# Patient Record
Sex: FEMALE | Race: BLACK OR AFRICAN AMERICAN | Employment: OTHER | URBAN - METROPOLITAN AREA
[De-identification: names, ages, dates, MRNs, and addresses within clinical notes are randomized per-mention and may not be internally consistent; named-entity substitution may affect disease eponyms.]

---

## 2017-02-25 ENCOUNTER — APPOINTMENT (OUTPATIENT)
Dept: GENERAL RADIOLOGY | Age: 72
DRG: 246 | End: 2017-02-25
Payer: MEDICARE

## 2017-02-25 ENCOUNTER — HOSPITAL ENCOUNTER (INPATIENT)
Age: 72
LOS: 3 days | Discharge: HOME | DRG: 246 | End: 2017-03-01
Attending: EMERGENCY MEDICINE | Admitting: INTERNAL MEDICINE
Payer: MEDICARE

## 2017-02-25 DIAGNOSIS — E11.42 TYPE 2 DIABETES MELLITUS WITH DIABETIC POLYNEUROPATHY, WITH LONG-TERM CURRENT USE OF INSULIN (HCC): ICD-10-CM

## 2017-02-25 DIAGNOSIS — I21.3 ST ELEVATION MYOCARDIAL INFARCTION (STEMI), UNSPECIFIED ARTERY (HCC): Primary | ICD-10-CM

## 2017-02-25 DIAGNOSIS — Z79.4 TYPE 2 DIABETES MELLITUS WITH DIABETIC POLYNEUROPATHY, WITH LONG-TERM CURRENT USE OF INSULIN (HCC): ICD-10-CM

## 2017-02-25 LAB
POC CREATININE: 0.8 MG/DL (ref 0.6–1.4)
POC TROPONIN I: 0 NG/ML (ref 0–0.1)
POC TROPONIN INTERP: NORMAL

## 2017-02-25 PROCEDURE — 82565 ASSAY OF CREATININE: CPT

## 2017-02-25 PROCEDURE — B205YZZ PLAIN RADIOGRAPHY OF LEFT HEART USING OTHER CONTRAST: ICD-10-PCS | Performed by: EMERGENCY MEDICINE

## 2017-02-25 PROCEDURE — 6360000002 HC RX W HCPCS

## 2017-02-25 PROCEDURE — 92928 PRQ TCAT PLMT NTRAC ST 1 LES: CPT | Performed by: INTERNAL MEDICINE

## 2017-02-25 PROCEDURE — 92929 HC PRQ CARD STENT W/ANGIO ADDL: CPT | Performed by: INTERNAL MEDICINE

## 2017-02-25 PROCEDURE — C1769 GUIDE WIRE: HCPCS

## 2017-02-25 PROCEDURE — C1874 STENT, COATED/COV W/DEL SYS: HCPCS

## 2017-02-25 PROCEDURE — 93005 ELECTROCARDIOGRAM TRACING: CPT

## 2017-02-25 PROCEDURE — 6360000002 HC RX W HCPCS: Performed by: EMERGENCY MEDICINE

## 2017-02-25 PROCEDURE — 4A023N7 MEASUREMENT OF CARDIAC SAMPLING AND PRESSURE, LEFT HEART, PERCUTANEOUS APPROACH: ICD-10-PCS | Performed by: EMERGENCY MEDICINE

## 2017-02-25 PROCEDURE — 027337Z DILATION OF CORONARY ARTERY, FOUR OR MORE ARTERIES WITH FOUR OR MORE DRUG-ELUTING INTRALUMINAL DEVICES, PERCUTANEOUS APPROACH: ICD-10-PCS | Performed by: EMERGENCY MEDICINE

## 2017-02-25 PROCEDURE — C1887 CATHETER, GUIDING: HCPCS

## 2017-02-25 PROCEDURE — 99285 EMERGENCY DEPT VISIT HI MDM: CPT

## 2017-02-25 PROCEDURE — B200YZZ PLAIN RADIOGRAPHY OF SINGLE CORONARY ARTERY USING OTHER CONTRAST: ICD-10-PCS | Performed by: EMERGENCY MEDICINE

## 2017-02-25 PROCEDURE — 92941 PRQ TRLML REVSC TOT OCCL AMI: CPT | Performed by: INTERNAL MEDICINE

## 2017-02-25 PROCEDURE — C1894 INTRO/SHEATH, NON-LASER: HCPCS

## 2017-02-25 PROCEDURE — C1768 GRAFT, VASCULAR: HCPCS

## 2017-02-25 PROCEDURE — 84484 ASSAY OF TROPONIN QUANT: CPT

## 2017-02-25 PROCEDURE — 71010 XR CHEST PORTABLE: CPT

## 2017-02-25 PROCEDURE — C1725 CATH, TRANSLUMIN NON-LASER: HCPCS

## 2017-02-25 PROCEDURE — 93458 L HRT ARTERY/VENTRICLE ANGIO: CPT | Performed by: INTERNAL MEDICINE

## 2017-02-25 RX ORDER — HEPARIN SODIUM 1000 [USP'U]/ML
4000 INJECTION, SOLUTION INTRAVENOUS; SUBCUTANEOUS PRN
Status: DISCONTINUED | OUTPATIENT
Start: 2017-02-25 | End: 2017-02-26

## 2017-02-25 RX ORDER — HEPARIN SODIUM 1000 [USP'U]/ML
2000 INJECTION, SOLUTION INTRAVENOUS; SUBCUTANEOUS PRN
Status: DISCONTINUED | OUTPATIENT
Start: 2017-02-25 | End: 2017-02-26

## 2017-02-25 RX ORDER — FENTANYL CITRATE 50 UG/ML
50 INJECTION, SOLUTION INTRAMUSCULAR; INTRAVENOUS ONCE
Status: COMPLETED | OUTPATIENT
Start: 2017-02-25 | End: 2017-02-25

## 2017-02-25 RX ORDER — HEPARIN SODIUM 10000 [USP'U]/100ML
INJECTION, SOLUTION INTRAVENOUS
Status: COMPLETED
Start: 2017-02-25 | End: 2017-02-25

## 2017-02-25 RX ORDER — HEPARIN SODIUM 1000 [USP'U]/ML
INJECTION, SOLUTION INTRAVENOUS; SUBCUTANEOUS
Status: COMPLETED
Start: 2017-02-25 | End: 2017-02-25

## 2017-02-25 RX ORDER — HEPARIN SODIUM 10000 [USP'U]/100ML
1000 INJECTION, SOLUTION INTRAVENOUS CONTINUOUS
Status: DISCONTINUED | OUTPATIENT
Start: 2017-02-25 | End: 2017-02-26

## 2017-02-25 RX ORDER — HEPARIN SODIUM 1000 [USP'U]/ML
4000 INJECTION, SOLUTION INTRAVENOUS; SUBCUTANEOUS ONCE
Status: COMPLETED | OUTPATIENT
Start: 2017-02-25 | End: 2017-02-25

## 2017-02-25 RX ORDER — ONDANSETRON 2 MG/ML
4 INJECTION INTRAMUSCULAR; INTRAVENOUS ONCE
Status: COMPLETED | OUTPATIENT
Start: 2017-02-25 | End: 2017-02-25

## 2017-02-25 RX ORDER — 0.9 % SODIUM CHLORIDE 0.9 %
1000 INTRAVENOUS SOLUTION INTRAVENOUS ONCE
Status: DISCONTINUED | OUTPATIENT
Start: 2017-02-25 | End: 2017-03-01 | Stop reason: HOSPADM

## 2017-02-25 RX ADMIN — HEPARIN SODIUM 1000 UNITS/HR: 10000 INJECTION, SOLUTION INTRAVENOUS at 23:28

## 2017-02-25 RX ADMIN — HEPARIN SODIUM AND DEXTROSE 1000 UNITS/HR: 10000; 5 INJECTION INTRAVENOUS at 23:28

## 2017-02-25 RX ADMIN — ONDANSETRON 4 MG: 2 INJECTION, SOLUTION INTRAMUSCULAR; INTRAVENOUS at 23:18

## 2017-02-25 RX ADMIN — HEPARIN SODIUM 4000 UNITS: 1000 INJECTION, SOLUTION INTRAVENOUS; SUBCUTANEOUS at 23:27

## 2017-02-25 RX ADMIN — FENTANYL CITRATE 50 MCG: 50 INJECTION, SOLUTION INTRAMUSCULAR; INTRAVENOUS at 23:18

## 2017-02-25 ASSESSMENT — PAIN DESCRIPTION - LOCATION: LOCATION: CHEST

## 2017-02-25 ASSESSMENT — PAIN DESCRIPTION - DESCRIPTORS: DESCRIPTORS: BURNING

## 2017-02-25 ASSESSMENT — PAIN DESCRIPTION - FREQUENCY: FREQUENCY: CONTINUOUS

## 2017-02-25 ASSESSMENT — PAIN DESCRIPTION - ORIENTATION: ORIENTATION: MID

## 2017-02-25 ASSESSMENT — PAIN SCALES - GENERAL
PAINLEVEL_OUTOF10: 10
PAINLEVEL_OUTOF10: 10

## 2017-02-25 ASSESSMENT — PAIN DESCRIPTION - PAIN TYPE: TYPE: ACUTE PAIN

## 2017-02-26 PROBLEM — I21.3 ST ELEVATION MYOCARDIAL INFARCTION (STEMI) (HCC): Status: ACTIVE | Noted: 2017-02-26

## 2017-02-26 PROBLEM — I21.11 ST ELEVATION (STEMI) MYOCARDIAL INFARCTION INVOLVING RIGHT CORONARY ARTERY (HCC): Status: ACTIVE | Noted: 2017-02-26

## 2017-02-26 LAB
ABSOLUTE EOS #: 0.1 K/UL (ref 0–0.4)
ABSOLUTE LYMPH #: 2 K/UL (ref 1–4.8)
ABSOLUTE MONO #: 0.4 K/UL (ref 0.1–1.2)
ALBUMIN SERPL-MCNC: 3.9 G/DL (ref 3.5–5.2)
ALBUMIN/GLOBULIN RATIO: 1.6 (ref 1–2.5)
ALP BLD-CCNC: 102 U/L (ref 35–104)
ALT SERPL-CCNC: 25 U/L (ref 5–33)
ANION GAP SERPL CALCULATED.3IONS-SCNC: 13 MMOL/L (ref 9–17)
AST SERPL-CCNC: 91 U/L
BASOPHILS # BLD: 1 % (ref 0–2)
BASOPHILS ABSOLUTE: 0.1 K/UL (ref 0–0.2)
BILIRUB SERPL-MCNC: 0.23 MG/DL (ref 0.3–1.2)
BUN BLDV-MCNC: 14 MG/DL (ref 8–23)
BUN/CREAT BLD: ABNORMAL (ref 9–20)
CALCIUM SERPL-MCNC: 8.9 MG/DL (ref 8.6–10.4)
CHLORIDE BLD-SCNC: 93 MMOL/L (ref 98–107)
CHOLESTEROL/HDL RATIO: 4.7
CHOLESTEROL: 249 MG/DL
CO2: 25 MMOL/L (ref 20–31)
CREAT SERPL-MCNC: 0.52 MG/DL (ref 0.5–0.9)
DIFFERENTIAL TYPE: NORMAL
EOSINOPHILS RELATIVE PERCENT: 2 % (ref 1–4)
ESTIMATED AVERAGE GLUCOSE: 266 MG/DL
GFR AFRICAN AMERICAN: >60 ML/MIN
GFR NON-AFRICAN AMERICAN: >60 ML/MIN
GFR SERPL CREATININE-BSD FRML MDRD: ABNORMAL ML/MIN/{1.73_M2}
GFR SERPL CREATININE-BSD FRML MDRD: ABNORMAL ML/MIN/{1.73_M2}
GLUCOSE BLD-MCNC: 246 MG/DL (ref 65–105)
GLUCOSE BLD-MCNC: 252 MG/DL (ref 65–105)
GLUCOSE BLD-MCNC: 320 MG/DL (ref 65–105)
GLUCOSE BLD-MCNC: 327 MG/DL (ref 70–99)
GLUCOSE BLD-MCNC: 335 MG/DL (ref 65–105)
HBA1C MFR BLD: 10.9 % (ref 4–6)
HCT VFR BLD CALC: 37.6 % (ref 36–46)
HDLC SERPL-MCNC: 53 MG/DL
HEMOGLOBIN: 12.9 G/DL (ref 12–16)
LDL CHOLESTEROL: 156 MG/DL (ref 0–130)
LYMPHOCYTES # BLD: 27 % (ref 24–44)
MAGNESIUM: 1.9 MG/DL (ref 1.6–2.6)
MCH RBC QN AUTO: 30.2 PG (ref 26–34)
MCHC RBC AUTO-ENTMCNC: 34.4 G/DL (ref 31–37)
MCV RBC AUTO: 87.8 FL (ref 80–100)
MONOCYTES # BLD: 6 % (ref 2–11)
PDW BLD-RTO: 14.3 % (ref 12.5–15.4)
PLATELET # BLD: 160 K/UL (ref 140–450)
PLATELET ESTIMATE: NORMAL
PMV BLD AUTO: 9.2 FL (ref 6–12)
POTASSIUM SERPL-SCNC: 3.7 MMOL/L (ref 3.7–5.3)
RBC # BLD: 4.28 M/UL (ref 4–5.2)
RBC # BLD: NORMAL 10*6/UL
SEG NEUTROPHILS: 64 % (ref 36–66)
SEGMENTED NEUTROPHILS ABSOLUTE COUNT: 4.9 K/UL (ref 1.8–7.7)
SODIUM BLD-SCNC: 131 MMOL/L (ref 135–144)
TOTAL PROTEIN: 6.3 G/DL (ref 6.4–8.3)
TRIGL SERPL-MCNC: 199 MG/DL
TROPONIN INTERP: ABNORMAL
TROPONIN INTERP: ABNORMAL
TROPONIN T: 1 NG/ML
TROPONIN T: 2.36 NG/ML
VLDLC SERPL CALC-MCNC: ABNORMAL MG/DL (ref 1–30)
WBC # BLD: 7.6 K/UL (ref 3.5–11)
WBC # BLD: NORMAL 10*3/UL

## 2017-02-26 PROCEDURE — 6360000002 HC RX W HCPCS: Performed by: INTERNAL MEDICINE

## 2017-02-26 PROCEDURE — 80053 COMPREHEN METABOLIC PANEL: CPT

## 2017-02-26 PROCEDURE — 83735 ASSAY OF MAGNESIUM: CPT

## 2017-02-26 PROCEDURE — 2000000000 HC ICU R&B

## 2017-02-26 PROCEDURE — 82947 ASSAY GLUCOSE BLOOD QUANT: CPT

## 2017-02-26 PROCEDURE — 84484 ASSAY OF TROPONIN QUANT: CPT

## 2017-02-26 PROCEDURE — 2580000003 HC RX 258: Performed by: INTERNAL MEDICINE

## 2017-02-26 PROCEDURE — 83036 HEMOGLOBIN GLYCOSYLATED A1C: CPT

## 2017-02-26 PROCEDURE — 6360000002 HC RX W HCPCS

## 2017-02-26 PROCEDURE — 93005 ELECTROCARDIOGRAM TRACING: CPT

## 2017-02-26 PROCEDURE — 2500000003 HC RX 250 WO HCPCS

## 2017-02-26 PROCEDURE — 99221 1ST HOSP IP/OBS SF/LOW 40: CPT | Performed by: FAMILY MEDICINE

## 2017-02-26 PROCEDURE — 6370000000 HC RX 637 (ALT 250 FOR IP): Performed by: INTERNAL MEDICINE

## 2017-02-26 PROCEDURE — 6370000000 HC RX 637 (ALT 250 FOR IP): Performed by: FAMILY MEDICINE

## 2017-02-26 PROCEDURE — 36415 COLL VENOUS BLD VENIPUNCTURE: CPT

## 2017-02-26 PROCEDURE — 85025 COMPLETE CBC W/AUTO DIFF WBC: CPT

## 2017-02-26 PROCEDURE — 6370000000 HC RX 637 (ALT 250 FOR IP)

## 2017-02-26 PROCEDURE — 2580000003 HC RX 258

## 2017-02-26 PROCEDURE — 80061 LIPID PANEL: CPT

## 2017-02-26 RX ORDER — ATORVASTATIN CALCIUM 80 MG/1
80 TABLET, FILM COATED ORAL NIGHTLY
Status: DISCONTINUED | OUTPATIENT
Start: 2017-02-26 | End: 2017-03-01 | Stop reason: HOSPADM

## 2017-02-26 RX ORDER — GABAPENTIN 100 MG/1
200 CAPSULE ORAL 3 TIMES DAILY
Status: DISCONTINUED | OUTPATIENT
Start: 2017-02-26 | End: 2017-03-01 | Stop reason: HOSPADM

## 2017-02-26 RX ORDER — LORAZEPAM 0.5 MG/1
0.5 TABLET ORAL EVERY 8 HOURS PRN
Status: DISCONTINUED | OUTPATIENT
Start: 2017-02-26 | End: 2017-03-01 | Stop reason: HOSPADM

## 2017-02-26 RX ORDER — DEXTROSE MONOHYDRATE 25 G/50ML
12.5 INJECTION, SOLUTION INTRAVENOUS PRN
Status: DISCONTINUED | OUTPATIENT
Start: 2017-02-26 | End: 2017-03-01 | Stop reason: HOSPADM

## 2017-02-26 RX ORDER — NITROGLYCERIN 0.4 MG/1
0.4 TABLET SUBLINGUAL EVERY 5 MIN PRN
Status: DISCONTINUED | OUTPATIENT
Start: 2017-02-26 | End: 2017-03-01 | Stop reason: HOSPADM

## 2017-02-26 RX ORDER — PANTOPRAZOLE SODIUM 40 MG/1
40 TABLET, DELAYED RELEASE ORAL
Status: DISCONTINUED | OUTPATIENT
Start: 2017-02-26 | End: 2017-03-01 | Stop reason: HOSPADM

## 2017-02-26 RX ORDER — TRAMADOL HYDROCHLORIDE 50 MG/1
50 TABLET ORAL EVERY 8 HOURS PRN
Status: DISCONTINUED | OUTPATIENT
Start: 2017-02-26 | End: 2017-03-01 | Stop reason: HOSPADM

## 2017-02-26 RX ORDER — POTASSIUM CHLORIDE 20MEQ/15ML
40 LIQUID (ML) ORAL PRN
Status: DISCONTINUED | OUTPATIENT
Start: 2017-02-26 | End: 2017-03-01 | Stop reason: HOSPADM

## 2017-02-26 RX ORDER — LISINOPRIL 20 MG/1
20 TABLET ORAL DAILY
Status: DISCONTINUED | OUTPATIENT
Start: 2017-02-27 | End: 2017-03-01 | Stop reason: HOSPADM

## 2017-02-26 RX ORDER — POTASSIUM CHLORIDE 20 MEQ/1
40 TABLET, EXTENDED RELEASE ORAL PRN
Status: DISCONTINUED | OUTPATIENT
Start: 2017-02-26 | End: 2017-03-01 | Stop reason: HOSPADM

## 2017-02-26 RX ORDER — SPIRONOLACTONE 25 MG/1
25 TABLET ORAL DAILY
COMMUNITY
End: 2021-08-20 | Stop reason: SDUPTHER

## 2017-02-26 RX ORDER — ACETAMINOPHEN 325 MG/1
650 TABLET ORAL EVERY 4 HOURS PRN
Status: DISCONTINUED | OUTPATIENT
Start: 2017-02-26 | End: 2017-03-01 | Stop reason: HOSPADM

## 2017-02-26 RX ORDER — NIFEDIPINE 60 MG/1
60 TABLET, FILM COATED, EXTENDED RELEASE ORAL DAILY
Status: DISCONTINUED | OUTPATIENT
Start: 2017-02-27 | End: 2017-03-01 | Stop reason: HOSPADM

## 2017-02-26 RX ORDER — POTASSIUM CHLORIDE 7.45 MG/ML
10 INJECTION INTRAVENOUS PRN
Status: DISCONTINUED | OUTPATIENT
Start: 2017-02-26 | End: 2017-03-01 | Stop reason: HOSPADM

## 2017-02-26 RX ORDER — M-VIT,TX,IRON,MINS/CALC/FOLIC 27MG-0.4MG
1 TABLET ORAL DAILY
Status: DISCONTINUED | OUTPATIENT
Start: 2017-02-26 | End: 2017-03-01 | Stop reason: HOSPADM

## 2017-02-26 RX ORDER — ONDANSETRON 2 MG/ML
4 INJECTION INTRAMUSCULAR; INTRAVENOUS EVERY 6 HOURS PRN
Status: DISCONTINUED | OUTPATIENT
Start: 2017-02-26 | End: 2017-03-01 | Stop reason: HOSPADM

## 2017-02-26 RX ORDER — POTASSIUM CHLORIDE 20 MEQ/1
20 TABLET, EXTENDED RELEASE ORAL
Status: DISCONTINUED | OUTPATIENT
Start: 2017-02-27 | End: 2017-02-26

## 2017-02-26 RX ORDER — ASPIRIN 81 MG/1
81 TABLET ORAL DAILY
Status: DISCONTINUED | OUTPATIENT
Start: 2017-02-26 | End: 2017-03-01 | Stop reason: HOSPADM

## 2017-02-26 RX ORDER — HYDRALAZINE HYDROCHLORIDE 20 MG/ML
10 INJECTION INTRAMUSCULAR; INTRAVENOUS EVERY 10 MIN PRN
Status: DISCONTINUED | OUTPATIENT
Start: 2017-02-26 | End: 2017-03-01 | Stop reason: HOSPADM

## 2017-02-26 RX ORDER — OXYCODONE HYDROCHLORIDE AND ACETAMINOPHEN 5; 325 MG/1; MG/1
1 TABLET ORAL EVERY 8 HOURS PRN
Status: DISCONTINUED | OUTPATIENT
Start: 2017-02-26 | End: 2017-03-01 | Stop reason: HOSPADM

## 2017-02-26 RX ORDER — CYCLOBENZAPRINE HCL 10 MG
10 TABLET ORAL 3 TIMES DAILY PRN
Status: DISCONTINUED | OUTPATIENT
Start: 2017-02-26 | End: 2017-03-01 | Stop reason: HOSPADM

## 2017-02-26 RX ORDER — NICOTINE POLACRILEX 4 MG
15 LOZENGE BUCCAL PRN
Status: DISCONTINUED | OUTPATIENT
Start: 2017-02-26 | End: 2017-03-01 | Stop reason: HOSPADM

## 2017-02-26 RX ORDER — DEXTROSE MONOHYDRATE 50 MG/ML
100 INJECTION, SOLUTION INTRAVENOUS PRN
Status: DISCONTINUED | OUTPATIENT
Start: 2017-02-26 | End: 2017-03-01 | Stop reason: HOSPADM

## 2017-02-26 RX ORDER — SODIUM CHLORIDE 0.9 % (FLUSH) 0.9 %
10 SYRINGE (ML) INJECTION EVERY 12 HOURS SCHEDULED
Status: DISCONTINUED | OUTPATIENT
Start: 2017-02-26 | End: 2017-03-01 | Stop reason: HOSPADM

## 2017-02-26 RX ORDER — ONDANSETRON 4 MG/1
4 TABLET, FILM COATED ORAL EVERY 8 HOURS PRN
Status: DISCONTINUED | OUTPATIENT
Start: 2017-02-26 | End: 2017-03-01 | Stop reason: HOSPADM

## 2017-02-26 RX ORDER — NIFEDIPINE 30 MG/1
30 TABLET, EXTENDED RELEASE ORAL DAILY
Status: DISCONTINUED | OUTPATIENT
Start: 2017-02-26 | End: 2017-02-26

## 2017-02-26 RX ORDER — DOCUSATE SODIUM 100 MG/1
100 CAPSULE, LIQUID FILLED ORAL 2 TIMES DAILY
Status: DISCONTINUED | OUTPATIENT
Start: 2017-02-26 | End: 2017-03-01 | Stop reason: HOSPADM

## 2017-02-26 RX ORDER — GLIMEPIRIDE 2 MG/1
4 TABLET ORAL
Status: DISCONTINUED | OUTPATIENT
Start: 2017-02-26 | End: 2017-03-01 | Stop reason: HOSPADM

## 2017-02-26 RX ORDER — POTASSIUM BICARBONATE 25 MEQ/1
25 TABLET, EFFERVESCENT ORAL 2 TIMES DAILY
Status: DISCONTINUED | OUTPATIENT
Start: 2017-02-26 | End: 2017-02-26

## 2017-02-26 RX ORDER — ALISKIREN 150 MG/1
150 TABLET, FILM COATED ORAL DAILY
Status: DISCONTINUED | OUTPATIENT
Start: 2017-02-26 | End: 2017-02-26

## 2017-02-26 RX ORDER — SODIUM CHLORIDE 0.9 % (FLUSH) 0.9 %
10 SYRINGE (ML) INJECTION PRN
Status: DISCONTINUED | OUTPATIENT
Start: 2017-02-26 | End: 2017-03-01 | Stop reason: HOSPADM

## 2017-02-26 RX ORDER — SPIRONOLACTONE 25 MG/1
25 TABLET ORAL DAILY
Status: DISCONTINUED | OUTPATIENT
Start: 2017-02-26 | End: 2017-02-26 | Stop reason: SDUPTHER

## 2017-02-26 RX ORDER — INSULIN GLARGINE 100 [IU]/ML
60 INJECTION, SOLUTION SUBCUTANEOUS 2 TIMES DAILY
Status: DISCONTINUED | OUTPATIENT
Start: 2017-02-26 | End: 2017-02-27

## 2017-02-26 RX ORDER — SPIRONOLACTONE 25 MG/1
25 TABLET ORAL DAILY
Status: DISCONTINUED | OUTPATIENT
Start: 2017-02-26 | End: 2017-03-01 | Stop reason: HOSPADM

## 2017-02-26 RX ORDER — CARVEDILOL 25 MG/1
25 TABLET ORAL 2 TIMES DAILY WITH MEALS
Status: DISCONTINUED | OUTPATIENT
Start: 2017-02-26 | End: 2017-03-01 | Stop reason: HOSPADM

## 2017-02-26 RX ORDER — MELOXICAM 15 MG/1
15 TABLET ORAL DAILY
Status: ON HOLD | COMMUNITY
Start: 2016-07-27 | End: 2017-02-27 | Stop reason: HOSPADM

## 2017-02-26 RX ADMIN — GLIMEPIRIDE 4 MG: 2 TABLET ORAL at 09:43

## 2017-02-26 RX ADMIN — INSULIN LISPRO 3 UNITS: 100 INJECTION, SOLUTION INTRAVENOUS; SUBCUTANEOUS at 17:48

## 2017-02-26 RX ADMIN — ATORVASTATIN CALCIUM 80 MG: 80 TABLET, FILM COATED ORAL at 20:19

## 2017-02-26 RX ADMIN — DOCUSATE SODIUM 100 MG: 100 CAPSULE, LIQUID FILLED ORAL at 09:42

## 2017-02-26 RX ADMIN — NIFEDIPINE 30 MG: 30 TABLET, FILM COATED, EXTENDED RELEASE ORAL at 09:42

## 2017-02-26 RX ADMIN — TICAGRELOR 90 MG: 90 TABLET ORAL at 20:19

## 2017-02-26 RX ADMIN — DOCUSATE SODIUM 100 MG: 100 CAPSULE, LIQUID FILLED ORAL at 20:19

## 2017-02-26 RX ADMIN — INSULIN LISPRO 10 UNITS: 100 INJECTION, SOLUTION INTRAVENOUS; SUBCUTANEOUS at 16:31

## 2017-02-26 RX ADMIN — OXYCODONE HYDROCHLORIDE AND ACETAMINOPHEN 1 TABLET: 5; 325 TABLET ORAL at 01:41

## 2017-02-26 RX ADMIN — SPIRONOLACTONE 25 MG: 25 TABLET ORAL at 13:13

## 2017-02-26 RX ADMIN — CARVEDILOL 25 MG: 25 TABLET, FILM COATED ORAL at 16:37

## 2017-02-26 RX ADMIN — PANTOPRAZOLE SODIUM 40 MG: 40 TABLET, DELAYED RELEASE ORAL at 16:31

## 2017-02-26 RX ADMIN — TICAGRELOR 90 MG: 90 TABLET ORAL at 09:43

## 2017-02-26 RX ADMIN — ASPIRIN 81 MG: 81 TABLET, COATED ORAL at 09:43

## 2017-02-26 RX ADMIN — OXYCODONE HYDROCHLORIDE AND ACETAMINOPHEN 1 TABLET: 5; 325 TABLET ORAL at 16:30

## 2017-02-26 RX ADMIN — HYDRALAZINE HYDROCHLORIDE 10 MG: 20 INJECTION INTRAMUSCULAR; INTRAVENOUS at 04:07

## 2017-02-26 RX ADMIN — ATORVASTATIN CALCIUM 80 MG: 80 TABLET, FILM COATED ORAL at 04:07

## 2017-02-26 RX ADMIN — Medication 10 ML: at 20:15

## 2017-02-26 RX ADMIN — GABAPENTIN 200 MG: 100 CAPSULE ORAL at 14:42

## 2017-02-26 RX ADMIN — GABAPENTIN 200 MG: 100 CAPSULE ORAL at 20:19

## 2017-02-26 RX ADMIN — CARVEDILOL 25 MG: 25 TABLET, FILM COATED ORAL at 09:42

## 2017-02-26 RX ADMIN — INSULIN GLARGINE 60 UNITS: 100 INJECTION, SOLUTION SUBCUTANEOUS at 20:20

## 2017-02-26 RX ADMIN — MULTIPLE VITAMINS W/ MINERALS TAB 1 TABLET: TAB at 09:42

## 2017-02-26 RX ADMIN — GABAPENTIN 200 MG: 100 CAPSULE ORAL at 09:42

## 2017-02-26 RX ADMIN — ALISKIREN HEMIFUMARATE 150 MG: 150 TABLET, FILM COATED ORAL at 09:42

## 2017-02-26 ASSESSMENT — ENCOUNTER SYMPTOMS
SHORTNESS OF BREATH: 0
NAUSEA: 1
SHORTNESS OF BREATH: 1
SINUS PRESSURE: 0
WHEEZING: 0
COUGH: 0
NAUSEA: 0
CONSTIPATION: 0
DIARRHEA: 0
BLOOD IN STOOL: 0
SORE THROAT: 0
VOMITING: 0
VOICE CHANGE: 0
ABDOMINAL PAIN: 0

## 2017-02-26 ASSESSMENT — PAIN SCALES - GENERAL
PAINLEVEL_OUTOF10: 8
PAINLEVEL_OUTOF10: 7
PAINLEVEL_OUTOF10: 8

## 2017-02-26 ASSESSMENT — PAIN DESCRIPTION - PAIN TYPE: TYPE: ACUTE PAIN

## 2017-02-26 ASSESSMENT — PAIN DESCRIPTION - LOCATION: LOCATION: GROIN;CHEST

## 2017-02-27 ENCOUNTER — APPOINTMENT (OUTPATIENT)
Dept: GENERAL RADIOLOGY | Age: 72
DRG: 246 | End: 2017-02-27
Payer: MEDICARE

## 2017-02-27 LAB
ALBUMIN SERPL-MCNC: 3.5 G/DL (ref 3.5–5.2)
ALBUMIN/GLOBULIN RATIO: 1.6 (ref 1–2.5)
ALP BLD-CCNC: 85 U/L (ref 35–104)
ALT SERPL-CCNC: 21 U/L (ref 5–33)
ANION GAP SERPL CALCULATED.3IONS-SCNC: 11 MMOL/L (ref 9–17)
AST SERPL-CCNC: 54 U/L
BILIRUB SERPL-MCNC: 0.57 MG/DL (ref 0.3–1.2)
BILIRUBIN DIRECT: 0.14 MG/DL
BILIRUBIN, INDIRECT: 0.43 MG/DL (ref 0–1)
BUN BLDV-MCNC: 18 MG/DL (ref 8–23)
BUN/CREAT BLD: ABNORMAL (ref 9–20)
CALCIUM SERPL-MCNC: 8.7 MG/DL (ref 8.6–10.4)
CHLORIDE BLD-SCNC: 100 MMOL/L (ref 98–107)
CO2: 26 MMOL/L (ref 20–31)
CREAT SERPL-MCNC: 0.69 MG/DL (ref 0.5–0.9)
EKG ATRIAL RATE: 72 BPM
EKG ATRIAL RATE: 83 BPM
EKG ATRIAL RATE: 87 BPM
EKG P AXIS: 44 DEGREES
EKG P AXIS: 77 DEGREES
EKG P AXIS: 95 DEGREES
EKG P-R INTERVAL: 146 MS
EKG P-R INTERVAL: 156 MS
EKG P-R INTERVAL: 160 MS
EKG Q-T INTERVAL: 366 MS
EKG Q-T INTERVAL: 400 MS
EKG Q-T INTERVAL: 406 MS
EKG QRS DURATION: 86 MS
EKG QRS DURATION: 90 MS
EKG QRS DURATION: 94 MS
EKG QTC CALCULATION (BAZETT): 430 MS
EKG QTC CALCULATION (BAZETT): 438 MS
EKG QTC CALCULATION (BAZETT): 481 MS
EKG R AXIS: 13 DEGREES
EKG R AXIS: 18 DEGREES
EKG T AXIS: 108 DEGREES
EKG T AXIS: 119 DEGREES
EKG T AXIS: 128 DEGREES
EKG VENTRICULAR RATE: 70 BPM
EKG VENTRICULAR RATE: 83 BPM
EKG VENTRICULAR RATE: 87 BPM
GFR AFRICAN AMERICAN: >60 ML/MIN
GFR NON-AFRICAN AMERICAN: >60 ML/MIN
GFR SERPL CREATININE-BSD FRML MDRD: ABNORMAL ML/MIN/{1.73_M2}
GFR SERPL CREATININE-BSD FRML MDRD: ABNORMAL ML/MIN/{1.73_M2}
GLOBULIN: ABNORMAL G/DL (ref 1.5–3.8)
GLUCOSE BLD-MCNC: 140 MG/DL (ref 65–105)
GLUCOSE BLD-MCNC: 219 MG/DL (ref 65–105)
GLUCOSE BLD-MCNC: 239 MG/DL (ref 65–105)
GLUCOSE BLD-MCNC: 240 MG/DL (ref 70–99)
GLUCOSE BLD-MCNC: 97 MG/DL (ref 65–105)
LV EF: 53 %
LVEF MODALITY: NORMAL
POTASSIUM SERPL-SCNC: 3.9 MMOL/L (ref 3.7–5.3)
SODIUM BLD-SCNC: 137 MMOL/L (ref 135–144)
TOTAL PROTEIN: 5.7 G/DL (ref 6.4–8.3)

## 2017-02-27 PROCEDURE — 2060000000 HC ICU INTERMEDIATE R&B

## 2017-02-27 PROCEDURE — 6370000000 HC RX 637 (ALT 250 FOR IP): Performed by: INTERNAL MEDICINE

## 2017-02-27 PROCEDURE — 71010 XR CHEST PORTABLE: CPT

## 2017-02-27 PROCEDURE — 99232 SBSQ HOSP IP/OBS MODERATE 35: CPT | Performed by: FAMILY MEDICINE

## 2017-02-27 PROCEDURE — 71010 XR CHEST PORTABLE: CPT | Performed by: RADIOLOGY

## 2017-02-27 PROCEDURE — 96374 THER/PROPH/DIAG INJ IV PUSH: CPT

## 2017-02-27 PROCEDURE — 80076 HEPATIC FUNCTION PANEL: CPT

## 2017-02-27 PROCEDURE — 93306 TTE W/DOPPLER COMPLETE: CPT

## 2017-02-27 PROCEDURE — 96375 TX/PRO/DX INJ NEW DRUG ADDON: CPT

## 2017-02-27 PROCEDURE — 2580000003 HC RX 258: Performed by: INTERNAL MEDICINE

## 2017-02-27 PROCEDURE — 80048 BASIC METABOLIC PNL TOTAL CA: CPT

## 2017-02-27 PROCEDURE — 6370000000 HC RX 637 (ALT 250 FOR IP): Performed by: FAMILY MEDICINE

## 2017-02-27 PROCEDURE — 36415 COLL VENOUS BLD VENIPUNCTURE: CPT

## 2017-02-27 PROCEDURE — 82947 ASSAY GLUCOSE BLOOD QUANT: CPT

## 2017-02-27 PROCEDURE — G0108 DIAB MANAGE TRN  PER INDIV: HCPCS

## 2017-02-27 RX ORDER — INSULIN GLARGINE 100 [IU]/ML
66 INJECTION, SOLUTION SUBCUTANEOUS 2 TIMES DAILY
Status: DISCONTINUED | OUTPATIENT
Start: 2017-02-27 | End: 2017-03-01 | Stop reason: HOSPADM

## 2017-02-27 RX ADMIN — GLIMEPIRIDE 4 MG: 2 TABLET ORAL at 08:29

## 2017-02-27 RX ADMIN — ATORVASTATIN CALCIUM 80 MG: 80 TABLET, FILM COATED ORAL at 20:01

## 2017-02-27 RX ADMIN — OXYCODONE HYDROCHLORIDE AND ACETAMINOPHEN 1 TABLET: 5; 325 TABLET ORAL at 15:00

## 2017-02-27 RX ADMIN — ASPIRIN 81 MG: 81 TABLET, COATED ORAL at 08:28

## 2017-02-27 RX ADMIN — Medication 66 UNITS: at 08:58

## 2017-02-27 RX ADMIN — NIFEDIPINE 60 MG: 60 TABLET, FILM COATED, EXTENDED RELEASE ORAL at 08:31

## 2017-02-27 RX ADMIN — TICAGRELOR 90 MG: 90 TABLET ORAL at 08:30

## 2017-02-27 RX ADMIN — MULTIPLE VITAMINS W/ MINERALS TAB 1 TABLET: TAB at 08:30

## 2017-02-27 RX ADMIN — INSULIN LISPRO 10 UNITS: 100 INJECTION, SOLUTION INTRAVENOUS; SUBCUTANEOUS at 12:44

## 2017-02-27 RX ADMIN — Medication 10 ML: at 20:04

## 2017-02-27 RX ADMIN — DOCUSATE SODIUM 100 MG: 100 CAPSULE, LIQUID FILLED ORAL at 08:29

## 2017-02-27 RX ADMIN — LISINOPRIL 20 MG: 20 TABLET ORAL at 08:31

## 2017-02-27 RX ADMIN — CARVEDILOL 25 MG: 25 TABLET, FILM COATED ORAL at 08:28

## 2017-02-27 RX ADMIN — OXYCODONE HYDROCHLORIDE AND ACETAMINOPHEN 1 TABLET: 5; 325 TABLET ORAL at 00:44

## 2017-02-27 RX ADMIN — SPIRONOLACTONE 25 MG: 25 TABLET ORAL at 08:30

## 2017-02-27 RX ADMIN — PANTOPRAZOLE SODIUM 40 MG: 40 TABLET, DELAYED RELEASE ORAL at 08:30

## 2017-02-27 RX ADMIN — INSULIN LISPRO 2 UNITS: 100 INJECTION, SOLUTION INTRAVENOUS; SUBCUTANEOUS at 12:45

## 2017-02-27 RX ADMIN — INSULIN LISPRO 10 UNITS: 100 INJECTION, SOLUTION INTRAVENOUS; SUBCUTANEOUS at 08:32

## 2017-02-27 RX ADMIN — TICAGRELOR 90 MG: 90 TABLET ORAL at 20:01

## 2017-02-27 RX ADMIN — LINAGLIPTIN 5 MG: 5 TABLET, FILM COATED ORAL at 08:31

## 2017-02-27 RX ADMIN — GABAPENTIN 200 MG: 100 CAPSULE ORAL at 08:27

## 2017-02-27 RX ADMIN — GABAPENTIN 200 MG: 100 CAPSULE ORAL at 14:57

## 2017-02-27 RX ADMIN — GABAPENTIN 200 MG: 100 CAPSULE ORAL at 20:01

## 2017-02-27 RX ADMIN — PANTOPRAZOLE SODIUM 40 MG: 40 TABLET, DELAYED RELEASE ORAL at 14:57

## 2017-02-27 RX ADMIN — CARVEDILOL 25 MG: 25 TABLET, FILM COATED ORAL at 17:42

## 2017-02-27 RX ADMIN — DOCUSATE SODIUM 100 MG: 100 CAPSULE, LIQUID FILLED ORAL at 20:01

## 2017-02-27 RX ADMIN — INSULIN LISPRO 2 UNITS: 100 INJECTION, SOLUTION INTRAVENOUS; SUBCUTANEOUS at 08:34

## 2017-02-27 ASSESSMENT — ENCOUNTER SYMPTOMS
DIARRHEA: 0
CONSTIPATION: 0
VOMITING: 0
COUGH: 0
WHEEZING: 0
SINUS PRESSURE: 0
SORE THROAT: 0
VOICE CHANGE: 0
ABDOMINAL PAIN: 0
SHORTNESS OF BREATH: 0
NAUSEA: 0
BLOOD IN STOOL: 0

## 2017-02-27 ASSESSMENT — PAIN SCALES - GENERAL
PAINLEVEL_OUTOF10: 7
PAINLEVEL_OUTOF10: 7

## 2017-02-28 ENCOUNTER — APPOINTMENT (OUTPATIENT)
Dept: GENERAL RADIOLOGY | Age: 72
DRG: 246 | End: 2017-02-28
Payer: MEDICARE

## 2017-02-28 LAB
ADENOVIRUS PCR: NOT DETECTED
BNP INTERPRETATION: ABNORMAL
BORDETELLA PERTUSSIS PCR: NOT DETECTED
CHLAMYDIA PNEUMONIAE BY PCR: NOT DETECTED
CORONAVIRUS 229E PCR: NOT DETECTED
CORONAVIRUS HKU1 PCR: NOT DETECTED
CORONAVIRUS NL63 PCR: NOT DETECTED
CORONAVIRUS OC43 PCR: NOT DETECTED
GLUCOSE BLD-MCNC: 112 MG/DL (ref 65–105)
GLUCOSE BLD-MCNC: 134 MG/DL (ref 65–105)
GLUCOSE BLD-MCNC: 166 MG/DL (ref 65–105)
GLUCOSE BLD-MCNC: 174 MG/DL (ref 65–105)
GLUCOSE BLD-MCNC: 234 MG/DL (ref 65–105)
GLUCOSE BLD-MCNC: 57 MG/DL (ref 65–105)
GLUCOSE BLD-MCNC: 78 MG/DL (ref 65–105)
HUMAN METAPNEUMOVIRUS PCR: NOT DETECTED
INFLUENZA A BY PCR: NOT DETECTED
INFLUENZA A H1 (2009) PCR: NORMAL
INFLUENZA A H1 PCR: NORMAL
INFLUENZA A H3 PCR: NORMAL
INFLUENZA B BY PCR: NOT DETECTED
MYCOPLASMA PNEUMONIAE PCR: NOT DETECTED
PARAINFLUENZA 1 PCR: NOT DETECTED
PARAINFLUENZA 2 PCR: NOT DETECTED
PARAINFLUENZA 3 PCR: NOT DETECTED
PARAINFLUENZA 4 PCR: NOT DETECTED
PRO-BNP: 359 PG/ML
RESP SYNCYTIAL VIRUS PCR: NOT DETECTED
RHINO/ENTEROVIRUS PCR: NOT DETECTED
SOURCE: NORMAL

## 2017-02-28 PROCEDURE — 6370000000 HC RX 637 (ALT 250 FOR IP): Performed by: FAMILY MEDICINE

## 2017-02-28 PROCEDURE — 87486 CHLMYD PNEUM DNA AMP PROBE: CPT

## 2017-02-28 PROCEDURE — 36415 COLL VENOUS BLD VENIPUNCTURE: CPT

## 2017-02-28 PROCEDURE — 83880 ASSAY OF NATRIURETIC PEPTIDE: CPT

## 2017-02-28 PROCEDURE — 99232 SBSQ HOSP IP/OBS MODERATE 35: CPT | Performed by: INTERNAL MEDICINE

## 2017-02-28 PROCEDURE — 71010 XR CHEST PORTABLE: CPT

## 2017-02-28 PROCEDURE — 6360000002 HC RX W HCPCS: Performed by: INTERNAL MEDICINE

## 2017-02-28 PROCEDURE — G0009 ADMIN PNEUMOCOCCAL VACCINE: HCPCS | Performed by: INTERNAL MEDICINE

## 2017-02-28 PROCEDURE — 6370000000 HC RX 637 (ALT 250 FOR IP): Performed by: INTERNAL MEDICINE

## 2017-02-28 PROCEDURE — 87798 DETECT AGENT NOS DNA AMP: CPT

## 2017-02-28 PROCEDURE — 87581 M.PNEUMON DNA AMP PROBE: CPT

## 2017-02-28 PROCEDURE — 82947 ASSAY GLUCOSE BLOOD QUANT: CPT

## 2017-02-28 PROCEDURE — 87633 RESP VIRUS 12-25 TARGETS: CPT

## 2017-02-28 PROCEDURE — 2060000000 HC ICU INTERMEDIATE R&B

## 2017-02-28 PROCEDURE — 90670 PCV13 VACCINE IM: CPT | Performed by: INTERNAL MEDICINE

## 2017-02-28 PROCEDURE — 2580000003 HC RX 258: Performed by: INTERNAL MEDICINE

## 2017-02-28 RX ORDER — FUROSEMIDE 10 MG/ML
40 INJECTION INTRAMUSCULAR; INTRAVENOUS ONCE
Status: COMPLETED | OUTPATIENT
Start: 2017-02-28 | End: 2017-02-28

## 2017-02-28 RX ADMIN — ASPIRIN 81 MG: 81 TABLET, COATED ORAL at 08:26

## 2017-02-28 RX ADMIN — GLIMEPIRIDE 4 MG: 2 TABLET ORAL at 08:26

## 2017-02-28 RX ADMIN — GABAPENTIN 200 MG: 100 CAPSULE ORAL at 08:26

## 2017-02-28 RX ADMIN — SPIRONOLACTONE 25 MG: 25 TABLET ORAL at 08:26

## 2017-02-28 RX ADMIN — INSULIN LISPRO 10 UNITS: 100 INJECTION, SOLUTION INTRAVENOUS; SUBCUTANEOUS at 12:00

## 2017-02-28 RX ADMIN — Medication 66 UNITS: at 23:04

## 2017-02-28 RX ADMIN — ATORVASTATIN CALCIUM 80 MG: 80 TABLET, FILM COATED ORAL at 20:46

## 2017-02-28 RX ADMIN — INSULIN LISPRO 10 UNITS: 100 INJECTION, SOLUTION INTRAVENOUS; SUBCUTANEOUS at 08:38

## 2017-02-28 RX ADMIN — LISINOPRIL 20 MG: 20 TABLET ORAL at 08:26

## 2017-02-28 RX ADMIN — ENOXAPARIN SODIUM 40 MG: 40 INJECTION SUBCUTANEOUS at 15:58

## 2017-02-28 RX ADMIN — NIFEDIPINE 60 MG: 60 TABLET, FILM COATED, EXTENDED RELEASE ORAL at 08:26

## 2017-02-28 RX ADMIN — CARVEDILOL 25 MG: 25 TABLET, FILM COATED ORAL at 15:57

## 2017-02-28 RX ADMIN — CARVEDILOL 25 MG: 25 TABLET, FILM COATED ORAL at 08:26

## 2017-02-28 RX ADMIN — PNEUMOCOCCAL 13-VALENT CONJUGATE VACCINE 0.5 ML: 2.2; 2.2; 2.2; 2.2; 2.2; 4.4; 2.2; 2.2; 2.2; 2.2; 2.2; 2.2; 2.2 INJECTION, SUSPENSION INTRAMUSCULAR at 11:11

## 2017-02-28 RX ADMIN — Medication 10 ML: at 08:26

## 2017-02-28 RX ADMIN — DOCUSATE SODIUM 100 MG: 100 CAPSULE, LIQUID FILLED ORAL at 08:48

## 2017-02-28 RX ADMIN — PANTOPRAZOLE SODIUM 40 MG: 40 TABLET, DELAYED RELEASE ORAL at 08:26

## 2017-02-28 RX ADMIN — PANTOPRAZOLE SODIUM 40 MG: 40 TABLET, DELAYED RELEASE ORAL at 15:58

## 2017-02-28 RX ADMIN — INSULIN LISPRO 10 UNITS: 100 INJECTION, SOLUTION INTRAVENOUS; SUBCUTANEOUS at 16:48

## 2017-02-28 RX ADMIN — LINAGLIPTIN 5 MG: 5 TABLET, FILM COATED ORAL at 08:26

## 2017-02-28 RX ADMIN — MULTIPLE VITAMINS W/ MINERALS TAB 1 TABLET: TAB at 08:26

## 2017-02-28 RX ADMIN — GABAPENTIN 200 MG: 100 CAPSULE ORAL at 20:46

## 2017-02-28 RX ADMIN — FUROSEMIDE 40 MG: 10 INJECTION, SOLUTION INTRAMUSCULAR; INTRAVENOUS at 14:45

## 2017-02-28 RX ADMIN — INSULIN LISPRO 1 UNITS: 100 INJECTION, SOLUTION INTRAVENOUS; SUBCUTANEOUS at 16:49

## 2017-02-28 RX ADMIN — Medication 10 ML: at 20:47

## 2017-02-28 RX ADMIN — Medication 66 UNITS: at 08:38

## 2017-02-28 RX ADMIN — DOCUSATE SODIUM 100 MG: 100 CAPSULE, LIQUID FILLED ORAL at 20:46

## 2017-02-28 RX ADMIN — INSULIN LISPRO 2 UNITS: 100 INJECTION, SOLUTION INTRAVENOUS; SUBCUTANEOUS at 13:12

## 2017-02-28 RX ADMIN — GABAPENTIN 200 MG: 100 CAPSULE ORAL at 13:10

## 2017-02-28 RX ADMIN — TICAGRELOR 90 MG: 90 TABLET ORAL at 08:26

## 2017-02-28 RX ADMIN — ACETAMINOPHEN 650 MG: 325 TABLET ORAL at 04:13

## 2017-02-28 RX ADMIN — TICAGRELOR 90 MG: 90 TABLET ORAL at 20:46

## 2017-02-28 ASSESSMENT — ENCOUNTER SYMPTOMS
ABDOMINAL PAIN: 0
ABDOMINAL DISTENTION: 0
DIARRHEA: 0
SORE THROAT: 0
EYE PAIN: 0
COUGH: 1
CHOKING: 0
WHEEZING: 0
NAUSEA: 0

## 2017-02-28 ASSESSMENT — PAIN SCALES - GENERAL
PAINLEVEL_OUTOF10: 0

## 2017-03-01 VITALS
RESPIRATION RATE: 20 BRPM | SYSTOLIC BLOOD PRESSURE: 135 MMHG | HEIGHT: 66 IN | DIASTOLIC BLOOD PRESSURE: 61 MMHG | WEIGHT: 229 LBS | BODY MASS INDEX: 36.8 KG/M2 | OXYGEN SATURATION: 98 % | HEART RATE: 90 BPM | TEMPERATURE: 97.9 F

## 2017-03-01 LAB
ANION GAP SERPL CALCULATED.3IONS-SCNC: 13 MMOL/L (ref 9–17)
BUN BLDV-MCNC: 20 MG/DL (ref 8–23)
BUN/CREAT BLD: ABNORMAL (ref 9–20)
CALCIUM SERPL-MCNC: 8.8 MG/DL (ref 8.6–10.4)
CHLORIDE BLD-SCNC: 98 MMOL/L (ref 98–107)
CO2: 26 MMOL/L (ref 20–31)
CREAT SERPL-MCNC: 0.78 MG/DL (ref 0.5–0.9)
GFR AFRICAN AMERICAN: >60 ML/MIN
GFR NON-AFRICAN AMERICAN: >60 ML/MIN
GFR SERPL CREATININE-BSD FRML MDRD: ABNORMAL ML/MIN/{1.73_M2}
GFR SERPL CREATININE-BSD FRML MDRD: ABNORMAL ML/MIN/{1.73_M2}
GLUCOSE BLD-MCNC: 109 MG/DL (ref 65–105)
GLUCOSE BLD-MCNC: 135 MG/DL (ref 65–105)
GLUCOSE BLD-MCNC: 182 MG/DL (ref 70–99)
GLUCOSE BLD-MCNC: 77 MG/DL (ref 65–105)
POTASSIUM SERPL-SCNC: 3.6 MMOL/L (ref 3.7–5.3)
SODIUM BLD-SCNC: 137 MMOL/L (ref 135–144)

## 2017-03-01 PROCEDURE — G0108 DIAB MANAGE TRN  PER INDIV: HCPCS

## 2017-03-01 PROCEDURE — 2580000003 HC RX 258: Performed by: INTERNAL MEDICINE

## 2017-03-01 PROCEDURE — 80048 BASIC METABOLIC PNL TOTAL CA: CPT

## 2017-03-01 PROCEDURE — 6360000002 HC RX W HCPCS: Performed by: INTERNAL MEDICINE

## 2017-03-01 PROCEDURE — 97530 THERAPEUTIC ACTIVITIES: CPT

## 2017-03-01 PROCEDURE — G8978 MOBILITY CURRENT STATUS: HCPCS

## 2017-03-01 PROCEDURE — 99232 SBSQ HOSP IP/OBS MODERATE 35: CPT | Performed by: INTERNAL MEDICINE

## 2017-03-01 PROCEDURE — 97535 SELF CARE MNGMENT TRAINING: CPT

## 2017-03-01 PROCEDURE — 97116 GAIT TRAINING THERAPY: CPT

## 2017-03-01 PROCEDURE — 6370000000 HC RX 637 (ALT 250 FOR IP): Performed by: FAMILY MEDICINE

## 2017-03-01 PROCEDURE — G8979 MOBILITY GOAL STATUS: HCPCS

## 2017-03-01 PROCEDURE — 97165 OT EVAL LOW COMPLEX 30 MIN: CPT

## 2017-03-01 PROCEDURE — 36415 COLL VENOUS BLD VENIPUNCTURE: CPT

## 2017-03-01 PROCEDURE — 82947 ASSAY GLUCOSE BLOOD QUANT: CPT

## 2017-03-01 PROCEDURE — 6370000000 HC RX 637 (ALT 250 FOR IP): Performed by: INTERNAL MEDICINE

## 2017-03-01 PROCEDURE — 97162 PT EVAL MOD COMPLEX 30 MIN: CPT

## 2017-03-01 PROCEDURE — G8987 SELF CARE CURRENT STATUS: HCPCS

## 2017-03-01 PROCEDURE — G8988 SELF CARE GOAL STATUS: HCPCS

## 2017-03-01 RX ADMIN — SPIRONOLACTONE 25 MG: 25 TABLET ORAL at 08:55

## 2017-03-01 RX ADMIN — TICAGRELOR 90 MG: 90 TABLET ORAL at 08:55

## 2017-03-01 RX ADMIN — ASPIRIN 81 MG: 81 TABLET, COATED ORAL at 08:55

## 2017-03-01 RX ADMIN — ENOXAPARIN SODIUM 40 MG: 40 INJECTION SUBCUTANEOUS at 08:54

## 2017-03-01 RX ADMIN — OXYCODONE HYDROCHLORIDE AND ACETAMINOPHEN 1 TABLET: 5; 325 TABLET ORAL at 00:59

## 2017-03-01 RX ADMIN — GLIMEPIRIDE 4 MG: 2 TABLET ORAL at 08:54

## 2017-03-01 RX ADMIN — CARVEDILOL 25 MG: 25 TABLET, FILM COATED ORAL at 08:55

## 2017-03-01 RX ADMIN — Medication 66 UNITS: at 08:54

## 2017-03-01 RX ADMIN — GABAPENTIN 200 MG: 100 CAPSULE ORAL at 08:55

## 2017-03-01 RX ADMIN — LINAGLIPTIN 5 MG: 5 TABLET, FILM COATED ORAL at 08:54

## 2017-03-01 RX ADMIN — LISINOPRIL 20 MG: 20 TABLET ORAL at 08:55

## 2017-03-01 RX ADMIN — INSULIN LISPRO 10 UNITS: 100 INJECTION, SOLUTION INTRAVENOUS; SUBCUTANEOUS at 08:55

## 2017-03-01 RX ADMIN — Medication 10 ML: at 08:56

## 2017-03-01 RX ADMIN — POTASSIUM CHLORIDE 40 MEQ: 40 SOLUTION ORAL at 08:55

## 2017-03-01 RX ADMIN — DOCUSATE SODIUM 100 MG: 100 CAPSULE, LIQUID FILLED ORAL at 08:55

## 2017-03-01 RX ADMIN — NIFEDIPINE 60 MG: 60 TABLET, FILM COATED, EXTENDED RELEASE ORAL at 08:55

## 2017-03-01 RX ADMIN — PANTOPRAZOLE SODIUM 40 MG: 40 TABLET, DELAYED RELEASE ORAL at 08:55

## 2017-03-01 RX ADMIN — INSULIN LISPRO 10 UNITS: 100 INJECTION, SOLUTION INTRAVENOUS; SUBCUTANEOUS at 13:54

## 2017-03-01 RX ADMIN — MULTIPLE VITAMINS W/ MINERALS TAB 1 TABLET: TAB at 08:55

## 2017-03-01 ASSESSMENT — ENCOUNTER SYMPTOMS
ABDOMINAL PAIN: 0
ABDOMINAL DISTENTION: 0
SORE THROAT: 0
NAUSEA: 0
CHOKING: 0
COUGH: 1
EYE PAIN: 0
WHEEZING: 0
DIARRHEA: 0

## 2017-03-01 ASSESSMENT — PAIN SCALES - GENERAL
PAINLEVEL_OUTOF10: 7
PAINLEVEL_OUTOF10: 7
PAINLEVEL_OUTOF10: 0

## 2017-03-01 ASSESSMENT — PAIN DESCRIPTION - PAIN TYPE: TYPE: NEUROPATHIC PAIN

## 2017-03-01 ASSESSMENT — PAIN DESCRIPTION - DESCRIPTORS: DESCRIPTORS: TINGLING

## 2017-03-01 ASSESSMENT — PAIN DESCRIPTION - LOCATION: LOCATION: HAND;FOOT

## 2017-03-01 ASSESSMENT — PAIN DESCRIPTION - ORIENTATION: ORIENTATION: RIGHT;LEFT

## 2017-03-16 ENCOUNTER — HOSPITAL ENCOUNTER (OUTPATIENT)
Age: 72
Setting detail: SPECIMEN
Discharge: HOME | End: 2017-03-16
Payer: MEDICARE

## 2017-03-16 ENCOUNTER — HOSPITAL ENCOUNTER (OUTPATIENT)
Dept: PAIN MANAGEMENT | Age: 72
Discharge: HOME | End: 2017-03-16
Payer: MEDICARE

## 2017-03-16 VITALS
TEMPERATURE: 47.3 F | RESPIRATION RATE: 18 BRPM | DIASTOLIC BLOOD PRESSURE: 87 MMHG | SYSTOLIC BLOOD PRESSURE: 191 MMHG | HEART RATE: 84 BPM

## 2017-03-16 DIAGNOSIS — M54.12 RADICULAR SYNDROME OF UPPER LIMBS: Primary | ICD-10-CM

## 2017-03-16 LAB
ABSOLUTE EOS #: 0.2 K/UL (ref 0–0.4)
ABSOLUTE LYMPH #: 2.1 K/UL (ref 1–4.8)
ABSOLUTE MONO #: 0.3 K/UL (ref 0.1–1.2)
ALBUMIN SERPL-MCNC: 4.3 G/DL (ref 3.5–5.2)
ALBUMIN/GLOBULIN RATIO: 1.5 (ref 1–2.5)
ALP BLD-CCNC: 103 U/L (ref 35–104)
ALT SERPL-CCNC: 13 U/L (ref 5–33)
ANION GAP SERPL CALCULATED.3IONS-SCNC: 12 MMOL/L (ref 9–17)
AST SERPL-CCNC: 11 U/L
BASOPHILS # BLD: 0 % (ref 0–2)
BASOPHILS ABSOLUTE: 0 K/UL (ref 0–0.2)
BILIRUB SERPL-MCNC: 0.3 MG/DL (ref 0.3–1.2)
BUN BLDV-MCNC: 16 MG/DL (ref 8–23)
BUN/CREAT BLD: ABNORMAL (ref 9–20)
CALCIUM SERPL-MCNC: 9.5 MG/DL (ref 8.6–10.4)
CHLORIDE BLD-SCNC: 101 MMOL/L (ref 98–107)
CO2: 27 MMOL/L (ref 20–31)
CREAT SERPL-MCNC: 0.69 MG/DL (ref 0.5–0.9)
DIFFERENTIAL TYPE: NORMAL
EOSINOPHILS RELATIVE PERCENT: 3 % (ref 1–4)
GFR AFRICAN AMERICAN: >60 ML/MIN
GFR NON-AFRICAN AMERICAN: >60 ML/MIN
GFR SERPL CREATININE-BSD FRML MDRD: ABNORMAL ML/MIN/{1.73_M2}
GFR SERPL CREATININE-BSD FRML MDRD: ABNORMAL ML/MIN/{1.73_M2}
GLUCOSE BLD-MCNC: 328 MG/DL (ref 70–99)
HCT VFR BLD CALC: 37.3 % (ref 36–46)
HEMOGLOBIN: 12.9 G/DL (ref 12–16)
LYMPHOCYTES # BLD: 33 % (ref 24–44)
MCH RBC QN AUTO: 30.2 PG (ref 26–34)
MCHC RBC AUTO-ENTMCNC: 34.4 G/DL (ref 31–37)
MCV RBC AUTO: 87.8 FL (ref 80–100)
MONOCYTES # BLD: 5 % (ref 2–11)
PDW BLD-RTO: 13.9 % (ref 12.5–15.4)
PLATELET # BLD: 207 K/UL (ref 140–450)
PLATELET ESTIMATE: NORMAL
PMV BLD AUTO: 9.3 FL (ref 6–12)
POTASSIUM SERPL-SCNC: 4.8 MMOL/L (ref 3.7–5.3)
RBC # BLD: 4.25 M/UL (ref 4–5.2)
RBC # BLD: NORMAL 10*6/UL
SEG NEUTROPHILS: 59 % (ref 36–66)
SEGMENTED NEUTROPHILS ABSOLUTE COUNT: 3.7 K/UL (ref 1.8–7.7)
SODIUM BLD-SCNC: 140 MMOL/L (ref 135–144)
THYROXINE, FREE: 1.15 NG/DL (ref 0.93–1.7)
TOTAL PROTEIN: 7.1 G/DL (ref 6.4–8.3)
TSH SERPL DL<=0.05 MIU/L-ACNC: 0.63 MIU/L (ref 0.3–5)
WBC # BLD: 6.4 K/UL (ref 3.5–11)
WBC # BLD: NORMAL 10*3/UL

## 2017-03-16 PROCEDURE — 84443 ASSAY THYROID STIM HORMONE: CPT

## 2017-03-16 PROCEDURE — 84439 ASSAY OF FREE THYROXINE: CPT

## 2017-03-16 PROCEDURE — G0463 HOSPITAL OUTPT CLINIC VISIT: HCPCS

## 2017-03-16 PROCEDURE — 36415 COLL VENOUS BLD VENIPUNCTURE: CPT

## 2017-03-16 PROCEDURE — 80053 COMPREHEN METABOLIC PANEL: CPT

## 2017-03-16 PROCEDURE — 80307 DRUG TEST PRSMV CHEM ANLYZR: CPT

## 2017-03-16 PROCEDURE — 85025 COMPLETE CBC W/AUTO DIFF WBC: CPT

## 2017-03-16 ASSESSMENT — ENCOUNTER SYMPTOMS
BOWEL INCONTINENCE: 0
STRIDOR: 0
JAUNDICE: 0
UNUSUAL HAIR DISTRIBUTION: 0
EYE DISCHARGE: 0
SUSPICIOUS LESIONS: 0
HEMOPTYSIS: 0
SPUTUM PRODUCTION: 0

## 2017-03-16 ASSESSMENT — PAIN DESCRIPTION - FREQUENCY: FREQUENCY: CONTINUOUS

## 2017-03-16 ASSESSMENT — PAIN DESCRIPTION - LOCATION: LOCATION: HAND;FOOT

## 2017-03-16 ASSESSMENT — PAIN DESCRIPTION - ONSET: ONSET: ON-GOING

## 2017-03-16 ASSESSMENT — PAIN SCALES - GENERAL: PAINLEVEL_OUTOF10: 8

## 2017-03-16 ASSESSMENT — PAIN DESCRIPTION - DESCRIPTORS: DESCRIPTORS: BURNING;CONSTANT

## 2017-03-16 ASSESSMENT — PAIN DESCRIPTION - ORIENTATION: ORIENTATION: RIGHT;LEFT

## 2017-03-16 ASSESSMENT — PAIN DESCRIPTION - PAIN TYPE: TYPE: CHRONIC PAIN

## 2017-03-16 ASSESSMENT — PAIN DESCRIPTION - PROGRESSION: CLINICAL_PROGRESSION: GRADUALLY WORSENING

## 2017-03-19 LAB

## 2017-03-27 ENCOUNTER — APPOINTMENT (OUTPATIENT)
Dept: GENERAL RADIOLOGY | Age: 72
End: 2017-03-27
Payer: MEDICARE

## 2017-03-27 ENCOUNTER — HOSPITAL ENCOUNTER (OUTPATIENT)
Age: 72
Setting detail: OBSERVATION
LOS: 1 days | Discharge: HOME HEALTH | End: 2017-03-28
Attending: EMERGENCY MEDICINE | Admitting: INTERNAL MEDICINE
Payer: MEDICARE

## 2017-03-27 DIAGNOSIS — R06.02 SOB (SHORTNESS OF BREATH): ICD-10-CM

## 2017-03-27 DIAGNOSIS — R07.9 CHEST PAIN, UNSPECIFIED TYPE: Primary | ICD-10-CM

## 2017-03-27 PROBLEM — I25.10 CAD (CORONARY ARTERY DISEASE): Status: ACTIVE | Noted: 2017-03-27

## 2017-03-27 PROBLEM — I10 ESSENTIAL HYPERTENSION: Status: ACTIVE | Noted: 2017-03-27

## 2017-03-27 LAB
ABSOLUTE EOS #: 0.4 K/UL (ref 0–0.4)
ABSOLUTE LYMPH #: 2.3 K/UL (ref 1–4.8)
ABSOLUTE MONO #: 0.7 K/UL (ref 0.1–1.2)
ANION GAP SERPL CALCULATED.3IONS-SCNC: 18 MMOL/L (ref 9–17)
BASOPHILS # BLD: 2 % (ref 0–2)
BASOPHILS ABSOLUTE: 0.2 K/UL (ref 0–0.2)
BUN BLDV-MCNC: 15 MG/DL (ref 8–23)
BUN/CREAT BLD: ABNORMAL (ref 9–20)
CALCIUM SERPL-MCNC: 10.1 MG/DL (ref 8.6–10.4)
CHLORIDE BLD-SCNC: 98 MMOL/L (ref 98–107)
CO2: 25 MMOL/L (ref 20–31)
CREAT SERPL-MCNC: 0.72 MG/DL (ref 0.5–0.9)
DIFFERENTIAL TYPE: ABNORMAL
EOSINOPHILS RELATIVE PERCENT: 5 % (ref 1–4)
GFR AFRICAN AMERICAN: >60 ML/MIN
GFR NON-AFRICAN AMERICAN: >60 ML/MIN
GFR SERPL CREATININE-BSD FRML MDRD: ABNORMAL ML/MIN/{1.73_M2}
GFR SERPL CREATININE-BSD FRML MDRD: ABNORMAL ML/MIN/{1.73_M2}
GLUCOSE BLD-MCNC: 190 MG/DL (ref 65–105)
GLUCOSE BLD-MCNC: 196 MG/DL (ref 70–99)
GLUCOSE BLD-MCNC: 199 MG/DL (ref 65–105)
HCT VFR BLD CALC: 38.5 % (ref 36–46)
HEMOGLOBIN: 13.2 G/DL (ref 12–16)
INR BLD: 1
LYMPHOCYTES # BLD: 29 % (ref 24–44)
MCH RBC QN AUTO: 30.2 PG (ref 26–34)
MCHC RBC AUTO-ENTMCNC: 34.4 G/DL (ref 31–37)
MCV RBC AUTO: 87.8 FL (ref 80–100)
MONOCYTES # BLD: 9 % (ref 2–11)
PARTIAL THROMBOPLASTIN TIME: 24.3 SEC (ref 21.3–31.3)
PDW BLD-RTO: 14.7 % (ref 12.5–15.4)
PLATELET # BLD: 168 K/UL (ref 140–450)
PLATELET ESTIMATE: ABNORMAL
PMV BLD AUTO: 9.3 FL (ref 6–12)
POC TROPONIN I: 0 NG/ML (ref 0–0.1)
POC TROPONIN I: 0 NG/ML (ref 0–0.1)
POC TROPONIN INTERP: NORMAL
POC TROPONIN INTERP: NORMAL
POTASSIUM SERPL-SCNC: 4.3 MMOL/L (ref 3.7–5.3)
PROTHROMBIN TIME: 10.9 SEC (ref 9.4–12.6)
RBC # BLD: 4.38 M/UL (ref 4–5.2)
RBC # BLD: ABNORMAL 10*6/UL
SEG NEUTROPHILS: 55 % (ref 36–66)
SEGMENTED NEUTROPHILS ABSOLUTE COUNT: 4.5 K/UL (ref 1.8–7.7)
SODIUM BLD-SCNC: 141 MMOL/L (ref 135–144)
TROPONIN INTERP: NORMAL
TROPONIN T: <0.03 NG/ML
WBC # BLD: 7.9 K/UL (ref 3.5–11)
WBC # BLD: ABNORMAL 10*3/UL

## 2017-03-27 PROCEDURE — 71020 XR CHEST STANDARD TWO VW: CPT

## 2017-03-27 PROCEDURE — 80048 BASIC METABOLIC PNL TOTAL CA: CPT

## 2017-03-27 PROCEDURE — 6370000000 HC RX 637 (ALT 250 FOR IP): Performed by: HOSPITALIST

## 2017-03-27 PROCEDURE — 96374 THER/PROPH/DIAG INJ IV PUSH: CPT

## 2017-03-27 PROCEDURE — G0378 HOSPITAL OBSERVATION PER HR: HCPCS

## 2017-03-27 PROCEDURE — 6360000002 HC RX W HCPCS: Performed by: EMERGENCY MEDICINE

## 2017-03-27 PROCEDURE — 84484 ASSAY OF TROPONIN QUANT: CPT

## 2017-03-27 PROCEDURE — 99285 EMERGENCY DEPT VISIT HI MDM: CPT

## 2017-03-27 PROCEDURE — 85610 PROTHROMBIN TIME: CPT

## 2017-03-27 PROCEDURE — 99223 1ST HOSP IP/OBS HIGH 75: CPT | Performed by: INTERNAL MEDICINE

## 2017-03-27 PROCEDURE — 6370000000 HC RX 637 (ALT 250 FOR IP): Performed by: EMERGENCY MEDICINE

## 2017-03-27 PROCEDURE — 36415 COLL VENOUS BLD VENIPUNCTURE: CPT

## 2017-03-27 PROCEDURE — 1200000000 HC SEMI PRIVATE

## 2017-03-27 PROCEDURE — 93005 ELECTROCARDIOGRAM TRACING: CPT

## 2017-03-27 PROCEDURE — 96375 TX/PRO/DX INJ NEW DRUG ADDON: CPT

## 2017-03-27 PROCEDURE — 82947 ASSAY GLUCOSE BLOOD QUANT: CPT

## 2017-03-27 PROCEDURE — 2580000003 HC RX 258: Performed by: HOSPITALIST

## 2017-03-27 PROCEDURE — 96372 THER/PROPH/DIAG INJ SC/IM: CPT

## 2017-03-27 PROCEDURE — 85025 COMPLETE CBC W/AUTO DIFF WBC: CPT

## 2017-03-27 PROCEDURE — 85730 THROMBOPLASTIN TIME PARTIAL: CPT

## 2017-03-27 PROCEDURE — 6360000002 HC RX W HCPCS: Performed by: HOSPITALIST

## 2017-03-27 PROCEDURE — 96376 TX/PRO/DX INJ SAME DRUG ADON: CPT

## 2017-03-27 RX ORDER — ACETAMINOPHEN 325 MG/1
650 TABLET ORAL EVERY 4 HOURS PRN
Status: CANCELLED | OUTPATIENT
Start: 2017-03-27

## 2017-03-27 RX ORDER — GABAPENTIN 100 MG/1
200 CAPSULE ORAL 3 TIMES DAILY
Status: DISCONTINUED | OUTPATIENT
Start: 2017-03-27 | End: 2017-03-28 | Stop reason: HOSPADM

## 2017-03-27 RX ORDER — DEXTROSE MONOHYDRATE 50 MG/ML
100 INJECTION, SOLUTION INTRAVENOUS PRN
Status: DISCONTINUED | OUTPATIENT
Start: 2017-03-27 | End: 2017-03-28 | Stop reason: HOSPADM

## 2017-03-27 RX ORDER — SODIUM CHLORIDE 0.9 % (FLUSH) 0.9 %
10 SYRINGE (ML) INJECTION PRN
Status: CANCELLED | OUTPATIENT
Start: 2017-03-27

## 2017-03-27 RX ORDER — NIFEDIPINE 30 MG/1
30 TABLET, EXTENDED RELEASE ORAL DAILY
Status: CANCELLED | OUTPATIENT
Start: 2017-03-27

## 2017-03-27 RX ORDER — VITAMIN E 268 MG
400 CAPSULE ORAL DAILY
Status: CANCELLED | OUTPATIENT
Start: 2017-03-27

## 2017-03-27 RX ORDER — NITROGLYCERIN 0.4 MG/1
0.4 TABLET SUBLINGUAL EVERY 5 MIN PRN
Status: CANCELLED | OUTPATIENT
Start: 2017-03-27

## 2017-03-27 RX ORDER — SPIRONOLACTONE 25 MG/1
25 TABLET ORAL DAILY
Status: DISCONTINUED | OUTPATIENT
Start: 2017-03-27 | End: 2017-03-28 | Stop reason: HOSPADM

## 2017-03-27 RX ORDER — DOCUSATE SODIUM 100 MG/1
100 CAPSULE, LIQUID FILLED ORAL DAILY
Status: DISCONTINUED | OUTPATIENT
Start: 2017-03-27 | End: 2017-03-28 | Stop reason: HOSPADM

## 2017-03-27 RX ORDER — MORPHINE SULFATE 4 MG/ML
4 INJECTION, SOLUTION INTRAMUSCULAR; INTRAVENOUS ONCE
Status: COMPLETED | OUTPATIENT
Start: 2017-03-27 | End: 2017-03-27

## 2017-03-27 RX ORDER — ASPIRIN 81 MG/1
324 TABLET, CHEWABLE ORAL ONCE
Status: COMPLETED | OUTPATIENT
Start: 2017-03-27 | End: 2017-03-27

## 2017-03-27 RX ORDER — ACETAMINOPHEN 325 MG/1
650 TABLET ORAL EVERY 4 HOURS PRN
Status: DISCONTINUED | OUTPATIENT
Start: 2017-03-27 | End: 2017-03-28 | Stop reason: HOSPADM

## 2017-03-27 RX ORDER — ONDANSETRON 2 MG/ML
4 INJECTION INTRAMUSCULAR; INTRAVENOUS ONCE
Status: COMPLETED | OUTPATIENT
Start: 2017-03-27 | End: 2017-03-27

## 2017-03-27 RX ORDER — PANTOPRAZOLE SODIUM 40 MG/1
40 TABLET, DELAYED RELEASE ORAL
Status: DISCONTINUED | OUTPATIENT
Start: 2017-03-28 | End: 2017-03-28 | Stop reason: HOSPADM

## 2017-03-27 RX ORDER — CARVEDILOL 25 MG/1
25 TABLET ORAL 2 TIMES DAILY WITH MEALS
Status: CANCELLED | OUTPATIENT
Start: 2017-03-27

## 2017-03-27 RX ORDER — M-VIT,TX,IRON,MINS/CALC/FOLIC 27MG-0.4MG
1 TABLET ORAL DAILY
Status: CANCELLED | OUTPATIENT
Start: 2017-03-27

## 2017-03-27 RX ORDER — HYDROCODONE BITARTRATE AND ACETAMINOPHEN 5; 325 MG/1; MG/1
1 TABLET ORAL EVERY 4 HOURS PRN
Status: DISCONTINUED | OUTPATIENT
Start: 2017-03-27 | End: 2017-03-28 | Stop reason: HOSPADM

## 2017-03-27 RX ORDER — CYCLOBENZAPRINE HCL 10 MG
10 TABLET ORAL 3 TIMES DAILY PRN
Status: CANCELLED | OUTPATIENT
Start: 2017-03-27

## 2017-03-27 RX ORDER — LISINOPRIL 10 MG/1
10 TABLET ORAL DAILY
Status: CANCELLED | OUTPATIENT
Start: 2017-03-27

## 2017-03-27 RX ORDER — NICOTINE POLACRILEX 4 MG
15 LOZENGE BUCCAL PRN
Status: DISCONTINUED | OUTPATIENT
Start: 2017-03-27 | End: 2017-03-28 | Stop reason: HOSPADM

## 2017-03-27 RX ORDER — ASPIRIN 81 MG/1
81 TABLET ORAL DAILY
Status: DISCONTINUED | OUTPATIENT
Start: 2017-03-28 | End: 2017-03-28 | Stop reason: HOSPADM

## 2017-03-27 RX ORDER — DEXTROSE MONOHYDRATE 25 G/50ML
12.5 INJECTION, SOLUTION INTRAVENOUS PRN
Status: DISCONTINUED | OUTPATIENT
Start: 2017-03-27 | End: 2017-03-28 | Stop reason: HOSPADM

## 2017-03-27 RX ORDER — SODIUM CHLORIDE 0.9 % (FLUSH) 0.9 %
10 SYRINGE (ML) INJECTION EVERY 12 HOURS SCHEDULED
Status: CANCELLED | OUTPATIENT
Start: 2017-03-27

## 2017-03-27 RX ORDER — SPIRONOLACTONE 25 MG/1
25 TABLET ORAL DAILY
Status: CANCELLED | OUTPATIENT
Start: 2017-03-27

## 2017-03-27 RX ORDER — NITROGLYCERIN 0.4 MG/1
0.4 TABLET SUBLINGUAL EVERY 5 MIN PRN
Status: DISCONTINUED | OUTPATIENT
Start: 2017-03-27 | End: 2017-03-28 | Stop reason: HOSPADM

## 2017-03-27 RX ORDER — SODIUM CHLORIDE 0.9 % (FLUSH) 0.9 %
10 SYRINGE (ML) INJECTION PRN
Status: DISCONTINUED | OUTPATIENT
Start: 2017-03-27 | End: 2017-03-28 | Stop reason: HOSPADM

## 2017-03-27 RX ORDER — NIFEDIPINE 30 MG/1
30 TABLET, EXTENDED RELEASE ORAL DAILY
Status: DISCONTINUED | OUTPATIENT
Start: 2017-03-28 | End: 2017-03-28 | Stop reason: HOSPADM

## 2017-03-27 RX ORDER — DOCUSATE SODIUM 100 MG/1
100 CAPSULE, LIQUID FILLED ORAL DAILY
Status: CANCELLED | OUTPATIENT
Start: 2017-03-27

## 2017-03-27 RX ORDER — OXYCODONE HYDROCHLORIDE AND ACETAMINOPHEN 5; 325 MG/1; MG/1
1 TABLET ORAL EVERY 4 HOURS PRN
Status: CANCELLED | OUTPATIENT
Start: 2017-03-27

## 2017-03-27 RX ORDER — PANTOPRAZOLE SODIUM 40 MG/1
40 TABLET, DELAYED RELEASE ORAL
Status: CANCELLED | OUTPATIENT
Start: 2017-03-28

## 2017-03-27 RX ORDER — CYCLOBENZAPRINE HCL 10 MG
10 TABLET ORAL 3 TIMES DAILY PRN
Status: DISCONTINUED | OUTPATIENT
Start: 2017-03-27 | End: 2017-03-28 | Stop reason: HOSPADM

## 2017-03-27 RX ORDER — ATORVASTATIN CALCIUM 40 MG/1
40 TABLET, FILM COATED ORAL NIGHTLY
Status: DISCONTINUED | OUTPATIENT
Start: 2017-03-27 | End: 2017-03-28 | Stop reason: HOSPADM

## 2017-03-27 RX ORDER — LISINOPRIL 10 MG/1
10 TABLET ORAL DAILY
Status: DISCONTINUED | OUTPATIENT
Start: 2017-03-27 | End: 2017-03-28 | Stop reason: HOSPADM

## 2017-03-27 RX ORDER — CARVEDILOL 25 MG/1
25 TABLET ORAL 2 TIMES DAILY WITH MEALS
Status: DISCONTINUED | OUTPATIENT
Start: 2017-03-27 | End: 2017-03-28 | Stop reason: HOSPADM

## 2017-03-27 RX ORDER — GABAPENTIN 100 MG/1
200 CAPSULE ORAL 3 TIMES DAILY
Status: CANCELLED | OUTPATIENT
Start: 2017-03-27

## 2017-03-27 RX ORDER — ONDANSETRON 2 MG/ML
4 INJECTION INTRAMUSCULAR; INTRAVENOUS EVERY 6 HOURS PRN
Status: CANCELLED | OUTPATIENT
Start: 2017-03-27

## 2017-03-27 RX ORDER — ONDANSETRON 2 MG/ML
4 INJECTION INTRAMUSCULAR; INTRAVENOUS EVERY 6 HOURS PRN
Status: DISCONTINUED | OUTPATIENT
Start: 2017-03-27 | End: 2017-03-28 | Stop reason: HOSPADM

## 2017-03-27 RX ORDER — VITAMIN E 268 MG
400 CAPSULE ORAL DAILY
Status: DISCONTINUED | OUTPATIENT
Start: 2017-03-27 | End: 2017-03-28 | Stop reason: HOSPADM

## 2017-03-27 RX ORDER — SODIUM CHLORIDE 0.9 % (FLUSH) 0.9 %
10 SYRINGE (ML) INJECTION EVERY 12 HOURS SCHEDULED
Status: DISCONTINUED | OUTPATIENT
Start: 2017-03-27 | End: 2017-03-28 | Stop reason: HOSPADM

## 2017-03-27 RX ADMIN — GABAPENTIN 200 MG: 100 CAPSULE ORAL at 20:19

## 2017-03-27 RX ADMIN — VITAMIN E CAP 400 UNIT 400 UNITS: 400 CAP at 18:33

## 2017-03-27 RX ADMIN — TICAGRELOR 90 MG: 90 TABLET ORAL at 20:19

## 2017-03-27 RX ADMIN — ONDANSETRON 4 MG: 2 INJECTION, SOLUTION INTRAMUSCULAR; INTRAVENOUS at 13:18

## 2017-03-27 RX ADMIN — MORPHINE SULFATE 4 MG: 4 INJECTION, SOLUTION INTRAMUSCULAR; INTRAVENOUS at 16:23

## 2017-03-27 RX ADMIN — ASPIRIN 81 MG 324 MG: 81 TABLET ORAL at 13:17

## 2017-03-27 RX ADMIN — ENOXAPARIN SODIUM 40 MG: 100 INJECTION SUBCUTANEOUS at 18:32

## 2017-03-27 RX ADMIN — SODIUM CHLORIDE, PRESERVATIVE FREE 10 ML: 5 INJECTION INTRAVENOUS at 20:19

## 2017-03-27 RX ADMIN — LISINOPRIL 10 MG: 10 TABLET ORAL at 18:32

## 2017-03-27 RX ADMIN — Medication 2 UNITS: at 18:34

## 2017-03-27 RX ADMIN — ATORVASTATIN CALCIUM 40 MG: 40 TABLET, FILM COATED ORAL at 20:19

## 2017-03-27 RX ADMIN — DOCUSATE SODIUM 100 MG: 100 CAPSULE ORAL at 18:34

## 2017-03-27 RX ADMIN — MORPHINE SULFATE 4 MG: 4 INJECTION, SOLUTION INTRAMUSCULAR; INTRAVENOUS at 13:18

## 2017-03-27 RX ADMIN — INSULIN LISPRO 1 UNITS: 100 INJECTION, SOLUTION INTRAVENOUS; SUBCUTANEOUS at 20:20

## 2017-03-27 RX ADMIN — SPIRONOLACTONE 25 MG: 25 TABLET ORAL at 18:34

## 2017-03-27 ASSESSMENT — PAIN DESCRIPTION - FREQUENCY: FREQUENCY: INTERMITTENT

## 2017-03-27 ASSESSMENT — PAIN DESCRIPTION - ORIENTATION
ORIENTATION: LEFT
ORIENTATION: RIGHT

## 2017-03-27 ASSESSMENT — ENCOUNTER SYMPTOMS
CHEST TIGHTNESS: 1
DIARRHEA: 0
SORE THROAT: 0
SHORTNESS OF BREATH: 1
VOMITING: 0
RHINORRHEA: 0
COUGH: 0
EYE DISCHARGE: 0
EYE REDNESS: 0
ABDOMINAL PAIN: 0
NAUSEA: 1

## 2017-03-27 ASSESSMENT — PAIN DESCRIPTION - LOCATION
LOCATION: CHEST
LOCATION: CHEST

## 2017-03-27 ASSESSMENT — PAIN SCALES - GENERAL
PAINLEVEL_OUTOF10: 7
PAINLEVEL_OUTOF10: 6
PAINLEVEL_OUTOF10: 7
PAINLEVEL_OUTOF10: 7

## 2017-03-27 ASSESSMENT — PAIN DESCRIPTION - PAIN TYPE: TYPE: ACUTE PAIN

## 2017-03-28 VITALS
TEMPERATURE: 98.1 F | OXYGEN SATURATION: 97 % | DIASTOLIC BLOOD PRESSURE: 57 MMHG | WEIGHT: 226 LBS | RESPIRATION RATE: 18 BRPM | HEIGHT: 66 IN | HEART RATE: 65 BPM | SYSTOLIC BLOOD PRESSURE: 110 MMHG | BODY MASS INDEX: 36.32 KG/M2

## 2017-03-28 LAB
ANION GAP SERPL CALCULATED.3IONS-SCNC: 12 MMOL/L (ref 9–17)
BUN BLDV-MCNC: 15 MG/DL (ref 8–23)
BUN/CREAT BLD: ABNORMAL (ref 9–20)
CALCIUM SERPL-MCNC: 9 MG/DL (ref 8.6–10.4)
CHLORIDE BLD-SCNC: 99 MMOL/L (ref 98–107)
CO2: 25 MMOL/L (ref 20–31)
CREAT SERPL-MCNC: 0.73 MG/DL (ref 0.5–0.9)
GFR AFRICAN AMERICAN: >60 ML/MIN
GFR NON-AFRICAN AMERICAN: >60 ML/MIN
GFR SERPL CREATININE-BSD FRML MDRD: ABNORMAL ML/MIN/{1.73_M2}
GFR SERPL CREATININE-BSD FRML MDRD: ABNORMAL ML/MIN/{1.73_M2}
GLUCOSE BLD-MCNC: 206 MG/DL (ref 65–105)
GLUCOSE BLD-MCNC: 228 MG/DL (ref 70–99)
GLUCOSE BLD-MCNC: 243 MG/DL (ref 65–105)
GLUCOSE BLD-MCNC: 251 MG/DL (ref 65–105)
HCT VFR BLD CALC: 34.1 % (ref 36–46)
HEMOGLOBIN: 11.7 G/DL (ref 12–16)
MCH RBC QN AUTO: 30 PG (ref 26–34)
MCHC RBC AUTO-ENTMCNC: 34.3 G/DL (ref 31–37)
MCV RBC AUTO: 87.7 FL (ref 80–100)
PDW BLD-RTO: 14.4 % (ref 12.5–15.4)
PLATELET # BLD: 134 K/UL (ref 140–450)
PMV BLD AUTO: 9.3 FL (ref 6–12)
POTASSIUM SERPL-SCNC: 4.3 MMOL/L (ref 3.7–5.3)
RBC # BLD: 3.89 M/UL (ref 4–5.2)
SODIUM BLD-SCNC: 136 MMOL/L (ref 135–144)
TROPONIN INTERP: NORMAL
TROPONIN T: <0.03 NG/ML
WBC # BLD: 5.9 K/UL (ref 3.5–11)

## 2017-03-28 PROCEDURE — 99239 HOSP IP/OBS DSCHRG MGMT >30: CPT | Performed by: INTERNAL MEDICINE

## 2017-03-28 PROCEDURE — 96372 THER/PROPH/DIAG INJ SC/IM: CPT

## 2017-03-28 PROCEDURE — 36415 COLL VENOUS BLD VENIPUNCTURE: CPT

## 2017-03-28 PROCEDURE — G0378 HOSPITAL OBSERVATION PER HR: HCPCS

## 2017-03-28 PROCEDURE — 2580000003 HC RX 258: Performed by: HOSPITALIST

## 2017-03-28 PROCEDURE — 6370000000 HC RX 637 (ALT 250 FOR IP): Performed by: HOSPITALIST

## 2017-03-28 PROCEDURE — 6360000002 HC RX W HCPCS: Performed by: HOSPITALIST

## 2017-03-28 PROCEDURE — 85027 COMPLETE CBC AUTOMATED: CPT

## 2017-03-28 PROCEDURE — 80048 BASIC METABOLIC PNL TOTAL CA: CPT

## 2017-03-28 PROCEDURE — 82947 ASSAY GLUCOSE BLOOD QUANT: CPT

## 2017-03-28 RX ORDER — ATORVASTATIN CALCIUM 40 MG/1
40 TABLET, FILM COATED ORAL NIGHTLY
Qty: 30 TABLET | Refills: 3 | Status: SHIPPED | OUTPATIENT
Start: 2017-03-28 | End: 2020-05-01

## 2017-03-28 RX ADMIN — CARVEDILOL 25 MG: 25 TABLET, FILM COATED ORAL at 08:41

## 2017-03-28 RX ADMIN — PANTOPRAZOLE SODIUM 40 MG: 40 TABLET, DELAYED RELEASE ORAL at 05:49

## 2017-03-28 RX ADMIN — Medication 4 UNITS: at 08:41

## 2017-03-28 RX ADMIN — SPIRONOLACTONE 25 MG: 25 TABLET ORAL at 08:43

## 2017-03-28 RX ADMIN — TICAGRELOR 90 MG: 90 TABLET ORAL at 08:43

## 2017-03-28 RX ADMIN — HYDROCODONE BITARTRATE AND ACETAMINOPHEN 1 TABLET: 5; 325 TABLET ORAL at 05:49

## 2017-03-28 RX ADMIN — Medication 4 UNITS: at 12:38

## 2017-03-28 RX ADMIN — NIFEDIPINE 30 MG: 30 TABLET, FILM COATED, EXTENDED RELEASE ORAL at 08:42

## 2017-03-28 RX ADMIN — ASPIRIN 81 MG: 81 TABLET, COATED ORAL at 08:43

## 2017-03-28 RX ADMIN — SODIUM CHLORIDE, PRESERVATIVE FREE 10 ML: 5 INJECTION INTRAVENOUS at 09:00

## 2017-03-28 RX ADMIN — ENOXAPARIN SODIUM 40 MG: 100 INJECTION SUBCUTANEOUS at 08:43

## 2017-03-28 RX ADMIN — GABAPENTIN 200 MG: 100 CAPSULE ORAL at 14:00

## 2017-03-28 RX ADMIN — LISINOPRIL 10 MG: 10 TABLET ORAL at 08:43

## 2017-03-28 RX ADMIN — GABAPENTIN 200 MG: 100 CAPSULE ORAL at 08:43

## 2017-03-28 RX ADMIN — VITAMIN E CAP 400 UNIT 400 UNITS: 400 CAP at 08:44

## 2017-03-28 RX ADMIN — Medication 6 UNITS: at 17:10

## 2017-03-28 RX ADMIN — DOCUSATE SODIUM 100 MG: 100 CAPSULE ORAL at 08:44

## 2017-03-28 ASSESSMENT — PAIN DESCRIPTION - DESCRIPTORS: DESCRIPTORS: ACHING;HEAVINESS

## 2017-03-28 ASSESSMENT — PAIN DESCRIPTION - PAIN TYPE: TYPE: ACUTE PAIN

## 2017-03-28 ASSESSMENT — PAIN SCALES - GENERAL
PAINLEVEL_OUTOF10: 3
PAINLEVEL_OUTOF10: 5

## 2017-03-28 ASSESSMENT — PAIN DESCRIPTION - LOCATION: LOCATION: CHEST

## 2017-03-29 LAB
EKG ATRIAL RATE: 76 BPM
EKG P AXIS: 71 DEGREES
EKG P-R INTERVAL: 144 MS
EKG Q-T INTERVAL: 384 MS
EKG QRS DURATION: 86 MS
EKG QTC CALCULATION (BAZETT): 432 MS
EKG R AXIS: 1 DEGREES
EKG T AXIS: 42 DEGREES
EKG VENTRICULAR RATE: 76 BPM

## 2017-03-30 ENCOUNTER — HOSPITAL ENCOUNTER (OUTPATIENT)
Dept: PAIN MANAGEMENT | Age: 72
Discharge: HOME | End: 2017-03-30
Payer: MEDICARE

## 2017-03-30 ENCOUNTER — HOSPITAL ENCOUNTER (OUTPATIENT)
Age: 72
Discharge: HOME | End: 2017-03-30
Payer: MEDICARE

## 2017-03-30 PROCEDURE — 99213 OFFICE O/P EST LOW 20 MIN: CPT

## 2017-04-06 ENCOUNTER — HOSPITAL ENCOUNTER (OUTPATIENT)
Dept: PAIN MANAGEMENT | Age: 72
Discharge: HOME | End: 2017-04-06
Payer: MEDICARE

## 2017-04-06 VITALS
SYSTOLIC BLOOD PRESSURE: 143 MMHG | RESPIRATION RATE: 18 BRPM | TEMPERATURE: 98.2 F | DIASTOLIC BLOOD PRESSURE: 78 MMHG | HEART RATE: 74 BPM

## 2017-04-06 DIAGNOSIS — Z79.4 TYPE 2 DIABETES MELLITUS WITH DIABETIC POLYNEUROPATHY, WITH LONG-TERM CURRENT USE OF INSULIN (HCC): Primary | Chronic | ICD-10-CM

## 2017-04-06 DIAGNOSIS — E11.42 TYPE 2 DIABETES MELLITUS WITH DIABETIC POLYNEUROPATHY, WITH LONG-TERM CURRENT USE OF INSULIN (HCC): Primary | Chronic | ICD-10-CM

## 2017-04-06 PROCEDURE — 99215 OFFICE O/P EST HI 40 MIN: CPT

## 2017-04-06 ASSESSMENT — PAIN DESCRIPTION - FREQUENCY: FREQUENCY: INTERMITTENT

## 2017-04-06 ASSESSMENT — PAIN DESCRIPTION - DIRECTION: RADIATING_TOWARDS: BIL HANDS AND FEET

## 2017-04-06 ASSESSMENT — PAIN DESCRIPTION - ORIENTATION: ORIENTATION: RIGHT;LEFT

## 2017-04-06 ASSESSMENT — PAIN DESCRIPTION - PAIN TYPE: TYPE: CHRONIC PAIN

## 2017-04-06 ASSESSMENT — PAIN DESCRIPTION - LOCATION: LOCATION: FOOT;CHEST

## 2017-04-06 ASSESSMENT — PAIN DESCRIPTION - ONSET: ONSET: ON-GOING

## 2017-04-06 ASSESSMENT — ENCOUNTER SYMPTOMS
BLURRED VISION: 0
UNUSUAL HAIR DISTRIBUTION: 0
EYE DISCHARGE: 0
SUSPICIOUS LESIONS: 0

## 2017-04-06 ASSESSMENT — PAIN SCALES - GENERAL: PAINLEVEL_OUTOF10: 7

## 2017-04-06 ASSESSMENT — PAIN DESCRIPTION - PROGRESSION: CLINICAL_PROGRESSION: NOT CHANGED

## 2017-10-03 ENCOUNTER — HOSPITAL ENCOUNTER (OUTPATIENT)
Age: 72
Discharge: HOME | End: 2017-10-03
Payer: MEDICARE

## 2017-10-03 ENCOUNTER — HOSPITAL ENCOUNTER (OUTPATIENT)
Dept: GENERAL RADIOLOGY | Age: 72
Discharge: HOME | End: 2017-10-03
Payer: MEDICARE

## 2017-10-03 DIAGNOSIS — R07.81 RIB PAIN ON LEFT SIDE: ICD-10-CM

## 2017-10-03 PROCEDURE — 71100 X-RAY EXAM RIBS UNI 2 VIEWS: CPT

## 2017-10-24 ENCOUNTER — HOSPITAL ENCOUNTER (OUTPATIENT)
Age: 72
Setting detail: OBSERVATION
Discharge: HOME | End: 2017-10-25
Attending: EMERGENCY MEDICINE | Admitting: EMERGENCY MEDICINE
Payer: MEDICARE

## 2017-10-24 ENCOUNTER — APPOINTMENT (OUTPATIENT)
Dept: GENERAL RADIOLOGY | Age: 72
End: 2017-10-24
Payer: MEDICARE

## 2017-10-24 DIAGNOSIS — R07.9 CHEST PAIN, UNSPECIFIED TYPE: Primary | ICD-10-CM

## 2017-10-24 DIAGNOSIS — R73.9 HYPERGLYCEMIA: ICD-10-CM

## 2017-10-24 DIAGNOSIS — F41.1 ANXIETY STATE: ICD-10-CM

## 2017-10-24 LAB
ABSOLUTE EOS #: 0.2 K/UL (ref 0–0.4)
ABSOLUTE IMMATURE GRANULOCYTE: NORMAL K/UL (ref 0–0.3)
ABSOLUTE LYMPH #: 2.5 K/UL (ref 1–4.8)
ABSOLUTE MONO #: 0.4 K/UL (ref 0.1–1.2)
ANION GAP SERPL CALCULATED.3IONS-SCNC: 15 MMOL/L (ref 9–17)
BASOPHILS # BLD: 1 %
BASOPHILS ABSOLUTE: 0 K/UL (ref 0–0.2)
BNP INTERPRETATION: ABNORMAL
BUN BLDV-MCNC: 15 MG/DL (ref 8–23)
BUN/CREAT BLD: ABNORMAL (ref 9–20)
CALCIUM SERPL-MCNC: 9.5 MG/DL (ref 8.6–10.4)
CHLORIDE BLD-SCNC: 94 MMOL/L (ref 98–107)
CO2: 25 MMOL/L (ref 20–31)
CREAT SERPL-MCNC: 0.65 MG/DL (ref 0.5–0.9)
DIFFERENTIAL TYPE: NORMAL
EOSINOPHILS RELATIVE PERCENT: 4 %
GFR AFRICAN AMERICAN: >60 ML/MIN
GFR NON-AFRICAN AMERICAN: >60 ML/MIN
GFR SERPL CREATININE-BSD FRML MDRD: ABNORMAL ML/MIN/{1.73_M2}
GFR SERPL CREATININE-BSD FRML MDRD: ABNORMAL ML/MIN/{1.73_M2}
GLUCOSE BLD-MCNC: 324 MG/DL (ref 70–99)
HCT VFR BLD CALC: 41.1 % (ref 36–46)
HEMOGLOBIN: 13.5 G/DL (ref 12–16)
IMMATURE GRANULOCYTES: NORMAL %
LYMPHOCYTES # BLD: 44 %
MCH RBC QN AUTO: 29.5 PG (ref 26–34)
MCHC RBC AUTO-ENTMCNC: 32.8 G/DL (ref 31–37)
MCV RBC AUTO: 90 FL (ref 80–100)
MONOCYTES # BLD: 6 %
PDW BLD-RTO: 14.5 % (ref 12.5–15.4)
PLATELET # BLD: 167 K/UL (ref 140–450)
PLATELET ESTIMATE: NORMAL
PMV BLD AUTO: 10.1 FL (ref 6–12)
POC TROPONIN I: 0 NG/ML (ref 0–0.1)
POC TROPONIN INTERP: NORMAL
POTASSIUM SERPL-SCNC: 3.8 MMOL/L (ref 3.7–5.3)
PRO-BNP: 389 PG/ML
RBC # BLD: 4.56 M/UL (ref 4–5.2)
RBC # BLD: NORMAL 10*6/UL
SEG NEUTROPHILS: 45 %
SEGMENTED NEUTROPHILS ABSOLUTE COUNT: 2.6 K/UL (ref 1.8–7.7)
SODIUM BLD-SCNC: 134 MMOL/L (ref 135–144)
WBC # BLD: 5.8 K/UL (ref 3.5–11)
WBC # BLD: NORMAL 10*3/UL

## 2017-10-24 PROCEDURE — 84484 ASSAY OF TROPONIN QUANT: CPT

## 2017-10-24 PROCEDURE — 93005 ELECTROCARDIOGRAM TRACING: CPT

## 2017-10-24 PROCEDURE — 99285 EMERGENCY DEPT VISIT HI MDM: CPT

## 2017-10-24 PROCEDURE — 2580000003 HC RX 258: Performed by: EMERGENCY MEDICINE

## 2017-10-24 PROCEDURE — G0378 HOSPITAL OBSERVATION PER HR: HCPCS

## 2017-10-24 PROCEDURE — 96372 THER/PROPH/DIAG INJ SC/IM: CPT

## 2017-10-24 PROCEDURE — 80048 BASIC METABOLIC PNL TOTAL CA: CPT

## 2017-10-24 PROCEDURE — 83880 ASSAY OF NATRIURETIC PEPTIDE: CPT

## 2017-10-24 PROCEDURE — 85025 COMPLETE CBC W/AUTO DIFF WBC: CPT

## 2017-10-24 PROCEDURE — 71020 XR CHEST STANDARD TWO VW: CPT

## 2017-10-24 PROCEDURE — 6370000000 HC RX 637 (ALT 250 FOR IP): Performed by: EMERGENCY MEDICINE

## 2017-10-24 PROCEDURE — 82947 ASSAY GLUCOSE BLOOD QUANT: CPT

## 2017-10-24 RX ORDER — NICOTINE POLACRILEX 4 MG
15 LOZENGE BUCCAL PRN
Status: DISCONTINUED | OUTPATIENT
Start: 2017-10-24 | End: 2017-10-25 | Stop reason: HOSPADM

## 2017-10-24 RX ORDER — ATORVASTATIN CALCIUM 40 MG/1
40 TABLET, FILM COATED ORAL NIGHTLY
Status: DISCONTINUED | OUTPATIENT
Start: 2017-10-24 | End: 2017-10-25 | Stop reason: HOSPADM

## 2017-10-24 RX ORDER — ACETAMINOPHEN 325 MG/1
650 TABLET ORAL EVERY 4 HOURS PRN
Status: DISCONTINUED | OUTPATIENT
Start: 2017-10-24 | End: 2017-10-25 | Stop reason: HOSPADM

## 2017-10-24 RX ORDER — SODIUM CHLORIDE 0.9 % (FLUSH) 0.9 %
10 SYRINGE (ML) INJECTION EVERY 12 HOURS SCHEDULED
Status: DISCONTINUED | OUTPATIENT
Start: 2017-10-24 | End: 2017-10-25 | Stop reason: HOSPADM

## 2017-10-24 RX ORDER — NIFEDIPINE 30 MG/1
30 TABLET, EXTENDED RELEASE ORAL DAILY
Status: DISCONTINUED | OUTPATIENT
Start: 2017-10-25 | End: 2017-10-25 | Stop reason: HOSPADM

## 2017-10-24 RX ORDER — M-VIT,TX,IRON,MINS/CALC/FOLIC 27MG-0.4MG
1 TABLET ORAL DAILY
Status: DISCONTINUED | OUTPATIENT
Start: 2017-10-25 | End: 2017-10-25 | Stop reason: HOSPADM

## 2017-10-24 RX ORDER — GABAPENTIN 100 MG/1
200 CAPSULE ORAL 3 TIMES DAILY
Status: DISCONTINUED | OUTPATIENT
Start: 2017-10-24 | End: 2017-10-25 | Stop reason: HOSPADM

## 2017-10-24 RX ORDER — LISINOPRIL 10 MG/1
10 TABLET ORAL DAILY
Status: DISCONTINUED | OUTPATIENT
Start: 2017-10-25 | End: 2017-10-25 | Stop reason: HOSPADM

## 2017-10-24 RX ORDER — DEXTROSE MONOHYDRATE 25 G/50ML
12.5 INJECTION, SOLUTION INTRAVENOUS PRN
Status: DISCONTINUED | OUTPATIENT
Start: 2017-10-24 | End: 2017-10-25 | Stop reason: HOSPADM

## 2017-10-24 RX ORDER — LORAZEPAM 0.5 MG/1
1 TABLET ORAL ONCE
Status: COMPLETED | OUTPATIENT
Start: 2017-10-24 | End: 2017-10-24

## 2017-10-24 RX ORDER — SODIUM CHLORIDE 0.9 % (FLUSH) 0.9 %
10 SYRINGE (ML) INJECTION PRN
Status: DISCONTINUED | OUTPATIENT
Start: 2017-10-24 | End: 2017-10-25 | Stop reason: HOSPADM

## 2017-10-24 RX ORDER — CYCLOBENZAPRINE HCL 10 MG
10 TABLET ORAL 3 TIMES DAILY PRN
Status: DISCONTINUED | OUTPATIENT
Start: 2017-10-24 | End: 2017-10-25 | Stop reason: HOSPADM

## 2017-10-24 RX ORDER — DOCUSATE SODIUM 100 MG/1
100 CAPSULE, LIQUID FILLED ORAL DAILY
Status: DISCONTINUED | OUTPATIENT
Start: 2017-10-25 | End: 2017-10-25 | Stop reason: HOSPADM

## 2017-10-24 RX ORDER — ASPIRIN 81 MG/1
81 TABLET ORAL DAILY
Status: DISCONTINUED | OUTPATIENT
Start: 2017-10-25 | End: 2017-10-25 | Stop reason: HOSPADM

## 2017-10-24 RX ORDER — DEXTROSE MONOHYDRATE 50 MG/ML
100 INJECTION, SOLUTION INTRAVENOUS PRN
Status: DISCONTINUED | OUTPATIENT
Start: 2017-10-24 | End: 2017-10-25 | Stop reason: HOSPADM

## 2017-10-24 RX ORDER — VITAMIN E 268 MG
400 CAPSULE ORAL DAILY
Status: DISCONTINUED | OUTPATIENT
Start: 2017-10-25 | End: 2017-10-25 | Stop reason: HOSPADM

## 2017-10-24 RX ORDER — CARVEDILOL 25 MG/1
25 TABLET ORAL 2 TIMES DAILY WITH MEALS
Status: DISCONTINUED | OUTPATIENT
Start: 2017-10-25 | End: 2017-10-25 | Stop reason: HOSPADM

## 2017-10-24 RX ORDER — LORAZEPAM 1 MG/1
1 TABLET ORAL EVERY 6 HOURS PRN
Status: DISCONTINUED | OUTPATIENT
Start: 2017-10-24 | End: 2017-10-25 | Stop reason: HOSPADM

## 2017-10-24 RX ADMIN — INSULIN LISPRO 3 UNITS: 100 INJECTION, SOLUTION INTRAVENOUS; SUBCUTANEOUS at 23:01

## 2017-10-24 RX ADMIN — GABAPENTIN 200 MG: 100 CAPSULE ORAL at 22:55

## 2017-10-24 RX ADMIN — Medication 10 ML: at 22:56

## 2017-10-24 RX ADMIN — LORAZEPAM 1 MG: 0.5 TABLET ORAL at 20:22

## 2017-10-24 RX ADMIN — TICAGRELOR 90 MG: 90 TABLET ORAL at 22:55

## 2017-10-24 RX ADMIN — ATORVASTATIN CALCIUM 40 MG: 40 TABLET, FILM COATED ORAL at 22:55

## 2017-10-24 RX ADMIN — INSULIN LISPRO 5 UNITS: 100 INJECTION, SOLUTION INTRAVENOUS; SUBCUTANEOUS at 21:47

## 2017-10-24 RX ADMIN — LORAZEPAM 1 MG: 0.5 TABLET ORAL at 22:55

## 2017-10-24 ASSESSMENT — ENCOUNTER SYMPTOMS
ABDOMINAL PAIN: 0
DIARRHEA: 0
COUGH: 0
VOMITING: 0
SHORTNESS OF BREATH: 1
CHEST TIGHTNESS: 0
NAUSEA: 0

## 2017-10-24 ASSESSMENT — PAIN SCALES - GENERAL: PAINLEVEL_OUTOF10: 7

## 2017-10-24 ASSESSMENT — PAIN DESCRIPTION - DESCRIPTORS: DESCRIPTORS: PRESSURE

## 2017-10-24 ASSESSMENT — PAIN DESCRIPTION - PAIN TYPE: TYPE: ACUTE PAIN

## 2017-10-24 ASSESSMENT — PAIN DESCRIPTION - DIRECTION: RADIATING_TOWARDS: LEFT SIDE

## 2017-10-24 ASSESSMENT — PAIN DESCRIPTION - ORIENTATION: ORIENTATION: MID

## 2017-10-24 ASSESSMENT — PAIN DESCRIPTION - FREQUENCY: FREQUENCY: CONTINUOUS

## 2017-10-24 ASSESSMENT — PAIN DESCRIPTION - LOCATION: LOCATION: CHEST

## 2017-10-24 NOTE — ED TRIAGE NOTES
Pt. Arrives to ED with c/o chest pain. Pt. Was visitor in room 3 and developed sudden onset CP, midsternal radiating towards left breast.  Pt. Took one SL nitro with minimal relief.

## 2017-10-24 NOTE — ED PROVIDER NOTES
[facility]  ED  Emergency Department Encounter  Emergency Medicine Resident     Pt Name: Deirdre Hansen  MRN: [id number]  [place] 1945  Date of evaluation: 10/24/17  PCP:  Balta Qureshi MD    [address] [place] [address]       Chief Complaint   Patient presents with    Chest Pain     pt. was visitor in room 3 and developed chest pain, took 1 SL nitro with minimal relief        HISTORY OF PRESENT ILLNESS  (Location/Symptom, Timing/Onset, Context/Setting, Quality, Duration, Modifying Factors, Severity.)      Deirdre Hansen is a 67 y.o. female who presents with chest pain for An hour. She has dyspnea too. Chest pain started after patient arrived to the ED and found out that her son has had a cardiac arrest and is now dead. She took 1 of nitro which helped with the pain. She has an extensive history of coronary artery disease including 6 stents. Chest pain is midsternal, nonradiating and feels like pressure. No diaphoresis, nausea, vomiting, abdominal pain. Patient states that she is also very anxious and would like something for that. No Other complaints or concerns at this time    PAST MEDICAL / SURGICAL / SOCIAL / FAMILY HISTORY      has a past medical history of Anxiety; Benign positional vertigo; CAD (coronary artery disease); Chest pain; Depression; Diabetes mellitus ([address] Diabetic neuropathy ([address] Hyperlipidemia; Hypertension; and Migraine. has a past surgical history that includes Percutaneous Transluminal Coronary Angio; Cardiac catheterization; and Coronary angioplasty with stent. Social History     Social History    Marital status:      Spouse name: N/A    Number of children: N/A    Years of education: N/A     Occupational History    Not on file.      Social History Main Topics    Smoking status: Never Smoker    Smokeless tobacco: Never Used    Alcohol use No    Drug use: No    Sexual activity: No     Other Topics Concern    Not on file     Social History Narrative daily.    Historical Provider, MD   meclizine (ANTIVERT) 25 MG tablet Take 25 mg by mouth 3 times daily as needed. Historical Provider, MD   nitroGLYCERIN (NITROSTAT) 0.4 MG SL tablet Place 0.4 mg under the tongue every 5 minutes as needed. Historical Provider, MD   oxyCODONE-acetaminophen (PERCOCET) 5-325 MG per tablet Take 1 tablet by mouth every 8 hours as needed. Historical Provider, MD   omeprazole (PRILOSEC) 20 MG capsule Take 20 mg by mouth Daily. Historical Provider, MD   traMADol (ULTRAM) 50 MG tablet Take 50 mg by mouth every 8 hours as needed. Historical Provider, MD       REVIEW OF SYSTEMS    (2-9 systems for level 4, 10 or more for level 5)      Review of Systems   Constitutional: Negative for chills, diaphoresis and fever. Respiratory: Positive for shortness of breath. Negative for cough and chest tightness. Cardiovascular: Positive for chest pain. Negative for palpitations and leg swelling. Gastrointestinal: Negative for abdominal pain, diarrhea, nausea and vomiting. Psychiatric/Behavioral: The patient is nervous/anxious. PHYSICAL EXAM   (up to 7 for level 4, 8 or more for level 5)      INITIAL VITALS:   BP (!) 200/93   Pulse 86   Resp (!) 33   SpO2 100%     Physical Exam   Constitutional: She appears well-developed and well-nourished. She appears distressed (tearful). She is not intubated. Neck: No JVD present. No tracheal deviation present. Cardiovascular: Normal rate, regular rhythm, normal heart sounds and intact distal pulses. PMI is not displaced. Exam reveals no gallop, no friction rub and no decreased pulses. No murmur heard. Pulses:       Radial pulses are 2+ on the right side, and 2+ on the left side. Dorsalis pedis pulses are 2+ on the right side, and 2+ on the left side. Posterior tibial pulses are 2+ on the right side, and 2+ on the left side.    Pulmonary/Chest: Effort normal and breath sounds normal. No accessory muscle usage or stridor. No apnea, no tachypnea and no bradypnea. She is not intubated. No respiratory distress. She has no decreased breath sounds. She has no wheezes. She has no rhonchi. She has no rales. She exhibits no tenderness. Abdominal: Soft. She exhibits no distension and no mass. There is no tenderness. There is no rigidity, no rebound and no guarding. Musculoskeletal:        Right lower leg: She exhibits swelling and edema. Left lower leg: She exhibits swelling and edema. Right foot: There is no tenderness and no swelling. Left foot: There is no tenderness and no swelling. 1+ edema   Neurological: She is alert. Skin: Skin is warm. She is not diaphoretic. Vitals reviewed.       DIFFERENTIAL  DIAGNOSIS     PLAN (LABS / IMAGING / EKG):  Orders Placed This Encounter   Procedures    XR CHEST STANDARD (2 VW)    CBC WITH AUTO DIFFERENTIAL    BASIC METABOLIC PANEL    Brain Natriuretic Peptide    Telemetry monitoring    Inpatient consult to Cardiology    POCT troponin    POCT troponin    EKG 12 Lead    Insert peripheral IV    PATIENT STATUS (FROM ED OR OR/PROCEDURAL) Observation       MEDICATIONS ORDERED:  Orders Placed This Encounter   Medications    LORazepam (ATIVAN) tablet 1 mg    insulin lispro (HUMALOG) injection vial 5 Units       DDX:  ACS/NSTEMI/STEMI/angina, arrhythmia, trauma, aortic dissection,  PE, PNA, pneumothroax, esophageal rupture, tamponade, Cocaine use, endocarditis, intercostal pain    DIAGNOSTIC RESULTS / EMERGENCY DEPARTMENT COURSE / MDM     Heart Score    SCREENING TOOLS:    HEART Risk Score for Chest Pain Patients   History and Physical Exam Suspicion Level  (Nausea, Vomiting, Diaphoresis, Radiation, Exertion)   Slightly Suspicious (0 pts)   Moderately Suspicious (1 pt)   Highly Suspicious (2 pts)   EKG Interpretation   Normal (0 pts)   Non-Specific Repolarization Disturbance (1 pt)   Significant ST-Depression (2 pts)   Age of Patient (in years)   = 45 (0 pts)   46-64 (1 pt)   = 65 (2 pts)   Risk Factors   No Risk Factors (0 pts)   1-2 Risk Factors (1 pt)   = 3 Risk Factors (2 pts)   Risk Factors Include:   Hypercholesterolemia   Hypertension   Diabetes Mellitus   Cigarette smoking   Positive family history   Obesity   CAD   (SLE, CKDz, HIV, Cocaine abuse)   Troponin Levels   = Normal Limit (0 pts)   1-3 Times Normal Limit (1 pt)   > 3 Times Normal Limit (2 pts)  TOTAL: 5    Percent Risk for Major Adverse Cardiac Event (MACE)  0-3 pts indicates low risk for MACE   2.5% (DISCHARGE)   4-7 pts indicates moderate risk for MACE  20.3% (OBS)  8-10 pts indicates high risk for MACE  72.7% (EARLY INVASIVE [place])    LABS:  Results for orders placed or performed during the hospital encounter of 10/24/17   CBC WITH AUTO DIFFERENTIAL   Result Value Ref Range    WBC 5.8 3.5 - 11.0 k/uL    RBC 4.56 4.0 - 5.2 m/uL    Hemoglobin 13.5 12.0 - 16.0 g/dL    Hematocrit 41.1 36 - 46 %    MCV 90.0 80 - 100 fL    MCH 29.5 26 - 34 pg    MCHC 32.8 31 - 37 g/dL    RDW 14.5 12.5 - 15.4 %    Platelets 023 629 - 642 k/uL    MPV 10.1 6.0 - 12.0 fL    Differential Type NOT REPORTED     Seg Neutrophils 45 %    Lymphocytes 44 %    Monocytes 6 %    Eosinophils % 4 %    Basophils 1 %    Immature Granulocytes NOT REPORTED 0 %    Segs Absolute 2.60 1.8 - 7.7 k/uL    Absolute Lymph # 2.50 1.0 - 4.8 k/uL    Absolute Mono # 0.40 0.1 - 1.2 k/uL    Absolute Eos # 0.20 0.0 - 0.4 k/uL    Basophils # 0.00 0.0 - 0.2 k/uL    Absolute Immature Granulocyte NOT REPORTED 0.00 - 0.30 k/uL    WBC Morphology NOT REPORTED     RBC Morphology NOT REPORTED     Platelet Estimate NOT REPORTED    BASIC METABOLIC PANEL   Result Value Ref Range    Glucose 324 (H) 70 - 99 mg/dL    BUN 15 8 - 23 mg/dL    CREATININE 0.65 0.50 - 0.90 mg/dL    Bun/Cre Ratio NOT REPORTED 9 - 20    Calcium 9.5 8.6 - 10.4 mg/dL    Sodium 134 (L) 135 - 144 mmol/L    Potassium 3.8 3.7 - 5.3 mmol/L    Chloride 94 (L) 98 - 107 mmol/L    CO2 25 20 - 31 mmol/L Anion Gap 15 9 - 17 mmol/L    GFR Non-African American >60 >60 mL/min    GFR African American >60 >60 mL/min    GFR Comment          GFR Staging NOT REPORTED    Brain Natriuretic Peptide   Result Value Ref Range    Pro- (H) <300 pg/mL    BNP Interpretation         POCT troponin   Result Value Ref Range    POC Troponin I 0.00 0.00 - 0.10 ng/mL    POC Troponin Interp       The Troponin-I (POC) results cannot be compared to the Troponin-T results. IMPRESSION: Patient here with chest pain and shortness of breath. History and physical exam concerning for possible ACS. I did recently undergo something traumatic and she is very tearful and anxious. Patient is asking for some Ativan. Patient is stating that she would like to be with her son. Given the circumstances, we still plan to do a cardiac workup to include EKG, troponins, CBC, BMP, chest x-ray. It may be difficult to keep the patient in her own room on cardiac monitoring because she would like to be at her son's bedside. We'll give her some oral Ativan. Anticipate admission. Low suspicion for PE, dissection, tension pneumothorax, esophageal rupture. RADIOLOGY:  Xr Chest Standard (2 Vw)    Result Date: 10/24/2017  EXAMINATION: TWO VIEWS OF THE CHEST 10/24/2017 8:26 pm COMPARISON: 10/03/2017, 03/27/2017 HISTORY: ORDERING SYSTEM PROVIDED HISTORY: midsternal tight chest pain and shortness of breath TECHNOLOGIST PROVIDED HISTORY: Reason for exam:->midsternal tight chest pain and shortness of breath FINDINGS: The heart and mediastinal structures are stable. The pulmonary vasculature is normal.  The lungs are clear. Degenerative change about the left shoulder noted. Degenerative spurring in the mid and lower thoracic spine is evident. Coronary artery stent is in place. No acute cardiopulmonary disease.      Xr Ribs Left (2 Views)    Result Date: 10/3/2017  EXAMINATION: FOUR VIEWS OF THE LEFT RIBS 10/3/2017 5:08 pm COMPARISON: Chest

## 2017-10-25 ENCOUNTER — APPOINTMENT (OUTPATIENT)
Dept: NUCLEAR MEDICINE | Age: 72
End: 2017-10-25
Payer: MEDICARE

## 2017-10-25 VITALS
WEIGHT: 225.97 LBS | TEMPERATURE: 98.2 F | SYSTOLIC BLOOD PRESSURE: 163 MMHG | DIASTOLIC BLOOD PRESSURE: 86 MMHG | OXYGEN SATURATION: 100 % | RESPIRATION RATE: 18 BRPM | HEART RATE: 75 BPM | BODY MASS INDEX: 36.32 KG/M2 | HEIGHT: 66 IN

## 2017-10-25 LAB
EKG ATRIAL RATE: 76 BPM
EKG ATRIAL RATE: 78 BPM
EKG ATRIAL RATE: 92 BPM
EKG P AXIS: 74 DEGREES
EKG P AXIS: 90 DEGREES
EKG P AXIS: 96 DEGREES
EKG P-R INTERVAL: 134 MS
EKG P-R INTERVAL: 148 MS
EKG P-R INTERVAL: 156 MS
EKG Q-T INTERVAL: 370 MS
EKG Q-T INTERVAL: 418 MS
EKG Q-T INTERVAL: 430 MS
EKG QRS DURATION: 86 MS
EKG QRS DURATION: 86 MS
EKG QRS DURATION: 88 MS
EKG QTC CALCULATION (BAZETT): 457 MS
EKG QTC CALCULATION (BAZETT): 476 MS
EKG QTC CALCULATION (BAZETT): 483 MS
EKG R AXIS: 11 DEGREES
EKG R AXIS: 4 DEGREES
EKG R AXIS: 9 DEGREES
EKG T AXIS: 125 DEGREES
EKG T AXIS: 65 DEGREES
EKG T AXIS: 81 DEGREES
EKG VENTRICULAR RATE: 76 BPM
EKG VENTRICULAR RATE: 78 BPM
EKG VENTRICULAR RATE: 92 BPM
GLUCOSE BLD-MCNC: 235 MG/DL (ref 65–105)
GLUCOSE BLD-MCNC: 248 MG/DL (ref 65–105)
GLUCOSE BLD-MCNC: 283 MG/DL (ref 65–105)
LV EF: 56 %
LVEF MODALITY: NORMAL
TROPONIN INTERP: NORMAL
TROPONIN T: <0.03 NG/ML

## 2017-10-25 PROCEDURE — G0378 HOSPITAL OBSERVATION PER HR: HCPCS

## 2017-10-25 PROCEDURE — 93017 CV STRESS TEST TRACING ONLY: CPT

## 2017-10-25 PROCEDURE — 78452 HT MUSCLE IMAGE SPECT MULT: CPT

## 2017-10-25 PROCEDURE — A9500 TC99M SESTAMIBI: HCPCS | Performed by: INTERNAL MEDICINE

## 2017-10-25 PROCEDURE — 6370000000 HC RX 637 (ALT 250 FOR IP): Performed by: EMERGENCY MEDICINE

## 2017-10-25 PROCEDURE — 82947 ASSAY GLUCOSE BLOOD QUANT: CPT

## 2017-10-25 PROCEDURE — 6360000002 HC RX W HCPCS: Performed by: INTERNAL MEDICINE

## 2017-10-25 PROCEDURE — 93005 ELECTROCARDIOGRAM TRACING: CPT

## 2017-10-25 PROCEDURE — 2580000003 HC RX 258: Performed by: INTERNAL MEDICINE

## 2017-10-25 PROCEDURE — 3430000000 HC RX DIAGNOSTIC RADIOPHARMACEUTICAL: Performed by: INTERNAL MEDICINE

## 2017-10-25 RX ORDER — SODIUM CHLORIDE 9 MG/ML
INJECTION, SOLUTION INTRAVENOUS ONCE
Status: COMPLETED | OUTPATIENT
Start: 2017-10-25 | End: 2017-10-25

## 2017-10-25 RX ORDER — SODIUM CHLORIDE 0.9 % (FLUSH) 0.9 %
10 SYRINGE (ML) INJECTION PRN
Status: DISCONTINUED | OUTPATIENT
Start: 2017-10-25 | End: 2017-10-25 | Stop reason: HOSPADM

## 2017-10-25 RX ORDER — NITROGLYCERIN 0.4 MG/1
0.4 TABLET SUBLINGUAL EVERY 5 MIN PRN
Status: DISCONTINUED | OUTPATIENT
Start: 2017-10-25 | End: 2017-10-25 | Stop reason: ALTCHOICE

## 2017-10-25 RX ORDER — METOPROLOL TARTRATE 5 MG/5ML
2.5 INJECTION INTRAVENOUS PRN
Status: DISCONTINUED | OUTPATIENT
Start: 2017-10-25 | End: 2017-10-25 | Stop reason: ALTCHOICE

## 2017-10-25 RX ORDER — SODIUM CHLORIDE 0.9 % (FLUSH) 0.9 %
10 SYRINGE (ML) INJECTION PRN
Status: DISCONTINUED | OUTPATIENT
Start: 2017-10-25 | End: 2017-10-25 | Stop reason: ALTCHOICE

## 2017-10-25 RX ORDER — AMINOPHYLLINE DIHYDRATE 25 MG/ML
100 INJECTION, SOLUTION INTRAVENOUS
Status: COMPLETED | OUTPATIENT
Start: 2017-10-25 | End: 2017-10-25

## 2017-10-25 RX ADMIN — SODIUM CHLORIDE, PRESERVATIVE FREE 10 ML: 5 INJECTION INTRAVENOUS at 10:12

## 2017-10-25 RX ADMIN — AMINOPHYLLINE 100 MG: 25 INJECTION, SOLUTION INTRAVENOUS at 12:01

## 2017-10-25 RX ADMIN — DOCUSATE SODIUM 100 MG: 100 CAPSULE, LIQUID FILLED ORAL at 15:13

## 2017-10-25 RX ADMIN — SODIUM CHLORIDE, PRESERVATIVE FREE 10 ML: 5 INJECTION INTRAVENOUS at 12:00

## 2017-10-25 RX ADMIN — TETRAKIS(2-METHOXYISOBUTYLISOCYANIDE)COPPER(I) TETRAFLUOROBORATE 15.2 MILLICURIE: 1 INJECTION, POWDER, LYOPHILIZED, FOR SOLUTION INTRAVENOUS at 10:12

## 2017-10-25 RX ADMIN — REGADENOSON 0.4 MG: 0.08 INJECTION, SOLUTION INTRAVENOUS at 11:59

## 2017-10-25 RX ADMIN — GABAPENTIN 200 MG: 100 CAPSULE ORAL at 15:13

## 2017-10-25 RX ADMIN — TICAGRELOR 90 MG: 90 TABLET ORAL at 15:13

## 2017-10-25 RX ADMIN — Medication 10 ML: at 11:48

## 2017-10-25 RX ADMIN — SODIUM CHLORIDE: 9 INJECTION, SOLUTION INTRAVENOUS at 11:48

## 2017-10-25 RX ADMIN — TETRAKIS(2-METHOXYISOBUTYLISOCYANIDE)COPPER(I) TETRAFLUOROBORATE 39 MILLICURIE: 1 INJECTION, POWDER, LYOPHILIZED, FOR SOLUTION INTRAVENOUS at 12:00

## 2017-10-25 RX ADMIN — CARVEDILOL 25 MG: 25 TABLET, FILM COATED ORAL at 15:13

## 2017-10-25 RX ADMIN — ASPIRIN 81 MG: 81 TABLET, COATED ORAL at 15:14

## 2017-10-25 RX ADMIN — VITAMIN E CAP 400 UNIT 400 UNITS: 400 CAP at 15:13

## 2017-10-25 RX ADMIN — NIFEDIPINE 30 MG: 30 TABLET, FILM COATED, EXTENDED RELEASE ORAL at 15:13

## 2017-10-25 RX ADMIN — LISINOPRIL 10 MG: 10 TABLET ORAL at 15:13

## 2017-10-25 RX ADMIN — MULTIPLE VITAMINS W/ MINERALS TAB 1 TABLET: TAB at 15:13

## 2017-10-25 RX ADMIN — INSULIN LISPRO 9 UNITS: 100 INJECTION, SOLUTION INTRAVENOUS; SUBCUTANEOUS at 13:31

## 2017-10-25 ASSESSMENT — ENCOUNTER SYMPTOMS
GASTROINTESTINAL NEGATIVE: 1
RESPIRATORY NEGATIVE: 1

## 2017-10-25 NOTE — H&P
Positive for chest pain. Gastrointestinal: Negative. Endocrine: Negative. Genitourinary: Negative. Musculoskeletal: Negative. Skin: Negative. Neurological: Negative. (PQRS) Advance directives on face sheet per hospital policy. No change unless specifically mentioned in chart    PAST MEDICAL HISTORY    has a past medical history of Anxiety; Benign positional vertigo; CAD (coronary artery disease); Chest pain; Depression; Diabetes mellitus ([address] Diabetic neuropathy ([facility] [address] Hyperlipidemia; Hypertension; and Migraine. I have reviewed the past medical history with the patient and it is pertinent to this complaint. SURGICAL HISTORY      has a past surgical history that includes Percutaneous Transluminal Coronary Angio; Cardiac catheterization; and Coronary angioplasty with stent. I have reviewed and agree with Surgical History entered and it is pertinent to this complaint.      CURRENT MEDICATIONS       NIFEdipine (PROCARDIA XL) extended release tablet 30 mg Daily   aspirin EC tablet 81 mg Daily   carvedilol (COREG) tablet 25 mg BID WC   docusate sodium (COLACE) capsule 100 mg Daily   cyclobenzaprine (FLEXERIL) tablet 10 mg TID PRN   therapeutic multivitamin-minerals 1 tablet Daily   vitamin E capsule 400 Units Daily   gabapentin (NEURONTIN) capsule 200 mg TID   lisinopril (PRINIVIL;ZESTRIL) tablet 10 mg Daily   ticagrelor (BRILINTA) tablet 90 mg BID   atorvastatin (LIPITOR) tablet 40 mg Nightly   glucose (GLUTOSE) 40 % oral gel 15 g PRN   dextrose 50 % solution 12.5 g PRN   glucagon (rDNA) injection 1 mg PRN   dextrose 5 % solution PRN   insulin lispro (HUMALOG) injection vial 0-18 Units TID    insulin lispro (HUMALOG) injection vial 0-9 Units Nightly   sodium chloride flush 0.9 % injection 10 mL 2 times per day   sodium chloride flush 0.9 % injection 10 mL PRN   acetaminophen (TYLENOL) tablet 650 mg Q4H PRN   enoxaparin (LOVENOX) injection 40 mg Daily   LORazepam (ATIVAN) tablet 1 mg Q6H Notable for the following:     POC Glucose 235 (*)     All other components within normal limits   CBC WITH AUTO DIFFERENTIAL   TROPONIN   POCT TROPONIN   POCT TROPONIN   POCT GLUCOSE       HEART Risk Score for Chest Pain Patients      History and Physical Exam Suspicion Level   · Nausea/Vomiting   · Diaphoresis   · Radiation   · Related to Exertion  · Quality of Pain     EKG Interpretation  · Normal (0 pts)  · Non-Specific Repolarization Disturbance (1 pt)  · Significant ST-Depression (2 pts)     Age of Patient (in years)  · = 45 (0 pts)  · 46-64 (1 pt)  · = 65 (2 pts)    Risk Factors (number present)  · Hypercholesterolemia  · Hypertension  · Diabetes Mellitus  · Cigarette smoking  · Positive family history  · Obesity  · CAD  · Automatically get 2 points for - SLE, CKDz, HIV, Cocaine abuse     Troponin Levels  · = Normal Limit (0 pts)  · 1-3 Times Normal Limit (1 pt)  · > 3 Times Normal Limit (2 pts)      H&P ECG  Patient Age Risk Factors Troponins   0   Slight Normal   45   None   Normal level   1   Moderate Non-specific    46-64   1-2 risk factors   1-3 x Normal   2   High ST Changes   65 or older   3+ risk factors   3+ x Normal   Total 1 1 2 2 0     TOTAL: 6    Percent Risk for Major Adverse Cardiac Event (MACE)  0-3 pts indicates low risk for MACE   2.5% (DISCHARGE)   4-7 pts indicates moderate risk for MACE  20.3% (OBS)  8-10 pts indicates high risk for MACE   72.7% (EARLY INVASIVE [place])    CDU IMPRESSION / PLAN      Rachel Rothman is a 67 y.o. female who presented to the ED last night with chest pain after her son had passed away. She had dyspnea. Chest pain started after patient arrived to the ED and found out that her son has had a cardiac arrest.  She took 1 of nitro which helped with the pain. She has an extensive history of coronary artery disease including 6 stents. Chest pain is midsternal, nonradiating and feels like pressure. No diaphoresis, nausea, vomiting, abdominal pain.     Primary Complaint:

## 2017-10-25 NOTE — ED PROVIDER NOTES
[facility] ED  Emergency Department  Emergency Medicine Resident Sign-out     Care of Filiberto Ritchie was assumed from Dr. Cristino Moya and is being seen for Chest Pain (pt. was visitor in room 3 and developed chest pain, took 1 SL nitro with minimal relief )  . The patient's initial evaluation and plan have been discussed with the prior provider who initially evaluated the patient. EMERGENCY DEPARTMENT COURSE / MEDICAL DECISION MAKING:       MEDICATIONS GIVEN:  Orders Placed This Encounter   Medications    LORazepam (ATIVAN) tablet 1 mg    insulin lispro (HUMALOG) injection vial 5 Units    LORazepam (ATIVAN) tablet 1 mg       LABS / RADIOLOGY:     Labs Reviewed   BASIC METABOLIC PANEL - Abnormal; Notable for the following:        Result Value    Glucose 324 (*)     Sodium 134 (*)     Chloride 94 (*)     All other components within normal limits   BRAIN NATRIURETIC PEPTIDE - Abnormal; Notable for the following:     Pro- (*)     All other components within normal limits   CBC WITH AUTO DIFFERENTIAL   POCT TROPONIN   POCT TROPONIN   POCT TROPONIN       Xr Chest Standard (2 Vw)    Result Date: 10/24/2017  EXAMINATION: TWO VIEWS OF THE CHEST 10/24/2017 8:26 pm COMPARISON: 10/03/2017, 03/27/2017 HISTORY: ORDERING SYSTEM PROVIDED HISTORY: midsternal tight chest pain and shortness of breath TECHNOLOGIST PROVIDED HISTORY: Reason for exam:->midsternal tight chest pain and shortness of breath FINDINGS: The heart and mediastinal structures are stable. The pulmonary vasculature is normal.  The lungs are clear. Degenerative change about the left shoulder noted. Degenerative spurring in the mid and lower thoracic spine is evident. Coronary artery stent is in place. No acute cardiopulmonary disease.      Xr Ribs Left (2 Views)    Result Date: 10/3/2017  EXAMINATION: FOUR VIEWS OF THE LEFT RIBS 10/3/2017 5:08 pm COMPARISON: Chest radiographs March 27, 2017 HISTORY: ORDERING SYSTEM PROVIDED

## 2017-10-25 NOTE — PROCEDURES
[facility]                             CARDIAC STRESS TEST    PATIENT NAME: Magdalene Guthrie                       :             1945  MED REC NO:   [id number]                              ROOM:            [id number]  ACCOUNT NO:   [de-identified]                            ADMISSION DATE:  10/24/2017  PROVIDER:     Sanjana Logan STRESS STUDY    DATE OF STUDY:  10/25/2017    ORDERING PROVIDER:  Fanny Uriarte Do    INDICATION:  Chest discomfort. CONSENT:  The test was explained and consent was signed. PROTOCOL:  Lexiscan, 0.4 mg infused. 100 mg IV Aminophylline was  given 2 minutes post Cardiolite injection. PREINFUSION EKG:  Abnormal-normal sinus rhythm, nonspecific ST and T  changes. PREINFUSION HR:  85 bpm, infusion HR, 100 bpm.  HR response to  Lexiscan was normal.  PREINFUSION BP:  150/102 mmHg, infusion BP, 206/85 mmHg. BP response  to Lexiscan was appropriate. LEXISCAN EKG:  No changes noted. CHEST DISCOMFORT:  Chest discomfort (7 of 10) with Lexiscan infusion,  relieved in recovery. ISCHEMIC EKG CHANGES:  None. IMPRESSION:  Electrocardiographically negative Lexiscan stress study.   *Cardiolite report issued from the department of Nuclear Medicine**    Angeles Christian    D: 10/25/2017 14:13:33       T: 10/25/2017 14:17:10     MT/ANGI  Job#: [id number]    Doc#: Unknown

## 2017-10-25 NOTE — ED PROVIDER NOTES
[facility]     Emergency Department     Faculty Attestation    I performed a history and physical examination of the patient and discussed management with the resident. I reviewed the residents note and agree with the documented findings and plan of care. Any areas of disagreement are noted on the chart. I was personally present for the key portions of any procedures. I have documented in the chart those procedures where I was not present during the key portions. I have reviewed the emergency nurses triage note. I agree with the chief complaint, past medical history, past surgical history, allergies, medications, social and family history as documented unless otherwise noted below. For Physician Assistant/ Nurse Practitioner cases/documentation I have personally evaluated this patient and have completed at least one if not all key elements of the E/M (history, physical exam, and MDM). Additional findings are as noted. Chest clear,  Heart exam normal , no pain or swelling on examination of the lower extremities , equal pulses both wrists , trachea midline. Abdomen is nontender without pulsatile mass or bruit. Chest pain does not radiate to the back , and the pain is not pleuritic. Patient appears comfortable in no distress, skin is warm and dry.  The patient is somewhat anxious and tearful because her son just passed away here of cardiac arrest.    EKG Interpretation    Interpreted by me    Rhythm: normal sinus   Rate: normal/92  Axis: normal  Ectopy: none  Conduction: normal  Lateral ST and T-wave changes  Q Waves: III    Clinical Impression:   Abnormal EKG      Elliot Sharma MD [address]  Attending Physician               Leon Isidro MD  10/24/17 2019

## 2017-10-26 NOTE — CONSULTS
capsules by mouth 3 times daily 3/26/16   Madelaine Irvin MD   lisinopril (PRINIVIL;ZESTRIL) 10 MG tablet Take 1 tablet by mouth daily 3/26/16   Madelaine Irvin MD   cyclobenzaprine (FLEXERIL) 10 MG tablet Take 10 mg by mouth 3 times daily as needed for Muscle spasms. Historical Provider, MD   Multiple Vitamins-Minerals (THERAPEUTIC MULTIVITAMIN-MINERALS) tablet Take 1 tablet by mouth daily. Historical Provider, MD   vitamin E 400 UNIT capsule Take 400 Units by mouth daily. Historical Provider, MD   insulin aspart (NOVOLOG FLEXPEN) 100 UNIT/ML injection pen Inject  into the skin 3 times daily (before meals). Historical Provider, MD   NIFEdipine (ADALAT CC) 30 MG CR tablet Take 30 mg by mouth daily. Historical Provider, MD   aspirin 81 MG tablet Take 81 mg by mouth daily. Historical Provider, MD   LORazepam (ATIVAN) 0.5 MG tablet Take 0.5 mg by mouth every 8 hours as needed. Historical Provider, MD   carvedilol (COREG) 25 MG tablet Take 25 mg by mouth 2 times daily (with meals). Historical Provider, MD   docusate sodium (COLACE) 100 MG capsule Take 100 mg by mouth daily     Historical Provider, MD   glimepiride (AMARYL) 4 MG tablet Take 4 mg by mouth every morning (before breakfast). Historical Provider, MD   insulin detemir (LEVEMIR) 100 UNIT/ML injection Inject 60 Units into the skin 2 times daily. Historical Provider, MD   meclizine (ANTIVERT) 25 MG tablet Take 25 mg by mouth 3 times daily as needed. Historical Provider, MD   nitroGLYCERIN (NITROSTAT) 0.4 MG SL tablet Place 0.4 mg under the tongue every 5 minutes as needed. Historical Provider, MD   oxyCODONE-acetaminophen (PERCOCET) 5-325 MG per tablet Take 1 tablet by mouth every 8 hours as needed. Historical Provider, MD   omeprazole (PRILOSEC) 20 MG capsule Take 20 mg by mouth Daily. Historical Provider, MD   traMADol (ULTRAM) 50 MG tablet Take 50 mg by mouth every 8 hours as needed.     Historical Provider, MD Hospital Meds:    No current facility-administered medications for this encounter. Current Outpatient Prescriptions   Medication Sig Dispense Refill    atorvastatin (LIPITOR) 40 MG tablet Take 1 tablet by mouth nightly 30 tablet 3    ticagrelor (BRILINTA) 90 MG TABS tablet Take 1 tablet by mouth 2 times daily 180 tablet 0    spironolactone (ALDACTONE) 25 MG tablet Take 25 mg by mouth      gabapentin (NEURONTIN) 100 MG capsule Take 2 capsules by mouth 3 times daily 30 capsule 3    lisinopril (PRINIVIL;ZESTRIL) 10 MG tablet Take 1 tablet by mouth daily 30 tablet 3    cyclobenzaprine (FLEXERIL) 10 MG tablet Take 10 mg by mouth 3 times daily as needed for Muscle spasms.  Multiple Vitamins-Minerals (THERAPEUTIC MULTIVITAMIN-MINERALS) tablet Take 1 tablet by mouth daily.  vitamin E 400 UNIT capsule Take 400 Units by mouth daily.  insulin aspart (NOVOLOG FLEXPEN) 100 UNIT/ML injection pen Inject  into the skin 3 times daily (before meals).  NIFEdipine (ADALAT CC) 30 MG CR tablet Take 30 mg by mouth daily.  aspirin 81 MG tablet Take 81 mg by mouth daily.  LORazepam (ATIVAN) 0.5 MG tablet Take 0.5 mg by mouth every 8 hours as needed.  carvedilol (COREG) 25 MG tablet Take 25 mg by mouth 2 times daily (with meals).  docusate sodium (COLACE) 100 MG capsule Take 100 mg by mouth daily       glimepiride (AMARYL) 4 MG tablet Take 4 mg by mouth every morning (before breakfast).  insulin detemir (LEVEMIR) 100 UNIT/ML injection Inject 60 Units into the skin 2 times daily.  meclizine (ANTIVERT) 25 MG tablet Take 25 mg by mouth 3 times daily as needed.  nitroGLYCERIN (NITROSTAT) 0.4 MG SL tablet Place 0.4 mg under the tongue every 5 minutes as needed.  oxyCODONE-acetaminophen (PERCOCET) 5-325 MG per tablet Take 1 tablet by mouth every 8 hours as needed.  omeprazole (PRILOSEC) 20 MG capsule Take 20 mg by mouth Daily.       traMADol (ULTRAM) 50 MG tablet Take cath.      Electronically signed by Nadia Diaz DO on 10/25/2017 at 9:30 PM

## 2017-10-29 NOTE — DISCHARGE SUMMARY
(ADALAT CC) 30 MG CR tablet  Take 30 mg by mouth daily. nitroGLYCERIN (NITROSTAT) 0.4 MG SL tablet  Place 0.4 mg under the tongue every 5 minutes as needed. omeprazole (PRILOSEC) 20 MG capsule  Take 20 mg by mouth Daily. oxyCODONE-acetaminophen (PERCOCET) 5-325 MG per tablet  Take 1 tablet by mouth every 8 hours as needed. spironolactone (ALDACTONE) 25 MG tablet  Take 25 mg by mouth             ticagrelor (BRILINTA) 90 MG TABS tablet  Take 1 tablet by mouth 2 times daily             traMADol (ULTRAM) 50 MG tablet  Take 50 mg by mouth every 8 hours as needed. vitamin E 400 UNIT capsule  Take 400 Units by mouth daily.                  Diet: General    Activity:  As tolerated    Follow-up:  Call today/tomorrow for a follow up appointment with Michael Bustamante MD     Consultants: IP CONSULT TO CARDIOLOGY  IP CONSULT TO CARDIOLOGY  IP CONSULT TO SPIRITUAL SERVICES    Procedures: not indicated     Diagnostic Test: [place] Stress Test        Physical Exam:    Vitals:  Vitals:    10/24/17 2005 10/24/17 2219 10/24/17 2317 10/25/17 0845   BP:   (!) 165/81 (!) 163/86   Pulse:   75 75   Resp: (!) 33  16 18   Temp:   97.5 °F (36.4 °C) 98.2 °F (36.8 °C)   TempSrc:   Oral Oral   SpO2: 100%  100%    Weight:  225 lb 15.5 oz (102.5 kg)     Height:  5' 6.14\" (1.68 m)         General appearance - alert, well appearing, and in no distress, oriented to person, place, and time, overweight, acyanotic, in no respiratory distress, improved and well hydrated  Chest - clear to auscultation, no wheezes, rales or rhonchi, symmetric air entry  Heart - normal rate, regular rhythm, normal S1, S2, no murmurs, rubs, clicks or gallops  Abdomen - soft, nontender, nondistended, no masses or organomegaly  Neurological - alert, oriented, normal speech, no focal findings or movement disorder noted  Extremities - peripheral pulses normal, no pedal edema, no clubbing or cyanosis  Skin - normal

## 2018-01-13 ENCOUNTER — APPOINTMENT (OUTPATIENT)
Dept: GENERAL RADIOLOGY | Age: 73
End: 2018-01-13
Payer: MEDICARE

## 2018-01-13 ENCOUNTER — APPOINTMENT (OUTPATIENT)
Dept: CT IMAGING | Age: 73
End: 2018-01-13
Payer: MEDICARE

## 2018-01-13 ENCOUNTER — HOSPITAL ENCOUNTER (OUTPATIENT)
Age: 73
Setting detail: OBSERVATION
Discharge: HOME | End: 2018-01-14
Attending: EMERGENCY MEDICINE | Admitting: EMERGENCY MEDICINE
Payer: MEDICARE

## 2018-01-13 DIAGNOSIS — E11.8 TYPE 2 DIABETES MELLITUS WITH COMPLICATION, UNSPECIFIED LONG TERM INSULIN USE STATUS: ICD-10-CM

## 2018-01-13 DIAGNOSIS — B34.9 ACUTE VIRAL SYNDROME: Primary | ICD-10-CM

## 2018-01-13 DIAGNOSIS — R06.09 EXERTIONAL DYSPNEA: ICD-10-CM

## 2018-01-13 PROBLEM — R06.00 DYSPNEA: Status: ACTIVE | Noted: 2018-01-13

## 2018-01-13 LAB
-: NORMAL
ABSOLUTE EOS #: 0.26 K/UL (ref 0–0.44)
ABSOLUTE IMMATURE GRANULOCYTE: <0.03 K/UL (ref 0–0.3)
ABSOLUTE LYMPH #: 2.17 K/UL (ref 1.1–3.7)
ABSOLUTE MONO #: 0.49 K/UL (ref 0.1–1.2)
AMORPHOUS: NORMAL
ANION GAP SERPL CALCULATED.3IONS-SCNC: 12 MMOL/L (ref 9–17)
BACTERIA: NORMAL
BASOPHILS # BLD: 0 % (ref 0–2)
BASOPHILS ABSOLUTE: 0.03 K/UL (ref 0–0.2)
BILIRUBIN URINE: NEGATIVE
BUN BLDV-MCNC: 18 MG/DL (ref 8–23)
BUN/CREAT BLD: ABNORMAL (ref 9–20)
CALCIUM SERPL-MCNC: 8.8 MG/DL (ref 8.6–10.4)
CASTS UA: NORMAL /LPF (ref 0–8)
CHLORIDE BLD-SCNC: 97 MMOL/L (ref 98–107)
CO2: 24 MMOL/L (ref 20–31)
COLOR: YELLOW
COMMENT UA: ABNORMAL
CREAT SERPL-MCNC: 0.58 MG/DL (ref 0.5–0.9)
CRYSTALS, UA: NORMAL /HPF
DIFFERENTIAL TYPE: NORMAL
DIRECT EXAM: NORMAL
EOSINOPHILS RELATIVE PERCENT: 4 % (ref 1–4)
EPITHELIAL CELLS UA: NORMAL /HPF (ref 0–5)
GFR AFRICAN AMERICAN: >60 ML/MIN
GFR NON-AFRICAN AMERICAN: >60 ML/MIN
GFR SERPL CREATININE-BSD FRML MDRD: ABNORMAL ML/MIN/{1.73_M2}
GFR SERPL CREATININE-BSD FRML MDRD: ABNORMAL ML/MIN/{1.73_M2}
GLUCOSE BLD-MCNC: 163 MG/DL (ref 65–105)
GLUCOSE BLD-MCNC: 263 MG/DL (ref 70–99)
GLUCOSE BLD-MCNC: 298 MG/DL (ref 65–105)
GLUCOSE URINE: ABNORMAL
HCT VFR BLD CALC: 41.9 % (ref 36.3–47.1)
HEMOGLOBIN: 13.4 G/DL (ref 11.9–15.1)
IMMATURE GRANULOCYTES: 0 %
KETONES, URINE: NEGATIVE
LEUKOCYTE ESTERASE, URINE: ABNORMAL
LYMPHOCYTES # BLD: 31 % (ref 24–43)
Lab: NORMAL
MAGNESIUM: 2 MG/DL (ref 1.6–2.6)
MCH RBC QN AUTO: 29.5 PG (ref 25.2–33.5)
MCHC RBC AUTO-ENTMCNC: 32 G/DL (ref 28.4–34.8)
MCV RBC AUTO: 92.1 FL (ref 82.6–102.9)
MONOCYTES # BLD: 7 % (ref 3–12)
MUCUS: NORMAL
NITRITE, URINE: NEGATIVE
OTHER OBSERVATIONS UA: NORMAL
PDW BLD-RTO: 13 % (ref 11.8–14.4)
PH UA: 5 (ref 5–8)
PLATELET # BLD: 173 K/UL (ref 138–453)
PLATELET ESTIMATE: NORMAL
PMV BLD AUTO: 11.9 FL (ref 8.1–13.5)
POC TROPONIN I: 0 NG/ML (ref 0–0.1)
POC TROPONIN I: 0 NG/ML (ref 0–0.1)
POC TROPONIN INTERP: NORMAL
POC TROPONIN INTERP: NORMAL
POTASSIUM SERPL-SCNC: 4.2 MMOL/L (ref 3.7–5.3)
PROTEIN UA: NEGATIVE
RBC # BLD: 4.55 M/UL (ref 3.95–5.11)
RBC # BLD: NORMAL 10*6/UL
RBC UA: NORMAL /HPF (ref 0–4)
RENAL EPITHELIAL, UA: NORMAL /HPF
SEDIMENTATION RATE, ERYTHROCYTE: 8 MM (ref 0–20)
SEG NEUTROPHILS: 58 % (ref 36–65)
SEGMENTED NEUTROPHILS ABSOLUTE COUNT: 4.07 K/UL (ref 1.5–8.1)
SODIUM BLD-SCNC: 133 MMOL/L (ref 135–144)
SPECIFIC GRAVITY UA: 1.02 (ref 1–1.03)
SPECIMEN DESCRIPTION: NORMAL
STATUS: NORMAL
TRICHOMONAS: NORMAL
TURBIDITY: CLEAR
URINE HGB: NEGATIVE
UROBILINOGEN, URINE: NORMAL
WBC # BLD: 7 K/UL (ref 3.5–11.3)
WBC # BLD: NORMAL 10*3/UL
WBC UA: NORMAL /HPF (ref 0–5)
YEAST: NORMAL

## 2018-01-13 PROCEDURE — 71046 X-RAY EXAM CHEST 2 VIEWS: CPT

## 2018-01-13 PROCEDURE — 99285 EMERGENCY DEPT VISIT HI MDM: CPT

## 2018-01-13 PROCEDURE — 6370000000 HC RX 637 (ALT 250 FOR IP): Performed by: EMERGENCY MEDICINE

## 2018-01-13 PROCEDURE — 84484 ASSAY OF TROPONIN QUANT: CPT

## 2018-01-13 PROCEDURE — 83735 ASSAY OF MAGNESIUM: CPT

## 2018-01-13 PROCEDURE — 2580000003 HC RX 258: Performed by: EMERGENCY MEDICINE

## 2018-01-13 PROCEDURE — G0378 HOSPITAL OBSERVATION PER HR: HCPCS

## 2018-01-13 PROCEDURE — 6370000000 HC RX 637 (ALT 250 FOR IP): Performed by: STUDENT IN AN ORGANIZED HEALTH CARE EDUCATION/TRAINING PROGRAM

## 2018-01-13 PROCEDURE — 96361 HYDRATE IV INFUSION ADD-ON: CPT

## 2018-01-13 PROCEDURE — 70450 CT HEAD/BRAIN W/O DYE: CPT

## 2018-01-13 PROCEDURE — 85025 COMPLETE CBC W/AUTO DIFF WBC: CPT

## 2018-01-13 PROCEDURE — 81001 URINALYSIS AUTO W/SCOPE: CPT

## 2018-01-13 PROCEDURE — 6360000002 HC RX W HCPCS: Performed by: EMERGENCY MEDICINE

## 2018-01-13 PROCEDURE — 85651 RBC SED RATE NONAUTOMATED: CPT

## 2018-01-13 PROCEDURE — 87804 INFLUENZA ASSAY W/OPTIC: CPT

## 2018-01-13 PROCEDURE — 93005 ELECTROCARDIOGRAM TRACING: CPT

## 2018-01-13 PROCEDURE — 96372 THER/PROPH/DIAG INJ SC/IM: CPT

## 2018-01-13 PROCEDURE — 80048 BASIC METABOLIC PNL TOTAL CA: CPT

## 2018-01-13 PROCEDURE — 96374 THER/PROPH/DIAG INJ IV PUSH: CPT

## 2018-01-13 PROCEDURE — 82947 ASSAY GLUCOSE BLOOD QUANT: CPT

## 2018-01-13 PROCEDURE — 96375 TX/PRO/DX INJ NEW DRUG ADDON: CPT

## 2018-01-13 RX ORDER — ATORVASTATIN CALCIUM 40 MG/1
40 TABLET, FILM COATED ORAL NIGHTLY
Status: DISCONTINUED | OUTPATIENT
Start: 2018-01-13 | End: 2018-01-14 | Stop reason: HOSPADM

## 2018-01-13 RX ORDER — HYDROCODONE BITARTRATE AND ACETAMINOPHEN 5; 325 MG/1; MG/1
1 TABLET ORAL ONCE
Status: COMPLETED | OUTPATIENT
Start: 2018-01-13 | End: 2018-01-13

## 2018-01-13 RX ORDER — MECLIZINE HCL 12.5 MG/1
25 TABLET ORAL 3 TIMES DAILY PRN
Status: DISCONTINUED | OUTPATIENT
Start: 2018-01-13 | End: 2018-01-14 | Stop reason: HOSPADM

## 2018-01-13 RX ORDER — LISINOPRIL 10 MG/1
10 TABLET ORAL DAILY
Status: DISCONTINUED | OUTPATIENT
Start: 2018-01-13 | End: 2018-01-14 | Stop reason: HOSPADM

## 2018-01-13 RX ORDER — INSULIN GLARGINE 100 [IU]/ML
40 INJECTION, SOLUTION SUBCUTANEOUS 2 TIMES DAILY
Status: DISCONTINUED | OUTPATIENT
Start: 2018-01-13 | End: 2018-01-14 | Stop reason: HOSPADM

## 2018-01-13 RX ORDER — DEXTROSE MONOHYDRATE 50 MG/ML
100 INJECTION, SOLUTION INTRAVENOUS PRN
Status: DISCONTINUED | OUTPATIENT
Start: 2018-01-13 | End: 2018-01-14 | Stop reason: HOSPADM

## 2018-01-13 RX ORDER — PANTOPRAZOLE SODIUM 40 MG/1
40 TABLET, DELAYED RELEASE ORAL
Status: DISCONTINUED | OUTPATIENT
Start: 2018-01-14 | End: 2018-01-14 | Stop reason: HOSPADM

## 2018-01-13 RX ORDER — SODIUM CHLORIDE, SODIUM LACTATE, POTASSIUM CHLORIDE, AND CALCIUM CHLORIDE .6; .31; .03; .02 G/100ML; G/100ML; G/100ML; G/100ML
500 INJECTION, SOLUTION INTRAVENOUS ONCE
Status: COMPLETED | OUTPATIENT
Start: 2018-01-13 | End: 2018-01-13

## 2018-01-13 RX ORDER — ASPIRIN 81 MG/1
243 TABLET, CHEWABLE ORAL ONCE
Status: COMPLETED | OUTPATIENT
Start: 2018-01-13 | End: 2018-01-13

## 2018-01-13 RX ORDER — SPIRONOLACTONE 25 MG/1
25 TABLET ORAL DAILY
Status: DISCONTINUED | OUTPATIENT
Start: 2018-01-13 | End: 2018-01-14 | Stop reason: HOSPADM

## 2018-01-13 RX ORDER — LORAZEPAM 0.5 MG/1
0.5 TABLET ORAL EVERY 8 HOURS PRN
Status: DISCONTINUED | OUTPATIENT
Start: 2018-01-13 | End: 2018-01-14 | Stop reason: HOSPADM

## 2018-01-13 RX ORDER — ASPIRIN 81 MG/1
81 TABLET ORAL DAILY
Status: DISCONTINUED | OUTPATIENT
Start: 2018-01-13 | End: 2018-01-14 | Stop reason: HOSPADM

## 2018-01-13 RX ORDER — CARVEDILOL 25 MG/1
25 TABLET ORAL 2 TIMES DAILY WITH MEALS
Status: DISCONTINUED | OUTPATIENT
Start: 2018-01-13 | End: 2018-01-14 | Stop reason: HOSPADM

## 2018-01-13 RX ORDER — NIFEDIPINE 30 MG/1
30 TABLET, EXTENDED RELEASE ORAL DAILY
Status: DISCONTINUED | OUTPATIENT
Start: 2018-01-13 | End: 2018-01-14 | Stop reason: HOSPADM

## 2018-01-13 RX ORDER — CYCLOBENZAPRINE HCL 10 MG
10 TABLET ORAL 3 TIMES DAILY PRN
Status: DISCONTINUED | OUTPATIENT
Start: 2018-01-13 | End: 2018-01-14 | Stop reason: HOSPADM

## 2018-01-13 RX ORDER — ONDANSETRON 2 MG/ML
4 INJECTION INTRAMUSCULAR; INTRAVENOUS ONCE
Status: COMPLETED | OUTPATIENT
Start: 2018-01-13 | End: 2018-01-13

## 2018-01-13 RX ORDER — KETOROLAC TROMETHAMINE 15 MG/ML
15 INJECTION, SOLUTION INTRAMUSCULAR; INTRAVENOUS ONCE
Status: COMPLETED | OUTPATIENT
Start: 2018-01-13 | End: 2018-01-13

## 2018-01-13 RX ORDER — NICOTINE POLACRILEX 4 MG
15 LOZENGE BUCCAL PRN
Status: DISCONTINUED | OUTPATIENT
Start: 2018-01-13 | End: 2018-01-14 | Stop reason: HOSPADM

## 2018-01-13 RX ORDER — SODIUM CHLORIDE 0.9 % (FLUSH) 0.9 %
10 SYRINGE (ML) INJECTION EVERY 12 HOURS SCHEDULED
Status: DISCONTINUED | OUTPATIENT
Start: 2018-01-13 | End: 2018-01-14 | Stop reason: HOSPADM

## 2018-01-13 RX ORDER — ACETAMINOPHEN 325 MG/1
650 TABLET ORAL EVERY 4 HOURS PRN
Status: DISCONTINUED | OUTPATIENT
Start: 2018-01-13 | End: 2018-01-14 | Stop reason: HOSPADM

## 2018-01-13 RX ORDER — GABAPENTIN 100 MG/1
200 CAPSULE ORAL 3 TIMES DAILY
Status: DISCONTINUED | OUTPATIENT
Start: 2018-01-13 | End: 2018-01-14 | Stop reason: HOSPADM

## 2018-01-13 RX ORDER — GLIMEPIRIDE 2 MG/1
4 TABLET ORAL
Status: DISCONTINUED | OUTPATIENT
Start: 2018-01-14 | End: 2018-01-14 | Stop reason: HOSPADM

## 2018-01-13 RX ORDER — DEXTROSE MONOHYDRATE 25 G/50ML
12.5 INJECTION, SOLUTION INTRAVENOUS PRN
Status: DISCONTINUED | OUTPATIENT
Start: 2018-01-13 | End: 2018-01-14 | Stop reason: HOSPADM

## 2018-01-13 RX ORDER — SODIUM CHLORIDE 0.9 % (FLUSH) 0.9 %
10 SYRINGE (ML) INJECTION PRN
Status: DISCONTINUED | OUTPATIENT
Start: 2018-01-13 | End: 2018-01-14 | Stop reason: HOSPADM

## 2018-01-13 RX ORDER — SODIUM CHLORIDE, SODIUM LACTATE, POTASSIUM CHLORIDE, CALCIUM CHLORIDE 600; 310; 30; 20 MG/100ML; MG/100ML; MG/100ML; MG/100ML
INJECTION, SOLUTION INTRAVENOUS CONTINUOUS
Status: DISCONTINUED | OUTPATIENT
Start: 2018-01-13 | End: 2018-01-14

## 2018-01-13 RX ADMIN — DESMOPRESSIN ACETATE 40 MG: 0.2 TABLET ORAL at 20:46

## 2018-01-13 RX ADMIN — INSULIN GLARGINE 40 UNITS: 100 INJECTION, SOLUTION SUBCUTANEOUS at 21:57

## 2018-01-13 RX ADMIN — SPIRONOLACTONE 25 MG: 25 TABLET ORAL at 16:29

## 2018-01-13 RX ADMIN — TICAGRELOR 90 MG: 90 TABLET ORAL at 20:47

## 2018-01-13 RX ADMIN — CARVEDILOL 25 MG: 25 TABLET, FILM COATED ORAL at 17:16

## 2018-01-13 RX ADMIN — INSULIN LISPRO 2 UNITS: 100 INJECTION, SOLUTION INTRAVENOUS; SUBCUTANEOUS at 21:55

## 2018-01-13 RX ADMIN — SODIUM CHLORIDE, POTASSIUM CHLORIDE, SODIUM LACTATE AND CALCIUM CHLORIDE 500 ML: 600; 310; 30; 20 INJECTION, SOLUTION INTRAVENOUS at 10:30

## 2018-01-13 RX ADMIN — SODIUM CHLORIDE, POTASSIUM CHLORIDE, SODIUM LACTATE AND CALCIUM CHLORIDE: 600; 310; 30; 20 INJECTION, SOLUTION INTRAVENOUS at 14:49

## 2018-01-13 RX ADMIN — ENOXAPARIN SODIUM 40 MG: 40 INJECTION SUBCUTANEOUS at 16:29

## 2018-01-13 RX ADMIN — GABAPENTIN 200 MG: 100 CAPSULE ORAL at 20:43

## 2018-01-13 RX ADMIN — MECLIZINE HCL 25 MG: 12.5 TABLET ORAL at 16:29

## 2018-01-13 RX ADMIN — GABAPENTIN 200 MG: 100 CAPSULE ORAL at 17:17

## 2018-01-13 RX ADMIN — ASPIRIN 81 MG 243 MG: 81 TABLET ORAL at 10:20

## 2018-01-13 RX ADMIN — ACETAMINOPHEN 650 MG: 325 TABLET ORAL at 21:55

## 2018-01-13 RX ADMIN — ONDANSETRON 4 MG: 2 INJECTION INTRAMUSCULAR; INTRAVENOUS at 10:19

## 2018-01-13 RX ADMIN — KETOROLAC TROMETHAMINE 15 MG: 15 INJECTION, SOLUTION INTRAMUSCULAR; INTRAVENOUS at 12:52

## 2018-01-13 RX ADMIN — HYDROCODONE BITARTRATE AND ACETAMINOPHEN 1 TABLET: 5; 325 TABLET ORAL at 10:19

## 2018-01-13 RX ADMIN — LISINOPRIL 10 MG: 10 TABLET ORAL at 16:29

## 2018-01-13 RX ADMIN — NIFEDIPINE 30 MG: 30 TABLET, FILM COATED, EXTENDED RELEASE ORAL at 16:28

## 2018-01-13 RX ADMIN — HYDROCODONE BITARTRATE AND ACETAMINOPHEN 1 TABLET: 5; 325 TABLET ORAL at 12:52

## 2018-01-13 ASSESSMENT — PAIN DESCRIPTION - PROGRESSION: CLINICAL_PROGRESSION: GRADUALLY WORSENING

## 2018-01-13 ASSESSMENT — PAIN DESCRIPTION - DESCRIPTORS: DESCRIPTORS: CONSTANT

## 2018-01-13 ASSESSMENT — ENCOUNTER SYMPTOMS
SHORTNESS OF BREATH: 1
ABDOMINAL PAIN: 0
VOMITING: 0
SINUS PRESSURE: 0
NAUSEA: 1
EYE PAIN: 0
DIARRHEA: 1
CHEST TIGHTNESS: 1
BLOOD IN STOOL: 0
PHOTOPHOBIA: 0
SORE THROAT: 0
COUGH: 0
RHINORRHEA: 0

## 2018-01-13 ASSESSMENT — PAIN SCALES - GENERAL
PAINLEVEL_OUTOF10: 10
PAINLEVEL_OUTOF10: 7
PAINLEVEL_OUTOF10: 10
PAINLEVEL_OUTOF10: 7

## 2018-01-13 ASSESSMENT — PAIN DESCRIPTION - ONSET: ONSET: PROGRESSIVE

## 2018-01-13 ASSESSMENT — PAIN DESCRIPTION - PAIN TYPE: TYPE: ACUTE PAIN

## 2018-01-13 ASSESSMENT — PAIN DESCRIPTION - LOCATION: LOCATION: HEAD

## 2018-01-13 ASSESSMENT — PAIN DESCRIPTION - FREQUENCY: FREQUENCY: CONTINUOUS

## 2018-01-13 NOTE — CARE COORDINATION
Case Management Initial Discharge Plan  Candida Pinto,         Readmission Risk              Readmission Risk:        9.5       Age 72 or Greater:  1    Admitted from SNF or Requires Paid or Family Care:  0    Currently has CHF,COPD,ARF,CRI,or is on dialysis:  0    Takes more than 5 Prescription Medications:  4    Takes Digoxin,Insulin,Anticoagulants,Narcotics or ASA/Plavix:  2    [address] in Past 12 Months:  0    On Disability:  0    Patient Considers own Health:  2.5            Met with:patient to discuss discharge plans. Information verified: address, contacts, phone number, , insurance Yes  PCP: Virginia Davalos MD  Date of last visit: 2017    Insurance Provider: Yamila Townsend    Discharge Planning  Current Residence:  Private Residence  Living Arrangements:  FION[place] Hendrix has 2 stories/ 10 stairs to climb  Support Systems:  Children  Current Services PTA:  Durable Medical Equipment Supplier: none  Patient able to perform ADL's:Independent  DME used to aid ambulation prior to admission: cane /during admission none    Potential Assistance Needed:  N/A    Pharmacy: [facility]   Potential Assistance Purchasing Medications:  No  Does patient want to participate in local refill/ meds to beds program?  No    Patient agreeable to home care: No  [place] of choice provided:  n/a      Type of Home Care Services:  None  Patient expects to be discharged to:  home    Prior SNF/Rehab Placement and Facility: no  Agreeable to SNF/Rehab: No  [place] of choice provided: n/a   Evaluation: n/a    Expected Discharge date:      Follow Up Appointment: Best Day/ Time:      Transportation provider: pt children  Transportation arrangements needed for discharge: No    Discharge Plan: home        Electronically signed by Fely Hoffman RN on 18 at 1:31 PM

## 2018-01-13 NOTE — ED PROVIDER NOTES
History reviewed. No pertinent family history. Allergies:  Penicillins and Sulfa antibiotics    Home Medications:  Prior to Admission medications    Medication Sig Start Date End Date Taking? Authorizing Provider   atorvastatin (LIPITOR) 40 MG tablet Take 1 tablet by mouth nightly 3/28/17  Yes Rebecca Munguia MD   spironolactone (ALDACTONE) 25 MG tablet Take 25 mg by mouth   Yes Historical Provider, MD   gabapentin (NEURONTIN) 100 MG capsule Take 2 capsules by mouth 3 times daily 3/26/16  Yes Sheril Nissen, MD   lisinopril (PRINIVIL;ZESTRIL) 10 MG tablet Take 1 tablet by mouth daily 3/26/16  Yes Sheril Nissen, MD   cyclobenzaprine (FLEXERIL) 10 MG tablet Take 10 mg by mouth 3 times daily as needed for Muscle spasms. Yes Historical Provider, MD   Multiple Vitamins-Minerals (THERAPEUTIC MULTIVITAMIN-MINERALS) tablet Take 1 tablet by mouth daily. Yes Historical Provider, MD   vitamin E 400 UNIT capsule Take 400 Units by mouth daily. Yes Historical Provider, MD   insulin aspart (NOVOLOG FLEXPEN) 100 UNIT/ML injection pen Inject  into the skin 3 times daily (before meals). Yes Historical Provider, MD   NIFEdipine (ADALAT CC) 30 MG CR tablet Take 30 mg by mouth daily. Yes Historical Provider, MD   aspirin 81 MG tablet Take 81 mg by mouth daily. Yes Historical Provider, MD   LORazepam (ATIVAN) 0.5 MG tablet Take 0.5 mg by mouth every 8 hours as needed. Yes Historical Provider, MD   carvedilol (COREG) 25 MG tablet Take 25 mg by mouth 2 times daily (with meals). Yes Historical Provider, MD   docusate sodium (COLACE) 100 MG capsule Take 100 mg by mouth daily    Yes Historical Provider, MD   glimepiride (AMARYL) 4 MG tablet Take 4 mg by mouth every morning (before breakfast). Yes Historical Provider, MD   insulin detemir (LEVEMIR) 100 UNIT/ML injection Inject 60 Units into the skin 2 times daily.    Yes Historical Provider, MD   meclizine (ANTIVERT) 25 MG tablet Take 25 mg by mouth 3 times daily as needed. Yes Historical Provider, MD   nitroGLYCERIN (NITROSTAT) 0.4 MG SL tablet Place 0.4 mg under the tongue every 5 minutes as needed. Yes Historical Provider, MD   omeprazole (PRILOSEC) 20 MG capsule Take 20 mg by mouth Daily. Yes Historical Provider, MD   traMADol (ULTRAM) 50 MG tablet Take 50 mg by mouth every 8 hours as needed. Yes Historical Provider, MD   ticagrelor (BRILINTA) 90 MG TABS tablet Take 1 tablet by mouth 2 times daily 2/27/17 5/28/17  TIP Sanchez MD         REVIEW OF SYSTEMS       Review of Systems   Constitutional: Positive for fatigue. Negative for chills and fever. HENT: Positive for congestion. Negative for postnasal drip, rhinorrhea, sinus pressure, sneezing and sore throat. Eyes: Negative for photophobia, pain and visual disturbance. Respiratory: Positive for chest tightness and shortness of breath. Negative for cough. Cardiovascular: Negative for chest pain and palpitations. PND. Gastrointestinal: Positive for diarrhea and nausea. Negative for abdominal pain, blood in stool and vomiting. Genitourinary: Negative for dysuria, flank pain, frequency and hematuria. Musculoskeletal: Positive for myalgias. Negative for arthralgias and joint swelling. Skin: Negative for pallor, rash and wound. Neurological: Positive for weakness and headaches. Negative for numbness. Psychiatric/Behavioral: Negative for confusion. The patient is not nervous/anxious.         PHYSICAL EXAM      INITIAL VITALS:   BP (!) 187/86   Pulse 54   Temp 98.1 °F (36.7 °C) (Oral)   Resp 15   Ht 5' 6\" (1.676 m)   Wt 235 lb (106.6 kg)   SpO2 97%   BMI 37.93 kg/m²     Physical Exam       CONSTITUTIONAL: AOx4,  cooperative with exam, afebrile   HEAD: normocephalic, atraumatic; mild bilateral tenderness over the temporal regions    EYES: Pupils round and reactive to light bilaterally, EOMI   ENT: Moist mucous membranes, uvula midline; mild posterior pharyngeal erythema; no tonsillar swelling/exudate; TM's clear bilaterally   NECK: Supple, symmetric, trachea midline; no JVD; full range of motion of the neck.   No meningismus    BACK: Symmetric, no deformity   LUNGS: Bilateral breath sounds, CTAB, no rales/ronchi/wheezes   CARDIOVASCULAR: RRR, no murmurs, 2+ pulses throughout   ABDOMEN: Soft, non-tender, non-distended, no guarding or rigidity   NEUROLOGIC:  MAEx4, normal sensorium; normal gait; AAOx3, CN III-XII grossly intact,  Moving all extremities, follows commands  5/5 motor strength bilateral upper/lower extremities  Sensation preserved/intact bilateral upper/lower extremities   MUSCULOSKELETAL: No clubbing, cyanosis or edema; no calf swelling or tenderness   SKIN: No rash, pallor or wounds       DIFFERENTIAL  DIAGNOSIS     PLAN (LABS / IMAGING / EKG):  Orders Placed This Encounter   Procedures    RAPID INFLUENZA A/B ANTIGENS    XR CHEST STANDARD (2 VW)    CT Head WO Contrast    CBC Auto Differential    Basic Metabolic Panel    MAGNESIUM    Sedimentation Rate    URINALYSIS    Microscopic Urinalysis    DIET GENERAL;    Vital signs per unit routine    Notify physician    Notify physician    Place intermittent pneumatic compression device    Telemetry Monitoring    HYPOGLYCEMIA TREATMENT: blood glucose less than 50 mg/dL and patient  ALERT and TOLERATING PO    HYPOGLYCEMIA TREATMENT: blood glucose less than 70 mg/dL and patient ALERT and TOLERATING PO    HYPOGLYCEMIA TREATMENT: blood glucose less than 70 mg/dL and patient NOT ALERT or NPO    Full Code    Inpatient consult to Cardiology    Inpatient consult to Spiritual Services    Home O2 eval (desaturation screen)    POCT troponin    POCT troponin    POCT troponin    POCT Glucose    POC Glucose Fingerstick    EKG 12 Lead    EKG 12 Lead    Insert peripheral IV    PATIENT STATUS (FROM ED OR OR/PROCEDURAL) Observation       MEDICATIONS ORDERED:  Orders Placed This Encounter   Medications    lactated ringers bolus    aspirin chewable tablet 243 mg    ondansetron (ZOFRAN) injection 4 mg    HYDROcodone-acetaminophen (NORCO) 5-325 MG per tablet 1 tablet    HYDROcodone-acetaminophen (NORCO) 5-325 MG per tablet 1 tablet    ketorolac (TORADOL) injection 15 mg    sodium chloride flush 0.9 % injection 10 mL    sodium chloride flush 0.9 % injection 10 mL    enoxaparin (LOVENOX) injection 40 mg    DISCONTD: lactated ringers 1,000 mL infusion    lactated ringers infusion    aspirin EC tablet 81 mg    atorvastatin (LIPITOR) tablet 40 mg    carvedilol (COREG) tablet 25 mg    cyclobenzaprine (FLEXERIL) tablet 10 mg    gabapentin (NEURONTIN) capsule 200 mg    glimepiride (AMARYL) tablet 4 mg    lisinopril (PRINIVIL;ZESTRIL) tablet 10 mg    LORazepam (ATIVAN) tablet 0.5 mg    meclizine (ANTIVERT) tablet 25 mg    NIFEdipine (PROCARDIA XL) extended release tablet 30 mg    pantoprazole (PROTONIX) tablet 40 mg    spironolactone (ALDACTONE) tablet 25 mg    ticagrelor (BRILINTA) tablet 90 mg    insulin lispro (HUMALOG) injection vial 0-6 Units    insulin lispro (HUMALOG) injection vial 0-3 Units    glucose (GLUTOSE) 40 % oral gel 15 g    dextrose 50 % solution 12.5 g    glucagon (rDNA) injection 1 mg    dextrose 5 % solution       DDX/IMPRESSION: 28-year-old female presents with generalized weakness, malaise, fatigue, congestion, cough, shortness of breath in particular increasing dyspnea with minimal exertion. Symptoms of PND also fit the scenario. Multiple comorbid disease including hypertension, diabetes. No known chronic kidney disease. On examination, hypertensive. Vital signs otherwise normal and stable. No wheezes, rales or rhonchi. Mild pharyngeal erythema, suggesting pharyngitis, combined with history of symptoms suggest influenza or other viral cause. We'll evaluate ACS with cardiac workup, has small amount of IV fluids, antiemetics, pain medication for myalgias. Also, provided aspirin. 1/13/2018 10:47 am TECHNIQUE: CT of the head was performed without the administration of intravenous contrast. Dose modulation, iterative reconstruction, and/or weight based adjustment of the mA/kV was utilized to reduce the radiation dose to as low as reasonably achievable. COMPARISON: March 2016 HISTORY: ORDERING SYSTEM PROVIDED HISTORY: Headache x3 days, no neuromuscular weakness. Night sweats. FINDINGS: BRAIN/VENTRICLES: Ventricles and sulci are stable. No focal areas of abnormal density are noted. No midline shift, hemorrhage or extra-axial collection is noted. Multiple vascular calcifications are noted ORBITS: Radiographic proptosis is noted bilaterally. SINUSES: The visualized paranasal sinuses and mastoid air cells demonstrate no acute abnormality. SOFT TISSUES/SKULL:  No acute abnormality of the visualized skull or soft tissues. No acute intracranial abnormality. EKG  EKG shows normal sinus rhythm, rate of 63, normal axis with intervals, QRS of 86 ms,  ms. Normal R-wave progression. No ST segment depression or elevation. I stated T-wave inversion in aVL. Compared to prior EKG on 10/25/2017, TWI not new. All EKG's are interpreted by the Emergency Department Physician who either signs or Co-signs this chart in the absence of a cardiologist.    PROCEDURES:  None    CONSULTS:  IP CONSULT TO CARDIOLOGY  IP CONSULT TO [facility] SERVICES    EMERGENCY DEPARTMENT COURSE/MDM:      ED Course as of Jan 13 1739   Sat Jan 13, 2018   1154 CT Head WO Contrast [MP]   5227 Labs reviewed. Hyperglycemia, but no definite evidence of DKA. Urinalysis without signs of any tract infection. Serial troponins are within normal limits. CBC with normal hematology indices. Mildly hyponatremic, normal potassium, normal anion gap. Rapid influenza is negative. ESR is within normal limits. At this time do not suspect any febrile at a dissection or pulmonary M wasn't.   Was IV symptoms represent a viral

## 2018-01-13 NOTE — ED PROVIDER NOTES
[address] [facility]     Emergency Department     Faculty Attestation    I performed a history and physical examination of the patient and discussed management with the resident. I reviewed the residents note and agree with the documented findings and plan of care. Any areas of disagreement are noted on the chart. I was personally present for the key portions of any procedures. I have documented in the chart those procedures where I was not present during the key portions. I have reviewed the emergency nurses triage note. I agree with the chief complaint, past medical history, past surgical history, allergies, medications, social and family history as documented unless otherwise noted below. For Physician Assistant/ Nurse Practitioner cases/documentation I have personally evaluated this patient and have completed at least one if not all key elements of the E/M (history, physical exam, and MDM). Additional findings are as noted. Chest clear,  Heart exam normal , no pain or swelling on examination of the lower extremities , equal pulses both wrists , trachea midline. Abdomen is nontender without pulsatile mass or bruit. No focal neuro deficits, bilateral temporal artery pain. Benign abdomen, no CVA tenderness. No meningeal signs. Patient appears comfortable in no distress, skin is warm and dry.      Deanne Sampson MD [facility]  Attending Physician     EKG Interpretation    Interpreted by me    Rhythm: normal sinus   Rate: normal/63  Axis: normal  Ectopy: none  Conduction: normal  ST Segments: no acute change  T Waves: Nonspecific T-wave changes  Q Waves: none    Clinical Impression: Abnormal EKG            Meli Jeronimo MD  01/13/18 4440

## 2018-01-14 VITALS
HEART RATE: 70 BPM | OXYGEN SATURATION: 97 % | DIASTOLIC BLOOD PRESSURE: 76 MMHG | HEIGHT: 66 IN | TEMPERATURE: 97.9 F | SYSTOLIC BLOOD PRESSURE: 142 MMHG | RESPIRATION RATE: 18 BRPM | WEIGHT: 235 LBS | BODY MASS INDEX: 37.77 KG/M2

## 2018-01-14 LAB
GLUCOSE BLD-MCNC: 247 MG/DL (ref 65–105)
GLUCOSE BLD-MCNC: 273 MG/DL (ref 65–105)

## 2018-01-14 PROCEDURE — 94618 PULMONARY STRESS TESTING: CPT

## 2018-01-14 PROCEDURE — 6360000002 HC RX W HCPCS: Performed by: EMERGENCY MEDICINE

## 2018-01-14 PROCEDURE — 6370000000 HC RX 637 (ALT 250 FOR IP): Performed by: EMERGENCY MEDICINE

## 2018-01-14 PROCEDURE — G0378 HOSPITAL OBSERVATION PER HR: HCPCS

## 2018-01-14 PROCEDURE — G0008 ADMIN INFLUENZA VIRUS VAC: HCPCS | Performed by: EMERGENCY MEDICINE

## 2018-01-14 PROCEDURE — 93005 ELECTROCARDIOGRAM TRACING: CPT

## 2018-01-14 PROCEDURE — 96372 THER/PROPH/DIAG INJ SC/IM: CPT

## 2018-01-14 PROCEDURE — 90686 IIV4 VACC NO PRSV 0.5 ML IM: CPT | Performed by: EMERGENCY MEDICINE

## 2018-01-14 PROCEDURE — 82947 ASSAY GLUCOSE BLOOD QUANT: CPT

## 2018-01-14 PROCEDURE — 2580000003 HC RX 258: Performed by: EMERGENCY MEDICINE

## 2018-01-14 RX ORDER — MECLIZINE HYDROCHLORIDE 25 MG/1
25 TABLET ORAL 3 TIMES DAILY PRN
Qty: 15 TABLET | Refills: 0 | Status: SHIPPED | OUTPATIENT
Start: 2018-01-14 | End: 2018-01-24

## 2018-01-14 RX ORDER — AZITHROMYCIN 250 MG/1
250 TABLET, FILM COATED ORAL DAILY
Qty: 10 TABLET | Refills: 0 | Status: SHIPPED | OUTPATIENT
Start: 2018-01-14 | End: 2018-01-24

## 2018-01-14 RX ORDER — DIAZEPAM 5 MG/1
5 TABLET ORAL EVERY 8 HOURS PRN
Qty: 10 TABLET | Refills: 0 | Status: SHIPPED | OUTPATIENT
Start: 2018-01-14 | End: 2018-01-24

## 2018-01-14 RX ADMIN — SPIRONOLACTONE 25 MG: 25 TABLET ORAL at 09:31

## 2018-01-14 RX ADMIN — GLIMEPIRIDE 4 MG: 2 TABLET ORAL at 09:31

## 2018-01-14 RX ADMIN — ENOXAPARIN SODIUM 40 MG: 40 INJECTION SUBCUTANEOUS at 09:32

## 2018-01-14 RX ADMIN — GABAPENTIN 200 MG: 100 CAPSULE ORAL at 14:38

## 2018-01-14 RX ADMIN — INSULIN LISPRO 2 UNITS: 100 INJECTION, SOLUTION INTRAVENOUS; SUBCUTANEOUS at 09:35

## 2018-01-14 RX ADMIN — ASPIRIN 81 MG: 81 TABLET, COATED ORAL at 09:30

## 2018-01-14 RX ADMIN — MECLIZINE HCL 25 MG: 12.5 TABLET ORAL at 09:30

## 2018-01-14 RX ADMIN — NIFEDIPINE 30 MG: 30 TABLET, FILM COATED, EXTENDED RELEASE ORAL at 09:30

## 2018-01-14 RX ADMIN — INSULIN GLARGINE 40 UNITS: 100 INJECTION, SOLUTION SUBCUTANEOUS at 09:35

## 2018-01-14 RX ADMIN — Medication 10 ML: at 09:41

## 2018-01-14 RX ADMIN — LORAZEPAM 0.5 MG: 0.5 TABLET ORAL at 14:35

## 2018-01-14 RX ADMIN — MECLIZINE HCL 25 MG: 12.5 TABLET ORAL at 16:15

## 2018-01-14 RX ADMIN — INFLUENZA VIRUS VACCINE 0.5 ML: 15; 15; 15; 15 SUSPENSION INTRAMUSCULAR at 16:38

## 2018-01-14 RX ADMIN — TICAGRELOR 90 MG: 90 TABLET ORAL at 09:30

## 2018-01-14 RX ADMIN — CARVEDILOL 25 MG: 25 TABLET, FILM COATED ORAL at 09:30

## 2018-01-14 RX ADMIN — PANTOPRAZOLE SODIUM 40 MG: 40 TABLET, DELAYED RELEASE ORAL at 09:29

## 2018-01-14 RX ADMIN — GABAPENTIN 200 MG: 100 CAPSULE ORAL at 09:29

## 2018-01-14 RX ADMIN — LISINOPRIL 10 MG: 10 TABLET ORAL at 10:50

## 2018-01-14 NOTE — H&P
(*)     All other components within normal limits   POC GLUCOSE FINGERSTICK - Abnormal; Notable for the following:     POC Glucose 298 (*)     All other components within normal limits   POC GLUCOSE FINGERSTICK - Abnormal; Notable for the following:     POC Glucose 247 (*)     All other components within normal limits   POC GLUCOSE FINGERSTICK - Abnormal; Notable for the following:     POC Glucose 273 (*)     All other components within normal limits   RAPID INFLUENZA A/B ANTIGENS   C DIFF TOXIN B BY RT PCR   CBC WITH AUTO DIFFERENTIAL   MAGNESIUM   SEDIMENTATION RATE   MICROSCOPIC URINALYSIS   POCT TROPONIN   POCT TROPONIN   POCT TROPONIN   POCT TROPONIN       SCREENING TOOLS:    HEART Risk Score for Chest Pain Patients   History and Physical Exam Suspicion Level  (Nausea, Vomiting, Diaphoresis, Radiation, Exertion)   Slightly Suspicious (0 pts)   Moderately Suspicious (1 pt)   Highly Suspicious (2 pts)   EKG Interpretation   Normal (0 pts)   Non-Specific Repolarization Disturbance (1 pt)   Significant ST-Depression (2 pts)   Age of Patient (in years)   = 39 (0 pts)   46-64 (1 pt)   = 65 (2 pts)   Risk Factors   No Risk Factors (0 pts)   1-2 Risk Factors (1 pt)   = 3 Risk Factors (2 pts)   Risk Factors Include:   Hypercholesterolemia   Hypertension   Diabetes Mellitus   Cigarette smoking   Positive family history   Obesity   CAD   (SLE, CKDz, HIV, Cocaine abuse)   Troponin Levels   = Normal Limit (0 pts)   1-3 Times Normal Limit (1 pt)   > 3 Times Normal Limit (2 pts)  TOTAL: 6    Percent Risk for Major Adverse Cardiac Event (MACE)  0-3 pts indicates low risk for MACE   2.5% (DISCHARGE)   4-7 pts indicates moderate risk for MACE  20.3% (OBS)  8-10 pts indicates high risk for MACE  72.7% (EARLY INVASIVE [place])    CDU IMPRESSION / PLAN      Yuliet Marks is a 67 y.o. female who presents with acute dizziness most likely etiology unknown and acute left-sided chest pain worse with exertion most likely etiology cardiac

## 2018-01-14 NOTE — CONSULTS
Cardiology Consult           Date of Admission:  1/13/2018  Date of Consultation:  1/14/2018      PCP:  Luigi Tran MD      Chief Complaint: dizziness and cough    History of Present Illness:  Heri Orourke is a 67 y.o. female who presents with dizziness, nausea and vomitting. She had diarrhea about 4 days ago. She has had a cough productive of yellow sputum for about a week that is not getting any better by her report. With the cough she has had chest tightness which is made worse by deep breathing and coughing. She thinks she has been more SOB since having this cough. PMH:   has a past medical history of Anxiety; Benign positional vertigo; CAD (coronary artery disease); Chest pain; Depression; Diabetes mellitus ([address] Diabetic neuropathy ([facility] [address] Hyperlipidemia; Hypertension; and Migraine. PSH:   has a past surgical history that includes Percutaneous Transluminal Coronary Angio; Cardiac catheterization; and Coronary angioplasty with stent. Allergies: Allergies   Allergen Reactions    Penicillins      Other reaction(s): Hives    Sulfa Antibiotics      Other reaction(s): Rash        Home Meds:    Prior to Admission medications    Medication Sig Start Date End Date Taking? Authorizing Provider   meclizine (ANTIVERT) 25 MG tablet Take 1 tablet by mouth 3 times daily as needed for Dizziness 1/14/18 1/24/18 Yes Dolly Coleman DO   diazepam (VALIUM) 5 MG tablet Take 1 tablet by mouth every 8 hours as needed for Anxiety for up to 10 days.  1/14/18 1/24/18 Yes Dolly Coleman DO   atorvastatin (LIPITOR) 40 MG tablet Take 1 tablet by mouth nightly 3/28/17  Yes Kelechi Hobson MD   spironolactone (ALDACTONE) 25 MG tablet Take 25 mg by mouth   Yes Historical Provider, MD   gabapentin (NEURONTIN) 100 MG capsule Take 2 capsules by mouth 3 times daily 3/26/16  Yes Kan Lunsford MD   lisinopril (PRINIVIL;ZESTRIL) 10 MG tablet Take 1 tablet by mouth daily 3/26/16  Yes Kan Lunsford MD   cyclobenzaprine (FLEXERIL) 10 MG tablet Take 10 mg by mouth 3 times daily as needed for Muscle spasms. Yes Historical Provider, MD   Multiple Vitamins-Minerals (THERAPEUTIC MULTIVITAMIN-MINERALS) tablet Take 1 tablet by mouth daily. Yes Historical Provider, MD   vitamin E 400 UNIT capsule Take 400 Units by mouth daily. Yes Historical Provider, MD   insulin aspart (NOVOLOG FLEXPEN) 100 UNIT/ML injection pen Inject  into the skin 3 times daily (before meals). Yes Historical Provider, MD   NIFEdipine (ADALAT CC) 30 MG CR tablet Take 30 mg by mouth daily. Yes Historical Provider, MD   aspirin 81 MG tablet Take 81 mg by mouth daily. Yes Historical Provider, MD   LORazepam (ATIVAN) 0.5 MG tablet Take 0.5 mg by mouth every 8 hours as needed. Yes Historical Provider, MD   carvedilol (COREG) 25 MG tablet Take 25 mg by mouth 2 times daily (with meals). Yes Historical Provider, MD   docusate sodium (COLACE) 100 MG capsule Take 100 mg by mouth daily    Yes Historical Provider, MD   glimepiride (AMARYL) 4 MG tablet Take 4 mg by mouth every morning (before breakfast). Yes Historical Provider, MD   insulin detemir (LEVEMIR) 100 UNIT/ML injection Inject 60 Units into the skin 2 times daily. Yes Historical Provider, MD   meclizine (ANTIVERT) 25 MG tablet Take 25 mg by mouth 3 times daily as needed. Yes Historical Provider, MD   nitroGLYCERIN (NITROSTAT) 0.4 MG SL tablet Place 0.4 mg under the tongue every 5 minutes as needed. Yes Historical Provider, MD   omeprazole (PRILOSEC) 20 MG capsule Take 20 mg by mouth Daily. Yes Historical Provider, MD   traMADol (ULTRAM) 50 MG tablet Take 50 mg by mouth every 8 hours as needed.    Yes Historical Provider, MD   ticagrelor (BRILINTA) 90 MG TABS tablet Take 1 tablet by mouth 2 times daily 2/27/17 5/28/17  Rosalie Jj MD        [facility] Meds:    Current Facility-Administered Medications   Medication Dose Route Frequency Provider Last Rate Last Dose    glucose (GLUTOSE) 40 % oral gel 15 g  15 g Oral PRN Fernanda Maciel, DO        dextrose 50 % solution 12.5 g  12.5 g Intravenous PRN Fernanda Beer, DO        glucagon (rDNA) injection 1 mg  1 mg Intramuscular PRN Fernanda Beer, DO        dextrose 5 % solution  100 mL/hr Intravenous PRN Fernanda Beer, DO        insulin glargine (LANTUS) injection vial 40 Units  40 Units Subcutaneous BID Elissa Thompson MD   40 Units at 01/14/18 0935    influenza quadrivalent split vaccine (FLUZONE;FLUARIX;FLULAVAL;AFLURIA) injection 0.5 mL  0.5 mL Intramuscular Once Elissa Thompson MD        acetaminophen (TYLENOL) tablet 650 mg  650 mg Oral Q4H PRN Karla Borrego MD   650 mg at 01/13/18 2155       Social History:       TOBACCO:   reports that she has never smoked. She has never used smokeless tobacco.  ETOH:   reports that she does not drink alcohol. DRUGS:  reports that she does not use drugs. OCCUPATION:          Family Histroy:     History reviewed. No pertinent family history. Review of Systems:   · Constitutional: there has been no unanticipated weight loss. There's been no change in energy level, sleep pattern, or activity level. · Eyes: No visual changes or diplopia. No scleral icterus. · ENT: No Headaches, hearing loss or vertigo. No mouth sores or sore throat. · Cardiovascular: see HPI  · Respiratory: see HPI   · Gastrointestinal: No abdominal pain, appetite loss, blood in stools. No change in bowel or bladder habits. · Genitourinary: No dysuria, trouble voiding, or hematuria. · Musculoskeletal:  No gait disturbance, weakness or joint complaints. · Integumentary: No rash or pruritis. · Neurological: No headache, diplopia, change in muscle strength, numbness or tingling. No change in gait, balance, coordination, mood, affect, memory, mentation, behavior. · Psychiatric: No anxiety, or depression. · Endocrine: No temperature intolerance. No excessive thirst, fluid intake, or urination.

## 2018-01-15 LAB
EKG ATRIAL RATE: 63 BPM
EKG ATRIAL RATE: 65 BPM
EKG ATRIAL RATE: 68 BPM
EKG P AXIS: 74 DEGREES
EKG P AXIS: 86 DEGREES
EKG P AXIS: 98 DEGREES
EKG P-R INTERVAL: 140 MS
EKG P-R INTERVAL: 148 MS
EKG P-R INTERVAL: 160 MS
EKG Q-T INTERVAL: 402 MS
EKG Q-T INTERVAL: 416 MS
EKG Q-T INTERVAL: 436 MS
EKG QRS DURATION: 84 MS
EKG QRS DURATION: 86 MS
EKG QRS DURATION: 86 MS
EKG QTC CALCULATION (BAZETT): 411 MS
EKG QTC CALCULATION (BAZETT): 442 MS
EKG QTC CALCULATION (BAZETT): 453 MS
EKG R AXIS: 1 DEGREES
EKG R AXIS: 14 DEGREES
EKG R AXIS: 4 DEGREES
EKG T AXIS: 126 DEGREES
EKG T AXIS: 72 DEGREES
EKG T AXIS: 84 DEGREES
EKG VENTRICULAR RATE: 63 BPM
EKG VENTRICULAR RATE: 65 BPM
EKG VENTRICULAR RATE: 68 BPM

## 2018-01-17 NOTE — DISCHARGE SUMMARY
LIPITOR  Take 1 tablet by mouth nightly     carvedilol 25 MG tablet  Commonly known as:  COREG     cyclobenzaprine 10 MG tablet  Commonly known as:  FLEXERIL     docusate sodium 100 MG capsule  Commonly known as:  COLACE     gabapentin 100 MG capsule  Commonly known as:  NEURONTIN  Take 2 capsules by mouth 3 times daily     glimepiride 4 MG tablet  Commonly known as:  AMARYL     LEVEMIR 100 UNIT/ML injection vial  Generic drug:  insulin detemir     lisinopril 10 MG tablet  Commonly known as:  PRINIVIL;ZESTRIL  Take 1 tablet by mouth daily     LORazepam 0.5 MG tablet  Commonly known as:  ATIVAN     NIFEdipine 30 MG extended release tablet  Commonly known as:  ADALAT CC     nitroGLYCERIN 0.4 MG SL tablet  Commonly known as:  NITROSTAT     NOVOLOG FLEXPEN 100 UNIT/ML injection pen  Generic drug:  insulin aspart     omeprazole 20 MG delayed release capsule  Commonly known as:  PRILOSEC     spironolactone 25 MG tablet  Commonly known as:  ALDACTONE     therapeutic multivitamin-minerals tablet     ticagrelor 90 MG Tabs tablet  Commonly known as:  BRILINTA  Take 1 tablet by mouth 2 times daily     traMADol 50 MG tablet  Commonly known as:  ULTRAM     vitamin E 400 UNIT capsule           Where to Get Your Medications      These medications were sent to [facility], [address], [address] [place] [id number]    Phone:  [phone]   · meclizine 25 MG tablet     You can get these medications from any pharmacy    Bring a paper prescription for each of these medications  · azithromycin 250 MG tablet  · diazepam 5 MG tablet         Diet:   , advance as tolerated     Activity:  As tolerated    Consultants: IP CONSULT TO SPIRITUAL SERVICES  IP CONSULT TO CARDIOLOGY    Procedures:  Not indicated     Diagnostic Test:   Results for orders placed or performed during the hospital encounter of 01/13/18   RAPID INFLUENZA A/B ANTIGENS   Result Value Ref Range - 2.6 mg/dL   Sedimentation Rate   Result Value Ref Range    Sed Rate 8 0 - 20 mm   URINALYSIS   Result Value Ref Range    Color, UA YELLOW YEL    Turbidity UA CLEAR CLEAR    Glucose, Ur 3+ (A) NEG    Bilirubin Urine NEGATIVE NEG    Ketones, Urine NEGATIVE NEG    Specific Gravity, UA 1.022 1.005 - 1.030    Urine Hgb NEGATIVE NEG    pH, UA 5.0 5.0 - 8.0    Protein, UA NEGATIVE NEG    Urobilinogen, Urine Normal NORM    Nitrite, Urine NEGATIVE NEG    Leukocyte Esterase, Urine TRACE (A) NEG    Urinalysis Comments NOT REPORTED    Microscopic Urinalysis   Result Value Ref Range    -          WBC, UA 2 TO 5 0 - 5 /HPF    RBC, UA None 0 - 4 /HPF    Casts UA 0 TO 2 HYALINE 0 - 8 /LPF    Crystals UA NOT REPORTED NONE /HPF    Epithelial Cells UA 0 TO 2 0 - 5 /HPF    Renal Epithelial, Urine NOT REPORTED 0 /HPF    Bacteria, UA NOT REPORTED NONE    Mucus, UA NOT REPORTED NONE    Trichomonas, UA NOT REPORTED NONE    Amorphous, UA NOT REPORTED NONE    Other Observations UA NOT REPORTED NREQ    Yeast, UA NOT REPORTED NONE   POCT troponin   Result Value Ref Range    POC Troponin I 0.00 0.00 - 0.10 ng/mL    POC Troponin Interp       The Troponin-I (POC) results cannot be compared to the Troponin-T results. POCT troponin   Result Value Ref Range    POC Troponin I 0.00 0.00 - 0.10 ng/mL    POC Troponin Interp       The Troponin-I (POC) results cannot be compared to the Troponin-T results.    POC Glucose Fingerstick   Result Value Ref Range    POC Glucose 163 (H) 65 - 105 mg/dL   POC Glucose Fingerstick   Result Value Ref Range    POC Glucose 298 (H) 65 - 105 mg/dL   POC Glucose Fingerstick   Result Value Ref Range    POC Glucose 247 (H) 65 - 105 mg/dL   POC Glucose Fingerstick   Result Value Ref Range    POC Glucose 273 (H) 65 - 105 mg/dL   EKG 12 Lead   Result Value Ref Range    Ventricular Rate 63 BPM    Atrial Rate 63 BPM    P-R Interval 148 ms    QRS Duration 86 ms    Q-T Interval 402 ms    QTc Calculation (Bazett) 411 ms    P Axis 86 degrees    R Axis 1 degrees    T Axis 72 degrees   EKG 12 Lead   Result Value Ref Range    Ventricular Rate 65 BPM    Atrial Rate 65 BPM    P-R Interval 140 ms    QRS Duration 84 ms    Q-T Interval 436 ms    QTc Calculation (Bazett) 453 ms    P Axis 98 degrees    R Axis 4 degrees    T Axis 126 degrees   EKG 12 Lead   Result Value Ref Range    Ventricular Rate 68 BPM    Atrial Rate 68 BPM    P-R Interval 160 ms    QRS Duration 86 ms    Q-T Interval 416 ms    QTc Calculation (Bazett) 442 ms    P Axis 74 degrees    R Axis 14 degrees    T Axis 84 degrees     Xr Chest Standard (2 Vw)    Result Date: 1/13/2018  EXAMINATION: TWO VIEWS OF THE CHEST 1/13/2018 10:07 am COMPARISON: October 24, 2017, March 27, 2017 HISTORY: ORDERING SYSTEM PROVIDED HISTORY: Cough, x 3 days, increased dyspnea. TECHNOLOGIST PROVIDED HISTORY: Reason for exam:->Cough, x 3 days, increased dyspnea. FINDINGS: The cardiomediastinal silhouette is unchanged in appearance. The cardiac silhouette is mildly enlarged as before. A coronary stent is noted. Mild prominence of the right cardiophrenic fat pad is again demonstrated. There is no consolidation, pneumothorax, or evidence of edema. No effusion is appreciated. The osseous structures are unchanged in appearance. Unchanged appearance of the chest without acute airspace disease identified. Ct Head Wo Contrast    Result Date: 1/13/2018  EXAMINATION: CT OF THE HEAD WITHOUT CONTRAST  1/13/2018 10:47 am TECHNIQUE: CT of the head was performed without the administration of intravenous contrast. Dose modulation, iterative reconstruction, and/or weight based adjustment of the mA/kV was utilized to reduce the radiation dose to as low as reasonably achievable. COMPARISON: March 2016 HISTORY: ORDERING SYSTEM PROVIDED HISTORY: Headache x3 days, no neuromuscular weakness. Night sweats. FINDINGS: BRAIN/VENTRICLES: Ventricles and sulci are stable. No focal areas of abnormal density are noted.   No midline shift, hemorrhage or extra-axial collection is noted. Multiple vascular calcifications are noted ORBITS: Radiographic proptosis is noted bilaterally. SINUSES: The visualized paranasal sinuses and mastoid air cells demonstrate no acute abnormality. SOFT TISSUES/SKULL:  No acute abnormality of the visualized skull or soft tissues. No acute intracranial abnormality. Physical Exam:    General appearance - NAD, AOx 3   Lungs -CTAB, no R/R/R  Heart - RRR, no M/R/G  Abdomen - Soft, NT/ND  Neurological:  MAEx4, No focal motor deficit, sensory loss  Extremities - Cap refil <2 sec in all ext., no edema  Skin -warm, dry      Hospital Course:  Clinical course has improved, labs and imaging reviewed. Aparna Ramirez originally presented to the hospital on 1/13/2018  9:32 AM with acute dizziness most likely etiology unknown and acute left-sided chest pain worse with exertion most likely etiology cardiac ischemia versus musculoskeletal chest wall pain . At that time it was determined that She required further observation and cardiology consult. Labs and imaging were followed daily. Imaging results as above. She is medically stable to be discharged. Disposition: Home    Patient stated that they will not drive themselves home from the hospital if they have gotten pain killers/ narcotics earlier that day and that they will arrange for transportation on their own or work with the  for a ride. Patient counseled NOT to drive while under the influence of narcotics/ pain killers. Condition: Good    Patient stable and ready for discharge home. I have discussed plan of care with patient and they are in understanding. They were instructed to read discharge paperwork. All of their questions and concerns were addressed. Time Spent: 0 day      --  Consuelo Lorenzo DO  Emergency Medicine Resident Physician    This dictation was generated by voice recognition computer software.   Although all attempts are made to edit the dictation for accuracy, there may be errors in the transcription that are not intended.

## 2018-03-14 ENCOUNTER — HOSPITAL ENCOUNTER (OUTPATIENT)
Age: 73
Setting detail: OBSERVATION
Discharge: HOME | End: 2018-03-15
Attending: EMERGENCY MEDICINE | Admitting: EMERGENCY MEDICINE
Payer: MEDICARE

## 2018-03-14 ENCOUNTER — APPOINTMENT (OUTPATIENT)
Dept: GENERAL RADIOLOGY | Age: 73
End: 2018-03-14
Payer: MEDICARE

## 2018-03-14 DIAGNOSIS — R07.9 CHEST PAIN, UNSPECIFIED TYPE: Primary | ICD-10-CM

## 2018-03-14 LAB
ABSOLUTE EOS #: 0.13 K/UL (ref 0–0.44)
ABSOLUTE IMMATURE GRANULOCYTE: <0.03 K/UL (ref 0–0.3)
ABSOLUTE LYMPH #: 1.37 K/UL (ref 1.1–3.7)
ABSOLUTE MONO #: 0.8 K/UL (ref 0.1–1.2)
ANION GAP SERPL CALCULATED.3IONS-SCNC: 12 MMOL/L (ref 9–17)
BASOPHILS # BLD: 1 % (ref 0–2)
BASOPHILS ABSOLUTE: 0.04 K/UL (ref 0–0.2)
BNP INTERPRETATION: ABNORMAL
BUN BLDV-MCNC: 19 MG/DL (ref 8–23)
BUN/CREAT BLD: ABNORMAL (ref 9–20)
CALCIUM SERPL-MCNC: 9.8 MG/DL (ref 8.6–10.4)
CHLORIDE BLD-SCNC: 98 MMOL/L (ref 98–107)
CO2: 26 MMOL/L (ref 20–31)
CREAT SERPL-MCNC: 0.73 MG/DL (ref 0.5–0.9)
DIFFERENTIAL TYPE: ABNORMAL
EOSINOPHILS RELATIVE PERCENT: 3 % (ref 1–4)
GFR AFRICAN AMERICAN: >60 ML/MIN
GFR NON-AFRICAN AMERICAN: >60 ML/MIN
GFR SERPL CREATININE-BSD FRML MDRD: ABNORMAL ML/MIN/{1.73_M2}
GFR SERPL CREATININE-BSD FRML MDRD: ABNORMAL ML/MIN/{1.73_M2}
GLUCOSE BLD-MCNC: 219 MG/DL (ref 65–105)
GLUCOSE BLD-MCNC: 252 MG/DL (ref 70–99)
HCT VFR BLD CALC: 39.7 % (ref 36.3–47.1)
HEMOGLOBIN: 12.6 G/DL (ref 11.9–15.1)
IMMATURE GRANULOCYTES: 1 %
LYMPHOCYTES # BLD: 31 % (ref 24–43)
MCH RBC QN AUTO: 29.5 PG (ref 25.2–33.5)
MCHC RBC AUTO-ENTMCNC: 31.7 G/DL (ref 28.4–34.8)
MCV RBC AUTO: 93 FL (ref 82.6–102.9)
MONOCYTES # BLD: 18 % (ref 3–12)
NRBC AUTOMATED: 0 PER 100 WBC
PDW BLD-RTO: 12.8 % (ref 11.8–14.4)
PLATELET # BLD: 147 K/UL (ref 138–453)
PLATELET ESTIMATE: ABNORMAL
PMV BLD AUTO: 11.6 FL (ref 8.1–13.5)
POC TROPONIN I: 0 NG/ML (ref 0–0.1)
POC TROPONIN I: 0 NG/ML (ref 0–0.1)
POC TROPONIN INTERP: NORMAL
POC TROPONIN INTERP: NORMAL
POTASSIUM SERPL-SCNC: 4.3 MMOL/L (ref 3.7–5.3)
PRO-BNP: 361 PG/ML
RBC # BLD: 4.27 M/UL (ref 3.95–5.11)
RBC # BLD: ABNORMAL 10*6/UL
SEG NEUTROPHILS: 46 % (ref 36–65)
SEGMENTED NEUTROPHILS ABSOLUTE COUNT: 2.07 K/UL (ref 1.5–8.1)
SODIUM BLD-SCNC: 136 MMOL/L (ref 135–144)
WBC # BLD: 4.4 K/UL (ref 3.5–11.3)
WBC # BLD: ABNORMAL 10*3/UL

## 2018-03-14 PROCEDURE — 2580000003 HC RX 258: Performed by: EMERGENCY MEDICINE

## 2018-03-14 PROCEDURE — 6370000000 HC RX 637 (ALT 250 FOR IP): Performed by: EMERGENCY MEDICINE

## 2018-03-14 PROCEDURE — 93005 ELECTROCARDIOGRAM TRACING: CPT

## 2018-03-14 PROCEDURE — 6360000002 HC RX W HCPCS: Performed by: EMERGENCY MEDICINE

## 2018-03-14 PROCEDURE — 84484 ASSAY OF TROPONIN QUANT: CPT

## 2018-03-14 PROCEDURE — 83880 ASSAY OF NATRIURETIC PEPTIDE: CPT

## 2018-03-14 PROCEDURE — 71046 X-RAY EXAM CHEST 2 VIEWS: CPT

## 2018-03-14 PROCEDURE — 94640 AIRWAY INHALATION TREATMENT: CPT

## 2018-03-14 PROCEDURE — 80048 BASIC METABOLIC PNL TOTAL CA: CPT

## 2018-03-14 PROCEDURE — 94664 DEMO&/EVAL PT USE INHALER: CPT

## 2018-03-14 PROCEDURE — G0378 HOSPITAL OBSERVATION PER HR: HCPCS

## 2018-03-14 PROCEDURE — 99285 EMERGENCY DEPT VISIT HI MDM: CPT

## 2018-03-14 PROCEDURE — 82947 ASSAY GLUCOSE BLOOD QUANT: CPT

## 2018-03-14 PROCEDURE — 96372 THER/PROPH/DIAG INJ SC/IM: CPT

## 2018-03-14 PROCEDURE — 85025 COMPLETE CBC W/AUTO DIFF WBC: CPT

## 2018-03-14 RX ORDER — DOCUSATE SODIUM 100 MG/1
100 CAPSULE, LIQUID FILLED ORAL DAILY
Status: DISCONTINUED | OUTPATIENT
Start: 2018-03-14 | End: 2018-03-15 | Stop reason: HOSPADM

## 2018-03-14 RX ORDER — ALBUTEROL SULFATE 2.5 MG/3ML
5 SOLUTION RESPIRATORY (INHALATION)
Status: DISCONTINUED | OUTPATIENT
Start: 2018-03-14 | End: 2018-03-14

## 2018-03-14 RX ORDER — NITROGLYCERIN 0.4 MG/1
0.4 TABLET SUBLINGUAL EVERY 5 MIN PRN
Status: DISCONTINUED | OUTPATIENT
Start: 2018-03-14 | End: 2018-03-15 | Stop reason: HOSPADM

## 2018-03-14 RX ORDER — GABAPENTIN 100 MG/1
200 CAPSULE ORAL 3 TIMES DAILY
Status: DISCONTINUED | OUTPATIENT
Start: 2018-03-14 | End: 2018-03-15 | Stop reason: HOSPADM

## 2018-03-14 RX ORDER — DEXTROSE MONOHYDRATE 25 G/50ML
12.5 INJECTION, SOLUTION INTRAVENOUS PRN
Status: DISCONTINUED | OUTPATIENT
Start: 2018-03-14 | End: 2018-03-15 | Stop reason: HOSPADM

## 2018-03-14 RX ORDER — M-VIT,TX,IRON,MINS/CALC/FOLIC 27MG-0.4MG
1 TABLET ORAL DAILY
Status: DISCONTINUED | OUTPATIENT
Start: 2018-03-14 | End: 2018-03-15 | Stop reason: HOSPADM

## 2018-03-14 RX ORDER — ALBUTEROL SULFATE 90 UG/1
2 AEROSOL, METERED RESPIRATORY (INHALATION)
Status: DISCONTINUED | OUTPATIENT
Start: 2018-03-14 | End: 2018-03-14

## 2018-03-14 RX ORDER — SODIUM CHLORIDE 0.9 % (FLUSH) 0.9 %
10 SYRINGE (ML) INJECTION EVERY 12 HOURS SCHEDULED
Status: DISCONTINUED | OUTPATIENT
Start: 2018-03-14 | End: 2018-03-15 | Stop reason: HOSPADM

## 2018-03-14 RX ORDER — ASPIRIN 81 MG/1
81 TABLET, CHEWABLE ORAL DAILY
Status: DISCONTINUED | OUTPATIENT
Start: 2018-03-14 | End: 2018-03-15 | Stop reason: HOSPADM

## 2018-03-14 RX ORDER — ALBUTEROL SULFATE 90 UG/1
2 AEROSOL, METERED RESPIRATORY (INHALATION) EVERY 6 HOURS PRN
Status: DISCONTINUED | OUTPATIENT
Start: 2018-03-15 | End: 2018-03-15 | Stop reason: HOSPADM

## 2018-03-14 RX ORDER — ACETAMINOPHEN 325 MG/1
650 TABLET ORAL EVERY 4 HOURS PRN
Status: DISCONTINUED | OUTPATIENT
Start: 2018-03-14 | End: 2018-03-15 | Stop reason: HOSPADM

## 2018-03-14 RX ORDER — MORPHINE SULFATE 10 MG/ML
6 INJECTION, SOLUTION INTRAMUSCULAR; INTRAVENOUS
Status: DISCONTINUED | OUTPATIENT
Start: 2018-03-14 | End: 2018-03-15 | Stop reason: HOSPADM

## 2018-03-14 RX ORDER — GLIMEPIRIDE 2 MG/1
4 TABLET ORAL
Status: DISCONTINUED | OUTPATIENT
Start: 2018-03-15 | End: 2018-03-15 | Stop reason: HOSPADM

## 2018-03-14 RX ORDER — SODIUM CHLORIDE 0.9 % (FLUSH) 0.9 %
10 SYRINGE (ML) INJECTION PRN
Status: DISCONTINUED | OUTPATIENT
Start: 2018-03-14 | End: 2018-03-15 | Stop reason: HOSPADM

## 2018-03-14 RX ORDER — VITAMIN E 268 MG
400 CAPSULE ORAL DAILY
Status: DISCONTINUED | OUTPATIENT
Start: 2018-03-14 | End: 2018-03-15 | Stop reason: HOSPADM

## 2018-03-14 RX ORDER — MORPHINE SULFATE 4 MG/ML
4 INJECTION, SOLUTION INTRAMUSCULAR; INTRAVENOUS
Status: DISCONTINUED | OUTPATIENT
Start: 2018-03-14 | End: 2018-03-15 | Stop reason: HOSPADM

## 2018-03-14 RX ORDER — DEXTROSE MONOHYDRATE 50 MG/ML
100 INJECTION, SOLUTION INTRAVENOUS PRN
Status: DISCONTINUED | OUTPATIENT
Start: 2018-03-14 | End: 2018-03-15 | Stop reason: HOSPADM

## 2018-03-14 RX ORDER — LISINOPRIL 10 MG/1
10 TABLET ORAL DAILY
Status: DISCONTINUED | OUTPATIENT
Start: 2018-03-14 | End: 2018-03-15 | Stop reason: HOSPADM

## 2018-03-14 RX ORDER — IPRATROPIUM BROMIDE AND ALBUTEROL SULFATE 2.5; .5 MG/3ML; MG/3ML
1 SOLUTION RESPIRATORY (INHALATION)
Status: DISCONTINUED | OUTPATIENT
Start: 2018-03-14 | End: 2018-03-14

## 2018-03-14 RX ORDER — CARVEDILOL 25 MG/1
25 TABLET ORAL 2 TIMES DAILY WITH MEALS
Status: DISCONTINUED | OUTPATIENT
Start: 2018-03-14 | End: 2018-03-15 | Stop reason: HOSPADM

## 2018-03-14 RX ORDER — NIFEDIPINE 30 MG/1
30 TABLET, EXTENDED RELEASE ORAL DAILY
Status: DISCONTINUED | OUTPATIENT
Start: 2018-03-14 | End: 2018-03-15 | Stop reason: HOSPADM

## 2018-03-14 RX ORDER — ONDANSETRON 2 MG/ML
4 INJECTION INTRAMUSCULAR; INTRAVENOUS EVERY 8 HOURS PRN
Status: DISCONTINUED | OUTPATIENT
Start: 2018-03-14 | End: 2018-03-15 | Stop reason: HOSPADM

## 2018-03-14 RX ORDER — CYCLOBENZAPRINE HCL 10 MG
10 TABLET ORAL 3 TIMES DAILY PRN
Status: DISCONTINUED | OUTPATIENT
Start: 2018-03-14 | End: 2018-03-15 | Stop reason: HOSPADM

## 2018-03-14 RX ORDER — LORAZEPAM 0.5 MG/1
0.5 TABLET ORAL EVERY 8 HOURS PRN
Status: DISCONTINUED | OUTPATIENT
Start: 2018-03-14 | End: 2018-03-15 | Stop reason: HOSPADM

## 2018-03-14 RX ORDER — ATORVASTATIN CALCIUM 40 MG/1
40 TABLET, FILM COATED ORAL NIGHTLY
Status: DISCONTINUED | OUTPATIENT
Start: 2018-03-14 | End: 2018-03-15 | Stop reason: HOSPADM

## 2018-03-14 RX ORDER — PANTOPRAZOLE SODIUM 40 MG/1
40 TABLET, DELAYED RELEASE ORAL
Status: DISCONTINUED | OUTPATIENT
Start: 2018-03-15 | End: 2018-03-15 | Stop reason: HOSPADM

## 2018-03-14 RX ORDER — SPIRONOLACTONE 25 MG/1
25 TABLET ORAL DAILY
Status: DISCONTINUED | OUTPATIENT
Start: 2018-03-14 | End: 2018-03-15 | Stop reason: HOSPADM

## 2018-03-14 RX ORDER — NICOTINE POLACRILEX 4 MG
15 LOZENGE BUCCAL PRN
Status: DISCONTINUED | OUTPATIENT
Start: 2018-03-14 | End: 2018-03-15 | Stop reason: HOSPADM

## 2018-03-14 RX ORDER — ASPIRIN 81 MG/1
324 TABLET, CHEWABLE ORAL ONCE
Status: COMPLETED | OUTPATIENT
Start: 2018-03-14 | End: 2018-03-14

## 2018-03-14 RX ORDER — MECLIZINE HCL 12.5 MG/1
25 TABLET ORAL 3 TIMES DAILY PRN
Status: DISCONTINUED | OUTPATIENT
Start: 2018-03-14 | End: 2018-03-15 | Stop reason: HOSPADM

## 2018-03-14 RX ADMIN — GABAPENTIN 200 MG: 100 CAPSULE ORAL at 21:30

## 2018-03-14 RX ADMIN — TICAGRELOR 90 MG: 90 TABLET ORAL at 22:29

## 2018-03-14 RX ADMIN — ATORVASTATIN CALCIUM 40 MG: 40 TABLET, FILM COATED ORAL at 21:30

## 2018-03-14 RX ADMIN — ENOXAPARIN SODIUM 40 MG: 40 INJECTION SUBCUTANEOUS at 22:34

## 2018-03-14 RX ADMIN — CARVEDILOL 25 MG: 25 TABLET, FILM COATED ORAL at 21:30

## 2018-03-14 RX ADMIN — IPRATROPIUM BROMIDE 0.5 MG: 0.5 SOLUTION RESPIRATORY (INHALATION) at 19:26

## 2018-03-14 RX ADMIN — NITROGLYCERIN 0.4 MG: 0.4 TABLET SUBLINGUAL at 19:05

## 2018-03-14 RX ADMIN — NITROGLYCERIN 0.4 MG: 0.4 TABLET SUBLINGUAL at 18:33

## 2018-03-14 RX ADMIN — Medication 10 ML: at 22:28

## 2018-03-14 RX ADMIN — ASPIRIN 81 MG 324 MG: 81 TABLET ORAL at 18:33

## 2018-03-14 RX ADMIN — ALBUTEROL SULFATE 10 MG: 5 SOLUTION RESPIRATORY (INHALATION) at 19:26

## 2018-03-14 ASSESSMENT — PAIN SCALES - GENERAL
PAINLEVEL_OUTOF10: 6
PAINLEVEL_OUTOF10: 7

## 2018-03-14 ASSESSMENT — PAIN DESCRIPTION - PAIN TYPE
TYPE: ACUTE PAIN
TYPE: ACUTE PAIN

## 2018-03-14 ASSESSMENT — PAIN DESCRIPTION - DIRECTION: RADIATING_TOWARDS: LUE

## 2018-03-14 ASSESSMENT — PAIN DESCRIPTION - PROGRESSION: CLINICAL_PROGRESSION: GRADUALLY IMPROVING

## 2018-03-14 ASSESSMENT — PAIN DESCRIPTION - DESCRIPTORS: DESCRIPTORS: SQUEEZING

## 2018-03-14 ASSESSMENT — PAIN DESCRIPTION - ORIENTATION: ORIENTATION: MID

## 2018-03-14 ASSESSMENT — PAIN DESCRIPTION - ONSET: ONSET: ON-GOING

## 2018-03-14 ASSESSMENT — PAIN DESCRIPTION - LOCATION
LOCATION: CHEST
LOCATION: CHEST

## 2018-03-14 ASSESSMENT — PAIN DESCRIPTION - FREQUENCY: FREQUENCY: INTERMITTENT

## 2018-03-14 ASSESSMENT — HEART SCORE: ECG: 1

## 2018-03-14 NOTE — PROGRESS NOTES
Inhaler / Aerosol Education        t   [x] Aerosolized Medications:     Verbal education has been provided in the use, benefits and possible adverse reactions of aerosolized medications used in the treatment of this patient.     [] Other:

## 2018-03-14 NOTE — ED PROVIDER NOTES
[facility]  Emergency Department Encounter  Emergency Medicine Resident     Pt Name: Melissa Escobar  MRN: [id number]  [place] 1945  Date of evaluation: 3/14/18  PCP:  Bernadette Ivan MD    [address] [place] [address]       Chief Complaint   Patient presents with    Chest Pain     x 2 days    Emesis    Nausea    Shortness of Breath    Cough       HISTORY OF PRESENT ILLNESS  (Location/Symptom, Timing/Onset, Context/Setting, Quality, Duration, Modifying Factors, Severity.)      Melissa Escobar is a 67 y.o. female with history of ACS and COPD who presents with Chest pain and shortness of breath. The patient reports that she was recently admitted to the hospital for bronchitis but has continued to have intermittent shortness of breath despite finishing her antibiotics. Starting 2 days ago she developed gradual onset, constant, progressive, left-sided chest pain that radiates into her right neck, and intermittent tingling down her left arm. She also complains of associated shortness of breath, nausea, and diaphoresis. She has been taking her albuterol inhaler with some relief does not list any other palliating factors. She denies any provoking factors. The patient has history of 6 stents with last stent approximately 1 year ago. Her cardiologist is Dr. Jean Parker. She denies fever, chills, headache, vision changes, neck pain, back pain, abdominal pain, urine/bowel symptoms, focal weakness, numbness, or recent injury. PAST MEDICAL / SURGICAL / SOCIAL / FAMILY HISTORY      has a past medical history of Anxiety; Benign positional vertigo; CAD (coronary artery disease); Chest pain; Depression; Diabetes mellitus ([address] Diabetic neuropathy ([address] Hyperlipidemia; Hypertension; and Migraine. has a past surgical history that includes Percutaneous Transluminal Coronary Angio; Cardiac catheterization; Coronary angioplasty with stent; and Appendectomy.     Social History     Social History    Marital status:      Spouse name: N/A    Number of children: N/A    Years of education: N/A     Occupational History    Not on file. Social History Main Topics    Smoking status: Never Smoker    Smokeless tobacco: Never Used    Alcohol use No    Drug use: No    Sexual activity: No     Other Topics Concern    Not on file     Social History Narrative    No narrative on file       I counseled the patient against using tobacco products. History reviewed. No pertinent family history. Allergies:  Penicillins and Sulfa antibiotics    Home Medications:  Prior to Admission medications    Medication Sig Start Date End Date Taking? Authorizing Provider   atorvastatin (LIPITOR) 40 MG tablet Take 1 tablet by mouth nightly 3/28/17  Yes Karen Justice MD   spironolactone (ALDACTONE) 25 MG tablet Take 25 mg by mouth   Yes Historical Provider, MD   gabapentin (NEURONTIN) 100 MG capsule Take 2 capsules by mouth 3 times daily 3/26/16  Yes Phani Rincon MD   lisinopril (PRINIVIL;ZESTRIL) 10 MG tablet Take 1 tablet by mouth daily 3/26/16  Yes Phani Rincon MD   cyclobenzaprine (FLEXERIL) 10 MG tablet Take 10 mg by mouth 3 times daily as needed for Muscle spasms. Yes Historical Provider, MD   Multiple Vitamins-Minerals (THERAPEUTIC MULTIVITAMIN-MINERALS) tablet Take 1 tablet by mouth daily. Yes Historical Provider, MD   vitamin E 400 UNIT capsule Take 400 Units by mouth daily. Yes Historical Provider, MD   insulin aspart (NOVOLOG FLEXPEN) 100 UNIT/ML injection pen Inject  into the skin 3 times daily (before meals). Yes Historical Provider, MD   NIFEdipine (ADALAT CC) 30 MG CR tablet Take 30 mg by mouth daily. Yes Historical Provider, MD   aspirin 81 MG tablet Take 81 mg by mouth daily. Yes Historical Provider, MD   LORazepam (ATIVAN) 0.5 MG tablet Take 0.5 mg by mouth every 8 hours as needed. Yes Historical Provider, MD   carvedilol (COREG) 25 MG tablet Take 25 mg by mouth 2 times daily (with meals). TREATMENT: blood glucose less than 70 mg/dL and patient NOT ALERT or NPO    Full Code    Inpatient consult to Cardiology    Inpatient consult to Spiritual Services    MDI Treatment    POCT troponin    POCT troponin    POCT troponin    POC Glucose Fingerstick    POCT glucose    POCT Glucose    EKG 12 Lead    EKG 12 Lead    EKG 12 Lead    Insert peripheral IV    PATIENT STATUS (FROM ED OR OR/PROCEDURAL) Observation       MEDICATIONS ORDERED:  Orders Placed This Encounter   Medications    aspirin chewable tablet 324 mg    nitroGLYCERIN (NITROSTAT) SL tablet 0.4 mg    DISCONTD: albuterol (PROVENTIL) nebulizer solution 5 mg    DISCONTD: ipratropium-albuterol (DUONEB) nebulizer solution 1 ampule    DISCONTD: albuterol (PROVENTIL) nebulizer solution 5 mg    DISCONTD: ipratropium (ATROVENT) 0.02 % nebulizer solution 0.5 mg    DISCONTD: albuterol sulfate  (90 Base) MCG/ACT inhaler 2 puff    DISCONTD: albuterol (PROVENTIL) nebulizer solution 10 mg    aspirin chewable tablet 81 mg    atorvastatin (LIPITOR) tablet 40 mg    carvedilol (COREG) tablet 25 mg    cyclobenzaprine (FLEXERIL) tablet 10 mg    docusate sodium (COLACE) capsule 100 mg    gabapentin (NEURONTIN) capsule 200 mg    glimepiride (AMARYL) tablet 4 mg    lisinopril (PRINIVIL;ZESTRIL) tablet 10 mg    LORazepam (ATIVAN) tablet 0.5 mg    meclizine (ANTIVERT) tablet 25 mg    therapeutic multivitamin-minerals 1 tablet    NIFEdipine (PROCARDIA XL) extended release tablet 30 mg    pantoprazole (PROTONIX) tablet 40 mg    spironolactone (ALDACTONE) tablet 25 mg    ticagrelor (BRILINTA) tablet 90 mg    vitamin E capsule 400 Units    sodium chloride flush 0.9 % injection 10 mL    sodium chloride flush 0.9 % injection 10 mL    acetaminophen (TYLENOL) tablet 650 mg    enoxaparin (LOVENOX) injection 40 mg    OR Linked Order Group     morphine injection 4 mg     morphine (PF) injection 6 mg    ondansetron (ZOFRAN) injection 4 DO Mc  Resident  03/15/18 [id number]

## 2018-03-14 NOTE — ED NOTES
Pt reports that she feels better after taking Nitro. Pain rating 5/10.        Allen Dyson RN  03/14/18 [id number]

## 2018-03-15 VITALS
HEIGHT: 60 IN | HEART RATE: 70 BPM | RESPIRATION RATE: 16 BRPM | SYSTOLIC BLOOD PRESSURE: 136 MMHG | BODY MASS INDEX: 46.92 KG/M2 | WEIGHT: 239 LBS | DIASTOLIC BLOOD PRESSURE: 75 MMHG | TEMPERATURE: 98.9 F | OXYGEN SATURATION: 98 %

## 2018-03-15 LAB
EKG ATRIAL RATE: 67 BPM
EKG ATRIAL RATE: 71 BPM
EKG ATRIAL RATE: 79 BPM
EKG P AXIS: 77 DEGREES
EKG P AXIS: 82 DEGREES
EKG P AXIS: 87 DEGREES
EKG P-R INTERVAL: 132 MS
EKG P-R INTERVAL: 134 MS
EKG P-R INTERVAL: 158 MS
EKG Q-T INTERVAL: 418 MS
EKG Q-T INTERVAL: 422 MS
EKG Q-T INTERVAL: 428 MS
EKG QRS DURATION: 102 MS
EKG QRS DURATION: 88 MS
EKG QRS DURATION: 92 MS
EKG QTC CALCULATION (BAZETT): 441 MS
EKG QTC CALCULATION (BAZETT): 465 MS
EKG QTC CALCULATION (BAZETT): 483 MS
EKG R AXIS: 13 DEGREES
EKG R AXIS: 5 DEGREES
EKG R AXIS: 7 DEGREES
EKG T AXIS: 58 DEGREES
EKG T AXIS: 75 DEGREES
EKG T AXIS: 99 DEGREES
EKG VENTRICULAR RATE: 67 BPM
EKG VENTRICULAR RATE: 71 BPM
EKG VENTRICULAR RATE: 79 BPM
GLUCOSE BLD-MCNC: 208 MG/DL (ref 65–105)

## 2018-03-15 PROCEDURE — G0378 HOSPITAL OBSERVATION PER HR: HCPCS

## 2018-03-15 PROCEDURE — 82947 ASSAY GLUCOSE BLOOD QUANT: CPT

## 2018-03-15 PROCEDURE — 93005 ELECTROCARDIOGRAM TRACING: CPT

## 2018-03-15 PROCEDURE — 6370000000 HC RX 637 (ALT 250 FOR IP): Performed by: EMERGENCY MEDICINE

## 2018-03-15 RX ORDER — ALBUTEROL SULFATE 90 UG/1
2 AEROSOL, METERED RESPIRATORY (INHALATION) EVERY 6 HOURS PRN
Qty: 1 INHALER | Refills: 3 | Status: ON HOLD | OUTPATIENT
Start: 2018-03-15 | End: 2022-05-31 | Stop reason: HOSPADM

## 2018-03-15 RX ADMIN — MULTIPLE VITAMINS W/ MINERALS TAB 1 TABLET: TAB at 11:54

## 2018-03-15 RX ADMIN — CARVEDILOL 25 MG: 25 TABLET, FILM COATED ORAL at 11:54

## 2018-03-15 RX ADMIN — TICAGRELOR 90 MG: 90 TABLET ORAL at 11:53

## 2018-03-15 RX ADMIN — ASPIRIN 81 MG: 81 TABLET, CHEWABLE ORAL at 11:54

## 2018-03-15 RX ADMIN — SPIRONOLACTONE 25 MG: 25 TABLET ORAL at 11:53

## 2018-03-15 RX ADMIN — GLIMEPIRIDE 4 MG: 2 TABLET ORAL at 11:53

## 2018-03-15 RX ADMIN — PANTOPRAZOLE SODIUM 40 MG: 40 TABLET, DELAYED RELEASE ORAL at 06:59

## 2018-03-15 RX ADMIN — GABAPENTIN 200 MG: 100 CAPSULE ORAL at 11:54

## 2018-03-15 RX ADMIN — LISINOPRIL 10 MG: 10 TABLET ORAL at 11:53

## 2018-03-15 RX ADMIN — VITAMIN E CAP 400 UNIT 400 UNITS: 400 CAP at 11:53

## 2018-03-15 RX ADMIN — INSULIN LISPRO 2 UNITS: 100 INJECTION, SOLUTION INTRAVENOUS; SUBCUTANEOUS at 11:55

## 2018-03-15 RX ADMIN — DOCUSATE SODIUM 100 MG: 100 CAPSULE ORAL at 11:54

## 2018-03-15 ASSESSMENT — ENCOUNTER SYMPTOMS
ABDOMINAL PAIN: 0
BACK PAIN: 0
VOMITING: 0
CONSTIPATION: 0
DIARRHEA: 0
NAUSEA: 1
SHORTNESS OF BREATH: 1

## 2018-03-15 ASSESSMENT — PAIN SCALES - GENERAL: PAINLEVEL_OUTOF10: 0

## 2018-03-15 NOTE — CARE COORDINATION
Case Management Initial Discharge Plan  Melissa Escobar,         Readmission Risk              Readmission Risk:        19.5       Age 72 or Greater:  1    Admitted from SNF or Requires Paid or Family Care:  0    Currently has CHF,COPD,ARF,CRI,or is on dialysis:  0    Takes more than 5 Prescription Medications:  4    Takes Digoxin,Insulin,Anticoagulants,Narcotics or ASA/Plavix:  2    [address] in Past 12 Months:  10    On Disability:  0    Patient Considers own Health:  2.5            Met with:patient to discuss discharge plans.    Information verified: address, contacts, phone number, , insurance Yes  PCP: Bernadette Ivan MD  Date of last visit: 3 weeks     Insurance Provider: [address] [place] [address] Drive     Discharge Planning  Current Residence:  Private Residence  Living Arrangements:  Alone   Home has 2 stories/flight  stairs to climb  Support Systems:  Family Members, Children  Current Services PTA:  None, Durable Medical Equipment Supplier: none   Patient able to perform ADL's:Independent  DME used to aid ambulation prior to admission: cane/during admission cane     Potential Assistance Needed:  N/A    Pharmacy: [facility]    Potential Assistance Purchasing Medications:  No  Does patient want to participate in local refill/ meds to beds program?  No    Patient agreeable to home care: No  [place] of choice provided:  n/a      Type of Home Care Services:  None  Patient expects to be discharged to:  home    Prior SNF/Rehab Placement and Facility: no   Agreeable to SNF/Rehab: No  [place] of choice provided: n/a   Evaluation: n/a    Expected Discharge date:  03/15/18  Follow Up Appointment: Best Day/ Time: Tuesday PM    Transportation provider: kids can provide   Transportation arrangements needed for discharge: No    Discharge Plan: Plan to discharge independently; has follow up PCP care         Electronically signed by Nba Arellano RN on 3/15/18 at 9:16 AM

## 2018-03-15 NOTE — H&P
[facility]  CDU / OBSERVATION eNCOUnter  Resident Note     Pt Name: Cielo Johnson  MRN: [id number]  [place] 1945  Date of evaluation: 3/15/18  Patient's PCP is :  Andreina Thompson MD    CHIEF COMPLAINT       Chief Complaint   Patient presents with    Chest Pain     x 2 days    Emesis    Nausea    Shortness of Breath    Cough         HISTORY OF PRESENT ILLNESS    Cielo Johnson is a 67 y.o. female who presents with acute onset left sided pressure-like chest pain that radiates to the right neck with associated intermittent left arm tingling, shortness of breath, sweating, and nausea, starting 2 days prior to arrival that is constant since onset, gradually worsening, and mildly improved with the use of her albuterol inhaler and worsened by coughing, deep inhalation. Pt has past medical history of COPD and ACS, with 6 stents placed in the past, most recently 1 year ago. Pt is followed by Dr. Domenico Sawant of Cardiology. She denies new or changes in current medications. Pt denies fever, chills, headache, weakness, changes in vision, neck or back pain, change in urinary or bowel habits, numbness, or recent injury. Location/Symptom: Chest pain with associated SOB, left arm tingling, nausea, diaphoresis  Timing/Onset: Acutely 2 days ago, constant since onset, progressively worsening. Provocation: Chest pain worse with coughing, deep-inhalation, improved with albuterol inhaler   Quality: Pressure-like  Radiation: Right neck, associated intermittent left arm tingling  Severity: Moderate  Timing/Duration: Constant since onset, progressively worsening    Modifying Factors:  Worsened by coughing, deep inhalation, improved with albuterol inhaler    REVIEW OF SYSTEMS       General ROS - No fevers, positive but improving malaise, positive diaphoresis w/ chest pain  Ophthalmic ROS - No discharge, No changes in vision  ENT ROS -  No sore throat, No rhinorrhea,   Respiratory ROS - Positive shortness 90 mg BID   vitamin E capsule 400 Units Daily   sodium chloride flush 0.9 % injection 10 mL 2 times per day   sodium chloride flush 0.9 % injection 10 mL PRN   acetaminophen (TYLENOL) tablet 650 mg Q4H PRN   enoxaparin (LOVENOX) injection 40 mg Daily   morphine injection 4 mg Q2H PRN   Or    morphine (PF) injection 6 mg Q2H PRN   ondansetron (ZOFRAN) injection 4 mg Q8H PRN   insulin lispro (HUMALOG) injection vial 0-6 Units TID WC   insulin lispro (HUMALOG) injection vial 0-3 Units Nightly   glucose (GLUTOSE) 40 % oral gel 15 g PRN   dextrose 50 % solution 12.5 g PRN   glucagon (rDNA) injection 1 mg PRN   dextrose 5 % solution PRN   albuterol sulfate  (90 Base) MCG/ACT inhaler 2 puff Q6H PRN       All medication charted and reviewed. ALLERGIES     is allergic to penicillins and sulfa antibiotics. FAMILY HISTORY     has no family status information on file. family history is not on file. The patient denies any pertinent family history. I have reviewed and agree with the family history entered. I have reviewed the Family History and it is not significant to the case    SOCIAL HISTORY      reports that she has never smoked. She has never used smokeless tobacco. She reports that she does not drink alcohol or use drugs. I have reviewed and agree with all Social.  There are no concerns for substance abuse/use. PHYSICAL EXAM     INITIAL VITALS:  height is 5' (1.524 m) and weight is 239 lb (108.4 kg). Her oral temperature is 98.9 °F (37.2 °C). Her blood pressure is 136/75 and her pulse is 70. Her respiration is 20 and oxygen saturation is 98%. CONSTITUTIONAL: AOx4, no apparent distress, appears stated age   HEAD: normocephalic, atraumatic   EYES: PERRLA, EOMI    ENT: moist mucous membranes, uvula midline   NECK: supple, symmetric   BACK: symmetric   LUNGS: Faint wheezing in left upper lobe, otherwise clear to ausculation. No rhonchi. Lung sounds diminished bilaterally.  No evidence of respiratory distress. CARDIOVASCULAR: regular rate and rhythm, no murmurs, rubs or gallops   ABDOMEN: soft, non-tender, non-distended with normal active bowel sounds   NEUROLOGIC:  MAEx4, no focal sensory or motor deficits   MUSCULOSKELETAL: no clubbing, cyanosis or edema   SKIN: no rash or wounds       DIFFERENTIAL DIAGNOSIS/MDM:     DDx:  Viral URI, bronchitis, chest wall pain, costochondritis, pleuritis, COPD exacerbation,  rule out ACS. DIAGNOSTIC RESULTS     EKG: All EKG's are interpreted by the Observation Physician who either signs or Co-signs this chart in the absence of a cardiologist.    EKG Interpretation    Interpreted by observation physician    Rhythm: normal sinus   Rate: normal  Axis: left  Ectopy: none  Conduction: normal  ST Segments: no acute change  T Waves: flattening in  I, II and III  Q Waves: inferior leads    Clinical Impression: no acute changes    Grace Murray MD        RADIOLOGY:   I directly visualized the following  images and reviewed the radiologist interpretations:    Xr Chest Standard (2 Vw)    Result Date: 3/14/2018  EXAMINATION: TWO VIEWS OF THE CHEST 3/14/2018 6:45 pm COMPARISON: January 13, 2018 HISTORY: ORDERING SYSTEM PROVIDED HISTORY: chest pain TECHNOLOGIST PROVIDED HISTORY: Reason for exam:->chest pain FINDINGS: The lungs are without acute focal process. There is no effusion or pneumothorax. The cardiomediastinal silhouette is without acute process. The osseous structures are without acute process. No acute process. LABS:  I have reviewed and interpreted all available lab results.   Labs Reviewed   BASIC METABOLIC PANEL - Abnormal; Notable for the following:        Result Value    Glucose 252 (*)     All other components within normal limits   BRAIN NATRIURETIC PEPTIDE - Abnormal; Notable for the following:     Pro- (*)     All other components within normal limits   CBC WITH AUTO DIFFERENTIAL - Abnormal; Notable for the following:     Monocytes 18 (*)     Immature Granulocytes 1 (*)     All other components within normal limits   POC GLUCOSE FINGERSTICK - Abnormal; Notable for the following:     POC Glucose 219 (*)     All other components within normal limits   POC GLUCOSE FINGERSTICK - Abnormal; Notable for the following:     POC Glucose 208 (*)     All other components within normal limits   POCT TROPONIN   POCT TROPONIN   POCT TROPONIN   POCT TROPONIN   POCT GLUCOSE   POCT GLUCOSE   POCT GLUCOSE           SCREENING TOOLS:    HEART Risk Score for Chest Pain Patients   History and Physical Exam Suspicion Level  (Nausea, Vomiting, Diaphoresis, Radiation, Exertion)   Slightly Suspicious (0 pts)   Moderately Suspicious (1 pt)   Highly Suspicious (2 pts)   EKG Interpretation   Normal (0 pts)   Non-Specific Repolarization Disturbance (1 pt)   Significant ST-Depression (2 pts)   Age of Patient (in years)   = 39 (0 pts)   46-64 (1 pt)   = 65 (2 pts)   Risk Factors   No Risk Factors (0 pts)   1-2 Risk Factors (1 pt)   = 3 Risk Factors (2 pts)   Risk Factors Include:   Hypercholesterolemia   Hypertension   Diabetes Mellitus   Cigarette smoking   Positive family history   Obesity   CAD   (SLE, CKDz, HIV, Cocaine abuse)   Troponin Levels   = Normal Limit (0 pts)   1-3 Times Normal Limit (1 pt)   > 3 Times Normal Limit (2 pts)  TOTAL: 6    Percent Risk for Major Adverse Cardiac Event (MACE)  0-3 pts indicates low risk for MACE   2.5% (DISCHARGE)   4-7 pts indicates moderate risk for MACE  20.3% (OBS)  8-10 pts indicates high risk for MACE  72.7% (EARLY INVASIVE [place])    CDU IMPRESSION / PLAN      Heri Orourke is a 67 y.o. female who presents with     1.  Acute onset left sided pressure-like chest pain with radiation into the left neck and associated left arm tingling, shortness of breath, cough, and diaphoresis most likely etiology viral URI vs bronchitis vs COPD exacerbation, doubt ACS, Cardiology consulted and plan to follow up on recommendations and further

## 2018-03-15 NOTE — PROGRESS NOTES
Provider, MD   meclizine (ANTIVERT) 25 MG tablet Take 25 mg by mouth 3 times daily as needed. Yes Historical Provider, MD   nitroGLYCERIN (NITROSTAT) 0.4 MG SL tablet Place 0.4 mg under the tongue every 5 minutes as needed. Yes Historical Provider, MD   omeprazole (PRILOSEC) 20 MG capsule Take 20 mg by mouth Daily. Yes Historical Provider, MD   traMADol (ULTRAM) 50 MG tablet Take 50 mg by mouth every 8 hours as needed.    Yes Historical Provider, MD   ticagrelor (BRILINTA) 90 MG TABS tablet Take 1 tablet by mouth 2 times daily 2/27/17 5/28/17  TIP Zee MD   .  Recent Surgical History: None = 0     Assessment     Peak Flow (asthma only)    Predicted:   Personal Best:   PEF   % Predicted   Peak Flow :     FEV1/FVC    FEV1 Predicted       FEV1     FEV1 % Predicted   FVC   IS volume   IBW     RR 16  Breath Sounds: clear      · Bronchodilator assessment at level  1  · Hyperinflation assessment at level   · Secretion Management assessment at level    ·   · []    Bronchodilator Assessment  BRONCHODILATOR ASSESSMENT SCORE  Score 0 1 2 3 4 5   Breath Sounds   []  Patient Baseline [x]  No Wheeze good aeration []  Faint, scattered wheezing, good aeration []  Expiratory Wheezing and or moderately diminished []  Insp/Exp wheeze and/or very diminished []  Insp/Exp and/ or marked distress   Respiratory Rate   []  Patient Baseline [x]  Less than 20 []  Less than 20 []  20-25 []  Greater than 25 []  Greater than 25   Peak flow % of Pred or PB [x]  NA   []  Greater than 90%  []  81-90% []  71-80% []  Less than or equal to 70%  or unable to perform []  Unable due to Respiratory Distress   Dyspnea re []  Patient Baseline [x]  No SOB []  No SOB []  SOB on exertion []  SOB min activity []  At rest/acute   e FEV% Predicted       [x]  NA []  Above 69%  []  Unable []  Above 60-69%  []  Unable []  Above 50-59%  []  Unable []  Above 35-49%  []  Unable []  Less than 35%  []  Unable                 []  Hyperinflation Assessment  Score 1 2 3   CXR and Breath Sounds   []  Clear []  No atelectasis  Basilar aeration []  Atelectasis or absent basilar breath sounds   Incentive Spirometry Volume  (Per IBW)   []  Greater than or equal to 15ml/Kg []  less than 15ml/Kg []  less than 15ml/Kg   Surgery within last 2 weeks []  None or general   []  Abdominal or thoracic surgery  []  Abdominal or thoracic   Chronic Pulmonary Historyre []  No []  Yes []  Yes     []  Secretion Management Assessment  Score 1 2 3   Bilateral Breath Sounds   []  Occasional Rhonchi []  Scattered Rhonchi []  Course Rhonchi and/or poor aeration   Sputum    []  Small amount of thin secretions []  Moderate amount of viscous secretions []  Copius, Viscious Yellow/ Secretions   CXR as reported by physician []  clear  []  Unavailable []  Infiltrates and/or consolidation  []  Unavailable []  Mucus Plugging and or lobar consolidation  []  Unavailable   Cough []  Strong, productive cough []  Weak productive cough []  No cough or weak non-productive cough   CAR A MARIAH  9:05 AM                            FEMALE                                  MALE                            FEV1 Predicted Normal Values                        FEV1 Predicted Normal Values          Age                                     Height in Feet and Inches       Age                                     Height in Feet and Inches       4' 11\" 5' 1\" 5' 3\" 5' 5\" 5' 7\" 5' 9\" 5' 11\" 6' 1\"  4' 11\" 5' 1\" 5' 3\" 5' 5\" 5' 7\" 5' 9\" 5' 11\" 6' 1\"   42 - 45 2.49 2.66 2.84 3.03 3.22 3.42 3.62 3.83 42 - 45 2.82 3.03 3.26 3.49 3.72 3.96 4.22 4.47   46 - 49 2.40 2.57 2.76 2.94 3.14 3.33 3.54 3.75 46 - 49 2.70 2.92 3.14 3.37 3.61 3.85 4.10 4.36   50 - 53 2.31 2.48 2.66 2.85 3.04 3.24 3.45 3.66 50 - 53 2.58 2.80 3.02 3.25 3.49 3.73 3.98 4.24   54 - 57 2.21 2.38 2.57 2.75 2.95 3.14 3.35 3.56 54 - 57 2.46 2.67 2.89 3.12 3.36 3.60 3.85 4.11   58 - 61 2.10 2.28 2.46 2.65 2.84 3.04 3.24 3.45 58 - 61 2.32 2.54 2.76 2.99 3.23 3. 47 3.72 3.98   62 - 65 1.99 2.17 2.35 2.54 2.73 2.93 3.13 3.34 62 - 65 2.19 2.40 2.62 2.85 3.09 3.33 3.58 3.84   66 - 69 1.88 2.05 2.23 2.42 2.61 2.81 3.02 3.23 66 - 69 2.04 2.26 2.48 2.71 2.95 3.19 3.44 3.70   70+ 1.82 1.99 2.17 2.36 2.55 2.75 2.95 3.16 70+ 1.97 2.19 2.41 2.64 2.87 3.12 3.37 3.62             Predicted Peak Expiratory Flow Rate                                       Height (in)  Female       Height (in) Male           Age 64 63 56 61 58 73 78 74 Age            21 344 357 372 387 402 417 432 446  60 62 64 66 68 70 72 74 76   25 337 352 366 381 396 411 426 441 25 447 476 505 533 562 [phone]   30 329 344 359 374 389 404 419 434 30 437 466 494 523 552 580 [phone]   35 322 337 351 366 381 396 411 426 35 426 455 484 512 541 [phone]   40 314 329 344 359 374 389 404 419 40 416 445 473 502 531 [phone] 647   45 307 322 336 351 366 381 396 411 45 405 434 463 491 520 [phone] 636   50 299 314 329 344 359 374 389 404 50 395 424 452 481 510 [phone] 625   55 292 307 321 336 351 366 381 396 55 384 413 442 470 499 528 556 585 615   60 284 299 314 329 344 359 374 389 60 374 403 431 460 489 517 546 575 605   65 277 292 306 321 336 351 366 381 65 363 392 421 449 478 507 535 564 594   70 269 284 299 314 329 344 359 374 70 353 382 410 439 468 [phone] 583   75 261 274 289 305 319 334 348 364 75 344 372 400 429 458 487 515 544 573   80 253 266 282 296 312 327 342 356 80 335 362 390 419 448 476 [phone]

## 2018-03-15 NOTE — CONSULTS
81 mg Oral Daily Aileen Gentle, DO        atorvastatin (LIPITOR) tablet 40 mg  40 mg Oral Nightly Aileen Gentle, DO   40 mg at 03/14/18 2130    carvedilol (COREG) tablet 25 mg  25 mg Oral BID WC Aileen Gentle, DO   25 mg at 03/14/18 2130    cyclobenzaprine (FLEXERIL) tablet 10 mg  10 mg Oral TID PRN Aileen Gentle, DO        docusate sodium (COLACE) capsule 100 mg  100 mg Oral Daily Aileen Gentle, DO        gabapentin (NEURONTIN) capsule 200 mg  200 mg Oral TID Aileen Gentle, DO   200 mg at 03/14/18 2130    glimepiride (AMARYL) tablet 4 mg  4 mg Oral QAM AC Aileen Gentle, DO        lisinopril (PRINIVIL;ZESTRIL) tablet 10 mg  10 mg Oral Daily Aileen Gentle, DO        LORazepam (ATIVAN) tablet 0.5 mg  0.5 mg Oral Q8H PRN Aileen Gentle, DO        meclizine (ANTIVERT) tablet 25 mg  25 mg Oral TID PRN Aileen Gentle, DO        therapeutic multivitamin-minerals 1 tablet  1 tablet Oral Daily Aileen Gentle, DO        NIFEdipine (PROCARDIA XL) extended release tablet 30 mg  30 mg Oral Daily Aileen Gentle, DO        pantoprazole (PROTONIX) tablet 40 mg  40 mg Oral QAM  Keila Sy, DO   40 mg at 03/15/18 4146    spironolactone (ALDACTONE) tablet 25 mg  25 mg Oral Daily Aileen Gentle, DO        ticagrelor [facility]) tablet 90 mg  90 mg Oral BID Aileen Gentle, DO   90 mg at 03/14/18 2229    vitamin E capsule 400 Units  400 Units Oral Daily Aileen Gentle, DO        sodium chloride flush 0.9 % injection 10 mL  10 mL Intravenous 2 times per day Aileen Gentle, DO   10 mL at 03/14/18 2228    sodium chloride flush 0.9 % injection 10 mL  10 mL Intravenous PRN Aileen Gentle, DO        acetaminophen (TYLENOL) tablet 650 mg  650 mg Oral Q4H PRN Aileen Gentle, DO        enoxaparin (LOVENOX) injection 40 mg  40 mg Subcutaneous Daily Aileen Gentle, DO   40 mg at 03/14/18 2234    morphine injection 4 mg  4 mg Intravenous Q2H PRN Aileen Gentle, DO        Or or pruritis. · Neurological: No headache, diplopia, change in muscle strength, numbness or tingling. No change in gait, balance, coordination, mood, affect, memory, mentation, behavior. · Psychiatric: No anxiety, or depression. · Endocrine: No temperature intolerance. No excessive thirst, fluid intake, or urination. No tremor. · Hematologic/Lymphatic: No abnormal bruising or bleeding, blood clots or swollen lymph nodes. · Allergic/Immunologic: No nasal congestion or hives. Physical Exam    Vital Signs: /75   Pulse 70   Temp 98.9 °F (37.2 °C) (Oral)   Resp 20   Ht 5' (1.524 m)   Wt 239 lb (108.4 kg)   SpO2 98%   BMI 46.68 kg/m²        Admission Weight: 239 lb (108.4 kg)     General appearance: Awake, Alert Cooperative    Head: Normocephalic, without obvious abnormality, atraumatic    Eyes: Conjunctivae/corneas clear. PERRL, EOM's intact. Fundi benign    Neck: no adenopathy, no carotid bruit, no JVD, supple, symmetrical, trachea midline and thyroid: not enlarged, symmetric, no tenderness/mass/nodules    Lungs: diminished air movement through out    Heart: regular rate and rhythm, S1, S2 normal, no murmur, click, rub or gallop    Abdomen: Soft, non-tender. Bowel sounds normal. No masses,  no organomegaly    Extremities: extremities normal, atraumatic, no cyanosis or edema    Skin: Skin color, texture, turgor normal. No rashes or lesions    Neurologic: Grossly normal            Labs:      CBC:   Recent Labs      03/14/18   1846   WBC  4.4   HGB  12.6   HCT  39.7   MCV  93.0   PLT  147     BMP:   Recent Labs      03/14/18   1846   NA  136   K  4.3   CL  98   CO2  26   BUN  19   CREATININE  0.73     PT/INR: No results for input(s): PROTIME, INR in the last 72 hours. APTT: No results for input(s): APTT in the last 72 hours. MAG: No results for input(s): MG in the last 72 hours. D Dimer: No results for input(s): DDIMER in the last 72 hours.   Troponin T No results for input(s): TROPONINT in the last 72 hours. ProBNP Invalid input(s): PRO-BNP          Diagnosis:  Active Problems:    Chest pain  Resolved Problems:    * No resolved hospital problems. *          Plan:    1. Atypical chest pain     - not ACS or cardiac in origin     - I suspect this is musculoskeletal do to coughing    2. CAD with stents     - continue DAPT     - continue BB, statin, and Nitrates    3. SOB and cough     - suspect pulmonary issue     - normal CXR    At this time I would not pursue an ischemic workup given that the symptoms are very atypical.  Would continue with pulmonary toilet and aerosols. OK for DC home from CV standpoint when OK with others.   Can follow up in office with OP workup then if symptoms persist.      Electronically signed by Jason Sherwood DO on 3/15/2018 at 11:01 AM

## 2018-03-16 NOTE — DISCHARGE SUMMARY
hypertension, and hyperlipidemia. Work up in the ED indicated no evidence of acute cardiac etiology, and due to past medical history of heart disease and concerning history, pt admitted to extended treatment unit for further evaluation and treatment, along with Cardiology evaluation. Following admission, pt had ongoing clinical improvement and reported improvement in chest pain the morning following admission. She was seen and evaluated by Cardiology who recommended no further diagnostic work up and attributed presenting symptoms to recent pulmonary problems. Pt was cleared for discharge from Cardiology standpoint and was provided with albuterol nebulizer for symptomatic relief. She was instructed to follow up with PCP and Cardiology for reevaluation. Labs and imaging were followed daily. At time of discharge, Connie Awad was tolerating a PO intake well, passing flatus, urinating adequately, ambulating and had adequate analgesia on oral pain medications. She is medically stable to be discharged. Clinical course has improved. I feel the patient can be safely discharged to home with outpatient follow up. Instructions have been given for the patient to return to the ED for any worsening of the symptoms, including but not limited to increased pain, shortness of breath, weakness, or any deterioration of their current condition. Disposition: Home    Condition: Good      Patient stable and ready for discharge home. I have discussed plan of care with patient and they are in understanding. They were instructed to read discharge paperwork. All of their questions and concerns were addressed.      Patient states that they understand the plan and agree with the plan      Time Spent: 10      Segundo Newton MD  Emergency Medicine Resident Physician

## 2018-06-14 ENCOUNTER — HOSPITAL ENCOUNTER (EMERGENCY)
Age: 73
Discharge: HOME | End: 2018-06-14
Attending: EMERGENCY MEDICINE
Payer: MEDICARE

## 2018-06-14 ENCOUNTER — APPOINTMENT (OUTPATIENT)
Dept: GENERAL RADIOLOGY | Age: 73
End: 2018-06-14
Payer: MEDICARE

## 2018-06-14 VITALS
TEMPERATURE: 97.7 F | DIASTOLIC BLOOD PRESSURE: 81 MMHG | SYSTOLIC BLOOD PRESSURE: 155 MMHG | RESPIRATION RATE: 18 BRPM | OXYGEN SATURATION: 98 % | HEART RATE: 72 BPM

## 2018-06-14 DIAGNOSIS — M79.605 LEFT LEG PAIN: Primary | ICD-10-CM

## 2018-06-14 LAB
ANION GAP SERPL CALCULATED.3IONS-SCNC: 12 MMOL/L (ref 9–17)
BUN BLDV-MCNC: 13 MG/DL (ref 8–23)
BUN/CREAT BLD: ABNORMAL (ref 9–20)
CALCIUM SERPL-MCNC: 9.3 MG/DL (ref 8.6–10.4)
CHLORIDE BLD-SCNC: 100 MMOL/L (ref 98–107)
CO2: 23 MMOL/L (ref 20–31)
CREAT SERPL-MCNC: 0.53 MG/DL (ref 0.5–0.9)
D-DIMER QUANTITATIVE: 0.48 MG/L FEU
GFR AFRICAN AMERICAN: >60 ML/MIN
GFR NON-AFRICAN AMERICAN: >60 ML/MIN
GFR SERPL CREATININE-BSD FRML MDRD: ABNORMAL ML/MIN/{1.73_M2}
GFR SERPL CREATININE-BSD FRML MDRD: ABNORMAL ML/MIN/{1.73_M2}
GLUCOSE BLD-MCNC: 277 MG/DL (ref 70–99)
HCT VFR BLD CALC: 41.4 % (ref 36.3–47.1)
HEMOGLOBIN: 12.7 G/DL (ref 11.9–15.1)
MCH RBC QN AUTO: 29.5 PG (ref 25.2–33.5)
MCHC RBC AUTO-ENTMCNC: 30.7 G/DL (ref 28.4–34.8)
MCV RBC AUTO: 96.1 FL (ref 82.6–102.9)
MYOGLOBIN: 62 NG/ML (ref 25–58)
NRBC AUTOMATED: 0 PER 100 WBC
PDW BLD-RTO: 12.8 % (ref 11.8–14.4)
PLATELET # BLD: 166 K/UL (ref 138–453)
PMV BLD AUTO: 11.4 FL (ref 8.1–13.5)
POTASSIUM SERPL-SCNC: 4 MMOL/L (ref 3.7–5.3)
RBC # BLD: 4.31 M/UL (ref 3.95–5.11)
SODIUM BLD-SCNC: 135 MMOL/L (ref 135–144)
TOTAL CK: 272 U/L (ref 26–192)
WBC # BLD: 5.6 K/UL (ref 3.5–11.3)

## 2018-06-14 PROCEDURE — 83874 ASSAY OF MYOGLOBIN: CPT

## 2018-06-14 PROCEDURE — 85027 COMPLETE CBC AUTOMATED: CPT

## 2018-06-14 PROCEDURE — 99283 EMERGENCY DEPT VISIT LOW MDM: CPT

## 2018-06-14 PROCEDURE — 6370000000 HC RX 637 (ALT 250 FOR IP): Performed by: EMERGENCY MEDICINE

## 2018-06-14 PROCEDURE — 80048 BASIC METABOLIC PNL TOTAL CA: CPT

## 2018-06-14 PROCEDURE — 73590 X-RAY EXAM OF LOWER LEG: CPT

## 2018-06-14 PROCEDURE — 73552 X-RAY EXAM OF FEMUR 2/>: CPT

## 2018-06-14 PROCEDURE — 85379 FIBRIN DEGRADATION QUANT: CPT

## 2018-06-14 PROCEDURE — 82550 ASSAY OF CK (CPK): CPT

## 2018-06-14 RX ORDER — TRAMADOL HYDROCHLORIDE 50 MG/1
50 TABLET ORAL ONCE
Status: COMPLETED | OUTPATIENT
Start: 2018-06-14 | End: 2018-06-14

## 2018-06-14 RX ORDER — LIDOCAINE 50 MG/G
1 PATCH TOPICAL DAILY
Qty: 30 PATCH | Refills: 0 | Status: ON HOLD | OUTPATIENT
Start: 2018-06-14 | End: 2019-12-16 | Stop reason: HOSPADM

## 2018-06-14 RX ADMIN — TRAMADOL HYDROCHLORIDE 50 MG: 50 TABLET, FILM COATED ORAL at 14:57

## 2018-06-14 ASSESSMENT — ENCOUNTER SYMPTOMS
VOMITING: 0
NAUSEA: 0
SHORTNESS OF BREATH: 0
ABDOMINAL PAIN: 0

## 2018-06-14 ASSESSMENT — PAIN SCALES - GENERAL
PAINLEVEL_OUTOF10: 8
PAINLEVEL_OUTOF10: 8

## 2018-06-14 ASSESSMENT — PAIN DESCRIPTION - ORIENTATION: ORIENTATION: RIGHT

## 2018-06-14 ASSESSMENT — PAIN DESCRIPTION - LOCATION: LOCATION: LEG

## 2018-06-29 ENCOUNTER — OFFICE VISIT (OUTPATIENT)
Dept: PODIATRY | Age: 73
End: 2018-06-29
Payer: MEDICARE

## 2018-06-29 VITALS — HEIGHT: 66 IN

## 2018-06-29 DIAGNOSIS — M20.42 HAMMER TOES OF BOTH FEET: ICD-10-CM

## 2018-06-29 DIAGNOSIS — B35.1 DERMATOPHYTOSIS OF NAIL: Primary | ICD-10-CM

## 2018-06-29 DIAGNOSIS — Z79.4 TYPE 2 DIABETES MELLITUS WITH DIABETIC POLYNEUROPATHY, WITH LONG-TERM CURRENT USE OF INSULIN (HCC): ICD-10-CM

## 2018-06-29 DIAGNOSIS — M79.674 TOE PAIN, BILATERAL: ICD-10-CM

## 2018-06-29 DIAGNOSIS — E11.42 TYPE 2 DIABETES MELLITUS WITH DIABETIC POLYNEUROPATHY, WITH LONG-TERM CURRENT USE OF INSULIN (HCC): ICD-10-CM

## 2018-06-29 DIAGNOSIS — M79.9 LESION OF SOFT TISSUE OF LOWER LEG AND ANKLE: ICD-10-CM

## 2018-06-29 DIAGNOSIS — M20.41 HAMMER TOES OF BOTH FEET: ICD-10-CM

## 2018-06-29 DIAGNOSIS — M79.675 TOE PAIN, BILATERAL: ICD-10-CM

## 2018-06-29 PROCEDURE — 99203 OFFICE O/P NEW LOW 30 MIN: CPT | Performed by: STUDENT IN AN ORGANIZED HEALTH CARE EDUCATION/TRAINING PROGRAM

## 2018-06-29 PROCEDURE — 11721 DEBRIDE NAIL 6 OR MORE: CPT | Performed by: STUDENT IN AN ORGANIZED HEALTH CARE EDUCATION/TRAINING PROGRAM

## 2018-06-29 PROCEDURE — 99204 OFFICE O/P NEW MOD 45 MIN: CPT | Performed by: STUDENT IN AN ORGANIZED HEALTH CARE EDUCATION/TRAINING PROGRAM

## 2018-10-05 ENCOUNTER — OFFICE VISIT (OUTPATIENT)
Dept: PODIATRY | Age: 73
End: 2018-10-05
Payer: MEDICARE

## 2018-10-05 VITALS
BODY MASS INDEX: 37.61 KG/M2 | WEIGHT: 234 LBS | DIASTOLIC BLOOD PRESSURE: 77 MMHG | SYSTOLIC BLOOD PRESSURE: 137 MMHG | HEIGHT: 66 IN | HEART RATE: 70 BPM

## 2018-10-05 DIAGNOSIS — M20.42 HAMMER TOES OF BOTH FEET: ICD-10-CM

## 2018-10-05 DIAGNOSIS — M79.675 TOE PAIN, BILATERAL: ICD-10-CM

## 2018-10-05 DIAGNOSIS — E11.42 TYPE 2 DIABETES MELLITUS WITH DIABETIC POLYNEUROPATHY, WITH LONG-TERM CURRENT USE OF INSULIN (HCC): ICD-10-CM

## 2018-10-05 DIAGNOSIS — B35.1 DERMATOPHYTOSIS OF NAIL: Primary | ICD-10-CM

## 2018-10-05 DIAGNOSIS — M20.41 HAMMER TOES OF BOTH FEET: ICD-10-CM

## 2018-10-05 DIAGNOSIS — I73.9 PAD (PERIPHERAL ARTERY DISEASE) (HCC): ICD-10-CM

## 2018-10-05 DIAGNOSIS — Z79.4 TYPE 2 DIABETES MELLITUS WITH DIABETIC POLYNEUROPATHY, WITH LONG-TERM CURRENT USE OF INSULIN (HCC): ICD-10-CM

## 2018-10-05 DIAGNOSIS — M79.674 TOE PAIN, BILATERAL: ICD-10-CM

## 2018-10-05 DIAGNOSIS — M79.9 LESION OF SOFT TISSUE OF LOWER LEG AND ANKLE: ICD-10-CM

## 2018-10-05 PROCEDURE — 99212 OFFICE O/P EST SF 10 MIN: CPT | Performed by: PODIATRIST

## 2018-10-05 PROCEDURE — 11721 DEBRIDE NAIL 6 OR MORE: CPT | Performed by: PODIATRIST

## 2018-10-05 PROCEDURE — 99213 OFFICE O/P EST LOW 20 MIN: CPT | Performed by: PODIATRIST

## 2018-10-05 NOTE — PROGRESS NOTES
Patient instructed to remove shoes and socks, instructed to sit in exam chair. Current PCP name is  and date of last visit 7/2018. Do you have a follow up visit scheduled? No   Diabetic visit information    Blood pressure (Control is BP <140/90)  BP Readings from Last 3 Encounters:   06/14/18 (!) 155/81   03/15/18 136/75   01/14/18 (!) 142/76       BP taken with correct size cuff? - Yes   Repeated if > 140/90 No      Tobacco use:  Patient  reports that she has never smoked. She has never used smokeless tobacco.  If Smoker - Cessation materials given?- No       Diabetic Health Maintenance Items due  Diabetes Management   Topic Date Due    Diabetic foot exam  05/11/1955    Diabetic retinal exam  05/11/1955    Diabetic microalbuminuria test  03/22/2017    A1C test (Diabetic or Prediabetic)  02/26/2018    Lipid screen  02/26/2018       Diabetic retinal exam done in last year? - Yes   If No: remind patient that it is due and they should schedule an exam    Medications  Is patient taking any medications for diabetes? -   Yes  Have blood sugars been controlled? Fasting blood sugars under 120   -   Yes   Random home sugars or today's POCT glucose is under 180 -   Yes   []  If No to the above then patient should schedule appt with PCP.      Diabetic Plan    A1C Plan  Lab Results   Component Value Date    LABA1C 10.9 (H) 02/26/2017    LABA1C 11.9 (H) 03/21/2016      []  If A1C over 8 and last result >3 months ago - Order A1C and refer to PCP   []  If last A1C over 6 months ago - Order A1C and refer to PCP for follow up   []  If elevated blood sugars > 180 - refer to PCP for follow up    []  Blood sugar controlled - A1C under 8 and last check was < 6 months      Cholesterol Plan   Lab Results   Component Value Date    LDLCHOLESTEROL 156 (H) 02/26/2017      []  If LDL > 100 and last result >3 months ago - order Fasting lipids and refer to PCP for follow up   []  If LDL < 100 and over 1 year ago - Order Fasting

## 2018-10-08 ENCOUNTER — TELEPHONE (OUTPATIENT)
Dept: PODIATRY | Age: 73
End: 2018-10-08

## 2018-10-08 NOTE — TELEPHONE ENCOUNTER
Writer called pt to see how her podiatry sayda went on Friday, pt stated that it was wonderful and no questions or concerns

## 2018-10-11 ENCOUNTER — HOSPITAL ENCOUNTER (OUTPATIENT)
Dept: VASCULAR LAB | Age: 73
Discharge: HOME | End: 2018-10-11
Payer: MEDICARE

## 2018-10-11 DIAGNOSIS — I73.9 PAD (PERIPHERAL ARTERY DISEASE) (HCC): ICD-10-CM

## 2018-10-11 PROCEDURE — 93923 UPR/LXTR ART STDY 3+ LVLS: CPT

## 2018-10-16 DIAGNOSIS — I73.9 PVD (PERIPHERAL VASCULAR DISEASE) (HCC): Primary | ICD-10-CM

## 2018-12-06 ENCOUNTER — INITIAL CONSULT (OUTPATIENT)
Dept: VASCULAR SURGERY | Age: 73
End: 2018-12-06
Payer: MEDICARE

## 2018-12-06 VITALS
RESPIRATION RATE: 19 BRPM | OXYGEN SATURATION: 100 % | WEIGHT: 233.91 LBS | BODY MASS INDEX: 37.59 KG/M2 | HEART RATE: 86 BPM | DIASTOLIC BLOOD PRESSURE: 78 MMHG | HEIGHT: 66 IN | SYSTOLIC BLOOD PRESSURE: 138 MMHG

## 2018-12-06 DIAGNOSIS — I10 ESSENTIAL HYPERTENSION: ICD-10-CM

## 2018-12-06 DIAGNOSIS — I73.9 PVD (PERIPHERAL VASCULAR DISEASE) WITH CLAUDICATION (HCC): Primary | ICD-10-CM

## 2018-12-06 DIAGNOSIS — Z13.6 ENCOUNTER FOR SCREENING FOR VASCULAR DISEASE: ICD-10-CM

## 2018-12-06 DIAGNOSIS — I79.8 OTHER DISORDERS OF ARTERIES, ARTERIOLES AND CAPILLARIES IN DISEASES CLASSIFIED ELSEWHERE (HCC): ICD-10-CM

## 2018-12-06 DIAGNOSIS — E11.42 TYPE 2 DIABETES MELLITUS WITH DIABETIC POLYNEUROPATHY, WITH LONG-TERM CURRENT USE OF INSULIN (HCC): Chronic | ICD-10-CM

## 2018-12-06 DIAGNOSIS — Z79.4 TYPE 2 DIABETES MELLITUS WITH DIABETIC POLYNEUROPATHY, WITH LONG-TERM CURRENT USE OF INSULIN (HCC): Chronic | ICD-10-CM

## 2018-12-06 PROCEDURE — 99204 OFFICE O/P NEW MOD 45 MIN: CPT | Performed by: SURGERY

## 2018-12-14 ENCOUNTER — HOSPITAL ENCOUNTER (OUTPATIENT)
Dept: VASCULAR LAB | Age: 73
Discharge: HOME | End: 2018-12-14
Payer: MEDICARE

## 2018-12-14 DIAGNOSIS — Z13.6 ENCOUNTER FOR SCREENING FOR VASCULAR DISEASE: ICD-10-CM

## 2018-12-14 DIAGNOSIS — I10 ESSENTIAL HYPERTENSION: ICD-10-CM

## 2018-12-14 DIAGNOSIS — Z79.4 TYPE 2 DIABETES MELLITUS WITH DIABETIC POLYNEUROPATHY, WITH LONG-TERM CURRENT USE OF INSULIN (HCC): Chronic | ICD-10-CM

## 2018-12-14 DIAGNOSIS — I73.9 PVD (PERIPHERAL VASCULAR DISEASE) WITH CLAUDICATION (HCC): ICD-10-CM

## 2018-12-14 DIAGNOSIS — E11.42 TYPE 2 DIABETES MELLITUS WITH DIABETIC POLYNEUROPATHY, WITH LONG-TERM CURRENT USE OF INSULIN (HCC): Chronic | ICD-10-CM

## 2018-12-14 DIAGNOSIS — I79.8 OTHER DISORDERS OF ARTERIES, ARTERIOLES AND CAPILLARIES IN DISEASES CLASSIFIED ELSEWHERE (HCC): ICD-10-CM

## 2018-12-14 PROCEDURE — 93880 EXTRACRANIAL BILAT STUDY: CPT

## 2019-01-02 ENCOUNTER — OFFICE VISIT (OUTPATIENT)
Dept: FAMILY MEDICINE CLINIC | Age: 74
End: 2019-01-02
Payer: MEDICARE

## 2019-01-02 VITALS
SYSTOLIC BLOOD PRESSURE: 130 MMHG | BODY MASS INDEX: 37.28 KG/M2 | HEART RATE: 63 BPM | HEIGHT: 66 IN | WEIGHT: 232 LBS | OXYGEN SATURATION: 98 % | DIASTOLIC BLOOD PRESSURE: 70 MMHG | TEMPERATURE: 97.6 F | RESPIRATION RATE: 16 BRPM

## 2019-01-02 DIAGNOSIS — E11.65 UNCONTROLLED TYPE 2 DIABETES MELLITUS WITH HYPERGLYCEMIA (HCC): Primary | ICD-10-CM

## 2019-01-02 DIAGNOSIS — E78.2 MIXED HYPERLIPIDEMIA: ICD-10-CM

## 2019-01-02 DIAGNOSIS — I25.10 CORONARY ARTERY DISEASE INVOLVING NATIVE CORONARY ARTERY OF NATIVE HEART WITHOUT ANGINA PECTORIS: ICD-10-CM

## 2019-01-02 DIAGNOSIS — I10 ESSENTIAL HYPERTENSION: ICD-10-CM

## 2019-01-02 DIAGNOSIS — E11.42 DIABETIC POLYNEUROPATHY ASSOCIATED WITH TYPE 2 DIABETES MELLITUS (HCC): ICD-10-CM

## 2019-01-02 DIAGNOSIS — Z12.39 SCREENING FOR BREAST CANCER: ICD-10-CM

## 2019-01-02 PROCEDURE — 99203 OFFICE O/P NEW LOW 30 MIN: CPT | Performed by: INTERNAL MEDICINE

## 2019-01-02 RX ORDER — TRAMADOL HYDROCHLORIDE 50 MG/1
50 TABLET ORAL EVERY 8 HOURS PRN
Qty: 30 TABLET | Refills: 0 | Status: SHIPPED | OUTPATIENT
Start: 2019-01-02 | End: 2019-02-01

## 2019-01-02 RX ORDER — GABAPENTIN 100 MG/1
400 CAPSULE ORAL 3 TIMES DAILY
Qty: 360 CAPSULE | Refills: 0
Start: 2019-01-02 | End: 2019-12-03

## 2019-01-02 RX ORDER — METFORMIN HYDROCHLORIDE 500 MG/1
TABLET, EXTENDED RELEASE ORAL
Status: ON HOLD | COMMUNITY
End: 2019-11-25 | Stop reason: HOSPADM

## 2019-01-02 ASSESSMENT — PATIENT HEALTH QUESTIONNAIRE - PHQ9
SUM OF ALL RESPONSES TO PHQ QUESTIONS 1-9: 0
SUM OF ALL RESPONSES TO PHQ9 QUESTIONS 1 & 2: 0
SUM OF ALL RESPONSES TO PHQ QUESTIONS 1-9: 0
1. LITTLE INTEREST OR PLEASURE IN DOING THINGS: 0
2. FEELING DOWN, DEPRESSED OR HOPELESS: 0

## 2019-02-01 ENCOUNTER — TELEPHONE (OUTPATIENT)
Dept: PODIATRY | Age: 74
End: 2019-02-01

## 2019-02-22 ENCOUNTER — OFFICE VISIT (OUTPATIENT)
Dept: PODIATRY | Age: 74
End: 2019-02-22
Payer: MEDICARE

## 2019-02-22 VITALS
BODY MASS INDEX: 37.61 KG/M2 | WEIGHT: 234 LBS | SYSTOLIC BLOOD PRESSURE: 135 MMHG | HEART RATE: 87 BPM | HEIGHT: 66 IN | DIASTOLIC BLOOD PRESSURE: 85 MMHG

## 2019-02-22 DIAGNOSIS — M79.675 TOE PAIN, BILATERAL: ICD-10-CM

## 2019-02-22 DIAGNOSIS — M20.42 HAMMER TOES OF BOTH FEET: ICD-10-CM

## 2019-02-22 DIAGNOSIS — M79.674 TOE PAIN, BILATERAL: ICD-10-CM

## 2019-02-22 DIAGNOSIS — B35.1 DERMATOPHYTOSIS OF NAIL: Primary | ICD-10-CM

## 2019-02-22 DIAGNOSIS — Z79.4 TYPE 2 DIABETES MELLITUS WITH DIABETIC POLYNEUROPATHY, WITH LONG-TERM CURRENT USE OF INSULIN (HCC): ICD-10-CM

## 2019-02-22 DIAGNOSIS — M20.41 HAMMER TOES OF BOTH FEET: ICD-10-CM

## 2019-02-22 DIAGNOSIS — I73.9 PAD (PERIPHERAL ARTERY DISEASE) (HCC): ICD-10-CM

## 2019-02-22 DIAGNOSIS — E11.42 TYPE 2 DIABETES MELLITUS WITH DIABETIC POLYNEUROPATHY, WITH LONG-TERM CURRENT USE OF INSULIN (HCC): ICD-10-CM

## 2019-02-22 PROCEDURE — 99212 OFFICE O/P EST SF 10 MIN: CPT

## 2019-02-22 PROCEDURE — 11721 DEBRIDE NAIL 6 OR MORE: CPT | Performed by: PODIATRIST

## 2019-02-22 PROCEDURE — 99213 OFFICE O/P EST LOW 20 MIN: CPT | Performed by: PODIATRIST

## 2019-02-28 ENCOUNTER — TELEPHONE (OUTPATIENT)
Dept: PODIATRY | Age: 74
End: 2019-02-28

## 2019-02-28 ENCOUNTER — HOSPITAL ENCOUNTER (OUTPATIENT)
Dept: PHYSICAL THERAPY | Age: 74
Setting detail: THERAPIES SERIES
Discharge: HOME | End: 2019-02-28
Payer: MEDICARE

## 2019-02-28 PROCEDURE — 97161 PT EVAL LOW COMPLEX 20 MIN: CPT

## 2019-06-04 ENCOUNTER — TELEPHONE (OUTPATIENT)
Dept: VASCULAR SURGERY | Age: 74
End: 2019-06-04

## 2019-06-04 NOTE — TELEPHONE ENCOUNTER
Unable to LVM to schedule 6 month follow up wit MARIZOL testing. On the patients Cell Phone. I Contacted the patients son and spoke to him and he requested that I leave him a VM with the information and he would inform his mother.

## 2019-06-17 ENCOUNTER — TELEPHONE (OUTPATIENT)
Dept: PODIATRY | Age: 74
End: 2019-06-17

## 2019-07-08 ENCOUNTER — TELEPHONE (OUTPATIENT)
Dept: PODIATRY | Age: 74
End: 2019-07-08

## 2019-07-23 ENCOUNTER — TELEPHONE (OUTPATIENT)
Dept: PODIATRY | Age: 74
End: 2019-07-23

## 2019-07-23 NOTE — TELEPHONE ENCOUNTER
Writer called pt to inform her that her 7/29 podiatry appointment has been cancelled and needs to be rescheduled.  Pt rescheduled to 8/12

## 2019-08-07 ENCOUNTER — TELEPHONE (OUTPATIENT)
Dept: PEDIATRIC CARDIOLOGY | Age: 74
End: 2019-08-07

## 2019-08-09 DIAGNOSIS — I73.9 PVD (PERIPHERAL VASCULAR DISEASE) WITH CLAUDICATION (HCC): Primary | ICD-10-CM

## 2019-09-04 ENCOUNTER — HOSPITAL ENCOUNTER (OUTPATIENT)
Dept: VASCULAR LAB | Age: 74
Discharge: HOME | End: 2019-09-04
Payer: MEDICARE

## 2019-09-04 DIAGNOSIS — I73.9 PVD (PERIPHERAL VASCULAR DISEASE) WITH CLAUDICATION (HCC): ICD-10-CM

## 2019-09-04 PROCEDURE — 93923 UPR/LXTR ART STDY 3+ LVLS: CPT

## 2019-09-09 ENCOUNTER — OFFICE VISIT (OUTPATIENT)
Dept: PODIATRY | Age: 74
End: 2019-09-09
Payer: MEDICARE

## 2019-09-09 VITALS
HEART RATE: 84 BPM | BODY MASS INDEX: 35.84 KG/M2 | SYSTOLIC BLOOD PRESSURE: 132 MMHG | WEIGHT: 223 LBS | HEIGHT: 66 IN | DIASTOLIC BLOOD PRESSURE: 82 MMHG

## 2019-09-09 DIAGNOSIS — I73.9 PAD (PERIPHERAL ARTERY DISEASE) (HCC): ICD-10-CM

## 2019-09-09 DIAGNOSIS — E11.42 TYPE 2 DIABETES MELLITUS WITH DIABETIC POLYNEUROPATHY, WITH LONG-TERM CURRENT USE OF INSULIN (HCC): Primary | ICD-10-CM

## 2019-09-09 DIAGNOSIS — M20.41 HAMMER TOES OF BOTH FEET: ICD-10-CM

## 2019-09-09 DIAGNOSIS — B35.1 DERMATOPHYTOSIS OF NAIL: ICD-10-CM

## 2019-09-09 DIAGNOSIS — M79.674 TOE PAIN, BILATERAL: ICD-10-CM

## 2019-09-09 DIAGNOSIS — Z79.4 TYPE 2 DIABETES MELLITUS WITH DIABETIC POLYNEUROPATHY, WITH LONG-TERM CURRENT USE OF INSULIN (HCC): Primary | ICD-10-CM

## 2019-09-09 DIAGNOSIS — M20.42 HAMMER TOES OF BOTH FEET: ICD-10-CM

## 2019-09-09 DIAGNOSIS — M79.675 TOE PAIN, BILATERAL: ICD-10-CM

## 2019-09-09 PROCEDURE — 11721 DEBRIDE NAIL 6 OR MORE: CPT | Performed by: STUDENT IN AN ORGANIZED HEALTH CARE EDUCATION/TRAINING PROGRAM

## 2019-09-09 PROCEDURE — 99212 OFFICE O/P EST SF 10 MIN: CPT | Performed by: STUDENT IN AN ORGANIZED HEALTH CARE EDUCATION/TRAINING PROGRAM

## 2019-09-09 NOTE — PROGRESS NOTES
MD Abbey   cyclobenzaprine (FLEXERIL) 10 MG tablet Take 10 mg by mouth 3 times daily as needed for Muscle spasms. Yes Historical Provider, MD   Multiple Vitamins-Minerals (THERAPEUTIC MULTIVITAMIN-MINERALS) tablet Take 1 tablet by mouth daily. Yes Historical Provider, MD   vitamin E 400 UNIT capsule Take 400 Units by mouth daily. Yes Historical Provider, MD   insulin aspart (NOVOLOG FLEXPEN) 100 UNIT/ML injection pen Inject  into the skin 3 times daily (before meals). Yes Historical Provider, MD   NIFEdipine (ADALAT CC) 30 MG CR tablet Take 30 mg by mouth daily. Yes Historical Provider, MD   aspirin 81 MG tablet Take 81 mg by mouth daily. Yes Historical Provider, MD   LORazepam (ATIVAN) 0.5 MG tablet Take 0.5 mg by mouth every 8 hours as needed. Yes Historical Provider, MD   carvedilol (COREG) 25 MG tablet Take 25 mg by mouth 2 times daily (with meals). Yes Historical Provider, MD   docusate sodium (COLACE) 100 MG capsule Take 100 mg by mouth daily    Yes Historical Provider, MD   insulin detemir (LEVEMIR) 100 UNIT/ML injection Inject 60 Units into the skin 2 times daily. Yes Historical Provider, MD   meclizine (ANTIVERT) 25 MG tablet Take 25 mg by mouth 3 times daily as needed. Yes Historical Provider, MD   nitroGLYCERIN (NITROSTAT) 0.4 MG SL tablet Place 0.4 mg under the tongue every 5 minutes as needed. Yes Historical Provider, MD   omeprazole (PRILOSEC) 20 MG capsule Take 20 mg by mouth Daily. Yes Historical Provider, MD   gabapentin (NEURONTIN) 100 MG capsule Take 4 capsules by mouth 3 times daily for 30 days. . 1/2/19 2/1/19  Letitia Negrete MD   ticagrelor (BRILINTA) 90 MG TABS tablet Take 1 tablet by mouth 2 times daily 2/27/17 1/2/19  Katelin Garcia MD       Past Surgical History:   Procedure Laterality Date    APPENDECTOMY      CARDIAC CATHETERIZATION      2012    CORONARY ANGIOPLASTY WITH STENT PLACEMENT      had 2 with 4 more placed     PTCA       No family history on NAILS, 6 OR MORE       Electronically signed by Astrid Broussard DPM on 9/9/2019 at 2:14 PM

## 2019-09-16 ENCOUNTER — OFFICE VISIT (OUTPATIENT)
Dept: VASCULAR SURGERY | Age: 74
End: 2019-09-16
Payer: MEDICARE

## 2019-09-16 VITALS
OXYGEN SATURATION: 100 % | HEART RATE: 73 BPM | WEIGHT: 220 LBS | BODY MASS INDEX: 35.36 KG/M2 | SYSTOLIC BLOOD PRESSURE: 196 MMHG | RESPIRATION RATE: 17 BRPM | HEIGHT: 66 IN | DIASTOLIC BLOOD PRESSURE: 86 MMHG

## 2019-09-16 DIAGNOSIS — I73.9 PVD (PERIPHERAL VASCULAR DISEASE) WITH CLAUDICATION (HCC): Primary | ICD-10-CM

## 2019-09-16 PROCEDURE — 99213 OFFICE O/P EST LOW 20 MIN: CPT | Performed by: SURGERY

## 2019-09-16 RX ORDER — LORATADINE 10 MG/1
TABLET ORAL
COMMUNITY
End: 2020-05-11 | Stop reason: SDUPTHER

## 2019-09-16 RX ORDER — ESCITALOPRAM OXALATE 10 MG/1
TABLET ORAL DAILY
COMMUNITY
End: 2020-08-11 | Stop reason: SDUPTHER

## 2019-09-16 RX ORDER — GABAPENTIN 300 MG/1
300 CAPSULE ORAL 3 TIMES DAILY
Status: ON HOLD | COMMUNITY
End: 2019-11-25 | Stop reason: HOSPADM

## 2019-09-16 RX ORDER — TRAMADOL HYDROCHLORIDE 50 MG/1
TABLET ORAL EVERY 6 HOURS PRN
COMMUNITY
End: 2020-02-11 | Stop reason: SDUPTHER

## 2019-09-17 ASSESSMENT — ENCOUNTER SYMPTOMS
CHEST TIGHTNESS: 0
ALLERGIC/IMMUNOLOGIC NEGATIVE: 1
COLOR CHANGE: 0
ABDOMINAL PAIN: 0
SHORTNESS OF BREATH: 0

## 2019-09-23 ENCOUNTER — HOSPITAL ENCOUNTER (EMERGENCY)
Age: 74
Discharge: HOME | End: 2019-09-23
Attending: EMERGENCY MEDICINE
Payer: MEDICARE

## 2019-09-23 VITALS
HEART RATE: 71 BPM | SYSTOLIC BLOOD PRESSURE: 148 MMHG | RESPIRATION RATE: 17 BRPM | DIASTOLIC BLOOD PRESSURE: 74 MMHG | OXYGEN SATURATION: 99 % | TEMPERATURE: 96.8 F

## 2019-09-23 DIAGNOSIS — R73.9 HYPERGLYCEMIA: Primary | ICD-10-CM

## 2019-09-23 DIAGNOSIS — N39.0 URINARY TRACT INFECTION WITHOUT HEMATURIA, SITE UNSPECIFIED: ICD-10-CM

## 2019-09-23 LAB
-: ABNORMAL
AMORPHOUS: ABNORMAL
ANION GAP SERPL CALCULATED.3IONS-SCNC: 15 MMOL/L (ref 9–17)
BACTERIA: ABNORMAL
BILIRUBIN URINE: NEGATIVE
BUN BLDV-MCNC: 20 MG/DL (ref 8–23)
BUN/CREAT BLD: ABNORMAL (ref 9–20)
CALCIUM SERPL-MCNC: 9.7 MG/DL (ref 8.6–10.4)
CASTS UA: ABNORMAL /LPF (ref 0–8)
CHLORIDE BLD-SCNC: 99 MMOL/L (ref 98–107)
CO2: 25 MMOL/L (ref 20–31)
COLOR: YELLOW
COMMENT UA: ABNORMAL
CREAT SERPL-MCNC: 0.72 MG/DL (ref 0.5–0.9)
CRYSTALS, UA: ABNORMAL /HPF
EPITHELIAL CELLS UA: ABNORMAL /HPF (ref 0–5)
GFR AFRICAN AMERICAN: >60 ML/MIN
GFR NON-AFRICAN AMERICAN: >60 ML/MIN
GFR SERPL CREATININE-BSD FRML MDRD: ABNORMAL ML/MIN/{1.73_M2}
GFR SERPL CREATININE-BSD FRML MDRD: ABNORMAL ML/MIN/{1.73_M2}
GLUCOSE BLD-MCNC: 231 MG/DL (ref 65–105)
GLUCOSE BLD-MCNC: 301 MG/DL (ref 70–99)
GLUCOSE BLD-MCNC: 319 MG/DL (ref 65–105)
GLUCOSE URINE: ABNORMAL
KETONES, URINE: NEGATIVE
LEUKOCYTE ESTERASE, URINE: NEGATIVE
MUCUS: ABNORMAL
NITRITE, URINE: POSITIVE
OTHER OBSERVATIONS UA: ABNORMAL
PH UA: 5 (ref 5–8)
POTASSIUM SERPL-SCNC: 3.7 MMOL/L (ref 3.7–5.3)
PROTEIN UA: NEGATIVE
RBC UA: ABNORMAL /HPF (ref 0–4)
RENAL EPITHELIAL, UA: ABNORMAL /HPF
SODIUM BLD-SCNC: 139 MMOL/L (ref 135–144)
SPECIFIC GRAVITY UA: 1.03 (ref 1–1.03)
TRICHOMONAS: ABNORMAL
TURBIDITY: CLEAR
URINE HGB: NEGATIVE
UROBILINOGEN, URINE: NORMAL
WBC UA: ABNORMAL /HPF (ref 0–5)
YEAST: ABNORMAL

## 2019-09-23 PROCEDURE — 87077 CULTURE AEROBIC IDENTIFY: CPT

## 2019-09-23 PROCEDURE — 93005 ELECTROCARDIOGRAM TRACING: CPT | Performed by: NURSE PRACTITIONER

## 2019-09-23 PROCEDURE — 96360 HYDRATION IV INFUSION INIT: CPT

## 2019-09-23 PROCEDURE — 2580000003 HC RX 258: Performed by: NURSE PRACTITIONER

## 2019-09-23 PROCEDURE — 81001 URINALYSIS AUTO W/SCOPE: CPT

## 2019-09-23 PROCEDURE — 6370000000 HC RX 637 (ALT 250 FOR IP): Performed by: NURSE PRACTITIONER

## 2019-09-23 PROCEDURE — 99285 EMERGENCY DEPT VISIT HI MDM: CPT

## 2019-09-23 PROCEDURE — 82947 ASSAY GLUCOSE BLOOD QUANT: CPT

## 2019-09-23 PROCEDURE — 87186 SC STD MICRODIL/AGAR DIL: CPT

## 2019-09-23 PROCEDURE — 87086 URINE CULTURE/COLONY COUNT: CPT

## 2019-09-23 PROCEDURE — 80048 BASIC METABOLIC PNL TOTAL CA: CPT

## 2019-09-23 RX ORDER — GLUCOSAMINE HCL/CHONDROITIN SU 500-400 MG
1 CAPSULE ORAL 2 TIMES DAILY
Qty: 100 STRIP | Refills: 0 | Status: SHIPPED | OUTPATIENT
Start: 2019-09-23 | End: 2019-12-03

## 2019-09-23 RX ORDER — BLOOD SUGAR DIAGNOSTIC
STRIP MISCELLANEOUS
Qty: 1 EACH | Refills: 0 | Status: ON HOLD | OUTPATIENT
Start: 2019-09-23 | End: 2019-12-16 | Stop reason: HOSPADM

## 2019-09-23 RX ORDER — LANCETS 30 GAUGE
1 EACH MISCELLANEOUS 2 TIMES DAILY
Qty: 300 EACH | Refills: 0 | Status: ON HOLD | OUTPATIENT
Start: 2019-09-23 | End: 2019-12-16 | Stop reason: HOSPADM

## 2019-09-23 RX ORDER — CEPHALEXIN 500 MG/1
500 CAPSULE ORAL 2 TIMES DAILY
Qty: 13 CAPSULE | Refills: 0 | Status: SHIPPED | OUTPATIENT
Start: 2019-09-23 | End: 2019-09-30

## 2019-09-23 RX ORDER — FLUCONAZOLE 150 MG/1
150 TABLET ORAL ONCE
Qty: 1 TABLET | Refills: 0 | Status: SHIPPED | OUTPATIENT
Start: 2019-09-23 | End: 2019-09-23

## 2019-09-23 RX ORDER — 0.9 % SODIUM CHLORIDE 0.9 %
1000 INTRAVENOUS SOLUTION INTRAVENOUS ONCE
Status: COMPLETED | OUTPATIENT
Start: 2019-09-23 | End: 2019-09-23

## 2019-09-23 RX ORDER — CEPHALEXIN 500 MG/1
500 CAPSULE ORAL ONCE
Status: COMPLETED | OUTPATIENT
Start: 2019-09-23 | End: 2019-09-23

## 2019-09-23 RX ORDER — BLOOD-GLUCOSE METER
1 EACH MISCELLANEOUS DAILY
Qty: 1 KIT | Refills: 0 | Status: SHIPPED | OUTPATIENT
Start: 2019-09-23 | End: 2019-12-03

## 2019-09-23 RX ADMIN — CEPHALEXIN 500 MG: 500 CAPSULE ORAL at 19:00

## 2019-09-23 RX ADMIN — SODIUM CHLORIDE 1000 ML: 9 INJECTION, SOLUTION INTRAVENOUS at 17:45

## 2019-09-23 ASSESSMENT — ENCOUNTER SYMPTOMS
SHORTNESS OF BREATH: 0
VOMITING: 0
ABDOMINAL PAIN: 0
NAUSEA: 0
DIARRHEA: 0

## 2019-09-23 NOTE — ED PROVIDER NOTES
FLEXPEN) 100 UNIT/ML injection pen Inject  into the skin 3 times daily (before meals). Yes Historical Provider, MD   NIFEdipine (ADALAT CC) 30 MG CR tablet Take 30 mg by mouth daily. Yes Historical Provider, MD   aspirin 81 MG tablet Take 81 mg by mouth daily. Yes Historical Provider, MD   LORazepam (ATIVAN) 0.5 MG tablet Take 0.5 mg by mouth every 8 hours as needed. Yes Historical Provider, MD   carvedilol (COREG) 25 MG tablet Take 25 mg by mouth 2 times daily (with meals). Yes Historical Provider, MD   docusate sodium (COLACE) 100 MG capsule Take 100 mg by mouth daily    Yes Historical Provider, MD   meclizine (ANTIVERT) 25 MG tablet Take 25 mg by mouth 3 times daily as needed. Yes Historical Provider, MD   nitroGLYCERIN (NITROSTAT) 0.4 MG SL tablet Place 0.4 mg under the tongue every 5 minutes as needed. Yes Historical Provider, MD   omeprazole (PRILOSEC) 20 MG capsule Take 20 mg by mouth Daily. Yes Historical Provider, MD   gabapentin (NEURONTIN) 100 MG capsule Take 4 capsules by mouth 3 times daily for 30 days. . 1/2/19 2/1/19  Letitia Chacon MD       REVIEW OF SYSTEMS    (2-9 systems for level 4, 10 or more for level 5)      Review of Systems   Constitutional: Negative for chills and fever. Respiratory: Negative for shortness of breath. Cardiovascular: Negative for chest pain. Gastrointestinal: Negative for abdominal pain, diarrhea, nausea and vomiting. Endocrine: Positive for polyuria. Genitourinary: Positive for frequency. Musculoskeletal: Negative for myalgias. Neurological: Positive for dizziness. Negative for syncope, weakness and numbness. PHYSICALEXAM   (upto 7 for level 4, 8 or more for level 5)      INITIAL VITALS:  oral temperature is 96.8 °F (36 °C). Her blood pressure is 148/74 (abnormal) and her pulse is 71. Her respiration is 17 and oxygen saturation is 99%. Physical Exam   Constitutional: She is oriented to person, place, and time.  She appears well-developed and well-nourished. No distress. HENT:   Head: Normocephalic. Eyes: Pupils are equal, round, and reactive to light. Neck: Normal range of motion. Neck supple. Cardiovascular: Normal rate and regular rhythm. Pulmonary/Chest: Effort normal. No respiratory distress. Abdominal: Soft. There is no tenderness. There is no rebound and no guarding. Musculoskeletal: Normal range of motion. Neurological: She is alert and oriented to person, place, and time. Skin: Skin is warm and dry. Capillary refill takes less than 2 seconds. Psychiatric: She has a normal mood and affect. Her behavior is normal. Judgment and thought content normal.   Nursing note and vitals reviewed. DIFFERENTIAL  DIAGNOSIS   Hyperglycemia, UTI    PLAN (LABS / IMAGING / EKG):  Orders Placed This Encounter   Procedures    Urine culture clean catch    Urinalysis Reflex to Culture    Basic Metabolic Panel    Microscopic Urinalysis    POC Glucose Fingerstick    POC Glucose Fingerstick    EKG 12 Lead    Insert peripheral IV       MEDICATIONS ORDERED:  Orders Placed This Encounter   Medications    0.9 % sodium chloride bolus    cephALEXin (KEFLEX) capsule 500 mg    cephALEXin (KEFLEX) 500 MG capsule     Sig: Take 1 capsule by mouth 2 times daily for 7 days     Dispense:  13 capsule     Refill:  0    insulin detemir (LEVEMIR) 100 UNIT/ML injection vial     Sig: Inject 60 Units into the skin 2 times daily     Dispense:  1 vial     Refill:  0    Lancets MISC     Si each by Does not apply route 2 times daily     Dispense:  300 each     Refill:  0    Blood Glucose Monitoring Suppl (ONE TOUCH ULTRA 2) w/Device KIT     Si kit by Does not apply route daily     Dispense:  1 kit     Refill:  0    blood glucose monitor strips     Si strip by Other route 2 times daily Test twice daily times a day & as needed for symptoms of irregular blood glucose.      Dispense:  100 strip     Refill:  0    Alcohol Swabs

## 2019-09-23 NOTE — ED PROVIDER NOTES
I performed a history and physical examination of the patient and discussed management with the resident. I reviewed the residents note and agree with the documented findings and plan of care. Any areas of disagreement are noted on the chart. I was personally present for the key portions of any procedures. I have documented in the chart those procedures where I was not present during the key portions. I have reviewed the emergency nurses triage note. I agree with the chief complaint, past medical history, past surgical history, allergies, medications, social and family history as documented unless otherwise noted below. Documentation of the HPI, Physical Exam and Medical Decision Making performed by medical students or scribes is based on my personal performance of the HPI, PE and MDM. For Phys Assistant/ Nurse Practitioner cases/documentation I have personally evaluated this patient and have completed at least one if not all key elements of the E/M (history, physical exam, and MDM). I find the patient's history and physical exam are consistent with the NP/PA documentation. I agree with the care provided, treatment rendered, disposition and followup plan. Additional findings are as noted. Bret Reed. Charly Patel MD  Attending Emergency  Physician    PRESENTING WITH C/O ELEVATED GLUCOSE. NOT SURE OF EXACT LEVEL SINCE SHE DOES NOT CHECK HER GLUCOSE LEVELS. MILD POLYURIA, POLYDYPSIA. NO FEVER, CHILLS, CHEST PAIN, SOB, VOMITING, DIARRHEA, ABD PAIN. AWAKE, ALERT, COOP, RESP. LUNGS CLEAR MARK. NO RALES, RHONCHI, WHEEZES, STRIDOR, RETRACTIONS. CARDIAC-S1S2, RRR, NO MRG. ABD SOFT, NONDISTENDED, NONTENDER. NORMAL BOWEL SOUNDS. SPEECH FLUENT, NORMAL COMPREHENSION. GCS-15. Les Senna. UA-POS  NITRITE, 3+GLUCOSE, NEG KETONES. NOTE-PATIENT WAS ADVISED HER MD VIA TELEMEDICINE TO COME TO ED FOR 'A LITER OF FLUID'. IMP-MILD HYPERGLYCEMIA  PLAN-IV FLUIDS, REASSESS.  PROB DISCHARGE WITH RX FOR ATB'S, INSULIN(PATIENT'S

## 2019-09-23 NOTE — ED NOTES
Pt to the ED with complaints of hyperglycemia. Pt states that she is feeling dizzy but denies any other complaints. Pt states that her BG normally runs around 140. She does not have a glucometer at home and she states that she took her last insulin this morning.         Abramremigio BellHel[facility]  09/23/19 2936

## 2019-09-24 LAB
EKG ATRIAL RATE: 72 BPM
EKG P AXIS: 79 DEGREES
EKG P-R INTERVAL: 154 MS
EKG Q-T INTERVAL: 396 MS
EKG QRS DURATION: 92 MS
EKG QTC CALCULATION (BAZETT): 433 MS
EKG R AXIS: 10 DEGREES
EKG T AXIS: 109 DEGREES
EKG VENTRICULAR RATE: 72 BPM

## 2019-09-25 LAB
CULTURE: ABNORMAL
Lab: ABNORMAL
SPECIMEN DESCRIPTION: ABNORMAL

## 2019-09-27 ENCOUNTER — HOSPITAL ENCOUNTER (EMERGENCY)
Age: 74
Discharge: HOME | End: 2019-09-27
Attending: EMERGENCY MEDICINE
Payer: MEDICARE

## 2019-09-27 VITALS
HEIGHT: 66 IN | HEART RATE: 74 BPM | SYSTOLIC BLOOD PRESSURE: 185 MMHG | RESPIRATION RATE: 16 BRPM | WEIGHT: 221 LBS | DIASTOLIC BLOOD PRESSURE: 91 MMHG | OXYGEN SATURATION: 95 % | BODY MASS INDEX: 35.52 KG/M2 | TEMPERATURE: 97.3 F

## 2019-09-27 DIAGNOSIS — M35.00 SICCA, UNSPECIFIED TYPE (HCC): Primary | ICD-10-CM

## 2019-09-27 DIAGNOSIS — R03.0 ELEVATED BLOOD PRESSURE READING: ICD-10-CM

## 2019-09-27 PROCEDURE — 99282 EMERGENCY DEPT VISIT SF MDM: CPT

## 2019-09-27 RX ORDER — LANOLIN ALCOHOL/MO/W.PET/CERES
3 CREAM (GRAM) TOPICAL NIGHTLY PRN
Qty: 60 TABLET | Refills: 0 | Status: SHIPPED | OUTPATIENT
Start: 2019-09-27 | End: 2020-02-11

## 2019-09-27 ASSESSMENT — ENCOUNTER SYMPTOMS
EYE DISCHARGE: 0
DIARRHEA: 0
RECTAL PAIN: 0
NAUSEA: 0
PHOTOPHOBIA: 0
RHINORRHEA: 0
SINUS PRESSURE: 1
SORE THROAT: 0
VOMITING: 0
ABDOMINAL PAIN: 0
TROUBLE SWALLOWING: 0
EYE REDNESS: 0
FACIAL SWELLING: 0
SHORTNESS OF BREATH: 0
VOICE CHANGE: 0

## 2019-09-27 NOTE — ED PROVIDER NOTES
mucosal edema, rhinorrhea, nose lacerations, sinus tenderness, nasal deformity, septal deviation or nasal septal hematoma. No epistaxis. No foreign bodies. Right sinus exhibits no maxillary sinus tenderness and no frontal sinus tenderness. Left sinus exhibits maxillary sinus tenderness. Left sinus exhibits no frontal sinus tenderness. Mouth/Throat: Uvula is midline, oropharynx is clear and moist and mucous membranes are normal. She has dentures. No oropharyngeal exudate. No tonsillar exudate. Upper and lower dentures  Sinus pressure L>R  No evidence of chad material in either nare similar to which pt brought with her      Eyes: Pupils are equal, round, and reactive to light. Conjunctivae and EOM are normal. Right eye exhibits no discharge. Left eye exhibits no discharge. Neck: Normal range of motion. Neck supple. Cardiovascular: Normal rate, regular rhythm and intact distal pulses. Pulmonary/Chest: Effort normal and breath sounds normal. No respiratory distress. Abdominal: Soft. Bowel sounds are normal. She exhibits no distension. There is no tenderness. There is no rebound and no guarding. Musculoskeletal: Normal range of motion. Lymphadenopathy:     She has no cervical adenopathy. Neurological: She is alert and oriented to person, place, and time. Skin: Skin is warm. Capillary refill takes less than 2 seconds. She is not diaphoretic. Nursing note and vitals reviewed. DIFFERENTIAL  DIAGNOSIS     PLAN (LABS / IMAGING / EKG):  No orders of the defined types were placed in this encounter. MEDICATIONS ORDERED:  No orders of the defined types were placed in this encounter. DDX: antihistamine use, sjogren, fb    Initial MDM/Plan: 76 y.o. female who presents with 1 month of nasal dryness. No fb, lesions, rhinorrhea, pharyngitis, ear pain. Negative nery in 2016 makes sjogren less likely. May be secondary to daily vitamin use were advised cessation of this.  Will recheck bp after he takes a dose of her antihypertensive medication. DIAGNOSTIC RESULTS / EMERGENCYDEPARTMENT COURSE / MDM     LABS:  Labs Reviewed - No data to display      RADIOLOGY:  No results found. EKG  All EKG's are interpreted by the Emergency Department Physicianwho either signs or Co-signs this chart in the absence of a cardiologist.    EMERGENCY DEPARTMENT COURSE:    seen and evaluated, h and p consistent with overuse of diphenhydramine pt advised to stop this medication and to use melatonin for sleep. Found to have elevated bp secondary to not taking her medication, which was provided and rechecked with improvement      PROCEDURES:  None    CONSULTS:  None    CRITICAL CARE:  Please see attending note    FINAL IMPRESSION      1. Sicca, unspecified type ([address]    2. Elevated blood pressure reading         DISPOSITION / [address]  Discharged home with prescription for melatonin advised to stop taking Benadryl and to follow-up with her primary care physician.       PATIENT REFERRED TO:  Dea Lesches, MD  [address] [place] [address]  [phone]    Schedule an appointment as soon as possible for a visit today      [facility]  [address] [place] [address]  [phone]  Go to   As needed, If symptoms worsen      DISCHARGE MEDICATIONS:  New Prescriptions    MELATONIN (RA MELATONIN) 3 MG TABS TABLET    Take 1 tablet by mouth nightly as needed (for insomnia)       Osbaldo Baxter DO  Emergency Medicine Resident    (Please note that portions of this note were completed with a voice recognition program.Efforts were made to edit the dictations but occasionally words are mis-transcribed.)        Osbaldo Baxter DO  Resident  09/27/19 [id number]

## 2019-10-03 ENCOUNTER — HOSPITAL ENCOUNTER (OUTPATIENT)
Dept: CARDIAC CATH/INVASIVE PROCEDURES | Age: 74
Discharge: HOME | End: 2019-10-03
Payer: MEDICARE

## 2019-10-03 VITALS
DIASTOLIC BLOOD PRESSURE: 66 MMHG | WEIGHT: 225 LBS | OXYGEN SATURATION: 98 % | RESPIRATION RATE: 16 BRPM | HEART RATE: 76 BPM | BODY MASS INDEX: 36.16 KG/M2 | SYSTOLIC BLOOD PRESSURE: 138 MMHG | HEIGHT: 66 IN | TEMPERATURE: 97.6 F

## 2019-10-03 LAB
GFR NON-AFRICAN AMERICAN: >60 ML/MIN
GFR SERPL CREATININE-BSD FRML MDRD: >60 ML/MIN
GFR SERPL CREATININE-BSD FRML MDRD: NORMAL ML/MIN/{1.73_M2}
GLUCOSE BLD-MCNC: 114 MG/DL (ref 65–105)
GLUCOSE BLD-MCNC: 117 MG/DL (ref 74–100)
GLUCOSE BLD-MCNC: 89 MG/DL (ref 65–105)
POC CHLORIDE: 106 MMOL/L (ref 98–107)
POC CREATININE: 0.65 MG/DL (ref 0.51–1.19)
POC HEMATOCRIT: 40 % (ref 36–46)
POC HEMOGLOBIN: 13.8 G/DL (ref 12–16)
POC POTASSIUM: 4.1 MMOL/L (ref 3.5–4.5)
POC SODIUM: 142 MMOL/L (ref 138–146)

## 2019-10-03 PROCEDURE — 82435 ASSAY OF BLOOD CHLORIDE: CPT

## 2019-10-03 PROCEDURE — 6370000000 HC RX 637 (ALT 250 FOR IP)

## 2019-10-03 PROCEDURE — 2500000003 HC RX 250 WO HCPCS

## 2019-10-03 PROCEDURE — 7100000010 HC PHASE II RECOVERY - FIRST 15 MIN

## 2019-10-03 PROCEDURE — C1725 CATH, TRANSLUMIN NON-LASER: HCPCS

## 2019-10-03 PROCEDURE — 37228 HC TIB PER TERRITORY PLASTY: CPT | Performed by: SURGERY

## 2019-10-03 PROCEDURE — 75710 ARTERY X-RAYS ARM/LEG: CPT | Performed by: SURGERY

## 2019-10-03 PROCEDURE — C1894 INTRO/SHEATH, NON-LASER: HCPCS

## 2019-10-03 PROCEDURE — C1887 CATHETER, GUIDING: HCPCS

## 2019-10-03 PROCEDURE — 85014 HEMATOCRIT: CPT

## 2019-10-03 PROCEDURE — 2709999900 HC NON-CHARGEABLE SUPPLY

## 2019-10-03 PROCEDURE — 75774 ARTERY X-RAY EACH VESSEL: CPT | Performed by: SURGERY

## 2019-10-03 PROCEDURE — 82947 ASSAY GLUCOSE BLOOD QUANT: CPT

## 2019-10-03 PROCEDURE — 75625 CONTRAST EXAM ABDOMINL AORTA: CPT | Performed by: SURGERY

## 2019-10-03 PROCEDURE — 37228 PR REVSC OPN/PRQ TIB/PERO W/ANGIOPLASTY UNI: CPT | Performed by: SURGERY

## 2019-10-03 PROCEDURE — C1760 CLOSURE DEV, VASC: HCPCS

## 2019-10-03 PROCEDURE — 6360000002 HC RX W HCPCS

## 2019-10-03 PROCEDURE — 84295 ASSAY OF SERUM SODIUM: CPT

## 2019-10-03 PROCEDURE — 76937 US GUIDE VASCULAR ACCESS: CPT | Performed by: SURGERY

## 2019-10-03 PROCEDURE — C1769 GUIDE WIRE: HCPCS

## 2019-10-03 PROCEDURE — 84132 ASSAY OF SERUM POTASSIUM: CPT

## 2019-10-03 PROCEDURE — 7100000011 HC PHASE II RECOVERY - ADDTL 15 MIN

## 2019-10-03 PROCEDURE — 82565 ASSAY OF CREATININE: CPT

## 2019-10-03 RX ORDER — OXYCODONE HYDROCHLORIDE AND ACETAMINOPHEN 5; 325 MG/1; MG/1
1 TABLET ORAL EVERY 4 HOURS PRN
Status: DISCONTINUED | OUTPATIENT
Start: 2019-10-03 | End: 2019-10-04 | Stop reason: HOSPADM

## 2019-10-03 RX ORDER — SODIUM CHLORIDE 9 MG/ML
INJECTION, SOLUTION INTRAVENOUS CONTINUOUS
Status: DISCONTINUED | OUTPATIENT
Start: 2019-10-03 | End: 2019-10-04 | Stop reason: HOSPADM

## 2019-10-03 RX ADMIN — OXYCODONE HYDROCHLORIDE AND ACETAMINOPHEN 1 TABLET: 5; 325 TABLET ORAL at 15:15

## 2019-10-03 RX ADMIN — SODIUM CHLORIDE: 9 INJECTION, SOLUTION INTRAVENOUS at 10:43

## 2019-10-03 ASSESSMENT — PAIN SCALES - GENERAL: PAINLEVEL_OUTOF10: 7

## 2019-10-04 ENCOUNTER — TELEPHONE (OUTPATIENT)
Dept: VASCULAR SURGERY | Age: 74
End: 2019-10-04

## 2019-10-07 DIAGNOSIS — I73.9 CLAUDICATION IN PERIPHERAL VASCULAR DISEASE (HCC): Primary | ICD-10-CM

## 2019-10-07 DIAGNOSIS — Z98.890 S/P ANGIOGRAM OF EXTREMITY: ICD-10-CM

## 2019-10-07 DIAGNOSIS — Z98.62 S/P ANGIOPLASTY: ICD-10-CM

## 2019-11-15 ENCOUNTER — TELEPHONE (OUTPATIENT)
Dept: VASCULAR SURGERY | Age: 74
End: 2019-11-15

## 2019-11-18 ENCOUNTER — APPOINTMENT (OUTPATIENT)
Dept: GENERAL RADIOLOGY | Age: 74
DRG: 176 | End: 2019-11-18
Payer: MEDICARE

## 2019-11-18 ENCOUNTER — HOSPITAL ENCOUNTER (INPATIENT)
Age: 74
LOS: 7 days | Discharge: HOME HEALTH | DRG: 176 | End: 2019-11-25
Attending: EMERGENCY MEDICINE | Admitting: FAMILY MEDICINE
Payer: MEDICARE

## 2019-11-18 ENCOUNTER — APPOINTMENT (OUTPATIENT)
Dept: CT IMAGING | Age: 74
DRG: 176 | End: 2019-11-18
Payer: MEDICARE

## 2019-11-18 DIAGNOSIS — I26.94 MULTIPLE SUBSEGMENTAL PULMONARY EMBOLI WITHOUT ACUTE COR PULMONALE (HCC): Primary | ICD-10-CM

## 2019-11-18 PROBLEM — I26.99 ACUTE PULMONARY EMBOLISM WITHOUT ACUTE COR PULMONALE (HCC): Status: ACTIVE | Noted: 2019-11-18

## 2019-11-18 LAB
-: NORMAL
ABSOLUTE EOS #: 0.3 K/UL (ref 0–0.44)
ABSOLUTE IMMATURE GRANULOCYTE: 0.03 K/UL (ref 0–0.3)
ABSOLUTE LYMPH #: 2.58 K/UL (ref 1.1–3.7)
ABSOLUTE MONO #: 0.52 K/UL (ref 0.1–1.2)
AMMONIA: 12 UMOL/L (ref 11–51)
ANION GAP SERPL CALCULATED.3IONS-SCNC: 10 MMOL/L (ref 9–17)
BASOPHILS # BLD: 1 % (ref 0–2)
BASOPHILS ABSOLUTE: 0.04 K/UL (ref 0–0.2)
BILIRUBIN URINE: NEGATIVE
BUN BLDV-MCNC: 14 MG/DL (ref 8–23)
BUN/CREAT BLD: ABNORMAL (ref 9–20)
CALCIUM IONIZED: 1.17 MMOL/L (ref 1.13–1.33)
CALCIUM SERPL-MCNC: 9.6 MG/DL (ref 8.6–10.4)
CHLORIDE BLD-SCNC: 101 MMOL/L (ref 98–107)
CO2: 27 MMOL/L (ref 20–31)
COLOR: YELLOW
COMMENT UA: ABNORMAL
CREAT SERPL-MCNC: 0.66 MG/DL (ref 0.5–0.9)
D-DIMER QUANTITATIVE: 2.61 MG/L FEU
DIFFERENTIAL TYPE: NORMAL
EOSINOPHILS RELATIVE PERCENT: 4 % (ref 1–4)
ESTIMATED AVERAGE GLUCOSE: 249 MG/DL
FOLATE: >20 NG/ML
GFR AFRICAN AMERICAN: >60 ML/MIN
GFR NON-AFRICAN AMERICAN: >60 ML/MIN
GFR SERPL CREATININE-BSD FRML MDRD: ABNORMAL ML/MIN/{1.73_M2}
GFR SERPL CREATININE-BSD FRML MDRD: ABNORMAL ML/MIN/{1.73_M2}
GLUCOSE BLD-MCNC: 311 MG/DL (ref 70–99)
GLUCOSE BLD-MCNC: 336 MG/DL (ref 65–105)
GLUCOSE URINE: ABNORMAL
HBA1C MFR BLD: 10.3 % (ref 4–6)
HCT VFR BLD CALC: 39 % (ref 36.3–47.1)
HEMOGLOBIN: 12.6 G/DL (ref 11.9–15.1)
IMMATURE GRANULOCYTES: 0 %
INR BLD: 1.1
KETONES, URINE: ABNORMAL
LACTIC ACID, WHOLE BLOOD: 1 MMOL/L (ref 0.7–2.1)
LEUKOCYTE ESTERASE, URINE: NEGATIVE
LYMPHOCYTES # BLD: 37 % (ref 24–43)
MAGNESIUM: 2.2 MG/DL (ref 1.6–2.6)
MCH RBC QN AUTO: 30.3 PG (ref 25.2–33.5)
MCHC RBC AUTO-ENTMCNC: 32.3 G/DL (ref 28.4–34.8)
MCV RBC AUTO: 93.8 FL (ref 82.6–102.9)
MONOCYTES # BLD: 8 % (ref 3–12)
NITRITE, URINE: NEGATIVE
NRBC AUTOMATED: 0 PER 100 WBC
PARTIAL THROMBOPLASTIN TIME: 25 SEC (ref 20.5–30.5)
PARTIAL THROMBOPLASTIN TIME: 92.4 SEC (ref 20.5–30.5)
PARTIAL THROMBOPLASTIN TIME: >120 SEC (ref 20.5–30.5)
PARTIAL THROMBOPLASTIN TIME: >120 SEC (ref 20.5–30.5)
PDW BLD-RTO: 13.2 % (ref 11.8–14.4)
PH UA: 6 (ref 5–8)
PLATELET # BLD: NORMAL K/UL (ref 138–453)
PLATELET ESTIMATE: NORMAL
PLATELET, FLUORESCENCE: 152 K/UL (ref 138–453)
PLATELET, IMMATURE FRACTION: 7.9 % (ref 1.1–10.3)
PMV BLD AUTO: NORMAL FL (ref 8.1–13.5)
POTASSIUM SERPL-SCNC: 4.2 MMOL/L (ref 3.7–5.3)
PROTEIN UA: NEGATIVE
PROTHROMBIN TIME: 11.8 SEC (ref 9–12)
RBC # BLD: 4.16 M/UL (ref 3.95–5.11)
RBC # BLD: NORMAL 10*6/UL
REASON FOR REJECTION: NORMAL
SEG NEUTROPHILS: 50 % (ref 36–65)
SEGMENTED NEUTROPHILS ABSOLUTE COUNT: 3.48 K/UL (ref 1.5–8.1)
SODIUM BLD-SCNC: 138 MMOL/L (ref 135–144)
SPECIFIC GRAVITY UA: 1.03 (ref 1–1.03)
TROPONIN INTERP: ABNORMAL
TROPONIN INTERP: ABNORMAL
TROPONIN T: ABNORMAL NG/ML
TROPONIN T: ABNORMAL NG/ML
TROPONIN, HIGH SENSITIVITY: 22 NG/L (ref 0–14)
TROPONIN, HIGH SENSITIVITY: 23 NG/L (ref 0–14)
TURBIDITY: CLEAR
URINE HGB: NEGATIVE
UROBILINOGEN, URINE: NORMAL
VITAMIN B-12: 780 PG/ML (ref 232–1245)
WBC # BLD: 7 K/UL (ref 3.5–11.3)
WBC # BLD: NORMAL 10*3/UL
ZZ NTE CLEAN UP: ORDERED TEST: NORMAL
ZZ NTE WITH NAME CLEAN UP: SPECIMEN SOURCE: NORMAL

## 2019-11-18 PROCEDURE — 82330 ASSAY OF CALCIUM: CPT

## 2019-11-18 PROCEDURE — 84484 ASSAY OF TROPONIN QUANT: CPT

## 2019-11-18 PROCEDURE — 96375 TX/PRO/DX INJ NEW DRUG ADDON: CPT

## 2019-11-18 PROCEDURE — 85025 COMPLETE CBC W/AUTO DIFF WBC: CPT

## 2019-11-18 PROCEDURE — 6370000000 HC RX 637 (ALT 250 FOR IP): Performed by: STUDENT IN AN ORGANIZED HEALTH CARE EDUCATION/TRAINING PROGRAM

## 2019-11-18 PROCEDURE — 83735 ASSAY OF MAGNESIUM: CPT

## 2019-11-18 PROCEDURE — 82607 VITAMIN B-12: CPT

## 2019-11-18 PROCEDURE — 83605 ASSAY OF LACTIC ACID: CPT

## 2019-11-18 PROCEDURE — 87077 CULTURE AEROBIC IDENTIFY: CPT

## 2019-11-18 PROCEDURE — 71260 CT THORAX DX C+: CPT

## 2019-11-18 PROCEDURE — 2060000000 HC ICU INTERMEDIATE R&B

## 2019-11-18 PROCEDURE — 83036 HEMOGLOBIN GLYCOSYLATED A1C: CPT

## 2019-11-18 PROCEDURE — 71046 X-RAY EXAM CHEST 2 VIEWS: CPT

## 2019-11-18 PROCEDURE — 96366 THER/PROPH/DIAG IV INF ADDON: CPT

## 2019-11-18 PROCEDURE — 99223 1ST HOSP IP/OBS HIGH 75: CPT | Performed by: NURSE PRACTITIONER

## 2019-11-18 PROCEDURE — 85379 FIBRIN DEGRADATION QUANT: CPT

## 2019-11-18 PROCEDURE — 6360000002 HC RX W HCPCS: Performed by: STUDENT IN AN ORGANIZED HEALTH CARE EDUCATION/TRAINING PROGRAM

## 2019-11-18 PROCEDURE — 6360000004 HC RX CONTRAST MEDICATION: Performed by: STUDENT IN AN ORGANIZED HEALTH CARE EDUCATION/TRAINING PROGRAM

## 2019-11-18 PROCEDURE — 87086 URINE CULTURE/COLONY COUNT: CPT

## 2019-11-18 PROCEDURE — 82746 ASSAY OF FOLIC ACID SERUM: CPT

## 2019-11-18 PROCEDURE — 93005 ELECTROCARDIOGRAM TRACING: CPT | Performed by: STUDENT IN AN ORGANIZED HEALTH CARE EDUCATION/TRAINING PROGRAM

## 2019-11-18 PROCEDURE — 82140 ASSAY OF AMMONIA: CPT

## 2019-11-18 PROCEDURE — 80048 BASIC METABOLIC PNL TOTAL CA: CPT

## 2019-11-18 PROCEDURE — 6370000000 HC RX 637 (ALT 250 FOR IP): Performed by: FAMILY MEDICINE

## 2019-11-18 PROCEDURE — 99285 EMERGENCY DEPT VISIT HI MDM: CPT

## 2019-11-18 PROCEDURE — 85055 RETICULATED PLATELET ASSAY: CPT

## 2019-11-18 PROCEDURE — 96365 THER/PROPH/DIAG IV INF INIT: CPT

## 2019-11-18 PROCEDURE — 87186 SC STD MICRODIL/AGAR DIL: CPT

## 2019-11-18 PROCEDURE — 81003 URINALYSIS AUTO W/O SCOPE: CPT

## 2019-11-18 PROCEDURE — 85730 THROMBOPLASTIN TIME PARTIAL: CPT

## 2019-11-18 PROCEDURE — 82947 ASSAY GLUCOSE BLOOD QUANT: CPT

## 2019-11-18 PROCEDURE — 85610 PROTHROMBIN TIME: CPT

## 2019-11-18 RX ORDER — GABAPENTIN 300 MG/1
300 CAPSULE ORAL 3 TIMES DAILY
Status: DISCONTINUED | OUTPATIENT
Start: 2019-11-18 | End: 2019-11-25 | Stop reason: HOSPADM

## 2019-11-18 RX ORDER — ESCITALOPRAM OXALATE 10 MG/1
10 TABLET ORAL DAILY
Status: DISCONTINUED | OUTPATIENT
Start: 2019-11-18 | End: 2019-11-25 | Stop reason: HOSPADM

## 2019-11-18 RX ORDER — HEPARIN SODIUM 10000 [USP'U]/100ML
18 INJECTION, SOLUTION INTRAVENOUS CONTINUOUS
Status: DISCONTINUED | OUTPATIENT
Start: 2019-11-18 | End: 2019-11-21

## 2019-11-18 RX ORDER — SODIUM CHLORIDE 0.9 % (FLUSH) 0.9 %
10 SYRINGE (ML) INJECTION PRN
Status: DISCONTINUED | OUTPATIENT
Start: 2019-11-18 | End: 2019-11-25 | Stop reason: HOSPADM

## 2019-11-18 RX ORDER — INSULIN GLARGINE 100 [IU]/ML
60 INJECTION, SOLUTION SUBCUTANEOUS 2 TIMES DAILY
Status: DISCONTINUED | OUTPATIENT
Start: 2019-11-18 | End: 2019-11-25 | Stop reason: HOSPADM

## 2019-11-18 RX ORDER — ONDANSETRON 2 MG/ML
4 INJECTION INTRAMUSCULAR; INTRAVENOUS EVERY 6 HOURS PRN
Status: DISCONTINUED | OUTPATIENT
Start: 2019-11-18 | End: 2019-11-25 | Stop reason: HOSPADM

## 2019-11-18 RX ORDER — ALBUTEROL SULFATE 90 UG/1
2 AEROSOL, METERED RESPIRATORY (INHALATION) EVERY 6 HOURS PRN
Status: DISCONTINUED | OUTPATIENT
Start: 2019-11-18 | End: 2019-11-25 | Stop reason: HOSPADM

## 2019-11-18 RX ORDER — METFORMIN HYDROCHLORIDE 500 MG/1
500 TABLET, EXTENDED RELEASE ORAL 2 TIMES DAILY WITH MEALS
Status: DISCONTINUED | OUTPATIENT
Start: 2019-11-18 | End: 2019-11-24

## 2019-11-18 RX ORDER — PANTOPRAZOLE SODIUM 20 MG/1
20 TABLET, DELAYED RELEASE ORAL
Status: DISCONTINUED | OUTPATIENT
Start: 2019-11-18 | End: 2019-11-25 | Stop reason: HOSPADM

## 2019-11-18 RX ORDER — NIFEDIPINE 30 MG/1
30 TABLET, EXTENDED RELEASE ORAL DAILY
Status: DISCONTINUED | OUTPATIENT
Start: 2019-11-18 | End: 2019-11-25 | Stop reason: HOSPADM

## 2019-11-18 RX ORDER — HEPARIN SODIUM 1000 [USP'U]/ML
40 INJECTION, SOLUTION INTRAVENOUS; SUBCUTANEOUS PRN
Status: DISCONTINUED | OUTPATIENT
Start: 2019-11-18 | End: 2019-11-22

## 2019-11-18 RX ORDER — MAGNESIUM HYDROXIDE/ALUMINUM HYDROXICE/SIMETHICONE 120; 1200; 1200 MG/30ML; MG/30ML; MG/30ML
30 SUSPENSION ORAL ONCE
Status: COMPLETED | OUTPATIENT
Start: 2019-11-18 | End: 2019-11-18

## 2019-11-18 RX ORDER — HYDRALAZINE HYDROCHLORIDE 20 MG/ML
10 INJECTION INTRAMUSCULAR; INTRAVENOUS EVERY 6 HOURS PRN
Status: DISCONTINUED | OUTPATIENT
Start: 2019-11-18 | End: 2019-11-25 | Stop reason: HOSPADM

## 2019-11-18 RX ORDER — LISINOPRIL 10 MG/1
10 TABLET ORAL DAILY
Status: DISCONTINUED | OUTPATIENT
Start: 2019-11-18 | End: 2019-11-25 | Stop reason: HOSPADM

## 2019-11-18 RX ORDER — GABAPENTIN 300 MG/1
300 CAPSULE ORAL ONCE
Status: COMPLETED | OUTPATIENT
Start: 2019-11-18 | End: 2019-11-18

## 2019-11-18 RX ORDER — CETIRIZINE HYDROCHLORIDE 10 MG/1
5 TABLET ORAL DAILY
Status: DISCONTINUED | OUTPATIENT
Start: 2019-11-18 | End: 2019-11-25 | Stop reason: HOSPADM

## 2019-11-18 RX ORDER — CARVEDILOL 25 MG/1
25 TABLET ORAL 2 TIMES DAILY WITH MEALS
Status: DISCONTINUED | OUTPATIENT
Start: 2019-11-18 | End: 2019-11-25 | Stop reason: HOSPADM

## 2019-11-18 RX ORDER — HEPARIN SODIUM 1000 [USP'U]/ML
80 INJECTION, SOLUTION INTRAVENOUS; SUBCUTANEOUS PRN
Status: DISCONTINUED | OUTPATIENT
Start: 2019-11-18 | End: 2019-11-22

## 2019-11-18 RX ORDER — HYDRALAZINE HYDROCHLORIDE 20 MG/ML
20 INJECTION INTRAMUSCULAR; INTRAVENOUS ONCE
Status: COMPLETED | OUTPATIENT
Start: 2019-11-18 | End: 2019-11-18

## 2019-11-18 RX ORDER — SPIRONOLACTONE 25 MG/1
25 TABLET ORAL DAILY
Status: DISCONTINUED | OUTPATIENT
Start: 2019-11-18 | End: 2019-11-25 | Stop reason: HOSPADM

## 2019-11-18 RX ORDER — HEPARIN SODIUM 1000 [USP'U]/ML
80 INJECTION, SOLUTION INTRAVENOUS; SUBCUTANEOUS ONCE
Status: COMPLETED | OUTPATIENT
Start: 2019-11-18 | End: 2019-11-18

## 2019-11-18 RX ORDER — SODIUM CHLORIDE 0.9 % (FLUSH) 0.9 %
10 SYRINGE (ML) INJECTION EVERY 12 HOURS SCHEDULED
Status: DISCONTINUED | OUTPATIENT
Start: 2019-11-18 | End: 2019-11-25 | Stop reason: HOSPADM

## 2019-11-18 RX ORDER — ATORVASTATIN CALCIUM 40 MG/1
40 TABLET, FILM COATED ORAL NIGHTLY
Status: DISCONTINUED | OUTPATIENT
Start: 2019-11-18 | End: 2019-11-25 | Stop reason: HOSPADM

## 2019-11-18 RX ADMIN — PANTOPRAZOLE SODIUM 20 MG: 20 TABLET, DELAYED RELEASE ORAL at 22:45

## 2019-11-18 RX ADMIN — CARVEDILOL 25 MG: 25 TABLET, FILM COATED ORAL at 22:40

## 2019-11-18 RX ADMIN — MYCOPHENOLATE MOFETIL 300 MG: 500 TABLET ORAL at 22:41

## 2019-11-18 RX ADMIN — HEPARIN SODIUM 8060 UNITS: 1000 INJECTION INTRAVENOUS; SUBCUTANEOUS at 16:47

## 2019-11-18 RX ADMIN — ESCITALOPRAM OXALATE 10 MG: 10 TABLET ORAL at 22:41

## 2019-11-18 RX ADMIN — ALUMINUM HYDROXIDE, MAGNESIUM HYDROXIDE, AND SIMETHICONE 30 ML: 200; 200; 20 SUSPENSION ORAL at 18:49

## 2019-11-18 RX ADMIN — DESMOPRESSIN ACETATE 40 MG: 0.2 TABLET ORAL at 22:40

## 2019-11-18 RX ADMIN — INSULIN GLARGINE 60 UNITS: 100 INJECTION, SOLUTION SUBCUTANEOUS at 22:42

## 2019-11-18 RX ADMIN — IOHEXOL 75 ML: 350 INJECTION, SOLUTION INTRAVENOUS at 16:01

## 2019-11-18 RX ADMIN — LISINOPRIL 10 MG: 10 TABLET ORAL at 22:44

## 2019-11-18 RX ADMIN — CETIRIZINE HYDROCHLORIDE 5 MG: 10 TABLET ORAL at 22:41

## 2019-11-18 RX ADMIN — TICAGRELOR 90 MG: 90 TABLET ORAL at 22:46

## 2019-11-18 RX ADMIN — HYDRALAZINE HYDROCHLORIDE 20 MG: 20 INJECTION INTRAMUSCULAR; INTRAVENOUS at 17:59

## 2019-11-18 RX ADMIN — SPIRONOLACTONE 25 MG: 25 TABLET ORAL at 22:45

## 2019-11-18 RX ADMIN — HEPARIN SODIUM AND DEXTROSE 18 UNITS/KG/HR: 10000; 5 INJECTION INTRAVENOUS at 16:46

## 2019-11-18 RX ADMIN — GABAPENTIN 300 MG: 300 CAPSULE ORAL at 15:00

## 2019-11-18 RX ADMIN — METFORMIN HYDROCHLORIDE 500 MG: 500 TABLET, EXTENDED RELEASE ORAL at 22:45

## 2019-11-18 RX ADMIN — NIFEDIPINE 30 MG: 30 TABLET, FILM COATED, EXTENDED RELEASE ORAL at 22:45

## 2019-11-18 ASSESSMENT — PAIN SCALES - GENERAL: PAINLEVEL_OUTOF10: 10

## 2019-11-19 PROBLEM — I50.22 CHRONIC SYSTOLIC HEART FAILURE (HCC): Status: ACTIVE | Noted: 2019-11-19

## 2019-11-19 PROBLEM — E11.42 DIABETIC POLYNEUROPATHY ASSOCIATED WITH TYPE 2 DIABETES MELLITUS (HCC): Status: ACTIVE | Noted: 2019-11-19

## 2019-11-19 PROBLEM — E78.49 OTHER HYPERLIPIDEMIA: Status: ACTIVE | Noted: 2019-11-19

## 2019-11-19 PROBLEM — I73.9 PERIPHERAL ARTERY DISEASE (HCC): Status: ACTIVE | Noted: 2019-11-19

## 2019-11-19 LAB
EKG ATRIAL RATE: 64 BPM
EKG P AXIS: 97 DEGREES
EKG P-R INTERVAL: 140 MS
EKG Q-T INTERVAL: 390 MS
EKG QRS DURATION: 88 MS
EKG QTC CALCULATION (BAZETT): 402 MS
EKG R AXIS: 8 DEGREES
EKG T AXIS: 103 DEGREES
EKG VENTRICULAR RATE: 64 BPM
GLUCOSE BLD-MCNC: 195 MG/DL (ref 65–105)
GLUCOSE BLD-MCNC: 215 MG/DL (ref 65–105)
GLUCOSE BLD-MCNC: 266 MG/DL (ref 65–105)
GLUCOSE BLD-MCNC: 297 MG/DL (ref 65–105)
HAV IGM SER IA-ACNC: NONREACTIVE
HEPATITIS B CORE IGM ANTIBODY: NONREACTIVE
HEPATITIS B SURFACE ANTIGEN: NONREACTIVE
HEPATITIS C ANTIBODY: NONREACTIVE
HOMOCYSTEINE: 10.3 UMOL/L
IRON SATURATION: 27 % (ref 20–55)
IRON: 57 UG/DL (ref 37–145)
MYOGLOBIN: 45 NG/ML (ref 25–58)
PARTIAL THROMBOPLASTIN TIME: 108.6 SEC (ref 20.5–30.5)
PARTIAL THROMBOPLASTIN TIME: 118.4 SEC (ref 20.5–30.5)
PARTIAL THROMBOPLASTIN TIME: 58.5 SEC (ref 20.5–30.5)
PARTIAL THROMBOPLASTIN TIME: 97.5 SEC (ref 20.5–30.5)
SEDIMENTATION RATE, ERYTHROCYTE: 11 MM (ref 0–20)
TOTAL IRON BINDING CAPACITY: 212 UG/DL (ref 250–450)
TROPONIN INTERP: ABNORMAL
TROPONIN T: ABNORMAL NG/ML
TROPONIN, HIGH SENSITIVITY: 26 NG/L (ref 0–14)
TSH SERPL DL<=0.05 MIU/L-ACNC: 1.11 MIU/L (ref 0.3–5)
UNSATURATED IRON BINDING CAPACITY: 155 UG/DL (ref 112–347)

## 2019-11-19 PROCEDURE — 83090 ASSAY OF HOMOCYSTEINE: CPT

## 2019-11-19 PROCEDURE — 6370000000 HC RX 637 (ALT 250 FOR IP): Performed by: NURSE PRACTITIONER

## 2019-11-19 PROCEDURE — 6370000000 HC RX 637 (ALT 250 FOR IP): Performed by: INTERNAL MEDICINE

## 2019-11-19 PROCEDURE — 2580000003 HC RX 258: Performed by: INTERNAL MEDICINE

## 2019-11-19 PROCEDURE — 99221 1ST HOSP IP/OBS SF/LOW 40: CPT | Performed by: SURGERY

## 2019-11-19 PROCEDURE — 85730 THROMBOPLASTIN TIME PARTIAL: CPT

## 2019-11-19 PROCEDURE — 6360000002 HC RX W HCPCS: Performed by: FAMILY MEDICINE

## 2019-11-19 PROCEDURE — 84484 ASSAY OF TROPONIN QUANT: CPT

## 2019-11-19 PROCEDURE — 2580000003 HC RX 258: Performed by: FAMILY MEDICINE

## 2019-11-19 PROCEDURE — 93010 ELECTROCARDIOGRAM REPORT: CPT | Performed by: INTERNAL MEDICINE

## 2019-11-19 PROCEDURE — 83036 HEMOGLOBIN GLYCOSYLATED A1C: CPT

## 2019-11-19 PROCEDURE — 84443 ASSAY THYROID STIM HORMONE: CPT

## 2019-11-19 PROCEDURE — 2060000000 HC ICU INTERMEDIATE R&B

## 2019-11-19 PROCEDURE — 6370000000 HC RX 637 (ALT 250 FOR IP): Performed by: FAMILY MEDICINE

## 2019-11-19 PROCEDURE — 86038 ANTINUCLEAR ANTIBODIES: CPT

## 2019-11-19 PROCEDURE — 99232 SBSQ HOSP IP/OBS MODERATE 35: CPT | Performed by: INTERNAL MEDICINE

## 2019-11-19 PROCEDURE — 85651 RBC SED RATE NONAUTOMATED: CPT

## 2019-11-19 PROCEDURE — 83540 ASSAY OF IRON: CPT

## 2019-11-19 PROCEDURE — 93005 ELECTROCARDIOGRAM TRACING: CPT | Performed by: NURSE PRACTITIONER

## 2019-11-19 PROCEDURE — 82947 ASSAY GLUCOSE BLOOD QUANT: CPT

## 2019-11-19 PROCEDURE — 80074 ACUTE HEPATITIS PANEL: CPT

## 2019-11-19 PROCEDURE — 83874 ASSAY OF MYOGLOBIN: CPT

## 2019-11-19 PROCEDURE — 83550 IRON BINDING TEST: CPT

## 2019-11-19 PROCEDURE — 36415 COLL VENOUS BLD VENIPUNCTURE: CPT

## 2019-11-19 PROCEDURE — 93970 EXTREMITY STUDY: CPT

## 2019-11-19 RX ORDER — 0.9 % SODIUM CHLORIDE 0.9 %
250 INTRAVENOUS SOLUTION INTRAVENOUS ONCE
Status: COMPLETED | OUTPATIENT
Start: 2019-11-19 | End: 2019-11-19

## 2019-11-19 RX ORDER — DEXTROSE MONOHYDRATE 50 MG/ML
100 INJECTION, SOLUTION INTRAVENOUS PRN
Status: DISCONTINUED | OUTPATIENT
Start: 2019-11-19 | End: 2019-11-25 | Stop reason: HOSPADM

## 2019-11-19 RX ORDER — UREA 10 %
3 LOTION (ML) TOPICAL NIGHTLY PRN
Status: DISCONTINUED | OUTPATIENT
Start: 2019-11-19 | End: 2019-11-25 | Stop reason: HOSPADM

## 2019-11-19 RX ORDER — ISOSORBIDE MONONITRATE 30 MG/1
30 TABLET, EXTENDED RELEASE ORAL DAILY
Status: DISCONTINUED | OUTPATIENT
Start: 2019-11-19 | End: 2019-11-25 | Stop reason: HOSPADM

## 2019-11-19 RX ORDER — ACETAMINOPHEN 325 MG/1
650 TABLET ORAL EVERY 4 HOURS PRN
Status: DISCONTINUED | OUTPATIENT
Start: 2019-11-19 | End: 2019-11-25 | Stop reason: HOSPADM

## 2019-11-19 RX ORDER — ROPINIROLE 0.25 MG/1
0.5 TABLET, FILM COATED ORAL NIGHTLY
Status: DISCONTINUED | OUTPATIENT
Start: 2019-11-19 | End: 2019-11-20

## 2019-11-19 RX ORDER — DEXTROSE MONOHYDRATE 25 G/50ML
12.5 INJECTION, SOLUTION INTRAVENOUS PRN
Status: DISCONTINUED | OUTPATIENT
Start: 2019-11-19 | End: 2019-11-25 | Stop reason: HOSPADM

## 2019-11-19 RX ORDER — NICOTINE POLACRILEX 4 MG
15 LOZENGE BUCCAL PRN
Status: DISCONTINUED | OUTPATIENT
Start: 2019-11-19 | End: 2019-11-25 | Stop reason: HOSPADM

## 2019-11-19 RX ADMIN — INSULIN LISPRO 3 UNITS: 100 INJECTION, SOLUTION INTRAVENOUS; SUBCUTANEOUS at 09:07

## 2019-11-19 RX ADMIN — CETIRIZINE HYDROCHLORIDE 5 MG: 10 TABLET ORAL at 09:08

## 2019-11-19 RX ADMIN — INSULIN LISPRO 3 UNITS: 100 INJECTION, SOLUTION INTRAVENOUS; SUBCUTANEOUS at 21:36

## 2019-11-19 RX ADMIN — LINAGLIPTIN 5 MG: 5 TABLET, FILM COATED ORAL at 09:08

## 2019-11-19 RX ADMIN — ISOSORBIDE MONONITRATE 30 MG: 30 TABLET ORAL at 09:08

## 2019-11-19 RX ADMIN — TICAGRELOR 90 MG: 90 TABLET ORAL at 09:08

## 2019-11-19 RX ADMIN — ROPINIROLE HYDROCHLORIDE 0.5 MG: 0.25 TABLET, FILM COATED ORAL at 21:36

## 2019-11-19 RX ADMIN — HEPARIN SODIUM AND DEXTROSE 13 UNITS/KG/HR: 10000; 5 INJECTION INTRAVENOUS at 15:02

## 2019-11-19 RX ADMIN — CARVEDILOL 25 MG: 25 TABLET, FILM COATED ORAL at 17:07

## 2019-11-19 RX ADMIN — INSULIN LISPRO 2 UNITS: 100 INJECTION, SOLUTION INTRAVENOUS; SUBCUTANEOUS at 17:00

## 2019-11-19 RX ADMIN — LISINOPRIL 10 MG: 10 TABLET ORAL at 09:08

## 2019-11-19 RX ADMIN — MYCOPHENOLATE MOFETIL 300 MG: 500 TABLET ORAL at 15:04

## 2019-11-19 RX ADMIN — PANTOPRAZOLE SODIUM 20 MG: 20 TABLET, DELAYED RELEASE ORAL at 07:00

## 2019-11-19 RX ADMIN — SODIUM CHLORIDE 250 ML: 9 INJECTION, SOLUTION INTRAVENOUS at 11:49

## 2019-11-19 RX ADMIN — TICAGRELOR 90 MG: 90 TABLET ORAL at 21:36

## 2019-11-19 RX ADMIN — INSULIN GLARGINE 60 UNITS: 100 INJECTION, SOLUTION SUBCUTANEOUS at 21:36

## 2019-11-19 RX ADMIN — SODIUM CHLORIDE, PRESERVATIVE FREE 10 ML: 5 INJECTION INTRAVENOUS at 09:09

## 2019-11-19 RX ADMIN — HEPARIN SODIUM AND DEXTROSE 11 UNITS/KG/HR: 10000; 5 INJECTION INTRAVENOUS at 10:19

## 2019-11-19 RX ADMIN — SODIUM CHLORIDE 250 ML: 9 INJECTION, SOLUTION INTRAVENOUS at 18:29

## 2019-11-19 RX ADMIN — NIFEDIPINE 30 MG: 30 TABLET, FILM COATED, EXTENDED RELEASE ORAL at 09:07

## 2019-11-19 RX ADMIN — HEPARIN SODIUM AND DEXTROSE 11 UNITS/KG/HR: 10000; 5 INJECTION INTRAVENOUS at 11:20

## 2019-11-19 RX ADMIN — INSULIN GLARGINE 60 UNITS: 100 INJECTION, SOLUTION SUBCUTANEOUS at 09:07

## 2019-11-19 RX ADMIN — MYCOPHENOLATE MOFETIL 300 MG: 500 TABLET ORAL at 09:08

## 2019-11-19 RX ADMIN — DESMOPRESSIN ACETATE 40 MG: 0.2 TABLET ORAL at 21:36

## 2019-11-19 RX ADMIN — ESCITALOPRAM OXALATE 10 MG: 10 TABLET ORAL at 09:08

## 2019-11-19 RX ADMIN — MYCOPHENOLATE MOFETIL 300 MG: 500 TABLET ORAL at 21:36

## 2019-11-19 RX ADMIN — ACETAMINOPHEN 650 MG: 325 TABLET ORAL at 03:10

## 2019-11-19 RX ADMIN — HEPARIN SODIUM 4030 UNITS: 1000 INJECTION INTRAVENOUS; SUBCUTANEOUS at 15:02

## 2019-11-19 RX ADMIN — SPIRONOLACTONE 25 MG: 25 TABLET ORAL at 09:08

## 2019-11-19 RX ADMIN — INSULIN LISPRO 4 UNITS: 100 INJECTION, SOLUTION INTRAVENOUS; SUBCUTANEOUS at 12:09

## 2019-11-19 ASSESSMENT — PAIN - FUNCTIONAL ASSESSMENT: PAIN_FUNCTIONAL_ASSESSMENT: ACTIVITIES ARE NOT PREVENTED

## 2019-11-19 ASSESSMENT — PAIN DESCRIPTION - LOCATION: LOCATION: HAND;FOOT

## 2019-11-19 ASSESSMENT — PAIN DESCRIPTION - DESCRIPTORS: DESCRIPTORS: CONSTANT;NUMBNESS;PINS AND NEEDLES

## 2019-11-19 ASSESSMENT — ENCOUNTER SYMPTOMS
CONSTIPATION: 0
DIARRHEA: 0
DIARRHEA: 1
BLOOD IN STOOL: 0
VOMITING: 0
SHORTNESS OF BREATH: 1
RHINORRHEA: 1
NAUSEA: 0
CHEST TIGHTNESS: 0
ABDOMINAL PAIN: 0
CHOKING: 0
COUGH: 0
WHEEZING: 0
COUGH: 1
ABDOMINAL PAIN: 1
CHEST TIGHTNESS: 1
STRIDOR: 0

## 2019-11-19 ASSESSMENT — PAIN DESCRIPTION - ONSET: ONSET: ON-GOING

## 2019-11-19 ASSESSMENT — PAIN SCALES - GENERAL
PAINLEVEL_OUTOF10: 7
PAINLEVEL_OUTOF10: 7

## 2019-11-19 ASSESSMENT — PAIN DESCRIPTION - FREQUENCY: FREQUENCY: CONTINUOUS

## 2019-11-19 ASSESSMENT — PAIN DESCRIPTION - PROGRESSION: CLINICAL_PROGRESSION: NOT CHANGED

## 2019-11-19 ASSESSMENT — PAIN DESCRIPTION - ORIENTATION: ORIENTATION: DISTAL

## 2019-11-19 ASSESSMENT — PAIN DESCRIPTION - PAIN TYPE: TYPE: CHRONIC PAIN

## 2019-11-20 LAB
ANION GAP SERPL CALCULATED.3IONS-SCNC: 11 MMOL/L (ref 9–17)
ANTI-NUCLEAR ANTIBODY (ANA): NEGATIVE
BUN BLDV-MCNC: 19 MG/DL (ref 8–23)
BUN/CREAT BLD: ABNORMAL (ref 9–20)
CALCIUM SERPL-MCNC: 8.7 MG/DL (ref 8.6–10.4)
CHLORIDE BLD-SCNC: 106 MMOL/L (ref 98–107)
CO2: 22 MMOL/L (ref 20–31)
CREAT SERPL-MCNC: 0.78 MG/DL (ref 0.5–0.9)
CULTURE: ABNORMAL
EKG ATRIAL RATE: 61 BPM
EKG P AXIS: 97 DEGREES
EKG P-R INTERVAL: 138 MS
EKG Q-T INTERVAL: 412 MS
EKG QRS DURATION: 94 MS
EKG QTC CALCULATION (BAZETT): 414 MS
EKG R AXIS: 177 DEGREES
EKG T AXIS: 85 DEGREES
EKG VENTRICULAR RATE: 61 BPM
GFR AFRICAN AMERICAN: >60 ML/MIN
GFR NON-AFRICAN AMERICAN: >60 ML/MIN
GFR SERPL CREATININE-BSD FRML MDRD: ABNORMAL ML/MIN/{1.73_M2}
GFR SERPL CREATININE-BSD FRML MDRD: ABNORMAL ML/MIN/{1.73_M2}
GLUCOSE BLD-MCNC: 102 MG/DL (ref 65–105)
GLUCOSE BLD-MCNC: 123 MG/DL (ref 70–99)
GLUCOSE BLD-MCNC: 131 MG/DL (ref 65–105)
GLUCOSE BLD-MCNC: 83 MG/DL (ref 65–105)
GLUCOSE BLD-MCNC: 93 MG/DL (ref 65–105)
HCT VFR BLD CALC: 35.1 % (ref 36.3–47.1)
HEMOGLOBIN: 10.6 G/DL (ref 11.9–15.1)
Lab: ABNORMAL
MCH RBC QN AUTO: 29.8 PG (ref 25.2–33.5)
MCHC RBC AUTO-ENTMCNC: 30.2 G/DL (ref 28.4–34.8)
MCV RBC AUTO: 98.6 FL (ref 82.6–102.9)
MYOGLOBIN: 42 NG/ML (ref 25–58)
MYOGLOBIN: 45 NG/ML (ref 25–58)
NRBC AUTOMATED: 0 PER 100 WBC
PARTIAL THROMBOPLASTIN TIME: 115.1 SEC (ref 20.5–30.5)
PARTIAL THROMBOPLASTIN TIME: 54 SEC (ref 20.5–30.5)
PARTIAL THROMBOPLASTIN TIME: 57 SEC (ref 20.5–30.5)
PARTIAL THROMBOPLASTIN TIME: 62.3 SEC (ref 20.5–30.5)
PDW BLD-RTO: 13.4 % (ref 11.8–14.4)
PLATELET # BLD: 161 K/UL (ref 138–453)
PLATELET # BLD: 175 K/UL (ref 138–453)
PMV BLD AUTO: 11.3 FL (ref 8.1–13.5)
POTASSIUM SERPL-SCNC: 3.5 MMOL/L (ref 3.7–5.3)
RBC # BLD: 3.56 M/UL (ref 3.95–5.11)
SODIUM BLD-SCNC: 139 MMOL/L (ref 135–144)
SPECIMEN DESCRIPTION: ABNORMAL
TROPONIN INTERP: ABNORMAL
TROPONIN INTERP: ABNORMAL
TROPONIN T: ABNORMAL NG/ML
TROPONIN T: ABNORMAL NG/ML
TROPONIN, HIGH SENSITIVITY: 28 NG/L (ref 0–14)
TROPONIN, HIGH SENSITIVITY: 30 NG/L (ref 0–14)
WBC # BLD: 6.7 K/UL (ref 3.5–11.3)

## 2019-11-20 PROCEDURE — 93010 ELECTROCARDIOGRAM REPORT: CPT | Performed by: INTERNAL MEDICINE

## 2019-11-20 PROCEDURE — 6370000000 HC RX 637 (ALT 250 FOR IP): Performed by: INTERNAL MEDICINE

## 2019-11-20 PROCEDURE — 80048 BASIC METABOLIC PNL TOTAL CA: CPT

## 2019-11-20 PROCEDURE — 6370000000 HC RX 637 (ALT 250 FOR IP): Performed by: NURSE PRACTITIONER

## 2019-11-20 PROCEDURE — 97530 THERAPEUTIC ACTIVITIES: CPT

## 2019-11-20 PROCEDURE — 6370000000 HC RX 637 (ALT 250 FOR IP): Performed by: FAMILY MEDICINE

## 2019-11-20 PROCEDURE — 82947 ASSAY GLUCOSE BLOOD QUANT: CPT

## 2019-11-20 PROCEDURE — 84484 ASSAY OF TROPONIN QUANT: CPT

## 2019-11-20 PROCEDURE — 97162 PT EVAL MOD COMPLEX 30 MIN: CPT

## 2019-11-20 PROCEDURE — 6360000002 HC RX W HCPCS: Performed by: FAMILY MEDICINE

## 2019-11-20 PROCEDURE — 83874 ASSAY OF MYOGLOBIN: CPT

## 2019-11-20 PROCEDURE — 99232 SBSQ HOSP IP/OBS MODERATE 35: CPT | Performed by: INTERNAL MEDICINE

## 2019-11-20 PROCEDURE — 85730 THROMBOPLASTIN TIME PARTIAL: CPT

## 2019-11-20 PROCEDURE — 85027 COMPLETE CBC AUTOMATED: CPT

## 2019-11-20 PROCEDURE — 2580000003 HC RX 258: Performed by: FAMILY MEDICINE

## 2019-11-20 PROCEDURE — 85049 AUTOMATED PLATELET COUNT: CPT

## 2019-11-20 PROCEDURE — 36415 COLL VENOUS BLD VENIPUNCTURE: CPT

## 2019-11-20 PROCEDURE — 2060000000 HC ICU INTERMEDIATE R&B

## 2019-11-20 PROCEDURE — 97166 OT EVAL MOD COMPLEX 45 MIN: CPT

## 2019-11-20 RX ORDER — CIPROFLOXACIN 500 MG/1
500 TABLET, FILM COATED ORAL EVERY 12 HOURS SCHEDULED
Status: DISCONTINUED | OUTPATIENT
Start: 2019-11-20 | End: 2019-11-25 | Stop reason: HOSPADM

## 2019-11-20 RX ORDER — POTASSIUM CHLORIDE 7.45 MG/ML
10 INJECTION INTRAVENOUS PRN
Status: DISCONTINUED | OUTPATIENT
Start: 2019-11-20 | End: 2019-11-25 | Stop reason: HOSPADM

## 2019-11-20 RX ORDER — CALCIUM CARBONATE 200(500)MG
1000 TABLET,CHEWABLE ORAL 3 TIMES DAILY PRN
Status: DISCONTINUED | OUTPATIENT
Start: 2019-11-20 | End: 2019-11-25 | Stop reason: HOSPADM

## 2019-11-20 RX ORDER — POTASSIUM CHLORIDE 20 MEQ/1
40 TABLET, EXTENDED RELEASE ORAL PRN
Status: DISCONTINUED | OUTPATIENT
Start: 2019-11-20 | End: 2019-11-25 | Stop reason: HOSPADM

## 2019-11-20 RX ORDER — ROPINIROLE 0.25 MG/1
0.5 TABLET, FILM COATED ORAL 3 TIMES DAILY
Status: DISCONTINUED | OUTPATIENT
Start: 2019-11-20 | End: 2019-11-25 | Stop reason: HOSPADM

## 2019-11-20 RX ORDER — MECLIZINE HCL 12.5 MG/1
12.5 TABLET ORAL 3 TIMES DAILY PRN
Status: DISCONTINUED | OUTPATIENT
Start: 2019-11-20 | End: 2019-11-25 | Stop reason: HOSPADM

## 2019-11-20 RX ADMIN — CIPROFLOXACIN 500 MG: 500 TABLET ORAL at 20:13

## 2019-11-20 RX ADMIN — ACETAMINOPHEN 650 MG: 325 TABLET ORAL at 04:19

## 2019-11-20 RX ADMIN — INSULIN GLARGINE 60 UNITS: 100 INJECTION, SOLUTION SUBCUTANEOUS at 20:13

## 2019-11-20 RX ADMIN — ESCITALOPRAM OXALATE 10 MG: 10 TABLET ORAL at 08:19

## 2019-11-20 RX ADMIN — ACETAMINOPHEN 650 MG: 325 TABLET ORAL at 08:25

## 2019-11-20 RX ADMIN — MYCOPHENOLATE MOFETIL 300 MG: 500 TABLET ORAL at 20:13

## 2019-11-20 RX ADMIN — HEPARIN SODIUM 4030 UNITS: 1000 INJECTION INTRAVENOUS; SUBCUTANEOUS at 10:23

## 2019-11-20 RX ADMIN — MYCOPHENOLATE MOFETIL 300 MG: 500 TABLET ORAL at 08:19

## 2019-11-20 RX ADMIN — CETIRIZINE HYDROCHLORIDE 5 MG: 10 TABLET ORAL at 08:19

## 2019-11-20 RX ADMIN — ISOSORBIDE MONONITRATE 30 MG: 30 TABLET ORAL at 08:19

## 2019-11-20 RX ADMIN — ROPINIROLE HYDROCHLORIDE 0.5 MG: 0.25 TABLET, FILM COATED ORAL at 20:12

## 2019-11-20 RX ADMIN — LINAGLIPTIN 5 MG: 5 TABLET, FILM COATED ORAL at 08:19

## 2019-11-20 RX ADMIN — DESMOPRESSIN ACETATE 40 MG: 0.2 TABLET ORAL at 21:53

## 2019-11-20 RX ADMIN — ROPINIROLE HYDROCHLORIDE 0.5 MG: 0.25 TABLET, FILM COATED ORAL at 13:21

## 2019-11-20 RX ADMIN — MYCOPHENOLATE MOFETIL 300 MG: 500 TABLET ORAL at 13:21

## 2019-11-20 RX ADMIN — TICAGRELOR 90 MG: 90 TABLET ORAL at 08:19

## 2019-11-20 RX ADMIN — HEPARIN SODIUM AND DEXTROSE 10 UNITS/KG/HR: 10000; 5 INJECTION INTRAVENOUS at 08:59

## 2019-11-20 RX ADMIN — MECLIZINE HYDROCHLORIDE 12.5 MG: 12.5 TABLET, FILM COATED ORAL at 13:21

## 2019-11-20 RX ADMIN — TICAGRELOR 60 MG: 60 TABLET ORAL at 21:53

## 2019-11-20 RX ADMIN — INSULIN GLARGINE 60 UNITS: 100 INJECTION, SOLUTION SUBCUTANEOUS at 08:18

## 2019-11-20 RX ADMIN — SPIRONOLACTONE 25 MG: 25 TABLET ORAL at 08:19

## 2019-11-20 RX ADMIN — SODIUM CHLORIDE, PRESERVATIVE FREE 10 ML: 5 INJECTION INTRAVENOUS at 08:23

## 2019-11-20 RX ADMIN — PANTOPRAZOLE SODIUM 20 MG: 20 TABLET, DELAYED RELEASE ORAL at 06:37

## 2019-11-20 RX ADMIN — ROPINIROLE HYDROCHLORIDE 0.5 MG: 0.25 TABLET, FILM COATED ORAL at 11:55

## 2019-11-20 RX ADMIN — POTASSIUM CHLORIDE 40 MEQ: 1500 TABLET, EXTENDED RELEASE ORAL at 18:20

## 2019-11-20 RX ADMIN — MECLIZINE HYDROCHLORIDE 12.5 MG: 12.5 TABLET, FILM COATED ORAL at 20:25

## 2019-11-20 ASSESSMENT — ENCOUNTER SYMPTOMS
SHORTNESS OF BREATH: 0
RHINORRHEA: 0
VOMITING: 0
SORE THROAT: 0
SINUS PRESSURE: 0
NAUSEA: 0
BACK PAIN: 0
CHEST TIGHTNESS: 0
ABDOMINAL PAIN: 0
COUGH: 0
DIARRHEA: 0

## 2019-11-20 ASSESSMENT — PAIN DESCRIPTION - LOCATION
LOCATION: HAND

## 2019-11-20 ASSESSMENT — PAIN SCALES - GENERAL
PAINLEVEL_OUTOF10: 10
PAINLEVEL_OUTOF10: 8
PAINLEVEL_OUTOF10: 7
PAINLEVEL_OUTOF10: 8
PAINLEVEL_OUTOF10: 10
PAINLEVEL_OUTOF10: 8

## 2019-11-20 ASSESSMENT — PAIN DESCRIPTION - PAIN TYPE
TYPE: CHRONIC PAIN
TYPE: CHRONIC PAIN

## 2019-11-20 ASSESSMENT — PAIN DESCRIPTION - FREQUENCY: FREQUENCY: CONTINUOUS

## 2019-11-20 ASSESSMENT — PAIN DESCRIPTION - PROGRESSION: CLINICAL_PROGRESSION: NOT CHANGED

## 2019-11-20 ASSESSMENT — PAIN DESCRIPTION - ONSET: ONSET: ON-GOING

## 2019-11-20 ASSESSMENT — PAIN DESCRIPTION - ORIENTATION
ORIENTATION: LEFT;RIGHT
ORIENTATION: RIGHT;LEFT
ORIENTATION: RIGHT;LEFT

## 2019-11-20 ASSESSMENT — PAIN - FUNCTIONAL ASSESSMENT: PAIN_FUNCTIONAL_ASSESSMENT: PREVENTS OR INTERFERES SOME ACTIVE ACTIVITIES AND ADLS

## 2019-11-21 LAB
ANION GAP SERPL CALCULATED.3IONS-SCNC: 10 MMOL/L (ref 9–17)
BUN BLDV-MCNC: 17 MG/DL (ref 8–23)
BUN/CREAT BLD: ABNORMAL (ref 9–20)
CALCIUM SERPL-MCNC: 8.7 MG/DL (ref 8.6–10.4)
CHLORIDE BLD-SCNC: 108 MMOL/L (ref 98–107)
CO2: 23 MMOL/L (ref 20–31)
CREAT SERPL-MCNC: 0.77 MG/DL (ref 0.5–0.9)
ESTIMATED AVERAGE GLUCOSE: 249 MG/DL
GFR AFRICAN AMERICAN: >60 ML/MIN
GFR NON-AFRICAN AMERICAN: >60 ML/MIN
GFR SERPL CREATININE-BSD FRML MDRD: ABNORMAL ML/MIN/{1.73_M2}
GFR SERPL CREATININE-BSD FRML MDRD: ABNORMAL ML/MIN/{1.73_M2}
GLUCOSE BLD-MCNC: 165 MG/DL (ref 65–105)
GLUCOSE BLD-MCNC: 174 MG/DL (ref 65–105)
GLUCOSE BLD-MCNC: 61 MG/DL (ref 65–105)
GLUCOSE BLD-MCNC: 66 MG/DL (ref 65–105)
GLUCOSE BLD-MCNC: 73 MG/DL (ref 65–105)
GLUCOSE BLD-MCNC: 85 MG/DL (ref 65–105)
GLUCOSE BLD-MCNC: 86 MG/DL (ref 70–99)
GLUCOSE BLD-MCNC: 97 MG/DL (ref 65–105)
HBA1C MFR BLD: 10.3 % (ref 4–6)
HCT VFR BLD CALC: 34.6 % (ref 36.3–47.1)
HEMOGLOBIN: 10.9 G/DL (ref 11.9–15.1)
LV EF: 50 %
LVEF MODALITY: NORMAL
MCH RBC QN AUTO: 30.3 PG (ref 25.2–33.5)
MCHC RBC AUTO-ENTMCNC: 31.5 G/DL (ref 28.4–34.8)
MCV RBC AUTO: 96.1 FL (ref 82.6–102.9)
NRBC AUTOMATED: 0 PER 100 WBC
PARTIAL THROMBOPLASTIN TIME: 60.8 SEC (ref 20.5–30.5)
PDW BLD-RTO: 13.7 % (ref 11.8–14.4)
PLATELET # BLD: 155 K/UL (ref 138–453)
PMV BLD AUTO: 11.4 FL (ref 8.1–13.5)
POTASSIUM SERPL-SCNC: 4 MMOL/L (ref 3.7–5.3)
RBC # BLD: 3.6 M/UL (ref 3.95–5.11)
SODIUM BLD-SCNC: 141 MMOL/L (ref 135–144)
WBC # BLD: 7.2 K/UL (ref 3.5–11.3)

## 2019-11-21 PROCEDURE — 6370000000 HC RX 637 (ALT 250 FOR IP): Performed by: FAMILY MEDICINE

## 2019-11-21 PROCEDURE — 6370000000 HC RX 637 (ALT 250 FOR IP): Performed by: NURSE PRACTITIONER

## 2019-11-21 PROCEDURE — 6360000002 HC RX W HCPCS: Performed by: FAMILY MEDICINE

## 2019-11-21 PROCEDURE — 93306 TTE W/DOPPLER COMPLETE: CPT

## 2019-11-21 PROCEDURE — 93971 EXTREMITY STUDY: CPT

## 2019-11-21 PROCEDURE — 2580000003 HC RX 258: Performed by: FAMILY MEDICINE

## 2019-11-21 PROCEDURE — 6370000000 HC RX 637 (ALT 250 FOR IP): Performed by: INTERNAL MEDICINE

## 2019-11-21 PROCEDURE — 82947 ASSAY GLUCOSE BLOOD QUANT: CPT

## 2019-11-21 PROCEDURE — 80048 BASIC METABOLIC PNL TOTAL CA: CPT

## 2019-11-21 PROCEDURE — 99232 SBSQ HOSP IP/OBS MODERATE 35: CPT | Performed by: SURGERY

## 2019-11-21 PROCEDURE — 2060000000 HC ICU INTERMEDIATE R&B

## 2019-11-21 PROCEDURE — 85027 COMPLETE CBC AUTOMATED: CPT

## 2019-11-21 PROCEDURE — 85730 THROMBOPLASTIN TIME PARTIAL: CPT

## 2019-11-21 PROCEDURE — 99232 SBSQ HOSP IP/OBS MODERATE 35: CPT | Performed by: INTERNAL MEDICINE

## 2019-11-21 PROCEDURE — 36415 COLL VENOUS BLD VENIPUNCTURE: CPT

## 2019-11-21 RX ORDER — ISOSORBIDE MONONITRATE 30 MG/1
30 TABLET, EXTENDED RELEASE ORAL DAILY
Qty: 30 TABLET | Refills: 3 | Status: CANCELLED | OUTPATIENT
Start: 2019-11-22

## 2019-11-21 RX ORDER — CIPROFLOXACIN 500 MG/1
500 TABLET, FILM COATED ORAL EVERY 12 HOURS SCHEDULED
Qty: 10 TABLET | Refills: 0 | Status: CANCELLED | OUTPATIENT
Start: 2019-11-21 | End: 2019-11-26

## 2019-11-21 RX ORDER — CARVEDILOL 25 MG/1
25 TABLET ORAL 2 TIMES DAILY WITH MEALS
Qty: 60 TABLET | Refills: 1 | Status: CANCELLED | OUTPATIENT
Start: 2019-11-21

## 2019-11-21 RX ORDER — ROPINIROLE 0.5 MG/1
0.5 TABLET, FILM COATED ORAL 3 TIMES DAILY
Qty: 90 TABLET | Refills: 0 | Status: CANCELLED | OUTPATIENT
Start: 2019-11-21

## 2019-11-21 RX ADMIN — ROPINIROLE HYDROCHLORIDE 0.5 MG: 0.25 TABLET, FILM COATED ORAL at 20:33

## 2019-11-21 RX ADMIN — ESCITALOPRAM OXALATE 10 MG: 10 TABLET ORAL at 08:23

## 2019-11-21 RX ADMIN — HEPARIN SODIUM 4030 UNITS: 1000 INJECTION INTRAVENOUS; SUBCUTANEOUS at 00:51

## 2019-11-21 RX ADMIN — CARVEDILOL 25 MG: 25 TABLET, FILM COATED ORAL at 17:10

## 2019-11-21 RX ADMIN — CIPROFLOXACIN 500 MG: 500 TABLET ORAL at 08:23

## 2019-11-21 RX ADMIN — HEPARIN SODIUM AND DEXTROSE 11 UNITS/KG/HR: 10000; 5 INJECTION INTRAVENOUS at 07:07

## 2019-11-21 RX ADMIN — TICAGRELOR 60 MG: 60 TABLET ORAL at 20:33

## 2019-11-21 RX ADMIN — ROPINIROLE HYDROCHLORIDE 0.5 MG: 0.25 TABLET, FILM COATED ORAL at 13:56

## 2019-11-21 RX ADMIN — SPIRONOLACTONE 25 MG: 25 TABLET ORAL at 08:23

## 2019-11-21 RX ADMIN — SODIUM CHLORIDE, PRESERVATIVE FREE 10 ML: 5 INJECTION INTRAVENOUS at 08:23

## 2019-11-21 RX ADMIN — ISOSORBIDE MONONITRATE 30 MG: 30 TABLET ORAL at 08:23

## 2019-11-21 RX ADMIN — ROPINIROLE HYDROCHLORIDE 0.5 MG: 0.25 TABLET, FILM COATED ORAL at 08:23

## 2019-11-21 RX ADMIN — MYCOPHENOLATE MOFETIL 300 MG: 500 TABLET ORAL at 13:56

## 2019-11-21 RX ADMIN — INSULIN LISPRO 2 UNITS: 100 INJECTION, SOLUTION INTRAVENOUS; SUBCUTANEOUS at 17:10

## 2019-11-21 RX ADMIN — INSULIN LISPRO 1 UNITS: 100 INJECTION, SOLUTION INTRAVENOUS; SUBCUTANEOUS at 20:43

## 2019-11-21 RX ADMIN — MYCOPHENOLATE MOFETIL 300 MG: 500 TABLET ORAL at 20:33

## 2019-11-21 RX ADMIN — SODIUM CHLORIDE, PRESERVATIVE FREE 10 ML: 5 INJECTION INTRAVENOUS at 20:33

## 2019-11-21 RX ADMIN — CIPROFLOXACIN 500 MG: 500 TABLET ORAL at 20:46

## 2019-11-21 RX ADMIN — CETIRIZINE HYDROCHLORIDE 5 MG: 10 TABLET ORAL at 08:23

## 2019-11-21 RX ADMIN — ACETAMINOPHEN 650 MG: 325 TABLET ORAL at 20:42

## 2019-11-21 RX ADMIN — ONDANSETRON 4 MG: 2 INJECTION INTRAMUSCULAR; INTRAVENOUS at 11:50

## 2019-11-21 RX ADMIN — RIVAROXABAN 15 MG: 15 TABLET, FILM COATED ORAL at 15:37

## 2019-11-21 RX ADMIN — MYCOPHENOLATE MOFETIL 300 MG: 500 TABLET ORAL at 08:23

## 2019-11-21 RX ADMIN — ACETAMINOPHEN 650 MG: 325 TABLET ORAL at 00:57

## 2019-11-21 RX ADMIN — PANTOPRAZOLE SODIUM 20 MG: 20 TABLET, DELAYED RELEASE ORAL at 06:31

## 2019-11-21 RX ADMIN — LINAGLIPTIN 5 MG: 5 TABLET, FILM COATED ORAL at 08:23

## 2019-11-21 RX ADMIN — DESMOPRESSIN ACETATE 40 MG: 0.2 TABLET ORAL at 20:33

## 2019-11-21 RX ADMIN — TICAGRELOR 60 MG: 60 TABLET ORAL at 08:23

## 2019-11-21 ASSESSMENT — PAIN DESCRIPTION - ORIENTATION
ORIENTATION: RIGHT
ORIENTATION: RIGHT;LEFT
ORIENTATION: RIGHT
ORIENTATION: RIGHT

## 2019-11-21 ASSESSMENT — PAIN SCALES - GENERAL
PAINLEVEL_OUTOF10: 3
PAINLEVEL_OUTOF10: 2
PAINLEVEL_OUTOF10: 6
PAINLEVEL_OUTOF10: 9
PAINLEVEL_OUTOF10: 6
PAINLEVEL_OUTOF10: 7

## 2019-11-21 ASSESSMENT — PAIN DESCRIPTION - PAIN TYPE
TYPE: ACUTE PAIN

## 2019-11-21 ASSESSMENT — PAIN DESCRIPTION - LOCATION
LOCATION: LEG
LOCATION: ARM

## 2019-11-21 ASSESSMENT — PAIN DESCRIPTION - PROGRESSION: CLINICAL_PROGRESSION: NOT CHANGED

## 2019-11-21 ASSESSMENT — PAIN - FUNCTIONAL ASSESSMENT: PAIN_FUNCTIONAL_ASSESSMENT: PREVENTS OR INTERFERES SOME ACTIVE ACTIVITIES AND ADLS

## 2019-11-21 ASSESSMENT — PAIN DESCRIPTION - FREQUENCY: FREQUENCY: CONTINUOUS

## 2019-11-21 ASSESSMENT — PAIN DESCRIPTION - ONSET: ONSET: ON-GOING

## 2019-11-22 LAB
ANION GAP SERPL CALCULATED.3IONS-SCNC: 9 MMOL/L (ref 9–17)
BUN BLDV-MCNC: 14 MG/DL (ref 8–23)
BUN/CREAT BLD: NORMAL (ref 9–20)
CALCIUM SERPL-MCNC: 8.8 MG/DL (ref 8.6–10.4)
CHLORIDE BLD-SCNC: 105 MMOL/L (ref 98–107)
CO2: 26 MMOL/L (ref 20–31)
CREAT SERPL-MCNC: 0.77 MG/DL (ref 0.5–0.9)
GFR AFRICAN AMERICAN: >60 ML/MIN
GFR NON-AFRICAN AMERICAN: >60 ML/MIN
GFR SERPL CREATININE-BSD FRML MDRD: NORMAL ML/MIN/{1.73_M2}
GFR SERPL CREATININE-BSD FRML MDRD: NORMAL ML/MIN/{1.73_M2}
GLUCOSE BLD-MCNC: 150 MG/DL (ref 65–105)
GLUCOSE BLD-MCNC: 188 MG/DL (ref 65–105)
GLUCOSE BLD-MCNC: 203 MG/DL (ref 65–105)
GLUCOSE BLD-MCNC: 74 MG/DL (ref 65–105)
GLUCOSE BLD-MCNC: 90 MG/DL (ref 70–99)
HCT VFR BLD CALC: 33 % (ref 36.3–47.1)
HEMOGLOBIN: 10.2 G/DL (ref 11.9–15.1)
MCH RBC QN AUTO: 29.7 PG (ref 25.2–33.5)
MCHC RBC AUTO-ENTMCNC: 30.9 G/DL (ref 28.4–34.8)
MCV RBC AUTO: 96.2 FL (ref 82.6–102.9)
NRBC AUTOMATED: 0 PER 100 WBC
PDW BLD-RTO: 13.7 % (ref 11.8–14.4)
PLATELET # BLD: 140 K/UL (ref 138–453)
PMV BLD AUTO: 10.9 FL (ref 8.1–13.5)
POTASSIUM SERPL-SCNC: 4.2 MMOL/L (ref 3.7–5.3)
RBC # BLD: 3.43 M/UL (ref 3.95–5.11)
SODIUM BLD-SCNC: 140 MMOL/L (ref 135–144)
WBC # BLD: 6.1 K/UL (ref 3.5–11.3)

## 2019-11-22 PROCEDURE — 99232 SBSQ HOSP IP/OBS MODERATE 35: CPT | Performed by: FAMILY MEDICINE

## 2019-11-22 PROCEDURE — 6370000000 HC RX 637 (ALT 250 FOR IP): Performed by: NURSE PRACTITIONER

## 2019-11-22 PROCEDURE — 6370000000 HC RX 637 (ALT 250 FOR IP): Performed by: INTERNAL MEDICINE

## 2019-11-22 PROCEDURE — 85027 COMPLETE CBC AUTOMATED: CPT

## 2019-11-22 PROCEDURE — 80048 BASIC METABOLIC PNL TOTAL CA: CPT

## 2019-11-22 PROCEDURE — 82947 ASSAY GLUCOSE BLOOD QUANT: CPT

## 2019-11-22 PROCEDURE — 97110 THERAPEUTIC EXERCISES: CPT

## 2019-11-22 PROCEDURE — 6370000000 HC RX 637 (ALT 250 FOR IP): Performed by: FAMILY MEDICINE

## 2019-11-22 PROCEDURE — 97535 SELF CARE MNGMENT TRAINING: CPT

## 2019-11-22 PROCEDURE — 94760 N-INVAS EAR/PLS OXIMETRY 1: CPT

## 2019-11-22 PROCEDURE — 2060000000 HC ICU INTERMEDIATE R&B

## 2019-11-22 PROCEDURE — 36415 COLL VENOUS BLD VENIPUNCTURE: CPT

## 2019-11-22 PROCEDURE — 2580000003 HC RX 258: Performed by: FAMILY MEDICINE

## 2019-11-22 PROCEDURE — 97116 GAIT TRAINING THERAPY: CPT

## 2019-11-22 RX ORDER — PREGABALIN 100 MG/1
100 CAPSULE ORAL ONCE
Status: COMPLETED | OUTPATIENT
Start: 2019-11-22 | End: 2019-11-22

## 2019-11-22 RX ADMIN — ACETAMINOPHEN 650 MG: 325 TABLET ORAL at 16:53

## 2019-11-22 RX ADMIN — SODIUM CHLORIDE, PRESERVATIVE FREE 10 ML: 5 INJECTION INTRAVENOUS at 08:31

## 2019-11-22 RX ADMIN — DESMOPRESSIN ACETATE 40 MG: 0.2 TABLET ORAL at 21:21

## 2019-11-22 RX ADMIN — MYCOPHENOLATE MOFETIL 300 MG: 500 TABLET ORAL at 08:31

## 2019-11-22 RX ADMIN — CIPROFLOXACIN 500 MG: 500 TABLET ORAL at 08:31

## 2019-11-22 RX ADMIN — TICAGRELOR 60 MG: 60 TABLET ORAL at 08:31

## 2019-11-22 RX ADMIN — RIVAROXABAN 15 MG: 15 TABLET, FILM COATED ORAL at 08:31

## 2019-11-22 RX ADMIN — ROPINIROLE HYDROCHLORIDE 0.5 MG: 0.25 TABLET, FILM COATED ORAL at 08:31

## 2019-11-22 RX ADMIN — CETIRIZINE HYDROCHLORIDE 5 MG: 10 TABLET ORAL at 08:31

## 2019-11-22 RX ADMIN — CARVEDILOL 25 MG: 25 TABLET, FILM COATED ORAL at 16:53

## 2019-11-22 RX ADMIN — RIVAROXABAN 15 MG: 15 TABLET, FILM COATED ORAL at 16:53

## 2019-11-22 RX ADMIN — MYCOPHENOLATE MOFETIL 300 MG: 500 TABLET ORAL at 13:18

## 2019-11-22 RX ADMIN — INSULIN LISPRO 2 UNITS: 100 INJECTION, SOLUTION INTRAVENOUS; SUBCUTANEOUS at 11:21

## 2019-11-22 RX ADMIN — CARVEDILOL 25 MG: 25 TABLET, FILM COATED ORAL at 08:31

## 2019-11-22 RX ADMIN — MYCOPHENOLATE MOFETIL 300 MG: 500 TABLET ORAL at 21:21

## 2019-11-22 RX ADMIN — ROPINIROLE HYDROCHLORIDE 0.5 MG: 0.25 TABLET, FILM COATED ORAL at 13:18

## 2019-11-22 RX ADMIN — ACETAMINOPHEN 650 MG: 325 TABLET ORAL at 00:13

## 2019-11-22 RX ADMIN — METFORMIN HYDROCHLORIDE 500 MG: 500 TABLET, EXTENDED RELEASE ORAL at 16:53

## 2019-11-22 RX ADMIN — ISOSORBIDE MONONITRATE 30 MG: 30 TABLET ORAL at 08:31

## 2019-11-22 RX ADMIN — CIPROFLOXACIN 500 MG: 500 TABLET ORAL at 21:20

## 2019-11-22 RX ADMIN — SPIRONOLACTONE 25 MG: 25 TABLET ORAL at 08:31

## 2019-11-22 RX ADMIN — PREGABALIN 100 MG: 100 CAPSULE ORAL at 17:23

## 2019-11-22 RX ADMIN — TICAGRELOR 60 MG: 60 TABLET ORAL at 21:20

## 2019-11-22 RX ADMIN — ACETAMINOPHEN 650 MG: 325 TABLET ORAL at 03:56

## 2019-11-22 RX ADMIN — ACETAMINOPHEN 650 MG: 325 TABLET ORAL at 08:31

## 2019-11-22 RX ADMIN — ROPINIROLE HYDROCHLORIDE 0.5 MG: 0.25 TABLET, FILM COATED ORAL at 21:21

## 2019-11-22 RX ADMIN — PANTOPRAZOLE SODIUM 20 MG: 20 TABLET, DELAYED RELEASE ORAL at 05:50

## 2019-11-22 RX ADMIN — ACETAMINOPHEN 650 MG: 325 TABLET ORAL at 13:18

## 2019-11-22 RX ADMIN — ESCITALOPRAM OXALATE 10 MG: 10 TABLET ORAL at 08:31

## 2019-11-22 RX ADMIN — INSULIN LISPRO 2 UNITS: 100 INJECTION, SOLUTION INTRAVENOUS; SUBCUTANEOUS at 21:23

## 2019-11-22 RX ADMIN — INSULIN LISPRO 2 UNITS: 100 INJECTION, SOLUTION INTRAVENOUS; SUBCUTANEOUS at 16:53

## 2019-11-22 RX ADMIN — LINAGLIPTIN 5 MG: 5 TABLET, FILM COATED ORAL at 08:31

## 2019-11-22 ASSESSMENT — PAIN SCALES - GENERAL
PAINLEVEL_OUTOF10: 7
PAINLEVEL_OUTOF10: 0
PAINLEVEL_OUTOF10: 6
PAINLEVEL_OUTOF10: 3
PAINLEVEL_OUTOF10: 7
PAINLEVEL_OUTOF10: 6
PAINLEVEL_OUTOF10: 7
PAINLEVEL_OUTOF10: 6
PAINLEVEL_OUTOF10: 7
PAINLEVEL_OUTOF10: 7
PAINLEVEL_OUTOF10: 0

## 2019-11-22 ASSESSMENT — PAIN DESCRIPTION - LOCATION
LOCATION: HAND

## 2019-11-22 ASSESSMENT — PAIN DESCRIPTION - ORIENTATION
ORIENTATION: RIGHT;LEFT
ORIENTATION: LEFT;RIGHT

## 2019-11-22 ASSESSMENT — PAIN DESCRIPTION - PAIN TYPE
TYPE: ACUTE PAIN
TYPE: CHRONIC PAIN
TYPE: ACUTE PAIN
TYPE: ACUTE PAIN

## 2019-11-23 LAB
ANION GAP SERPL CALCULATED.3IONS-SCNC: 10 MMOL/L (ref 9–17)
BUN BLDV-MCNC: 12 MG/DL (ref 8–23)
BUN/CREAT BLD: ABNORMAL (ref 9–20)
CALCIUM SERPL-MCNC: 8.9 MG/DL (ref 8.6–10.4)
CHLORIDE BLD-SCNC: 105 MMOL/L (ref 98–107)
CO2: 25 MMOL/L (ref 20–31)
CREAT SERPL-MCNC: 0.61 MG/DL (ref 0.5–0.9)
GFR AFRICAN AMERICAN: >60 ML/MIN
GFR NON-AFRICAN AMERICAN: >60 ML/MIN
GFR SERPL CREATININE-BSD FRML MDRD: ABNORMAL ML/MIN/{1.73_M2}
GFR SERPL CREATININE-BSD FRML MDRD: ABNORMAL ML/MIN/{1.73_M2}
GLUCOSE BLD-MCNC: 181 MG/DL (ref 65–105)
GLUCOSE BLD-MCNC: 185 MG/DL (ref 65–105)
GLUCOSE BLD-MCNC: 195 MG/DL (ref 70–99)
GLUCOSE BLD-MCNC: 204 MG/DL (ref 65–105)
GLUCOSE BLD-MCNC: 206 MG/DL (ref 65–105)
HCT VFR BLD CALC: 33.6 % (ref 36.3–47.1)
HEMOGLOBIN: 10.5 G/DL (ref 11.9–15.1)
MCH RBC QN AUTO: 30.3 PG (ref 25.2–33.5)
MCHC RBC AUTO-ENTMCNC: 31.3 G/DL (ref 28.4–34.8)
MCV RBC AUTO: 97.1 FL (ref 82.6–102.9)
NRBC AUTOMATED: 0 PER 100 WBC
PDW BLD-RTO: 13.7 % (ref 11.8–14.4)
PLATELET # BLD: ABNORMAL K/UL (ref 138–453)
PLATELET, FLUORESCENCE: 138 K/UL (ref 138–453)
PLATELET, IMMATURE FRACTION: 5.6 % (ref 1.1–10.3)
PMV BLD AUTO: ABNORMAL FL (ref 8.1–13.5)
POTASSIUM SERPL-SCNC: 4.5 MMOL/L (ref 3.7–5.3)
RBC # BLD: 3.46 M/UL (ref 3.95–5.11)
SODIUM BLD-SCNC: 140 MMOL/L (ref 135–144)
WBC # BLD: 4.8 K/UL (ref 3.5–11.3)

## 2019-11-23 PROCEDURE — 99232 SBSQ HOSP IP/OBS MODERATE 35: CPT | Performed by: FAMILY MEDICINE

## 2019-11-23 PROCEDURE — 6370000000 HC RX 637 (ALT 250 FOR IP): Performed by: INTERNAL MEDICINE

## 2019-11-23 PROCEDURE — 6370000000 HC RX 637 (ALT 250 FOR IP): Performed by: NURSE PRACTITIONER

## 2019-11-23 PROCEDURE — 82947 ASSAY GLUCOSE BLOOD QUANT: CPT

## 2019-11-23 PROCEDURE — 6370000000 HC RX 637 (ALT 250 FOR IP): Performed by: FAMILY MEDICINE

## 2019-11-23 PROCEDURE — 80048 BASIC METABOLIC PNL TOTAL CA: CPT

## 2019-11-23 PROCEDURE — 36415 COLL VENOUS BLD VENIPUNCTURE: CPT

## 2019-11-23 PROCEDURE — 85027 COMPLETE CBC AUTOMATED: CPT

## 2019-11-23 PROCEDURE — 85055 RETICULATED PLATELET ASSAY: CPT

## 2019-11-23 PROCEDURE — 2060000000 HC ICU INTERMEDIATE R&B

## 2019-11-23 PROCEDURE — 2580000003 HC RX 258: Performed by: FAMILY MEDICINE

## 2019-11-23 PROCEDURE — 6360000002 HC RX W HCPCS: Performed by: NURSE PRACTITIONER

## 2019-11-23 RX ADMIN — LINAGLIPTIN 5 MG: 5 TABLET, FILM COATED ORAL at 08:21

## 2019-11-23 RX ADMIN — TICAGRELOR 60 MG: 60 TABLET ORAL at 20:58

## 2019-11-23 RX ADMIN — INSULIN LISPRO 4 UNITS: 100 INJECTION, SOLUTION INTRAVENOUS; SUBCUTANEOUS at 11:56

## 2019-11-23 RX ADMIN — TICAGRELOR 60 MG: 60 TABLET ORAL at 08:22

## 2019-11-23 RX ADMIN — ISOSORBIDE MONONITRATE 30 MG: 30 TABLET ORAL at 08:22

## 2019-11-23 RX ADMIN — CETIRIZINE HYDROCHLORIDE 5 MG: 10 TABLET ORAL at 08:22

## 2019-11-23 RX ADMIN — ESCITALOPRAM OXALATE 10 MG: 10 TABLET ORAL at 08:23

## 2019-11-23 RX ADMIN — SODIUM CHLORIDE, PRESERVATIVE FREE 10 ML: 5 INJECTION INTRAVENOUS at 08:24

## 2019-11-23 RX ADMIN — SPIRONOLACTONE 25 MG: 25 TABLET ORAL at 08:22

## 2019-11-23 RX ADMIN — CIPROFLOXACIN 500 MG: 500 TABLET ORAL at 21:00

## 2019-11-23 RX ADMIN — ROPINIROLE HYDROCHLORIDE 0.5 MG: 0.25 TABLET, FILM COATED ORAL at 15:23

## 2019-11-23 RX ADMIN — MYCOPHENOLATE MOFETIL 300 MG: 500 TABLET ORAL at 21:00

## 2019-11-23 RX ADMIN — METFORMIN HYDROCHLORIDE 500 MG: 500 TABLET, EXTENDED RELEASE ORAL at 08:23

## 2019-11-23 RX ADMIN — SODIUM CHLORIDE, PRESERVATIVE FREE 10 ML: 5 INJECTION INTRAVENOUS at 21:01

## 2019-11-23 RX ADMIN — ACETAMINOPHEN 650 MG: 325 TABLET ORAL at 20:59

## 2019-11-23 RX ADMIN — LISINOPRIL 10 MG: 10 TABLET ORAL at 08:23

## 2019-11-23 RX ADMIN — RIVAROXABAN 15 MG: 15 TABLET, FILM COATED ORAL at 17:49

## 2019-11-23 RX ADMIN — ROPINIROLE HYDROCHLORIDE 0.5 MG: 0.25 TABLET, FILM COATED ORAL at 20:59

## 2019-11-23 RX ADMIN — CARVEDILOL 25 MG: 25 TABLET, FILM COATED ORAL at 17:49

## 2019-11-23 RX ADMIN — CIPROFLOXACIN 500 MG: 500 TABLET ORAL at 08:21

## 2019-11-23 RX ADMIN — DESMOPRESSIN ACETATE 40 MG: 0.2 TABLET ORAL at 21:00

## 2019-11-23 RX ADMIN — RIVAROXABAN 15 MG: 15 TABLET, FILM COATED ORAL at 08:23

## 2019-11-23 RX ADMIN — HYDRALAZINE HYDROCHLORIDE 10 MG: 20 INJECTION INTRAMUSCULAR; INTRAVENOUS at 17:26

## 2019-11-23 RX ADMIN — MYCOPHENOLATE MOFETIL 300 MG: 500 TABLET ORAL at 15:23

## 2019-11-23 RX ADMIN — INSULIN LISPRO 2 UNITS: 100 INJECTION, SOLUTION INTRAVENOUS; SUBCUTANEOUS at 08:24

## 2019-11-23 RX ADMIN — CARVEDILOL 25 MG: 25 TABLET, FILM COATED ORAL at 08:23

## 2019-11-23 RX ADMIN — ACETAMINOPHEN 650 MG: 325 TABLET ORAL at 08:21

## 2019-11-23 RX ADMIN — INSULIN LISPRO 1 UNITS: 100 INJECTION, SOLUTION INTRAVENOUS; SUBCUTANEOUS at 20:55

## 2019-11-23 RX ADMIN — INSULIN LISPRO 4 UNITS: 100 INJECTION, SOLUTION INTRAVENOUS; SUBCUTANEOUS at 17:26

## 2019-11-23 RX ADMIN — PANTOPRAZOLE SODIUM 20 MG: 20 TABLET, DELAYED RELEASE ORAL at 06:21

## 2019-11-23 RX ADMIN — ACETAMINOPHEN 650 MG: 325 TABLET ORAL at 15:23

## 2019-11-23 RX ADMIN — METFORMIN HYDROCHLORIDE 500 MG: 500 TABLET, EXTENDED RELEASE ORAL at 17:49

## 2019-11-23 RX ADMIN — ROPINIROLE HYDROCHLORIDE 0.5 MG: 0.25 TABLET, FILM COATED ORAL at 08:22

## 2019-11-23 RX ADMIN — MYCOPHENOLATE MOFETIL 300 MG: 500 TABLET ORAL at 08:23

## 2019-11-23 ASSESSMENT — PAIN SCALES - GENERAL
PAINLEVEL_OUTOF10: 6
PAINLEVEL_OUTOF10: 7
PAINLEVEL_OUTOF10: 0
PAINLEVEL_OUTOF10: 6

## 2019-11-23 ASSESSMENT — PAIN DESCRIPTION - ORIENTATION
ORIENTATION: LEFT;RIGHT
ORIENTATION: RIGHT;LEFT
ORIENTATION: LEFT;RIGHT

## 2019-11-23 ASSESSMENT — PAIN DESCRIPTION - LOCATION
LOCATION: HAND;HEAD
LOCATION: HAND
LOCATION: HAND

## 2019-11-23 ASSESSMENT — PAIN DESCRIPTION - PAIN TYPE
TYPE: CHRONIC PAIN;ACUTE PAIN
TYPE: CHRONIC PAIN
TYPE: CHRONIC PAIN

## 2019-11-23 ASSESSMENT — PAIN DESCRIPTION - PROGRESSION: CLINICAL_PROGRESSION: NOT CHANGED

## 2019-11-23 ASSESSMENT — PAIN DESCRIPTION - ONSET: ONSET: ON-GOING

## 2019-11-23 ASSESSMENT — PAIN - FUNCTIONAL ASSESSMENT: PAIN_FUNCTIONAL_ASSESSMENT: PREVENTS OR INTERFERES SOME ACTIVE ACTIVITIES AND ADLS

## 2019-11-23 ASSESSMENT — PAIN DESCRIPTION - FREQUENCY: FREQUENCY: CONTINUOUS

## 2019-11-24 LAB
ANION GAP SERPL CALCULATED.3IONS-SCNC: 7 MMOL/L (ref 9–17)
BUN BLDV-MCNC: 14 MG/DL (ref 8–23)
BUN/CREAT BLD: ABNORMAL (ref 9–20)
CALCIUM SERPL-MCNC: 9.4 MG/DL (ref 8.6–10.4)
CHLORIDE BLD-SCNC: 103 MMOL/L (ref 98–107)
CO2: 26 MMOL/L (ref 20–31)
CREAT SERPL-MCNC: 0.65 MG/DL (ref 0.5–0.9)
GFR AFRICAN AMERICAN: >60 ML/MIN
GFR NON-AFRICAN AMERICAN: >60 ML/MIN
GFR SERPL CREATININE-BSD FRML MDRD: ABNORMAL ML/MIN/{1.73_M2}
GFR SERPL CREATININE-BSD FRML MDRD: ABNORMAL ML/MIN/{1.73_M2}
GLUCOSE BLD-MCNC: 158 MG/DL (ref 65–105)
GLUCOSE BLD-MCNC: 167 MG/DL (ref 65–105)
GLUCOSE BLD-MCNC: 189 MG/DL (ref 65–105)
GLUCOSE BLD-MCNC: 195 MG/DL (ref 65–105)
GLUCOSE BLD-MCNC: 197 MG/DL (ref 70–99)
HCT VFR BLD CALC: 34.6 % (ref 36.3–47.1)
HEMOGLOBIN: 11 G/DL (ref 11.9–15.1)
MCH RBC QN AUTO: 30.4 PG (ref 25.2–33.5)
MCHC RBC AUTO-ENTMCNC: 31.8 G/DL (ref 28.4–34.8)
MCV RBC AUTO: 95.6 FL (ref 82.6–102.9)
NRBC AUTOMATED: 0 PER 100 WBC
PDW BLD-RTO: 13.5 % (ref 11.8–14.4)
PLATELET # BLD: 167 K/UL (ref 138–453)
PMV BLD AUTO: 11.3 FL (ref 8.1–13.5)
POTASSIUM SERPL-SCNC: 4.5 MMOL/L (ref 3.7–5.3)
RBC # BLD: 3.62 M/UL (ref 3.95–5.11)
SODIUM BLD-SCNC: 136 MMOL/L (ref 135–144)
WBC # BLD: 5.3 K/UL (ref 3.5–11.3)

## 2019-11-24 PROCEDURE — 36415 COLL VENOUS BLD VENIPUNCTURE: CPT

## 2019-11-24 PROCEDURE — 97535 SELF CARE MNGMENT TRAINING: CPT

## 2019-11-24 PROCEDURE — 6370000000 HC RX 637 (ALT 250 FOR IP): Performed by: INTERNAL MEDICINE

## 2019-11-24 PROCEDURE — 99232 SBSQ HOSP IP/OBS MODERATE 35: CPT | Performed by: FAMILY MEDICINE

## 2019-11-24 PROCEDURE — 6370000000 HC RX 637 (ALT 250 FOR IP): Performed by: NURSE PRACTITIONER

## 2019-11-24 PROCEDURE — 6360000002 HC RX W HCPCS: Performed by: NURSE PRACTITIONER

## 2019-11-24 PROCEDURE — 2060000000 HC ICU INTERMEDIATE R&B

## 2019-11-24 PROCEDURE — 6370000000 HC RX 637 (ALT 250 FOR IP): Performed by: FAMILY MEDICINE

## 2019-11-24 PROCEDURE — 2580000003 HC RX 258: Performed by: FAMILY MEDICINE

## 2019-11-24 PROCEDURE — 82947 ASSAY GLUCOSE BLOOD QUANT: CPT

## 2019-11-24 PROCEDURE — 80048 BASIC METABOLIC PNL TOTAL CA: CPT

## 2019-11-24 PROCEDURE — 85027 COMPLETE CBC AUTOMATED: CPT

## 2019-11-24 RX ORDER — METFORMIN HYDROCHLORIDE 750 MG/1
750 TABLET, EXTENDED RELEASE ORAL 2 TIMES DAILY WITH MEALS
Status: DISCONTINUED | OUTPATIENT
Start: 2019-11-25 | End: 2019-11-25 | Stop reason: HOSPADM

## 2019-11-24 RX ADMIN — SPIRONOLACTONE 25 MG: 25 TABLET ORAL at 09:24

## 2019-11-24 RX ADMIN — INSULIN LISPRO 2 UNITS: 100 INJECTION, SOLUTION INTRAVENOUS; SUBCUTANEOUS at 16:57

## 2019-11-24 RX ADMIN — TICAGRELOR 60 MG: 60 TABLET ORAL at 21:55

## 2019-11-24 RX ADMIN — CIPROFLOXACIN 500 MG: 500 TABLET ORAL at 21:55

## 2019-11-24 RX ADMIN — INSULIN LISPRO 1 UNITS: 100 INJECTION, SOLUTION INTRAVENOUS; SUBCUTANEOUS at 21:56

## 2019-11-24 RX ADMIN — MYCOPHENOLATE MOFETIL 300 MG: 500 TABLET ORAL at 13:49

## 2019-11-24 RX ADMIN — METFORMIN HYDROCHLORIDE 500 MG: 500 TABLET, EXTENDED RELEASE ORAL at 09:25

## 2019-11-24 RX ADMIN — MYCOPHENOLATE MOFETIL 300 MG: 500 TABLET ORAL at 09:23

## 2019-11-24 RX ADMIN — ESCITALOPRAM OXALATE 10 MG: 10 TABLET ORAL at 09:25

## 2019-11-24 RX ADMIN — ACETAMINOPHEN 650 MG: 325 TABLET ORAL at 06:37

## 2019-11-24 RX ADMIN — PANTOPRAZOLE SODIUM 20 MG: 20 TABLET, DELAYED RELEASE ORAL at 06:03

## 2019-11-24 RX ADMIN — ISOSORBIDE MONONITRATE 30 MG: 30 TABLET ORAL at 09:24

## 2019-11-24 RX ADMIN — MYCOPHENOLATE MOFETIL 300 MG: 500 TABLET ORAL at 21:56

## 2019-11-24 RX ADMIN — CARVEDILOL 25 MG: 25 TABLET, FILM COATED ORAL at 10:37

## 2019-11-24 RX ADMIN — ANTACID TABLETS 1000 MG: 500 TABLET, CHEWABLE ORAL at 00:03

## 2019-11-24 RX ADMIN — LISINOPRIL 10 MG: 10 TABLET ORAL at 09:25

## 2019-11-24 RX ADMIN — METFORMIN HYDROCHLORIDE 500 MG: 500 TABLET, EXTENDED RELEASE ORAL at 16:58

## 2019-11-24 RX ADMIN — INSULIN LISPRO 2 UNITS: 100 INJECTION, SOLUTION INTRAVENOUS; SUBCUTANEOUS at 12:06

## 2019-11-24 RX ADMIN — ROPINIROLE HYDROCHLORIDE 0.5 MG: 0.25 TABLET, FILM COATED ORAL at 21:55

## 2019-11-24 RX ADMIN — RIVAROXABAN 15 MG: 15 TABLET, FILM COATED ORAL at 10:36

## 2019-11-24 RX ADMIN — ACETAMINOPHEN 650 MG: 325 TABLET ORAL at 13:55

## 2019-11-24 RX ADMIN — LINAGLIPTIN 5 MG: 5 TABLET, FILM COATED ORAL at 09:24

## 2019-11-24 RX ADMIN — CARVEDILOL 25 MG: 25 TABLET, FILM COATED ORAL at 16:58

## 2019-11-24 RX ADMIN — CETIRIZINE HYDROCHLORIDE 5 MG: 10 TABLET ORAL at 09:25

## 2019-11-24 RX ADMIN — ACETAMINOPHEN 650 MG: 325 TABLET ORAL at 02:37

## 2019-11-24 RX ADMIN — RIVAROXABAN 15 MG: 15 TABLET, FILM COATED ORAL at 17:16

## 2019-11-24 RX ADMIN — SODIUM CHLORIDE, PRESERVATIVE FREE 10 ML: 5 INJECTION INTRAVENOUS at 21:57

## 2019-11-24 RX ADMIN — DESMOPRESSIN ACETATE 40 MG: 0.2 TABLET ORAL at 21:56

## 2019-11-24 RX ADMIN — TICAGRELOR 60 MG: 60 TABLET ORAL at 09:23

## 2019-11-24 RX ADMIN — SODIUM CHLORIDE, PRESERVATIVE FREE 10 ML: 5 INJECTION INTRAVENOUS at 10:35

## 2019-11-24 RX ADMIN — INSULIN LISPRO 2 UNITS: 100 INJECTION, SOLUTION INTRAVENOUS; SUBCUTANEOUS at 09:25

## 2019-11-24 RX ADMIN — CIPROFLOXACIN 500 MG: 500 TABLET ORAL at 09:24

## 2019-11-24 RX ADMIN — HYDRALAZINE HYDROCHLORIDE 10 MG: 20 INJECTION INTRAMUSCULAR; INTRAVENOUS at 03:34

## 2019-11-24 ASSESSMENT — PAIN SCALES - GENERAL
PAINLEVEL_OUTOF10: 8
PAINLEVEL_OUTOF10: 6
PAINLEVEL_OUTOF10: 6
PAINLEVEL_OUTOF10: 7

## 2019-11-24 ASSESSMENT — PAIN DESCRIPTION - LOCATION: LOCATION: HAND

## 2019-11-25 LAB
ANION GAP SERPL CALCULATED.3IONS-SCNC: 9 MMOL/L (ref 9–17)
BUN BLDV-MCNC: 14 MG/DL (ref 8–23)
BUN/CREAT BLD: ABNORMAL (ref 9–20)
CALCIUM SERPL-MCNC: 9.5 MG/DL (ref 8.6–10.4)
CHLORIDE BLD-SCNC: 103 MMOL/L (ref 98–107)
CO2: 25 MMOL/L (ref 20–31)
CREAT SERPL-MCNC: 0.76 MG/DL (ref 0.5–0.9)
GFR AFRICAN AMERICAN: >60 ML/MIN
GFR NON-AFRICAN AMERICAN: >60 ML/MIN
GFR SERPL CREATININE-BSD FRML MDRD: ABNORMAL ML/MIN/{1.73_M2}
GFR SERPL CREATININE-BSD FRML MDRD: ABNORMAL ML/MIN/{1.73_M2}
GLUCOSE BLD-MCNC: 144 MG/DL (ref 65–105)
GLUCOSE BLD-MCNC: 162 MG/DL (ref 65–105)
GLUCOSE BLD-MCNC: 165 MG/DL (ref 70–99)
HCT VFR BLD CALC: 35.3 % (ref 36.3–47.1)
HEMOGLOBIN: 11 G/DL (ref 11.9–15.1)
MCH RBC QN AUTO: 30 PG (ref 25.2–33.5)
MCHC RBC AUTO-ENTMCNC: 31.2 G/DL (ref 28.4–34.8)
MCV RBC AUTO: 96.2 FL (ref 82.6–102.9)
NRBC AUTOMATED: 0 PER 100 WBC
PDW BLD-RTO: 13.5 % (ref 11.8–14.4)
PLATELET # BLD: 174 K/UL (ref 138–453)
PMV BLD AUTO: 11.2 FL (ref 8.1–13.5)
POTASSIUM SERPL-SCNC: 4.3 MMOL/L (ref 3.7–5.3)
RBC # BLD: 3.67 M/UL (ref 3.95–5.11)
SODIUM BLD-SCNC: 137 MMOL/L (ref 135–144)
WBC # BLD: 6 K/UL (ref 3.5–11.3)

## 2019-11-25 PROCEDURE — 6370000000 HC RX 637 (ALT 250 FOR IP): Performed by: INTERNAL MEDICINE

## 2019-11-25 PROCEDURE — 6370000000 HC RX 637 (ALT 250 FOR IP): Performed by: NURSE PRACTITIONER

## 2019-11-25 PROCEDURE — 99239 HOSP IP/OBS DSCHRG MGMT >30: CPT | Performed by: INTERNAL MEDICINE

## 2019-11-25 PROCEDURE — 85027 COMPLETE CBC AUTOMATED: CPT

## 2019-11-25 PROCEDURE — 36415 COLL VENOUS BLD VENIPUNCTURE: CPT

## 2019-11-25 PROCEDURE — 97530 THERAPEUTIC ACTIVITIES: CPT

## 2019-11-25 PROCEDURE — 82947 ASSAY GLUCOSE BLOOD QUANT: CPT

## 2019-11-25 PROCEDURE — 97110 THERAPEUTIC EXERCISES: CPT

## 2019-11-25 PROCEDURE — 6360000002 HC RX W HCPCS: Performed by: FAMILY MEDICINE

## 2019-11-25 PROCEDURE — 80048 BASIC METABOLIC PNL TOTAL CA: CPT

## 2019-11-25 PROCEDURE — 6370000000 HC RX 637 (ALT 250 FOR IP): Performed by: FAMILY MEDICINE

## 2019-11-25 PROCEDURE — 2580000003 HC RX 258: Performed by: FAMILY MEDICINE

## 2019-11-25 RX ORDER — CARVEDILOL 25 MG/1
25 TABLET ORAL 2 TIMES DAILY WITH MEALS
Qty: 60 TABLET | Refills: 3 | Status: SHIPPED | OUTPATIENT
Start: 2019-11-25 | End: 2021-04-27 | Stop reason: SDUPTHER

## 2019-11-25 RX ORDER — ROPINIROLE 0.5 MG/1
0.5 TABLET, FILM COATED ORAL 3 TIMES DAILY
Qty: 90 TABLET | Refills: 0 | Status: SHIPPED | OUTPATIENT
Start: 2019-11-25 | End: 2020-12-11

## 2019-11-25 RX ORDER — METFORMIN HYDROCHLORIDE 750 MG/1
750 TABLET, EXTENDED RELEASE ORAL 2 TIMES DAILY WITH MEALS
Qty: 30 TABLET | Refills: 3 | Status: ON HOLD | OUTPATIENT
Start: 2019-11-25 | End: 2020-11-13 | Stop reason: HOSPADM

## 2019-11-25 RX ORDER — CIPROFLOXACIN 500 MG/1
500 TABLET, FILM COATED ORAL EVERY 12 HOURS SCHEDULED
Qty: 4 TABLET | Refills: 0 | Status: SHIPPED | OUTPATIENT
Start: 2019-11-25 | End: 2019-11-27

## 2019-11-25 RX ORDER — CARVEDILOL 25 MG/1
25 TABLET ORAL 2 TIMES DAILY WITH MEALS
Qty: 60 TABLET | Refills: 3 | Status: SHIPPED | OUTPATIENT
Start: 2019-11-25 | End: 2019-11-25

## 2019-11-25 RX ORDER — CALCIUM CARBONATE 200(500)MG
1000 TABLET,CHEWABLE ORAL 3 TIMES DAILY PRN
Qty: 90 TABLET | Refills: 0 | Status: SHIPPED | OUTPATIENT
Start: 2019-11-25 | End: 2019-12-25

## 2019-11-25 RX ORDER — ISOSORBIDE MONONITRATE 30 MG/1
30 TABLET, EXTENDED RELEASE ORAL DAILY
Qty: 30 TABLET | Refills: 3 | Status: SHIPPED | OUTPATIENT
Start: 2019-11-26 | End: 2021-04-27 | Stop reason: SDUPTHER

## 2019-11-25 RX ADMIN — SODIUM CHLORIDE, PRESERVATIVE FREE 10 ML: 5 INJECTION INTRAVENOUS at 07:43

## 2019-11-25 RX ADMIN — INSULIN LISPRO 2 UNITS: 100 INJECTION, SOLUTION INTRAVENOUS; SUBCUTANEOUS at 07:34

## 2019-11-25 RX ADMIN — ACETAMINOPHEN 650 MG: 325 TABLET ORAL at 03:54

## 2019-11-25 RX ADMIN — ISOSORBIDE MONONITRATE 30 MG: 30 TABLET ORAL at 07:34

## 2019-11-25 RX ADMIN — METFORMIN HYDROCHLORIDE 750 MG: 750 TABLET, EXTENDED RELEASE ORAL at 07:32

## 2019-11-25 RX ADMIN — CETIRIZINE HYDROCHLORIDE 5 MG: 10 TABLET ORAL at 07:33

## 2019-11-25 RX ADMIN — PANTOPRAZOLE SODIUM 20 MG: 20 TABLET, DELAYED RELEASE ORAL at 07:33

## 2019-11-25 RX ADMIN — SPIRONOLACTONE 25 MG: 25 TABLET ORAL at 07:34

## 2019-11-25 RX ADMIN — LISINOPRIL 10 MG: 10 TABLET ORAL at 07:32

## 2019-11-25 RX ADMIN — ANTACID TABLETS 1000 MG: 500 TABLET, CHEWABLE ORAL at 01:19

## 2019-11-25 RX ADMIN — ONDANSETRON 4 MG: 2 INJECTION INTRAMUSCULAR; INTRAVENOUS at 09:40

## 2019-11-25 RX ADMIN — MYCOPHENOLATE MOFETIL 300 MG: 500 TABLET ORAL at 07:33

## 2019-11-25 RX ADMIN — CIPROFLOXACIN 500 MG: 500 TABLET ORAL at 07:33

## 2019-11-25 RX ADMIN — TICAGRELOR 60 MG: 60 TABLET ORAL at 07:33

## 2019-11-25 RX ADMIN — ROPINIROLE HYDROCHLORIDE 0.5 MG: 0.25 TABLET, FILM COATED ORAL at 13:59

## 2019-11-25 RX ADMIN — INSULIN LISPRO 2 UNITS: 100 INJECTION, SOLUTION INTRAVENOUS; SUBCUTANEOUS at 11:53

## 2019-11-25 RX ADMIN — LINAGLIPTIN 5 MG: 5 TABLET, FILM COATED ORAL at 07:32

## 2019-11-25 RX ADMIN — ESCITALOPRAM OXALATE 10 MG: 10 TABLET ORAL at 07:33

## 2019-11-25 RX ADMIN — ROPINIROLE HYDROCHLORIDE 0.5 MG: 0.25 TABLET, FILM COATED ORAL at 07:34

## 2019-11-25 RX ADMIN — MYCOPHENOLATE MOFETIL 300 MG: 500 TABLET ORAL at 13:59

## 2019-11-25 RX ADMIN — RIVAROXABAN 15 MG: 15 TABLET, FILM COATED ORAL at 07:33

## 2019-11-25 ASSESSMENT — PAIN DESCRIPTION - ORIENTATION: ORIENTATION: RIGHT;LEFT

## 2019-11-25 ASSESSMENT — PAIN SCALES - GENERAL
PAINLEVEL_OUTOF10: 6
PAINLEVEL_OUTOF10: 8

## 2019-11-25 ASSESSMENT — PAIN DESCRIPTION - LOCATION: LOCATION: HAND

## 2019-11-25 ASSESSMENT — PAIN DESCRIPTION - PAIN TYPE: TYPE: ACUTE PAIN;CHRONIC PAIN

## 2019-12-01 VITALS
RESPIRATION RATE: 16 BRPM | SYSTOLIC BLOOD PRESSURE: 154 MMHG | BODY MASS INDEX: 37.34 KG/M2 | DIASTOLIC BLOOD PRESSURE: 54 MMHG | HEART RATE: 64 BPM | HEIGHT: 66 IN | TEMPERATURE: 97.8 F | OXYGEN SATURATION: 97 % | WEIGHT: 232.37 LBS

## 2019-12-09 ENCOUNTER — HOSPITAL ENCOUNTER (OUTPATIENT)
Dept: VASCULAR LAB | Age: 74
Discharge: HOME | End: 2019-12-09
Payer: MEDICARE

## 2019-12-09 DIAGNOSIS — Z98.62 S/P ANGIOPLASTY: ICD-10-CM

## 2019-12-09 DIAGNOSIS — Z98.890 S/P ANGIOGRAM OF EXTREMITY: ICD-10-CM

## 2019-12-09 DIAGNOSIS — I73.9 CLAUDICATION IN PERIPHERAL VASCULAR DISEASE (HCC): ICD-10-CM

## 2019-12-09 PROCEDURE — 93923 UPR/LXTR ART STDY 3+ LVLS: CPT

## 2019-12-10 ENCOUNTER — TELEPHONE (OUTPATIENT)
Dept: VASCULAR SURGERY | Age: 74
End: 2019-12-10

## 2019-12-13 ENCOUNTER — APPOINTMENT (OUTPATIENT)
Dept: GENERAL RADIOLOGY | Age: 74
DRG: 293 | End: 2019-12-13
Payer: MEDICARE

## 2019-12-13 ENCOUNTER — APPOINTMENT (OUTPATIENT)
Dept: CT IMAGING | Age: 74
DRG: 293 | End: 2019-12-13
Payer: MEDICARE

## 2019-12-13 ENCOUNTER — HOSPITAL ENCOUNTER (INPATIENT)
Age: 74
LOS: 3 days | Discharge: HOME HEALTH | DRG: 293 | End: 2019-12-16
Attending: EMERGENCY MEDICINE | Admitting: FAMILY MEDICINE
Payer: MEDICARE
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
## 2019-12-13 DIAGNOSIS — I50.21 ACUTE SYSTOLIC CONGESTIVE HEART FAILURE (HCC): Primary | ICD-10-CM

## 2019-12-13 DIAGNOSIS — J81.0 ACUTE PULMONARY EDEMA (HCC): ICD-10-CM

## 2019-12-13 PROBLEM — I50.9 CONGESTIVE HEART FAILURE (HCC): Status: ACTIVE | Noted: 2019-12-13

## 2019-12-13 LAB
ABSOLUTE EOS #: 0.18 K/UL (ref 0–0.44)
ABSOLUTE IMMATURE GRANULOCYTE: 0.03 K/UL (ref 0–0.3)
ABSOLUTE LYMPH #: 2.02 K/UL (ref 1.1–3.7)
ABSOLUTE MONO #: 0.37 K/UL (ref 0.1–1.2)
ANION GAP SERPL CALCULATED.3IONS-SCNC: 13 MMOL/L (ref 9–17)
BASOPHILS # BLD: 1 % (ref 0–2)
BASOPHILS ABSOLUTE: 0.03 K/UL (ref 0–0.2)
BNP INTERPRETATION: ABNORMAL
BUN BLDV-MCNC: 11 MG/DL (ref 8–23)
BUN/CREAT BLD: ABNORMAL (ref 9–20)
CALCIUM SERPL-MCNC: 9.8 MG/DL (ref 8.6–10.4)
CHLORIDE BLD-SCNC: 102 MMOL/L (ref 98–107)
CO2: 25 MMOL/L (ref 20–31)
CREAT SERPL-MCNC: 0.71 MG/DL (ref 0.5–0.9)
DIFFERENTIAL TYPE: ABNORMAL
EOSINOPHILS RELATIVE PERCENT: 3 % (ref 1–4)
GFR AFRICAN AMERICAN: >60 ML/MIN
GFR NON-AFRICAN AMERICAN: >60 ML/MIN
GFR SERPL CREATININE-BSD FRML MDRD: ABNORMAL ML/MIN/{1.73_M2}
GFR SERPL CREATININE-BSD FRML MDRD: ABNORMAL ML/MIN/{1.73_M2}
GLUCOSE BLD-MCNC: 173 MG/DL (ref 65–105)
GLUCOSE BLD-MCNC: 264 MG/DL (ref 70–99)
HCT VFR BLD CALC: 39.7 % (ref 36.3–47.1)
HEMOGLOBIN: 12.6 G/DL (ref 11.9–15.1)
IMMATURE GRANULOCYTES: 1 %
LYMPHOCYTES # BLD: 32 % (ref 24–43)
MCH RBC QN AUTO: 30.7 PG (ref 25.2–33.5)
MCHC RBC AUTO-ENTMCNC: 31.7 G/DL (ref 28.4–34.8)
MCV RBC AUTO: 96.6 FL (ref 82.6–102.9)
MONOCYTES # BLD: 6 % (ref 3–12)
NRBC AUTOMATED: 0 PER 100 WBC
PDW BLD-RTO: 13.6 % (ref 11.8–14.4)
PLATELET # BLD: 174 K/UL (ref 138–453)
PLATELET ESTIMATE: ABNORMAL
PMV BLD AUTO: 11.8 FL (ref 8.1–13.5)
POTASSIUM SERPL-SCNC: 3.9 MMOL/L (ref 3.7–5.3)
PRO-BNP: 798 PG/ML
RBC # BLD: 4.11 M/UL (ref 3.95–5.11)
RBC # BLD: ABNORMAL 10*6/UL
SEG NEUTROPHILS: 57 % (ref 36–65)
SEGMENTED NEUTROPHILS ABSOLUTE COUNT: 3.75 K/UL (ref 1.5–8.1)
SODIUM BLD-SCNC: 140 MMOL/L (ref 135–144)
TROPONIN INTERP: ABNORMAL
TROPONIN INTERP: ABNORMAL
TROPONIN T: ABNORMAL NG/ML
TROPONIN T: ABNORMAL NG/ML
TROPONIN, HIGH SENSITIVITY: 32 NG/L (ref 0–14)
TROPONIN, HIGH SENSITIVITY: 35 NG/L (ref 0–14)
WBC # BLD: 6.4 K/UL (ref 3.5–11.3)
WBC # BLD: ABNORMAL 10*3/UL

## 2019-12-13 PROCEDURE — 71260 CT THORAX DX C+: CPT

## 2019-12-13 PROCEDURE — 84484 ASSAY OF TROPONIN QUANT: CPT

## 2019-12-13 PROCEDURE — 70450 CT HEAD/BRAIN W/O DYE: CPT

## 2019-12-13 PROCEDURE — 6370000000 HC RX 637 (ALT 250 FOR IP): Performed by: NURSE PRACTITIONER

## 2019-12-13 PROCEDURE — 1200000000 HC SEMI PRIVATE

## 2019-12-13 PROCEDURE — 71101 X-RAY EXAM UNILAT RIBS/CHEST: CPT

## 2019-12-13 PROCEDURE — 73110 X-RAY EXAM OF WRIST: CPT

## 2019-12-13 PROCEDURE — 82947 ASSAY GLUCOSE BLOOD QUANT: CPT

## 2019-12-13 PROCEDURE — 93005 ELECTROCARDIOGRAM TRACING: CPT | Performed by: EMERGENCY MEDICINE

## 2019-12-13 PROCEDURE — 83880 ASSAY OF NATRIURETIC PEPTIDE: CPT

## 2019-12-13 PROCEDURE — 73030 X-RAY EXAM OF SHOULDER: CPT

## 2019-12-13 PROCEDURE — 2580000003 HC RX 258: Performed by: NURSE PRACTITIONER

## 2019-12-13 PROCEDURE — 6360000004 HC RX CONTRAST MEDICATION: Performed by: EMERGENCY MEDICINE

## 2019-12-13 PROCEDURE — 83036 HEMOGLOBIN GLYCOSYLATED A1C: CPT

## 2019-12-13 PROCEDURE — 6360000002 HC RX W HCPCS: Performed by: EMERGENCY MEDICINE

## 2019-12-13 PROCEDURE — 73130 X-RAY EXAM OF HAND: CPT

## 2019-12-13 PROCEDURE — 73080 X-RAY EXAM OF ELBOW: CPT

## 2019-12-13 PROCEDURE — 80048 BASIC METABOLIC PNL TOTAL CA: CPT

## 2019-12-13 PROCEDURE — 99223 1ST HOSP IP/OBS HIGH 75: CPT | Performed by: NURSE PRACTITIONER

## 2019-12-13 PROCEDURE — 6370000000 HC RX 637 (ALT 250 FOR IP): Performed by: EMERGENCY MEDICINE

## 2019-12-13 PROCEDURE — 36415 COLL VENOUS BLD VENIPUNCTURE: CPT

## 2019-12-13 PROCEDURE — 85025 COMPLETE CBC W/AUTO DIFF WBC: CPT

## 2019-12-13 PROCEDURE — 99285 EMERGENCY DEPT VISIT HI MDM: CPT

## 2019-12-13 RX ORDER — CALCIUM CARBONATE 200(500)MG
1000 TABLET,CHEWABLE ORAL 3 TIMES DAILY PRN
Status: DISCONTINUED | OUTPATIENT
Start: 2019-12-13 | End: 2019-12-16 | Stop reason: HOSPADM

## 2019-12-13 RX ORDER — ONDANSETRON 2 MG/ML
4 INJECTION INTRAMUSCULAR; INTRAVENOUS EVERY 6 HOURS PRN
Status: DISCONTINUED | OUTPATIENT
Start: 2019-12-13 | End: 2019-12-16 | Stop reason: HOSPADM

## 2019-12-13 RX ORDER — SPIRONOLACTONE 25 MG/1
25 TABLET ORAL DAILY
Status: DISCONTINUED | OUTPATIENT
Start: 2019-12-14 | End: 2019-12-16 | Stop reason: HOSPADM

## 2019-12-13 RX ORDER — LISINOPRIL 10 MG/1
10 TABLET ORAL DAILY
Status: DISCONTINUED | OUTPATIENT
Start: 2019-12-14 | End: 2019-12-16 | Stop reason: HOSPADM

## 2019-12-13 RX ORDER — HYDROCODONE BITARTRATE AND ACETAMINOPHEN 5; 325 MG/1; MG/1
1 TABLET ORAL EVERY 4 HOURS PRN
Status: DISCONTINUED | OUTPATIENT
Start: 2019-12-13 | End: 2019-12-16 | Stop reason: HOSPADM

## 2019-12-13 RX ORDER — ALBUTEROL SULFATE 2.5 MG/3ML
2.5 SOLUTION RESPIRATORY (INHALATION)
Status: DISCONTINUED | OUTPATIENT
Start: 2019-12-13 | End: 2019-12-16 | Stop reason: HOSPADM

## 2019-12-13 RX ORDER — BENZONATATE 100 MG/1
100 CAPSULE ORAL 3 TIMES DAILY PRN
Status: DISCONTINUED | OUTPATIENT
Start: 2019-12-13 | End: 2019-12-16 | Stop reason: HOSPADM

## 2019-12-13 RX ORDER — DEXTROSE MONOHYDRATE 25 G/50ML
12.5 INJECTION, SOLUTION INTRAVENOUS PRN
Status: DISCONTINUED | OUTPATIENT
Start: 2019-12-13 | End: 2019-12-16 | Stop reason: HOSPADM

## 2019-12-13 RX ORDER — ATORVASTATIN CALCIUM 40 MG/1
40 TABLET, FILM COATED ORAL NIGHTLY
Status: DISCONTINUED | OUTPATIENT
Start: 2019-12-13 | End: 2019-12-16 | Stop reason: HOSPADM

## 2019-12-13 RX ORDER — ASPIRIN 81 MG/1
81 TABLET, CHEWABLE ORAL DAILY
Status: DISCONTINUED | OUTPATIENT
Start: 2019-12-14 | End: 2019-12-16 | Stop reason: HOSPADM

## 2019-12-13 RX ORDER — ESCITALOPRAM OXALATE 10 MG/1
10 TABLET ORAL DAILY
Status: DISCONTINUED | OUTPATIENT
Start: 2019-12-14 | End: 2019-12-16 | Stop reason: HOSPADM

## 2019-12-13 RX ORDER — OXYCODONE HYDROCHLORIDE 5 MG/1
5 TABLET ORAL ONCE
Status: COMPLETED | OUTPATIENT
Start: 2019-12-13 | End: 2019-12-13

## 2019-12-13 RX ORDER — VITAMIN E 268 MG
400 CAPSULE ORAL DAILY
Status: DISCONTINUED | OUTPATIENT
Start: 2019-12-14 | End: 2019-12-16 | Stop reason: HOSPADM

## 2019-12-13 RX ORDER — SODIUM CHLORIDE 0.9 % (FLUSH) 0.9 %
10 SYRINGE (ML) INJECTION PRN
Status: DISCONTINUED | OUTPATIENT
Start: 2019-12-13 | End: 2019-12-16 | Stop reason: HOSPADM

## 2019-12-13 RX ORDER — CARVEDILOL 25 MG/1
25 TABLET ORAL 2 TIMES DAILY WITH MEALS
Status: DISCONTINUED | OUTPATIENT
Start: 2019-12-14 | End: 2019-12-13

## 2019-12-13 RX ORDER — ACETAMINOPHEN 325 MG/1
650 TABLET ORAL EVERY 4 HOURS PRN
Status: DISCONTINUED | OUTPATIENT
Start: 2019-12-13 | End: 2019-12-16 | Stop reason: HOSPADM

## 2019-12-13 RX ORDER — GABAPENTIN 300 MG/1
300 CAPSULE ORAL 3 TIMES DAILY
Status: DISCONTINUED | OUTPATIENT
Start: 2019-12-13 | End: 2019-12-16 | Stop reason: HOSPADM

## 2019-12-13 RX ORDER — FENTANYL CITRATE 50 UG/ML
50 INJECTION, SOLUTION INTRAMUSCULAR; INTRAVENOUS ONCE
Status: COMPLETED | OUTPATIENT
Start: 2019-12-13 | End: 2019-12-13

## 2019-12-13 RX ORDER — HYDROCODONE BITARTRATE AND ACETAMINOPHEN 5; 325 MG/1; MG/1
2 TABLET ORAL EVERY 4 HOURS PRN
Status: DISCONTINUED | OUTPATIENT
Start: 2019-12-13 | End: 2019-12-16 | Stop reason: HOSPADM

## 2019-12-13 RX ORDER — DEXTROSE MONOHYDRATE 50 MG/ML
100 INJECTION, SOLUTION INTRAVENOUS PRN
Status: DISCONTINUED | OUTPATIENT
Start: 2019-12-13 | End: 2019-12-16 | Stop reason: HOSPADM

## 2019-12-13 RX ORDER — SODIUM CHLORIDE 0.9 % (FLUSH) 0.9 %
10 SYRINGE (ML) INJECTION EVERY 12 HOURS SCHEDULED
Status: DISCONTINUED | OUTPATIENT
Start: 2019-12-13 | End: 2019-12-16 | Stop reason: HOSPADM

## 2019-12-13 RX ORDER — NICOTINE POLACRILEX 4 MG
15 LOZENGE BUCCAL PRN
Status: DISCONTINUED | OUTPATIENT
Start: 2019-12-13 | End: 2019-12-16 | Stop reason: HOSPADM

## 2019-12-13 RX ORDER — ROPINIROLE 0.25 MG/1
0.5 TABLET, FILM COATED ORAL 3 TIMES DAILY
Status: DISCONTINUED | OUTPATIENT
Start: 2019-12-13 | End: 2019-12-16 | Stop reason: HOSPADM

## 2019-12-13 RX ORDER — CARVEDILOL 25 MG/1
25 TABLET ORAL 2 TIMES DAILY WITH MEALS
Status: DISCONTINUED | OUTPATIENT
Start: 2019-12-13 | End: 2019-12-16 | Stop reason: HOSPADM

## 2019-12-13 RX ORDER — ACETAMINOPHEN 500 MG
1000 TABLET ORAL ONCE
Status: COMPLETED | OUTPATIENT
Start: 2019-12-13 | End: 2019-12-13

## 2019-12-13 RX ORDER — UREA 10 %
3 LOTION (ML) TOPICAL NIGHTLY PRN
Status: DISCONTINUED | OUTPATIENT
Start: 2019-12-13 | End: 2019-12-16 | Stop reason: HOSPADM

## 2019-12-13 RX ORDER — FUROSEMIDE 10 MG/ML
40 INJECTION INTRAMUSCULAR; INTRAVENOUS 2 TIMES DAILY
Status: DISCONTINUED | OUTPATIENT
Start: 2019-12-14 | End: 2019-12-16 | Stop reason: HOSPADM

## 2019-12-13 RX ORDER — ISOSORBIDE MONONITRATE 30 MG/1
30 TABLET, EXTENDED RELEASE ORAL DAILY
Status: DISCONTINUED | OUTPATIENT
Start: 2019-12-14 | End: 2019-12-16 | Stop reason: HOSPADM

## 2019-12-13 RX ORDER — CETIRIZINE HYDROCHLORIDE 10 MG/1
10 TABLET ORAL DAILY
Status: DISCONTINUED | OUTPATIENT
Start: 2019-12-14 | End: 2019-12-16 | Stop reason: HOSPADM

## 2019-12-13 RX ORDER — NICOTINE 21 MG/24HR
1 PATCH, TRANSDERMAL 24 HOURS TRANSDERMAL DAILY PRN
Status: DISCONTINUED | OUTPATIENT
Start: 2019-12-13 | End: 2019-12-16 | Stop reason: HOSPADM

## 2019-12-13 RX ADMIN — ROPINIROLE HYDROCHLORIDE 0.5 MG: 0.25 TABLET, FILM COATED ORAL at 22:46

## 2019-12-13 RX ADMIN — DESMOPRESSIN ACETATE 40 MG: 0.2 TABLET ORAL at 22:46

## 2019-12-13 RX ADMIN — HYDROCODONE BITARTRATE AND ACETAMINOPHEN 2 TABLET: 5; 325 TABLET ORAL at 22:35

## 2019-12-13 RX ADMIN — FENTANYL CITRATE 50 MCG: 50 INJECTION, SOLUTION INTRAMUSCULAR; INTRAVENOUS at 19:19

## 2019-12-13 RX ADMIN — Medication 10 ML: at 22:53

## 2019-12-13 RX ADMIN — TICAGRELOR 90 MG: 90 TABLET ORAL at 22:46

## 2019-12-13 RX ADMIN — OXYCODONE HYDROCHLORIDE 5 MG: 5 TABLET ORAL at 21:02

## 2019-12-13 RX ADMIN — GABAPENTIN 300 MG: 300 CAPSULE ORAL at 22:46

## 2019-12-13 RX ADMIN — IOHEXOL 75 ML: 350 INJECTION, SOLUTION INTRAVENOUS at 19:01

## 2019-12-13 RX ADMIN — INSULIN LISPRO 1 UNITS: 100 INJECTION, SOLUTION INTRAVENOUS; SUBCUTANEOUS at 22:47

## 2019-12-13 RX ADMIN — ACETAMINOPHEN 1000 MG: 500 TABLET ORAL at 18:34

## 2019-12-13 ASSESSMENT — ENCOUNTER SYMPTOMS
SHORTNESS OF BREATH: 1
TROUBLE SWALLOWING: 0
ABDOMINAL PAIN: 0
CHEST TIGHTNESS: 1
BACK PAIN: 0
NAUSEA: 0
VOMITING: 0
VOICE CHANGE: 0

## 2019-12-13 ASSESSMENT — PAIN DESCRIPTION - ONSET: ONSET: SUDDEN

## 2019-12-13 ASSESSMENT — PAIN SCALES - GENERAL
PAINLEVEL_OUTOF10: 8
PAINLEVEL_OUTOF10: 10
PAINLEVEL_OUTOF10: 9
PAINLEVEL_OUTOF10: 10

## 2019-12-13 ASSESSMENT — PAIN DESCRIPTION - LOCATION: LOCATION: ARM;CHEST

## 2019-12-13 ASSESSMENT — PAIN DESCRIPTION - ORIENTATION: ORIENTATION: RIGHT

## 2019-12-13 ASSESSMENT — PAIN DESCRIPTION - PROGRESSION: CLINICAL_PROGRESSION: GRADUALLY WORSENING

## 2019-12-13 ASSESSMENT — PAIN DESCRIPTION - PAIN TYPE: TYPE: ACUTE PAIN

## 2019-12-13 ASSESSMENT — PAIN DESCRIPTION - DESCRIPTORS: DESCRIPTORS: ACHING

## 2019-12-14 ENCOUNTER — APPOINTMENT (OUTPATIENT)
Dept: GENERAL RADIOLOGY | Age: 74
DRG: 293 | End: 2019-12-14
Payer: MEDICARE
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
## 2019-12-14 LAB
BNP INTERPRETATION: ABNORMAL
CHOLESTEROL/HDL RATIO: 2.9
CHOLESTEROL: 148 MG/DL
EKG ATRIAL RATE: 85 BPM
EKG P AXIS: 79 DEGREES
EKG P-R INTERVAL: 162 MS
EKG Q-T INTERVAL: 384 MS
EKG QRS DURATION: 86 MS
EKG QTC CALCULATION (BAZETT): 456 MS
EKG R AXIS: 14 DEGREES
EKG T AXIS: 71 DEGREES
EKG VENTRICULAR RATE: 85 BPM
GLUCOSE BLD-MCNC: 151 MG/DL (ref 65–105)
GLUCOSE BLD-MCNC: 194 MG/DL (ref 65–105)
GLUCOSE BLD-MCNC: 208 MG/DL (ref 65–105)
GLUCOSE BLD-MCNC: 217 MG/DL (ref 65–105)
HCT VFR BLD CALC: 32.6 % (ref 36.3–47.1)
HDLC SERPL-MCNC: 51 MG/DL
HEMOGLOBIN: 10.3 G/DL (ref 11.9–15.1)
LDL CHOLESTEROL: 85 MG/DL (ref 0–130)
MAGNESIUM: 1.9 MG/DL (ref 1.6–2.6)
MCH RBC QN AUTO: 30.3 PG (ref 25.2–33.5)
MCHC RBC AUTO-ENTMCNC: 31.6 G/DL (ref 28.4–34.8)
MCV RBC AUTO: 95.9 FL (ref 82.6–102.9)
NRBC AUTOMATED: 0 PER 100 WBC
PDW BLD-RTO: 13.7 % (ref 11.8–14.4)
PLATELET # BLD: 161 K/UL (ref 138–453)
PMV BLD AUTO: 11.9 FL (ref 8.1–13.5)
PRO-BNP: 1223 PG/ML
RBC # BLD: 3.4 M/UL (ref 3.95–5.11)
TRIGL SERPL-MCNC: 61 MG/DL
TSH SERPL DL<=0.05 MIU/L-ACNC: 0.46 MIU/L (ref 0.3–5)
VLDLC SERPL CALC-MCNC: NORMAL MG/DL (ref 1–30)
WBC # BLD: 6.5 K/UL (ref 3.5–11.3)

## 2019-12-14 PROCEDURE — 99232 SBSQ HOSP IP/OBS MODERATE 35: CPT | Performed by: FAMILY MEDICINE

## 2019-12-14 PROCEDURE — 2580000003 HC RX 258: Performed by: NURSE PRACTITIONER

## 2019-12-14 PROCEDURE — 82947 ASSAY GLUCOSE BLOOD QUANT: CPT

## 2019-12-14 PROCEDURE — 80061 LIPID PANEL: CPT

## 2019-12-14 PROCEDURE — 83735 ASSAY OF MAGNESIUM: CPT

## 2019-12-14 PROCEDURE — 6370000000 HC RX 637 (ALT 250 FOR IP): Performed by: NURSE PRACTITIONER

## 2019-12-14 PROCEDURE — 83880 ASSAY OF NATRIURETIC PEPTIDE: CPT

## 2019-12-14 PROCEDURE — 85027 COMPLETE CBC AUTOMATED: CPT

## 2019-12-14 PROCEDURE — 71046 X-RAY EXAM CHEST 2 VIEWS: CPT

## 2019-12-14 PROCEDURE — 1200000000 HC SEMI PRIVATE

## 2019-12-14 PROCEDURE — 84443 ASSAY THYROID STIM HORMONE: CPT

## 2019-12-14 PROCEDURE — 6360000002 HC RX W HCPCS: Performed by: NURSE PRACTITIONER

## 2019-12-14 PROCEDURE — 36415 COLL VENOUS BLD VENIPUNCTURE: CPT

## 2019-12-14 RX ORDER — TRAMADOL HYDROCHLORIDE 50 MG/1
100 TABLET ORAL EVERY 6 HOURS PRN
Status: DISCONTINUED | OUTPATIENT
Start: 2019-12-14 | End: 2019-12-16 | Stop reason: HOSPADM

## 2019-12-14 RX ORDER — TRAMADOL HYDROCHLORIDE 50 MG/1
50 TABLET ORAL EVERY 6 HOURS PRN
Status: DISCONTINUED | OUTPATIENT
Start: 2019-12-14 | End: 2019-12-16 | Stop reason: HOSPADM

## 2019-12-14 RX ADMIN — LINAGLIPTIN 5 MG: 5 TABLET, FILM COATED ORAL at 09:00

## 2019-12-14 RX ADMIN — ROPINIROLE HYDROCHLORIDE 0.5 MG: 0.25 TABLET, FILM COATED ORAL at 09:18

## 2019-12-14 RX ADMIN — CARVEDILOL 25 MG: 25 TABLET, FILM COATED ORAL at 17:34

## 2019-12-14 RX ADMIN — ASPIRIN 81 MG: 81 TABLET, CHEWABLE ORAL at 09:18

## 2019-12-14 RX ADMIN — DESMOPRESSIN ACETATE 40 MG: 0.2 TABLET ORAL at 21:51

## 2019-12-14 RX ADMIN — LISINOPRIL 10 MG: 10 TABLET ORAL at 09:18

## 2019-12-14 RX ADMIN — ISOSORBIDE MONONITRATE 30 MG: 30 TABLET ORAL at 09:18

## 2019-12-14 RX ADMIN — SPIRONOLACTONE 25 MG: 25 TABLET ORAL at 09:18

## 2019-12-14 RX ADMIN — TICAGRELOR 90 MG: 90 TABLET ORAL at 21:50

## 2019-12-14 RX ADMIN — GABAPENTIN 300 MG: 300 CAPSULE ORAL at 13:09

## 2019-12-14 RX ADMIN — GABAPENTIN 300 MG: 300 CAPSULE ORAL at 09:19

## 2019-12-14 RX ADMIN — INSULIN LISPRO 4 UNITS: 100 INJECTION, SOLUTION INTRAVENOUS; SUBCUTANEOUS at 17:33

## 2019-12-14 RX ADMIN — HYDROCODONE BITARTRATE AND ACETAMINOPHEN 2 TABLET: 5; 325 TABLET ORAL at 13:08

## 2019-12-14 RX ADMIN — TICAGRELOR 90 MG: 90 TABLET ORAL at 09:19

## 2019-12-14 RX ADMIN — CETIRIZINE HYDROCHLORIDE 10 MG: 10 TABLET ORAL at 09:19

## 2019-12-14 RX ADMIN — ROPINIROLE HYDROCHLORIDE 0.5 MG: 0.25 TABLET, FILM COATED ORAL at 13:09

## 2019-12-14 RX ADMIN — GABAPENTIN 300 MG: 300 CAPSULE ORAL at 21:51

## 2019-12-14 RX ADMIN — FUROSEMIDE 40 MG: 10 INJECTION, SOLUTION INTRAMUSCULAR; INTRAVENOUS at 17:34

## 2019-12-14 RX ADMIN — BENZONATATE 100 MG: 100 CAPSULE ORAL at 17:34

## 2019-12-14 RX ADMIN — INSULIN LISPRO 4 UNITS: 100 INJECTION, SOLUTION INTRAVENOUS; SUBCUTANEOUS at 13:38

## 2019-12-14 RX ADMIN — ESCITALOPRAM OXALATE 10 MG: 10 TABLET ORAL at 09:18

## 2019-12-14 RX ADMIN — INSULIN LISPRO 2 UNITS: 100 INJECTION, SOLUTION INTRAVENOUS; SUBCUTANEOUS at 09:14

## 2019-12-14 RX ADMIN — RIVAROXABAN 20 MG: 20 TABLET, FILM COATED ORAL at 09:18

## 2019-12-14 RX ADMIN — VITAMIN E CAP 400 UNIT 400 UNITS: 400 CAP at 09:18

## 2019-12-14 RX ADMIN — HYDROCODONE BITARTRATE AND ACETAMINOPHEN 2 TABLET: 5; 325 TABLET ORAL at 06:50

## 2019-12-14 RX ADMIN — ROPINIROLE HYDROCHLORIDE 0.5 MG: 0.25 TABLET, FILM COATED ORAL at 21:50

## 2019-12-14 RX ADMIN — CARVEDILOL 25 MG: 25 TABLET, FILM COATED ORAL at 09:18

## 2019-12-14 RX ADMIN — CARVEDILOL 25 MG: 25 TABLET, FILM COATED ORAL at 00:01

## 2019-12-14 RX ADMIN — Medication 10 ML: at 21:51

## 2019-12-14 RX ADMIN — INSULIN LISPRO 1 UNITS: 100 INJECTION, SOLUTION INTRAVENOUS; SUBCUTANEOUS at 22:07

## 2019-12-14 RX ADMIN — HYDROCODONE BITARTRATE AND ACETAMINOPHEN 2 TABLET: 5; 325 TABLET ORAL at 22:00

## 2019-12-14 RX ADMIN — FUROSEMIDE 40 MG: 10 INJECTION, SOLUTION INTRAMUSCULAR; INTRAVENOUS at 09:18

## 2019-12-14 RX ADMIN — Medication 10 ML: at 09:18

## 2019-12-14 ASSESSMENT — PAIN DESCRIPTION - FREQUENCY: FREQUENCY: CONTINUOUS

## 2019-12-14 ASSESSMENT — PAIN DESCRIPTION - DESCRIPTORS: DESCRIPTORS: ACHING

## 2019-12-14 ASSESSMENT — PAIN SCALES - GENERAL
PAINLEVEL_OUTOF10: 7
PAINLEVEL_OUTOF10: 9
PAINLEVEL_OUTOF10: 8

## 2019-12-14 ASSESSMENT — PAIN DESCRIPTION - PROGRESSION: CLINICAL_PROGRESSION: GRADUALLY WORSENING

## 2019-12-14 ASSESSMENT — PAIN DESCRIPTION - LOCATION: LOCATION: ARM;HAND

## 2019-12-14 ASSESSMENT — PAIN DESCRIPTION - ONSET: ONSET: SUDDEN

## 2019-12-14 ASSESSMENT — PAIN - FUNCTIONAL ASSESSMENT: PAIN_FUNCTIONAL_ASSESSMENT: PREVENTS OR INTERFERES SOME ACTIVE ACTIVITIES AND ADLS

## 2019-12-14 ASSESSMENT — PAIN DESCRIPTION - PAIN TYPE: TYPE: ACUTE PAIN

## 2019-12-14 ASSESSMENT — PAIN DESCRIPTION - ORIENTATION: ORIENTATION: RIGHT

## 2019-12-15 LAB
ANION GAP SERPL CALCULATED.3IONS-SCNC: 11 MMOL/L (ref 9–17)
BUN BLDV-MCNC: 17 MG/DL (ref 8–23)
BUN/CREAT BLD: ABNORMAL (ref 9–20)
CALCIUM SERPL-MCNC: 8.7 MG/DL (ref 8.6–10.4)
CHLORIDE BLD-SCNC: 102 MMOL/L (ref 98–107)
CO2: 27 MMOL/L (ref 20–31)
CREAT SERPL-MCNC: 0.74 MG/DL (ref 0.5–0.9)
GFR AFRICAN AMERICAN: >60 ML/MIN
GFR NON-AFRICAN AMERICAN: >60 ML/MIN
GFR SERPL CREATININE-BSD FRML MDRD: ABNORMAL ML/MIN/{1.73_M2}
GFR SERPL CREATININE-BSD FRML MDRD: ABNORMAL ML/MIN/{1.73_M2}
GLUCOSE BLD-MCNC: 153 MG/DL (ref 65–105)
GLUCOSE BLD-MCNC: 164 MG/DL (ref 65–105)
GLUCOSE BLD-MCNC: 177 MG/DL (ref 70–99)
GLUCOSE BLD-MCNC: 179 MG/DL (ref 65–105)
GLUCOSE BLD-MCNC: 182 MG/DL (ref 65–105)
GLUCOSE BLD-MCNC: 212 MG/DL (ref 65–105)
POTASSIUM SERPL-SCNC: 3.9 MMOL/L (ref 3.7–5.3)
SODIUM BLD-SCNC: 140 MMOL/L (ref 135–144)

## 2019-12-15 PROCEDURE — 1200000000 HC SEMI PRIVATE

## 2019-12-15 PROCEDURE — 6370000000 HC RX 637 (ALT 250 FOR IP): Performed by: FAMILY MEDICINE

## 2019-12-15 PROCEDURE — 99232 SBSQ HOSP IP/OBS MODERATE 35: CPT | Performed by: FAMILY MEDICINE

## 2019-12-15 PROCEDURE — 82947 ASSAY GLUCOSE BLOOD QUANT: CPT

## 2019-12-15 PROCEDURE — 6370000000 HC RX 637 (ALT 250 FOR IP): Performed by: NURSE PRACTITIONER

## 2019-12-15 PROCEDURE — 36415 COLL VENOUS BLD VENIPUNCTURE: CPT

## 2019-12-15 PROCEDURE — 80048 BASIC METABOLIC PNL TOTAL CA: CPT

## 2019-12-15 PROCEDURE — 6360000002 HC RX W HCPCS: Performed by: NURSE PRACTITIONER

## 2019-12-15 PROCEDURE — 85025 COMPLETE CBC W/AUTO DIFF WBC: CPT

## 2019-12-15 PROCEDURE — 2580000003 HC RX 258: Performed by: NURSE PRACTITIONER

## 2019-12-15 RX ADMIN — CARVEDILOL 25 MG: 25 TABLET, FILM COATED ORAL at 08:17

## 2019-12-15 RX ADMIN — INSULIN LISPRO 2 UNITS: 100 INJECTION, SOLUTION INTRAVENOUS; SUBCUTANEOUS at 16:57

## 2019-12-15 RX ADMIN — ASPIRIN 81 MG: 81 TABLET, CHEWABLE ORAL at 08:16

## 2019-12-15 RX ADMIN — ESCITALOPRAM OXALATE 10 MG: 10 TABLET ORAL at 08:14

## 2019-12-15 RX ADMIN — FUROSEMIDE 40 MG: 10 INJECTION, SOLUTION INTRAMUSCULAR; INTRAVENOUS at 08:17

## 2019-12-15 RX ADMIN — ISOSORBIDE MONONITRATE 30 MG: 30 TABLET ORAL at 08:14

## 2019-12-15 RX ADMIN — CETIRIZINE HYDROCHLORIDE 10 MG: 10 TABLET ORAL at 08:14

## 2019-12-15 RX ADMIN — INSULIN LISPRO 4 UNITS: 100 INJECTION, SOLUTION INTRAVENOUS; SUBCUTANEOUS at 13:13

## 2019-12-15 RX ADMIN — LINAGLIPTIN 5 MG: 5 TABLET, FILM COATED ORAL at 08:17

## 2019-12-15 RX ADMIN — FUROSEMIDE 40 MG: 10 INJECTION, SOLUTION INTRAMUSCULAR; INTRAVENOUS at 16:56

## 2019-12-15 RX ADMIN — ROPINIROLE HYDROCHLORIDE 0.5 MG: 0.25 TABLET, FILM COATED ORAL at 20:32

## 2019-12-15 RX ADMIN — GABAPENTIN 300 MG: 300 CAPSULE ORAL at 13:13

## 2019-12-15 RX ADMIN — VITAMIN E CAP 400 UNIT 400 UNITS: 400 CAP at 08:17

## 2019-12-15 RX ADMIN — HYDROCODONE BITARTRATE AND ACETAMINOPHEN 1 TABLET: 5; 325 TABLET ORAL at 20:33

## 2019-12-15 RX ADMIN — LISINOPRIL 10 MG: 10 TABLET ORAL at 08:14

## 2019-12-15 RX ADMIN — BENZONATATE 100 MG: 100 CAPSULE ORAL at 08:17

## 2019-12-15 RX ADMIN — Medication 10 ML: at 20:32

## 2019-12-15 RX ADMIN — GABAPENTIN 300 MG: 300 CAPSULE ORAL at 08:14

## 2019-12-15 RX ADMIN — HYDROCODONE BITARTRATE AND ACETAMINOPHEN 2 TABLET: 5; 325 TABLET ORAL at 13:18

## 2019-12-15 RX ADMIN — RIVAROXABAN 20 MG: 20 TABLET, FILM COATED ORAL at 08:18

## 2019-12-15 RX ADMIN — TRAMADOL HYDROCHLORIDE 100 MG: 50 TABLET, FILM COATED ORAL at 08:13

## 2019-12-15 RX ADMIN — ONDANSETRON 4 MG: 2 INJECTION INTRAMUSCULAR; INTRAVENOUS at 14:21

## 2019-12-15 RX ADMIN — ROPINIROLE HYDROCHLORIDE 0.5 MG: 0.25 TABLET, FILM COATED ORAL at 08:16

## 2019-12-15 RX ADMIN — MAGNESIUM HYDROXIDE 30 ML: 400 SUSPENSION ORAL at 20:33

## 2019-12-15 RX ADMIN — GABAPENTIN 300 MG: 300 CAPSULE ORAL at 20:32

## 2019-12-15 RX ADMIN — INSULIN LISPRO 2 UNITS: 100 INJECTION, SOLUTION INTRAVENOUS; SUBCUTANEOUS at 08:19

## 2019-12-15 RX ADMIN — Medication 10 ML: at 08:18

## 2019-12-15 RX ADMIN — ROPINIROLE HYDROCHLORIDE 0.5 MG: 0.25 TABLET, FILM COATED ORAL at 13:14

## 2019-12-15 RX ADMIN — INSULIN LISPRO 1 UNITS: 100 INJECTION, SOLUTION INTRAVENOUS; SUBCUTANEOUS at 23:19

## 2019-12-15 RX ADMIN — TICAGRELOR 90 MG: 90 TABLET ORAL at 20:33

## 2019-12-15 RX ADMIN — HYDROCODONE BITARTRATE AND ACETAMINOPHEN 2 TABLET: 5; 325 TABLET ORAL at 05:56

## 2019-12-15 RX ADMIN — SPIRONOLACTONE 25 MG: 25 TABLET ORAL at 08:17

## 2019-12-15 RX ADMIN — DESMOPRESSIN ACETATE 40 MG: 0.2 TABLET ORAL at 20:32

## 2019-12-15 RX ADMIN — TICAGRELOR 90 MG: 90 TABLET ORAL at 08:17

## 2019-12-15 ASSESSMENT — PAIN SCALES - GENERAL
PAINLEVEL_OUTOF10: 10
PAINLEVEL_OUTOF10: 0
PAINLEVEL_OUTOF10: 0
PAINLEVEL_OUTOF10: 9
PAINLEVEL_OUTOF10: 7
PAINLEVEL_OUTOF10: 8
PAINLEVEL_OUTOF10: 0

## 2019-12-16 VITALS
DIASTOLIC BLOOD PRESSURE: 45 MMHG | HEART RATE: 74 BPM | HEIGHT: 65 IN | BODY MASS INDEX: 37.25 KG/M2 | SYSTOLIC BLOOD PRESSURE: 122 MMHG | RESPIRATION RATE: 16 BRPM | WEIGHT: 223.6 LBS | OXYGEN SATURATION: 98 % | TEMPERATURE: 98.2 F

## 2019-12-16 LAB
ESTIMATED AVERAGE GLUCOSE: 214 MG/DL
GLUCOSE BLD-MCNC: 177 MG/DL (ref 65–105)
GLUCOSE BLD-MCNC: 218 MG/DL (ref 65–105)
HBA1C MFR BLD: 9.1 % (ref 4–6)

## 2019-12-16 PROCEDURE — 6360000002 HC RX W HCPCS: Performed by: NURSE PRACTITIONER

## 2019-12-16 PROCEDURE — 97535 SELF CARE MNGMENT TRAINING: CPT

## 2019-12-16 PROCEDURE — 6370000000 HC RX 637 (ALT 250 FOR IP): Performed by: NURSE PRACTITIONER

## 2019-12-16 PROCEDURE — 99239 HOSP IP/OBS DSCHRG MGMT >30: CPT | Performed by: FAMILY MEDICINE

## 2019-12-16 PROCEDURE — 97760 ORTHOTIC MGMT&TRAING 1ST ENC: CPT

## 2019-12-16 PROCEDURE — 97166 OT EVAL MOD COMPLEX 45 MIN: CPT

## 2019-12-16 PROCEDURE — 82947 ASSAY GLUCOSE BLOOD QUANT: CPT

## 2019-12-16 RX ORDER — HYDROCODONE BITARTRATE AND ACETAMINOPHEN 5; 325 MG/1; MG/1
1 TABLET ORAL EVERY 8 HOURS PRN
Qty: 6 TABLET | Refills: 0 | Status: SHIPPED | OUTPATIENT
Start: 2019-12-16 | End: 2019-12-18

## 2019-12-16 RX ADMIN — FUROSEMIDE 40 MG: 10 INJECTION, SOLUTION INTRAMUSCULAR; INTRAVENOUS at 09:14

## 2019-12-16 RX ADMIN — INSULIN LISPRO 4 UNITS: 100 INJECTION, SOLUTION INTRAVENOUS; SUBCUTANEOUS at 09:30

## 2019-12-16 RX ADMIN — LISINOPRIL 10 MG: 10 TABLET ORAL at 09:12

## 2019-12-16 RX ADMIN — CETIRIZINE HYDROCHLORIDE 10 MG: 10 TABLET ORAL at 09:14

## 2019-12-16 RX ADMIN — CARVEDILOL 25 MG: 25 TABLET, FILM COATED ORAL at 09:12

## 2019-12-16 RX ADMIN — ASPIRIN 81 MG: 81 TABLET, CHEWABLE ORAL at 09:14

## 2019-12-16 RX ADMIN — ROPINIROLE HYDROCHLORIDE 0.5 MG: 0.25 TABLET, FILM COATED ORAL at 09:12

## 2019-12-16 RX ADMIN — TICAGRELOR 90 MG: 90 TABLET ORAL at 09:14

## 2019-12-16 RX ADMIN — VITAMIN E CAP 400 UNIT 400 UNITS: 400 CAP at 09:17

## 2019-12-16 RX ADMIN — LINAGLIPTIN 5 MG: 5 TABLET, FILM COATED ORAL at 09:17

## 2019-12-16 RX ADMIN — ESCITALOPRAM OXALATE 10 MG: 10 TABLET ORAL at 09:14

## 2019-12-16 RX ADMIN — GABAPENTIN 300 MG: 300 CAPSULE ORAL at 09:14

## 2019-12-16 RX ADMIN — BENZONATATE 100 MG: 100 CAPSULE ORAL at 09:18

## 2019-12-16 RX ADMIN — RIVAROXABAN 20 MG: 20 TABLET, FILM COATED ORAL at 09:12

## 2019-12-16 RX ADMIN — BENZONATATE 100 MG: 100 CAPSULE ORAL at 01:32

## 2019-12-16 RX ADMIN — HYDROCODONE BITARTRATE AND ACETAMINOPHEN 1 TABLET: 5; 325 TABLET ORAL at 06:10

## 2019-12-16 RX ADMIN — ISOSORBIDE MONONITRATE 30 MG: 30 TABLET ORAL at 09:12

## 2019-12-16 RX ADMIN — SPIRONOLACTONE 25 MG: 25 TABLET ORAL at 09:12

## 2019-12-16 ASSESSMENT — PAIN DESCRIPTION - PAIN TYPE: TYPE: ACUTE PAIN

## 2019-12-16 ASSESSMENT — PAIN SCALES - GENERAL
PAINLEVEL_OUTOF10: 8
PAINLEVEL_OUTOF10: 7

## 2019-12-16 ASSESSMENT — PAIN DESCRIPTION - LOCATION: LOCATION: HAND

## 2019-12-16 ASSESSMENT — PAIN DESCRIPTION - ORIENTATION: ORIENTATION: RIGHT

## 2020-01-02 ENCOUNTER — APPOINTMENT (OUTPATIENT)
Dept: GENERAL RADIOLOGY | Age: 75
DRG: 690 | End: 2020-01-02
Payer: MEDICARE

## 2020-01-02 ENCOUNTER — APPOINTMENT (OUTPATIENT)
Dept: CT IMAGING | Age: 75
DRG: 690 | End: 2020-01-02
Payer: MEDICARE

## 2020-01-02 ENCOUNTER — HOSPITAL ENCOUNTER (INPATIENT)
Age: 75
LOS: 1 days | Discharge: HOME HEALTH | DRG: 690 | End: 2020-01-04
Attending: EMERGENCY MEDICINE | Admitting: INTERNAL MEDICINE
Payer: MEDICARE

## 2020-01-02 LAB
-: ABNORMAL
ABSOLUTE EOS #: 0.21 K/UL (ref 0–0.44)
ABSOLUTE IMMATURE GRANULOCYTE: 0.03 K/UL (ref 0–0.3)
ABSOLUTE LYMPH #: 1.83 K/UL (ref 1.1–3.7)
ABSOLUTE MONO #: 0.87 K/UL (ref 0.1–1.2)
ALBUMIN SERPL-MCNC: 4.1 G/DL (ref 3.5–5.2)
ALBUMIN/GLOBULIN RATIO: 1.2 (ref 1–2.5)
ALP BLD-CCNC: 100 U/L (ref 35–104)
ALT SERPL-CCNC: 9 U/L (ref 5–33)
AMORPHOUS: ABNORMAL
ANION GAP SERPL CALCULATED.3IONS-SCNC: 16 MMOL/L (ref 9–17)
AST SERPL-CCNC: 10 U/L
BACTERIA: ABNORMAL
BASOPHILS # BLD: 0 % (ref 0–2)
BASOPHILS ABSOLUTE: <0.03 K/UL (ref 0–0.2)
BILIRUB SERPL-MCNC: 0.71 MG/DL (ref 0.3–1.2)
BILIRUBIN URINE: NEGATIVE
BUN BLDV-MCNC: 9 MG/DL (ref 8–23)
BUN/CREAT BLD: ABNORMAL (ref 9–20)
CALCIUM SERPL-MCNC: 9.5 MG/DL (ref 8.6–10.4)
CASTS UA: ABNORMAL /LPF (ref 0–8)
CHLORIDE BLD-SCNC: 101 MMOL/L (ref 98–107)
CO2: 20 MMOL/L (ref 20–31)
COLOR: YELLOW
COMMENT UA: ABNORMAL
CREAT SERPL-MCNC: 0.58 MG/DL (ref 0.5–0.9)
CRYSTALS, UA: ABNORMAL /HPF
DIFFERENTIAL TYPE: ABNORMAL
EOSINOPHILS RELATIVE PERCENT: 2 % (ref 1–4)
EPITHELIAL CELLS UA: ABNORMAL /HPF (ref 0–5)
GFR AFRICAN AMERICAN: >60 ML/MIN
GFR NON-AFRICAN AMERICAN: >60 ML/MIN
GFR SERPL CREATININE-BSD FRML MDRD: ABNORMAL ML/MIN/{1.73_M2}
GFR SERPL CREATININE-BSD FRML MDRD: ABNORMAL ML/MIN/{1.73_M2}
GLUCOSE BLD-MCNC: 132 MG/DL (ref 70–99)
GLUCOSE URINE: NEGATIVE
HCT VFR BLD CALC: 36.5 % (ref 36.3–47.1)
HEMOGLOBIN: 11.7 G/DL (ref 11.9–15.1)
IMMATURE GRANULOCYTES: 0 %
KETONES, URINE: NEGATIVE
LACTIC ACID, WHOLE BLOOD: 1.2 MMOL/L (ref 0.7–2.1)
LEUKOCYTE ESTERASE, URINE: ABNORMAL
LIPASE: 13 U/L (ref 13–60)
LYMPHOCYTES # BLD: 19 % (ref 24–43)
MCH RBC QN AUTO: 30.2 PG (ref 25.2–33.5)
MCHC RBC AUTO-ENTMCNC: 32.1 G/DL (ref 28.4–34.8)
MCV RBC AUTO: 94.3 FL (ref 82.6–102.9)
MONOCYTES # BLD: 9 % (ref 3–12)
MUCUS: ABNORMAL
NITRITE, URINE: POSITIVE
NRBC AUTOMATED: 0 PER 100 WBC
OTHER OBSERVATIONS UA: ABNORMAL
PDW BLD-RTO: 13.6 % (ref 11.8–14.4)
PH UA: 5.5 (ref 5–8)
PLATELET # BLD: 196 K/UL (ref 138–453)
PLATELET ESTIMATE: ABNORMAL
PMV BLD AUTO: 11.6 FL (ref 8.1–13.5)
POTASSIUM SERPL-SCNC: 3.9 MMOL/L (ref 3.7–5.3)
PROTEIN UA: ABNORMAL
RBC # BLD: 3.87 M/UL (ref 3.95–5.11)
RBC # BLD: ABNORMAL 10*6/UL
RBC UA: ABNORMAL /HPF (ref 0–4)
RENAL EPITHELIAL, UA: ABNORMAL /HPF
SEG NEUTROPHILS: 70 % (ref 36–65)
SEGMENTED NEUTROPHILS ABSOLUTE COUNT: 6.59 K/UL (ref 1.5–8.1)
SODIUM BLD-SCNC: 137 MMOL/L (ref 135–144)
SPECIFIC GRAVITY UA: 1.01 (ref 1–1.03)
TOTAL PROTEIN: 7.5 G/DL (ref 6.4–8.3)
TRICHOMONAS: ABNORMAL
TROPONIN INTERP: ABNORMAL
TROPONIN INTERP: ABNORMAL
TROPONIN T: ABNORMAL NG/ML
TROPONIN T: ABNORMAL NG/ML
TROPONIN, HIGH SENSITIVITY: 38 NG/L (ref 0–14)
TROPONIN, HIGH SENSITIVITY: 46 NG/L (ref 0–14)
TURBIDITY: ABNORMAL
URINE HGB: ABNORMAL
UROBILINOGEN, URINE: NORMAL
WBC # BLD: 9.6 K/UL (ref 3.5–11.3)
WBC # BLD: ABNORMAL 10*3/UL
WBC UA: ABNORMAL /HPF (ref 0–5)
YEAST: ABNORMAL

## 2020-01-02 PROCEDURE — 73110 X-RAY EXAM OF WRIST: CPT

## 2020-01-02 PROCEDURE — 74177 CT ABD & PELVIS W/CONTRAST: CPT

## 2020-01-02 PROCEDURE — 6360000004 HC RX CONTRAST MEDICATION: Performed by: EMERGENCY MEDICINE

## 2020-01-02 PROCEDURE — 87077 CULTURE AEROBIC IDENTIFY: CPT

## 2020-01-02 PROCEDURE — 96365 THER/PROPH/DIAG IV INF INIT: CPT

## 2020-01-02 PROCEDURE — 96375 TX/PRO/DX INJ NEW DRUG ADDON: CPT

## 2020-01-02 PROCEDURE — 84484 ASSAY OF TROPONIN QUANT: CPT

## 2020-01-02 PROCEDURE — 6360000002 HC RX W HCPCS: Performed by: STUDENT IN AN ORGANIZED HEALTH CARE EDUCATION/TRAINING PROGRAM

## 2020-01-02 PROCEDURE — 2580000003 HC RX 258: Performed by: STUDENT IN AN ORGANIZED HEALTH CARE EDUCATION/TRAINING PROGRAM

## 2020-01-02 PROCEDURE — 85025 COMPLETE CBC W/AUTO DIFF WBC: CPT

## 2020-01-02 PROCEDURE — 87086 URINE CULTURE/COLONY COUNT: CPT

## 2020-01-02 PROCEDURE — 99285 EMERGENCY DEPT VISIT HI MDM: CPT

## 2020-01-02 PROCEDURE — 83605 ASSAY OF LACTIC ACID: CPT

## 2020-01-02 PROCEDURE — 87186 SC STD MICRODIL/AGAR DIL: CPT

## 2020-01-02 PROCEDURE — 80053 COMPREHEN METABOLIC PANEL: CPT

## 2020-01-02 PROCEDURE — 83690 ASSAY OF LIPASE: CPT

## 2020-01-02 PROCEDURE — 70450 CT HEAD/BRAIN W/O DYE: CPT

## 2020-01-02 PROCEDURE — 93005 ELECTROCARDIOGRAM TRACING: CPT | Performed by: INTERNAL MEDICINE

## 2020-01-02 PROCEDURE — 81001 URINALYSIS AUTO W/SCOPE: CPT

## 2020-01-02 RX ORDER — LORAZEPAM 2 MG/ML
2 INJECTION INTRAMUSCULAR ONCE
Status: COMPLETED | OUTPATIENT
Start: 2020-01-02 | End: 2020-01-02

## 2020-01-02 RX ORDER — MORPHINE SULFATE 4 MG/ML
4 INJECTION, SOLUTION INTRAMUSCULAR; INTRAVENOUS ONCE
Status: COMPLETED | OUTPATIENT
Start: 2020-01-02 | End: 2020-01-02

## 2020-01-02 RX ORDER — ONDANSETRON 2 MG/ML
4 INJECTION INTRAMUSCULAR; INTRAVENOUS EVERY 6 HOURS PRN
Status: DISCONTINUED | OUTPATIENT
Start: 2020-01-02 | End: 2020-01-04 | Stop reason: HOSPADM

## 2020-01-02 RX ADMIN — ONDANSETRON 4 MG: 2 INJECTION INTRAMUSCULAR; INTRAVENOUS at 20:26

## 2020-01-02 RX ADMIN — IOHEXOL 75 ML: 350 INJECTION, SOLUTION INTRAVENOUS at 21:34

## 2020-01-02 RX ADMIN — CEFTRIAXONE SODIUM 1 G: 1 INJECTION, POWDER, FOR SOLUTION INTRAMUSCULAR; INTRAVENOUS at 23:22

## 2020-01-02 RX ADMIN — MORPHINE SULFATE 4 MG: 4 INJECTION INTRAVENOUS at 20:26

## 2020-01-02 RX ADMIN — LORAZEPAM 2 MG: 2 INJECTION INTRAMUSCULAR; INTRAVENOUS at 21:09

## 2020-01-02 ASSESSMENT — PAIN SCALES - GENERAL
PAINLEVEL_OUTOF10: 10
PAINLEVEL_OUTOF10: 3

## 2020-01-03 PROBLEM — R77.8 ELEVATED TROPONIN: Status: ACTIVE | Noted: 2020-01-03

## 2020-01-03 PROBLEM — R79.89 ELEVATED TROPONIN: Status: ACTIVE | Noted: 2020-01-03

## 2020-01-03 PROBLEM — Z86.711 HISTORY OF PULMONARY EMBOLISM: Status: ACTIVE | Noted: 2020-01-03

## 2020-01-03 PROBLEM — N12 PYELONEPHRITIS OF RIGHT KIDNEY: Status: ACTIVE | Noted: 2020-01-03

## 2020-01-03 PROBLEM — N39.0 UTI (URINARY TRACT INFECTION): Status: ACTIVE | Noted: 2020-01-03

## 2020-01-03 LAB
ESTIMATED AVERAGE GLUCOSE: 220 MG/DL
GLUCOSE BLD-MCNC: 128 MG/DL (ref 65–105)
GLUCOSE BLD-MCNC: 148 MG/DL (ref 65–105)
GLUCOSE BLD-MCNC: 180 MG/DL (ref 65–105)
GLUCOSE BLD-MCNC: 182 MG/DL (ref 65–105)
GLUCOSE BLD-MCNC: 258 MG/DL (ref 65–105)
HBA1C MFR BLD: 9.3 % (ref 4–6)
MYOGLOBIN: 41 NG/ML (ref 25–58)
TROPONIN INTERP: ABNORMAL
TROPONIN T: ABNORMAL NG/ML
TROPONIN, HIGH SENSITIVITY: 39 NG/L (ref 0–14)

## 2020-01-03 PROCEDURE — 6360000002 HC RX W HCPCS: Performed by: STUDENT IN AN ORGANIZED HEALTH CARE EDUCATION/TRAINING PROGRAM

## 2020-01-03 PROCEDURE — 1200000000 HC SEMI PRIVATE

## 2020-01-03 PROCEDURE — 96375 TX/PRO/DX INJ NEW DRUG ADDON: CPT

## 2020-01-03 PROCEDURE — 83874 ASSAY OF MYOGLOBIN: CPT

## 2020-01-03 PROCEDURE — 6370000000 HC RX 637 (ALT 250 FOR IP): Performed by: INTERNAL MEDICINE

## 2020-01-03 PROCEDURE — 83036 HEMOGLOBIN GLYCOSYLATED A1C: CPT

## 2020-01-03 PROCEDURE — 84484 ASSAY OF TROPONIN QUANT: CPT

## 2020-01-03 PROCEDURE — 2500000003 HC RX 250 WO HCPCS: Performed by: NURSE PRACTITIONER

## 2020-01-03 PROCEDURE — 82947 ASSAY GLUCOSE BLOOD QUANT: CPT

## 2020-01-03 PROCEDURE — 96376 TX/PRO/DX INJ SAME DRUG ADON: CPT

## 2020-01-03 PROCEDURE — 96366 THER/PROPH/DIAG IV INF ADDON: CPT

## 2020-01-03 PROCEDURE — 36415 COLL VENOUS BLD VENIPUNCTURE: CPT

## 2020-01-03 PROCEDURE — 6360000002 HC RX W HCPCS: Performed by: NURSE PRACTITIONER

## 2020-01-03 PROCEDURE — 99223 1ST HOSP IP/OBS HIGH 75: CPT | Performed by: INTERNAL MEDICINE

## 2020-01-03 PROCEDURE — 6370000000 HC RX 637 (ALT 250 FOR IP): Performed by: NURSE PRACTITIONER

## 2020-01-03 PROCEDURE — 2580000003 HC RX 258: Performed by: NURSE PRACTITIONER

## 2020-01-03 PROCEDURE — G0378 HOSPITAL OBSERVATION PER HR: HCPCS

## 2020-01-03 RX ORDER — PANTOPRAZOLE SODIUM 40 MG/1
40 TABLET, DELAYED RELEASE ORAL
Status: DISCONTINUED | OUTPATIENT
Start: 2020-01-03 | End: 2020-01-04 | Stop reason: HOSPADM

## 2020-01-03 RX ORDER — SODIUM CHLORIDE 9 MG/ML
INJECTION, SOLUTION INTRAVENOUS CONTINUOUS
Status: DISCONTINUED | OUTPATIENT
Start: 2020-01-03 | End: 2020-01-03

## 2020-01-03 RX ORDER — ESCITALOPRAM OXALATE 10 MG/1
10 TABLET ORAL DAILY
Status: DISCONTINUED | OUTPATIENT
Start: 2020-01-03 | End: 2020-01-04 | Stop reason: HOSPADM

## 2020-01-03 RX ORDER — SPIRONOLACTONE 25 MG/1
25 TABLET ORAL DAILY
Status: DISCONTINUED | OUTPATIENT
Start: 2020-01-03 | End: 2020-01-04 | Stop reason: HOSPADM

## 2020-01-03 RX ORDER — CARVEDILOL 25 MG/1
25 TABLET ORAL 2 TIMES DAILY WITH MEALS
Status: DISCONTINUED | OUTPATIENT
Start: 2020-01-03 | End: 2020-01-04 | Stop reason: HOSPADM

## 2020-01-03 RX ORDER — LISINOPRIL 10 MG/1
10 TABLET ORAL DAILY
Status: DISCONTINUED | OUTPATIENT
Start: 2020-01-03 | End: 2020-01-03

## 2020-01-03 RX ORDER — SODIUM CHLORIDE 0.9 % (FLUSH) 0.9 %
10 SYRINGE (ML) INJECTION PRN
Status: DISCONTINUED | OUTPATIENT
Start: 2020-01-03 | End: 2020-01-04 | Stop reason: HOSPADM

## 2020-01-03 RX ORDER — ACETAMINOPHEN 325 MG/1
650 TABLET ORAL EVERY 4 HOURS PRN
Status: DISCONTINUED | OUTPATIENT
Start: 2020-01-03 | End: 2020-01-04 | Stop reason: HOSPADM

## 2020-01-03 RX ORDER — TRAMADOL HYDROCHLORIDE 50 MG/1
100 TABLET ORAL EVERY 6 HOURS PRN
Status: DISCONTINUED | OUTPATIENT
Start: 2020-01-03 | End: 2020-01-04 | Stop reason: HOSPADM

## 2020-01-03 RX ORDER — UREA 10 %
3 LOTION (ML) TOPICAL NIGHTLY PRN
Status: DISCONTINUED | OUTPATIENT
Start: 2020-01-03 | End: 2020-01-04 | Stop reason: HOSPADM

## 2020-01-03 RX ORDER — DEXTROSE MONOHYDRATE 25 G/50ML
12.5 INJECTION, SOLUTION INTRAVENOUS PRN
Status: DISCONTINUED | OUTPATIENT
Start: 2020-01-03 | End: 2020-01-04 | Stop reason: HOSPADM

## 2020-01-03 RX ORDER — ASPIRIN 81 MG/1
81 TABLET ORAL DAILY
Status: DISCONTINUED | OUTPATIENT
Start: 2020-01-03 | End: 2020-01-04 | Stop reason: HOSPADM

## 2020-01-03 RX ORDER — LISINOPRIL 20 MG/1
20 TABLET ORAL DAILY
Status: DISCONTINUED | OUTPATIENT
Start: 2020-01-04 | End: 2020-01-04 | Stop reason: HOSPADM

## 2020-01-03 RX ORDER — GABAPENTIN 300 MG/1
300 CAPSULE ORAL 3 TIMES DAILY
Status: DISCONTINUED | OUTPATIENT
Start: 2020-01-03 | End: 2020-01-04 | Stop reason: HOSPADM

## 2020-01-03 RX ORDER — ATORVASTATIN CALCIUM 40 MG/1
40 TABLET, FILM COATED ORAL NIGHTLY
Status: DISCONTINUED | OUTPATIENT
Start: 2020-01-03 | End: 2020-01-04 | Stop reason: HOSPADM

## 2020-01-03 RX ORDER — NICOTINE POLACRILEX 4 MG
15 LOZENGE BUCCAL PRN
Status: DISCONTINUED | OUTPATIENT
Start: 2020-01-03 | End: 2020-01-04 | Stop reason: HOSPADM

## 2020-01-03 RX ORDER — VITAMIN E 268 MG
400 CAPSULE ORAL DAILY
Status: DISCONTINUED | OUTPATIENT
Start: 2020-01-03 | End: 2020-01-04 | Stop reason: HOSPADM

## 2020-01-03 RX ORDER — TRAMADOL HYDROCHLORIDE 50 MG/1
50 TABLET ORAL EVERY 6 HOURS PRN
Status: DISCONTINUED | OUTPATIENT
Start: 2020-01-03 | End: 2020-01-04 | Stop reason: HOSPADM

## 2020-01-03 RX ORDER — SODIUM CHLORIDE 0.9 % (FLUSH) 0.9 %
10 SYRINGE (ML) INJECTION EVERY 12 HOURS SCHEDULED
Status: DISCONTINUED | OUTPATIENT
Start: 2020-01-03 | End: 2020-01-04 | Stop reason: HOSPADM

## 2020-01-03 RX ORDER — METOPROLOL TARTRATE 5 MG/5ML
5 INJECTION INTRAVENOUS EVERY 4 HOURS PRN
Status: DISCONTINUED | OUTPATIENT
Start: 2020-01-03 | End: 2020-01-04 | Stop reason: HOSPADM

## 2020-01-03 RX ORDER — ISOSORBIDE MONONITRATE 30 MG/1
30 TABLET, EXTENDED RELEASE ORAL DAILY
Status: DISCONTINUED | OUTPATIENT
Start: 2020-01-03 | End: 2020-01-04 | Stop reason: HOSPADM

## 2020-01-03 RX ORDER — CETIRIZINE HYDROCHLORIDE 10 MG/1
5 TABLET ORAL DAILY
Status: DISCONTINUED | OUTPATIENT
Start: 2020-01-03 | End: 2020-01-04 | Stop reason: HOSPADM

## 2020-01-03 RX ORDER — LISINOPRIL 10 MG/1
10 TABLET ORAL ONCE
Status: COMPLETED | OUTPATIENT
Start: 2020-01-03 | End: 2020-01-03

## 2020-01-03 RX ORDER — MORPHINE SULFATE 4 MG/ML
4 INJECTION, SOLUTION INTRAMUSCULAR; INTRAVENOUS ONCE
Status: COMPLETED | OUTPATIENT
Start: 2020-01-03 | End: 2020-01-03

## 2020-01-03 RX ORDER — DEXTROSE MONOHYDRATE 50 MG/ML
100 INJECTION, SOLUTION INTRAVENOUS PRN
Status: DISCONTINUED | OUTPATIENT
Start: 2020-01-03 | End: 2020-01-04 | Stop reason: HOSPADM

## 2020-01-03 RX ORDER — ROPINIROLE 0.25 MG/1
0.5 TABLET, FILM COATED ORAL 3 TIMES DAILY
Status: DISCONTINUED | OUTPATIENT
Start: 2020-01-03 | End: 2020-01-04 | Stop reason: HOSPADM

## 2020-01-03 RX ADMIN — LINAGLIPTIN 5 MG: 5 TABLET, FILM COATED ORAL at 08:39

## 2020-01-03 RX ADMIN — ROPINIROLE HYDROCHLORIDE 0.5 MG: 0.25 TABLET, FILM COATED ORAL at 14:15

## 2020-01-03 RX ADMIN — ISOSORBIDE MONONITRATE 30 MG: 30 TABLET ORAL at 08:39

## 2020-01-03 RX ADMIN — CARVEDILOL 25 MG: 25 TABLET, FILM COATED ORAL at 17:31

## 2020-01-03 RX ADMIN — ESCITALOPRAM OXALATE 10 MG: 10 TABLET ORAL at 08:38

## 2020-01-03 RX ADMIN — MORPHINE SULFATE 4 MG: 4 INJECTION INTRAVENOUS at 01:37

## 2020-01-03 RX ADMIN — VITAMIN E CAP 400 UNIT 400 UNITS: 400 CAP at 08:38

## 2020-01-03 RX ADMIN — CEFTRIAXONE SODIUM 1 G: 1 INJECTION, POWDER, FOR SOLUTION INTRAMUSCULAR; INTRAVENOUS at 23:07

## 2020-01-03 RX ADMIN — GABAPENTIN 300 MG: 300 CAPSULE ORAL at 14:15

## 2020-01-03 RX ADMIN — ROPINIROLE HYDROCHLORIDE 0.5 MG: 0.25 TABLET, FILM COATED ORAL at 08:39

## 2020-01-03 RX ADMIN — DESMOPRESSIN ACETATE 40 MG: 0.2 TABLET ORAL at 21:39

## 2020-01-03 RX ADMIN — RIVAROXABAN 20 MG: 20 TABLET, FILM COATED ORAL at 08:39

## 2020-01-03 RX ADMIN — INSULIN LISPRO 3 UNITS: 100 INJECTION, SOLUTION INTRAVENOUS; SUBCUTANEOUS at 17:32

## 2020-01-03 RX ADMIN — CETIRIZINE HYDROCHLORIDE 5 MG: 10 TABLET ORAL at 08:38

## 2020-01-03 RX ADMIN — GABAPENTIN 300 MG: 300 CAPSULE ORAL at 21:39

## 2020-01-03 RX ADMIN — PANTOPRAZOLE SODIUM 40 MG: 40 TABLET, DELAYED RELEASE ORAL at 05:53

## 2020-01-03 RX ADMIN — SPIRONOLACTONE 25 MG: 25 TABLET ORAL at 08:39

## 2020-01-03 RX ADMIN — INSULIN LISPRO 1 UNITS: 100 INJECTION, SOLUTION INTRAVENOUS; SUBCUTANEOUS at 08:48

## 2020-01-03 RX ADMIN — LISINOPRIL 10 MG: 10 TABLET ORAL at 10:27

## 2020-01-03 RX ADMIN — METOPROLOL TARTRATE 5 MG: 5 INJECTION, SOLUTION INTRAVENOUS at 05:54

## 2020-01-03 RX ADMIN — GABAPENTIN 300 MG: 300 CAPSULE ORAL at 08:39

## 2020-01-03 RX ADMIN — ACETAMINOPHEN 650 MG: 325 TABLET ORAL at 10:27

## 2020-01-03 RX ADMIN — TICAGRELOR 90 MG: 90 TABLET ORAL at 08:38

## 2020-01-03 RX ADMIN — ACETAMINOPHEN 650 MG: 325 TABLET ORAL at 05:54

## 2020-01-03 RX ADMIN — TICAGRELOR 90 MG: 90 TABLET ORAL at 22:07

## 2020-01-03 RX ADMIN — SODIUM CHLORIDE: 9 INJECTION, SOLUTION INTRAVENOUS at 05:36

## 2020-01-03 RX ADMIN — LISINOPRIL 10 MG: 10 TABLET ORAL at 08:39

## 2020-01-03 RX ADMIN — TRAMADOL HYDROCHLORIDE 100 MG: 50 TABLET, FILM COATED ORAL at 05:54

## 2020-01-03 RX ADMIN — ROPINIROLE HYDROCHLORIDE 0.5 MG: 0.25 TABLET, FILM COATED ORAL at 21:39

## 2020-01-03 RX ADMIN — CARVEDILOL 25 MG: 25 TABLET, FILM COATED ORAL at 08:38

## 2020-01-03 RX ADMIN — Medication 81 MG: at 08:38

## 2020-01-03 RX ADMIN — INSULIN LISPRO 1 UNITS: 100 INJECTION, SOLUTION INTRAVENOUS; SUBCUTANEOUS at 12:06

## 2020-01-03 RX ADMIN — TRAMADOL HYDROCHLORIDE 100 MG: 50 TABLET, FILM COATED ORAL at 21:39

## 2020-01-03 RX ADMIN — TRAMADOL HYDROCHLORIDE 100 MG: 50 TABLET, FILM COATED ORAL at 12:05

## 2020-01-03 ASSESSMENT — ENCOUNTER SYMPTOMS
BACK PAIN: 0
SHORTNESS OF BREATH: 0
ABDOMINAL PAIN: 1
SORE THROAT: 0
VOMITING: 1
NAUSEA: 1

## 2020-01-03 ASSESSMENT — PAIN SCALES - GENERAL
PAINLEVEL_OUTOF10: 5
PAINLEVEL_OUTOF10: 8
PAINLEVEL_OUTOF10: 7
PAINLEVEL_OUTOF10: 9
PAINLEVEL_OUTOF10: 5
PAINLEVEL_OUTOF10: 0
PAINLEVEL_OUTOF10: 0
PAINLEVEL_OUTOF10: 10
PAINLEVEL_OUTOF10: 7
PAINLEVEL_OUTOF10: 4

## 2020-01-03 NOTE — H&P
%    Monocytes 9 3 - 12 %    Eosinophils % 2 1 - 4 %    Basophils 0 0 - 2 %    Immature Granulocytes 0 0 %    Segs Absolute 6.59 1.50 - 8.10 k/uL    Absolute Lymph # 1.83 1.10 - 3.70 k/uL    Absolute Mono # 0.87 0.10 - 1.20 k/uL    Absolute Eos # 0.21 0.00 - 0.44 k/uL    Basophils Absolute <0.03 0.00 - 0.20 k/uL    Absolute Immature Granulocyte 0.03 0.00 - 0.30 k/uL    WBC Morphology NOT REPORTED     RBC Morphology NOT REPORTED     Platelet Estimate NOT REPORTED    Comprehensive Metabolic Panel    Collection Time: 01/02/20  8:31 PM   Result Value Ref Range    Glucose 132 (H) 70 - 99 mg/dL    BUN 9 8 - 23 mg/dL    CREATININE 0.58 0.50 - 0.90 mg/dL    Bun/Cre Ratio NOT REPORTED 9 - 20    Calcium 9.5 8.6 - 10.4 mg/dL    Sodium 137 135 - 144 mmol/L    Potassium 3.9 3.7 - 5.3 mmol/L    Chloride 101 98 - 107 mmol/L    CO2 20 20 - 31 mmol/L    Anion Gap 16 9 - 17 mmol/L    Alkaline Phosphatase 100 35 - 104 U/L    ALT 9 5 - 33 U/L    AST 10 <32 U/L    Total Bilirubin 0.71 0.3 - 1.2 mg/dL    Total Protein 7.5 6.4 - 8.3 g/dL    Alb 4.1 3.5 - 5.2 g/dL    Albumin/Globulin Ratio 1.2 1.0 - 2.5    GFR Non-African American >60 >60 mL/min    GFR African American >60 >60 mL/min    GFR Comment          GFR Staging NOT REPORTED    LIPASE    Collection Time: 01/02/20  8:31 PM   Result Value Ref Range    Lipase 13 13 - 60 U/L   Troponin    Collection Time: 01/02/20  8:31 PM   Result Value Ref Range    Troponin, High Sensitivity 46 (H) 0 - 14 ng/L    Troponin T NOT REPORTED <0.03 ng/mL    Troponin Interp NOT REPORTED    LACTIC ACID, WHOLE BLOOD    Collection Time: 01/02/20  9:09 PM   Result Value Ref Range    Lactic Acid, Whole Blood 1.2 0.7 - 2.1 mmol/L   Urinalysis Reflex to Culture    Collection Time: 01/02/20  9:14 PM   Result Value Ref Range    Color, UA YELLOW YELLOW    Turbidity UA CLOUDY (A) CLEAR    Glucose, Ur NEGATIVE NEGATIVE    Bilirubin Urine NEGATIVE NEGATIVE    Ketones, Urine NEGATIVE NEGATIVE    Specific [place], UA 1.010 1.005 - 1.030    Urine Hgb LARGE (A) NEGATIVE    pH, UA 5.5 5.0 - 8.0    Protein, UA 2+ (A) NEGATIVE    Urobilinogen, Urine Normal Normal    Nitrite, Urine POSITIVE (A) NEGATIVE    Leukocyte Esterase, Urine MODERATE (A) NEGATIVE    Urinalysis Comments NOT REPORTED    Microscopic Urinalysis    Collection Time: 01/02/20  9:14 PM   Result Value Ref Range    -          WBC, UA 50  0 - 5 /HPF    RBC, UA TOO NUMEROUS TO COUNT 0 - 4 /HPF    Casts UA  0 - 8 /LPF     5 TO 10 HYALINE Reference range defined for non-centrifuged specimen. Crystals UA NOT REPORTED None /HPF    Epithelial Cells UA None 0 - 5 /HPF    Renal Epithelial, Urine NOT REPORTED 0 /HPF    Bacteria, UA MANY (A) None    Mucus, UA NOT REPORTED None    Trichomonas, UA NOT REPORTED None    Amorphous, UA NOT REPORTED None    Other Observations UA NOT REPORTED NOT REQ. Yeast, UA NOT REPORTED None   Troponin    Collection Time: 01/02/20 11:09 PM   Result Value Ref Range    Troponin, High Sensitivity 38 (H) 0 - 14 ng/L    Troponin T NOT REPORTED <0.03 ng/mL    Troponin Interp NOT REPORTED    POC Glucose Fingerstick    Collection Time: 01/03/20  5:34 AM   Result Value Ref Range    POC Glucose 148 (H) 65 - 105 mg/dL   POC Glucose Fingerstick    Collection Time: 01/03/20  7:23 AM   Result Value Ref Range    POC Glucose 182 (H) 65 - 105 mg/dL   Hemoglobin A1c    Collection Time: 01/03/20  7:42 AM   Result Value Ref Range    Hemoglobin A1C 9.3 (H) 4.0 - 6.0 %    Estimated Avg Glucose 220 mg/dL   TROP/MYOGLOBIN    Collection Time: 01/03/20  7:42 AM   Result Value Ref Range    Troponin, High Sensitivity 39 (H) 0 - 14 ng/L    Troponin T NOT REPORTED <0.03 ng/mL    Troponin Interp NOT REPORTED     Myoglobin 41 25 - 58 ng/mL       Imaging/Diagnostics:  Xr Wrist Right (min 3 Views)    Result Date: 1/2/2020  Degenerative changes without acute osseous abnormality.  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 Ct Head Wo Contrast    Result Date: 1/2/2020  No acute intracranial abnormality. Ct Abdomen Pelvis W Iv Contrast Additional Contrast? None    Result Date: 1/2/2020  1. Mild diffuse bladder wall thickening and infiltration of the pericystic fat given the degree of distention. Correlate for underlying cystitis. 2. No other acute findings within the abdomen or pelvis. Colonic diverticulosis with no acute features. 3. Previous cholecystectomy and hysterectomy.        Assessment :      Hospital Problems           Last Modified POA    * (Principal) Pyelonephritis of right kidney 1/3/2020 Yes    Type 2 diabetes mellitus with diabetic polyneuropathy, with long-term current use of insulin (HCC) (Chronic) 1/3/2020 Yes    Essential hypertension 1/3/2020 Yes    CAD (coronary artery disease) 1/3/2020 Yes    Overview Signed 3/27/2017  4:39 PM by Darwin Knox MD     S/P 4 + 2 stents         Peripheral artery disease ([facility] 1/3/2020 Yes    Chronic systolic heart failure ([facility] 1/3/2020 Yes    History of pulmonary embolism 1/3/2020 Yes    Elevated troponin 1/3/2020 Yes    Overview Signed 1/3/2020  9:05 AM by Anabell Echevarria Blood, DO     chronically           uncontrolled htn    Plan:     Patient status inpatient in the Med/Surge    Adjust htn mds for better control  Iv antibiotics for pyelonephritis  Would like to see urine culture results prior to dc  Cont home meds  dvt prophylaxis with full dose xarelto for h/o PE  Dc ivf with h/o chf  Pt/ot-needs work with right wrist, will brace that wrist for stability  If continues to have reduced rom in right wrist, would suggest hand specialist as outpatient  Monitor/control glucose  D/w family    Consultations:   IP CONSULT TO HOSPITALIST     Patient is admitted as inpatient status because of co-morbidities listed above, severity of signs and symptoms as outlined, requirement for current medical therapies and most importantly because of direct

## 2020-01-03 NOTE — DISCHARGE INSTR - COC
Z79.4    Vertigo R42    Essential hypertension I10    CAD (coronary artery disease) I25.10    Dyspnea R06.00    Claudication in peripheral vascular disease (HCC) I73.9    Acute pulmonary embolism without acute cor pulmonale (HCC) I26.99    Peripheral artery disease (HCC) I73.9    Diabetic polyneuropathy associated with type 2 diabetes mellitus (HCC) E11.42    Other hyperlipidemia E78.49    Chronic systolic heart failure (HCC) I50.22    Multiple subsegmental pulmonary emboli without acute cor pulmonale I26.94    Congestive heart failure (HCC) I50.9    Fall W19. Karolina Ousmane    Pyelonephritis of right kidney N12    History of pulmonary embolism Z86.711    Elevated troponin R79.89       Isolation/Infection:   Isolation          No Isolation        Patient Infection Status     None to display          Nurse Assessment:  Last Vital Signs: BP (!) 186/68   Pulse 81   Temp 98.3 °F (36.8 °C)   Resp 18   Ht 5' 6\" (1.676 m)   Wt 227 lb 1.6 oz (103 kg)   SpO2 95%   BMI 36.65 kg/m²     Last documented pain score (0-10 scale): Pain Level: 4  Last Weight:   Wt Readings from Last 1 Encounters:   01/03/20 227 lb 1.6 oz (103 kg)     Mental Status:  oriented    IV Access:  - None    Nursing Mobility/ADLs:  Walking   Independent  Transfer  Independent  Bathing  Independent  Dressing  Independent  Toileting  Independent  Feeding  Independent  Med Admin  Independent  Med Delivery   whole    Wound Care Documentation and Therapy:        Elimination:  Continence:   · Bowel: No  · Bladder: Yes  Urinary Catheter: None   Colostomy/Ileostomy/Ileal Conduit: No       Date of Last BM: n/a    Intake/Output Summary (Last 24 hours) at 1/3/2020 1151  Last data filed at 1/3/2020 0009  Gross per 24 hour   Intake 50 ml   Output --   Net 50 ml     I/O last 3 completed shifts:   In: 48 [IV Piggyback:50]  Out: -     Safety Concerns:     None    Impairments/Disabilities:      None    Nutrition Therapy:  Current Nutrition Therapy:   - Oral Diet: General    Routes of Feeding: Oral  Liquids: No Restrictions  Daily Fluid Restriction: no  Last Modified Barium Swallow with Video (Video Swallowing Test): not done    Treatments at the Time of Hospital Discharge:   Respiratory Treatments: n/a  Oxygen Therapy:  is not on home oxygen therapy. Ventilator:    - No ventilator support    Rehab Therapies: Physical Therapy and Occupational Therapy  Weight Bearing Status/Restrictions: No weight bearing restirctions  Other Medical Equipment (for information only, NOT a DME order):  ***  Other Treatments: n/a    Patient's personal belongings (please select all that are sent with patient):  None    RN SIGNATURE:  Electronically signed by Aquilino Jaffe RN on 1/4/20 at 6:20 PM    CASE MANAGEMENT/SOCIAL WORK SECTION    Inpatient Status Date: ***    Readmission Risk Assessment Score:  Readmission Risk              Risk of Unplanned Readmission:        20           Discharging to Facility/ Agency   · Name: [address] [address]         Phone: [phone]       Fax: [phone]        Dialysis Facility (if applicable)   · Name:  · Address:  · Dialysis Schedule:  · Phone:  · Fax:    / signature: Electronically signed by Yung Acevedo RN on 1/3/20 at 11:51 AM    PHYSICIAN SECTION    Prognosis: Fair    Condition at Discharge: Stable    Rehab Potential (if transferring to Rehab): Fair    Recommended Labs or Other Treatments After Discharge: pt/ot    Physician Certification: I certify the above information and transfer of Owen Masters  is necessary for the continuing treatment of the diagnosis listed and that she requires Home Care for greater 30 days.      Update Admission H&P: No change in H&P    PHYSICIAN SIGNATURE:  Electronically signed by Quintin Boeck Blood, DO on 1/4/20 at 10:35 AM

## 2020-01-03 NOTE — ED NOTES
Complete bed change, Gown change, Due to peeing in bed  pericare provided.  Pt placed back on monitor        Charo Hunt RN  01/03/20 0021

## 2020-01-03 NOTE — ED PROVIDER NOTES
[facility]  Emergency Department  Faculty Attestation     I performed a history and physical examination of the patient and discussed management with the resident. I reviewed the residents note and agree with the documented findings and plan of care. Any areas of disagreement are noted on the chart. I was personally present for the key portions of any procedures. I have documented in the chart those procedures where I was not present during the key portions. I have reviewed the emergency nurses triage note. I agree with the chief complaint, past medical history, past surgical history, allergies, medications, social and family history as documented unless otherwise noted below. For Physician Assistant/ Nurse Practitioner cases/documentation I have personally evaluated this patient and have completed at least one if not all key elements of the E/M (history, physical exam, and MDM). Additional findings are as noted. Primary Care Physician:  Emerita Velásquez MD    Screenings:  [unfilled]    CHIEF COMPLAINT       Chief Complaint   Patient presents with    Emesis    Abdominal Pain       RECENT VITALS:   Temp: 98.5 °F (36.9 °C),  Pulse: 95, Resp: 20, BP: (!) 204/92    LABS:  Labs Reviewed   CBC WITH AUTO DIFFERENTIAL   COMPREHENSIVE METABOLIC PANEL   LIPASE   URINE RT REFLEX TO CULTURE   TROPONIN       Radiology  CT ABDOMEN PELVIS W IV CONTRAST Additional Contrast? None    (Results Pending)       EKG:   EKG Interpretation    Interpreted by me    Rhythm: normal sinus   Rate: normal  Axis: normal  Ectopy: none  Conduction: normal  ST Segments: no acute change  T Waves: Flattening, inversion aVL  Q Waves: none    Clinical Impression: Nonspecific T wave changes    Attending Physician Additional  Notes    Patient is multiple complaints. She has been sick ever since discharge. Has been having uncontrollable shakiness in the right upper extremity since then.   She fell and

## 2020-01-03 NOTE — ED TRIAGE NOTES
Pt arrived to ed c/o vomiting and lower abd pain for 3 days. Pt was recently admitted for a fall less than a month ago. Per pt c/o urinary incontinence which is new for her.  Pt is alert oriented speaking clearly

## 2020-01-03 NOTE — PROGRESS NOTES
Smoking Cessation - topics covered   []  Health Risks  []  Benefits of Quitting   []  Smoking Cessation  [x]  Patient has no history of tobacco use  []  Patient is former smoker. [x]  No need for tobacco cessation education. []  Booklet given  []  Patient verbalizes understanding. []  Patient denies need for tobacco cessation education. []  Unable to meet with patient today. Will follow up as able.   Iván Santiago  7:29 AM

## 2020-01-03 NOTE — ED PROVIDER NOTES
file   Social Needs    Financial resource strain: Not on file    Food insecurity:     Worry: Not on file     Inability: Not on file    Transportation needs:     Medical: Not on file     Non-medical: Not on file   Tobacco Use    Smoking status: Never Smoker    Smokeless tobacco: Never Used   Substance and Sexual Activity    Alcohol use: No    Drug use: No    Sexual activity: Never   Lifestyle    Physical activity:     Days per week: Not on file     Minutes per session: Not on file    Stress: Not on file   Relationships    Social connections:     Talks on phone: Not on file     Gets together: Not on file     Attends [employer] service: Not on file     Active member of club or organization: Not on file     Attends meetings of clubs or organizations: Not on file     Relationship status: Not on file    Intimate partner violence:     Fear of current or ex partner: Not on file     Emotionally abused: Not on file     Physically abused: Not on file     Forced sexual activity: Not on file   Other Topics Concern    Not on file   Social History Narrative    Not on file       Family History   Problem Relation Age of Onset    Cancer Mother     Cancer Father         Allergies:  Penicillins and Sulfa antibiotics    Home Medications:  Prior to Admission medications    Medication Sig Start Date End Date Taking? Authorizing Provider   gabapentin (NEURONTIN) 300 MG capsule Take 1 capsule by mouth 3 times daily for 180 days.  Intended supply: 90 days 12/6/19 6/3/20  Gregoria Heart MD   isosorbide mononitrate (IMDUR) 30 MG extended release tablet Take 1 tablet by mouth daily 11/26/19   Stephania Chow MD   rivaroxaban (XARELTO) 20 MG TABS tablet Take 1 tablet by mouth daily (with breakfast) Start on 12/12/2019 after finishing Xarelto 15 mg twice daily for 21 days 11/25/19   Stephania Chow MD   linagliptin (TRADJENTA) 5 MG tablet Take 1 tablet by mouth daily 11/26/19   Stephania Chow MD   metFORMIN [facility]) 750 MG extended release tablet Take 1 tablet by mouth 2 times daily (with meals) 11/25/19   Rafael Cox MD   rOPINIRole (REQUIP) 0.5 MG tablet Take 1 tablet by mouth 3 times daily 11/25/19   Rafael Cox MD   carvedilol (COREG) 25 MG tablet Take 1 tablet by mouth 2 times daily (with meals) Hold for systolic blood pressure < 120 or heart rate < 50  Give 1 hour apart from other blood pressure medicines 11/25/19   Rafael Cox MD   melatonin (RA MELATONIN) 3 MG TABS tablet Take 1 tablet by mouth nightly as needed (for insomnia) 9/27/19   Larwance Olp, DO   traMADol (ULTRAM) 50 MG tablet every 6 hours as needed. Historical Provider, MD   escitalopram (LEXAPRO) 10 MG tablet Take by mouth daily     Historical Provider, MD   loratadine (CLARITIN) 10 MG tablet Claritin 10 mg tablet   Take 1 tablet every day by oral route. Historical Provider, MD   albuterol sulfate  (90 Base) MCG/ACT inhaler Inhale 2 puffs into the lungs every 6 hours as needed for Wheezing 3/15/18   Alexandrea Hammer MD   atorvastatin (LIPITOR) 40 MG tablet Take 1 tablet by mouth nightly 3/28/17   Gina Santana MD   ticagrelor (BRILINTA) 90 MG TABS tablet Take 1 tablet by mouth 2 times daily 2/27/17 10/3/19  P Teresita Eugene MD   spironolactone (ALDACTONE) 25 MG tablet Take 25 mg by mouth daily     Historical Provider, MD   lisinopril (PRINIVIL;ZESTRIL) 10 MG tablet Take 1 tablet by mouth daily 3/26/16   Saira Sequeira MD   vitamin E 400 UNIT capsule Take 400 Units by mouth daily. Historical Provider, MD   insulin aspart (NOVOLOG FLEXPEN) 100 UNIT/ML injection pen Inject  into the skin 3 times daily (before meals). Historical Provider, MD   aspirin 81 MG tablet Take 81 mg by mouth daily. Historical Provider, MD   nitroGLYCERIN (NITROSTAT) 0.4 MG SL tablet Place 0.4 mg under the tongue every 5 minutes as needed. Historical Provider, MD   omeprazole (PRILOSEC) 20 MG capsule Take 20 mg by mouth Daily. and oriented to person, place, and time. DIFFERENTIAL  DIAGNOSIS     PLAN (LABS / IMAGING / EKG):  Orders Placed This Encounter   Procedures    Urine Culture    CT ABDOMEN PELVIS W IV CONTRAST Additional Contrast? None    CT HEAD WO CONTRAST    XR WRIST RIGHT (MIN 3 VIEWS)    CBC WITH AUTO DIFFERENTIAL    Comprehensive Metabolic Panel    LIPASE    Urinalysis Reflex to Culture    Troponin    LACTIC ACID, WHOLE BLOOD    Microscopic Urinalysis    Troponin    Inpatient consult to Hospitalist    PATIENT STATUS (FROM ED OR OR/PROCEDURAL) Inpatient       MEDICATIONS ORDERED:  Orders Placed This Encounter   Medications    morphine injection 4 mg    ondansetron (ZOFRAN) injection 4 mg    LORazepam (ATIVAN) injection 2 mg    iohexol (OMNIPAQUE 350) solution 75 mL    cefTRIAXone (ROCEPHIN) 1 g IVPB in 50 mL D5W minibag         Initial MDM/Plan: 76 y.o. female who presents with abdominal pain and urinary incontinence since yesterday. The patient was afebrile and not tachycardic on arrival.  On physical exam, patient had significant tenderness suprapubic region. No rebound or guarding. She also had resting right hand tremor. Given the patient's history of peripheral vascular disease, mesenteric ischemia differential.  Will will get initial CT abdomen pelvis with contrast.  Will also get urinalysis due to the patient suprapubic tenderness. Will also get CBC, CMP and lipase. The patient is denying any chest pain but given her age and nausea/vomiting will get cardiac workup. Right hand tremor could be due to focal seizure, will give Ativan and CT head to rule out any acute pathology.      DIAGNOSTIC RESULTS / EMERGENCYDEPARTMENT COURSE / MDM     LABS:  Labs Reviewed   CBC WITH AUTO DIFFERENTIAL - Abnormal; Notable for the following components:       Result Value    RBC 3.87 (*)     Hemoglobin 11.7 (*)     Seg Neutrophils 70 (*)     Lymphocytes 19 (*)     All other components within normal limits COMPREHENSIVE METABOLIC PANEL - Abnormal; Notable for the following components:    Glucose 132 (*)     All other components within normal limits   URINE RT REFLEX TO CULTURE - Abnormal; Notable for the following components:    Turbidity UA CLOUDY (*)     Urine Hgb LARGE (*)     Protein, UA 2+ (*)     Nitrite, Urine POSITIVE (*)     Leukocyte Esterase, Urine MODERATE (*)     All other components within normal limits   TROPONIN - Abnormal; Notable for the following components:    Troponin, High Sensitivity 46 (*)     All other components within normal limits   MICROSCOPIC URINALYSIS - Abnormal; Notable for the following components:    Bacteria, UA MANY (*)     All other components within normal limits   TROPONIN - Abnormal; Notable for the following components:    Troponin, High Sensitivity 38 (*)     All other components within normal limits   URINE CULTURE   LIPASE   LACTIC ACID, WHOLE BLOOD         RADIOLOGY:  Xr Wrist Right (min 3 Views)    Result Date: 1/2/2020  EXAMINATION: 4 XRAY VIEWS OF THE RIGHT WRIST 1/2/2020 10:57 pm COMPARISON: None. HISTORY: ORDERING SYSTEM PROVIDED HISTORY: right wrist pain TECHNOLOGIST PROVIDED HISTORY: right wrist pain Reason for Exam: Pt fell onto Rt wiist (x several wks ago)  Still pain and swelling Acuity: Unknown FINDINGS: 4 images of the right wrist were provided. Multifocal degenerative change in the wrist is noted. This is greatest at the junction of the distal carpal row in the metacarpal bases. Round lucency in the scaphoid is noted raising the question of a cystic geode. There is no acute fracture. There are suggested periosteal reactive changes at the base of the 5th metacarpal raising the question of a healing fracture. Mild soft tissue swelling is noted along the dorsum of the wrist.  Widening of the scapholunate space is again noted. Degenerative changes without acute osseous abnormality.  Healing fracture of the base of the 5th metacarpal Widening of the none  Conduction: normal  ST Segments: no acute change  T Waves: no acute change  Q Waves: none    Clinical Impression: no acute changes    [address]      All EKG's are interpreted by the Emergency Department Physicianwho either signs or Co-signs this chart in the absence of a cardiologist.    EMERGENCY DEPARTMENT COURSE:      Initial troponin 46 and down trended to 38. EKG showed no changes from prior. CT abdomen/pelvis significant for bladder wall thickening and the patient's urinalysis was positive for nitrates, leukocyte esterase and bacteria. The patient was started on Rocephin. She was not meeting Sirs criteria as she did not have leukocytosis or abnormal vital signs. The family was requesting imaging of the right wrist due to the patient's persistent pain and swelling. X-ray imaging showed possible scapholunate ligament injury and healing fifth metacarpal fracture. The patient was admitted to [place] [id number] for neurology and orthopedic consultation as well as UTI. PROCEDURES:  None    CONSULTS:  IP CONSULT TO HOSPITALIST    CRITICAL CARE:  Please see attending note    FINAL IMPRESSION      1. Acute cystitis without hematuria    2.  Closed nondisplaced fracture of other part of fifth metacarpal bone of right hand, initial encounter          DISPOSITION / [address] Admitted 01/03/2020 12:20:34 AM      PATIENT REFERRED TO:  Vanessa Giordano, [address]   [address]  [place] [address]  [phone]            DISCHARGE MEDICATIONS:  New Prescriptions    No medications on file       Lucien Bauman MD  Emergency Medicine Resident    (Please note that portions of this note were completed with a voice recognition program.Efforts were made to edit the dictations but occasionally words are mis-transcribed.)        Lucien Bauman MD  Resident  01/03/20 Jessica Taylor MD  Resident  01/03/20 [id number]       Lucien Bauman MD  Resident  01/03/20 [id number]

## 2020-01-03 NOTE — ED NOTES
Pt brief changed and placed on bedside commode  Pt placed back into bed and back on cardiac monitor  Pt denies pain at this time     Ruba Sanz RN  01/03/20 [address], RN  01/03/20 0141

## 2020-01-03 NOTE — ED PROVIDER NOTES
XRAY VIEWS OF THE CHEST 12/14/2019 10:05 am COMPARISON: None. HISTORY: ORDERING SYSTEM PROVIDED HISTORY: CHF TECHNOLOGIST PROVIDED HISTORY: CHF FINDINGS: There is chronic pulmonary change. There may be trace effusions. There is no pneumothorax. Mediastinal structures are unremarkable. The upper abdomen is unremarkable. The extrathoracic soft tissues are unremarkable. Chronic pulmonary change with trace effusions. Xr Ribs Right Include Chest (min 3 Views)    Result Date: 12/13/2019  EXAMINATION: 3  XRAY VIEWS OF THE RIGHT RIBS WITH FRONTAL XRAY VIEW OF THE CHEST 12/13/2019 6:13 pm COMPARISON: 11/18/2018 CTA chest HISTORY: ORDERING SYSTEM PROVIDED HISTORY: chest pain, fall w/right side pain TECHNOLOGIST PROVIDED HISTORY: chest pain, fall w/right side pain Reason for Exam: fall/ right sided pain with right arm and hand and left hand pain Acuity: Acute Type of Exam: Initial FINDINGS: Heart is mildly large in size. Mild perihilar edema and vascular congestion. No acute displaced rib fracture. No pleural effusion pneumothorax. No acute displaced rib fracture. Mild cardiomegaly and mild pulmonary edema. Xr Shoulder Right (min 2 Views)    Result Date: 12/13/2019  EXAMINATION: THREE XRAY VIEWS OF THE RIGHT SHOULDER 12/13/2019 6:13 pm COMPARISON: None. HISTORY: ORDERING SYSTEM PROVIDED HISTORY: fall, pain TECHNOLOGIST PROVIDED HISTORY: fall, pain Reason for Exam: fall/ right sided pain with right arm and hand and left hand pain Acuity: Acute Type of Exam: Initial FINDINGS: Glenohumeral joint is normally aligned. No evidence of acute fracture or dislocation. No abnormal periarticular calcifications. The [facility] joint is unremarkable in appearance. Visualized lung is unremarkable. No acute abnormality.      Xr Elbow Right (min 3 Views)    Result Date: 12/13/2019  EXAMINATION: 3 XRAY VIEWS OF THE RIGHT ELBOW; 3 XRAY VIEWS OF THE RIGHT WRIST 12/13/2019 6:13 pm COMPARISON: Radiographs of the right hand 08/11/2015. HISTORY: ORDERING SYSTEM PROVIDED HISTORY: fall, pain TECHNOLOGIST PROVIDED HISTORY: fall, pain Reason for Exam: fall/ right sided pain with right arm and hand and left hand pain Acuity: Acute Type of Exam: Initial FINDINGS: RIGHT WRIST: There is no acute fracture or dislocation. Mild to moderate degenerative changes are noted at the 1st [facility] and triscaphe joints. Degenerative changes of the interphalangeal joints are incompletely profiled. There are no radiopaque foreign bodies. RIGHT ELBOW: There is no acute fracture or dislocation. The joint spaces are preserved. Mild degenerative changes are noted about the lateral epicondyle. There is no elbow joint effusion. There are no radiopaque foreign bodies. 1. No acute fracture or dislocation of the right wrist or elbow. 2. Degenerative changes as described above. Xr Wrist Right (min 3 Views)    Result Date: 1/2/2020  EXAMINATION: 4 XRAY VIEWS OF THE RIGHT WRIST 1/2/2020 10:57 pm COMPARISON: None. HISTORY: ORDERING SYSTEM PROVIDED HISTORY: right wrist pain TECHNOLOGIST PROVIDED HISTORY: right wrist pain Reason for Exam: Pt fell onto Rt wiist (x several wks ago)  Still pain and swelling Acuity: Unknown FINDINGS: 4 images of the right wrist were provided. Multifocal degenerative change in the wrist is noted. This is greatest at the junction of the distal carpal row in the metacarpal bases. Round lucency in the scaphoid is noted raising the question of a cystic geode. There is no acute fracture. There are suggested periosteal reactive changes at the base of the 5th metacarpal raising the question of a healing fracture. Mild soft tissue swelling is noted along the dorsum of the wrist.  Widening of the scapholunate space is again noted. Degenerative changes without acute osseous abnormality. 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      Xr Wrist Right (min 3 fall, pain Reason for Exam: fall/ right sided pain with right arm and hand and left hand pain Acuity: Acute Type of Exam: Initial FINDINGS: No fracture, dislocation, or focal osseous lesion is noted. Mild lateral soft tissue swelling. Mild degenerate change. No fracture or dislocation. Mild degenerate changes     Ct Head Wo Contrast    Result Date: 1/2/2020  EXAMINATION: CT OF THE HEAD WITHOUT CONTRAST  1/2/2020 9:25 pm TECHNIQUE: CT of the head was performed without the administration of intravenous contrast. Dose modulation, iterative reconstruction, and/or weight based adjustment of the mA/kV was utilized to reduce the radiation dose to as low as reasonably achievable. COMPARISON: 12/13/2019. HISTORY: ORDERING SYSTEM PROVIDED HISTORY: seizure like activity TECHNOLOGIST PROVIDED HISTORY: seizure like activity Reason for Exam: seizure like activity Acuity: Unknown Type of Exam: Unknown FINDINGS: BRAIN/VENTRICLES: There is no acute intracranial hemorrhage, mass effect or midline shift. No abnormal extra-axial fluid collection. The gray-white differentiation is maintained without evidence of an acute infarct. There is no evidence of hydrocephalus. Very mild decreased attenuation in the periventricular and subcortical white matter consistent with small vessel ischemic change. Intracranial atherosclerotic vascular calcification. ORBITS: The visualized portion of the orbits demonstrate no acute abnormality. SINUSES: The visualized paranasal sinuses and mastoid air cells demonstrate no acute abnormality. SOFT TISSUES/SKULL:  No acute abnormality of the visualized skull or soft tissues. No acute intracranial abnormality.      Ct Head Wo Contrast    Result Date: 12/13/2019  EXAMINATION: CT OF THE HEAD WITHOUT CONTRAST  12/13/2019 6:43 pm TECHNIQUE: CT of the head was performed without the administration of intravenous contrast. Dose modulation, iterative reconstruction, and/or weight based adjustment of the mA/kV was utilized to reduce the radiation dose to as low as reasonably achievable. COMPARISON: CT head 01/13/2018, MRI brain 03/25/2016. HISTORY: ORDERING SYSTEM PROVIDED HISTORY: fall, head contusion, on blood thinners TECHNOLOGIST PROVIDED HISTORY: fall, head contusion, on blood thinners FINDINGS: BRAIN/VENTRICLES: There is no intracranial hemorrhage. The ventricles and basal cisterns are patent. There is no midline shift or extra-axial collection. There is no acute transcortical infarct. There is a mild amount of periventricular, subcortical and deep white matter hypoattenuation, likely the sequela of chronic small vessel ischemia. ORBITS: The visualized portion of the orbits demonstrate no acute abnormality. SINUSES: There is partial opacification of the inferior left mastoid air cells, possibly a mastoid effusion. SOFT TISSUES/SKULL:  There is no depressed calvarial fracture. No acute intracranial abnormality. Ct Abdomen Pelvis W Iv Contrast Additional Contrast? None    Result Date: 1/2/2020  EXAMINATION: CT OF THE ABDOMEN AND PELVIS WITH CONTRAST 1/2/2020 9:25 pm TECHNIQUE: CT of the abdomen and pelvis was performed with the administration of intravenous contrast. Multiplanar reformatted images are provided for review. Dose modulation, iterative reconstruction, and/or weight based adjustment of the mA/kV was utilized to reduce the radiation dose to as low as reasonably achievable. COMPARISON: 01/29/2015 HISTORY: ORDERING SYSTEM PROVIDED HISTORY: abdominal pain TECHNOLOGIST PROVIDED HISTORY: abdominal pain Reason for Exam: pain Acuity: Unknown Type of Exam: Unknown FINDINGS: Lower Chest: No acute infiltrate at the lung bases. Mild interlobular septal thickening suggesting possible mild edema. Mild distal esophageal wall thickening. Atherosclerotic calcification in the coronary circulation. Organs: Status post cholecystectomy. No significant biliary dilatation.   The liver, spleen, pancreas and adrenal glands are unremarkable. No solid renal mass or significant hydronephrosis. Bilateral renal cysts, the largest on the left measuring 6.6 cm. There is interval decrease in size of a cyst in the lower pole medially on the left suggesting interval rupture. GI/Bowel: Colonic diverticulosis with no CT evidence of diverticulitis. No secondary signs of appendicitis. No small bowel distension. The stomach and duodenal C-loop are intact. Pelvis: No pelvic mass or free pelvic fluid. The uterus is absent. Mild distention of the urinary bladder. Mild diffuse bladder wall thickening with mild infiltration of the pericystic fat. Peritoneum/Retroperitoneum: The abdominal aorta is normal in caliber with mild calcified atherosclerotic plaque. No retroperitoneal adenopathy or upper abdominal ascites. Bones/Soft Tissues: No acute osseous or soft tissue abnormality. Mild skin thickening and infiltration of the subcutaneous fat in the mid pelvis, stable. Stable mild compression deformity of the superior endplate of L2.     1. Mild diffuse bladder wall thickening and infiltration of the pericystic fat given the degree of distention. Correlate for underlying cystitis. 2. No other acute findings within the abdomen or pelvis. Colonic diverticulosis with no acute features. 3. Previous cholecystectomy and hysterectomy. Ct Chest Pulmonary Embolism W Contrast    Result Date: 12/13/2019  EXAMINATION: CTA OF THE CHEST 12/13/2019 6:43 pm TECHNIQUE: CTA of the chest was performed after the administration of intravenous contrast.  Multiplanar reformatted images are provided for review. MIP images are provided for review. Dose modulation, iterative reconstruction, and/or weight based adjustment of the mA/kV was utilized to reduce the radiation dose to as low as reasonably achievable.  COMPARISON: 11/18/2018 CT chest, chest x-ray 12/13/2018 HISTORY: ORDERING SYSTEM PROVIDED HISTORY: recently diagnosed PE, c/o chest pain and SOB TECHNOLOGIST PROVIDED HISTORY: recently diagnosed PE, c/o chest pain and SOB Reason for Exam: recently diagnosed PE. Pt c/o chest pain and SOB Acuity: Unknown Type of Exam: Unknown FINDINGS: Pulmonary Arteries: Pulmonary arteries are adequately opacified for evaluation. No evidence of central, lobar or proximal segmental intraluminal filling defect to suggest central, lobar or proximal segmental pulmonary embolism. Distal pulmonary arteries not well evaluated due to motion artifacts. Main pulmonary artery is normal in caliber. Mediastinum: Heart is enlarged. Coronary calcifications and coronary stents. Trace pericardial thickening versus pericardial fluid. No suspicious mediastinal or hilar adenopathy identified per CT criteria. Lungs/pleura: Mild bronchial wall thickening Upper Abdomen: Limited images of the upper abdomen are unremarkable. Soft Tissues/Bones: Multilevel degenerate changes thoracic spine. Mild mucosal thickening identified distal thoracic esophagus. No evidence of central, lobar or proximal segmental pulmonary embolism Mild pulmonary edema. Small effusions. Bronchial wall thickening noted. This may related to underlying bronchitis versus edema. Vl Arterial Pvr Lower Wo Exercise    Result Date: 12/9/2019    [facility]  Vascular Lower Arterial Plethysmography Procedure   Patient Name   Erica Kilgore     Date of Study           12/09/2019                 Kaila Lim   Date of Birth  1945  Gender                  Female   Age            76 year(s)  Race                    Black   Room Number    op   Corporate ID # [id number]   Patient Acct # [de-identified]   MR #           [id number]     Sonographer             Gita Grande RVT   Accession #    [id number]   Interpreting Physician  Bradford Cabezas   Referring                  Referring Physician  Nurse  Practitioner  Procedure Type of Study:   Extremities Arteries: Lower Arterial Plethysmography, PVR Lower.   Indications for Study:PVD. Patient Status:Out Patient. Conclusions   Summary   Right MARIZOL of 0.85 is consistent with mild arterial insufficiency. Left MARIZOL  of 0.59 is consistent with moderate arterial insufficiency. These findings  are unchanged from the previous study in 09/2019. Signature   ----------------------------------------------------------------  Electronically signed by Patti Tarango RVT(Sonographer) on  12/09/2019 12:15 PM  ----------------------------------------------------------------   ----------------------------------------------------------------  Electronically signed by Willena Jan Reyes,Arthur(Interpreting  physician) on 12/09/2019 09:32 PM  ----------------------------------------------------------------  Risk Factors History +----------------------------+----------+----------------------------------+ ! Diagnosis                   ! Date      ! Comments                          ! +----------------------------+----------+----------------------------------+ ! Pulmonary Embolism          ! 11/19/2019!                                  ! +----------------------------+----------+----------------------------------+ ! Previous Scan               !11/19/2019! rt wnl, left tibioperoneal trunk, ! !                            !          !PTV's non-compressible            ! +----------------------------+----------+----------------------------------+ ! Previous Scan               !09/04/2019! RT MARIZOL--.78                       ! ! !          !LT MARIZOL--.57                       ! +----------------------------+----------+----------------------------------+ ! Peripheral vascular         ! ! S/P RT leg angioplasty 1 month ago! !disease->Surgery or PCI     !          !                                  ! +----------------------------+----------+----------------------------------+ ! CHF                         !           !                                  ! +----------------------------+----------+----------------------------------+ ! CAD                         ! !stent                             ! +----------------------------+----------+----------------------------------+   - The patient's risk factor(s) include: diabetes mellitus, dyslipidemia     and arterial hypertension. Allergies   - Allergy:Sulfa(Drug). - Allergy:Penicillin(Drug). Velocities are measured in cm/s ; Diameters are measured in cm Pressures +--------------------------------------++--------+-----+----+--------+-----+ ! ! !Right   ! ! Left!        !     ! +--------------------------------------++--------+-----+----+--------+-----+ ! Location                              ! !Pressure! Ratio! !Pressure! Ratio! +--------------------------------------++--------+-----+----+--------+-----+ ! Thigh                                 !!184     !1.03 !    !180     !1.01 ! +--------------------------------------++--------+-----+----+--------+-----+ ! Calf                                  !!133     !0.75 !    !147     ! 0.83 ! +--------------------------------------++--------+-----+----+--------+-----+ ! Ankle PT                              !!106     !0.6  ! !105     ! 0.59 ! +--------------------------------------++--------+-----+----+--------+-----+ ! Ankle DP                              !!151     !0.85 !    !99      !0.56 ! +--------------------------------------++--------+-----+----+--------+-----+ ! Great Toe                             !!72      !0.4  ! !48      !0.27 ! +--------------------------------------++--------+-----+----+--------+-----+   - Brachial Pressure:Right: 164. Left:178.   - MARIZOL:Right: 0.85. Left: 0.59. Plethysmographic Digit Evaluation +---------++--------+-----+---------------++--------+-----+----------------+ ! ! !Right   ! ! Left           !!        !     !                ! +---------++--------+-----+---------------++--------+-----+----------------+ ! Location ! !Pressure! Ratio! PPG Wave Form  ! !Pressure! Ratio! PPG Wave Form   ! +---------++--------+-----+---------------++--------+-----+----------------+ ! Great Toe!!72      !0.4  ! !!48      !0.27 !                ! +---------++--------+-----+---------------++--------+-----+----------------+      RECENT VITALS:     Temp: 98.5 °F (36.9 °C),  Pulse: 100, Resp: 20, BP: (!) 172/93, SpO2: 97 %    This patient is a 76 y.o. Female with abd pain, vomiting, R wrist pain. New tremor. S/p mechanical fall 3 wks, on AC. CTH  Neg. abd CT obtained w abnormal gallbladder wall thickening. UA pos for UTI. Receiving rocephin. Admitted to [facility]. OUTSTANDING TASKS / RECOMMENDATIONS:    1. Awaiting bed     FINAL IMPRESSION:     1. Acute cystitis without hematuria    2.  Closed nondisplaced fracture of other part of fifth metacarpal bone of right hand, initial encounter        DISPOSITION:         DISPOSITION:  []  Discharge   []  Transfer -    [x]  Admission -     []  Against Medical Advice   []  Eloped   FOLLOW-UP: Joseline Mendoza [facility]  [address] [facility] [address]: New Prescriptions    No medications on file          Josh Stephenson MD  Emergency Medicine Resident  [address] [facility]       Josh Stephenson MD  01/04/20 [id number]

## 2020-01-03 NOTE — CARE COORDINATION
Case Management Initial Discharge [address],           Met with:patient and pt's son Philippe Samano to discuss discharge plans. Information verified: address, contacts, phone number, , insurance Yes  PCP: Neda Dias MD  Date of last visit: unsure of last visit    Insurance Provider: [employer]    Discharge Planning    Living Arrangements:  Children, Family Members   Support Systems:  Family Members, [id number] has 2 stories  2 stairs to climb to get into front door, one flight of stairs to climb to reach second floor  Location of bedroom/bathroom in home is on the main level    Patient able to perform ADL's:Independent    Current Services (outpatient & in home) [facility]  DME equipment: walker, glucometer  DME provider: n/a      Potential Assistance Needed:  Home Care    Patient agreeable to home care: Yes  Freedom of choice provided:  Yes, continue with [facility]    Prior SNF/Rehab Placement and Facility: none  Agreeable to SNF/Rehab: No  [place] of choice provided: n/a   Evaluation: n/a    Expected Discharge date:  20  Patient expects to be discharged to:  Home  Follow Up Appointment: Best Day/ Time: Monday AM    Transportation provider: family  Transportation arrangements needed for discharge: No    Readmission Risk              Risk of Unplanned Readmission:        20             Does patient have a readmission risk score greater than 14?: Yes  If yes, follow-up appointment must be made within 7 days of discharge. Goals of Care:       Discharge Plan: home with [facility], [facility] [place] with [facility] verified that pt is current with their services. Need HC order and STEPHEN completed. Will need PCP F/U appt.            Electronically signed by Estrada Retana RN on 1/3/20 at 4:17 PM

## 2020-01-03 NOTE — ED NOTES
Pt up to bedside commode with assist and placed back into bed safely.  Call light within reach, side rails up     Lauryn Choi RN  01/03/20 [id number]

## 2020-01-03 NOTE — ED PROVIDER NOTES
Dr Praveena Calzada sign out, abdominal pain vomiting, uti, pt admitted,       Jaret Chu,   01/02/20 [id number]

## 2020-01-04 VITALS
HEIGHT: 66 IN | BODY MASS INDEX: 36.48 KG/M2 | WEIGHT: 227 LBS | RESPIRATION RATE: 16 BRPM | SYSTOLIC BLOOD PRESSURE: 140 MMHG | DIASTOLIC BLOOD PRESSURE: 59 MMHG | OXYGEN SATURATION: 96 % | HEART RATE: 75 BPM | TEMPERATURE: 98.1 F

## 2020-01-04 LAB
ANION GAP SERPL CALCULATED.3IONS-SCNC: 11 MMOL/L (ref 9–17)
BUN BLDV-MCNC: 16 MG/DL (ref 8–23)
BUN/CREAT BLD: ABNORMAL (ref 9–20)
CALCIUM SERPL-MCNC: 8.3 MG/DL (ref 8.6–10.4)
CHLORIDE BLD-SCNC: 101 MMOL/L (ref 98–107)
CO2: 23 MMOL/L (ref 20–31)
CREAT SERPL-MCNC: 1.19 MG/DL (ref 0.5–0.9)
CULTURE: ABNORMAL
EKG ATRIAL RATE: 95 BPM
EKG P AXIS: 81 DEGREES
EKG P-R INTERVAL: 146 MS
EKG Q-T INTERVAL: 368 MS
EKG QRS DURATION: 92 MS
EKG QTC CALCULATION (BAZETT): 462 MS
EKG R AXIS: 30 DEGREES
EKG T AXIS: 93 DEGREES
EKG VENTRICULAR RATE: 95 BPM
GFR AFRICAN AMERICAN: 54 ML/MIN
GFR NON-AFRICAN AMERICAN: 44 ML/MIN
GFR SERPL CREATININE-BSD FRML MDRD: ABNORMAL ML/MIN/{1.73_M2}
GFR SERPL CREATININE-BSD FRML MDRD: ABNORMAL ML/MIN/{1.73_M2}
GLUCOSE BLD-MCNC: 180 MG/DL (ref 65–105)
GLUCOSE BLD-MCNC: 202 MG/DL (ref 65–105)
GLUCOSE BLD-MCNC: 209 MG/DL (ref 65–105)
GLUCOSE BLD-MCNC: 220 MG/DL (ref 70–99)
HCT VFR BLD CALC: 32.2 % (ref 36.3–47.1)
HEMOGLOBIN: 9.7 G/DL (ref 11.9–15.1)
Lab: ABNORMAL
MCH RBC QN AUTO: 30.1 PG (ref 25.2–33.5)
MCHC RBC AUTO-ENTMCNC: 30.1 G/DL (ref 28.4–34.8)
MCV RBC AUTO: 100 FL (ref 82.6–102.9)
NRBC AUTOMATED: 0 PER 100 WBC
PDW BLD-RTO: 13.6 % (ref 11.8–14.4)
PLATELET # BLD: 179 K/UL (ref 138–453)
PMV BLD AUTO: 11.7 FL (ref 8.1–13.5)
POTASSIUM SERPL-SCNC: 4.2 MMOL/L (ref 3.7–5.3)
RBC # BLD: 3.22 M/UL (ref 3.95–5.11)
SODIUM BLD-SCNC: 135 MMOL/L (ref 135–144)
SPECIMEN DESCRIPTION: ABNORMAL
WBC # BLD: 8.3 K/UL (ref 3.5–11.3)

## 2020-01-04 PROCEDURE — 82947 ASSAY GLUCOSE BLOOD QUANT: CPT

## 2020-01-04 PROCEDURE — G0378 HOSPITAL OBSERVATION PER HR: HCPCS

## 2020-01-04 PROCEDURE — 85027 COMPLETE CBC AUTOMATED: CPT

## 2020-01-04 PROCEDURE — 6370000000 HC RX 637 (ALT 250 FOR IP): Performed by: NURSE PRACTITIONER

## 2020-01-04 PROCEDURE — 80048 BASIC METABOLIC PNL TOTAL CA: CPT

## 2020-01-04 PROCEDURE — 6370000000 HC RX 637 (ALT 250 FOR IP): Performed by: INTERNAL MEDICINE

## 2020-01-04 PROCEDURE — 36415 COLL VENOUS BLD VENIPUNCTURE: CPT

## 2020-01-04 PROCEDURE — 2580000003 HC RX 258: Performed by: INTERNAL MEDICINE

## 2020-01-04 PROCEDURE — 99239 HOSP IP/OBS DSCHRG MGMT >30: CPT | Performed by: INTERNAL MEDICINE

## 2020-01-04 PROCEDURE — 6360000002 HC RX W HCPCS: Performed by: INTERNAL MEDICINE

## 2020-01-04 PROCEDURE — 96366 THER/PROPH/DIAG IV INF ADDON: CPT

## 2020-01-04 RX ORDER — CEFDINIR 300 MG/1
300 CAPSULE ORAL 2 TIMES DAILY
Qty: 10 CAPSULE | Refills: 0 | Status: SHIPPED | OUTPATIENT
Start: 2020-01-05 | End: 2020-01-07 | Stop reason: SINTOL

## 2020-01-04 RX ADMIN — INSULIN LISPRO 2 UNITS: 100 INJECTION, SOLUTION INTRAVENOUS; SUBCUTANEOUS at 17:19

## 2020-01-04 RX ADMIN — TRAMADOL HYDROCHLORIDE 100 MG: 50 TABLET, FILM COATED ORAL at 09:04

## 2020-01-04 RX ADMIN — TRAMADOL HYDROCHLORIDE 100 MG: 50 TABLET, FILM COATED ORAL at 15:45

## 2020-01-04 RX ADMIN — CEFTRIAXONE SODIUM 1 G: 1 INJECTION, POWDER, FOR SOLUTION INTRAMUSCULAR; INTRAVENOUS at 18:33

## 2020-01-04 RX ADMIN — Medication 81 MG: at 09:03

## 2020-01-04 RX ADMIN — VITAMIN E CAP 400 UNIT 400 UNITS: 400 CAP at 09:04

## 2020-01-04 RX ADMIN — RIVAROXABAN 20 MG: 20 TABLET, FILM COATED ORAL at 12:23

## 2020-01-04 RX ADMIN — LINAGLIPTIN 5 MG: 5 TABLET, FILM COATED ORAL at 12:23

## 2020-01-04 RX ADMIN — CARVEDILOL 25 MG: 25 TABLET, FILM COATED ORAL at 09:03

## 2020-01-04 RX ADMIN — CARVEDILOL 25 MG: 25 TABLET, FILM COATED ORAL at 18:39

## 2020-01-04 RX ADMIN — LISINOPRIL 20 MG: 20 TABLET ORAL at 12:23

## 2020-01-04 RX ADMIN — TICAGRELOR 90 MG: 90 TABLET ORAL at 09:04

## 2020-01-04 RX ADMIN — GABAPENTIN 300 MG: 300 CAPSULE ORAL at 09:04

## 2020-01-04 RX ADMIN — SPIRONOLACTONE 25 MG: 25 TABLET ORAL at 12:22

## 2020-01-04 RX ADMIN — CETIRIZINE HYDROCHLORIDE 5 MG: 10 TABLET ORAL at 09:03

## 2020-01-04 RX ADMIN — ESCITALOPRAM OXALATE 10 MG: 10 TABLET ORAL at 09:03

## 2020-01-04 RX ADMIN — ROPINIROLE HYDROCHLORIDE 0.5 MG: 0.25 TABLET, FILM COATED ORAL at 09:05

## 2020-01-04 RX ADMIN — ISOSORBIDE MONONITRATE 30 MG: 30 TABLET ORAL at 09:02

## 2020-01-04 RX ADMIN — INSULIN LISPRO 2 UNITS: 100 INJECTION, SOLUTION INTRAVENOUS; SUBCUTANEOUS at 12:24

## 2020-01-04 RX ADMIN — INSULIN LISPRO 1 UNITS: 100 INJECTION, SOLUTION INTRAVENOUS; SUBCUTANEOUS at 09:11

## 2020-01-04 ASSESSMENT — PAIN SCALES - GENERAL
PAINLEVEL_OUTOF10: 8
PAINLEVEL_OUTOF10: 7

## 2020-01-04 NOTE — PROGRESS NOTES
Jero Sneed 19    Progress Note    1/4/2020    10:31 AM    Name:   Shahriar Hagen  MRN:     [id number]     [place]:      [de-identified]   Room:   [address] [place] [address] Day:  1  Admit Date:  1/2/2020  7:36 PM    PCP:   Daniela Keith MD  Code Status:  Full Code    Subjective:     C/C:   Chief Complaint   Patient presents with    Emesis    Abdominal Pain     Interval History Status: improved. Feels better today but still has right arm pain  abd pain and flank pain less  No n/v    Brief History: This 76 yof presents with lower abd pain R>L radiating to back. Also had urinary burning and incontinence. The pain is better now. Review of Systems:     Constitutional:  negative for chills, fevers, sweats  Respiratory:  negative for cough, dyspnea on exertion, hemoptysis, shortness of breath, wheezing  Cardiovascular:  negative for chest pain, chest pressure/discomfort, lower extremity edema, palpitations  Gastrointestinal:  negative for constipation, diarrhea, nausea, vomiting  Neurological:  negative for dizziness, headache    Medications: Allergies:     Allergies   Allergen Reactions    Penicillins      Other reaction(s): Hives    Sulfa Antibiotics      Other reaction(s): Rash       Current Meds:   Scheduled Meds:    aspirin  81 mg Oral Daily    atorvastatin  40 mg Oral Nightly    carvedilol  25 mg Oral BID WC    escitalopram  10 mg Oral Daily    gabapentin  300 mg Oral TID    isosorbide mononitrate  30 mg Oral Daily    linagliptin  5 mg Oral Daily    cetirizine  5 mg Oral Daily    pantoprazole  40 mg Oral QAM AC    rivaroxaban  20 mg Oral Daily with breakfast    rOPINIRole  0.5 mg Oral TID    spironolactone  25 mg Oral Daily    ticagrelor  90 mg Oral BID    vitamin E  400 Units Oral Daily    sodium chloride flush  10 mL Intravenous 2 times per day    insulin lispro  0-6 Units Subcutaneous TID WC    insulin lispro  0-3 Units Subcutaneous Nightly    cefTRIAXone (ROCEPHIN) IV  1 g Intravenous Q24H    lisinopril  20 mg Oral Daily     Continuous Infusions:    dextrose       PRN Meds: melatonin, sodium chloride flush, magnesium hydroxide, glucose, dextrose, glucagon (rDNA), dextrose, traMADol **OR** traMADol, acetaminophen, metoprolol, ondansetron    Data:     Past Medical History:   has a past medical history of Anxiety, Benign positional vertigo, CAD (coronary artery disease), Chest pain, Depression, Diabetes mellitus ([address] Diabetic neuropathy ([address] Hyperlipidemia, Hypertension, and Migraine. Social History:   reports that she has never smoked. She has never used smokeless tobacco. She reports that she does not drink alcohol or use drugs. Family History:   Family History   Problem Relation Age of Onset   Gwen Kilgore Cancer Mother     Cancer Father        Vitals:  /71   Pulse 74   Temp 99.3 °F (37.4 °C)   Resp 18   Ht 5' 6\" (1.676 m)   Wt 227 lb (103 kg)   SpO2 96%   BMI 36.64 kg/m²   Temp (24hrs), Av.4 °F (36.9 °C), Min:97.6 °F (36.4 °C), Max:99.3 °F (37.4 °C)    Recent Labs     20  1156 20  1619 20  2138 20  0757   POCGLU 180* 258* 128* 180*       I/O (24Hr):     Intake/Output Summary (Last 24 hours) at 2020 1031  Last data filed at 2020 0643  Gross per 24 hour   Intake 400 ml   Output --   Net 400 ml       Labs:  Hematology:  Recent Labs     20  0632   WBC 9.6 8.3   RBC 3.87* 3.22*   HGB 11.7* 9.7*   HCT 36.5 32.2*   MCV 94.3 100.0   MCH 30.2 30.1   MCHC 32.1 30.1   RDW 13.6 13.6    179   MPV 11.6 11.7     Chemistry:  Recent Labs     20  21020  2309 20  0742 20  0632     --   --   --  135   K 3.9  --   --   --  4.2     --   --   --  101   CO2 20  --   --   --  23   GLUCOSE 132*  --   --   --  220*   BUN 9  --   --   --  16   CREATININE 0.58  --   --   --  1.19*   ANIONGAP 16  --   --   --  11   LABGLOM >60 --   --   --  44*   GFRAA >60  --   --   --  54*   CALCIUM 9.5  --   --   --  8.3*   TROPHS 46*  --  38* 39*  --    MYOGLOBIN  --   --   --  41  --    LACTACIDWB  --  1.2  --   --   --      Recent Labs     01/02/20 2031 01/03/20  0534 01/03/20  0723 01/03/20  0742 01/03/20  1156 01/03/20  1619 01/03/20  2138 01/04/20  0757   PROT 7.5  --   --   --   --   --   --   --    LABALBU 4.1  --   --   --   --   --   --   --    LABA1C  --   --   --  9.3*  --   --   --   --    AST 10  --   --   --   --   --   --   --    ALT 9  --   --   --   --   --   --   --    ALKPHOS 100  --   --   --   --   --   --   --    BILITOT 0.71  --   --   --   --   --   --   --    LIPASE 13  --   --   --   --   --   --   --    POCGLU  --  148* 182*  --  180* 258* 128* 180*     ABG:No results found for: POCPH, PHART, PH, POCPCO2, RQX5NXW, PCO2, POCPO2, PO2ART, PO2, POCHCO3, AJP0LFQ, HCO3, NBEA, PBEA, BEART, BE, THGBART, THB, MTK0GCK, QZEX1AGX, M5NYVJPS, O2SAT, FIO2  Lab Results   Component Value Date/Time    SPECIAL NOT REPORTED 01/02/2020 09:14 PM     Lab Results   Component Value Date/Time    CULTURE (A) 01/02/2020 09:14 PM     NON LACTOSE FERMENTING GRAM NEGATIVE RODS >[id number] CFU/ML       Radiology:  Xr Wrist Right (min 3 Views)    Result Date: 1/2/2020  Degenerative changes without acute osseous abnormality. 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 Ct Head Wo Contrast    Result Date: 1/2/2020  No acute intracranial abnormality. Ct Abdomen Pelvis W Iv Contrast Additional Contrast? None    Result Date: 1/3/2020  1. Mild diffuse bladder wall thickening and infiltration of the pericystic fat given the degree of distention. Correlate for underlying cystitis. 2. No other acute findings within the abdomen or pelvis. Colonic diverticulosis with no acute features. 3. Previous cholecystectomy and hysterectomy.        Physical Examination:        General appearance:  alert, cooperative and no distress  Mental Status:  oriented to person, place and time and normal affect  Lungs:  clear to auscultation bilaterally, normal effort  Heart:  regular rate and rhythm, no murmur  Abdomen:  soft, mildly suprapubic ttp, nondistended, normal bowel sounds, no masses, hepatomegaly, splenomegaly; minimal cvat right  Extremities:  no edema, redness, tenderness in the calves  Skin:  no gross lesions, rashes, induration    Assessment:        Hospital Problems           Last Modified POA    * (Principal) Pyelonephritis of right kidney 1/3/2020 Yes    Type 2 diabetes mellitus with diabetic polyneuropathy, with long-term current use of insulin (HCC) (Chronic) 1/3/2020 Yes    Essential hypertension 1/3/2020 Yes    CAD (coronary artery disease) 1/3/2020 Yes    Overview Signed 3/27/2017  4:39 PM by Reji Grimaldo MD     S/P 4 + 2 stents         Peripheral artery disease ([facility] 1/3/2020 Yes    Chronic systolic heart failure ([facility] 1/3/2020 Yes    History of pulmonary embolism 1/3/2020 Yes    Elevated troponin 1/3/2020 Yes    Overview Signed 1/3/2020  9:05 AM by Sravanthi Duque DO     chronically               Plan:        Awaiting final urine culture/sensitivity  Dc ivf  Dc home once urine culture obtained  F/u hand clinic    Sravanthi Duque DO  1/4/2020  10:31 AM

## 2020-01-04 NOTE — PROGRESS NOTES
Physical Therapy  DATE: 2020    NAME: Anabela Macias  MRN: [id number]   : 1945    Patient not seen this date for Physical Therapy due to:  [] Blood transfusion in progress  [] Hemodialysis  [x]  Patient Declined-nausea/vomiting-incr pain-ortho consulted and NWB R UE and splint on-see note-yousif 2020  [] Spine Precautions   [] Strict Bedrest  [] Surgery/ Procedure  [] Testing      [] Other        [] PT being discontinued at this time. Patient independent. No further needs. [] PT being discontinued at this time as the patient has been transferred to palliative care. No further needs.     Mandy Bustillo, PT

## 2020-01-04 NOTE — CONSULTS
Orthopedic Surgery Consult  (Dr. Pj Vargas)                   CC/Reason for consult:  R 11th  fracture    HPI:    The patient is a 76 y.o. female patient had a fall 3.5 weeks ago on 12/13/19 acquiring a right 5th metacarpal fracture. Was placed in a removal wrist splint at that time. Admitted to the hospital for pyelonephritis. Still admits to right hand pain. Has been wearing brace. Denies any new injuries. Denies numbness, tingling, fevers, chills, shortness of breath, or chest pain. Past Medical History:    Past Medical History:   Diagnosis Date    Anxiety     Benign positional vertigo     CAD (coronary artery disease)     Chest pain     Depression     Diabetes mellitus ([facility]     Diabetic neuropathy ([facility]     Hyperlipidemia     Hypertension     Migraine     Migraine headaches aggravated with sublingual nitroglycerin       Past Surgical History:    Past Surgical History:   Procedure Laterality Date    APPENDECTOMY      CARDIAC CATHETERIZATION      2012    CORONARY ANGIOPLASTY WITH STENT PLACEMENT      had 2 with 4 more placed     PTCA         Medications Prior to Admission:   Prior to Admission medications    Medication Sig Start Date End Date Taking? Authorizing Provider   gabapentin (NEURONTIN) 300 MG capsule Take 1 capsule by mouth 3 times daily for 180 days.  Intended supply: 90 days 12/6/19 6/3/20 Yes Ashwin Woody MD   isosorbide mononitrate (IMDUR) 30 MG extended release tablet Take 1 tablet by mouth daily 11/26/19  Yes Marquis Samaniego MD   rivaroxaban (XARELTO) 20 MG TABS tablet Take 1 tablet by mouth daily (with breakfast) Start on 12/12/2019 after finishing Xarelto 15 mg twice daily for 21 days 11/25/19  Yes Marquis Samaniego MD   linagliptin (TRADJENTA) 5 MG tablet Take 1 tablet by mouth daily 11/26/19  Yes Marquis Samaniego MD   metFORMIN (GLUCOPHAGE-XR) 750 MG extended release tablet Take 1 tablet by mouth 2 times daily (with meals) 11/25/19  Yes Marquis Samaniego MD   rOPINIRole (REQUIP) 0.5 MG tablet Take 1 tablet by mouth 3 times daily 11/25/19  Yes Angela Miramontes MD   carvedilol (COREG) 25 MG tablet Take 1 tablet by mouth 2 times daily (with meals) Hold for systolic blood pressure < 120 or heart rate < 50  Give 1 hour apart from other blood pressure medicines 11/25/19  Yes Angela Miramontes MD   melatonin (RA MELATONIN) 3 MG TABS tablet Take 1 tablet by mouth nightly as needed (for insomnia) 9/27/19  Yes Edvin Jules DO   traMADol (ULTRAM) 50 MG tablet every 6 hours as needed. Yes Historical Provider, MD   escitalopram (LEXAPRO) 10 MG tablet Take by mouth daily    Yes Historical Provider, MD   loratadine (CLARITIN) 10 MG tablet Claritin 10 mg tablet   Take 1 tablet every day by oral route. Yes Historical Provider, MD   albuterol sulfate  (90 Base) MCG/ACT inhaler Inhale 2 puffs into the lungs every 6 hours as needed for Wheezing 3/15/18  Yes Adrienne Owusu MD   atorvastatin (LIPITOR) 40 MG tablet Take 1 tablet by mouth nightly 3/28/17  Yes Malou Downey MD   spironolactone (ALDACTONE) 25 MG tablet Take 25 mg by mouth daily    Yes Historical Provider, MD   lisinopril (PRINIVIL;ZESTRIL) 10 MG tablet Take 1 tablet by mouth daily 3/26/16  Yes Arlan Claude, MD   vitamin E 400 UNIT capsule Take 400 Units by mouth daily. Yes Historical Provider, MD   insulin aspart (NOVOLOG FLEXPEN) 100 UNIT/ML injection pen Inject  into the skin 3 times daily (before meals). Yes Historical Provider, MD   aspirin 81 MG tablet Take 81 mg by mouth daily. Yes Historical Provider, MD   nitroGLYCERIN (NITROSTAT) 0.4 MG SL tablet Place 0.4 mg under the tongue every 5 minutes as needed. Yes Historical Provider, MD   omeprazole (PRILOSEC) 20 MG capsule Take 20 mg by mouth Daily.    Yes Historical Provider, MD   ticagrelor (BRILINTA) 90 MG TABS tablet Take 1 tablet by mouth 2 times daily 2/27/17 10/3/19  P Viktor Gandhi MD       Allergies:    Penicillins and Sulfa antibiotics    Social History:   Social History     Socioeconomic History    Marital status:      Spouse name: None    Number of children: None    Years of education: None    Highest education level: None   Occupational History    None   Social Needs    Financial resource strain: None    Food insecurity:     Worry: None     Inability: None    Transportation needs:     Medical: None     Non-medical: None   Tobacco Use    Smoking status: Never Smoker    Smokeless tobacco: Never Used   Substance and Sexual Activity    Alcohol use: No    Drug use: No    Sexual activity: Never   Lifestyle    Physical activity:     Days per week: None     Minutes per session: None    Stress: None   Relationships    Social connections:     Talks on phone: None     Gets together: None     Attends Mu-ism service: None     Active member of club or organization: None     Attends meetings of clubs or organizations: None     Relationship status: None    Intimate partner violence:     Fear of current or ex partner: None     Emotionally abused: None     Physically abused: None     Forced sexual activity: None   Other Topics Concern    None   Social History Narrative    None       Family History:  Family History   Problem Relation Age of Onset    Cancer Mother     Cancer Father        REVIEW OF SYSTEMS:    Constitutional: Negative for fever and chills. HENT: Negative for congestion. Eyes: Negative for blurred vision and double vision. Respiratory: Negative for cough, shortness of breath and wheezing. Cardiovascular: Negative for chest pain and palpitations. Gastrointestinal: Negative for nausea. Negative for vomiting. Musculoskeletal: Positive for right hand pain. See HPI   Skin: Negative for itching and rash. Neurological: Negative for dizziness, sensory change and headaches. Psychiatric/Behavioral: Negative for depression and suicidal ideas.      PHYSICAL EXAM:  /61   Pulse 65   Temp 98.2 °F (36.8 °C) (Oral)   Resp 18   Ht 5' 6\" (1.676 m)   Wt 227 lb (103 kg)   SpO2 92%   BMI 36.64 kg/m²     Gen: AAOx3, NAD, cooperative     Head: Normocephalic, atraumatic    Neck: Supple    Chest: Non labored breathing    Cardiovascular: Regular rate, no dependent edema, distal pulses 2+     Respiratory: Chest symmetric, no accessory muscle use, normal respirations     RUE: Tender over base of 5th MC. [place] normal. Stiff with finger flexion at the MCP. No ecchymosis or deformities. Skin intact. Full AROM without pain shoulder/elbow. Compartments soft and compressible. Ulnar/Median/AIN/PIN motor intact. Median/Radial/Ulnar nerve SILT. Hand and fingers warm and well-perfused w/ BCR; radial pulse 2+. LABS:  Recent Labs     01/04/20  0632   WBC 8.3   HGB 9.7*   HCT 32.2*         K 4.2   BUN 16   CREATININE 1.19*   GLUCOSE 220*        Radiology:   X-ray of right hand demonstrates healing fracture of base of 5th     A/P: 76 y.o. female being seen for R 5th metacarpal fracture 3.5 weeks out after [facility]    -No acute orthopedic surgical intervention indicated at this time  -Weight bearing: Non weight bearing right upper extremity  -Pain control per primary  -May continue removal wrist splint. Ok to remove in 2 weeks. Ok to work on finger ROM while in brace.    -Ice and elevation for pain/swelling  -DVT ppx: ASA, EPC, Chemical AC per primary.  -Follow up with hand/burn/speciality clinic in 2 weeks  -Please page Ortho with any questions or concerns    ----------------------------------------  Genaro Green,   PGY-2, Department of [address], [facility]

## 2020-01-04 NOTE — DISCHARGE SUMMARY
01/04/2020    CREATININE 1.19 01/04/2020    ANIONGAP 11 01/04/2020    ALKPHOS 100 01/02/2020    ALT 9 01/02/2020    AST 10 01/02/2020    BILITOT 0.71 01/02/2020    LABALBU 4.1 01/02/2020    ALBUMIN 1.2 01/02/2020    LABGLOM 44 01/04/2020    GFRAA 54 01/04/2020    GFR      01/04/2020    GFR NOT REPORTED 01/04/2020    PROT 7.5 01/02/2020    CALCIUM 8.3 01/04/2020        Radiology:  Xr Wrist Right (min 3 Views)    Result Date: 1/2/2020  Degenerative changes without acute osseous abnormality. 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 Ct Head Wo Contrast    Result Date: 1/2/2020  No acute intracranial abnormality. Ct Abdomen Pelvis W Iv Contrast Additional Contrast? None    Result Date: 1/3/2020  1. Mild diffuse bladder wall thickening and infiltration of the pericystic fat given the degree of distention. Correlate for underlying cystitis. 2. No other acute findings within the abdomen or pelvis. Colonic diverticulosis with no acute features. 3. Previous cholecystectomy and hysterectomy. Consultations:    Consults:     Final Specialist Recommendations/Findings:   IP CONSULT TO HOSPITALIST  IP CONSULT TO PLASTIC SURGERY  IP CONSULT TO HOME CARE NEEDS      The patient was seen and examined on day of discharge and this discharge summary is in conjunction with any daily progress note from day of discharge.     Discharge plan:     Disposition: Home    Physician Follow Up:     Gurmeet Carlton, [address]   [address] [facility] [address] [place] [address]  [facility]  [phone]  In 2 weeks  hand fracture       Requiring Further Evaluation/Follow Up POST HOSPITALIZATION/Incidental Findings: hand fracture    Diet: diabetic diet    Activity: As tolerated    Instructions to Patient: take medications as prescribed      Discharge Medications: as: REQUIP  Take 1 tablet by mouth 3 times daily     spironolactone 25 MG tablet  Commonly known as:  ALDACTONE     ticagrelor 90 MG Tabs tablet  Commonly known as:  BRILINTA  Take 1 tablet by mouth 2 times daily     traMADol 50 MG tablet  Commonly known as:  ULTRAM     vitamin E 400 UNIT capsule           Where to Get Your Medications      These medications were sent to [facility], [address], [address] [place] [id number]    Phone:  [phone]   cefdinir 300 MG capsule         Time Spent on discharge is  35 mins in patient examination, evaluation, counseling as well as medication reconciliation, prescriptions for required medications, discharge plan and follow up. Electronically signed by   Obed Duque DO  1/4/2020  5:59 PM      Thank you Dr. Samina Enamorado MD for the opportunity to be involved in this patient's care.

## 2020-01-13 ENCOUNTER — OFFICE VISIT (OUTPATIENT)
Dept: ORTHOPEDIC SURGERY | Age: 75
End: 2020-01-13
Payer: MEDICARE

## 2020-01-13 VITALS — WEIGHT: 229.06 LBS | HEIGHT: 66 IN | BODY MASS INDEX: 36.81 KG/M2
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
## 2020-01-13 PROCEDURE — 99203 OFFICE O/P NEW LOW 30 MIN: CPT | Performed by: ORTHOPAEDIC SURGERY

## 2020-01-13 NOTE — PROGRESS NOTES
[facility]  [facility]  [address] [place] [id number]  Dept: [phone]  Dept Fax: [phone]        Fracture Follow Up    Subjective:     Chief Complaint   Patient presents with    Hand Pain     right       HPI:     Initial visit:   Owen Masters is a RHD 76y.o. year old female who presents to our office today regarding her right wrist pain. The injury was caused by a fall from standing height. The date of injury was on 12/13/19. Therefore, we are 4 weeks post injury. Patient went to the emergency department at that time which imaging demonstrated a fifth base metacarpal fracture. Patient was placed in a removable splint at that time and was subsequently admitted to the hospital for medical issues. Since then, she has been admitted multiple times the most recent being pyelonephritis on 1/2/2020. Patient reports that she has pain over the dorsum of her right hand that radiates ulnarly and at the base of the fifth metacarpal.  She also endorses shooting pains starting from the elbow to all 5 fingers. Reports that she has not had these symptoms prior to her fall. Otherwise, patient has no orthopedic complaints at this time. REVIEW OF SYSTEMS:   Constitutional: Negative for fever and chills. Cardiovascular: Negative for chest pain and palpitations. Musculoskeletal: Positive for myalgias and joint pain. Skin: Negative for itching and rash. I have reviewed the CC, HPI, ROS, PMH, FHX, Social History, and if not present in this note, I have reviewed in the patient's chart. I agree with the documentation provided by other staff and have reviewed their documentation prior to providing my signature indicating agreement. Vitals:   Ht 5' 5.98\" (1.676 m)   Wt 229 lb 0.9 oz (103.9 kg)   BMI 36.99 kg/m²  Body mass index is 36.99 kg/m². Physical Examination:     Orthopedics:    GENERAL: Alert and oriented X3 in no acute distress.   SKIN: Intact without

## 2020-02-02 PROBLEM — R79.89 ELEVATED TROPONIN: Status: RESOLVED | Noted: 2020-01-03 | Resolved: 2020-02-02

## 2020-02-02 PROBLEM — R77.8 ELEVATED TROPONIN: Status: RESOLVED | Noted: 2020-01-03 | Resolved: 2020-02-02

## 2020-02-03 ENCOUNTER — OFFICE VISIT (OUTPATIENT)
Dept: VASCULAR SURGERY | Age: 75
End: 2020-02-03
Payer: MEDICARE

## 2020-02-03 VITALS
DIASTOLIC BLOOD PRESSURE: 88 MMHG | HEART RATE: 72 BPM | SYSTOLIC BLOOD PRESSURE: 188 MMHG | WEIGHT: 215 LBS | BODY MASS INDEX: 34.72 KG/M2 | OXYGEN SATURATION: 100 % | TEMPERATURE: 98.2 F

## 2020-02-03 PROCEDURE — 99213 OFFICE O/P EST LOW 20 MIN: CPT | Performed by: SURGERY

## 2020-02-03 NOTE — PROGRESS NOTES
abdominal tenderness. Musculoskeletal:      Right hand: She exhibits no tenderness and normal capillary refill. Normal sensation noted. Normal strength noted. Left hand: She exhibits no tenderness and normal capillary refill. Normal sensation noted. Normal strength noted. Right foot: Normal capillary refill. No tenderness. Left foot: Normal capillary refill. No tenderness. Feet:      Right foot:      Skin integrity: No ulcer or skin breakdown. Left foot:      Skin integrity: No ulcer or skin breakdown. Skin:     General: Skin is warm. Capillary Refill: Capillary refill takes less than 2 seconds. Neurological:      Mental Status: She is alert and oriented to person, place, and time. GCS: GCS eye subscore is 4. GCS verbal subscore is 5. GCS motor subscore is 6. Sensory: Sensory deficit (neuropathy in hands) present. Motor: Motor function is intact. Psychiatric:         Attention and Perception: Attention normal.         Mood and Affect: Mood normal.         Speech: Speech normal.         Behavior: Behavior normal.       Imaging/Labs:         Assessment and Plan:     PAD, severe tibial occlusive disease  · Continue optimal medical therapy with antiplatelet and statins  · Daily exercise to improve cardiovascular health  · Yearly surveillance with non-invasive studies (PVRs), sooner if symptoms return    Electronically signed by Kylee Espana MD on 2/3/20 at [address]: [phone]  C: [phone]  Email: [email or web address]

## 2020-02-04 ASSESSMENT — ENCOUNTER SYMPTOMS
COLOR CHANGE: 0
CHEST TIGHTNESS: 0
SHORTNESS OF BREATH: 0
ABDOMINAL PAIN: 0
ALLERGIC/IMMUNOLOGIC NEGATIVE: 1

## 2020-03-02 ENCOUNTER — OFFICE VISIT (OUTPATIENT)
Dept: ORTHOPEDIC SURGERY | Age: 75
End: 2020-03-02
Payer: MEDICARE

## 2020-03-02 VITALS — HEIGHT: 66 IN | WEIGHT: 220.02 LBS | BODY MASS INDEX: 35.36 KG/M2

## 2020-03-02 PROCEDURE — 99213 OFFICE O/P EST LOW 20 MIN: CPT | Performed by: STUDENT IN AN ORGANIZED HEALTH CARE EDUCATION/TRAINING PROGRAM

## 2020-03-02 NOTE — PROGRESS NOTES
metacarpal fracture with maintained alignment. Unable to visualize fracture line. Impression:  Healing fracture of right fifth metacarpal base fracture      Assessment:      1. Closed displaced fracture of base of fifth metacarpal bone of right hand with routine healing, subsequent encounter       Plan:       -Patient with persistent pain likely due to to overall stiffness of the right hand after her injured fifth metacarpal.  Recommend occupational therapy to work on range of motion exercises. Of note numbness and tingling is due to diabetic neuropathy demonstrated on EMG that was recently done at [facility]. Patient will follow-up in 6 to 8 weeks for reevaluation. No orders of the defined types were placed in this encounter.          Orders Placed This Encounter   Procedures    XR HAND RIGHT (MIN 3 VIEWS)   [address]     Referral Priority:   Routine     Referral Type:   Eval and Treat     Referral Reason:   Specialty Services Required     Requested Specialty:   Occupational Therapy     Number of Visits Requested:   1       Electronically signed by Farooq Ceja DO   Orthopedic Surgery Resident PGY-2  [facility]  3/2/2020 at 11:30 AM

## 2020-03-13 ENCOUNTER — HOSPITAL ENCOUNTER (OUTPATIENT)
Dept: OCCUPATIONAL THERAPY | Age: 75
Setting detail: THERAPIES SERIES
Discharge: HOME | End: 2020-03-13
Payer: MEDICARE

## 2020-03-13 PROCEDURE — 97165 OT EVAL LOW COMPLEX 30 MIN: CPT

## 2020-03-13 PROCEDURE — 97110 THERAPEUTIC EXERCISES: CPT

## 2020-03-13 PROCEDURE — 97140 MANUAL THERAPY 1/> REGIONS: CPT

## 2020-03-13 NOTE — CONSULTS
[] [id number] [facility] floor       [address], [place]         Phone: [phone]       Fax: [phone] [] [address] at [address] , [address]  Phone: [phone]  Fax: [phone]       Occupational Therapy Hand & Upper Extremity  Initial Evaluation      Date: 3/13/2020      Patient: Marcy Choi  : 1945  MRN: [id number]    Physician: Wanda King DO   (Attending)  Insurance: [employer]    Medical Diagnosis: Closed displace fracture of base of fifth metacarpal bone of right hand S62.316D. Rehab Codes: Pain in hand M79.646, pain in wrist M25.53, stiffness in hand M25.64, stiffness in wrist M25.63, effusion of wrist M25.43, effusion of hand M25.44,  other disturbance, of skin sens R20.8. Onset Date:     Next Dr. Michael Alcantar: 20    10:10 AM [facility]  #Visits/Total Visits:      2 times a week for 12 visits  Cancels/No Shows:  0/0    Past Medical History:   MI/Heart Problems, Refer to Epic, Diabetes and HTN      Comorbidities:   [] Obesity [] Dialysis  [x] Other: 6 cardiac stents   [] Asthma/COPD [] Dementia [x] Other: Diabetic - insulin dependent   [] Stroke [] Sleep apnea [x] Other: HTN   [] Vascular disease [] Rheumatic disease [] Other:     Medications:   Refer to Medical chart in [facility]    Allergies: Sulfa, Penicillin         Mechanism of Injury: 5th metacarpal fracture, right  Surgery Date: NA    Precautions:  None            Involved Extremity:        Right    Dominant Extremity: Right    Home Environment:  Pt lives in a  and House - two stories plus basement  2 and B Handrail  The washing machine is on the lower level (basement) and Family does laundry  Pt's son and grandson live with her in her home.     Employer NA   Job Status []  Normal duty   [] Light duty   [] Off due to condition    [x]  Retired   [] Not employed   [] Disability  [] Other:  []  Return intervention needed   Low =   Score of 0-24    [x]  Use standard prevention interventions Moderate =  Score of 24-44   [x] Give patient handout and discuss fall prevention strategies   [x] Establish goal of education for patient/family RE: fall prevention strategies      Tests/Measurements: Upper Extremity Functional Index  Current Functional Level:  1/80 functionally impaired as measured with the Upper Extremity Functional Index Survey. 0-80 scale, with 80 = no Deficits  (The UEFI model does not provide any specific cut off points that could classify the upper limb disability degree, however, a minimal detectable change of 9 points is provided. This means that for improvement or deterioration to be considered, between two subsequent evaluations, the scores must differ by at least 9 points.)    AROM:  WRIST   Initial 03/13/20          Degrees Right AROM   Extension/Flexion +57/38  Deficit/deficit   Radial/Ulnar Deviation  18/42   Deficit/WNL   Forearm Pronation/Supination  88/88   WNL      Elbow AROM unremarjkable    AROM:  DIGITS    Initial 03/13/20       Extension/Flexion  RIGHT   Degrees INDEX MIDDLE RING LITTLE   MCP  +5/35 +10/35 +13/35 +10/35   PIP -15/72  -12/77  -15/67   -5/65   DIP  -4/25   +5/38 +10/18 -17/44    Not touching finger pad to palm Not touching finger pad to palm Not touching finger pad to palm Not touching finger pad to palm     THUMB    Initial 03/13/20 Extension/Flexion     Degrees RIGHT   CMC   45/32   MCP    -6/47   IP +13/65       Sensibility: Numbness, Tingling and Constant     Edema: Mod , right hand      Circumfirential measurements:  Figure - 8 of hands: Right 45 cm, Left 42 cm  Middle fingers at P1: Right 7.5 cm, left 7.2 cm  A variance of 0.5 cm or more is considered significant edema.     Skin Color: Normal    Skin Condition: Intact    Problems: Pain, ROM, Strength, Function, Edema and Sensation     Assessment:  Patient would benefit from skilled occupational therapy services in motion. Significant hand and digit edema observed and measured. Pt states pain of 8/10 in right wrist and mid palm. Retrograde edema massage and AROM home exercise program initiated. Pt demo'd back accurately all ex. Treatment Potential: Good and Fair Suggested Professional Referral: No  Domestic Concerns: No   Barriers to Goal Achievement: No    Home Program Initiated: Written, Verbal and Demo     Comprehension of Education: Yes  Plans, Goals, Risks, Benefits, Discussed with and Patient    Treatment Plan:  Frequency/Duration:     2  Times a week, for  12  Visits. Therapeutic Exercise 23636, Manual Therapy 01959, Ultrasound [id number] and Written Home Program         Treatment This Date:  [x] Eval   29     Min. 1 Unit   1002 - 1036      Treatment Charges: Mins Units Time In/Out   []  Modalities        [x]  Ther Exercise 19 1 1049 - 1107   [x]  Manual Therapy 11 1 6285 - 6823   []  Ther Activities      []        []        []        Total Treatment time 58 min            Flow Sheet:  Exercise Reps/Time Weight/Level Comments   Fall prevention strategies   Planned for next session   Retrograde Edema massage right hand/forearm   Administered  Pt education provided: verbal, demo. Pt demo'd back accurately, voiced understanding   AROM - right forearm, wrist, hand 10 reps  Completed  Pt education provided: written, verbal, demo. Dexteroll  -0- weight Planned for subsequent visit   Wrist maze   Planned for subsequent visit   Cones   Planned for subsequent visit   Foam block  Yellow Planned for subsequent visit       Evaluation Complexity:  History (Personal factors, comorbidities) []  0 [x]  1-2 []  3+   Exam (limitations, restrictions) [x]  1-2 []  3 []  4+   Decision Making [x]  Low []  Moderate []  High   ?  [x]  Low Complexity []  Moderate Complexity []  High Complexity         Medicare Cap   [] Physical Therapy  [] Speech Therapy  [x] Occupational therapy  *PT and Speech caps combine      $2040 Cap limit < kx modifier needed        Patient Name: Ellie Ayala  YOB: 1945    Note:  This is an estimate of charges billed. Date of [address] Name # units/ charge $$$ charge Daily Total Charge Ongoing Total $$$   03/13/20 OT Eval - Low 1 87.69      Th Ex 1 23.22      Man Th 1 21.70 179.61 179.61                               Total Treatment Time:  58 minutes     Time In/Time Out: 4055 - 0596      Electronically signed by STEPHON Roman/L, CHT on 3/13/2020 at 10:14 AM        Physician Signature: _________________________ Date: _______________  By signing above or cosigning this note, I have reviewed this plan of care and certify a need for medically necessary rehabilitation services.      *PLEASE SIGN ABOVE AND FAX BACK ALL PAGES*

## 2020-03-16 ENCOUNTER — APPOINTMENT (OUTPATIENT)
Dept: OCCUPATIONAL THERAPY | Age: 75
End: 2020-03-16
Payer: MEDICARE

## 2020-05-26 NOTE — PATIENT INSTRUCTIONS
infection. ? Keep your skin soft. Use moisturizing skin cream to keep the skin on your feet soft and prevent calluses and cracks. But do not put the cream between your toes, and stop using any cream that causes a rash. ? Clean underneath your toenails carefully. Do not use a sharp object to clean underneath your toenails. Use the blunt end of a nail file or other rounded tool. ? Trim and file your toenails straight across to prevent ingrown toenails. Use a nail clipper, not scissors. Use an emery board to smooth the edges. · Change socks daily. Socks without seams are best, because seams often rub the feet. You can find socks for people with diabetes from specialty catalogs. · Look inside your shoes every day for things like gravel or torn linings, which could cause blisters or sores. · Buy shoes that fit well:  ? Look for shoes that have plenty of space around the toes. This helps prevent bunions and blisters. ? Try on shoes while wearing the kind of socks you will usually wear with the shoes. ? Avoid plastic shoes. They may rub your feet and cause blisters. Good shoes should be made of materials that are flexible and breathable, such as leather or cloth. ? Break in new shoes slowly by wearing them for no more than an hour a day for several days. Take extra time to check your feet for red areas, blisters, or other problems after you wear new shoes. · Do not go barefoot. Do not wear sandals, and do not wear shoes with very thin soles. Thin soles are easy to puncture. They also do not protect your feet from hot pavement or cold weather. · Have your doctor check your feet during each visit. If you have a foot problem, see your doctor. Do not try to treat an early foot problem at home. Home remedies or treatments that you can buy without a prescription (such as corn removers) can be harmful. · Always get early treatment for foot problems.  A minor irritation can lead to a major problem if not properly cared

## 2020-06-01 ENCOUNTER — OFFICE VISIT (OUTPATIENT)
Dept: PODIATRY | Age: 75
End: 2020-06-01
Payer: MEDICARE

## 2020-06-01 VITALS
WEIGHT: 224 LBS | SYSTOLIC BLOOD PRESSURE: 138 MMHG | HEART RATE: 68 BPM | HEIGHT: 66 IN | DIASTOLIC BLOOD PRESSURE: 81 MMHG | BODY MASS INDEX: 36 KG/M2

## 2020-06-01 PROCEDURE — 11721 DEBRIDE NAIL 6 OR MORE: CPT | Performed by: STUDENT IN AN ORGANIZED HEALTH CARE EDUCATION/TRAINING PROGRAM

## 2020-06-01 PROCEDURE — 99212 OFFICE O/P EST SF 10 MIN: CPT | Performed by: STUDENT IN AN ORGANIZED HEALTH CARE EDUCATION/TRAINING PROGRAM

## 2020-06-01 PROCEDURE — 99213 OFFICE O/P EST LOW 20 MIN: CPT | Performed by: STUDENT IN AN ORGANIZED HEALTH CARE EDUCATION/TRAINING PROGRAM

## 2020-06-01 RX ORDER — AMMONIUM LACTATE 12 G/100G
CREAM TOPICAL
Qty: 1 BOTTLE | Refills: 1 | Status: SHIPPED | OUTPATIENT
Start: 2020-06-01 | End: 2021-04-09 | Stop reason: SDUPTHER

## 2020-06-01 NOTE — PROGRESS NOTES
[facility]  [address] [place] [address], Parker Guzman  Tel: [phone]   Fax: [phone]    Subjective     CC: Diabetic foot exam     HPI:  Morena Esteban is a 76y.o. year old female who presents to clinic today for diabetic foot exam. Patient states that she is a Type 2 diabetic with neuropathy, currently insulin dependent. Patient notes numbness, tingling, and burning in the feet. Admits to cramping in calves with walking, patient follows with Dr. Leonardo Payne. She states that he pain is worse on the left than the right. Currently ambulates with cane and walker. Patients last A1c was 9.5% as if 5/11/2020. Patient notes that her legs and feet have gotten very dry and would like some lotion for them. Primary care physician is Kelsey Ragsdale MD.    ROS:    Constitutional: Denies nausea, vomiting, fever, chills. Neurologic: Positive numbness, tingling, and burning in the feet. Vascular: Denies symptoms of lower extremity claudication. Skin: Denies open wounds. Otherwise negative except as noted in the HPI. PMH:  Past Medical History:   Diagnosis Date    Anxiety     Benign positional vertigo     CAD (coronary artery disease)     Chest pain     Depression     Diabetes mellitus ([facility]     Diabetic neuropathy ([facility]     Hyperlipidemia     Hypertension     Migraine     Migraine headaches aggravated with sublingual nitroglycerin       Surgical History:   Past Surgical History:   Procedure Laterality Date    APPENDECTOMY      CARDIAC CATHETERIZATION      2012    CORONARY ANGIOPLASTY WITH STENT PLACEMENT      had 2 with 4 more placed     PTCA         Social History:  Social History     Tobacco Use    Smoking status: Never Smoker    Smokeless tobacco: Never Used   Substance Use Topics    Alcohol use: No    Drug use: No       Medications:  Prior to Admission medications    Medication Sig Start Date End Date Taking?  Authorizing Provider   ammonium lactate (LAC-HYDRIN) and tight glycemic control. · Patient to RTC in 3 month(s)  · Please, call the office with any questions or concerns   · Discussed with Dr. King Beverage This Encounter   Medications    ammonium lactate (LAC-HYDRIN) 12 % cream     Sig: Apply topically as needed.      Dispense:  1 Bottle     Refill:  1     Orders Placed This Encounter   Procedures     DIABETES FOOT EXAM    47637 - [place] DEBRIDEMENT OF NAILS, 6 OR MORE       Diane Walker [place]   Podiatric Medicine & Surgery   6/1/2020 at 3:47 PM

## 2020-06-08 ENCOUNTER — HOSPITAL ENCOUNTER (EMERGENCY)
Age: 75
Discharge: HOME | End: 2020-06-08
Attending: EMERGENCY MEDICINE
Payer: MEDICARE

## 2020-06-08 ENCOUNTER — APPOINTMENT (OUTPATIENT)
Dept: GENERAL RADIOLOGY | Age: 75
End: 2020-06-08
Payer: MEDICARE

## 2020-06-08 ENCOUNTER — TELEPHONE (OUTPATIENT)
Dept: ORTHOPEDIC SURGERY | Age: 75
End: 2020-06-08

## 2020-06-08 VITALS
DIASTOLIC BLOOD PRESSURE: 90 MMHG | OXYGEN SATURATION: 99 % | TEMPERATURE: 96.8 F | SYSTOLIC BLOOD PRESSURE: 207 MMHG | HEART RATE: 70 BPM | RESPIRATION RATE: 16 BRPM

## 2020-06-08 LAB
ABSOLUTE EOS #: 0.31 K/UL (ref 0–0.44)
ABSOLUTE IMMATURE GRANULOCYTE: <0.03 K/UL (ref 0–0.3)
ABSOLUTE LYMPH #: 2.8 K/UL (ref 1.1–3.7)
ABSOLUTE MONO #: 0.59 K/UL (ref 0.1–1.2)
ALBUMIN SERPL-MCNC: 4 G/DL (ref 3.5–5.2)
ALBUMIN/GLOBULIN RATIO: 1.4 (ref 1–2.5)
ALP BLD-CCNC: 79 U/L (ref 35–104)
ALT SERPL-CCNC: 8 U/L (ref 5–33)
ANION GAP SERPL CALCULATED.3IONS-SCNC: 10 MMOL/L (ref 9–17)
AST SERPL-CCNC: 11 U/L
BASOPHILS # BLD: 1 % (ref 0–2)
BASOPHILS ABSOLUTE: 0.05 K/UL (ref 0–0.2)
BILIRUB SERPL-MCNC: 0.3 MG/DL (ref 0.3–1.2)
BUN BLDV-MCNC: 17 MG/DL (ref 8–23)
BUN/CREAT BLD: ABNORMAL (ref 9–20)
CALCIUM SERPL-MCNC: 9.6 MG/DL (ref 8.6–10.4)
CHLORIDE BLD-SCNC: 101 MMOL/L (ref 98–107)
CO2: 28 MMOL/L (ref 20–31)
CREAT SERPL-MCNC: 1.33 MG/DL (ref 0.5–0.9)
DIFFERENTIAL TYPE: ABNORMAL
EOSINOPHILS RELATIVE PERCENT: 5 % (ref 1–4)
GFR AFRICAN AMERICAN: 47 ML/MIN
GFR NON-AFRICAN AMERICAN: 39 ML/MIN
GFR SERPL CREATININE-BSD FRML MDRD: ABNORMAL ML/MIN/{1.73_M2}
GFR SERPL CREATININE-BSD FRML MDRD: ABNORMAL ML/MIN/{1.73_M2}
GLUCOSE BLD-MCNC: 212 MG/DL (ref 70–99)
HCT VFR BLD CALC: 35.2 % (ref 36.3–47.1)
HEMOGLOBIN: 11.4 G/DL (ref 11.9–15.1)
IMMATURE GRANULOCYTES: 0 %
LYMPHOCYTES # BLD: 42 % (ref 24–43)
MCH RBC QN AUTO: 30.7 PG (ref 25.2–33.5)
MCHC RBC AUTO-ENTMCNC: 32.4 G/DL (ref 28.4–34.8)
MCV RBC AUTO: 94.9 FL (ref 82.6–102.9)
MONOCYTES # BLD: 9 % (ref 3–12)
NRBC AUTOMATED: 0 PER 100 WBC
PDW BLD-RTO: 13.4 % (ref 11.8–14.4)
PLATELET # BLD: 186 K/UL (ref 138–453)
PLATELET ESTIMATE: ABNORMAL
PMV BLD AUTO: 11 FL (ref 8.1–13.5)
POTASSIUM SERPL-SCNC: 4.3 MMOL/L (ref 3.7–5.3)
RBC # BLD: 3.71 M/UL (ref 3.95–5.11)
RBC # BLD: ABNORMAL 10*6/UL
SEG NEUTROPHILS: 43 % (ref 36–65)
SEGMENTED NEUTROPHILS ABSOLUTE COUNT: 2.93 K/UL (ref 1.5–8.1)
SODIUM BLD-SCNC: 139 MMOL/L (ref 135–144)
TOTAL PROTEIN: 6.9 G/DL (ref 6.4–8.3)
WBC # BLD: 6.7 K/UL (ref 3.5–11.3)
WBC # BLD: ABNORMAL 10*3/UL

## 2020-06-08 PROCEDURE — 96374 THER/PROPH/DIAG INJ IV PUSH: CPT

## 2020-06-08 PROCEDURE — 85025 COMPLETE CBC W/AUTO DIFF WBC: CPT

## 2020-06-08 PROCEDURE — 99283 EMERGENCY DEPT VISIT LOW MDM: CPT

## 2020-06-08 PROCEDURE — 71046 X-RAY EXAM CHEST 2 VIEWS: CPT

## 2020-06-08 PROCEDURE — 2580000003 HC RX 258: Performed by: STUDENT IN AN ORGANIZED HEALTH CARE EDUCATION/TRAINING PROGRAM

## 2020-06-08 PROCEDURE — 6360000002 HC RX W HCPCS: Performed by: STUDENT IN AN ORGANIZED HEALTH CARE EDUCATION/TRAINING PROGRAM

## 2020-06-08 PROCEDURE — 6370000000 HC RX 637 (ALT 250 FOR IP): Performed by: STUDENT IN AN ORGANIZED HEALTH CARE EDUCATION/TRAINING PROGRAM

## 2020-06-08 PROCEDURE — 80053 COMPREHEN METABOLIC PANEL: CPT

## 2020-06-08 RX ORDER — DEXAMETHASONE SODIUM PHOSPHATE 4 MG/ML
2 INJECTION, SOLUTION INTRA-ARTICULAR; INTRALESIONAL; INTRAMUSCULAR; INTRAVENOUS; SOFT TISSUE ONCE
Status: COMPLETED | OUTPATIENT
Start: 2020-06-08 | End: 2020-06-08

## 2020-06-08 RX ORDER — LORAZEPAM 0.5 MG/1
0.5 TABLET ORAL DAILY PRN
Qty: 3 TABLET | Refills: 0 | Status: SHIPPED | OUTPATIENT
Start: 2020-06-08 | End: 2020-06-11

## 2020-06-08 RX ORDER — AZITHROMYCIN 250 MG/1
250 TABLET, FILM COATED ORAL SEE ADMIN INSTRUCTIONS
Qty: 6 TABLET | Refills: 0 | Status: SHIPPED | OUTPATIENT
Start: 2020-06-08 | End: 2020-06-13

## 2020-06-08 RX ORDER — DIPHENHYDRAMINE HYDROCHLORIDE 50 MG/ML
25 INJECTION INTRAMUSCULAR; INTRAVENOUS EVERY 6 HOURS PRN
Status: DISCONTINUED | OUTPATIENT
Start: 2020-06-08 | End: 2020-06-08 | Stop reason: HOSPADM

## 2020-06-08 RX ORDER — 0.9 % SODIUM CHLORIDE 0.9 %
500 INTRAVENOUS SOLUTION INTRAVENOUS ONCE
Status: COMPLETED | OUTPATIENT
Start: 2020-06-08 | End: 2020-06-08

## 2020-06-08 RX ORDER — AZITHROMYCIN 250 MG/1
500 TABLET, FILM COATED ORAL ONCE
Status: COMPLETED | OUTPATIENT
Start: 2020-06-08 | End: 2020-06-08

## 2020-06-08 RX ORDER — DEXAMETHASONE SODIUM PHOSPHATE 10 MG/ML
10 INJECTION INTRAMUSCULAR; INTRAVENOUS ONCE
Status: COMPLETED | OUTPATIENT
Start: 2020-06-08 | End: 2020-06-08

## 2020-06-08 RX ORDER — PREDNISONE 20 MG/1
40 TABLET ORAL DAILY
Qty: 10 TABLET | Refills: 0 | Status: SHIPPED | OUTPATIENT
Start: 2020-06-08 | End: 2020-06-13

## 2020-06-08 RX ADMIN — AZITHROMYCIN 500 MG: 250 TABLET, FILM COATED ORAL at 18:05

## 2020-06-08 RX ADMIN — DEXAMETHASONE SODIUM PHOSPHATE 10 MG: 10 INJECTION INTRAMUSCULAR; INTRAVENOUS at 17:05

## 2020-06-08 RX ADMIN — SODIUM CHLORIDE 500 ML: 9 INJECTION, SOLUTION INTRAVENOUS at 18:05

## 2020-06-08 RX ADMIN — DIPHENHYDRAMINE HYDROCHLORIDE 25 MG: 50 INJECTION, SOLUTION INTRAMUSCULAR; INTRAVENOUS at 17:05

## 2020-06-08 RX ADMIN — DEXAMETHASONE SODIUM PHOSPHATE 2 MG: 4 INJECTION, SOLUTION INTRAMUSCULAR; INTRAVENOUS at 17:05

## 2020-06-08 NOTE — ED PROVIDER NOTES
[facility]  ED  Emergency Department Encounter  Emergency Medicine Resident     Pt Name: Tere Sanchez  MRN: [id number]  [place] 1945  Date of evaluation: 6/8/20  PCP:  Krishna Molina MD    CHIEF COMPLAINT       Chief Complaint   Patient presents with    Pruritis       HISTORY Dandre  (Location/Symptom, Timing/Onset, Context/Setting, Quality, Duration, Modifying Tavares Solorzano.)      Tere Sanchez is a 76 ear old female who presents with concerns of itchiness of approximately 3 months duration as well as 3 months of chronic productive green sputum cough, with itchiness worsening intensity over the past 2 to 3 days. Patient states that she is woken up with warm sweats over the course of the past week, states that she does not believe that her apartment is too warm but states that that could potentially contribute to symptoms. Patient also states that over the past 3 months she has approximately lost 1 dress size worth of weight although she is unable to quantify how many pounds this would be. Patient denies lightheadedness, does have a history of numbness of the bilateral upper extremity at the wrist consistent with carpal tunnel however has no specific diagnosis of this. Patient does have a history of diabetic neuropathy, coronary artery disease as well as anxiety and benign positional vertigo. PAST MEDICAL / SURGICAL / SOCIAL / FAMILY HISTORY      has a past medical history of Anxiety, Benign positional vertigo, CAD (coronary artery disease), Chest pain, Depression, Diabetes mellitus ([address] Diabetic neuropathy ([address] Hyperlipidemia, Hypertension, and Migraine. has a past surgical history that includes Percutaneous Transluminal Coronary Angio; Cardiac catheterization; Coronary angioplasty with stent; and Appendectomy.      Social History     Socioeconomic History    Marital status:      Spouse name: Not on file    Number of children: Not on file    Years of education: Not on file    Highest education level: Not on file   Occupational History    Not on file   Social Needs    Financial resource strain: Not on file    Food insecurity     Worry: Not on file     Inability: Not on file    Transportation needs     Medical: Not on file     Non-medical: Not on file   Tobacco Use    Smoking status: Never Smoker    Smokeless tobacco: Never Used   Substance and Sexual Activity    Alcohol use: No    Drug use: No    Sexual activity: Never   Lifestyle    Physical activity     Days per week: Not on file     Minutes per session: Not on file    Stress: Not on file   Relationships    Social connections     Talks on phone: Not on file     Gets together: Not on file     Attends Gnosticist service: Not on file     Active member of club or organization: Not on file     Attends meetings of clubs or organizations: Not on file     Relationship status: Not on file    Intimate partner violence     Fear of current or ex partner: Not on file     Emotionally abused: Not on file     Physically abused: Not on file     Forced sexual activity: Not on file   Other Topics Concern    Not on file   Social History Narrative    Not on file       Family History   Problem Relation Age of Onset    Cancer Mother     Cancer Father         Allergies:  Penicillins and Sulfa antibiotics    Home Medications:  Prior to Admission medications    Medication Sig Start Date End Date Taking? Authorizing Provider   azithromycin (ZITHROMAX) 250 MG tablet Take 1 tablet by mouth See Admin Instructions for 5 days 500mg on day 1 followed by 250mg on days 2 - 5 6/8/20 6/13/20 Yes Ida Tobias MD   predniSONE (DELTASONE) 20 MG tablet Take 2 tablets by mouth daily for 5 days 6/8/20 6/13/20 Yes Ida Tobias MD   LORazepam (ATIVAN) 0.5 MG tablet Take 1 tablet by mouth daily as needed for Anxiety for up to 3 days.  6/8/20 6/11/20 Yes Ida Tobias MD   hydrOXYzine (ATARAX) 25 MG tablet Take 1 tablet by mouth every 8 hours as needed for Itching 6/6/20 6/16/20  Felecia David MD   ammonium lactate (LAC-HYDRIN) 12 % cream Apply topically as needed. 6/1/20 7/1/20  Earline Priest DPM   fluticasone (FLONASE) 50 MCG/ACT nasal spray instill 2 sprays into each nostril once daily 4/21/20   Historical Provider, MD   NOVOFINE 32G X 6 MM MISC use 1 NEEDLE to inject MEDICATION subcutaneously five times a day 4/8/20   Historical Provider, MD   mupirocin (BACTROBAN) 2 % ointment apply topically to affected area three times a day 4/21/20   Historical Provider, MD   NIFEdipine (PROCARDIA XL) 60 MG extended release tablet take 1 tablet by mouth once daily 4/27/20   Historical Provider, MD   BANOPHEN 50 MG capsule TAKE 1 CAPSULE BY MOUTH NIGHTLY 4/21/20   Historical Provider, MD   gabapentin (NEURONTIN) 600 MG tablet Take 1 tablet by mouth 3 times daily for 30 days. 5/11/20 6/10/20  Krishna Molina MD   omeprazole (PRILOSEC) 20 MG delayed release capsule Take 1 capsule by mouth Daily 5/11/20   Krishna Molina MD   insulin aspart (NOVOLOG FLEXPEN) 100 UNIT/ML injection pen Use TID before meals according to scale - max 45 units a day 5/11/20   Krishna Molina MD   loratadine (CLARITIN) 10 MG tablet Take 1 tablet every day by oral route. 5/11/20   Krishna Molina MD   rosuvastatin (CRESTOR) 40 MG tablet Take 1 tablet by mouth daily 5/1/20   Krishna Molina MD   LEVEMIR FLEXTOUCH 100 UNIT/ML injection pen Inject 70 unit(s) twice a day by sub-q route for 30 days.  3/11/20   Historical Provider, MD   rivaroxaban (XARELTO) 20 MG TABS tablet Take 1 tablet by mouth daily (with breakfast) Start on 12/12/2019 after finishing Xarelto 15 mg twice daily for 21 days 1/7/20   Krishna Molina MD   isosorbide mononitrate (IMDUR) 30 MG extended release tablet Take 1 tablet by mouth daily 11/26/19   Delicia Blanca MD   linagliptin (TRADJENTA) 5 MG tablet Take 1 tablet by mouth daily 11/26/19   Adenike LAMB Comprehensive Metabolic Panel       MEDICATIONS ORDERED:  Orders Placed This Encounter   Medications    DISCONTD: diphenhydrAMINE (BENADRYL) injection 25 mg    dexamethasone (DECADRON) injection 10 mg    dexamethasone (DECADRON) injection 2 mg    0.9 % sodium chloride bolus    azithromycin (ZITHROMAX) tablet 500 mg    azithromycin (ZITHROMAX) 250 MG tablet     Sig: Take 1 tablet by mouth See Admin Instructions for 5 days 500mg on day 1 followed by 250mg on days 2 - 5     Dispense:  6 tablet     Refill:  0    predniSONE (DELTASONE) 20 MG tablet     Sig: Take 2 tablets by mouth daily for 5 days     Dispense:  10 tablet     Refill:  0    LORazepam (ATIVAN) 0.5 MG tablet     Sig: Take 1 tablet by mouth daily as needed for Anxiety for up to 3 days. Dispense:  3 tablet     Refill:  0       DDX: Jaundice, pruritus, malignancy, elevated bilirubin, pneumonia, bronchitis, COVID    Initial MDM/Plan: 76 y.o. female who presents with generalized itchiness for approximately 3 months duration worsening over the past 3 days as well as productive cough of 2 3 days duration. Patient notably has no raised rash or erythema, does have some concern for jaundice based on healing of the right eye however does not have sublingual discoloration, abdomen is soft nontender. Plan to check liver function as well as bilirubin as well as get a chest x-ray to assess for concerns for bronchitis versus pneumonia. Patient is resting comfortably without pain, plan to treat pruritus itself with oral Decadron.   Will reassess patient    DIAGNOSTIC RESULTS / EMERGENCYDEPARTMENT COURSE / MDM     LABS:  Labs Reviewed   CBC WITH AUTO DIFFERENTIAL - Abnormal; Notable for the following components:       Result Value    RBC 3.71 (*)     Hemoglobin 11.4 (*)     Hematocrit 35.2 (*)     Eosinophils % 5 (*)     All other components within normal limits   COMPREHENSIVE METABOLIC PANEL - Abnormal; Notable for the following components:    Glucose 212 (*) [address]  [phone]      As needed    [facility] ED  [address] [address]  [phone]    As needed      DISCHARGE MEDICATIONS:  Discharge Medication List as of 6/8/2020  5:52 PM      START taking these medications    Details   azithromycin (ZITHROMAX) 250 MG tablet Take 1 tablet by mouth See Admin Instructions for 5 days 500mg on day 1 followed by 250mg on days 2 - 5, Disp-6 tablet, R-0Print      predniSONE (DELTASONE) 20 MG tablet Take 2 tablets by mouth daily for 5 days, Disp-10 tablet, R-0Print             Tracey Cary MD  Emergency Medicine Resident    (Please note that portions of this note were completed with a voice recognition program.Efforts were made to edit the dictations but occasionally words are mis-transcribed.)       Tracey Cary MD  Resident  06/09/20 0966

## 2020-06-09 ENCOUNTER — CARE COORDINATION (OUTPATIENT)
Dept: CARE COORDINATION | Age: 75
End: 2020-06-09

## 2020-06-09 ASSESSMENT — ENCOUNTER SYMPTOMS
ABDOMINAL PAIN: 0
CHEST TIGHTNESS: 0
BACK PAIN: 0
DIARRHEA: 0
SORE THROAT: 0
TROUBLE SWALLOWING: 0
PHOTOPHOBIA: 0
RHINORRHEA: 0
WHEEZING: 0
NAUSEA: 0
SHORTNESS OF BREATH: 0
CONSTIPATION: 0
VOMITING: 0
ABDOMINAL DISTENTION: 0

## 2020-06-09 NOTE — CARE COORDINATION
not go to work, school, or public areas. Avoid using public transportation, ride-sharing, or taxis. Separate yourself from other people and animals in your home  People: As much as possible, you should stay in a specific room and away from other people in your home. Also, you should use a separate bathroom, if available. Animals: You should restrict contact with pets and other animals while you are sick with COVID-19, just like you would around other people. Although there have not been reports of pets or other animals becoming sick with COVID-19, it is still recommended that people sick with COVID-19 limit contact with animals until more information is known about the virus. When possible, have another member of your household care for your animals while you are sick. If you are sick with COVID-19, avoid contact with your pet, including petting, snuggling, being kissed or licked, and sharing food. If you must care for your pet or be around animals while you are sick, wash your hands before and after you interact with pets and wear a facemask. Call ahead before visiting your doctor  If you have a medical appointment, call the healthcare provider and tell them that you have or may have COVID-19. This will help the healthcare providers office take steps to keep other people from getting infected or exposed. Wear a facemask  You should wear a facemask when you are around other people (e.g., sharing a room or vehicle) or pets and before you enter a healthcare providers office. If you are not able to wear a facemask (for example, because it causes trouble breathing), then people who live with you should not stay in the same room with you, or they should wear a facemask if they enter your room. Cover your coughs and sneezes  Cover your mouth and nose with a tissue when you cough or sneeze. Throw used tissues in a lined trash can.  Immediately wash your hands with soap and water for at least 20 seconds or, if soap

## 2020-06-16 ENCOUNTER — CARE COORDINATION (OUTPATIENT)
Dept: CARE COORDINATION | Age: 75
End: 2020-06-16

## 2020-06-26 ENCOUNTER — CARE COORDINATION (OUTPATIENT)
Dept: CARE COORDINATION | Age: 75
End: 2020-06-26

## 2020-06-26 NOTE — CARE COORDINATION
You Patient resolved from the Care Transitions episode on 6/9/2020    Patient/family has been provided the following resources and education related to COVID-19:                         Signs, symptoms and red flags related to COVID-19            CDC exposure and quarantine guidelines            Conduit exposure contact - [phone]            Contact for their local Department of Health                 Patient currently reports that the following symptoms have improved:  Cough    No further outreach scheduled with this CTN/ACM. Episode of Care resolved. Patient has this CTN/ACM contact information if future needs arise.

## 2020-07-12 ENCOUNTER — HOSPITAL ENCOUNTER (INPATIENT)
Age: 75
LOS: 2 days | Discharge: HOME | DRG: 683 | End: 2020-07-14
Attending: EMERGENCY MEDICINE | Admitting: INTERNAL MEDICINE
Payer: MEDICARE

## 2020-07-12 ENCOUNTER — APPOINTMENT (OUTPATIENT)
Dept: GENERAL RADIOLOGY | Age: 75
DRG: 683 | End: 2020-07-12
Payer: MEDICARE

## 2020-07-12 PROBLEM — A41.9 SEPSIS (HCC): Status: ACTIVE | Noted: 2020-07-12

## 2020-07-12 LAB
-: NORMAL
-: NORMAL
ABSOLUTE EOS #: 0.25 K/UL (ref 0–0.44)
ABSOLUTE IMMATURE GRANULOCYTE: 0.03 K/UL (ref 0–0.3)
ABSOLUTE LYMPH #: 2.66 K/UL (ref 1.1–3.7)
ABSOLUTE MONO #: 0.63 K/UL (ref 0.1–1.2)
ALBUMIN SERPL-MCNC: 4.1 G/DL (ref 3.5–5.2)
ALBUMIN/GLOBULIN RATIO: 1.5 (ref 1–2.5)
ALP BLD-CCNC: 77 U/L (ref 35–104)
ALT SERPL-CCNC: 7 U/L (ref 5–33)
ANION GAP SERPL CALCULATED.3IONS-SCNC: 14 MMOL/L (ref 9–17)
AST SERPL-CCNC: 11 U/L
BASOPHILS # BLD: 0 % (ref 0–2)
BASOPHILS ABSOLUTE: 0.03 K/UL (ref 0–0.2)
BILIRUB SERPL-MCNC: 0.25 MG/DL (ref 0.3–1.2)
BUN BLDV-MCNC: 20 MG/DL (ref 8–23)
BUN/CREAT BLD: ABNORMAL (ref 9–20)
C-REACTIVE PROTEIN: 2.4 MG/L (ref 0–5)
CALCIUM SERPL-MCNC: 9.7 MG/DL (ref 8.6–10.4)
CHLORIDE BLD-SCNC: 99 MMOL/L (ref 98–107)
CO2: 23 MMOL/L (ref 20–31)
CREAT SERPL-MCNC: 1.69 MG/DL (ref 0.5–0.9)
D-DIMER QUANTITATIVE: 0.29 MG/L FEU
D-DIMER QUANTITATIVE: NORMAL MG/L FEU
DIFFERENTIAL TYPE: ABNORMAL
EOSINOPHILS RELATIVE PERCENT: 4 % (ref 1–4)
FERRITIN: 325 UG/L (ref 13–150)
FIBRINOGEN: 441 MG/DL (ref 140–420)
GFR AFRICAN AMERICAN: 36 ML/MIN
GFR NON-AFRICAN AMERICAN: 30 ML/MIN
GFR SERPL CREATININE-BSD FRML MDRD: ABNORMAL ML/MIN/{1.73_M2}
GFR SERPL CREATININE-BSD FRML MDRD: ABNORMAL ML/MIN/{1.73_M2}
GLUCOSE BLD-MCNC: 204 MG/DL (ref 70–99)
HCT VFR BLD CALC: 36.7 % (ref 36.3–47.1)
HEMOGLOBIN: 11.6 G/DL (ref 11.9–15.1)
IMMATURE GRANULOCYTES: 0 %
INR BLD: 1.3
INR BLD: NORMAL
LACTATE DEHYDROGENASE: 694 U/L (ref 135–214)
LYMPHOCYTES # BLD: 38 % (ref 24–43)
MCH RBC QN AUTO: 29.7 PG (ref 25.2–33.5)
MCHC RBC AUTO-ENTMCNC: 31.6 G/DL (ref 28.4–34.8)
MCV RBC AUTO: 93.9 FL (ref 82.6–102.9)
MONOCYTES # BLD: 9 % (ref 3–12)
NRBC AUTOMATED: 0 PER 100 WBC
PARTIAL THROMBOPLASTIN TIME: 36.1 SEC (ref 20.5–30.5)
PARTIAL THROMBOPLASTIN TIME: NORMAL SEC (ref 20.5–30.5)
PDW BLD-RTO: 12.6 % (ref 11.8–14.4)
PLATELET # BLD: 200 K/UL (ref 138–453)
PLATELET ESTIMATE: ABNORMAL
PMV BLD AUTO: 11.4 FL (ref 8.1–13.5)
POTASSIUM SERPL-SCNC: 3.7 MMOL/L (ref 3.7–5.3)
PROCALCITONIN: 0.05 NG/ML
PROTHROMBIN TIME: 13.3 SEC (ref 9–12)
PROTHROMBIN TIME: NORMAL SEC (ref 9–12)
RBC # BLD: 3.91 M/UL (ref 3.95–5.11)
RBC # BLD: ABNORMAL 10*6/UL
REASON FOR REJECTION: NORMAL
REASON FOR REJECTION: NORMAL
SEG NEUTROPHILS: 49 % (ref 36–65)
SEGMENTED NEUTROPHILS ABSOLUTE COUNT: 3.41 K/UL (ref 1.5–8.1)
SODIUM BLD-SCNC: 136 MMOL/L (ref 135–144)
TOTAL PROTEIN: 6.8 G/DL (ref 6.4–8.3)
TROPONIN INTERP: ABNORMAL
TROPONIN INTERP: ABNORMAL
TROPONIN T: ABNORMAL NG/ML
TROPONIN T: ABNORMAL NG/ML
TROPONIN, HIGH SENSITIVITY: 31 NG/L (ref 0–14)
TROPONIN, HIGH SENSITIVITY: 48 NG/L (ref 0–14)
WBC # BLD: 7 K/UL (ref 3.5–11.3)
WBC # BLD: ABNORMAL 10*3/UL
ZZ NTE CLEAN UP: ORDERED TEST: NORMAL
ZZ NTE CLEAN UP: ORDERED TEST: NORMAL
ZZ NTE WITH NAME CLEAN UP: SPECIMEN SOURCE: NORMAL
ZZ NTE WITH NAME CLEAN UP: SPECIMEN SOURCE: NORMAL

## 2020-07-12 PROCEDURE — 83605 ASSAY OF LACTIC ACID: CPT

## 2020-07-12 PROCEDURE — 84145 PROCALCITONIN (PCT): CPT

## 2020-07-12 PROCEDURE — 85025 COMPLETE CBC W/AUTO DIFF WBC: CPT

## 2020-07-12 PROCEDURE — 99285 EMERGENCY DEPT VISIT HI MDM: CPT

## 2020-07-12 PROCEDURE — 71046 X-RAY EXAM CHEST 2 VIEWS: CPT

## 2020-07-12 PROCEDURE — 80053 COMPREHEN METABOLIC PANEL: CPT

## 2020-07-12 PROCEDURE — 83615 LACTATE (LD) (LDH) ENZYME: CPT

## 2020-07-12 PROCEDURE — 85384 FIBRINOGEN ACTIVITY: CPT

## 2020-07-12 PROCEDURE — 85610 PROTHROMBIN TIME: CPT

## 2020-07-12 PROCEDURE — 84484 ASSAY OF TROPONIN QUANT: CPT

## 2020-07-12 PROCEDURE — 6360000002 HC RX W HCPCS: Performed by: STUDENT IN AN ORGANIZED HEALTH CARE EDUCATION/TRAINING PROGRAM

## 2020-07-12 PROCEDURE — G0378 HOSPITAL OBSERVATION PER HR: HCPCS

## 2020-07-12 PROCEDURE — U0003 INFECTIOUS AGENT DETECTION BY NUCLEIC ACID (DNA OR RNA); SEVERE ACUTE RESPIRATORY SYNDROME CORONAVIRUS 2 (SARS-COV-2) (CORONAVIRUS DISEASE [COVID-19]), AMPLIFIED PROBE TECHNIQUE, MAKING USE OF HIGH THROUGHPUT TECHNOLOGIES AS DESCRIBED BY CMS-2020-01-R: HCPCS

## 2020-07-12 PROCEDURE — 96372 THER/PROPH/DIAG INJ SC/IM: CPT

## 2020-07-12 PROCEDURE — 93005 ELECTROCARDIOGRAM TRACING: CPT | Performed by: STUDENT IN AN ORGANIZED HEALTH CARE EDUCATION/TRAINING PROGRAM

## 2020-07-12 PROCEDURE — 86140 C-REACTIVE PROTEIN: CPT

## 2020-07-12 PROCEDURE — 6370000000 HC RX 637 (ALT 250 FOR IP): Performed by: STUDENT IN AN ORGANIZED HEALTH CARE EDUCATION/TRAINING PROGRAM

## 2020-07-12 PROCEDURE — 85379 FIBRIN DEGRADATION QUANT: CPT

## 2020-07-12 PROCEDURE — 85730 THROMBOPLASTIN TIME PARTIAL: CPT

## 2020-07-12 PROCEDURE — 82728 ASSAY OF FERRITIN: CPT

## 2020-07-12 PROCEDURE — 96374 THER/PROPH/DIAG INJ IV PUSH: CPT

## 2020-07-12 PROCEDURE — 2060000000 HC ICU INTERMEDIATE R&B

## 2020-07-12 RX ORDER — ACETAMINOPHEN 325 MG/1
650 TABLET ORAL EVERY 6 HOURS PRN
Status: DISCONTINUED | OUTPATIENT
Start: 2020-07-12 | End: 2020-07-12

## 2020-07-12 RX ORDER — ACETAMINOPHEN 500 MG
1000 TABLET ORAL EVERY 6 HOURS PRN
Status: DISCONTINUED | OUTPATIENT
Start: 2020-07-12 | End: 2020-07-15 | Stop reason: HOSPADM

## 2020-07-12 RX ORDER — ACETAMINOPHEN 650 MG/1
650 SUPPOSITORY RECTAL EVERY 6 HOURS PRN
Status: DISCONTINUED | OUTPATIENT
Start: 2020-07-12 | End: 2020-07-12

## 2020-07-12 RX ORDER — ACETAMINOPHEN 650 MG/1
650 SUPPOSITORY RECTAL EVERY 6 HOURS PRN
Status: DISCONTINUED | OUTPATIENT
Start: 2020-07-12 | End: 2020-07-15 | Stop reason: HOSPADM

## 2020-07-12 RX ADMIN — ENOXAPARIN SODIUM 30 MG: 30 INJECTION SUBCUTANEOUS at 20:59

## 2020-07-12 RX ADMIN — ACETAMINOPHEN 650 MG: 325 TABLET ORAL at 21:48

## 2020-07-12 ASSESSMENT — PAIN DESCRIPTION - ORIENTATION: ORIENTATION: RIGHT

## 2020-07-12 ASSESSMENT — PAIN DESCRIPTION - LOCATION: LOCATION: CHEST;LEG

## 2020-07-12 ASSESSMENT — PAIN SCALES - GENERAL
PAINLEVEL_OUTOF10: 7
PAINLEVEL_OUTOF10: 8

## 2020-07-12 NOTE — ED NOTES
[facility] ED  Emergency Department Encounter  Emergency Medicine Resident     Pt Name: Gillian Chiang  MRN: [id number]  Keithgfyamila 1945  Date of evaluation: 7/12/20  PCP:  Ki Cleveland, [address] [place] [address]       Chief Complaint   Patient presents with    Chest Pain    Leg Pain     right leg pain, fell in oct,        HISTORY OFPRESENT ILLNESS  (Location/Symptom, Timing/Onset, Context/Setting, Quality, Duration, Modifying Factors,Severity.)      Gillian Chiang is a 76 y. o.yo female who presents with acute onset chest pain and leg pain. Chest pain described as a heaviness has been ongoing for the past three days, it is worse with exertion and she becomes short of breath when trying to exert herself. She states radiation of pain goes into her upper chest. States that going to sleep helps her chest pain because she forgets about it. She confirms history of acute MI but states that this pain in chest feels differently than her previous MI. Her discomfort is described as severe in nature and quite uncomfortable. She also complains of right leg pain that has been ongoing for years but has acutely worsened with swelling of the right ankle. She admits to 2 weeks of productive cough, congestion, headache, fever, chills, and myalgias. She states these symptoms have worsened over the past two weeks to include abdominal pain, diarrhea, and vomiting the past three days. PAST MEDICAL / SURGICAL / SOCIAL / FAMILY HISTORY      has a past medical history of Anxiety, Benign positional vertigo, CAD (coronary artery disease), Chest pain, Depression, Diabetes mellitus ([address] Diabetic neuropathy ([address] Hyperlipidemia, Hypertension, and Migraine. has a past surgical history that includes Percutaneous Transluminal Coronary Angio; Cardiac catheterization; Coronary angioplasty with stent; and Appendectomy. Social:  reports that she has never smoked.  She has never used smokeless tobacco. She reports that she does not drink alcohol or use drugs. Family Hx:   Family History   Problem Relation Age of Onset   [de-identified] Cancer Mother     Cancer Father         Allergies:  Penicillins and Sulfa antibiotics    Home Medications:  Prior to Admission medications    Medication Sig Start Date End Date Taking? Authorizing Provider   BANOPHEN 50 MG capsule Take 1 capsule by mouth every 6 hours as needed for Itching 6/22/20 7/22/20  Phani Rodrigues, APRN - NP   gabapentin (NEURONTIN) 600 MG tablet Take 1 tablet by mouth 3 times daily for 30 days. 6/12/20 7/12/20  Angie Desai MD   clonazePAM (KLONOPIN) 0.5 MG tablet Take 1 tablet by mouth 2 times daily as needed for Anxiety for up to 15 days. 6/12/20 6/27/20  Angie Desai MD   ticagrelor (BRILINTA) 90 MG TABS tablet Take 1 tablet by mouth 2 times daily 6/10/20 9/8/20  Angie Desai MD   fluticasone [facility]) 50 MCG/ACT nasal spray instill 2 sprays into each nostril once daily 4/21/20   Historical Provider, MD   NOVOFINE 32G X 6 MM MISC use 1 NEEDLE to inject MEDICATION subcutaneously five times a day 4/8/20   Historical Provider, MD   mupirocin (BACTROBAN) 2 % ointment apply topically to affected area three times a day 4/21/20   Historical Provider, MD   NIFEdipine (PROCARDIA XL) 60 MG extended release tablet take 1 tablet by mouth once daily 4/27/20   Historical Provider, MD   omeprazole (PRILOSEC) 20 MG delayed release capsule Take 1 capsule by mouth Daily 5/11/20   Angie Desai MD   insulin aspart (NOVOLOG FLEXPEN) 100 UNIT/ML injection pen Use TID before meals according to scale - max 45 units a day 5/11/20   Angie Desai MD   loratadine (CLARITIN) 10 MG tablet Take 1 tablet every day by oral route. 5/11/20   Angie Desai MD   rosuvastatin (CRESTOR) 40 MG tablet Take 1 tablet by mouth daily 5/1/20   Angie Desai MD   LEVEMIR FLEXTOUCH 100 UNIT/ML injection pen Inject 70 unit(s) twice a day by sub-q route for 30 days. 3/11/20   Historical Provider, MD   rivaroxaban (XARELTO) 20 MG TABS tablet Take 1 tablet by mouth daily (with breakfast) Start on 12/12/2019 after finishing Xarelto 15 mg twice daily for 21 days 1/7/20   Sixto Nyhan, MD   isosorbide mononitrate (IMDUR) 30 MG extended release tablet Take 1 tablet by mouth daily 11/26/19   Caitlin Price MD   linagliptin (TRADJENTA) 5 MG tablet Take 1 tablet by mouth daily 11/26/19   Caitlin Price MD   metFORMIN (GLUCOPHAGE-XR) 750 MG extended release tablet Take 1 tablet by mouth 2 times daily (with meals) 11/25/19   Caitlin Price MD   rOPINIRole (REQUIP) 0.5 MG tablet Take 1 tablet by mouth 3 times daily 11/25/19   Caitlin Price MD   carvedilol (COREG) 25 MG tablet Take 1 tablet by mouth 2 times daily (with meals) Hold for systolic blood pressure < 120 or heart rate < 50  Give 1 hour apart from other blood pressure medicines 11/25/19   Caitlin Price MD   escitalopram (LEXAPRO) 10 MG tablet Take by mouth daily     Historical Provider, MD   albuterol sulfate  (90 Base) MCG/ACT inhaler Inhale 2 puffs into the lungs every 6 hours as needed for Wheezing 3/15/18   Star Null MD   spironolactone (ALDACTONE) 25 MG tablet Take 25 mg by mouth daily     Historical Provider, MD   lisinopril (PRINIVIL;ZESTRIL) 10 MG tablet Take 1 tablet by mouth daily 3/26/16   Matthew Ann MD   vitamin E 400 UNIT capsule Take 400 Units by mouth daily. Historical Provider, MD   aspirin 81 MG tablet Take 81 mg by mouth daily. Historical Provider, MD   nitroGLYCERIN (NITROSTAT) 0.4 MG SL tablet Place 0.4 mg under the tongue every 5 minutes as needed. Historical Provider, MD       REVIEW OFSYSTEMS    (2-9 systems for level 4, 10 or more for level 5)      Review of Systems   Constitutional: Positive for activity change, chills, fatigue and fever. HENT: Positive for congestion, ear pain (Right ear pain) and rhinorrhea. Eyes: Positive for pain.  Negative for discharge. Respiratory: Positive for chest tightness and shortness of breath. Cardiovascular: Positive for leg swelling. Gastrointestinal: Positive for abdominal pain, diarrhea, nausea and vomiting. Genitourinary: Positive for frequency. Negative for dysuria. Musculoskeletal: Positive for arthralgias and myalgias. Skin: Negative for rash and wound. Neurological: Positive for headaches. Negative for dizziness, syncope and light-headedness. Psychiatric/Behavioral: Positive for confusion. Negative for agitation. PHYSICAL EXAM   (up to 7 for level 4, 8 or more forlevel 5)      INITIAL VITALS:   Vitals:    07/12/20 2131   BP: 139/71   Pulse: 63   Resp: 17   Temp:    SpO2: 96%        Physical Exam  Vitals signs and nursing note reviewed. Constitutional:       General: She is not in acute distress. Appearance: Normal appearance. She is not ill-appearing or toxic-appearing. HENT:      Head: Normocephalic and atraumatic. Nose: Nose normal.      Mouth/Throat:      Mouth: Mucous membranes are moist.      Pharynx: Oropharynx is clear. Eyes:      General:         Right eye: No discharge. Left eye: No discharge. Cardiovascular:      Rate and Rhythm: Normal rate and regular rhythm. Pulses: Normal pulses. Heart sounds: Normal heart sounds. No murmur. No friction rub. No gallop. Pulmonary:      Effort: Pulmonary effort is normal.      Breath sounds: Normal breath sounds. No wheezing. Abdominal:      General: There is no distension. Palpations: Abdomen is soft. Tenderness: There is abdominal tenderness. Skin:     General: Skin is warm and dry. Neurological:      General: No focal deficit present. Mental Status: She is alert and oriented to person, place, and time.    Psychiatric:         Mood and Affect: Mood normal.         Behavior: Behavior normal.         DIFFERENTIAL  DIAGNOSIS       DDX: acute MI, DVT, COVID r/o     Initial MDM/Plan: 76 y.o. female who presents with chest pain, leg pain, and upper respiratory type symptoms. Will plan for cardiology workup, doppler of right leg, CBC, CMP, COVID test. Patient and granddaughter compliant with this plan.      DIAGNOSTIC RESULTS / EMERGENCYDEPARTMENT COURSE / MDM     LABS:  Labs Reviewed   CBC WITH AUTO DIFFERENTIAL - Abnormal; Notable for the following components:       Result Value    RBC 3.91 (*)     Hemoglobin 11.6 (*)     All other components within normal limits   COMPREHENSIVE METABOLIC PANEL W/ REFLEX TO MG FOR LOW K - Abnormal; Notable for the following components:    Glucose 204 (*)     CREATININE 1.69 (*)     Total Bilirubin 0.25 (*)     GFR Non- 30 (*)     GFR  36 (*)     All other components within normal limits   TROPONIN - Abnormal; Notable for the following components:    Troponin, High Sensitivity 48 (*)     All other components within normal limits   LACTATE DEHYDROGENASE - Abnormal; Notable for the following components:     (*)     All other components within normal limits   FERRITIN - Abnormal; Notable for the following components:    Ferritin 325 (*)     All other components within normal limits   TROPONIN - Abnormal; Notable for the following components:    Troponin, High Sensitivity 31 (*)     All other components within normal limits   FIBRINOGEN - Abnormal; Notable for the following components:    Fibrinogen 441 (*)     All other components within normal limits   PROTIME-INR - Abnormal; Notable for the following components:    Protime 13.3 (*)     All other components within normal limits   APTT - Abnormal; Notable for the following components:    PTT 36.1 (*)     All other components within normal limits   URIC ACID - Abnormal; Notable for the following components:    Uric Acid 7.0 (*)     All other components within normal limits   POC GLUCOSE FINGERSTICK - Abnormal; Notable for the following components:    POC Glucose 52 (*)     All other components within normal limits   POCT GLUCOSE - Normal   CULTURE, BLOOD 1   CULTURE, BLOOD 2   GRAM STAIN   CULTURE, RESPIRATORY   GASTROINTESTINAL PANEL, MOLECULAR   PROTIME-INR   APTT   D-DIMER, QUANTITATIVE   PROCALCITONIN   C-REACTIVE PROTEIN   COVID-19   SPECIMEN REJECTION   SPECIMEN REJECTION   D-DIMER, QUANTITATIVE   CK   URINALYSIS WITH MICROSCOPIC   LACTIC ACID, WHOLE BLOOD   PREVIOUS SPECIMEN   CALCIUM, IONIZED   POCT GLUCOSE   POC GLUCOSE FINGERSTICK   POCT GLUCOSE         RADIOLOGY:  Xr Chest Standard (2 Vw)    Result Date: 7/12/2020  EXAMINATION: TWO XRAY VIEWS OF THE CHEST 7/12/2020 6:19 pm COMPARISON: 06/08/2020 HISTORY: ORDERING SYSTEM PROVIDED HISTORY: chest pain TECHNOLOGIST PROVIDED HISTORY: chest pain Reason for Exam: upr,sob,rt leg swelling Initial encounter FINDINGS: No focal consolidation, pleural effusion or pneumothorax. The cardiomediastinal silhouette is stable. No overt pulmonary edema. The osseous structures are stable. Coronary artery stent is noted. Moderate degenerative changes of the thoracic spine. Osteopenia. No acute cardiopulmonary findings. Xr Knee Right (3 Views)    Result Date: 7/13/2020  EXAMINATION: THREE XRAY VIEWS OF THE RIGHT KNEE; 4 XRAY VIEWS OF THE RIGHT TIBIA AND FIBULA 7/13/2020 2:51 am COMPARISON: None. HISTORY: ORDERING SYSTEM PROVIDED HISTORY: right knee/leg pain TECHNOLOGIST PROVIDED HISTORY: right knee/leg pain Reason for Exam: rt leg pain; ORDERING SYSTEM PROVIDED HISTORY: r/o fx; leg pain; no trauma TECHNOLOGIST PROVIDED HISTORY: r/o fx; leg pain; no trauma Reason for Exam: rt leg pain FINDINGS: Knee: Bone alignment is within normal limits. Bone mineralization is unremarkable. Moderate medial, patellofemoral knee joint compartment space narrowing and osteophytosis is present. Lateral knee joint compartment is unremarkable in appearance. No evidence of joint effusion is seen. Surrounding soft tissues are unremarkable. Tibia/fibula:  The tibia and fibula demonstrate normal alignment. Ankle mortise is symmetric and intact. The joint spaces are preserved. Bone mineralization is within normal limits. No evidence of acute fracture, dislocation is noted. Scattered atherosclerotic calcification is present. Surrounding soft tissues are unremarkable. 1. 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 2. Scattered right lower extremity scattered atherosclerotic calcification. Xr Tibia Fibula Right (2 Views)    Result Date: 7/13/2020  EXAMINATION: THREE XRAY VIEWS OF THE RIGHT KNEE; 4 XRAY VIEWS OF THE RIGHT TIBIA AND FIBULA 7/13/2020 2:51 am COMPARISON: None. HISTORY: ORDERING SYSTEM PROVIDED HISTORY: right knee/leg pain TECHNOLOGIST PROVIDED HISTORY: right knee/leg pain Reason for Exam: rt leg pain; ORDERING SYSTEM PROVIDED HISTORY: r/o fx; leg pain; no trauma TECHNOLOGIST PROVIDED HISTORY: r/o fx; leg pain; no trauma Reason for Exam: rt leg pain FINDINGS: Knee: Bone alignment is within normal limits. Bone mineralization is unremarkable. Moderate medial, patellofemoral knee joint compartment space narrowing and osteophytosis is present. Lateral knee joint compartment is unremarkable in appearance. No evidence of joint effusion is seen. Surrounding soft tissues are unremarkable. Tibia/fibula: The tibia and fibula demonstrate normal alignment. Ankle mortise is symmetric and intact. The joint spaces are preserved. Bone mineralization is within normal limits. No evidence of acute fracture, dislocation is noted. Scattered atherosclerotic calcification is present. Surrounding soft tissues are unremarkable. 1. 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 2. Scattered right lower extremity scattered atherosclerotic calcification.        EKG    Regular rate  Normal sinus rhythm   Normal axis, no axis deviation noted  No ST elevation noted   Inverted T waves in lead I and AVL consistent with prior EKG, no acute abnormality   EKG shows no acute abnormalities     All EKG's are interpreted by the Emergency Department Physicianwho either signs or Co-signs this chart in the absence of a cardiologist.    EMERGENCY DEPARTMENT COURSE:  ED Course as of Jul 13 0409   Sun Jul 12, 2020   1750 Patient seen with granddaughter at bedside helping to provide history. [MA]   1144 Discussed with attending, Dr. Facundo Myles. Will add on COVID labs and lactic due to myalgias, abdominal pain, weakness, subjective fever. [MA]   1824 Added Right lower extremity doppler for acute onset leg pain and swelling. R/o DVT    [MA]   1925 Chest x-ray reviewed, no acute cardiopulmonary findings. [MA]   1925 Lactic Acid, Plasma [MA]   2120 Updated patient that we will be admitting her to the hospital.  Patient compliant with this plan. Called patient's daughter and spoke with her and updated her. Also compliant with this plan. Will contact [facility]    [MA]   2135 Spoke with [facility]. They accepted patient. [MA]   Mon Jul 13, 2020   0009 COVID-19:    SARS-CoV-2 PENDING   SARS-CoV-2, Rapid PENDING   Source . NASOPHARYNGEAL SWAB   SARS-CoV-2, PCR      [MA]   [id number] Patient signed out to Dr. Jamaal Sheffield. [MA]      ED Course User Index  [MA] Kareen Dhillon DO      Patient being boarded in the ED. Discussed with and signed out to Dr. Jamaal Sheffield. PROCEDURES:  None    CONSULTS:  IP CONSULT TO INFECTIOUS DISEASES  IP CONSULT TO CARDIOLOGY      FINAL IMPRESSION      1. Generalized weakness    2.  Chest pain, unspecified type          DISPOSITION / PLAN     DISPOSITION Admitted 07/12/2020 09:50:35 PM      PATIENT REFERRED TO:  Chantelle Pruitt, [address]  [address]  [address] [phone]            DISCHARGE MEDICATIONS:  New Prescriptions    No medications on file       Kareen Dhillon DO  Emergency Medicine Resident    (Please note that portions of this note were completed with a voice recognition program.Efforts were made to edit the dictations but occasionally words are mis-transcribed.)       Jonathan García DO  Resident  07/13/20 3953

## 2020-07-12 NOTE — ED NOTES
Pt came into ER in NAD with grand daughter, pt reports mid sternal non radiating chest pain, pt reports right leg/hip pain but she had surgery a year ago on that hip, pt reports sob and feeling weak for the last couple days, pt doesn't want to be seen but pt grand daughter wants her to be seen, pt on monitor, ekg done, will continue to assess      Denver Baltimore, RN  07/12/20 [id number]

## 2020-07-12 NOTE — ED PROVIDER NOTES
[facility]  Emergency Department  Faculty Attestation     I performed a history and physical examination of the patient and discussed management with the resident. I reviewed the residents note and agree with the documented findings and plan of care. Any areas of disagreement are noted on the chart. I was personally present for the key portions of any procedures. I have documented in the chart those procedures where I was not present during the key portions. I have reviewed the emergency nurses triage note. I agree with the chief complaint, past medical history, past surgical history, allergies, medications, social and family history as documented unless otherwise noted below. For Physician Assistant/ Nurse Practitioner cases/documentation I have personally evaluated this patient and have completed at least one if not all key elements of the E/M (history, physical exam, and MDM). Additional findings are as noted. Primary Care Physician:  Ki Cleveland MD    Screenings:  [unfilled]    CHIEF COMPLAINT       Chief Complaint   Patient presents with    Chest Pain    Leg Pain     right leg pain, fell in oct,        RECENT VITALS:   Temp: 98.6 °F (37 °C),  Pulse: 73, Resp: 16, BP: (!) 163/83    LABS:  Labs Reviewed - No data to display    Radiology  No orders to display       CRITICAL CARE: There was a high probability of clinically significant/life threatening deterioration in this patient's condition which required my urgent intervention. Total critical care time was none minutes. This excludes any time for separately reportable procedures. EKG:   EKG Interpretation    Interpreted by me    Rhythm: normal sinus   Rate: normal  Axis: normal  Ectopy: none  Conduction: normal  ST Segments: no acute change  T Waves:  Inversion high lateral, diffuse flattening  Q Waves: none  U wave noted V2    Clinical Impression: Nonspecific T wave change, consider electrolyte abnormality    Attending Physician Additional  Notes    Patient presents with family members for multiple complaints. She has been ill for the past several days with vomiting and diarrhea. She is wearing diapers and she is concerned she cannot get to the bathroom quick enough. She has swelling and pain in the right lower extremity, the same side that she had a DVT surgery. She has chest pain dry cough and shortness of breath. She has nasal congestion but no sore throat. She has diffuse myalgias especially the back of her neck and head. No loss of sense of smell or taste. No exposures to known COVID. She does not have access to her nitroglycerin. Granddaughter states that she has been keeping her temperature in her room 80 degrees for the past several days. On exam she is afebrile nontoxic no respiratory distress vital signs are normal.  GCS is 15. Mouth is slightly dry. Lungs are clear. No chest wall tenderness. Abdomen is soft and nontender. Right ankle and right knee have small effusions with minimal warmth but no significant tenderness. There is slight edema in the right lower extremity greater than the left. Impression is chest pain rule out MI/ACS, rule out PE, rule out DVT, consider infection or dehydration, consider COVID. Plan is droplet precautions, laboratory studies, analgesics, fluids, gentle hydration, antiemetics as needed, analgesics, repeat troponin, anticipate admission. Lorrie Peres.  Mansoor Yu MD, [address] [facility]  Attending Emergency  Physician                Dipti Kendall MD  07/12/20 [id number]

## 2020-07-13 ENCOUNTER — APPOINTMENT (OUTPATIENT)
Dept: NUCLEAR MEDICINE | Age: 75
DRG: 683 | End: 2020-07-13
Payer: MEDICARE

## 2020-07-13 ENCOUNTER — APPOINTMENT (OUTPATIENT)
Dept: GENERAL RADIOLOGY | Age: 75
DRG: 683 | End: 2020-07-13
Payer: MEDICARE

## 2020-07-13 PROBLEM — R19.7 DIARRHEA OF PRESUMED INFECTIOUS ORIGIN: Status: ACTIVE | Noted: 2020-07-13

## 2020-07-13 PROBLEM — Z20.822 SUSPECTED COVID-19 VIRUS INFECTION: Status: ACTIVE | Noted: 2020-07-13

## 2020-07-13 PROBLEM — N17.9 AKI (ACUTE KIDNEY INJURY) (HCC): Status: ACTIVE | Noted: 2020-07-13

## 2020-07-13 PROBLEM — I50.32 CHRONIC DIASTOLIC (CONGESTIVE) HEART FAILURE (HCC): Status: ACTIVE | Noted: 2020-07-13

## 2020-07-13 LAB
CALCIUM IONIZED: 1.24 MMOL/L (ref 1.13–1.33)
CHP ED QC CHECK: YES
CHP ED QC CHECK: YES
GLUCOSE BLD-MCNC: 179 MG/DL
GLUCOSE BLD-MCNC: 179 MG/DL (ref 65–105)
GLUCOSE BLD-MCNC: 186 MG/DL (ref 65–105)
GLUCOSE BLD-MCNC: 214 MG/DL (ref 65–105)
GLUCOSE BLD-MCNC: 214 MG/DL (ref 65–105)
GLUCOSE BLD-MCNC: 278 MG/DL (ref 65–105)
GLUCOSE BLD-MCNC: 52 MG/DL (ref 65–105)
GLUCOSE BLD-MCNC: 92 MG/DL
GLUCOSE BLD-MCNC: 92 MG/DL (ref 65–105)
LACTIC ACID, WHOLE BLOOD: 0.9 MMOL/L (ref 0.7–2.1)
SARS-COV-2, PCR: NORMAL
SARS-COV-2, RAPID: NORMAL
SARS-COV-2: NOT DETECTED
SOURCE: NORMAL
TOTAL CK: 83 U/L (ref 26–192)
URIC ACID: 7 MG/DL (ref 2.4–5.7)

## 2020-07-13 PROCEDURE — 6370000000 HC RX 637 (ALT 250 FOR IP): Performed by: INTERNAL MEDICINE

## 2020-07-13 PROCEDURE — A9500 TC99M SESTAMIBI: HCPCS | Performed by: INTERNAL MEDICINE

## 2020-07-13 PROCEDURE — 6360000002 HC RX W HCPCS: Performed by: INTERNAL MEDICINE

## 2020-07-13 PROCEDURE — 83605 ASSAY OF LACTIC ACID: CPT

## 2020-07-13 PROCEDURE — G0378 HOSPITAL OBSERVATION PER HR: HCPCS

## 2020-07-13 PROCEDURE — 3430000000 HC RX DIAGNOSTIC RADIOPHARMACEUTICAL: Performed by: INTERNAL MEDICINE

## 2020-07-13 PROCEDURE — 2580000003 HC RX 258: Performed by: NURSE PRACTITIONER

## 2020-07-13 PROCEDURE — 2060000000 HC ICU INTERMEDIATE R&B

## 2020-07-13 PROCEDURE — 82330 ASSAY OF CALCIUM: CPT

## 2020-07-13 PROCEDURE — 93017 CV STRESS TEST TRACING ONLY: CPT

## 2020-07-13 PROCEDURE — 36415 COLL VENOUS BLD VENIPUNCTURE: CPT

## 2020-07-13 PROCEDURE — 78452 HT MUSCLE IMAGE SPECT MULT: CPT

## 2020-07-13 PROCEDURE — 73590 X-RAY EXAM OF LOWER LEG: CPT

## 2020-07-13 PROCEDURE — 73562 X-RAY EXAM OF KNEE 3: CPT

## 2020-07-13 PROCEDURE — 6370000000 HC RX 637 (ALT 250 FOR IP): Performed by: NURSE PRACTITIONER

## 2020-07-13 PROCEDURE — 84550 ASSAY OF BLOOD/URIC ACID: CPT

## 2020-07-13 PROCEDURE — 87205 SMEAR GRAM STAIN: CPT

## 2020-07-13 PROCEDURE — 6370000000 HC RX 637 (ALT 250 FOR IP): Performed by: STUDENT IN AN ORGANIZED HEALTH CARE EDUCATION/TRAINING PROGRAM

## 2020-07-13 PROCEDURE — 99223 1ST HOSP IP/OBS HIGH 75: CPT | Performed by: INTERNAL MEDICINE

## 2020-07-13 PROCEDURE — 93971 EXTREMITY STUDY: CPT

## 2020-07-13 PROCEDURE — 87040 BLOOD CULTURE FOR BACTERIA: CPT

## 2020-07-13 PROCEDURE — 82550 ASSAY OF CK (CPK): CPT

## 2020-07-13 PROCEDURE — 2580000003 HC RX 258: Performed by: INTERNAL MEDICINE

## 2020-07-13 PROCEDURE — 82947 ASSAY GLUCOSE BLOOD QUANT: CPT

## 2020-07-13 RX ORDER — GABAPENTIN 600 MG/1
600 TABLET ORAL 3 TIMES DAILY
Status: DISCONTINUED | OUTPATIENT
Start: 2020-07-13 | End: 2020-07-15 | Stop reason: HOSPADM

## 2020-07-13 RX ORDER — SODIUM CHLORIDE 9 MG/ML
INJECTION, SOLUTION INTRAVENOUS CONTINUOUS
Status: DISCONTINUED | OUTPATIENT
Start: 2020-07-13 | End: 2020-07-15 | Stop reason: HOSPADM

## 2020-07-13 RX ORDER — INSULIN GLARGINE 100 [IU]/ML
70 INJECTION, SOLUTION SUBCUTANEOUS 2 TIMES DAILY
Status: DISCONTINUED | OUTPATIENT
Start: 2020-07-13 | End: 2020-07-13

## 2020-07-13 RX ORDER — SODIUM CHLORIDE 9 MG/ML
500 INJECTION, SOLUTION INTRAVENOUS CONTINUOUS PRN
Status: DISCONTINUED | OUTPATIENT
Start: 2020-07-13 | End: 2020-07-13 | Stop reason: ALTCHOICE

## 2020-07-13 RX ORDER — DEXTROSE MONOHYDRATE 50 MG/ML
100 INJECTION, SOLUTION INTRAVENOUS PRN
Status: DISCONTINUED | OUTPATIENT
Start: 2020-07-13 | End: 2020-07-15 | Stop reason: HOSPADM

## 2020-07-13 RX ORDER — ATROPINE SULFATE 0.1 MG/ML
0.5 INJECTION INTRAVENOUS EVERY 5 MIN PRN
Status: DISCONTINUED | OUTPATIENT
Start: 2020-07-13 | End: 2020-07-13 | Stop reason: ALTCHOICE

## 2020-07-13 RX ORDER — CARVEDILOL 12.5 MG/1
25 TABLET ORAL 2 TIMES DAILY WITH MEALS
Status: DISCONTINUED | OUTPATIENT
Start: 2020-07-13 | End: 2020-07-15 | Stop reason: HOSPADM

## 2020-07-13 RX ORDER — VITAMIN E 268 MG
400 CAPSULE ORAL DAILY
Status: DISCONTINUED | OUTPATIENT
Start: 2020-07-13 | End: 2020-07-15 | Stop reason: HOSPADM

## 2020-07-13 RX ORDER — ROSUVASTATIN CALCIUM 20 MG/1
40 TABLET, COATED ORAL DAILY
Status: DISCONTINUED | OUTPATIENT
Start: 2020-07-13 | End: 2020-07-15 | Stop reason: HOSPADM

## 2020-07-13 RX ORDER — SODIUM CHLORIDE 0.9 % (FLUSH) 0.9 %
10 SYRINGE (ML) INJECTION EVERY 12 HOURS SCHEDULED
Status: DISCONTINUED | OUTPATIENT
Start: 2020-07-13 | End: 2020-07-15 | Stop reason: HOSPADM

## 2020-07-13 RX ORDER — SODIUM CHLORIDE 0.9 % (FLUSH) 0.9 %
10 SYRINGE (ML) INJECTION PRN
Status: DISCONTINUED | OUTPATIENT
Start: 2020-07-13 | End: 2020-07-15 | Stop reason: HOSPADM

## 2020-07-13 RX ORDER — LEVOFLOXACIN 750 MG/1
750 TABLET ORAL
Status: DISCONTINUED | OUTPATIENT
Start: 2020-07-13 | End: 2020-07-14

## 2020-07-13 RX ORDER — ESCITALOPRAM OXALATE 10 MG/1
10 TABLET ORAL DAILY
Status: DISCONTINUED | OUTPATIENT
Start: 2020-07-13 | End: 2020-07-15 | Stop reason: HOSPADM

## 2020-07-13 RX ORDER — ROSUVASTATIN CALCIUM 20 MG/1
40 TABLET, COATED ORAL DAILY
Status: DISCONTINUED | OUTPATIENT
Start: 2020-07-13 | End: 2020-07-13

## 2020-07-13 RX ORDER — SPIRONOLACTONE 25 MG/1
25 TABLET ORAL DAILY
Status: DISCONTINUED | OUTPATIENT
Start: 2020-07-13 | End: 2020-07-15 | Stop reason: HOSPADM

## 2020-07-13 RX ORDER — ACETAMINOPHEN 325 MG/1
650 TABLET ORAL EVERY 6 HOURS PRN
Status: DISCONTINUED | OUTPATIENT
Start: 2020-07-13 | End: 2020-07-13

## 2020-07-13 RX ORDER — INSULIN GLARGINE 100 [IU]/ML
35 INJECTION, SOLUTION SUBCUTANEOUS 2 TIMES DAILY
Status: DISCONTINUED | OUTPATIENT
Start: 2020-07-13 | End: 2020-07-15 | Stop reason: HOSPADM

## 2020-07-13 RX ORDER — FLUTICASONE PROPIONATE 50 MCG
2 SPRAY, SUSPENSION (ML) NASAL DAILY
Status: DISCONTINUED | OUTPATIENT
Start: 2020-07-13 | End: 2020-07-15 | Stop reason: HOSPADM

## 2020-07-13 RX ORDER — ISOSORBIDE MONONITRATE 30 MG/1
30 TABLET, EXTENDED RELEASE ORAL DAILY
Status: DISCONTINUED | OUTPATIENT
Start: 2020-07-13 | End: 2020-07-15 | Stop reason: HOSPADM

## 2020-07-13 RX ORDER — ACETAMINOPHEN 650 MG/1
650 SUPPOSITORY RECTAL EVERY 6 HOURS PRN
Status: DISCONTINUED | OUTPATIENT
Start: 2020-07-13 | End: 2020-07-13

## 2020-07-13 RX ORDER — ASPIRIN 81 MG/1
81 TABLET ORAL DAILY
Status: DISCONTINUED | OUTPATIENT
Start: 2020-07-13 | End: 2020-07-15 | Stop reason: HOSPADM

## 2020-07-13 RX ORDER — ROPINIROLE 0.25 MG/1
0.5 TABLET, FILM COATED ORAL 3 TIMES DAILY
Status: DISCONTINUED | OUTPATIENT
Start: 2020-07-13 | End: 2020-07-15 | Stop reason: HOSPADM

## 2020-07-13 RX ORDER — NIFEDIPINE 60 MG/1
60 TABLET, FILM COATED, EXTENDED RELEASE ORAL DAILY
Status: DISCONTINUED | OUTPATIENT
Start: 2020-07-13 | End: 2020-07-15 | Stop reason: HOSPADM

## 2020-07-13 RX ORDER — SODIUM CHLORIDE 0.9 % (FLUSH) 0.9 %
10 SYRINGE (ML) INJECTION PRN
Status: DISCONTINUED | OUTPATIENT
Start: 2020-07-13 | End: 2020-07-13 | Stop reason: ALTCHOICE

## 2020-07-13 RX ORDER — NICOTINE POLACRILEX 4 MG
15 LOZENGE BUCCAL PRN
Status: DISCONTINUED | OUTPATIENT
Start: 2020-07-13 | End: 2020-07-15 | Stop reason: HOSPADM

## 2020-07-13 RX ORDER — DIPHENHYDRAMINE HCL 25 MG
25 TABLET ORAL EVERY 6 HOURS PRN
Status: DISCONTINUED | OUTPATIENT
Start: 2020-07-13 | End: 2020-07-15 | Stop reason: HOSPADM

## 2020-07-13 RX ORDER — NITROGLYCERIN 0.4 MG/1
0.4 TABLET SUBLINGUAL EVERY 5 MIN PRN
Status: DISCONTINUED | OUTPATIENT
Start: 2020-07-13 | End: 2020-07-13 | Stop reason: ALTCHOICE

## 2020-07-13 RX ORDER — CETIRIZINE HYDROCHLORIDE 10 MG/1
10 TABLET ORAL DAILY
Status: DISCONTINUED | OUTPATIENT
Start: 2020-07-13 | End: 2020-07-15 | Stop reason: HOSPADM

## 2020-07-13 RX ORDER — AMINOPHYLLINE DIHYDRATE 25 MG/ML
50 INJECTION, SOLUTION INTRAVENOUS PRN
Status: DISCONTINUED | OUTPATIENT
Start: 2020-07-13 | End: 2020-07-13 | Stop reason: ALTCHOICE

## 2020-07-13 RX ORDER — PANTOPRAZOLE SODIUM 40 MG/1
40 TABLET, DELAYED RELEASE ORAL
Status: DISCONTINUED | OUTPATIENT
Start: 2020-07-13 | End: 2020-07-15 | Stop reason: HOSPADM

## 2020-07-13 RX ORDER — ALBUTEROL SULFATE 90 UG/1
2 AEROSOL, METERED RESPIRATORY (INHALATION) EVERY 6 HOURS PRN
Status: DISCONTINUED | OUTPATIENT
Start: 2020-07-13 | End: 2020-07-15 | Stop reason: HOSPADM

## 2020-07-13 RX ORDER — METOPROLOL TARTRATE 5 MG/5ML
5 INJECTION INTRAVENOUS EVERY 5 MIN PRN
Status: DISCONTINUED | OUTPATIENT
Start: 2020-07-13 | End: 2020-07-13 | Stop reason: ALTCHOICE

## 2020-07-13 RX ORDER — DEXTROSE MONOHYDRATE 25 G/50ML
12.5 INJECTION, SOLUTION INTRAVENOUS PRN
Status: DISCONTINUED | OUTPATIENT
Start: 2020-07-13 | End: 2020-07-15 | Stop reason: HOSPADM

## 2020-07-13 RX ADMIN — ACETAMINOPHEN 1000 MG: 325 TABLET ORAL at 11:58

## 2020-07-13 RX ADMIN — INSULIN GLARGINE 35 UNITS: 100 INJECTION, SOLUTION SUBCUTANEOUS at 20:25

## 2020-07-13 RX ADMIN — REGADENOSON 0.4 MG: 0.08 INJECTION, SOLUTION INTRAVENOUS at 13:10

## 2020-07-13 RX ADMIN — FLUTICASONE PROPIONATE 2 SPRAY: 50 SPRAY, METERED NASAL at 11:41

## 2020-07-13 RX ADMIN — DIPHENHYDRAMINE HCL 25 MG: 25 TABLET ORAL at 21:45

## 2020-07-13 RX ADMIN — ROPINIROLE HYDROCHLORIDE 0.5 MG: 0.25 TABLET, FILM COATED ORAL at 11:41

## 2020-07-13 RX ADMIN — ROPINIROLE HYDROCHLORIDE 0.5 MG: 0.25 TABLET, FILM COATED ORAL at 20:25

## 2020-07-13 RX ADMIN — GABAPENTIN 600 MG: 600 TABLET ORAL at 20:24

## 2020-07-13 RX ADMIN — SODIUM CHLORIDE, PRESERVATIVE FREE 10 ML: 5 INJECTION INTRAVENOUS at 13:15

## 2020-07-13 RX ADMIN — CARVEDILOL 25 MG: 12.5 TABLET, FILM COATED ORAL at 08:29

## 2020-07-13 RX ADMIN — CARVEDILOL 25 MG: 12.5 TABLET, FILM COATED ORAL at 17:34

## 2020-07-13 RX ADMIN — TICAGRELOR 90 MG: 90 TABLET ORAL at 03:17

## 2020-07-13 RX ADMIN — LEVOFLOXACIN 750 MG: 750 TABLET, FILM COATED ORAL at 04:22

## 2020-07-13 RX ADMIN — NIFEDIPINE 60 MG: 60 TABLET, EXTENDED RELEASE ORAL at 08:30

## 2020-07-13 RX ADMIN — TETRAKIS(2-METHOXYISOBUTYLISOCYANIDE)COPPER(I) TETRAFLUOROBORATE 43 MILLICURIE: 1 INJECTION, POWDER, LYOPHILIZED, FOR SOLUTION INTRAVENOUS at 13:14

## 2020-07-13 RX ADMIN — GABAPENTIN 600 MG: 600 TABLET ORAL at 08:30

## 2020-07-13 RX ADMIN — Medication 10 ML: at 20:26

## 2020-07-13 RX ADMIN — CETIRIZINE HYDROCHLORIDE 10 MG: 10 TABLET ORAL at 11:41

## 2020-07-13 RX ADMIN — TICAGRELOR 90 MG: 90 TABLET ORAL at 20:25

## 2020-07-13 RX ADMIN — INSULIN LISPRO 6 UNITS: 100 INJECTION, SOLUTION INTRAVENOUS; SUBCUTANEOUS at 17:18

## 2020-07-13 RX ADMIN — Medication 400 UNITS: at 08:30

## 2020-07-13 RX ADMIN — ROSUVASTATIN CALCIUM 40 MG: 20 TABLET, FILM COATED ORAL at 08:31

## 2020-07-13 RX ADMIN — INSULIN GLARGINE 35 UNITS: 100 INJECTION, SOLUTION SUBCUTANEOUS at 10:12

## 2020-07-13 RX ADMIN — ISOSORBIDE MONONITRATE 30 MG: 30 TABLET ORAL at 08:30

## 2020-07-13 RX ADMIN — PANTOPRAZOLE SODIUM 40 MG: 40 TABLET, DELAYED RELEASE ORAL at 08:29

## 2020-07-13 RX ADMIN — ROSUVASTATIN CALCIUM 40 MG: 20 TABLET, FILM COATED ORAL at 20:25

## 2020-07-13 RX ADMIN — ASPIRIN 81 MG: 81 TABLET, COATED ORAL at 08:29

## 2020-07-13 RX ADMIN — DIPHENHYDRAMINE HCL 25 MG: 25 TABLET ORAL at 15:29

## 2020-07-13 RX ADMIN — TICAGRELOR 90 MG: 90 TABLET ORAL at 10:12

## 2020-07-13 RX ADMIN — ESCITALOPRAM OXALATE 10 MG: 10 TABLET ORAL at 08:30

## 2020-07-13 RX ADMIN — INSULIN LISPRO 2 UNITS: 100 INJECTION, SOLUTION INTRAVENOUS; SUBCUTANEOUS at 20:26

## 2020-07-13 RX ADMIN — DEXTROSE 15 G: 15 GEL ORAL at 03:15

## 2020-07-13 RX ADMIN — GABAPENTIN 600 MG: 600 TABLET ORAL at 15:23

## 2020-07-13 RX ADMIN — SODIUM CHLORIDE: 9 INJECTION, SOLUTION INTRAVENOUS at 10:23

## 2020-07-13 RX ADMIN — SODIUM CHLORIDE: 9 INJECTION, SOLUTION INTRAVENOUS at 03:14

## 2020-07-13 RX ADMIN — ROPINIROLE HYDROCHLORIDE 0.5 MG: 0.25 TABLET, FILM COATED ORAL at 15:22

## 2020-07-13 ASSESSMENT — ENCOUNTER SYMPTOMS
EYE PAIN: 0
EYE PAIN: 1
VOMITING: 1
NAUSEA: 1
CHEST TIGHTNESS: 1
RHINORRHEA: 1
SHORTNESS OF BREATH: 1
CONSTIPATION: 0
COUGH: 1
ABDOMINAL PAIN: 1
EYE DISCHARGE: 0
DIARRHEA: 1

## 2020-07-13 ASSESSMENT — PAIN SCALES - GENERAL: PAINLEVEL_OUTOF10: 9

## 2020-07-13 NOTE — ED PROVIDER NOTES
[facility] ED  Emergency Department Encounter  Emergency Medicine Resident     Pt Name: Estrella Bragg  MRN: [id number]  Keithgfyamila 1945  Date of evaluation: 7/13/20  PCP:  Marit[address]       Chief Complaint   Patient presents with    Chest Pain    Leg Pain     right leg pain, fell in oct,        HISTORY OFPRESENT ILLNESS  (Location/Symptom, Timing/Onset, Context/Setting, Quality, Duration, Modifying Factors,Severity.)      Estrella Bragg is a 76 y. o.yo female who presents with daughter at bedside helping with history. Patient complains of right leg pain and chest pain. Chest pain started acutely over the past 3 days he states that is a pressure sensation in her chest that is worse with exertion. Exerting herself makes her become short of breath. Patient states that the pain radiates throughout her chest up towards her neck. States that she has not taken any medication that has helped her pain. Was unsure if she took her aspirin, and denied taking nitroglycerin. Patient also complains of 2 weeks of cough, productive of mucus, shortness of breath, headache, myalgias, fever, and chills. Patient denies travel and denies recent contacts with anybody who has been diagnosed as COVID positive. Patient also reports 3-day history of nausea, vomiting, and diarrhea. States she has not been able to keep much food or water down. Granddaughter states that today she became altered, and was not herself according to granddaughter. Patient is normally able to transfer herself and today she was not able to transfer herself from bed to chair, she needed help from her granddaughter. Patient admits to generalized weakness.        PAST MEDICAL / SURGICAL / SOCIAL / FAMILY HISTORY      has a past medical history of Anxiety, Benign positional vertigo, CAD (coronary artery disease), Chest pain, Depression, Diabetes mellitus ([address] Diabetic neuropathy ([facility] [address] Hyperlipidemia, Hypertension, and Migraine. has a past surgical history that includes Percutaneous Transluminal Coronary Angio; Cardiac catheterization; Coronary angioplasty with stent; and Appendectomy. Social:  reports that she has never smoked. She has never used smokeless tobacco. She reports that she does not drink alcohol or use drugs. Family Hx:   Family History   Problem Relation Age of Onset   Flores Cancer Mother     Cancer Father         Allergies:  Penicillins and Sulfa antibiotics    Home Medications:  Prior to Admission medications    Medication Sig Start Date End Date Taking? Authorizing Provider   BANOPHEN 50 MG capsule Take 1 capsule by mouth every 6 hours as needed for Itching 6/22/20 7/22/20  MAYELA Quinn NP   gabapentin (NEURONTIN) 600 MG tablet Take 1 tablet by mouth 3 times daily for 30 days. 6/12/20 7/12/20  Amanda Do MD   clonazePAM (KLONOPIN) 0.5 MG tablet Take 1 tablet by mouth 2 times daily as needed for Anxiety for up to 15 days.  6/12/20 6/27/20  Amanda Do MD   ticagrelor (BRILINTA) 90 MG TABS tablet Take 1 tablet by mouth 2 times daily 6/10/20 9/8/20  Amanda Do MD   fluticasone [place]) 50 MCG/ACT nasal spray instill 2 sprays into each nostril once daily 4/21/20   Historical Provider, MD   NOVOFINE 32G X 6 MM MISC use 1 NEEDLE to inject MEDICATION subcutaneously five times a day 4/8/20   Historical Provider, MD   mupirocin (BACTROBAN) 2 % ointment apply topically to affected area three times a day 4/21/20   Historical Provider, MD   NIFEdipine (PROCARDIA XL) 60 MG extended release tablet take 1 tablet by mouth once daily 4/27/20   Historical Provider, MD   omeprazole (PRILOSEC) 20 MG delayed release capsule Take 1 capsule by mouth Daily 5/11/20   Amanda Do MD   insulin aspart (NOVOLOG FLEXPEN) 100 UNIT/ML injection pen Use TID before meals according to scale - max 45 units a day 5/11/20   Amanda Do MD   loratadine (CLARITIN) 10 MG OFSYSTEMS    (2-9 systems for level 4, 10 or more for level 5)      Review of Systems   Constitutional: Positive for activity change, appetite change, chills and fever. HENT: Positive for congestion, ear pain (Right sided ) and rhinorrhea. Eyes: Negative for pain and discharge. Respiratory: Positive for cough (productive of thick mucous ), chest tightness and shortness of breath (x's 2 weeks ). Cardiovascular: Positive for chest pain and leg swelling. Negative for palpitations. Gastrointestinal: Positive for abdominal pain, diarrhea, nausea and vomiting. Negative for constipation. Genitourinary: Positive for frequency. Negative for difficulty urinating. Musculoskeletal: Positive for arthralgias and myalgias. Skin: Negative for rash and wound. Neurological: Positive for light-headedness and headaches. Negative for dizziness. Psychiatric/Behavioral: Positive for confusion. PHYSICAL EXAM   (up to 7 for level 4, 8 or more forlevel 5)      INITIAL VITALS:   Vitals:    07/12/20 2131   BP: 139/71   Pulse: 63   Resp: 17   Temp:    SpO2: 96%        Physical Exam  Vitals signs and nursing note reviewed. Constitutional:       General: She is not in acute distress. Appearance: She is obese. She is not toxic-appearing. HENT:      Head: Normocephalic and atraumatic. Nose: Nose normal.      Mouth/Throat:      Mouth: Mucous membranes are moist.      Pharynx: Oropharynx is clear. Eyes:      General:         Right eye: No discharge. Left eye: No discharge. Cardiovascular:      Rate and Rhythm: Normal rate and regular rhythm. Pulses: Normal pulses. Heart sounds: Normal heart sounds. No murmur. No friction rub. No gallop. Pulmonary:      Effort: Pulmonary effort is normal. No respiratory distress. Breath sounds: Normal breath sounds. No wheezing. Abdominal:      General: There is no distension. Palpations: Abdomen is soft. Tenderness:  There is abdominal tenderness. There is no guarding. Musculoskeletal:         General: Swelling (right ankle ) and tenderness (right lower extremity ) present. Skin:     General: Skin is warm and dry. Neurological:      General: No focal deficit present. Mental Status: She is alert and oriented to person, place, and time. Psychiatric:         Mood and Affect: Mood normal.         Thought Content: Thought content normal.         DIFFERENTIAL  DIAGNOSIS       DDX: Acute MI, COVID rule out, generalized weakness, DVT of right lower extremity. Initial MDM/Plan: 76 y.o. female who presents with acute onset of chest pain for 3 days, upper respiratory symptoms of 2 weeks duration -occluding shortness of breath, cough adductive of mucus, fevers, chills, myalgias, and headache. We will plan for chest x-ray, CBC, CMP, Doppler of right lower extremity, COVID swab, Tylenol for pain. Patient compliant with this plan.      DIAGNOSTIC RESULTS / EMERGENCYDEPARTMENT COURSE / MDM     LABS:  Labs Reviewed   CBC WITH AUTO DIFFERENTIAL - Abnormal; Notable for the following components:       Result Value    RBC 3.91 (*)     Hemoglobin 11.6 (*)     All other components within normal limits   COMPREHENSIVE METABOLIC PANEL W/ REFLEX TO MG FOR LOW K - Abnormal; Notable for the following components:    Glucose 204 (*)     CREATININE 1.69 (*)     Total Bilirubin 0.25 (*)     GFR Non- 30 (*)     GFR  36 (*)     All other components within normal limits   TROPONIN - Abnormal; Notable for the following components:    Troponin, High Sensitivity 48 (*)     All other components within normal limits   LACTATE DEHYDROGENASE - Abnormal; Notable for the following components:     (*)     All other components within normal limits   FERRITIN - Abnormal; Notable for the following components:    Ferritin 325 (*)     All other components within normal limits   TROPONIN - Abnormal; Notable for the following components:

## 2020-07-13 NOTE — PROGRESS NOTES
Went through home meds with pt. We completed as much as we could from her memory. She said she will have her son bring in her med list. Paged Attn for med d/t itching. Placed an order for lillian to see pt for living will and POA per pt.

## 2020-07-13 NOTE — PROGRESS NOTES
Chart was reviewed  Knee x-ray and leg x-ray was reviewed    Assessment and plan  CO VID 19 was negative  Pending cardiology evaluation continue IV hydration

## 2020-07-13 NOTE — PROGRESS NOTES
COVID is negative, chest x-ray is negative, not the picture of COVID, please remove isolation and move out of COVID floors.     Mat Eden MD. Infectious Diseases

## 2020-07-13 NOTE — PROGRESS NOTES
(BACTROBAN) 2 % ointment apply topically to affected area three times a day 4/21/20   Historical Provider, MD   NIFEdipine (PROCARDIA XL) 60 MG extended release tablet take 1 tablet by mouth once daily 4/27/20   Historical Provider, MD   omeprazole (PRILOSEC) 20 MG delayed release capsule Take 1 capsule by mouth Daily 5/11/20   Christine Plunkett MD   insulin aspart (NOVOLOG FLEXPEN) 100 UNIT/ML injection pen Use TID before meals according to scale - max 45 units a day 5/11/20   Christine Plunkett MD   loratadine (CLARITIN) 10 MG tablet Take 1 tablet every day by oral route. 5/11/20   Christine Plunkett MD   rosuvastatin (CRESTOR) 40 MG tablet Take 1 tablet by mouth daily 5/1/20   Christine Plunkett MD   LEVEMIR FLEXTOUCH 100 UNIT/ML injection pen Inject 70 unit(s) twice a day by sub-q route for 30 days.  3/11/20   Historical Provider, MD   rivaroxaban (XARELTO) 20 MG TABS tablet Take 1 tablet by mouth daily (with breakfast) Start on 12/12/2019 after finishing Xarelto 15 mg twice daily for 21 days 1/7/20   Christine Plunkett MD   isosorbide mononitrate (IMDUR) 30 MG extended release tablet Take 1 tablet by mouth daily 11/26/19   Annie Oneil MD   linagliptin (TRADJENTA) 5 MG tablet Take 1 tablet by mouth daily 11/26/19   Annie Oneil MD   metFORMIN (GLUCOPHAGE-XR) 750 MG extended release tablet Take 1 tablet by mouth 2 times daily (with meals) 11/25/19   Annei Oneil MD   rOPINIRole (REQUIP) 0.5 MG tablet Take 1 tablet by mouth 3 times daily 11/25/19   Annie Oneil MD   carvedilol (COREG) 25 MG tablet Take 1 tablet by mouth 2 times daily (with meals) Hold for systolic blood pressure < 120 or heart rate < 50  Give 1 hour apart from other blood pressure medicines 11/25/19   Annie Oneil MD   escitalopram (LEXAPRO) 10 MG tablet Take by mouth daily     Historical Provider, MD   albuterol sulfate  (90 Base) MCG/ACT inhaler Inhale 2 puffs into the lungs every 6 hours as needed for Wheezing 3/15/18   Andrae Eugene MD   spironolactone (ALDACTONE) 25 MG tablet Take 25 mg by mouth daily     Historical Provider, MD   lisinopril (PRINIVIL;ZESTRIL) 10 MG tablet Take 1 tablet by mouth daily 3/26/16   Dora Art MD   vitamin E 400 UNIT capsule Take 400 Units by mouth daily. Historical Provider, MD   aspirin 81 MG tablet Take 81 mg by mouth daily. Historical Provider, MD   nitroGLYCERIN (NITROSTAT) 0.4 MG SL tablet Place 0.4 mg under the tongue every 5 minutes as needed.     Historical Provider, MD      Current Facility-Administered Medications: aspirin EC tablet 81 mg, 81 mg, Oral, Daily  albuterol sulfate  (90 Base) MCG/ACT inhaler 2 puff, 2 puff, Inhalation, Q6H PRN  carvedilol (COREG) tablet 25 mg, 25 mg, Oral, BID WC  escitalopram (LEXAPRO) tablet 10 mg, 10 mg, Oral, Daily  fluticasone (FLONASE) 50 MCG/ACT nasal spray 2 spray, 2 spray, Each Nostril, Daily  gabapentin (NEURONTIN) tablet 600 mg, 600 mg, Oral, TID  isosorbide mononitrate (IMDUR) extended release tablet 30 mg, 30 mg, Oral, Daily  cetirizine (ZYRTEC) tablet 10 mg, 10 mg, Oral, Daily  NIFEdipine (ADALAT CC) extended release tablet 60 mg, 60 mg, Oral, Daily  pantoprazole (PROTONIX) tablet 40 mg, 40 mg, Oral, QAM AC  rOPINIRole (REQUIP) tablet 0.5 mg, 0.5 mg, Oral, TID  rosuvastatin (CRESTOR) tablet 40 mg, 40 mg, Oral, Daily  [Held by provider] spironolactone (ALDACTONE) tablet 25 mg, 25 mg, Oral, Daily  ticagrelor (BRILINTA) tablet 90 mg, 90 mg, Oral, BID  vitamin E capsule 400 Units, 400 Units, Oral, Daily  0.9 % sodium chloride infusion, , Intravenous, Continuous  sodium chloride flush 0.9 % injection 10 mL, 10 mL, Intravenous, 2 times per day  sodium chloride flush 0.9 % injection 10 mL, 10 mL, Intravenous, PRN  insulin lispro (HUMALOG) injection vial 0-12 Units, 0-12 Units, Subcutaneous, TID WC  insulin lispro (HUMALOG) injection vial 0-6 Units, 0-6 Units, Subcutaneous, Nightly  glucose (GLUTOSE) 40 % oral gel 15 g, 15 g, Oral, PRN  dextrose 50 % IV solution, 12.5 g, Intravenous, PRN  glucagon (rDNA) injection 1 mg, 1 mg, Intramuscular, PRN  dextrose 5 % solution, 100 mL/hr, Intravenous, PRN  insulin glargine (LANTUS) injection vial 35 Units, 35 Units, Subcutaneous, BID  levoFLOXacin (LEVAQUIN) tablet 750 mg, 750 mg, Oral, Q48H  acetaminophen (TYLENOL) tablet 1,000 mg, 1,000 mg, Oral, Q6H PRN **OR** acetaminophen (TYLENOL) suppository 650 mg, 650 mg, Rectal, Q6H PRN    Labs:     CBC:   Recent Labs     07/12/20 1748   WBC 7.0   HGB 11.6*   HCT 36.7        BMP:   Recent Labs     07/12/20 1748 07/13/20  0400 07/13/20  0815     --   --    K 3.7  --   --    CO2 23  --   --    BUN 20  --   --    CREATININE 1.69*  --   --    LABGLOM 30*  --   --    GLUCOSE 204* 92 179     PT/INR:   Recent Labs     07/12/20 1748 07/12/20  1844   PROTIME 13.3* PLEASE DISREGARD RESULTS, SPECIMEN QUANTITY NOT SUFFICIENT   INR 1.3 CANNOT BE CALCULATED     APTT:  Recent Labs     07/12/20 1748 07/12/20  1844   APTT 36.1* PLEASE DISREGARD RESULTS, SPECIMEN QUANTITY NOT SUFFICIENT      CARDIAC ENZYMES:  Recent Labs     07/12/20 1748 07/12/20  1844   TROPHS 48* 31*     Recent Labs     07/13/20  0218   CKTOTAL 83     FASTING LIPID PANEL:  Lab Results   Component Value Date    HDL 51 12/14/2019    TRIG 61 12/14/2019     LIVER PROFILE:  Recent Labs     07/12/20 1748   AST 11   ALT 7   LABALBU 4.1       Isreal Dwyer [facility]

## 2020-07-13 NOTE — ED PROVIDER NOTES
[address] ED  Emergency Department  Emergency Medicine Resident Sign-out     Care of Mario Ordoñez was assumed from Dr. Vilma Odom and is being seen for Chest Pain and Leg Pain (right leg pain, fell in oct, )   . The patient's initial evaluation and plan have been discussed with the prior provider who initially evaluated the patient.      EMERGENCY DEPARTMENT COURSE / MEDICAL DECISION MAKING:       MEDICATIONS GIVEN:  Orders Placed This Encounter   Medications    DISCONTD: acetaminophen (TYLENOL) tablet 650 mg    DISCONTD: acetaminophen (TYLENOL) suppository 650 mg    DISCONTD: enoxaparin (LOVENOX) injection 30 mg    enoxaparin (LOVENOX) injection 30 mg    OR Linked Order Group     acetaminophen (TYLENOL) tablet 1,000 mg     acetaminophen (TYLENOL) suppository 650 mg       LABS / RADIOLOGY:     Labs Reviewed   CBC WITH AUTO DIFFERENTIAL - Abnormal; Notable for the following components:       Result Value    RBC 3.91 (*)     Hemoglobin 11.6 (*)     All other components within normal limits   COMPREHENSIVE METABOLIC PANEL W/ REFLEX TO MG FOR LOW K - Abnormal; Notable for the following components:    Glucose 204 (*)     CREATININE 1.69 (*)     Total Bilirubin 0.25 (*)     GFR Non- 30 (*)     GFR  36 (*)     All other components within normal limits   TROPONIN - Abnormal; Notable for the following components:    Troponin, High Sensitivity 48 (*)     All other components within normal limits   LACTATE DEHYDROGENASE - Abnormal; Notable for the following components:     (*)     All other components within normal limits   FERRITIN - Abnormal; Notable for the following components:    Ferritin 325 (*)     All other components within normal limits   TROPONIN - Abnormal; Notable for the following components:    Troponin, High Sensitivity 31 (*)     All other components within normal limits   FIBRINOGEN - Abnormal; Notable for the following components: Fibrinogen 441 (*)     All other components within normal limits   PROTIME-INR - Abnormal; Notable for the following components:    Protime 13.3 (*)     All other components within normal limits   APTT - Abnormal; Notable for the following components:    PTT 36.1 (*)     All other components within normal limits   PROTIME-INR   APTT   D-DIMER, QUANTITATIVE   PROCALCITONIN   C-REACTIVE PROTEIN   COVID-19   SPECIMEN REJECTION   SPECIMEN REJECTION   D-DIMER, QUANTITATIVE   PROTIME-INR   APTT   FIBRINOGEN   D-DIMER, QUANTITATIVE   URINALYSIS WITH MICROSCOPIC   LACTIC ACID, WHOLE BLOOD   PREVIOUS SPECIMEN   TROPONIN       XR CHEST STANDARD (2 VW)   Final Result   No acute cardiopulmonary findings. VL DUP LOWER EXTREMITY VENOUS RIGHT    (Results Pending)       RECENT VITALS:     Temp: 98.6 °F (37 °C),  Pulse: 63, Resp: 17, BP: 139/71, SpO2: 96 %    This patient is a 76 y.o. Female with acute chest pain x3 days, cardiac work-up negative, Doppler study was ordered secondary to leg pain, low concern for pulmonary embolism patient is not tachycardic, EKG negative, patient is admitted to [facility] for generalized weakness and COVID rule out. OUTSTANDING TASKS / RECOMMENDATIONS:      1. Await Bed placement  2. Pt signed out to oncoming resident awaiting bed placement       FINAL IMPRESSION:     1. Generalized weakness    2.  Chest pain, unspecified type        DISPOSITION:       DISPOSITION:  []  Discharge   []  Transfer -    [x]  Admission - [facility]    []  Against Medical Advice   []  Eloped   FOLLOW-UP: Holly Rubio MD  [facility] [address]  [address] [address]  [phone]           DISCHARGE MEDICATIONS: New Prescriptions    No medications on file          Sheyla Nunez DO  Emergency Medicine Resident  [facility]     Sheyla Nunez [place]  Resident  07/13/20 0100       Sheyla Nunez DO  Resident  07/13/20 4526

## 2020-07-13 NOTE — ED NOTES
Pt resting comfortably on cot, NAD noted. Awaiting bed upstairs. Call light in reach. Will cont to monitor.       Bessy Reyes RN  07/13/20 6013

## 2020-07-13 NOTE — ED NOTES
Patient updated on POC, glucose collected at this time. Pt glucose 179.       Randolph, [facility]  07/13/20 9136

## 2020-07-13 NOTE — CONSULTS
[facility]    Consult                        Today's Date: 7/13/2020  Patient Name: Verona William  Date of admission: 7/12/2020  5:24 PM  Patient's age: 76 y.o., 1945  Admission Dx: Sepsis ([facility] [address] [A41.9]  Sepsis ([facility] [address] [A41.9]    Reason for Consult:  Chest pain    Requesting Physician: Isreal Rosario MD    CHIEF COMPLAINT:  Chest pain    History Obtained From:  patient    HISTORY OF PRESENT ILLNESS:      The patient is a 76 y.o.  female who is admitted to the hospital for Chest pain  The patient presented with chest pain and right leg pain, associated with SOB, no dizziness or syncope. Past Medical History:   has a past medical history of Anxiety, Benign positional vertigo, CAD (coronary artery disease), Chest pain, Depression, Diabetes mellitus ([facility] [address] Diabetic neuropathy ([facility] [address] Hyperlipidemia, Hypertension, and Migraine. Past Surgical History:   has a past surgical history that includes Percutaneous Transluminal Coronary Angio; Cardiac catheterization; Coronary angioplasty with stent; and Appendectomy. Home Medications:    Prior to Admission medications    Medication Sig Start Date End Date Taking? Authorizing Provider   BANOPHEN 50 MG capsule Take 1 capsule by mouth every 6 hours as needed for Itching 6/22/20 7/22/20 Yes MAYELA Clinton NP   gabapentin (NEURONTIN) 600 MG tablet Take 1 tablet by mouth 3 times daily for 30 days. 6/12/20 7/13/20 Yes Rupert Kumar MD   clonazePAM (KLONOPIN) 0.5 MG tablet Take 1 tablet by mouth 2 times daily as needed for Anxiety for up to 15 days.  6/12/20 7/13/20 Yes Rupert Kumar MD   ticagrelor [facility]) 90 MG TABS tablet Take 1 tablet by mouth 2 times daily 6/10/20 9/8/20 Yes Rupert Kumar MD   fluticasone [facility]) 50 MCG/ACT nasal spray instill 2 sprays into each nostril once daily 4/21/20  Yes Historical Provider, MD   NIFEdipine (PROCARDIA XL) 60 MG extended release tablet take 1 tablet by mouth once daily 4/27/20  Yes Historical Provider, MD   omeprazole (PRILOSEC) 20 MG delayed release capsule Take 1 capsule by mouth Daily 5/11/20  Yes Yogi Le MD   insulin aspart (NOVOLOG FLEXPEN) 100 UNIT/ML injection pen Use TID before meals according to scale - max 45 units a day 5/11/20  Yes Yogi Le MD   loratadine (CLARITIN) 10 MG tablet Take 1 tablet every day by oral route. 5/11/20  Yes Yogi Le MD   rivaroxaban (XARELTO) 20 MG TABS tablet Take 1 tablet by mouth daily (with breakfast) Start on 12/12/2019 after finishing Xarelto 15 mg twice daily for 21 days 1/7/20  Yes Yogi Le MD   isosorbide mononitrate (IMDUR) 30 MG extended release tablet Take 1 tablet by mouth daily 11/26/19  Yes Jeremie Monterroso MD   metFORMIN (GLUCOPHAGE-XR) 750 MG extended release tablet Take 1 tablet by mouth 2 times daily (with meals) 11/25/19  Yes Jeremie Monterroso MD   rOPINIRole (REQUIP) 0.5 MG tablet Take 1 tablet by mouth 3 times daily 11/25/19  Yes Jeremie Monterroso MD   carvedilol (COREG) 25 MG tablet Take 1 tablet by mouth 2 times daily (with meals) Hold for systolic blood pressure < 120 or heart rate < 50  Give 1 hour apart from other blood pressure medicines 11/25/19  Yes Jeremie Monterroso MD   escitalopram (LEXAPRO) 10 MG tablet Take by mouth daily    Yes Historical Provider, MD   albuterol sulfate  (90 Base) MCG/ACT inhaler Inhale 2 puffs into the lungs every 6 hours as needed for Wheezing 3/15/18  Yes Shahrzad Le MD   spironolactone (ALDACTONE) 25 MG tablet Take 25 mg by mouth daily    Yes Historical Provider, MD   lisinopril (PRINIVIL;ZESTRIL) 10 MG tablet Take 1 tablet by mouth daily 3/26/16  Yes Jessica Connell MD   vitamin E 400 UNIT capsule Take 400 Units by mouth daily. Yes Historical Provider, MD   aspirin 81 MG tablet Take 81 mg by mouth daily.    Yes Historical Provider, MD   nitroGLYCERIN (NITROSTAT) 0.4 MG SL tablet Place 0.4 mg under the tongue every 5 minutes as needed. Yes Historical Provider, MD   NOVOFINE 32G X 6 MM MISC use 1 NEEDLE to inject MEDICATION subcutaneously five times a day 4/8/20   Historical Provider, MD   mupirocin (BACTROBAN) 2 % ointment apply topically to affected area three times a day 4/21/20   Historical Provider, MD   rosuvastatin (CRESTOR) 40 MG tablet Take 1 tablet by mouth daily 5/1/20   Luciano Tapia MD   LEVEMIR FLEXTOUCH 100 UNIT/ML injection pen Inject 70 unit(s) twice a day by sub-q route for 30 days. 3/11/20   Historical Provider, MD   linagliptin (TRADJENTA) 5 MG tablet Take 1 tablet by mouth daily 11/26/19   Mavis Devries MD       Allergies:  Penicillins and Sulfa antibiotics    Social History:   reports that she has never smoked. She has never used smokeless tobacco. She reports that she does not drink alcohol or use drugs. Family History: family history includes Cancer in her father and mother. No h/o sudden cardiac death. No for premature CAD    REVIEW OF SYSTEMS:    · Constitutional: there has been no unanticipated weight loss. There's been Yes change in energy level, Yes change in activity level. · Eyes: No visual changes or diplopia. No scleral icterus. · ENT: No Headaches, hearing loss or vertigo. No mouth sores or sore throat. · Cardiovascular: Yes chest pain, Yes dyspnea on exertion, No palpitations or No loss of consciousness. No cough, hemoptysis, No pleuritic pain, or phlebitis. · Respiratory: No cough or wheezing, no sputum production. No hematemesis. · Gastrointestinal: No abdominal pain, appetite loss, blood in stools. No change in bowel or bladder habits. · Genitourinary: No dysuria, trouble voiding, or hematuria. · Musculoskeletal:  No gait disturbance, yes weakness or joint complaints. · Integumentary: No rash or pruritis. · Neurological: No headache, diplopia, change in muscle strength, numbness or tingling.  No change in gait, balance, coordination, mood, affect, memory, mentation, behavior. · Psychiatric: No anxiety, or depression. · Endocrine: No temperature intolerance. No excessive thirst, fluid intake, or urination. No tremor. · Hematologic/Lymphatic: No abnormal bruising or bleeding, blood clots or swollen lymph nodes. · Allergic/Immunologic: No nasal congestion or hives. PHYSICAL EXAM:      BP (!) 159/77   Pulse 78   Temp 98.1 °F (36.7 °C) (Oral)   Resp 18   Ht 5' 6\" (1.676 m)   Wt 215 lb 8 oz (97.8 kg)   SpO2 99%   BMI 34.78 kg/m²    Constitutional and General Appearance: alert, cooperative, no distress and appears stated age  HEENT: PERRL, no cervical lymphadenopathy. No masses palpable. Normal oral mucosa  Respiratory:  · Normal excursion and expansion without use of accessory muscles  · Resp Auscultation: Good respiratory effort. No for increased work of breathing. On auscultation: clear to auscultation bilaterally  Cardiovascular:  · The apical impulse is not displaced  · Heart tones are crisp and normal. regular S1 and S2.  · Jugular venous pulsation Normal  · The carotid upstroke is normal in amplitude and contour without delay or bruit  · Peripheral pulses are symmetrical and full  Abdomen:  · No masses or tenderness  · Bowel sounds present  Extremities:  ·  No Cyanosis or Clubbing  ·  Lower extremity edema: No  · Skin: Warm and dry  Neurological:  · Alert and oriented. · Moves all extremities well  · No abnormalities of mood, affect, memory, mentation, or behavior are noted    DATA:    Diagnostics:    EKG: NSR, Nonspecific ST changes. Ailyn Deberry 12/21/19:   1. Elevated trop  2. Hypokalemia  3. CAD   4. Right sided PE     Plan --  1. PE.  AC per primary   2. Elevated trop. Nestor Cumberland type 2.  Will await ECHO results due to PE/Elevated trops.   Continue statin, BB, imdur, ACE,  brilinta   3. Keep K > 4 and Mag > 2.  Replace per scale.   4. If ECHO wnl, ok to d/c home from cardiology standpoint    Labs:     CBC:   Recent Labs     07/12/20  1748   WBC 7.0 HGB 11.6*   HCT 36.7        BMP:   Recent Labs     07/12/20  1748 07/13/20  0400 07/13/20  0815     --   --    K 3.7  --   --    CO2 23  --   --    BUN 20  --   --    CREATININE 1.69*  --   --    LABGLOM 30*  --   --    GLUCOSE 204* 92 179     BNP: No results for input(s): BNP in the last 72 hours. PT/INR:   Recent Labs     07/12/20 1748 07/12/20  1844   PROTIME 13.3* PLEASE DISREGARD RESULTS, SPECIMEN QUANTITY NOT SUFFICIENT   INR 1.3 CANNOT BE CALCULATED     APTT:  Recent Labs     07/12/20 1748 07/12/20  1844   APTT 36.1* PLEASE DISREGARD RESULTS, SPECIMEN QUANTITY NOT SUFFICIENT      CARDIAC ENZYMES:  Recent Labs     07/13/20  0218   CKTOTAL 83     FASTING LIPID PANEL:  Lab Results   Component Value Date    HDL 51 12/14/2019    TRIG 61 12/14/2019     LIVER PROFILE:  Recent Labs     07/12/20  1748   AST 11   ALT 7   LABALBU 4.1       IMPRESSION/RECOMMENDATIONS:  Chest pain, mildly elevated high sensitivity troponin, previous history of multivessel PCI, will plan for Cardiolite stress test.       Discussed with patient and Nurse.     Demetria Byrd MD  [facility]           [phone]

## 2020-07-13 NOTE — ED PROVIDER NOTES
[facility]  Emergency Department  Faculty Sign-Out Addendum     Care of Ellen Saunders was assumed from previous attending and is being seen for Chest Pain and Leg Pain (right leg pain, fell in oct, )  . The patient's initial evaluation and plan have been discussed with the prior provider who initially evaluated the patient. Handoff taken on the following patient from prior Attending Physician:    Ahsan Sheehan    I was available and discussed any additional care issues that arose and coordinated the management plans with the resident(s) caring for the patient during my duty period. Any areas of disagreement with residents documentation of care or procedures are noted on the chart. I was personally present for the key portions of any/all procedures during my duty period. I have documented in the chart those procedures where I was not present during the key portions. EMERGENCY DEPARTMENT COURSE / MEDICAL DECISION MAKING:       MEDICATIONS GIVEN:  Orders Placed This Encounter   Medications    DISCONTD: acetaminophen (TYLENOL) tablet 650 mg    DISCONTD: acetaminophen (TYLENOL) suppository 650 mg    DISCONTD: enoxaparin (LOVENOX) injection 30 mg    DISCONTD: enoxaparin (LOVENOX) injection 30 mg    OR Linked Order Group     acetaminophen (TYLENOL) tablet 1,000 mg     acetaminophen (TYLENOL) suppository 650 mg    aspirin EC tablet 81 mg    albuterol sulfate  (90 Base) MCG/ACT inhaler 2 puff    carvedilol (COREG) tablet 25 mg    escitalopram (LEXAPRO) tablet 10 mg    fluticasone (FLONASE) 50 MCG/ACT nasal spray 2 spray    gabapentin (NEURONTIN) tablet 600 mg    isosorbide mononitrate (IMDUR) extended release tablet 30 mg    DISCONTD: insulin detemir (LEVEMIR) injection pen 70 Units     Order Specific Question:   Please select a reason the therapeutic interchange was not accepted:      Answer:   Florinda Caruso for Pharmacy to Substitute    cetirizine (ZYRTEC) tablet 10 mg    NIFEdipine (ADALAT CC) extended release tablet 60 mg    pantoprazole (PROTONIX) tablet 40 mg    rOPINIRole (REQUIP) tablet 0.5 mg    rosuvastatin (CRESTOR) tablet 40 mg    spironolactone (ALDACTONE) tablet 25 mg    ticagrelor (BRILINTA) tablet 90 mg    vitamin E capsule 400 Units    0.9 % sodium chloride infusion    sodium chloride flush 0.9 % injection 10 mL    sodium chloride flush 0.9 % injection 10 mL    DISCONTD: acetaminophen (TYLENOL) tablet 650 mg    DISCONTD: acetaminophen (TYLENOL) suppository 650 mg    DISCONTD: enoxaparin (LOVENOX) injection 30 mg    DISCONTD: insulin glargine (LANTUS) injection vial 70 Units    insulin lispro (HUMALOG) injection vial 0-12 Units    insulin lispro (HUMALOG) injection vial 0-6 Units    glucose (GLUTOSE) 40 % oral gel 15 g    dextrose 50 % IV solution    glucagon (rDNA) injection 1 mg    dextrose 5 % solution    insulin glargine (LANTUS) injection vial 35 Units    levoFLOXacin (LEVAQUIN) tablet 750 mg       LABS / RADIOLOGY:     Labs Reviewed   CBC WITH AUTO DIFFERENTIAL - Abnormal; Notable for the following components:       Result Value    RBC 3.91 (*)     Hemoglobin 11.6 (*)     All other components within normal limits   COMPREHENSIVE METABOLIC PANEL W/ REFLEX TO MG FOR LOW K - Abnormal; Notable for the following components:    Glucose 204 (*)     CREATININE 1.69 (*)     Total Bilirubin 0.25 (*)     GFR Non- 30 (*)     GFR  36 (*)     All other components within normal limits   TROPONIN - Abnormal; Notable for the following components:    Troponin, High Sensitivity 48 (*)     All other components within normal limits   LACTATE DEHYDROGENASE - Abnormal; Notable for the following components:     (*)     All other components within normal limits   FERRITIN - Abnormal; Notable for the following components:    Ferritin 325 (*)     All other components within normal limits   TROPONIN - Abnormal; Notable for the following components:    Troponin, High Sensitivity 31 (*)     All other components within normal limits   FIBRINOGEN - Abnormal; Notable for the following components:    Fibrinogen 441 (*)     All other components within normal limits   PROTIME-INR - Abnormal; Notable for the following components:    Protime 13.3 (*)     All other components within normal limits   APTT - Abnormal; Notable for the following components:    PTT 36.1 (*)     All other components within normal limits   URIC ACID - Abnormal; Notable for the following components:    Uric Acid 7.0 (*)     All other components within normal limits   CULTURE, BLOOD 1   CULTURE, BLOOD 2   GRAM STAIN   CULTURE, RESPIRATORY   GASTROINTESTINAL PANEL, MOLECULAR   PROTIME-INR   APTT   D-DIMER, QUANTITATIVE   PROCALCITONIN   C-REACTIVE PROTEIN   COVID-19   SPECIMEN REJECTION   SPECIMEN REJECTION   D-DIMER, QUANTITATIVE   CK   URINALYSIS WITH MICROSCOPIC   LACTIC ACID, WHOLE BLOOD   PREVIOUS SPECIMEN   CALCIUM, IONIZED   POCT GLUCOSE   POCT GLUCOSE       Xr Chest Standard (2 Vw)    Result Date: 7/12/2020  EXAMINATION: TWO XRAY VIEWS OF THE CHEST 7/12/2020 6:19 pm COMPARISON: 06/08/2020 HISTORY: ORDERING SYSTEM PROVIDED HISTORY: chest pain TECHNOLOGIST PROVIDED HISTORY: chest pain Reason for Exam: upr,sob,rt leg swelling Initial encounter FINDINGS: No focal consolidation, pleural effusion or pneumothorax. The cardiomediastinal silhouette is stable. No overt pulmonary edema. The osseous structures are stable. Coronary artery stent is noted. Moderate degenerative changes of the thoracic spine. Osteopenia. No acute cardiopulmonary findings. RECENT VITALS:     Temp: 98.6 °F (37 °C),  Pulse: 63, Resp: 17, BP: 139/71, SpO2: 96 %    This patient is a 76 y.o. Female with chest pain, cough congestion, and leg pain. Concern for COVID. Doppler ordered to rule out DVT. OUTSTANDING TASKS / RECOMMENDATIONS:    1. Bed assignment    No acute events overnight.

## 2020-07-13 NOTE — ED NOTES
Pt. Resting on stretcher in room 22   All questions answered at this time. Pt. Updated on plan of care.       Rianna Abernathy RN  07/13/20 0023

## 2020-07-13 NOTE — ED PROVIDER NOTES
Handoff taken on the following patient from prior Attending Physician:    Pt Name: Gordo Enamorado    PCP:  Cricket Zambrano MD         Attestation    I was available and discussed any additional care issues that arose and coordinated the management plans with the resident(s) caring for the patient during my duty period. Any areas of disagreement with residents documentation of care or procedures are noted on the chart. I was personally present for the key portions of any/all procedures during my duty period. I have documented in the chart those procedures where I was not present during the banegas portions.         Slava Curiel,   07/12/20 0801

## 2020-07-13 NOTE — PROCEDURES
[address] [address]                              CARDIAC STRESS TEST    PATIENT NAME: Capri Saucedo                    :        1945  MED REC NO:   [id number]                             ROOM:         ACCOUNT NO:   [de-identified]                           ADMIT DATE: 2020  PROVIDER:     Zack Honeycutt MD    DATE OF STUDY:  2020    ORDERING PROVIDER:  Radha Gonzalez MD  PRIMARY CARE PROVIDER:  Mekhi Dunn MD  INTERPRETING PHYSICIAN:  Vitaly Taylor MD    LEXISCAN STRESS STUDY:  Indication:  chest pain    Medications:  Lexiscan, 0.4 mg  Resting heart rate:  75  Resting blood pressure:  111/58  Infusion heart rate:  82  Infusion blood pressure:  111/58  Resting EKG:  Abnormal (sinus rhythm/PVC/nonspecific T wave abnormality)  Stress heart response:  Normal response  Stress BP response:  Appropriate  Stress EKG(S):  No changes seen (PVC)  Chest discomfort:  Chest pain (/10), which resolved in recovery  Ischemic EKG changes:  None    IMPRESSION:  Electrocardiographically negative Lexiscan stress study. Radio-isotope  results to follow from the department of Nuclear Medicine.                   Andreina Erazo MD    D: 2020 15:33:07       T: 2020 15:34:31     /HUGO  Job#: [id number]     Doc#: Unknown    CC:    ()

## 2020-07-13 NOTE — CARE COORDINATION
Case Management Initial Discharge Plan  Francesca Echavarria             Met with: julia Lin to discuss discharge plans. Information verified: address, contacts, phone number, , insurance Yes-address changed in [address] and sent to registration    Emergency Contact/Next of Kin name & number: Mojgan De Jesus  [phone]    PCP: Chloe Coleman MD  Date of last visit: not sure    Insurance Provider: Vargas Sweeney    Discharge Planning    Living Arrangements:  Family Members   Support Systems:  Children, Family Members    Home has 1 story  2 stairs to climb to get into front door    Patient able to perform ADL's:Independent    Current Services (outpatient & in home) none  DME equipment: walker/cane,shower chair,glucometer  DME provider:     Receiving oral anticoagulation therapy? Yes    If indicated:   Physician managing anticoagulation treatment: PCP he thinks  Where does patient obtain lab work for ATC treatment? n/a      Potential Assistance Needed:  N/A    Patient agreeable to home care: No  [place] of choice provided:  n/a    Prior SNF/Rehab Placement and Facility: none  Agreeable to SNF/Rehab: No  [place] of choice provided: n/a     Evaluation: no    Expected Discharge date:  07/15/20    Patient expects to be discharged to:  home  Follow Up Appointment: Best Day/ Time: Tuesday AM    Transportation provider: julia Lin  Transportation arrangements needed for discharge: No    Readmission Risk              Risk of Unplanned Readmission:        22             Does patient have a readmission risk score greater than 14?: Yes  If yes, follow-up appointment must be made within 7 days of discharge.      Goals of Care: infection resolving      Discharge Plan: home with grandson and family support          Electronically signed by Yumiko Ruiz RN on 20 at 1:57 PM EDT

## 2020-07-13 NOTE — FLOWSHEET NOTE
Assessment:  Patient is a 76 y.o. female in room 2012.  responding to request for information regarding AD.  spoke to patient's nurse, Hannah Howard. Patient tired from getting settled in room. Patient sleeping. [place] will continue to follow up.     07/13/20 1715   Encounter Summary   Services provided to: Patient not available   Referral/Consult From: Multi-disciplinary team   Support System Family members   Continue Visiting   (7/13/2020)   Complexity of Encounter Low   Length of Encounter 15 minutes   Routine   Type Initial   Intervention Sustaining presence/ Ministry of presence;Contacted support as requested per patient/family request   Why? Advanced Directives     Intervention:   was ministry of presence. Outcome:  No response    Plan:  Chaplains will remain available for spiritual and emotional support as needed.

## 2020-07-13 NOTE — ED NOTES
Pt resting in bed in NAD with even and unlabored rr  Will continue to assess     Muriel Guevara RN  07/12/20 7918

## 2020-07-13 NOTE — ED NOTES
Pt. Resting on stretcher in room 22. Pt. Updated on plan of care.    No      Moe Sung, CAMILLE  07/13/20 [id number]

## 2020-07-13 NOTE — ED NOTES
Pt. Resting in bed on stretcher in room 22. Pt. Updated on plan of care. Bed in lowest position and call light within reach. Will continue to monitor.       Moe Almaraz RN  07/13/20 [id number]

## 2020-07-13 NOTE — H&P
be around 1.2. Chest x-ray was unremarkable. She was noted to have an elevated troponin which is chronic in nature. There is concern for possible COVID-19 infection, the patient will be admitted to the [facility] unit for rule out. Past Medical History:     Past Medical History:   Diagnosis Date    Anxiety     Benign positional vertigo     CAD (coronary artery disease)     Chest pain     Depression     Diabetes mellitus ([address]     Diabetic neuropathy [facility] [address]     Hyperlipidemia     Hypertension     Migraine     Migraine headaches aggravated with sublingual nitroglycerin        Past Surgical History:     Past Surgical History:   Procedure Laterality Date    APPENDECTOMY      CARDIAC CATHETERIZATION      2012    CORONARY ANGIOPLASTY WITH STENT PLACEMENT      had 2 with 4 more placed     PTCA          Medications Prior to Admission:     Prior to Admission medications    Medication Sig Start Date End Date Taking? Authorizing Provider   BANOPHEN 50 MG capsule Take 1 capsule by mouth every 6 hours as needed for Itching 6/22/20 7/22/20  MAYELA Cunha - NP   gabapentin (NEURONTIN) 600 MG tablet Take 1 tablet by mouth 3 times daily for 30 days. 6/12/20 7/12/20  Cricket Zambrano MD   clonazePAM (KLONOPIN) 0.5 MG tablet Take 1 tablet by mouth 2 times daily as needed for Anxiety for up to 15 days.  6/12/20 6/27/20  Cricket Zambrano MD   ticagrelor (BRILINTA) 90 MG TABS tablet Take 1 tablet by mouth 2 times daily 6/10/20 9/8/20  Cricket Zambrano MD   fluticasone [facility]) 50 MCG/ACT nasal spray instill 2 sprays into each nostril once daily 4/21/20   Historical Provider, MD   NOVOFINE 32G X 6 MM MISC use 1 NEEDLE to inject MEDICATION subcutaneously five times a day 4/8/20   Historical Provider, MD   mupirocin (BACTROBAN) 2 % ointment apply topically to affected area three times a day 4/21/20   Historical Provider, MD   NIFEdipine (PROCARDIA XL) 60 MG extended release tablet take 1 tablet by mouth once daily 4/27/20   Historical Provider, MD   omeprazole (PRILOSEC) 20 MG delayed release capsule Take 1 capsule by mouth Daily 5/11/20   Timothy Mariano MD   insulin aspart (NOVOLOG FLEXPEN) 100 UNIT/ML injection pen Use TID before meals according to scale - max 45 units a day 5/11/20   Timothy Mariano MD   loratadine (CLARITIN) 10 MG tablet Take 1 tablet every day by oral route. 5/11/20   Timothy Mariano MD   rosuvastatin (CRESTOR) 40 MG tablet Take 1 tablet by mouth daily 5/1/20   Timothy Mariano MD   LEVEMIR FLEXTOUCH 100 UNIT/ML injection pen Inject 70 unit(s) twice a day by sub-q route for 30 days.  3/11/20   Historical Provider, MD   rivaroxaban (XARELTO) 20 MG TABS tablet Take 1 tablet by mouth daily (with breakfast) Start on 12/12/2019 after finishing Xarelto 15 mg twice daily for 21 days 1/7/20   Timothy Mariano MD   isosorbide mononitrate (IMDUR) 30 MG extended release tablet Take 1 tablet by mouth daily 11/26/19   Angi Rg MD   linagliptin (TRADJENTA) 5 MG tablet Take 1 tablet by mouth daily 11/26/19   Angi Rg MD   metFORMIN (GLUCOPHAGE-XR) 750 MG extended release tablet Take 1 tablet by mouth 2 times daily (with meals) 11/25/19   Angi Rg MD   rOPINIRole (REQUIP) 0.5 MG tablet Take 1 tablet by mouth 3 times daily 11/25/19   Angi Rg MD   carvedilol (COREG) 25 MG tablet Take 1 tablet by mouth 2 times daily (with meals) Hold for systolic blood pressure < 120 or heart rate < 50  Give 1 hour apart from other blood pressure medicines 11/25/19   Angi Rg MD   escitalopram (LEXAPRO) 10 MG tablet Take by mouth daily     Historical Provider, MD   albuterol sulfate  (90 Base) MCG/ACT inhaler Inhale 2 puffs into the lungs every 6 hours as needed for Wheezing 3/15/18   Eliel Null MD   spironolactone (ALDACTONE) 25 MG tablet Take 25 mg by mouth daily     Historical Provider, MD   lisinopril (PRINIVIL;ZESTRIL) 10 MG tablet Take 1 tablet by mouth daily 3/26/16   Marsha Guadarrama MD   vitamin E 400 UNIT capsule Take 400 Units by mouth daily. Historical Provider, MD   aspirin 81 MG tablet Take 81 mg by mouth daily. Historical Provider, MD   nitroGLYCERIN (NITROSTAT) 0.4 MG SL tablet Place 0.4 mg under the tongue every 5 minutes as needed. Historical Provider, MD        Allergies:     Penicillins and Sulfa antibiotics    Social History:     Tobacco:    reports that she has never smoked. She has never used smokeless tobacco.  Alcohol:      reports no history of alcohol use. Drug Use:  reports no history of drug use. Family History:     Family History   Problem Relation Age of Onset    Cancer Mother     Cancer Father        Review of Systems:     Positive and Negative as described in HPI.     CONSTITUTIONAL: reports 2 days of weakness and myalgias; negative for sweats, weight loss  HEENT:  negative for vision, hearing changes, runny nose, throat pain  RESPIRATORY:  negative for shortness of breath, cough, congestion, wheezing  CARDIOVASCULAR: reports substernal chest pain that does not radiate; negative for palpitations  GASTROINTESTINAL: reports decreased appetite, diarrhea, and nausea/vomiting   GENITOURINARY:  negative for difficulty of urination, burning with urination, frequency   INTEGUMENT:  negative for rash, skin lesions, easy bruising   HEMATOLOGIC/LYMPHATIC:  negative for swelling/edema   ALLERGIC/IMMUNOLOGIC:  negative for urticaria , itching  ENDOCRINE:  negative increase in drinking, increase in urination, hot or cold intolerance  MUSCULOSKELETAL:  negative joint pains, muscle aches, swelling of joints  NEUROLOGICAL:  negative for headaches, dizziness, lightheadedness, numbness, pain, tingling extremities  BEHAVIOR/PSYCH:  negative for depression, anxiety    Physical Exam:   /71   Pulse 63   Temp 98.6 °F (37 °C)   Resp 17   SpO2 96%   Temp (24hrs), Av.6 °F (37 °C), Min:98.6 °F (37 °C), Max:98.6 °F (37 °C)    No results k/uL    Absolute Lymph # 2.66 1.10 - 3.70 k/uL    Absolute Mono # 0.63 0.10 - 1.20 k/uL    Absolute Eos # 0.25 0.00 - 0.44 k/uL    Basophils Absolute 0.03 0.00 - 0.20 k/uL    Absolute Immature Granulocyte 0.03 0.00 - 0.30 k/uL    WBC Morphology NOT REPORTED     RBC Morphology NOT REPORTED     Platelet Estimate NOT REPORTED    Comprehensive Metabolic Panel w/ Reflex to MG    Collection Time: 07/12/20  5:48 PM   Result Value Ref Range    Glucose 204 (H) 70 - 99 mg/dL    BUN 20 8 - 23 mg/dL    CREATININE 1.69 (H) 0.50 - 0.90 mg/dL    Bun/Cre Ratio NOT REPORTED 9 - 20    Calcium 9.7 8.6 - 10.4 mg/dL    Sodium 136 135 - 144 mmol/L    Potassium 3.7 3.7 - 5.3 mmol/L    Chloride 99 98 - 107 mmol/L    CO2 23 20 - 31 mmol/L    Anion Gap 14 9 - 17 mmol/L    Alkaline Phosphatase 77 35 - 104 U/L    ALT 7 5 - 33 U/L    AST 11 <32 U/L    Total Bilirubin 0.25 (L) 0.3 - 1.2 mg/dL    Total Protein 6.8 6.4 - 8.3 g/dL    Alb 4.1 3.5 - 5.2 g/dL    Albumin/Globulin Ratio 1.5 1.0 - 2.5    GFR Non-African American 30 (L) >60 mL/min    GFR  36 (L) >60 mL/min    GFR Comment          GFR Staging NOT REPORTED    Troponin    Collection Time: 07/12/20  5:48 PM   Result Value Ref Range    Troponin, High Sensitivity 48 (H) 0 - 14 ng/L    Troponin T NOT REPORTED <0.03 ng/mL    Troponin Interp NOT REPORTED    D-Dimer, Quantitative    Collection Time: 07/12/20  5:48 PM   Result Value Ref Range    D-Dimer, Quant 0.29 mg/L FEU   Fibrinogen    Collection Time: 07/12/20  5:48 PM   Result Value Ref Range    Fibrinogen 441 (H) 140 - 420 mg/dL   Protime-INR    Collection Time: 07/12/20  5:48 PM   Result Value Ref Range    Protime 13.3 (H) 9.0 - 12.0 sec    INR 1.3    APTT    Collection Time: 07/12/20  5:48 PM   Result Value Ref Range    PTT 36.1 (H) 20.5 - 30.5 sec   Protime-INR    Collection Time: 07/12/20  6:44 PM   Result Value Ref Range    Protime  9.0 - 12.0 sec     PLEASE DISREGARD RESULTS, SPECIMEN QUANTITY NOT SUFFICIENT    INR CANNOT BE CALCULATED    APTT    Collection Time: 07/12/20  6:44 PM   Result Value Ref Range    PTT  20.5 - 30.5 sec     PLEASE DISREGARD RESULTS, SPECIMEN QUANTITY NOT SUFFICIENT    Lactate Dehydrogenase    Collection Time: 07/12/20  6:44 PM   Result Value Ref Range     (H) 135 - 214 U/L   Ferritin    Collection Time: 07/12/20  6:44 PM   Result Value Ref Range    Ferritin 325 (H) 13 - 150 ug/L   D-Dimer, Quantitative    Collection Time: 07/12/20  6:44 PM   Result Value Ref Range    D-Dimer, Quant  mg/L FEU     PLEASE DISREGARD RESULTS, SPECIMEN QUANTITY NOT SUFFICIENT   Procalcitonin    Collection Time: 07/12/20  6:44 PM   Result Value Ref Range    Procalcitonin 0.05 <0.09 ng/mL   C-Reactive Protein    Collection Time: 07/12/20  6:44 PM   Result Value Ref Range    CRP 2.4 0.0 - 5.0 mg/L   SPECIMEN REJECTION    Collection Time: 07/12/20  6:44 PM   Result Value Ref Range    Specimen Source . BLOOD     Ordered Test FIB     Reason for Rejection       Unable to perform testing: Specimen quantity not sufficient.    - NOT REPORTED    Troponin    Collection Time: 07/12/20  6:44 PM   Result Value Ref Range    Troponin, High Sensitivity 31 (H) 0 - 14 ng/L    Troponin T NOT REPORTED <0.03 ng/mL    Troponin Interp NOT REPORTED    SPECIMEN REJECTION    Collection Time: 07/12/20  7:35 PM   Result Value Ref Range    Specimen Source . BLOOD     Ordered Test DIME, PTT, PT, FIB     Reason for Rejection Unable to perform testing: Specimen hemolyzed. - NOT REPORTED    COVID-19    Collection Time: 07/12/20  8:20 PM    Specimen: Other   Result Value Ref Range    SARS-CoV-2 PENDING     SARS-CoV-2, Rapid PENDING     Source . NASOPHARYNGEAL SWAB     SARS-CoV-2, PCR             Imaging/Diagnostics:  Xr Chest Standard (2 Vw)    Result Date: 7/12/2020  No acute cardiopulmonary findings.        Assessment :      Hospital Problems           Last Modified POA    * (Principal) Suspected COVID-19 virus infection 7/13/2020 Yes    Atypical chest pain 7/13/2020 Yes    MARIELLE (acute kidney injury) ([facility] [address] 7/13/2020 Yes    Type 2 diabetes mellitus with diabetic polyneuropathy, with long-term current use of insulin (HCC) (Chronic) 7/13/2020 Yes    Diarrhea of presumed infectious origin 7/13/2020 Yes    Chronic diastolic (congestive) heart failure ([facility] [address] 7/13/2020 Yes    Essential hypertension 7/13/2020 Yes    CAD (coronary artery disease) 7/13/2020 Yes    Overview Signed 3/27/2017  4:39 PM by James Nguyen MD     S/P 4 + 2 stents         Claudication in peripheral vascular disease ([facility] [address] 7/13/2020 Yes    Peripheral artery disease ([facility] [address] 7/13/2020 Yes    Other hyperlipidemia 7/13/2020 Yes    History of pulmonary embolism 7/13/2020 Yes    Elevated troponin I level 7/13/2020 Yes    Overview Signed 1/3/2020  9:05 AM by Hamzah Duque, DO     chronically               Plan:     Patient status inpatient in the Med/Surge    Admit to COVID unit - [facility] primary  ID consultation - r/o COVID  Check stool studies given diarrhea  Monitor SpO2. Currently not requiring supplemental O2  Check blood cultures x 2; start levaquin monotherapy for infectious diarrhea vs pulm source. Check lactic now  Cardio evaluation for chest pain r/o; patient has history of 4 stents placed in   MARIELLE on CKD - likely due to diarrhea/decrease PO intake; check CPK and uric acid. Recheck BMP on 7/14 after 24 hours of re-hydration  Continue IVF  DM II - decreased patient's lantus to half her normal dose of 70 units as she is not really eating or drinking  Continue Xarelto for history of DVT/PE  Right lower leg pain - uncertain of etiology, check radiograph of RLE  Essential hypertension - resume home meds (coreg, nifedipine) with hold parameters  CAD - history of 4 stents + 2. Resume statin / Ryan Purl therapy; Imdur, coreg.  Hold Aldactone while receiving IVF  Trop chonically elevated    Consultations:   IP CONSULT TO INFECTIOUS DISEASES    Patient is admitted as inpatient status because of co-morbidities listed above, severity of signs and symptoms as outlined, requirement for current medical therapies and most importantly because of direct risk to patient if care not provided in a hospital setting.     Mikala Griffith DO  7/13/2020  2:06 AM    Copy sent to Dr. Chloe Coleman MD

## 2020-07-13 NOTE — ED NOTES
Kenan Faulkner- son  [phone] or  [phone]  For updates      Eduardo Martin RN  07/13/20 [id number]

## 2020-07-14 VITALS
TEMPERATURE: 98.3 F | HEART RATE: 67 BPM | DIASTOLIC BLOOD PRESSURE: 45 MMHG | RESPIRATION RATE: 18 BRPM | BODY MASS INDEX: 34.3 KG/M2 | OXYGEN SATURATION: 98 % | HEIGHT: 66 IN | WEIGHT: 213.41 LBS | SYSTOLIC BLOOD PRESSURE: 99 MMHG

## 2020-07-14 PROBLEM — E66.811 CLASS 1 OBESITY IN ADULT: Status: ACTIVE | Noted: 2020-07-14

## 2020-07-14 PROBLEM — E66.9 CLASS 1 OBESITY IN ADULT: Status: ACTIVE | Noted: 2020-07-14

## 2020-07-14 PROBLEM — K52.9 GASTROENTERITIS: Status: ACTIVE | Noted: 2020-07-14

## 2020-07-14 PROBLEM — D64.9 ANEMIA, NORMOCYTIC NORMOCHROMIC: Status: ACTIVE | Noted: 2020-07-14

## 2020-07-14 PROBLEM — E86.0 DEHYDRATION: Status: ACTIVE | Noted: 2020-07-14

## 2020-07-14 LAB
-: ABNORMAL
ABSOLUTE EOS #: 0.15 K/UL (ref 0–0.44)
ABSOLUTE IMMATURE GRANULOCYTE: <0.03 K/UL (ref 0–0.3)
ABSOLUTE LYMPH #: 2.36 K/UL (ref 1.1–3.7)
ABSOLUTE MONO #: 0.56 K/UL (ref 0.1–1.2)
AMORPHOUS: ABNORMAL
ANION GAP SERPL CALCULATED.3IONS-SCNC: 10 MMOL/L (ref 9–17)
BACTERIA: ABNORMAL
BASOPHILS # BLD: 1 % (ref 0–2)
BASOPHILS ABSOLUTE: 0.03 K/UL (ref 0–0.2)
BILIRUBIN URINE: NEGATIVE
BUN BLDV-MCNC: 12 MG/DL (ref 8–23)
BUN/CREAT BLD: ABNORMAL (ref 9–20)
CALCIUM SERPL-MCNC: 8.8 MG/DL (ref 8.6–10.4)
CASTS UA: ABNORMAL /LPF (ref 0–2)
CHLORIDE BLD-SCNC: 103 MMOL/L (ref 98–107)
CO2: 22 MMOL/L (ref 20–31)
COLOR: YELLOW
CREAT SERPL-MCNC: 0.88 MG/DL (ref 0.5–0.9)
CRYSTALS, UA: ABNORMAL /HPF
DIFFERENTIAL TYPE: ABNORMAL
DIRECT EXAM: ABNORMAL
EOSINOPHILS RELATIVE PERCENT: 2 % (ref 1–4)
EPITHELIAL CELLS UA: ABNORMAL /HPF (ref 0–5)
GFR AFRICAN AMERICAN: >60 ML/MIN
GFR NON-AFRICAN AMERICAN: >60 ML/MIN
GFR SERPL CREATININE-BSD FRML MDRD: ABNORMAL ML/MIN/{1.73_M2}
GFR SERPL CREATININE-BSD FRML MDRD: ABNORMAL ML/MIN/{1.73_M2}
GLUCOSE BLD-MCNC: 124 MG/DL (ref 65–105)
GLUCOSE BLD-MCNC: 198 MG/DL (ref 65–105)
GLUCOSE BLD-MCNC: 226 MG/DL (ref 65–105)
GLUCOSE BLD-MCNC: 251 MG/DL (ref 70–99)
GLUCOSE BLD-MCNC: 88 MG/DL (ref 65–105)
GLUCOSE BLD-MCNC: 89 MG/DL (ref 65–105)
GLUCOSE URINE: NEGATIVE
HCT VFR BLD CALC: 33.4 % (ref 36.3–47.1)
HEMOGLOBIN: 10.4 G/DL (ref 11.9–15.1)
IMMATURE GRANULOCYTES: 0 %
KETONES, URINE: NEGATIVE
LEUKOCYTE ESTERASE, URINE: NEGATIVE
LV EF: 61 %
LVEF MODALITY: NORMAL
LYMPHOCYTES # BLD: 38 % (ref 24–43)
Lab: ABNORMAL
MAGNESIUM: 2.1 MG/DL (ref 1.6–2.6)
MCH RBC QN AUTO: 30.1 PG (ref 25.2–33.5)
MCHC RBC AUTO-ENTMCNC: 31.1 G/DL (ref 28.4–34.8)
MCV RBC AUTO: 96.8 FL (ref 82.6–102.9)
MONOCYTES # BLD: 9 % (ref 3–12)
MUCUS: ABNORMAL
NITRITE, URINE: NEGATIVE
NRBC AUTOMATED: 0 PER 100 WBC
OTHER OBSERVATIONS UA: ABNORMAL
PDW BLD-RTO: 12.5 % (ref 11.8–14.4)
PH UA: 5.5 (ref 5–8)
PLATELET # BLD: 163 K/UL (ref 138–453)
PLATELET ESTIMATE: ABNORMAL
PMV BLD AUTO: 11.1 FL (ref 8.1–13.5)
POTASSIUM SERPL-SCNC: 4.1 MMOL/L (ref 3.7–5.3)
PROTEIN UA: NEGATIVE
RBC # BLD: 3.45 M/UL (ref 3.95–5.11)
RBC # BLD: ABNORMAL 10*6/UL
RBC UA: ABNORMAL /HPF (ref 0–2)
RENAL EPITHELIAL, UA: ABNORMAL /HPF
SEG NEUTROPHILS: 50 % (ref 36–65)
SEGMENTED NEUTROPHILS ABSOLUTE COUNT: 3.05 K/UL (ref 1.5–8.1)
SODIUM BLD-SCNC: 135 MMOL/L (ref 135–144)
SPECIFIC GRAVITY UA: 1.03 (ref 1–1.03)
SPECIMEN DESCRIPTION: ABNORMAL
TRICHOMONAS: ABNORMAL
TURBIDITY: ABNORMAL
URINE HGB: NEGATIVE
UROBILINOGEN, URINE: NORMAL
WBC # BLD: 6.2 K/UL (ref 3.5–11.3)
WBC # BLD: ABNORMAL 10*3/UL
WBC UA: ABNORMAL /HPF (ref 0–5)
YEAST: ABNORMAL

## 2020-07-14 PROCEDURE — 6370000000 HC RX 637 (ALT 250 FOR IP): Performed by: INTERNAL MEDICINE

## 2020-07-14 PROCEDURE — 2580000003 HC RX 258: Performed by: NURSE PRACTITIONER

## 2020-07-14 PROCEDURE — 6370000000 HC RX 637 (ALT 250 FOR IP): Performed by: NURSE PRACTITIONER

## 2020-07-14 PROCEDURE — 2580000003 HC RX 258: Performed by: INTERNAL MEDICINE

## 2020-07-14 PROCEDURE — 80048 BASIC METABOLIC PNL TOTAL CA: CPT

## 2020-07-14 PROCEDURE — A9500 TC99M SESTAMIBI: HCPCS | Performed by: INTERNAL MEDICINE

## 2020-07-14 PROCEDURE — 99222 1ST HOSP IP/OBS MODERATE 55: CPT | Performed by: INTERNAL MEDICINE

## 2020-07-14 PROCEDURE — 81001 URINALYSIS AUTO W/SCOPE: CPT

## 2020-07-14 PROCEDURE — 36415 COLL VENOUS BLD VENIPUNCTURE: CPT

## 2020-07-14 PROCEDURE — G0378 HOSPITAL OBSERVATION PER HR: HCPCS

## 2020-07-14 PROCEDURE — 85025 COMPLETE CBC W/AUTO DIFF WBC: CPT

## 2020-07-14 PROCEDURE — 99239 HOSP IP/OBS DSCHRG MGMT >30: CPT | Performed by: INTERNAL MEDICINE

## 2020-07-14 PROCEDURE — 3430000000 HC RX DIAGNOSTIC RADIOPHARMACEUTICAL: Performed by: INTERNAL MEDICINE

## 2020-07-14 PROCEDURE — 83735 ASSAY OF MAGNESIUM: CPT

## 2020-07-14 PROCEDURE — 82947 ASSAY GLUCOSE BLOOD QUANT: CPT

## 2020-07-14 RX ORDER — SODIUM CHLORIDE 0.9 % (FLUSH) 0.9 %
10 SYRINGE (ML) INJECTION PRN
Status: DISCONTINUED | OUTPATIENT
Start: 2020-07-14 | End: 2020-07-15 | Stop reason: HOSPADM

## 2020-07-14 RX ORDER — CLONAZEPAM 1 MG/1
0.5 TABLET ORAL 2 TIMES DAILY PRN
Status: DISCONTINUED | OUTPATIENT
Start: 2020-07-14 | End: 2020-07-15 | Stop reason: HOSPADM

## 2020-07-14 RX ORDER — NITROGLYCERIN 0.4 MG/1
0.4 TABLET SUBLINGUAL EVERY 5 MIN PRN
Status: DISCONTINUED | OUTPATIENT
Start: 2020-07-14 | End: 2020-07-15 | Stop reason: HOSPADM

## 2020-07-14 RX ADMIN — INSULIN LISPRO 2 UNITS: 100 INJECTION, SOLUTION INTRAVENOUS; SUBCUTANEOUS at 17:16

## 2020-07-14 RX ADMIN — SODIUM CHLORIDE, PRESERVATIVE FREE 10 ML: 5 INJECTION INTRAVENOUS at 07:25

## 2020-07-14 RX ADMIN — CETIRIZINE HYDROCHLORIDE 10 MG: 10 TABLET ORAL at 10:38

## 2020-07-14 RX ADMIN — DIPHENHYDRAMINE HCL 25 MG: 25 TABLET ORAL at 05:01

## 2020-07-14 RX ADMIN — ROPINIROLE HYDROCHLORIDE 0.5 MG: 0.25 TABLET, FILM COATED ORAL at 10:39

## 2020-07-14 RX ADMIN — PANTOPRAZOLE SODIUM 40 MG: 40 TABLET, DELAYED RELEASE ORAL at 05:00

## 2020-07-14 RX ADMIN — Medication 400 UNITS: at 10:37

## 2020-07-14 RX ADMIN — SODIUM CHLORIDE: 9 INJECTION, SOLUTION INTRAVENOUS at 10:43

## 2020-07-14 RX ADMIN — NIFEDIPINE 60 MG: 60 TABLET, EXTENDED RELEASE ORAL at 10:38

## 2020-07-14 RX ADMIN — DIPHENHYDRAMINE HCL 25 MG: 25 TABLET ORAL at 14:58

## 2020-07-14 RX ADMIN — ISOSORBIDE MONONITRATE 30 MG: 30 TABLET ORAL at 10:44

## 2020-07-14 RX ADMIN — ESCITALOPRAM OXALATE 10 MG: 10 TABLET ORAL at 10:38

## 2020-07-14 RX ADMIN — CARVEDILOL 25 MG: 12.5 TABLET, FILM COATED ORAL at 17:17

## 2020-07-14 RX ADMIN — GABAPENTIN 600 MG: 600 TABLET ORAL at 10:37

## 2020-07-14 RX ADMIN — ROPINIROLE HYDROCHLORIDE 0.5 MG: 0.25 TABLET, FILM COATED ORAL at 14:58

## 2020-07-14 RX ADMIN — TICAGRELOR 90 MG: 90 TABLET ORAL at 10:38

## 2020-07-14 RX ADMIN — TETRAKIS(2-METHOXYISOBUTYLISOCYANIDE)COPPER(I) TETRAFLUOROBORATE 44.7 MILLICURIE: 1 INJECTION, POWDER, LYOPHILIZED, FOR SOLUTION INTRAVENOUS at 07:25

## 2020-07-14 RX ADMIN — CARVEDILOL 25 MG: 12.5 TABLET, FILM COATED ORAL at 10:38

## 2020-07-14 RX ADMIN — GABAPENTIN 600 MG: 600 TABLET ORAL at 14:58

## 2020-07-14 RX ADMIN — INSULIN GLARGINE 35 UNITS: 100 INJECTION, SOLUTION SUBCUTANEOUS at 10:39

## 2020-07-14 RX ADMIN — FLUTICASONE PROPIONATE 2 SPRAY: 50 SPRAY, METERED NASAL at 10:45

## 2020-07-14 RX ADMIN — ASPIRIN 81 MG: 81 TABLET, COATED ORAL at 10:39

## 2020-07-14 ASSESSMENT — ENCOUNTER SYMPTOMS
NAUSEA: 0
ABDOMINAL PAIN: 0
COUGH: 0
SHORTNESS OF BREATH: 0
VOMITING: 0

## 2020-07-14 NOTE — PROGRESS NOTES
[facility]    Progress Note    7/14/2020    3:57 PM    Name:   Amberly Mathias  MRN:     [id number]     [place]:      [de-identified]   Room:   2012/2012-01  IP Day:  2  Admit Date:  7/12/2020  5:24 PM    PCP:   Louis Kerr MD  Code Status:  Full Code    Subjective:     C/C:   Chief Complaint   Patient presents with    Chest Pain    Leg Pain     right leg pain, fell in oct,      Interval History Status:   Feels better  Stronger  No chest pain  No further nausea, vomiting, diarrhea  Hopes to go home    Data Base Updates:  Stress test - see below    Blood sugars labile     Brief History:     As documented in the medical record:  Elijah Mariano is a 76 y.o. -American female who presents to [facility] for evaluation of multiple complaints including 2 days of weakness, diarrhea, myalgias, vomiting, dry cough, and shortness of breath. She is also complaining of right leg pain. She has a somewhat complex past medical history including uncontrolled type 2 diabetes mellitus, history of pulmonary embolism (currently on Xarelto), heart failure with preserved ejection fraction, hypertension, hyperlipidemia, peripheral vascular disease, and coronary artery disease status post 4 stents. Onset of symptoms approximately 2 days ago. She denies being around any sick contacts. She has had a significant decrease in her appetite. She states she only ate oatmeal over the past 24 hours. She has a history of blood clots, but is currently on Xarelto. She is complaining of right leg pain which also started approximately 2 days ago. She is also complaining of chest pain which is sternal in nature and is nonradiating. She states that she has had this in the past.  She does have a history of coronary disease status post stenting.   In the emergency department, she is noted to have elevated inflammatory markers including ferritin and LDH. CRP is normal.  She has a bit of renal insufficiency, as her creatinine is currently 1.69. Baseline appears to be around 1.2. Chest x-ray was unremarkable. She was noted to have an elevated troponin which is chronic in nature. There is concern for possible COVID-19 infection, the patient will be admitted to the [facility] unit for rule out. \"     The patient was admitted  Serial ECGs and enzymes initiated  Blood sugars were monitored and controlled  Covid was ruled out   Cardiology was consulted    Stress test revealed: There is medium-size severe mid to basal inferior wall reversible perfusion   defect which resolves on the attenuation corrected images. There is no   significant fixed or reversible defect to indicate ischemia or infarct. The   gated images show normal motion. Impression:      No stress-induced ischemia. No infarct. Normal LVEF   Risk stratification: Low          Renal function was carefully monitored  Results for Earna Peabody (MRN [id number]) as of 7/14/2020 15:46   Ref. Range 12/15/2019 17:54 1/2/2020 20:31 1/4/2020 06:32 6/8/2020 16:52 7/12/2020 17:48   Creatinine Latest Ref Range: 0.50 - 0.90 mg/dL 0.74 0.58 1.19 (H) 1.33 (H) 1.69 (H)     Venous scan:   Summary      No evidence of superficial or deep venous thrombosis in the right lower    extremity. Medications: Allergies:     Allergies   Allergen Reactions    Penicillins      Other reaction(s): Hives    Sulfa Antibiotics      Other reaction(s): Rash       Current Meds:   Scheduled Meds:    [START ON 7/15/2020] rivaroxaban  20 mg Oral Daily with breakfast    aspirin  81 mg Oral Daily    carvedilol  25 mg Oral BID WC    escitalopram  10 mg Oral Daily    fluticasone  2 spray Each Nostril Daily    gabapentin  600 mg Oral TID    isosorbide mononitrate  30 mg Oral Daily    cetirizine  10 mg Oral Daily    NIFEdipine  60 mg Oral Daily    pantoprazole  40 mg Oral QAM AC    rOPINIRole  0.5 mg Oral TID    [Held by provider] spironolactone  25 mg Oral Daily    ticagrelor  90 mg Oral BID    vitamin E  400 Units Oral Daily    sodium chloride flush  10 mL Intravenous 2 times per day    insulin lispro  0-12 Units Subcutaneous TID WC    insulin lispro  0-6 Units Subcutaneous Nightly    insulin glargine  35 Units Subcutaneous BID    rosuvastatin  40 mg Oral Daily     Continuous Infusions:    sodium chloride 75 mL/hr at 20 1043    dextrose       PRN Meds: sodium chloride flush, sodium chloride flush, clonazePAM, nitroGLYCERIN, albuterol sulfate HFA, sodium chloride flush, glucose, dextrose, glucagon (rDNA), dextrose, diphenhydrAMINE, acetaminophen **OR** acetaminophen    Data:     Past Medical History:   has a past medical history of Anxiety, Benign positional vertigo, CAD (coronary artery disease), Chest pain, Depression, Diabetes mellitus ([address] Diabetic neuropathy ([facility] [address] Hyperlipidemia, Hypertension, and Migraine. Social History:   reports that she has never smoked. She has never used smokeless tobacco. She reports that she does not drink alcohol or use drugs. Family History:   Family History   Problem Relation Age of Onset   [facility] Cancer Mother     Cancer Father        Vitals:  BP (!) 96/54   Pulse 62   Temp 98.3 °F (36.8 °C) (Oral)   Resp 18   Ht 5' 6\" (1.676 m)   Wt 213 lb 6.5 oz (96.8 kg)   SpO2 97%   BMI 34.44 kg/m²   Temp (24hrs), Av.4 °F (36.9 °C), Min:98.3 °F (36.8 °C), Max:98.4 °F (36.9 °C)    Recent Labs     20  1642 20  2023 20  0809 20  1150   POCGLU 278* 214* 124* 88       I/O (24Hr): Intake/Output Summary (Last 24 hours) at 2020 1557  Last data filed at 2020 1943  Gross per 24 hour   Intake 250 ml   Output 400 ml   Net -150 ml         Review of Systems:     Review of Systems   Constitutional: Positive for activity change (Decreased) and fatigue (Exercise capacity still reduced). Respiratory: Negative for cough and shortness of breath. Cardiovascular: Negative for chest pain and palpitations. Gastrointestinal: Negative for abdominal pain, nausea and vomiting. Genitourinary: Negative for flank pain and hematuria. Neurological: Positive for weakness. Physical Examination:        Physical Exam  Vitals signs reviewed. Constitutional:       General: She is not in acute distress. Appearance: She is not diaphoretic. HENT:      Head: Normocephalic. Nose: Nose normal.   Eyes:      General: No scleral icterus. Conjunctiva/sclera: Conjunctivae normal.   Neck:      Musculoskeletal: Neck supple. Trachea: No tracheal deviation. Cardiovascular:      Rate and Rhythm: Normal rate and regular rhythm. Pulmonary:      Effort: Pulmonary effort is normal. No respiratory distress. Breath sounds: Normal breath sounds. No wheezing or rales. Chest:      Chest wall: No tenderness. Abdominal:      General: Bowel sounds are normal. There is no distension. Palpations: Abdomen is soft. Tenderness: There is no abdominal tenderness. Musculoskeletal:         General: No tenderness. Skin:     General: Skin is warm and dry.            Labs:  Hematology:  Recent Labs     07/12/20 1748 07/12/20 1844 07/14/20  0520   WBC 7.0  --  6.2   RBC 3.91*  --  3.45*   HGB 11.6*  --  10.4*   HCT 36.7  --  33.4*   MCV 93.9  --  96.8   MCH 29.7  --  30.1   MCHC 31.6  --  31.1   RDW 12.6  --  12.5     --  163   MPV 11.4  --  11.1   CRP  --  2.4  --    INR 1.3 CANNOT BE CALCULATED  --    DDIMER 0.29 PLEASE DISREGARD RESULTS, SPECIMEN QUANTITY NOT SUFFICIENT  --      Chemistry:  Recent Labs     07/12/20 1748 07/12/20 1844 07/13/20  0218 07/13/20  0400 07/13/20  0426 07/13/20  0815 07/14/20  0520     --   --   --   --   --   --    K 3.7  --   --   --   --   --   --    CL 99  --   --   --   --   --   --    CO2 23  --   --   --   --   --   --    GLUCOSE 204*  --   --  92  --  179  --    BUN 20  --   --   --   --   --   -- CREATININE 1.69*  --   --   --   --   --   --    MG  --   --   --   --   --   --  2.1   ANIONGAP 14  --   --   --   --   --   --    LABGLOM 30*  --   --   --   --   --   --    GFRAA 36*  --   --   --   --   --   --    CALCIUM 9.7  --   --   --   --   --   --    CAION  --   --   --   --  1.24  --   --    TROPHS 48* 31*  --   --   --   --   --    CKTOTAL  --   --  83  --   --   --   --    LACTACIDWB  --   --   --   --  0.9  --   --      Recent Labs     07/12/20  1748 07/12/20  1844 07/13/20  0218  07/13/20  0932 07/13/20  1142 07/13/20  1642 07/13/20  2023 07/14/20  0809 07/14/20  1150   PROT 6.8  --   --   --   --   --   --   --   --   --    LABALBU 4.1  --   --   --   --   --   --   --   --   --    AST 11  --   --   --   --   --   --   --   --   --    ALT 7  --   --   --   --   --   --   --   --   --    LDH  --  694*  --   --   --   --   --   --   --   --    ALKPHOS 77  --   --   --   --   --   --   --   --   --    BILITOT 0.25*  --   --   --   --   --   --   --   --   --    URICACID  --   --  7.0*  --   --   --   --   --   --   --    POCGLU  --   --   --    < > 186* 214* 278* 214* 124* 88    < > = values in this interval not displayed. Radiology:    Xr Chest Standard (2 Vw)    Result Date: 7/12/2020  No acute cardiopulmonary findings. Xr Knee Right (3 Views)    Result Date: 7/13/2020  1. 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 2. Scattered right lower extremity scattered atherosclerotic calcification. Xr Tibia Fibula Right (2 Views)    Result Date: 7/13/2020  1. 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 2. Scattered right lower extremity scattered atherosclerotic calcification. Nm Cardiac Stress Test Nuclear Imaging    Result Date: 7/14/2020  No stress-induced ischemia. No infarct.  Normal LVEF Risk stratification: Low       Assessment:        Primary Problem  Suspected COVID-19 virus infection    Active

## 2020-07-14 NOTE — PROGRESS NOTES
Pharmacy Note     Renal Dose Adjustment    Hosea Pike is a 76 y.o. female. Pharmacist assessment of renally cleared medications. Recent Labs     07/12/20  1748   BUN 20       Recent Labs     07/12/20  1748   CREATININE 1.69*       Estimated Creatinine Clearance: 34 mL/min (A) (based on SCr of 1.69 mg/dL (H)).       Height:   Ht Readings from Last 1 Encounters:   07/13/20 5' 6\" (1.676 m)     Weight:  Wt Readings from Last 1 Encounters:   07/14/20 213 lb 6.5 oz (96.8 kg)       The following medication dose has been adjusted based upon renal function per P&T Guidelines:                Continue levofloxacin 750mg PO Q48 hours    Raquel Dyson, PharmD [facility]

## 2020-07-14 NOTE — CONSULTS
[facility] - Initial Consult Note  Today's Date and Time: 7/14/2020, 11:56 AM    Impression :   Vomiting, diarrhea for 2 days  Diffuse myalgias and weakness  Shortness of breath  Right leg pain  Uncontrolled type 2 diabetes mellitus  Prior history of congestive heart failure  Essential hypertension  Peripheral arterial insufficiency  Coronary artery disease status post placement of 4 stents  Chronic kidney disease  COVID-19 suspect  COVID tests:  7/12/2020- negative    Recommendations:   Monitor off antibiotics  Discontinue Levaquin  Cardiac work-up as per cardiology  Hydration    Medical Decision Making/Summary/Discussion:7/14/2020       Infection Control Recommendations   [place] Precautions    Antimicrobial Stewardship Recommendations     Discontinuation of therapy  Coordination of Outpatient Care:   Estimated Length of IV antimicrobials: No  Patient will need Midline Catheter Insertion: No  Patient will need PICC line Insertion: No  Patient will need: Home IV , [place],  SNF,  LTAC: No  Patient will need outpatient wound care: No    Chief complaint/reason for consultation:   covid suspect  Gastroenteritis      History of Present Illness:   Reinaldo Vega is a 76y.o.-year-old  female who was initially admitted on 7/12/2020. Patient seen at the request of . INITIAL HISTORY:    Patient presented through ER with complaints of not feeling well, developing weakness, generalized myalgias, vomiting, diarrhea, dry cough and shortness of breath for a period of 2 days. She also complained of some chest pain and has an underlying history of coronary artery disease with associated previous placement of 4 stents. Patient has an abnormal EKG.     Patient has an underlying past medical history of Uncontrolled type 2 diabetes mellitus, congestive heart failure,essential hypertension, Peripheral arterial insufficiency, Coronary artery disease status times per day    insulin lispro  0-12 Units Subcutaneous TID WC    insulin lispro  0-6 Units Subcutaneous Nightly    insulin glargine  35 Units Subcutaneous BID    levoFLOXacin  750 mg Oral Q48H    rosuvastatin  40 mg Oral Daily       Social History:     Social History     Socioeconomic History    Marital status:      Spouse name: Not on file    Number of children: Not on file    Years of education: Not on file    Highest education level: Not on file   Occupational History    Not on file   Social Needs    Financial resource strain: Not on file    Food insecurity     Worry: Not on file     Inability: Not on file   [employer] needs     Medical: Not on file     Non-medical: Not on file   Tobacco Use    Smoking status: Never Smoker    Smokeless tobacco: Never Used   Substance and Sexual Activity    Alcohol use: No    Drug use: No    Sexual activity: Never   Lifestyle    Physical activity     Days per week: Not on file     Minutes per session: Not on file    Stress: Not on file   Relationships    Social connections     Talks on phone: Not on file     Gets together: Not on file     Attends Evangelical service: Not on file     Active member of club or organization: Not on file     Attends meetings of clubs or organizations: Not on file     Relationship status: Not on file    Intimate partner violence     Fear of current or ex partner: Not on file     Emotionally abused: Not on file     Physically abused: Not on file     Forced sexual activity: Not on file   Other Topics Concern    Not on file   Social History Narrative    Not on file       Family History:     Family History   Problem Relation Age of Onset    Cancer Mother     Cancer Father         Allergies:   Penicillins and Sulfa antibiotics     Review of Systems:   Constitutional: No fevers or chills. Myalgias, malaise, weakness  Head: No headaches  Eyes: No double vision or blurry vision. No conjunctival inflammation.   ENT: No sore throat or runny nose. . No hearing loss, tinnitus or vertigo. Cardiovascular: Substernal chest pain, no palpitations, shortness of breath. No FRANKS  Lung: shortness of breath, dry cough. No sputum production  Abdomen: Vomiting, diarrhea. No abdominal pain. Anneliese Kennedy No cramps. Genitourinary: No increased urinary frequency, or dysuria. No hematuria. No suprapubic or CVA pain  Musculoskeletal: Myalgias. No joint effusions, swelling or deformities  Hematologic: No bleeding or bruising. Neurologic: No headache, weakness, numbness, or tingling. Integument: No rash, no ulcers. Psychiatric: No depression. Endocrine: No polyuria, no polydipsia, no polyphagia. Physical Examination :     Patient Vitals for the past 8 hrs:   BP Temp Temp src Pulse Resp Weight   07/14/20 0812 (!) 144/72 98.4 °F (36.9 °C) Oral 76 18 --   07/14/20 0529 -- -- -- -- -- 213 lb 6.5 oz (96.8 kg)     General Appearance: Awake, alert, and in no apparent distress  Head:  Normocephalic, no trauma  Eyes: Pupils equal, round, reactive to light and accommodation; extraocular movements intact; sclera anicteric; conjunctivae pink. No embolic phenomena. ENT: Oropharynx clear, without erythema, exudate, or thrush. No tenderness of sinuses. Mouth/throat: mucosa pink and moist. No lesions. Dentition in good repair. Neck:Supple, without lymphadenopathy. Thyroid normal, No bruits. Pulmonary/Chest: Clear to auscultation, without wheezes, rales, or rhonchi. No dullness to percussion. Cardiovascular: Regular rate and rhythm without murmurs, rubs, or gallops. Abdomen: Soft, non tender. Bowel sounds normal. No organomegaly  All four Extremities: No cyanosis, clubbing, edema, or effusions. Neurologic: No gross sensory or motor deficits. Skin: Warm and dry with good turgor. No signs of peripheral arterial or venous insufficiency. No ulcerations. No open wounds.     Medical Decision Making -Laboratory:   I have independently reviewed/ordered the following labs:    CBC with Differential:   Recent Labs     07/12/20 1748 07/14/20  0520   WBC 7.0 6.2   HGB 11.6* 10.4*   HCT 36.7 33.4*    163   LYMPHOPCT 38 38   MONOPCT 9 9     BMP:   Recent Labs     07/12/20  1748 07/14/20  0520     --    K 3.7  --    CL 99  --    CO2 23  --    BUN 20  --    CREATININE 1.69*  --    MG  --  2.1     Hepatic Function Panel:   Recent Labs     07/12/20  1748   PROT 6.8   LABALBU 4.1   BILITOT 0.25*   ALKPHOS 77   ALT 7   AST 11     No results for input(s): RPR in the last 72 hours. No results for input(s): HIV in the last 72 hours. No results for input(s): BC in the last 72 hours. Lab Results   Component Value Date    MUCUS NOT REPORTED 07/14/2020    RBC 3.45 07/14/2020    TRICHOMONAS NOT REPORTED 07/14/2020    WBC 6.2 07/14/2020    YEAST MANY 07/14/2020    TURBIDITY SLIGHTLY CLOUDY 07/14/2020     Lab Results   Component Value Date    CREATININE 1.69 07/12/2020    GLUCOSE 179 07/13/2020       Medical Decision Making-Imaging:     EXAMINATION:    TWO XRAY VIEWS OF THE CHEST         7/12/2020 6:19 pm         COMPARISON:    06/08/2020         HISTORY:    ORDERING SYSTEM PROVIDED HISTORY: chest pain    TECHNOLOGIST PROVIDED HISTORY:    chest pain    Reason for Exam: upr,sob,rt leg swelling         Initial encounter         FINDINGS:    No focal consolidation, pleural effusion or pneumothorax.  The    cardiomediastinal silhouette is stable.  No overt pulmonary edema.  The    osseous structures are stable.  Coronary artery stent is noted.  Moderate    degenerative changes of the thoracic spine.  Osteopenia.              Impression    No acute cardiopulmonary findings.         Medical Decision Olhkae-Bbberxwk-Jowcz:       Medical Decision Making-Other:     Note:  Labs, medications, radiologic studies were reviewed with personal review of films  Large amounts of data were reviewed  Discussed with nursing Staff, Discharge planner  Infection Control and Prevention measures reviewed  All prior entries were reviewed  Administer medications as ordered  Prognosis: [address]  Discharge planning reviewed  Follow up as outpatient. Thank you for allowing us to participate in the care of this patient. Please call with questions.     Malissa Pacheco MD  Pager: [phone] - Office: [phone]

## 2020-07-14 NOTE — PROGRESS NOTES
[facility]  Progress Note                   Date:   7/14/2020  Patient name: Amberly Mathias  Date of admission:  7/12/2020  5:24 PM  MRN:   [id number]  YOB: 1945  PCP: Louis Kerr MD    Reason for Admission: Sepsis ([facility] [A41.9]  [facility]epsis ([facility] [A41.9]    Subjective:       Clinical Changes /Abnormalities: No acute issues overnight. Awaiting final stress test read. Review of Systems    Medications:   Scheduled Meds:   aspirin  81 mg Oral Daily    carvedilol  25 mg Oral BID WC    escitalopram  10 mg Oral Daily    fluticasone  2 spray Each Nostril Daily    gabapentin  600 mg Oral TID    isosorbide mononitrate  30 mg Oral Daily    cetirizine  10 mg Oral Daily    NIFEdipine  60 mg Oral Daily    pantoprazole  40 mg Oral QAM AC    rOPINIRole  0.5 mg Oral TID    [Held by provider] spironolactone  25 mg Oral Daily    ticagrelor  90 mg Oral BID    vitamin E  400 Units Oral Daily    sodium chloride flush  10 mL Intravenous 2 times per day    insulin lispro  0-12 Units Subcutaneous TID     insulin lispro  0-6 Units Subcutaneous Nightly    insulin glargine  35 Units Subcutaneous BID    levoFLOXacin  750 mg Oral Q48H    rosuvastatin  40 mg Oral Daily     Continuous Infusions:   sodium chloride 75 mL/hr at 07/14/20 1043    dextrose       CBC:   Recent Labs     07/12/20 1748 07/14/20  0520   WBC 7.0 6.2   HGB 11.6* 10.4*    163     BMP:    Recent Labs     07/12/20 1748 07/13/20  0400 07/13/20  0815     --   --    K 3.7  --   --    CL 99  --   --    CO2 23  --   --    BUN 20  --   --    CREATININE 1.69*  --   --    GLUCOSE 204* 92 179     Hepatic:  Recent Labs     07/12/20 1748   AST 11   ALT 7   BILITOT 0.25*   ALKPHOS 77     Troponin:   Recent Labs     07/12/20 1748 07/12/20  1844   TROPHS 48* 31*     BNP: No results for input(s): BNP in the last 72 hours. Lipids: No results for input(s): CHOL, HDL in the last 72 hours.     Invalid input(s): LDLCALCU  INR:   Recent Labs     07/12/20  1748 07/12/20  1844   INR 1.3 CANNOT BE CALCULATED       Objective:   Vitals: BP (!) 144/72   Pulse 76   Temp 98.4 °F (36.9 °C) (Oral)   Resp 18   Ht 5' 6\" (1.676 m)   Wt 213 lb 6.5 oz (96.8 kg)   SpO2 96%   BMI 34.44 kg/m²   General appearance: alert and cooperative with exam  HEENT: Head: Normocephalic, no lesions, without obvious abnormality. Neck:no JVD, trachea midline, no adenopathy  Lungs: Clear to auscultation  Heart: Regular rate and rhythm, s1/s2 auscultated, no murmurs  Abdomen: soft, non-tender, bowel sounds active  Extremities: no edema  Neurologic: not done    ECHO 11/21/19: EF 50%, grade III, LA moderately dilated, trivial AI, mild MR.      STRESS 10/25/17: No ischemia/infarct. EF 56%.      CATH 2/25/17: MVD. EF 60%. JAMA to proximal PL. JAMA to mid RCA. JAMA to proximal PDA. JAMA to distal LAD. Assessment / Acute Cardiac Problems:   1. Chest pain  2. Hx of CAD/MVD as above  3. Leg pain  4. HTD  5.  Chronic diastolic CHF    Patient Active Problem List:     Atypical chest pain     Type 2 diabetes mellitus with diabetic polyneuropathy, with long-term current use of insulin (HCC)     Vertigo     Essential hypertension     CAD (coronary artery disease)     Dyspnea     Claudication in peripheral vascular disease (HCC)     Acute pulmonary embolism without acute cor pulmonale (HCC)     Peripheral artery disease (HCC)     Diabetic polyneuropathy associated with type 2 diabetes mellitus ([address]     Other hyperlipidemia     Chronic systolic heart failure (HCC)     Multiple subsegmental pulmonary emboli without acute cor pulmonale     Congestive heart failure (HCC)     Pyelonephritis of right kidney     History of pulmonary embolism     Elevated troponin I level     Sepsis ([address]     Suspected COVID-19 virus infection     MARIELLE (acute kidney injury) ([address]     Diarrhea of presumed infectious origin     Chronic diastolic (congestive) heart failure ([address]      Plan of Treatment:   1. Await stress test findings. If low risk/no ischemia will be OK for discharge from CV standpoint with OP f/u with her Cardiologist in 1-2 weeks.      Electronically signed by MAYELA Jack CNP on 7/14/2020 at 10:45 AM  [address]  [phone]

## 2020-07-14 NOTE — PLAN OF CARE
Still waiting for stat BMP results ordered at 1615  Will DC if creat stable / improving  Med rec done  STEPHEN done  HCOs placed  DCP 34 min+

## 2020-07-14 NOTE — DISCHARGE INSTR - COC
Continuity of Care Form    Patient Name: Monica Green   :  1945  MRN:  [id number]    Admit date:  2020  Discharge date:      Code Status Order: Full Code   Advance Directives:   [facility] Documentation     Date/Time Healthcare Directive Type of Healthcare Directive Copy in [facility] [address] Agent's Name Healthcare Agent's Phone Number    [phone]  No, patient does not have an advance directive for healthcare treatment -- -- -- -- --          Admitting Physician:  Kay Villatoro MD  PCP: Corky Landrum MD    Discharging Nurse: [address] Unit/Room#:   Discharging Unit Phone Number: [phone]    Emergency Contact:   Extended Emergency Contact Information  Primary Emergency Contact: [place]  Address: [place]  Home Phone: [phone]  Relation: Child  Secondary Emergency Contact: Frank Gracia. Phone: [phone]  Mobile Phone: [phone]  Relation: Child    Past Surgical History:  Past Surgical History:   Procedure Laterality Date    APPENDECTOMY      CARDIAC CATHETERIZATION          CORONARY ANGIOPLASTY WITH STENT PLACEMENT      had 2 with 4 more placed     PTCA         Immunization History:   Immunization History   Administered Date(s) Administered    Influenza A ([id number]) Vaccine PF IM 2009    Influenza Vaccine, unspecified formulation 2013, 10/22/2015, 2016    Influenza Virus Vaccine 10/03/2016    Influenza, High Dose (Fluzone 65 yrs and older) 10/12/2018, 2019    Influenza, Quadv, IM, PF (6 mo and older Fluzone, Flulaval, Fluarix, and 3 yrs and older Afluria) 2018    Pneumococcal Conjugate 13-valent ([id number]) 2017    Pneumococcal Polysaccharide ([id number]) 2012       Active Problems:  Patient Active Problem List   Diagnosis Code    Atypical chest pain R07.89    Type 2 diabetes mellitus with diabetic polyneuropathy, with long-term current use of insulin ([facility]) E11.42, Z79.4    Vertigo R42    Essential hypertension I10    CAD (coronary artery disease) I25.10    Dyspnea R06.00    Claudication in peripheral vascular disease ([facility]) I73.9    Acute pulmonary embolism without acute cor pulmonale (HCC) I26.99    Peripheral artery disease (HCC) I73.9    Diabetic polyneuropathy associated with type 2 diabetes mellitus (HCC) E11.42    Other hyperlipidemia E78.49    Chronic systolic heart failure (HCC) I50.22    Multiple subsegmental pulmonary emboli without acute cor pulmonale I26.94    Congestive heart failure (HCC) I50.9    Pyelonephritis of right kidney N12    History of pulmonary embolism Z86.711    Elevated troponin I level R79.89    Sepsis ([facility]) A41.9    Suspected COVID-19 virus infection Z20.828    MARIELLE (acute kidney injury) ([facility]) N17.9    Diarrhea of presumed infectious origin R19.7    Chronic diastolic (congestive) heart failure ([facility]) I50.32    Generalized weakness R53.1    Anemia, normocytic normochromic D64.9    Class 1 obesity in adult E66.9       Isolation/Infection:   Isolation          No Isolation        Patient Infection Status     Infection Onset Added Last Indicated Last Indicated By Review Planned Expiration Resolved Resolved By    None active    Resolved    C-diff Rule Out 07/13/20 07/13/20 07/13/20 C DIFF TOXIN/ANTIGEN (Ordered)   07/14/20 Deuce Lama RN    COVID-19 Rule Out 07/12/20 07/12/20 07/12/20 COVID-19 (Ordered)   07/13/20 Rule-Out Test Resulted          Nurse Assessment:  Last Vital Signs: BP (!) 96/54   Pulse 62   Temp 98.3 °F (36.8 °C) (Oral)   Resp 18   Ht 5' 6\" (1.676 m)   Wt 213 lb 6.5 oz (96.8 kg)   SpO2 97%   BMI 34.44 kg/m²     Last documented pain score (0-10 scale): Pain Level: 9  Last Weight:   Wt Readings from Last 1 Encounters:   07/14/20 213 lb 6.5 oz (96.8 kg)     Mental Status:  oriented and alert    IV Access:  - None    Nursing Mobility/ADLs:  Walking   Assisted  Transfer Assisted  Bathing  Assisted  Dressing  Assisted  Toileting  Assisted  Feeding  Independent  Med Admin  Assisted  Med Delivery   whole    Wound Care Documentation and Therapy:        Elimination:  Continence:   · Bowel: Yes  · Bladder: Yes  Urinary Catheter: None   Colostomy/Ileostomy/Ileal Conduit: No       Date of Last BM: ***    Intake/Output Summary (Last 24 hours) at 7/14/2020 1830  Last data filed at 7/13/2020 1943  Gross per 24 hour   Intake 250 ml   Output 400 ml   Net -150 ml     I/O last 3 completed shifts: In: 250 [P.O.:250]  Out: 400 [Urine:400]    Safety Concerns: At Risk for Falls    Impairments/Disabilities:      None    Nutrition Therapy:  Current Nutrition Therapy:   - Oral Diet:  General    Routes of Feeding: Oral  Liquids: No Restrictions  Daily Fluid Restriction: no  Last Modified Barium Swallow with Video (Video Swallowing Test): not done    Treatments at the Time of Hospital Discharge:   Respiratory Treatments: albuteral two puffs as needed for wheezing   Oxygen Therapy:  is not on home oxygen therapy. Ventilator:    - No ventilator support    Rehab Therapies: Physical Therapy and Occupational Therapy  Weight Bearing Status/Restrictions: No weight bearing restirctions  Other Medical Equipment (for information only, NOT a DME order):  wheelchair, cane and walker  Other Treatments: none     Patient's personal belongings (please select all that are sent with patient):  None    RN SIGNATURE:  Electronically signed by Elliot Tsai RN on 7/14/20 at 10:32 PM EDT    CASE MANAGEMENT/SOCIAL WORK SECTION    Inpatient Status Date: 7/14/20    Readmission Risk Assessment Score:  Readmission Risk              Risk of Unplanned Readmission:        23           Discharging to Facility/ [address]  [address], [address]  [address]       Phone: [phone]       Fax: [phone]       Request status        ·     Dialysis Facility (if applicable)   · Name:  · Address:  · Dialysis Schedule:  · Phone:  · Fax:    / signature: Electronically signed by Jeane Bell RN on 7/14/20 at 9:56 PM EDT    PHYSICIAN SECTION    Prognosis: Fair    Condition at Discharge: Stable    Rehab Potential (if transferring to Rehab): Fair    Recommended Labs or Other Treatments After Discharge:   Cardiology evaluation and follow-up as scheduled  Aspirin   Coreg  Imdur  Brilinta  Crestor  Glycemic contol - monitor and control blood sugars  Check bun and creatinine   Blood Pressure - Monitor and control   Gentle hydration   Risk factor management / weight loss    Xarelto resumed    Physician Certification: I certify the above information and transfer of Gordo Enamorado  is necessary for the continuing treatment of the diagnosis listed and that she requires Home Care for less 30 days. Update Admission H&P:   Principal Problem:    Suspected COVID-19 virus infection  Active Problems:    MARIELLE (acute kidney injury) ([facility]    Diarrhea of presumed infectious origin    Chronic diastolic (congestive) heart failure (HCC)    Atypical chest pain    Type 2 diabetes mellitus with diabetic polyneuropathy, with long-term current use of insulin (HCC)    Essential hypertension    CAD (coronary artery disease)    Claudication in peripheral vascular disease (HCC)    Peripheral artery disease (HCC)    Other hyperlipidemia    History of pulmonary embolism    Elevated troponin I level    Sepsis (HCC)    Generalized weakness    Anemia, normocytic normochromic    Class 1 obesity in adult  Resolved Problems:    * No resolved hospital problems.  *       PHYSICIAN SIGNATURE:  Electronically signed by Armando Middleton DO on 7/14/20 at 6:32 PM EDT

## 2020-07-15 LAB
EKG ATRIAL RATE: 73 BPM
EKG P AXIS: 77 DEGREES
EKG P-R INTERVAL: 154 MS
EKG Q-T INTERVAL: 410 MS
EKG QRS DURATION: 86 MS
EKG QTC CALCULATION (BAZETT): 451 MS
EKG R AXIS: 12 DEGREES
EKG T AXIS: 94 DEGREES
EKG VENTRICULAR RATE: 73 BPM

## 2020-07-15 PROCEDURE — 93010 ELECTROCARDIOGRAM REPORT: CPT | Performed by: INTERNAL MEDICINE

## 2020-07-15 NOTE — PROGRESS NOTES
Writer spoke with case management in ED about completing STEPHEN, case management completed STEPHEN and referral for home health care. Writer also spoke with [facility] who works for [employer] to look for referral in the morning. Patient gathered all belongings and questions were answered. Patient received paper work and was wheeled off the unit in a wheelchair.

## 2020-07-16 NOTE — DISCHARGE SUMMARY
evaluation of multiple complaints including 2 days of weakness, diarrhea, myalgias, vomiting, dry cough, and shortness of breath.  She is also complaining of right leg pain.  She has a somewhat complex past medical history including uncontrolled type 2 diabetes mellitus, history of pulmonary embolism (currently on Xarelto), heart failure with preserved ejection fraction, hypertension, hyperlipidemia, peripheral vascular disease, and coronary artery disease status post 4 stents.  Onset of symptoms approximately 2 days ago. Wally Craft denies being around any sick contacts. Wally Craft has had a significant decrease in her appetite.  She states she only ate oatmeal over the past 24 hours.  She has a history of blood clots, but is currently on Xarelto.  She is complaining of right leg pain which also started approximately 2 days ago. Wally Craft is also complaining of chest pain which is sternal in nature and is nonradiating.  She states that she has had this in the past.  She does have a history of coronary disease status post stenting.  In the emergency department, she is noted to have elevated inflammatory markers including ferritin and LDH.  CRP is normal.  She has a bit of renal insufficiency, as her creatinine is currently 1.69.  Baseline appears to be around 1.2.  Chest x-ray was unremarkable.  She was noted to have an elevated troponin which is chronic in nature. Dotty Orona is concern for possible COVID-19 infection, the patient will be admitted to the COVID unit for rule out. \"      The patient was admitted  Serial ECGs and enzymes initiated  Blood sugars were monitored and controlled  Covid was ruled out   Cardiology was consulted     Stress test revealed: There is medium-size severe mid to basal inferior wall reversible perfusion   defect which resolves on the attenuation corrected images. Constancia Border is no   significant fixed or reversible defect to indicate ischemia or infarct.  The   gated images show normal motion.    Impression:   --   --   --   --   --    LACTACIDWB  --   --  0.9  --   --   --      Recent Labs     07/13/20  0218  07/13/20  2023 07/14/20  0809 07/14/20  1150 07/14/20  1318 07/14/20  1709 07/14/20  1946   URICACID 7.0*  --   --   --   --   --   --   --    POCGLU  --    < > 214* 124* 88 89 198* 226*    < > = values in this interval not displayed. Radiology:    Xr Chest Standard (2 Vw)    Result Date: 7/12/2020  EXAMINATION: TWO XRAY VIEWS OF THE CHEST 7/12/2020 6:19 pm COMPARISON: 06/08/2020 HISTORY: ORDERING SYSTEM PROVIDED HISTORY: chest pain TECHNOLOGIST PROVIDED HISTORY: chest pain Reason for Exam: upr,sob,rt leg swelling Initial encounter FINDINGS: No focal consolidation, pleural effusion or pneumothorax. The cardiomediastinal silhouette is stable. No overt pulmonary edema. The osseous structures are stable. Coronary artery stent is noted. Moderate degenerative changes of the thoracic spine. Osteopenia. No acute cardiopulmonary findings. Xr Knee Right (3 Views)    Result Date: 7/13/2020  EXAMINATION: THREE XRAY VIEWS OF THE RIGHT KNEE; 4 XRAY VIEWS OF THE RIGHT TIBIA AND FIBULA 7/13/2020 2:51 am COMPARISON: None. HISTORY: ORDERING SYSTEM PROVIDED HISTORY: right knee/leg pain TECHNOLOGIST PROVIDED HISTORY: right knee/leg pain Reason for Exam: rt leg pain; ORDERING SYSTEM PROVIDED HISTORY: r/o fx; leg pain; no trauma TECHNOLOGIST PROVIDED HISTORY: r/o fx; leg pain; no trauma Reason for Exam: rt leg pain FINDINGS: Knee: Bone alignment is within normal limits. Bone mineralization is unremarkable. Moderate medial, patellofemoral knee joint compartment space narrowing and osteophytosis is present. Lateral knee joint compartment is unremarkable in appearance. No evidence of joint effusion is seen. Surrounding soft tissues are unremarkable. Tibia/fibula: The tibia and fibula demonstrate normal alignment. Ankle mortise is symmetric and intact. The joint spaces are preserved.  Bone mineralization is within normal limits. No evidence of acute fracture, dislocation is noted. Scattered atherosclerotic calcification is present. Surrounding soft tissues are unremarkable. 1. 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 2. Scattered right lower extremity scattered atherosclerotic calcification. Xr Tibia Fibula Right (2 Views)    Result Date: 7/13/2020  EXAMINATION: THREE XRAY VIEWS OF THE RIGHT KNEE; 4 XRAY VIEWS OF THE RIGHT TIBIA AND FIBULA 7/13/2020 2:51 am COMPARISON: None. HISTORY: ORDERING SYSTEM PROVIDED HISTORY: right knee/leg pain TECHNOLOGIST PROVIDED HISTORY: right knee/leg pain Reason for Exam: rt leg pain; ORDERING SYSTEM PROVIDED HISTORY: r/o fx; leg pain; no trauma TECHNOLOGIST PROVIDED HISTORY: r/o fx; leg pain; no trauma Reason for Exam: rt leg pain FINDINGS: Knee: Bone alignment is within normal limits. Bone mineralization is unremarkable. Moderate medial, patellofemoral knee joint compartment space narrowing and osteophytosis is present. Lateral knee joint compartment is unremarkable in appearance. No evidence of joint effusion is seen. Surrounding soft tissues are unremarkable. Tibia/fibula: The tibia and fibula demonstrate normal alignment. Ankle mortise is symmetric and intact. The joint spaces are preserved. Bone mineralization is within normal limits. No evidence of acute fracture, dislocation is noted. Scattered atherosclerotic calcification is present. Surrounding soft tissues are unremarkable. 1. 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 2. Scattered right lower extremity scattered atherosclerotic calcification.      Vl Dup Lower Extremity Venous Right    Result Date: 7/13/2020    [facility]  Vascular Lower Extremities DVT Study Procedure   Patient Name   Adore Nunez     Date of Study           07/13/2020                 Jw Murphy   Date of Birth  1945  Gender                  Female   Age 76 year(s)  Race                    Black   Room Number    2012   Corporate ID # [id number]   Patient Acct # [de-identified]   MR #           [id number]     Iam Ramirez RVT   Accession #    [id number]  Interpreting Physician  Robbie Andino   Referring                  Referring Physician     PARISH JETER *  Nurse  Practitioner  Procedure Type of Study:   Veins: Lower Extremities DVT Study, Venous Scan Lower Right. Indications for Study:Pain and swelling. Patient Status: In Patient. Technical Quality:Limited visualization. Limitation reason:Swelling. Conclusions   Summary   No evidence of superficial or deep venous thrombosis in the right lower  extremity. Signature   ----------------------------------------------------------------  Electronically signed by Angelica Garcia RVT(Sonographer) on  07/13/2020 12:33 PM  ----------------------------------------------------------------   ----------------------------------------------------------------  Electronically signed by Yvan Mayers(Interpreting physician)  on 07/13/2020 11:26 PM  ----------------------------------------------------------------  Findings:   Right Impression:                         Left Impression:  The common femoral, femoral, popliteal    The common femora vein  and tibial veins demonstrate normal       demonstrate normal  compressibility and augmentation. compressibility and                                            augmentation. Normal compressibility of the great  saphenous vein. Normal compressibility of the small  saphenous vein. Risk Factors History +----------------------------+----------+----------------------------------+ ! Diagnosis                   ! Date      ! Comments                          ! +----------------------------+----------+----------------------------------+ ! Pulmonary Embolism          ! 11/19/2019!                                  ! +----------------------------+----------+----------------------------------+ ! Previous Scan               !11/19/2019! rt wnl, left tibioperoneal trunk, ! !                            !          !PTV's non-compressible            ! +----------------------------+----------+----------------------------------+ ! Previous Scan               !09/04/2019! RT MARIZOL--.78                       ! ! !          !LT MARIZOL--.57                       ! +----------------------------+----------+----------------------------------+ ! Peripheral vascular         ! ! S/P RT leg angioplasty 1 month ago! !disease->Surgery or PCI     !          !                                  ! +----------------------------+----------+----------------------------------+ ! CHF                         ! !                                  ! +----------------------------+----------+----------------------------------+ ! CAD                         ! !stent                             ! +----------------------------+----------+----------------------------------+ Allergies   - Allergy:Sulfa(Drug). - Allergy:Penicillin(Drug). Velocities are measured in cm/s ; Diameters are measured in cm Right Lower Extremities DVT Study Measurements Right 2D Measurements +------------------------------------+----------+---------------+----------+ ! Location                            ! Visualized! Compressibility! Thrombosis! +------------------------------------+----------+---------------+----------+ ! Common Femoral                      !Yes       ! Yes            ! None      ! +------------------------------------+----------+---------------+----------+ ! Prox Femoral                        !Yes       ! Yes            ! None      ! +------------------------------------+----------+---------------+----------+ ! Mid Femoral                         !Yes       ! Yes            ! None      ! +------------------------------------+----------+---------------+----------+ ! Dist Femoral                        !Yes       ! Yes            ! None      ! +------------------------------------+----------+---------------+----------+ ! Deep Femoral                        !Yes       ! Yes            ! None      ! +------------------------------------+----------+---------------+----------+ ! Popliteal                           !Yes       ! Yes            ! None      ! +------------------------------------+----------+---------------+----------+ ! Sapheno Femoral Junction            ! Yes       ! Yes            ! None      ! +------------------------------------+----------+---------------+----------+ ! PTV                                 ! Partial   !Yes            ! None      ! +------------------------------------+----------+---------------+----------+ ! Peroneal                            !Partial   !Yes            ! None      ! +------------------------------------+----------+---------------+----------+ ! Gastroc                             ! Yes       ! Yes            ! None      ! +------------------------------------+----------+---------------+----------+ ! GSV Thigh                           ! Yes       ! Yes            ! None      ! +------------------------------------+----------+---------------+----------+ ! GSV Knee                            ! Yes       ! Yes            ! None      ! +------------------------------------+----------+---------------+----------+ ! GSV Ankle                           ! Yes       ! Yes            ! None      ! +------------------------------------+----------+---------------+----------+ ! SSV                                 ! Partial   !Yes            ! None      ! +------------------------------------+----------+---------------+----------+ Right Doppler Measurements +---------------------------+------+------+--------------------------------+ ! Location                   ! Signal!Reflux! Reflux (msec) ! +---------------------------+------+------+--------------------------------+ ! Common Femoral             !Phasic!      !                                ! +---------------------------+------+------+--------------------------------+ ! Prox Femoral               !Phasic!      !                                ! +---------------------------+------+------+--------------------------------+ ! Popliteal                  !Phasic!      !                                ! +---------------------------+------+------+--------------------------------+    Nm Cardiac Stress Test Nuclear Imaging    Result Date: 7/14/2020  EXAMINATION: MYOCARDIAL PERFUSION IMAGING 7/13/2020 2:26 pm TECHNIQUE: For the rest study, 44.7 mCi of Tc 99 labeled sestamibi were injected. SPECT images were acquired. Under cardiology supervision, 0.4mg Pearley Formica was infused. After pharmacologic stress, 43 mCi of Tc 99 labeled sestamibi were injected. SPECT images with ECG gating were acquired. COMPARISON: None Available. HISTORY: ORDERING SYSTEM PROVIDED HISTORY: Chest pain TECHNOLOGIST PROVIDED HISTORY: Reason for Exam: Chest pain Procedure Type->Rx chest pain Reason for Exam: Chest pain, numbness in the arms and hands, SOB, lightheaded, dizzy, nausea/vomiting, unusual sweating, prior heart attack catheterizations and stents, high BP, diabetic, high cholesterol, and family Hx of CAD. FINDINGS: Images interpreted utilizing [employer] system and [employer]. There is medium-size severe mid to basal inferior wall reversible perfusion defect which resolves on the attenuation corrected images. There is no significant fixed or reversible defect to indicate ischemia or infarct. The gated images show normal motion. Perfusion scores are visually adjusted to account for artifact.  Summed stress score:  2 Summed rest score:  0 Summed reversibility score:  2 Function: End diastolic volume:  20DN Left ventricular ejection fraction:  61% TID score:  0.8 (scores greater than 1.39 are considered elevated for Lexiscan stress with Tc99m) Notes concerning risk stratification: Risk stratification incorporates both clinical history and some testing results. Final risk determination is the responsibility of the ordering provider as other patient information and test results may increase or decrease the risk assessment reported for this examination. Risk stratification criteria are adapted from \"Noninvasive Risk Stratification\" criteria from Erwin Arias. Al, ACC/AATS/AHA/ASE/ASNC/SCAI/SCCT/STS 2017 Appropriate Use Criteria For Coronary Revascularization in Patients With Stable Ischemic Heart Disease [facility] Volume 69, Issue 17, May 2017 High risk (>3% annual death or MI) 1. Severe resting LV dysfunction (LVEF <35%) not readily explained by non coronary causes 2. Resting perfusion abnormalities greater than 10% of the myocardium in patients without prior history or evidence of MI 3. Stress-induced perfusion abnormalities encumbering greater than or equal to 10% myocardium or stress segmental scores indicating multiple vascular territories with abnormalities 4. Stress-induced LV dilatation (TID ratio greater than 1.19 for exercise and greater than 1.39 for regadenoson) Intermediate risk (1% to 3% annual death or MI) 1. Mild/moderate resting LV dysfunction (LVEF 35% to 49%) not readily explained by non coronary causes. 2. Resting perfusion abnormalities in 5%-9.9% of the myocardium in patients without a history or prior evidence of MI 3. Stress-induced perfusion abnormality encumbering 5%-9.9% of the myocardium or stress segmental scores indicating 1 vascular territory with abnormalities but without LV dilation 4. Small wall motion abnormality involving 1-2 segments and only 1 coronary bed. Low Risk (Less than 1% annual death or MI) 1. Normal or small myocardial perfusion defect at rest or with stress encumbering less than 5% of the myocardium. No stress-induced ischemia. No infarct. Normal LVEF Risk stratification: Low         Consultations:    Consults:     Final Specialist Recommendations/Findings:   IP CONSULT TO INFECTIOUS DISEASES  IP CONSULT TO CARDIOLOGY  IP CONSULT TO SPIRITUAL SERVICES  IP CONSULT TO HOME CARE NEEDS        Discharged Condition:    Stable     Disposition: Home care    Physician Follow Up:   MD Sang Yee   [address] [facility]  [phone]             Activity:  activity as tolerated    Diet: Renal/cardiac/diabetic    Discharge Medications:      Medication List      CONTINUE taking these medications    albuterol sulfate  (90 Base) MCG/ACT inhaler  Inhale 2 puffs into the lungs every 6 hours as needed for Wheezing     aspirin 81 MG tablet     Banophen 50 MG capsule  Generic drug:  diphenhydrAMINE  Take 1 capsule by mouth every 6 hours as needed for Itching     carvedilol 25 MG tablet  Commonly known as:  COREG  Take 1 tablet by mouth 2 times daily (with meals) Hold for systolic blood pressure < 120 or heart rate < 50  Give 1 hour apart from other blood pressure medicines     clonazePAM 0.5 MG tablet  Commonly known as:  KLONOPIN  Take 1 tablet by mouth 2 times daily as needed for Anxiety for up to 15 days. escitalopram 10 MG tablet  Commonly known as:  LEXAPRO     fluticasone 50 MCG/ACT nasal spray  Commonly known as:  FLONASE     gabapentin 600 MG tablet  Commonly known as:  Neurontin  Take 1 tablet by mouth 3 times daily for 30 days. isosorbide mononitrate 30 MG extended release tablet  Commonly known as:  IMDUR  Take 1 tablet by mouth daily     Levemir FlexTouch 100 UNIT/ML injection pen  Generic drug:  insulin detemir     linagliptin 5 MG tablet  Commonly known as:  TRADJENTA  Take 1 tablet by mouth daily     lisinopril 10 MG tablet  Commonly known as:  PRINIVIL;ZESTRIL  Take 1 tablet by mouth daily     loratadine 10 MG tablet  Commonly known as:  Claritin  Take 1 tablet every day by oral route.      metFORMIN 750 MG extended release tablet  Commonly known as:  GLUCOPHAGE-XR  Take 1 tablet by mouth 2 times daily (with meals)     mupirocin 2 % ointment  Commonly known as:  BACTROBAN     NIFEdipine 60 MG extended release tablet  Commonly known as:  PROCARDIA XL     nitroGLYCERIN 0.4 MG SL tablet  Commonly known as:  NITROSTAT     NovoFine 32G X 6 MM Misc  Generic drug:  Insulin Pen Needle     NovoLOG FlexPen 100 UNIT/ML injection pen  Generic drug:  insulin aspart  Use TID before meals according to scale - max 45 units a day     omeprazole 20 MG delayed release capsule  Commonly known as:  PRILOSEC  Take 1 capsule by mouth Daily     rivaroxaban 20 MG Tabs tablet  Commonly known as:  XARELTO  Take 1 tablet by mouth daily (with breakfast) Start on 12/12/2019 after finishing Xarelto 15 mg twice daily for 21 days     rOPINIRole 0.5 MG tablet  Commonly known as:  REQUIP  Take 1 tablet by mouth 3 times daily     rosuvastatin 40 MG tablet  Commonly known as:  CRESTOR  Take 1 tablet by mouth daily     spironolactone 25 MG tablet  Commonly known as:  ALDACTONE     ticagrelor 90 MG Tabs tablet  Commonly known as:  BRILINTA  Take 1 tablet by mouth 2 times daily     vitamin E 400 UNIT capsule            Time Spent on discharge is  34 mins in patient examination, evaluation, counseling, medication reconciliation, discharge plan and follow up. Electronically signed by   Armando Middleton DO  7/15/2020  8:50 PM      Thank you Dr. Cricket Zambrano MD for the opportunity to be involved in this patient's care.

## 2020-07-19 LAB
CULTURE: NORMAL
CULTURE: NORMAL
Lab: NORMAL
Lab: NORMAL
SPECIMEN DESCRIPTION: NORMAL
SPECIMEN DESCRIPTION: NORMAL

## 2020-07-28 ENCOUNTER — OFFICE VISIT (OUTPATIENT)
Dept: NEUROLOGY | Age: 75
End: 2020-07-28
Payer: MEDICARE

## 2020-07-28 VITALS
HEART RATE: 65 BPM | TEMPERATURE: 96.4 F | RESPIRATION RATE: 16 BRPM | DIASTOLIC BLOOD PRESSURE: 72 MMHG | SYSTOLIC BLOOD PRESSURE: 121 MMHG

## 2020-07-28 PROCEDURE — 99205 OFFICE O/P NEW HI 60 MIN: CPT | Performed by: STUDENT IN AN ORGANIZED HEALTH CARE EDUCATION/TRAINING PROGRAM

## 2020-07-28 RX ORDER — PREGABALIN 25 MG/1
25 CAPSULE ORAL 3 TIMES DAILY
Qty: 90 CAPSULE | Refills: 2 | Status: SHIPPED | OUTPATIENT
Start: 2020-07-28 | End: 2020-10-02 | Stop reason: SDUPTHER

## 2020-07-28 ASSESSMENT — ENCOUNTER SYMPTOMS
EYE REDNESS: 0
NAUSEA: 0
COUGH: 0
ABDOMINAL PAIN: 0
PHOTOPHOBIA: 0
SINUS PAIN: 0
SORE THROAT: 0
DIARRHEA: 0
SHORTNESS OF BREATH: 0
EYE PAIN: 0
CONSTIPATION: 0
VOMITING: 0
EYE DISCHARGE: 0

## 2020-07-28 NOTE — PROGRESS NOTES
[address] [place] [address],  [address], [facility], [address] [facility], [address] [place] [address]  P: [phone]  F: [phone]    NEUROLOGY CLINIC NOTE     PATIENT NAME: Estrella Bragg  PATIENT MRN: [id number]  PRIMARY CARE PHYSICIAN: Aram Chua MD    HPI:      Estrella Bragg is a 76 y.o. right handed  female with PMH significant for diabetes, PE on Xarelto, neuropathy, hypertension, hyperlipidemia, migraine headaches, CAD, BPPV, anxiety depression was seen in the clinic for neuropathy. History obtained from Patient    Patient endorses pins-and-needles sensation in her bilateral upper extremities and feet, for last 2 years, getting worse since summer last year. She describes constant paresthesias in her fingers and hand bilaterally, worse on the right side as compared to the left side. She had a mechanical fall in October last year, fell on the right side and had trauma to the right hand at that time, endorses worsening of paresthesias since then. She also complains of weakness in her bilateral upper extremities, mainly involving the proximal muscles, gradually getting worse for last couple of months. She endorses difficulty using her right hand, difficulty with opening the jars, buttoning her shirts, writing. She also complains of intermittent neck pain with radicular symptoms in her right upper extremity, sometimes getting worse with neck movements and on exertion. She also complains of unsteady gait for last couple of years, uses a walker for ambulation. Denies history of multiple falls in the past.  Denies any back pain or radicular symptoms. She endorses paresthesias in her feet for many years, most likely secondary to diabetes, endorses improvement in her symptoms with Neurontin. Currently she is taking Neurontin 600 mg 3 times a day, denies any side effects on the medications. Denies any problem with Bowel or bladder function.   Patient also complains of intermittent shaking of the right upper extremity for last couple of years. Patient had a EMG nerve conduction study done of bilateral upper extremity at [facility] at beginning of this year. No results available to review.       PATIENT HISTORY:     Past Medical History:   Diagnosis Date    Anxiety     Benign positional vertigo     CAD (coronary artery disease)     Chest pain     Depression     Diabetes mellitus (HCC)     Diabetic neuropathy ([address]     Hyperlipidemia     Hypertension     Migraine     Migraine headaches aggravated with sublingual nitroglycerin        Past Surgical History:   Procedure Laterality Date    APPENDECTOMY      CARDIAC CATHETERIZATION      2012    CORONARY ANGIOPLASTY WITH STENT PLACEMENT      had 2 with 4 more placed     PTCA          Social History     Socioeconomic History    Marital status:      Spouse name: Not on file    Number of children: Not on file    Years of education: Not on file    Highest education level: Not on file   Occupational History    Not on file   Social Needs    Financial resource strain: Not on file    Food insecurity     Worry: Not on file     Inability: Not on file    Transportation needs     Medical: Not on file     Non-medical: Not on file   Tobacco Use    Smoking status: Never Smoker    Smokeless tobacco: Never Used   Substance and Sexual Activity    Alcohol use: No    Drug use: No    Sexual activity: Never   Lifestyle    Physical activity     Days per week: Not on file     Minutes per session: Not on file    Stress: Not on file   Relationships    Social connections     Talks on phone: Not on file     Gets together: Not on file     Attends Muslim service: Not on file     Active member of club or organization: Not on file     Attends meetings of clubs or organizations: Not on file     Relationship status: Not on file    Intimate partner violence     Fear of current or ex partner: Not on file     Emotionally abused: Not on Negative for pallor and rash. Neurological: Positive for dizziness, tremors, weakness, light-headedness and numbness. Negative for seizures, syncope, facial asymmetry, speech difficulty and headaches. Hematological: Does not bruise/bleed easily. Psychiatric/Behavioral: Negative for agitation, behavioral problems and hallucinations. VITALS  /72 (Site: Right Upper Arm, Position: Sitting, Cuff Size: Large Adult)   Pulse 65   Temp 96.4 °F (35.8 °C)   Resp 16      PHYSICAL EXAMINATION:     Constitutional: Well developed, well nourished and in no acute distress. Head:  normocephalic, atraumatic. Neck: supple, no carotid bruits, thyroid not palpable  Respiratory: Clear to auscultation bilaterally with no use of accessory muscles during respiration. Cardiovascular: normal rate, regular rhythm, no murmur, gallop, rub.   Abdomen: Soft, nontender, nondistended, normal bowel sounds, no hepatomegaly or splenomegaly  Extremities:  peripheral pulses palpable, no pedal edema or calf pain with palpation  Psych: normal affect      NEUROLOGICAL EXAMINATION:     Mental status   Alert and oriented; intact memory with no confusion, speech or language problems; no hallucinations or delusions     Cranial nerves   II - visual fields intact to confrontation                                                III, IV, VI - extra-ocular muscles full: no pupillary defect; no MICHAEL, no nystagmus, no ptosis   V - normal facial sensation                                                               VII - normal facial symmetry                                                             VIII - intact hearing                                                                             IX, X - symmetrical palate                                                                  XI - symmetrical shoulder shrug                                                       XII - midline tongue without atrophy or fasciculation     Motor function Normal muscle bulk and tone  Muscle strength:   Bilateral upper extremities-proximal muscles 5-/5  Distal muscles 4/5, decreased handgrip bilaterally  Bilateral lower extremities 4+/5       Sensory function  decreased light touch and pinprick in bilateral upper and lower extremities in glove and stocking distribution     Cerebellar  mild intermittent postural tremors noted in bilateral upper extremities, worse on the right side as compared to the left side, improves with distraction. Reflex function 1+ DTR and symmetric. Negative Babinski     Gait                  Patient was very unsteady on her feet, was able to take few steps, normally uses a walker for ambulation. Unable to perform toe, heel or tandem walking. Dizzy/lightheaded and unsteady especially on turning around. PRIOR TESTS AND IMAGING: Following images and Labs were reviewed by the examiner     EMG nerve conduction studies of bilateral upper extremities-done at [facility], no results available to review. Uric acid 7 on 7/13/2020  CK 83    ASSESSMENT / PLAN:       Brodie Manriquez is a 76 y.o. right handed  female  was seen in the clinic for neuropathy. Paresthesias in bilateral upper and lower extremities, worse in her hands as compared to the feet, worse on the right side as compared to left. Neck pain with radicular symptoms on the right side  Uncontrolled diabetes with possible diabetic polyneuropathy  History of PE on Xarelto  CHF  Hypertension  Hyperlipidemia  Peripheral vascular disease  CAD status post 4 stents  History of migraine headaches  BPPV  Anxiety depression    HbA1c of 9.1 on 12/16/2019    PLAN:   -Check orthostatic vitals  -MRI of the cervical spine to rule out any cervical pathology contributing to unsteady gait. If unremarkable may consider MRI of the brain  -EMG nerve conduction studies of bilateral upper extremities, as per patient it was done recently at [facility]. We will try to obtain the records. EMG of bilateral lower extremities was not done before.  -Will check for reversible causes of neuropathy, HbA1c, vitamin B12, folate, TSH, serum copper, ceruloplasmin, SPEP, UPEP  -PT/OT evaluation and therapy depending on the MRI and EMG findings.  -Patient is currently taking gabapentin 600 mg 3 times a day. -We will give a trial of Lyrica 25 mg 3 times a day, gradually increase the dose as per patient's tolerance. Discussed all possible side effects with the patient, instructed patient to call the clinic if develop any of the side effects. She voiced understanding.  - Follow up in the clinic after above evaluation  - Instructed patient to call the clinic if symptoms worsen or develop any new symptoms. I have spent 60 minutes face to face with the patient more than 50% of this time was spent counseling and coordinating care.       Electronically signed by Tatyana Matias MD on 7/28/2020 at 11:26 AM

## 2020-08-13 PROBLEM — E86.0 DEHYDRATION: Status: RESOLVED | Noted: 2020-07-14 | Resolved: 2020-08-13

## 2020-08-25 ENCOUNTER — TELEPHONE (OUTPATIENT)
Dept: NEUROSURGERY | Age: 75
End: 2020-08-25

## 2020-08-25 NOTE — TELEPHONE ENCOUNTER
Registration called stating the pt was instructed to go to [facility] today to have her MRI Hand done. Upon looking, there is no order for MRI Hand and nothing documented regarding the encounter. Informed registration and transport that there is no order and upon looking at LOV note, there is nothing stated showing the order was placed or was going to be placed. Pt would like a response back with whether or not MRI Hand can be ordered. Please review and advise.

## 2020-08-31 ENCOUNTER — TELEPHONE (OUTPATIENT)
Dept: PODIATRY | Age: 75
End: 2020-08-31

## 2020-08-31 NOTE — TELEPHONE ENCOUNTER
Writer called pt to reschedule missed appointment. Pt unavailable, no option to leave vm. Writer sent letter in the mail.

## 2020-08-31 NOTE — TELEPHONE ENCOUNTER
MRI of the cervical spine was ordered during last visit. Maybe patient was asking about that.     Thank you

## 2020-09-02 ENCOUNTER — APPOINTMENT (OUTPATIENT)
Dept: GENERAL RADIOLOGY | Age: 75
End: 2020-09-02
Payer: MEDICARE

## 2020-09-02 ENCOUNTER — APPOINTMENT (OUTPATIENT)
Dept: CT IMAGING | Age: 75
End: 2020-09-02
Payer: MEDICARE

## 2020-09-02 ENCOUNTER — HOSPITAL ENCOUNTER (OUTPATIENT)
Age: 75
Setting detail: OBSERVATION
Discharge: HOME | End: 2020-09-05
Attending: EMERGENCY MEDICINE | Admitting: EMERGENCY MEDICINE
Payer: MEDICARE

## 2020-09-02 LAB
-: ABNORMAL
ABSOLUTE EOS #: 0.19 K/UL (ref 0–0.44)
ABSOLUTE IMMATURE GRANULOCYTE: <0.03 K/UL (ref 0–0.3)
ABSOLUTE LYMPH #: 2.94 K/UL (ref 1.1–3.7)
ABSOLUTE MONO #: 0.5 K/UL (ref 0.1–1.2)
ALBUMIN SERPL-MCNC: 4.1 G/DL (ref 3.5–5.2)
ALBUMIN/GLOBULIN RATIO: 1.3 (ref 1–2.5)
ALP BLD-CCNC: 61 U/L (ref 35–104)
ALT SERPL-CCNC: 9 U/L (ref 5–33)
AMORPHOUS: ABNORMAL
ANION GAP SERPL CALCULATED.3IONS-SCNC: 17 MMOL/L (ref 9–17)
AST SERPL-CCNC: 24 U/L
BACTERIA: ABNORMAL
BASOPHILS # BLD: 1 % (ref 0–2)
BASOPHILS ABSOLUTE: 0.04 K/UL (ref 0–0.2)
BILIRUB SERPL-MCNC: 0.54 MG/DL (ref 0.3–1.2)
BILIRUBIN URINE: NEGATIVE
BUN BLDV-MCNC: 16 MG/DL (ref 8–23)
BUN/CREAT BLD: ABNORMAL (ref 9–20)
CALCIUM SERPL-MCNC: 9.8 MG/DL (ref 8.6–10.4)
CASTS UA: ABNORMAL /LPF (ref 0–2)
CHLORIDE BLD-SCNC: 104 MMOL/L (ref 98–107)
CO2: 20 MMOL/L (ref 20–31)
COLOR: YELLOW
CREAT SERPL-MCNC: 0.91 MG/DL (ref 0.5–0.9)
CRYSTALS, UA: ABNORMAL /HPF
DIFFERENTIAL TYPE: ABNORMAL
EOSINOPHILS RELATIVE PERCENT: 3 % (ref 1–4)
EPITHELIAL CELLS UA: ABNORMAL /HPF (ref 0–5)
GFR AFRICAN AMERICAN: >60 ML/MIN
GFR NON-AFRICAN AMERICAN: >60 ML/MIN
GFR SERPL CREATININE-BSD FRML MDRD: ABNORMAL ML/MIN/{1.73_M2}
GFR SERPL CREATININE-BSD FRML MDRD: ABNORMAL ML/MIN/{1.73_M2}
GLUCOSE BLD-MCNC: 177 MG/DL (ref 70–99)
GLUCOSE URINE: ABNORMAL
HCT VFR BLD CALC: 38.5 % (ref 36.3–47.1)
HEMOGLOBIN: 12.7 G/DL (ref 11.9–15.1)
IMMATURE GRANULOCYTES: 0 %
KETONES, URINE: ABNORMAL
LEUKOCYTE ESTERASE, URINE: NEGATIVE
LIPASE: 25 U/L (ref 13–60)
LYMPHOCYTES # BLD: 44 % (ref 24–43)
MCH RBC QN AUTO: 30.5 PG (ref 25.2–33.5)
MCHC RBC AUTO-ENTMCNC: 33 G/DL (ref 28.4–34.8)
MCV RBC AUTO: 92.5 FL (ref 82.6–102.9)
MONOCYTES # BLD: 8 % (ref 3–12)
MUCUS: ABNORMAL
NITRITE, URINE: NEGATIVE
NRBC AUTOMATED: 0 PER 100 WBC
OTHER OBSERVATIONS UA: ABNORMAL
PDW BLD-RTO: 12.8 % (ref 11.8–14.4)
PH UA: 5 (ref 5–8)
PLATELET # BLD: 174 K/UL (ref 138–453)
PLATELET ESTIMATE: ABNORMAL
PMV BLD AUTO: 12.6 FL (ref 8.1–13.5)
POTASSIUM SERPL-SCNC: 4.4 MMOL/L (ref 3.7–5.3)
PROTEIN UA: ABNORMAL
RBC # BLD: 4.16 M/UL (ref 3.95–5.11)
RBC # BLD: ABNORMAL 10*6/UL
RBC UA: ABNORMAL /HPF (ref 0–2)
RENAL EPITHELIAL, UA: ABNORMAL /HPF
SEG NEUTROPHILS: 44 % (ref 36–65)
SEGMENTED NEUTROPHILS ABSOLUTE COUNT: 3.01 K/UL (ref 1.5–8.1)
SODIUM BLD-SCNC: 141 MMOL/L (ref 135–144)
SPECIFIC GRAVITY UA: 1.04 (ref 1–1.03)
TOTAL PROTEIN: 7.3 G/DL (ref 6.4–8.3)
TRICHOMONAS: ABNORMAL
TROPONIN INTERP: ABNORMAL
TROPONIN INTERP: ABNORMAL
TROPONIN T: ABNORMAL NG/ML
TROPONIN T: ABNORMAL NG/ML
TROPONIN, HIGH SENSITIVITY: 28 NG/L (ref 0–14)
TROPONIN, HIGH SENSITIVITY: 30 NG/L (ref 0–14)
TURBIDITY: ABNORMAL
URINE HGB: NEGATIVE
UROBILINOGEN, URINE: NORMAL
WBC # BLD: 6.7 K/UL (ref 3.5–11.3)
WBC # BLD: ABNORMAL 10*3/UL
WBC UA: ABNORMAL /HPF (ref 0–5)
YEAST: ABNORMAL

## 2020-09-02 PROCEDURE — 84484 ASSAY OF TROPONIN QUANT: CPT

## 2020-09-02 PROCEDURE — 87329 GIARDIA AG IA: CPT

## 2020-09-02 PROCEDURE — 80053 COMPREHEN METABOLIC PANEL: CPT

## 2020-09-02 PROCEDURE — 94640 AIRWAY INHALATION TREATMENT: CPT

## 2020-09-02 PROCEDURE — 71045 X-RAY EXAM CHEST 1 VIEW: CPT

## 2020-09-02 PROCEDURE — 87506 IADNA-DNA/RNA PROBE TQ 6-11: CPT

## 2020-09-02 PROCEDURE — G0378 HOSPITAL OBSERVATION PER HR: HCPCS

## 2020-09-02 PROCEDURE — 83630 LACTOFERRIN FECAL (QUAL): CPT

## 2020-09-02 PROCEDURE — 83690 ASSAY OF LIPASE: CPT

## 2020-09-02 PROCEDURE — 6370000000 HC RX 637 (ALT 250 FOR IP): Performed by: STUDENT IN AN ORGANIZED HEALTH CARE EDUCATION/TRAINING PROGRAM

## 2020-09-02 PROCEDURE — 99285 EMERGENCY DEPT VISIT HI MDM: CPT

## 2020-09-02 PROCEDURE — 85025 COMPLETE CBC W/AUTO DIFF WBC: CPT

## 2020-09-02 PROCEDURE — 6360000004 HC RX CONTRAST MEDICATION: Performed by: STUDENT IN AN ORGANIZED HEALTH CARE EDUCATION/TRAINING PROGRAM

## 2020-09-02 PROCEDURE — 87328 CRYPTOSPORIDIUM AG IA: CPT

## 2020-09-02 PROCEDURE — 74177 CT ABD & PELVIS W/CONTRAST: CPT

## 2020-09-02 PROCEDURE — 81001 URINALYSIS AUTO W/SCOPE: CPT

## 2020-09-02 PROCEDURE — 93005 ELECTROCARDIOGRAM TRACING: CPT | Performed by: STUDENT IN AN ORGANIZED HEALTH CARE EDUCATION/TRAINING PROGRAM

## 2020-09-02 PROCEDURE — 94664 DEMO&/EVAL PT USE INHALER: CPT

## 2020-09-02 RX ORDER — GABAPENTIN 600 MG/1
600 TABLET ORAL 3 TIMES DAILY
Status: DISCONTINUED | OUTPATIENT
Start: 2020-09-02 | End: 2020-09-05 | Stop reason: HOSPADM

## 2020-09-02 RX ORDER — CLONAZEPAM 1 MG/1
0.5 TABLET ORAL NIGHTLY
Status: DISCONTINUED | OUTPATIENT
Start: 2020-09-02 | End: 2020-09-05 | Stop reason: HOSPADM

## 2020-09-02 RX ORDER — ALBUTEROL SULFATE 90 UG/1
2 AEROSOL, METERED RESPIRATORY (INHALATION) ONCE
Status: COMPLETED | OUTPATIENT
Start: 2020-09-02 | End: 2020-09-02

## 2020-09-02 RX ORDER — SODIUM CHLORIDE 0.9 % (FLUSH) 0.9 %
10 SYRINGE (ML) INJECTION EVERY 12 HOURS SCHEDULED
Status: DISCONTINUED | OUTPATIENT
Start: 2020-09-02 | End: 2020-09-05 | Stop reason: HOSPADM

## 2020-09-02 RX ORDER — ISOSORBIDE MONONITRATE 30 MG/1
30 TABLET, EXTENDED RELEASE ORAL DAILY
Status: DISCONTINUED | OUTPATIENT
Start: 2020-09-03 | End: 2020-09-05 | Stop reason: HOSPADM

## 2020-09-02 RX ORDER — ALBUTEROL SULFATE 90 UG/1
2 AEROSOL, METERED RESPIRATORY (INHALATION) EVERY 6 HOURS PRN
Status: DISCONTINUED | OUTPATIENT
Start: 2020-09-02 | End: 2020-09-05 | Stop reason: HOSPADM

## 2020-09-02 RX ORDER — ESCITALOPRAM OXALATE 10 MG/1
20 TABLET ORAL DAILY
Status: DISCONTINUED | OUTPATIENT
Start: 2020-09-03 | End: 2020-09-05 | Stop reason: HOSPADM

## 2020-09-02 RX ORDER — ROPINIROLE 0.25 MG/1
0.5 TABLET, FILM COATED ORAL 3 TIMES DAILY
Status: DISCONTINUED | OUTPATIENT
Start: 2020-09-02 | End: 2020-09-05 | Stop reason: HOSPADM

## 2020-09-02 RX ORDER — METFORMIN HYDROCHLORIDE 750 MG/1
750 TABLET, EXTENDED RELEASE ORAL 2 TIMES DAILY WITH MEALS
Status: DISCONTINUED | OUTPATIENT
Start: 2020-09-03 | End: 2020-09-04

## 2020-09-02 RX ORDER — ASPIRIN 81 MG/1
81 TABLET ORAL DAILY
Status: DISCONTINUED | OUTPATIENT
Start: 2020-09-03 | End: 2020-09-05 | Stop reason: HOSPADM

## 2020-09-02 RX ORDER — CETIRIZINE HYDROCHLORIDE 10 MG/1
10 TABLET ORAL DAILY
Status: DISCONTINUED | OUTPATIENT
Start: 2020-09-03 | End: 2020-09-05 | Stop reason: HOSPADM

## 2020-09-02 RX ORDER — VITAMIN E 268 MG
400 CAPSULE ORAL DAILY
Status: DISCONTINUED | OUTPATIENT
Start: 2020-09-03 | End: 2020-09-05 | Stop reason: HOSPADM

## 2020-09-02 RX ORDER — SODIUM CHLORIDE 0.9 % (FLUSH) 0.9 %
10 SYRINGE (ML) INJECTION PRN
Status: DISCONTINUED | OUTPATIENT
Start: 2020-09-02 | End: 2020-09-05 | Stop reason: HOSPADM

## 2020-09-02 RX ORDER — ROSUVASTATIN CALCIUM 20 MG/1
40 TABLET, COATED ORAL DAILY
Status: DISCONTINUED | OUTPATIENT
Start: 2020-09-03 | End: 2020-09-05 | Stop reason: HOSPADM

## 2020-09-02 RX ORDER — HYDROXYZINE HYDROCHLORIDE 25 MG/1
25 TABLET, FILM COATED ORAL 3 TIMES DAILY PRN
Status: DISCONTINUED | OUTPATIENT
Start: 2020-09-02 | End: 2020-09-05 | Stop reason: HOSPADM

## 2020-09-02 RX ORDER — ALOGLIPTIN 12.5 MG/1
25 TABLET, FILM COATED ORAL DAILY
Status: DISCONTINUED | OUTPATIENT
Start: 2020-09-03 | End: 2020-09-05 | Stop reason: HOSPADM

## 2020-09-02 RX ORDER — LISINOPRIL 10 MG/1
10 TABLET ORAL DAILY
Status: DISCONTINUED | OUTPATIENT
Start: 2020-09-03 | End: 2020-09-05 | Stop reason: HOSPADM

## 2020-09-02 RX ORDER — CARVEDILOL 12.5 MG/1
25 TABLET ORAL 2 TIMES DAILY WITH MEALS
Status: DISCONTINUED | OUTPATIENT
Start: 2020-09-03 | End: 2020-09-05 | Stop reason: HOSPADM

## 2020-09-02 RX ORDER — PREGABALIN 25 MG/1
25 CAPSULE ORAL 3 TIMES DAILY
Status: DISCONTINUED | OUTPATIENT
Start: 2020-09-02 | End: 2020-09-03

## 2020-09-02 RX ORDER — PANTOPRAZOLE SODIUM 40 MG/1
40 TABLET, DELAYED RELEASE ORAL
Status: DISCONTINUED | OUTPATIENT
Start: 2020-09-03 | End: 2020-09-05 | Stop reason: HOSPADM

## 2020-09-02 RX ORDER — INSULIN GLARGINE 100 [IU]/ML
70 INJECTION, SOLUTION SUBCUTANEOUS NIGHTLY
Status: DISCONTINUED | OUTPATIENT
Start: 2020-09-02 | End: 2020-09-05 | Stop reason: HOSPADM

## 2020-09-02 RX ADMIN — IOHEXOL 75 ML: 350 INJECTION, SOLUTION INTRAVENOUS at 17:16

## 2020-09-02 RX ADMIN — ALBUTEROL SULFATE 2 PUFF: 90 AEROSOL, METERED RESPIRATORY (INHALATION) at 16:43

## 2020-09-02 ASSESSMENT — ENCOUNTER SYMPTOMS
COUGH: 0
WHEEZING: 0
COLOR CHANGE: 0
VOMITING: 0
CONSTIPATION: 0
BACK PAIN: 0
NAUSEA: 1
CHEST TIGHTNESS: 0
DIARRHEA: 1
SHORTNESS OF BREATH: 1
ABDOMINAL PAIN: 1

## 2020-09-02 ASSESSMENT — PAIN SCALES - GENERAL
PAINLEVEL_OUTOF10: 6
PAINLEVEL_OUTOF10: 5

## 2020-09-02 ASSESSMENT — PAIN DESCRIPTION - ORIENTATION: ORIENTATION: RIGHT;LEFT

## 2020-09-02 ASSESSMENT — PAIN DESCRIPTION - PAIN TYPE: TYPE: CHRONIC PAIN

## 2020-09-02 ASSESSMENT — PAIN DESCRIPTION - LOCATION: LOCATION: HAND

## 2020-09-02 NOTE — ED TRIAGE NOTES
Patient reports having diarrhea for 3 days and waking up in the night with sweats. Patient states that she has been having some mild shortness of breath. patient denies contact with anyone sick or suspected to have COVID 19.

## 2020-09-02 NOTE — ED PROVIDER NOTES
[facility] ED  Emergency Department Encounter  Emergency Medicine Resident     Pt Name: Ethyl Gowers  MRN: [id number]  [place] 1945  Date of evaluation: 9/2/20  PCP:  Nikita Bowman, [address] [place] [address]       Chief Complaint   Patient presents with    Shortness of Breath    Diarrhea       HISTORY OFPRESENT ILLNESS  (Location/Symptom, Timing/Onset, Context/Setting, Quality, Duration, Modifying Factors,Severity.)      Ethyl Gowers is a 76 y.o. female who presents with shortness of breath is ongoing and diarrhea. Patient states she has had 3 days of diarrhea and intermittent difficulties catching her breath. Patient was recently admitted and had a stress test done that showed a medium risk. Patient states she had 6/10 in the past placed in her heart. Patient states anytime she tries to eat she gets diarrhea. This is been ongoing for several days, does have a history of diarrhea in the past but states it is worse with every time she tries to eat she gets diarrhea that is new. Patient has had recurrent problems in the past with somewhat similar symptoms in the past.    PAST MEDICAL / SURGICAL / SOCIAL / FAMILY HISTORY      has a past medical history of Anxiety, Benign positional vertigo, CAD (coronary artery disease), Chest pain, Depression, Diabetes mellitus ([address] Diabetic neuropathy ([address] Hyperlipidemia, Hypertension, and Migraine. has a past surgical history that includes Percutaneous Transluminal Coronary Angio; Cardiac catheterization; Coronary angioplasty with stent (02/25/2017); Appendectomy; and Coronary angioplasty with stent (07/11/2012).      Social History     Socioeconomic History    Marital status:      Spouse name: Not on file    Number of children: Not on file    Years of education: Not on file    Highest education level: Not on file   Occupational History    Not on file   Social Needs    Financial resource strain: Not on file   Genero-Geovanny insecurity     Worry: Not on file     Inability: Not on file    Transportation needs     Medical: Not on file     Non-medical: Not on file   Tobacco Use    Smoking status: Never Smoker    Smokeless tobacco: Never Used   Substance and Sexual Activity    Alcohol use: No    Drug use: No    Sexual activity: Never   Lifestyle    Physical activity     Days per week: Not on file     Minutes per session: Not on file    Stress: Not on file   Relationships    Social connections     Talks on phone: Not on file     Gets together: Not on file     Attends Evangelical service: Not on file     Active member of club or organization: Not on file     Attends meetings of clubs or organizations: Not on file     Relationship status: Not on file    Intimate partner violence     Fear of current or ex partner: Not on file     Emotionally abused: Not on file     Physically abused: Not on file     Forced sexual activity: Not on file   Other Topics Concern    Not on file   Social History Narrative    Not on file       Family History   Problem Relation Age of Onset    Cancer Mother     Cancer Father         Allergies:  Penicillins and Sulfa antibiotics    Home Medications:  Prior to Admission medications    Medication Sig Start Date End Date Taking? Authorizing Provider   escitalopram (LEXAPRO) 20 MG tablet Take 1 tablet by mouth daily 8/11/20   Manpreet Schulte MD   clonazePAM Naa Chiara) 0.5 MG tablet take 1 tablet by mouth twice a day if needed for anxiety 8/5/20 9/4/20  Manpreet Schulte MD   hydrOXYzine (ATARAX) 25 MG tablet take 1 tablet by mouth every 8 hours rectally for itching 8/5/20   Manpreet Schulte MD   pregabalin (LYRICA) 25 MG capsule Take 1 capsule by mouth 3 times daily for 30 days. 7/28/20 8/27/20  Yvette Moseley MD   gabapentin (NEURONTIN) 600 MG tablet Take 1 tablet by mouth 3 times daily for 30 days.  6/12/20 7/13/20  Manpreet Schulte MD   ticagrelor (BRILINTA) 90 MG TABS tablet Take 1 tablet by mouth 2 times daily 6/10/20 9/8/20  Soraya Mills MD   fluticasone [facility]) 50 MCG/ACT nasal spray instill 2 sprays into each nostril once daily 4/21/20   Historical Provider, MD   NOVOFINE 32G X 6 MM MISC use 1 NEEDLE to inject MEDICATION subcutaneously five times a day 4/8/20   Historical Provider, MD   mupirocin (BACTROBAN) 2 % ointment apply topically to affected area three times a day 4/21/20   Historical Provider, MD   NIFEdipine (PROCARDIA XL) 60 MG extended release tablet take 1 tablet by mouth once daily 4/27/20   Historical Provider, MD   omeprazole (PRILOSEC) 20 MG delayed release capsule Take 1 capsule by mouth Daily 5/11/20   Soraya Mills MD   insulin aspart (NOVOLOG FLEXPEN) 100 UNIT/ML injection pen Use TID before meals according to scale - max 45 units a day 5/11/20   Soraya Mills MD   loratadine (CLARITIN) 10 MG tablet Take 1 tablet every day by oral route. 5/11/20   Soraya Mills MD   rosuvastatin (CRESTOR) 40 MG tablet Take 1 tablet by mouth daily 5/1/20   Soraya Mills MD   LEVEMIR FLEXTOUCH 100 UNIT/ML injection pen Inject 70 unit(s) twice a day by sub-q route for 30 days.  3/11/20   Historical Provider, MD   rivaroxaban (XARELTO) 20 MG TABS tablet Take 1 tablet by mouth daily (with breakfast) Start on 12/12/2019 after finishing Xarelto 15 mg twice daily for 21 days 1/7/20   Soraya Mills MD   isosorbide mononitrate (IMDUR) 30 MG extended release tablet Take 1 tablet by mouth daily 11/26/19   Nadya Solis MD   linagliptin (TRADJENTA) 5 MG tablet Take 1 tablet by mouth daily 11/26/19   Nadya Solis MD   metFORMIN (GLUCOPHAGE-XR) 750 MG extended release tablet Take 1 tablet by mouth 2 times daily (with meals) 11/25/19   Nadya Solis MD   rOPINIRole (REQUIP) 0.5 MG tablet Take 1 tablet by mouth 3 times daily 11/25/19   Nadya Solis MD   carvedilol (COREG) 25 MG tablet Take 1 tablet by mouth 2 times daily (with meals) Hold for systolic blood pressure < 120 or heart rate < 50  Give 1 hour apart from other blood pressure medicines 11/25/19   Mariaa Fabian MD   albuterol sulfate  (90 Base) MCG/ACT inhaler Inhale 2 puffs into the lungs every 6 hours as needed for Wheezing 3/15/18   Jayant Arenas MD   spironolactone (ALDACTONE) 25 MG tablet Take 25 mg by mouth daily     Historical Provider, MD   lisinopril (PRINIVIL;ZESTRIL) 10 MG tablet Take 1 tablet by mouth daily 3/26/16   Michael Rangel MD   vitamin E 400 UNIT capsule Take 400 Units by mouth daily. Historical Provider, MD   aspirin 81 MG tablet Take 81 mg by mouth daily. Historical Provider, MD   nitroGLYCERIN (NITROSTAT) 0.4 MG SL tablet Place 0.4 mg under the tongue every 5 minutes as needed. Historical Provider, MD       REVIEW OFSYSTEMS    (2-9 systems for level 4, 10 or more for level 5)      Review of Systems   Constitutional: Negative for chills, diaphoresis, fatigue and fever. Respiratory: Positive for shortness of breath. Negative for cough, chest tightness and wheezing. Cardiovascular: Negative for chest pain, palpitations and leg swelling. Gastrointestinal: Positive for abdominal pain, diarrhea and nausea. Negative for constipation and vomiting. Endocrine: Negative for polydipsia, polyphagia and polyuria. Genitourinary: Negative for difficulty urinating, dysuria and urgency. Musculoskeletal: Negative for arthralgias, back pain, neck pain and neck stiffness. Skin: Negative for color change, pallor and rash. Neurological: Negative for dizziness, weakness, light-headedness and headaches. PHYSICAL EXAM   (up to 7 for level 4, 8 or more forlevel 5)      INITIAL VITALS:   ED Triage Vitals [09/02/20 1435]   BP Temp Temp Source Pulse Resp SpO2 Height Weight   (!) 161/78 98.3 °F (36.8 °C) Oral 64 16 99 % -- --       Physical Exam  Vitals signs and nursing note reviewed. Constitutional:       General: She is not in acute distress.      Appearance: She is well-developed. She is not diaphoretic. HENT:      Head: Normocephalic and atraumatic. Eyes:      General: No scleral icterus. Conjunctiva/sclera: Conjunctivae normal.      Pupils: Pupils are equal, round, and reactive to light. Neck:      Musculoskeletal: Normal range of motion and neck supple. Vascular: No JVD. Trachea: No tracheal deviation. Cardiovascular:      Rate and Rhythm: Normal rate and regular rhythm. Heart sounds: Normal heart sounds. No murmur. No friction rub. Pulmonary:      Effort: Pulmonary effort is normal. No tachypnea or respiratory distress. Breath sounds: Normal breath sounds. No decreased breath sounds or wheezing. Chest:      Chest wall: No tenderness. Abdominal:      General: Bowel sounds are normal. There is no distension. Palpations: Abdomen is soft. Tenderness: There is abdominal tenderness (Mild, diffuse tenderness). There is no guarding. Skin:     General: Skin is warm and dry. Capillary Refill: Capillary refill takes less than 2 seconds. Coloration: Skin is not pale. Findings: No erythema. Neurological:      General: No focal deficit present. Mental Status: She is alert. She is disoriented. Cranial Nerves: No cranial nerve deficit. Sensory: No sensory deficit.    Psychiatric:         Mood and Affect: Mood normal.         Behavior: Behavior normal.         DIFFERENTIAL  DIAGNOSIS     PLAN (LABS / IMAGING / EKG):  Orders Placed This Encounter   Procedures    XR CHEST PORTABLE    CT ABDOMEN PELVIS W IV CONTRAST Additional Contrast? None    CBC Auto Differential    Comprehensive Metabolic Panel w/ Reflex to MG    Troponin    Lipase    Troponin    Urinalysis with microscopic    EKG 12 Lead    PATIENT STATUS (FROM ED OR OR/PROCEDURAL) Observation       MEDICATIONS ORDERED:  Orders Placed This Encounter   Medications    albuterol sulfate  (90 Base) MCG/ACT inhaler 2 puff    iohexol (OMNIPAQUE 350) solution 75 mL       DDX: ACS, diverticulitis, COPD exacerbation, asthma, UTI, pancreatitis, infectious versus functional diarrhea. Initial MDM/Plan: 76 y.o. female who presents with diarrhea and intermittent shortness of breath. Symptoms been ongoing the last 3 days and she has had similar symptoms in the past, but seems worse this time. No recent travel or antibiotics. Patient previously admitted with diarrhea has presumed infectious etiology improved at that time, patient has had a recurrence of symptoms. Patient overall well-appearing in no acute distress with mild abdominal tenderness. Low concern for emergent cause of abdominal pain or shortness of breath. Patient is not tachypneic and it does not like she has increased work of breathing  Plan for cardiac work-up, CMP, lipase, CT abdomen pelvis with contrast to evaluate for diverticular/infectious/intra-abdominal pathology. Patty Stacy     DIAGNOSTIC RESULTS / EMERGENCYDEPARTMENT COURSE / MDM     LABS:  Labs Reviewed   CBC WITH AUTO DIFFERENTIAL - Abnormal; Notable for the following components:       Result Value    Lymphocytes 44 (*)     All other components within normal limits   COMPREHENSIVE METABOLIC PANEL W/ REFLEX TO MG FOR LOW K - Abnormal; Notable for the following components:    Glucose 177 (*)     CREATININE 0.91 (*)     All other components within normal limits   TROPONIN - Abnormal; Notable for the following components:    Troponin, High Sensitivity 28 (*)     All other components within normal limits   TROPONIN - Abnormal; Notable for the following components:    Troponin, High Sensitivity 30 (*)     All other components within normal limits   LIPASE   URINALYSIS WITH MICROSCOPIC         RADIOLOGY:  Ct Abdomen Pelvis W Iv Contrast Additional Contrast? None    Result Date: 9/2/2020  EXAMINATION: CT OF THE ABDOMEN AND PELVIS WITH CONTRAST, 9/2/2020 4:40 pm TECHNIQUE: CT of the abdomen and pelvis was performed with the administration of intravenous contrast. Multiplanar reformatted images are provided for review. Dose modulation, iterative reconstruction, and/or weight based adjustment of the mA/kV was utilized to reduce the radiation dose to as low as reasonably achievable. COMPARISON: CT abdomen and pelvis dated 01/02/2020 HISTORY: ORDERING SYSTEM PROVIDED HISTORY: Abd pain, diarrhea TECHNOLOGIST PROVIDED HISTORY: Abd pain, diarrhea Reason for Exam: Abd pain, diarrhea Acuity: Acute Type of Exam: Initial FINDINGS: Lower Chest:  Visualized portion of the lower chest demonstrates no acute abnormality. Organs: The liver, spleen, pancreas, and adrenal glands are unremarkable. Gallbladder is surgically absent. Kidneys are symmetric in size. There is a small wedge-shaped focus of hypoenhancement and volume loss at the inferior left kidney, which is unchanged. Bilateral renal cysts are unchanged. No hydronephrosis or perinephric inflammation. GI/Bowel: There is scattered colonic diverticulosis. There is no abnormal bowel distention or pericolonic inflammation. No free intraperitoneal air or fluid. Appendix is surgically absent. Pelvis: Urinary bladder is within normal limits. Uterus is surgically absent. No pelvic lymphadenopathy. Peritoneum/Retroperitoneum: The abdominal aorta is normal in caliber. There is no retroperitoneal or mesenteric lymphadenopathy. Bones/Soft Tissues: There is skin thickening and subcutaneous stranding in the lower abdominal wall, unchanged. No soft tissue gas or fluid collection. There is a chronic compression fracture at L2. No acute or suspicious osseous abnormality. No acute process in the abdomen or pelvis. Chronic incidental findings described above. Xr Chest Portable    Result Date: 9/2/2020  EXAMINATION: ONE XRAY VIEW OF THE CHEST 9/2/2020 3:17 pm COMPARISON: 07/12/2020.  HISTORY: ORDERING SYSTEM PROVIDED HISTORY: shortness of breath TECHNOLOGIST PROVIDED HISTORY: shortness of breath Reason for Exam: upright portable FINDINGS: The cardiomediastinal silhouette is unremarkable. The lungs are clear. No infiltrate, pleural fluid or evidence of overt failure. Mild apical fibrotic changes, stable. No acute cardiopulmonary disease. EKG  EKG Interpretation    Interpreted by emergency department physician    Rhythm: normal sinus   Rate: normal  Axis: normal  Ectopy: none  Conduction: normal  ST Segments: no acute change  T Waves: no acute change  Q Waves: none    Clinical Impression: non-specific EKG    Billee Habermann Gruenbaum    All EKG's are interpreted by the Emergency Department Physicianwho either signs or Co-signs this chart in the absence of a cardiologist.    EMERGENCY DEPARTMENT COURSE:  ED Course as of Sep 02 1931   Wed Sep 02, 2020   1544 Chest x-ray normal.   XR CHEST PORTABLE [JG]   1642 Cr stable compared to previous     Creatinine(!): 0.91 [JG]   1642 No leukocytosis     WBC: 6.7 [JG]   1651 Elevated troponin, troponins have been increased in the past.  Will repeat troponin to make sure is not uptrending. Troponin, High Sensitivity(!): 28 [JG]   1711 Lipase normal.   Lipase: 25 [JG]   1810 Trope stable, will admit observation unit for continued evaluation, cardiology evaluation, and will order stool studies for her persistent diarrhea. Troponin, High Sensitivity(!): 30 [JG]      ED Course User Index  [JG] Billee Habermann Gruenbaum, DO          PROCEDURES:  None    CONSULTS:  IP CONSULT TO CARDIOLOGY    CRITICAL CARE:  Please see attending note    FINAL IMPRESSION      1. Shortness of breath    2.  Diarrhea, unspecified type          DISPOSITION / PLAN     DISPOSITION Admitted 09/02/2020 06:19:51 PM      PATIENT REFERRED TO:  Rigoberto Pagan, [address]  [address] [facility] [phone]            DISCHARGE MEDICATIONS:  New Prescriptions    No medications on file       Patrick Mendes DO  Emergency Medicine Resident    (Please note that portions of this note were

## 2020-09-02 NOTE — ED PROVIDER NOTES
[facility]     Emergency Department     Faculty Attestation    I performed a history and physical examination of the patient and discussed management with the resident. I have reviewed and agree with the residents findings including all diagnostic interpretations, and treatment plans as written. Any areas of disagreement are noted on the chart. I was personally present for the key portions of any procedures. I have documented in the chart those procedures where I was not present during the key portions. I have reviewed the emergency nurses triage note. I agree with the chief complaint, past medical history, past surgical history, allergies, medications, social and family history as documented unless otherwise noted below. Documentation of the HPI, Physical Exam and Medical Decision Making performed by catalina is based on my personal performance of the HPI, PE and MDM. For Physician Assistant/ Nurse Practitioner cases/documentation I have personally evaluated this patient and have completed at least one if not all key elements of the E/M (history, physical exam, and MDM). Additional findings are as noted. Patient with multiple complaints, diarrhea for the past few days, she reports 2 episodes daily, nonbloody no recent antibiotic use. She also complains of some shortness of breath ongoing over the past 5 days. He is on Xarelto. Does have a history of PE. Recent admission for similar complaints, was COVID negative. She has not been exposed to anyone sick over the last few weeks. She also complains of a some abdominal pain and an episode of emesis today. She is status post a hysterectomy as well as a cholecystectomy.     On exam patient well-appearing speaking in full sentences in no distress her lungs are clear bilaterally without any rales wheezes or rhonchi, her abdomen is soft but she has tenderness to palpation diffusely which does not localize any quadrant there is no rebound or guarding noted. She has no swelling noted to her lower extremities bilaterally. Shortness of breath, abdominal pain we will plan on cardiac work-up, CT imaging, diarrhea is minimal at this time she is does not look fluid depleted. But small IV fluid to be given, and reassess. EKG Interpretation    Interpreted by me    Patient with normal sinus rhythm, with a ventricular rate of 72, PVC noted. No ST elevation or depressions noted. Normal axis. Saint Clarity, D.O, M.P.H  Attending Emergency Medicine Physician .       Saint Clarity, DO  09/02/20 [id number]

## 2020-09-03 LAB
ABSOLUTE EOS #: 0.19 K/UL (ref 0–0.44)
ABSOLUTE IMMATURE GRANULOCYTE: <0.03 K/UL (ref 0–0.3)
ABSOLUTE LYMPH #: 2.61 K/UL (ref 1.1–3.7)
ABSOLUTE MONO #: 0.49 K/UL (ref 0.1–1.2)
ANION GAP SERPL CALCULATED.3IONS-SCNC: 14 MMOL/L (ref 9–17)
BASOPHILS # BLD: 1 % (ref 0–2)
BASOPHILS ABSOLUTE: 0.03 K/UL (ref 0–0.2)
BUN BLDV-MCNC: 13 MG/DL (ref 8–23)
BUN/CREAT BLD: ABNORMAL (ref 9–20)
CALCIUM SERPL-MCNC: 9.4 MG/DL (ref 8.6–10.4)
CHLORIDE BLD-SCNC: 105 MMOL/L (ref 98–107)
CO2: 23 MMOL/L (ref 20–31)
CREAT SERPL-MCNC: 0.87 MG/DL (ref 0.5–0.9)
DIFFERENTIAL TYPE: ABNORMAL
EOSINOPHILS RELATIVE PERCENT: 3 % (ref 1–4)
GFR AFRICAN AMERICAN: >60 ML/MIN
GFR NON-AFRICAN AMERICAN: >60 ML/MIN
GFR SERPL CREATININE-BSD FRML MDRD: ABNORMAL ML/MIN/{1.73_M2}
GFR SERPL CREATININE-BSD FRML MDRD: ABNORMAL ML/MIN/{1.73_M2}
GLUCOSE BLD-MCNC: 195 MG/DL (ref 65–105)
GLUCOSE BLD-MCNC: 210 MG/DL (ref 65–105)
GLUCOSE BLD-MCNC: 222 MG/DL (ref 70–99)
GLUCOSE BLD-MCNC: 247 MG/DL (ref 65–105)
GLUCOSE BLD-MCNC: 261 MG/DL (ref 65–105)
GLUCOSE BLD-MCNC: 353 MG/DL (ref 65–105)
HCT VFR BLD CALC: 36.5 % (ref 36.3–47.1)
HEMOGLOBIN: 11.5 G/DL (ref 11.9–15.1)
IMMATURE GRANULOCYTES: 0 %
LYMPHOCYTES # BLD: 47 % (ref 24–43)
MCH RBC QN AUTO: 29.4 PG (ref 25.2–33.5)
MCHC RBC AUTO-ENTMCNC: 31.5 G/DL (ref 28.4–34.8)
MCV RBC AUTO: 93.4 FL (ref 82.6–102.9)
MONOCYTES # BLD: 9 % (ref 3–12)
NRBC AUTOMATED: 0 PER 100 WBC
PDW BLD-RTO: 13 % (ref 11.8–14.4)
PLATELET # BLD: 174 K/UL (ref 138–453)
PLATELET ESTIMATE: ABNORMAL
PMV BLD AUTO: 12 FL (ref 8.1–13.5)
POTASSIUM SERPL-SCNC: 3.4 MMOL/L (ref 3.7–5.3)
RBC # BLD: 3.91 M/UL (ref 3.95–5.11)
RBC # BLD: ABNORMAL 10*6/UL
SEG NEUTROPHILS: 40 % (ref 36–65)
SEGMENTED NEUTROPHILS ABSOLUTE COUNT: 2.23 K/UL (ref 1.5–8.1)
SODIUM BLD-SCNC: 142 MMOL/L (ref 135–144)
TROPONIN INTERP: ABNORMAL
TROPONIN T: ABNORMAL NG/ML
TROPONIN, HIGH SENSITIVITY: 35 NG/L (ref 0–14)
WBC # BLD: 5.6 K/UL (ref 3.5–11.3)
WBC # BLD: ABNORMAL 10*3/UL

## 2020-09-03 PROCEDURE — 2580000003 HC RX 258: Performed by: STUDENT IN AN ORGANIZED HEALTH CARE EDUCATION/TRAINING PROGRAM

## 2020-09-03 PROCEDURE — 2580000003 HC RX 258: Performed by: EMERGENCY MEDICINE

## 2020-09-03 PROCEDURE — 6370000000 HC RX 637 (ALT 250 FOR IP): Performed by: EMERGENCY MEDICINE

## 2020-09-03 PROCEDURE — G0378 HOSPITAL OBSERVATION PER HR: HCPCS

## 2020-09-03 PROCEDURE — 87506 IADNA-DNA/RNA PROBE TQ 6-11: CPT

## 2020-09-03 PROCEDURE — 80048 BASIC METABOLIC PNL TOTAL CA: CPT

## 2020-09-03 PROCEDURE — 93005 ELECTROCARDIOGRAM TRACING: CPT | Performed by: STUDENT IN AN ORGANIZED HEALTH CARE EDUCATION/TRAINING PROGRAM

## 2020-09-03 PROCEDURE — 82947 ASSAY GLUCOSE BLOOD QUANT: CPT

## 2020-09-03 PROCEDURE — 96374 THER/PROPH/DIAG INJ IV PUSH: CPT

## 2020-09-03 PROCEDURE — 6360000002 HC RX W HCPCS: Performed by: HEALTH CARE PROVIDER

## 2020-09-03 PROCEDURE — 6370000000 HC RX 637 (ALT 250 FOR IP): Performed by: HEALTH CARE PROVIDER

## 2020-09-03 PROCEDURE — 6370000000 HC RX 637 (ALT 250 FOR IP): Performed by: STUDENT IN AN ORGANIZED HEALTH CARE EDUCATION/TRAINING PROGRAM

## 2020-09-03 PROCEDURE — 84484 ASSAY OF TROPONIN QUANT: CPT

## 2020-09-03 PROCEDURE — 36415 COLL VENOUS BLD VENIPUNCTURE: CPT

## 2020-09-03 PROCEDURE — 85025 COMPLETE CBC W/AUTO DIFF WBC: CPT

## 2020-09-03 PROCEDURE — 87329 GIARDIA AG IA: CPT

## 2020-09-03 PROCEDURE — 87328 CRYPTOSPORIDIUM AG IA: CPT

## 2020-09-03 RX ORDER — SODIUM CHLORIDE, SODIUM LACTATE, POTASSIUM CHLORIDE, CALCIUM CHLORIDE 600; 310; 30; 20 MG/100ML; MG/100ML; MG/100ML; MG/100ML
INJECTION, SOLUTION INTRAVENOUS CONTINUOUS
Status: DISCONTINUED | OUTPATIENT
Start: 2020-09-03 | End: 2020-09-05 | Stop reason: HOSPADM

## 2020-09-03 RX ORDER — LOPERAMIDE HYDROCHLORIDE 2 MG/1
2 CAPSULE ORAL 4 TIMES DAILY PRN
Status: DISCONTINUED | OUTPATIENT
Start: 2020-09-03 | End: 2020-09-05 | Stop reason: HOSPADM

## 2020-09-03 RX ORDER — DEXTROSE MONOHYDRATE 25 G/50ML
12.5 INJECTION, SOLUTION INTRAVENOUS PRN
Status: DISCONTINUED | OUTPATIENT
Start: 2020-09-03 | End: 2020-09-05 | Stop reason: HOSPADM

## 2020-09-03 RX ORDER — ONDANSETRON 2 MG/ML
4 INJECTION INTRAMUSCULAR; INTRAVENOUS EVERY 6 HOURS PRN
Status: DISCONTINUED | OUTPATIENT
Start: 2020-09-03 | End: 2020-09-05 | Stop reason: HOSPADM

## 2020-09-03 RX ORDER — AMLODIPINE BESYLATE 5 MG/1
5 TABLET ORAL DAILY
Status: DISCONTINUED | OUTPATIENT
Start: 2020-09-03 | End: 2020-09-05 | Stop reason: HOSPADM

## 2020-09-03 RX ORDER — DEXTROSE MONOHYDRATE 50 MG/ML
100 INJECTION, SOLUTION INTRAVENOUS PRN
Status: DISCONTINUED | OUTPATIENT
Start: 2020-09-03 | End: 2020-09-05 | Stop reason: HOSPADM

## 2020-09-03 RX ORDER — NICOTINE POLACRILEX 4 MG
15 LOZENGE BUCCAL PRN
Status: DISCONTINUED | OUTPATIENT
Start: 2020-09-03 | End: 2020-09-05 | Stop reason: HOSPADM

## 2020-09-03 RX ADMIN — Medication 400 UNITS: at 14:28

## 2020-09-03 RX ADMIN — ROPINIROLE HYDROCHLORIDE 0.5 MG: 0.25 TABLET, FILM COATED ORAL at 14:28

## 2020-09-03 RX ADMIN — INSULIN LISPRO 4 UNITS: 100 INJECTION, SOLUTION INTRAVENOUS; SUBCUTANEOUS at 13:19

## 2020-09-03 RX ADMIN — GABAPENTIN 600 MG: 600 TABLET ORAL at 22:46

## 2020-09-03 RX ADMIN — INSULIN LISPRO 3 UNITS: 100 INJECTION, SOLUTION INTRAVENOUS; SUBCUTANEOUS at 22:47

## 2020-09-03 RX ADMIN — ROPINIROLE HYDROCHLORIDE 0.5 MG: 0.25 TABLET, FILM COATED ORAL at 00:14

## 2020-09-03 RX ADMIN — LOPERAMIDE HYDROCHLORIDE 2 MG: 2 CAPSULE ORAL at 12:23

## 2020-09-03 RX ADMIN — TICAGRELOR 90 MG: 90 TABLET ORAL at 00:14

## 2020-09-03 RX ADMIN — GABAPENTIN 600 MG: 600 TABLET ORAL at 10:49

## 2020-09-03 RX ADMIN — GABAPENTIN 600 MG: 600 TABLET ORAL at 00:14

## 2020-09-03 RX ADMIN — ROPINIROLE HYDROCHLORIDE 0.5 MG: 0.25 TABLET, FILM COATED ORAL at 10:50

## 2020-09-03 RX ADMIN — CLONAZEPAM 0.5 MG: 1 TABLET ORAL at 00:15

## 2020-09-03 RX ADMIN — PREGABALIN 25 MG: 25 CAPSULE ORAL at 00:14

## 2020-09-03 RX ADMIN — ISOSORBIDE MONONITRATE 30 MG: 30 TABLET ORAL at 14:29

## 2020-09-03 RX ADMIN — ONDANSETRON 4 MG: 2 INJECTION INTRAMUSCULAR; INTRAVENOUS at 12:35

## 2020-09-03 RX ADMIN — INSULIN LISPRO 10 UNITS: 100 INJECTION, SOLUTION INTRAVENOUS; SUBCUTANEOUS at 17:39

## 2020-09-03 RX ADMIN — CARVEDILOL 25 MG: 12.5 TABLET, FILM COATED ORAL at 10:50

## 2020-09-03 RX ADMIN — CLONAZEPAM 0.5 MG: 1 TABLET ORAL at 22:46

## 2020-09-03 RX ADMIN — CARVEDILOL 25 MG: 12.5 TABLET, FILM COATED ORAL at 17:39

## 2020-09-03 RX ADMIN — CETIRIZINE HYDROCHLORIDE 10 MG: 10 TABLET ORAL at 10:50

## 2020-09-03 RX ADMIN — AMLODIPINE BESYLATE 5 MG: 5 TABLET ORAL at 14:27

## 2020-09-03 RX ADMIN — ROPINIROLE HYDROCHLORIDE 0.5 MG: 0.25 TABLET, FILM COATED ORAL at 22:46

## 2020-09-03 RX ADMIN — LISINOPRIL 10 MG: 10 TABLET ORAL at 10:50

## 2020-09-03 RX ADMIN — PANTOPRAZOLE SODIUM 40 MG: 40 TABLET, DELAYED RELEASE ORAL at 10:50

## 2020-09-03 RX ADMIN — RIVAROXABAN 20 MG: 20 TABLET, FILM COATED ORAL at 14:30

## 2020-09-03 RX ADMIN — METFORMIN HYDROCHLORIDE 750 MG: 750 TABLET, EXTENDED RELEASE ORAL at 17:39

## 2020-09-03 RX ADMIN — Medication 81 MG: at 10:50

## 2020-09-03 RX ADMIN — ESCITALOPRAM OXALATE 20 MG: 10 TABLET ORAL at 10:50

## 2020-09-03 RX ADMIN — SODIUM CHLORIDE, PRESERVATIVE FREE 10 ML: 5 INJECTION INTRAVENOUS at 00:18

## 2020-09-03 RX ADMIN — ROSUVASTATIN CALCIUM 40 MG: 20 TABLET, FILM COATED ORAL at 14:29

## 2020-09-03 RX ADMIN — SODIUM CHLORIDE, POTASSIUM CHLORIDE, SODIUM LACTATE AND CALCIUM CHLORIDE: 600; 310; 30; 20 INJECTION, SOLUTION INTRAVENOUS at 10:58

## 2020-09-03 ASSESSMENT — PAIN SCALES - GENERAL
PAINLEVEL_OUTOF10: 0
PAINLEVEL_OUTOF10: 0
PAINLEVEL_OUTOF10: 5

## 2020-09-03 NOTE — PROGRESS NOTES
Signout taken from Dr. Kat Spivey in the ED. Patient is currently stable, was endorsing chest pain, and has extensive cardiac history including multiple stents. Cardiology is involved with this patient, will be n.p.o. after midnight in the case intervention is needed tomorrow as per cardiology.

## 2020-09-03 NOTE — DISCHARGE INSTR - COC
Continuity of Care Form    Patient Name: Mario Ordoñez   :  1945  MRN:  [id number]    Admit date:  2020  Discharge date:  ***    Code Status Order: Full Code   Advance Directives:   Advance Care Flowsheet Documentation     Date/Time Healthcare Directive Type of Healthcare Directive Copy in [address] Agent's Name Healthcare Agent's Phone Number    20 0000  No, patient does not have an advance directive for healthcare treatment -- -- -- -- --          Admitting Physician:  Reynaldo Zarate MD  PCP: Yogi Le MD    Discharging Nurse: yudi Swan [facility] [address] Unit/Room#: 0339/0339-02  Discharging Unit Phone Number: [phone]    Emergency Contact:   Extended Emergency Contact Information  Primary Emergency Contact: Rah Matthews  Address: [place]  Home Phone: [phone]  Relation: Child  Secondary Emergency Contact: Frank Gracia. Phone: [phone]  Mobile Phone: [phone]  Relation: Child    Past Surgical History:  Past Surgical History:   Procedure Laterality Date    APPENDECTOMY      CARDIAC CATHETERIZATION          CORONARY ANGIOPLASTY WITH STENT PLACEMENT  2017    4 SYNERGY HEART STENTS DRUG ELUTING ALL MRI CONDITONAL 3T OK, SAFE IMMEDIATELY.      CORONARY ANGIOPLASTY WITH STENT PLACEMENT  2012    XIENCE HEART STENT/ MRI CONDITIONAL 3T OK, SAFE IMMEDIATELY    PTCA         Immunization History:   Immunization History   Administered Date(s) Administered    Influenza A (P1P4-48) Vaccine PF IM 2009    Influenza Vaccine, unspecified formulation 2013, 10/22/2015, 2016    Influenza Virus Vaccine 10/03/2016    Influenza, High Dose (Fluzone 65 yrs and older) 10/12/2018, 2019    Influenza, Quadv, IM, PF (6 mo and older Fluzone, Flulaval, Fluarix, and 3 yrs and older Afluria) 2018    Pneumococcal Conjugate 13-valent (Nlysikk19) 2017    Pneumococcal Polysaccharide (Pyaxjeomk23) 2012 Active Problems:  Patient Active Problem List   Diagnosis Code    Atypical chest pain R07.89    Type 2 diabetes mellitus with diabetic polyneuropathy, with long-term current use of insulin ([facility]) E11.42, Z79.4    Vertigo R42    Essential hypertension I10    CAD (coronary artery disease) I25.10    Dyspnea R06.00    Claud[facility] in [facility] I73.9    Acute pulmonary embolism without acute cor pulmonale ([facility]) I26.99    Peripheral artery disease ([facility]) I73.9    Diabetic polyneuropathy associated with type 2 diabetes mellitus ([facility] E11.42    Other hyperlipidemia E78.49    Chronic systolic heart failure ([facility]) I50.22    Multiple subsegmental pulmonary emboli without acute cor pulmonale I26.94    Congestive heart failure ([facility]) I50.9    Pyelonephritis of right kidney N12    History of pulmonary embolism Z86.711    Elevated troponin I level R79.89    Sepsis ([facility]) A41.9    Suspected COVID-19 virus infection Z20.828    MARIELLE (acute kidney injury) ([facility]) N17.9    Diarrhea of presumed infectious origin R19.7    Chronic diastolic (congestive) heart failure ([facility]) I50.32    Generalized weakness R53.1    Anemia, normocytic normochromic D64.9    Class 1 obesity in adult E66.9    Gastroenteritis K52.9       Isolation/Infection:   Isolation          No Isolation        Patient Infection Status     Infection Onset Added Last Indicated Last Indicated By Review Planned Expiration Resolved Resolved By    None active    Resolved    C-diff Rule Out 09/02/20 09/02/20 09/02/20 Gastrointestinal Panel, Molecular (Ordered)   09/03/20 Megan Meza RN    C-diff is not tested in GI Panel    C-diff Rule Out 07/13/20 07/13/20 07/13/20 C DIFF TOXIN/ANTIGEN (Ordered)   07/14/20 Megan Meza RN    COVID-19 Rule Out 07/12/20 07/12/20 07/12/20 COVID-19 (Ordered)   07/13/20 Rule-Out Test Resulted          Nurse Assessment:  Last Vital Signs: BP (!) 190/79   Pulse 68   Temp 98.2 °F (36.8 °C) (Oral) Resp 18   Ht 5' 6\" (1.676 m)   Wt 218 lb 8 oz (99.1 kg)   SpO2 100%   BMI 35.27 kg/m²     Last documented pain score (0-10 scale): Pain Level: 0  Last Weight:   Wt Readings from Last 1 Encounters:   09/03/20 218 lb 8 oz (99.1 kg)     Mental Status:  oriented    IV Access:  - None    Nursing Mobility/ADLs:  Walking   Assisted  Transfer  Assisted  Bathing  Assisted  Dressing  Assisted  Toileting  Independent  Feeding  Independent  Med Admin  Independent  Med Delivery   whole    Wound Care Documentation and Therapy:        Elimination:  Continence:   · Bowel: Yes  · Bladder: Yes  Urinary Catheter: {Urinary Catheter:[id number]}   Colostomy/Ileostomy/Ileal Conduit: No       Date of Last BM: ***  No intake or output data in the 24 hours ending 09/03/20 1027  No intake/output data recorded. Safety Concerns: At Risk for Falls    Impairments/Disabilities:      [address] [place] [address] Impairments/Disabilities:[id number]}    Nutrition Therapy:  Current Nutrition Therapy:   - Oral Diet:  General    Routes of Feeding: Oral  Liquids: No Restrictions  Daily Fluid Restriction: yes - amount ***  Last Modified Barium Swallow with Video (Video Swallowing Test): not done    Treatments at the Time of Hospital Discharge:   Respiratory Treatments: ***  Oxygen Therapy:  is not on home oxygen therapy.   Ventilator:    - No ventilator support    Rehab Therapies: Physical Therapy and Occupational Therapy  Weight Bearing Status/Restrictions: No weight bearing restirctions  Other Medical Equipment (for information only, NOT a DME order):  walker  Other Treatments: ***    Patient's personal belongings (please select all that are sent with patient):  {[facility] DME Belongings:[id number]}    RN SIGNATURE:  Electronically signed by Gem Croft RN on 9/5/20 at 1:00 PM EDT    CASE MANAGEMENT/SOCIAL WORK SECTION    Inpatient Status Date: ***    Readmission Risk Assessment Score:  Readmission Risk              Risk of Unplanned Readmission:        0 Discharging to Facility/ Agency   · Name: [facility]   · [facility]  [phone]  · Fax:[phone]    Dialysis Facility (if applicable)   · Name:  · Address:  · Dialysis Schedule:  · Phone:  · Fax:    / signature: Electronically signed by Brennan Washington RN on 9/3/20 at 10:27 AM EDT    PHYSICIAN SECTION    Prognosis: Good    Condition at Discharge: Stable    Rehab Potential (if transferring to Rehab): Good    Recommended Labs or Other Treatments After Discharge:      Physician Certification: I certify the above information and transfer of Hosea Pike  is necessary for the continuing treatment of the diagnosis listed and that she requires Home Care for greater 30 days.      Update Admission H&P: No change in H&P    PHYSICIAN SIGNATURE:  Electronically signed by Howie Benoit MD on 9/4/20 at 12:30 PM EDT

## 2020-09-03 NOTE — FLOWSHEET NOTE
Assessment: Patient was awake and oriented when  visited. Family was not present at the time. Patient was in good spirit and seemed to be doing well. When asked how she was feeling, patient responded; \"better. \" Patient was raised Jainism.   Intervention:  provided presence, offered support, prayed with patient and reassured her that she was in good hands. Outcome: Patient was very appreciative of the prayer and spiritual support she received. Follow up visits recommended for more spiritual and emotional support. 09/03/20 1539   Encounter Summary   Services provided to: Patient   Support System Family members   Place of [address] Visiting   (09/03/2020)   Complexity of Encounter Moderate   Length of Encounter 15 minutes   Spiritual Assessment Completed Yes   Routine   Type Initial   Assessment Calm; Approachable; Hopeful   Intervention Active listening;Nurtured hope;Prayer;[place];Empowerment   Outcome Expressed gratitude   Spiritual/Confucianist   Type Spiritual support

## 2020-09-03 NOTE — CARE COORDINATION
Case Management Initial Discharge Plan  Reinaldo Vega,             Met with:patient to discuss discharge plans. Information verified: address, contacts, phone number, , insurance Yes    Emergency Contact/Next of Kin name & number: Larrie Kocher @ [phone]    PCP: Halie Vallejo MD  Date of last visit: month     Insurance Provider: John Coffey     Discharge Planning    Living Arrangements:  Family Members   Support Systems:  Family Members    Home has 1 stories  3 stairs to climb to get into front door, none stairs to climb to reach second floor  Location of bedroom/bathroom in home main     Patient able to perform ADL's:Assisted    Current Services (outpatient & in home) [facility]   DME equipment: walker, cane   DME provider: na    Receiving oral anticoagulation therapy? Yes    If indicated:   Physician managing anticoagulation treatment: Dr Nica Seaman  Where does patient obtain lab work for ATC treatment? Dr Nica Seaman. Patient is on brilinta and xarelto      Potential Assistance Needed:  Home Care    Patient agreeable to home care: Yes  Freedom of choice provided:  yes    Prior SNF/Rehab Placement and Facility: none  Agreeable to SNF/Rehab: No  [place] of choice provided: n/a     Evaluation: n/a    Expected Discharge date:       Patient expects to be discharged to:  home  Follow Up Appointment: Best Day/ Time:      Transportation provider: family   Transportation arrangements needed for discharge: No    Readmission Risk              Risk of Unplanned Readmission:        0             Does patient have a readmission risk score greater than 14?: No  If yes, follow-up appointment must be made within 7 days of discharge. Goals of Care: feel better breathe easier       Discharge Plan: DC to home with current services- [facility].  Has established pcp care, family to transport , rewquesting walker with wheels, glucometer          Electronically signed by Charley Farr RN on 9/3/20 at

## 2020-09-03 NOTE — PROGRESS NOTES
[facility]  Documentation Note                Admission Dx: Shortness of breath [R06.02]    Past Medical History:   has a past medical history of Anxiety, Benign positional vertigo, CAD (coronary artery disease), Chest pain, Depression, Diabetes mellitus ([facility] Diabetic neuropathy ([facility] Hyperlipidemia, Hypertension, and Migraine. Previous Testing:    STRESS 7/14/2020: No ischemia/infarct. Normal LVSF.     ECHO 11/21/19: EF 50%, grade III DD, LA is moderately dilated, trivial AI, mild MR. CATH 2/25/17:   Multi-vessel coronary artery disease. Normal LV contractility. Successful PCI / Drug Eluting Stent of the proximal Posterolateral Coronary Artery. Successful PCI / Drug Eluting Stent of the mid Right Coronary Artery. Successful PCI / Drug Eluting Stent of the proximal Posterior Descending Coronary Artery. Successful PCI / Drug Eluting Stent of the distal Left Anterior Descending Coronary Artery. Previous office/hospital visit:   SERA Rodriguez NP 7/14/2020:   1. Chest pain  2. Hx of CAD/MVD as above  3. Leg pain  4. HTD  5. Chronic diastolic CHF    Plan --  Await stress test findings. If low risk/no ischemia will be OK for discharge from CV standpoint with OP f/u with her Cardiologist in 1-2 weeks.      Isreal Dwyer [facility]

## 2020-09-03 NOTE — H&P
[facility]  CDU / OBSERVATION eNCOUnter  Resident Note     Pt Name: Monica Green  MRN: [id number]  [place] 1945  Date of evaluation: 9/3/20  Patient's PCP is : Croky Landrum MD    CHIEF COMPLAINT       Chief Complaint   Patient presents with    Shortness of Breath    Diarrhea         HISTORY OF PRESENT ILLNESS    Monica Green is a 76 y.o. female who presents with dyspnea and diarrhea. Patient reports having had diarrhea over the past 3 days with intermittent episodes of dyspnea. Patient reports multiple episodes of diarrhea each day, following any attempts to eat. She also has trouble catching her breath after ambulation or prolonged standing. .  She has a history of coronary artery disease stent placement and was recently admitted to the hospital and had a stress test, which showed a medium risk. Location/Symptom: Dyspnea  Timing/Onset: 3 days  Provocation: Ambulation  Quality: Not applicable  Radiation: Not applicable  Severity: Moderate  Timing/Duration: Intermittent  Modifying Factors: Worsened with ambulation and prolonged standing, improved with rest    REVIEW OF SYSTEMS       General ROS - No fevers, No malaise   Ophthalmic ROS - No discharge, No changes in vision  ENT ROS -  No sore throat, No rhinorrhea,   Respiratory ROS - no shortness of breath, no cough, no  wheezing  Cardiovascular ROS -symptoms as listed in HPI. No jes chest pain. Gastrointestinal ROS - No abdominal pain, no nausea or vomiting, no change in bowel habits, no black or bloody stools  Genito-Urinary ROS - No dysuria, trouble voiding, or hematuria  Musculoskeletal ROS - No myalgias, No arthalgias  Neurological ROS - No headache, no dizziness/lightheadedness, No focal weakness, no loss of sensation  Dermatological ROS - No lesions, No rash     (PQRS) Advance directives on face sheet per hospital policy.  No change unless specifically mentioned in chart    PAST MEDICAL HISTORY    has a past medical history of Anxiety, Benign positional vertigo, CAD (coronary artery disease), Chest pain, Depression, Diabetes mellitus ([facility] Diabetic neuropathy ([facility] Hyperlipidemia, Hypertension, and Migraine. I have reviewed the past medical history with the patient and it is potentially pertinent to this complaint. SURGICAL HISTORY      has a past surgical history that includes Percutaneous Transluminal Coronary Angio; Cardiac catheterization; Coronary angioplasty with stent (02/25/2017); Appendectomy; and Coronary angioplasty with stent (07/11/2012). I have reviewed and agree with Surgical History entered and it is potentially pertinent to this complaint. CURRENT MEDICATIONS     albuterol sulfate  (90 Base) MCG/ACT inhaler 2 puff, Q6H PRN  aspirin EC tablet 81 mg, Daily  carvedilol (COREG) tablet 25 mg, BID WC  clonazePAM (KLONOPIN) tablet 0.5 mg, Nightly  escitalopram (LEXAPRO) tablet 20 mg, Daily  gabapentin (NEURONTIN) tablet 600 mg, TID  hydrOXYzine (ATARAX) tablet 25 mg, TID PRN  insulin lispro (HUMALOG) injection vial 0-12 Units, TID WC  insulin lispro (HUMALOG) injection vial 0-6 Units, Nightly  isosorbide mononitrate (IMDUR) extended release tablet 30 mg, Daily  alogliptin (NESINA) tablet 25 mg, Daily  lisinopril (PRINIVIL;ZESTRIL) tablet 10 mg, Daily  cetirizine (ZYRTEC) tablet 10 mg, Daily  metFORMIN (GLUCOPHAGE-XR) extended release tablet 750 mg, BID WC  pantoprazole (PROTONIX) tablet 40 mg, QAM AC  pregabalin (LYRICA) capsule 25 mg, TID  rivaroxaban (XARELTO) tablet 20 mg, Daily with breakfast  rOPINIRole (REQUIP) tablet 0.5 mg, TID  rosuvastatin (CRESTOR) tablet 40 mg, Daily  ticagrelor (BRILINTA) tablet 90 mg, BID  vitamin E capsule 400 Units, Daily  sodium chloride flush 0.9 % injection 10 mL, 2 times per day  sodium chloride flush 0.9 % injection 10 mL, PRN  insulin glargine (LANTUS) injection vial 70 Units, Nightly        All medication charted and reviewed.     ALLERGIES     is suspicious osseous abnormality. No acute process in the abdomen or pelvis. Chronic incidental findings described above. Xr Chest Portable    Result Date: 9/2/2020  EXAMINATION: ONE XRAY VIEW OF THE CHEST 9/2/2020 3:17 pm COMPARISON: 07/12/2020. HISTORY: ORDERING SYSTEM PROVIDED HISTORY: shortness of breath TECHNOLOGIST PROVIDED HISTORY: shortness of breath Reason for Exam: upright portable FINDINGS: The cardiomediastinal silhouette is unremarkable. The lungs are clear. No infiltrate, pleural fluid or evidence of overt failure. Mild apical fibrotic changes, stable. No acute cardiopulmonary disease. LABS:  I have reviewed and interpreted all available lab results.   Labs Reviewed   CBC WITH AUTO DIFFERENTIAL - Abnormal; Notable for the following components:       Result Value    Lymphocytes 44 (*)     All other components within normal limits   COMPREHENSIVE METABOLIC PANEL W/ REFLEX TO MG FOR LOW K - Abnormal; Notable for the following components:    Glucose 177 (*)     CREATININE 0.91 (*)     All other components within normal limits   TROPONIN - Abnormal; Notable for the following components:    Troponin, High Sensitivity 28 (*)     All other components within normal limits   TROPONIN - Abnormal; Notable for the following components:    Troponin, High Sensitivity 30 (*)     All other components within normal limits   URINALYSIS WITH MICROSCOPIC - Abnormal; Notable for the following components:    Turbidity UA CLOUDY (*)     Glucose, Ur 1+ (*)     Ketones, Urine SMALL (*)     Specific Gravity, UA 1.042 (*)     Protein, UA TRACE (*)     Bacteria, UA FEW (*)     Mucus, UA 1+ (*)     All other components within normal limits   BASIC METABOLIC PANEL - Abnormal; Notable for the following components:    Glucose 222 (*)     Potassium 3.4 (*)     All other components within normal limits   CBC WITH AUTO DIFFERENTIAL - Abnormal; Notable for the following components:    RBC 3.91 (*)     Hemoglobin 11.5 (*)     Lymphocytes 47 (*)     All other components within normal limits   TROPONIN - Abnormal; Notable for the following components:    Troponin, High Sensitivity 35 (*)     All other components within normal limits   POC GLUCOSE FINGERSTICK - Abnormal; Notable for the following components:    POC Glucose 247 (*)     All other components within normal limits   GASTROINTESTINAL PANEL, MOLECULAR   LIPASE   FECAL LACTOFERRIN   GIARDIA / CRYPTOSPORIDUM ANTIGENS       SCREENING TOOLS:    HEART Risk Score for Chest Pain Patients   History and Physical Exam Suspicion Level  (Nausea, Vomiting, Diaphoresis, Radiation, Exertion)   Slightly Suspicious (0 pts)   Moderately Suspicious (1 pt)   Highly Suspicious (2 pts)   EKG Interpretation   Normal (0 pts)   Non-Specific Repolarization Disturbance (1 pt)   Significant ST-Depression (2 pts)   Age of Patient (in years)   = 39 (0 pts)   46-64 (1 pt)   = 65 (2 pts)   Risk Factors   No Risk Factors (0 pts)   1-2 Risk Factors (1 pt)   = 3 Risk Factors (2 pts)   Risk Factors Include:   Hypercholesterolemia   Hypertension   Diabetes Mellitus   Cigarette smoking   Positive family history   Obesity   CAD   (SLE, CKDz, HIV, Cocaine abuse)   Troponin Levels   = Normal Limit (0 pts)   1-3 Times Normal Limit (1 pt)   > 3 Times Normal Limit (2 pts)  TOTAL: 6    Percent Risk for Major Adverse Cardiac Event (MACE)  0-3 pts indicates low risk for MACE   2.5% (DISCHARGE)   4-7 pts indicates moderate risk for MACE  20.3% (OBS)  8-10 pts indicates high risk for MACE  72.7% (EARLY INVASIVE TX)    CDU IMPRESSION / PLAN      Harmony Balderrama is a 76 y.o. female who presents with 3 days of diarrhea and intermittent shortness of breath. Work-up was done in the emergency department, including chest x-ray, EKG and CT of the abdomen. X-ray and CT were negative for any acute processes. EKG was nonspecific. However, her troponin was elevated to 35 on initial measurement and 30 on repeat measurement.   It was determined that patient required additional evaluation by cardiology and was therefore admitted to the observation unit. · Follow-up cardiology recommendations  · Monitor for additional loose bowel movements  · Continue home medications and pain control  · Monitor vitals, labs, and imaging  · DISPO: pending consults and clinical improvement    CONSULTS:    IP CONSULT TO CARDIOLOGY    PROCEDURES:  Not indicated       PATIENT REFERRED TO:    Rigoberto Latasha, [address] [place] [address]            --  Tom Scales MD   Emergency Medicine Resident     This dictation was generated by voice recognition computer software. Although all attempts are made to edit the dictation for accuracy, there may be errors in the transcription that are not intended.

## 2020-09-04 LAB
ABSOLUTE EOS #: 0.24 K/UL (ref 0–0.44)
ABSOLUTE IMMATURE GRANULOCYTE: <0.03 K/UL (ref 0–0.3)
ABSOLUTE LYMPH #: 2.52 K/UL (ref 1.1–3.7)
ABSOLUTE MONO #: 0.5 K/UL (ref 0.1–1.2)
ANION GAP SERPL CALCULATED.3IONS-SCNC: 11 MMOL/L (ref 9–17)
ANION GAP SERPL CALCULATED.3IONS-SCNC: 8 MMOL/L (ref 9–17)
BASOPHILS # BLD: 0 % (ref 0–2)
BASOPHILS ABSOLUTE: <0.03 K/UL (ref 0–0.2)
BUN BLDV-MCNC: 20 MG/DL (ref 8–23)
BUN BLDV-MCNC: 20 MG/DL (ref 8–23)
BUN/CREAT BLD: ABNORMAL (ref 9–20)
BUN/CREAT BLD: ABNORMAL (ref 9–20)
CALCIUM SERPL-MCNC: 8.9 MG/DL (ref 8.6–10.4)
CALCIUM SERPL-MCNC: 8.9 MG/DL (ref 8.6–10.4)
CAMPYLOBACTER PCR: NORMAL
CHLORIDE BLD-SCNC: 102 MMOL/L (ref 98–107)
CHLORIDE BLD-SCNC: 99 MMOL/L (ref 98–107)
CO2: 23 MMOL/L (ref 20–31)
CO2: 26 MMOL/L (ref 20–31)
CREAT SERPL-MCNC: 1.35 MG/DL (ref 0.5–0.9)
CREAT SERPL-MCNC: 1.71 MG/DL (ref 0.5–0.9)
DIFFERENTIAL TYPE: ABNORMAL
DIRECT EXAM: NORMAL
DIRECT EXAM: NORMAL
E COLI ENTEROTOXIGENIC PCR: NORMAL
EKG ATRIAL RATE: 71 BPM
EKG ATRIAL RATE: 72 BPM
EKG P AXIS: 73 DEGREES
EKG P AXIS: 93 DEGREES
EKG P-R INTERVAL: 144 MS
EKG P-R INTERVAL: 146 MS
EKG Q-T INTERVAL: 410 MS
EKG Q-T INTERVAL: 424 MS
EKG QRS DURATION: 100 MS
EKG QRS DURATION: 86 MS
EKG QTC CALCULATION (BAZETT): 448 MS
EKG QTC CALCULATION (BAZETT): 460 MS
EKG R AXIS: 13 DEGREES
EKG R AXIS: 31 DEGREES
EKG T AXIS: 130 DEGREES
EKG T AXIS: 132 DEGREES
EKG VENTRICULAR RATE: 71 BPM
EKG VENTRICULAR RATE: 72 BPM
EOSINOPHILS RELATIVE PERCENT: 4 % (ref 1–4)
GFR AFRICAN AMERICAN: 35 ML/MIN
GFR AFRICAN AMERICAN: 46 ML/MIN
GFR NON-AFRICAN AMERICAN: 29 ML/MIN
GFR NON-AFRICAN AMERICAN: 38 ML/MIN
GFR SERPL CREATININE-BSD FRML MDRD: ABNORMAL ML/MIN/{1.73_M2}
GLUCOSE BLD-MCNC: 183 MG/DL (ref 65–105)
GLUCOSE BLD-MCNC: 194 MG/DL (ref 65–105)
GLUCOSE BLD-MCNC: 209 MG/DL (ref 70–99)
GLUCOSE BLD-MCNC: 218 MG/DL (ref 65–105)
GLUCOSE BLD-MCNC: 230 MG/DL (ref 65–105)
GLUCOSE BLD-MCNC: 230 MG/DL (ref 70–99)
HCT VFR BLD CALC: 31.8 % (ref 36.3–47.1)
HEMOGLOBIN: 9.9 G/DL (ref 11.9–15.1)
IMMATURE GRANULOCYTES: 0 %
LYMPHOCYTES # BLD: 40 % (ref 24–43)
Lab: NORMAL
MCH RBC QN AUTO: 30.3 PG (ref 25.2–33.5)
MCHC RBC AUTO-ENTMCNC: 31.1 G/DL (ref 28.4–34.8)
MCV RBC AUTO: 97.2 FL (ref 82.6–102.9)
MONOCYTES # BLD: 8 % (ref 3–12)
NRBC AUTOMATED: 0 PER 100 WBC
PDW BLD-RTO: 13.1 % (ref 11.8–14.4)
PLATELET # BLD: 167 K/UL (ref 138–453)
PLATELET ESTIMATE: ABNORMAL
PLESIOMONAS SHIGELLOIDES PCR: NORMAL
PMV BLD AUTO: 11.8 FL (ref 8.1–13.5)
POTASSIUM SERPL-SCNC: 3.9 MMOL/L (ref 3.7–5.3)
POTASSIUM SERPL-SCNC: 3.9 MMOL/L (ref 3.7–5.3)
RBC # BLD: 3.27 M/UL (ref 3.95–5.11)
RBC # BLD: ABNORMAL 10*6/UL
SALMONELLA PCR: NORMAL
SEG NEUTROPHILS: 48 % (ref 36–65)
SEGMENTED NEUTROPHILS ABSOLUTE COUNT: 3.03 K/UL (ref 1.5–8.1)
SHIGATOXIN GENE PCR: NORMAL
SHIGELLA SP PCR: NORMAL
SODIUM BLD-SCNC: 133 MMOL/L (ref 135–144)
SODIUM BLD-SCNC: 136 MMOL/L (ref 135–144)
SPECIMEN DESCRIPTION: NORMAL
SPECIMEN DESCRIPTION: NORMAL
TOTAL CK: 61 U/L (ref 26–192)
VIBRIO PCR: NORMAL
WBC # BLD: 6.3 K/UL (ref 3.5–11.3)
WBC # BLD: ABNORMAL 10*3/UL
YERSINIA ENTEROCOLITICA PCR: NORMAL

## 2020-09-04 PROCEDURE — 82570 ASSAY OF URINE CREATININE: CPT

## 2020-09-04 PROCEDURE — G0378 HOSPITAL OBSERVATION PER HR: HCPCS

## 2020-09-04 PROCEDURE — 82947 ASSAY GLUCOSE BLOOD QUANT: CPT

## 2020-09-04 PROCEDURE — 6370000000 HC RX 637 (ALT 250 FOR IP): Performed by: STUDENT IN AN ORGANIZED HEALTH CARE EDUCATION/TRAINING PROGRAM

## 2020-09-04 PROCEDURE — 97166 OT EVAL MOD COMPLEX 45 MIN: CPT

## 2020-09-04 PROCEDURE — 6370000000 HC RX 637 (ALT 250 FOR IP): Performed by: HEALTH CARE PROVIDER

## 2020-09-04 PROCEDURE — 85025 COMPLETE CBC W/AUTO DIFF WBC: CPT

## 2020-09-04 PROCEDURE — 80048 BASIC METABOLIC PNL TOTAL CA: CPT

## 2020-09-04 PROCEDURE — 36415 COLL VENOUS BLD VENIPUNCTURE: CPT

## 2020-09-04 PROCEDURE — 97162 PT EVAL MOD COMPLEX 30 MIN: CPT

## 2020-09-04 PROCEDURE — 82550 ASSAY OF CK (CPK): CPT

## 2020-09-04 PROCEDURE — 2580000003 HC RX 258: Performed by: EMERGENCY MEDICINE

## 2020-09-04 PROCEDURE — 84300 ASSAY OF URINE SODIUM: CPT

## 2020-09-04 PROCEDURE — 97530 THERAPEUTIC ACTIVITIES: CPT

## 2020-09-04 PROCEDURE — 97535 SELF CARE MNGMENT TRAINING: CPT

## 2020-09-04 RX ORDER — METFORMIN HYDROCHLORIDE 500 MG/1
500 TABLET, EXTENDED RELEASE ORAL 2 TIMES DAILY WITH MEALS
Status: DISCONTINUED | OUTPATIENT
Start: 2020-09-04 | End: 2020-09-05 | Stop reason: HOSPADM

## 2020-09-04 RX ORDER — MECLIZINE HCL 12.5 MG/1
25 TABLET ORAL 3 TIMES DAILY PRN
Status: DISCONTINUED | OUTPATIENT
Start: 2020-09-04 | End: 2020-09-05 | Stop reason: HOSPADM

## 2020-09-04 RX ADMIN — CLONAZEPAM 0.5 MG: 1 TABLET ORAL at 20:06

## 2020-09-04 RX ADMIN — ROPINIROLE HYDROCHLORIDE 0.5 MG: 0.25 TABLET, FILM COATED ORAL at 20:01

## 2020-09-04 RX ADMIN — RIVAROXABAN 20 MG: 20 TABLET, FILM COATED ORAL at 10:33

## 2020-09-04 RX ADMIN — ROSUVASTATIN CALCIUM 40 MG: 20 TABLET, FILM COATED ORAL at 10:34

## 2020-09-04 RX ADMIN — INSULIN LISPRO 4 UNITS: 100 INJECTION, SOLUTION INTRAVENOUS; SUBCUTANEOUS at 18:40

## 2020-09-04 RX ADMIN — INSULIN LISPRO 2 UNITS: 100 INJECTION, SOLUTION INTRAVENOUS; SUBCUTANEOUS at 10:51

## 2020-09-04 RX ADMIN — INSULIN LISPRO 2 UNITS: 100 INJECTION, SOLUTION INTRAVENOUS; SUBCUTANEOUS at 12:54

## 2020-09-04 RX ADMIN — SODIUM CHLORIDE, POTASSIUM CHLORIDE, SODIUM LACTATE AND CALCIUM CHLORIDE: 600; 310; 30; 20 INJECTION, SOLUTION INTRAVENOUS at 18:45

## 2020-09-04 RX ADMIN — Medication 400 UNITS: at 10:33

## 2020-09-04 RX ADMIN — METFORMIN HYDROCHLORIDE 500 MG: 500 TABLET, EXTENDED RELEASE ORAL at 18:40

## 2020-09-04 RX ADMIN — GABAPENTIN 600 MG: 600 TABLET ORAL at 20:02

## 2020-09-04 RX ADMIN — GABAPENTIN 600 MG: 600 TABLET ORAL at 10:47

## 2020-09-04 RX ADMIN — INSULIN LISPRO 2 UNITS: 100 INJECTION, SOLUTION INTRAVENOUS; SUBCUTANEOUS at 20:11

## 2020-09-04 RX ADMIN — ESCITALOPRAM OXALATE 20 MG: 10 TABLET ORAL at 10:35

## 2020-09-04 RX ADMIN — GABAPENTIN 600 MG: 600 TABLET ORAL at 14:06

## 2020-09-04 RX ADMIN — ROPINIROLE HYDROCHLORIDE 0.5 MG: 0.25 TABLET, FILM COATED ORAL at 10:43

## 2020-09-04 RX ADMIN — Medication 81 MG: at 10:35

## 2020-09-04 RX ADMIN — CETIRIZINE HYDROCHLORIDE 10 MG: 10 TABLET ORAL at 10:35

## 2020-09-04 RX ADMIN — PANTOPRAZOLE SODIUM 40 MG: 40 TABLET, DELAYED RELEASE ORAL at 10:34

## 2020-09-04 RX ADMIN — INSULIN GLARGINE 70 UNITS: 100 INJECTION, SOLUTION SUBCUTANEOUS at 20:11

## 2020-09-04 RX ADMIN — CARVEDILOL 25 MG: 12.5 TABLET, FILM COATED ORAL at 18:39

## 2020-09-04 RX ADMIN — ROPINIROLE HYDROCHLORIDE 0.5 MG: 0.25 TABLET, FILM COATED ORAL at 14:06

## 2020-09-04 ASSESSMENT — PAIN DESCRIPTION - FREQUENCY: FREQUENCY: CONTINUOUS

## 2020-09-04 ASSESSMENT — PAIN DESCRIPTION - DESCRIPTORS
DESCRIPTORS: DISCOMFORT
DESCRIPTORS: DISCOMFORT

## 2020-09-04 ASSESSMENT — PAIN DESCRIPTION - LOCATION
LOCATION: CHEST;ABDOMEN
LOCATION: CHEST;ABDOMEN

## 2020-09-04 ASSESSMENT — PAIN SCALES - GENERAL
PAINLEVEL_OUTOF10: 5
PAINLEVEL_OUTOF10: 5

## 2020-09-04 ASSESSMENT — PAIN DESCRIPTION - PAIN TYPE
TYPE: CHRONIC PAIN
TYPE: CHRONIC PAIN

## 2020-09-04 ASSESSMENT — PAIN - FUNCTIONAL ASSESSMENT: PAIN_FUNCTIONAL_ASSESSMENT: PREVENTS OR INTERFERES SOME ACTIVE ACTIVITIES AND ADLS

## 2020-09-04 NOTE — PROGRESS NOTES
OBS/CDU   RESIDENT NOTE      Patients PCP is: Mekhi Dunn MD        SUBJECTIVE      No acute events overnight. Has been able to tolerate a full liquid diet without any additional episodes of diarrhea. No longer complaining of any shortness of breath. PHYSICAL EXAM      General: NAD, AO X 3  Heent: EMOI, PERRL  Neck: SUPPLE, NO JVD  Cardiovascular: RRR, S1S2  Pulmonary: CTAB, NO SOB  Abdomen: SOFT, NTTP, ND, +BS  Extremities: +2/4 PULSES DISTAL, NO SWELLING  Neuro / Psych: NO NUMBNESS OR TINGLING, MENTATION AT BASELINE    PERTINENT TEST /EXAMS      I have reviewed all available laboratory results.     MEDICATIONS CURRENT   lactated ringers infusion, Continuous  amLODIPine (NORVASC) tablet 5 mg, Daily  loperamide (IMODIUM) capsule 2 mg, 4x Daily PRN  ondansetron (ZOFRAN) injection 4 mg, Q6H PRN  glucose (GLUTOSE) 40 % oral gel 15 g, PRN  dextrose 50 % IV solution, PRN  glucagon (rDNA) injection 1 mg, PRN  dextrose 5 % solution, PRN  albuterol sulfate  (90 Base) MCG/ACT inhaler 2 puff, Q6H PRN  aspirin EC tablet 81 mg, Daily  carvedilol (COREG) tablet 25 mg, BID WC  clonazePAM (KLONOPIN) tablet 0.5 mg, Nightly  escitalopram (LEXAPRO) tablet 20 mg, Daily  gabapentin (NEURONTIN) tablet 600 mg, TID  hydrOXYzine (ATARAX) tablet 25 mg, TID PRN  insulin lispro (HUMALOG) injection vial 0-12 Units, TID WC  insulin lispro (HUMALOG) injection vial 0-6 Units, Nightly  isosorbide mononitrate (IMDUR) extended release tablet 30 mg, Daily  alogliptin (NESINA) tablet 25 mg, Daily  lisinopril (PRINIVIL;ZESTRIL) tablet 10 mg, Daily  cetirizine (ZYRTEC) tablet 10 mg, Daily  metFORMIN (GLUCOPHAGE-XR) extended release tablet 750 mg, BID WC  pantoprazole (PROTONIX) tablet 40 mg, QAM AC  rivaroxaban (XARELTO) tablet 20 mg, Daily with breakfast  rOPINIRole (REQUIP) tablet 0.5 mg, TID  rosuvastatin (CRESTOR) tablet 40 mg, Daily  vitamin E capsule 400 Units, Daily  sodium chloride flush 0.9 % injection 10 mL, 2 times per day  sodium chloride flush 0.9 % injection 10 mL, PRN  insulin glargine (LANTUS) injection vial 70 Units, Nightly        All medication charted and reviewed. CONSULTS      IP CONSULT TO CARDIOLOGY    ASSESSMENT/PLAN       Ros Gamez is a 76 y.o. female who presents with diarrhea shortness of breath. Symptoms have fully resolved. · Advance to carb controlled diet. · PT/OT assessment for any outpatient needs. · Continue home medications   · Monitor vitals, labs, and imaging  · DISPO: OK home today    --  Seda Guillen  Emergency Medicine Resident Physician     This dictation was generated by voice recognition computer software. Although all attempts are made to edit the dictation for accuracy, there may be errors in the transcription that are not intended.

## 2020-09-04 NOTE — PROGRESS NOTES
[facility]  CDU / OBSERVATION eNCOUnter  Attending NOte       I performed a history and physical examination of the patient and discussed management with the resident. I reviewed the residents note and agree with the documented findings and plan of care. Any areas of disagreement are noted on the chart. I was personally present for the key portions of any procedures. I have documented in the chart those procedures where I was not present during the key portions. I have reviewed the nurses notes. I agree with the chief complaint, past medical history, past surgical history, allergies, medications, social and family history as documented unless otherwise noted below. The Family history, social history, and ROS are effectively unchanged since admission unless noted elsewhere in the chart. Patient with weakness and fatigue secondary to apparent dehydration. Patient with significant nausea and vomiting beginning on Sunday with symptoms on Monday of weakness and discomfort. Patient was admitted in part for cardiology evaluation. I did discuss the case with cardiology at some length and we reviewed her medication list.  Patient however had no chest pain shortness of breath per se or anginal symptoms but rather feelings of fatigue and weakness. Patient was assessed as being likely dehydrated requiring primarily GI treatment. Medications been reconciled with cardiology including stopping triple anticoagulation therapy. Patient was subsequently adjusted her medications and given IV fluids. Patient improved over the course of the day but we will watch overnight as well to advance diet slowly. The patient is diabetic. I have reviewed the patient's chart and found the most recent A1c is 9.2. This indicates reasonable control.  Will continue to follow-up with PCP      Kenny Rodrigues MD  Attending Emergency  Physician

## 2020-09-04 NOTE — PROGRESS NOTES
Physical Therapy    Facility/Department: [address] MED SURG  Initial Assessment    NAME: Clarence Nugent  : 1945  MRN: [id number]    Date of Service: 2020    Discharge Recommendations:    Further therapy recommended at discharge. PT Equipment Recommendations  Equipment Needed: Yes  Mobility Devices: [place]: Rolling    Assessment   Body structures, Functions, Activity limitations: Decreased functional mobility ; Decreased endurance;Decreased ROM; Decreased strength;Decreased balance;Decreased safe awareness  Assessment: Pt ambulated 25ft w/ RW Vanesa, demonstrating significant onset of dizziness with ambulation. Pt had one LOB throughout ambulation requiring modA from writer to correct. Pt is a high fall risk due to dizziness and would be unsafe to perform functional mobility unassisted at this time. Recommending continued skilled physical therapy to address functional mobility deficits and maximize independence. Prognosis: Good  Decision Making: Medium Complexity  PT Education: Goals;PT Role;Plan of Care;Transfer Training  REQUIRES PT FOLLOW UP: Yes  Activity Tolerance  Activity Tolerance: Other  Activity Tolerance: Limited due to dizziness       Patient Diagnosis(es): The primary encounter diagnosis was Shortness of breath. A diagnosis of Diarrhea, unspecified type was also pertinent to this visit. has a past medical history of Anxiety, Benign positional vertigo, CAD (coronary artery disease), Chest pain, Depression, Diabetes mellitus ([address] Diabetic neuropathy ([address] Hyperlipidemia, Hypertension, and Migraine. has a past surgical history that includes Percutaneous Transluminal Coronary Angio; Cardiac catheterization; Coronary angioplasty with stent (2017); Appendectomy; and Coronary angioplasty with stent (2012).     Restrictions  Restrictions/Precautions  Restrictions/Precautions: Up as Tolerated, Fall Risk  Required Braces or Orthoses?: No  Vision/Hearing  Vision: Impaired  Vision Exceptions: Wears glasses for reading  Hearing: Within functional limits     Subjective  General  Patient assessed for rehabilitation services?: Yes  Response To Previous Treatment: Not applicable  Family / Caregiver Present: No  Follows Commands: Within Functional Limits  Subjective  Subjective: RN and pt in agreement for PT eval; pt supine in bed upon PT arrival, pt pleasant and cooperative throughout session  Pain Screening  Patient Currently in Pain: Yes  Pain Assessment  Pain Assessment: 0-10  Pain Level: 5  Pain Type: Chronic pain  Pain Location: Chest;Abdomen  Pain Descriptors: Discomfort  Non-Pharmaceutical Pain Intervention(s): Ambulation/Increased Activity;Repositioned  Response to Pain Intervention: Patient Satisfied  Multiple Pain Sites: No  Vital Signs  Patient Currently in Pain: Yes       Orientation  Orientation  Overall Orientation Status: Within Functional Limits  Social/Functional History  Social/Functional History  Lives With: Other (comment)(Grandson)  Type of Home: Trailer  Home Layout: One level  Home Access: Stairs to enter with rails  Entrance Stairs - Number of Steps: 3  Entrance Stairs - Rails: Right  Bathroom Shower/Tub: Tub/Shower unit  Bathroom Toilet: Standard  Bathroom Equipment: Grab bars in shower, Shower chair  Home Equipment: 4 wheeled walker, Rolling walker, [employer] Help From: Family, Home health  ADL Assistance: Independent  Homemaking Assistance: Independent(Shares with grandson)  Shopping: Other (comment)(Per pt report- pt's son takes grocery shopping)  Homemaking Responsibilities: Yes(Shares with grandson)  Ambulation Assistance: Independent(pt reports ambulating with RW at baseline)  Transfer Assistance: Independent  Active : Yes  Mode of Transportation: SUV  Occupation: Retired  Leisure & Hobbies: Sleeping  Additional Comments: Pt reports children and grandson are able to provide 24hr support discharge  Cognition   Cognition  Overall Cognitive Status: WFL    Objective  Joint Mobility  Spine: WFL  ROM RLE: WFL  ROM LLE: WFL  ROM RUE: WFL  ROM LUE: WFL  Strength RLE  Strength RLE: WFL  Strength LLE  Strength LLE: WFL  Strength RUE  Strength RUE: WFL  Strength LUE  Strength LUE: WFL     Sensation  Overall Sensation Status: Impaired(pt reports chronic numbness and tingling in bilateral hands/ feet)  Bed mobility  Supine to Sit: Contact guard assistance  Sit to Supine: Minimal assistance  Comment: Increased time to complete. Pt required Vanesa to progress BLE. Pt reports dizziness while seated EOB, /64 pulse 75. Dizziness subsided in approximately 1 minute of seated rest break. Transfers  Sit to Stand: Contact guard assistance  Stand to sit: Contact guard assistance  Comment: Pt reports dizziness upon standing, /56 upon standing pulse of 76. Ambulation  Ambulation?: Yes  Ambulation 1  Surface: level tile  Device: Rolling Walker  Assistance: Minimal assistance  Quality of Gait: Shuffling gait  Gait Deviations: Slow Cherise;Decreased step length  Distance: 25ft  Comments: Pt reports significant dizziness following ambulation of approximately 15ft with one LOB requiring modA to correct from writer. /60 following activity.   Stairs/Curb  Stairs?: No     Balance  Posture: Fair  Sitting - Static: Good;-  Sitting - Dynamic: Good;-  Standing - Static: Fair;+  Standing - Dynamic: Fair;+  Comments: Standing balance assessed w/ RW; pt seated EOB CGA        Plan   Plan  Times per week: 5-6x/week  Current Treatment Recommendations: Strengthening, Transfer Training, Endurance Training, Patient/Caregiver Education & Training, ROM, Balance Training, Gait Training, Home Exercise Program, Functional Mobility Training, Stair training, Safety Education & Training  Safety Devices  Type of devices: Left in bed, Call light within reach, Gait belt, Bed alarm in place, Patient at risk for falls, Nurse notified  Restraints  Initially in place: No    AM-PAC Score     AM-PAC Inpatient Mobility without Stair Climbing Raw Score : 17 (09/04/20 1604)  AM-PAC Inpatient without Stair Climbing T-Scale Score : 48.47 (09/04/20 1604)  Mobility Inpatient CMS 0-100% Score: 32.72 (09/04/20 1604)  Mobility Inpatient without Stair CMS G-Code Modifier : Lowella Virgil (09/04/20 1604)       Goals  Short term goals  Time Frame for Short term goals: 14  Short term goal 1: Pt to perform bed mobility with supervision  Short term goal 2: Demonstrate functional transfers with supervision  Short term goal 3: Ambulate 150ft w/ RW SBA  Short term goal 4: Ascend/descend 3 stairs with R rail SBA  Patient Goals   Patient goals :  To go home       Therapy Time   Individual Concurrent Group Co-treatment   Time In 9563         Time Out 1406         Minutes 28         Timed Code Treatment Minutes: [address] [facility]       Betty Mitul, PT

## 2020-09-04 NOTE — PROGRESS NOTES
Occupational Therapy   Occupational Therapy Initial Assessment  Date: 2020   Patient Name: Estrella Bragg  MRN: [id number]     : 1945    Date of Service: 2020    Discharge Recommendations:  Patient would benefit from continued therapy after discharge       Assessment   Performance deficits / Impairments: Decreased functional mobility ; Decreased ADL status; Decreased balance;Decreased high-level IADLs;Decreased endurance;Decreased safe awareness  Comments: Pt currently unsafe to return home and will require continued OT services while hospitalized and at discharge. Prognosis: Good  Decision Making: Medium Complexity  OT Education: OT Role;Plan of Care;Precautions; ADL Adaptive Strategies; Energy Conservation;Transfer Training;Equipment  REQUIRES OT FOLLOW UP: Yes  Activity Tolerance  Activity Tolerance: Patient limited by fatigue(dizziness)  Safety Devices  Safety Devices in place: Yes  Type of devices: Bed alarm in place;Call light within reach; Left in bed;Nurse notified  Restraints  Initially in place: No           Patient Diagnosis(es): The primary encounter diagnosis was Shortness of breath. A diagnosis of Diarrhea, unspecified type was also pertinent to this visit. has a past medical history of Anxiety, Benign positional vertigo, CAD (coronary artery disease), Chest pain, Depression, Diabetes mellitus ([address] Diabetic neuropathy ([address] Hyperlipidemia, Hypertension, and Migraine. has a past surgical history that includes Percutaneous Transluminal Coronary Angio; Cardiac catheterization; Coronary angioplasty with stent (2017); Appendectomy; and Coronary angioplasty with stent (2012).            Restrictions  Restrictions/Precautions  Restrictions/Precautions: Up as Tolerated, Fall Risk  Required Braces or Orthoses?: No    Subjective   General  Patient assessed for rehabilitation services?: Yes  Family / Caregiver Present: No  General Comment  Comments: RN ok'd for therapy this PM. Pt agreeable to participate in session and pleasant/cooperative throughout. Patient Currently in Pain: Yes  Pain Assessment  Pain Assessment: 0-10  Pain Level: 5  Pain Type: Chronic pain  Pain Location: Chest;Abdomen  Pain Descriptors: Discomfort  Pain Frequency: Continuous  Functional Pain Assessment: Prevents or interferes some active activities and ADLs  Non-Pharmaceutical Pain Intervention(s): Distraction; Ambulation/Increased Activity  Response to Pain Intervention: Patient Satisfied  Vital Signs  Patient Currently in Pain: Yes  Social/Functional History  Social/Functional History  Lives With: Other (comment)(Grandson)  Type of Home: Trailer  Home Layout: One level  Home Access: Stairs to enter with rails  Entrance Stairs - Number of Steps: 3  Entrance Stairs - Rails: Right  Bathroom Shower/Tub: Tub/Shower unit  Bathroom Toilet: Standard  Bathroom Equipment: Grab bars in shower, Shower chair  Home Equipment: 4 wheeled walker, Rolling walker, [employer] Help From: Family, Home health  ADL Assistance: Independent  Homemaking Assistance: Independent(Shares with grandson)  Shopping: Other (comment)(Per pt report- pt's son takes grocery shopping)  Homemaking Responsibilities: Yes(Shares with grandson)  Ambulation Assistance: Independent(pt reports ambulating with RW at baseline)  Transfer Assistance: Independent  Active : Yes  Mode of Transportation: SUV  Occupation: Retired  Leisure & Hobbies: Sleeping  Additional Comments: Pt reports children and grandson are able to provide 24hr support discharge       Objective   Vision: Impaired  Vision Exceptions: Wears glasses for reading  Hearing: Within functional limits    Orientation  Overall Orientation Status: Within Functional Limits     Standing Balance  Time: ~4 minutes  Activity: functional mobility within hospital room  Comment: min A with 1x LOB requiring mod A to correct; pt unsteady with complaints of dizziness and required increased time with multiple short standing rest breaks as well as max VCs for sequencing/safety awareness and to keep head up and eyes open throughout  ADL  Feeding: Independent  Grooming: Contact guard assistance; Increased time to complete  UE Bathing: Minimal assistance; Increased time to complete  LE Bathing: Moderate assistance; Increased time to complete  UE Dressing: Minimal assistance; Increased time to complete  LE Dressing: Moderate assistance; Increased time to complete(seated EOB to doff/don socks)  Toileting: Increased time to complete; Moderate assistance  Tone RUE  RUE Tone: Normotonic  Tone LUE  LUE Tone: Normotonic  Coordination  Movements Are Fluid And Coordinated: Yes     Bed mobility  Supine to Sit: Contact guard assistance  Sit to Supine: Minimal assistance  Transfers  Sit to stand: Contact guard assistance  Stand to sit: Contact guard assistance  Transfer Comments: Pt required mod VCs for proper hand placement/safety techniques with use of RW during functional transfers.      Cognition  Overall Cognitive Status: WFL        Sensation  Overall Sensation Status: Impaired(pt reports chronic numbness and tingling in bilateral hands/ feet)        LUE AROM (degrees)  LUE AROM : WFL  RUE AROM (degrees)  RUE AROM : WFL  LUE Strength  Gross LUE Strength: WFL  RUE Strength  Gross RUE Strength: WFL                   Plan   Plan  Times per week: 3-4x/wk  Current Treatment Recommendations: Balance Training, Functional Mobility Training, Patient/Caregiver Education & Training, Equipment Evaluation, Education, & procurement, Self-Care / ADL, Home Management Training, Safety Education & Training    G-Code     OutComes Score                                                  AM-PAC Score        [facility] Inpatient Daily Activity Raw Score: 16 (09/04/20 1652)  AM-PAC Inpatient ADL T-Scale Score : 35.96 (09/04/20 1652)  ADL Inpatient CMS 0-100% Score: 53.32 (09/04/20 1652)  ADL Inpatient CMS G-Code Modifier : CK (09/04/20 1652)    Goals  Short term

## 2020-09-04 NOTE — PLAN OF CARE
Problem: RESPIRATORY  Intervention: Administer treatments as ordered  Note: BRONCHOSPASM/BRONCHOCONSTRICTION     [x]         IMPROVE AERATION/BREATH SOUNDS  [x]   ADMINISTER BRONCHODILATOR THERAPY AS APPROPRIATE  [x]   ASSESS BREATH SOUNDS  [x]   IMPLEMENT AEROSOL/MDI PROTOCOL  [x]   PATIENT EDUCATION AS NEEDED

## 2020-09-04 NOTE — PROGRESS NOTES
[facility]  CDU / OBSERVATION eNCOUnter  Attending NOte       I performed a history and physical examination of the patient and discussed management with the resident. I reviewed the residents note and agree with the documented findings and plan of care. Any areas of disagreement are noted on the chart. I was personally present for the key portions of any procedures. I have documented in the chart those procedures where I was not present during the key portions. I have reviewed the nurses notes. I agree with the chief complaint, past medical history, past surgical history, allergies, medications, social and family history as documented unless otherwise noted below. The Family history, social history, and ROS are effectively unchanged since admission unless noted elsewhere in the chart. Patient with elevation of creatinine today. Symptomatically somewhat improved and handling p.o. Patient has been receiving IV fluids. Despite this patient's creatinine 1.71 today. Last check was normal.  Patient in general has normal creatinine. BUN is not significantly elevated and is more of a 10 1 ratio. Will check urinalysis. Will check urine electrolytes. Asking nephrology for evaluation. Patient's diarrhea has improved and patient does feel better but she was noticed to be vertiginous and stumbling a little bit by physical therapist.  Please see PT note for more elaboration. Given symptomatic vertigo, renal insufficiency, and acute change from renal function will need further evaluation and treatment    Joyce Wong was evaluated today and a DME order was entered for a wheeled walker because she requires this to successfully complete daily living tasks of ambulating.   A wheeled walker is necessary due to the patient's unsteady gait, upper body weakness, and inability to  an ambulation device; and she can ambulate only by pushing a walker instead of a lesser assistive device such as a cane, crutch, or standard walker. The need for this equipment was discussed with the patient and she understands and is in agreement.         Lorene Villatoro MD  Attending Emergency  Physician

## 2020-09-05 VITALS
HEIGHT: 66 IN | SYSTOLIC BLOOD PRESSURE: 146 MMHG | HEART RATE: 65 BPM | RESPIRATION RATE: 18 BRPM | BODY MASS INDEX: 35.12 KG/M2 | DIASTOLIC BLOOD PRESSURE: 73 MMHG | WEIGHT: 218.5 LBS | OXYGEN SATURATION: 99 % | TEMPERATURE: 98.8 F

## 2020-09-05 LAB
ANION GAP SERPL CALCULATED.3IONS-SCNC: 8 MMOL/L (ref 9–17)
BUN BLDV-MCNC: 16 MG/DL (ref 8–23)
BUN/CREAT BLD: ABNORMAL (ref 9–20)
CALCIUM SERPL-MCNC: 8.3 MG/DL (ref 8.6–10.4)
CHLORIDE BLD-SCNC: 103 MMOL/L (ref 98–107)
CO2: 27 MMOL/L (ref 20–31)
CREAT SERPL-MCNC: 1.09 MG/DL (ref 0.5–0.9)
GFR AFRICAN AMERICAN: 59 ML/MIN
GFR NON-AFRICAN AMERICAN: 49 ML/MIN
GFR SERPL CREATININE-BSD FRML MDRD: ABNORMAL ML/MIN/{1.73_M2}
GFR SERPL CREATININE-BSD FRML MDRD: ABNORMAL ML/MIN/{1.73_M2}
GLUCOSE BLD-MCNC: 111 MG/DL (ref 70–99)
GLUCOSE BLD-MCNC: 116 MG/DL (ref 65–105)
GLUCOSE BLD-MCNC: 82 MG/DL (ref 65–105)
LACTOFERRIN, QUAL: ABNORMAL
POTASSIUM SERPL-SCNC: 4 MMOL/L (ref 3.7–5.3)
SODIUM BLD-SCNC: 138 MMOL/L (ref 135–144)

## 2020-09-05 PROCEDURE — 82947 ASSAY GLUCOSE BLOOD QUANT: CPT

## 2020-09-05 PROCEDURE — 6370000000 HC RX 637 (ALT 250 FOR IP): Performed by: STUDENT IN AN ORGANIZED HEALTH CARE EDUCATION/TRAINING PROGRAM

## 2020-09-05 PROCEDURE — 36415 COLL VENOUS BLD VENIPUNCTURE: CPT

## 2020-09-05 PROCEDURE — 80048 BASIC METABOLIC PNL TOTAL CA: CPT

## 2020-09-05 PROCEDURE — G0378 HOSPITAL OBSERVATION PER HR: HCPCS

## 2020-09-05 PROCEDURE — 6370000000 HC RX 637 (ALT 250 FOR IP): Performed by: EMERGENCY MEDICINE

## 2020-09-05 RX ORDER — FLUCONAZOLE 150 MG/1
150 TABLET ORAL ONCE
Qty: 1 TABLET | Refills: 0 | Status: SHIPPED | OUTPATIENT
Start: 2020-09-05 | End: 2020-09-05

## 2020-09-05 RX ORDER — LOPERAMIDE HYDROCHLORIDE 2 MG/1
2 CAPSULE ORAL 4 TIMES DAILY PRN
Qty: 40 CAPSULE | Refills: 0 | Status: SHIPPED | OUTPATIENT
Start: 2020-09-05 | End: 2021-04-14 | Stop reason: SDUPTHER

## 2020-09-05 RX ADMIN — LISINOPRIL 10 MG: 10 TABLET ORAL at 10:00

## 2020-09-05 RX ADMIN — ROSUVASTATIN CALCIUM 40 MG: 20 TABLET, FILM COATED ORAL at 10:09

## 2020-09-05 RX ADMIN — ROPINIROLE HYDROCHLORIDE 0.5 MG: 0.25 TABLET, FILM COATED ORAL at 10:10

## 2020-09-05 RX ADMIN — ISOSORBIDE MONONITRATE 30 MG: 30 TABLET ORAL at 10:15

## 2020-09-05 RX ADMIN — RIVAROXABAN 20 MG: 20 TABLET, FILM COATED ORAL at 10:09

## 2020-09-05 RX ADMIN — CARVEDILOL 25 MG: 12.5 TABLET, FILM COATED ORAL at 10:15

## 2020-09-05 RX ADMIN — Medication 81 MG: at 10:10

## 2020-09-05 RX ADMIN — CETIRIZINE HYDROCHLORIDE 10 MG: 10 TABLET ORAL at 10:10

## 2020-09-05 RX ADMIN — PANTOPRAZOLE SODIUM 40 MG: 40 TABLET, DELAYED RELEASE ORAL at 06:41

## 2020-09-05 RX ADMIN — GABAPENTIN 600 MG: 600 TABLET ORAL at 10:10

## 2020-09-05 RX ADMIN — AMLODIPINE BESYLATE 5 MG: 5 TABLET ORAL at 10:11

## 2020-09-05 RX ADMIN — ESCITALOPRAM OXALATE 20 MG: 10 TABLET ORAL at 10:09

## 2020-09-05 RX ADMIN — Medication 400 UNITS: at 10:11

## 2020-09-05 ASSESSMENT — PAIN SCALES - GENERAL: PAINLEVEL_OUTOF10: 5

## 2020-09-05 NOTE — PROGRESS NOTES
CDU Daily Progress Note  Attending Physician       Pt Name: Raoul Goldstein  MRN: [id number]  [place] 1945  Date of evaluation: 9/5/20    I performed a history and physical examination of the patient and discussed management with the resident. I reviewed the residents note and agree with the documented findings and plan of care. Any areas of disagreement are noted on the chart. I was personally present for the key portions of any procedures. I have documented in the chart those procedures where I was not present during the key portions. I have reviewed the emergency nurses triage note. I agree with the chief complaint, past medical history, past surgical history, allergies, medications, social and family history as documented unless otherwise noted below. Documentation of the HPI, Physical Exam and Medical Decision Making performed by medical students or scribes is based on my personal performance of the HPI, PE and MDM. For Physician Assistant/ Nurse Practitioner cases/documentation I have personally evaluated this patient and have completed at least one if not all key elements of the E/M (history, physical exam, and MDM). Additional findings are as noted. The Family History, Social History and Review of Systems are unchanged from the previous day. No significant events overnight. The patient is diabetic. I have reviewed the patient's chart and found the most recent A1c is 9.2. This indicates poor control. Will alter diabetic regimen and help arrange follow-up with PCP. Diabetic education ordered. Nonsmoker. Renal function improving. Awaiting cardiology input for dyspnea.       Javed Newman MD  Attending Physician  Critical Decision Unit

## 2020-09-05 NOTE — DISCHARGE SUMMARY
CDU Discharge Summary        Patient:  Montse Zavala  YOB: 1945    MRN: [id number]   Acct: [de-identified]    Primary Care Physician: Kayley Ron MD    Admit date:  9/2/2020  2:23 PM  Discharge date: 9/5/2020    Discharge Diagnoses:     Acute dizziness, shortness of breath and acute kidney injury due to acute dehydration from acute diarrhea. Improved with rehydration and Imodium    Follow-up:  Call today/tomorrow for a follow up appointment with Kayley Ron MD , or return to the Emergency Room with worsening symptoms    Stressed to patient the importance of following up with primary care doctor for further workup/management of symptoms. Pt verbalizes understanding and agrees with plan. Discharge Medications:  Changes to medications          Dione Salazar   Home Medication Instructions XCQ:[id number]    Printed on:09/05/20 1050   Medication Information                      albuterol sulfate  (90 Base) MCG/ACT inhaler  Inhale 2 puffs into the lungs every 6 hours as needed for Wheezing             aspirin 81 MG tablet  Take 81 mg by mouth daily. carvedilol (COREG) 25 MG tablet  Take 1 tablet by mouth 2 times daily (with meals) Hold for systolic blood pressure < 120 or heart rate < 50  Give 1 hour apart from other blood pressure medicines             clonazePAM (KLONOPIN) 0.5 MG tablet  take 1 tablet by mouth twice a day if needed for anxiety             escitalopram (LEXAPRO) 20 MG tablet  Take 1 tablet by mouth daily             fluconazole (DIFLUCAN) 150 MG tablet  Take 1 tablet by mouth once for 1 dose             fluticasone (FLONASE) 50 MCG/ACT nasal spray  instill 2 sprays into each nostril once daily             gabapentin (NEURONTIN) 600 MG tablet  Take 1 tablet by mouth 3 times daily for 30 days.              hydrOXYzine (ATARAX) 25 MG tablet  take 1 tablet by mouth every 8 hours rectally for itching             insulin aspart (NOVOLOG FLEXPEN) 100 UNIT/ML injection pen  Use TID before meals according to scale - max 45 units a day             isosorbide mononitrate (IMDUR) 30 MG extended release tablet  Take 1 tablet by mouth daily             LEVEMIR FLEXTOUCH 100 UNIT/ML injection pen  Inject 70 unit(s) twice a day by sub-q route for 30 days. linagliptin (TRADJENTA) 5 MG tablet  Take 1 tablet by mouth daily             lisinopril (PRINIVIL;ZESTRIL) 10 MG tablet  Take 1 tablet by mouth daily             loperamide (IMODIUM) 2 MG capsule  Take 1 capsule by mouth 4 times daily as needed for Diarrhea             loratadine (CLARITIN) 10 MG tablet  Take 1 tablet every day by oral route. metFORMIN (GLUCOPHAGE-XR) 750 MG extended release tablet  Take 1 tablet by mouth 2 times daily (with meals)             mupirocin (BACTROBAN) 2 % ointment  apply topically to affected area three times a day             NIFEdipine (PROCARDIA XL) 60 MG extended release tablet  take 1 tablet by mouth once daily             nitroGLYCERIN (NITROSTAT) 0.4 MG SL tablet  Place 0.4 mg under the tongue every 5 minutes as needed. NOVOFINE 32G X 6 MM MISC  use 1 NEEDLE to inject MEDICATION subcutaneously five times a day             omeprazole (PRILOSEC) 20 MG delayed release capsule  Take 1 capsule by mouth Daily             pregabalin (LYRICA) 25 MG capsule  Take 1 capsule by mouth 3 times daily for 30 days.              rivaroxaban (XARELTO) 20 MG TABS tablet  Take 1 tablet by mouth daily (with breakfast) Start on 12/12/2019 after finishing Xarelto 15 mg twice daily for 21 days             rOPINIRole (REQUIP) 0.5 MG tablet  Take 1 tablet by mouth 3 times daily             rosuvastatin (CRESTOR) 40 MG tablet  Take 1 tablet by mouth daily             spironolactone (ALDACTONE) 25 MG tablet  Take 25 mg by mouth daily              ticagrelor (BRILINTA) 90 MG TABS tablet  Take 1 tablet by mouth 2 times daily             vitamin E 400 UNIT capsule  Take 400 Units by mouth daily. Diet:  DIET CARB CONTROL; Carb Control: 4 carb choices (60 gms)/meal , Advance as tolerated     Activity:  As tolerated    Consultants: IP CONSULT TO CARDIOLOGY  IP CONSULT TO HOME CARE NEEDS  IP CONSULT TO DIABETES EDUCATOR    Procedures:  Not indicated     Diagnostic Test:   Results for orders placed or performed during the hospital encounter of 09/02/20   Gastrointestinal Panel, Molecular    Specimen: Stool   Result Value Ref Range    Specimen Description . FECES     Campylobacter PCR  NEGATIVE: No Campylobacter spp. (jejuni or coli) DNA Detecte     NEGATIVE: No Campylobacter spp. (jejuni or coli) DNA Detected    Salmonella PCR NEGATIVE: No Salmonella spp. DNA Detected NEGATIVE: No Salmonella spp.  DNA Detected    Shigatoxin Gene PCR  NEGATIVE: No Shiga toxin-producing gene(s) Detected     NEGATIVE: No Shiga toxin-producing gene(s) Detected    Shigella Sp PCR NEGATIVE: No Shigella spp. / EIEC DNA Detected NEGATIVE: No Shigella spp. / EIEC DNA Detected    Plesiomonas Shigelloides PCR NEGATIVE: No Plesionomas shigelloides DNA Detected NEGATIVE: No Plesionomas shigelloides DNA Detected    Vibrio PCR  NEGATIVE: No Vibrio (V. vulnificus, V, parahaemolyticus and     NEGATIVE: No Vibrio (V. vulnificus, V, parahaemolyticus and V. cholerae) DNA Detected    E Coli Enterotoxigenic PCR  NEGATIVE: No Enterotoxigenic E. coli (ETEC) Heat-labile and     NEGATIVE: No Enterotoxigenic E. coli (ETEC) Heat-labile and heat-stable (LT/ST) DNA Detected    Yersinia Enterocolitica PCR NEGATIVE: No Yersinia enterocolitica DNA Detected NEGATIVE: No Yersinia enterocolitica DNA Detected   CBC Auto Differential   Result Value Ref Range    WBC 6.7 3.5 - 11.3 k/uL    RBC 4.16 3.95 - 5.11 m/uL    Hemoglobin 12.7 11.9 - 15.1 g/dL    Hematocrit 38.5 36.3 - 47.1 %    MCV 92.5 82.6 - 102.9 fL    MCH 30.5 25.2 - 33.5 pg    MCHC 33.0 28.4 - 34.8 g/dL    RDW 12.8 11.8 - 14.4 %    Platelets 025 495 - 140 k/uL    MPV 12.6 8.1 - 13.5 fL    NRBC Automated 0.0 0.0 per 100 WBC    Differential Type NOT REPORTED     Seg Neutrophils 44 36 - 65 %    Lymphocytes 44 (H) 24 - 43 %    Monocytes 8 3 - 12 %    Eosinophils % 3 1 - 4 %    Basophils 1 0 - 2 %    Immature Granulocytes 0 0 %    Segs Absolute 3.01 1.50 - 8.10 k/uL    Absolute Lymph # 2.94 1.10 - 3.70 k/uL    Absolute Mono # 0.50 0.10 - 1.20 k/uL    Absolute Eos # 0.19 0.00 - 0.44 k/uL    Basophils Absolute 0.04 0.00 - 0.20 k/uL    Absolute Immature Granulocyte <0.03 0.00 - 0.30 k/uL    WBC Morphology NOT REPORTED     RBC Morphology NOT REPORTED     Platelet Estimate NOT REPORTED    Comprehensive Metabolic Panel w/ Reflex to MG   Result Value Ref Range    Glucose 177 (H) 70 - 99 mg/dL    BUN 16 8 - 23 mg/dL    CREATININE 0.91 (H) 0.50 - 0.90 mg/dL    Bun/Cre Ratio NOT REPORTED 9 - 20    Calcium 9.8 8.6 - 10.4 mg/dL    Sodium 141 135 - 144 mmol/L    Potassium 4.4 3.7 - 5.3 mmol/L    Chloride 104 98 - 107 mmol/L    CO2 20 20 - 31 mmol/L    Anion Gap 17 9 - 17 mmol/L    Alkaline Phosphatase 61 35 - 104 U/L    ALT 9 5 - 33 U/L    AST 24 <32 U/L    Total Bilirubin 0.54 0.3 - 1.2 mg/dL    Total Protein 7.3 6.4 - 8.3 g/dL    Alb 4.1 3.5 - 5.2 g/dL    Albumin/Globulin Ratio 1.3 1.0 - 2.5    GFR Non-African American >60 >60 mL/min    GFR African American >60 >60 mL/min    GFR Comment          GFR Staging NOT REPORTED    Troponin   Result Value Ref Range    Troponin, High Sensitivity 28 (H) 0 - 14 ng/L    Troponin T NOT REPORTED <0.03 ng/mL    Troponin Interp NOT REPORTED    Lipase   Result Value Ref Range    Lipase 25 13 - 60 U/L   Troponin   Result Value Ref Range    Troponin, High Sensitivity 30 (H) 0 - 14 ng/L    Troponin T NOT REPORTED <0.03 ng/mL    Troponin Interp NOT REPORTED    Urinalysis with microscopic   Result Value Ref Range    Color, UA YELLOW YELLOW    Turbidity UA CLOUDY (A) CLEAR    Glucose, Ur 1+ (A) NEGATIVE    Bilirubin Urine NEGATIVE NEGATIVE    Ketones, Urine SMALL (A) NEGATIVE    Specific Gravity, UA 1.042 (H) 1.005 - 1.030    Urine Hgb NEGATIVE NEGATIVE    pH, UA 5.0 5.0 - 8.0    Protein, UA TRACE (A) NEGATIVE    Urobilinogen, Urine Normal Normal    Nitrite, Urine NEGATIVE NEGATIVE    Leukocyte Esterase, Urine NEGATIVE NEGATIVE    -          WBC, UA 0 TO 2 0 - 5 /HPF    RBC, UA None 0 - 2 /HPF    Casts UA NOT REPORTED 0 - 2 /LPF    Crystals, UA NOT REPORTED None /HPF    Epithelial Cells UA 2 TO 5 0 - 5 /HPF    Renal Epithelial, UA NOT REPORTED 0 /HPF    Bacteria, UA FEW (A) None    Mucus, UA 1+ (A) None    Trichomonas, UA NOT REPORTED None    Amorphous, UA NOT REPORTED None    Other Observations UA NOT REPORTED NOT REQ. Yeast, UA NOT REPORTED None   Fecal lactoferrin   Result Value Ref Range    Lactoferrin, Qual (A) NEGATIVE for fecal lactoferrin. POSITIVE for fecal lactoferrin, a marker for fecal leukocytes and an indicator of intestinal inflammation.    BASIC METABOLIC PANEL   Result Value Ref Range    Glucose 222 (H) 70 - 99 mg/dL    BUN 13 8 - 23 mg/dL    CREATININE 0.87 0.50 - 0.90 mg/dL    Bun/Cre Ratio NOT REPORTED 9 - 20    Calcium 9.4 8.6 - 10.4 mg/dL    Sodium 142 135 - 144 mmol/L    Potassium 3.4 (L) 3.7 - 5.3 mmol/L    Chloride 105 98 - 107 mmol/L    CO2 23 20 - 31 mmol/L    Anion Gap 14 9 - 17 mmol/L    GFR Non-African American >60 >60 mL/min    GFR African American >60 >60 mL/min    GFR Comment          GFR Staging NOT REPORTED    CBC WITH AUTO DIFFERENTIAL   Result Value Ref Range    WBC 5.6 3.5 - 11.3 k/uL    RBC 3.91 (L) 3.95 - 5.11 m/uL    Hemoglobin 11.5 (L) 11.9 - 15.1 g/dL    Hematocrit 36.5 36.3 - 47.1 %    MCV 93.4 82.6 - 102.9 fL    MCH 29.4 25.2 - 33.5 pg    MCHC 31.5 28.4 - 34.8 g/dL    RDW 13.0 11.8 - 14.4 %    Platelets 046 360 - 623 k/uL    MPV 12.0 8.1 - 13.5 fL    NRBC Automated 0.0 0.0 per 100 WBC    Differential Type NOT REPORTED     Seg Neutrophils 40 36 - 65 %    Lymphocytes 47 (H) 24 - 43 %    Monocytes 9 3 - 12 %    Eosinophils % 3 1 - 4 %    Basophils 1 0 - 2 %    Immature Granulocytes 0 0 %    Segs Absolute 2.23 1.50 - 8.10 k/uL    Absolute Lymph # 2.61 1.10 - 3.70 k/uL    Absolute Mono # 0.49 0.10 - 1.20 k/uL    Absolute Eos # 0.19 0.00 - 0.44 k/uL    Basophils Absolute 0.03 0.00 - 0.20 k/uL    Absolute Immature Granulocyte <0.03 0.00 - 0.30 k/uL    WBC Morphology NOT REPORTED     RBC Morphology NOT REPORTED     Platelet Estimate NOT REPORTED    Troponin   Result Value Ref Range    Troponin, High Sensitivity 35 (H) 0 - 14 ng/L    Troponin T NOT REPORTED <0.03 ng/mL    Troponin Interp NOT REPORTED    Giardia / Cryptosporidum antigens   Result Value Ref Range    Specimen Description . FECES     Special Requests NOT REPORTED     Direct Exam Giardia Antigen Assay Negative     Direct Exam Cryptosporidium Antigen Assay Negative    BASIC METABOLIC PANEL   Result Value Ref Range    Glucose 209 (H) 70 - 99 mg/dL    BUN 20 8 - 23 mg/dL    CREATININE 1.71 (H) 0.50 - 0.90 mg/dL    Bun/Cre Ratio NOT REPORTED 9 - 20    Calcium 8.9 8.6 - 10.4 mg/dL    Sodium 136 135 - 144 mmol/L    Potassium 3.9 3.7 - 5.3 mmol/L    Chloride 102 98 - 107 mmol/L    CO2 23 20 - 31 mmol/L    Anion Gap 11 9 - 17 mmol/L    GFR Non-African American 29 (L) >60 mL/min    GFR  35 (L) >60 mL/min    GFR Comment          GFR Staging NOT REPORTED    CBC WITH AUTO DIFFERENTIAL   Result Value Ref Range    WBC 6.3 3.5 - 11.3 k/uL    RBC 3.27 (L) 3.95 - 5.11 m/uL    Hemoglobin 9.9 (L) 11.9 - 15.1 g/dL    Hematocrit 31.8 (L) 36.3 - 47.1 %    MCV 97.2 82.6 - 102.9 fL    MCH 30.3 25.2 - 33.5 pg    MCHC 31.1 28.4 - 34.8 g/dL    RDW 13.1 11.8 - 14.4 %    Platelets 166 384 - 951 k/uL    MPV 11.8 8.1 - 13.5 fL    NRBC Automated 0.0 0.0 per 100 WBC    Differential Type NOT REPORTED     Seg Neutrophils 48 36 - 65 %    Lymphocytes 40 24 - 43 %    Monocytes 8 3 - 12 %    Eosinophils % 4 1 - 4 %    Basophils 0 0 - 2 %    Immature Granulocytes 0 0 %    Segs Absolute 3.03 1.50 - 8.10 k/uL (Bazett) 448 ms    P Axis 93 degrees    R Axis 31 degrees    T Axis 130 degrees   EKG 12 Lead   Result Value Ref Range    Ventricular Rate 71 BPM    Atrial Rate 71 BPM    P-R Interval 146 ms    QRS Duration 100 ms    Q-T Interval 424 ms    QTc Calculation (Bazett) 460 ms    P Axis 73 degrees    R Axis 13 degrees    T Axis 132 degrees     Ct Abdomen Pelvis W Iv Contrast Additional Contrast? None    Result Date: 9/2/2020  EXAMINATION: CT OF THE ABDOMEN AND PELVIS WITH CONTRAST, 9/2/2020 4:40 pm TECHNIQUE: CT of the abdomen and pelvis was performed with the administration of intravenous contrast. Multiplanar reformatted images are provided for review. Dose modulation, iterative reconstruction, and/or weight based adjustment of the mA/kV was utilized to reduce the radiation dose to as low as reasonably achievable. COMPARISON: CT abdomen and pelvis dated 01/02/2020 HISTORY: ORDERING SYSTEM PROVIDED HISTORY: Abd pain, diarrhea TECHNOLOGIST PROVIDED HISTORY: Abd pain, diarrhea Reason for Exam: Abd pain, diarrhea Acuity: Acute Type of Exam: Initial FINDINGS: Lower Chest:  Visualized portion of the lower chest demonstrates no acute abnormality. Organs: The liver, spleen, pancreas, and adrenal glands are unremarkable. Gallbladder is surgically absent. Kidneys are symmetric in size. There is a small wedge-shaped focus of hypoenhancement and volume loss at the inferior left kidney, which is unchanged. Bilateral renal cysts are unchanged. No hydronephrosis or perinephric inflammation. GI/Bowel: There is scattered colonic diverticulosis. There is no abnormal bowel distention or pericolonic inflammation. No free intraperitoneal air or fluid. Appendix is surgically absent. Pelvis: Urinary bladder is within normal limits. Uterus is surgically absent. No pelvic lymphadenopathy. Peritoneum/Retroperitoneum: The abdominal aorta is normal in caliber. There is no retroperitoneal or mesenteric lymphadenopathy.  Bones/Soft Tissues: There is skin thickening and subcutaneous stranding in the lower abdominal wall, unchanged. No soft tissue gas or fluid collection. There is a chronic compression fracture at L2. No acute or suspicious osseous abnormality. No acute process in the abdomen or pelvis. Chronic incidental findings described above. Xr Chest Portable    Result Date: 9/2/2020  EXAMINATION: ONE XRAY VIEW OF THE CHEST 9/2/2020 3:17 pm COMPARISON: 07/12/2020. HISTORY: ORDERING SYSTEM PROVIDED HISTORY: shortness of breath TECHNOLOGIST PROVIDED HISTORY: shortness of breath Reason for Exam: upright portable FINDINGS: The cardiomediastinal silhouette is unremarkable. The lungs are clear. No infiltrate, pleural fluid or evidence of overt failure. Mild apical fibrotic changes, stable. No acute cardiopulmonary disease. Physical Exam:    General appearance - NAD, AOx 3   Lungs -CTAB, no R/R/R  Heart - RRR, no M/R/G  Abdomen - Soft, NT/ND  Neurological:  MAEx4, No focal motor deficit, sensory loss  Extremities - Cap refil <2 sec in all ext., no edema  Skin -warm, dry      Hospital Course:  Clinical course has improved, labs and imaging reviewed. Laura Wang originally presented to the hospital on 9/2/2020  2:23 PM. with acute dizziness and dyspnea on exertion in the setting of acute dehydration from diarrhea. At that time it was determined that She required further observation and rehydration. She was admitted and labs and imaging were followed daily. Imaging results as above. She is medically stable to be discharged. Disposition: Home    Patient stated that they will not drive themselves home from the hospital if they have gotten pain killers/ narcotics earlier that day and that they will arrange for transportation on their own or work with the  for a ride. Patient counseled NOT to drive while under the influence of narcotics/ pain killers.      Condition: Good    Patient stable and ready for discharge home. I have discussed plan of care with patient and they are in understanding. They were instructed to read discharge paperwork. All of their questions and concerns were addressed. Time Spent: 3 day      --  Beena Herman MD  Emergency Medicine Resident Physician    This dictation was generated by voice recognition computer software. Although all attempts are made to edit the dictation for accuracy, there may be errors in the transcription that are not intended.

## 2020-09-05 NOTE — PROGRESS NOTES
CLINICAL PHARMACY NOTE: MEDS TO [address] Select Patient?: No  Total # of Prescriptions Filled: 2   The following medications were delivered to the patient:  · Loperamide 2mg  · Fluconazole 150mg  Total # of Interventions Completed: 0  Time Spent (min): 30    Additional Documentation:

## 2020-09-05 NOTE — PROGRESS NOTES
OBS/CDU   RESIDENT NOTE      Patients PCP is: Soraya Mills MD        SUBJECTIVE      No acute events overnight. Has been able to tolerate a full diet without nausea or vomiting. The patient is urinating on his own and is passing flatus. Denies fever, chills, nausea, vomiting, chest pain, shortness of breath, abdominal pain, focal weakness, numbness, tingling, urinary/bowel symptoms, vision changes, visual hallucinations, or headache. PHYSICAL EXAM      General: NAD, AO X 3  Heent: EMOI, PERRL  Neck: SUPPLE, NO JVD  Cardiovascular: RRR, S1S2  Pulmonary: CTAB, NO SOB  Abdomen: SOFT, NTTP, ND, +BS  Extremities: +2/4 PULSES DISTAL, NO SWELLING  Neuro / Psych: NO NUMBNESS OR TINGLING, MENTATION AT BASELINE    PERTINENT TEST /EXAMS      I have reviewed all available laboratory results.     MEDICATIONS CURRENT   metFORMIN (GLUCOPHAGE-XR) extended release tablet 500 mg, BID WC  meclizine (ANTIVERT) tablet 25 mg, TID PRN  lactated ringers infusion, Continuous  amLODIPine (NORVASC) tablet 5 mg, Daily  loperamide (IMODIUM) capsule 2 mg, 4x Daily PRN  ondansetron (ZOFRAN) injection 4 mg, Q6H PRN  glucose (GLUTOSE) 40 % oral gel 15 g, PRN  dextrose 50 % IV solution, PRN  glucagon (rDNA) injection 1 mg, PRN  dextrose 5 % solution, PRN  albuterol sulfate  (90 Base) MCG/ACT inhaler 2 puff, Q6H PRN  aspirin EC tablet 81 mg, Daily  carvedilol (COREG) tablet 25 mg, BID WC  clonazePAM (KLONOPIN) tablet 0.5 mg, Nightly  escitalopram (LEXAPRO) tablet 20 mg, Daily  gabapentin (NEURONTIN) tablet 600 mg, TID  hydrOXYzine (ATARAX) tablet 25 mg, TID PRN  insulin lispro (HUMALOG) injection vial 0-12 Units, TID WC  insulin lispro (HUMALOG) injection vial 0-6 Units, Nightly  isosorbide mononitrate (IMDUR) extended release tablet 30 mg, Daily  alogliptin (NESINA) tablet 25 mg, Daily  lisinopril (PRINIVIL;ZESTRIL) tablet 10 mg, Daily  cetirizine (ZYRTEC) tablet 10 mg, Daily  pantoprazole (PROTONIX) tablet 40 mg, QAM AC  rivaroxaban (XARELTO) tablet 20 mg, Daily with breakfast  rOPINIRole (REQUIP) tablet 0.5 mg, TID  rosuvastatin (CRESTOR) tablet 40 mg, Daily  vitamin E capsule 400 Units, Daily  sodium chloride flush 0.9 % injection 10 mL, 2 times per day  sodium chloride flush 0.9 % injection 10 mL, PRN  insulin glargine (LANTUS) injection vial 70 Units, Nightly        All medication charted and reviewed. CONSULTS      IP CONSULT TO CARDIOLOGY  IP CONSULT TO HOME CARE NEEDS  IP CONSULT TO DIABETES EDUCATOR    ASSESSMENT/PLAN       Montse Zavala is a 76 y.o. female who presents with diarrhea, dizziness, and dyspnea on exertion. Symptoms have resolved. Patient was noted to have lab findings consistent with MARIELLE, which was most likely to dehydration. Renal function appears to be improving. · Follow-up labs to rule out renal disease  · Continue home medications and pain control  · Home nursing and DME have been arranged to assist patient with ADLs  · Monitor vitals, labs, and imaging  DISPO: Likely discharge home today if labs within normal limits    --  Gely Nielesn  Emergency Medicine Resident Physician     This dictation was generated by voice recognition computer software. Although all attempts are made to edit the dictation for accuracy, there may be errors in the transcription that are not intended.

## 2020-09-05 NOTE — CARE COORDINATION
Discharge [facility] Case Management Department  Written by: Talia Dover RN    Patient Name: Amberly Mathias  Attending Provider: Philippe Putnam MD  Admit Date: 2020  2:23 PM  MRN: [id number]  Account: [de-identified]                     : 1945  Discharge Date:       Disposition: home with [facility]., HC called and updated on discharge, states they can resume care on Tuesday, pt states this is fine as she has help and support at home with family. PT given DME order, face to face and facesheet as she plans to have her son get the walker for her.      Talia Dover RN

## 2020-09-08 ENCOUNTER — CARE COORDINATION (OUTPATIENT)
Dept: CASE MANAGEMENT | Age: 75
End: 2020-09-08

## 2020-09-08 NOTE — CARE COORDINATION
Patient contacted regarding [place]. Discussed COVID-19 related testing which was not done at this time. Test results were not done. Patient informed of results, if available? No    Care Transition Nurse/ Ambulatory Care Manager contacted the patient by telephone to perform post discharge assessment. Call within 2 business days of discharge: Yes. Verified name and  with patient as identifiers. Provided introduction to self, and explanation of the CTN/ACM role, and reason for call due to risk factors for infection and/or exposure to COVID-19. Symptoms reviewed with patient who verbalized the following symptoms: no new symptoms and no worsening symptoms. Due to no new or worsening symptoms encounter was not routed to provider for escalation. Discussed follow-up appointments. If no appointment was previously scheduled, appointment scheduling offered: Yes  [facility] follow up appointment(s):   Future Appointments   Date Time Provider Олег Cheng   2020 12:45 PM STV MRI RM 1 [facility] MRI [facility] Radiolog   2020 12:50 PM Tatyana Matias MD Neuro  [place]   2020  1:20 PM Cricket Zambrano MD [facility] [address] [facility]     [id number] [address] Chung follow up appointment(s):   Non-face-to-face services provided:  Reviewed and followed up on pending diagnostic tests and treatments-maryjo reviewed with patient that she has a Mri on 2020 at [facility]. V's, f/u with neuro/Dr. Matias Dear on 2020     Advance Care Planning:   Does patient have an Advance Directive:  decision maker updated. Patient has following risk factors of: diabetes. CTN/ACM reviewed discharge instructions, medical action plan and red flags such as increased shortness of breath, increasing fever and signs of decompensation with patient who verbalized understanding. Discussed exposure protocols and quarantine with CDC Guidelines What to do if you are sick with coronavirus disease .  Patient was given an opportunity for questions and concerns. The patient agrees to contact the [facility] exposure line [phone], local health department [place] Department of Health: ([phone]) and PCP office for questions related to their healthcare. CTN/ACM provided contact information for future needs. Reviewed and educated patient on any new and changed medications related to discharge diagnosis     Patient/family/caregiver given information for [employer] [facility] and agrees to enroll no  Patient's preferred e-mail:    Patient's preferred phone number:   Based on Loop alert triggers, patient will be contacted by nurse care manager for worsening symptoms. Plan for follow-up call in 5-7 days based on severity of symptoms and risk factors.   Patient is going to call and schedule a follow up appointment with her pcp//JU

## 2020-09-09 ENCOUNTER — HOSPITAL ENCOUNTER (OUTPATIENT)
Dept: MRI IMAGING | Age: 75
Discharge: HOME | End: 2020-09-11
Payer: MEDICARE

## 2020-09-09 PROCEDURE — 72141 MRI NECK SPINE W/O DYE: CPT

## 2020-09-15 ENCOUNTER — CARE COORDINATION (OUTPATIENT)
Dept: CASE MANAGEMENT | Age: 75
End: 2020-09-15

## 2020-09-16 ENCOUNTER — CARE COORDINATION (OUTPATIENT)
Dept: CASE MANAGEMENT | Age: 75
End: 2020-09-16

## 2020-09-24 ENCOUNTER — CARE COORDINATION (OUTPATIENT)
Dept: CASE MANAGEMENT | Age: 75
End: 2020-09-24

## 2020-09-24 NOTE — CARE COORDINATION
[facility] Transitions Follow Up Call    2020    Patient: Harmony Balderrama  Patient : 1945   MRN: [id number]  Reason for Admission: Shortness of breath, diarrhea, COVID 19 monitoring  Discharge Date: 20 RARS: Readmission Risk Score: 24         Spoke with: Harmony Balderrama    Was able to contact Samantha Phoenix for Covid follow up. She stated that she was doing all right. She continues with shortness of breath, that is a little bit better, no diarrhea or viral symptoms. She said that visiting nurse did come out and set up her medications and she will be coming out weekly      Needs to be reviewed by the provider   Additional needs identified to be addressed with provider No  none  Discussed COVID-19 related testing which was available at this time. Test results were negative. Patient informed of results, if available? Yes         Method of communication with provider : none    Care Transition Nurse (CTN) contacted the patient by telephone to follow up after admission on 20. Verified name and  with patient as identifiers. Addressed changes since last contact: routine covid follow up  Discharged needs reviewed: none  Follow up appointment completed? No    Advance Care Planning:   Does patient have an Advance Directive:  reviewed and current. CTN reviewed discharge instructions, medical action plan and red flags with patient and discussed any barriers to care and/or understanding of plan of care after discharge. Discussed appropriate site of care based on symptoms and resources available to patient including: PCP. The patient agrees to contact the PCP office for questions related to their healthcare. Patients top risk factors for readmission: medical condition  Interventions to address risk factors: Assessment and support for treatment adherence and medication management-reviwed    Discussed follow-up appointments.  If no appointment was previously scheduled, appointment scheduling

## 2020-09-30 ENCOUNTER — CARE COORDINATION (OUTPATIENT)
Dept: CASE MANAGEMENT | Age: 75
End: 2020-09-30

## 2020-09-30 NOTE — CARE COORDINATION
[facility] Transitions Follow Up Call    2020    Patient: Inga Servin  Patient : 1945   MRN: [id number]  Reason for Admission: Shortness of breath, diarrhea, COVID 19 Monitoring  Discharge Date: 20 RARS: Readmission Risk Score: 24         Attempted to reach patient for subsequent transitional call.  left to return call to [phone]. 1st attempt.      Follow Up  Future Appointments   Date Time Provider Олег Cheng   10/2/2020  1:50 PM Donna Moctezuma MD [facility]   2020  1:20 PM Christine Plunkett  [address], [address] [facility]

## 2020-10-01 ENCOUNTER — CARE COORDINATION (OUTPATIENT)
Dept: CASE MANAGEMENT | Age: 75
End: 2020-10-01

## 2020-10-01 NOTE — CARE COORDINATION
[facility] Transitions Follow Up Call    10/1/2020    Patient: Addison Herron  Patient : 1945   MRN: [id number]  Reason for Admission: Dyspnea   Discharge Date: 20 RARS: Readmission Risk Score: 24       Final attempt to reach patient, has no vm set up. Episode resolved.     Follow Up  Future Appointments   Date Time Provider Олег Cheng   10/2/2020  1:50 PM David Cha MD [facility]   2020  1:20 PM Jayne Murray MD [facility] [address] [facility]

## 2020-10-02 ENCOUNTER — OFFICE VISIT (OUTPATIENT)
Dept: NEUROLOGY | Age: 75
End: 2020-10-02
Payer: MEDICARE

## 2020-10-02 VITALS
RESPIRATION RATE: 16 BRPM | HEART RATE: 75 BPM | TEMPERATURE: 96.6 F | SYSTOLIC BLOOD PRESSURE: 199 MMHG | DIASTOLIC BLOOD PRESSURE: 79 MMHG

## 2020-10-02 PROCEDURE — 99214 OFFICE O/P EST MOD 30 MIN: CPT | Performed by: STUDENT IN AN ORGANIZED HEALTH CARE EDUCATION/TRAINING PROGRAM

## 2020-10-02 RX ORDER — PREGABALIN 50 MG/1
50 CAPSULE ORAL 3 TIMES DAILY
Qty: 90 CAPSULE | Refills: 3 | Status: ON HOLD | OUTPATIENT
Start: 2020-10-02 | End: 2021-09-04 | Stop reason: HOSPADM

## 2020-10-02 ASSESSMENT — ENCOUNTER SYMPTOMS
COUGH: 0
ABDOMINAL PAIN: 0
SINUS PAIN: 0
NAUSEA: 0
EYE PAIN: 0
PHOTOPHOBIA: 0
DIARRHEA: 0
CONSTIPATION: 0
VOMITING: 0
EYE DISCHARGE: 0
SHORTNESS OF BREATH: 0
SORE THROAT: 0
EYE REDNESS: 0

## 2020-10-02 NOTE — PROGRESS NOTES
[address] [place] [address], [facility], [facility], [address] [facility], [address] [place] [address]  P: [phone]  F: [phone]    NEUROLOGY CLINIC NOTE     PATIENT NAME: Meryl Navarro  PATIENT MRN: [id number]  PRIMARY CARE PHYSICIAN: Shantel Chiang MD    Interval History: 10/2/2020   Patient was last seen in the clinic in July 2020. During last clinic visit she was started on Lyrica 25 mg 3 times a day, endorses significant improvement in her symptoms with that. Endorses improvement mainly in her paresthesias. She was requesting to increase the dose of her Lyrica. Also taking gabapentin 600 mg 3 times a day. Denies any side effects of the medications. Patient endorses improvement in her back pain. Endorses some improvement in the neck pain. Denies any falls since last visit. MRI of the cervical spine without contrast 9/9/2020 showed mild reversal of cervical curvature from C4-C6, disc space narrowing and osteophytes causing stenosis of the thecal sac and narrowing of the neural foramina throughout the cervical region, worst level is C5-C6.  1.5 cm thyroid nodule noted on the right side. Patient denies any other new neurologic concerns during this visit. Notes from 7/28/20  HPI:      Meryl Navarro is a 76 y.o. right handed  female with PMH significant for diabetes, PE on Xarelto, neuropathy, hypertension, hyperlipidemia, migraine headaches, CAD, BPPV, anxiety depression was seen in the clinic for neuropathy. History obtained from Patient    Patient endorses pins-and-needles sensation in her bilateral upper extremities and feet, for last 2 years, getting worse since summer last year. She describes constant paresthesias in her fingers and hand bilaterally, worse on the right side as compared to the left side.   She had a mechanical fall in October last year, fell on the right side and had trauma to the right hand at that time, endorses worsening of paresthesias Socioeconomic History    Marital status:      Spouse name: Not on file    Number of children: Not on file    Years of education: Not on file    Highest education level: Not on file   Occupational History    Not on file   Social Needs    Financial resource strain: Not on file    Food insecurity     Worry: Not on file     Inability: Not on file    Transportation needs     Medical: Not on file     Non-medical: Not on file   Tobacco Use    Smoking status: Never Smoker    Smokeless tobacco: Never Used   Substance and Sexual Activity    Alcohol use: No    Drug use: No    Sexual activity: Never   Lifestyle    Physical activity     Days per week: Not on file     Minutes per session: Not on file    Stress: Not on file   Relationships    Social connections     Talks on phone: Not on file     Gets together: Not on file     Attends Synagogue service: Not on file     Active member of club or organization: Not on file     Attends meetings of clubs or organizations: Not on file     Relationship status: Not on file    Intimate partner violence     Fear of current or ex partner: Not on file     Emotionally abused: Not on file     Physically abused: Not on file     Forced sexual activity: Not on file   Other Topics Concern    Not on file   Social History Narrative    Not on file        Current Outpatient Medications   Medication Sig Dispense Refill    pregabalin (LYRICA) 50 MG capsule Take 1 capsule by mouth 3 times daily for 30 days.  90 capsule 3    escitalopram (LEXAPRO) 20 MG tablet Take 1 tablet by mouth daily 30 tablet 11    hydrOXYzine (ATARAX) 25 MG tablet take 1 tablet by mouth every 8 hours rectally for itching 30 tablet 11    fluticasone (FLONASE) 50 MCG/ACT nasal spray instill 2 sprays into each nostril once daily      NOVOFINE 32G X 6 MM MISC use 1 NEEDLE to inject MEDICATION subcutaneously five times a day      mupirocin (BACTROBAN) 2 % ointment apply topically to affected area three times a day      NIFEdipine (PROCARDIA XL) 60 MG extended release tablet take 1 tablet by mouth once daily      omeprazole (PRILOSEC) 20 MG delayed release capsule Take 1 capsule by mouth Daily 30 capsule 11    insulin aspart (NOVOLOG FLEXPEN) 100 UNIT/ML injection pen Use TID before meals according to scale - max 45 units a day 10 pen 11    loratadine (CLARITIN) 10 MG tablet Take 1 tablet every day by oral route. 30 tablet 11    rosuvastatin (CRESTOR) 40 MG tablet Take 1 tablet by mouth daily 90 tablet 1    LEVEMIR FLEXTOUCH 100 UNIT/ML injection pen Inject 70 unit(s) twice a day by sub-q route for 30 days.  rivaroxaban (XARELTO) 20 MG TABS tablet Take 1 tablet by mouth daily (with breakfast) Start on 12/12/2019 after finishing Xarelto 15 mg twice daily for 21 days 90 tablet 1    isosorbide mononitrate (IMDUR) 30 MG extended release tablet Take 1 tablet by mouth daily 30 tablet 3    linagliptin (TRADJENTA) 5 MG tablet Take 1 tablet by mouth daily 30 tablet 1    metFORMIN (GLUCOPHAGE-XR) 750 MG extended release tablet Take 1 tablet by mouth 2 times daily (with meals) 30 tablet 3    rOPINIRole (REQUIP) 0.5 MG tablet Take 1 tablet by mouth 3 times daily 90 tablet 0    carvedilol (COREG) 25 MG tablet Take 1 tablet by mouth 2 times daily (with meals) Hold for systolic blood pressure < 120 or heart rate < 50  Give 1 hour apart from other blood pressure medicines 60 tablet 3    albuterol sulfate  (90 Base) MCG/ACT inhaler Inhale 2 puffs into the lungs every 6 hours as needed for Wheezing 1 Inhaler 3    spironolactone (ALDACTONE) 25 MG tablet Take 25 mg by mouth daily       lisinopril (PRINIVIL;ZESTRIL) 10 MG tablet Take 1 tablet by mouth daily 30 tablet 3    vitamin E 400 UNIT capsule Take 400 Units by mouth daily.  aspirin 81 MG tablet Take 81 mg by mouth daily.  nitroGLYCERIN (NITROSTAT) 0.4 MG SL tablet Place 0.4 mg under the tongue every 5 minutes as needed.       levoFLOXacin (LEVAQUIN) 500 MG tablet Take 1 tablet by mouth daily for 7 days 7 tablet 0    clonazePAM (KLONOPIN) 0.5 MG tablet take 1 tablet by mouth twice a day if needed for anxiety 30 tablet 0    gabapentin (NEURONTIN) 600 MG tablet Take 1 tablet by mouth 3 times daily for 30 days. 90 tablet 11    ticagrelor (BRILINTA) 90 MG TABS tablet Take 1 tablet by mouth 2 times daily 180 tablet 3     No current facility-administered medications for this visit. Allergies   Allergen Reactions    Penicillins      Other reaction(s): Hives    Sulfa Antibiotics      Other reaction(s): Rash        REVIEW OF SYSTEMS:     Review of Systems   Constitutional: Positive for fatigue. Negative for chills, diaphoresis, fever and unexpected weight change. HENT: Negative for congestion, ear discharge, ear pain, hearing loss, sinus pain and sore throat. Eyes: Negative for photophobia, pain, discharge, redness and visual disturbance. Respiratory: Negative for cough and shortness of breath. Cardiovascular: Negative for chest pain, palpitations and leg swelling. Gastrointestinal: Negative for abdominal pain, constipation, diarrhea, nausea and vomiting. Endocrine: Negative for polydipsia and polyuria. Genitourinary: Negative for difficulty urinating and hematuria. Musculoskeletal: Positive for arthralgias, gait problem and neck pain. Skin: Negative for pallor and rash. Neurological: Positive for dizziness, tremors, weakness, light-headedness and numbness. Negative for seizures, syncope, facial asymmetry, speech difficulty and headaches. Hematological: Does not bruise/bleed easily. Psychiatric/Behavioral: Negative for agitation, behavioral problems and hallucinations. VITALS  BP (!) 199/79   Pulse 75   Temp 96.6 °F (35.9 °C)   Resp 16      PHYSICAL EXAMINATION:     Constitutional: Well developed, well nourished and in no acute distress. Head:  normocephalic, atraumatic.   Neck: supple, no carotid bruits, thyroid not palpable  Respiratory: Clear to auscultation bilaterally with no use of accessory muscles during respiration. Cardiovascular: normal rate, regular rhythm, no murmur, gallop, rub. Abdomen: Soft, nontender, nondistended, normal bowel sounds, no hepatomegaly or splenomegaly  Extremities:  peripheral pulses palpable, no pedal edema or calf pain with palpation  Psych: normal affect      NEUROLOGICAL EXAMINATION:     Mental status   Alert and oriented; intact memory with no confusion, speech or language problems; no hallucinations or delusions     Cranial nerves   II - visual fields intact to confrontation                                                III, IV, VI - extra-ocular muscles full: no pupillary defect; no MICHAEL, no nystagmus, no ptosis   V - normal facial sensation                                                               VII - normal facial symmetry                                                             VIII - intact hearing                                                                             IX, X - symmetrical palate                                                                  XI - symmetrical shoulder shrug                                                       XII - midline tongue without atrophy or fasciculation     Motor function  Normal muscle bulk and tone  Muscle strength:   Bilateral upper extremities-proximal muscles 5-/5  Distal muscles 4/5, decreased handgrip bilaterally  Bilateral lower extremities 4+/5       Sensory function  decreased light touch and pinprick in bilateral upper and lower extremities in glove and stocking distribution     Cerebellar  mild intermittent postural tremors noted in bilateral upper extremities, worse on the right side as compared to the left side, improves with distraction. Reflex function 1+ DTR and symmetric.  Negative Babinski     Gait                  Patient was very unsteady on her feet, was able to take few steps, normally cervical curvature from C4 through C6.  Disc space    narrowing and osteophytes causing stenosis of the thecal sac and narrowing of    the neural foramina throughout the cervical region as discussed above.  The    worst level is C5-6.         There is a 1.5 cm nodule in the right lobe of the thyroid for which thyroid    ultrasound is suggested. China Seller is a small nodule in the left lobe of the    thyroid as well. ASSESSMENT / PLAN:       Issac Stephen is a 76 y.o. right handed  female  was seen in the clinic for neuropathy. Paresthesias in bilateral upper and lower extremities, worse in her hands as compared to the feet, worse on the right side as compared to left. Neck pain with radicular symptoms on the right side  Uncontrolled diabetes with possible diabetic polyneuropathy  History of PE on Xarelto  CHF  Hypertension  Hyperlipidemia  Peripheral vascular disease  CAD status post 4 stents  History of migraine headaches  BPPV  Anxiety depression    HbA1c of 9.1 on 12/16/2019    PLAN:   -Orthostatic vitals- negative    -MRI of the cervical spine -images reviewed and discussed with the patient  -EMG nerve conduction studies of bilateral upper extremities, as per patient it was done recently at [facility]. We will try to obtain the records. EMG of bilateral lower extremities was not done before.    -Will check for reversible causes of neuropathy, HbA1c, vitamin B12, folate, TSH, serum copper, ceruloplasmin, SPEP, UPEP-not done yet. Orders reprinted during this visit    -Will refer to neurosurgery for cervical degenerative changes  -PT/OT evaluation and therapy if okay as per neurosurgery     -Patient is currently taking gabapentin 600 mg 3 times a day. -We will increase the dose of Lyrica from 25 mg 3 times a day to 50 mg 3 times a day. Discussed all possible side effects with the patient, instructed patient to call the clinic if develop any of the side effects.   She voiced

## 2020-10-21 ENCOUNTER — OFFICE VISIT (OUTPATIENT)
Dept: NEUROSURGERY | Age: 75
End: 2020-10-21
Payer: MEDICARE

## 2020-10-21 VITALS
OXYGEN SATURATION: 98 % | BODY MASS INDEX: 35.68 KG/M2 | DIASTOLIC BLOOD PRESSURE: 93 MMHG | TEMPERATURE: 95 F | WEIGHT: 222 LBS | HEART RATE: 52 BPM | SYSTOLIC BLOOD PRESSURE: 164 MMHG | HEIGHT: 66 IN

## 2020-10-21 PROCEDURE — 99204 OFFICE O/P NEW MOD 45 MIN: CPT | Performed by: NEUROLOGICAL SURGERY

## 2020-10-21 NOTE — PROGRESS NOTES
Current Outpatient Medications on File Prior to Visit   Medication Sig Dispense Refill    azithromycin (ZITHROMAX) 250 MG tablet azithromycin 250 mg tablet      glucose monitoring kit (FREESTYLE) monitoring kit 1 kit by Does not apply route 4 times daily Ivonne Aponte . Dx E11.65, has tremors and cannot use conventional meter without great difficulty. Please provide supplies for 3 months, rf 3,[facility]. 1 kit 0    Misc. Devices (STEP N REST WALKER) MISC 1 each by Does not apply route continuous Seated, wheeled walker 1 each 0    pregabalin (LYRICA) 50 MG capsule Take 1 capsule by mouth 3 times daily for 30 days. 90 capsule 3    escitalopram (LEXAPRO) 20 MG tablet Take 1 tablet by mouth daily 30 tablet 11    hydrOXYzine (ATARAX) 25 MG tablet take 1 tablet by mouth every 8 hours rectally for itching 30 tablet 11    fluticasone (FLONASE) 50 MCG/ACT nasal spray instill 2 sprays into each nostril once daily      NOVOFINE 32G X 6 MM MISC use 1 NEEDLE to inject MEDICATION subcutaneously five times a day      NIFEdipine (PROCARDIA XL) 60 MG extended release tablet take 1 tablet by mouth once daily      omeprazole (PRILOSEC) 20 MG delayed release capsule Take 1 capsule by mouth Daily 30 capsule 11    insulin aspart (NOVOLOG FLEXPEN) 100 UNIT/ML injection pen Use TID before meals according to scale - max 45 units a day 10 pen 11    loratadine (CLARITIN) 10 MG tablet Take 1 tablet every day by oral route. 30 tablet 11    rosuvastatin (CRESTOR) 40 MG tablet Take 1 tablet by mouth daily 90 tablet 1    LEVEMIR FLEXTOUCH 100 UNIT/ML injection pen Inject 70 unit(s) twice a day by sub-q route for 30 days.       isosorbide mononitrate (IMDUR) 30 MG extended release tablet Take 1 tablet by mouth daily 30 tablet 3    linagliptin (TRADJENTA) 5 MG tablet Take 1 tablet by mouth daily 30 tablet 1    metFORMIN (GLUCOPHAGE-XR) 750 MG extended release tablet Take 1 tablet by mouth 2 times daily (with cold intolerance and heat intolerance. Genitourinary: Negative for frequency and flank pain. Musculoskeletal: Negative for myalgias and joint swelling. Skin: Negative for rash and wound. Allergic/Immunologic: Negative for environmental allergies and food allergies. Hematological: Negative for adenopathy. Does not bruise/bleed easily. Psychiatric/Behavioral: Negative for self-injury. The patient is not nervous/anxious. Physical Exam:      BP (!) 164/93 (Site: Left Upper Arm, Position: Sitting, Cuff Size: Medium Adult)   Pulse 52   Temp 95 °F (35 °C) (Temporal)   Ht 5' 6\" (1.676 m)   Wt 222 lb (100.7 kg)   SpO2 98%   BMI 35.83 kg/m²   Estimated body mass index is 35.83 kg/m² as calculated from the following:    Height as of this encounter: 5' 6\" (1.676 m). Weight as of this encounter: 222 lb (100.7 kg). General:  Soni Lockett is a 76y.o. year old female who appears her stated age. HEENT: Normocephalic atraumatic. Neck supple. Chest: regular rate; pulses equal  Abdomen: Soft nontender nondistended. Normoactive bowel sounds. Ext: DP and PT pulses 2+, good cap refill  Neuro    Mentation  Appropriate affect  Registration intact  Orientation intact  3 item recall intact  Judgement intact to situation    Cranial Nerves:   Pupils equal and reactive to light  Extraocular motion intact  Face and shrug symmetric  Tongue midline  No dysarthria  v1-3 sensation symmetric, masseter tone symmetric  Hearing symmetric and intact to finger rub    Sensation:   Diminished bilateral upper extremities.     Motor  L deltoid 5/5; R deltoid 5/5  L biceps 5/5; R biceps 5/5  L triceps 5/5; R triceps 5/5  L wrist extension 5/5; R wrist extension 5/5  Intrinsics 4 out of 5 bilateral with stiffness    L iliopsoas 5/5 , R iliopsoas 5/5  L quadriceps 5/5; R quadriceps 5/5  L Dorsiflexion 5/5; R dorsiflexion 5/5  L Plantarflexion 5/5; R plantarflexion 5/5  L EHL 5/5; R EHL 5/5    Reflexes  L Brachioradialis 2+/4; R brachioradialis 2+/4  L Biceps 2+/4; R Biceps 2+/4  L Triceps 2+/4; R Triceps 2+/4  L Patellar 2+/4: R Patellar 2+/4  L Achilles 2+/4; R Achilles 2+/4    Active Allen's bilateral.    Studies Review:     MRI cervical spine with multilevel spondylosis discogenic disease and significant central stenosis of the cord with distortion as well as some signal change. Discogenic stenosis at C4-5 C5-6 and C6-7. Assessment and Plan:      1. Cervical spondylosis with myelopathy          Plan: Extensive discussion regarding the natural severe cervical myelopathy which is progressive oftentimes if untreated. Progression with resulting loss of function of the upper extremities with loss of dexterity weakness progressive ataxia. Symptoms if they do progress are difficult to reverse in the setting of delayed intervention and decompression. Additionally minor trauma falls with hyperextension of the neck can result in significant incomplete quadriplegia or complete quadriplegia. Recommend an anterior cervical discectomy fusion at C4-5 C5-6 and C6-7 due to significant compression. I discussed with the option of anterior versus posterior procedure with the anterior having the benefit of decrease pain and increase in quicker recovery with the downside of dysphagia as well as the possibility of needing a second procedure. Posterior approach more definitive decompression with no existence of dysphagia but significant amount of postoperative axial pain. I do believe that based on the localized disease to the level of the disc she has a strong chance of recovery and definitive decompression anteriorly. We will proceed with C4-C7 anterior cervical discectomy and fusion    Followup: No follow-ups on file. Prescriptions Ordered:  No orders of the defined types were placed in this encounter. Orders Placed:  No orders of the defined types were placed in this encounter.        Electronically signed by Debbie Mckeon DO on

## 2020-11-03 PROBLEM — I73.9 PERIPHERAL ARTERY DISEASE (HCC): Status: RESOLVED | Noted: 2019-11-19 | Resolved: 2020-11-03

## 2020-11-06 ENCOUNTER — APPOINTMENT (OUTPATIENT)
Dept: CT IMAGING | Age: 75
DRG: 246 | End: 2020-11-06
Payer: MEDICARE

## 2020-11-06 ENCOUNTER — APPOINTMENT (OUTPATIENT)
Dept: GENERAL RADIOLOGY | Age: 75
DRG: 246 | End: 2020-11-06
Payer: MEDICARE

## 2020-11-06 ENCOUNTER — HOSPITAL ENCOUNTER (INPATIENT)
Age: 75
LOS: 7 days | Discharge: HOME HEALTH | DRG: 246 | End: 2020-11-13
Attending: EMERGENCY MEDICINE | Admitting: INTERNAL MEDICINE
Payer: MEDICARE

## 2020-11-06 PROBLEM — I50.33 ACUTE ON CHRONIC DIASTOLIC CHF (CONGESTIVE HEART FAILURE) (HCC): Status: ACTIVE | Noted: 2020-11-06

## 2020-11-06 LAB
ABSOLUTE EOS #: 0.15 K/UL (ref 0–0.44)
ABSOLUTE IMMATURE GRANULOCYTE: 0.03 K/UL (ref 0–0.3)
ABSOLUTE LYMPH #: 1.25 K/UL (ref 1.1–3.7)
ABSOLUTE MONO #: 0.47 K/UL (ref 0.1–1.2)
ANION GAP SERPL CALCULATED.3IONS-SCNC: 11 MMOL/L (ref 9–17)
BASOPHILS # BLD: 0 % (ref 0–2)
BASOPHILS ABSOLUTE: 0.03 K/UL (ref 0–0.2)
BNP INTERPRETATION: ABNORMAL
BUN BLDV-MCNC: 16 MG/DL (ref 8–23)
BUN/CREAT BLD: ABNORMAL (ref 9–20)
CALCIUM SERPL-MCNC: 9.3 MG/DL (ref 8.6–10.4)
CHLORIDE BLD-SCNC: 102 MMOL/L (ref 98–107)
CO2: 24 MMOL/L (ref 20–31)
CREAT SERPL-MCNC: 0.81 MG/DL (ref 0.5–0.9)
DIFFERENTIAL TYPE: ABNORMAL
EOSINOPHILS RELATIVE PERCENT: 2 % (ref 1–4)
GFR AFRICAN AMERICAN: >60 ML/MIN
GFR NON-AFRICAN AMERICAN: >60 ML/MIN
GFR SERPL CREATININE-BSD FRML MDRD: ABNORMAL ML/MIN/{1.73_M2}
GFR SERPL CREATININE-BSD FRML MDRD: ABNORMAL ML/MIN/{1.73_M2}
GLUCOSE BLD-MCNC: 111 MG/DL (ref 65–105)
GLUCOSE BLD-MCNC: 227 MG/DL (ref 70–99)
HCT VFR BLD CALC: 36.2 % (ref 36.3–47.1)
HEMOGLOBIN: 11.4 G/DL (ref 11.9–15.1)
IMMATURE GRANULOCYTES: 0 %
LIPASE: 18 U/L (ref 13–60)
LYMPHOCYTES # BLD: 18 % (ref 24–43)
MAGNESIUM: 2.1 MG/DL (ref 1.6–2.6)
MCH RBC QN AUTO: 30 PG (ref 25.2–33.5)
MCHC RBC AUTO-ENTMCNC: 31.5 G/DL (ref 28.4–34.8)
MCV RBC AUTO: 95.3 FL (ref 82.6–102.9)
MONOCYTES # BLD: 7 % (ref 3–12)
NRBC AUTOMATED: 0 PER 100 WBC
PDW BLD-RTO: 13.6 % (ref 11.8–14.4)
PLATELET # BLD: 195 K/UL (ref 138–453)
PLATELET ESTIMATE: ABNORMAL
PMV BLD AUTO: 11 FL (ref 8.1–13.5)
POTASSIUM SERPL-SCNC: 4.3 MMOL/L (ref 3.7–5.3)
PRO-BNP: 2059 PG/ML
RBC # BLD: 3.8 M/UL (ref 3.95–5.11)
RBC # BLD: ABNORMAL 10*6/UL
SARS-COV-2, RAPID: NOT DETECTED
SARS-COV-2: NORMAL
SARS-COV-2: NORMAL
SEG NEUTROPHILS: 73 % (ref 36–65)
SEGMENTED NEUTROPHILS ABSOLUTE COUNT: 5.03 K/UL (ref 1.5–8.1)
SODIUM BLD-SCNC: 137 MMOL/L (ref 135–144)
SOURCE: NORMAL
TROPONIN INTERP: ABNORMAL
TROPONIN INTERP: ABNORMAL
TROPONIN T: ABNORMAL NG/ML
TROPONIN T: ABNORMAL NG/ML
TROPONIN, HIGH SENSITIVITY: 30 NG/L (ref 0–14)
TROPONIN, HIGH SENSITIVITY: 33 NG/L (ref 0–14)
TSH SERPL DL<=0.05 MIU/L-ACNC: 0.64 MIU/L (ref 0.3–5)
WBC # BLD: 7 K/UL (ref 3.5–11.3)
WBC # BLD: ABNORMAL 10*3/UL

## 2020-11-06 PROCEDURE — 99223 1ST HOSP IP/OBS HIGH 75: CPT | Performed by: INTERNAL MEDICINE

## 2020-11-06 PROCEDURE — 93005 ELECTROCARDIOGRAM TRACING: CPT | Performed by: EMERGENCY MEDICINE

## 2020-11-06 PROCEDURE — 83735 ASSAY OF MAGNESIUM: CPT

## 2020-11-06 PROCEDURE — 96365 THER/PROPH/DIAG IV INF INIT: CPT

## 2020-11-06 PROCEDURE — 2060000000 HC ICU INTERMEDIATE R&B

## 2020-11-06 PROCEDURE — 83690 ASSAY OF LIPASE: CPT

## 2020-11-06 PROCEDURE — 82947 ASSAY GLUCOSE BLOOD QUANT: CPT

## 2020-11-06 PROCEDURE — 6360000004 HC RX CONTRAST MEDICATION: Performed by: STUDENT IN AN ORGANIZED HEALTH CARE EDUCATION/TRAINING PROGRAM

## 2020-11-06 PROCEDURE — 85025 COMPLETE CBC W/AUTO DIFF WBC: CPT

## 2020-11-06 PROCEDURE — 83880 ASSAY OF NATRIURETIC PEPTIDE: CPT

## 2020-11-06 PROCEDURE — 80048 BASIC METABOLIC PNL TOTAL CA: CPT

## 2020-11-06 PROCEDURE — 96375 TX/PRO/DX INJ NEW DRUG ADDON: CPT

## 2020-11-06 PROCEDURE — 36415 COLL VENOUS BLD VENIPUNCTURE: CPT

## 2020-11-06 PROCEDURE — 99285 EMERGENCY DEPT VISIT HI MDM: CPT

## 2020-11-06 PROCEDURE — 84443 ASSAY THYROID STIM HORMONE: CPT

## 2020-11-06 PROCEDURE — 2500000003 HC RX 250 WO HCPCS: Performed by: STUDENT IN AN ORGANIZED HEALTH CARE EDUCATION/TRAINING PROGRAM

## 2020-11-06 PROCEDURE — 71045 X-RAY EXAM CHEST 1 VIEW: CPT

## 2020-11-06 PROCEDURE — 6360000002 HC RX W HCPCS: Performed by: STUDENT IN AN ORGANIZED HEALTH CARE EDUCATION/TRAINING PROGRAM

## 2020-11-06 PROCEDURE — 71260 CT THORAX DX C+: CPT

## 2020-11-06 PROCEDURE — 6370000000 HC RX 637 (ALT 250 FOR IP): Performed by: STUDENT IN AN ORGANIZED HEALTH CARE EDUCATION/TRAINING PROGRAM

## 2020-11-06 PROCEDURE — 84484 ASSAY OF TROPONIN QUANT: CPT

## 2020-11-06 PROCEDURE — U0002 COVID-19 LAB TEST NON-CDC: HCPCS

## 2020-11-06 PROCEDURE — 2580000003 HC RX 258: Performed by: STUDENT IN AN ORGANIZED HEALTH CARE EDUCATION/TRAINING PROGRAM

## 2020-11-06 PROCEDURE — 96366 THER/PROPH/DIAG IV INF ADDON: CPT

## 2020-11-06 RX ORDER — SODIUM CHLORIDE 0.9 % (FLUSH) 0.9 %
10 SYRINGE (ML) INJECTION PRN
Status: DISCONTINUED | OUTPATIENT
Start: 2020-11-06 | End: 2020-11-13 | Stop reason: HOSPADM

## 2020-11-06 RX ORDER — CLONAZEPAM 1 MG/1
0.5 TABLET ORAL NIGHTLY
Status: DISCONTINUED | OUTPATIENT
Start: 2020-11-06 | End: 2020-11-13 | Stop reason: HOSPADM

## 2020-11-06 RX ORDER — ESCITALOPRAM OXALATE 10 MG/1
20 TABLET ORAL DAILY
Status: DISCONTINUED | OUTPATIENT
Start: 2020-11-07 | End: 2020-11-13 | Stop reason: HOSPADM

## 2020-11-06 RX ORDER — ASPIRIN 81 MG/1
324 TABLET, CHEWABLE ORAL ONCE
Status: DISCONTINUED | OUTPATIENT
Start: 2020-11-06 | End: 2020-11-06

## 2020-11-06 RX ORDER — GABAPENTIN 600 MG/1
600 TABLET ORAL 3 TIMES DAILY
Status: DISCONTINUED | OUTPATIENT
Start: 2020-11-06 | End: 2020-11-13 | Stop reason: HOSPADM

## 2020-11-06 RX ORDER — CARVEDILOL 12.5 MG/1
25 TABLET ORAL 2 TIMES DAILY WITH MEALS
Status: DISCONTINUED | OUTPATIENT
Start: 2020-11-07 | End: 2020-11-12

## 2020-11-06 RX ORDER — NICOTINE POLACRILEX 4 MG
15 LOZENGE BUCCAL PRN
Status: DISCONTINUED | OUTPATIENT
Start: 2020-11-06 | End: 2020-11-13 | Stop reason: HOSPADM

## 2020-11-06 RX ORDER — DEXTROSE MONOHYDRATE 50 MG/ML
100 INJECTION, SOLUTION INTRAVENOUS PRN
Status: DISCONTINUED | OUTPATIENT
Start: 2020-11-06 | End: 2020-11-13 | Stop reason: HOSPADM

## 2020-11-06 RX ORDER — ASPIRIN 81 MG/1
81 TABLET ORAL DAILY
Status: DISCONTINUED | OUTPATIENT
Start: 2020-11-07 | End: 2020-11-13 | Stop reason: HOSPADM

## 2020-11-06 RX ORDER — MAGNESIUM SULFATE IN WATER 40 MG/ML
2 INJECTION, SOLUTION INTRAVENOUS PRN
Status: DISCONTINUED | OUTPATIENT
Start: 2020-11-06 | End: 2020-11-13 | Stop reason: HOSPADM

## 2020-11-06 RX ORDER — SODIUM CHLORIDE 0.9 % (FLUSH) 0.9 %
10 SYRINGE (ML) INJECTION EVERY 12 HOURS SCHEDULED
Status: DISCONTINUED | OUTPATIENT
Start: 2020-11-06 | End: 2020-11-13 | Stop reason: HOSPADM

## 2020-11-06 RX ORDER — ACETAMINOPHEN 325 MG/1
650 TABLET ORAL EVERY 6 HOURS PRN
Status: DISCONTINUED | OUTPATIENT
Start: 2020-11-06 | End: 2020-11-09

## 2020-11-06 RX ORDER — DEXTROSE MONOHYDRATE 25 G/50ML
12.5 INJECTION, SOLUTION INTRAVENOUS PRN
Status: DISCONTINUED | OUTPATIENT
Start: 2020-11-06 | End: 2020-11-13 | Stop reason: HOSPADM

## 2020-11-06 RX ORDER — ONDANSETRON 2 MG/ML
4 INJECTION INTRAMUSCULAR; INTRAVENOUS EVERY 6 HOURS PRN
Status: DISCONTINUED | OUTPATIENT
Start: 2020-11-06 | End: 2020-11-13 | Stop reason: HOSPADM

## 2020-11-06 RX ORDER — PROMETHAZINE HYDROCHLORIDE 12.5 MG/1
12.5 TABLET ORAL EVERY 6 HOURS PRN
Status: DISCONTINUED | OUTPATIENT
Start: 2020-11-06 | End: 2020-11-13 | Stop reason: HOSPADM

## 2020-11-06 RX ORDER — PANTOPRAZOLE SODIUM 40 MG/1
40 TABLET, DELAYED RELEASE ORAL
Status: DISCONTINUED | OUTPATIENT
Start: 2020-11-07 | End: 2020-11-13 | Stop reason: HOSPADM

## 2020-11-06 RX ORDER — NITROGLYCERIN 20 MG/100ML
20 INJECTION INTRAVENOUS CONTINUOUS
Status: DISCONTINUED | OUTPATIENT
Start: 2020-11-06 | End: 2020-11-07

## 2020-11-06 RX ORDER — SPIRONOLACTONE 25 MG/1
25 TABLET ORAL DAILY
Status: DISCONTINUED | OUTPATIENT
Start: 2020-11-06 | End: 2020-11-13 | Stop reason: HOSPADM

## 2020-11-06 RX ORDER — ROSUVASTATIN CALCIUM 20 MG/1
40 TABLET, COATED ORAL DAILY
Status: DISCONTINUED | OUTPATIENT
Start: 2020-11-07 | End: 2020-11-13 | Stop reason: HOSPADM

## 2020-11-06 RX ORDER — FUROSEMIDE 10 MG/ML
20 INJECTION INTRAMUSCULAR; INTRAVENOUS ONCE
Status: COMPLETED | OUTPATIENT
Start: 2020-11-06 | End: 2020-11-06

## 2020-11-06 RX ORDER — POTASSIUM CHLORIDE 20 MEQ/1
40 TABLET, EXTENDED RELEASE ORAL PRN
Status: DISCONTINUED | OUTPATIENT
Start: 2020-11-06 | End: 2020-11-13 | Stop reason: HOSPADM

## 2020-11-06 RX ORDER — ISOSORBIDE MONONITRATE 30 MG/1
30 TABLET, EXTENDED RELEASE ORAL DAILY
Status: DISCONTINUED | OUTPATIENT
Start: 2020-11-07 | End: 2020-11-13 | Stop reason: HOSPADM

## 2020-11-06 RX ORDER — LISINOPRIL 10 MG/1
10 TABLET ORAL DAILY
Status: DISCONTINUED | OUTPATIENT
Start: 2020-11-06 | End: 2020-11-07 | Stop reason: SDUPTHER

## 2020-11-06 RX ORDER — MORPHINE SULFATE 4 MG/ML
4 INJECTION, SOLUTION INTRAMUSCULAR; INTRAVENOUS ONCE
Status: COMPLETED | OUTPATIENT
Start: 2020-11-06 | End: 2020-11-06

## 2020-11-06 RX ORDER — ACETAMINOPHEN 650 MG/1
650 SUPPOSITORY RECTAL EVERY 6 HOURS PRN
Status: DISCONTINUED | OUTPATIENT
Start: 2020-11-06 | End: 2020-11-13 | Stop reason: HOSPADM

## 2020-11-06 RX ORDER — ACETAMINOPHEN 325 MG/1
650 TABLET ORAL EVERY 4 HOURS PRN
Status: DISCONTINUED | OUTPATIENT
Start: 2020-11-06 | End: 2020-11-06

## 2020-11-06 RX ORDER — FUROSEMIDE 10 MG/ML
40 INJECTION INTRAMUSCULAR; INTRAVENOUS DAILY
Status: DISCONTINUED | OUTPATIENT
Start: 2020-11-07 | End: 2020-11-07

## 2020-11-06 RX ORDER — ONDANSETRON 2 MG/ML
4 INJECTION INTRAMUSCULAR; INTRAVENOUS ONCE
Status: COMPLETED | OUTPATIENT
Start: 2020-11-06 | End: 2020-11-06

## 2020-11-06 RX ORDER — POTASSIUM CHLORIDE 7.45 MG/ML
10 INJECTION INTRAVENOUS PRN
Status: DISCONTINUED | OUTPATIENT
Start: 2020-11-06 | End: 2020-11-13 | Stop reason: HOSPADM

## 2020-11-06 RX ORDER — POLYETHYLENE GLYCOL 3350 17 G/17G
17 POWDER, FOR SOLUTION ORAL DAILY PRN
Status: DISCONTINUED | OUTPATIENT
Start: 2020-11-06 | End: 2020-11-13 | Stop reason: HOSPADM

## 2020-11-06 RX ADMIN — FUROSEMIDE 20 MG: 10 INJECTION, SOLUTION INTRAMUSCULAR; INTRAVENOUS at 10:53

## 2020-11-06 RX ADMIN — LISINOPRIL 10 MG: 10 TABLET ORAL at 23:30

## 2020-11-06 RX ADMIN — NITROGLYCERIN 20 MCG/MIN: 20 INJECTION INTRAVENOUS at 10:20

## 2020-11-06 RX ADMIN — IOPAMIDOL 75 ML: 755 INJECTION, SOLUTION INTRAVENOUS at 11:46

## 2020-11-06 RX ADMIN — GABAPENTIN 600 MG: 600 TABLET ORAL at 23:30

## 2020-11-06 RX ADMIN — MORPHINE SULFATE 4 MG: 4 INJECTION INTRAVENOUS at 10:15

## 2020-11-06 RX ADMIN — ONDANSETRON 4 MG: 2 INJECTION INTRAMUSCULAR; INTRAVENOUS at 10:15

## 2020-11-06 RX ADMIN — SPIRONOLACTONE 25 MG: 25 TABLET ORAL at 23:30

## 2020-11-06 RX ADMIN — CLONAZEPAM 0.5 MG: 1 TABLET ORAL at 23:31

## 2020-11-06 RX ADMIN — SODIUM CHLORIDE, PRESERVATIVE FREE 10 ML: 5 INJECTION INTRAVENOUS at 12:45

## 2020-11-06 ASSESSMENT — PAIN SCALES - GENERAL
PAINLEVEL_OUTOF10: 10
PAINLEVEL_OUTOF10: 7

## 2020-11-06 ASSESSMENT — ENCOUNTER SYMPTOMS
BACK PAIN: 0
SHORTNESS OF BREATH: 1
EYE REDNESS: 0
EYE DISCHARGE: 0
VOMITING: 0
NAUSEA: 0
ABDOMINAL PAIN: 1

## 2020-11-06 ASSESSMENT — PAIN DESCRIPTION - PAIN TYPE: TYPE: ACUTE PAIN

## 2020-11-06 ASSESSMENT — PAIN DESCRIPTION - DESCRIPTORS: DESCRIPTORS: ACHING

## 2020-11-06 ASSESSMENT — PAIN DESCRIPTION - PROGRESSION: CLINICAL_PROGRESSION: GRADUALLY IMPROVING

## 2020-11-06 ASSESSMENT — PAIN DESCRIPTION - LOCATION: LOCATION: CHEST

## 2020-11-06 NOTE — ED PROVIDER NOTES
[facility]  ED  Emergency Department Encounter  Emergency Medicine Resident     Pt Name: Sachi Simmons  MRN: [id number]  Jan 1945  Date of evaluation: 11/6/20  PCP:  Katy Swanson [id number]       Chief Complaint   Patient presents with    Shortness of Breath     for 3 days with productive cough       HISTORY OFPRESENT ILLNESS  (Location/Symptom, Timing/Onset, Context/Setting, Quality, Duration, Modifying Factors,Severity.)      Sachi Simmons is a 76 y.o. female who presents with shortness of breath. She has had approximately 3 days of productive cough increasing shortness of breath. Does have a history of PE is anticoagulated and compliant with Xarelto. States she is on a \"water pill \"however she is unsure what it actually is. She states she is also had some intermittent chest pain. Worse with the shortness of breath. Intermittent. Substernal.  Nonradiating. PAST MEDICAL / SURGICAL / SOCIAL / FAMILY HISTORY      has a past medical history of Anxiety, Benign positional vertigo, CAD (coronary artery disease), Chest pain, Depression, Diabetes mellitus ([address] Diabetic neuropathy ([address] Hyperlipidemia, Hypertension, and Migraine. has a past surgical history that includes Percutaneous Transluminal Coronary Angio; Cardiac catheterization; Coronary angioplasty with stent (02/25/2017); Appendectomy; and Coronary angioplasty with stent (07/11/2012).     Social History     Socioeconomic History    Marital status:      Spouse name: Not on file    Number of children: Not on file    Years of education: Not on file    Highest education level: Not on file   Occupational History    Not on file   Social Needs    Financial resource strain: Not on file    Food insecurity     Worry: Not on file     Inability: Not on file    Transportation needs     Medical: Not on file     Non-medical: Not on file   Tobacco Use    Smoking status: Never Smoker    Smokeless tobacco: Never Used   Substance and Sexual Activity    Alcohol use: No    Drug use: No    Sexual activity: Never   Lifestyle    Physical activity     Days per week: Not on file     Minutes per session: Not on file    Stress: Not on file   Relationships    Social connections     Talks on phone: Not on file     Gets together: Not on file     Attends Tenriism service: Not on file     Active member of club or organization: Not on file     Attends meetings of clubs or organizations: Not on file     Relationship status: Not on file    Intimate partner violence     Fear of current or ex partner: Not on file     Emotionally abused: Not on file     Physically abused: Not on file     Forced sexual activity: Not on file   Other Topics Concern    Not on file   Social History Narrative    Not on file       Family History   Problem Relation Age of Onset    Cancer Mother     Cancer Father        Allergies:  Penicillins and Sulfa antibiotics    Home Medications:  Prior to Admission medications    Medication Sig Start Date End Date Taking? Authorizing Provider   Continuous Blood Gluc  (FREESTYLE SYLVIA 14 DAY READER) JAQUELINE 1 each by Does not apply route continuous [facility] 10/30/20   Melvin Millan MD   Continuous Blood Gluc Sensor (FREESTYLE SYLVIA 14 DAY SENSOR) [facility] [place] [place] 1 each by Does not apply route every 14 days 10/30/20   Melvin Millan MD   rivaroxaban (XARELTO) 20 MG TABS tablet Take 1 tablet by mouth daily (with breakfast) 10/21/20   Melvni Millan MD   azithromycin (ZITHROMAX) 250 MG tablet azithromycin 250 mg tablet    Historical Provider, MD   glucose monitoring kit (FREESTYLE) monitoring kit 1 kit by Does not apply route 4 times daily Freestyle Sylvia . Dx E11.65, has tremors and cannot use conventional meter without great difficulty. Please provide supplies for 3 months, rf 3,Pharmacy Counter [facility]. 10/6/20   Melvin Millan MD   [facility].  Devices (STEP N REST WALKER) MISC 1 each by Does not apply route continuous Seated, wheeled walker 10/6/20   Melvin Millan MD   pregabalin (LYRICA) 50 MG capsule Take 1 capsule by mouth 3 times daily for 30 days. 10/2/20 11/1/20  Sharron Delgado MD   escitalopram (LEXAPRO) 20 MG tablet Take 1 tablet by mouth daily 8/11/20   Melvin Millan MD   clonazePAM Frederich Rower) 0.5 MG tablet take 1 tablet by mouth twice a day if needed for anxiety 8/5/20 9/4/20  Melvin Millan MD   hydrOXYzine (ATARAX) 25 MG tablet take 1 tablet by mouth every 8 hours rectally for itching 8/5/20   Melvin Millan MD   gabapentin (NEURONTIN) 600 MG tablet Take 1 tablet by mouth 3 times daily for 30 days. 6/12/20 9/3/20  Melvin Millan MD   fluticasone [facility]) 50 MCG/ACT nasal spray instill 2 sprays into each nostril once daily 4/21/20   Historical Provider, MD   NOVOFINE 32G X 6 MM MISC use 1 NEEDLE to inject MEDICATION subcutaneously five times a day 4/8/20   Historical Provider, MD   mupirocin (BACTROBAN) 2 % ointment apply topically to affected area three times a day 4/21/20   Historical Provider, MD   NIFEdipine (PROCARDIA XL) 60 MG extended release tablet take 1 tablet by mouth once daily 4/27/20   Historical Provider, MD   omeprazole (PRILOSEC) 20 MG delayed release capsule Take 1 capsule by mouth Daily 5/11/20   Melvin Millan MD   insulin aspart (NOVOLOG FLEXPEN) 100 UNIT/ML injection pen Use TID before meals according to scale - max 45 units a day 5/11/20   Melvin Millan MD   loratadine (CLARITIN) 10 MG tablet Take 1 tablet every day by oral route. 5/11/20   Melvin Millan MD   rosuvastatin (CRESTOR) 40 MG tablet Take 1 tablet by mouth daily 5/1/20   Melvin Millan MD   LEVEMIR FLEXTOUCH 100 UNIT/ML injection pen Inject 70 unit(s) twice a day by sub-q route for 30 days.  3/11/20   Historical Provider, MD   isosorbide mononitrate (IMDUR) 30 MG extended release tablet Take 1 tablet by mouth daily 11/26/19   Jarred Alas MD   linagliptin (TRADJENTA) 5 MG tablet Take 1 tablet by mouth daily 11/26/19   Jarred Alas MD   metFORMIN (GLUCOPHAGE-XR) 750 MG extended release tablet Take 1 tablet by mouth 2 times daily (with meals) 11/25/19   Jarred Alas MD   rOPINIRole (REQUIP) 0.5 MG tablet Take 1 tablet by mouth 3 times daily 11/25/19   Jarred Alas MD   carvedilol (COREG) 25 MG tablet Take 1 tablet by mouth 2 times daily (with meals) Hold for systolic blood pressure < 120 or heart rate < 50  Give 1 hour apart from other blood pressure medicines 11/25/19   Jarred Alas MD   albuterol sulfate  (90 Base) MCG/ACT inhaler Inhale 2 puffs into the lungs every 6 hours as needed for Wheezing  Patient not taking: Reported on 10/21/2020 3/15/18   Rehan Leong MD   spironolactone (ALDACTONE) 25 MG tablet Take 25 mg by mouth daily     Historical Provider, MD   lisinopril (PRINIVIL;ZESTRIL) 10 MG tablet Take 1 tablet by mouth daily 3/26/16   Sachi Lopez MD   vitamin E 400 UNIT capsule Take 400 Units by mouth daily. Historical Provider, MD   aspirin 81 MG tablet Take 81 mg by mouth daily. Historical Provider, MD   nitroGLYCERIN (NITROSTAT) 0.4 MG SL tablet Place 0.4 mg under the tongue every 5 minutes as needed. Historical Provider, MD       REVIEW OF SYSTEMS    (2-9 systems for level 4, 10 or more for level 5)      Review of Systems   Constitutional: Negative for chills and fever. Eyes: Negative for discharge and redness. Respiratory: Positive for shortness of breath. Cardiovascular: Positive for chest pain. Gastrointestinal: Positive for abdominal pain. Negative for nausea and vomiting. Genitourinary: Negative for flank pain. Musculoskeletal: Negative for back pain. Skin: Negative for rash. Allergic/Immunologic: Positive for environmental allergies. Neurological: Negative for headaches. Psychiatric/Behavioral: Negative for agitation and confusion.        PHYSICAL EXAM   (up to 7 for level 4, 8 or more for level 5)     INITIAL VITALS:    height is 5' 6\" (1.676 m) and weight is 225 lb (102.1 kg). Her oral temperature is 97.7 °F (36.5 °C). Her blood pressure is 165/94 (abnormal) and her pulse is 63. Her respiration is 28 and oxygen saturation is 100%. Physical Exam  Vitals signs and nursing note reviewed. Constitutional:       Appearance: She is well-developed. HENT:      Head: Normocephalic and atraumatic. Nose: Nose normal.      Mouth/Throat:      Mouth: Mucous membranes are moist.   Eyes:      General: No scleral icterus. Conjunctiva/sclera: Conjunctivae normal.      Pupils: Pupils are equal, round, and reactive to light. Neck:      Musculoskeletal: Neck supple. Trachea: No tracheal deviation. Cardiovascular:      Rate and Rhythm: Normal rate and regular rhythm. Heart sounds: Normal heart sounds. No murmur. No friction rub. No gallop. Pulmonary:      Effort: Pulmonary effort is normal. No respiratory distress. Breath sounds: Normal breath sounds. No wheezing or rales. Comments: Slight bilateral lower lobe crackles  Abdominal:      General: Bowel sounds are normal. There is no distension. Palpations: Abdomen is soft. There is no mass. Tenderness: There is abdominal tenderness. There is no guarding or rebound. Comments: Slight epigastric tenderness to deep palpation. No guarding no mass no rebound tenderness. Musculoskeletal: Normal range of motion. Comments: 2+ pitting edema to bilateral lower extremities   Skin:     General: Skin is warm and dry. Findings: No erythema or rash. Neurological:      Mental Status: She is alert and oriented to person, place, and time.    Psychiatric:         Behavior: Behavior normal.         DIFFERENTIAL  DIAGNOSIS     PLAN (LABS / IMAGING / EKG):  Orders Placed This Encounter   Procedures    CT CHEST PULMONARY EMBOLISM W CONTRAST    XR CHEST PORTABLE    Basic Metabolic Panel    CBC Auto Differential    TSH with Reflex    Troponin    Magnesium    Lipase    Brain Natriuretic Peptide    COVID-19    Vital signs per unit routine    Notify physician    Notify physician    Up as tolerated    Inpatient consult to Internal Medicine    EKG 12 Lead    PATIENT STATUS (FROM ED OR OR/PROCEDURAL) Inpatient       MEDICATIONS ORDERED:  Orders Placed This Encounter   Medications    DISCONTD: aspirin chewable tablet 324 mg    morphine injection 4 mg    ondansetron (ZOFRAN) injection 4 mg    nitroGLYCERIN 50 mg in dextrose 5% 250 mL infusion    DISCONTD: iopamidol (ISOVUE-370) 76 % injection 75 mL    DISCONTD: iopamidol (ISOVUE-370) 76 % injection 75 mL    iopamidol (ISOVUE-370) 76 % injection 75 mL    furosemide (LASIX) injection 20 mg    sodium chloride flush 0.9 % injection 10 mL    sodium chloride flush 0.9 % injection 10 mL    acetaminophen (TYLENOL) tablet 650 mg    rivaroxaban (XARELTO) tablet 20 mg       DDX: CHF exacerbation versus PE versus Covid versus electrolyte abnormality    Initial MDM/Plan: 76 y.o. female who presents with hypertension, shortness of breath, chest pain. Laboratory studies including BNP. Anticipate CT rule out PE patient has history of PE. Dissipate admission. High suspicion for CHF exacerbation. Last echocardiogram showing severe diastolic dysfunction.     DIAGNOSTIC RESULTS / EMERGENCY DEPARTMENT COURSE / MDM     LABS:  Labs Reviewed   BASIC METABOLIC PANEL - Abnormal; Notable for the following components:       Result Value    Glucose 227 (*)     All other components within normal limits   CBC WITH AUTO DIFFERENTIAL - Abnormal; Notable for the following components:    RBC 3.80 (*)     Hemoglobin 11.4 (*)     Hematocrit 36.2 (*)     Seg Neutrophils 73 (*)     Lymphocytes 18 (*)     All other components within normal limits   TROPONIN - Abnormal; Notable for the following components:    Troponin, High Sensitivity 33 (*)     All other components within normal limits   TROPONIN - Abnormal; Notable for the following components:    Troponin, High Sensitivity 30 (*)     All other components within normal limits   BRAIN NATRIURETIC PEPTIDE - Abnormal; Notable for the following components:    Pro-BNP 2,059 (*)     All other components within normal limits   TSH WITH REFLEX   MAGNESIUM   LIPASE   COVID-19         RADIOLOGY:  Xr Chest Portable    Result Date: 11/6/2020  EXAMINATION: ONE XRAY VIEW OF THE CHEST 11/6/2020 9:59 am COMPARISON: 09/02/2020 HISTORY: ORDERING SYSTEM PROVIDED HISTORY: SOB TECHNOLOGIST PROVIDED HISTORY: SOB Reason for Exam: Shortness of breath/  AP erect/ port. Acuity: Unknown Type of Exam: Unknown FINDINGS: Cardiomegaly, pulmonary vascular congestion, and interstitial edema. Small bowel pleural effusions. Osseous structures and soft tissues are grossly intact. Cardiomegaly, pulmonary vascular congestion, and interstitial edema. Small bilateral effusions. Ct Chest Pulmonary Embolism W Contrast    Result Date: 11/6/2020  EXAMINATION: CTA OF THE CHEST 11/6/2020 10:14 am TECHNIQUE: CTA of the chest was performed after the administration of intravenous contrast.  Multiplanar reformatted images are provided for review. MIP images are provided for review. Dose modulation, iterative reconstruction, and/or weight based adjustment of the mA/kV was utilized to reduce the radiation dose to as low as reasonably achievable. COMPARISON: Chest radiograph today. Chest CT 12/13/2019. HISTORY: ORDERING SYSTEM PROVIDED HISTORY: CP/SOB TECHNOLOGIST PROVIDED HISTORY: CP/SOB Reason for Exam: sob chest pain r/o pe FINDINGS: Pulmonary Arteries: Pulmonary arteries are adequately opacified for evaluation. No evidence of intraluminal filling defect to suggest pulmonary embolism. Main pulmonary artery is normal in caliber. Mediastinum: No evidence of mediastinal lymphadenopathy. The heart and pericardium demonstrate no acute abnormality. There is no acute abnormality of the thoracic aorta. Calcified atheromatous plaque and coronary calcification is noted. Lungs/pleura: Moderate size right pleural effusion and small left effusion are noted. Scattered ground-glass opacities are noted. Mild septal thickening in the lung bases. Minimal debris in the trachea. Upper Abdomen: No acute findings. Bilateral renal cysts are noted. Diverticulosis is noted. Soft Tissues/Bones: No acute bone or soft tissue abnormality. 1.  No evidence for acute pulmonary embolism. 2.  Findings compatible with interstitial edema with moderate right and small left effusion. EMERGENCY DEPARTMENT COURSE:  ED Course as of Nov 06 1323   Fri Nov 06, 2020   1017 BNP elevated, pressure elevated, chest x-ray appearing fluid overloaded. Anticipate admission for CHF. Await electrolytes before diuresis. [MS]   3334 Case discussed with internal medicine agreeable to admission. [MS]      ED Course User Index  [MS] Pete Brown DO     Patient appearing fluid overloaded on chest x-ray. Small bilateral effusions. CT scan showing significant bilateral pleural effusions. Covid testing negative. Case discussed with internal medicine agreeable to admission. Negative for PE. Concern for worsening heart failure. Diuresed with Lasix, on nitro drip. Admitted to internal medicine for CHF exacerbation. Not requiring oxygen. PROCEDURES:  None    CONSULTS:  IP CONSULT TO INTERNAL MEDICINE    CRITICAL CARE:  Please see attending note    FINAL IMPRESSION      1. Acute on chronic congestive heart failure, unspecified heart failure type ([address]          DISPOSITION / PLAN     DISPOSITION Admitted 11/06/2020 12:24:00 PM        PATIENTREFERRED TO:  No follow-up provider specified.     DISCHARGE MEDICATIONS:  New Prescriptions    No medications on file       Pete Brown DO  EmergencyMedicine Resident    (Please note that portions of this note were completed with a voice recognition program.  Efforts were made to edit the dictations but occasionally words are mis-transcribed.)       Betsy Alves DO  Resident  11/06/20 [id number]

## 2020-11-06 NOTE — ED NOTES
Placed on monitor, resident at bedside     Shannon Moss, [address] [place] [address]  11/06/20 [id number]

## 2020-11-06 NOTE — H&P
[address]     Department of Internal Medicine - Staff Internal Medicine Teaching Service          ADMISSION NOTE/HISTORY AND PHYSICAL EXAMINATION   Date: 11/6/2020  Patient Name: Steve Velazquez  Date of admission: 11/6/2020  8:28 AM  YOB: 1945  PCP: Jc Narvaez MD  History Obtained From:  patient    CHIEF COMPLAINT     Chief complaint: Shortness of breath    HISTORY OF PRESENTING ILLNESS     The patient is a pleasant 76 y.o. female with background history of diastolic heart failure presents with a chief complaint of shortness of breath which started about 3 days ago and it is getting worse slowly. According to the patient she started to have shortness of breath started about 2 days ago. It is gradually worsening and now even she is feeling short of breath at rest.  Patient also reported orthopnea and paroxysmal nocturnal dyspnea along with ankle swelling. Patient says that she is having cough with some yellowish phlegm but no fever or chills. No sick contacts recently. Patient is compliant with her medications, no salty foods. But patient was found to be very hypertensive on presentation to the ED. Review of Systems:  Review of Systems   Constitutional: Positive for activity change and fatigue. Negative for diaphoresis and fever. HENT: Positive for sinus pressure. Negative for congestion, drooling, facial swelling and sinus pain. Eyes: Negative for photophobia, discharge and itching. Respiratory: Positive for cough and shortness of breath. Negative for apnea and wheezing. Cardiovascular: Positive for leg swelling. Negative for chest pain and palpitations. Gastrointestinal: Negative for abdominal distention, anal bleeding, diarrhea, nausea, rectal pain and vomiting. Endocrine: Negative for polydipsia and polyphagia. Genitourinary: Negative for dysuria, enuresis and pelvic pain.    Musculoskeletal: Negative for arthralgias, gait problem and myalgias. Neurological: Negative for seizures, syncope and facial asymmetry. Psychiatric/Behavioral: Negative for agitation and hallucinations. The patient is not hyperactive. PAST MEDICAL HISTORY     Past Medical History:   Diagnosis Date    Anxiety     Benign positional vertigo     CAD (coronary artery disease)     Chest pain     Depression     [facility]     Diabetic neuropathy [facility]     Hyperlipidemia     Hypertension     Migraine     Migraine headaches aggravated with sublingual nitroglycerin       PAST SURGICAL HISTORY     Past Surgical History:   Procedure Laterality Date    APPENDECTOMY      CARDIAC CATHETERIZATION      2012    CORONARY ANGIOPLASTY WITH STENT PLACEMENT  02/25/2017    4 SYNERGY HEART STENTS DRUG ELUTING ALL MRI CONDITONAL 3T OK, SAFE IMMEDIATELY.  CORONARY ANGIOPLASTY WITH STENT PLACEMENT  07/11/2012    XIENCE HEART STENT/ MRI CONDITIONAL 3T OK, SAFE IMMEDIATELY    PTCA         ALLERGIES     Penicillins and Sulfa antibiotics    MEDICATIONS PRIOR TO ADMISSION     Prior to Admission medications    Medication Sig Start Date End Date Taking? Authorizing Provider   Continuous Blood Gluc  (FREESTYLE SYLVIA 14 DAY READER) JAQUELINE 1 each by Does not apply route continuous [facility] 10/30/20   Jc Narvaez MD   Continuous Blood Gluc Sensor (FREESTYLE SYLVIA 14 DAY SENSOR) MISC 1 each by Does not apply route every 14 days 10/30/20   Jc Narvaez MD   rivaroxaban (XARELTO) 20 MG TABS tablet Take 1 tablet by mouth daily (with breakfast) 10/21/20   Jc Narvaez MD   azithromycin (ZITHROMAX) 250 MG tablet azithromycin 250 mg tablet    Historical Provider, MD   glucose monitoring kit (FREESTYLE) monitoring kit 1 kit by Does not apply route 4 times daily Freestyle Sylvia . Dx E11.65, has tremors and cannot use conventional meter without great difficulty.  Please provide supplies for 3 months, rf 3,Pharmacy [facility]. 10/6/20   Melvin Millan MD   [facility]. Devices (STEP N REST WALKER) MISC 1 each by Does not apply route continuous Seated, wheeled walker 10/6/20   Melvin Millan MD   pregabalin (LYRICA) 50 MG capsule Take 1 capsule by mouth 3 times daily for 30 days. 10/2/20 11/1/20  Sharron Delgado MD   escitalopram (LEXAPRO) 20 MG tablet Take 1 tablet by mouth daily 8/11/20   Melvin Millan MD   clonazePAM Frederich Rower) 0.5 MG tablet take 1 tablet by mouth twice a day if needed for anxiety 8/5/20 9/4/20  Melvin Millan MD   hydrOXYzine (ATARAX) 25 MG tablet take 1 tablet by mouth every 8 hours rectally for itching 8/5/20   Melvin Millan MD   gabapentin (NEURONTIN) 600 MG tablet Take 1 tablet by mouth 3 times daily for 30 days. 6/12/20 9/3/20  Melvin Millan MD   fluticasone Rosezena Gaucher) 50 MCG/ACT nasal spray instill 2 sprays into each nostril once daily 4/21/20   Historical Provider, MD   NOVOFINE 32G X 6 MM MISC use 1 NEEDLE to inject MEDICATION subcutaneously five times a day 4/8/20   Historical Provider, MD   mupirocin (BACTROBAN) 2 % ointment apply topically to affected area three times a day 4/21/20   Historical Provider, MD   NIFEdipine (PROCARDIA XL) 60 MG extended release tablet take 1 tablet by mouth once daily 4/27/20   Historical Provider, MD   omeprazole (PRILOSEC) 20 MG delayed release capsule Take 1 capsule by mouth Daily 5/11/20   Melvin Millan MD   insulin aspart (NOVOLOG FLEXPEN) 100 UNIT/ML injection pen Use TID before meals according to scale - max 45 units a day 5/11/20   Melvin Millan MD   loratadine (CLARITIN) 10 MG tablet Take 1 tablet every day by oral route. 5/11/20   Melvin Millan MD   rosuvastatin (CRESTOR) 40 MG tablet Take 1 tablet by mouth daily 5/1/20   Melvin Millan MD   LEVEMIR FLEXTOUCH 100 UNIT/ML injection pen Inject 70 unit(s) twice a day by sub-q route for 30 days.  3/11/20   Historical Provider, MD   isosorbide mononitrate (IMDUR) 30 MG extended release tablet Take 1 tablet by mouth daily 19   Violet Sow MD   linagliptin (TRADJENTA) 5 MG tablet Take 1 tablet by mouth daily 19   Violet Sow MD   metFORMIN (GLUCOPHAGE-XR) 750 MG extended release tablet Take 1 tablet by mouth 2 times daily (with meals) 19   Violet Sow MD   rOPINIRole (REQUIP) 0.5 MG tablet Take 1 tablet by mouth 3 times daily 19   Violet Sow MD   carvedilol (COREG) 25 MG tablet Take 1 tablet by mouth 2 times daily (with meals) Hold for systolic blood pressure < 120 or heart rate < 50  Give 1 hour apart from other blood pressure medicines 19   Violet Sow MD   albuterol sulfate  (90 Base) MCG/ACT inhaler Inhale 2 puffs into the lungs every 6 hours as needed for Wheezing  Patient not taking: Reported on 10/21/2020 3/15/18   Libby Workman MD   spironolactone (ALDACTONE) 25 MG tablet Take 25 mg by mouth daily     Historical Provider, MD   lisinopril (PRINIVIL;ZESTRIL) 10 MG tablet Take 1 tablet by mouth daily 3/26/16   Khadra Khalil MD   vitamin E 400 UNIT capsule Take 400 Units by mouth daily. Historical Provider, MD   aspirin 81 MG tablet Take 81 mg by mouth daily. Historical Provider, MD   nitroGLYCERIN (NITROSTAT) 0.4 MG SL tablet Place 0.4 mg under the tongue every 5 minutes as needed.     Historical Provider, MD       SOCIAL HISTORY     Tobacco: Never smoked  Alcohol: Nondrinker  Illicits: None  Occupation:     FAMILY HISTORY     Family History   Problem Relation Age of Onset    Cancer Mother     Cancer Father        PHYSICAL EXAM     Vitals: BP (!) 163/79   Pulse 72   Temp 97.7 °F (36.5 °C) (Oral)   Resp 28   Ht 5' 6\" (1.676 m)   Wt 225 lb (102.1 kg)   SpO2 100%   BMI 36.32 kg/m²   Tmax: Temp (24hrs), Av.6 °F (36.4 °C), Min:97.4 °F (36.3 °C), Max:97.7 °F (36.5 °C)    Last Body weight:   Wt Readings from Last 3 Encounters:   20 225 lb (102.1 kg)   10/21/20 222 lb (100.7 kg) 09/03/20 218 lb 8 oz (99.1 kg)     Body Mass Index : Body mass index is 36.32 kg/m². PHYSICAL EXAMINATION:  Constitutional: This is a well developed, well nourished, 35-39.9 - Obesity Grade II 76y.o. year old female who is alert, oriented, cooperative and in mild distress due to shortness of breath. Head:normocephalic and atraumatic. Respiratory: Bilateral basal crepitation with decreased air entry in the base . cardiovascular: Regular without murmur, clicks, gallops or rubs. Abdomen: Slightly rounded and soft without organomegaly. No rebound, rigidity or guarding was appreciated. Lymphatic: No lymphadenopathy. Musculoskeletal: Normal curvature of the spine. No gross muscle weakness. Extremities: +2 edema but no ulcerations, tenderness, varicosities or erythema. Muscle size, tone and strength are normal.  No involuntary movements are noted. Skin:  Warm and dry. Good color, turgor and pigmentation. No lesions or scars.   No cyanosis or clubbing  Neurological/Psychiatric: The patient's general behavior, level of consciousness, thought content and emotional status is normal.          INVESTIGATIONS     Laboratory Testing:     Recent Results (from the past 24 hour(s))   Basic Metabolic Panel    Collection Time: 11/06/20  9:10 AM   Result Value Ref Range    Glucose 227 (H) 70 - 99 mg/dL    BUN 16 8 - 23 mg/dL    CREATININE 0.81 0.50 - 0.90 mg/dL    Bun/Cre Ratio NOT REPORTED 9 - 20    Calcium 9.3 8.6 - 10.4 mg/dL    Sodium 137 135 - 144 mmol/L    Potassium 4.3 3.7 - 5.3 mmol/L    Chloride 102 98 - 107 mmol/L    CO2 24 20 - 31 mmol/L    Anion Gap 11 9 - 17 mmol/L    GFR Non-African American >60 >60 mL/min    GFR African American >60 >60 mL/min    GFR Comment          GFR Staging NOT REPORTED    CBC Auto Differential    Collection Time: 11/06/20  9:10 AM   Result Value Ref Range    WBC 7.0 3.5 - 11.3 k/uL    RBC 3.80 (L) 3.95 - 5.11 m/uL    Hemoglobin 11.4 (L) 11.9 - 15.1 g/dL    Hematocrit 36. 2 (L) 36.3 - 47.1 %    MCV 95.3 82.6 - 102.9 fL    MCH 30.0 25.2 - 33.5 pg    MCHC 31.5 28.4 - 34.8 g/dL    RDW 13.6 11.8 - 14.4 %    Platelets 860 374 - 317 k/uL    MPV 11.0 8.1 - 13.5 fL    NRBC Automated 0.0 0.0 per 100 WBC    Differential Type NOT REPORTED     Seg Neutrophils 73 (H) 36 - 65 %    Lymphocytes 18 (L) 24 - 43 %    Monocytes 7 3 - 12 %    Eosinophils % 2 1 - 4 %    Basophils 0 0 - 2 %    Immature Granulocytes 0 0 %    Segs Absolute 5.03 1.50 - 8.10 k/uL    Absolute Lymph # 1.25 1.10 - 3.70 k/uL    Absolute Mono # 0.47 0.10 - 1.20 k/uL    Absolute Eos # 0.15 0.00 - 0.44 k/uL    Basophils Absolute 0.03 0.00 - 0.20 k/uL    Absolute Immature Granulocyte 0.03 0.00 - 0.30 k/uL    WBC Morphology NOT REPORTED     RBC Morphology NOT REPORTED     Platelet Estimate NOT REPORTED    TSH with Reflex    Collection Time: 11/06/20  9:10 AM   Result Value Ref Range    TSH 0.64 0.30 - 5.00 mIU/L   Troponin    Collection Time: 11/06/20  9:10 AM   Result Value Ref Range    Troponin, High Sensitivity 33 (H) 0 - 14 ng/L    Troponin T NOT REPORTED <0.03 ng/mL    Troponin Interp NOT REPORTED    Magnesium    Collection Time: 11/06/20  9:10 AM   Result Value Ref Range    Magnesium 2.1 1.6 - 2.6 mg/dL   Lipase    Collection Time: 11/06/20  9:10 AM   Result Value Ref Range    Lipase 18 13 - 60 U/L   Brain Natriuretic Peptide    Collection Time: 11/06/20  9:10 AM   Result Value Ref Range    Pro-BNP 2,059 (H) <300 pg/mL    BNP Interpretation Pro-BNP Reference Range:    COVID-19    Collection Time: 11/06/20  9:28 AM    Specimen: Other   Result Value Ref Range    SARS-CoV-2          SARS-CoV-2, Rapid Not Detected Not Detected    Source . NASOPHARYNGEAL SWAB     SARS-CoV-2         Troponin    Collection Time: 11/06/20 10:07 AM   Result Value Ref Range    Troponin, High Sensitivity 30 (H) 0 - 14 ng/L    Troponin T NOT REPORTED <0.03 ng/mL    Troponin Interp NOT REPORTED        Imaging:   Xr Chest Portable    Result Date: 11/6/2020  Cardiomegaly, pulmonary vascular congestion, and interstitial edema. Small bilateral effusions. Ct Chest Pulmonary Embolism W Contrast    Result Date: 11/6/2020  1. No evidence for acute pulmonary embolism. 2.  Findings compatible with interstitial edema with moderate right and small left effusion. ASSESSMENT & PLAN   IMPRESSION  This is a 76 y.o. female who presented with shortness of breath and found to have acute exacerbation of heart failure. patient admitted to inpatient to the general medical floor for further management of acute on chronic diastolic heart failure. Acute on chronic diastolic CHF (congestive heart failure) (HCC)  -Furosemide 20 mg IV ava  -Resume spironolactone 25 mg daily  -Strict I/Os record  -Repeat echo  -Daily weight    Hypertensive urgency:  -Nitro glycerin infusion  -Needed blood pressure.  -continue lisinopril 10 mg x OD  -Continue carvedilol    Insulin-dependent diabetes mellitus complicated by peripheral neuropathy:  -Low-dose sliding scale  -Monitor blood glucose every Q6  -HbA1c Eliezer    Coronary artery disease s/p stents  -Continue aspirin  -Trend troponins  -EKG does not show ischemia    Pulmonary embolism  -History of pulmonary embolism  -Continue rivaroxaban 20 mg daily    Peripheral neuropathy due to diabetes mellitus  -Continue gabapentin 600 mg 3 times a day    Discharge planning:  Patient can be discharged once heart failure is stabilized. Expected discharge in the next 1 or 2 days. DVT ppx: Already on anticoagulant  GI ppx: Pantoprazole    PT/OT/SW: Ongoing    Ashly Arana MD  Internal Medicine Resident, PGY-1  [facility]; [place], [address] [place] [address] Drive  11/6/2020, 6:07 PM      Attending Physician Statement  I have discussed the case, including pertinent history and exam findings with the resident and the team.  I have seen and examined the patient and the key elements of the encounter have been performed by me.   I agree with the assessment, plan and orders as documented by the resident.       Edgar Mullins MD   Attending Physician, Internal Medicine Residency Program  11/7/2020, 12:08 PM

## 2020-11-06 NOTE — ED NOTES
Pt requesting pain medication for her chest pain. Will notify physician. Pt remains on full cardiac monitor.       Tamia Bush RN  11/06/20 [id number]

## 2020-11-06 NOTE — PROGRESS NOTES
RAPID Covid 19 swab taken from right nare, labeled, placed in red dot bag, and handed off to second healthcare worker outside of room for transport to laboratory per hospital policy and procedure. Patient tolerated procedure well.       Swab placed in Bio-hazard bags and sent to Lab via tube system

## 2020-11-06 NOTE — ED NOTES
Pt to bedside commode without complication. Pt back in bed NAD noted. Pt denies anything further at this time.  Call light in reach      [facility] - CAMILLE CONROY  11/06/20 1362

## 2020-11-06 NOTE — ED NOTES
Pt given crackers and cranberry juice. Remains on monitor. Awaiting bed assignment for admission.       Sweta Daniels RN  11/06/20 [id number]

## 2020-11-06 NOTE — ED TRIAGE NOTES
presents with complaints of SOB and chest pain for 3 days. C/o productive cough denies fevers.   States chest pain relieved by nitro at home

## 2020-11-07 ENCOUNTER — APPOINTMENT (OUTPATIENT)
Dept: GENERAL RADIOLOGY | Age: 75
DRG: 246 | End: 2020-11-07
Payer: MEDICARE

## 2020-11-07 PROBLEM — I50.31 ACUTE DIASTOLIC CHF (CONGESTIVE HEART FAILURE) (HCC): Status: ACTIVE | Noted: 2020-11-07

## 2020-11-07 LAB
ABSOLUTE EOS #: 0.2 K/UL (ref 0–0.44)
ABSOLUTE IMMATURE GRANULOCYTE: <0.03 K/UL (ref 0–0.3)
ABSOLUTE LYMPH #: 1.52 K/UL (ref 1.1–3.7)
ABSOLUTE MONO #: 0.69 K/UL (ref 0.1–1.2)
ANION GAP SERPL CALCULATED.3IONS-SCNC: 5 MMOL/L (ref 9–17)
BASOPHILS # BLD: 1 % (ref 0–2)
BASOPHILS ABSOLUTE: 0.04 K/UL (ref 0–0.2)
BUN BLDV-MCNC: 13 MG/DL (ref 8–23)
BUN/CREAT BLD: ABNORMAL (ref 9–20)
CALCIUM SERPL-MCNC: 7.9 MG/DL (ref 8.6–10.4)
CHLORIDE BLD-SCNC: 106 MMOL/L (ref 98–107)
CO2: 27 MMOL/L (ref 20–31)
CREAT SERPL-MCNC: 0.81 MG/DL (ref 0.5–0.9)
DIFFERENTIAL TYPE: ABNORMAL
EKG ATRIAL RATE: 92 BPM
EKG P AXIS: 61 DEGREES
EKG P-R INTERVAL: 154 MS
EKG Q-T INTERVAL: 388 MS
EKG QRS DURATION: 92 MS
EKG QTC CALCULATION (BAZETT): 479 MS
EKG R AXIS: 15 DEGREES
EKG T AXIS: 59 DEGREES
EKG VENTRICULAR RATE: 92 BPM
EOSINOPHILS RELATIVE PERCENT: 3 % (ref 1–4)
GFR AFRICAN AMERICAN: >60 ML/MIN
GFR NON-AFRICAN AMERICAN: >60 ML/MIN
GFR SERPL CREATININE-BSD FRML MDRD: ABNORMAL ML/MIN/{1.73_M2}
GFR SERPL CREATININE-BSD FRML MDRD: ABNORMAL ML/MIN/{1.73_M2}
GLUCOSE BLD-MCNC: 128 MG/DL (ref 65–105)
GLUCOSE BLD-MCNC: 213 MG/DL (ref 70–99)
GLUCOSE BLD-MCNC: 215 MG/DL (ref 65–105)
GLUCOSE BLD-MCNC: 222 MG/DL (ref 65–105)
GLUCOSE BLD-MCNC: 237 MG/DL (ref 65–105)
HCT VFR BLD CALC: 27 % (ref 36.3–47.1)
HEMOGLOBIN: 8.1 G/DL (ref 11.9–15.1)
IMMATURE GRANULOCYTES: 0 %
LV EF: 58 %
LVEF MODALITY: NORMAL
LYMPHOCYTES # BLD: 26 % (ref 24–43)
MCH RBC QN AUTO: 29.9 PG (ref 25.2–33.5)
MCHC RBC AUTO-ENTMCNC: 30 G/DL (ref 28.4–34.8)
MCV RBC AUTO: 99.6 FL (ref 82.6–102.9)
MONOCYTES # BLD: 12 % (ref 3–12)
NRBC AUTOMATED: 0 PER 100 WBC
PDW BLD-RTO: 13.7 % (ref 11.8–14.4)
PLATELET # BLD: 149 K/UL (ref 138–453)
PLATELET ESTIMATE: ABNORMAL
PMV BLD AUTO: 10.7 FL (ref 8.1–13.5)
POTASSIUM SERPL-SCNC: 3.6 MMOL/L (ref 3.7–5.3)
RBC # BLD: 2.71 M/UL (ref 3.95–5.11)
RBC # BLD: ABNORMAL 10*6/UL
SEG NEUTROPHILS: 59 % (ref 36–65)
SEGMENTED NEUTROPHILS ABSOLUTE COUNT: 3.49 K/UL (ref 1.5–8.1)
SODIUM BLD-SCNC: 138 MMOL/L (ref 135–144)
TROPONIN INTERP: ABNORMAL
TROPONIN T: ABNORMAL NG/ML
TROPONIN, HIGH SENSITIVITY: 28 NG/L (ref 0–14)
TROPONIN, HIGH SENSITIVITY: 31 NG/L (ref 0–14)
TROPONIN, HIGH SENSITIVITY: 32 NG/L (ref 0–14)
WBC # BLD: 6 K/UL (ref 3.5–11.3)
WBC # BLD: ABNORMAL 10*3/UL

## 2020-11-07 PROCEDURE — 6370000000 HC RX 637 (ALT 250 FOR IP): Performed by: STUDENT IN AN ORGANIZED HEALTH CARE EDUCATION/TRAINING PROGRAM

## 2020-11-07 PROCEDURE — 36415 COLL VENOUS BLD VENIPUNCTURE: CPT

## 2020-11-07 PROCEDURE — 71046 X-RAY EXAM CHEST 2 VIEWS: CPT

## 2020-11-07 PROCEDURE — 2060000000 HC ICU INTERMEDIATE R&B

## 2020-11-07 PROCEDURE — 93010 ELECTROCARDIOGRAM REPORT: CPT | Performed by: INTERNAL MEDICINE

## 2020-11-07 PROCEDURE — 97535 SELF CARE MNGMENT TRAINING: CPT

## 2020-11-07 PROCEDURE — 6360000002 HC RX W HCPCS: Performed by: STUDENT IN AN ORGANIZED HEALTH CARE EDUCATION/TRAINING PROGRAM

## 2020-11-07 PROCEDURE — 2500000003 HC RX 250 WO HCPCS: Performed by: STUDENT IN AN ORGANIZED HEALTH CARE EDUCATION/TRAINING PROGRAM

## 2020-11-07 PROCEDURE — 82947 ASSAY GLUCOSE BLOOD QUANT: CPT

## 2020-11-07 PROCEDURE — 84484 ASSAY OF TROPONIN QUANT: CPT

## 2020-11-07 PROCEDURE — 97165 OT EVAL LOW COMPLEX 30 MIN: CPT

## 2020-11-07 PROCEDURE — 2700000000 HC OXYGEN THERAPY PER DAY

## 2020-11-07 PROCEDURE — 99233 SBSQ HOSP IP/OBS HIGH 50: CPT | Performed by: INTERNAL MEDICINE

## 2020-11-07 PROCEDURE — 85025 COMPLETE CBC W/AUTO DIFF WBC: CPT

## 2020-11-07 PROCEDURE — 80048 BASIC METABOLIC PNL TOTAL CA: CPT

## 2020-11-07 PROCEDURE — 93306 TTE W/DOPPLER COMPLETE: CPT

## 2020-11-07 PROCEDURE — 97162 PT EVAL MOD COMPLEX 30 MIN: CPT

## 2020-11-07 PROCEDURE — 97530 THERAPEUTIC ACTIVITIES: CPT

## 2020-11-07 PROCEDURE — 2580000003 HC RX 258: Performed by: STUDENT IN AN ORGANIZED HEALTH CARE EDUCATION/TRAINING PROGRAM

## 2020-11-07 RX ORDER — FUROSEMIDE 10 MG/ML
40 INJECTION INTRAMUSCULAR; INTRAVENOUS 2 TIMES DAILY
Status: DISCONTINUED | OUTPATIENT
Start: 2020-11-07 | End: 2020-11-07

## 2020-11-07 RX ORDER — NIFEDIPINE 60 MG/1
60 TABLET, FILM COATED, EXTENDED RELEASE ORAL DAILY
Status: DISCONTINUED | OUTPATIENT
Start: 2020-11-07 | End: 2020-11-13 | Stop reason: HOSPADM

## 2020-11-07 RX ORDER — FUROSEMIDE 10 MG/ML
40 INJECTION INTRAMUSCULAR; INTRAVENOUS DAILY
Status: COMPLETED | OUTPATIENT
Start: 2020-11-08 | End: 2020-11-08

## 2020-11-07 RX ORDER — LISINOPRIL 10 MG/1
10 TABLET ORAL DAILY
Status: DISCONTINUED | OUTPATIENT
Start: 2020-11-07 | End: 2020-11-13

## 2020-11-07 RX ORDER — INSULIN GLARGINE 100 [IU]/ML
5 INJECTION, SOLUTION SUBCUTANEOUS NIGHTLY
Status: DISCONTINUED | OUTPATIENT
Start: 2020-11-07 | End: 2020-11-12

## 2020-11-07 RX ADMIN — FUROSEMIDE 40 MG: 10 INJECTION, SOLUTION INTRAMUSCULAR; INTRAVENOUS at 17:48

## 2020-11-07 RX ADMIN — RIVAROXABAN 20 MG: 20 TABLET, FILM COATED ORAL at 10:22

## 2020-11-07 RX ADMIN — INSULIN GLARGINE 5 UNITS: 100 INJECTION, SOLUTION SUBCUTANEOUS at 22:13

## 2020-11-07 RX ADMIN — ASPIRIN 81 MG: 81 TABLET, COATED ORAL at 10:02

## 2020-11-07 RX ADMIN — CARVEDILOL 25 MG: 12.5 TABLET, FILM COATED ORAL at 10:02

## 2020-11-07 RX ADMIN — FUROSEMIDE 40 MG: 10 INJECTION, SOLUTION INTRAMUSCULAR; INTRAVENOUS at 10:02

## 2020-11-07 RX ADMIN — NIFEDIPINE 60 MG: 60 TABLET, EXTENDED RELEASE ORAL at 10:22

## 2020-11-07 RX ADMIN — GABAPENTIN 600 MG: 600 TABLET ORAL at 20:54

## 2020-11-07 RX ADMIN — SPIRONOLACTONE 25 MG: 25 TABLET ORAL at 10:02

## 2020-11-07 RX ADMIN — PANTOPRAZOLE SODIUM 40 MG: 40 TABLET, DELAYED RELEASE ORAL at 05:52

## 2020-11-07 RX ADMIN — LISINOPRIL 10 MG: 10 TABLET ORAL at 10:02

## 2020-11-07 RX ADMIN — SODIUM CHLORIDE, PRESERVATIVE FREE 10 ML: 5 INJECTION INTRAVENOUS at 10:02

## 2020-11-07 RX ADMIN — NITROGLYCERIN 50 MCG/MIN: 20 INJECTION INTRAVENOUS at 00:01

## 2020-11-07 RX ADMIN — ISOSORBIDE MONONITRATE 30 MG: 30 TABLET ORAL at 10:23

## 2020-11-07 RX ADMIN — CLONAZEPAM 0.5 MG: 1 TABLET ORAL at 22:12

## 2020-11-07 RX ADMIN — GABAPENTIN 600 MG: 600 TABLET ORAL at 15:57

## 2020-11-07 RX ADMIN — ROSUVASTATIN CALCIUM 40 MG: 20 TABLET, FILM COATED ORAL at 10:22

## 2020-11-07 RX ADMIN — INSULIN LISPRO 1 UNITS: 100 INJECTION, SOLUTION INTRAVENOUS; SUBCUTANEOUS at 20:53

## 2020-11-07 RX ADMIN — SODIUM CHLORIDE, PRESERVATIVE FREE 10 ML: 5 INJECTION INTRAVENOUS at 20:55

## 2020-11-07 RX ADMIN — ESCITALOPRAM OXALATE 20 MG: 10 TABLET ORAL at 10:23

## 2020-11-07 RX ADMIN — GABAPENTIN 600 MG: 600 TABLET ORAL at 10:02

## 2020-11-07 RX ADMIN — INSULIN LISPRO 2 UNITS: 100 INJECTION, SOLUTION INTRAVENOUS; SUBCUTANEOUS at 12:05

## 2020-11-07 RX ADMIN — INSULIN LISPRO 2 UNITS: 100 INJECTION, SOLUTION INTRAVENOUS; SUBCUTANEOUS at 16:41

## 2020-11-07 ASSESSMENT — PAIN SCALES - GENERAL
PAINLEVEL_OUTOF10: 0
PAINLEVEL_OUTOF10: 0

## 2020-11-07 NOTE — ED NOTES
Patient laying quietly with eyes closed, appears to be resting comfortably. Pt has no s/s of distress at this time, will continue to monitor.       Hunter Lawrence RN  11/07/20 [id number]

## 2020-11-07 NOTE — ED NOTES
Patient laying quietly with eyes closed, appears to be resting comfortably. Pt has no s/s of distress at this time, will continue to monitor.       Dayna Wen RN  11/06/20 1955

## 2020-11-07 NOTE — PROGRESS NOTES
Occupational Therapy   Occupational Therapy Initial Assessment  Date: 2020   Patient Name: Charleen Gomez  MRN: [id number]     : 1945    Date of Service: 2020    Discharge Recommendations:    No therapy recommended at discharge. Assessment   Performance deficits / Impairments: Decreased endurance;Decreased ADL status; Decreased high-level IADLs  Assessment: Patient demonstrates decreased endurance affecting performance and safety with ADL tasks. Patient would benefit from acute OT services to educate patient on EC/WS techs and DME to increase safety with ADL/IADL tasks. Prognosis: Good  Decision Making: Low Complexity  Patient Education: OT role, OT POC, purpose of evaluation, importance of OOB activity, benefits of sitting in chair - fair/good return  REQUIRES OT FOLLOW UP: Yes  Activity Tolerance  Activity Tolerance: Patient Tolerated treatment well  Activity Tolerance: limited by patient wanting to eat breakfast  Safety Devices  Safety Devices in place: Yes  Type of devices: All fall risk precautions in place;Nurse notified;Call light within reach;Gait belt;Left in chair  Restraints  Initially in place: No         Patient Diagnosis(es): The encounter diagnosis was Acute on chronic congestive heart failure, unspecified heart failure type ([address] has a past medical history of Anxiety, Benign positional vertigo, CAD (coronary artery disease), Chest pain, Depression, Diabetes mellitus ([address] Diabetic neuropathy ([address] Hyperlipidemia, Hypertension, and Migraine. has a past surgical history that includes Percutaneous Transluminal Coronary Angio; Cardiac catheterization; Coronary angioplasty with stent (2017); Appendectomy; and Coronary angioplasty with stent (2012).       Restrictions  Restrictions/Precautions  Restrictions/Precautions: Up as Tolerated  Required Braces or Orthoses?: No  Position Activity Restriction  Other position/activity restrictions: up with assistance    Subjective   General  Patient assessed for rehabilitation services?: Yes  Family / Caregiver Present: No  General Comment  Comments: RN ok'd patient for OT/PT evaluation. Pt pleasant and cooperative throughout.   Patient Currently in Pain: Denies  Vital Signs  Patient Currently in Pain: Denies    Social/Functional History  Social/Functional History  Lives With: Family(Milton 25 yo)  Type of Home: Mobile home  Home Layout: One level  Home Access: Stairs to enter with rails  Entrance Stairs - Number of Steps: 3  Entrance Stairs - Rails: Both  Bathroom Shower/Tub: Tub/Shower unit  Bathroom Toilet: Standard  Bathroom Equipment: Shower chair, Grab bars in shower, Grab bars around toilet  Home Equipment: Domitila Chávez, 4 wheeled walker, Wheelchair-manual(4WW at all times)  Receives Help From: Family  ADL Assistance: Independent  Homemaking Assistance: Needs assistance(milton performs mopping and heavier household tasks)  Homemaking Responsibilities: Yes  Meal Prep Responsibility: Secondary  Laundry Responsibility: No  Cleaning Responsibility: No  Shopping Responsibility: No  Ambulation Assistance: Independent  Transfer Assistance: Independent  Active : Yes  Mode of Transportation: Car  Occupation: Retired  Leisure & Hobbies: Enjoys going to [email or web address]  Additional Comments: Pt reports milton works, son stops by daily     Objective        Orientation  Overall Orientation Status: Within Functional Limits     Balance  Sitting Balance: Modified independent   Standing Balance: Stand by assistance  Functional Mobility  Functional - Mobility Device: Rolling Walker  Activity: Other  Assist Level: Stand by assistance  Functional Mobility Comments: on 1L O2 upon arrival, completed functional mobility at SBA to bedside recliner and maintained O2 at 96%  ADL  Feeding: Modified independent ;Setup(Pt required opening of containers prior to being able to engage in self feeding at Mod I)  Grooming: Independent(OT facilitated washing face sitting EOB)  UE Bathing: Supervision; Increased time to complete  LE Bathing: Stand by assistance; Increased time to complete  UE Dressing: Supervision; Increased time to complete  LE Dressing: Stand by assistance; Increased time to complete  Toileting: Stand by assistance; Increased time to complete  Additional Comments: Pt declining other ADLs, d/t wanting to eat breakfast  Tone RUE  RUE Tone: Normotonic  Tone LUE  LUE Tone: Normotonic  Coordination  Movements Are Fluid And Coordinated: Yes     Bed mobility  Supine to Sit: Stand by assistance  Scooting: Supervision  Comment: pt required to bedside recliner at end of session  Transfers  Sit to stand: Stand by assistance  Stand to sit: Stand by assistance     Cognition  Overall Cognitive Status: WFL        Sensation  Overall Sensation Status: Impaired(pt c/o chronic numbness in B hands and feet)      LUE AROM : WFL  Left Hand AROM: WFL  RUE AROM : WFL  Right Hand AROM: WFL  LUE Strength  Gross LUE Strength: WFL  L Hand General: 4+/5  RUE Strength  Gross RUE Strength: WFL  R Hand General: 4+/5              Plan   Plan  Times per week: 1-2 times total  Current Treatment Recommendations: Patient/Caregiver Education & Training, Equipment Evaluation, Education, & procurement, Endurance Training, Safety Education & Training, Self-Care / ADL    AM-PAC Score        AM-[facility] Inpatient Daily Activity Raw Score: 20 (11/07/20 1303)  AM-PAC Inpatient ADL T-Scale Score : 42.03 (11/07/20 1303)  ADL Inpatient CMS 0-100% Score: 38.32 (11/07/20 1303)  ADL Inpatient CMS G-Code Modifier : Wayne Speaks (11/07/20 1303)    Goals  Short term goals  Time Frame for Short term goals: Patient will, by discharge  Short term goal 1: demo UB/LB ADLs at Mod I  Short term goal 2: demo 25+ min of functional activity tolerance implementing EC/WS techs to increase ADL/IADL safety  Short term goal 3: verbalize 2+ task modifications to increase safety with functional task engagement  Short term goal 4:

## 2020-11-07 NOTE — CARE COORDINATION
Case Management Initial Discharge Plan  Abelardo Mcintyre,             Met with:patient to discuss discharge plans. Information verified: address, contacts, phone number, , insurance Yes    Emergency Contact/Next of Kin name & number: Melly Hauser (son) [phone]    PCP: Gema Hall MD  Date of last visit: 10/6/2020 per [address] Provider: Maribel Simms Medicare    Discharge Planning    Living Arrangements:  Family Members   Support Systems:  Family Members, Children, Home Care Staff    Home has 1 stories  3 stairs to climb to get into front door, n/a stairs to climb to reach second floor  Location of bedroom/bathroom in home main level    Patient able to perform ADL's:Independent    Current Services (outpatient & in home) DME  DME equipment: SC, Rollator, WC, cane  DME provider:      Receiving oral anticoagulation therapy? Yes - Xarelto    If indicated:   Physician managing anticoagulation treatment:    Where does patient obtain lab work for ATC treatment? n/a      Potential Assistance Needed:  [address], Durable Medical Equipment    Patient agreeable to home care: Yes  Freedom of choice provided:  yes    Prior SNF/Rehab Placement and Facility: Yes  Agreeable to SNF/Rehab: No  [place] of choice provided: n/a     Evaluation: n/a    Expected Discharge date:  20    Patient expects to be discharged to:  home  Follow Up Appointment: Best Day/ Time:      Transportation provider: self or son  Transportation arrangements needed for discharge: No - Rebeca dumont    Readmission Risk              Risk of Unplanned Readmission:        31             Does patient have a readmission risk score greater than 14?: Yes  If yes, follow-up appointment must be made within 7 days of discharge. Goals of Care: relieve SOB      Discharge Plan: Home with grandson and [employer] , monitor for home oxygen  States son and daughter check on her frequently while grandson is at work.  She would like to complete medical POA papers. [facility] to [facility] on[facility]call.     Home pharmacy: [facility] on [address]      Electronically signed by Guanako Montes RN on 11/7/20 at 1:45 PM EST

## 2020-11-07 NOTE — ED NOTES
Pt's daughter called to see if patients blood sugar had been checked as she is a diabetic, no scheduled blood sugar checks ordered. PRN check done by writer, pt's blood glucose 111. Pt given boxed lunch. Pt aware that she is waiting on a few medications from pharmacy, pt requested to wait and take all nighttime meds together after she eats. Pt remains A&Ox4, RR even and non-labored Patient has no signs or symptoms of acute distress at this time, remains on continuous telemetry and pulse ox monitoring. Isela Mitchell [address] [place] [address], RN  11/06/20 [id number]

## 2020-11-07 NOTE — PROGRESS NOTES
Physical Therapy    Facility/Department: [facility] 4A STEPDOWN  Initial Assessment    NAME: Addison Herron  : 1945  MRN: [id number]  Chief Complaint   Patient presents with    Shortness of Breath     for 3 days with productive cough     Date of Service: 2020    Discharge Recommendations: Further therapy recommended at discharge. PT Equipment Recommendations  Equipment Needed: No    Assessment   Body structures, Functions, Activity limitations: Decreased functional mobility ; Decreased balance;Decreased endurance  Assessment: The pt ambulated 20ft with RW and CGA, limited by mild endurance deficits. Recommend continued therapy to maximize safety and independence. Prognosis: Good  Decision Making: Medium Complexity  PT Education: Goals;PT Role;Plan of Care; Functional Mobility Training  REQUIRES PT FOLLOW UP: Yes  Activity Tolerance  Activity Tolerance: Patient Tolerated treatment well  Activity Tolerance: pt declined further ambulation due to arrival of breakfast       Patient Diagnosis(es): The encounter diagnosis was Acute on chronic congestive heart failure, unspecified heart failure type ([address] has a past medical history of Anxiety, Benign positional vertigo, CAD (coronary artery disease), Chest pain, Depression, Diabetes mellitus ([address] Diabetic neuropathy ([address] Hyperlipidemia, Hypertension, and Migraine. has a past surgical history that includes Percutaneous Transluminal Coronary Angio; Cardiac catheterization; Coronary angioplasty with stent (2017); Appendectomy; and Coronary angioplasty with stent (2012).     Restrictions  Restrictions/Precautions  Restrictions/Precautions: Up as Tolerated, Fall Risk  Required Braces or Orthoses?: No  Position Activity Restriction  Other position/activity restrictions: up with assistance, negative for PE  Vision/Hearing  Vision: Within Functional Limits  Hearing: Within functional limits     Subjective  General  Patient assessed for rehabilitation In [address] [place] [address]         Time Out 0954         Minutes 18         Timed Code Treatment Minutes: 8 Minutes       Marino Landau, PT

## 2020-11-07 NOTE — PLAN OF CARE
Problem: Falls - Risk of:  Goal: Will remain free from falls  Description: Will remain free from falls  11/7/2020 1523 by Loida Lakhani RN  Outcome: Met This Shift  Goal: Absence of physical injury  Description: Absence of physical injury  11/7/2020 1523 by Loida Lakhani RN  Outcome: Met This Shift     Problem: OXYGENATION/RESPIRATORY FUNCTION  Goal: Patient will maintain patent airway  Outcome: Ongoing     Problem:  Activity:  Goal: Capacity to carry out activities will improve  Description: Capacity to carry out activities will improve  Outcome: Ongoing  Goal: Will verbalize the importance of balancing activity with adequate rest periods  Description: Will verbalize the importance of balancing activity with adequate rest periods  Outcome: Ongoing     Problem: Cardiac:  Goal: Hemodynamic stability will improve  Description: Hemodynamic stability will improve  Outcome: Ongoing  Goal: Ability to maintain an adequate cardiac output will improve  Description: Ability to maintain an adequate cardiac output will improve  Outcome: Ongoing     Problem: Fluid Volume:  Goal: Risk for excess fluid volume will decrease  Description: Risk for excess fluid volume will decrease  Outcome: Ongoing  Goal: Maintenance of adequate hydration will improve  Description: Maintenance of adequate hydration will improve  Outcome: Ongoing  Goal: Will show no signs and symptoms of electrolyte imbalance  Description: Will show no signs and symptoms of electrolyte imbalance  Outcome: Ongoing

## 2020-11-07 NOTE — ED NOTES
Patient laying quietly with eyes closed, appears to be resting comfortably. Pt has no s/s of distress at this time, will continue to monitor.       Kim Morales RN  11/07/20 8650

## 2020-11-07 NOTE — PROGRESS NOTES
[facility]  Internal Medicine Teaching Residency Program  Inpatient Daily Progress Note  ______________________________________________________________________________    Patient: Steve Velazquez  YOB: 1945   [id number]    Acct: [de-identified]     Room: 17/17  Admit date: 11/6/2020  Today's date: 11/07/20  Number of days in the hospital: 1    SUBJECTIVE   Admitting Diagnosis: Acute on chronic diastolic heart failure  CC: Shortness of breath  Pt examined at bedside. Chart & results reviewed. Patient shortness of breath has improved. But feel chest pain on deep inspiration and the chest pain is reproducible on palpation. ROS:  Constitutional: Positive for activity change and fatigue. Negative for diaphoresis and fever. HENT: Positive for sinus pressure. Negative for congestion, drooling, facial swelling and sinus pain. Eyes: Negative for photophobia, discharge and itching. Respiratory: Positive for cough and shortness of breath. Negative for apnea and wheezing. Cardiovascular: Positive for leg swelling. Negative for chest pain and palpitations. Gastrointestinal: Negative for abdominal distention, anal bleeding, diarrhea, nausea, rectal pain and vomiting. Endocrine: Negative for polydipsia and polyphagia. Genitourinary: Negative for dysuria, enuresis and pelvic pain. Musculoskeletal: Negative for arthralgias, gait problem and myalgias. Neurological: Negative for seizures, syncope and facial asymmetry. Psychiatric/Behavioral: Negative for agitation and hallucinations. The patient is not hyperactive. BRIEF HISTORY   The patient is a pleasant 76 y.o. female with background history of diastolic heart failure presents with a chief complaint of shortness of breath which started about 3 days ago and it is getting worse slowly. According to the patient she started to have shortness of breath started about 2 days ago.   It is gradually worsening and now even she is feeling short of breath at rest.  Patient also reported orthopnea and paroxysmal nocturnal dyspnea along with ankle swelling. Patient says that she is having cough with some yellowish phlegm but no fever or chills. No sick contacts recently. Patient is compliant with her medications, no salty foods. But patient was found to be very hypertensive on presentation to the ED. OBJECTIVE     Vital Signs:  BP (!) 150/77   Pulse 70   Temp 97.7 °F (36.5 °C) (Oral)   Resp 28   Ht 5' 6\" (1.676 m)   Wt 225 lb (102.1 kg)   SpO2 100%   BMI 36.32 kg/m²     Temp (24hrs), Av.6 °F (36.4 °C), Min:97.4 °F (36.3 °C), Max:97.7 °F (36.5 °C)    In: -   Out: 200 [Urine:200]    Physical Exam:  Constitutional: This is a well developed, well nourished, 35-39.9 - Obesity Grade II 76y.o. year old female who is alert, oriented, cooperative and in mild distress due to shortness of breath. Head:normocephalic and atraumatic. EENT:  PERRLA. No conjunctival injections. Septum was midline, mucosa was without erythema, exudates or cobblestoning. No thrush was noted. Neck: Supple without thyromegaly. No elevated JVP. Trachea was midline. Respiratory: Bilateral basal crepitation with decreased air entry in the base . cardiovascular: Regular without murmur, clicks, gallops or rubs. Abdomen: Slightly rounded and soft without organomegaly. No rebound, rigidity or guarding was appreciated. Lymphatic: No lymphadenopathy. Musculoskeletal: Normal curvature of the spine. No gross muscle weakness. Extremities: +2 edema but no ulcerations, tenderness, varicosities or erythema. Muscle size, tone and strength are normal.  No involuntary movements are noted. Skin:  Warm and dry. Good color, turgor and pigmentation. No lesions or scars.   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 Expected discharge in the next 1 or 2 days.        DVT ppx: Already on anticoagulant  GI ppx: Pantoprazole     PT/OT/SW: Ongoing      Don Ramirez MD  Internal Medicine Resident, PGY-1  [facility]; [place], [place]  11/7/2020, 05:52 am        Attending Physician Statement  I have discussed the case, including pertinent history and exam findings with the resident and the team.  I have seen and examined the patient and the key elements of the encounter have been performed by me. I agree with the assessment, plan and orders as documented by the resident.       In Brief:  Doing well  Breathing better  Vital stable  Ambulate   Discharge tomorrow    Edgar Del Valle MD   Attending Physician, Internal Medicine Residency Program  11/7/2020, 12:09 PM

## 2020-11-08 ENCOUNTER — APPOINTMENT (OUTPATIENT)
Dept: GENERAL RADIOLOGY | Age: 75
DRG: 246 | End: 2020-11-08
Payer: MEDICARE

## 2020-11-08 LAB
ABSOLUTE EOS #: 0.22 K/UL (ref 0–0.44)
ABSOLUTE IMMATURE GRANULOCYTE: <0.03 K/UL (ref 0–0.3)
ABSOLUTE LYMPH #: 1.97 K/UL (ref 1.1–3.7)
ABSOLUTE MONO #: 0.63 K/UL (ref 0.1–1.2)
ANION GAP SERPL CALCULATED.3IONS-SCNC: 8 MMOL/L (ref 9–17)
BASOPHILS # BLD: 1 % (ref 0–2)
BASOPHILS ABSOLUTE: 0.03 K/UL (ref 0–0.2)
BUN BLDV-MCNC: 16 MG/DL (ref 8–23)
BUN/CREAT BLD: ABNORMAL (ref 9–20)
CALCIUM SERPL-MCNC: 8.9 MG/DL (ref 8.6–10.4)
CHLORIDE BLD-SCNC: 99 MMOL/L (ref 98–107)
CO2: 28 MMOL/L (ref 20–31)
CREAT SERPL-MCNC: 0.92 MG/DL (ref 0.5–0.9)
DIFFERENTIAL TYPE: ABNORMAL
EOSINOPHILS RELATIVE PERCENT: 4 % (ref 1–4)
GFR AFRICAN AMERICAN: >60 ML/MIN
GFR NON-AFRICAN AMERICAN: 60 ML/MIN
GFR SERPL CREATININE-BSD FRML MDRD: ABNORMAL ML/MIN/{1.73_M2}
GFR SERPL CREATININE-BSD FRML MDRD: ABNORMAL ML/MIN/{1.73_M2}
GLUCOSE BLD-MCNC: 174 MG/DL (ref 70–99)
GLUCOSE BLD-MCNC: 190 MG/DL (ref 65–105)
GLUCOSE BLD-MCNC: 204 MG/DL (ref 65–105)
GLUCOSE BLD-MCNC: 210 MG/DL (ref 65–105)
GLUCOSE BLD-MCNC: 226 MG/DL (ref 65–105)
HCT VFR BLD CALC: 30.4 % (ref 36.3–47.1)
HEMOGLOBIN: 9.3 G/DL (ref 11.9–15.1)
IMMATURE GRANULOCYTES: 0 %
LYMPHOCYTES # BLD: 33 % (ref 24–43)
MCH RBC QN AUTO: 30 PG (ref 25.2–33.5)
MCHC RBC AUTO-ENTMCNC: 30.6 G/DL (ref 28.4–34.8)
MCV RBC AUTO: 98.1 FL (ref 82.6–102.9)
MONOCYTES # BLD: 11 % (ref 3–12)
NRBC AUTOMATED: 0 PER 100 WBC
PARTIAL THROMBOPLASTIN TIME: 32.3 SEC (ref 20.5–30.5)
PARTIAL THROMBOPLASTIN TIME: 32.5 SEC (ref 20.5–30.5)
PARTIAL THROMBOPLASTIN TIME: 55.5 SEC (ref 20.5–30.5)
PARTIAL THROMBOPLASTIN TIME: 67.3 SEC (ref 20.5–30.5)
PDW BLD-RTO: 13.2 % (ref 11.8–14.4)
PLATELET # BLD: 167 K/UL (ref 138–453)
PLATELET ESTIMATE: ABNORMAL
PMV BLD AUTO: 10.7 FL (ref 8.1–13.5)
POTASSIUM SERPL-SCNC: 4 MMOL/L (ref 3.7–5.3)
RBC # BLD: 3.1 M/UL (ref 3.95–5.11)
RBC # BLD: ABNORMAL 10*6/UL
SEG NEUTROPHILS: 51 % (ref 36–65)
SEGMENTED NEUTROPHILS ABSOLUTE COUNT: 3.13 K/UL (ref 1.5–8.1)
SODIUM BLD-SCNC: 135 MMOL/L (ref 135–144)
TROPONIN INTERP: ABNORMAL
TROPONIN T: ABNORMAL NG/ML
TROPONIN, HIGH SENSITIVITY: 28 NG/L (ref 0–14)
WBC # BLD: 6 K/UL (ref 3.5–11.3)
WBC # BLD: ABNORMAL 10*3/UL

## 2020-11-08 PROCEDURE — 85025 COMPLETE CBC W/AUTO DIFF WBC: CPT

## 2020-11-08 PROCEDURE — 94760 N-INVAS EAR/PLS OXIMETRY 1: CPT

## 2020-11-08 PROCEDURE — 2060000000 HC ICU INTERMEDIATE R&B

## 2020-11-08 PROCEDURE — 99233 SBSQ HOSP IP/OBS HIGH 50: CPT | Performed by: INTERNAL MEDICINE

## 2020-11-08 PROCEDURE — 85730 THROMBOPLASTIN TIME PARTIAL: CPT

## 2020-11-08 PROCEDURE — 36415 COLL VENOUS BLD VENIPUNCTURE: CPT

## 2020-11-08 PROCEDURE — 71045 X-RAY EXAM CHEST 1 VIEW: CPT

## 2020-11-08 PROCEDURE — 6370000000 HC RX 637 (ALT 250 FOR IP): Performed by: STUDENT IN AN ORGANIZED HEALTH CARE EDUCATION/TRAINING PROGRAM

## 2020-11-08 PROCEDURE — 84484 ASSAY OF TROPONIN QUANT: CPT

## 2020-11-08 PROCEDURE — 93005 ELECTROCARDIOGRAM TRACING: CPT | Performed by: STUDENT IN AN ORGANIZED HEALTH CARE EDUCATION/TRAINING PROGRAM

## 2020-11-08 PROCEDURE — 82947 ASSAY GLUCOSE BLOOD QUANT: CPT

## 2020-11-08 PROCEDURE — 80048 BASIC METABOLIC PNL TOTAL CA: CPT

## 2020-11-08 PROCEDURE — 6360000002 HC RX W HCPCS: Performed by: STUDENT IN AN ORGANIZED HEALTH CARE EDUCATION/TRAINING PROGRAM

## 2020-11-08 PROCEDURE — 2580000003 HC RX 258: Performed by: STUDENT IN AN ORGANIZED HEALTH CARE EDUCATION/TRAINING PROGRAM

## 2020-11-08 RX ORDER — HEPARIN SODIUM 10000 [USP'U]/100ML
9.79 INJECTION, SOLUTION INTRAVENOUS CONTINUOUS
Status: DISCONTINUED | OUTPATIENT
Start: 2020-11-08 | End: 2020-11-11

## 2020-11-08 RX ORDER — HEPARIN SODIUM 1000 [USP'U]/ML
2000 INJECTION, SOLUTION INTRAVENOUS; SUBCUTANEOUS PRN
Status: DISCONTINUED | OUTPATIENT
Start: 2020-11-08 | End: 2020-11-11

## 2020-11-08 RX ORDER — 0.9 % SODIUM CHLORIDE 0.9 %
500 INTRAVENOUS SOLUTION INTRAVENOUS ONCE
Status: COMPLETED | OUTPATIENT
Start: 2020-11-08 | End: 2020-11-08

## 2020-11-08 RX ORDER — HEPARIN SODIUM 1000 [USP'U]/ML
4000 INJECTION, SOLUTION INTRAVENOUS; SUBCUTANEOUS ONCE
Status: DISCONTINUED | OUTPATIENT
Start: 2020-11-08 | End: 2020-11-08

## 2020-11-08 RX ORDER — HEPARIN SODIUM 1000 [USP'U]/ML
4000 INJECTION, SOLUTION INTRAVENOUS; SUBCUTANEOUS PRN
Status: DISCONTINUED | OUTPATIENT
Start: 2020-11-08 | End: 2020-11-11

## 2020-11-08 RX ADMIN — INSULIN LISPRO 2 UNITS: 100 INJECTION, SOLUTION INTRAVENOUS; SUBCUTANEOUS at 11:57

## 2020-11-08 RX ADMIN — ROSUVASTATIN CALCIUM 40 MG: 20 TABLET, FILM COATED ORAL at 08:50

## 2020-11-08 RX ADMIN — FUROSEMIDE 40 MG: 10 INJECTION, SOLUTION INTRAMUSCULAR; INTRAVENOUS at 08:50

## 2020-11-08 RX ADMIN — SPIRONOLACTONE 25 MG: 25 TABLET ORAL at 08:50

## 2020-11-08 RX ADMIN — INSULIN GLARGINE 5 UNITS: 100 INJECTION, SOLUTION SUBCUTANEOUS at 21:36

## 2020-11-08 RX ADMIN — NIFEDIPINE 60 MG: 60 TABLET, EXTENDED RELEASE ORAL at 08:50

## 2020-11-08 RX ADMIN — HEPARIN SODIUM 9.79 UNITS/KG/HR: 10000 INJECTION, SOLUTION INTRAVENOUS at 10:26

## 2020-11-08 RX ADMIN — ISOSORBIDE MONONITRATE 30 MG: 30 TABLET ORAL at 08:50

## 2020-11-08 RX ADMIN — ASPIRIN 81 MG: 81 TABLET, COATED ORAL at 08:50

## 2020-11-08 RX ADMIN — CARVEDILOL 25 MG: 12.5 TABLET, FILM COATED ORAL at 08:50

## 2020-11-08 RX ADMIN — GABAPENTIN 600 MG: 600 TABLET ORAL at 08:50

## 2020-11-08 RX ADMIN — INSULIN LISPRO 2 UNITS: 100 INJECTION, SOLUTION INTRAVENOUS; SUBCUTANEOUS at 16:36

## 2020-11-08 RX ADMIN — SODIUM CHLORIDE 500 ML: 9 INJECTION, SOLUTION INTRAVENOUS at 14:09

## 2020-11-08 RX ADMIN — SODIUM CHLORIDE, PRESERVATIVE FREE 10 ML: 5 INJECTION INTRAVENOUS at 08:50

## 2020-11-08 RX ADMIN — INSULIN LISPRO 1 UNITS: 100 INJECTION, SOLUTION INTRAVENOUS; SUBCUTANEOUS at 21:37

## 2020-11-08 RX ADMIN — ESCITALOPRAM OXALATE 20 MG: 10 TABLET ORAL at 08:50

## 2020-11-08 RX ADMIN — INSULIN LISPRO 1 UNITS: 100 INJECTION, SOLUTION INTRAVENOUS; SUBCUTANEOUS at 08:50

## 2020-11-08 RX ADMIN — PANTOPRAZOLE SODIUM 40 MG: 40 TABLET, DELAYED RELEASE ORAL at 06:02

## 2020-11-08 RX ADMIN — LISINOPRIL 10 MG: 10 TABLET ORAL at 08:50

## 2020-11-08 ASSESSMENT — PAIN SCALES - GENERAL
PAINLEVEL_OUTOF10: 0

## 2020-11-08 NOTE — CONSULTS
Attestation signed by      Attending Physician Statement:    I have discussed the care of  Karlie Connell , including pertinent history and exam findings, with the Cardiology fellow/resident. I have seen and examined the patient and the key elements of all parts of the encounter have been performed by me. I agree with the assessment, plan and orders as documented by the fellow/resident, after I modified exam findings and plan of treatments, and the final version is my approved version of the assessment. Additional Comments:   Given extensive history of CAD multiple stent with worsening effort short of breath, admitted with chest pressure to which relieved by IV nitro  Although tropes are not that elevated  Plan is to do the heart cath in a.m., agree to hold Xarelto and start on heparin without any bolus  Continue rest  Talk in detail regarding the risk and complication of the cath  Please keep it n.p.o. after small bowl of cereal at 6 AM   [facility] Cardiology Cardiology    Consult / H&P               Today's Date: 11/8/2020  Patient Name: Karlie Connell  Date of admission: 11/6/2020  8:28 AM  Patient's age: 76 y.o., 1945  Admission Dx: Acute on chronic diastolic CHF (congestive heart failure) ([address] [I50.33]  Acute on chronic diastolic CHF (congestive heart failure) ([address] [D38.91]  Acute diastolic CHF (congestive heart failure) ([address] [I50.31]    Reason for Consult:  Cardiac evaluation    Requesting Physician: Edgar Mejia MD    CHIEF COMPLAINT: Shortness of breath    History Obtained From:  patient    HISTORY OF PRESENT ILLNESS:      The patient is a 76 y.o. female who is admitted to the hospital for shortness of breath. Patient states that she started developing shortness of breath about a month ago, gradual in onset, progressive in nature, associated with cough and yellow-colored sputum and swelling in her ankles. Patient states that she has orthopnea and PND.   Patient has significant history of pulmonary embolism month ago and was started on rivaroxaban. Patient also complains of chest pain which started month ago which is worse on exertion, is relieved with rest.  Patient denies any history of alcohol intake, smoking or any other recreational drug use. Past Medical History:   has a past medical history of Anxiety, Benign positional vertigo, CAD (coronary artery disease), Chest pain, Depression, Diabetes mellitus ([address] Diabetic neuropathy ([facility] Hyperlipidemia, Hypertension, and Migraine. Past Surgical History:   has a past surgical history that includes Percutaneous Transluminal Coronary Angio; Cardiac catheterization; Coronary angioplasty with stent (02/25/2017); Appendectomy; and Coronary angioplasty with stent (07/11/2012). Home Medications:    Prior to Admission medications    Medication Sig Start Date End Date Taking? Authorizing Provider   Continuous Blood Gluc  (FREESTYLE SYLVIA 14 DAY READER) JAQUELINE 1 each by Does not apply route continuous [facility] 10/30/20   Ez Rome MD   Continuous Blood Gluc Sensor (FREESTYLE SYLVIA 14 DAY SENSOR) MISC 1 each by Does not apply route every 14 days 10/30/20   Ez Rome MD   rivaroxaban (XARELTO) 20 MG TABS tablet Take 1 tablet by mouth daily (with breakfast) 10/21/20   Ez Rome MD   azithromycin (ZITHROMAX) 250 MG tablet azithromycin 250 mg tablet    Historical Provider, MD   glucose monitoring kit (FREESTYLE) monitoring kit 1 kit by Does not apply route 4 times daily Freestyle Sylvia . Dx E11.65, has tremors and cannot use conventional meter without great difficulty. Please provide supplies for 3 months, rf 3,Pharm[facility]. 10/6/20   Ez Rome MD   [facility].  Devices (STEP N REST WALKER) MISC 1 each by Does not apply route continuous Seated, wheeled walker 10/6/20   Ez Rome MD   pregabalin (LYRICA) 50 MG capsule Take 1 capsule by mouth 3 times daily for 30 days. 10/2/20 11/1/20  Sid Valenzuela MD   escitalopram (LEXAPRO) 20 MG tablet Take 1 tablet by mouth daily 8/11/20   Catie Napoles MD   clonazePAM Pattricia Folk) 0.5 MG tablet take 1 tablet by mouth twice a day if needed for anxiety 8/5/20 9/4/20  Catie Napoles MD   hydrOXYzine (ATARAX) 25 MG tablet take 1 tablet by mouth every 8 hours rectally for itching 8/5/20   Catie Napoles MD   gabapentin (NEURONTIN) 600 MG tablet Take 1 tablet by mouth 3 times daily for 30 days. 6/12/20 9/3/20  Catie Napoles MD   fluticasone [facility]) 50 MCG/ACT nasal spray instill 2 sprays into each nostril once daily 4/21/20   Historical Provider, MD   NOVOFINE 32G X 6 MM MISC use 1 NEEDLE to inject MEDICATION subcutaneously five times a day 4/8/20   Historical Provider, MD   mupirocin (BACTROBAN) 2 % ointment apply topically to affected area three times a day 4/21/20   Historical Provider, MD   NIFEdipine (PROCARDIA XL) 60 MG extended release tablet take 1 tablet by mouth once daily 4/27/20   Historical Provider, MD   omeprazole (PRILOSEC) 20 MG delayed release capsule Take 1 capsule by mouth Daily 5/11/20   Catie Napoles MD   insulin aspart (NOVOLOG FLEXPEN) 100 UNIT/ML injection pen Use TID before meals according to scale - max 45 units a day 5/11/20   Catie Napoles MD   loratadine (CLARITIN) 10 MG tablet Take 1 tablet every day by oral route. 5/11/20   Catie Napoles MD   rosuvastatin (CRESTOR) 40 MG tablet Take 1 tablet by mouth daily 5/1/20   Catie Napoles MD   LEVEMIR FLEXTOUCH 100 UNIT/ML injection pen Inject 70 unit(s) twice a day by sub-q route for 30 days.  3/11/20   Historical Provider, MD   isosorbide mononitrate (IMDUR) 30 MG extended release tablet Take 1 tablet by mouth daily 11/26/19   Sofia Rand MD   linagliptin (TRADJENTA) 5 MG tablet Take 1 tablet by mouth daily 11/26/19   Sofia Rand MD   metFORMIN (GLUCOPHAGE-XR) 750 MG extended change in muscle strength, numbness or tingling. No change in gait, balance, coordination, mood, affect, memory, mentation, behavior. PHYSICAL EXAM:      /62   Pulse 66   Temp 97.8 °F (36.6 °C) (Axillary)   Resp 13   Ht 5' 6\" (1.676 m)   Wt 225 lb (102.1 kg)   SpO2 99%   BMI 36.32 kg/m²    Constitutional and General Appearance: alert, cooperative, no distress and appears stated age  HEENT: PERRL, no cervical lymphadenopathy. No masses palpable. Normal oral mucosa  Respiratory:  Normal excursion and expansion without use of accessory muscles  Resp Auscultation: Good respiratory effort. No for increased work of breathing. On auscultation: Bilateral basilar crackles  Cardiovascular:  Heart tones are crisp and normal. regular S1 and S2.  Peripheral pulses are symmetrical and full   Abdomen:   No masses or tenderness  Bowel sounds present  Extremities:   No Cyanosis or Clubbing   Lower extremity edema: No   Skin: Warm and dry  Neurological:  Alert and oriented. Moves all extremities well  No abnormalities of mood, affect, memory, mentation, or behavior are noted    DATA:    Diagnostics:    EKG: normal sinus rhythm, unchanged from previous tracings. ECHO: reviewed. Ejection fraction: 50%  Stress Test: reviewed. 07/12/20 : Negative Lexiscan stress test.  Cardiac Angiography: reviewed.  02/25/17  Multi-vessel coronary artery disease.   Normal LV contractility.   Successful PCI / Drug Eluting Stent of the proximal Posterolateral Coronary Artery.   Successful PCI / Drug Eluting Stent of the mid Right Coronary Artery.   Successful PCI / Drug Eluting Stent of the proximal Posterior Descending Coronary Artery.   Successful PCI / Drug Eluting Stent of the distal Left Anterior Descending Coronary Artery    Labs:     CBC:   Recent Labs     11/06/20  0910 11/07/20  0606   WBC 7.0 6.0   HGB 11.4* 8.1*   HCT 36.2* 27.0*    149     BMP:   Recent Labs     11/06/20  0910 11/07/20  0606    138   K 4.3 3.6*   CO2 24 27   BUN 16 13   CREATININE 0.81 0.81   LABGLOM >60 >60   GLUCOSE 227* 213*     BNP: No results for input(s): BNP in the last 72 hours. PT/INR: No results for input(s): PROTIME, INR in the last 72 hours. APTT:No results for input(s): APTT in the last 72 hours. CARDIAC ENZYMES:No results for input(s): CKTOTAL, CKMB, CKMBINDEX, TROPONINI in the last 72 hours. FASTING LIPID PANEL:  Lab Results   Component Value Date    HDL 51 12/14/2019    TRIG 61 12/14/2019     LIVER PROFILE:No results for input(s): AST, ALT, LABALBU in the last 72 hours. IMPRESSION:      Angina  Hx of CAD/MVD as above  HTD  Chronic diastolic CHF    RECOMMENDATIONS:  We will switch her Xarelto to heparin drip as patient might need cardiac cath. Continue the patient current medications. Further discussed with Dr. Hailey Becerra. Discussed with patient and Nurse.     Electronically signed by Kenny Daniels MD on 11/8/2020 at 5:54 AM

## 2020-11-08 NOTE — PLAN OF CARE
Problem: Respiratory:  Goal: Ability to maintain adequate ventilation will improve  Description: Ability to maintain adequate ventilation will improve  Outcome: Met This Shift  Goal: Respiratory status will improve  Description: Respiratory status will improve  Outcome: Met This Shift

## 2020-11-08 NOTE — PROGRESS NOTES
nocturnal dyspnea along with ankle swelling. Patient says that she is having cough with some yellowish phlegm but no fever or chills. No sick contacts recently. Patient is compliant with her medications, no salty foods. But patient was found to be very hypertensive on presentation to the ED. Patient was started on nitroglycerin drip and later on the patient improved. OBJECTIVE     Vital Signs:  /62   Pulse 66   Temp 97.8 °F (36.6 °C) (Axillary)   Resp 13   Ht 5' 6\" (1.676 m)   Wt 240 lb 4.8 oz (109 kg)   SpO2 99%   BMI 38.79 kg/m²     Temp (24hrs), Av.3 °F (36.8 °C), Min:97.8 °F (36.6 °C), Max:98.8 °F (37.1 °C)    In: 710   Out: 1125 [Urine:1125]    Physical Exam:  Constitutional: This is a well developed, well nourished, 35-39.9 - Obesity Grade II 76y.o. year old female who is alert, oriented, cooperative and in mild distress due to shortness of breath. Head:normocephalic and atraumatic. Respiratory: Bilateral basal crepitation and normal vesicular breathing in upper part of the lung  cardiovascular: Regular without murmur, clicks, gallops or rubs. Abdomen: Slightly rounded and soft without organomegaly. No rebound, rigidity or guarding was appreciated. Lymphatic: No lymphadenopathy. Musculoskeletal: Normal curvature of the spine. No gross muscle weakness. Extremities: Trace edema but no ulcerations, tenderness, varicosities or erythema. Muscle size, tone and strength are normal.  No involuntary movements are noted. Skin:  Warm and dry. Good color, turgor and pigmentation. No lesions or scars.   No cyanosis or clubbing  Neurological/Psychiatric: The patient's general behavior, level of consciousness, thought content and emotional status is normal      Medications:  Scheduled Medications:    heparin (porcine)  4,000 Units Intravenous Once    lisinopril  10 mg Oral Daily    NIFEdipine  60 mg Oral Daily    influenza virus vaccine  0.5 mL Intramuscular Prior to discharge    furosemide  40 mg Intravenous Daily    insulin glargine  5 Units Subcutaneous Nightly    sodium chloride flush  10 mL Intravenous 2 times per day    [Held by provider] rivaroxaban  20 mg Oral Daily with breakfast    sodium chloride flush  10 mL Intravenous 2 times per day    aspirin  81 mg Oral Daily    clonazePAM  0.5 mg Oral Nightly    escitalopram  20 mg Oral Daily    gabapentin  600 mg Oral TID    isosorbide mononitrate  30 mg Oral Daily    carvedilol  25 mg Oral BID WC    pantoprazole  40 mg Oral QAM AC    rosuvastatin  40 mg Oral Daily    insulin lispro  0-6 Units Subcutaneous TID WC    insulin lispro  0-3 Units Subcutaneous Nightly    spironolactone  25 mg Oral Daily     Continuous Infusions:    heparin (PORCINE) Infusion      dextrose       PRN Medicationsheparin (porcine), 4,000 Units, PRN  heparin (porcine), 2,000 Units, PRN  sodium chloride flush, 10 mL, PRN  sodium chloride flush, 10 mL, PRN  acetaminophen, 650 mg, Q6H PRN    Or  acetaminophen, 650 mg, Q6H PRN  polyethylene glycol, 17 g, Daily PRN  potassium chloride, 40 mEq, PRN    Or  potassium alternative oral replacement, 40 mEq, PRN    Or  potassium chloride, 10 mEq, PRN  magnesium sulfate, 2 g, PRN  promethazine, 12.5 mg, Q6H PRN    Or  ondansetron, 4 mg, Q6H PRN  glucose, 15 g, PRN  dextrose, 12.5 g, PRN  glucagon (rDNA), 1 mg, PRN  dextrose, 100 mL/hr, PRN        Diagnostic Labs:  CBC:   Recent Labs     11/06/20  0910 11/07/20  0606 11/08/20  0628   WBC 7.0 6.0 6.0   RBC 3.80* 2.71* 3.10*   HGB 11.4* 8.1* 9.3*   HCT 36.2* 27.0* 30.4*   MCV 95.3 99.6 98.1   RDW 13.6 13.7 13.2    149 167     BMP:   Recent Labs     11/06/20  0910 11/07/20  0606    138   K 4.3 3.6*    106   CO2 24 27   BUN 16 13   CREATININE 0.81 0.81         ASSESSMENT & PLAN     Acute on chronic diastolic CHF (congestive heart failure) (HCC)  -Furosemide 40 mg p/o daily  -Resume spironolactone 25 mg daily  -Strict I/Os record  -Cardiology consulted  -Cardiology want to do cath.     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        DVT ppx: Already on anticoagulant  GI ppx: Pantoprazole     PT/OT/SW: Ongoing      Pat Boateng MD  Internal Medicine Resident, PGY-1  [address], [place]  11/8/2020, 08:44 am      Attending Physician Statement  I have discussed the case, including pertinent history and exam findings with the resident and the team.  I have seen and examined the patient and the key elements of the encounter have been performed by me. I agree with the assessment, plan and orders as documented by the resident.       Edgar Person MD   Attending Physician, Internal Medicine Residency Program  11/8/2020, 12:52 PM

## 2020-11-08 NOTE — PLAN OF CARE
Problem: Falls - Risk of:  Goal: Will remain free from falls  Description: Will remain free from falls  11/8/2020 0347 by Juan J Pittman RN  Outcome: Ongoing  11/8/2020 0347 by Juan J Pittman RN  Outcome: Ongoing  11/8/2020 0346 by Juan J Pittman RN  Outcome: Met This Shift  11/7/2020 1523 by Rachel Fothergill, RN  Outcome: Met This Shift  11/7/2020 1522 by Rachel Fothergill, RN  Outcome: Met This Shift  Goal: Absence of physical injury  Description: Absence of physical injury  11/8/2020 0347 by Juan J Pittman RN  Outcome: Ongoing  11/8/2020 0347 by Juan J Pittman RN  Outcome: Ongoing  11/8/2020 0346 by Juan J Pittman RN  Outcome: Met This Shift  11/7/2020 1523 by Rachel Fothergill, RN  Outcome: Met This Shift  11/7/2020 1522 by Rachel Fothergill, RN  Outcome: Met This Shift     Problem: OXYGENATION/RESPIRATORY FUNCTION  Goal: Patient will maintain patent airway  11/8/2020 0347 by Juan J Pittman RN  Outcome: Ongoing  11/8/2020 0347 by Juan J Pittman RN  Outcome: Ongoing  11/7/2020 1522 by Rachel Fothergill, RN  Outcome: Ongoing  Goal: Patient will achieve/maintain normal respiratory rate/effort  Description: Respiratory rate and effort will be within normal limits for the patient  11/8/2020 0347 by Juan J Pittman RN  Outcome: Ongoing  11/8/2020 0347 by Juan J Pittman RN  Outcome: Ongoing     Problem:  Activity:  Goal: Capacity to carry out activities will improve  Description: Capacity to carry out activities will improve  11/8/2020 0347 by Juan J Pittman RN  Outcome: Ongoing  11/8/2020 0347 by Juan J Pittman RN  Outcome: Ongoing  11/8/2020 0346 by Juan J Pittman RN  Outcome: Met This Shift  11/7/2020 1523 by Rachel Fothergill, RN  Outcome: Ongoing  Goal: Will verbalize the importance of balancing activity with adequate rest periods  Description: Will verbalize the importance of balancing activity with adequate rest periods  11/8/2020 0347 by Juan J Pittman RN  Outcome: Ongoing  11/8/2020 0346 by Donato Hercules RN  Outcome: Met This Shift  11/7/2020 1523 by Tru Meier RN  Outcome: Ongoing     Problem: Cardiac:  Goal: Hemodynamic stability will improve  Description: Hemodynamic stability will improve  11/8/2020 0347 by Donato Hercules RN  Outcome: Ongoing  11/7/2020 1523 by Tru Meier RN  Outcome: Ongoing  Goal: Ability to maintain an adequate cardiac output will improve  Description: Ability to maintain an adequate cardiac output will improve  11/8/2020 0347 by Donato Hercules RN  Outcome: Ongoing  11/7/2020 1523 by Tru Meier RN  Outcome: Ongoing     Problem: Coping:  Goal: Verbalizations of decreased anxiety will decrease  Description: Verbalizations of decreased anxiety will decrease  11/7/2020 1523 by Tru Meier RN  Outcome: Ongoing     Problem: Fluid Volume:  Goal: Risk for excess fluid volume will decrease  Description: Risk for excess fluid volume will decrease  11/7/2020 1523 by Tru Meier RN  Outcome: Ongoing  Goal: Maintenance of adequate hydration will improve  Description: Maintenance of adequate hydration will improve  11/7/2020 1523 by Tru Meier RN  Outcome: Ongoing  Goal: Will show no signs and symptoms of electrolyte imbalance  Description: Will show no signs and symptoms of electrolyte imbalance  11/7/2020 1523 by Tru Meier RN  Outcome: Ongoing     Problem: Physical Regulation:  Goal: Complications related to the disease process, condition or treatment will be avoided or minimized  Description: Complications related to the disease process, condition or treatment will be avoided or minimized  11/7/2020 1523 by Tru Meier RN  Outcome: Ongoing

## 2020-11-09 ENCOUNTER — APPOINTMENT (OUTPATIENT)
Dept: CARDIAC CATH/INVASIVE PROCEDURES | Age: 75
DRG: 246 | End: 2020-11-09
Payer: MEDICARE

## 2020-11-09 LAB
ABO/RH: NORMAL
ABSOLUTE EOS #: 0.16 K/UL (ref 0–0.44)
ABSOLUTE IMMATURE GRANULOCYTE: <0.03 K/UL (ref 0–0.3)
ABSOLUTE LYMPH #: 2.55 K/UL (ref 1.1–3.7)
ABSOLUTE MONO #: 0.64 K/UL (ref 0.1–1.2)
ANION GAP SERPL CALCULATED.3IONS-SCNC: 8 MMOL/L (ref 9–17)
ANTIBODY SCREEN: NEGATIVE
ARM BAND NUMBER: NORMAL
BASOPHILS # BLD: 1 % (ref 0–2)
BASOPHILS ABSOLUTE: 0.05 K/UL (ref 0–0.2)
BLOOD BANK SPECIMEN: NORMAL
BUN BLDV-MCNC: 15 MG/DL (ref 8–23)
BUN/CREAT BLD: ABNORMAL (ref 9–20)
CALCIUM SERPL-MCNC: 8.7 MG/DL (ref 8.6–10.4)
CHLORIDE BLD-SCNC: 101 MMOL/L (ref 98–107)
CO2: 27 MMOL/L (ref 20–31)
CREAT SERPL-MCNC: 0.94 MG/DL (ref 0.5–0.9)
DIFFERENTIAL TYPE: ABNORMAL
EOSINOPHILS RELATIVE PERCENT: 3 % (ref 1–4)
EXPIRATION DATE: NORMAL
GFR AFRICAN AMERICAN: >60 ML/MIN
GFR NON-AFRICAN AMERICAN: 58 ML/MIN
GFR SERPL CREATININE-BSD FRML MDRD: ABNORMAL ML/MIN/{1.73_M2}
GFR SERPL CREATININE-BSD FRML MDRD: ABNORMAL ML/MIN/{1.73_M2}
GLUCOSE BLD-MCNC: 155 MG/DL (ref 65–105)
GLUCOSE BLD-MCNC: 160 MG/DL (ref 70–99)
GLUCOSE BLD-MCNC: 229 MG/DL (ref 65–105)
GLUCOSE BLD-MCNC: 233 MG/DL (ref 65–105)
HCT VFR BLD CALC: 29.5 % (ref 36.3–47.1)
HEMOGLOBIN: 9.2 G/DL (ref 11.9–15.1)
IMMATURE GRANULOCYTES: 0 %
LYMPHOCYTES # BLD: 39 % (ref 24–43)
MCH RBC QN AUTO: 30 PG (ref 25.2–33.5)
MCHC RBC AUTO-ENTMCNC: 31.2 G/DL (ref 28.4–34.8)
MCV RBC AUTO: 96.1 FL (ref 82.6–102.9)
MONOCYTES # BLD: 10 % (ref 3–12)
NRBC AUTOMATED: 0 PER 100 WBC
PARTIAL THROMBOPLASTIN TIME: 27.9 SEC (ref 20.5–30.5)
PARTIAL THROMBOPLASTIN TIME: 44 SEC (ref 20.5–30.5)
PARTIAL THROMBOPLASTIN TIME: 69.5 SEC (ref 20.5–30.5)
PDW BLD-RTO: 13.2 % (ref 11.8–14.4)
PLATELET # BLD: 164 K/UL (ref 138–453)
PLATELET ESTIMATE: ABNORMAL
PMV BLD AUTO: 10.9 FL (ref 8.1–13.5)
POTASSIUM SERPL-SCNC: 3.9 MMOL/L (ref 3.7–5.3)
RBC # BLD: 3.07 M/UL (ref 3.95–5.11)
RBC # BLD: ABNORMAL 10*6/UL
SEG NEUTROPHILS: 47 % (ref 36–65)
SEGMENTED NEUTROPHILS ABSOLUTE COUNT: 3.12 K/UL (ref 1.5–8.1)
SODIUM BLD-SCNC: 136 MMOL/L (ref 135–144)
WBC # BLD: 6.5 K/UL (ref 3.5–11.3)
WBC # BLD: ABNORMAL 10*3/UL

## 2020-11-09 PROCEDURE — 99221 1ST HOSP IP/OBS SF/LOW 40: CPT | Performed by: THORACIC SURGERY (CARDIOTHORACIC VASCULAR SURGERY)

## 2020-11-09 PROCEDURE — 86901 BLOOD TYPING SEROLOGIC RH(D): CPT

## 2020-11-09 PROCEDURE — C1887 CATHETER, GUIDING: HCPCS

## 2020-11-09 PROCEDURE — 36415 COLL VENOUS BLD VENIPUNCTURE: CPT

## 2020-11-09 PROCEDURE — 85730 THROMBOPLASTIN TIME PARTIAL: CPT

## 2020-11-09 PROCEDURE — 2500000003 HC RX 250 WO HCPCS

## 2020-11-09 PROCEDURE — 2000000000 HC ICU R&B

## 2020-11-09 PROCEDURE — 86900 BLOOD TYPING SEROLOGIC ABO: CPT

## 2020-11-09 PROCEDURE — 86850 RBC ANTIBODY SCREEN: CPT

## 2020-11-09 PROCEDURE — 6360000002 HC RX W HCPCS

## 2020-11-09 PROCEDURE — C1769 GUIDE WIRE: HCPCS

## 2020-11-09 PROCEDURE — 93458 L HRT ARTERY/VENTRICLE ANGIO: CPT | Performed by: INTERNAL MEDICINE

## 2020-11-09 PROCEDURE — 2060000000 HC ICU INTERMEDIATE R&B

## 2020-11-09 PROCEDURE — 83036 HEMOGLOBIN GLYCOSYLATED A1C: CPT

## 2020-11-09 PROCEDURE — 7100000001 HC PACU RECOVERY - ADDTL 15 MIN

## 2020-11-09 PROCEDURE — B2151ZZ FLUOROSCOPY OF LEFT HEART USING LOW OSMOLAR CONTRAST: ICD-10-PCS | Performed by: INTERNAL MEDICINE

## 2020-11-09 PROCEDURE — 99233 SBSQ HOSP IP/OBS HIGH 50: CPT | Performed by: INTERNAL MEDICINE

## 2020-11-09 PROCEDURE — 2709999900 HC NON-CHARGEABLE SUPPLY

## 2020-11-09 PROCEDURE — C1894 INTRO/SHEATH, NON-LASER: HCPCS

## 2020-11-09 PROCEDURE — 85025 COMPLETE CBC W/AUTO DIFF WBC: CPT

## 2020-11-09 PROCEDURE — 6360000004 HC RX CONTRAST MEDICATION

## 2020-11-09 PROCEDURE — 82947 ASSAY GLUCOSE BLOOD QUANT: CPT

## 2020-11-09 PROCEDURE — C1760 CLOSURE DEV, VASC: HCPCS

## 2020-11-09 PROCEDURE — 6370000000 HC RX 637 (ALT 250 FOR IP): Performed by: STUDENT IN AN ORGANIZED HEALTH CARE EDUCATION/TRAINING PROGRAM

## 2020-11-09 PROCEDURE — B2111ZZ FLUOROSCOPY OF MULTIPLE CORONARY ARTERIES USING LOW OSMOLAR CONTRAST: ICD-10-PCS | Performed by: INTERNAL MEDICINE

## 2020-11-09 PROCEDURE — 4A023N7 MEASUREMENT OF CARDIAC SAMPLING AND PRESSURE, LEFT HEART, PERCUTANEOUS APPROACH: ICD-10-PCS | Performed by: INTERNAL MEDICINE

## 2020-11-09 PROCEDURE — 6360000002 HC RX W HCPCS: Performed by: STUDENT IN AN ORGANIZED HEALTH CARE EDUCATION/TRAINING PROGRAM

## 2020-11-09 PROCEDURE — 93970 EXTREMITY STUDY: CPT

## 2020-11-09 PROCEDURE — 7100000000 HC PACU RECOVERY - FIRST 15 MIN

## 2020-11-09 PROCEDURE — 93880 EXTRACRANIAL BILAT STUDY: CPT

## 2020-11-09 PROCEDURE — 80048 BASIC METABOLIC PNL TOTAL CA: CPT

## 2020-11-09 PROCEDURE — 99223 1ST HOSP IP/OBS HIGH 75: CPT | Performed by: NURSE PRACTITIONER

## 2020-11-09 RX ORDER — SODIUM CHLORIDE 0.9 % (FLUSH) 0.9 %
10 SYRINGE (ML) INJECTION PRN
Status: DISCONTINUED | OUTPATIENT
Start: 2020-11-09 | End: 2020-11-13 | Stop reason: HOSPADM

## 2020-11-09 RX ORDER — CHLORHEXIDINE GLUCONATE 4 G/100ML
SOLUTION TOPICAL SEE ADMIN INSTRUCTIONS
Status: CANCELLED | OUTPATIENT
Start: 2020-11-09

## 2020-11-09 RX ORDER — SODIUM CHLORIDE 0.9 % (FLUSH) 0.9 %
10 SYRINGE (ML) INJECTION EVERY 12 HOURS SCHEDULED
Status: DISCONTINUED | OUTPATIENT
Start: 2020-11-09 | End: 2020-11-13 | Stop reason: HOSPADM

## 2020-11-09 RX ORDER — CHLORHEXIDINE GLUCONATE 0.12 MG/ML
15 RINSE ORAL ONCE
Status: CANCELLED | OUTPATIENT
Start: 2020-11-09 | End: 2020-11-09

## 2020-11-09 RX ORDER — AMIODARONE HYDROCHLORIDE 200 MG/1
200 TABLET ORAL 3 TIMES DAILY
Status: CANCELLED | OUTPATIENT
Start: 2020-11-09

## 2020-11-09 RX ORDER — HYDRALAZINE HYDROCHLORIDE 20 MG/ML
10 INJECTION INTRAMUSCULAR; INTRAVENOUS EVERY 10 MIN PRN
Status: DISCONTINUED | OUTPATIENT
Start: 2020-11-09 | End: 2020-11-09

## 2020-11-09 RX ORDER — LABETALOL HYDROCHLORIDE 5 MG/ML
10 INJECTION, SOLUTION INTRAVENOUS EVERY 30 MIN PRN
Status: DISCONTINUED | OUTPATIENT
Start: 2020-11-09 | End: 2020-11-13 | Stop reason: HOSPADM

## 2020-11-09 RX ORDER — SODIUM CHLORIDE 0.9 % (FLUSH) 0.9 %
10 SYRINGE (ML) INJECTION PRN
Status: CANCELLED | OUTPATIENT
Start: 2020-11-09

## 2020-11-09 RX ORDER — ASPIRIN 81 MG/1
81 TABLET ORAL
Status: CANCELLED | OUTPATIENT
Start: 2020-11-09 | End: 2020-11-09

## 2020-11-09 RX ORDER — ACETAMINOPHEN 325 MG/1
650 TABLET ORAL EVERY 4 HOURS PRN
Status: DISCONTINUED | OUTPATIENT
Start: 2020-11-09 | End: 2020-11-13 | Stop reason: HOSPADM

## 2020-11-09 RX ORDER — SODIUM CHLORIDE 9 MG/ML
INJECTION, SOLUTION INTRAVENOUS CONTINUOUS
Status: CANCELLED | OUTPATIENT
Start: 2020-11-10

## 2020-11-09 RX ORDER — SODIUM CHLORIDE 0.9 % (FLUSH) 0.9 %
10 SYRINGE (ML) INJECTION EVERY 12 HOURS SCHEDULED
Status: CANCELLED | OUTPATIENT
Start: 2020-11-09

## 2020-11-09 RX ADMIN — INSULIN LISPRO 1 UNITS: 100 INJECTION, SOLUTION INTRAVENOUS; SUBCUTANEOUS at 23:14

## 2020-11-09 RX ADMIN — ROSUVASTATIN CALCIUM 40 MG: 20 TABLET, FILM COATED ORAL at 08:17

## 2020-11-09 RX ADMIN — ESCITALOPRAM OXALATE 20 MG: 10 TABLET ORAL at 08:17

## 2020-11-09 RX ADMIN — ASPIRIN 81 MG: 81 TABLET, COATED ORAL at 08:17

## 2020-11-09 RX ADMIN — HEPARIN SODIUM 9.79 UNITS/KG/HR: 10000 INJECTION, SOLUTION INTRAVENOUS at 08:16

## 2020-11-09 RX ADMIN — NIFEDIPINE 60 MG: 60 TABLET, EXTENDED RELEASE ORAL at 08:17

## 2020-11-09 RX ADMIN — HEPARIN SODIUM 9.79 UNITS/KG/HR: 10000 INJECTION, SOLUTION INTRAVENOUS at 17:12

## 2020-11-09 ASSESSMENT — PAIN SCALES - GENERAL
PAINLEVEL_OUTOF10: 0

## 2020-11-09 NOTE — PROGRESS NOTES
Received post procedure to [facility] to room 2. Assessment obtained. Restrictions reviewed with patient. Post procedure pathway initiated. Right groin site soft , band aid dry and intact. No hematoma noted. Family at side. Patient without complaints. Head of bed flat with right leg straight.

## 2020-11-09 NOTE — OP NOTE
[facility]    CARDIAC CATHETERIZATION    Date:   11/9/2020  Patient name:  Cielo Diaz  Date of admission:  11/6/2020  8:28 AM  MRN:   [id number]  YOB: 1945    Operators:  Primary:   Chon Hernandez MD (Attending Physician)    Assistant/CV fellow: Gayathri Uriostegui MD      Procedure performed:     [x] Left Heart Catheterization. [] Graft Angiography. [x] Left Ventriculography. [] Right Heart Catheterization. [x] Coronary Angiography. [] Aortic Valve Studies. [] PCI:      [] Other:       Pre Procedure Conscious Sedation Data:  ASA Class:    [] I [x] II [] III [] IV    Mallampati Class:  [] I [x] II [] III [] IV      Indication:  [] STEMI      [] + Stress test  [x] ACS      [] + EKG Changes  [x] Non Q MI       [] Significant Risk Factors  [x] Recurrent Angina             [] Diabetes Mellitus    [] New LBBB      [] Other.  [] CHF / Low EF changes     [] Abnormal CTA / Ca Score      Procedure:  Access:  [x] Femoral  [] Radial  artery       [x] Right  [] Left    Procedure: After informed consent was obtained with explanation of the risks and benefits, patient was brought to the cath lab. The access area was prepped and draped in sterile fashion. 1% lidocaine was used for local block. The artery was cannulated with 6  Fr sheath with brisk arterial blood return. The side port was frequently flushed and aspirated with normal saline. Estimated Blood Loss:  [x] Minimal < 25 cc [] Moderate 25-50 cc  [] >50 cc    Findings:    LMCA: Normal 0% stenosis. LAD: Proximal 75% stenosis   Mid area and distal 90% stenosis   D's are small     LCx: Mid 50% stenosis   OM1: Proximal 80% stenosis     RCA: Mid stent area 4050% stenosis   PDA: Ostial 80% stenosis   PL branch with proximal 80% stenosis     The LV gram was performed in the MOREL 30 position. LVEF: 55 %. Conclusions:     Multivessel CAD    Preserved LV function     Recommendations:  1. Post-cath protocol  2.  CV surgery Consult   3. Continue optimal medical therapy  4. Risk factor modification      ____________________________________________________________________    History and Risk Factors    [x] Hypertension     [] Family history of CAD  [x] Hyperlipidemia     [] Cerebrovascular Disease   [] Prior MI       [x] Peripheral Vascular disease   [x] Prior PCI              [] Diabetes Mellitus    [] Left Main PCI. [] Currently on Dialysis. [] Prior CABG. [] Currently smoker. [] Cardiac Arrest outside of healthcare facility. [] Yes    [x] No        Witnessed     [] Yes   [] No     Arrest after arrival of EMS  [] Yes   [] No     [] Cardiac Arrest at other Facility. [] Yes   [x] No    Pre-Procedure Information. Heart Failure       [] Yes    [x] No        Class  [] I      [] II  [] III    [] IV. New Diagnosis    [] Yes  [] No    HF Type      [] Systolic   [] Diastolic          [] Unknown. Diagnostic Test:   EKG       [] Normal   [x] Abnormal    New antiarrhythmia medications:    [] Yes   [] No   New onset atrial fibrillation / Flutter     [] Yes   [] No   ECG Abnormalities:      [] V. Fib   [] Rosana V. Tach           [] NS V. T   [] New LBBB           [] T. Inv  []  ST dev > 0.5 mm         [] PVC's freq  [] PVC's infrequent    Stress Test Performed:      [] Yes    [x] No     Type:     [] Stress Echo   [] Exercise Stress Test (no imaging)      [] Stress Nuclear  [] Stress Imaging     Results   [] Negative   [] Positive        [] Indeterminate  [] Unavailable     If Positive/ Risk / Extent of Ischemia:       [] Low  [] Intermediate         [] High  [] Unavailable      Cardiac CTA Performed:     [] Yes    [x] No      Results   [] CAD   [] Non obstructive CAD      [] No CAD   [] Uncertain      [] Unknown   [] Structural Disease.      Pre Procedure Medications:   [x] Yes    [] No         [x] ASA   [x] Beta Blockers      [] Nitrate   [] Ca Channel Blockers      [] Ranolazine   [x] Statin       [] Plavix/Others antiplatelets      Electronically signed on 11/9/2020 at 12:49 PM by:    Bettina Spain MD  Fellow, [address]    I have reviewed the case / procedure with resident / fellow  I have examined the patient personally  Patient agree with treatment plan, correction innotes was made as appropriate, and discussed final arrangement based on  my evaluation and exam.    Risk and benefit of procedure if planned were explained in details. Procedure was performed by me, with all aspect of the procedure being done using standard protocol. Note was modified based on my own assessment and treatment.     Buster Robles MD  [facility]

## 2020-11-09 NOTE — PROGRESS NOTES
[facility]   Progress Note                   Date:   11/9/2020  Patient name: Laquita Boston  Date of admission:  11/6/2020  8:28 AM  MRN:   [id number]  YOB: 1945  PCP: Claire Cantor MD    Reason for Admission: Acute on chronic diastolic CHF (congestive heart failure) ([facility] [I50.33]  Acute on chronic diastolic CHF (congestive heart failure) ([facility] [E02.32]  Acute diastolic CHF (congestive heart failure) ([facility] [I50.31]    Subjective:       Clinical Changes / Abnormalities: Pt seen and examined in the room. Pt denies any CP or SOB.  NPO for cardiac cath today      Medications:   Scheduled Meds:   [Held by provider] lisinopril  10 mg Oral Daily    NIFEdipine  60 mg Oral Daily    influenza virus vaccine  0.5 mL Intramuscular Prior to discharge    insulin glargine  5 Units Subcutaneous Nightly    sodium chloride flush  10 mL Intravenous 2 times per day    [Held by provider] rivaroxaban  20 mg Oral Daily with breakfast    sodium chloride flush  10 mL Intravenous 2 times per day    aspirin  81 mg Oral Daily    [Held by provider] clonazePAM  0.5 mg Oral Nightly    escitalopram  20 mg Oral Daily    [Held by provider] gabapentin  600 mg Oral TID    [Held by provider] isosorbide mononitrate  30 mg Oral Daily    [Held by provider] carvedilol  25 mg Oral BID WC    pantoprazole  40 mg Oral QAM AC    rosuvastatin  40 mg Oral Daily    insulin lispro  0-6 Units Subcutaneous TID WC    insulin lispro  0-3 Units Subcutaneous Nightly    [Held by provider] spironolactone  25 mg Oral Daily     Continuous Infusions:   heparin (PORCINE) Infusion 9.79 Units/kg/hr (11/09/20 0816)    dextrose       CBC:   Recent Labs     11/07/20  0606 11/08/20 0628 11/09/20  0531   WBC 6.0 6.0 6.5   HGB 8.1* 9.3* 9.2*    167 164     BMP:    Recent Labs     11/07/20  0606 11/08/20 0628 11/09/20  0531    135 136   K 3.6* 4.0 3.9    99 101   CO2 27 28 27   BUN 13 16 15   CREATININE 0. 81 0.92* 0.94*   GLUCOSE 213* 174* 160*     Hepatic: No results for input(s): AST, ALT, ALB, BILITOT, ALKPHOS in the last 72 hours. Troponin:   Recent Labs     11/07/20  1425 11/07/20  1950 11/08/20  0041   TROPHS 32* 31* 28*     BNP: No results for input(s): BNP in the last 72 hours. Lipids: No results for input(s): CHOL, HDL in the last 72 hours. Invalid input(s): LDLCALCU  INR: No results for input(s): INR in the last 72 hours. Objective:   Vitals: BP (!) 155/68   Pulse 77   Temp 99 °F (37.2 °C) (Oral)   Resp 13   Ht 5' 6\" (1.676 m)   Wt 246 lb 14.6 oz (112 kg)   SpO2 98%   BMI 39.85 kg/m²   General appearance: alert and cooperative with exam  HEENT: Head: Normocephalic, no lesions, without obvious abnormality. Neck: no JVD, trachea midline, no adenopathy  Lungs: Clear to auscultation  Heart: Regular rate and rhythm, s1/s2 auscultated, no murmurs  Abdomen: soft, non-tender, bowel sounds active  Extremities: no edema  Neurologic: not done    EKG: normal sinus rhythm, unchanged from previous tracings. ECHO: reviewed. Ejection fraction: 50%  Stress Test: reviewed. 07/12/20 : Negative Lexiscan stress test.  Cardiac Angiography: reviewed. 02/25/17  Multi-vessel coronary artery disease.   Normal LV contractility.   Successful PCI / Drug Eluting Stent of the proximal Posterolateral Coronary Artery.   Successful PCI / Drug Eluting Stent of the mid Right Coronary Artery.   Successful PCI / Drug Eluting Stent of the proximal Posterior Descending Coronary Artery.   Successful PCI / Drug Eluting Stent of the distal Left Anterior Descending Coronary Artery    Assessment / Acute Cardiac Problems:   1. Hx of CAD  2.  DM   3. CP     Patient Active Problem List:     Atypical chest pain     Type 2 diabetes mellitus with diabetic polyneuropathy, with long-term current use of insulin (HCC)     Vertigo     Essential hypertension     CAD (coronary artery disease)     Dyspnea     Claudication in peripheral vascular disease ([facility]     Acute pulmonary embolism without acute cor pulmonale (HCC)     Diabetic polyneuropathy associated with type 2 diabetes mellitus ([facility]     Other hyperlipidemia     Chronic systolic heart failure (HCC)     Multiple subsegmental pulmonary emboli without acute cor pulmonale (HCC)     Congestive heart failure (HCC)     Pyelonephritis of right kidney     History of pulmonary embolism     Elevated troponin I level     Sepsis ([facility]     Suspected COVID-19 virus infection     MARIELLE (acute kidney injury) ([facility]     Diarrhea of presumed infectious origin     Chronic diastolic (congestive) heart failure (HCC)     Generalized weakness     Anemia, normocytic normochromic     Class 1 obesity in adult     Gastroenteritis     Acute on chronic diastolic CHF (congestive heart failure) (HCC)     Acute diastolic CHF (congestive heart failure) ([facility]      Plan of Treatment:   1. Extensive hx of CAD. NSTEMI. Per Dr. Bertha Knox, will plan for cardiac cath today. Risks and benefits reviewed with pt and she is agreeable to proceed. 2. PE- Xarelto on hold. On heparin gtt.     3. Orders to follow     Electronically signed by MAYELA Powers CNP on 11/9/2020 at 9:47 AM  [address]  [phone]

## 2020-11-09 NOTE — PROGRESS NOTES
[facility]  Internal Medicine Teaching Residency Program  Inpatient Daily Progress Note  ______________________________________________________________________________    Patient: Chris De La Fuente  YOB: 1945   XRC:[id number]    Acct: [de-identified]     [id number]  Admit date: 11/6/2020  Today's date: 11/09/20  Number of days in the hospital: 3    SUBJECTIVE   Admitting Diagnosis: Acute on chronic diastolic heart failure  CC: Shortness of breath    Patient seen and examined. No acute issues overnight. Hemodynamically and vitally stable. Afebrile. Labs reviewed. Denies any nausea, vomiting, diarrhea, abdominal pain, worsening of previous symptoms. Going for cardiac cath. ROS:  Constitutional: Positive for activity change and fatigue. Negative for diaphoresis and fever. HENT:Negative for congestion, drooling, facial swelling and sinus pain. Eyes: Negative for photophobia, discharge and itching. Respiratory: Positive for shortness of breath. Negative for apnea and wheezing. Cardiovascular: Positive for leg swelling. Negative for chest pain and palpitations. Gastrointestinal: Negative for abdominal distention, anal bleeding, diarrhea, nausea, rectal pain and vomiting. Endocrine: Negative for polydipsia and polyphagia. Genitourinary: Negative for dysuria, enuresis and pelvic pain. Musculoskeletal: Negative for arthralgias, gait problem and myalgias. Neurological: Negative for seizures, syncope and facial asymmetry. Psychiatric/Behavioral: Negative for agitation and hallucinations. The patient is not hyperactive. BRIEF HISTORY   The patient is a pleasant 76 y.o. female with background history of diastolic heart failure presents with a chief complaint of shortness of breath which started about 3 days ago and it is getting worse slowly. According to the patient she started to have shortness of breath started about 2 days ago. It is gradually worsening and now even she is feeling short of breath at rest.  Patient also reported orthopnea and paroxysmal nocturnal dyspnea along with ankle swelling. Patient says that she is having cough with some yellowish phlegm but no fever or chills. No sick contacts recently. Patient is compliant with her medications, no salty foods. But patient was found to be very hypertensive on presentation to the ED. Patient was started on nitroglycerin drip and later on the patient improved. OBJECTIVE     Vital Signs:  BP (!) 155/68   Pulse 77   Temp 99 °F (37.2 °C) (Oral)   Resp 13   Ht 5' 6\" (1.676 m)   Wt 246 lb 14.6 oz (112 kg)   SpO2 98%   BMI 39.85 kg/m²     Temp (24hrs), Av.5 °F (37.5 °C), Min:99 °F (37.2 °C), Max:99.9 °F (37.7 °C)    In: 1181   Out: 200 [Urine:200]    Physical Exam:  Constitutional: This is a well developed, well nourished, 35-39.9 - Obesity Grade II 76y.o. year old female who is alert, oriented, cooperative and in mild distress due to shortness of breath. Head:normocephalic and atraumatic. Respiratory: Bilateral basal crepitation and normal vesicular breathing in upper part of the lung  cardiovascular: Regular without murmur, clicks, gallops or rubs. Abdomen: Slightly rounded and soft without organomegaly. No rebound, rigidity or guarding was appreciated. Lymphatic: No lymphadenopathy. Musculoskeletal: Normal curvature of the spine. No gross muscle weakness. Extremities: Trace edema but no ulcerations, tenderness, varicosities or erythema. Muscle size, tone and strength are normal.  No involuntary movements are noted. Skin:  Warm and dry. Good color, turgor and pigmentation. No lesions or scars.   No cyanosis or clubbing  Neurological/Psychiatric: The patient's general behavior, level of consciousness, thought content and emotional status is normal      Medications:  Scheduled Medications:    [Held by provider] lisinopril  10 mg Oral Daily    NIFEdipine  60 mg Oral Daily    influenza virus vaccine  0.5 mL Intramuscular Prior to discharge    insulin glargine  5 Units Subcutaneous Nightly    sodium chloride flush  10 mL Intravenous 2 times per day    [Held by provider] rivaroxaban  20 mg Oral Daily with breakfast    sodium chloride flush  10 mL Intravenous 2 times per day    aspirin  81 mg Oral Daily    [Held by provider] clonazePAM  0.5 mg Oral Nightly    escitalopram  20 mg Oral Daily    [Held by provider] gabapentin  600 mg Oral TID    [Held by provider] isosorbide mononitrate  30 mg Oral Daily    [Held by provider] carvedilol  25 mg Oral BID WC    pantoprazole  40 mg Oral QAM AC    rosuvastatin  40 mg Oral Daily    insulin lispro  0-6 Units Subcutaneous TID WC    insulin lispro  0-3 Units Subcutaneous Nightly    [Held by provider] spironolactone  25 mg Oral Daily     Continuous Infusions:    heparin (PORCINE) Infusion 9.79 Units/kg/hr (11/09/20 0816)    dextrose       PRN Medicationsheparin (porcine), 4,000 Units, PRN  heparin (porcine), 2,000 Units, PRN  sodium chloride flush, 10 mL, PRN  sodium chloride flush, 10 mL, PRN  acetaminophen, 650 mg, Q6H PRN    Or  acetaminophen, 650 mg, Q6H PRN  polyethylene glycol, 17 g, Daily PRN  potassium chloride, 40 mEq, PRN    Or  potassium alternative oral replacement, 40 mEq, PRN    Or  potassium chloride, 10 mEq, PRN  magnesium sulfate, 2 g, PRN  promethazine, 12.5 mg, Q6H PRN    Or  ondansetron, 4 mg, Q6H PRN  glucose, 15 g, PRN  dextrose, 12.5 g, PRN  glucagon (rDNA), 1 mg, PRN  dextrose, 100 mL/hr, PRN        Diagnostic Labs:  CBC:   Recent Labs     11/07/20  0606 11/08/20 0628 11/09/20  0531   WBC 6.0 6.0 6.5   RBC 2.71* 3.10* 3.07*   HGB 8.1* 9.3* 9.2*   HCT 27.0* 30.4* 29.5*   MCV 99.6 98.1 96.1   RDW 13.7 13.2 13.2    167 164     BMP:   Recent Labs     11/07/20  0606 11/08/20  0628 11/09/20  0531    135 136   K 3.6* 4.0 3.9    99 101   CO2 27 28 27   BUN 13 16 15 CREATININE 0.81 0.92* 0.94*         ASSESSMENT & PLAN     Acute on chronic diastolic CHF (HCC)  -Resume spironolactone 25 mg daily  -Strict I/Os record  -Cardiology want to do cath.     Hypertensive urgency: controlled   -Nitro glycerin infusion stopped  -Home antihypertensive medications resumed     Insulin-dependent diabetes mellitus complicated by peripheral neuropathy: stable   -Low-dose sliding scale     Coronary artery disease s/p stents  -Continue aspirin  -Troponin's remains flat  -EKG does not show ischemia     Pulmonary embolism  -History of pulmonary embolism  -Continue rivaroxaban 20 mg daily     Peripheral neuropathy due to diabetes mellitus  -Continue gabapentin 600 mg 3 times a day    Musculoskeletal chest pain  -troponin's are negative  -analgesics    Discharge planning: ongoing        DVT ppx: Already on anticoagulant  GI ppx: Pantoprazole     PT/OT/SW: Ongoing      Akhil Ortiz MD  Internal Medicine Resident, PGY-2  [address], [place]  11/9/2020, 08:44 am      Attending Physician Statement  I have discussed the case, including pertinent history and exam findings with the resident and the team.  I have seen and examined the patient and the key elements of the encounter have been performed by me. I agree with the assessment, plan and orders as documented by the resident.       Vital stable  No issues overnight  Cath today  Discharge today or tomorrow    Edgar Edgar MD   Attending Physician, Internal Medicine Residency Program  11/9/2020, 1:19 PM

## 2020-11-09 NOTE — CONSULTS
Allergies   Allergen Reactions    Penicillins      Other reaction(s): Hives    Sulfa Antibiotics      Other reaction(s): Rash     Family History   Problem Relation Age of Onset    Cancer Mother     Cancer Father      Social History     Socioeconomic History    Marital status:      Spouse name: Not on file    Number of children: Not on file    Years of education: Not on file    Highest education level: Not on file   Occupational History    Not on file   Social Needs    Financial resource strain: Not on file    Food insecurity     Worry: Not on file     Inability: Not on file    Transportation needs     Medical: Not on file     Non-medical: Not on file   Tobacco Use    Smoking status: Never Smoker    Smokeless tobacco: Never Used   Substance and Sexual Activity    Alcohol use: No    Drug use: No    Sexual activity: Never   Lifestyle    Physical activity     Days per week: Not on file     Minutes per session: Not on file    Stress: Not on file   Relationships    Social connections     Talks on phone: Not on file     Gets together: Not on file     Attends Voodoo service: Not on file     Active member of club or organization: Not on file     Attends meetings of clubs or organizations: Not on file     Relationship status: Not on file    Intimate partner violence     Fear of current or ex partner: Not on file     Emotionally abused: Not on file     Physically abused: Not on file     Forced sexual activity: Not on file   Other Topics Concern    Not on file   Social History Narrative    Not on file       Current Facility-Administered Medications   Medication Dose Route Frequency Provider Last Rate Last Dose    heparin (porcine) injection 4,000 Units  4,000 Units Intravenous PRAMINA Torres MD        heparin (porcine) injection 2,000 Units  2,000 Units Intravenous PRN Yandy Torres MD        heparin 25,000 units in dextrose 5% 250 mL infusion  9.79 Units/kg/hr Intravenous Continuous Echo Rivas MD 10 mL/hr at 11/09/20 0816 9.79 Units/kg/hr at 11/09/20 0816    [Held by provider] lisinopril (PRINIVIL;ZESTRIL) tablet 10 mg  10 mg Oral Daily Cesar Tran MD   10 mg at 11/08/20 0850    NIFEdipine (ADALAT CC) extended release tablet 60 mg  60 mg Oral Daily Cesar Tran MD   60 mg at 11/09/20 0817    influenza quadrivalent split vaccine (FLUZONE;FLUARIX;FLULAVAL;AFLURIA) injection 0.5 mL  0.5 mL Intramuscular Prior to discharge Edgar Mullins MD        insulin glargine (LANTUS) injection vial 5 Units  5 Units Subcutaneous Nightly Cesar Tran MD   5 Units at 11/08/20 2136    sodium chloride flush 0.9 % injection 10 mL  10 mL Intravenous 2 times per day Pete Perone, DO        sodium chloride flush 0.9 % injection 10 mL  10 mL Intravenous PRN Pete Perone, DO   10 mL at 11/06/20 1245    [Held by provider] rivaroxaban (XARELTO) tablet 20 mg  20 mg Oral Daily with breakfast Pete Perone, DO   20 mg at 11/07/20 1022    sodium chloride flush 0.9 % injection 10 mL  10 mL Intravenous 2 times per day Cesar Tran MD   10 mL at 11/08/20 0850    sodium chloride flush 0.9 % injection 10 mL  10 mL Intravenous PRN Cesar Tran MD        acetaminophen (TYLENOL) tablet 650 mg  650 mg Oral Q6H PRN Cesar Tran MD        Or   Dorene Me acetaminophen (TYLENOL) suppository 650 mg  650 mg Rectal Q6H PRN Cesar Tran MD        polyethylene glycol (GLYCOLAX) packet 17 g  17 g Oral Daily PRN Cesar Tran MD        potassium chloride (KLOR-CON M) extended release tablet 40 mEq  40 mEq Oral PRN Cesar Tran MD        Or    potassium bicarb-citric acid (EFFER-K) effervescent tablet 40 mEq  40 mEq Oral PRN Cesar Tran MD        Or    potassium chloride 10 mEq/100 mL IVPB (Peripheral Line)  10 mEq Intravenous PRN Cesar Tran MD        magnesium sulfate 2 g in 50 mL IVPB premix  2 g Intravenous PRN Cesar Tran MD        promethazine (PHENERGAN) tablet 12.5 mg  12.5 mg Oral Q6H PRN Yung Pop Cristobal Wagoner MD        Or    ondansetron [facility]) injection 4 mg  4 mg Intravenous Q6H PRN Omar Blanco MD        aspirin EC tablet 81 mg  81 mg Oral Daily Omar Blanco MD   81 mg at 11/09/20 0817    [Held by provider] clonazePAM [facility]) tablet 0.5 mg  0.5 mg Oral Nightly Omra Blanco MD   0.5 mg at 11/07/20 2212    escitalopram (LEXAPRO) tablet 20 mg  20 mg Oral Daily Omar Blanco MD   20 mg at 11/09/20 0817    [Held by provider] gabapentin (NEURONTIN) tablet 600 mg  600 mg Oral TID Omar Blanco MD   600 mg at 11/08/20 0850    [Held by provider] isosorbide mononitrate (IMDUR) extended release tablet 30 mg  30 mg Oral Daily Omar Blanco MD   30 mg at 11/08/20 0850    [Held by provider] carvedilol (COREG) tablet 25 mg  25 mg Oral BID  Omar Blanco MD   25 mg at 11/08/20 0850    pantoprazole (PROTONIX) tablet 40 mg  40 mg Oral QAM AC Omra Blanco MD   40 mg at 11/08/20 0602    rosuvastatin (CRESTOR) tablet 40 mg  40 mg Oral Daily Omar Blanco MD   40 mg at 11/09/20 0817    insulin lispro (HUMALOG) injection vial 0-6 Units  0-6 Units Subcutaneous TID  Omar Blanco MD   2 Units at 11/08/20 1636    insulin lispro (HUMALOG) injection vial 0-3 Units  0-3 Units Subcutaneous Nightly Omar Blanco MD   1 Units at 11/08/20 2137    glucose (GLUTOSE) 40 % oral gel 15 g  15 g Oral PRN Omar Blanco MD        dextrose 50 % IV solution  12.5 g Intravenous PRN Omar Blanco MD        glucagon (rDNA) injection 1 mg  1 mg Intramuscular PRN Omar Blanco MD        dextrose 5 % solution  100 mL/hr Intravenous PRN Omar Blanco MD        [Held by provider] spironolactone (ALDACTONE) tablet 25 mg  25 mg Oral Daily Jim Bond MD   25 mg at 11/08/20 0850       Physical Exam:  Vitals:    11/09/20 1300   BP: 132/71   Pulse: 115   Resp: 18   Temp:    SpO2: 95%     Weight: Weight: 246 lb 14.6 oz (112 kg)    Weight: 225 lb (102.1 kg)        General: Alert and Oriented x3. Sitting up in bed.  No apparent distress. HEENT:  Normocephalic and atraumatic. PERRL. EOMI. Lips and oral mucosa moist and without lesions. Neck:  Supple. Trachea midline. Chest:  No abnormality. Equal and symmetric expansion with respiration. Lungs:  Clear to auscultation. Cardiac:  Regular rate and rhythm without murmurs, rubs or gallops. Abdomen:  Soft, non-tender, normoactive bowel sounds. No masses or organomegaly. Extremities:  No cyanosis, clubbing, or edema. Intact pulses in all four extremities. Musculoskeletal:  Intact range of motion of peripheral joints. Normal muscular strength. Neurologic:  Cranial nerves are grossly intact. Non-focal sensory deficits on exam.  Psychiatric: Mood and affect are appropriate. Imaging Studies:    Cardiac Cath:  LMCA: Normal 0% stenosis.     LAD: Proximal 75% stenosis  Mid area and distal 90% stenosis  D's are small     LCx: Mid 50% stenosis  OM1: Proximal 80% stenosis     RCA: Mid stent area 4050% stenosis  PDA: Ostial 80% stenosis  PL branch with proximal 80% stenosis    Echo:     Summary  Left ventricle is normal in size, mildly increased wall thickness, global  left ventricular systolic function is normal, calculated ejection fraction  is 58%. Grade III (severe) left ventricular diastolic dysfunction. Left atrium is moderately dilated. Inter-atrial septum is intact with no evidence for an atrial septal defect. Right atrium is moderately dilated . Normal right ventricular size and function. Mild mitral regurgitation. Tricuspid valve was not well visualized. Mild tricuspid regurgitation. Estimated right ventricular systolic pressure is 55.9 mmHg.       Assessment & Plan:  Patient Active Problem List   Diagnosis    Atypical chest pain    Type 2 diabetes mellitus with diabetic polyneuropathy, with long-term current use of insulin (HCC)    Vertigo    Essential hypertension    CAD (coronary artery disease)    Dyspnea    Claudication in peripheral vascular disease [facility] [address]    Acute pulmonary embolism without acute cor pulmonale (HCC)    Diabetic polyneuropathy associated with type 2 diabetes mellitus (HCC)    Other hyperlipidemia    Chronic systolic heart failure (HCC)    Multiple subsegmental pulmonary emboli without acute cor pulmonale (HCC)    Congestive heart failure (HCC)    Pyelonephritis of right kidney    History of pulmonary embolism    Elevated troponin I level    Sepsis (HCC)    Suspected COVID-19 virus infection    MARIELLE [facility]acute kidney injury) ([facility] [address]    Diarrhea of presumed infectious origin    Chronic diastolic (congestive) heart failure (HCC)    Generalized weakness    Anemia, normocytic normochromic    Class 1 obesity in adult    Gastroenteritis    Acute on chronic diastolic CHF (congestive heart failure) (HCC)    Acute diastolic CHF (congestive heart failure) (HCC)       PLAN  We will continue to round on patient and decide if she is a candidate for CABG surgery   Please complete preop work up. Please refrain from using any antiplatelet or anticoagulant other than heparin    Agree with above  Pt with chest pain and mild SOB. Able to walk only short distances with walker. VSS afebrile    HRT- RRR  Lungs - CTA  Abd - benign  Ext - mild edema    CAD, Chronic A.fib and PE    Multiple stents with poor distal targets  Poor rehab potential  Would plan for medical managagement or possible PCI  Risk, benefits, and the intentions were discussed with the patient.         Nash Parrish CNP  Phone: [phone]

## 2020-11-09 NOTE — PLAN OF CARE
Problem: Falls - Risk of:  Goal: Will remain free from falls  Description: Will remain free from falls  Outcome: Ongoing  Goal: Absence of physical injury  Description: Absence of physical injury  Outcome: Ongoing     Problem:  Activity:  Goal: Capacity to carry out activities will improve  Description: Capacity to carry out activities will improve  Outcome: Ongoing  Goal: Will verbalize the importance of balancing activity with adequate rest periods  Description: Will verbalize the importance of balancing activity with adequate rest periods  Outcome: Ongoing     Problem: Fluid Volume:  Goal: Risk for excess fluid volume will decrease  Description: Risk for excess fluid volume will decrease  Outcome: Ongoing  Goal: Maintenance of adequate hydration will improve  Description: Maintenance of adequate hydration will improve  Outcome: Ongoing  Goal: Will show no signs and symptoms of electrolyte imbalance  Description: Will show no signs and symptoms of electrolyte imbalance  Outcome: Ongoing     Problem: Respiratory:  Goal: Ability to maintain adequate ventilation will improve  Description: Ability to maintain adequate ventilation will improve  Outcome: Ongoing  Goal: Respiratory status will improve  Description: Respiratory status will improve  Outcome: Ongoing

## 2020-11-10 LAB
ABSOLUTE EOS #: 0.2 K/UL (ref 0–0.44)
ABSOLUTE IMMATURE GRANULOCYTE: <0.03 K/UL (ref 0–0.3)
ABSOLUTE LYMPH #: 1.84 K/UL (ref 1.1–3.7)
ABSOLUTE MONO #: 0.6 K/UL (ref 0.1–1.2)
ALBUMIN SERPL-MCNC: 3.8 G/DL (ref 3.5–5.2)
ALBUMIN/GLOBULIN RATIO: 1.3 (ref 1–2.5)
ALP BLD-CCNC: 87 U/L (ref 35–104)
ALT SERPL-CCNC: 9 U/L (ref 5–33)
ANION GAP SERPL CALCULATED.3IONS-SCNC: 12 MMOL/L (ref 9–17)
AST SERPL-CCNC: 10 U/L
BASOPHILS # BLD: 1 % (ref 0–2)
BASOPHILS ABSOLUTE: 0.04 K/UL (ref 0–0.2)
BILIRUB SERPL-MCNC: 0.44 MG/DL (ref 0.3–1.2)
BUN BLDV-MCNC: 7 MG/DL (ref 8–23)
BUN/CREAT BLD: ABNORMAL (ref 9–20)
CALCIUM SERPL-MCNC: 9.5 MG/DL (ref 8.6–10.4)
CHLORIDE BLD-SCNC: 102 MMOL/L (ref 98–107)
CO2: 26 MMOL/L (ref 20–31)
CREAT SERPL-MCNC: 0.73 MG/DL (ref 0.5–0.9)
DIFFERENTIAL TYPE: NORMAL
EKG ATRIAL RATE: 127 BPM
EKG Q-T INTERVAL: 336 MS
EKG QRS DURATION: 84 MS
EKG QTC CALCULATION (BAZETT): 492 MS
EKG R AXIS: -9 DEGREES
EKG T AXIS: -161 DEGREES
EKG VENTRICULAR RATE: 129 BPM
EOSINOPHILS RELATIVE PERCENT: 3 % (ref 1–4)
ESTIMATED AVERAGE GLUCOSE: 189 MG/DL
GFR AFRICAN AMERICAN: >60 ML/MIN
GFR NON-AFRICAN AMERICAN: >60 ML/MIN
GFR SERPL CREATININE-BSD FRML MDRD: ABNORMAL ML/MIN/{1.73_M2}
GFR SERPL CREATININE-BSD FRML MDRD: ABNORMAL ML/MIN/{1.73_M2}
GLUCOSE BLD-MCNC: 194 MG/DL (ref 65–105)
GLUCOSE BLD-MCNC: 195 MG/DL (ref 70–99)
GLUCOSE BLD-MCNC: 205 MG/DL (ref 65–105)
GLUCOSE BLD-MCNC: 214 MG/DL (ref 65–105)
HBA1C MFR BLD: 8.2 % (ref 4–6)
HCT VFR BLD CALC: 37.2 % (ref 36.3–47.1)
HEMOGLOBIN: 11.9 G/DL (ref 11.9–15.1)
IMMATURE GRANULOCYTES: 0 %
LYMPHOCYTES # BLD: 31 % (ref 24–43)
MAGNESIUM: 2 MG/DL (ref 1.6–2.6)
MCH RBC QN AUTO: 29.8 PG (ref 25.2–33.5)
MCHC RBC AUTO-ENTMCNC: 32 G/DL (ref 28.4–34.8)
MCV RBC AUTO: 93.2 FL (ref 82.6–102.9)
MONOCYTES # BLD: 10 % (ref 3–12)
NRBC AUTOMATED: 0 PER 100 WBC
PARTIAL THROMBOPLASTIN TIME: 63.7 SEC (ref 20.5–30.5)
PARTIAL THROMBOPLASTIN TIME: 66.3 SEC (ref 20.5–30.5)
PARTIAL THROMBOPLASTIN TIME: 71.8 SEC (ref 20.5–30.5)
PARTIAL THROMBOPLASTIN TIME: 75.8 SEC (ref 20.5–30.5)
PDW BLD-RTO: 13 % (ref 11.8–14.4)
PLATELET # BLD: 203 K/UL (ref 138–453)
PLATELET ESTIMATE: NORMAL
PMV BLD AUTO: 10.7 FL (ref 8.1–13.5)
POTASSIUM SERPL-SCNC: 3.8 MMOL/L (ref 3.7–5.3)
RBC # BLD: 3.99 M/UL (ref 3.95–5.11)
RBC # BLD: NORMAL 10*6/UL
SEG NEUTROPHILS: 55 % (ref 36–65)
SEGMENTED NEUTROPHILS ABSOLUTE COUNT: 3.24 K/UL (ref 1.5–8.1)
SODIUM BLD-SCNC: 140 MMOL/L (ref 135–144)
TOTAL PROTEIN: 6.7 G/DL (ref 6.4–8.3)
WBC # BLD: 5.9 K/UL (ref 3.5–11.3)
WBC # BLD: NORMAL 10*3/UL

## 2020-11-10 PROCEDURE — 83735 ASSAY OF MAGNESIUM: CPT

## 2020-11-10 PROCEDURE — 93010 ELECTROCARDIOGRAM REPORT: CPT | Performed by: INTERNAL MEDICINE

## 2020-11-10 PROCEDURE — 85025 COMPLETE CBC W/AUTO DIFF WBC: CPT

## 2020-11-10 PROCEDURE — 6370000000 HC RX 637 (ALT 250 FOR IP): Performed by: STUDENT IN AN ORGANIZED HEALTH CARE EDUCATION/TRAINING PROGRAM

## 2020-11-10 PROCEDURE — 6360000002 HC RX W HCPCS: Performed by: STUDENT IN AN ORGANIZED HEALTH CARE EDUCATION/TRAINING PROGRAM

## 2020-11-10 PROCEDURE — 99232 SBSQ HOSP IP/OBS MODERATE 35: CPT | Performed by: INTERNAL MEDICINE

## 2020-11-10 PROCEDURE — 2060000000 HC ICU INTERMEDIATE R&B

## 2020-11-10 PROCEDURE — 80053 COMPREHEN METABOLIC PANEL: CPT

## 2020-11-10 PROCEDURE — 93005 ELECTROCARDIOGRAM TRACING: CPT | Performed by: INTERNAL MEDICINE

## 2020-11-10 PROCEDURE — APPSS30 APP SPLIT SHARED TIME 16-30 MINUTES: Performed by: NURSE PRACTITIONER

## 2020-11-10 PROCEDURE — 85730 THROMBOPLASTIN TIME PARTIAL: CPT

## 2020-11-10 PROCEDURE — 36415 COLL VENOUS BLD VENIPUNCTURE: CPT

## 2020-11-10 PROCEDURE — 82947 ASSAY GLUCOSE BLOOD QUANT: CPT

## 2020-11-10 PROCEDURE — 2500000003 HC RX 250 WO HCPCS: Performed by: STUDENT IN AN ORGANIZED HEALTH CARE EDUCATION/TRAINING PROGRAM

## 2020-11-10 PROCEDURE — 2580000003 HC RX 258: Performed by: STUDENT IN AN ORGANIZED HEALTH CARE EDUCATION/TRAINING PROGRAM

## 2020-11-10 RX ADMIN — PANTOPRAZOLE SODIUM 40 MG: 40 TABLET, DELAYED RELEASE ORAL at 07:15

## 2020-11-10 RX ADMIN — AMIODARONE HYDROCHLORIDE 150 MG: 50 INJECTION, SOLUTION INTRAVENOUS at 12:33

## 2020-11-10 RX ADMIN — INSULIN LISPRO 2 UNITS: 100 INJECTION, SOLUTION INTRAVENOUS; SUBCUTANEOUS at 20:34

## 2020-11-10 RX ADMIN — INSULIN GLARGINE 5 UNITS: 100 INJECTION, SOLUTION SUBCUTANEOUS at 01:30

## 2020-11-10 RX ADMIN — Medication 10 MG: at 06:41

## 2020-11-10 RX ADMIN — NIFEDIPINE 60 MG: 60 TABLET, EXTENDED RELEASE ORAL at 10:16

## 2020-11-10 RX ADMIN — SODIUM CHLORIDE, PRESERVATIVE FREE 10 ML: 5 INJECTION INTRAVENOUS at 20:42

## 2020-11-10 RX ADMIN — SODIUM CHLORIDE, PRESERVATIVE FREE 10 ML: 5 INJECTION INTRAVENOUS at 01:34

## 2020-11-10 RX ADMIN — AMIODARONE HYDROCHLORIDE 1 MG/MIN: 50 INJECTION, SOLUTION INTRAVENOUS at 12:57

## 2020-11-10 RX ADMIN — INSULIN LISPRO 2 UNITS: 100 INJECTION, SOLUTION INTRAVENOUS; SUBCUTANEOUS at 09:00

## 2020-11-10 RX ADMIN — INSULIN LISPRO 1 UNITS: 100 INJECTION, SOLUTION INTRAVENOUS; SUBCUTANEOUS at 12:58

## 2020-11-10 RX ADMIN — ESCITALOPRAM OXALATE 20 MG: 10 TABLET ORAL at 14:00

## 2020-11-10 RX ADMIN — INSULIN GLARGINE 5 UNITS: 100 INJECTION, SOLUTION SUBCUTANEOUS at 22:01

## 2020-11-10 RX ADMIN — CARVEDILOL 25 MG: 12.5 TABLET, FILM COATED ORAL at 17:00

## 2020-11-10 RX ADMIN — HEPARIN SODIUM 2000 UNITS: 1000 INJECTION INTRAVENOUS; SUBCUTANEOUS at 00:05

## 2020-11-10 RX ADMIN — ASPIRIN 81 MG: 81 TABLET, COATED ORAL at 08:00

## 2020-11-10 RX ADMIN — HEPARIN SODIUM 11.75 UNITS/KG/HR: 10000 INJECTION, SOLUTION INTRAVENOUS at 15:41

## 2020-11-10 RX ADMIN — HEPARIN SODIUM 12 ML/HR: 10000 INJECTION, SOLUTION INTRAVENOUS at 00:05

## 2020-11-10 RX ADMIN — ISOSORBIDE MONONITRATE 30 MG: 30 TABLET ORAL at 09:30

## 2020-11-10 RX ADMIN — PROMETHAZINE HYDROCHLORIDE 12.5 MG: 12.5 TABLET ORAL at 20:42

## 2020-11-10 RX ADMIN — CARVEDILOL 25 MG: 12.5 TABLET, FILM COATED ORAL at 08:00

## 2020-11-10 ASSESSMENT — PAIN SCALES - GENERAL
PAINLEVEL_OUTOF10: 0
PAINLEVEL_OUTOF10: 0

## 2020-11-10 NOTE — PROGRESS NOTES
[facility]   Progress Note                    Date:   11/10/2020  Patient name:  Krysten Portillo  Date of admission:  11/6/2020  8:28 AM  MRN:   [id number]  YOB: 1945  PCP:    Melvin Millan MD    Reason for Admission:  Acute on chronic diastolic CHF (congestive heart failure) ([facility] [address] [I50.33]  Acute on chronic diastolic CHF (congestive heart failure) ([facility] [address] [H95.30]  Acute diastolic CHF (congestive heart failure) ([facility] [address] [I50.31]    Subjective:      Clinical Changes / Abnormalities:    Patient seen and examined at bedside. No acute events overnight. 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 CV Surgery consulted and pre-op work up initiated. Being continued on heparin gtt. Reports no specific complaints this morning. States she is not having any chest pain or shortness of breath.       Urine output in the last 24 hours:     Intake/Output Summary (Last 24 hours) at 11/10/2020 0844  Last data filed at 11/10/2020 0445  Gross per 24 hour   Intake --   Output 1525 ml   Net -1525 ml     I/O since admission: -869 cc        Medications:   Scheduled Meds:   sodium chloride flush  10 mL Intravenous 2 times per day    [Held by provider] lisinopril  10 mg Oral Daily    NIFEdipine  60 mg Oral Daily    influenza virus vaccine  0.5 mL Intramuscular Prior to discharge    insulin glargine  5 Units Subcutaneous Nightly    sodium chloride flush  10 mL Intravenous 2 times per day    [Held by provider] rivaroxaban  20 mg Oral Daily with breakfast    sodium chloride flush  10 mL Intravenous 2 times per day    aspirin  81 mg Oral Daily    [Held by provider] clonazePAM  0.5 mg Oral Nightly    escitalopram  20 mg Oral Daily    [Held by provider] gabapentin  600 mg Oral TID    isosorbide mononitrate  30 mg Oral Daily    carvedilol  25 mg Oral BID WC    pantoprazole  40 mg Oral QAM AC    rosuvastatin  40 mg Oral Daily    insulin lispro  0-6 Units Subcutaneous TID WC    insulin lispro  0-3 Units Subcutaneous Nightly    [Held by provider] spironolactone  25 mg Oral Daily     Continuous Infusions:   heparin (PORCINE) Infusion 12 mL/hr (11/10/20 0005)    dextrose       CBC:   Recent Labs     11/08/20  0628 11/09/20  0531 11/10/20  0514   WBC 6.0 6.5 5.9   HGB 9.3* 9.2* 11.9    164 203     BMP:    Recent Labs     11/08/20  0628 11/09/20  0531 11/10/20  0514    136 140   K 4.0 3.9 3.8   CL 99 101 102   CO2 28 27 26   BUN 16 15 7*   CREATININE 0.92* 0.94* 0.73   GLUCOSE 174* 160* 195*     Hepatic:   Recent Labs     11/10/20  0514   AST 10   ALT 9   BILITOT 0.44   ALKPHOS 87     Troponin: No results for input(s): TROPONINI in the last 72 hours. Recent Labs     11/07/20  1425 11/07/20  1950 11/08/20  0041   TROPONINT NOT REPORTED NOT REPORTED NOT REPORTED     BNP: No results for input(s): PROBNP in the last 72 hours. No results for input(s): BNP in the last 72 hours. Lipids: No results for input(s): CHOL, HDL in the last 72 hours. Invalid input(s): LDLCALCU  INR: No results for input(s): INR in the last 72 hours. Objective:   Vitals: BP (!) 120/108   Pulse 109   Temp 98.4 °F (36.9 °C) (Oral)   Resp 16   Ht 5' 6\" (1.676 m)   Wt 254 lb (115.2 kg)   SpO2 96%   BMI 41.00 kg/m²      Constitutional and General Appearance:    alert, cooperative, no distress and appears stated age  Respiratory:  · No for increased work of breathing. · On auscultation: clear to auscultation bilaterally  Cardiovascular:  · The apical impulse is not displaced  · Heart tones are crisp and normal. Regular S1 and S2. No murmurs. Abdomen:   · No masses or tenderness  · Bowel sounds present  Extremities:  ·  No Cyanosis or Clubbing  ·  Lower extremity edema: No  ·  Skin: Warm and dry  Neurological:  · Alert and oriented. · Moves all extremities well    Diagnostic Studies:     EKG: normal sinus rhythm, unchanged from previous tracings. ECHO: reviewed. Ejection fraction: 50%  Stress Test: reviewed. 07/12/20 : Negative Lexiscan stress test.  Cardiac Angiography: reviewed. 02/25/17  Multi-vessel coronary artery disease.   Normal LV contractility.   Successful PCI / Drug Eluting Stent of the proximal Posterolateral Coronary Artery.   Successful PCI / Drug Eluting Stent of the mid Right Coronary Artery.   Successful PCI / Drug Eluting Stent of the proximal Posterior Descending Coronary Artery.   Successful PCI / Drug Eluting Stent of the distal Left Anterior Descending Coronary Artery  Cardiac Angiography: 11/09/2020  LMCA: Normal 0% stenosis. LAD: Proximal 75% stenosis. Mid area and distal 90% stenosis. D's are small   LCx: Mid 50% stenosis. OM1: Proximal 80% stenosis   RCA: Mid stent area 40-50% stenosis. PDA: Ostial 80% stenosis. PL branch with proximal 80% stenosis. The LV gram was performed in the MOREL 30 position. LVEF: 55 %.      Conclusions:  Multivessel CAD   Preserved LV function      Recommendations:  1. Post-cath protocol  2. CV surgery  Consult   3. Continue optimal medical therapy  4. Risk factor modification    Assessment / Acute Cardiac Problems:   1. MV-CAD: Cath report listed above  2. T2DM  3. HTN  4. HLD  5. Grade III Diastolic dysfunction  6. Hx of PE: on Xarelto (Last dose taken Friday, 11/06/2020)    Plan of Treatment:   1. CV Surgery consulted, appreciate recommendations. Pre-op work-up initiated  2. Continue heparin gtt  3. Continue ASA & Crestor  4. Continue nifedipine 90 mg once daily  5. Restarted home dose of co-reg 25 BID  6. Restarted home dose of imdur 30 mg once daily  7. Will continue to hold ACEi & sprinolactone while being worked up for CABG  8. K>4, Mg>2    Caridad Santizo MD  Fellow, [id number] [facility]      Attending note,   The patient was seen and examined, agree with above, chest pain free and hemodynamically stable. On IV heparin. ECG shows Atrial fibrillation.  Will start IV

## 2020-11-10 NOTE — PROGRESS NOTES
[facility]  Progress Note    11/10/2020 9:35 AM  Preop     Subjective:  Ms. Jake Humphreys   Resting in bed comfortably with no chest pain or shortness of breath. Patient states she ambulates at home on her own without any complications.   Patient talked to about reviewing her films and we are still in discussion for potential plan    Objective:  BP (!) 120/108   Pulse 109   Temp 98.4 °F (36.9 °C) (Oral)   Resp 16   Ht 5' 6\" (1.676 m)   Wt 254 lb (115.2 kg)   SpO2 96%   BMI 41.00 kg/m²     CV: RVR A. fib rate around 115  Lungs: clear to auscultation, no wheezes, rales, or rhonchi  Abd: normal bowel sounds   Lower Extremities: Trace edema    Labs:   CBC:   Recent Labs     11/08/20  0628 11/09/20  0531 11/10/20  0514   WBC 6.0 6.5 5.9   HGB 9.3* 9.2* 11.9   HCT 30.4* 29.5* 37.2   MCV 98.1 96.1 93.2    164 203     BMP:   Recent Labs     11/08/20 0628 11/09/20 0531 11/10/20  0514    136 140   K 4.0 3.9 3.8   CL 99 101 102   CO2 28 27 26   BUN 16 15 7*   CREATININE 0.92* 0.94* 0.73       I/O: I/O last 3 completed shifts:  In: -   Out: 1525 [Urine:1525]  Scheduled Meds:   sodium chloride flush  10 mL Intravenous 2 times per day    [Held by provider] lisinopril  10 mg Oral Daily    NIFEdipine  60 mg Oral Daily    influenza virus vaccine  0.5 mL Intramuscular Prior to discharge    insulin glargine  5 Units Subcutaneous Nightly    sodium chloride flush  10 mL Intravenous 2 times per day    [Held by provider] rivaroxaban  20 mg Oral Daily with breakfast    sodium chloride flush  10 mL Intravenous 2 times per day    aspirin  81 mg Oral Daily    [Held by provider] clonazePAM  0.5 mg Oral Nightly    escitalopram  20 mg Oral Daily    [Held by provider] gabapentin  600 mg Oral TID    isosorbide mononitrate  30 mg Oral Daily    carvedilol  25 mg Oral BID WC    pantoprazole  40 mg Oral QAM AC    rosuvastatin  40 mg Oral Daily    insulin lispro  0-6 Units Subcutaneous TID WC   

## 2020-11-10 NOTE — CARE COORDINATION
Transitional planning. Spoke with pt and she said she is planning on going home with her grandson who lives with her and other family support with [facility]. [facility] Family requesting CM and was very concerned over the visitation policy due to the pt having 5 children and they all want to se her and vice versa. Support and understanding given. Wanted to know if I did POA papers and I told them [facility] will do that. They then added somebody already called them. [facility] visits. Family also wanted to speak with MD. Rik Joyce, bedside nurse already aware.  is across the street momentarily. [address] [facility] intake called per writer and left a VM about acceptence or to clarify if pt is current with them.

## 2020-11-10 NOTE — PLAN OF CARE
Problem: Falls - Risk of:  Goal: Will remain free from falls  Description: Will remain free from falls  Outcome: Ongoing  Goal: Absence of physical injury  Description: Absence of physical injury  Outcome: Ongoing     Problem: OXYGENATION/RESPIRATORY FUNCTION  Goal: Patient will maintain patent airway  Outcome: Ongoing  Goal: Patient will achieve/maintain normal respiratory rate/effort  Description: Respiratory rate and effort will be within normal limits for the patient  Outcome: Ongoing     Problem:  Activity:  Goal: Capacity to carry out activities will improve  Description: Capacity to carry out activities will improve  Outcome: Ongoing  Goal: Will verbalize the importance of balancing activity with adequate rest periods  Description: Will verbalize the importance of balancing activity with adequate rest periods  Outcome: Ongoing     Problem: Cardiac:  Goal: Hemodynamic stability will improve  Description: Hemodynamic stability will improve  Outcome: Ongoing  Goal: Ability to maintain an adequate cardiac output will improve  Description: Ability to maintain an adequate cardiac output will improve  Outcome: Ongoing     Problem: Coping:  Goal: Verbalizations of decreased anxiety will decrease  Description: Verbalizations of decreased anxiety will decrease  Outcome: Ongoing     Problem: Fluid Volume:  Goal: Risk for excess fluid volume will decrease  Description: Risk for excess fluid volume will decrease  Outcome: Ongoing  Goal: Maintenance of adequate hydration will improve  Description: Maintenance of adequate hydration will improve  Outcome: Ongoing  Goal: Will show no signs and symptoms of electrolyte imbalance  Description: Will show no signs and symptoms of electrolyte imbalance  Outcome: Ongoing     Problem: Health Behavior:  Goal: Ability to manage health-related needs will improve  Description: Ability to manage health-related needs will improve  Outcome: Ongoing  Goal: Ability to seek appropriate health care will improve  Description: Ability to seek appropriate health care will improve  Outcome: Ongoing     Problem: Nutritional:  Goal: Maintenance of adequate nutrition will improve  Description: Maintenance of adequate nutrition will improve  Outcome: Ongoing     Problem: Physical Regulation:  Goal: Complications related to the disease process, condition or treatment will be avoided or minimized  Description: Complications related to the disease process, condition or treatment will be avoided or minimized  Outcome: Ongoing     Problem: Respiratory:  Goal: Ability to maintain adequate ventilation will improve  Description: Ability to maintain adequate ventilation will improve  Outcome: Ongoing  Goal: Respiratory status will improve  Description: Respiratory status will improve  Outcome: Ongoing     Problem: Skin Integrity:  Goal: Will show no infection signs and symptoms  Description: Will show no infection signs and symptoms  Outcome: Ongoing  Goal: Absence of new skin breakdown  Description: Absence of new skin breakdown  Outcome: Ongoing

## 2020-11-10 NOTE — PROGRESS NOTES
[facility]  Internal Medicine Teaching Residency Program  Inpatient Daily Progress Note  ______________________________________________________________________________    Patient: Cielo Diaz  YOB: 1945   ZNP:[id number]    Acct: [de-identified]     Room: [facility]-01  Admit date: 11/6/2020  Today's date: 11/10/20  Number of days in the hospital: 4    SUBJECTIVE   Admitting Diagnosis: Acute on chronic diastolic heart failure  CC: Shortness of breath    Patient seen and examined. Patient Is feeling better and had no acute issues over night. Her shortness of breath has improved. ROS:  Constitutional: Positive for activity change and fatigue. Negative for diaphoresis and fever. HENT:Negative for congestion, drooling, facial swelling and sinus pain. Eyes: Negative for photophobia, discharge and itching. Respiratory: Positive for shortness of breath. Negative for apnea and wheezing. Cardiovascular: Positive for leg swelling. Negative for chest pain and palpitations. Gastrointestinal: Negative for abdominal distention, anal bleeding, diarrhea, nausea, rectal pain and vomiting. Endocrine: Negative for polydipsia and polyphagia. Genitourinary: Negative for dysuria, enuresis and pelvic pain. Musculoskeletal: Negative for arthralgias, gait problem and myalgias. Neurological: Negative for seizures, syncope and facial asymmetry. Psychiatric/Behavioral: Negative for agitation and hallucinations. The patient is not hyperactive. BRIEF HISTORY   The patient is a pleasant 76 y.o. female with background history of diastolic heart failure presents with a chief complaint of shortness of breath which started about 3 days ago and it is getting worse slowly. According to the patient she started to have shortness of breath started about 2 days ago.   It is gradually worsening and now even she is feeling short of breath at rest.  Patient also reported orthopnea and paroxysmal nocturnal dyspnea along with ankle swelling. Patient says that she is having cough with some yellowish phlegm but no fever or chills. No sick contacts recently. Patient is compliant with her medications, no salty foods. But patient was found to be very hypertensive on presentation to the ED. Patient was started on nitroglycerin drip and later on the patient improved. OBJECTIVE     Vital Signs:  BP (!) 120/108   Pulse 109   Temp 98.4 °F (36.9 °C) (Oral)   Resp 16   Ht 5' 6\" (1.676 m)   Wt 254 lb (115.2 kg)   SpO2 96%   BMI 41.00 kg/m²     Temp (24hrs), Av.3 °F (36.8 °C), Min:97.1 °F (36.2 °C), Max:99 °F (37.2 °C)    In: -   Out: 1525 [Urine:1525]    Physical Exam:  Constitutional: This is a well developed, well nourished, 35-39.9 - Obesity Grade II 76y.o. year old female who is alert, oriented, cooperative and in mild distress due to shortness of breath. Head:normocephalic and atraumatic. Respiratory: Bilateral basal crepitation and normal vesicular breathing in upper part of the lung  cardiovascular: Regular without murmur, clicks, gallops or rubs. Abdomen: Slightly rounded and soft without organomegaly. No rebound, rigidity or guarding was appreciated. Lymphatic: No lymphadenopathy. Musculoskeletal: Normal curvature of the spine. No gross muscle weakness. Extremities: Trace edema but no ulcerations, tenderness, varicosities or erythema. Muscle size, tone and strength are normal.  No involuntary movements are noted. Skin:  Warm and dry. Good color, turgor and pigmentation. No lesions or scars.   No cyanosis or clubbing  Neurological/Psychiatric: The patient's general behavior, level of consciousness, thought content and emotional status is normal      Medications:  Scheduled Medications:    sodium chloride flush  10 mL Intravenous 2 times per day    [Held by provider] lisinopril  10 mg Oral Daily    NIFEdipine  60 mg Oral Daily    influenza virus ASSESSMENT & PLAN     Acute on chronic diastolic CHF (HCC)  -Resume spironolactone 25 mg daily  -Strict I/Os record    Hypertensive urgency: controlled   -Nitro glycerin infusion stopped  -Home antihypertensive medications resumed     Insulin-dependent diabetes mellitus complicated by peripheral neuropathy: stable   -Low-dose sliding scale     Coronary artery disease s/p stents  -Continue aspirin and crestor  -Continue co-reg 25 mg  BID  -CRCI Index risk score is 3 and the30-day risk of death, MI, or cardiac arrest is 15 %  -Cardiac cath shows multivessel disease   -Cardiothoracic surgery consulted  -planning on doing CABG  -Pre-op work initiated.     Pulmonary embolism  -History of pulmonary embolism  -on heparin drip  -Discontinue the xarelto.     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; [facility]  11/10/2020, 08:44 am      Attending Physician Statement  I have discussed the case, including pertinent history and exam findings with the resident and the team.  I have seen and examined the patient and the key elements of the encounter have been performed by me. I agree with the assessment, plan and orders as documented by the resident.       In Brief:  Cath completed  Vital stable  Observe for bleeding    Edgar Urbano MD   Attending Physician, Internal Medicine Residency Program  11/10/2020, 3:52 PM

## 2020-11-11 ENCOUNTER — APPOINTMENT (OUTPATIENT)
Dept: CARDIAC CATH/INVASIVE PROCEDURES | Age: 75
DRG: 246 | End: 2020-11-11
Payer: MEDICARE

## 2020-11-11 ENCOUNTER — APPOINTMENT (OUTPATIENT)
Dept: ULTRASOUND IMAGING | Age: 75
DRG: 246 | End: 2020-11-11
Payer: MEDICARE

## 2020-11-11 LAB
ABSOLUTE EOS #: 0.25 K/UL (ref 0–0.44)
ABSOLUTE IMMATURE GRANULOCYTE: <0.03 K/UL (ref 0–0.3)
ABSOLUTE LYMPH #: 2.45 K/UL (ref 1.1–3.7)
ABSOLUTE MONO #: 0.71 K/UL (ref 0.1–1.2)
ANION GAP SERPL CALCULATED.3IONS-SCNC: 11 MMOL/L (ref 9–17)
BASOPHILS # BLD: 1 % (ref 0–2)
BASOPHILS ABSOLUTE: 0.05 K/UL (ref 0–0.2)
BUN BLDV-MCNC: 13 MG/DL (ref 8–23)
BUN/CREAT BLD: ABNORMAL (ref 9–20)
CALCIUM SERPL-MCNC: 9.6 MG/DL (ref 8.6–10.4)
CHLORIDE BLD-SCNC: 102 MMOL/L (ref 98–107)
CO2: 29 MMOL/L (ref 20–31)
CREAT SERPL-MCNC: 0.93 MG/DL (ref 0.5–0.9)
DIFFERENTIAL TYPE: ABNORMAL
EKG ATRIAL RATE: 52 BPM
EKG ATRIAL RATE: 72 BPM
EKG P AXIS: 50 DEGREES
EKG P AXIS: 71 DEGREES
EKG P-R INTERVAL: 158 MS
EKG P-R INTERVAL: 160 MS
EKG Q-T INTERVAL: 432 MS
EKG Q-T INTERVAL: 480 MS
EKG QRS DURATION: 86 MS
EKG QRS DURATION: 86 MS
EKG QTC CALCULATION (BAZETT): 446 MS
EKG QTC CALCULATION (BAZETT): 473 MS
EKG R AXIS: 11 DEGREES
EKG R AXIS: 15 DEGREES
EKG T AXIS: 55 DEGREES
EKG T AXIS: 9 DEGREES
EKG VENTRICULAR RATE: 52 BPM
EKG VENTRICULAR RATE: 72 BPM
EOSINOPHILS RELATIVE PERCENT: 4 % (ref 1–4)
GFR AFRICAN AMERICAN: >60 ML/MIN
GFR NON-AFRICAN AMERICAN: 59 ML/MIN
GFR SERPL CREATININE-BSD FRML MDRD: ABNORMAL ML/MIN/{1.73_M2}
GFR SERPL CREATININE-BSD FRML MDRD: ABNORMAL ML/MIN/{1.73_M2}
GLUCOSE BLD-MCNC: 142 MG/DL (ref 65–105)
GLUCOSE BLD-MCNC: 212 MG/DL (ref 70–99)
GLUCOSE BLD-MCNC: 214 MG/DL (ref 65–105)
GLUCOSE BLD-MCNC: 216 MG/DL (ref 65–105)
GLUCOSE BLD-MCNC: 228 MG/DL (ref 65–105)
GLUCOSE BLD-MCNC: 261 MG/DL (ref 65–105)
HCT VFR BLD CALC: 34 % (ref 36.3–47.1)
HEMOGLOBIN: 10.9 G/DL (ref 11.9–15.1)
IMMATURE GRANULOCYTES: 0 %
INR BLD: 1.2
LYMPHOCYTES # BLD: 34 % (ref 24–43)
MAGNESIUM: 2 MG/DL (ref 1.6–2.6)
MCH RBC QN AUTO: 29.8 PG (ref 25.2–33.5)
MCHC RBC AUTO-ENTMCNC: 32.1 G/DL (ref 28.4–34.8)
MCV RBC AUTO: 92.9 FL (ref 82.6–102.9)
MONOCYTES # BLD: 10 % (ref 3–12)
NRBC AUTOMATED: 0 PER 100 WBC
PARTIAL THROMBOPLASTIN TIME: 58.5 SEC (ref 20.5–30.5)
PDW BLD-RTO: 13.1 % (ref 11.8–14.4)
PLATELET # BLD: 201 K/UL (ref 138–453)
PLATELET ESTIMATE: ABNORMAL
PMV BLD AUTO: 10.9 FL (ref 8.1–13.5)
POTASSIUM SERPL-SCNC: 3.9 MMOL/L (ref 3.7–5.3)
PROTHROMBIN TIME: 12.1 SEC (ref 9–12)
RBC # BLD: 3.66 M/UL (ref 3.95–5.11)
RBC # BLD: ABNORMAL 10*6/UL
SEG NEUTROPHILS: 51 % (ref 36–65)
SEGMENTED NEUTROPHILS ABSOLUTE COUNT: 3.68 K/UL (ref 1.5–8.1)
SODIUM BLD-SCNC: 142 MMOL/L (ref 135–144)
WBC # BLD: 7.2 K/UL (ref 3.5–11.3)
WBC # BLD: ABNORMAL 10*3/UL

## 2020-11-11 PROCEDURE — 6360000004 HC RX CONTRAST MEDICATION

## 2020-11-11 PROCEDURE — C1887 CATHETER, GUIDING: HCPCS

## 2020-11-11 PROCEDURE — 85610 PROTHROMBIN TIME: CPT

## 2020-11-11 PROCEDURE — C1894 INTRO/SHEATH, NON-LASER: HCPCS

## 2020-11-11 PROCEDURE — 82945 GLUCOSE OTHER FLUID: CPT

## 2020-11-11 PROCEDURE — 80048 BASIC METABOLIC PNL TOTAL CA: CPT

## 2020-11-11 PROCEDURE — 2580000003 HC RX 258: Performed by: STUDENT IN AN ORGANIZED HEALTH CARE EDUCATION/TRAINING PROGRAM

## 2020-11-11 PROCEDURE — 92978 ENDOLUMINL IVUS OCT C 1ST: CPT | Performed by: INTERNAL MEDICINE

## 2020-11-11 PROCEDURE — 83986 ASSAY PH BODY FLUID NOS: CPT

## 2020-11-11 PROCEDURE — 6370000000 HC RX 637 (ALT 250 FOR IP): Performed by: STUDENT IN AN ORGANIZED HEALTH CARE EDUCATION/TRAINING PROGRAM

## 2020-11-11 PROCEDURE — 02713ZZ DILATION OF CORONARY ARTERY, TWO ARTERIES, PERCUTANEOUS APPROACH: ICD-10-PCS | Performed by: INTERNAL MEDICINE

## 2020-11-11 PROCEDURE — 93454 CORONARY ARTERY ANGIO S&I: CPT | Performed by: INTERNAL MEDICINE

## 2020-11-11 PROCEDURE — C9600 PERC DRUG-EL COR STENT SING: HCPCS | Performed by: INTERNAL MEDICINE

## 2020-11-11 PROCEDURE — 83615 LACTATE (LD) (LDH) ENZYME: CPT

## 2020-11-11 PROCEDURE — 93010 ELECTROCARDIOGRAM REPORT: CPT | Performed by: INTERNAL MEDICINE

## 2020-11-11 PROCEDURE — 87116 MYCOBACTERIA CULTURE: CPT

## 2020-11-11 PROCEDURE — 87206 SMEAR FLUORESCENT/ACID STAI: CPT

## 2020-11-11 PROCEDURE — 4A023N7 MEASUREMENT OF CARDIAC SAMPLING AND PRESSURE, LEFT HEART, PERCUTANEOUS APPROACH: ICD-10-PCS | Performed by: INTERNAL MEDICINE

## 2020-11-11 PROCEDURE — 76604 US EXAM CHEST: CPT

## 2020-11-11 PROCEDURE — 87102 FUNGUS ISOLATION CULTURE: CPT

## 2020-11-11 PROCEDURE — 92921 HC PRQ CARDIAC ANGIO ADDL ART: CPT | Performed by: INTERNAL MEDICINE

## 2020-11-11 PROCEDURE — 027135Z DILATION OF CORONARY ARTERY, TWO ARTERIES WITH TWO DRUG-ELUTING INTRALUMINAL DEVICES, PERCUTANEOUS APPROACH: ICD-10-PCS | Performed by: INTERNAL MEDICINE

## 2020-11-11 PROCEDURE — C1725 CATH, TRANSLUMIN NON-LASER: HCPCS

## 2020-11-11 PROCEDURE — C1769 GUIDE WIRE: HCPCS

## 2020-11-11 PROCEDURE — 93005 ELECTROCARDIOGRAM TRACING: CPT | Performed by: INTERNAL MEDICINE

## 2020-11-11 PROCEDURE — 2500000003 HC RX 250 WO HCPCS

## 2020-11-11 PROCEDURE — 87070 CULTURE OTHR SPECIMN AEROBIC: CPT

## 2020-11-11 PROCEDURE — 84157 ASSAY OF PROTEIN OTHER: CPT

## 2020-11-11 PROCEDURE — 83735 ASSAY OF MAGNESIUM: CPT

## 2020-11-11 PROCEDURE — 99233 SBSQ HOSP IP/OBS HIGH 50: CPT | Performed by: INTERNAL MEDICINE

## 2020-11-11 PROCEDURE — 85730 THROMBOPLASTIN TIME PARTIAL: CPT

## 2020-11-11 PROCEDURE — 36415 COLL VENOUS BLD VENIPUNCTURE: CPT

## 2020-11-11 PROCEDURE — 85025 COMPLETE CBC W/AUTO DIFF WBC: CPT

## 2020-11-11 PROCEDURE — 87075 CULTR BACTERIA EXCEPT BLOOD: CPT

## 2020-11-11 PROCEDURE — 6360000002 HC RX W HCPCS

## 2020-11-11 PROCEDURE — C1753 CATH, INTRAVAS ULTRASOUND: HCPCS

## 2020-11-11 PROCEDURE — 6370000000 HC RX 637 (ALT 250 FOR IP)

## 2020-11-11 PROCEDURE — 87205 SMEAR GRAM STAIN: CPT

## 2020-11-11 PROCEDURE — 2709999900 HC NON-CHARGEABLE SUPPLY

## 2020-11-11 PROCEDURE — C1874 STENT, COATED/COV W/DEL SYS: HCPCS

## 2020-11-11 PROCEDURE — 2060000000 HC ICU INTERMEDIATE R&B

## 2020-11-11 PROCEDURE — B241ZZ3 ULTRASONOGRAPHY OF MULTIPLE CORONARY ARTERIES, INTRAVASCULAR: ICD-10-PCS | Performed by: INTERNAL MEDICINE

## 2020-11-11 PROCEDURE — B2111ZZ FLUOROSCOPY OF MULTIPLE CORONARY ARTERIES USING LOW OSMOLAR CONTRAST: ICD-10-PCS | Performed by: INTERNAL MEDICINE

## 2020-11-11 PROCEDURE — APPSS30 APP SPLIT SHARED TIME 16-30 MINUTES: Performed by: NURSE PRACTITIONER

## 2020-11-11 PROCEDURE — 89051 BODY FLUID CELL COUNT: CPT

## 2020-11-11 PROCEDURE — 94761 N-INVAS EAR/PLS OXIMETRY MLT: CPT

## 2020-11-11 PROCEDURE — 92979 ENDOLUMINL IVUS OCT C EA: CPT | Performed by: INTERNAL MEDICINE

## 2020-11-11 RX ORDER — SODIUM CHLORIDE 9 MG/ML
INJECTION, SOLUTION INTRAVENOUS CONTINUOUS
Status: ACTIVE | OUTPATIENT
Start: 2020-11-11 | End: 2020-11-12

## 2020-11-11 RX ORDER — SODIUM CHLORIDE 0.9 % (FLUSH) 0.9 %
10 SYRINGE (ML) INJECTION EVERY 12 HOURS SCHEDULED
Status: DISCONTINUED | OUTPATIENT
Start: 2020-11-11 | End: 2020-11-13 | Stop reason: HOSPADM

## 2020-11-11 RX ORDER — ACETAMINOPHEN 325 MG/1
650 TABLET ORAL EVERY 4 HOURS PRN
Status: DISCONTINUED | OUTPATIENT
Start: 2020-11-11 | End: 2020-11-13 | Stop reason: HOSPADM

## 2020-11-11 RX ORDER — SODIUM CHLORIDE 0.9 % (FLUSH) 0.9 %
10 SYRINGE (ML) INJECTION PRN
Status: DISCONTINUED | OUTPATIENT
Start: 2020-11-11 | End: 2020-11-13 | Stop reason: HOSPADM

## 2020-11-11 RX ADMIN — CARVEDILOL 25 MG: 12.5 TABLET, FILM COATED ORAL at 07:30

## 2020-11-11 RX ADMIN — SODIUM CHLORIDE, PRESERVATIVE FREE 10 ML: 5 INJECTION INTRAVENOUS at 21:52

## 2020-11-11 RX ADMIN — INSULIN LISPRO 2 UNITS: 100 INJECTION, SOLUTION INTRAVENOUS; SUBCUTANEOUS at 18:17

## 2020-11-11 RX ADMIN — PROMETHAZINE HYDROCHLORIDE 12.5 MG: 12.5 TABLET ORAL at 02:55

## 2020-11-11 RX ADMIN — INSULIN GLARGINE 5 UNITS: 100 INJECTION, SOLUTION SUBCUTANEOUS at 21:50

## 2020-11-11 RX ADMIN — CARVEDILOL 25 MG: 12.5 TABLET, FILM COATED ORAL at 18:39

## 2020-11-11 RX ADMIN — SODIUM CHLORIDE: 9 INJECTION, SOLUTION INTRAVENOUS at 18:39

## 2020-11-11 RX ADMIN — PANTOPRAZOLE SODIUM 40 MG: 40 TABLET, DELAYED RELEASE ORAL at 06:05

## 2020-11-11 NOTE — PROGRESS NOTES
[facility]  Internal Medicine Teaching Residency Program  Inpatient Daily Progress Note  ______________________________________________________________________________    Patient: Pradeep Ghosh  YOB: 1945   PFA:[id number]    Acct: [de-identified]     Room: [facility]/1002-01  Admit date: 11/6/2020  Today's date: 11/11/20  Number of days in the hospital: 5    SUBJECTIVE   Admitting Diagnosis: Acute on chronic diastolic heart failure  CC: Shortness of breath  Patient is feeling better and had no acute issues. Vitals are stable  ROS:  Constitutional: Positive for activity change and fatigue. Negative for diaphoresis and fever. HENT:Negative for congestion, drooling, facial swelling and sinus pain. Eyes: Negative for photophobia, discharge and itching. Respiratory: Positive for shortness of breath. Negative for apnea and wheezing. Cardiovascular: Positive for leg swelling. Negative for chest pain and palpitations. Gastrointestinal: Negative for abdominal distention, anal bleeding, diarrhea, nausea, rectal pain and vomiting. Endocrine: Negative for polydipsia and polyphagia. Genitourinary: Negative for dysuria, enuresis and pelvic pain. Musculoskeletal: Negative for arthralgias, gait problem and myalgias. Neurological: Negative for seizures, syncope and facial asymmetry. Psychiatric/Behavioral: Negative for agitation and hallucinations. The patient is not hyperactive. BRIEF HISTORY   The patient is a pleasant 76 y.o. female with background history of diastolic heart failure presents with a chief complaint of shortness of breath which started about 3 days ago and it is getting worse slowly. According to the patient she started to have shortness of breath started about 2 days ago.   It is gradually worsening and now even she is feeling short of breath at rest.  Patient also reported orthopnea and paroxysmal nocturnal dyspnea along with ankle swelling. Patient says that she is having cough with some yellowish phlegm but no fever or chills. No sick contacts recently. Patient is compliant with her medications, no salty foods. But patient was found to be very hypertensive on presentation to the ED. Patient was started on nitroglycerin drip and later on the patient improved. OBJECTIVE     Vital Signs:  BP (!) 150/68   Pulse 70   Temp 98.1 °F (36.7 °C)   Resp 16   Ht 5' 6\" (1.676 m)   Wt 254 lb (115.2 kg)   SpO2 98%   BMI 41.00 kg/m²     Temp (24hrs), Av.3 °F (36.8 °C), Min:98 °F (36.7 °C), Max:98.7 °F (37.1 °C)    In: -   Out: 350 [Urine:350]    Physical Exam:  Constitutional: This is a well developed, well nourished, 35-39.9 - Obesity Grade II 76y.o. year old female who is alert, oriented, cooperative and in mild distress due to shortness of breath. Head:normocephalic and atraumatic. Respiratory: Bilateral basal crepitation and normal vesicular breathing in upper part of the lung  cardiovascular: Regular without murmur, clicks, gallops or rubs. Abdomen: Slightly rounded and soft without organomegaly. No rebound, rigidity or guarding was appreciated. Lymphatic: No lymphadenopathy. Musculoskeletal: Normal curvature of the spine. No gross muscle weakness. Extremities: Trace edema but no ulcerations, tenderness, varicosities or erythema. Muscle size, tone and strength are normal.  No involuntary movements are noted. Skin:  Warm and dry. Good color, turgor and pigmentation. No lesions or scars.   No cyanosis or clubbing  Neurological/Psychiatric: The patient's general behavior, level of consciousness, thought content and emotional status is normal      Medications:  Scheduled Medications:    sodium chloride flush  10 mL Intravenous 2 times per day    [Held by provider] lisinopril  10 mg Oral Daily    NIFEdipine  60 mg Oral Daily    influenza virus vaccine  0.5 mL Intramuscular Prior to discharge    insulin glargine  5 Units Subcutaneous Nightly    sodium chloride flush  10 mL Intravenous 2 times per day    [Held by provider] rivaroxaban  20 mg Oral Daily with breakfast    sodium chloride flush  10 mL Intravenous 2 times per day    aspirin  81 mg Oral Daily    [Held by provider] clonazePAM  0.5 mg Oral Nightly    escitalopram  20 mg Oral Daily    [Held by provider] gabapentin  600 mg Oral TID    isosorbide mononitrate  30 mg Oral Daily    carvedilol  25 mg Oral BID WC    pantoprazole  40 mg Oral QAM AC    rosuvastatin  40 mg Oral Daily    insulin lispro  0-6 Units Subcutaneous TID WC    insulin lispro  0-3 Units Subcutaneous Nightly    [Held by provider] spironolactone  25 mg Oral Daily     Continuous Infusions:    amiodarone 0.5 mg/min (11/10/20 1700)    heparin (PORCINE) Infusion 9.79 Units/kg/hr (11/11/20 0543)    dextrose       PRN Medicationssodium chloride flush, 10 mL, PRN  acetaminophen, 650 mg, Q4H PRN  labetalol, 10 mg, Q30 Min PRN  heparin (porcine), 4,000 Units, PRN  heparin (porcine), 2,000 Units, PRN  sodium chloride flush, 10 mL, PRN  sodium chloride flush, 10 mL, PRN  acetaminophen, 650 mg, Q6H PRN  polyethylene glycol, 17 g, Daily PRN  potassium chloride, 40 mEq, PRN    Or  potassium alternative oral replacement, 40 mEq, PRN    Or  potassium chloride, 10 mEq, PRN  magnesium sulfate, 2 g, PRN  promethazine, 12.5 mg, Q6H PRN    Or  ondansetron, 4 mg, Q6H PRN  glucose, 15 g, PRN  dextrose, 12.5 g, PRN  glucagon (rDNA), 1 mg, PRN  dextrose, 100 mL/hr, PRN        Diagnostic Labs:  CBC:   Recent Labs     11/09/20  0531 11/10/20  0514 11/11/20  0504   WBC 6.5 5.9 7.2   RBC 3.07* 3.99 3.66*   HGB 9.2* 11.9 10.9*   HCT 29.5* 37.2 34.0*   MCV 96.1 93.2 92.9   RDW 13.2 13.0 13.1    203 201     BMP:   Recent Labs     11/09/20 0531 11/10/20  0514 11/11/20  0504    140 142   K 3.9 3.8 3.9    102 102   CO2 27 26 29   BUN 15 7* 13   CREATININE 0.94* 0.73 0.93*         ASSESSMENT & PLAN Acute on chronic diastolic CHF (HCC)  -Resume spironolactone 25 mg daily  -Strict I/Os record  -daily weights    Hypertensive urgency: controlled   -Nitro glycerin infusion stopped  -Home antihypertensive medications resumed     Insulin-dependent diabetes mellitus complicated by peripheral neuropathy: stable   -Low-dose sliding scale     Coronary artery disease s/p stents  -Continue aspirin and crestor  -Continue co-reg 25 mg  BID  -CRCI Index risk score is 3 and the30-day risk of death, MI, or cardiac arrest is 15 %  -Cardiac cath shows multivessel disease   -Cardiothoracic surgery consulted  -planning on doing CABG  -Pre-op work initiated.     Pulmonary embolism  -History of pulmonary embolism  -on heparin drip  -Discontinue the xarelto.     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  I have seen and examined the patient and the key elements of the encounter have been performed by me. I agree with the assessment, plan and orders as documented by the resident.       In Brief:  Cath plan for today  Vital stable  Discussed with patient and daughter     Ifrah Quispe MD   Attending Physician, Internal Medicine Residency Program  11/11/2020, 12:19 PM

## 2020-11-11 NOTE — PROGRESS NOTES
[facility]up[facility] [address]  Occupational Therapy Not Seen Note    DATE: 2020  Name: Flaco Iniguez  : 1945  MRN: [id number]    Patient not available for Occupational Therapy due to:    [x] Other: Pt out of room. Will check back as able. Next Scheduled Treatment: Will check back as able.      Torie Sutton OTR/L

## 2020-11-11 NOTE — PROGRESS NOTES
[facility] Cardiothoracic Surgery  Progress Note     11/11/2020 8:14 AM  Preop  Subjective:  Ms. Srini Garcia   Staying in bed. No chest pain. No shortness of breath. Objective:  BP (!) 150/68   Pulse 70   Temp 98.1 °F (36.7 °C)   Resp 16   Ht 5' 6\" (1.676 m)   Wt 254 lb (115.2 kg)   SpO2 98%   BMI 41.00 kg/m²     CV: no murmur noted, Normal S1, S2, no converted to normal sinus rhythm  Lungs: clear to auscultation, no wheezes, rales, or rhonchi  Abd: normal bowel sounds   Lower Extremities: Trace edema      CT scan-moderate right-sided effusion no pneumothorax.     Labs:   CBC:   Recent Labs     11/09/20  0531 11/10/20  0514 11/11/20  0504   WBC 6.5 5.9 7.2   HGB 9.2* 11.9 10.9*   HCT 29.5* 37.2 34.0*   MCV 96.1 93.2 92.9    203 201     BMP:   Recent Labs     11/09/20  0531 11/10/20  0514 11/11/20  0504    140 142   K 3.9 3.8 3.9    102 102   CO2 27 26 29   BUN 15 7* 13   CREATININE 0.94* 0.73 0.93*       I/O: I/O last 3 completed shifts:  In: -   Out: 350 [Urine:350]  Scheduled Meds:   sodium chloride flush  10 mL Intravenous 2 times per day    [Held by provider] lisinopril  10 mg Oral Daily    NIFEdipine  60 mg Oral Daily    influenza virus vaccine  0.5 mL Intramuscular Prior to discharge    insulin glargine  5 Units Subcutaneous Nightly    sodium chloride flush  10 mL Intravenous 2 times per day    [Held by provider] rivaroxaban  20 mg Oral Daily with breakfast    sodium chloride flush  10 mL Intravenous 2 times per day    aspirin  81 mg Oral Daily    [Held by provider] clonazePAM  0.5 mg Oral Nightly    escitalopram  20 mg Oral Daily    [Held by provider] gabapentin  600 mg Oral TID    isosorbide mononitrate  30 mg Oral Daily    carvedilol  25 mg Oral BID WC    pantoprazole  40 mg Oral QAM AC    rosuvastatin  40 mg Oral Daily    insulin lispro  0-6 Units Subcutaneous TID WC    insulin lispro  0-3 Units Subcutaneous Nightly    [Held by provider] spironolactone  25 mg Oral Daily     Continuous Infusions:   amiodarone 0.5 mg/min (11/10/20 1700)    heparin (PORCINE) Infusion Stopped (11/11/20 0737)    dextrose       PRN Meds:sodium chloride flush, acetaminophen, labetalol, heparin (porcine), heparin (porcine), sodium chloride flush, sodium chloride flush, [DISCONTINUED] acetaminophen **OR** acetaminophen, polyethylene glycol, potassium chloride **OR** potassium alternative oral replacement **OR** potassium chloride, magnesium sulfate, promethazine **OR** ondansetron, glucose, dextrose, glucagon (rDNA), dextrose        Assessment/ Plan:  IR for thoracentesis   At this time we are going to refer patient back to cardiology for PCI/medical management  We spoke to cardiologist and patient will likely have the PCI done later today  Please keep patient n.p.o. and off Xarelto  Please stop heparin infusion  When family arrives please call cardiothoracic surgery to update family        [address] [facility], APRN - NP

## 2020-11-11 NOTE — OP NOTE
[facility]    CARDIAC CATHETERIZATION    Date:   11/11/2020  Patient name:  Bert Blas  Date of admission:  11/6/2020  8:28 AM  MRN:   [id number]  YOB: 1945    Operators:  Dr Dyan Castle     Procedure performed:     1. Left heart cath   2. Selective left and right coronary angiography  3. Intra-vascular imaging using IVUS   4. PTCA/JAMA Ostial RPDA and Proximal LAD  5. Balloon angioplasty of small ostial RPL and distal LAD  6. Closure of right CFA acces with 6 Fr Mynx      Pre Procedure Conscious Sedation Data:  ASA Class:    [] I [x] II [] III [] IV    Mallampati Class:  [] I [x] II [] III [] IV      Indication:  Unstable angina  Multivessel CAD (deemed not a suitable candidate for CABG)     Procedure:  Access:  [x] Femoral  [] Radial  artery       [x] Right  [] Left    Procedure: After informed consent was obtained with explanation of the risks and benefits, patient was brought to the cath lab. The access area was prepped and draped in sterile fashion. 1% lidocaine was used for local block. The artery was cannulated with 6  Fr sheath with brisk arterial blood return. The side port was frequently flushed and aspirated with normal saline. Estimated Blood Loss:  [x] Minimal < 25 cc [] Moderate 25-50 cc  [] >50 cc    Findings:        LMCA: Mild irregularities 10-20%. LAD: Proximal 80% stenosis, reduced to 0% with PTCA/JAMA (3.5 x 18 mm Xience) post dilated with a 4.0 NC Balloon. Mid LAD stents were under-expanded as seen on IVUS with diffuse in stent restenosis and was treated with balloon angioplasty using NC balloons.    Distal LAD is small vessel (~ 2-2.5 mm on IVUS) with 80% long stenosis reduced to 40% post balloon angioplasty       RCA: Patent mid stent with 50% in stent re-stenosis confirmed with IVUS  Ostial RPDA had 80% stenosis reduced to 0% with PTCA/JAMA (2.75 x 18 mm) extending into distal RCA  Ostial RPL had 60% stenosis, was jailed after PDA stent, reduced to 40% with kissing balloon angioplasty (CUONG III flow)     Patent stent in mid RPL with 30-40% stenosis      Procedure Summary   1.  Successful image guided PCI of ostial RPDA and Proximal LAD with JAMA   2. Balloon angioplasty of ostial RPL and distal LAD due to small caliber of    the vessels        Recommendations     Routine Post Stent Orders.     Staged intervention to OM in 4-6 weeks     Medical therapy with DAPT and high intensity statin            Morales Rashid MD, [facility]

## 2020-11-11 NOTE — PROGRESS NOTES
Physical Therapy  DATE: 2020    NAME: Concepción Davalos  MRN: [id number]   : 1945    Patient not seen this date for Physical Therapy due to:  [] Blood transfusion in progress  [] Hemodialysis  [] Patient Declined  [] Spine Precautions   [] Strict Bedrest  [] Surgery/ Procedure  [x] Testing  -  Thoracocentesis/Cath. Lab. Will check on pt tomorrow. [] Other        [] PT is being discontinued at this time. Patient independent. No further needs. [] PT is being discontinued at this time due to declining physical/ medical status. Therapy is not appropriate at this time.     Rafa Linder, PTA

## 2020-11-11 NOTE — CARE COORDINATION
Spoke with pt and daughter r/e transitional planning. They would like pt to go to [facility]. Pt is too weak and feels unsafe to go home. Referral sent. Voicemail left for Girly Stuff in admissions    [phone] received call from Girly Stuff at [facility]. Pt accepted.  precert started

## 2020-11-11 NOTE — PROGRESS NOTES
[facility]   Progress Note                    Date:   11/11/2020  Patient name:  Denice Pavon  Date of admission:  11/6/2020  8:28 AM  MRN:   [id number]  YOB: 1945  PCP:    Concepción Logan MD    Reason for Admission:  Acute on chronic diastolic CHF (congestive heart failure) ([facility] [address] [I50.33]  Acute on chronic diastolic CHF (congestive heart failure) ([facility] [address] [L80.20]  Acute diastolic CHF (congestive heart failure) ([facility] [address] [I50.31]    Subjective:   Clinical Changes / Abnormalities:    Patient seen and examined at bedside. Was noted to be in atrial fibrillation with RVR yesterday. Started on amiodarone gtt after bolus and subsequently has converted into SR. No acute events overnight. Reports no chest pain or shortness of breath this morning or any other specific complaints. CV surgery on board for pre-op CABG work-up.        Urine output in the last 24 hours:     Intake/Output Summary (Last 24 hours) at 11/11/2020 0858  Last data filed at 11/11/2020 4169  Gross per 24 hour   Intake --   Output 550 ml   Net -550 ml     I/O since admission: -1.4 liters      Medications:   Scheduled Meds:   sodium chloride flush  10 mL Intravenous 2 times per day    [Held by provider] lisinopril  10 mg Oral Daily    NIFEdipine  60 mg Oral Daily    influenza virus vaccine  0.5 mL Intramuscular Prior to discharge    insulin glargine  5 Units Subcutaneous Nightly    sodium chloride flush  10 mL Intravenous 2 times per day    [Held by provider] rivaroxaban  20 mg Oral Daily with breakfast    sodium chloride flush  10 mL Intravenous 2 times per day    aspirin  81 mg Oral Daily    [Held by provider] clonazePAM  0.5 mg Oral Nightly    escitalopram  20 mg Oral Daily    [Held by provider] gabapentin  600 mg Oral TID    isosorbide mononitrate  30 mg Oral Daily    carvedilol  25 mg Oral BID WC    pantoprazole  40 mg Oral QAM AC    rosuvastatin  40 mg Oral Daily    insulin lispro  0-6 Units Subcutaneous TID WC    insulin lispro  0-3 Units Subcutaneous Nightly    [Held by provider] spironolactone  25 mg Oral Daily     Continuous Infusions:   amiodarone 0.5 mg/min (11/10/20 1700)    heparin (PORCINE) Infusion Stopped (11/11/20 0737)    dextrose       CBC:   Recent Labs     11/09/20  0531 11/10/20  0514 11/11/20  0504   WBC 6.5 5.9 7.2   HGB 9.2* 11.9 10.9*    203 201     BMP:    Recent Labs     11/09/20 0531 11/10/20  0514 11/11/20  0504    140 142   K 3.9 3.8 3.9    102 102   CO2 27 26 29   BUN 15 7* 13   CREATININE 0.94* 0.73 0.93*   GLUCOSE 160* 195* 212*     Hepatic:   Recent Labs     11/10/20  0514   AST 10   ALT 9   BILITOT 0.44   ALKPHOS 87     Troponin: No results for input(s): TROPONINI in the last 72 hours. No results for input(s): TROPONINT in the last 72 hours. BNP: No results for input(s): PROBNP in the last 72 hours. No results for input(s): BNP in the last 72 hours. Lipids: No results for input(s): CHOL, HDL in the last 72 hours. Invalid input(s): LDLCALCU  INR:   Recent Labs     11/11/20  0504   INR 1.2       Objective:   Vitals: BP (!) 161/67   Pulse 76   Temp 98.2 °F (36.8 °C) (Oral)   Resp 20   Ht 5' 6\" (1.676 m)   Wt 254 lb (115.2 kg)   SpO2 98%   BMI 41.00 kg/m²    Constitutional and General Appearance:   · alert, cooperative, no distress and appears stated age  Respiratory:  · No for increased work of breathing. · On auscultation: clear to auscultation bilaterally  Cardiovascular:  · The apical impulse is not displaced  · Heart tones are crisp and normal. Regular S1 and S2. No murmurs. Abdomen:   · No masses or tenderness  · Bowel sounds present  Extremities:  ·  No Cyanosis or Clubbing  ·  Lower extremity edema: No  ·  Skin: Warm and dry  Neurological:  · Alert and oriented. · Moves all extremities well     Diagnostic Studies:      EKG: normal sinus rhythm, unchanged from previous tracings. ECHO: reviewed.    Ejection fraction: 50%  Stress Test: reviewed. 07/12/20 : Negative Lexiscan stress test.  Cardiac Angiography: reviewed. 02/25/17  Multi-vessel coronary artery disease.   Normal LV contractility.   Successful PCI / Drug Eluting Stent of the proximal Posterolateral Coronary Artery.   Successful PCI / Drug Eluting Stent of the mid Right Coronary Artery.   Successful PCI / Drug Eluting Stent of the proximal Posterior Descending Coronary Artery.   Successful PCI / Drug Eluting Stent of the distal Left Anterior Descending Coronary Artery  Cardiac Angiography: 11/09/2020  LMCA: Normal 0% stenosis. LAD: Proximal 75% stenosis. Mid area and distal 90% stenosis. D's are small   LCx: Mid 50% stenosis. OM1: Proximal 80% stenosis   RCA: Mid stent area 40-50% stenosis. PDA: Ostial 80% stenosis. PL branch with proximal 80% stenosis.     The LV gram was performed in the MOREL 30 position. LVEF: 55 %.      Conclusions:  Multivessel CAD   Preserved LV function      Recommendations:  1. Post-cath protocol  2. CV surgery  Consult   3. Continue optimal medical therapy  4. Risk factor modification     Assessment / Acute Cardiac Problems:   1. MV-CAD: Cath report listed above  2. Atrial fibrillation: unknown if it is new onset or paroxysmal  3. T2DM  4. HTN  5. HLD  6. Grade III Diastolic dysfunction  7. Hx of PE: on Xarelto (Last dose taken Friday, 11/06/2020)     Plan of Treatment:   1. CV Surgery consulted, appreciate recommendations. Pre-op work-up initiated  2. Continue heparin gtt  3. Continue amiodarone gtt  4. Continue ASA & Crestor  5. Continue nifedipine 90 mg once daily  6. Continue home dose of co-reg 25 BID  7. Continue home dose of imdur 30 mg once daily  8.  Hold ACEi & sprinolactone while being worked up for CABG  9. K>4, Mg>2    Nusrat Ruiz MD  Fellow, [id number] [facility]      Attending note,   The patient was seen and examined, agree with above, no chest pain or SOB, on IV Amiodarone and IV heparin. Continue current medications, plan for CABG per CT surgery.

## 2020-11-12 LAB
ABSOLUTE EOS #: 0.18 K/UL (ref 0–0.44)
ABSOLUTE IMMATURE GRANULOCYTE: 0.03 K/UL (ref 0–0.3)
ABSOLUTE LYMPH #: 1.83 K/UL (ref 1.1–3.7)
ABSOLUTE MONO #: 0.69 K/UL (ref 0.1–1.2)
ANION GAP SERPL CALCULATED.3IONS-SCNC: 11 MMOL/L (ref 9–17)
BASOPHILS # BLD: 0 % (ref 0–2)
BASOPHILS ABSOLUTE: 0.03 K/UL (ref 0–0.2)
BUN BLDV-MCNC: 13 MG/DL (ref 8–23)
BUN/CREAT BLD: ABNORMAL (ref 9–20)
CALCIUM SERPL-MCNC: 9.5 MG/DL (ref 8.6–10.4)
CHLORIDE BLD-SCNC: 105 MMOL/L (ref 98–107)
CO2: 26 MMOL/L (ref 20–31)
CREAT SERPL-MCNC: 1.05 MG/DL (ref 0.5–0.9)
DIFFERENTIAL TYPE: ABNORMAL
EKG ATRIAL RATE: 52 BPM
EKG P AXIS: 82 DEGREES
EKG P-R INTERVAL: 152 MS
EKG Q-T INTERVAL: 514 MS
EKG QRS DURATION: 90 MS
EKG QTC CALCULATION (BAZETT): 478 MS
EKG R AXIS: 8 DEGREES
EKG T AXIS: 80 DEGREES
EKG VENTRICULAR RATE: 52 BPM
EOSINOPHILS RELATIVE PERCENT: 3 % (ref 1–4)
GFR AFRICAN AMERICAN: >60 ML/MIN
GFR NON-AFRICAN AMERICAN: 51 ML/MIN
GFR SERPL CREATININE-BSD FRML MDRD: ABNORMAL ML/MIN/{1.73_M2}
GFR SERPL CREATININE-BSD FRML MDRD: ABNORMAL ML/MIN/{1.73_M2}
GLUCOSE BLD-MCNC: 165 MG/DL (ref 70–99)
GLUCOSE BLD-MCNC: 177 MG/DL (ref 65–105)
GLUCOSE BLD-MCNC: 197 MG/DL (ref 65–105)
GLUCOSE BLD-MCNC: 213 MG/DL (ref 65–105)
GLUCOSE BLD-MCNC: 224 MG/DL (ref 65–105)
HCT VFR BLD CALC: 35.5 % (ref 36.3–47.1)
HEMOGLOBIN: 10.9 G/DL (ref 11.9–15.1)
IMMATURE GRANULOCYTES: 0 %
LYMPHOCYTES # BLD: 26 % (ref 24–43)
MCH RBC QN AUTO: 29.8 PG (ref 25.2–33.5)
MCHC RBC AUTO-ENTMCNC: 30.7 G/DL (ref 28.4–34.8)
MCV RBC AUTO: 97 FL (ref 82.6–102.9)
MONOCYTES # BLD: 10 % (ref 3–12)
NRBC AUTOMATED: 0 PER 100 WBC
PDW BLD-RTO: 13.2 % (ref 11.8–14.4)
PLATELET # BLD: 196 K/UL (ref 138–453)
PLATELET ESTIMATE: ABNORMAL
PMV BLD AUTO: 11.1 FL (ref 8.1–13.5)
POTASSIUM SERPL-SCNC: 4.3 MMOL/L (ref 3.7–5.3)
RBC # BLD: 3.66 M/UL (ref 3.95–5.11)
RBC # BLD: ABNORMAL 10*6/UL
SEG NEUTROPHILS: 61 % (ref 36–65)
SEGMENTED NEUTROPHILS ABSOLUTE COUNT: 4.37 K/UL (ref 1.5–8.1)
SODIUM BLD-SCNC: 142 MMOL/L (ref 135–144)
WBC # BLD: 7.1 K/UL (ref 3.5–11.3)
WBC # BLD: ABNORMAL 10*3/UL

## 2020-11-12 PROCEDURE — 6370000000 HC RX 637 (ALT 250 FOR IP): Performed by: STUDENT IN AN ORGANIZED HEALTH CARE EDUCATION/TRAINING PROGRAM

## 2020-11-12 PROCEDURE — 6370000000 HC RX 637 (ALT 250 FOR IP): Performed by: INTERNAL MEDICINE

## 2020-11-12 PROCEDURE — 2060000000 HC ICU INTERMEDIATE R&B

## 2020-11-12 PROCEDURE — 97110 THERAPEUTIC EXERCISES: CPT

## 2020-11-12 PROCEDURE — 99232 SBSQ HOSP IP/OBS MODERATE 35: CPT | Performed by: INTERNAL MEDICINE

## 2020-11-12 PROCEDURE — 36415 COLL VENOUS BLD VENIPUNCTURE: CPT

## 2020-11-12 PROCEDURE — 82947 ASSAY GLUCOSE BLOOD QUANT: CPT

## 2020-11-12 PROCEDURE — 85025 COMPLETE CBC W/AUTO DIFF WBC: CPT

## 2020-11-12 PROCEDURE — 93010 ELECTROCARDIOGRAM REPORT: CPT | Performed by: INTERNAL MEDICINE

## 2020-11-12 PROCEDURE — 80048 BASIC METABOLIC PNL TOTAL CA: CPT

## 2020-11-12 PROCEDURE — 97116 GAIT TRAINING THERAPY: CPT

## 2020-11-12 PROCEDURE — 97530 THERAPEUTIC ACTIVITIES: CPT

## 2020-11-12 RX ORDER — CARVEDILOL 12.5 MG/1
12.5 TABLET ORAL 2 TIMES DAILY WITH MEALS
Status: DISCONTINUED | OUTPATIENT
Start: 2020-11-12 | End: 2020-11-12

## 2020-11-12 RX ORDER — CARVEDILOL 25 MG/1
25 TABLET ORAL 2 TIMES DAILY WITH MEALS
Status: DISCONTINUED | OUTPATIENT
Start: 2020-11-12 | End: 2020-11-13 | Stop reason: HOSPADM

## 2020-11-12 RX ORDER — ROPINIROLE 0.25 MG/1
0.5 TABLET, FILM COATED ORAL 3 TIMES DAILY
Status: DISCONTINUED | OUTPATIENT
Start: 2020-11-12 | End: 2020-11-13 | Stop reason: HOSPADM

## 2020-11-12 RX ORDER — INSULIN GLARGINE 100 [IU]/ML
7 INJECTION, SOLUTION SUBCUTANEOUS NIGHTLY
Status: DISCONTINUED | OUTPATIENT
Start: 2020-11-12 | End: 2020-11-13

## 2020-11-12 RX ORDER — HYDRALAZINE HYDROCHLORIDE 25 MG/1
25 TABLET, FILM COATED ORAL EVERY 6 HOURS PRN
Status: DISCONTINUED | OUTPATIENT
Start: 2020-11-12 | End: 2020-11-13 | Stop reason: HOSPADM

## 2020-11-12 RX ADMIN — GABAPENTIN 600 MG: 600 TABLET ORAL at 08:46

## 2020-11-12 RX ADMIN — ROSUVASTATIN CALCIUM 40 MG: 20 TABLET, FILM COATED ORAL at 08:53

## 2020-11-12 RX ADMIN — NIFEDIPINE 60 MG: 60 TABLET, EXTENDED RELEASE ORAL at 08:47

## 2020-11-12 RX ADMIN — INSULIN LISPRO 2 UNITS: 100 INJECTION, SOLUTION INTRAVENOUS; SUBCUTANEOUS at 12:03

## 2020-11-12 RX ADMIN — INSULIN LISPRO 1 UNITS: 100 INJECTION, SOLUTION INTRAVENOUS; SUBCUTANEOUS at 20:47

## 2020-11-12 RX ADMIN — TICAGRELOR 90 MG: 90 TABLET ORAL at 08:47

## 2020-11-12 RX ADMIN — INSULIN GLARGINE 7 UNITS: 100 INJECTION, SOLUTION SUBCUTANEOUS at 20:48

## 2020-11-12 RX ADMIN — ESCITALOPRAM OXALATE 20 MG: 10 TABLET ORAL at 08:48

## 2020-11-12 RX ADMIN — INSULIN LISPRO 1 UNITS: 100 INJECTION, SOLUTION INTRAVENOUS; SUBCUTANEOUS at 08:59

## 2020-11-12 RX ADMIN — ISOSORBIDE MONONITRATE 30 MG: 30 TABLET ORAL at 08:50

## 2020-11-12 RX ADMIN — TICAGRELOR 90 MG: 90 TABLET ORAL at 21:36

## 2020-11-12 RX ADMIN — ROPINIROLE HYDROCHLORIDE 0.5 MG: 0.25 TABLET, FILM COATED ORAL at 20:47

## 2020-11-12 RX ADMIN — PANTOPRAZOLE SODIUM 40 MG: 40 TABLET, DELAYED RELEASE ORAL at 08:35

## 2020-11-12 RX ADMIN — HYDRALAZINE HYDROCHLORIDE 25 MG: 25 TABLET, FILM COATED ORAL at 04:40

## 2020-11-12 RX ADMIN — CARVEDILOL 25 MG: 25 TABLET, FILM COATED ORAL at 17:32

## 2020-11-12 RX ADMIN — ASPIRIN 81 MG: 81 TABLET, COATED ORAL at 08:50

## 2020-11-12 RX ADMIN — CARVEDILOL 25 MG: 12.5 TABLET, FILM COATED ORAL at 08:47

## 2020-11-12 RX ADMIN — INSULIN LISPRO 1 UNITS: 100 INJECTION, SOLUTION INTRAVENOUS; SUBCUTANEOUS at 16:47

## 2020-11-12 ASSESSMENT — PAIN DESCRIPTION - PAIN TYPE: TYPE: CHRONIC PAIN

## 2020-11-12 ASSESSMENT — PAIN SCALES - GENERAL
PAINLEVEL_OUTOF10: 4
PAINLEVEL_OUTOF10: 1
PAINLEVEL_OUTOF10: 0

## 2020-11-12 ASSESSMENT — PAIN DESCRIPTION - LOCATION: LOCATION: CHEST

## 2020-11-12 NOTE — PROGRESS NOTES
[facility]  Internal Medicine Teaching Residency Program  Inpatient Daily Progress Note  ______________________________________________________________________________    Patient: Estefany Hercules  YOB: 1945   [facility]:[id number]    Acct: [de-identified]     Room: [facility]/1002-01  Admit date: 11/6/2020  Today's date: 11/12/20  Number of days in the hospital: 6    SUBJECTIVE   Admitting Diagnosis: Acute on chronic diastolic heart failure  CC: Shortness of breath  Patient is feeling better and her shortness of breath has improved. Patient have watery diarrhea in the morning. ROS:  Constitutional: Positive for activity change and fatigue. Negative for diaphoresis and fever. HENT:Negative for congestion, drooling, facial swelling and sinus pain. Eyes: Negative for photophobia, discharge and itching. Respiratory: Negative for apnea,shortness of breath and wheezing. Cardiovascular:  Negative for chest pain and palpitations. Gastrointestinal: Negative for abdominal distention, anal bleeding, rectal pain and vomiting but positive for  diarrhea, nausea,  Endocrine: Negative for polydipsia and polyphagia. Genitourinary: Negative for dysuria, enuresis and pelvic pain. Musculoskeletal: Negative for arthralgias, gait problem and myalgias. Neurological: Negative for seizures, syncope and facial asymmetry. Psychiatric/Behavioral: Negative for agitation and hallucinations. The patient is not hyperactive. BRIEF HISTORY   The patient is a pleasant 76 y.o. female with background history of diastolic heart failure presents with a chief complaint of shortness of breath which started about 3 days ago and it is getting worse slowly. According to the patient she started to have shortness of breath started about 2 days ago.   It is gradually worsening and now even she is feeling short of breath at rest.  Patient also reported orthopnea and paroxysmal nocturnal dyspnea along with ankle swelling. Patient says that she is having cough with some yellowish phlegm but no fever or chills. No sick contacts recently. Patient is compliant with her medications, no salty foods. But patient was found to be very hypertensive on presentation to the ED. Patient was started on nitroglycerin drip and later on the patient improved. OBJECTIVE     Vital Signs:  BP (!) 160/67   Pulse 69   Temp 98.3 °F (36.8 °C) (Oral)   Resp 18   Ht 5' 6\" (1.676 m)   Wt 254 lb (115.2 kg)   SpO2 99%   BMI 41.00 kg/m²     Temp (24hrs), Av.1 °F (36.7 °C), Min:97.7 °F (36.5 °C), Max:98.3 °F (36.8 °C)    In: 1586.7   Out: 200 [Urine:200]    Physical Exam:  Constitutional: This is a well developed, well nourished, 35-39.9 - Obesity Grade II 76y.o. year old female who is alert, oriented, cooperative and in mild distress due to shortness of breath. Head:normocephalic and atraumatic. Respiratory: Bilateral basal crepitation and normal vesicular breathing in upper part of the lung  cardiovascular: Regular without murmur, clicks, gallops or rubs. Abdomen: Slightly rounded and soft without organomegaly. No rebound, rigidity or guarding was appreciated. Lymphatic: No lymphadenopathy. Musculoskeletal: Normal curvature of the spine. No gross muscle weakness. Extremities: Trace edema but no ulcerations, tenderness, varicosities or erythema. Muscle size, tone and strength are normal.  No involuntary movements are noted. Skin:  Warm and dry. Good color, turgor and pigmentation. No lesions or scars.   No cyanosis or clubbing  Neurological/Psychiatric: The patient's general behavior, level of consciousness, thought content and emotional status is normal      Medications:  Scheduled Medications:    sodium chloride flush  10 mL Intravenous 2 times per day    ticagrelor  90 mg Oral BID    sodium chloride flush  10 mL Intravenous 2 times per day    [Held by provider] lisinopril  10 mg Oral Daily    NIFEdipine  60 mg Oral Daily    influenza virus vaccine  0.5 mL Intramuscular Prior to discharge    insulin glargine  5 Units Subcutaneous Nightly    sodium chloride flush  10 mL Intravenous 2 times per day    [Held by provider] rivaroxaban  20 mg Oral Daily with breakfast    sodium chloride flush  10 mL Intravenous 2 times per day    aspirin  81 mg Oral Daily    [Held by provider] clonazePAM  0.5 mg Oral Nightly    escitalopram  20 mg Oral Daily    gabapentin  600 mg Oral TID    isosorbide mononitrate  30 mg Oral Daily    carvedilol  25 mg Oral BID WC    pantoprazole  40 mg Oral QAM AC    rosuvastatin  40 mg Oral Daily    insulin lispro  0-6 Units Subcutaneous TID WC    insulin lispro  0-3 Units Subcutaneous Nightly    [Held by provider] spironolactone  25 mg Oral Daily     Continuous Infusions:    amiodarone 0.5 mg/min (11/10/20 1700)    dextrose       PRN MedicationshydrALAZINE, 25 mg, Q6H PRN  sodium chloride flush, 10 mL, PRN  acetaminophen, 650 mg, Q4H PRN  sodium chloride flush, 10 mL, PRN  acetaminophen, 650 mg, Q4H PRN  labetalol, 10 mg, Q30 Min PRN  sodium chloride flush, 10 mL, PRN  sodium chloride flush, 10 mL, PRN  acetaminophen, 650 mg, Q6H PRN  polyethylene glycol, 17 g, Daily PRN  potassium chloride, 40 mEq, PRN    Or  potassium alternative oral replacement, 40 mEq, PRN    Or  potassium chloride, 10 mEq, PRN  magnesium sulfate, 2 g, PRN  promethazine, 12.5 mg, Q6H PRN    Or  ondansetron, 4 mg, Q6H PRN  glucose, 15 g, PRN  dextrose, 12.5 g, PRN  glucagon (rDNA), 1 mg, PRN  dextrose, 100 mL/hr, PRN        Diagnostic Labs:  CBC:   Recent Labs     11/10/20  0514 11/11/20  0504 11/12/20  0456   WBC 5.9 7.2 7.1   RBC 3.99 3.66* 3.66*   HGB 11.9 10.9* 10.9*   HCT 37.2 34.0* 35.5*   MCV 93.2 92.9 97.0   RDW 13.0 13.1 13.2    201 196     BMP:   Recent Labs     11/10/20  0514 11/11/20  0504 11/12/20  0456    142 142   K 3.8 3.9 4.3    102 105   CO2 26 29 26   BUN 7* 13 13 CREATININE 0.73 0.93* 1.05*         ASSESSMENT & PLAN     Acute on chronic diastolic CHF (HCC)  -Resume spironolactone 25 mg daily  -Strict I/Os record  -daily weights    Hypertensive urgency: controlled   -Nitro glycerin infusion stopped  -Home antihypertensive medications resumed     Insulin-dependent diabetes mellitus complicated by peripheral neuropathy: stable   -Low-dose sliding scale     Coronary artery disease s/p stents  -Patient got the stents yesterday  -Continue aspirin,brilinta and crestor  -Continue co-reg 25 mg  BID  -Restart Xarelto today.  -Hold ACEi for 1 to 2 more days before initiating prior to discharge    Atrial fibrillation:  -Paroxysmal  -New  -On amiodarone as per recommendation of cardiology    Pulmonary embolism  -History of pulmonary embolism  -on heparin drip  -Discontinue the xarelto.     Peripheral neuropathy due to diabetes mellitus  -Continue gabapentin 600 mg 3 times a day    Diarrhea:  -New onset  -stool studies  -gentle iv fluids    Musculoskeletal chest pain  -troponin's are negative  -analgesics    Discharge planning: ongoing      DVT ppx: Already on anticoagulant  GI ppx: Pantoprazole     PT/OT/SW: Ongoing      Chela Jj MD  Internal Medicine Resident, PGY-1  [facility];  [place], [place]  11/12/2020, 08:44 am

## 2020-11-12 NOTE — PROGRESS NOTES
[facility]  Progress Note                   Date:   11/12/2020  Patient name: Concepción Davalos  Date of admission:  11/6/2020  8:28 AM  MRN:   [id number]  YOB: 1945  PCP: Severa Barge, MD    Reason for Admission: Acute on chronic diastolic CHF (congestive heart failure) ([facility] [address] [I50.33]  Acute on chronic diastolic CHF (congestive heart failure) ([facility] [address] [H22.83]  Acute diastolic CHF (congestive heart failure) ([facility] [address] [I50.31]    Subjective:       Clinical Changes /Abnormalities:  Patient seen and examined in bed in room with RN present. MV-CAD on CATH 11/9/2020 not a surgical candidate. Underwent cath with PCI on 11/11/2020. Planned stage PCI in 4-6 weeks. Hypotensive overnight with hypertension this am. Anti-hypertensive medications were given and again hypotensive. SR on tele HR 62    Discussed in detail with patient post cath POC including but not limited to medications, diet, exercise, right femoral artery site care, and follow-up. Questions and concerns addressed. F/U in office in 2 weeks.       Urine output in the last 24 hours:      Intake/Output Summary (Last 24 hours) at 11/12/2020 0826  Last data filed at 11/12/2020 0548      Gross per 24 hour   Intake 1386.66 ml   Output 380 ml   Net 1006.66 ml      I/O since admission: -412.3cc      Review of Systems    Medications:   Scheduled Meds:   carvedilol  12.5 mg Oral BID WC    sodium chloride flush  10 mL Intravenous 2 times per day    ticagrelor  90 mg Oral BID    sodium chloride flush  10 mL Intravenous 2 times per day    [Held by provider] lisinopril  10 mg Oral Daily    NIFEdipine  60 mg Oral Daily    influenza virus vaccine  0.5 mL Intramuscular Prior to discharge    insulin glargine  5 Units Subcutaneous Nightly    sodium chloride flush  10 mL Intravenous 2 times per day    rivaroxaban  20 mg Oral Daily with breakfast    sodium chloride flush  10 mL Intravenous 2 times per day    aspirin  81 mg Oral Daily    with kissing balloon angioplasty (CUONG III flow)     Patent stent in mid RPL with 30-40% stenosis      Procedure Summary   1. Successful image guided PCI of ostial RPDA and Proximal LAD with JAMA   2. Balloon angioplasty of ostial RPL and distal LAD due to small caliber of    the vessels        Recommendations     Routine Post Stent Orders.     Staged intervention to OM in 4-6 weeks     Medical therapy with DAPT and high intensity statin     ECHO: 11/7/2020  Summary  Left ventricle is normal in size, mildly increased wall thickness, global  left ventricular systolic function is normal, calculated ejection fraction  is 58%. Grade III (severe) left ventricular diastolic dysfunction. Left atrium is moderately dilated. Inter-atrial septum is intact with no evidence for an atrial septal defect. Right atrium is moderately dilated . Normal right ventricular size and function. Mild mitral regurgitation. Tricuspid valve was not well visualized. Mild tricuspid regurgitation. Estimated right ventricular systolic pressure is 87.7 mmHg. Stress Test:  07/12/20 : Negative Lexiscan stress test.  Cardiac Angiography: reviewed. 02/25/17  Multi-vessel coronary artery disease.   Normal LV contractility.   Successful PCI / Drug Eluting Stent of the proximal Posterolateral Coronary Artery.   Successful PCI / Drug Eluting Stent of the mid Right Coronary Artery.   Successful PCI / Drug Eluting Stent of the proximal Posterior Descending Coronary Artery.   Successful PCI / Drug Eluting Stent of the distal Left Anterior Descending Coronary Artery  Cardiac Angiography: 11/09/2020  LMCA: Normal 0% stenosis. LAD: Proximal 75% stenosis. Mid area and distal 90% stenosis. D's are small   LCx: Mid 50% stenosis. OM1: Proximal 80% stenosis   RCA: Mid stent area 40-50% stenosis. PDA: Ostial 80% stenosis. PL branch with proximal 80% stenosis.     The LV gram was performed in the MOREL 30 position.    LVEF: 55 %.      Conclusions:  Multivessel CAD   Preserved LV function      Recommendations:  1. Post-cath protocol  2. CV surgery  Consult   3. Continue optimal medical therapy  4. Risk factor modification    Objective:   Vitals: BP (!) 160/67   Pulse 69   Temp 98.3 °F (36.8 °C) (Oral)   Resp 18   Ht 5' 6\" (1.676 m)   Wt 254 lb (115.2 kg)   SpO2 99%   BMI 41.00 kg/m²      Repeat BP 94/45 at 11:01    General appearance: alert and cooperative with exam  HEENT: Head: Normocephalic, no lesions, without obvious abnormality. Neck:no JVD, trachea midline, no adenopathy  Lungs: Clear to auscultation  Heart: Regular rate and rhythm, s1/s2 auscultated, no murmurs  Abdomen: soft, non-tender, bowel sounds active  Extremities: no edema  Neurologic: not done  Right Femoral Artery site:   CDI without ecchymosis or hematoma. Soft + pulse. Assessment / Acute Cardiac Problems:   1. MV-CAD not a surgical candidate s/p PCI of ostial RPDA and Proximal LAD with JAMA with Balloon angioplasty of ostial RPL and distal LAD due to small caliber of the vessels with plan for staged PCI to OM in 4-6 weeks  2. Atrial fibrillation: unknown if it is new onset or paroxysmal  3. T2DM  4. HTN  5. HLD  6. Grade III Diastolic dysfunction  7.  Hx of PE: on Xarelto    Patient Active Problem List:     Atypical chest pain     Type 2 diabetes mellitus with diabetic polyneuropathy, with long-term current use of insulin (HCC)     Vertigo     Essential hypertension     CAD (coronary artery disease)     Dyspnea     Claudication in peripheral vascular disease (HCC)     Acute pulmonary embolism without acute cor pulmonale (HCC)     Diabetic polyneuropathy associated with type 2 diabetes mellitus ([address]     Other hyperlipidemia     Chronic systolic heart failure (HCC)     Multiple subsegmental pulmonary emboli without acute cor pulmonale (HCC)     Congestive heart failure (HCC)     Pyelonephritis of right kidney     History of pulmonary embolism     Elevated troponin I level     Sepsis ([address] Suspected COVID-19 virus infection     MARIELLE (acute kidney injury) ([facility] [address]     Diarrhea of presumed infectious origin     Chronic diastolic (congestive) heart failure (HCC)     Generalized weakness     Anemia, normocytic normochromic     Class 1 obesity in adult     Gastroenteritis     Acute on chronic diastolic CHF (congestive heart failure) (HCC)     Acute diastolic CHF (congestive heart failure) ([facility] [address]    Coronary Discharge Core Measure: Please indicate the medication given by X, and if not the reasons not given:    Not Given Reason  Given      Beta Blockers X   Currently held d/t hypotension. Re-evaluate at discharge. ACE-I       Statins X      ASA X      OAP Brilinta X    SL Nitro X           Plan of Treatment:   1. MV-CAD S/p CATH 11/11/2020 PCI of ostial RPDA and Proximal LAD with JAMA with Balloon angioplasty of ostial RPL and distal LAD due to small caliber of the vessels with plan for staged PCI to OM in 4-6 weeks. Continue ASA, Brilinta, statin, BB, IMDUR. ACE on hold d/t hypotension. 2. A Fib SR on tele  HR 62  Continue BB and restart xarelto  3. HTN  Hypotensive currently. Will decrease BB. Continue CCB and monitor. Will hold ACE. 4. Acute on chronic diastolic HFpEF. Improved not in fluid overload. LVEF 58% on ECHO 11/7/2020  Grade III DD. Continue BB. ACE on hold d/t hypotension. 5. Keep K > 4.0 and Mag > 2.0 stable.     Electronically signed by MAYELA Allen NP on 11/12/2020 at 12:47 PM  [address]  [phone]      \

## 2020-11-12 NOTE — FLOWSHEET NOTE
Advance Care Planning     Advance Care Planning Activator (Inpatient)  Conversation Note      Date of ACP Conversation: 11/6/2020    Conversation Conducted with: Patient with Decision Making Capacity    ACP Activator: Rosario Hubbard      Length of ACP Conversation in minutes:  10 min     [facility] will paperwork is given to the patient. The patient said they want to read it over. The patient was informed to let their nurse know if they would like a  to come fill it out with them. 11/07/20 1431   Encounter Summary   Services provided to: Patient   Referral/Consult From: Nurse   Support System Family members   [facility]   (11/07/20)   Complexity of Encounter Moderate   Length of Encounter 15 minutes   Spiritual Assessment Completed Yes   Advance Care Planning Yes   Routine   Type Initial   Assessment Calm; Approachable   Intervention Explored feelings, thoughts, concerns; Active listening;Explored coping resources   Outcome Engaged in conversation;Expressed gratitude
DATE: 2020    NAME: Nya Bai  MRN: [id number]   : 1945    Patient not seen this date for Physical Therapy due to:  [] Blood transfusion in progress  [] Hemodialysis  [] Patient Declined  [] Spine Precautions   [] Strict Bedrest  [x] Surgery/ Procedure: Cardiac cath and possible CABG  [] Testing      [] Other        [] PT is being discontinued at this time. Patient independent. No further needs. [] PT is being discontinued at this time due to declining physical/ medical status. Therapy is not appropriate at this time.     Vale Fernandez, PTA
Transfer to rm 2019
appropriate. 11/10/20 1400   Encounter Summary   Services provided to: Patient and family together   Referral/Consult From: Other    [address]   [facility])   Continue Visiting   ([phone])   Complexity of Encounter Moderate   Length of Encounter 45 minutes   Spiritual Assessment Completed Yes   Advance Care Planning Yes   Routine   Type Follow up   Assessment Approachable;Coping; Anxious   Intervention Active listening;Explored feelings, thoughts, concerns;Explored coping resources; Discussed illness/injury and it's impact;Sustaining presence/ Ministry of presence   Outcome Expressed gratitude;Expressed feelings/needs/concerns;Engaged in conversation   Advance Directives (For Healthcare)   Healthcare Directive Yes, patient has an advance directive for healthcare treatment   Type of Healthcare Directive Durable power of  for health care   [address] power of [address] [employer] [address] Agent's Name [id number] Agent's Phone Number [phone]     Electronically signed by Yolis Da Silva on 11/10/2020 at 11:17 PM

## 2020-11-12 NOTE — PROGRESS NOTES
discharge    insulin glargine  5 Units Subcutaneous Nightly    sodium chloride flush  10 mL Intravenous 2 times per day    [Held by provider] rivaroxaban  20 mg Oral Daily with breakfast    sodium chloride flush  10 mL Intravenous 2 times per day    aspirin  81 mg Oral Daily    [Held by provider] clonazePAM  0.5 mg Oral Nightly    escitalopram  20 mg Oral Daily    gabapentin  600 mg Oral TID    isosorbide mononitrate  30 mg Oral Daily    carvedilol  25 mg Oral BID WC    pantoprazole  40 mg Oral QAM AC    rosuvastatin  40 mg Oral Daily    insulin lispro  0-6 Units Subcutaneous TID WC    insulin lispro  0-3 Units Subcutaneous Nightly    [Held by provider] spironolactone  25 mg Oral Daily     Continuous Infusions:   amiodarone 0.5 mg/min (11/10/20 1700)    dextrose       CBC:   Recent Labs     11/10/20  0514 11/11/20  0504 11/12/20  0456   WBC 5.9 7.2 7.1   HGB 11.9 10.9* 10.9*    201 196     BMP:    Recent Labs     11/10/20  0514 11/11/20  0504 11/12/20  0456    142 142   K 3.8 3.9 4.3    102 105   CO2 26 29 26   BUN 7* 13 13   CREATININE 0.73 0.93* 1.05*   GLUCOSE 195* 212* 165*     Hepatic:   Recent Labs     11/10/20  0514   AST 10   ALT 9   BILITOT 0.44   ALKPHOS 87     Troponin: No results for input(s): TROPONINI in the last 72 hours. No results for input(s): TROPONINT in the last 72 hours. BNP: No results for input(s): PROBNP in the last 72 hours. No results for input(s): BNP in the last 72 hours. Lipids: No results for input(s): CHOL, HDL in the last 72 hours. Invalid input(s): LDLCALCU  INR:   Recent Labs     11/11/20 0504   INR 1.2       Objective:   Vitals: BP (!) 160/67   Pulse 69   Temp 98.3 °F (36.8 °C) (Oral)   Resp 18   Ht 5' 6\" (1.676 m)   Wt 254 lb (115.2 kg)   SpO2 99%   BMI 41.00 kg/m²    Constitutional and General Appearance:   · alert, cooperative, no distress and appears stated age  Respiratory:  · No for increased work of breathing.    · On auscultation: clear to auscultation bilaterally  Cardiovascular:  · The apical impulse is not displaced  · Heart tones are crisp and normal. Regular S1 and S2. No murmurs. · Groin site examined; no bleeding, oozing or hematoma noted  Abdomen:   · No masses or tenderness  · Bowel sounds present  Extremities:  ·  No Cyanosis or Clubbing  ·  Lower extremity edema: No  ·  Skin: Warm and dry  Neurological:  · Alert and oriented. · Moves all extremities well     Diagnostic Studies:      EKG: normal sinus rhythm, unchanged from previous tracings. ECHO: reviewed. Ejection fraction: 50%  Stress Test: reviewed. 07/12/20 : Negative Lexiscan stress test.  Cardiac Angiography: reviewed. 02/25/17  Multi-vessel coronary artery disease.   Normal LV contractility.   Successful PCI / Drug Eluting Stent of the proximal Posterolateral Coronary Artery.   Successful PCI / Drug Eluting Stent of the mid Right Coronary Artery.   Successful PCI / Drug Eluting Stent of the proximal Posterior Descending Coronary Artery.   Successful PCI / Drug Eluting Stent of the distal Left Anterior Descending Coronary Artery  Cardiac Angiography: 11/09/2020  LMCA: Normal 0% stenosis. LAD: Proximal 75% stenosis. Mid area and distal 90% stenosis. D's are small   LCx: Mid 50% stenosis. OM1: Proximal 80% stenosis   RCA: Mid stent area 40-50% stenosis. PDA: Ostial 80% stenosis. PL branch with proximal 80% stenosis.     The LV gram was performed in the MOREL 30 position. LVEF: 55 %.      Conclusions:  Multivessel CAD   Preserved LV function      Recommendations:  1. Post-cath protocol  2. CV surgery  Consult   3. Continue optimal medical therapy  4. Risk factor modification    Cardiac Angiography: 11/11/2020  Procedure Summary   1.  Successful image guided PCI of ostial RPDA and Proximal LAD with JAMA   2. Balloon angioplasty of ostial RPL and distal LAD due to small caliber of    the vessels        Recommendations     Routine Post Stent Orders.     Staged intervention to OM in 4-6 weeks     Medical therapy with DAPT and high intensity statin         Assessment / Acute Cardiac Problems:   1. MV-CAD not a surgical candidate s/p PCI of ostial RPDA and Proximal LAD with JAMA with Balloon angioplasty of ostial RPL and distal LAD due to small caliber of the vessels with plan for staged PCI to OM in 4-6 weeks  2. Atrial fibrillation: unknown if it is new onset or paroxysmal  3. T2DM  4. HTN  5. HLD  6. Grade III Diastolic dysfunction  7. Hx of PE: on Xarelto (Last dose taken Friday, 11/06/2020)      Plan of Treatment:   1. S/P PCI of ostial RPDA and Proximal LAD with JAMA with Balloon angioplasty of ostial RPL and distal LAD due to small caliber of the vessels with plan for staged PCI to OM in 4-6 weeks  2. Continue ASA & brilinta along with crestor 20 mg once daily  3. Continue co-reg 25 BID & imdur 30 mg once daily  4. Continue nifedipine XL 90 mg once daily  5. Restart Xarelto today. 6. Hold ACEi for 1 to 2 more days before initiating prior to discharge  7. K>4, Mg>2    Nilay Beasley MD  Fellow, [address] [place] [address] CARLOS A Garcia MD, Attending Physician. [facility].

## 2020-11-12 NOTE — PLAN OF CARE
Problem: Falls - Risk of:  Goal: Will remain free from falls  Description: Will remain free from falls  11/12/2020 0753 by Johny Persaud RN  Outcome: Ongoing  11/12/2020 0435 by Raven Lauren RN  Outcome: Ongoing  Goal: Absence of physical injury  Description: Absence of physical injury  11/12/2020 0753 by Johny Persaud RN  Outcome: Ongoing  11/12/2020 0435 by Raven Lauren RN  Outcome: Ongoing     Problem: OXYGENATION/RESPIRATORY FUNCTION  Goal: Patient will maintain patent airway  11/12/2020 0753 by Johny Persaud RN  Outcome: Ongoing  11/12/2020 0435 by Raven Lauren RN  Outcome: Ongoing  Goal: Patient will achieve/maintain normal respiratory rate/effort  Description: Respiratory rate and effort will be within normal limits for the patient  11/12/2020 0753 by Johny Persaud RN  Outcome: Ongoing  11/12/2020 0435 by Raven Lauren RN  Outcome: Ongoing     Problem:  Activity:  Goal: Capacity to carry out activities will improve  Description: Capacity to carry out activities will improve  11/12/2020 0753 by Johny Persaud RN  Outcome: Ongoing  11/12/2020 0435 by Raven Lauren RN  Outcome: Ongoing  Goal: Will verbalize the importance of balancing activity with adequate rest periods  Description: Will verbalize the importance of balancing activity with adequate rest periods  11/12/2020 0753 by Johny Persaud RN  Outcome: Ongoing  11/12/2020 0435 by Raven Lauren RN  Outcome: Ongoing     Problem: Cardiac:  Goal: Hemodynamic stability will improve  Description: Hemodynamic stability will improve  11/12/2020 0753 by Johny Persaud RN  Outcome: Ongoing  11/12/2020 0435 by Raven aLuren RN  Outcome: Ongoing  Goal: Ability to maintain an adequate cardiac output will improve  Description: Ability to maintain an adequate cardiac output will improve  11/12/2020 0753 by Johny Persaud RN  Outcome: Ongoing  11/12/2020 0435 by Raven Lauren RN  Outcome: Ongoing     Problem: Coping:  Goal: Verbalizations of decreased anxiety will decrease  Description: Verbalizations of decreased anxiety will decrease  11/12/2020 0753 by Mallika Pabon RN  Outcome: Ongoing  11/12/2020 0435 by Pamela Pineda RN  Outcome: Ongoing     Problem: Fluid Volume:  Goal: Risk for excess fluid volume will decrease  Description: Risk for excess fluid volume will decrease  11/12/2020 0753 by Mallika Pabon RN  Outcome: Ongoing  11/12/2020 0435 by Pamela Pineda RN  Outcome: Ongoing  Goal: Maintenance of adequate hydration will improve  Description: Maintenance of adequate hydration will improve  11/12/2020 0753 by Mallika Pabon RN  Outcome: Ongoing  11/12/2020 0435 by Pamela Pineda RN  Outcome: Ongoing  Goal: Will show no signs and symptoms of electrolyte imbalance  Description: Will show no signs and symptoms of electrolyte imbalance  11/12/2020 0753 by Malliak Pabon RN  Outcome: Ongoing  11/12/2020 0435 by Pamela Pineda RN  Outcome: Ongoing     Problem: Health Behavior:  Goal: Ability to manage health-related needs will improve  Description: Ability to manage health-related needs will improve  11/12/2020 0753 by Mallika Pabon RN  Outcome: Ongoing  11/12/2020 0435 by Pamela Pineda RN  Outcome: Ongoing  Goal: Ability to seek appropriate health care will improve  Description: Ability to seek appropriate health care will improve  11/12/2020 0753 by Mallika Pabon RN  Outcome: Ongoing  11/12/2020 0435 by Pamela Pineda RN  Outcome: Ongoing     Problem: Nutritional:  Goal: Maintenance of adequate nutrition will improve  Description: Maintenance of adequate nutrition will improve  11/12/2020 0753 by Mallika Pabon RN  Outcome: Ongoing  11/12/2020 0435 by Pamela Pineda RN  Outcome: Ongoing     Problem: Physical Regulation:  Goal: Complications related to the disease process, condition or treatment will be avoided or minimized  Description: Complications related to the disease process, condition or treatment will be avoided or minimized  11/12/2020 0753 by Mallika Pabon RN  Outcome: Ongoing  11/12/2020 0435 by Héctor Doran RN  Outcome: Ongoing     Problem: Respiratory:  Goal: Ability to maintain adequate ventilation will improve  Description: Ability to maintain adequate ventilation will improve  11/12/2020 0753 by Arnold Villela RN  Outcome: Ongoing  11/12/2020 0435 by Héctor Doran RN  Outcome: Ongoing  Goal: Respiratory status will improve  Description: Respiratory status will improve  11/12/2020 0753 by Arnold Villela RN  Outcome: Ongoing  11/12/2020 0435 by Héctor Doran RN  Outcome: Ongoing     Problem: Skin Integrity:  Goal: Will show no infection signs and symptoms  Description: Will show no infection signs and symptoms  11/12/2020 0753 by Arnold Villela RN  Outcome: Ongoing  11/12/2020 0435 by Héctor Doran RN  Outcome: Ongoing  Goal: Absence of new skin breakdown  Description: Absence of new skin breakdown  11/12/2020 0753 by Arnold Villela RN  Outcome: Ongoing  11/12/2020 0435 by Héctor Doran RN  Outcome: Ongoing

## 2020-11-12 NOTE — PROGRESS NOTES
Updated Dr. Estelita Collet. Pt HR down to 49, pt became symptomatic, slurred speech, & disoriented to time and situation. EKG results reviewed. Pt now alert & oriented. HR 57. No new orders at this time.

## 2020-11-12 NOTE — PROGRESS NOTES
Occupational Therapy    Occupational Therapy Not Seen Note    DATE: 2020  Name: Cielo Diaz  : 1945  MRN: [id number]    Patient not available for Occupational Therapy due to:    Patient Declined : Pt politely declined stating having bouts of diarrhea (up to bathroom multiple times) and transferred to new room.  Pt tired and wanting to rest.        Electronically signed by KEMAL Coats on 2020 at 4:10 PM

## 2020-11-12 NOTE — PROGRESS NOTES
Physical Therapy  Facility/Department: [facility]  Daily Treatment Note  NAME: Karlie Connell  : 1945  MRN: [id number]    Date of Service: 2020    Discharge Recommendations:    Further therapy recommended at discharge. PT Equipment Recommendations  Equipment Needed: No     Assessment     Body structures, Functions, Activity limitations: Decreased functional mobility ; Decreased balance;Decreased endurance  Assessment: The pt ambulated 40ft with RW and CGA,Chair to follow, limited by mild endurance deficits. Recommend continued therapy to maximize safety and independence. Prognosis: Good  Decision Making: Medium Complexity  PT Education: Goals;PT Role;Plan of Care; Functional Mobility Training  REQUIRES PT FOLLOW UP: Yes  Activity Tolerance  Activity Tolerance: Patient Tolerated treatment well  Activity Tolerance: pt declined further ambulation due to arrival of breakfast         Patient Diagnosis(es): The encounter diagnosis was Acute on chronic congestive heart failure, unspecified heart failure type ([facility] [address] has a past medical history of Anxiety, Atrial fibrillation ([address] Benign positional vertigo, CAD (coronary artery disease), Chest pain, Depression, Diabetes mellitus ([address] Diabetic neuropathy ([address] Hyperlipidemia, Hypertension, and Migraine. has a past surgical history that includes Percutaneous Transluminal Coronary Angio; Cardiac catheterization; Coronary angioplasty with stent (2017); Appendectomy; and Coronary angioplasty with stent (2012). Restrictions  Restrictions/Precautions  Restrictions/Precautions: Up as Tolerated, Fall Risk  Required Braces or Orthoses?: No  Position Activity Restriction  Other position/activity restrictions: up with assistance, negative for PE  Subjective   Pt sitting up in bed. Pt's daughter is present. Pt has no c/o pain.    Orientation    Overall Orientation Status: Within Functional Limits  Objective     Bed mobility  Supine to Sit: Stand by assistance  Scooting: Supervision  Comment: pt retired to bedside chair following ambulation  Transfers  Sit to Stand: Contact guard assistance  Stand to sit: Contact guard assistance  Stand Pivot Transfers: Contact guard assistance  Comment: transfers performed with RW, pt demos appropriate UE placement with good return demo  Ambulation  Ambulation?: Yes  Ambulation 1  Surface: level tile  Device: Rolling Walker  Assistance: Contact guard assistance  Quality of Gait: steady, no LOB  Gait Deviations: Decreased step length;Decreased step height;Slow Cherise  Distance: 40ft x 1 with chair to follow. Stairs/Curb  Stairs?: No  Balance  Posture: Good  Sitting - Static: Good  Sitting - Dynamic: Good;-  Standing - Static: Fair;+  Standing - Dynamic: Fair;+  Comments: standing balance assessed with RW   Ex's  Upper extremity exercises: Bicep curl, shoulder flexion/extension, punches. Reps: 2 x 5 with 2 lb wt on a SPC. Seated LE exercise program: Long Arc Quads,heel/toe raises, and marches. Reps: 2 x 10 with 2 lb wt on B LE's.   Goals  Short term goals  Time Frame for Short term goals: 14 visits  Short term goal 1: Perform bed mobility and functional transfers independently  Short term goal 2: Ambulate 300ft with RW independently  Short term goal 3: Ascend/descend 4 steps with HR and SBA  Short term goal 4: Demo Good dynamic standing balance    Plan    Plan  Times per week: 5x/wk  Current Treatment Recommendations: Strengthening, ROM, Balance Training, Functional Mobility Training, Transfer Training, Gait Training, Stair training, Endurance Training, Home Exercise Program, Safety Education & Training, Patient/Caregiver Education & Training  Safety Devices  Type of devices: Nurse notified, Call light within reach, Gait belt, Left in chair  Restraints  Initially in place: No     Therapy Time   Individual Concurrent Group Co-treatment   Time In  110         Time Out  150         Minutes  40                 Bethany Roa, PTA

## 2020-11-13 VITALS
BODY MASS INDEX: 40.82 KG/M2 | HEART RATE: 52 BPM | DIASTOLIC BLOOD PRESSURE: 46 MMHG | OXYGEN SATURATION: 97 % | RESPIRATION RATE: 18 BRPM | WEIGHT: 254 LBS | HEIGHT: 66 IN | SYSTOLIC BLOOD PRESSURE: 103 MMHG | TEMPERATURE: 98.8 F

## 2020-11-13 PROBLEM — E44.0 MODERATE MALNUTRITION (HCC): Status: ACTIVE | Noted: 2020-11-13

## 2020-11-13 LAB
ABSOLUTE EOS #: 0.2 K/UL (ref 0–0.44)
ABSOLUTE IMMATURE GRANULOCYTE: 0.03 K/UL (ref 0–0.3)
ABSOLUTE LYMPH #: 1.62 K/UL (ref 1.1–3.7)
ABSOLUTE MONO #: 0.64 K/UL (ref 0.1–1.2)
ANION GAP SERPL CALCULATED.3IONS-SCNC: 13 MMOL/L (ref 9–17)
BASOPHILS # BLD: 1 % (ref 0–2)
BASOPHILS ABSOLUTE: 0.04 K/UL (ref 0–0.2)
BUN BLDV-MCNC: 12 MG/DL (ref 8–23)
BUN/CREAT BLD: ABNORMAL (ref 9–20)
CALCIUM SERPL-MCNC: 9.2 MG/DL (ref 8.6–10.4)
CHLORIDE BLD-SCNC: 101 MMOL/L (ref 98–107)
CO2: 24 MMOL/L (ref 20–31)
CREAT SERPL-MCNC: 0.88 MG/DL (ref 0.5–0.9)
DIFFERENTIAL TYPE: ABNORMAL
EOSINOPHILS RELATIVE PERCENT: 3 % (ref 1–4)
GFR AFRICAN AMERICAN: >60 ML/MIN
GFR NON-AFRICAN AMERICAN: >60 ML/MIN
GFR SERPL CREATININE-BSD FRML MDRD: ABNORMAL ML/MIN/{1.73_M2}
GFR SERPL CREATININE-BSD FRML MDRD: ABNORMAL ML/MIN/{1.73_M2}
GLUCOSE BLD-MCNC: 175 MG/DL (ref 70–99)
GLUCOSE BLD-MCNC: 214 MG/DL (ref 65–105)
GLUCOSE BLD-MCNC: 223 MG/DL (ref 65–105)
GLUCOSE BLD-MCNC: 231 MG/DL (ref 65–105)
GLUCOSE BLD-MCNC: 236 MG/DL (ref 65–105)
HCT VFR BLD CALC: 33.5 % (ref 36.3–47.1)
HEMOGLOBIN: 10.6 G/DL (ref 11.9–15.1)
IMMATURE GRANULOCYTES: 0 %
LYMPHOCYTES # BLD: 23 % (ref 24–43)
MAGNESIUM: 1.9 MG/DL (ref 1.6–2.6)
MCH RBC QN AUTO: 29.9 PG (ref 25.2–33.5)
MCHC RBC AUTO-ENTMCNC: 31.6 G/DL (ref 28.4–34.8)
MCV RBC AUTO: 94.4 FL (ref 82.6–102.9)
MONOCYTES # BLD: 9 % (ref 3–12)
NRBC AUTOMATED: 0 PER 100 WBC
PDW BLD-RTO: 13.2 % (ref 11.8–14.4)
PLATELET # BLD: 195 K/UL (ref 138–453)
PLATELET ESTIMATE: ABNORMAL
PMV BLD AUTO: 11.1 FL (ref 8.1–13.5)
POTASSIUM SERPL-SCNC: 3.3 MMOL/L (ref 3.7–5.3)
RBC # BLD: 3.55 M/UL (ref 3.95–5.11)
RBC # BLD: ABNORMAL 10*6/UL
SEG NEUTROPHILS: 64 % (ref 36–65)
SEGMENTED NEUTROPHILS ABSOLUTE COUNT: 4.57 K/UL (ref 1.5–8.1)
SODIUM BLD-SCNC: 138 MMOL/L (ref 135–144)
WBC # BLD: 7.1 K/UL (ref 3.5–11.3)
WBC # BLD: ABNORMAL 10*3/UL

## 2020-11-13 PROCEDURE — 6360000002 HC RX W HCPCS: Performed by: STUDENT IN AN ORGANIZED HEALTH CARE EDUCATION/TRAINING PROGRAM

## 2020-11-13 PROCEDURE — 97110 THERAPEUTIC EXERCISES: CPT

## 2020-11-13 PROCEDURE — 6370000000 HC RX 637 (ALT 250 FOR IP): Performed by: NURSE PRACTITIONER

## 2020-11-13 PROCEDURE — 6370000000 HC RX 637 (ALT 250 FOR IP): Performed by: STUDENT IN AN ORGANIZED HEALTH CARE EDUCATION/TRAINING PROGRAM

## 2020-11-13 PROCEDURE — 85025 COMPLETE CBC W/AUTO DIFF WBC: CPT

## 2020-11-13 PROCEDURE — 90686 IIV4 VACC NO PRSV 0.5 ML IM: CPT | Performed by: STUDENT IN AN ORGANIZED HEALTH CARE EDUCATION/TRAINING PROGRAM

## 2020-11-13 PROCEDURE — 2580000003 HC RX 258: Performed by: STUDENT IN AN ORGANIZED HEALTH CARE EDUCATION/TRAINING PROGRAM

## 2020-11-13 PROCEDURE — 82947 ASSAY GLUCOSE BLOOD QUANT: CPT

## 2020-11-13 PROCEDURE — 80048 BASIC METABOLIC PNL TOTAL CA: CPT

## 2020-11-13 PROCEDURE — 36415 COLL VENOUS BLD VENIPUNCTURE: CPT

## 2020-11-13 PROCEDURE — 99239 HOSP IP/OBS DSCHRG MGMT >30: CPT | Performed by: INTERNAL MEDICINE

## 2020-11-13 PROCEDURE — 83735 ASSAY OF MAGNESIUM: CPT

## 2020-11-13 PROCEDURE — 97116 GAIT TRAINING THERAPY: CPT

## 2020-11-13 PROCEDURE — G0008 ADMIN INFLUENZA VIRUS VAC: HCPCS | Performed by: STUDENT IN AN ORGANIZED HEALTH CARE EDUCATION/TRAINING PROGRAM

## 2020-11-13 RX ORDER — LISINOPRIL 2.5 MG/1
2.5 TABLET ORAL DAILY
Status: DISCONTINUED | OUTPATIENT
Start: 2020-11-13 | End: 2020-11-13 | Stop reason: HOSPADM

## 2020-11-13 RX ORDER — INSULIN GLARGINE 100 [IU]/ML
10 INJECTION, SOLUTION SUBCUTANEOUS NIGHTLY
Status: DISCONTINUED | OUTPATIENT
Start: 2020-11-13 | End: 2020-11-13 | Stop reason: HOSPADM

## 2020-11-13 RX ORDER — LISINOPRIL 2.5 MG/1
2.5 TABLET ORAL DAILY
Qty: 30 TABLET | Refills: 3 | Status: SHIPPED | OUTPATIENT
Start: 2020-11-13 | End: 2021-08-03 | Stop reason: SDUPTHER

## 2020-11-13 RX ORDER — INSULIN DETEMIR 100 [IU]/ML
12 INJECTION, SOLUTION SUBCUTANEOUS NIGHTLY
Qty: 5 PEN | Refills: 0 | Status: SHIPPED | OUTPATIENT
Start: 2020-11-13 | End: 2021-02-01

## 2020-11-13 RX ORDER — NIFEDIPINE 30 MG/1
30 TABLET, EXTENDED RELEASE ORAL DAILY
Qty: 30 TABLET | Refills: 1 | Status: ON HOLD | OUTPATIENT
Start: 2020-11-13 | End: 2021-09-04 | Stop reason: HOSPADM

## 2020-11-13 RX ADMIN — ISOSORBIDE MONONITRATE 30 MG: 30 TABLET ORAL at 08:18

## 2020-11-13 RX ADMIN — GABAPENTIN 600 MG: 600 TABLET ORAL at 08:18

## 2020-11-13 RX ADMIN — NIFEDIPINE 60 MG: 60 TABLET, EXTENDED RELEASE ORAL at 08:18

## 2020-11-13 RX ADMIN — Medication 10 ML: at 08:19

## 2020-11-13 RX ADMIN — RIVAROXABAN 20 MG: 20 TABLET, FILM COATED ORAL at 08:18

## 2020-11-13 RX ADMIN — ASPIRIN 81 MG: 81 TABLET, COATED ORAL at 08:18

## 2020-11-13 RX ADMIN — GABAPENTIN 600 MG: 600 TABLET ORAL at 14:11

## 2020-11-13 RX ADMIN — ROPINIROLE HYDROCHLORIDE 0.5 MG: 0.25 TABLET, FILM COATED ORAL at 14:11

## 2020-11-13 RX ADMIN — TICAGRELOR 90 MG: 90 TABLET ORAL at 08:18

## 2020-11-13 RX ADMIN — INFLUENZA A VIRUS A/VICTORIA/2454/2019 IVR-207 (H1N1) ANTIGEN (PROPIOLACTONE INACTIVATED), INFLUENZA A VIRUS A/HONG KONG/2671/2019 IVR-208 (H3N2) ANTIGEN (PROPIOLACTONE INACTIVATED), INFLUENZA B VIRUS B/VICTORIA/705/2018 BVR-11 ANTIGEN (PROPIOLACTONE INACTIVATED), INFLUENZA B VIRUS B/PHUKET/3073/2013 BVR-1B ANTIGEN (PROPIOLACTONE INACTIVATED) 0.5 ML: 15; 15; 15; 15 INJECTION, SUSPENSION INTRAMUSCULAR at 16:54

## 2020-11-13 RX ADMIN — ROPINIROLE HYDROCHLORIDE 0.5 MG: 0.25 TABLET, FILM COATED ORAL at 08:18

## 2020-11-13 RX ADMIN — SODIUM CHLORIDE, PRESERVATIVE FREE 10 ML: 5 INJECTION INTRAVENOUS at 08:20

## 2020-11-13 RX ADMIN — ESCITALOPRAM OXALATE 20 MG: 10 TABLET ORAL at 08:18

## 2020-11-13 RX ADMIN — ROSUVASTATIN CALCIUM 40 MG: 20 TABLET, FILM COATED ORAL at 08:19

## 2020-11-13 RX ADMIN — INSULIN LISPRO 2 UNITS: 100 INJECTION, SOLUTION INTRAVENOUS; SUBCUTANEOUS at 16:51

## 2020-11-13 RX ADMIN — LISINOPRIL 2.5 MG: 2.5 TABLET ORAL at 14:12

## 2020-11-13 RX ADMIN — CARVEDILOL 25 MG: 25 TABLET, FILM COATED ORAL at 08:17

## 2020-11-13 RX ADMIN — PANTOPRAZOLE SODIUM 40 MG: 40 TABLET, DELAYED RELEASE ORAL at 08:18

## 2020-11-13 RX ADMIN — INSULIN LISPRO 2 UNITS: 100 INJECTION, SOLUTION INTRAVENOUS; SUBCUTANEOUS at 12:31

## 2020-11-13 RX ADMIN — POTASSIUM CHLORIDE 40 MEQ: 1500 TABLET, EXTENDED RELEASE ORAL at 11:03

## 2020-11-13 NOTE — PLAN OF CARE
Nutrition Problem #1: Moderate malnutrition, In context of acute illness or injury  Intervention: Food and/or Nutrient Delivery: Modify Current Diet, Start Oral Nutrition Supplement  Nutritional Goals: Pt to meet % of est'd needs via PO

## 2020-11-13 NOTE — CARE COORDINATION
Received VM from [facility]- physician intends to deny for placement. Ref # [id number] and needs to be called by 4:30 today to [phone].  [place] CM advised

## 2020-11-13 NOTE — DISCHARGE INSTR - COC
Continuity of Care Form    Patient Name: Concepción Davalos   :  1945  MRN:  [id number]    Admit date:  2020  Discharge date:  2020    Code Status Order: Full Code   Advance Directives:   [facility] Documentation       Date/Time Healthcare Directive Type of Healthcare Directive Copy in [address] Agent's Name Healthcare Agent's Phone Number    11/10/20 1400  Yes, patient has an advance directive for healthcare treatment  Durable power of  for health care --  Healthcare power of   Monica Disla  [phone]            Admitting Physician:  Edgar Hannah MD  PCP: Severa Barge, MD    Discharging Nurse: [address] Unit/Room#:   Discharging Unit Phone Number: [phone]    Emergency Contact:   Extended Emergency Contact Information  Primary Emergency Contact: ByronRah larios  Address: [place]  Home Phone: [phone]  Relation: Child  Secondary Emergency Contact: Frank Abrams Phone: [phone]  Mobile Phone: [phone]  Relation: Child    Past Surgical History:  Past Surgical History:   Procedure Laterality Date    APPENDECTOMY      CARDIAC CATHETERIZATION          CORONARY ANGIOPLASTY WITH STENT PLACEMENT  2017    4 SYNERGY HEART STENTS DRUG ELUTING ALL MRI CONDITONAL 3T OK, SAFE IMMEDIATELY.      CORONARY ANGIOPLASTY WITH STENT PLACEMENT  2012    XIENCE HEART STENT/ MRI CONDITIONAL 3T OK, SAFE IMMEDIATELY    PTCA         Immunization History:   Immunization History   Administered Date(s) Administered    Influenza A ([id number]) Vaccine PF IM 2009    Influenza Vaccine, unspecified formulation 2013, 10/22/2015, 2016    Influenza Virus Vaccine 10/03/2016    Influenza, High Dose (Fluzone 65 yrs and older) 10/12/2018, 2019    Influenza, Quadv, IM, PF (6 mo and older Fluzone, Flulaval, Fluarix, and 3 yrs and older Afluria) 2018    (Ordered)   11/06/20 Rule-Out Test Resulted    C-diff Rule Out 09/03/20 09/03/20 09/03/20 Gastrointestinal Panel, Molecular (Ordered)   09/04/20 Cheyanne Kidney, RN    GI panel does not test for C-diff    C-diff Rule Out 09/02/20 09/02/20 09/02/20 Gastrointestinal Panel, Molecular (Ordered)   09/03/20 Cheyanne Kidney, RN    C-diff is not tested in GI Panel    C-diff Rule Out 07/13/20 07/13/20 07/13/20 C DIFF TOXIN/ANTIGEN (Ordered)   07/14/20 Cheyanne Kidney, RN    COVID-19 Rule Out 07/12/20 07/12/20 07/12/20 COVID-19 (Ordered)   07/13/20 Rule-Out Test Resulted            Nurse Assessment:  Last Vital Signs: BP (!) 162/70   Pulse 71   Temp 99.3 °F (37.4 °C) (Oral)   Resp 19   Ht 5' 6\" (1.676 m)   Wt 254 lb (115.2 kg)   SpO2 99%   BMI 41.00 kg/m²     Last documented pain score (0-10 scale): Pain Level: 0  Last Weight:   Wt Readings from Last 1 Encounters:   11/13/20 254 lb (115.2 kg)     Mental Status:  oriented, alert, coherent, logical, thought processes intact and able to concentrate and follow conversation    IV Access:  - None    Nursing Mobility/ADLs:  Walking   Assisted  Transfer  Assisted  Bathing  Assisted  Dressing  Assisted  Toileting  Assisted  Feeding  Assisted  Med Admin  Assisted  Med Delivery   prefers larger pills be split in half    Wound Care Documentation and Therapy:        Elimination:  Continence:   · Bowel: Yes  · Bladder: Yes  Urinary Catheter: None   Colostomy/Ileostomy/Ileal Conduit: No       Date of Last BM: 11/12/2020    Intake/Output Summary (Last 24 hours) at 11/13/2020 1325  Last data filed at 11/13/2020 1146  Gross per 24 hour   Intake 465 ml   Output 400 ml   Net 65 ml     I/O last 3 completed shifts:   In: 425 [P.O.:425]  Out: 400 [Urine:400]    Safety Concerns:     History of Falls (last 30 days) and At Risk for Falls    Impairments/Disabilities:      Vision    Nutrition Therapy:  Current Nutrition Therapy:   - Oral Diet:  Carb Control 5 carbs/meal (2000kcals/day) and Cardiac    Routes of Feeding: Oral  Liquids: No Restrictions  Daily Fluid Restriction: no  Last Modified Barium Swallow with Video (Video Swallowing Test): not done    Treatments at the Time of Hospital Discharge:   Respiratory Treatments: None  Oxygen Therapy:  is not on home oxygen therapy. Ventilator:    - No ventilator support    Rehab Therapies: Physical Therapy and Occupational Therapy  Weight Bearing Status/Restrictions: No weight bearing restirctions  Other Medical Equipment (for information only, NOT a DME order):  walker  Other Treatments: home health aides, skilled nursing    Patient's personal belongings (please select all that are sent with patient):  Dentures upper and lower    RN SIGNATURE:  Electronically signed by Fariba Clark RN on 11/13/20 at 4:00 PM EST    CASE MANAGEMENT/SOCIAL WORK SECTION    Inpatient Status Date: 11-6-2020    Readmission Risk Assessment Score:  Readmission Risk              Risk of Unplanned Readmission:        33           Discharging to Facility/ Agency   · Name:   · Address:  · Phone:  · Fax:    Dialysis Facility (if applicable)   · Name:  · Address:  · Dialysis Schedule:  · Phone:  · Fax:    / signature: Electronically signed by Stan Whelan RN on 11/13/20 at 2:05 PM EST    PHYSICIAN SECTION    Prognosis: Fair    Condition at Discharge: Stable    Rehab Potential (if transferring to Rehab): Fair    Recommended Labs or Other Treatments After Discharge:   PT and OT eval and treat  Fall precautions  Follow-up with cardiology  Blood sugar checks premeals and bedtime and adjust insulin in coordination with family doctor  Monitor vitals  Physician Certification: I certify the above information and transfer of Vickie Cummings  is necessary for the continuing treatment of the diagnosis listed and that she requires Home Care for greater 30 days.      Update Admission H&P: Changes in H&P as follows - see discharge summary    PHYSICIAN SIGNATURE: Electronically signed by Meena Rush MD on 11/13/20 at 1:26 PM EST

## 2020-11-13 NOTE — PROGRESS NOTES
[facility]  Progress Note                   Date:   11/13/2020  Patient name: Chris De La Fuente  Date of admission:  11/6/2020  8:28 AM  MRN:   [id number]  YOB: 1945  PCP: Trudi Rodriguez MD    Reason for Admission: Acute on chronic diastolic CHF (congestive heart failure) ([facility] [I50.33]  Acute on chronic diastolic CHF (congestive heart failure) ([facility] [L66.40]  Acute diastolic CHF (congestive heart failure) ([facility] [I50.31]    Subjective:       Clinical Changes /Abnormalities:  Patient seen and examined in bed in room with RN present. . BP improved from yesterday.             - 387 ml since admsission       Review of Systems    Medications:   Scheduled Meds:   carvedilol  25 mg Oral BID WC    insulin glargine  7 Units Subcutaneous Nightly    rOPINIRole  0.5 mg Oral TID    sodium chloride flush  10 mL Intravenous 2 times per day    ticagrelor  90 mg Oral BID    sodium chloride flush  10 mL Intravenous 2 times per day    [Held by provider] lisinopril  10 mg Oral Daily    NIFEdipine  60 mg Oral Daily    influenza virus vaccine  0.5 mL Intramuscular Prior to discharge    sodium chloride flush  10 mL Intravenous 2 times per day    rivaroxaban  20 mg Oral Daily with breakfast    sodium chloride flush  10 mL Intravenous 2 times per day    aspirin  81 mg Oral Daily    [Held by provider] clonazePAM  0.5 mg Oral Nightly    escitalopram  20 mg Oral Daily    gabapentin  600 mg Oral TID    isosorbide mononitrate  30 mg Oral Daily    pantoprazole  40 mg Oral QAM AC    rosuvastatin  40 mg Oral Daily    insulin lispro  0-6 Units Subcutaneous TID WC    insulin lispro  0-3 Units Subcutaneous Nightly    [Held by provider] spironolactone  25 mg Oral Daily     Continuous Infusions:   amiodarone 0.5 mg/min (11/10/20 1700)    dextrose       CBC:   Recent Labs     11/11/20  0504 11/12/20  0456 11/13/20  0650   WBC 7.2 7.1 7.1   HGB 10.9* 10.9* 10.6*    196 195     BMP: Recent Labs     11/11/20  0504 11/12/20  0456 11/13/20  0650    142 138   K 3.9 4.3 3.3*    105 101   CO2 29 26 24   BUN 13 13 12   CREATININE 0.93* 1.05* 0.88   GLUCOSE 212* 165* 175*     Hepatic:  No results for input(s): AST, ALT, ALB, BILITOT, ALKPHOS in the last 72 hours. Troponin: No results for input(s): TROPHS in the last 72 hours. BNP: No results for input(s): BNP in the last 72 hours. Lipids: No results for input(s): CHOL, HDL in the last 72 hours. Invalid input(s): LDLCALCU  INR:   Recent Labs     11/11/20  0504   INR 1.2     DIAGNOSTIC DATA  EKG: normal sinus rhythm, unchanged from previous tracings. CATH 11/11/2020  Findings:         LMCA: Mild irregularities 10-20%. LAD: Proximal 80% stenosis, reduced to 0% with PTCA/JAMA (3.5 x 18 mm Xience) post dilated with a 4.0 NC Balloon. Mid LAD stents were under-expanded as seen on IVUS with diffuse in stent restenosis and was treated with balloon angioplasty using NC balloons. Distal LAD is small vessel (~ 2-2.5 mm on IVUS) with 80% long stenosis reduced to 40% post balloon angioplasty       RCA: Patent mid stent with 50% in stent re-stenosis confirmed with IVUS  Ostial RPDA had 80% stenosis reduced to 0% with PTCA/JAMA (2.75 x 18 mm) extending into distal RCA  Ostial RPL had 60% stenosis, was jailed after PDA stent, reduced to 40% with kissing balloon angioplasty (CUONG III flow)     Patent stent in mid RPL with 30-40% stenosis      Procedure Summary   1.  Successful image guided PCI of ostial RPDA and Proximal LAD with JAMA   2. Balloon angioplasty of ostial RPL and distal LAD due to small caliber of    the vessels        Recommendations     Routine Post Stent Orders.     Staged intervention to OM in 4-6 weeks     Medical therapy with DAPT and high intensity statin     ECHO: 11/7/2020  Summary  Left ventricle is normal in size, mildly increased wall thickness, global  left ventricular systolic function is normal, calculated ejection fraction  is 58%. Grade III (severe) left ventricular diastolic dysfunction. Left atrium is moderately dilated. Inter-atrial septum is intact with no evidence for an atrial septal defect. Right atrium is moderately dilated . Normal right ventricular size and function. Mild mitral regurgitation. Tricuspid valve was not well visualized. Mild tricuspid regurgitation. Estimated right ventricular systolic pressure is 24.1 mmHg. Stress Test:  07/12/20 : Negative Lexiscan stress test.  Cardiac Angiography: reviewed. 02/25/17  Multi-vessel coronary artery disease.   Normal LV contractility.   Successful PCI / Drug Eluting Stent of the proximal Posterolateral Coronary Artery.   Successful PCI / Drug Eluting Stent of the mid Right Coronary Artery.   Successful PCI / Drug Eluting Stent of the proximal Posterior Descending Coronary Artery.   Successful PCI / Drug Eluting Stent of the distal Left Anterior Descending Coronary Artery  Cardiac Angiography: 11/09/2020  LMCA: Normal 0% stenosis. LAD: Proximal 75% stenosis. Mid area and distal 90% stenosis. D's are small   LCx: Mid 50% stenosis. OM1: Proximal 80% stenosis   RCA: Mid stent area 40-50% stenosis. PDA: Ostial 80% stenosis. PL branch with proximal 80% stenosis.     The LV gram was performed in the MOREL 30 position. LVEF: 55 %.      Conclusions:  Multivessel CAD   Preserved LV function      Recommendations:  1. Post-cath protocol  2. CV surgery  Consult   3. Continue optimal medical therapy  4. Risk factor modification    Objective:   Vitals: BP (!) 162/70   Pulse 71   Temp 99.3 °F (37.4 °C) (Oral)   Resp 19   Ht 5' 6\" (1.676 m)   Wt 254 lb (115.2 kg)   SpO2 99%   BMI 41.00 kg/m²          General appearance: alert and cooperative with exam  HEENT: Head: Normocephalic, no lesions, without obvious abnormality.   Neck:no JVD, trachea midline, no adenopathy  Lungs: Clear to auscultation  Heart: Regular rate and rhythm, s1/s2 auscultated, no murmurs  Abdomen: soft, non-tender, bowel sounds active  Extremities: no edema  Neurologic: not done  Right Femoral Artery site:   CDI without ecchymosis or hematoma. Soft + pulse. Assessment / Acute Cardiac Problems:   1. MV-CAD not a surgical candidate s/p PCI of ostial RPDA and Proximal LAD with JAMA with Balloon angioplasty of ostial RPL and distal LAD due to small caliber of the vessels with plan for staged PCI to OM in 4-6 weeks  2. Atrial fibrillation: unknown if it is new onset or paroxysmal  3. T2DM  4. HTN  5. HLD  6. Grade III Diastolic dysfunction  7.  Hx of PE: on Xarelto    Patient Active Problem List:     Atypical chest pain     Type 2 diabetes mellitus with diabetic polyneuropathy, with long-term current use of insulin (HCC)     Vertigo     Essential hypertension     CAD (coronary artery disease)     Dyspnea     Claudication in peripheral vascular disease (HCC)     Acute pulmonary embolism without acute cor pulmonale (HCC)     Diabetic polyneuropathy associated with type 2 diabetes mellitus ([address]     Other hyperlipidemia     Chronic systolic heart failure (HCC)     Multiple subsegmental pulmonary emboli without acute cor pulmonale (HCC)     Congestive heart failure (HCC)     Pyelonephritis of right kidney     History of pulmonary embolism     Elevated troponin I level     Sepsis ([address]     Suspected COVID-19 virus infection     MARIELLE (acute kidney injury) ([address]     Diarrhea of presumed infectious origin     Chronic diastolic (congestive) heart failure (HCC)     Generalized weakness     Anemia, normocytic normochromic     Class 1 obesity in adult     Gastroenteritis     Acute on chronic diastolic CHF (congestive heart failure) (HCC)     Acute diastolic CHF (congestive heart failure) ([address]    Coronary Discharge Core Measure: Please indicate the medication given by X, and if not the reasons not given:    Not Given Reason  Given      Beta Blockers X    ACE-I x      Statins X      ASA X      OAP Brilinta X    SL Nitro X Plan of Treatment:   1. MV-CAD S/p PCI, Stable. Plan for  staged PCI to OM in 4-6 weeks. Continue ASA, Brilinta, statin, BB, IMDUR. ACE restarted at lower dose. Brilinta given with script, samples and discount card. Discussed importance of medication. Pt verbalizes understanding. 2. A Fib-- NSR Continue BB and Xarelto  3. HTN- Will decrease BB. Continue CCB and monitor. ACE added at lower dose   4. Acute on chronic diastolic HFpEF. Improved, no signs of fluid overload today. Continue BB. ACE on hold d/t hypotension. 5. Keep K > 4.0 and Mag > 2.0 stable. 6. Will sign off and follow up in 2 weeks.       Electronically signed by MAYELA Kelley CNP on 11/13/2020 at 9:39 AM  [address]  [phone]      \

## 2020-11-13 NOTE — PROGRESS NOTES
Intravenous 2 times per day    ticagrelor  90 mg Oral BID    sodium chloride flush  10 mL Intravenous 2 times per day    NIFEdipine  60 mg Oral Daily    influenza virus vaccine  0.5 mL Intramuscular Prior to discharge    sodium chloride flush  10 mL Intravenous 2 times per day    rivaroxaban  20 mg Oral Daily with breakfast    sodium chloride flush  10 mL Intravenous 2 times per day    aspirin  81 mg Oral Daily    [Held by provider] clonazePAM  0.5 mg Oral Nightly    escitalopram  20 mg Oral Daily    gabapentin  600 mg Oral TID    isosorbide mononitrate  30 mg Oral Daily    pantoprazole  40 mg Oral QAM AC    rosuvastatin  40 mg Oral Daily    insulin lispro  0-6 Units Subcutaneous TID WC    insulin lispro  0-3 Units Subcutaneous Nightly    [Held by provider] spironolactone  25 mg Oral Daily     Continuous Infusions:    amiodarone 0.5 mg/min (11/10/20 1700)    dextrose       PRN MedicationshydrALAZINE, 25 mg, Q6H PRN  sodium chloride flush, 10 mL, PRN  acetaminophen, 650 mg, Q4H PRN  sodium chloride flush, 10 mL, PRN  acetaminophen, 650 mg, Q4H PRN  labetalol, 10 mg, Q30 Min PRN  sodium chloride flush, 10 mL, PRN  sodium chloride flush, 10 mL, PRN  acetaminophen, 650 mg, Q6H PRN  polyethylene glycol, 17 g, Daily PRN  potassium chloride, 40 mEq, PRN    Or  potassium alternative oral replacement, 40 mEq, PRN    Or  potassium chloride, 10 mEq, PRN  magnesium sulfate, 2 g, PRN  promethazine, 12.5 mg, Q6H PRN    Or  ondansetron, 4 mg, Q6H PRN  glucose, 15 g, PRN  dextrose, 12.5 g, PRN  glucagon (rDNA), 1 mg, PRN  dextrose, 100 mL/hr, PRN        Diagnostic Labs:  CBC:   Recent Labs     11/11/20  0504 11/12/20  0456 11/13/20  0650   WBC 7.2 7.1 7.1   RBC 3.66* 3.66* 3.55*   HGB 10.9* 10.9* 10.6*   HCT 34.0* 35.5* 33.5*   MCV 92.9 97.0 94.4   RDW 13.1 13.2 13.2    196 195     BMP:   Recent Labs     11/11/20  0504 11/12/20  0456 11/13/20  0650    142 138   K 3.9 4.3 3.3*    105 101   CO2 29 26 24   BUN 13 13 12   CREATININE 0.93* 1.05* 0.88         ASSESSMENT & PLAN     Acute on chronic diastolic CHF (HCC)  -Resume spironolactone 25 mg daily  -Strict I/Os record  -daily weights    Hypertensive urgency: controlled   -Nitro glycerin infusion stopped  -Home antihypertensive medications resumed  -Patient was hypertensive yesterday but hypertensive today  -restarted on hold medications     Insulin-dependent diabetes mellitus complicated by peripheral neuropathy: stable   -patient is on Lantus 10 units from onward.  -restarted the patient on Levemir 12 units on discharge  -restarted trajenta on discharge  -instructions put on the discharge for monitoring of blood glucose and to adjust medications.     Coronary artery disease s/p stents  -Patient got the stents yesterday  -Continue aspirin,brilinta and crestor  -Continue co-reg 25 mg  BID  -Restart Xarelto today. -ACEi restarted    Atrial fibrillation:  -Paroxysmal  -New  -On amiodarone as per recommendation of cardiology    Pulmonary embolism  -History of pulmonary embolism  -restarted xarelto     Peripheral neuropathy due to diabetes mellitus  -Continue gabapentin 600 mg 3 times a day    Diarrhea:  -New onset but has subsided and have no further episodes  -stool studies  -gentle iv fluids    Musculoskeletal chest pain  -resolved    Discharge planning: ongoing      DVT ppx: Already on anticoagulant  GI ppx: Pantoprazole     PT/OT/SW: Ongoing      Klaudia Deluca MD  Internal Medicine Resident, PGY-1  [facility];  [place], [place]  11/13/2020, 08:44 am

## 2020-11-13 NOTE — PROGRESS NOTES
injury related to inadequate protein-energy intake as evidenced by intake 0-25%, localized or generalized fluid accumulation(Need for ONS)      Nutrition Interventions:   Food and/or Nutrient Delivery:  Modify Current Diet, Start Oral Nutrition Supplement  Nutrition Education/Counseling:  Education not indicated   Coordination of Nutrition Care:  Continue to monitor while inpatient    Goals: Set   Pt to meet % of est'd needs via PO       Nutrition Monitoring and Evaluation:   Food/Nutrient Intake Outcomes:  Diet Advancement/Tolerance, Food and Nutrient Intake, Supplement Intake  Physical Signs/Symptoms Outcomes:  Biochemical Data, Nutrition Focused Physical Findings, Skin, Weight, GI Status, Fluid Status or Edema     Discharge Planning:     Too soon to determine     Electronically signed by Christy Addison RD, LD on 11/13/20 at 2:16 PM EST    Contact: [phone]

## 2020-11-13 NOTE — PLAN OF CARE
care will improve  Description: Ability to seek appropriate health care will improve  Outcome: Ongoing     Problem: Nutritional:  Goal: Maintenance of adequate nutrition will improve  Description: Maintenance of adequate nutrition will improve  Outcome: Ongoing     Problem: Physical Regulation:  Goal: Complications related to the disease process, condition or treatment will be avoided or minimized  Description: Complications related to the disease process, condition or treatment will be avoided or minimized  Outcome: Ongoing     Problem: Respiratory:  Goal: Ability to maintain adequate ventilation will improve  Description: Ability to maintain adequate ventilation will improve  Outcome: Ongoing  Goal: Respiratory status will improve  Description: Respiratory status will improve  Outcome: Ongoing     Problem: Skin Integrity:  Goal: Will show no infection signs and symptoms  Description: Will show no infection signs and symptoms  Outcome: Ongoing  Goal: Absence of new skin breakdown  Description: Absence of new skin breakdown  Outcome: Ongoing

## 2020-11-13 NOTE — PROGRESS NOTES
Physical Therapy  Facility/Department: [facility]  Daily Treatment Note  NAME: Cielo Diaz  : 1945  MRN: [id number]    Date of Service: 2020    Discharge Recommendations:  Patient would benefit from continued therapy after discharge   PT Equipment Recommendations  Equipment Needed: No    Assessment   Body structures, Functions, Activity limitations: Decreased functional mobility ; Decreased balance;Decreased endurance  Assessment: The pt ambulated 40ft with RW and CGA, limited by balance andendurance deficits. Recommend continued therapy to maximize safety and independence. Prognosis: Good  PT Education: Goals;PT Role;Plan of Care; Functional Mobility Training;Gait Training;General Safety;Home Exercise Program;Transfer Training  Activity Tolerance  Activity Tolerance: Patient limited by fatigue;Patient limited by endurance     Patient Diagnosis(es): The encounter diagnosis was Acute on chronic congestive heart failure, unspecified heart failure type ([address] has a past medical history of Anxiety, Atrial fibrillation ([address] Benign positional vertigo, CAD (coronary artery disease), Chest pain, Depression, Diabetes mellitus ([address] Diabetic neuropathy ([address] Hyperlipidemia, Hypertension, and Migraine. has a past surgical history that includes Percutaneous Transluminal Coronary Angio; Cardiac catheterization; Coronary angioplasty with stent (2017); Appendectomy; and Coronary angioplasty with stent (2012). Restrictions  Restrictions/Precautions  Restrictions/Precautions: Up as Tolerated, Fall Risk  Required Braces or Orthoses?: No  Position Activity Restriction  Other position/activity restrictions: up with assistance, negative for PE  Subjective   General  Chart Reviewed: Yes  Response To Previous Treatment: Patient with no complaints from previous session. Family / Caregiver Present: No  Subjective  Subjective: RN and pt agreeable to PT.  Pt supine in bed upon arrival, pleasant and cooperative throughout. Pain Screening  Patient Currently in Pain: Denies  Vital Signs  Patient Currently in Pain: Denies       Orientation  Orientation  Overall Orientation Status: Within Functional Limits  Cognition      Objective   Bed mobility  Supine to Sit: Contact guard assistance  Sit to Supine: (Pt retired to chair)  Comment: Pt demo increase tremors on R hand upon sitting at EOB,  Transfers  Sit to Stand: Contact guard assistance  Stand to sit: Contact guard assistance  Bed to Chair: Contact guard assistance  Comment: transfers performed with RW,  Ambulation  Ambulation?: Yes  Ambulation 1  Surface: level tile  Device: Rolling Walker  Other Apparatus: Wheelchair follow  Assistance: Contact guard assistance  Quality of Gait: demo impaired balance and poor tolerance. Gait Deviations: Decreased step length;Decreased step height;Slow Cherise  Distance: 40ft  Comments: I long seated break. Limited by fatigue and pt feeling light headed  Stairs/Curb  Stairs?: No     Balance  Sitting - Static: Good;-  Sitting - Dynamic: Good;-  Standing - Static: Fair;+  Standing - Dynamic: Fair;+  Exercises  Comments: Seated LE exercise program: Long Arc Quads, hip abduction/adduction, heel/toe raises, and marches.  Reps: 10      Goals  Short term goals  Time Frame for Short term goals: 14 visits  Short term goal 1: Perform bed mobility and functional transfers independently  Short term goal 2: Ambulate 300ft with RW independently  Short term goal 3: Ascend/descend 4 steps with HR and SBA  Short term goal 4: Demo Good dynamic standing balance    Plan    Plan  Times per week: 5x/wk  Current Treatment Recommendations: Strengthening, ROM, Balance Training, Functional Mobility Training, Transfer Training, Gait Training, Stair training, Endurance Training, Home Exercise Program, Safety Education & Training, Patient/Caregiver Education & Training  Safety Devices  Type of devices: Nurse notified, Call light within reach, Gait belt,

## 2020-11-13 NOTE — DISCHARGE SUMMARY
[address]     Department of Internal Medicine - Staff Internal Medicine Teaching Service    INPATIENT DISCHARGE SUMMARY      Patient Identification:  Yenifer Bray is a 76 y.o. female. :  1945  MRN: [id number]     Acct: [de-identified]   PCP: Maryetta Seip, MD  Admit Date:  2020  Discharge date and time: No discharge date for patient encounter. Attending Provider: Jon Esparza MD                                     [facility] Problem Lists:  Active Problems:    Acute on chronic diastolic CHF (congestive heart failure) (HCC)    Acute diastolic CHF (congestive heart failure) (HCC)    Arterial hypotension  Resolved Problems:    * No resolved hospital problems. *      HOSPITAL STAY     Brief Inpatient course:   Yenifer Bray is a 76 y.o. female with background history of diastolic heart failure presents with a chief complaint of shortness of breath which started about 3 days ago and it is getting worse slowly.  According to the patient she started to have shortness of breath started about 2 days ago. Katelin Mourning is gradually worsening and now even she is feeling short of breath at rest.  Patient also reported orthopnea and paroxysmal nocturnal dyspnea along with ankle swelling.  Patient says that she is having cough with some yellowish phlegm but no fever or chills.  No sick contacts recently. Patient is compliant with her medications, no salty foods. But patient was found to be very hypertensive on presentation to the ED. Patient was started on nitroglycerin drip and later on the patient blood pressure improved. Patient got the Echo which showed decreased EF and the cardiology did Cath which showed multivessel disease. CT was consulted who referred the patient back to cardiology for PCI. Patient got the PCI with two stents. She is feeling better now and her shortness of breath has improved. Patient had one diarrhea which later on subsided.  Patient 30 tablet, Refills: 3      linagliptin (TRADJENTA) 5 MG tablet Take 1 tablet by mouth daily  Qty: 30 tablet, Refills: 1      metFORMIN (GLUCOPHAGE-XR) 750 MG extended release tablet Take 1 tablet by mouth 2 times daily (with meals)  Qty: 30 tablet, Refills: 3      rOPINIRole (REQUIP) 0.5 MG tablet Take 1 tablet by mouth 3 times daily  Qty: 90 tablet, Refills: 0      carvedilol (COREG) 25 MG tablet Take 1 tablet by mouth 2 times daily (with meals) Hold for systolic blood pressure < 120 or heart rate < 50  Give 1 hour apart from other blood pressure medicines  Qty: 60 tablet, Refills: 3      albuterol sulfate  (90 Base) MCG/ACT inhaler Inhale 2 puffs into the lungs every 6 hours as needed for Wheezing  Qty: 1 Inhaler, Refills: 3      spironolactone (ALDACTONE) 25 MG tablet Take 25 mg by mouth daily       lisinopril (PRINIVIL;ZESTRIL) 10 MG tablet Take 1 tablet by mouth daily  Qty: 30 tablet, Refills: 3      vitamin E 400 UNIT capsule Take 400 Units by mouth daily. aspirin 81 MG tablet Take 81 mg by mouth daily. nitroGLYCERIN (NITROSTAT) 0.4 MG SL tablet Place 0.4 mg under the tongue every 5 minutes as needed. Activity: activity as tolerated    Diet: cardiac diet, diabetic diet and low sodium diet    Follow-up:    MAYELA Resendez - NP  [address] [place], [place] [address]y 6  Sanna [address]  [phone]    On 11/30/2020  at 10:15 am s/p cath with PCI at [facility] with planned staged PCI in 4-6 weeks. Melvin Millan, [address]  [address] [address]  [phone]    Call in 1 week        Patient Instructions: 1. Please starting taking Levemir as 12 units daily. Monitor your blood sugars daily and if your blood glucose is high then you can increase the dose of the Levemir with your PCP with close monitoring of your blood glucose. 2. Start taking home dose of trajenta  3.  We stopped your metformin due to worsening of your heart failure as it can increase the acids in your body if you have heart failure. So please do not take without prior consultation with PCP. 4. Please check your basal metabolic profile with your PCP in one week time  5.continue taking brilinta and aspirin until your follow up with the PCP. 6.Please check your weight daily at the same time of each day, using same clothes and same scale; please  monitor for 3-5 lb weight increase over 1-2 days notify physician if change noted. 7.if you are  feeling more tired than normal, feeling short of breath, coughing that increases when you lie down, sudden weight gain, swelling of your feet, legs or belly then please notify physician office if these symptoms occur. Follow up labs: Follow up imaging:     Note that over 30 minutes was spent in preparing discharge papers, discussing discharge with patient, medication review, etc.      Tianna Joshi MD, MD  Internal Medicine Resident, PGY-1  [address], [place]  11/13/2020, 1:35 PM

## 2020-11-13 NOTE — CARE COORDINATION
Claudette served Dr. Segundo David for peer to peer for placement. Ayana Dougherty. Call from [address] [facility] to peer denied. Recommend home with home care. Called Daughter Alix Zhou to inform her of insurance decision. She wanted home care  Company she had before-notes say [employer]. Called [employer]-stated they may not be able to provide aide services, she will call back. 1430 call from [facility] at [facility]-unable to provide aide services    1500 called daughter Alix Zhou with choices for other companies. Chose [facility]. Wanting [facility] for everything. Faxed referral to [facility]. Talked with Echo Ryder at [address]. 1610 talked with Echo Ryder at [facility]-patient accepted. 1705 faxed AVS and home care order to [facility].

## 2020-11-16 NOTE — PROGRESS NOTES
Physician Progress Note      PATIENT:               Jayda Mendes  CSN #:                  [id number]  :                       1945  ADMIT DATE:       2020 8:28 AM  DISCH DATE:        2020 6:35 PM  RESPONDING  PROVIDER #:        Arielle Espinosa MD          QUERY TEXT:    Patient admitted with Acute on Chronic diastolic CHF. Documentation reflects   NSTEMI in problem list , and then on PN of  - musculoskeletal chest   pain - resolved. If possible, please document in the progress notes and   discharge summary if NSTEMI was: The medical record reflects the following:  Risk Factors: 76 yr old hx CAD, DM, HTN, HLP  Clinical Indicators: troponins 32>31>28, admitted with increasing SOB, Acute   diastolic CHF, MV CAD,  Treatment: Cardiac cath x2 requiring JAMA x2 with balloon angioplasty x2, lab   monitoring, heparin gtt    Thank you in advance for your time, please contact me if I can be of any   assistance. Joe Loaiza RN, [facility], Supervisor  [phone]  Surya Borrego RN, [facility]- cell -[phone]  Options provided:  -- NSTEMI ruled out after study, only musculoskeletal chest pain  -- NSTEMI treated and resolved  -- NSTEMI confirmed after study  -- Other - I will add my own diagnosis  -- Disagree - Not applicable / Not valid  -- Disagree - Clinically unable to determine / Unknown  -- Refer to Clinical Documentation Reviewer    PROVIDER RESPONSE TEXT:    NSTEMI treated and resolved.     Query created by: Daria Chance on 2020 1:58 PM      Electronically signed by:  Arielle Espinosa MD 2020 11:51 AM

## 2020-11-23 RX ORDER — SODIUM CHLORIDE, SODIUM LACTATE, POTASSIUM CHLORIDE, CALCIUM CHLORIDE 600; 310; 30; 20 MG/100ML; MG/100ML; MG/100ML; MG/100ML
1000 INJECTION, SOLUTION INTRAVENOUS CONTINUOUS
Status: CANCELLED | OUTPATIENT
Start: 2020-11-23

## 2020-11-24 ENCOUNTER — HOSPITAL ENCOUNTER (OUTPATIENT)
Dept: PREADMISSION TESTING | Age: 75
Discharge: HOME | End: 2020-11-24

## 2020-12-11 ENCOUNTER — HOSPITAL ENCOUNTER (OUTPATIENT)
Dept: CARDIAC CATH/INVASIVE PROCEDURES | Age: 75
Discharge: HOME HEALTH | End: 2020-12-12
Attending: INTERNAL MEDICINE | Admitting: INTERNAL MEDICINE
Payer: MEDICARE

## 2020-12-11 PROBLEM — Z98.61 S/P PTCA (PERCUTANEOUS TRANSLUMINAL CORONARY ANGIOPLASTY): Status: ACTIVE | Noted: 2020-12-11

## 2020-12-11 LAB
GFR NON-AFRICAN AMERICAN: 56 ML/MIN
GFR SERPL CREATININE-BSD FRML MDRD: >60 ML/MIN
GFR SERPL CREATININE-BSD FRML MDRD: ABNORMAL ML/MIN/{1.73_M2}
GLUCOSE BLD-MCNC: 210 MG/DL (ref 65–105)
GLUCOSE BLD-MCNC: 263 MG/DL (ref 65–105)
GLUCOSE BLD-MCNC: 276 MG/DL (ref 74–100)
POC CHLORIDE: 102 MMOL/L (ref 98–107)
POC CREATININE: 0.97 MG/DL (ref 0.51–1.19)
POC HEMATOCRIT: 34 % (ref 36–46)
POC HEMOGLOBIN: 11.5 G/DL (ref 12–16)
POC POTASSIUM: 4.4 MMOL/L (ref 3.5–4.5)
POC SODIUM: 141 MMOL/L (ref 138–146)

## 2020-12-11 PROCEDURE — C1725 CATH, TRANSLUMIN NON-LASER: HCPCS

## 2020-12-11 PROCEDURE — 84295 ASSAY OF SERUM SODIUM: CPT

## 2020-12-11 PROCEDURE — 82947 ASSAY GLUCOSE BLOOD QUANT: CPT

## 2020-12-11 PROCEDURE — 84132 ASSAY OF SERUM POTASSIUM: CPT

## 2020-12-11 PROCEDURE — 2580000003 HC RX 258: Performed by: INTERNAL MEDICINE

## 2020-12-11 PROCEDURE — 6360000004 HC RX CONTRAST MEDICATION

## 2020-12-11 PROCEDURE — C1769 GUIDE WIRE: HCPCS

## 2020-12-11 PROCEDURE — 2500000003 HC RX 250 WO HCPCS

## 2020-12-11 PROCEDURE — 6370000000 HC RX 637 (ALT 250 FOR IP): Performed by: STUDENT IN AN ORGANIZED HEALTH CARE EDUCATION/TRAINING PROGRAM

## 2020-12-11 PROCEDURE — 7100000000 HC PACU RECOVERY - FIRST 15 MIN

## 2020-12-11 PROCEDURE — C1894 INTRO/SHEATH, NON-LASER: HCPCS

## 2020-12-11 PROCEDURE — 7100000001 HC PACU RECOVERY - ADDTL 15 MIN

## 2020-12-11 PROCEDURE — 93454 CORONARY ARTERY ANGIO S&I: CPT | Performed by: INTERNAL MEDICINE

## 2020-12-11 PROCEDURE — C1874 STENT, COATED/COV W/DEL SYS: HCPCS

## 2020-12-11 PROCEDURE — C9600 PERC DRUG-EL COR STENT SING: HCPCS | Performed by: INTERNAL MEDICINE

## 2020-12-11 PROCEDURE — 82565 ASSAY OF CREATININE: CPT

## 2020-12-11 PROCEDURE — 6360000002 HC RX W HCPCS

## 2020-12-11 PROCEDURE — 85014 HEMATOCRIT: CPT

## 2020-12-11 PROCEDURE — C1887 CATHETER, GUIDING: HCPCS

## 2020-12-11 PROCEDURE — 82435 ASSAY OF BLOOD CHLORIDE: CPT

## 2020-12-11 PROCEDURE — 2709999900 HC NON-CHARGEABLE SUPPLY

## 2020-12-11 PROCEDURE — 6370000000 HC RX 637 (ALT 250 FOR IP)

## 2020-12-11 RX ORDER — DEXTROSE MONOHYDRATE 50 MG/ML
100 INJECTION, SOLUTION INTRAVENOUS PRN
Status: DISCONTINUED | OUTPATIENT
Start: 2020-12-11 | End: 2020-12-12 | Stop reason: HOSPADM

## 2020-12-11 RX ORDER — SODIUM CHLORIDE 9 MG/ML
INJECTION, SOLUTION INTRAVENOUS CONTINUOUS
Status: DISCONTINUED | OUTPATIENT
Start: 2020-12-11 | End: 2020-12-12 | Stop reason: HOSPADM

## 2020-12-11 RX ORDER — SODIUM CHLORIDE 0.9 % (FLUSH) 0.9 %
10 SYRINGE (ML) INJECTION PRN
Status: CANCELLED | OUTPATIENT
Start: 2020-12-11

## 2020-12-11 RX ORDER — ROSUVASTATIN CALCIUM 20 MG/1
40 TABLET, COATED ORAL NIGHTLY
Status: DISCONTINUED | OUTPATIENT
Start: 2020-12-11 | End: 2020-12-12 | Stop reason: HOSPADM

## 2020-12-11 RX ORDER — CARVEDILOL 12.5 MG/1
12.5 TABLET ORAL 2 TIMES DAILY WITH MEALS
Status: DISCONTINUED | OUTPATIENT
Start: 2020-12-11 | End: 2020-12-12 | Stop reason: HOSPADM

## 2020-12-11 RX ORDER — GABAPENTIN 300 MG/1
300 CAPSULE ORAL 3 TIMES DAILY
Status: DISCONTINUED | OUTPATIENT
Start: 2020-12-11 | End: 2020-12-12 | Stop reason: HOSPADM

## 2020-12-11 RX ORDER — DEXTROSE MONOHYDRATE 25 G/50ML
12.5 INJECTION, SOLUTION INTRAVENOUS PRN
Status: DISCONTINUED | OUTPATIENT
Start: 2020-12-11 | End: 2020-12-12 | Stop reason: HOSPADM

## 2020-12-11 RX ORDER — ASPIRIN 81 MG/1
81 TABLET ORAL DAILY
Status: DISCONTINUED | OUTPATIENT
Start: 2020-12-12 | End: 2020-12-11

## 2020-12-11 RX ORDER — LOPERAMIDE HYDROCHLORIDE 2 MG/1
2 CAPSULE ORAL 4 TIMES DAILY PRN
Status: DISCONTINUED | OUTPATIENT
Start: 2020-12-11 | End: 2020-12-12 | Stop reason: HOSPADM

## 2020-12-11 RX ORDER — DIPHENHYDRAMINE HCL 25 MG
25 TABLET ORAL NIGHTLY PRN
Status: DISCONTINUED | OUTPATIENT
Start: 2020-12-11 | End: 2020-12-12 | Stop reason: HOSPADM

## 2020-12-11 RX ORDER — ACETAMINOPHEN 325 MG/1
650 TABLET ORAL EVERY 4 HOURS PRN
Status: CANCELLED | OUTPATIENT
Start: 2020-12-11

## 2020-12-11 RX ORDER — LABETALOL HYDROCHLORIDE 5 MG/ML
10 INJECTION, SOLUTION INTRAVENOUS EVERY 4 HOURS PRN
Status: DISCONTINUED | OUTPATIENT
Start: 2020-12-11 | End: 2020-12-12 | Stop reason: HOSPADM

## 2020-12-11 RX ORDER — NICOTINE POLACRILEX 4 MG
15 LOZENGE BUCCAL PRN
Status: DISCONTINUED | OUTPATIENT
Start: 2020-12-11 | End: 2020-12-12 | Stop reason: HOSPADM

## 2020-12-11 RX ORDER — SODIUM CHLORIDE 0.9 % (FLUSH) 0.9 %
10 SYRINGE (ML) INJECTION EVERY 12 HOURS SCHEDULED
Status: CANCELLED | OUTPATIENT
Start: 2020-12-11

## 2020-12-11 RX ADMIN — CARVEDILOL 12.5 MG: 12.5 TABLET, FILM COATED ORAL at 17:17

## 2020-12-11 RX ADMIN — INSULIN LISPRO 2 UNITS: 100 INJECTION, SOLUTION INTRAVENOUS; SUBCUTANEOUS at 17:17

## 2020-12-11 RX ADMIN — LOPERAMIDE HYDROCHLORIDE 2 MG: 2 CAPSULE ORAL at 20:48

## 2020-12-11 RX ADMIN — TICAGRELOR 90 MG: 90 TABLET ORAL at 21:49

## 2020-12-11 RX ADMIN — DIPHENHYDRAMINE HCL 25 MG: 25 TABLET ORAL at 20:48

## 2020-12-11 RX ADMIN — SODIUM CHLORIDE: 9 INJECTION, SOLUTION INTRAVENOUS at 16:50

## 2020-12-11 RX ADMIN — ROSUVASTATIN CALCIUM 40 MG: 20 TABLET, FILM COATED ORAL at 21:46

## 2020-12-11 RX ADMIN — INSULIN LISPRO 2 UNITS: 100 INJECTION, SOLUTION INTRAVENOUS; SUBCUTANEOUS at 20:56

## 2020-12-11 ASSESSMENT — PAIN SCALES - GENERAL
PAINLEVEL_OUTOF10: 0
PAINLEVEL_OUTOF10: 0

## 2020-12-11 NOTE — OP NOTE
[facility]  Cardiac catheterization note        Date:   12/11/2020  Patient name: Vickie Cummings  Date of admission:  No admission date for patient encounter. MRN:   [id number]  YOB: 1945    Operators:  Miranda Atwood MD (Attending Physician)       Pre Procedure Conscious Sedation Data:    ASA Class:    [] I [x] II [] III [] IV    Mallampati Class:  [] I [x] II [] III [] IV      Indication:  1. MV CAD - staged PCI  2. Risk factors (HTN, HFpEF)    Procedure:   1. Left heart catheterization   2. Selective left and right coronary angiography   3. Left ventriculography    4. PTCA/JAMA OM and LAD    Access:  [] Femoral  [x] Radial  artery       [x] Right  [] Left    Procedure: After informed consent was obtained with explanation of the risks and benefits, patient was brought to the cath lab. The access area was prepped and draped in sterile fashion. 1% lidocaine was used for local block. The artery was cannulated with 6  Fr sheath with brisk arterial blood return. The side port was frequently flushed and aspirated with normal saline. Findings:  LMCA: Mild irregularities 10-20%. LAD: Patent proximal and mid stents with 20-30% diffuse instent restenosis   Distally vessel is small to intermediate with long 75% re-stenosis, treated with PTCA/JAMA     LCx: Mild irregularities 10-20%. OM has mid 80% stenosis, reduced to 0% with PTCA/JAMA       Coronary Tree        Dominance: Right     The LV gram was not performed.          Procedure Summary        1. Successful Staged PCI of OM with JAMA    2. Restenosis of distal LAD, required, PTCA/JAMA        Recommendations        Routine Post PCI Orders.    Medical therapy    Risk factor modification.      EBL - 5 cc    Electronically signed by Ricarda Saunders MD on 12/11/2020 at 3:22 PM  Cardiovascular fellow  [id number]           Attending Physician    Chelsea Zabala MD, [facility]

## 2020-12-11 NOTE — PROGRESS NOTES
Patient admitted, consent signed and questions answered. Patient ready for procedure. Call light to reach with side rails up 2 of 2. Bilateral groin clipped. Family at bedside with patient. History and physical needs to be completed.

## 2020-12-11 NOTE — H&P
[facility]  Pre- Procedure History and Physical/Update          Patient Name:  Concepción Davalos  MRN:    [id number]  YOB: 1945  Date of evaluation:  12/11/2020       Please refer to the consult note / H&P completed by Davi Nguyen on 11/13/20 in the medical record and note that:     [x] I have examined the patient and reviewed the H&P/Consult and there are no changes to be made to the assessment or plan. [] I have examined the patient and reviewed the H&P/Consult and have noted the following changes:        Past Medical History:   Diagnosis Date    Anxiety     Atrial fibrillation (HCC)     chronic-takes xarelto    Benign positional vertigo     CAD (coronary artery disease)     Chest pain     Depression     Diabetes mellitus ([address]     Diabetic neuropathy [facility]     Hyperlipidemia     Hypertension     Migraine     Migraine headaches aggravated with sublingual nitroglycerin       Past Surgical History:   Procedure Laterality Date    APPENDECTOMY      CARDIAC CATHETERIZATION      2012    CORONARY ANGIOPLASTY WITH STENT PLACEMENT  02/25/2017    4 SYNERGY HEART STENTS DRUG ELUTING ALL MRI CONDITONAL 3T OK, SAFE IMMEDIATELY.  CORONARY ANGIOPLASTY WITH STENT PLACEMENT  07/11/2012    XIENCE HEART STENT/ MRI CONDITIONAL 3T OK, SAFE IMMEDIATELY    CORONARY ANGIOPLASTY WITH STENT PLACEMENT  12/11/2020    PTCA          reports that she has never smoked. She has never used smokeless tobacco. She reports that she does not drink alcohol or use drugs. Prior to Admission medications    Medication Sig Start Date End Date Taking?  Authorizing Provider   LEVEMIR FLEXTOUCH 100 UNIT/ML injection pen Inject 12 Units into the skin nightly If your sugars are high go back to normal dosage, please follow up further with your PCP. 11/13/20  Yes Maddi Shetty MD   lisinopril (PRINIVIL;ZESTRIL) 2.5 MG tablet Take 1 tablet by mouth daily  Patient taking differently: Take 10 mg by mouth daily  11/13/20  Yes Alexandria Ly MD   ticagrelor (BRILINTA) 90 MG TABS tablet Take 1 tablet by mouth 2 times daily 11/13/20  Yes Alexandria Ly MD   NIFEdipine (PROCARDIA XL) 30 MG extended release tablet Take 1 tablet by mouth daily 11/13/20 1/12/21 Yes Alexandria Ly MD   rivaroxaban (XARELTO) 20 MG TABS tablet Take 1 tablet by mouth daily (with breakfast) 10/21/20  Yes Trudi Rodriguez MD   escitalopram (LEXAPRO) 20 MG tablet Take 1 tablet by mouth daily 8/11/20  Yes Trudi Rodriguez MD   clonazePAM Radha Staggers) 0.5 MG tablet take 1 tablet by mouth twice a day if needed for anxiety 8/5/20 12/11/20 Yes Trudi Rodriguez MD   hydrOXYzine (ATARAX) 25 MG tablet take 1 tablet by mouth every 8 hours rectally for itching 8/5/20  Yes Trudi Rodriguez MD   gabapentin (NEURONTIN) 600 MG tablet Take 1 tablet by mouth 3 times daily for 30 days. 6/12/20 12/11/20 Yes Trudi Rodriguez MD   fluticasone Peg Slight) 50 MCG/ACT nasal spray instill 2 sprays into each nostril once daily 4/21/20  Yes Attila Vicente MD   omeprazole (PRILOSEC) 20 MG delayed release capsule Take 1 capsule by mouth Daily 5/11/20  Yes Trudi Rodriguez MD   insulin aspart (NOVOLOG FLEXPEN) 100 UNIT/ML injection pen Use TID before meals according to scale - max 45 units a day 5/11/20  Yes Trudi Rodriguez MD   loratadine (CLARITIN) 10 MG tablet Take 1 tablet every day by oral route.  5/11/20  Yes Trudi Rodriguez MD   rosuvastatin (CRESTOR) 40 MG tablet Take 1 tablet by mouth daily 5/1/20  Yes Truid Rodriguez MD   isosorbide mononitrate (IMDUR) 30 MG extended release tablet Take 1 tablet by mouth daily 11/26/19  Yes Sam Mix MD   carvedilol (COREG) 25 MG tablet Take 1 tablet by mouth 2 times daily (with meals) Hold for systolic blood pressure < 120 or heart rate < 50  Give 1 hour apart from other blood pressure medicines 11/25/19  Yes Sam Mix MD   spironolactone (ALDACTONE) 25 MG tablet Take 25 mg by mouth daily    Yes Historical Provider, MD   vitamin E 400 UNIT capsule Take 400 Units by mouth daily. Yes Historical Provider, MD   aspirin 81 MG tablet Take 81 mg by mouth daily. Yes Historical Provider, MD   nitroGLYCERIN (NITROSTAT) 0.4 MG SL tablet Place 1 tablet under the tongue every 5 minutes as needed for Chest pain 11/24/20   Lynnette Honeycutt MD   Continuous Blood Gluc  (FREESTYLE RICHARD 14 DAY READER) JAQUELINE 1 each by Does not apply route continuous [facility] 10/30/20   Lynnette Honeycutt MD   Continuous Blood Gluc Sensor (FREESTYLE RICHARD 14 DAY SENSOR) MISC 1 each by Does not apply route every 14 days 10/30/20   Lynnette Honeycutt MD   glucose monitoring kit (FREESTYLE) monitoring kit 1 kit by Does not apply route 4 times daily Freestyle Richard . Dx E11.65, has tremors and cannot use conventional meter without great difficulty. Please provide supplies for 3 months, rf 3,Pharmacy [facility]. 10/6/20   Lynnette Honeycutt MD   [facility] Devices (STEP N REST WALKER) MISC 1 each by Does not apply route continuous Seated, wheeled walker 10/6/20   Lynnette Honeycutt MD   pregabalin (LYRICA) 50 MG capsule Take 1 capsule by mouth 3 times daily for 30 days. 10/2/20 11/1/20  Jackie Mcmahon MD   NOVOFINE 32G X 6 MM MISC use 1 NEEDLE to inject MEDICATION subcutaneously five times a day 4/8/20   Historical Provider, MD   mupirocin (BACTROBAN) 2 % ointment apply topically to affected area three times a day 4/21/20   Historical Provider, MD   albuterol sulfate  (90 Base) MCG/ACT inhaler Inhale 2 puffs into the lungs every 6 hours as needed for Wheezing  Patient not taking: Reported on 10/21/2020 3/15/18   Johny Streeter MD       Allergies   Allergen Reactions    Penicillins      Other reaction(s): Hives    Sulfa Antibiotics      Other reaction(s): Rash         REVIEW OF SYSTEMS:     A detailed review of system was performed as already noted and is otherwise as above. PHYSICAL EXAM:     Vitals:    12/11/20 1300   BP: (!) 153/64   Pulse: 61   Resp: 16   Temp: 98 °F (36.7 °C)   SpO2: 98%        Constitutional and General Appearance: Alert. Not in acute stress. Respiratory:  · Clear to auscultation b/l. No wheeze or crackles. Cardiovascular:  · Regular rate and rhythm. No murmurs. · Jugular venous pulsation is normal  Abdomen:  · No masses or tenderness  Extremities:  · Lower extremity edema - No  · Skin: Warm and dry  Neurological:  · Alert and oriented. · Moves all extremities well      Active Problems:    S/P PTCA (percutaneous transluminal coronary angioplasty)  Resolved Problems:    * No resolved hospital problems. *      Assessment:  1. MV CAD - needs staged PCI -OM, prior PCI to RPDA and prox LAD and POBA of ostial RPL and distal LAD  2. Paroxysmal afib  3. HTN  4. HFpEF      Plan:  1. Proceed with planned cardiac cath/PCI. 2. Further orders to follow. The risks, benefits, and alternatives of the prcoedure were discussed in detail with the patient. The patient agrees to proceed with the procedure, verbalizes understanding and signed consent.        Sarika Aldana MD  Fellow, [facility]

## 2020-12-11 NOTE — PROGRESS NOTES
Patient received post cath to [facility] room 10. Assessment obtained. Post cath pathway initiated. Right radial site with Vascband intact. No hematoma noted. Restrictions reviewed with family and patient  Patient without complaints.

## 2020-12-12 VITALS
HEIGHT: 66 IN | DIASTOLIC BLOOD PRESSURE: 70 MMHG | OXYGEN SATURATION: 99 % | RESPIRATION RATE: 14 BRPM | TEMPERATURE: 98 F | SYSTOLIC BLOOD PRESSURE: 147 MMHG | HEART RATE: 62 BPM | WEIGHT: 222 LBS | BODY MASS INDEX: 35.68 KG/M2

## 2020-12-12 LAB — GLUCOSE BLD-MCNC: 216 MG/DL (ref 65–105)

## 2020-12-12 PROCEDURE — 2500000003 HC RX 250 WO HCPCS: Performed by: STUDENT IN AN ORGANIZED HEALTH CARE EDUCATION/TRAINING PROGRAM

## 2020-12-12 PROCEDURE — 2580000003 HC RX 258: Performed by: INTERNAL MEDICINE

## 2020-12-12 PROCEDURE — 6370000000 HC RX 637 (ALT 250 FOR IP): Performed by: STUDENT IN AN ORGANIZED HEALTH CARE EDUCATION/TRAINING PROGRAM

## 2020-12-12 PROCEDURE — 82947 ASSAY GLUCOSE BLOOD QUANT: CPT

## 2020-12-12 RX ORDER — LISINOPRIL 2.5 MG/1
2.5 TABLET ORAL DAILY
Status: DISCONTINUED | OUTPATIENT
Start: 2020-12-12 | End: 2020-12-12 | Stop reason: HOSPADM

## 2020-12-12 RX ORDER — CARVEDILOL 12.5 MG/1
12.5 TABLET ORAL 2 TIMES DAILY WITH MEALS
Qty: 60 TABLET | Refills: 3 | Status: SHIPPED | OUTPATIENT
Start: 2020-12-12 | End: 2020-12-12 | Stop reason: HOSPADM

## 2020-12-12 RX ADMIN — INSULIN LISPRO 2 UNITS: 100 INJECTION, SOLUTION INTRAVENOUS; SUBCUTANEOUS at 09:06

## 2020-12-12 RX ADMIN — Medication 10 MG: at 05:00

## 2020-12-12 RX ADMIN — TICAGRELOR 90 MG: 90 TABLET ORAL at 09:00

## 2020-12-12 RX ADMIN — Medication 10 MG: at 09:00

## 2020-12-12 RX ADMIN — SODIUM CHLORIDE: 9 INJECTION, SOLUTION INTRAVENOUS at 06:32

## 2020-12-12 RX ADMIN — GABAPENTIN 300 MG: 300 CAPSULE ORAL at 09:00

## 2020-12-12 RX ADMIN — CARVEDILOL 12.5 MG: 12.5 TABLET, FILM COATED ORAL at 09:00

## 2020-12-12 NOTE — CARE COORDINATION
Discharge [facility] Case Management Department  Written by: Cruz Frausto RN    Patient Name: Bret Blas  Attending Provider: Brigette Valerio MD  Admit Date: 2020  4:41 PM  MRN: [id number]  Account: [de-identified]                     : 1945  Discharge Date: 2020      Disposition: home with [facility]. Left VM for [id number] admissions that patient is d/cing today. Patient has transportation. AVS and HC order faxed to [address] [place] [address]. 1115 Call from [place] at [address] [place] [address] stating that patient was not seen by their agency because she was already being seen by [facility].  called Nickie Stout with [facility]. She stated that patient as discharged from their agency because family wanted home health aides, which [facility] could not provide. 1225 spoke with patient and patient's daughter at bedside regarding hc. Both stated that patient uses [facility] and would like to continue with them. Called Sarah at [facility] 9881 and notified her that patient and patient's family agreeable to go home with [facility]. STEPHEN and HC order faxed to [facility].      Cruz Frausto RN

## 2020-12-12 NOTE — CARE COORDINATION
Case Management Initial Discharge Plan  Shalom Charles,             Met with:patient to discuss discharge plans. Information verified: address, contacts, phone number, , insurance Yes    Emergency Contact/Next of Kin name & number: Markel Andersen - son [phone]    PCP: Lynnette Honeycutt MD  Date of last visit: last month    Insurance Provider: Perfecto Baldy    Discharge Planning    Living Arrangements: Pt lives with 22yo grson     Support Systems: family      Home has 1 stories - mobile home  3 stairs to climb to get into front door, 0 stairs to climb to reach second floor  Location of bedroom/bathroom in home     Patient able to perform ADL's:Independent    Current Services (outpatient & in home) none, recently had [facility]  DME equipment: cane, walker  DME provider:    Receiving oral anticoagulation therapy? Yes    If indicated:   Physician managing anticoagulation treatment: PCP  Where does patient obtain lab work for ATC treatment? [facility]      Potential Assistance Needed:       Patient agreeable to home care: Yes, pt would like her HC resumed and wants same company as had before  Freedom of choice provided:  yes    Prior SNF/Rehab Placement and Facility: No  Agreeable to SNF/Rehab: No  [place] of choice provided: n/a     Evaluation: no    Expected Discharge date:       Patient expects to be discharged to:home     Follow Up Appointment: Best Day/ Time:      Transportation provider: [facility]  Transportation arrangements needed for discharge: No, dtr will transport    Readmission Risk              Risk of Unplanned Readmission:        0             Does patient have a readmission risk score greater than 14?: No  If yes, follow-up appointment must be made within 7 days of discharge.      Goals of Care:       Discharge Plan: Pt will return home and would like [facility] again  Ref made          Electronically signed by Montana Jones on 20 at 9:12 AM EST

## 2020-12-12 NOTE — DISCHARGE INSTR - COC
Continuity of Care Form    Patient Name: Bret Blas   :  1945  MRN:  [id number]    Admit date:  2020  Discharge date:  2020    Code Status Order: Full Code   Advance Directives:   [facility] Documentation       Date/Time Healthcare Directive Type of Healthcare Directive Copy in [address] [facility] [address] Agent's Name Healthcare Agent's Phone Number    [phone]  Yes, patient has an advance directive for healthcare treatment  Durable power of  for health care;Living will  No, copy requested from family  [address] [facility] Claudy Moss            Admitting Physician:  Brigette Valerio MD  PCP: Reyes Maynard MD    Discharging Nurse: James Tao Unit/Room#: 6930/7941-81  Discharging Unit Phone Number: [phone]    Emergency Contact:   Extended Emergency Contact Information  Primary Emergency Contact: Rah Matthews  Address: [place]  Home Phone: [phone]  Relation: Child  Secondary Emergency Contact: Frank Gracia. Phone: [phone]  Mobile Phone: [phone]  Relation: Child    Past Surgical History:  Past Surgical History:   Procedure Laterality Date    APPENDECTOMY      CARDIAC CATHETERIZATION          CORONARY ANGIOPLASTY WITH STENT PLACEMENT  2017    4 SYNERGY HEART STENTS DRUG ELUTING ALL MRI CONDITONAL 3T OK, SAFE IMMEDIATELY.      CORONARY ANGIOPLASTY WITH STENT PLACEMENT  2012    XIENCE HEART STENT/ MRI CONDITIONAL 3T OK, SAFE IMMEDIATELY    CORONARY ANGIOPLASTY WITH STENT PLACEMENT  2020    PTCA         Immunization History:   Immunization History   Administered Date(s) Administered    Influenza A (D1M8-09) Vaccine PF IM 2009    Influenza Vaccine, unspecified formulation 2013, 10/22/2015, 2016    Influenza Virus Vaccine 10/03/2016    Influenza, High Dose (Fluzone 65 yrs and older) 10/12/2018, 2019    Influenza, Quadv, IM, PF (6 mo and older Fluzone, Flulaval, Fluarix, and 3 yrs and older Afluria) 01/14/2018, 11/13/2020    Pneumococcal Conjugate 13-valent (Lkklyyw22) 02/28/2017    Pneumococcal Polysaccharide ([id number]) 07/12/2012       Active Problems:  Patient Active Problem List   Diagnosis Code    Atypical chest pain R07.89    Type 2 diabetes mellitus with diabetic polyneuropathy, with long-term current use of insulin ([facility]) E11.42, Z79.4    Vertigo R42    Essential hypertension I10    CAD (coronary artery disease) I25.10    Dyspnea R06.00    Claudication in peripheral vascular disease ([facility]) I73.9    Acute pulmonary embolism without acute cor pulmonale ([facility]) I26.99    Diabetic polyneuropathy associated with type 2 diabetes mellitus ([facility]) E11.42    Other hyperlipidemia E78.49    Chronic systolic heart failure ([facility]) I50.22    Multiple subsegmental pulmonary emboli without acute cor pulmonale ([facility]) I26.94    Congestive heart failure ([facility]) I50.9    Pyelonephritis of right kidney N12    History of pulmonary embolism Z86.711    Elevated troponin I level R77.8    Sepsis ([facility]) A41.9    Suspected COVID-19 virus infection Z20.828    MARIELLE (acute kidney injury) ([facility] N17.9    Diarrhea of presumed infectious origin R19.7    Chronic diastolic (congestive) heart failure ([facility]) I50.32    Generalized weakness R53.1    Anemia, normocytic normochromic D64.9    Class 1 obesity in adult E66.9    Gastroenteritis K52.9    Acute on chronic diastolic CHF (congestive heart failure) ([facility]) I50.33    Acute diastolic CHF (congestive heart failure) ([facility]) I50.31    Arterial hypotension I95.9    Moderate malnutrition ([facility]) E44.0    S/P PTCA (percutaneous transluminal coronary angioplasty) Z98.61       Isolation/Infection:   Isolation            No Isolation          Patient Infection Status       Infection Onset Added Last Indicated Last Indicated By Review Planned Expiration Resolved Resolved By    None active    Resolved    C-diff Rule Out 11/12/20 11/12/20 11/12/20 Gastrointestinal Panel, Molecular (Ordered)   11/13/20 Meliza Tucker RN    GI panel does not test for C-diff    COVID-19 Rule Out 11/06/20 11/06/20 11/06/20 COVID-19 (Ordered)   11/06/20 Rule-Out Test Resulted    C-diff Rule Out 09/03/20 09/03/20 09/03/20 Gastrointestinal Panel, Molecular (Ordered)   09/04/20 Meliza Tucker RN    GI panel does not test for C-diff    C-diff Rule Out 09/02/20 09/02/20 09/02/20 Gastrointestinal Panel, Molecular (Ordered)   09/03/20 Meliza Tucker RN    C-diff is not tested in GI Panel    C-diff Rule Out 07/13/20 07/13/20 07/13/20 C DIFF TOXIN/ANTIGEN (Ordered)   07/14/20 Meliza Tucker RN    COVID-19 Rule Out 07/12/20 07/12/20 07/12/20 COVID-19 (Ordered)   07/13/20 Rule-Out Test Resulted            Nurse Assessment:  Last Vital Signs: BP (!) 180/86   Pulse 67   Temp 97.9 °F (36.6 °C) (Oral)   Resp 18   Ht 5' 6\" (1.676 m)   Wt 222 lb (100.7 kg)   SpO2 100%   BMI 35.83 kg/m²     Last documented pain score (0-10 scale): Pain Level: 0  Last Weight:   Wt Readings from Last 1 Encounters:   12/11/20 222 lb (100.7 kg)     Mental Status:  oriented, alert, coherent, logical, thought processes intact and able to concentrate and follow conversation    IV Access:  - None    Nursing Mobility/ADLs:  Walking   Independent  Transfer  Independent  Bathing  Assisted   Dressing  Assisted  [address]  Independent  Med Delivery   whole    Wound Care Documentation and Therapy:        Elimination:  Continence:   · Bowel: Yes  · Bladder: Yes  Urinary Catheter: None   Colostomy/Ileostomy/Ileal Conduit: No       Date of Last BM: 12/11/2020    Intake/Output Summary (Last 24 hours) at 12/12/2020 0939  Last data filed at 12/12/2020 0667  Gross per 24 hour   Intake 1027.5 ml   Output 450 ml   Net 577.5 ml     I/O last 3 completed shifts:   In: 1027.5 [I.V.:1027.5]  Out: 450 [Urine:450]    Safety Concerns:     None    Impairments/Disabilities: None    Nutrition Therapy:  Current Nutrition Therapy:   - Oral Diet:  Cardiac    Routes of Feeding: Oral  Liquids: No Restrictions  Daily Fluid Restriction: no  Last Modified Barium Swallow with Video (Video Swallowing Test): not done    Treatments at the Time of Hospital Discharge:   Respiratory Treatments: None  Oxygen Therapy:  is not on home oxygen therapy. Ventilator:    - No ventilator support    Rehab Therapies: none  Weight Bearing Status/Restrictions: No weight bearing restirctions  Other Medical Equipment (for information only, NOT a DME order):  cane and walker  Other Treatments: none    Patient's personal belongings (please select all that are sent with patient):  None    RN SIGNATURE:  Electronically signed by Elena Norris RN on 12/12/2020 at 10:02 AM      CASE MANAGEMENT/SOCIAL WORK SECTION    Inpatient Status Date: 12/12/2020    Readmission Risk Assessment Score:  Readmission Risk              Risk of Unplanned Readmission:        0           Discharging to Facility/ Agency   · Name: [facility]  · [address]  [address]  · Phone:[phone]  · Fax:    Dialysis Facility (if applicable)   · Name:  · Address:  · Dialysis Schedule:  · Phone:  · Fax:    / signature: Electronically signed by Alivia Burch RN on 12/12/20 at 11:28 AM EST    PHYSICIAN SECTION    Prognosis: Good    Condition at Discharge: Stable    Rehab Potential (if transferring to Rehab): Good    Recommended Labs or Other Treatments After Discharge:     Physician Certification: I certify the above information and transfer of Shalom Charles  is necessary for the continuing treatment of the diagnosis listed and that she requires Home Care for less 30 days.      Update Admission H&P: No change in H&P    PHYSICIAN SIGNATURE:  Electronically signed by Llana Schilder, MD  on 12/12/20 at 9:39 AM EST

## 2020-12-12 NOTE — DISCHARGE SUMMARY
[facility]  Discharge Note                 Name:  Laquita Boston  YOB: 1945  Social Security Number:  [id number]  Medical Record Number:  [id number]    Date of Admission:  12/11/2020  Date of Discharge:  12/12/2020    Admitting physician: Quinton Henson MD    Discharge Attending: Heidi Finn CNP  Primary Care Physician: Claire Cantor MD  Consultants: Cardiology  Discharge to [address] [place] [address] LIST:  Patient Active Problem List   Diagnosis    Atypical chest pain    Type 2 diabetes mellitus with diabetic polyneuropathy, with long-term current use of insulin ([address]    Vertigo    Essential hypertension    CAD (coronary artery disease)    Dyspnea    Claudication in peripheral vascular disease ([address]    Acute pulmonary embolism without acute cor pulmonale ([address]    Diabetic polyneuropathy associated with type 2 diabetes mellitus ([address]    Other hyperlipidemia    Chronic systolic heart failure ([address]    Multiple subsegmental pulmonary emboli without acute cor pulmonale (HCC)    Congestive heart failure ([address]    Pyelonephritis of right kidney    History of pulmonary embolism    Elevated troponin I level    Sepsis ([address]    Suspected COVID-19 virus infection    MARIELLE (acute kidney injury) ([address]    Diarrhea of presumed infectious origin    Chronic diastolic (congestive) heart failure (HCC)    Generalized weakness    Anemia, normocytic normochromic    Class 1 obesity in adult    Gastroenteritis    Acute on chronic diastolic CHF (congestive heart failure) (HCC)    Acute diastolic CHF (congestive heart failure) (HCC)    Arterial hypotension    Moderate malnutrition (HCC)    S/P PTCA (percutaneous transluminal coronary angioplasty)         Procedures:cardiac catheterization    HOSPITAL COURSE :           The patient was admitted for: Cardiac cath   Hospital Procedures if any:  Cardiac cath   Medications changes recommendation:  See list   Follow Up Plan:  2 weeks       Discharge exam:   Vitals:    12/12/20 0900   BP: (!) 180/86   Pulse:    Resp:    Temp:    SpO2:      Neuro: normal  Chest: Clear to ausculation. No wheezing. Cardiac: Regular rate. s1 and s2 auscultated. No murmur noted. Abdomen/groin: soft, non-tender, without masses or organomegaly  Lower extremity edema: none. Radial site soft. Follow up with primary care provider 1 week  Follow up with cardiology 4 weeks  Follow up with other consultant physicians at their directions. Discharge Medications:   Alyson Leak   Home Medication Instructions [facility]:[id number]    Printed on:12/12/20 7929   Medication Information                      albuterol sulfate  (90 Base) MCG/ACT inhaler  Inhale 2 puffs into the lungs every 6 hours as needed for Wheezing             aspirin 81 MG tablet  Take 81 mg by mouth daily. carvedilol (COREG) 25 MG tablet  Take 1 tablet by mouth 2 times daily (with meals) Hold for systolic blood pressure < 120 or heart rate < 50  Give 1 hour apart from other blood pressure medicines             clonazePAM (KLONOPIN) 0.5 MG tablet  take 1 tablet by mouth twice a day if needed for anxiety             Continuous Blood Gluc  (FREESTYLE SYLVIA 14 DAY READER) JAQUELINE  1 each by Does not apply route continuous [facility] [address]             Continuous Blood Gluc Sensor (FREESTYLE SYLVIA 14 DAY SENSOR) MISC  1 each by Does not apply route every 14 days             escitalopram (LEXAPRO) 20 MG tablet  Take 1 tablet by mouth daily             fluticasone (FLONASE) 50 MCG/ACT nasal spray  instill 2 sprays into each nostril once daily             gabapentin (NEURONTIN) 600 MG tablet  Take 1 tablet by mouth 3 times daily for 30 days. glucose monitoring kit (FREESTYLE) monitoring kit  1 kit by Does not apply route 4 times daily [facility] .  Dx E11.65, has tremors and cannot use conventional meter daily.                CARDIAC CATH 12/11/20  Indication:  1. MV CAD - staged PCI  2. Risk factors (HTN, HFpEF)     Procedure:   1. Left heart catheterization   2. Selective left and right coronary angiography   3. Left ventriculography    4. PTCA/JAMA OM and LAD     Access:          []? Femoral       [x]? Radial  artery                            [x]? Right            []? Left     Procedure: After informed consent was obtained with explanation of the risks and benefits, patient was brought to the cath lab. The access area was prepped and draped in sterile fashion. 1% lidocaine was used for local block. The artery was cannulated with 6  Fr sheath with brisk arterial blood return. The side port was frequently flushed and aspirated with normal saline.     Findings:  LMCA: Mild irregularities 10-20%. LAD: Patent proximal and mid stents with 20-30% diffuse instent restenosis   Distally vessel is small to intermediate with long 75% re-stenosis, treated with PTCA/JAMA     LCx: Mild irregularities 10-20%. OM has mid 80% stenosis, reduced to 0% with PTCA/JAMA       Coronary Tree        Dominance: Right    The LV gram was not performed.          Procedure Summary        1. Successful Staged PCI of OM with JAMA    2. Restenosis of distal LAD, required, PTCA/JAMA        Recommendations        Routine Post PCI Orders.    Medical therapy    Risk factor modification. Coronary Discharge Core Measure: Please indicate the medication given by X, and if not the reasons not given:    Not Given Reason  Given      Beta Blockers X      ACE-I X      Statins X     No due to brilinta/xarelto  ASA        OAP (Plavix/Effient/Brilinta) X    SL Nitro   X         Pt seen and examined. Pt resting in bed. She denies any chest pain or shortness of breath. Radial site soft. Discussed with patient and nursing. Medications and discharge instructions reviewed with patient and nursing.  Pt verbalizes understanding regarding medications and activity

## 2020-12-12 NOTE — DISCHARGE INSTR - COC
Continuity of Care Form    Patient Name: Shalom Charles   :  1945  MRN:  [id number]    Admit date:  2020  Discharge date:  ***    Code Status Order: Full Code   Advance Directives:   [facility] Documentation     Date/Time Healthcare Directive Type of Healthcare Directive Copy in [address] Agent's Name Healthcare Agent's Phone Number    20 [phone]  Yes, patient has an advance directive for healthcare treatment  Durable power of  for health care;Living will  No, copy requested from family  [address] [place] [address] Claudy Moss          Admitting Physician:  Elkin Aragon MD  PCP: Lynnette Honeycutt MD    Discharging Nurse: [facility] Unit/Room#: 7823/6539-94  Discharging Unit Phone Number: ***    Emergency Contact:   Extended Emergency Contact Information  Primary Emergency Contact: Rah Matthews  Address: [place]  Home Phone: [phone]  Relation: Child  Secondary Emergency Contact: Frank Gracia. Phone: [phone]  Mobile Phone: [phone]  Relation: Child    Past Surgical History:  Past Surgical History:   Procedure Laterality Date    APPENDECTOMY      CARDIAC CATHETERIZATION          CORONARY ANGIOPLASTY WITH STENT PLACEMENT  2017    4 SYNERGY HEART STENTS DRUG ELUTING ALL MRI CONDITONAL 3T OK, SAFE IMMEDIATELY.      CORONARY ANGIOPLASTY WITH STENT PLACEMENT  2012    XI[facility] HEART STENT/ MRI CONDITIONAL 3T OK, SAFE IMMEDIATELY    CORONARY ANGIOPLASTY WITH STENT PLACEMENT  2020    PTCA         Immunization History:   Immunization History   Administered Date(s) Administered    Influenza A ([id number]) Vaccine PF IM 2009    Influenza Vaccine, unspecified formulation 2013, 10/22/2015, 2016    Influenza Virus Vaccine 10/03/2016    Influenza, High Dose (Fluzone 65 yrs and older) 10/12/2018, 2019    Influenza, Quadv, IM, PF (6 mo and older Fluzone, Flulaval, Fluarix, and 3 yrs and older Afluria) 01/14/2018, 11/13/2020    Pneumococcal Conjugate 13-valent ([id number]) 02/28/2017    Pneumococcal Polysaccharide ([id number]) 07/12/2012       Active Problems:  Patient Active Problem List   Diagnosis Code    Atypical chest pain R07.89    Type 2 diabetes mellitus with diabetic polyneuropathy, with long-term current use of insulin ([facility]) E11.42, Z79.4    Vertigo R42    Essential hypertension I10    CAD (coronary artery disease) I25.10    Dyspnea R06.00    Claudication in peripheral vascular disease ([facility] I73.9    Acute pulmonary embolism without acute cor pulmonale ([facility]) I26.99    Diabetic polyneuropathy associated with type 2 diabetes mellitus ([facility]) E11.42    Other hyperlipidemia E78.49    Chronic systolic heart failure ([facility]) I50.22    Multiple subsegmental pulmonary emboli without acute cor pulmonale ([facility]) I26.94    Congestive heart failure ([facility]) I50.9    Pyelonephritis of right kidney N12    History of pulmonary embolism Z86.711    Elevated troponin I level R77.8    Sepsis ([facility]) A41.9    Suspected COVID-19 virus infection Z20.828    MARIELLE (acute kidney injury) ([facility] N17.9    Diarrhea of presumed infectious origin R19.7    Chronic diastolic (congestive) heart failure ([facility]) I50.32    Generalized weakness R53.1    Anemia, normocytic normochromic D64.9    Class 1 obesity in adult E66.9    Gastroenteritis K52.9    Acute on chronic diastolic CHF (congestive heart failure) ([facility]) I50.33    Acute diastolic CHF (congestive heart failure) ([facility]) I50.31    Arterial hypotension I95.9    Moderate malnutrition ([facility]) E44.0    S/P PTCA (percutaneous transluminal coronary angioplasty) Z98.61       Isolation/Infection:   Isolation          No Isolation        Patient Infection Status     Infection Onset Added Last Indicated Last Indicated By Review Planned Expiration Resolved Resolved By    None active    Resolved    C-diff Rule Out 11/12/20 11/12/20 11/12/20 Gastrointestinal Panel, Molecular (Ordered)   20 Chase Soler RN    GI panel does not test for C-diff    COVID-19 Rule Out 20 COVID-19 (Ordered)   20 Rule-Out Test Resulted    C-diff Rule Out 20 Gastrointestinal Panel, Molecular (Ordered)   20 Chase Soler RN    GI panel does not test for C-diff    C-diff Rule Out 20 Gastrointestinal Panel, Molecular (Ordered)   20 Chase Soler RN    C-diff is not tested in GI Panel    C-diff Rule Out 20 C DIFF TOXIN/ANTIGEN (Ordered)   20 Chase Soler RN    COVID-19 Rule Out 20 COVID-19 (Ordered)   20 Rule-Out Test Resulted          Nurse Assessment:  Last Vital Signs: BP (!) 180/86   Pulse 67   Temp 97.9 °F (36.6 °C) (Oral)   Resp 18   Ht 5' 6\" (1.676 m)   Wt 222 lb (100.7 kg)   SpO2 100%   BMI 35.83 kg/m²     Last documented pain score (0-10 scale): Pain Level: 0  Last Weight:   Wt Readings from Last 1 Encounters:   20 222 lb (100.7 kg)     Mental Status:  {IP PT MENTAL STATUS:19803}    IV Access:  { STEPHEN IV ACCESS:536313071}    Nursing Mobility/ADLs:  Walking   {CHP DME BUTO:927816224}  Transfer  {CHP DME LCTW:338187034}  Bathing  {CHP DME KGDO:792744191}  Dressing  {CHP DME VOWX:519828435}  Toileting  {CHP DME VJCL:881117926}  Feeding  {CHP DME BPOH:014377473}  Med Admin  {CHP DME SOCH:654587755}  Med Delivery   { STEPHEN MED Delivery:099949006}    Wound Care Documentation and Therapy:        Elimination:  Continence:   · Bowel: {YES / W}  · Bladder: {YES / CO:19366}  Urinary Catheter: {Urinary Catheter:911910796}   Colostomy/Ileostomy/Ileal Conduit: {YES / EJ:97596}       Date of Last BM: ***    Intake/Output Summary (Last 24 hours) at 2020 0938  Last data filed at 2020 1361  Gross per 24 hour   Intake 1027.5 ml   Output 450 ml   Net 577.5 ml     I/O last 3 completed shifts:   In: 1027.5 [I.V.:1027.5]  Out: 450 [Urine:450]    Safety Concerns:     [address] Safety Concerns:707668041}    Impairments/Disabilities:      [address] [facility] Impairments/Disabilities:664428505}    Nutrition Therapy:  Current Nutrition Therapy:   [address] STEPHEN Diet List:338310103}    Routes of Feeding: {CHP DME Other Feedings:431940791}  Liquids: {Slp liquid thickness:87114}  Daily Fluid Restriction: {CHP DME Yes amt example:028127104}  Last Modified Barium Swallow with Video (Video Swallowing Test): {Done Not Done VMWJ:697443398}    Treatments at the Time of Hospital Discharge:   Respiratory Treatments: ***  Oxygen Therapy:  {Therapy; copd oxygen:99263}  Ventilator:    {MH CC Vent FHMN:195235422}    Rehab Therapies: {THERAPEUTIC INTERVENTION:7273706985}  Weight Bearing Status/Restrictions: [address]  Weight Bearin}  Other Medical Equipment (for information only, NOT a DME order):  {EQUIPMENT:105413540}  Other Treatments: ***    Patient's personal belongings (please select all that are sent with patient):  {CHP DME Belongings:939794175}    RN SIGNATURE:  {Esignature:138879875}    CASE MANAGEMENT/SOCIAL WORK SECTION    Inpatient Status Date: ***    Readmission Risk Assessment Score:  Readmission Risk              Risk of Unplanned Readmission:        0           Discharging to Facility/ Agency   · Name:   · Address:  · Phone:  · Fax:    Dialysis Facility (if applicable)   · Name:  · Address:  · Dialysis Schedule:  · Phone:  · Fax:    / signature: {Esignature:322923718}    PHYSICIAN SECTION    Prognosis: {Prognosis:3425072768}    Condition at Discharge: [address] Patient Condition:932815965}    Rehab Potential (if transferring to Rehab): {Prognosis:8370475543}    Recommended Labs or Other Treatments After Discharge: ***    Physician Certification: I certify the above information and transfer of Vikki Valderrama  is necessary for the continuing treatment of the diagnosis listed and that she requires {Admit to Appropriate

## 2020-12-28 ENCOUNTER — HOSPITAL ENCOUNTER (OUTPATIENT)
Dept: CARDIAC REHAB | Age: 75
Setting detail: THERAPIES SERIES
Discharge: HOME | End: 2020-12-28
Payer: MEDICARE

## 2020-12-28 VITALS
WEIGHT: 220.1 LBS | DIASTOLIC BLOOD PRESSURE: 78 MMHG | HEART RATE: 72 BPM | TEMPERATURE: 96.6 F | HEIGHT: 66 IN | BODY MASS INDEX: 35.37 KG/M2 | SYSTOLIC BLOOD PRESSURE: 134 MMHG

## 2020-12-28 PROCEDURE — 93798 PHYS/QHP OP CAR RHAB W/ECG: CPT

## 2020-12-28 ASSESSMENT — PATIENT HEALTH QUESTIONNAIRE - PHQ9
SUM OF ALL RESPONSES TO PHQ QUESTIONS 1-9: 2
SUM OF ALL RESPONSES TO PHQ QUESTIONS 1-9: 2

## 2020-12-28 NOTE — PROGRESS NOTES
Orientation visit complete-medications reconciled, equipment reviewed, ITP initiated and goal established.

## 2020-12-30 ENCOUNTER — HOSPITAL ENCOUNTER (OUTPATIENT)
Dept: CARDIAC REHAB | Age: 75
Setting detail: THERAPIES SERIES
Discharge: HOME | End: 2020-12-30
Payer: MEDICARE

## 2021-01-04 ENCOUNTER — HOSPITAL ENCOUNTER (OUTPATIENT)
Dept: CARDIAC REHAB | Age: 76
Setting detail: THERAPIES SERIES
Discharge: HOME | End: 2021-01-04
Payer: MEDICARE

## 2021-01-04 VITALS — WEIGHT: 220 LBS | TEMPERATURE: 96.5 F | BODY MASS INDEX: 35.51 KG/M2

## 2021-01-04 PROCEDURE — 93798 PHYS/QHP OP CAR RHAB W/ECG: CPT

## 2021-01-05 ENCOUNTER — TELEPHONE (OUTPATIENT)
Dept: VASCULAR SURGERY | Age: 76
End: 2021-01-05

## 2021-01-05 DIAGNOSIS — I73.9 PAD (PERIPHERAL ARTERY DISEASE) (HCC): Primary | ICD-10-CM

## 2021-01-05 NOTE — TELEPHONE ENCOUNTER
LVM for patient to call the office to schedule  1 year f/u for PAD and to schedule  PVR's. Orders for PVR'S were generated.

## 2021-01-06 ENCOUNTER — HOSPITAL ENCOUNTER (OUTPATIENT)
Dept: CARDIAC REHAB | Age: 76
Setting detail: THERAPIES SERIES
Discharge: HOME | End: 2021-01-06
Payer: MEDICARE

## 2021-01-06 VITALS — WEIGHT: 219.2 LBS | TEMPERATURE: 96.5 F | BODY MASS INDEX: 35.38 KG/M2

## 2021-01-06 PROCEDURE — 93798 PHYS/QHP OP CAR RHAB W/ECG: CPT

## 2021-01-11 ENCOUNTER — HOSPITAL ENCOUNTER (OUTPATIENT)
Dept: CARDIAC REHAB | Age: 76
Setting detail: THERAPIES SERIES
Discharge: HOME | End: 2021-01-11
Payer: MEDICARE

## 2021-01-11 VITALS — WEIGHT: 222.8 LBS | BODY MASS INDEX: 35.96 KG/M2 | TEMPERATURE: 96.4 F

## 2021-01-11 PROCEDURE — 93798 PHYS/QHP OP CAR RHAB W/ECG: CPT

## 2021-01-11 NOTE — PROGRESS NOTES
Goals reviewed, pt states is able to get around more with her walker since starting Cardiac Rehab, continues to work on her goals.

## 2021-01-13 ENCOUNTER — HOSPITAL ENCOUNTER (OUTPATIENT)
Dept: CARDIAC REHAB | Age: 76
Setting detail: THERAPIES SERIES
Discharge: HOME | End: 2021-01-13
Payer: MEDICARE

## 2021-01-13 VITALS — BODY MASS INDEX: 36.27 KG/M2 | TEMPERATURE: 95.6 F | WEIGHT: 224.7 LBS

## 2021-01-13 PROCEDURE — 93798 PHYS/QHP OP CAR RHAB W/ECG: CPT

## 2021-01-15 ENCOUNTER — APPOINTMENT (OUTPATIENT)
Dept: CARDIAC REHAB | Age: 76
End: 2021-01-15
Payer: MEDICARE

## 2021-01-18 ENCOUNTER — APPOINTMENT (OUTPATIENT)
Dept: CARDIAC REHAB | Age: 76
End: 2021-01-18
Payer: MEDICARE

## 2021-01-18 ENCOUNTER — HOSPITAL ENCOUNTER (OUTPATIENT)
Dept: CARDIAC REHAB | Age: 76
Setting detail: THERAPIES SERIES
Discharge: HOME | End: 2021-01-18
Payer: MEDICARE

## 2021-01-18 VITALS — WEIGHT: 224.4 LBS | TEMPERATURE: 95.7 F | BODY MASS INDEX: 36.22 KG/M2

## 2021-01-18 PROCEDURE — 93798 PHYS/QHP OP CAR RHAB W/ECG: CPT

## 2021-01-20 ENCOUNTER — HOSPITAL ENCOUNTER (OUTPATIENT)
Dept: CARDIAC REHAB | Age: 76
Setting detail: THERAPIES SERIES
Discharge: HOME | End: 2021-01-20
Payer: MEDICARE

## 2021-01-20 ENCOUNTER — APPOINTMENT (OUTPATIENT)
Dept: CARDIAC REHAB | Age: 76
End: 2021-01-20
Payer: MEDICARE

## 2021-01-20 NOTE — PROGRESS NOTES
Pt called to say she would not be at cardiac rehab d/t issues with transportation. Visit rescheduled.

## 2021-01-22 ENCOUNTER — APPOINTMENT (OUTPATIENT)
Dept: CARDIAC REHAB | Age: 76
End: 2021-01-22
Payer: MEDICARE

## 2021-01-25 ENCOUNTER — APPOINTMENT (OUTPATIENT)
Dept: CARDIAC REHAB | Age: 76
End: 2021-01-25
Payer: MEDICARE

## 2021-01-25 ENCOUNTER — HOSPITAL ENCOUNTER (OUTPATIENT)
Dept: CARDIAC REHAB | Age: 76
Setting detail: THERAPIES SERIES
Discharge: HOME | End: 2021-01-25
Payer: MEDICARE

## 2021-01-25 NOTE — PROGRESS NOTES
No call/no show to cardiac rehab. Unable to leave a [facility]ian set up.

## 2021-01-27 ENCOUNTER — APPOINTMENT (OUTPATIENT)
Dept: CARDIAC REHAB | Age: 76
End: 2021-01-27
Payer: MEDICARE

## 2021-01-27 ENCOUNTER — HOSPITAL ENCOUNTER (OUTPATIENT)
Dept: CARDIAC REHAB | Age: 76
Setting detail: THERAPIES SERIES
Discharge: HOME | End: 2021-01-27
Payer: MEDICARE

## 2021-01-27 VITALS — HEIGHT: 66 IN | BODY MASS INDEX: 35.62 KG/M2 | TEMPERATURE: 96 F | WEIGHT: 221.6 LBS

## 2021-01-27 PROCEDURE — 93798 PHYS/QHP OP CAR RHAB W/ECG: CPT

## 2021-01-27 NOTE — PROGRESS NOTES
ITP updated,meds, diet, goals and home exercised reviewed. Education provided on the importance of Stretching.

## 2021-01-29 ENCOUNTER — APPOINTMENT (OUTPATIENT)
Dept: CARDIAC REHAB | Age: 76
End: 2021-01-29
Payer: MEDICARE

## 2021-01-29 ENCOUNTER — HOSPITAL ENCOUNTER (EMERGENCY)
Age: 76
Discharge: HOME | End: 2021-01-29
Attending: EMERGENCY MEDICINE
Payer: MEDICARE

## 2021-01-29 ENCOUNTER — APPOINTMENT (OUTPATIENT)
Dept: GENERAL RADIOLOGY | Age: 76
End: 2021-01-29
Payer: MEDICARE

## 2021-01-29 VITALS
DIASTOLIC BLOOD PRESSURE: 72 MMHG | TEMPERATURE: 97 F | OXYGEN SATURATION: 99 % | HEART RATE: 67 BPM | SYSTOLIC BLOOD PRESSURE: 132 MMHG

## 2021-01-29 DIAGNOSIS — S92.354A CLOSED NONDISPLACED FRACTURE OF FIFTH METATARSAL BONE OF RIGHT FOOT, INITIAL ENCOUNTER: Primary | ICD-10-CM

## 2021-01-29 PROCEDURE — 99282 EMERGENCY DEPT VISIT SF MDM: CPT

## 2021-01-29 PROCEDURE — 73502 X-RAY EXAM HIP UNI 2-3 VIEWS: CPT

## 2021-01-29 PROCEDURE — 6370000000 HC RX 637 (ALT 250 FOR IP): Performed by: STUDENT IN AN ORGANIZED HEALTH CARE EDUCATION/TRAINING PROGRAM

## 2021-01-29 PROCEDURE — 73630 X-RAY EXAM OF FOOT: CPT

## 2021-01-29 PROCEDURE — 73610 X-RAY EXAM OF ANKLE: CPT

## 2021-01-29 RX ORDER — OXYCODONE HYDROCHLORIDE 5 MG/1
5 TABLET ORAL EVERY 6 HOURS PRN
Qty: 12 TABLET | Refills: 0 | Status: SHIPPED | OUTPATIENT
Start: 2021-01-29 | End: 2021-02-01

## 2021-01-29 RX ORDER — OXYCODONE HYDROCHLORIDE 5 MG/1
5 TABLET ORAL ONCE
Status: COMPLETED | OUTPATIENT
Start: 2021-01-29 | End: 2021-01-29

## 2021-01-29 RX ADMIN — OXYCODONE HYDROCHLORIDE 5 MG: 5 TABLET ORAL at 18:28

## 2021-01-29 ASSESSMENT — PAIN DESCRIPTION - PAIN TYPE: TYPE: ACUTE PAIN

## 2021-01-29 ASSESSMENT — ENCOUNTER SYMPTOMS
ABDOMINAL PAIN: 0
SHORTNESS OF BREATH: 0
VOMITING: 0
NAUSEA: 0

## 2021-01-29 ASSESSMENT — PAIN DESCRIPTION - DESCRIPTORS: DESCRIPTORS: SHARP

## 2021-01-29 ASSESSMENT — PAIN DESCRIPTION - LOCATION: LOCATION: ANKLE;FOOT

## 2021-01-29 ASSESSMENT — PAIN DESCRIPTION - ORIENTATION: ORIENTATION: RIGHT

## 2021-01-29 NOTE — ED PROVIDER NOTES
[facility]     Emergency Department     Faculty Attestation    I performed a history and physical examination of the patient and discussed management with the resident. I reviewed the residents note and agree with the documented findings and plan of care. Any areas of disagreement are noted on the chart. I was personally present for the key portions of any procedures. I have documented in the chart those procedures where I was not present during the key portions. I have reviewed the emergency nurses triage note. I agree with the chief complaint, past medical history, past surgical history, allergies, medications, social and family history as documented unless otherwise noted below. For Physician Assistant/ Nurse Practitioner cases/documentation I have personally evaluated this patient and have completed at least one if not all key elements of the E/M (history, physical exam, and MDM). Additional findings are as noted. Patient presents with right foot pain and tailbone pain after she had a fall yesterday. She says she was on her outside steps going down when she tripped and fell. She denies hitting her head or having a loss of consciousness. She says she has been unable to put any weight on her foot since the fall. Patient is not on any anticoagulants. On exam, patient is resting comfortably in the bed. There is moderate edema to the right ankle and foot. There is tenderness over the right lateral malleolus. No erythema or warmth. Sensation and pulses are intact. There is also tenderness to the right buttock. No midline spinal tenderness. Pelvis is stable. We will get x-rays and treat patient's pain.       Calton Galeazzi, MD  Attending Emergency  Physician              Carlos Eduardo Rose MD  01/29/21 4775

## 2021-01-29 NOTE — ED PROVIDER NOTES
FACULTY SIGN-OUT  ADDENDUM       Patient: Ann Canales   MRN: [id number]  PCP:  Elsie Jack MD  Attestation  I was available and discussed any additional care issues that arose and coordinated the management plans with the resident(s) caring for the patient during my duty period. Any areas of disagreement with resident's documentation of care or procedures are noted on the chart. I was personally present for the key portions of any/all procedures during my duty period. I have documented in the chart those procedures where I was not present during the key portions. The patient's initial evaluation and plan have been discussed with the prior provider who initially evaluated the patient. Pertinent Comments: The patient is a 76 y.o. female taken in signout with fall on Xarelto and neurovascularly intact however more pain over the last 3 days since the fall  We are awaiting x-rays and reevaluation. It should be noted no head injury or headache or neck pain    ED COURSE      The patient was given the following medications:  No orders of the defined types were placed in this encounter.       RECENT VITALS:   BP: 132/72  Pulse: 67     Temp: 97 °F (36.1 °C) SpO2: 99 %    (Please note that portions of this note were completed with a voice recognition program.  Efforts were made to edit the dictations but occasionally words are mis-transcribed.)    MD Cathleen Lund  Attending Emergency Medicine Physician       Rafa Rodriguez MD  01/29/21 [address], [place]  01/29/21 3270

## 2021-01-29 NOTE — ED PROVIDER NOTES
[facility]  ED  Emergency Department Encounter  Emergency Medicine Resident     Pt Name: Ann Canales  MRN: [id number]  Keithgfyamila 1945  Date of evaluation: 1/29/21  PCP:  Elsie Jack MD    [address] [place] [address]       Chief Complaint   Patient presents with    Ankle Injury     right ankle pain d/t fell out of bed and stairs       HISTORY OFPRESENT ILLNESS  (Location/Symptom, Timing/Onset, Context/Setting, Quality, Duration, Modifying Factors,Severity.)      Ann Canales is a 75-year-old female, PMH CHF, PE on Xarelto, CAD, who presents with right ankle pain. Patient reports that 3 days ago she was walking down her porch steps. She slipped on some ice and fell backwards. She did not hit her head or lose consciousness. She believes she twisted her ankle. She has been unable to ambulate since then due to right-sided ankle pain. She is also complaining of some right buttock pain. She is complaining of some right-sided neck pain. No new numbness or weakness in her extremities. No abdominal pain. PAST MEDICAL / SURGICAL / SOCIAL / FAMILY HISTORY      has a past medical history of Anxiety, Atrial fibrillation (HCC), Benign positional vertigo, CAD (coronary artery disease), Chest pain, Depression, Diabetes mellitus ([address] Diabetic neuropathy ([address] Hyperlipidemia, Hypertension, and Migraine. has a past surgical history that includes Percutaneous Transluminal Coronary Angio; Cardiac catheterization; Coronary angioplasty with stent (02/25/2017); Appendectomy; Coronary angioplasty with stent (07/11/2012); and Coronary angioplasty with stent (12/11/2020).      Social History     Socioeconomic History    Marital status:      Spouse name: Not on file    Number of children: Not on file    Years of education: Not on file    Highest education level: Not on file   Occupational History    Not on file   Social Needs    Financial resource strain: Not on file    Food insecurity Worry: Not on file     Inability: Not on file    Transportation needs     Medical: Not on file     Non-medical: Not on file   Tobacco Use    Smoking status: Never Smoker    Smokeless tobacco: Never Used   Substance and Sexual Activity    Alcohol use: No    Drug use: No    Sexual activity: Never   Lifestyle    Physical activity     Days per week: Not on file     Minutes per session: Not on file    Stress: Not on file   Relationships    Social connections     Talks on phone: Not on file     Gets together: Not on file     Attends Scientologist service: Not on file     Active member of club or organization: Not on file     Attends meetings of clubs or organizations: Not on file     Relationship status: Not on file    Intimate partner violence     Fear of current or ex partner: Not on file     Emotionally abused: Not on file     Physically abused: Not on file     Forced sexual activity: Not on file   Other Topics Concern    Not on file   Social History Narrative    Not on file       Family History   Problem Relation Age of Onset    Cancer Mother     Cancer Father         Allergies:  Penicillins and Sulfa antibiotics    Home Medications:  Prior to Admission medications    Medication Sig Start Date End Date Taking? Authorizing Provider   oxyCODONE (ROXICODONE) 5 MG immediate release tablet Take 1 tablet by mouth every 6 hours as needed for Pain for up to 3 days. Intended supply: 3 days.  Take lowest dose possible to manage pain 1/29/21 2/1/21 Yes Snow Esteves MD   traMADol [facility]) 50 MG tablet take 1 tablet by mouth every 12 hours if needed for pain 1/4/21 2/3/21  David Munguia MD   nitroGLYCERIN (NITROSTAT) 0.4 MG SL tablet Place 1 tablet under the tongue every 5 minutes as needed for Chest pain 11/24/20   David Munguia MD   LEVEMIR FLEXTOUCH 100 UNIT/ML injection pen Inject 12 Units into the skin nightly If your sugars are high go back to normal dosage, please follow up further with your PCP. 11/13/20   Akilah Maynard MD   lisinopril (PRINIVIL;ZESTRIL) 2.5 MG tablet Take 1 tablet by mouth daily  Patient taking differently: Take 10 mg by mouth daily  11/13/20   Akilah Maynard MD   ticagrelor (BRILINTA) 90 MG TABS tablet Take 1 tablet by mouth 2 times daily 11/13/20   Akilah Maynard MD   NIFEdipine (PROCARDIA XL) 30 MG extended release tablet Take 1 tablet by mouth daily 11/13/20 1/12/21  Akilah Maynard MD   Continuous Blood Gluc  (FREESTYLE RICHARD 14 DAY READER) JAQUELINE 1 each by Does not apply route continuous [facility] 10/30/20   Denise Rowan MD   Continuous Blood Gluc Sensor (FREESTYLE RICHARD 14 DAY SENSOR) MISC 1 each by Does not apply route every 14 days 10/30/20   Denise Rowan MD   rivaroxaban (XARELTO) 20 MG TABS tablet Take 1 tablet by mouth daily (with breakfast) 10/21/20   Denise Rowan MD   glucose monitoring kit (FREESTYLE) monitoring kit 1 kit by Does not apply route 4 times daily Freestyle Richard . Dx E11.65, has tremors and cannot use conventional meter without great difficulty. Please provide supplies for 3 months, rf 3,Pharm[facility]. 10/6/20   MD Felicity Alexander. Devices (STEP N REST WALKER) MISC 1 each by Does not apply route continuous Seated, wheeled walker 10/6/20   Denise Rowan MD   pregabalin (LYRICA) 50 MG capsule Take 1 capsule by mouth 3 times daily for 30 days. 10/2/20 11/1/20  Rui Riley MD   escitalopram (LEXAPRO) 20 MG tablet Take 1 tablet by mouth daily 8/11/20   Denise Rowan MD   clonazePAM Bryan Rupinder) 0.5 MG tablet take 1 tablet by mouth twice a day if needed for anxiety 8/5/20 12/11/20  Denise Rowan MD   hydrOXYzine (ATARAX) 25 MG tablet take 1 tablet by mouth every 8 hours rectally for itching 8/5/20   Denise Rowan MD   gabapentin (NEURONTIN) 600 MG tablet Take 1 tablet by mouth 3 times daily for 30 days.  6/12/20 12/11/20  Denise Rowan MD   fluticasone (FLONASE) 50 MCG/ACT nasal spray instill 2 sprays into each nostril once daily 4/21/20   Historical Provider, MD   NOVOFINE 32G X 6 MM MISC use 1 NEEDLE to inject MEDICATION subcutaneously five times a day 4/8/20   Historical Provider, MD   mupirocin (BACTROBAN) 2 % ointment apply topically to affected area three times a day 4/21/20   Historical Provider, MD   omeprazole (PRILOSEC) 20 MG delayed release capsule Take 1 capsule by mouth Daily 5/11/20   Jyothi Kenny MD   insulin aspart (NOVOLOG FLEXPEN) 100 UNIT/ML injection pen Use TID before meals according to scale - max 45 units a day 5/11/20   Jyothi Kenny MD   loratadine (CLARITIN) 10 MG tablet Take 1 tablet every day by oral route. 5/11/20   Jyothi Kenny MD   rosuvastatin (CRESTOR) 40 MG tablet Take 1 tablet by mouth daily 5/1/20   Jyothi Kenny MD   isosorbide mononitrate (IMDUR) 30 MG extended release tablet Take 1 tablet by mouth daily 11/26/19   Geno Gilman MD   carvedilol (COREG) 25 MG tablet Take 1 tablet by mouth 2 times daily (with meals) Hold for systolic blood pressure < 120 or heart rate < 50  Give 1 hour apart from other blood pressure medicines 11/25/19   Geno Gilman MD   albuterol sulfate  (90 Base) MCG/ACT inhaler Inhale 2 puffs into the lungs every 6 hours as needed for Wheezing  Patient not taking: Reported on 10/21/2020 3/15/18   Bethany Muhammad MD   spironolactone (ALDACTONE) 25 MG tablet Take 25 mg by mouth daily     Historical Provider, MD   vitamin E 400 UNIT capsule Take 400 Units by mouth daily. Historical Provider, MD       REVIEW OFSYSTEMS    (2-9 systems for level 4, 10 or more for level 5)      Review of Systems   Constitutional: Negative for chills and fever. Respiratory: Negative for shortness of breath. Cardiovascular: Negative for chest pain. Gastrointestinal: Negative for abdominal pain, nausea and vomiting. Musculoskeletal: Positive for arthralgias and gait problem. Neurological: Negative for syncope, light-headedness and headaches. PHYSICAL EXAM   (up to 7 for level 4, 8 or more forlevel 5)      INITIAL VITALS:   ED Triage Vitals [01/29/21 1528]   BP Temp Temp Source Pulse Resp SpO2 Height Weight   -- 97 °F (36.1 °C) Temporal -- -- -- -- --       Physical Exam  Constitutional:       General: She is not in acute distress. Appearance: She is well-developed. She is not diaphoretic. HENT:      Head: Normocephalic and atraumatic. Eyes:      Conjunctiva/sclera: Conjunctivae normal.      Pupils: Pupils are equal, round, and reactive to light. Neck:      Musculoskeletal: Neck supple. Cardiovascular:      Rate and Rhythm: Normal rate and regular rhythm. Heart sounds: No murmur. No friction rub. No gallop. Pulmonary:      Effort: Pulmonary effort is normal. No respiratory distress. Breath sounds: Normal breath sounds. No wheezing or rales. Abdominal:      General: There is no distension. Palpations: Abdomen is soft. Tenderness: There is no abdominal tenderness. There is no guarding. Musculoskeletal:      Comments: Right lateral lower extremity swelling  Right lower extremity: Tenderness palpation lateral malleolus, +2 DP and TP pulses, normal range of motion of the ankle, knee and hip joint, no ecchymosis   Skin:     General: Skin is warm. Neurological:      Mental Status: She is alert and oriented to person, place, and time.          DIFFERENTIAL  DIAGNOSIS     PLAN (LABS / IMAGING / EKG):  Orders Placed This Encounter   Procedures    XR ANKLE RIGHT (MIN 3 VIEWS)    XR FOOT RIGHT (MIN 3 VIEWS)    XR HIP RIGHT (2-3 VIEWS)    [employer] Post Op Shoe, Unisex - Left; MD (M7.5-9/F8.5-10)    [employer] Crutches; Pair, Right Side Injury; Med (5'2\"-5'10\")       MEDICATIONS ORDERED:  Orders Placed This Encounter   Medications    oxyCODONE (ROXICODONE) immediate release tablet 5 mg    oxyCODONE (ROXICODONE) 5 MG immediate release tablet     Sig: Take 1 tablet by mouth every 6 hours as needed for Pain for up to 3 days. Intended supply: 3 days. Take lowest dose possible to manage pain     Dispense:  12 tablet     Refill:  0         Initial MDM/Plan: 76 y.o. female who presents with foot pain after a fall. The patient had stable vital signs on arrival.  On physical exam, the patient had tenderness palpation lateral malleolus. Normal range of motion of the ankle right side. Intact distal pulses bilaterally. Plan for x-ray imaging of the right ankle, foot. We will also get x-ray of the right hip. The patient had normal range of motion of the bilateral hip joints. No midline cervical, thoracic or lumbar tenderness. Plan to reassess. DIAGNOSTIC RESULTS / EMERGENCYDEPARTMENT COURSE / MDM     LABS:  Labs Reviewed - No data to display      RADIOLOGY:  Xr Hip Right (2-3 Views)    Result Date: 1/29/2021  EXAMINATION: TWO XRAY VIEWS OF THE RIGHT HIP 1/29/2021 5:24 pm COMPARISON: None. HISTORY: ORDERING SYSTEM PROVIDED HISTORY: fall TECHNOLOGIST PROVIDED HISTORY: fall FINDINGS: No acute fracture. No dislocation. Right hip joint is maintained. No focal abnormality or radiopaque foreign object in the overlying soft tissues. No acute osseous abnormality. Xr Ankle Right (min 3 Views)    Result Date: 1/29/2021  EXAMINATION: THREE XRAY VIEWS OF THE RIGHT ANKLE; THREE XRAY VIEWS OF THE RIGHT FOOT 1/29/2021 2:24 pm COMPARISON: None. HISTORY: ORDERING SYSTEM PROVIDED HISTORY: fall TECHNOLOGIST PROVIDED HISTORY: fall FINDINGS: Radiographic evaluation is limited by diffuse osseous demineralization. There is an oblique fracture through the 5th metatarsal diaphysis. The ankle mortise appears grossly symmetric. Posterior and plantar calcaneal enthesophytes and scattered vascular calcifications. Bimalleolar soft tissue swelling, worse laterally and soft tissue swelling along the lateral foot.      Oblique fracture through the 5th metatarsal diaphysis with overlying lateral soft tissue swelling at the foot. Please note that radiographic evaluation is limited due to osseous demineralization. If there is persistent clinical concern for other radiographically occult fractures, CT is available. Xr Foot Right (min 3 Views)    Result Date: 1/29/2021  EXAMINATION: THREE XRAY VIEWS OF THE RIGHT ANKLE; THREE XRAY VIEWS OF THE RIGHT FOOT 1/29/2021 2:24 pm COMPARISON: None. HISTORY: ORDERING SYSTEM PROVIDED HISTORY: fall TECHNOLOGIST PROVIDED HISTORY: fall FINDINGS: Radiographic evaluation is limited by diffuse osseous demineralization. There is an oblique fracture through the 5th metatarsal diaphysis. The ankle mortise appears grossly symmetric. Posterior and plantar calcaneal enthesophytes and scattered vascular calcifications. Bimalleolar soft tissue swelling, worse laterally and soft tissue swelling along the lateral foot. Oblique fracture through the 5th metatarsal diaphysis with overlying lateral soft tissue swelling at the foot. Please note that radiographic evaluation is limited due to osseous demineralization. If there is persistent clinical concern for other radiographically occult fractures, CT is available. EKG      All EKG's are interpreted by the Emergency Department Physicianwho either signs or Co-signs this chart in the absence of a cardiologist.    EMERGENCY DEPARTMENT COURSE:      X-ray imaging was significant for fifth metatarsal fracture. The patient was provided a postop shoe and crutches. Instructed to follow-up with podiatry within the next week. Given Roxicodone for pain relief. Instructed to keep her right lower extremity elevated and to take Tylenol Motrin as well for symptomatic relief. The patient expressed understanding. Instructed to remain nonweightbearing until follow-up with orthopedic surgery.     PROCEDURES:  None    CONSULTS:  None    CRITICAL CARE:  Please see attending note    FINAL IMPRESSION      1. Closed nondisplaced fracture of fifth metatarsal bone of right foot, initial encounter          DISPOSITION / PLAN     DISPOSITION        PATIENT REFERRED TO:  [facility]  [address] [facility] [address]  [phone]  Schedule an appointment as soon as possible for a visit in 1 week      Sher Courser, [address]  [address]  [address] [facility] [address]  [phone]    Schedule an appointment as soon as possible for a visit in 1 week      [facility]  [address] [facility] [address]  [phone]  Go to   If symptoms worsen      DISCHARGE MEDICATIONS:  Discharge Medication List as of 1/29/2021  6:20 PM      START taking these medications    Details   oxyCODONE (ROXICODONE) 5 MG immediate release tablet Take 1 tablet by mouth every 6 hours as needed for Pain for up to 3 days. Intended supply: 3 days.  Take lowest dose possible to manage pain, Disp-12 tablet, R-0Print             Monica Kim MD  Emergency Medicine Resident    (Please note that portions of this note were completed with a voice recognition program.Efforts were made to edit the dictations but occasionally words are mis-transcribed.)        Monica Kim MD  Resident  01/30/21 [id number]

## 2021-01-29 NOTE — ED NOTES
Bed: 45  Expected date:   Expected time:   Means of arrival:   Comments:  No monitor     Michelle Zayas RN  01/29/21 [id number]

## 2021-02-01 ENCOUNTER — HOSPITAL ENCOUNTER (OUTPATIENT)
Dept: CARDIAC REHAB | Age: 76
Setting detail: THERAPIES SERIES
Discharge: HOME | End: 2021-02-01
Payer: MEDICARE

## 2021-02-01 RX ORDER — INSULIN DETEMIR 100 [IU]/ML
INJECTION, SOLUTION SUBCUTANEOUS
Qty: 15 ML | Refills: 11 | Status: SHIPPED | OUTPATIENT
Start: 2021-02-01 | End: 2021-03-16 | Stop reason: SDUPTHER

## 2021-02-03 ENCOUNTER — OFFICE VISIT (OUTPATIENT)
Dept: PODIATRY | Age: 76
End: 2021-02-03
Payer: MEDICARE

## 2021-02-03 VITALS — WEIGHT: 221 LBS | RESPIRATION RATE: 16 BRPM | BODY MASS INDEX: 35.52 KG/M2 | HEIGHT: 66 IN | TEMPERATURE: 97.6 F

## 2021-02-03 DIAGNOSIS — M80.00XA AGE-RELATED OSTEOPOROSIS WITH CURRENT PATHOLOGICAL FRACTURE, INITIAL ENCOUNTER: ICD-10-CM

## 2021-02-03 DIAGNOSIS — S92.354A NONDISPLACED FRACTURE OF FIFTH METATARSAL BONE, RIGHT FOOT, INITIAL ENCOUNTER FOR CLOSED FRACTURE: ICD-10-CM

## 2021-02-03 DIAGNOSIS — M79.604 PAIN OF RIGHT LOWER EXTREMITY: Primary | ICD-10-CM

## 2021-02-03 DIAGNOSIS — E11.42 TYPE 2 DIABETES MELLITUS WITH DIABETIC POLYNEUROPATHY, WITH LONG-TERM CURRENT USE OF INSULIN (HCC): ICD-10-CM

## 2021-02-03 DIAGNOSIS — Z79.4 TYPE 2 DIABETES MELLITUS WITH DIABETIC POLYNEUROPATHY, WITH LONG-TERM CURRENT USE OF INSULIN (HCC): ICD-10-CM

## 2021-02-03 DIAGNOSIS — I73.9 PAD (PERIPHERAL ARTERY DISEASE) (HCC): ICD-10-CM

## 2021-02-03 PROCEDURE — 29425 APPL SHORT LEG CAST WALKING: CPT | Performed by: PODIATRIST

## 2021-02-03 PROCEDURE — 99204 OFFICE O/P NEW MOD 45 MIN: CPT | Performed by: PODIATRIST

## 2021-02-03 RX ORDER — OXYCODONE HYDROCHLORIDE AND ACETAMINOPHEN 5; 325 MG/1; MG/1
1 TABLET ORAL EVERY 6 HOURS PRN
Qty: 28 TABLET | Refills: 0 | Status: SHIPPED | OUTPATIENT
Start: 2021-02-03 | End: 2021-02-10

## 2021-02-03 RX ORDER — DOCUSATE SODIUM 100 MG/1
100 CAPSULE, LIQUID FILLED ORAL DAILY PRN
Qty: 30 CAPSULE | Refills: 0 | Status: ON HOLD | OUTPATIENT
Start: 2021-02-03 | End: 2022-05-31 | Stop reason: HOSPADM

## 2021-02-03 NOTE — PROGRESS NOTES
[facility] PHYSICIANS  [facility] [place]  [address] [place] [address]  Dept: [phone]  Dept Fax: [phone]    NEW PATIENT PROGRESS NOTE  Date of patient's visit: 2/3/2021  Patient's Name:  Rosalba Pascal YOB: 1945            Patient Care Team:  John Ahuja MD as PCP - General (Family Medicine)  John Ahuja MD as PCP - [address] [place] [address]  [facility] Provider  Daylin Duarte MD as Consulting Physician (Endocrinology)  Meliza Whelan MD as Consulting Physician (Internal Medicine Cardiovascular Disease)  Donavon Gil DPM (Podiatry)        Chief Complaint   Patient presents with    New Patient    Foot Injury    Foot Pain         HPI:   Rosalba Pascal is a 76 y.o. female who presents to the office today complaining of  Injury to right foot. .  Symptoms began 4 day(s) ago. Patient relates pain is Present. Pain is rated 10 out of 10 and is described as constant. Treatments prior to today's visit include: none. Currently denies F/C/N/V. Pt's primary care physician is John Ahuja MD last seen 1/31/21     Allergies   Allergen Reactions    Penicillins      Other reaction(s): Hives    Sulfa Antibiotics      Other reaction(s): Rash       Past Medical History:   Diagnosis Date    Anxiety     Atrial fibrillation (HCC)     chronic-takes xarelto    Benign positional vertigo     CAD (coronary artery disease)     Chest pain     Depression     Diabetes mellitus ([address]     Diabetic neuropathy ([address]     Hyperlipidemia     Hypertension     Migraine     Migraine headaches aggravated with sublingual nitroglycerin       Prior to Admission medications    Medication Sig Start Date End Date Taking? Authorizing Provider   LEVEMIR FLEXTOUCH 100 UNIT/ML injection pen inject 12 units subcutaneously IF SUGAR IS HIGH RETURN TO NORMAL DOSAGE.  PLEASE FOLLOW UP WITH PRESCRIBER 2/1/21  Yes John Ahuja MD   traMADol (ULTRAM) 50 MG tablet take 1 tablet by mouth every 12 hours if needed for pain 1/4/21 2/3/21 Yes Isabela Smith MD   nitroGLYCERIN (NITROSTAT) 0.4 MG SL tablet Place 1 tablet under the tongue every 5 minutes as needed for Chest pain 20  Yes Isabela Smith MD   lisinopril (PRINIVIL;ZESTRIL) 2.5 MG tablet Take 1 tablet by mouth daily  Patient taking differently: Take 10 mg by mouth daily  20  Yes Ossie Apgar, MD   ticagrelor (BRILINTA) 90 MG TABS tablet Take 1 tablet by mouth 2 times daily 20  Yes Ossie Apgar, MD   Continuous Blood Gluc  (FREESTYLE RICHARD 14 DAY READER) [place] 1 each by Does not apply route continuous [facility] 10/30/20  Yes Isabela Smith MD   Continuous Blood Gluc Sensor (FREESTYLE RICHARD 14 DAY SENSOR) MISC 1 each by Does not apply route every 14 days 10/30/20  Yes Isabela Smith MD   rivaroxaban (XARELTO) 20 MG TABS tablet Take 1 tablet by mouth daily (with breakfast) 10/21/20  Yes Isabela Smith MD   glucose monitoring kit (FREESTYLE) monitoring kit 1 kit by Does not apply route 4 times daily Freestyle Richard . Dx E11.65, has tremors and cannot use conventional meter without great difficulty. Please provide supplies for 3 months, rf 3,Pharmacy Counter [facility]. 10/6/20  Yes Isabela Smith MD   [facility].  Devices (STEP N REST WALKER) MISC 1 each by Does not apply route continuous Seated, wheeled walker 10/6/20  Yes Isabela Smith MD   escitalopram (LEXAPRO) 20 MG tablet Take 1 tablet by mouth daily 20  Yes Isabela Smith MD   hydrOXYzine (ATARAX) 25 MG tablet take 1 tablet by mouth every 8 hours rectally for itching 20  Yes Isabela Smith MD   fluticasone [facility]) 50 MCG/ACT nasal spray instill 2 sprays into each nostril once daily 20  Yes Historical Provider, MD   NOVOFINE 32G X 6 MM MISC use 1 NEEDLE to inject MEDICATION subcutaneously five times a day 20  Yes Historical Provider, MD   mupirocin (BACTROBAN) 2 % ointment apply topically to affected area three times a day 4/21/20  Yes Historical Provider, MD   omeprazole (PRILOSEC) 20 MG delayed release capsule Take 1 capsule by mouth Daily 5/11/20  Yes Fide Bauer MD   insulin aspart (NOVOLOG FLEXPEN) 100 UNIT/ML injection pen Use TID before meals according to scale - max 45 units a day 5/11/20  Yes Fide Bauer MD   loratadine (CLARITIN) 10 MG tablet Take 1 tablet every day by oral route. 5/11/20  Yes Fide Bauer MD   rosuvastatin (CRESTOR) 40 MG tablet Take 1 tablet by mouth daily 5/1/20  Yes Fide Bauer MD   isosorbide mononitrate (IMDUR) 30 MG extended release tablet Take 1 tablet by mouth daily 11/26/19  Yes Alma Herring MD   carvedilol (COREG) 25 MG tablet Take 1 tablet by mouth 2 times daily (with meals) Hold for systolic blood pressure < 120 or heart rate < 50  Give 1 hour apart from other blood pressure medicines 11/25/19  Yes Alma Herring MD   albuterol sulfate  (90 Base) MCG/ACT inhaler Inhale 2 puffs into the lungs every 6 hours as needed for Wheezing 3/15/18  Yes Dayan See MD   spironolactone (ALDACTONE) 25 MG tablet Take 25 mg by mouth daily    Yes Historical Provider, MD   vitamin E 400 UNIT capsule Take 400 Units by mouth daily. Yes Historical Provider, MD   NIFEdipine (PROCARDIA XL) 30 MG extended release tablet Take 1 tablet by mouth daily 11/13/20 1/12/21  Sy Stafford MD   pregabalin (LYRICA) 50 MG capsule Take 1 capsule by mouth 3 times daily for 30 days. 10/2/20 11/1/20  Carlos Cortes MD   clonazePAM (KLONOPIN) 0.5 MG tablet take 1 tablet by mouth twice a day if needed for anxiety 8/5/20 12/11/20  Fide Bauer MD   gabapentin (NEURONTIN) 600 MG tablet Take 1 tablet by mouth 3 times daily for 30 days.  6/12/20 12/11/20  Fide Bauer MD       Past Surgical History:   Procedure Laterality Date    APPENDECTOMY      CARDIAC CATHETERIZATION      2012    CORONARY ANGIOPLASTY WITH STENT PLACEMENT  02/25/2017    4 SYNERGY HEART STENTS DRUG ELUTING ALL MRI CONDITONAL 3T OK, SAFE IMMEDIATELY.  CORONARY ANGIOPLASTY WITH STENT PLACEMENT  07/11/2012    XIENCE HEART STENT/ MRI CONDITIONAL 3T OK, SAFE IMMEDIATELY    CORONARY ANGIOPLASTY WITH STENT PLACEMENT  12/11/2020    PTCA         Family History   Problem Relation Age of Onset    Cancer Mother     Cancer Father        Social History     Tobacco Use    Smoking status: Never Smoker    Smokeless tobacco: Never Used   Substance Use Topics    Alcohol use: No       Review of Systems    Review of Systems:   History obtained from chart review and the patient  General ROS: negative for - chills, fatigue, fever, night sweats or weight gain  Constitutional: Negative for chills, diaphoresis, fatigue, fever and unexpected weight change. Musculoskeletal: Positive for arthralgias, gait problem and joint swelling. Neurological ROS: negative for - behavioral changes, confusion, headaches or seizures. Negative for weakness and numbness. Dermatological ROS: negative for - mole changes, rash  Cardiovascular: Negative for leg swelling. Gastrointestinal: Negative for constipation, diarrhea, nausea and vomiting. Lower Extremity Physical Examination:   Vitals: There were no vitals filed for this visit. General: AAO x 3 in NAD. Dermatologic Exam:  Skin lesion/ulceration Absent . Skin No rashes or nodules noted. .       Musculoskeletal:     1st MPJ ROM decreased, Bilateral.  Muscle strength 5/5, Bilateral.  Pain present upon palpation of right foot along lateral 5th metatarsal.  Medial longitudinal arch, Bilateral WNL. Ankle ROM guarded, right. Dorsally contracted digits absent digits 1-5 Bilateral.     Vascular: DP and PT pulses palpable 2/4, Bilateral.  CFT <3 seconds, Bilateral.  Hair growth present to the level of the digits, Bilateral.  Edema noted to right lateral foot.   Varicosities absent, Bilateral. Erythema absent, Bilateral    Neurological: Sensation intact to light touch to level of digits, Bilateral.  Protective sensation intact 10/10 sites via 5.07/10g [place]elacio Monofilament, Bilateral.  negative Tinel's, Bilateral.  negative Valleix sign, Bilateral.      Integument: Warm, dry, supple, Bilateral.  Open lesion absent, Bilateral.  Interdigital maceration absent to web spaces 1-4, Bilateral.  Nails are normal in length, thickness and color 1-5 bilateral.  Fissures absent, Bilateral.       Radiographs:  3 views right foot:     Oblique fracture through the 5th metatarsal diaphysis with overlying lateral   soft tissue swelling at the foot, nondisplaced       Asessment: Patient is a 76 y.o. female with:      Diagnosis Orders   1. Pain of right lower extremity  oxyCODONE-acetaminophen (PERCOCET) 5-325 MG per tablet    Vitamin D 25 Hydroxy    Misc. Devices (COMMODE BEDSIDE) MISC    [place] APPLY SHORT LEG CAST,WALKER   2. Age-related osteoporosis with current pathological fracture, initial encounter  Misc. Devices (COMMODE BEDSIDE) MISC   3. Type 2 diabetes mellitus with diabetic polyneuropathy, with long-term current use of insulin ([facility]     4. PAD (peripheral artery disease) ([facility]     5. Nondisplaced fracture of fifth metatarsal bone, right foot, initial encounter for closed fracture  [place] APPLY SHORT LEG CAST,WALKER       Plan: Patient examined and evaluated. Current condition and treatment options discussed in detail. Discussed conservative and surgical options with the patient. All labs were reviewed and all imagining including the above findings were reviewed prior to the patients arrival and with the patient today. Previous patient encounter was reviewed. Encounters from the patients other medical providers were reviewed and noted. Time was spent educating the patient and their families/caregivers on proper care of the feet and ankles.       At this time we discussed that surgical intervention is recommended if fracture becomes nondisplaced. at this time due to past medical history, we agree that surgery will be delayed until necessary. All the above diagnosis were addressed at todays visit and all questions were answered. Short leg cast was applied to right lower extremity. A total of 45 minutes was spent with this patients encounter    Advised pt to remain nonWB at all times to RLE. Verbal and written instructions given to patient. Contact office with any questions/problems/concerns. RTC in 3week(s).     2/3/2021    Electronically signed by Eric Ren DPM on 2/3/2021 at 10:38 AM  2/3/2021

## 2021-02-08 ENCOUNTER — HOSPITAL ENCOUNTER (OUTPATIENT)
Dept: CARDIAC REHAB | Age: 76
Setting detail: THERAPIES SERIES
Discharge: HOME | End: 2021-02-08
Payer: MEDICARE

## 2021-02-24 ENCOUNTER — OFFICE VISIT (OUTPATIENT)
Dept: PODIATRY | Age: 76
End: 2021-02-24
Payer: MEDICARE

## 2021-02-24 VITALS — BODY MASS INDEX: 35.52 KG/M2 | WEIGHT: 221 LBS | RESPIRATION RATE: 16 BRPM | TEMPERATURE: 97 F | HEIGHT: 66 IN

## 2021-02-24 DIAGNOSIS — S92.354A NONDISPLACED FRACTURE OF FIFTH METATARSAL BONE, RIGHT FOOT, INITIAL ENCOUNTER FOR CLOSED FRACTURE: ICD-10-CM

## 2021-02-24 DIAGNOSIS — M79.604 PAIN OF RIGHT LOWER EXTREMITY: Primary | ICD-10-CM

## 2021-02-24 PROCEDURE — 99213 OFFICE O/P EST LOW 20 MIN: CPT | Performed by: PODIATRIST

## 2021-02-24 RX ORDER — OXYCODONE HYDROCHLORIDE AND ACETAMINOPHEN 5; 325 MG/1; MG/1
1 TABLET ORAL EVERY 6 HOURS PRN
Qty: 28 TABLET | Refills: 0 | Status: SHIPPED | OUTPATIENT
Start: 2021-02-24 | End: 2021-03-03

## 2021-02-24 NOTE — PROGRESS NOTES
[facility] PHYSICIANS  [facility]  [address] [place] [address]  Dept: [phone]  Dept Fax: [phone]    RETURN PATIENT PROGRESS NOTE  Date of patient's visit: 2/24/2021  Patient's Name:  Azeem Bhatia YOB: 1945            Patient Care Team:  Chanelle Bangura MD as PCP - General (Family Medicine)  Chanelle Bangura MD as PCP - [facility] Empaneled Provider  Shashi Ortega MD as Consulting Physician (Endocrinology)  Ankur Cunningham MD as Consulting Physician (Internal Medicine Cardiovascular Disease)  Miesha Mercado DPM (Podiatry)  Jac Marmolejo DPM as Physician (Podiatry)       Azeemrod Bhatia 76 y.o. female that presents for follow-up of fx to right foot  Chief Complaint   Patient presents with    Foot Injury     rt fx    Foot Pain     Pt's primary care physician is Chanelle Bangura MD last seen 2/15/21  Symptoms began 4 week(s) ago and are decreased . Patient relates pain is Present. Pain is rated 5 out of 10 and is described as intermittent. Treatments prior to today's visit include: short leg cast to right LE. Currently denies F/C/N/V. Allergies   Allergen Reactions    Penicillins      Other reaction(s): Hives    Sulfa Antibiotics      Other reaction(s): Rash       Past Medical History:   Diagnosis Date    Anxiety     Atrial fibrillation (HCC)     chronic-takes xarelto    Benign positional vertigo     CAD (coronary artery disease)     Chest pain     Depression     Diabetes mellitus ([address]     Diabetic neuropathy (HCC)     Hyperlipidemia     Hypertension     Migraine     Migraine headaches aggravated with sublingual nitroglycerin       Prior to Admission medications    Medication Sig Start Date End Date Taking?  Authorizing Provider   nitroGLYCERIN (NITROSTAT) 0.4 MG SL tablet place 1 tablet under the tongue if needed every 5 minutes if needed for CHEST PAIN 2/19/21   Chanelle Bangura MD   clonazePAM (KLONOPIN) 0.5 MG tablet take 1 tablet by mouth twice a day if needed for anxiety 2/4/21 3/6/21  Mansi Cabral MD   [facility]. Devices [facility]) MISC Diagnosis: right 5th metatarsal fracture, pain in limb    Duration: 3 months 2/3/21   Radha Smith DPM   docusate sodium (COLACE) 100 MG capsule Take 1 capsule by mouth daily as needed for Constipation 2/3/21   Radha Smith DPM   Misc. Devices (COMMODE BEDSIDE) MISC 1 Device by Does not apply route daily 2/3/21   Marisol Acosta DPM   LEVEMIR FLEXTOUCH 100 UNIT/ML injection pen inject 12 units subcutaneously IF SUGAR IS HIGH RETURN TO NORMAL DOSAGE. PLEASE FOLLOW UP WITH PRESCRIBER 2/1/21   Mansi Cabral MD   lisinopril (PRINIVIL;ZESTRIL) 2.5 MG tablet Take 1 tablet by mouth daily  Patient taking differently: Take 10 mg by mouth daily  11/13/20   Tamia Harper MD   ticagrelor (BRILINTA) 90 MG TABS tablet Take 1 tablet by mouth 2 times daily 11/13/20   Tamia Harper MD   NIFEdipine (PROCARDIA XL) 30 MG extended release tablet Take 1 tablet by mouth daily 11/13/20 1/12/21  Tamia Harper MD   Continuous Blood Gluc  (FREESTYLE RICHARD 14 DAY READER) JAQUELINE 1 each by Does not apply route continuous [facility] 10/30/20   Mansi Cabral MD   Continuous Blood Gluc Sensor (FREESTYLE RICHARD 14 DAY SENSOR) [facility] 1 each by Does not apply route every 14 days 10/30/20   Mansi Cabral MD   rivaroxaban (XARELTO) 20 MG TABS tablet Take 1 tablet by mouth daily (with breakfast) 10/21/20   Mansi Cabral MD   glucose monitoring kit (FREESTYLE) monitoring kit 1 kit by Does not apply route 4 times daily Freestyle Richard . Dx E11.65, has tremors and cannot use conventional meter without great difficulty. Please provide supplies for 3 months, rf 3,Pharmacy Counter [facility]. 10/6/20   Mansi Cabral MD   [facility].  Devices (STEP N REST WALKER) MISC 1 each by Does not apply route continuous Seated, wheeled walker 10/6/20   Mansi Cabral MD   pregabalin (LYRICA) 50 MG capsule Take 1 capsule by mouth 3 times daily for 30 days. 10/2/20 11/1/20  Rui Riley MD   escitalopram (LEXAPRO) 20 MG tablet Take 1 tablet by mouth daily 8/11/20   Denise Rowan MD   hydrOXYzine (ATARAX) 25 MG tablet take 1 tablet by mouth every 8 hours rectally for itching 8/5/20   Denise Rowan MD   gabapentin (NEURONTIN) 600 MG tablet Take 1 tablet by mouth 3 times daily for 30 days. 6/12/20 12/11/20  Denise Rowan MD   fluticasone [facility]) 50 MCG/ACT nasal spray instill 2 sprays into each nostril once daily 4/21/20   Historical Provider, MD   NOVOFINE 32G X 6 MM MISC use 1 NEEDLE to inject MEDICATION subcutaneously five times a day 4/8/20   Historical Provider, MD   mupirocin (BACTROBAN) 2 % ointment apply topically to affected area three times a day 4/21/20   Historical Provider, MD   omeprazole (PRILOSEC) 20 MG delayed release capsule Take 1 capsule by mouth Daily 5/11/20   Denise Rowan MD   insulin aspart (NOVOLOG FLEXPEN) 100 UNIT/ML injection pen Use TID before meals according to scale - max 45 units a day 5/11/20   Denise Rowan MD   loratadine (CLARITIN) 10 MG tablet Take 1 tablet every day by oral route.  5/11/20   Denise Rowan MD   rosuvastatin (CRESTOR) 40 MG tablet Take 1 tablet by mouth daily 5/1/20   Denise Rowan MD   isosorbide mononitrate (IMDUR) 30 MG extended release tablet Take 1 tablet by mouth daily 11/26/19   Amanda Fischer MD   carvedilol (COREG) 25 MG tablet Take 1 tablet by mouth 2 times daily (with meals) Hold for systolic blood pressure < 120 or heart rate < 50  Give 1 hour apart from other blood pressure medicines 11/25/19   Amanda Fischer MD   albuterol sulfate  (90 Base) MCG/ACT inhaler Inhale 2 puffs into the lungs every 6 hours as needed for Wheezing 3/15/18   Sarah Toledo MD   spironolactone (ALDACTONE) 25 MG tablet Take 25 mg by mouth daily     Historical Provider, MD   vitamin E 400 UNIT capsule Take 400 Units by mouth daily. Historical Provider, MD       Review of Systems    Review of Systems:  History obtained from chart review and the patient  General ROS: negative for - chills, fatigue, fever, night sweats or weight gain  Constitutional: Negative for chills, diaphoresis, fatigue, fever and unexpected weight change. Musculoskeletal: Positive for arthralgias, gait problem and joint swelling. Neurological ROS: negative for - behavioral changes, confusion, headaches or seizures. Negative for weakness and numbness. Dermatological ROS: negative for - mole changes, rash  Cardiovascular: Negative for leg swelling. Gastrointestinal: Negative for constipation, diarrhea, nausea and vomiting. Lower Extremity Physical Examination:     Vitals:   Vitals:    02/24/21 1313   Resp: 16     General: AAO x 3 in NAD. Prior to Visit Medications    Medication Sig Taking? Authorizing Provider   oxyCODONE-acetaminophen (PERCOCET) 5-325 MG per tablet Take 1 tablet by mouth every 6 hours as needed for Pain for up to 7 days. Intended supply: 7 days. Take lowest dose possible to manage pain Yes Joel Herrera DPM   nitroGLYCERIN (NITROSTAT) 0.4 MG SL tablet place 1 tablet under the tongue if needed every 5 minutes if needed for CHEST PAIN Yes David Munguia MD   clonazePAM (KLONOPIN) 0.5 MG tablet take 1 tablet by mouth twice a day if needed for anxiety Yes David Munguia MD   [facility]. Devices (WALKER) MISC Diagnosis: right 5th metatarsal fracture, pain in limb    Duration: 3 months Yes Marisol Acosta DPM   docusate sodium (COLACE) 100 MG capsule Take 1 capsule by mouth daily as needed for Constipation Yes Joel Herrera DPM   [place]. Devices (COMMODE BEDSIDE) MISC 1 Device by Does not apply route daily Yes Marisol Acosta DPM   LEVEMIR FLEXTOUCH 100 UNIT/ML injection pen inject 12 units subcutaneously IF SUGAR IS HIGH RETURN TO NORMAL DOSAGE.  PLEASE FOLLOW UP WITH PRESCRIBER Yes David Munguia MD   lisinopril (CLARITIN) 10 MG tablet Take 1 tablet every day by oral route. Yes Fide Bauer MD   rosuvastatin (CRESTOR) 40 MG tablet Take 1 tablet by mouth daily Yes Fide Bauer MD   isosorbide mononitrate (IMDUR) 30 MG extended release tablet Take 1 tablet by mouth daily Yes Alma Herring MD   carvedilol (COREG) 25 MG tablet Take 1 tablet by mouth 2 times daily (with meals) Hold for systolic blood pressure < 120 or heart rate < 50  Give 1 hour apart from other blood pressure medicines Yes Alma Herring MD   albuterol sulfate  (90 Base) MCG/ACT inhaler Inhale 2 puffs into the lungs every 6 hours as needed for Wheezing Yes Dayan See MD   spironolactone (ALDACTONE) 25 MG tablet Take 25 mg by mouth daily  Yes Historical Provider, MD   vitamin E 400 UNIT capsule Take 400 Units by mouth daily. Yes Historical Provider, MD   NIFEdipine (PROCARDIA XL) 30 MG extended release tablet Take 1 tablet by mouth daily  Sy Stafford MD   pregabalin (LYRICA) 50 MG capsule Take 1 capsule by mouth 3 times daily for 30 days. Carlos Cortes MD   gabapentin (NEURONTIN) 600 MG tablet Take 1 tablet by mouth 3 times daily for 30 days. Fide Bauer MD     Dermatologic Exam:  Skin lesion/ulceration Absent . Skin No rashes or nodules noted. .       Musculoskeletal:     1st MPJ ROM decreased, Bilateral.  Muscle strength 5/5, Bilateral.  Pain present upon palpation of right foot along lateral 5th metatarsal.  Medial longitudinal arch, Bilateral WNL.   Ankle ROM WNL,Bilateral.    Dorsally contracted digits absent digits 1-5 Bilateral.     Vascular: DP and PT pulses palpable 2/4, Bilateral.  CFT <3 seconds, Bilateral.  Hair growth present to the level of the digits, Bilateral.  Edema absent, Bilateral.  Varicosities absent, Bilateral. Erythema absent, Bilateral    Neurological: Sensation intact to light touch to level of digits, Bilateral.  Protective sensation intact 10/10 sites via 5.07/10g [place]-Gelacio Monofilament, Bilateral.  negative Tinel's, Bilateral.  negative Valleix sign, Bilateral.      Integument: Warm, dry, supple, Bilateral.  Open lesion absent, Bilateral.  Interdigital maceration absent to web spaces 1-4, Bilateral.  Nails are normal in length, thickness and color 1-5 bilateral.  Fissures absent, Bilateral.     Xrays, 3 views, right foot: nondisplaced fracture 5th metatarsal shaft with minimal bone healing    Asessment: Patient is a 76 y.o. female with:    Diagnosis Orders   1. Pain of right lower extremity  XR FOOT RIGHT (MIN 3 VIEWS)    oxyCODONE-acetaminophen (PERCOCET) 5-325 MG per tablet   2. Nondisplaced fracture of fifth metatarsal bone, right foot, initial encounter for closed fracture  XR FOOT RIGHT (MIN 3 VIEWS)    oxyCODONE-acetaminophen (PERCOCET) 5-325 MG per tablet         Plan: Patient examined and evaluated. Current condition and treatment options discussed in detail. Reviewed and discussed xrays with above findings. Advised pt to remain in [facility] and to remain nonWB to [facility]. We discussed that surgery would be considered if there is no signs of bone healing. Verbal and written instructions given to patient. Contact office with any questions/problems/concerns. No orders of the defined types were placed in this encounter. No orders of the defined types were placed in this encounter. RTC in 3week(s).     2/24/2021      Electronically signed by Viviana Mattson DPM on 2/24/2021 at 1:14 PM  2/24/2021

## 2021-03-01 NOTE — PROGRESS NOTES
Spoke with pt. Her foot will continue to be casted for 3 more weeks, non weight bearing, and expecting physical therapy after. Pt will call us when she can restart Cardiac Rehab. Visits canceled.

## 2021-03-03 ENCOUNTER — HOSPITAL ENCOUNTER (OUTPATIENT)
Dept: CARDIAC REHAB | Age: 76
Setting detail: THERAPIES SERIES
Discharge: HOME | End: 2021-03-03
Payer: MEDICARE

## 2021-03-17 ENCOUNTER — OFFICE VISIT (OUTPATIENT)
Dept: PODIATRY | Age: 76
End: 2021-03-17
Payer: MEDICARE

## 2021-03-17 VITALS — WEIGHT: 221 LBS | TEMPERATURE: 98.4 F | HEIGHT: 66 IN | BODY MASS INDEX: 35.52 KG/M2 | RESPIRATION RATE: 16 BRPM

## 2021-03-17 DIAGNOSIS — M79.604 PAIN OF RIGHT LOWER EXTREMITY: ICD-10-CM

## 2021-03-17 DIAGNOSIS — Z79.4 TYPE 2 DIABETES MELLITUS WITH DIABETIC POLYNEUROPATHY, WITH LONG-TERM CURRENT USE OF INSULIN (HCC): ICD-10-CM

## 2021-03-17 DIAGNOSIS — S92.354A NONDISPLACED FRACTURE OF FIFTH METATARSAL BONE, RIGHT FOOT, INITIAL ENCOUNTER FOR CLOSED FRACTURE: Primary | ICD-10-CM

## 2021-03-17 DIAGNOSIS — E11.42 TYPE 2 DIABETES MELLITUS WITH DIABETIC POLYNEUROPATHY, WITH LONG-TERM CURRENT USE OF INSULIN (HCC): ICD-10-CM

## 2021-03-17 PROCEDURE — 99214 OFFICE O/P EST MOD 30 MIN: CPT | Performed by: PODIATRIST

## 2021-03-17 RX ORDER — MELATONIN
1000 DAILY
Qty: 90 TABLET | Refills: 1 | Status: SHIPPED | OUTPATIENT
Start: 2021-03-17 | End: 2021-09-28 | Stop reason: SDUPTHER

## 2021-03-17 RX ORDER — OXYCODONE HYDROCHLORIDE AND ACETAMINOPHEN 5; 325 MG/1; MG/1
1 TABLET ORAL EVERY 6 HOURS PRN
Qty: 28 TABLET | Refills: 0 | Status: SHIPPED | OUTPATIENT
Start: 2021-03-17 | End: 2021-03-24

## 2021-03-17 NOTE — PROGRESS NOTES
[facility] PHYSICIANS  [facility]  [address] [facility]  Dept: [phone]  Dept Fax: [phone]    RETURN PATIENT PROGRESS NOTE  Date of patient's visit: 3/17/2021  Patient's Name:  Kendra Kelsey YOB: 1945            Patient Care Team:  Romina Milligan MD as PCP - General (Family Medicine)  Romina Milligan MD as PCP - REHABILITATION [facility] Empaneled Provider  Richard Beverly MD as Consulting Physician (Endocrinology)  Beryl Neely MD as Consulting Physician (Internal Medicine Cardiovascular Disease)  Carlyn Wesley DPM (Podiatry)  Danielle Longoria DPM as Physician (Podiatry)       Kendra Velasqueznabila 76 y.o. female that presents for follow-up of fx to right foot  Chief Complaint   Patient presents with    Foot Injury     fx 3 wk     Pt's primary care physician is Romina Milligan MD last seen 3/16/21  Symptoms began 3 week(s) ago and are decreased . Patient relates pain is Present. Pain is rated 4 out of 10 and is described as intermittent. Treatments prior to today's visit include: short leg cast to right LE. Currently denies F/C/N/V. Allergies   Allergen Reactions    Penicillins      Other reaction(s): Hives    Sulfa Antibiotics      Other reaction(s): Rash       Past Medical History:   Diagnosis Date    Anxiety     Atrial fibrillation (HCC)     chronic-takes xarelto    Benign positional vertigo     CAD (coronary artery disease)     Chest pain     Depression     Diabetes mellitus ([facility] [address]     Diabetic neuropathy (HCC)     Hyperlipidemia     Hypertension     Migraine     Migraine headaches aggravated with sublingual nitroglycerin       Prior to Admission medications    Medication Sig Start Date End Date Taking?  Authorizing Provider   insulin detemir (LEVEMIR FLEXTOUCH) 100 UNIT/ML injection pen Inject 60 Units into the skin 2 times daily 3/16/21   Romina Milligan MD   nitroGLYCERIN (NITROSTAT) 0.4 MG SL tablet place 1 tablet under the tongue if needed every 5 minutes if needed for CHEST PAIN 2/19/21   Jyothi Kenny MD   clonazePAM Maru Lobe) 0.5 MG tablet take 1 tablet by mouth twice a day if needed for anxiety 2/4/21 3/6/21  Jyothi Kenny MD   [facility]. Devices [facility]) MISC Diagnosis: right 5th metatarsal fracture, pain in limb    Duration: 3 months 2/3/21   Jef Labrum, DPM   docusate sodium (COLACE) 100 MG capsule Take 1 capsule by mouth daily as needed for Constipation 2/3/21   Jef Labrum, DPM   Misc. Devices (COMMODE BEDSIDE) MISC 1 Device by Does not apply route daily 2/3/21   Jef Labrum, DPM   lisinopril (PRINIVIL;ZESTRIL) 2.5 MG tablet Take 1 tablet by mouth daily  Patient taking differently: Take 10 mg by mouth daily  11/13/20   Semaj Boswell MD   ticagrelor (BRILINTA) 90 MG TABS tablet Take 1 tablet by mouth 2 times daily 11/13/20   Semaj Boswell MD   NIFEdipine (PROCARDIA XL) 30 MG extended release tablet Take 1 tablet by mouth daily 11/13/20 1/12/21  Semaj Boswell MD   Continuous Blood Gluc  (FREESTYLE RICHARD 14 DAY READER) JAQUELINE 1 each by Does not apply route continuous [facility] 10/30/20   Jyothi Kenny MD   Continuous Blood Gluc Sensor (FREESTYLE RICHARD 14 DAY SENSOR) MISC 1 each by Does not apply route every 14 days 10/30/20   Jyothi Kenny MD   rivaroxaban (XARELTO) 20 MG TABS tablet Take 1 tablet by mouth daily (with breakfast) 10/21/20   Jyothi Kenny MD   glucose monitoring kit (FREESTYLE) monitoring kit 1 kit by Does not apply route 4 times daily Freestyle Richard . Dx E11.65, has tremors and cannot use conventional meter without great difficulty. Please provide supplies for 3 months, rf 3,Pharmacy [facility]. 10/6/20   Jyothi Kenny MD   Misc.  Devices (STEP N REST WALKER) MISC 1 each by Does not apply route continuous Seated, wheeled walker 10/6/20   Jyothi Kenny MD   pregabalin (LYRICA) 50 MG capsule Take 1 capsule by mouth 3 times daily for 30 days. 10/2/20 11/1/20  Batool Dhillon MD   escitalopram (LEXAPRO) 20 MG tablet Take 1 tablet by mouth daily 8/11/20   Bridget Diaz MD   hydrOXYzine (ATARAX) 25 MG tablet take 1 tablet by mouth every 8 hours rectally for itching 8/5/20   Bridget Diaz MD   gabapentin (NEURONTIN) 600 MG tablet Take 1 tablet by mouth 3 times daily for 30 days. 6/12/20 12/11/20  Bridget Diaz MD   fluticasone Almetta Frankel) 50 MCG/ACT nasal spray instill 2 sprays into each nostril once daily 4/21/20   Historical Provider, MD   NOVOFINE 32G X 6 MM MISC use 1 NEEDLE to inject MEDICATION subcutaneously five times a day 4/8/20   Historical Provider, MD   mupirocin (BACTROBAN) 2 % ointment apply topically to affected area three times a day 4/21/20   Historical Provider, MD   omeprazole (PRILOSEC) 20 MG delayed release capsule Take 1 capsule by mouth Daily 5/11/20   Bridget Diaz MD   insulin aspart (NOVOLOG FLEXPEN) 100 UNIT/ML injection pen Use TID before meals according to scale - max 45 units a day 5/11/20   Bridget Diaz MD   loratadine (CLARITIN) 10 MG tablet Take 1 tablet every day by oral route. 5/11/20   Bridget Diaz MD   rosuvastatin (CRESTOR) 40 MG tablet Take 1 tablet by mouth daily 5/1/20   Bridget Diaz MD   isosorbide mononitrate (IMDUR) 30 MG extended release tablet Take 1 tablet by mouth daily 11/26/19   Naren Marques MD   carvedilol (COREG) 25 MG tablet Take 1 tablet by mouth 2 times daily (with meals) Hold for systolic blood pressure < 120 or heart rate < 50  Give 1 hour apart from other blood pressure medicines 11/25/19   Naren Marques MD   albuterol sulfate  (90 Base) MCG/ACT inhaler Inhale 2 puffs into the lungs every 6 hours as needed for Wheezing 3/15/18   Quita Mckeon MD   spironolactone (ALDACTONE) 25 MG tablet Take 25 mg by mouth daily     Historical Provider, MD   vitamin E 400 UNIT capsule Take 400 Units by mouth daily.     Historical Provider, MD Review of Systems    Review of Systems:  History obtained from chart review and the patient  General ROS: negative for - chills, fatigue, fever, night sweats or weight gain  Constitutional: Negative for chills, diaphoresis, fatigue, fever and unexpected weight change. Musculoskeletal: Positive for arthralgias, gait problem and joint swelling. Neurological ROS: negative for - behavioral changes, confusion, headaches or seizures. Negative for weakness and numbness. Dermatological ROS: negative for - mole changes, rash  Cardiovascular: Negative for leg swelling. Gastrointestinal: Negative for constipation, diarrhea, nausea and vomiting. Lower Extremity Physical Examination:     Vitals: There were no vitals filed for this visit. General: AAO x 3 in NAD. Dermatologic Exam:  Skin lesion/ulceration Absent . Skin No rashes or nodules noted. .       Musculoskeletal:     1st MPJ ROM decreased, Bilateral.  Muscle strength 5/5, Bilateral.  Pain present upon palpation of right foot along 5th metatarsal shaft. Medial longitudinal arch, Bilateral WNL.   Ankle ROM WNL,Bilateral.    Dorsally contracted digits absent digits 1-5 Bilateral.     Vascular: DP and PT pulses palpable 2/4, Bilateral.  CFT <3 seconds, Bilateral.  Hair growth present to the level of the digits, Bilateral.  Edema absent, Bilateral.  Varicosities absent, Bilateral. Erythema absent, Bilateral    Neurological: Sensation intact to light touch to level of digits, Bilateral.  Protective sensation intact 10/10 sites via 5.07/10g Custer-Gelacio Monofilament, Bilateral.  negative Tinel's, Bilateral.  negative Valleix sign, Bilateral.      Integument: Warm, dry, supple, Bilateral.  Open lesion absent, Bilateral.  Interdigital maceration absent to web spaces 1-4, Bilateral.  Nails are normal in length, thickness and color 1-5 bilateral.  Fissures absent, Bilateral.     Xrays, 3 views, right foot: there is noted appropriate bone healing along 5th metatarsal shaft with slight bone callus formation, nondisplaced    Asessment: Patient is a 76 y.o. female with:    Diagnosis Orders   1. Nondisplaced fracture of fifth metatarsal bone, right foot, initial encounter for closed fracture  XR FOOT RIGHT (MIN 3 VIEWS)    oxyCODONE-acetaminophen (PERCOCET) 5-325 MG per tablet   2. Type 2 diabetes mellitus with diabetic polyneuropathy, with long-term current use of insulin (HCC)  XR FOOT RIGHT (MIN 3 VIEWS)    oxyCODONE-acetaminophen (PERCOCET) 5-325 MG per tablet   3. Pain of right lower extremity         Plan: Patient examined and evaluated. Current condition and treatment options discussed in detail. All labs were reviewed and all imagining including the above findings were reviewed prior to the patients arrival and with the patient today. Previous patient encounter was reviewed. Encounters from the patients other medical providers were reviewed and noted. Time was spent educating the patient and their families/caregivers on proper care of the feet and ankles. All the above diagnosis were addressed at todays visit and all questions were answered. A total of 35 minutes was spent with this patients encounter    I have dispensed a pneumatic equalizer walker. Due to the patient's diagnosis and related symptoms this is medically necessary for the treatment. The function of this device is to restrict and limit motion and provide stabilization and compression to the affected area. This device will allow the patient to slowly increase strength and ROM of the extremity. Specific instructions on weight bearing and ROM exercises were discussed and given to the patient. The goals and function of this device was explained in detail to the patient. Upon gait analysis, the device appeared to be fitting well and the patient states that the device is comfortable at this time. The patient was shown how to properly apply, wear, and care for the device.  The patient was able to apply properly and ambulate without distress. At that time, the device was  dispensed, it was suitable for the patient's condition and was not substandard. No guarantees were given and the precautions were reviewed. Written instructions and warranty information was given along with the list of the twenty-one (21) Durable Medical Equipment Supplier Guidelines. All the questions were answered satisfactorily    Advised pt to limit walking in CAM boot. Verbal and written instructions given to patient. Contact office with any questions/problems/concerns. No orders of the defined types were placed in this encounter. No orders of the defined types were placed in this encounter. RTC in 3week(s) for follow up xrays.     3/17/2021      Electronically signed by Candy Adams DPM on 3/17/2021 at 1:00 PM  3/17/2021

## 2021-04-07 ENCOUNTER — OFFICE VISIT (OUTPATIENT)
Dept: PODIATRY | Age: 76
End: 2021-04-07
Payer: MEDICARE

## 2021-04-07 DIAGNOSIS — Z79.4 TYPE 2 DIABETES MELLITUS WITH DIABETIC POLYNEUROPATHY, WITH LONG-TERM CURRENT USE OF INSULIN (HCC): Primary | ICD-10-CM

## 2021-04-07 DIAGNOSIS — I73.9 PAD (PERIPHERAL ARTERY DISEASE) (HCC): ICD-10-CM

## 2021-04-07 DIAGNOSIS — B35.1 DERMATOPHYTOSIS OF NAIL: ICD-10-CM

## 2021-04-07 DIAGNOSIS — S92.354A NONDISPLACED FRACTURE OF FIFTH METATARSAL BONE, RIGHT FOOT, INITIAL ENCOUNTER FOR CLOSED FRACTURE: ICD-10-CM

## 2021-04-07 DIAGNOSIS — M80.00XA AGE-RELATED OSTEOPOROSIS WITH CURRENT PATHOLOGICAL FRACTURE, INITIAL ENCOUNTER: ICD-10-CM

## 2021-04-07 DIAGNOSIS — E11.42 TYPE 2 DIABETES MELLITUS WITH DIABETIC POLYNEUROPATHY, WITH LONG-TERM CURRENT USE OF INSULIN (HCC): Primary | ICD-10-CM

## 2021-04-07 DIAGNOSIS — M79.604 PAIN OF RIGHT LOWER EXTREMITY: ICD-10-CM

## 2021-04-07 PROCEDURE — 99213 OFFICE O/P EST LOW 20 MIN: CPT | Performed by: PODIATRIST

## 2021-04-07 PROCEDURE — 11721 DEBRIDE NAIL 6 OR MORE: CPT | Performed by: PODIATRIST

## 2021-04-07 NOTE — PROGRESS NOTES
[facility] PHYSICIANS  [facility]  [address] [place] [address]  Dept: [phone]  Dept Fax: [phone]    RETURN PATIENT PROGRESS NOTE  Date of patient's visit: 4/7/2021  Patient's Name:  Gris Lopez YOB: 1945            Patient Care Team:  Iman Lion MD as PCP - General (Family Medicine)  Iman Lion MD as PCP - [facility]  Henri Alas MD as Consulting Physician (Endocrinology)  Jan Le MD as Consulting Physician (Internal Medicine Cardiovascular Disease)  Pedro Appiah DPM (Podiatry)  Guillermo Abbasi DPM as Physician (Podiatry)       Gris Lopez 76 y.o. female that presents for follow-up of   Chief Complaint   Patient presents with    Foot Injury     6 wk f/u /fx     Pt's primary care physician is Iman Lion MD last seen 3/16/21  Symptoms began 6 week(s) ago and are decreased to the right foot . Patient relates pain is Present. Pain is rated 3 out of 10 and is described as intermittent. Treatments prior to today's visit include: CAM boot and cast immobilization. Currently denies F/C/N/V. Pt has new complaint of increased painful ingrown nails that is difficult for her to trim at home. Admits to history of diabetes, well controlled. Allergies   Allergen Reactions    Penicillins      Other reaction(s): Hives    Sulfa Antibiotics      Other reaction(s): Rash       Past Medical History:   Diagnosis Date    Anxiety     Atrial fibrillation (HCC)     chronic-takes xarelto    Benign positional vertigo     CAD (coronary artery disease)     Chest pain     Depression     Diabetes mellitus ([facility] [address]     Diabetic neuropathy (HCC)     Hyperlipidemia     Hypertension     Migraine     Migraine headaches aggravated with sublingual nitroglycerin       Prior to Admission medications    Medication Sig Start Date End Date Taking?  Authorizing Provider   ticagrelor (BRILINTA) 90 MG TABS tablet Take 3,Pharmacy [facility]. 10/6/20   Debbie Brown MD   Misc. Devices (STEP N REST WALKER) MISC 1 each by Does not apply route continuous Seated, wheeled walker 10/6/20   Debbie Brown MD   pregabalin (LYRICA) 50 MG capsule Take 1 capsule by mouth 3 times daily for 30 days. 10/2/20 11/1/20  Vivienne Steen MD   escitalopram (LEXAPRO) 20 MG tablet Take 1 tablet by mouth daily 8/11/20   Debbie Brown MD   hydrOXYzine (ATARAX) 25 MG tablet take 1 tablet by mouth every 8 hours rectally for itching 8/5/20   Debbie Brown MD   gabapentin (NEURONTIN) 600 MG tablet Take 1 tablet by mouth 3 times daily for 30 days. 6/12/20 12/11/20  Debbie Brown MD   fluticasone [facility]) 50 MCG/ACT nasal spray instill 2 sprays into each nostril once daily 4/21/20   Historical Provider, MD   NOVOFINE 32G X 6 MM MISC use 1 NEEDLE to inject MEDICATION subcutaneously five times a day 4/8/20   Historical Provider, MD   mupirocin (BACTROBAN) 2 % ointment apply topically to affected area three times a day 4/21/20   Historical Provider, MD   omeprazole (PRILOSEC) 20 MG delayed release capsule Take 1 capsule by mouth Daily 5/11/20   Debbie Brown MD   insulin aspart (NOVOLOG FLEXPEN) 100 UNIT/ML injection pen Use TID before meals according to scale - max 45 units a day 5/11/20   Debbie Brown MD   loratadine (CLARITIN) 10 MG tablet Take 1 tablet every day by oral route.  5/11/20   Debbie Brown MD   rosuvastatin (CRESTOR) 40 MG tablet Take 1 tablet by mouth daily 5/1/20   Debbie Brown MD   isosorbide mononitrate (IMDUR) 30 MG extended release tablet Take 1 tablet by mouth daily 11/26/19   Selam Cook MD   carvedilol (COREG) 25 MG tablet Take 1 tablet by mouth 2 times daily (with meals) Hold for systolic blood pressure < 120 or heart rate < 50  Give 1 hour apart from other blood pressure medicines 11/25/19   Selam Cook MD   albuterol sulfate  (90 Base) MCG/ACT inhaler Inhale 2 puffs into the lungs every 6 hours as needed for Wheezing 3/15/18   Madisyn Turk MD   spironolactone (ALDACTONE) 25 MG tablet Take 25 mg by mouth daily     Historical Provider, MD   vitamin E 400 UNIT capsule Take 400 Units by mouth daily. Historical Provider, MD       Review of Systems    Review of Systems:  History obtained from chart review and the patient  General ROS: negative for - chills, fatigue, fever, night sweats or weight gain  Constitutional: Negative for chills, diaphoresis, fatigue, fever and unexpected weight change. Musculoskeletal: Positive for arthralgias, gait problem and joint swelling. Neurological ROS: negative for - behavioral changes, confusion, headaches or seizures. Negative for weakness and numbness. Dermatological ROS: negative for - mole changes, rash  Cardiovascular: Negative for leg swelling. Gastrointestinal: Negative for constipation, diarrhea, nausea and vomiting. Lower Extremity Physical Examination:     Vitals: There were no vitals filed for this visit. General: AAO x 3 in NAD. Dermatologic Exam:  Skin lesion/ulceration Absent . Skin No rashes or nodules noted. .   Skin is thin, with flaky sloughing skin as well as decreased hair growth to the lower leg  Small red hemosiderin deposits seen dorsal foot   Musculoskeletal:     1st MPJ ROM decreased, Bilateral. +minimal POP right 5th metatarsal base. Muscle strength 5/5, Bilateral.  Pain present upon palpation of toenails 1-5, Bilateral. decreased medial longitudinal arch, Bilateral.  Ankle ROM decreased and guarded, right .     Dorsally contracted digits present digits 2, Bilateral.     Vascular: DP pulses 1/4 bilateral.  PT pulses 0/4 bilateral.   CFT <5 seconds, Bilateral.  Hair growth absent to the level of the digits, Bilateral.  Edema present, Bilateral.  Varicosities absent, Bilateral. Erythema absent, Bilateral    Neurological: Sensation diminshed to light touch to level of digits, Previous patient encounter was reviewed. Encounters from the patients other medical providers were reviewed and noted. Time was spent educating the patient and their families/caregivers on proper care of the feet and ankles. All the above diagnosis were addressed at todays visit and all questions were answered. A total of 25 minutes was spent with this patients encounter which included charting after the patients visit    . Verbal and written instructions given to patient. Contact office with any questions/problems/concerns. No orders of the defined types were placed in this encounter. No orders of the defined types were placed in this encounter. RTC in 2month(s).     4/7/2021      Electronically signed by Bee Saleh DPM on 4/7/2021 at 1:08 PM  4/7/2021

## 2021-09-01 ENCOUNTER — HOSPITAL ENCOUNTER (INPATIENT)
Age: 76
LOS: 3 days | Discharge: HOME HEALTH | DRG: 683 | End: 2021-09-04
Attending: EMERGENCY MEDICINE | Admitting: INTERNAL MEDICINE
Payer: MEDICARE

## 2021-09-01 ENCOUNTER — APPOINTMENT (OUTPATIENT)
Dept: GENERAL RADIOLOGY | Age: 76
DRG: 683 | End: 2021-09-01
Payer: MEDICARE

## 2021-09-01 DIAGNOSIS — R19.7 DIARRHEA, UNSPECIFIED TYPE: ICD-10-CM

## 2021-09-01 DIAGNOSIS — N17.9 AKI (ACUTE KIDNEY INJURY) (HCC): Primary | ICD-10-CM

## 2021-09-01 DIAGNOSIS — E86.0 DEHYDRATION: ICD-10-CM

## 2021-09-01 LAB
ABSOLUTE EOS #: 0.22 K/UL (ref 0–0.44)
ABSOLUTE IMMATURE GRANULOCYTE: <0.03 K/UL (ref 0–0.3)
ABSOLUTE LYMPH #: 1.64 K/UL (ref 1.1–3.7)
ABSOLUTE MONO #: 0.44 K/UL (ref 0.1–1.2)
ALBUMIN SERPL-MCNC: 4.4 G/DL (ref 3.5–5.2)
ALBUMIN/GLOBULIN RATIO: 1.5 (ref 1–2.5)
ALP BLD-CCNC: 85 U/L (ref 35–104)
ALT SERPL-CCNC: 8 U/L (ref 5–33)
ANION GAP SERPL CALCULATED.3IONS-SCNC: 10 MMOL/L (ref 9–17)
AST SERPL-CCNC: 12 U/L
BASOPHILS # BLD: 0 % (ref 0–2)
BASOPHILS ABSOLUTE: <0.03 K/UL (ref 0–0.2)
BILIRUB SERPL-MCNC: 0.32 MG/DL (ref 0.3–1.2)
BNP INTERPRETATION: ABNORMAL
BUN BLDV-MCNC: 22 MG/DL (ref 8–23)
BUN/CREAT BLD: ABNORMAL (ref 9–20)
CALCIUM SERPL-MCNC: 9.4 MG/DL (ref 8.6–10.4)
CHLORIDE BLD-SCNC: 102 MMOL/L (ref 98–107)
CO2: 21 MMOL/L (ref 20–31)
CREAT SERPL-MCNC: 1.44 MG/DL (ref 0.5–0.9)
DIFFERENTIAL TYPE: ABNORMAL
EOSINOPHILS RELATIVE PERCENT: 4 % (ref 1–4)
GFR AFRICAN AMERICAN: 43 ML/MIN
GFR NON-AFRICAN AMERICAN: 35 ML/MIN
GFR SERPL CREATININE-BSD FRML MDRD: ABNORMAL ML/MIN/{1.73_M2}
GFR SERPL CREATININE-BSD FRML MDRD: ABNORMAL ML/MIN/{1.73_M2}
GLUCOSE BLD-MCNC: 308 MG/DL (ref 70–99)
HCT VFR BLD CALC: 33.8 % (ref 36.3–47.1)
HEMOGLOBIN: 10.7 G/DL (ref 11.9–15.1)
IMMATURE GRANULOCYTES: 0 %
LYMPHOCYTES # BLD: 30 % (ref 24–43)
MCH RBC QN AUTO: 29.7 PG (ref 25.2–33.5)
MCHC RBC AUTO-ENTMCNC: 31.7 G/DL (ref 28.4–34.8)
MCV RBC AUTO: 93.9 FL (ref 82.6–102.9)
MONOCYTES # BLD: 8 % (ref 3–12)
NRBC AUTOMATED: 0 PER 100 WBC
PDW BLD-RTO: 12.7 % (ref 11.8–14.4)
PLATELET # BLD: 173 K/UL (ref 138–453)
PLATELET ESTIMATE: ABNORMAL
PMV BLD AUTO: 11.5 FL (ref 8.1–13.5)
POTASSIUM SERPL-SCNC: 5.1 MMOL/L (ref 3.7–5.3)
PRO-BNP: 795 PG/ML
RBC # BLD: 3.6 M/UL (ref 3.95–5.11)
RBC # BLD: ABNORMAL 10*6/UL
SARS-COV-2, RAPID: NOT DETECTED
SEG NEUTROPHILS: 58 % (ref 36–65)
SEGMENTED NEUTROPHILS ABSOLUTE COUNT: 3.14 K/UL (ref 1.5–8.1)
SODIUM BLD-SCNC: 133 MMOL/L (ref 135–144)
SPECIMEN DESCRIPTION: NORMAL
TOTAL PROTEIN: 7.3 G/DL (ref 6.4–8.3)
TROPONIN INTERP: ABNORMAL
TROPONIN INTERP: ABNORMAL
TROPONIN T: ABNORMAL NG/ML
TROPONIN T: ABNORMAL NG/ML
TROPONIN, HIGH SENSITIVITY: 42 NG/L (ref 0–14)
TROPONIN, HIGH SENSITIVITY: 42 NG/L (ref 0–14)
WBC # BLD: 5.5 K/UL (ref 3.5–11.3)
WBC # BLD: ABNORMAL 10*3/UL

## 2021-09-01 PROCEDURE — 84484 ASSAY OF TROPONIN QUANT: CPT

## 2021-09-01 PROCEDURE — 93005 ELECTROCARDIOGRAM TRACING: CPT | Performed by: STUDENT IN AN ORGANIZED HEALTH CARE EDUCATION/TRAINING PROGRAM

## 2021-09-01 PROCEDURE — 85025 COMPLETE CBC W/AUTO DIFF WBC: CPT

## 2021-09-01 PROCEDURE — 71045 X-RAY EXAM CHEST 1 VIEW: CPT

## 2021-09-01 PROCEDURE — 1200000000 HC SEMI PRIVATE

## 2021-09-01 PROCEDURE — 81001 URINALYSIS AUTO W/SCOPE: CPT

## 2021-09-01 PROCEDURE — 96361 HYDRATE IV INFUSION ADD-ON: CPT

## 2021-09-01 PROCEDURE — 83880 ASSAY OF NATRIURETIC PEPTIDE: CPT

## 2021-09-01 PROCEDURE — 87635 SARS-COV-2 COVID-19 AMP PRB: CPT

## 2021-09-01 PROCEDURE — 80053 COMPREHEN METABOLIC PANEL: CPT

## 2021-09-01 PROCEDURE — 99284 EMERGENCY DEPT VISIT MOD MDM: CPT

## 2021-09-01 PROCEDURE — 2580000003 HC RX 258: Performed by: STUDENT IN AN ORGANIZED HEALTH CARE EDUCATION/TRAINING PROGRAM

## 2021-09-01 RX ORDER — CETIRIZINE HYDROCHLORIDE 10 MG/1
10 TABLET ORAL DAILY
Status: DISCONTINUED | OUTPATIENT
Start: 2021-09-02 | End: 2021-09-04 | Stop reason: HOSPADM

## 2021-09-01 RX ORDER — SODIUM POLYSTYRENE SULFONATE 15 G/60ML
30 SUSPENSION ORAL; RECTAL
Status: ACTIVE | OUTPATIENT
Start: 2021-09-01 | End: 2021-09-01

## 2021-09-01 RX ORDER — ACETAMINOPHEN 325 MG/1
650 TABLET ORAL EVERY 6 HOURS PRN
Status: DISCONTINUED | OUTPATIENT
Start: 2021-09-01 | End: 2021-09-04 | Stop reason: HOSPADM

## 2021-09-01 RX ORDER — VITAMIN B COMPLEX
1000 TABLET ORAL DAILY
Status: DISCONTINUED | OUTPATIENT
Start: 2021-09-02 | End: 2021-09-04 | Stop reason: HOSPADM

## 2021-09-01 RX ORDER — ONDANSETRON 4 MG/1
4 TABLET, ORALLY DISINTEGRATING ORAL EVERY 8 HOURS PRN
Status: DISCONTINUED | OUTPATIENT
Start: 2021-09-01 | End: 2021-09-04 | Stop reason: HOSPADM

## 2021-09-01 RX ORDER — FLUTICASONE PROPIONATE 50 MCG
2 SPRAY, SUSPENSION (ML) NASAL DAILY
Status: DISCONTINUED | OUTPATIENT
Start: 2021-09-02 | End: 2021-09-04 | Stop reason: HOSPADM

## 2021-09-01 RX ORDER — DEXTROSE MONOHYDRATE 50 MG/ML
100 INJECTION, SOLUTION INTRAVENOUS PRN
Status: DISCONTINUED | OUTPATIENT
Start: 2021-09-01 | End: 2021-09-04 | Stop reason: HOSPADM

## 2021-09-01 RX ORDER — SODIUM CHLORIDE 0.9 % (FLUSH) 0.9 %
5-40 SYRINGE (ML) INJECTION EVERY 12 HOURS SCHEDULED
Status: DISCONTINUED | OUTPATIENT
Start: 2021-09-01 | End: 2021-09-04 | Stop reason: HOSPADM

## 2021-09-01 RX ORDER — 0.9 % SODIUM CHLORIDE 0.9 %
1000 INTRAVENOUS SOLUTION INTRAVENOUS ONCE
Status: COMPLETED | OUTPATIENT
Start: 2021-09-01 | End: 2021-09-01

## 2021-09-01 RX ORDER — NITROGLYCERIN 0.4 MG/1
0.4 TABLET SUBLINGUAL EVERY 5 MIN PRN
Status: DISCONTINUED | OUTPATIENT
Start: 2021-09-01 | End: 2021-09-04 | Stop reason: HOSPADM

## 2021-09-01 RX ORDER — INSULIN GLARGINE 100 [IU]/ML
60 INJECTION, SOLUTION SUBCUTANEOUS 2 TIMES DAILY
Status: DISCONTINUED | OUTPATIENT
Start: 2021-09-01 | End: 2021-09-04

## 2021-09-01 RX ORDER — VITAMIN E 268 MG
400 CAPSULE ORAL DAILY
Status: DISCONTINUED | OUTPATIENT
Start: 2021-09-02 | End: 2021-09-04 | Stop reason: HOSPADM

## 2021-09-01 RX ORDER — NICOTINE POLACRILEX 4 MG
15 LOZENGE BUCCAL PRN
Status: DISCONTINUED | OUTPATIENT
Start: 2021-09-01 | End: 2021-09-04 | Stop reason: HOSPADM

## 2021-09-01 RX ORDER — ESCITALOPRAM OXALATE 10 MG/1
20 TABLET ORAL DAILY
Status: DISCONTINUED | OUTPATIENT
Start: 2021-09-02 | End: 2021-09-04 | Stop reason: HOSPADM

## 2021-09-01 RX ORDER — ROSUVASTATIN CALCIUM 20 MG/1
40 TABLET, COATED ORAL DAILY
Status: DISCONTINUED | OUTPATIENT
Start: 2021-09-02 | End: 2021-09-04 | Stop reason: HOSPADM

## 2021-09-01 RX ORDER — ONDANSETRON 2 MG/ML
4 INJECTION INTRAMUSCULAR; INTRAVENOUS EVERY 6 HOURS PRN
Status: DISCONTINUED | OUTPATIENT
Start: 2021-09-01 | End: 2021-09-04 | Stop reason: HOSPADM

## 2021-09-01 RX ORDER — ISOSORBIDE MONONITRATE 30 MG/1
30 TABLET, EXTENDED RELEASE ORAL DAILY
Status: DISCONTINUED | OUTPATIENT
Start: 2021-09-02 | End: 2021-09-02

## 2021-09-01 RX ORDER — ALBUTEROL SULFATE 90 UG/1
2 AEROSOL, METERED RESPIRATORY (INHALATION) EVERY 6 HOURS PRN
Status: DISCONTINUED | OUTPATIENT
Start: 2021-09-01 | End: 2021-09-04 | Stop reason: HOSPADM

## 2021-09-01 RX ORDER — PANTOPRAZOLE SODIUM 40 MG/1
40 TABLET, DELAYED RELEASE ORAL
Status: DISCONTINUED | OUTPATIENT
Start: 2021-09-02 | End: 2021-09-04 | Stop reason: HOSPADM

## 2021-09-01 RX ORDER — DEXTROSE MONOHYDRATE 25 G/50ML
12.5 INJECTION, SOLUTION INTRAVENOUS PRN
Status: DISCONTINUED | OUTPATIENT
Start: 2021-09-01 | End: 2021-09-04 | Stop reason: HOSPADM

## 2021-09-01 RX ORDER — SPIRONOLACTONE 25 MG/1
25 TABLET ORAL DAILY
Status: DISCONTINUED | OUTPATIENT
Start: 2021-09-02 | End: 2021-09-02

## 2021-09-01 RX ORDER — CARVEDILOL 12.5 MG/1
25 TABLET ORAL 2 TIMES DAILY WITH MEALS
Status: DISCONTINUED | OUTPATIENT
Start: 2021-09-02 | End: 2021-09-02

## 2021-09-01 RX ORDER — SODIUM CHLORIDE 0.9 % (FLUSH) 0.9 %
5-40 SYRINGE (ML) INJECTION PRN
Status: DISCONTINUED | OUTPATIENT
Start: 2021-09-01 | End: 2021-09-04 | Stop reason: HOSPADM

## 2021-09-01 RX ORDER — SODIUM POLYSTYRENE SULFONATE 15 G/60ML
15 SUSPENSION ORAL; RECTAL
Status: ACTIVE | OUTPATIENT
Start: 2021-09-01 | End: 2021-09-01

## 2021-09-01 RX ORDER — ACETAMINOPHEN 650 MG/1
650 SUPPOSITORY RECTAL EVERY 6 HOURS PRN
Status: DISCONTINUED | OUTPATIENT
Start: 2021-09-01 | End: 2021-09-04 | Stop reason: HOSPADM

## 2021-09-01 RX ORDER — SODIUM CHLORIDE 9 MG/ML
25 INJECTION, SOLUTION INTRAVENOUS PRN
Status: DISCONTINUED | OUTPATIENT
Start: 2021-09-01 | End: 2021-09-04 | Stop reason: HOSPADM

## 2021-09-01 RX ORDER — SODIUM CHLORIDE 9 MG/ML
INJECTION, SOLUTION INTRAVENOUS CONTINUOUS
Status: DISCONTINUED | OUTPATIENT
Start: 2021-09-01 | End: 2021-09-04

## 2021-09-01 RX ADMIN — SODIUM CHLORIDE 1000 ML: 9 INJECTION, SOLUTION INTRAVENOUS at 17:09

## 2021-09-01 ASSESSMENT — PAIN SCALES - GENERAL
PAINLEVEL_OUTOF10: 7
PAINLEVEL_OUTOF10: 7

## 2021-09-01 ASSESSMENT — PAIN DESCRIPTION - PAIN TYPE
TYPE: ACUTE PAIN
TYPE: ACUTE PAIN;CHRONIC PAIN

## 2021-09-01 ASSESSMENT — PAIN DESCRIPTION - LOCATION: LOCATION: HAND

## 2021-09-01 ASSESSMENT — PAIN DESCRIPTION - PROGRESSION: CLINICAL_PROGRESSION: NOT CHANGED

## 2021-09-01 ASSESSMENT — PAIN DESCRIPTION - ORIENTATION: ORIENTATION: RIGHT;LEFT

## 2021-09-01 ASSESSMENT — PAIN DESCRIPTION - DESCRIPTORS
DESCRIPTORS: PINS AND NEEDLES
DESCRIPTORS: ACHING

## 2021-09-01 ASSESSMENT — PAIN DESCRIPTION - FREQUENCY: FREQUENCY: CONTINUOUS

## 2021-09-01 ASSESSMENT — PAIN - FUNCTIONAL ASSESSMENT: PAIN_FUNCTIONAL_ASSESSMENT: PREVENTS OR INTERFERES SOME ACTIVE ACTIVITIES AND ADLS

## 2021-09-01 ASSESSMENT — PAIN DESCRIPTION - ONSET: ONSET: ON-GOING

## 2021-09-01 NOTE — ED NOTES
Pt arrived with complaints of sob, diarrhea and concern for covid. Pt stated that the symptoms started a few days a few. Pt stated that she had a friend get covid and she tHinks that she may have it as well. Pt placed on full cardiac monitor. EKG performed. Call light within reach.  Will continue plan of care      Mi Najera RN  09/01/21 2045

## 2021-09-02 ENCOUNTER — APPOINTMENT (OUTPATIENT)
Dept: ULTRASOUND IMAGING | Age: 76
DRG: 683 | End: 2021-09-02
Payer: MEDICARE

## 2021-09-02 PROBLEM — G95.9 CERVICAL MYELOPATHY (HCC): Status: ACTIVE | Noted: 2021-09-02

## 2021-09-02 PROBLEM — A08.4 VIRAL GASTROENTERITIS: Status: ACTIVE | Noted: 2021-09-02

## 2021-09-02 LAB
-: ABNORMAL
ALBUMIN SERPL-MCNC: 4 G/DL (ref 3.5–5.2)
ALBUMIN/GLOBULIN RATIO: 1.4 (ref 1–2.5)
ALP BLD-CCNC: 76 U/L (ref 35–104)
ALT SERPL-CCNC: 6 U/L (ref 5–33)
AMORPHOUS: ABNORMAL
ANION GAP SERPL CALCULATED.3IONS-SCNC: 9 MMOL/L (ref 9–17)
AST SERPL-CCNC: 11 U/L
BACTERIA: ABNORMAL
BILIRUB SERPL-MCNC: 0.46 MG/DL (ref 0.3–1.2)
BILIRUBIN URINE: NEGATIVE
BUN BLDV-MCNC: 20 MG/DL (ref 8–23)
BUN/CREAT BLD: ABNORMAL (ref 9–20)
CALCIUM SERPL-MCNC: 9.5 MG/DL (ref 8.6–10.4)
CASTS UA: ABNORMAL /LPF (ref 0–8)
CHLORIDE BLD-SCNC: 102 MMOL/L (ref 98–107)
CO2: 22 MMOL/L (ref 20–31)
COLOR: YELLOW
COMMENT UA: ABNORMAL
CREAT SERPL-MCNC: 1.04 MG/DL (ref 0.5–0.9)
CRYSTALS, UA: ABNORMAL /HPF
EPITHELIAL CELLS UA: ABNORMAL /HPF (ref 0–5)
ESTIMATED AVERAGE GLUCOSE: 252 MG/DL
GFR AFRICAN AMERICAN: >60 ML/MIN
GFR NON-AFRICAN AMERICAN: 52 ML/MIN
GFR SERPL CREATININE-BSD FRML MDRD: ABNORMAL ML/MIN/{1.73_M2}
GFR SERPL CREATININE-BSD FRML MDRD: ABNORMAL ML/MIN/{1.73_M2}
GLUCOSE BLD-MCNC: 136 MG/DL (ref 65–105)
GLUCOSE BLD-MCNC: 150 MG/DL (ref 65–105)
GLUCOSE BLD-MCNC: 164 MG/DL (ref 70–99)
GLUCOSE BLD-MCNC: 169 MG/DL (ref 65–105)
GLUCOSE BLD-MCNC: 226 MG/DL (ref 65–105)
GLUCOSE BLD-MCNC: 227 MG/DL (ref 65–105)
GLUCOSE URINE: ABNORMAL
HBA1C MFR BLD: 10.4 % (ref 4–6)
HCT VFR BLD CALC: 32.2 % (ref 36.3–47.1)
HEMOGLOBIN: 10.3 G/DL (ref 11.9–15.1)
INR BLD: 1.1
KETONES, URINE: NEGATIVE
LEUKOCYTE ESTERASE, URINE: NEGATIVE
MAGNESIUM: 2.3 MG/DL (ref 1.6–2.6)
MCH RBC QN AUTO: 29.3 PG (ref 25.2–33.5)
MCHC RBC AUTO-ENTMCNC: 32 G/DL (ref 28.4–34.8)
MCV RBC AUTO: 91.7 FL (ref 82.6–102.9)
MUCUS: ABNORMAL
NITRITE, URINE: NEGATIVE
NRBC AUTOMATED: 0 PER 100 WBC
OTHER OBSERVATIONS UA: ABNORMAL
PDW BLD-RTO: 12.8 % (ref 11.8–14.4)
PH UA: 7.5 (ref 5–8)
PLATELET # BLD: 146 K/UL (ref 138–453)
PMV BLD AUTO: 11.3 FL (ref 8.1–13.5)
POTASSIUM SERPL-SCNC: 4.3 MMOL/L (ref 3.7–5.3)
PROTEIN UA: NEGATIVE
PROTHROMBIN TIME: 11.5 SEC (ref 9.1–12.3)
RBC # BLD: 3.51 M/UL (ref 3.95–5.11)
RBC UA: ABNORMAL /HPF (ref 0–4)
RENAL EPITHELIAL, UA: ABNORMAL /HPF
SODIUM BLD-SCNC: 133 MMOL/L (ref 135–144)
SODIUM,UR: 141 MMOL/L
SPECIFIC GRAVITY UA: 1.01 (ref 1–1.03)
TOTAL PROTEIN, URINE: 11 MG/DL
TOTAL PROTEIN: 6.9 G/DL (ref 6.4–8.3)
TRICHOMONAS: ABNORMAL
TURBIDITY: CLEAR
URINE HGB: NEGATIVE
UROBILINOGEN, URINE: NORMAL
WBC # BLD: 5.7 K/UL (ref 3.5–11.3)
WBC UA: ABNORMAL /HPF (ref 0–5)
YEAST: ABNORMAL

## 2021-09-02 PROCEDURE — 2580000003 HC RX 258: Performed by: NURSE PRACTITIONER

## 2021-09-02 PROCEDURE — 84156 ASSAY OF PROTEIN URINE: CPT

## 2021-09-02 PROCEDURE — 80053 COMPREHEN METABOLIC PANEL: CPT

## 2021-09-02 PROCEDURE — 83735 ASSAY OF MAGNESIUM: CPT

## 2021-09-02 PROCEDURE — 82947 ASSAY GLUCOSE BLOOD QUANT: CPT

## 2021-09-02 PROCEDURE — 6370000000 HC RX 637 (ALT 250 FOR IP): Performed by: INTERNAL MEDICINE

## 2021-09-02 PROCEDURE — 6370000000 HC RX 637 (ALT 250 FOR IP): Performed by: NURSE PRACTITIONER

## 2021-09-02 PROCEDURE — 85610 PROTHROMBIN TIME: CPT

## 2021-09-02 PROCEDURE — 36415 COLL VENOUS BLD VENIPUNCTURE: CPT

## 2021-09-02 PROCEDURE — 83036 HEMOGLOBIN GLYCOSYLATED A1C: CPT

## 2021-09-02 PROCEDURE — 99223 1ST HOSP IP/OBS HIGH 75: CPT | Performed by: INTERNAL MEDICINE

## 2021-09-02 PROCEDURE — 96374 THER/PROPH/DIAG INJ IV PUSH: CPT

## 2021-09-02 PROCEDURE — 6360000002 HC RX W HCPCS: Performed by: INTERNAL MEDICINE

## 2021-09-02 PROCEDURE — 6360000002 HC RX W HCPCS: Performed by: NURSE PRACTITIONER

## 2021-09-02 PROCEDURE — 1200000000 HC SEMI PRIVATE

## 2021-09-02 PROCEDURE — APPSS45 APP SPLIT SHARED TIME 31-45 MINUTES: Performed by: NURSE PRACTITIONER

## 2021-09-02 PROCEDURE — 87506 IADNA-DNA/RNA PROBE TQ 6-11: CPT

## 2021-09-02 PROCEDURE — 96375 TX/PRO/DX INJ NEW DRUG ADDON: CPT

## 2021-09-02 PROCEDURE — 85027 COMPLETE CBC AUTOMATED: CPT

## 2021-09-02 PROCEDURE — 84300 ASSAY OF URINE SODIUM: CPT

## 2021-09-02 PROCEDURE — 76770 US EXAM ABDO BACK WALL COMP: CPT

## 2021-09-02 RX ORDER — LISINOPRIL 10 MG/1
10 TABLET ORAL DAILY
Status: DISCONTINUED | OUTPATIENT
Start: 2021-09-02 | End: 2021-09-04 | Stop reason: HOSPADM

## 2021-09-02 RX ORDER — LOPERAMIDE HYDROCHLORIDE 2 MG/1
2 CAPSULE ORAL 4 TIMES DAILY PRN
Status: DISCONTINUED | OUTPATIENT
Start: 2021-09-02 | End: 2021-09-04 | Stop reason: HOSPADM

## 2021-09-02 RX ORDER — CARVEDILOL 12.5 MG/1
25 TABLET ORAL 2 TIMES DAILY WITH MEALS
Status: DISCONTINUED | OUTPATIENT
Start: 2021-09-02 | End: 2021-09-04 | Stop reason: HOSPADM

## 2021-09-02 RX ORDER — SPIRONOLACTONE 25 MG/1
25 TABLET ORAL DAILY
Status: DISCONTINUED | OUTPATIENT
Start: 2021-09-02 | End: 2021-09-03

## 2021-09-02 RX ORDER — HYDRALAZINE HYDROCHLORIDE 20 MG/ML
10 INJECTION INTRAMUSCULAR; INTRAVENOUS ONCE
Status: COMPLETED | OUTPATIENT
Start: 2021-09-02 | End: 2021-09-02

## 2021-09-02 RX ORDER — ISOSORBIDE MONONITRATE 30 MG/1
30 TABLET, EXTENDED RELEASE ORAL DAILY
Status: DISCONTINUED | OUTPATIENT
Start: 2021-09-02 | End: 2021-09-04 | Stop reason: HOSPADM

## 2021-09-02 RX ADMIN — ROSUVASTATIN CALCIUM 40 MG: 20 TABLET, FILM COATED ORAL at 21:21

## 2021-09-02 RX ADMIN — PANTOPRAZOLE SODIUM 40 MG: 40 TABLET, DELAYED RELEASE ORAL at 09:50

## 2021-09-02 RX ADMIN — INSULIN LISPRO 3 UNITS: 100 INJECTION, SOLUTION INTRAVENOUS; SUBCUTANEOUS at 01:57

## 2021-09-02 RX ADMIN — LISINOPRIL 10 MG: 10 TABLET ORAL at 17:54

## 2021-09-02 RX ADMIN — FLUTICASONE PROPIONATE 2 SPRAY: 50 SPRAY, METERED NASAL at 09:51

## 2021-09-02 RX ADMIN — RIVAROXABAN 20 MG: 20 TABLET, FILM COATED ORAL at 09:51

## 2021-09-02 RX ADMIN — HYDRALAZINE HYDROCHLORIDE 10 MG: 20 INJECTION INTRAMUSCULAR; INTRAVENOUS at 09:50

## 2021-09-02 RX ADMIN — ESCITALOPRAM OXALATE 20 MG: 10 TABLET ORAL at 09:50

## 2021-09-02 RX ADMIN — CETIRIZINE HYDROCHLORIDE 10 MG: 10 TABLET ORAL at 09:50

## 2021-09-02 RX ADMIN — ACETAMINOPHEN 650 MG: 325 TABLET ORAL at 17:54

## 2021-09-02 RX ADMIN — CARVEDILOL 25 MG: 25 TABLET, FILM COATED ORAL at 09:50

## 2021-09-02 RX ADMIN — INSULIN GLARGINE 60 UNITS: 100 INJECTION, SOLUTION SUBCUTANEOUS at 20:38

## 2021-09-02 RX ADMIN — SODIUM CHLORIDE: 9 INJECTION, SOLUTION INTRAVENOUS at 01:47

## 2021-09-02 RX ADMIN — ACETAMINOPHEN 650 MG: 325 TABLET ORAL at 12:11

## 2021-09-02 RX ADMIN — TICAGRELOR 90 MG: 90 TABLET ORAL at 20:37

## 2021-09-02 RX ADMIN — LOPERAMIDE HYDROCHLORIDE 2 MG: 2 CAPSULE ORAL at 12:09

## 2021-09-02 RX ADMIN — SODIUM CHLORIDE, PRESERVATIVE FREE 10 ML: 5 INJECTION INTRAVENOUS at 20:43

## 2021-09-02 RX ADMIN — INSULIN GLARGINE 60 UNITS: 100 INJECTION, SOLUTION SUBCUTANEOUS at 09:51

## 2021-09-02 RX ADMIN — INSULIN LISPRO 6 UNITS: 100 INJECTION, SOLUTION INTRAVENOUS; SUBCUTANEOUS at 12:47

## 2021-09-02 RX ADMIN — Medication 400 UNITS: at 09:49

## 2021-09-02 RX ADMIN — ONDANSETRON 4 MG: 2 INJECTION INTRAMUSCULAR; INTRAVENOUS at 12:11

## 2021-09-02 RX ADMIN — Medication 1000 UNITS: at 09:50

## 2021-09-02 RX ADMIN — SODIUM CHLORIDE, PRESERVATIVE FREE 10 ML: 5 INJECTION INTRAVENOUS at 01:52

## 2021-09-02 RX ADMIN — LOPERAMIDE HYDROCHLORIDE 2 MG: 2 CAPSULE ORAL at 17:54

## 2021-09-02 RX ADMIN — TICAGRELOR 90 MG: 90 TABLET ORAL at 09:50

## 2021-09-02 RX ADMIN — CARVEDILOL 25 MG: 25 TABLET, FILM COATED ORAL at 17:54

## 2021-09-02 RX ADMIN — TICAGRELOR 90 MG: 90 TABLET ORAL at 01:53

## 2021-09-02 ASSESSMENT — ENCOUNTER SYMPTOMS
TROUBLE SWALLOWING: 0
ABDOMINAL PAIN: 0
COLOR CHANGE: 0
PHOTOPHOBIA: 0
SHORTNESS OF BREATH: 1
WHEEZING: 0
NAUSEA: 0
COUGH: 0
DIARRHEA: 1
CHEST TIGHTNESS: 0
RHINORRHEA: 0
VOMITING: 0

## 2021-09-02 ASSESSMENT — PAIN SCALES - GENERAL
PAINLEVEL_OUTOF10: 2
PAINLEVEL_OUTOF10: 1
PAINLEVEL_OUTOF10: 3

## 2021-09-02 NOTE — H&P
[facility]  Office: [address], DO, Samson Martel, DO, Audra Boast, DO, Brayan Cantorrod Blood, DO, Mirela Ortiz MD, Roberth Arciniega MD, German You MD, Grace Ralph MD, Marsha Orellana MD, Raul Gold MD, Chrystal Jerez MD, Chalino Oviedo, DO, Krystal Arana MD, Moses Macias DO, Daija Metzger MD,  Rm Foster DO, Philemon Simmonds, MD, Creed Ahumada, MD, Marlow Cowden, MD, Patria Buckner MD, Roberta Medina MD, Malcom Brown MD, Bk Sevilla MD, Ellen Pennington, [facility], [address], [facility], Itzel Gates, CNP, Morgan Márquez, CNS, Shahbaz Wen, CNP, Jose De Jesus Hoyt, CNP, Tyler Benitez, CNP, Modesto Forrest, CNP, Som Clemons, CNP, Pattie Jolly PA-C, Blanca Gomez DNP, Dada Dawson, CNP, Hellen Pacheco, CNP, Olga Guadalupe, CNP, Baldwin Essex, CNP, Philip Hall, CNP, Mohsen Gustafson, CNP, Fernanda Aldana, CNP, Gaynelle Pallas, CNP         [facility]   [address] [facility]    HISTORY AND PHYSICAL EXAMINATION            Date:   9/2/2021  Patient name:  Verona Mercedes  Date of admission:  9/1/2021  4:20 PM  MRN:   [id number]  Account:  [de-identified]  YOB: 1945  PCP:    Kumar Botello MD  Room:   [facility]4537-  Code Status:    Full Code    Chief Complaint:     Chief Complaint   Patient presents with    Shortness of Breath       History Obtained From:     patient, electronic medical record    History of Present Illness:     Verona Mercedes is a 68 y.o. Non- / non  female who presents with Shortness of Breath   and is admitted to the hospital for the management of MARIELLE (acute kidney injury) ([facility] [address] This is a 68 yr old female with history of CAD s/p stents, HTN, Afib and DVT/PE on xarelto and Type II DM who presents to the ER with diarrhea and generalized malaise. Symptoms started approximately 4 days ago with multiple episodes of diarrhea daily. She denies any bloody or melanotic stools.   She also endorses feeling generally fatigued and has mild dyspnea. She is concerned as her son who lives with her tested positive for COVID-19 yesterday. Patient denies any CP, ABD pain, nausea/vomiting, dysuria/changes in urinary habits, myalgias/arthralgias, cough or fevers. Initial EKG demonstrating NSR without any acute changes and CXR is demonstrating trace bilateral effusions. Pertinent labs include: Na: 133, K: 5.1, BUN: 22, CRT: 1.44 (baseline ~0.9), Glucose: 308, pro-bnp: 795, Trop: 42-->42, HGB: 10.7 and COVID-19: Negative. Patient was given 1L IV fluid bolus in the ER and is admitted to [facility] for further management of MARIELLE. Past Medical History:     Past Medical History:   Diagnosis Date    Anxiety     Atrial fibrillation (HCC)     chronic-takes xarelto    Benign positional vertigo     CAD (coronary artery disease)     Chest pain     Depression     Diabetes mellitus ([address]     Diabetic neuropathy ([address]     Hyperlipidemia     Hypertension     Migraine     Migraine headaches aggravated with sublingual nitroglycerin        Past Surgical History:     Past Surgical History:   Procedure Laterality Date    APPENDECTOMY      CARDIAC CATHETERIZATION      2012    CORONARY ANGIOPLASTY WITH STENT PLACEMENT  02/25/2017    4 SYNERGY HEART STENTS DRUG ELUTING ALL MRI CONDITONAL 3T OK, SAFE IMMEDIATELY.  CORONARY ANGIOPLASTY WITH STENT PLACEMENT  07/11/2012    XIENCE HEART STENT/ MRI CONDITIONAL 3T OK, SAFE IMMEDIATELY    CORONARY ANGIOPLASTY WITH STENT PLACEMENT  12/11/2020    PTCA          Medications Prior to Admission:     Prior to Admission medications    Medication Sig Start Date End Date Taking? Authorizing Provider   nystatin (MYCOSTATIN) 671791 UNIT/GM powder Apply 3 times daily.  8/27/21   Bertha Guzman MD   spironolactone (ALDACTONE) 25 MG tablet Take 1 tablet by mouth daily 8/20/21   Bertha Guzman MD   lisinopril (PRINIVIL;ZESTRIL) 10 MG tablet Take 1 tablet by mouth daily 8/3/21   Bertha Guzman MD   Continuous Blood Gluc Sensor (FREESTYLE SYLVIA 14 DAY SENSOR) MISC 1 each by Does not apply route every 14 days 7/6/21   Sabina Phillips MD   omeprazole (PRILOSEC) 20 MG delayed release capsule Take 1 capsule by mouth Daily 5/28/21   Sabina Phillips MD   Control Gel Formula Dressing (DUODERM CGF DRESSING) MISC Apply 2 each topically daily 5/21/21   Sabina Phillips MD   Misc.  Devices (STEP N REST WALKER) MISC 1 each by Does not apply route continuous Seated, wheeled walker 5/20/21   Sabina Phillips MD   gabapentin (NEURONTIN) 600 MG tablet TAKE ONE TABLET BY MOUTH THREE TIMES A DAY 5/18/21 6/17/21  Sabina Phillips MD   fluticasone [facility]) 50 MCG/ACT nasal spray 2 sprays by Each Nostril route daily 5/11/21   Sabina Phillips MD   Zinc Oxide 15 % CREA Apply to buttocks up to 4 times daily - may use any concentratino 5/6/21   Sabina Phillips MD   carvedilol (COREG) 25 MG tablet Take 1 tablet by mouth 2 times daily (with meals) Hold for sbp<120 or hr <50, give 1 hour from other bp meds 5/5/21   Sabina Phillips MD   isosorbide mononitrate (IMDUR) 30 MG extended release tablet Take 1 tablet by mouth daily 4/27/21   Sabina Phillips MD   oxyCODONE-acetaminophen (PERCOCET) 5-325 MG per tablet  3/25/21   Historical Provider, MD   ticagrelor (BRILINTA) 90 MG TABS tablet Take 1 tablet by mouth 2 times daily 4/5/21   Sabina Phlilips MD   vitamin D3 (CHOLECALCIFEROL) 25 MCG (1000 UT) TABS tablet Take 1 tablet by mouth daily 3/17/21   Cheryl Larson DPM   insulin detemir (LEVEMIR FLEXTOUCH) 100 UNIT/ML injection pen Inject 60 Units into the skin 2 times daily 3/16/21   Sabina Phillips MD   nitroGLYCERIN (NITROSTAT) 0.4 MG SL tablet place 1 tablet under the tongue if needed every 5 minutes if needed for CHEST PAIN 2/19/21   Sabina Phillips MD   clonazePAM Fiorella Bruno) 0.5 MG tablet take 1 tablet by mouth twice a day if needed for anxiety 2/4/21 3/6/21  Sabina Phillips, MD   Misc. Devices [facility]) MISC Diagnosis: right 5th metatarsal fracture, pain in limb    Duration: 3 months 2/3/21   Gale Azeem, DPM   docusate sodium (COLACE) 100 MG capsule Take 1 capsule by mouth daily as needed for Constipation 2/3/21   Gale Azeem, DPM   Misc. Devices (COMMODE BEDSIDE) MISC 1 Device by Does not apply route daily 2/3/21   Gale Azeem, DPM   NIFEdipine (PROCARDIA XL) 30 MG extended release tablet Take 1 tablet by mouth daily 11/13/20 1/12/21  Hailey Castle MD   Continuous Blood Gluc  (FREESTYLE RICHARD 14 DAY READER) JAQUELINE 1 each by Does not apply route continuous [facility] 10/30/20   Genie Armenta MD   rivaroxaban (XARELTO) 20 MG TABS tablet Take 1 tablet by mouth daily (with breakfast) 10/21/20   Genie Armenta MD   glucose monitoring kit (FREESTYLE) monitoring kit 1 kit by Does not apply route 4 times daily Freestyle Richard . Dx E11.65, has tremors and cannot use conventional meter without great difficulty. Please provide supplies for 3 months, rf 3,[facility]. 10/6/20   MD Merritt Delgado. Devices (STEP N REST WALKER) MISC 1 each by Does not apply route continuous Seated, wheeled walker 10/6/20   Genie Armenta MD   pregabalin (LYRICA) 50 MG capsule Take 1 capsule by mouth 3 times daily for 30 days.  10/2/20 11/1/20  Brittni Whitaker MD   escitalopram (LEXAPRO) 20 MG tablet Take 1 tablet by mouth daily 8/11/20   Genie Armenta MD   hydrOXYzine (ATARAX) 25 MG tablet take 1 tablet by mouth every 8 hours rectally for itching 8/5/20   Genie Armenta MD   NOVOFINE 32G X 6 MM MISC use 1 NEEDLE to inject MEDICATION subcutaneously five times a day 4/8/20   Historical Provider, MD   mupirocin (BACTROBAN) 2 % ointment apply topically to affected area three times a day 4/21/20   Historical Provider, MD   insulin aspart (NOVOLOG FLEXPEN) 100 UNIT/ML injection pen Use TID before meals according to scale - max 45 units a day 5/11/20   Florinda Blanca MD   loratadine (CLARITIN) 10 MG tablet Take 1 tablet every day by oral route. 5/11/20   Florinda Blanca MD   rosuvastatin (CRESTOR) 40 MG tablet Take 1 tablet by mouth daily 5/1/20   Florinda Blanca MD   albuterol sulfate  (90 Base) MCG/ACT inhaler Inhale 2 puffs into the lungs every 6 hours as needed for Wheezing 3/15/18   Saira Dee MD   vitamin E 400 UNIT capsule Take 400 Units by mouth daily. Historical Provider, MD        Allergies:     Penicillins and Sulfa antibiotics    Social History:     Tobacco:    reports that she has never smoked. She has never used smokeless tobacco.  Alcohol:      reports no history of alcohol use. Drug Use:  reports no history of drug use. Family History:     Family History   Problem Relation Age of Onset    Cancer Mother     Cancer Father        Review of Systems:     Positive and Negative as described in HPI. Review of Systems   Constitutional: Positive for activity change and fatigue. Negative for chills and fever. HENT: Negative for rhinorrhea, tinnitus and trouble swallowing. Eyes: Negative for photophobia and visual disturbance. Respiratory: Positive for shortness of breath. Negative for cough, chest tightness and wheezing. Cardiovascular: Positive for leg swelling. Negative for chest pain and palpitations. Gastrointestinal: Positive for diarrhea. Negative for abdominal pain, nausea and vomiting. Endocrine: Negative for polyphagia and polyuria. Genitourinary: Negative for decreased urine volume, difficulty urinating and dysuria. Musculoskeletal: Negative for arthralgias, myalgias, neck pain and neck stiffness. Skin: Negative for color change, rash and wound. Allergic/Immunologic: Negative for immunocompromised state. Neurological: Negative for dizziness, syncope, light-headedness and headaches. Hematological: Negative for adenopathy.    Psychiatric/Behavioral: Negative for agitation, behavioral problems and confusion. The patient is not nervous/anxious.         Physical Exam:   BP (!) 182/102   Pulse 79   Temp 97.3 °F (36.3 °C) (Oral)   Resp 19   Wt 225 lb (102.1 kg)   SpO2 95%   BMI 36.32 kg/m²   Temp (24hrs), Av.1 °F (36.7 °C), Min:97.3 °F (36.3 °C), Max:98.8 °F (37.1 °C)    Recent Labs     21  0027   POCGLU 226*     No intake or output data in the 24 hours ending 21 0232    Physical Exam    Investigations:      Laboratory Testing:  Recent Results (from the past 24 hour(s))   CBC Auto Differential    Collection Time: 21  5:10 PM   Result Value Ref Range    WBC 5.5 3.5 - 11.3 k/uL    RBC 3.60 (L) 3.95 - 5.11 m/uL    Hemoglobin 10.7 (L) 11.9 - 15.1 g/dL    Hematocrit 33.8 (L) 36.3 - 47.1 %    MCV 93.9 82.6 - 102.9 fL    MCH 29.7 25.2 - 33.5 pg    MCHC 31.7 28.4 - 34.8 g/dL    RDW 12.7 11.8 - 14.4 %    Platelets 246 083 - 446 k/uL    MPV 11.5 8.1 - 13.5 fL    NRBC Automated 0.0 0.0 per 100 WBC    Differential Type NOT REPORTED     Seg Neutrophils 58 36 - 65 %    Lymphocytes 30 24 - 43 %    Monocytes 8 3 - 12 %    Eosinophils % 4 1 - 4 %    Basophils 0 0 - 2 %    Immature Granulocytes 0 0 %    Segs Absolute 3.14 1.50 - 8.10 k/uL    Absolute Lymph # 1.64 1.10 - 3.70 k/uL    Absolute Mono # 0.44 0.10 - 1.20 k/uL    Absolute Eos # 0.22 0.00 - 0.44 k/uL    Basophils Absolute <0.03 0.00 - 0.20 k/uL    Absolute Immature Granulocyte <0.03 0.00 - 0.30 k/uL    WBC Morphology NOT REPORTED     RBC Morphology NOT REPORTED     Platelet Estimate NOT REPORTED    Comprehensive Metabolic Panel w/ Reflex to MG    Collection Time: 21  5:10 PM   Result Value Ref Range    Glucose 308 (H) 70 - 99 mg/dL    BUN 22 8 - 23 mg/dL    CREATININE 1.44 (H) 0.50 - 0.90 mg/dL    Bun/Cre Ratio NOT REPORTED 9 - 20    Calcium 9.4 8.6 - 10.4 mg/dL    Sodium 133 (L) 135 - 144 mmol/L    Potassium 5.1 3.7 - 5.3 mmol/L    Chloride 102 98 - 107 mmol/L    CO2 21 20 - 31 mmol/L    Anion Gap 10 9 - 17 mmol/L Alkaline Phosphatase 85 35 - 104 U/L    ALT 8 5 - 33 U/L    AST 12 <32 U/L    Total Bilirubin 0.32 0.3 - 1.2 mg/dL    Total Protein 7.3 6.4 - 8.3 g/dL    Albumin 4.4 3.5 - 5.2 g/dL    Albumin/Globulin Ratio 1.5 1.0 - 2.5    GFR Non- 35 (L) >60 mL/min    GFR  43 (L) >60 mL/min    GFR Comment          GFR Staging NOT REPORTED    Troponin    Collection Time: 09/01/21  5:10 PM   Result Value Ref Range    Troponin, High Sensitivity 42 (H) 0 - 14 ng/L    Troponin T NOT REPORTED <0.03 ng/mL    Troponin Interp NOT REPORTED    Brain Natriuretic Peptide    Collection Time: 09/01/21  5:10 PM   Result Value Ref Range    Pro- (H) <300 pg/mL    BNP Interpretation Pro-BNP Reference Range:    COVID-19, Rapid    Collection Time: 09/01/21  5:12 PM    Specimen: Nasopharyngeal Swab   Result Value Ref Range    Specimen Description . NASOPHARYNGEAL SWAB     SARS-CoV-2, Rapid Not Detected Not Detected   Troponin    Collection Time: 09/01/21  8:06 PM   Result Value Ref Range    Troponin, High Sensitivity 42 (H) 0 - 14 ng/L    Troponin T NOT REPORTED <0.03 ng/mL    Troponin Interp NOT REPORTED    POC Glucose Fingerstick    Collection Time: 09/02/21 12:27 AM   Result Value Ref Range    POC Glucose 226 (H) 65 - 105 mg/dL       Imaging/Diagnostics:  XR CHEST PORTABLE    Result Date: 9/1/2021  Cardiomegaly. Interstitial prominence favoring Cathi venous congestion. Trace bilateral effusions.        Assessment :      Hospital Problems         Last Modified POA    * (Principal) MARIELLE (acute kidney injury) ([address] 9/2/2021 Yes    Chronic diastolic (congestive) heart failure ([address] 9/2/2021 Yes    Type 2 diabetes mellitus with diabetic polyneuropathy, with long-term current use of insulin ([address] (Chronic) 9/2/2021 Yes    Essential hypertension 9/2/2021 Yes    CAD (coronary artery disease) 9/2/2021 Yes    Overview Signed 3/27/2017  4:39 PM by Wendi Alvarez MD     S/P 4 + 2 stents               Plan:     Patient

## 2021-09-02 NOTE — ED NOTES
2nd trop drawn, labeled and sent. Pt informed Dr Aline Galeas was made aware she wanted to leave and would be speaking to her shortly. Pt in NAD, rr even and unlabored. Call light within reach.  Will cont to monitor     Lauro Montero RN  09/01/21 2010

## 2021-09-02 NOTE — ED NOTES
Pt called out, states she has changed her mind and doesn't want to stay. Dr Jens Sterling informed.       Jovanni Herbert RN  09/01/21 2128

## 2021-09-02 NOTE — ED PROVIDER NOTES
[facility] ED  Emergency Department Encounter  EmergencyMedicine Resident     Pt Naima Steven  MRN: [id number]  [place] 1945  Date of evaluation: 9/1/21  PCP:  Giorgi Rivers MD    CHIEF COMPLAINT       Chief Complaint   Patient presents with    Shortness of Breath       HISTORY OF PRESENT ILLNESS  (Location/Symptom, Timing/Onset, Context/Setting, Quality, Duration, Modifying Factors, Severity.)      Queenie Maya is a 68 y.o. female who presents with chief complaint 48 hours duration myalgias body aches and general malaise. Yesterday reports 2 episodes of loose stools. Had a nosebleed for approximately 1 hour that resolved. She is on Xarelto and Brilinta. Also reports exposure to coronavirus from family members who tested positive. Patient received full series Covid vaccine in March. Patient has significant past medical history for CAD with stenting diabetes hypertension. States that she is compliant with her medications. Denying any chest pain shortness of breath abdominal pain fevers vomiting    PAST MEDICAL / SURGICAL / SOCIAL / FAMILY HISTORY      has a past medical history of Anxiety, Atrial fibrillation (HCC), Benign positional vertigo, CAD (coronary artery disease), Chest pain, Depression, Diabetes mellitus ([address] Diabetic neuropathy ([address] Hyperlipidemia, Hypertension, and Migraine. has a past surgical history that includes Percutaneous Transluminal Coronary Angio; Cardiac catheterization; Coronary angioplasty with stent (02/25/2017); Appendectomy; Coronary angioplasty with stent (07/11/2012); and Coronary angioplasty with stent (12/11/2020).     Social History     Socioeconomic History    Marital status:      Spouse name: Not on file    Number of children: Not on file    Years of education: Not on file    Highest education level: Not on file   Occupational History    Not on file   Tobacco Use    Smoking status: Never Smoker    Smokeless tobacco: Never Used   Substance and Sexual Activity    Alcohol use: No    Drug use: No    Sexual activity: Never   Other Topics Concern    Not on file   Social History Narrative    Not on file     Social Determinants of Health     Financial Resource Strain:     Difficulty of Paying Living Expenses:    Food Insecurity:     Worried About Running Out of Food in the Last Year:     [address] in the Last Year:    Transportation Needs:     Lack of Transportation (Medical):  Lack of Transportation (Non-Medical):    Physical Activity:     Days of Exercise per Week:     Minutes of Exercise per Session:    Stress:     Feeling of Stress :    Social Connections:     Frequency of Communication with Friends and Family:     Frequency of Social Gatherings with Friends and Family:     Attends [employer] Services:     Active Member of Clubs or Organizations:     Attends Club or Organization Meetings:     Marital Status:    Intimate Partner Violence:     Fear of Current or Ex-Partner:     Emotionally Abused:     Physically Abused:     Sexually Abused:        Family History   Problem Relation Age of Onset    Cancer Mother     Cancer Father        Allergies:  Penicillins and Sulfa antibiotics    Home Medications:  Prior to Admission medications    Medication Sig Start Date End Date Taking? Authorizing Provider   nystatin (MYCOSTATIN) 533759 UNIT/GM powder Apply 3 times daily.  8/27/21   Joyce Dobbs MD   spironolactone (ALDACTONE) 25 MG tablet Take 1 tablet by mouth daily 8/20/21   Joyce Dobbs MD   lisinopril (PRINIVIL;ZESTRIL) 10 MG tablet Take 1 tablet by mouth daily 8/3/21   Joyce Dobbs MD   Continuous Blood Gluc Sensor (FREESTYLE SYLVIA 14 DAY SENSOR) MISC 1 each by Does not apply route every 14 days 7/6/21   Joyce Dobbs MD   omeprazole (PRILOSEC) 20 MG delayed release capsule Take 1 capsule by mouth Daily 5/28/21   Joyce Dobbs MD   Control Gel Formula Dressing (DUODERM CGF DRESSING) MISC Apply 2 each topically daily 5/21/21   Arturo Anderson MD   [facility]. Devices (STEP N REST WALKER) MISC 1 each by Does not apply route continuous Seated, wheeled walker 5/20/21   Arturo Anderson MD   gabapentin (NEURONTIN) 600 MG tablet TAKE ONE TABLET BY MOUTH THREE TIMES A DAY 5/18/21 6/17/21  Arturo Anderson MD   fluticasone [facility]) 50 MCG/ACT nasal spray 2 sprays by Each Nostril route daily 5/11/21   Arturo Anderson MD   Zinc Oxide 15 % CREA Apply to buttocks up to 4 times daily - may use any concentratino 5/6/21   Arturo Anderson MD   carvedilol (COREG) 25 MG tablet Take 1 tablet by mouth 2 times daily (with meals) Hold for sbp<120 or hr <50, give 1 hour from other bp meds 5/5/21   Arturo Anderson MD   isosorbide mononitrate (IMDUR) 30 MG extended release tablet Take 1 tablet by mouth daily 4/27/21   Arturo Anderson MD   oxyCODONE-acetaminophen (PERCOCET) 5-325 MG per tablet  3/25/21   Historical Provider, MD   ticagrelor (BRILINTA) 90 MG TABS tablet Take 1 tablet by mouth 2 times daily 4/5/21   Arturo Anderson MD   vitamin D3 (CHOLECALCIFEROL) 25 MCG (1000 UT) TABS tablet Take 1 tablet by mouth daily 3/17/21   Gino Mittal DPM   insulin detemir (LEVEMIR FLEXTOUCH) 100 UNIT/ML injection pen Inject 60 Units into the skin 2 times daily 3/16/21   Arturo Anderson MD   nitroGLYCERIN (NITROSTAT) 0.4 MG SL tablet place 1 tablet under the tongue if needed every 5 minutes if needed for CHEST PAIN 2/19/21   Arturo Anderson MD   clonazePAM [facility]) 0.5 MG tablet take 1 tablet by mouth twice a day if needed for anxiety 2/4/21 3/6/21  Arturo Anderson MD   [facility]) MISC Diagnosis: right 5th metatarsal fracture, pain in limb    Duration: 3 months 2/3/21   Gino Mittal DPM   docusate sodium (COLACE) 100 MG capsule Take 1 capsule by mouth daily as needed for Constipation 2/3/21   Gino Mittal DPM   Misc.  Devices (COMMODE MCG/ACT inhaler Inhale 2 puffs into the lungs every 6 hours as needed for Wheezing 3/15/18   Major Lee MD   vitamin E 400 UNIT capsule Take 400 Units by mouth daily. Historical Provider, MD       REVIEW OF SYSTEMS    (2-9 systems for level 4, 10 or more for level 5)      Review of Systems   Constitutional: Generalized malaise, sweats  HENT: Negative for congestion. Eyes: Negative for photophobia. Respiratory: Negative for shortness of breath. Cardiovascular: Negative for chest pain. Gastrointestinal: Positive for diarrhea  Endocrine: Negative for polyuria. Genitourinary: Negative for dysuria. Musculoskeletal: Negative for arthralgias. Skin: Negative for color change. Allergic/Immunologic: Negative for immunocompromised state. Neurological: Negative for dizziness. Hematological: Does not bruise/bleed easily. Psychiatric/Behavioral: Negative for agitation. PHYSICAL EXAM   (up to 7 for level 4, 8 or more for level 5)      INITIAL VITALS:   BP (!) 171/87   Pulse 62   Temp 98.8 °F (37.1 °C)   Resp 20   Wt 225 lb (102.1 kg)   SpO2 96%   BMI 36.32 kg/m²     Physical Exam  Constitutional:       General: Not in acute distress. Appearance: Normal appearance. Normal weight. Not toxic-appearing. HENT:      Head: Normocephalic and atraumatic. Nose: Nose normal.      Mouth/Throat: Mucous membranes are moist.  Uvula midline no oropharyngeal edema. Pharynx: Oropharynx is clear. Eyes:      Extraocular Movements: Extraocular movements intact. Conjunctiva/sclera: Conjunctivae normal.      Pupils: Pupils are equal, round, and reactive to light. Neck:      Musculoskeletal: Normal range of motion and neck supple. No neck rigidity. Cardiovascular:      Rate and Rhythm: Normal rate and regular rhythm. Pulses: Normal pulses. Heart sounds: Normal heart sounds. No murmur.      Pulmonary:      Effort: Pulmonary effort is normal.      Breath sounds: Normal breath sounds. No wheezing. Abdominal:      General: Abdomen is flat. Bowel sounds are normal.      Tenderness: There is no abdominal tenderness. Musculoskeletal:     Normal range of motion. General: No swelling or tenderness. No LE edema    Skin:     General: Skin is warm. Capillary Refill: Capillary refill takes less than 2 seconds. Coloration: Skin is not jaundiced. Neurological:      General: No focal deficit present. Mental Status: Alert and oriented to person, place, and time. Mental status is at baseline. Motor: No weakness. DIFFERENTIAL  DIAGNOSIS     PLAN (LABS / IMAGING / EKG):  Orders Placed This Encounter   Procedures    COVID-19, Rapid    XR CHEST PORTABLE    CBC Auto Differential    Comprehensive Metabolic Panel w/ Reflex to MG    Troponin    Brain Natriuretic Peptide    Troponin    Urinalysis Reflex to Culture    Inpatient consult to Hospitalist    EKG 12 Lead    Insert peripheral IV    PATIENT STATUS (FROM ED OR OR/PROCEDURAL) Inpatient       MEDICATIONS ORDERED:  Orders Placed This Encounter   Medications    0.9 % sodium chloride bolus           DIAGNOSTIC RESULTS / EMERGENCY DEPARTMENT COURSE / MDM     LABS:  Results for orders placed or performed during the hospital encounter of 09/01/21   COVID-19, Rapid    Specimen: Nasopharyngeal Swab   Result Value Ref Range    Specimen Description . NASOPHARYNGEAL SWAB     SARS-CoV-2, Rapid Not Detected Not Detected   CBC Auto Differential   Result Value Ref Range    WBC 5.5 3.5 - 11.3 k/uL    RBC 3.60 (L) 3.95 - 5.11 m/uL    Hemoglobin 10.7 (L) 11.9 - 15.1 g/dL    Hematocrit 33.8 (L) 36.3 - 47.1 %    MCV 93.9 82.6 - 102.9 fL    MCH 29.7 25.2 - 33.5 pg    MCHC 31.7 28.4 - 34.8 g/dL    RDW 12.7 11.8 - 14.4 %    Platelets 798 457 - 918 k/uL    MPV 11.5 8.1 - 13.5 fL    NRBC Automated 0.0 0.0 per 100 WBC    Differential Type NOT REPORTED     Seg Neutrophils 58 36 - 65 %    Lymphocytes 30 24 - 43 % Monocytes 8 3 - 12 %    Eosinophils % 4 1 - 4 %    Basophils 0 0 - 2 %    Immature Granulocytes 0 0 %    Segs Absolute 3.14 1.50 - 8.10 k/uL    Absolute Lymph # 1.64 1.10 - 3.70 k/uL    Absolute Mono # 0.44 0.10 - 1.20 k/uL    Absolute Eos # 0.22 0.00 - 0.44 k/uL    Basophils Absolute <0.03 0.00 - 0.20 k/uL    Absolute Immature Granulocyte <0.03 0.00 - 0.30 k/uL    WBC Morphology NOT REPORTED     RBC Morphology NOT REPORTED     Platelet Estimate NOT REPORTED    Comprehensive Metabolic Panel w/ Reflex to MG   Result Value Ref Range    Glucose 308 (H) 70 - 99 mg/dL    BUN 22 8 - 23 mg/dL    CREATININE 1.44 (H) 0.50 - 0.90 mg/dL    Bun/Cre Ratio NOT REPORTED 9 - 20    Calcium 9.4 8.6 - 10.4 mg/dL    Sodium 133 (L) 135 - 144 mmol/L    Potassium 5.1 3.7 - 5.3 mmol/L    Chloride 102 98 - 107 mmol/L    CO2 21 20 - 31 mmol/L    Anion Gap 10 9 - 17 mmol/L    Alkaline Phosphatase 85 35 - 104 U/L    ALT 8 5 - 33 U/L    AST 12 <32 U/L    Total Bilirubin 0.32 0.3 - 1.2 mg/dL    Total Protein 7.3 6.4 - 8.3 g/dL    Albumin 4.4 3.5 - 5.2 g/dL    Albumin/Globulin Ratio 1.5 1.0 - 2.5    GFR Non- 35 (L) >60 mL/min    GFR  43 (L) >60 mL/min    GFR Comment          GFR Staging NOT REPORTED    Troponin   Result Value Ref Range    Troponin, High Sensitivity 42 (H) 0 - 14 ng/L    Troponin T NOT REPORTED <0.03 ng/mL    Troponin Interp NOT REPORTED    Brain Natriuretic Peptide   Result Value Ref Range    Pro- (H) <300 pg/mL    BNP Interpretation Pro-BNP Reference Range:    Troponin   Result Value Ref Range    Troponin, High Sensitivity 42 (H) 0 - 14 ng/L    Troponin T NOT REPORTED <0.03 ng/mL    Troponin Interp NOT REPORTED          RADIOLOGY:  XR CHEST PORTABLE    Result Date: 9/1/2021  EXAMINATION: ONE XRAY VIEW OF THE CHEST 9/1/2021 6:28 pm COMPARISON: 8 November 2020 HISTORY: ORDERING SYSTEM PROVIDED HISTORY: possible covid TECHNOLOGIST PROVIDED HISTORY: possible covid Reason for Exam: port Upright Chronic CHF FINDINGS: AP portable view of the chest time stamped at 1822 hours demonstrates stable cardiac size. Diffuse interstitial prominence is noted similar to prior studies favoring chronic vascular congestion. No asymmetric consolidation, overt edema or extrapleural air is noted. Trace effusions are suggested. No extrapleural air. Osseous and mediastinal structures are age-appropriate. Cardiomegaly. Interstitial prominence favoring pulmonary venous congestion. Trace bilateral effusions. EKG  EKG Interpretation    Interpreted by me    Rhythm: normal sinus   Rate: normal  Axis: normal  Ectopy: none  Conduction: normal  ST Segments: no acute change  T Waves: no acute change  Q Waves: none    Clinical Impression: no acute changes and normal EKG    All EKG's are interpreted by the Emergency Department Physician who either signs or Co-signs this chart in the absence of a cardiologist.    EMERGENCY DEPARTMENT COURSE:  Patient breathing quietly and unlabored on room air. Speech is normal and speaking in full sentences without requiring to pause to take a breath. Physical exam patient appears well 98% SPO2 on room air. Vital signs stable afebrile. Despite patient's significant medical history she appears well today. States that she was coronavirus test and wants to go home. Will do Covid test and MI ACS work-up to rule out atypical chest pain ultra derangement from diarrhea hemoglobin derangement. Troponin slightly elevated at 42 compared to patient's baseline however she does have an MARIELLE at 1.4, baseline is about 0.9. Repeat troponin also 42. EKG is unremarkable no signs of ischemia. Will admit the patient for MARIELLE for rehydration. Will add on urinalysis to rule out infectious component although the patient is denying dysuria frequency. Patient agrees to admission.     Admitted to hospitalist.        PROCEDURES:  None    CONSULTS:  IP CONSULT TO HOSPITALIST    CRITICAL CARE:  None    FINAL IMPRESSION      1. MARIELLE (acute kidney injury) ([facility]    2. Diarrhea, unspecified type    3.  Dehydration          DISPOSITION / PLAN     DISPOSITION Admitted 09/01/2021 10:16:53 PM      PATIENT REFERRED TO:  Debbie Brown, [address]  [address] [facility]  [phone]    Schedule an appointment as soon as possible for a visit today        DISCHARGE MEDICATIONS:  New Prescriptions    No medications on file       Sadie Rodriguez MD  Emergency Medicine Resident    (Please note that portions of thisnote were completed with a voice recognition program.  Efforts were made to edit the dictations but occasionally words are mis-transcribed.)       Sadie Rodriguez MD  Resident  09/01/21 [id number]

## 2021-09-02 NOTE — ED PROVIDER NOTES
[facility]     Emergency Department     Faculty Attestation    I performed a history and physical examination of the patient and discussed management with the resident. I reviewed the residents note and agree with the documented findings including all diagnostic interpretations and plan of care. Any areas of disagreement are noted on the chart. I was personally present for the key portions of any procedures. I have documented in the chart those procedures where I was not present during the key portions. I have reviewed the emergency nurses triage note. I agree with the chief complaint, past medical history, past surgical history, allergies, medications, social and family history as documented unless otherwise noted below. Documentation of the HPI, Physical Exam and Medical Decision Making performed by scribes is based on my personal performance of the HPI, PE and MDM. For Physician Assistant/ Nurse Practitioner cases/documentation I have personally evaluated this patient and have completed at least one if not all key elements of the E/M (history, physical exam, and MDM). Additional findings are as noted. This patient was evaluated in the Emergency Department for symptoms described in the history of present illness. He/she was evaluated in the context of the global COVID-19 pandemic, which necessitated consideration that the patient might be at risk for infection with the SARS-CoV-2 virus that causes COVID-19. Institutional protocols and algorithms that pertain to the evaluation of patients at risk for COVID-19 are in a state of rapid change based on information released by regulatory bodies including the CDC and federal and state organizations. These policies and algorithms were followed during the patient's care in the ED. Primary Care Physician: Ford Ly MD    History:  This is a 68 y.o. female who presents to the Emergency Department with complaint of shortness of breath and fatigue. Generalized weakness. Is concerned about possible covert exposure. She is vaccinated however had close contact recently turned positive. Denies any fever    Physical:     weight is 225 lb (102.1 kg). Her oral temperature is 97.3 °F (36.3 °C). Her blood pressure is 182/102 (abnormal) and her pulse is 79.  Her respiration is 19 and oxygen saturation is 95%.    68 y.o. female no acute distress, cardiac exam regular rate and rhythm no murmurs rubs gallops, pulmonary diminished in the bases bilaterally abdomen soft nontender nondistended, minimal edema to the extremities    Impression: Fatigue weakness and possible Covid exposure    Plan: Labs, Covid swab, chest x-ray, reassess    EKG Interpretation    Interpreted by me    Rhythm: normal sinus   Rate: normal  Axis: normal  Ectopy: none  Conduction: normal  ST Segments: no acute change  T Waves: no acute change  Q Waves: none    Clinical Impression: no acute changes and normal EKG    Marcelina Quiroga MD, Lora Johnson  Attending Emergency Physician        Jenny Diaz MD  09/02/21 [id number]

## 2021-09-02 NOTE — CARE COORDINATION
Case Management Initial Discharge Plan  Gaetano Graham,             Met with:patient to discuss discharge plans. Information verified: address, contacts, phone number, , insurance Yes  Insurance Provider: [facility]    Emergency Contact/Next of Kin name & number:   Juliane Le, [phone]    Who are involved in patient's support system? Children    PCP: Larry Duenas MD  Date of last visit:       Discharge Planning    Living Arrangements:  Alone     Home has 1 stories  3 stairs to climb to get into front door, 0 stairs to climb to reach second floor  Location of bedroom/bathroom in home main    Patient able to perform ADL's:Independent    Current Services (outpatient & in home) DME  DME equipment: cane and walker  DME provider:     Is patient receiving oral anticoagulation therapy? Yes- xarelto    If indicated:   Physician managing anticoagulation treatment:   Where does patient obtain lab work for ATC treatment? Potential Assistance Needed:  Home Care    Patient agreeable to home care: Yes  Freedom of choice provided:  yes    Prior SNF/Rehab Placement and Facility: none  Agreeable to SNF/Rehab: No  [place] of choice provided: n/a     Evaluation: n/a    Expected Discharge date:  21    Patient expects to be discharged to: If home: is the family and/or caregiver wiling & able to provide support at home? no  Who will be providing this support? Follow Up Appointment: Best Day/ Time: Thursday PM    Transportation provider: may need  Transportation arrangements needed for discharge: Yes    Readmission Risk              Risk of Unplanned Readmission:  21             Does patient have a readmission risk score greater than 14?: Yes  If yes, follow-up appointment must be made within 7 days of discharge.      Goals of Care:       Educated patient on transitional options, provided freedom of choice and are agreeable with plan      Discharge Plan: home with family support and HH, referral sent to [address], has cane/walker, may need ride home          Electronically signed by Donte Griffith RN on 9/2/21 at 5:32 PM EDT

## 2021-09-02 NOTE — ED PROVIDER NOTES
Faculty Sign-Out Attestation  Handoff taken on the following patient from prior Attending Physician: Radha Tucker    I was available and discussed any additional care issues that arose and coordinated the management plans with the resident(s) caring for the patient during my duty period. Any areas of disagreement with residents documentation of care or procedures are noted on the chart. I was personally present for the key portions of any/all procedures during my duty period. I have documented in the chart those procedures where I was not present during the key portions.     Sob, rosa, > admitted    Trena Harrell DO  Attending Physician     Trena Harrell DO  09/01/21 [id number]

## 2021-09-03 LAB
ABSOLUTE EOS #: 0.19 K/UL (ref 0–0.44)
ABSOLUTE IMMATURE GRANULOCYTE: <0.03 K/UL (ref 0–0.3)
ABSOLUTE LYMPH #: 1.91 K/UL (ref 1.1–3.7)
ABSOLUTE MONO #: 0.62 K/UL (ref 0.1–1.2)
ANION GAP SERPL CALCULATED.3IONS-SCNC: 16 MMOL/L (ref 9–17)
BASOPHILS # BLD: 1 % (ref 0–2)
BASOPHILS ABSOLUTE: 0.03 K/UL (ref 0–0.2)
BUN BLDV-MCNC: 18 MG/DL (ref 8–23)
BUN/CREAT BLD: ABNORMAL (ref 9–20)
CALCIUM SERPL-MCNC: 9.3 MG/DL (ref 8.6–10.4)
CAMPYLOBACTER PCR: NORMAL
CHLORIDE BLD-SCNC: 107 MMOL/L (ref 98–107)
CO2: 20 MMOL/L (ref 20–31)
CREAT SERPL-MCNC: 1.34 MG/DL (ref 0.5–0.9)
DIFFERENTIAL TYPE: ABNORMAL
E COLI ENTEROTOXIGENIC PCR: NORMAL
EKG ATRIAL RATE: 78 BPM
EKG P AXIS: 68 DEGREES
EKG P-R INTERVAL: 168 MS
EKG Q-T INTERVAL: 398 MS
EKG QRS DURATION: 88 MS
EKG QTC CALCULATION (BAZETT): 453 MS
EKG R AXIS: 3 DEGREES
EKG T AXIS: 91 DEGREES
EKG VENTRICULAR RATE: 78 BPM
EOSINOPHILS RELATIVE PERCENT: 3 % (ref 1–4)
GFR AFRICAN AMERICAN: 47 ML/MIN
GFR NON-AFRICAN AMERICAN: 38 ML/MIN
GFR SERPL CREATININE-BSD FRML MDRD: ABNORMAL ML/MIN/{1.73_M2}
GFR SERPL CREATININE-BSD FRML MDRD: ABNORMAL ML/MIN/{1.73_M2}
GLUCOSE BLD-MCNC: 110 MG/DL (ref 70–99)
GLUCOSE BLD-MCNC: 153 MG/DL (ref 65–105)
GLUCOSE BLD-MCNC: 161 MG/DL (ref 65–105)
GLUCOSE BLD-MCNC: 182 MG/DL (ref 65–105)
GLUCOSE BLD-MCNC: 95 MG/DL (ref 65–105)
HCT VFR BLD CALC: 33.3 % (ref 36.3–47.1)
HEMOGLOBIN: 10.4 G/DL (ref 11.9–15.1)
IMMATURE GRANULOCYTES: 0 %
LYMPHOCYTES # BLD: 33 % (ref 24–43)
MCH RBC QN AUTO: 29.7 PG (ref 25.2–33.5)
MCHC RBC AUTO-ENTMCNC: 31.2 G/DL (ref 28.4–34.8)
MCV RBC AUTO: 95.1 FL (ref 82.6–102.9)
MONOCYTES # BLD: 11 % (ref 3–12)
NRBC AUTOMATED: 0 PER 100 WBC
PDW BLD-RTO: 12.8 % (ref 11.8–14.4)
PLATELET # BLD: 166 K/UL (ref 138–453)
PLATELET ESTIMATE: ABNORMAL
PLESIOMONAS SHIGELLOIDES PCR: NORMAL
PMV BLD AUTO: 11.8 FL (ref 8.1–13.5)
POTASSIUM SERPL-SCNC: 4.1 MMOL/L (ref 3.7–5.3)
RBC # BLD: 3.5 M/UL (ref 3.95–5.11)
RBC # BLD: ABNORMAL 10*6/UL
SALMONELLA PCR: NORMAL
SEG NEUTROPHILS: 52 % (ref 36–65)
SEGMENTED NEUTROPHILS ABSOLUTE COUNT: 3.04 K/UL (ref 1.5–8.1)
SHIGATOXIN GENE PCR: NORMAL
SHIGELLA SP PCR: NORMAL
SODIUM BLD-SCNC: 143 MMOL/L (ref 135–144)
SPECIMEN DESCRIPTION: NORMAL
VIBRIO PCR: NORMAL
WBC # BLD: 5.8 K/UL (ref 3.5–11.3)
WBC # BLD: ABNORMAL 10*3/UL
YERSINIA ENTEROCOLITICA PCR: NORMAL

## 2021-09-03 PROCEDURE — 1200000000 HC SEMI PRIVATE

## 2021-09-03 PROCEDURE — 6370000000 HC RX 637 (ALT 250 FOR IP): Performed by: INTERNAL MEDICINE

## 2021-09-03 PROCEDURE — 80048 BASIC METABOLIC PNL TOTAL CA: CPT

## 2021-09-03 PROCEDURE — 6370000000 HC RX 637 (ALT 250 FOR IP): Performed by: NURSE PRACTITIONER

## 2021-09-03 PROCEDURE — 82947 ASSAY GLUCOSE BLOOD QUANT: CPT

## 2021-09-03 PROCEDURE — 36415 COLL VENOUS BLD VENIPUNCTURE: CPT

## 2021-09-03 PROCEDURE — 94760 N-INVAS EAR/PLS OXIMETRY 1: CPT

## 2021-09-03 PROCEDURE — 99232 SBSQ HOSP IP/OBS MODERATE 35: CPT | Performed by: INTERNAL MEDICINE

## 2021-09-03 PROCEDURE — 2580000003 HC RX 258: Performed by: NURSE PRACTITIONER

## 2021-09-03 PROCEDURE — 85025 COMPLETE CBC W/AUTO DIFF WBC: CPT

## 2021-09-03 RX ORDER — AMLODIPINE BESYLATE 5 MG/1
5 TABLET ORAL DAILY
Status: DISCONTINUED | OUTPATIENT
Start: 2021-09-03 | End: 2021-09-04

## 2021-09-03 RX ORDER — SPIRONOLACTONE 25 MG/1
50 TABLET ORAL DAILY
Status: DISCONTINUED | OUTPATIENT
Start: 2021-09-04 | End: 2021-09-04

## 2021-09-03 RX ADMIN — SPIRONOLACTONE 25 MG: 25 TABLET ORAL at 08:08

## 2021-09-03 RX ADMIN — SODIUM CHLORIDE, PRESERVATIVE FREE 10 ML: 5 INJECTION INTRAVENOUS at 20:53

## 2021-09-03 RX ADMIN — ISOSORBIDE MONONITRATE 30 MG: 30 TABLET ORAL at 08:09

## 2021-09-03 RX ADMIN — CARVEDILOL 25 MG: 25 TABLET, FILM COATED ORAL at 17:07

## 2021-09-03 RX ADMIN — Medication 400 UNITS: at 08:09

## 2021-09-03 RX ADMIN — INSULIN LISPRO 3 UNITS: 100 INJECTION, SOLUTION INTRAVENOUS; SUBCUTANEOUS at 17:10

## 2021-09-03 RX ADMIN — SODIUM CHLORIDE, PRESERVATIVE FREE 10 ML: 5 INJECTION INTRAVENOUS at 08:10

## 2021-09-03 RX ADMIN — FLUTICASONE PROPIONATE 2 SPRAY: 50 SPRAY, METERED NASAL at 08:10

## 2021-09-03 RX ADMIN — LISINOPRIL 10 MG: 10 TABLET ORAL at 08:08

## 2021-09-03 RX ADMIN — CETIRIZINE HYDROCHLORIDE 10 MG: 10 TABLET ORAL at 08:09

## 2021-09-03 RX ADMIN — LOPERAMIDE HYDROCHLORIDE 2 MG: 2 CAPSULE ORAL at 10:08

## 2021-09-03 RX ADMIN — TICAGRELOR 90 MG: 90 TABLET ORAL at 08:08

## 2021-09-03 RX ADMIN — ROSUVASTATIN CALCIUM 40 MG: 20 TABLET, FILM COATED ORAL at 08:08

## 2021-09-03 RX ADMIN — RIVAROXABAN 20 MG: 20 TABLET, FILM COATED ORAL at 08:08

## 2021-09-03 RX ADMIN — PANTOPRAZOLE SODIUM 40 MG: 40 TABLET, DELAYED RELEASE ORAL at 08:08

## 2021-09-03 RX ADMIN — ESCITALOPRAM OXALATE 20 MG: 10 TABLET ORAL at 08:09

## 2021-09-03 RX ADMIN — ACETAMINOPHEN 650 MG: 325 TABLET ORAL at 19:38

## 2021-09-03 RX ADMIN — INSULIN LISPRO 2 UNITS: 100 INJECTION, SOLUTION INTRAVENOUS; SUBCUTANEOUS at 20:53

## 2021-09-03 RX ADMIN — INSULIN GLARGINE 60 UNITS: 100 INJECTION, SOLUTION SUBCUTANEOUS at 20:52

## 2021-09-03 RX ADMIN — Medication 1000 UNITS: at 08:08

## 2021-09-03 RX ADMIN — TICAGRELOR 90 MG: 90 TABLET ORAL at 20:52

## 2021-09-03 RX ADMIN — AMLODIPINE BESYLATE 5 MG: 5 TABLET ORAL at 17:07

## 2021-09-03 RX ADMIN — ACETAMINOPHEN 650 MG: 325 TABLET ORAL at 01:38

## 2021-09-03 RX ADMIN — INSULIN LISPRO 3 UNITS: 100 INJECTION, SOLUTION INTRAVENOUS; SUBCUTANEOUS at 12:57

## 2021-09-03 RX ADMIN — CARVEDILOL 25 MG: 25 TABLET, FILM COATED ORAL at 08:09

## 2021-09-03 ASSESSMENT — PAIN SCALES - GENERAL
PAINLEVEL_OUTOF10: 0
PAINLEVEL_OUTOF10: 7
PAINLEVEL_OUTOF10: 6

## 2021-09-03 NOTE — DISCHARGE INSTR - COC
Continuity of Care Form    Patient Name: Mushtaq Shelton   :  1945  MRN:  [id number]    Admit date:  2021  Discharge date:  ***    Code Status Order: Full Code   Advance Directives:      Admitting Physician:  Sharifa Dawson DO  PCP: Chad Yanes MD    Discharging Nurse: [facility] Unit/Room#: 2414/8348-91  Discharging Unit Phone Number: ***    Emergency Contact:   Extended Emergency Contact Information  Primary Emergency Contact: Rah Matthews  Address: [place]  Home Phone: [phone]  Relation: Child  Secondary Emergency Contact: Frank Abrams Phone: [phone]  Mobile Phone: [phone]  Relation: Child    Past Surgical History:  Past Surgical History:   Procedure Laterality Date    APPENDECTOMY      CARDIAC CATHETERIZATION          CORONARY ANGIOPLASTY WITH STENT PLACEMENT  2017    4 SYNERGY HEART STENTS DRUG ELUTING ALL MRI CONDITONAL 3T OK, SAFE IMMEDIATELY.      CORONARY ANGIOPLASTY WITH STENT PLACEMENT  2012    XIENCE HEART STENT/ MRI CONDITIONAL 3T OK, SAFE IMMEDIATELY    CORONARY ANGIOPLASTY WITH STENT PLACEMENT  2020    PTCA         Immunization History:   Immunization History   Administered Date(s) Administered    COVID-19, Pfizer, PF, 30mcg/0.3mL 2021, 2021    Influenza A (E8D8-17) Vaccine PF IM 2009    Influenza Vaccine, unspecified formulation 2013, 10/22/2015, 2016    Influenza Virus Vaccine 10/03/2016    Influenza, High Dose (Fluzone 65 yrs and older) 10/12/2018, 2019    Influenza, Quadv, IM, PF (6 mo and older Fluzone, Flulaval, Fluarix, and 3 yrs and older Afluria) 2018, 2020    Pneumococcal Conjugate 13-valent ([id number]) 2017    Pneumococcal Polysaccharide (Nrnzsbeya14) 2012       Active Problems:  Patient Active Problem List   Diagnosis Code    Atypical chest pain R07.89    Type 2 diabetes mellitus with diabetic polyneuropathy, with long-term current use of insulin ([facility] E11.42, Z79.4    Vertigo R42    Essential hypertension I10    CAD (coronary artery disease) I25.10    Dyspnea R06.00    Claudication in peripheral vascular disease ([facility]) I73.9    Acute pulmonary embolism without acute cor pulmonale ([facility]) I26.99    Diabetic polyneuropathy associated with type 2 diabetes mellitus ([facility]) E11.42    Other hyperlipidemia E78.49    Chronic systolic heart failure ([facility]) I50.22    Multiple subsegmental pulmonary emboli without acute cor pulmonale ([facility]) I26.94    Congestive heart failure (HCC) I50.9    Pyelonephritis of right kidney N12    History of pulmonary embolism Z86.711    Elevated troponin I level R77.8    Sepsis ([facility]) A41.9    Suspected COVID-19 virus infection Z20.822    MARIELLE (acute kidney injury) ([facility] N17.9    Diarrhea R19.7    Chronic diastolic (congestive) heart failure ([facility]) I50.32    Generalized weakness R53.1    Anemia, normocytic normochromic D64.9    Class 1 obesity in adult E66.9    Gastroenteritis K52.9    Acute on chronic diastolic CHF (congestive heart failure) ([facility]) B27.35    Acute diastolic CHF (congestive heart failure) ([facility]) I50.31    Arterial hypotension I95.9    Moderate malnutrition ([facility]) E44.0    S/P PTCA (percutaneous transluminal coronary angioplasty) Z98.61    Cervical myelopathy ([facility]) G95.9    Viral gastroenteritis A08.4       Isolation/Infection:   Isolation            No Isolation          Patient Infection Status       Infection Onset Added Last Indicated Last Indicated By Review Planned Expiration Resolved Resolved By    None active    Resolved    C-diff Rule Out 09/02/21 09/02/21 09/02/21 Gastrointestinal Panel, Molecular (Ordered)   09/03/21 Jenn Jeronimo RN    COVID-19 Rule Out 09/01/21 09/01/21 09/01/21 COVID-19, Rapid (Ordered)   09/01/21 Rule-Out Test Resulted    C-diff Rule Out 11/12/20 11/12/20 11/12/20 Gastrointestinal Panel, Molecular (Ordered)   11/13/20 Jenn Jeronimo RN    GI panel does not test for C-diff    COVID-19 Rule Out 11/06/20 11/06/20 11/06/20 COVID-19 (Ordered)   11/06/20 Rule-Out Test Resulted    C-diff Rule Out 09/03/20 09/03/20 09/03/20 Gastrointestinal Panel, Molecular (Ordered)   09/04/20 Sylvia Chandler RN    GI panel does not test for C-diff    C-diff Rule Out 09/02/20 09/02/20 09/02/20 Gastrointestinal Panel, Molecular (Ordered)   09/03/20 Sylvia Chandler RN    C-diff is not tested in GI Panel    C-diff Rule Out 07/13/20 07/13/20 07/13/20 C DIFF TOXIN/ANTIGEN (Ordered)   07/14/20 Sylvia Chandler RN    COVID-19 Rule Out 07/12/20 07/12/20 07/12/20 COVID-19 (Ordered)   07/13/20 Rule-Out Test Resulted            Nurse Assessment:  Last Vital Signs: BP (!) 177/66   Pulse 65   Temp 98.3 °F (36.8 °C) (Oral)   Resp 18   Ht 5' 10\" (1.778 m)   Wt 225 lb (102.1 kg)   SpO2 99%   BMI 32.28 kg/m²     Last documented pain score (0-10 scale): Pain Level: 6  Last Weight:   Wt Readings from Last 1 Encounters:   09/01/21 225 lb (102.1 kg)     Mental Status:  {IP PT MENTAL STATUS:20030}    IV Access:  { STEPHEN IV ACCESS:848117977}    Nursing Mobility/ADLs:  Walking   {CHP DME DBVD:181912745}  Transfer  {CHP DME PJCE:413945850}  Bathing  {CHP DME HCAD:685645717}  Dressing  {CHP DME HXZY:311694511}  Toileting  {CHP DME SLCW:339339977}  Feeding  {CHP DME GPFW:138311967}  Med Admin  {CHP DME UNNB:001865905}  Med Delivery   { STEPHEN MED Delivery:564824278}    Wound Care Documentation and Therapy:        Elimination:  Continence: Bowel: {YES / BV:21820}  Bladder: {YES / BN:45476}  Urinary Catheter: {Urinary Catheter:665582873}   Colostomy/Ileostomy/Ileal Conduit: {YES / WM:59553}       Date of Last BM: ***  No intake or output data in the 24 hours ending 09/03/21 1233  No intake/output data recorded.     Safety Concerns:     [address] Safety Concerns:561173981}    Impairments/Disabilities:      [address] Impairments/Disabilities:898407522}    Nutrition Therapy:  Current Nutrition Therapy:   [address] Diet List:356372669}    Routes of Feeding: {CHP DME Other Feedings:423295638}  Liquids: {Slp liquid thickness:28298}  Daily Fluid Restriction: {CHP DME Yes amt example:869797075}  Last Modified Barium Swallow with Video (Video Swallowing Test): {Done Not Done ECOZ:202131070}    Treatments at the Time of Hospital Discharge:   Respiratory Treatments: ***  Oxygen Therapy:  {Therapy; copd oxygen:62362}  Ventilator:    { CC Vent JRMI:547868752}    Rehab Therapies: {THERAPEUTIC INTERVENTION:9324895833}  Weight Bearing Status/Restrictions: [facility] CC Weight Bearin}  Other Medical Equipment (for information only, NOT a DME order):  {EQUIPMENT:507578092}  Other Treatments: ***    Patient's personal belongings (please select all that are sent with patient):  {CHP DME Belongings:377479766}    RN SIGNATURE:  {Esignature:421641329}    CASE MANAGEMENT/SOCIAL WORK SECTION    Inpatient Status Date: ***    Readmission Risk Assessment Score:  Readmission Risk              Risk of Unplanned Readmission:  22           Discharging to Facility/ Agency   [facility] [address]         Phone: [phone]       Fax: [phone]          Dialysis Facility (if applicable)   Name:  Address:  Dialysis Schedule:  Phone:  Fax:    / signature: Electronically signed by Eyad Villa RN on 9/3/21 at 12:34 PM EDT    PHYSICIAN SECTION    Prognosis: Good    Condition at Discharge: Stable    Rehab Potential (if transferring to Rehab): Good    Recommended Labs or Other Treatments After Discharge: none    Physician Certification: I certify the above information and transfer of Mendez Marsh  is necessary for the continuing treatment of the diagnosis listed and that she requires Home Care for greater 30 days.      Update Admission H&P: No change in H&P    PHYSICIAN SIGNATURE:  Electronically signed by Kory Arce DO on 21 at 1:18 PM EDT

## 2021-09-03 NOTE — CARE COORDINATION
Transitional Planning:    Called [address] to see if pt is current with them and if they received the referral- LM with intake and req call back. [phone] call from Jazmyn Panda with [address], they are reviewing insurance and will let me know if they are able to accept.

## 2021-09-03 NOTE — PROGRESS NOTES
[facility]  Office: [address], DO, Leeann Bustamante, DO, [place], DO, Cruz Galarza Blood, DO, Abe Tim MD, Riya Davila MD, Librado Garcia MD, Tiffany Holder MD, Brendan Wilson MD, Robert Lee MD, Kristal Faust MD, David Shields, DO, Serena Thrasher MD, Kory Arce, DO, Suma Ly MD,  Johann Galeazzi, DO, Nicolasa Jones MD, Luiz Allan MD, Sean Alberts MD, Jelani Caban MD, Marisol Man MD, Sachi Middleton MD, Isak Sanders MD, Hu Villarreal [facility], [facility], CNP, Cindy Mireles, CNP, [place], CNS, Bee Gilbert, CNP, Tanya Bauer, CNP, Mary Caruso, CNP, Lucy Hendricks, CNP, Nai Gregory, CNP, MARGARITA DavisC, Ashlee Calixto, [facility], Dang Chandler, CNP, Sam Diaz, CNP, Antionette Blanco, CNP, Daksha Elkins, CNP, Michael Ivy, CNP, Mehrdad Vance, CNP, Tayo Hood, CNP, [facility], [address] [place] [address]    Progress Note    9/3/2021    5:02 PM    Name:   Mendez Marsh  MRN:     [id number]     Sebastian Keisha:      [de-identified]   Room:   [address] [place] [address] Day:  2  Admit Date:  9/1/2021  4:20 PM    PCP:   Gabrielle Musa MD  Code Status:  Full Code    Subjective:     C/C:   Chief Complaint   Patient presents with    Shortness of Breath     Interval History Status: improved. Viral panel is negative, diarrhea is improved. Patient developed an acute kidney injury this afternoon, she has no other complaints. We briefly discussed adequate hydration and outpatient follow-up. Brief History:     From H&P  This is a 68 yr old female with history of CAD s/p stents, HTN, Afib and DVT/PE on xarelto and Type II DM who presents to the ER with diarrhea and generalized malaise. Symptoms started approximately 4 days ago with multiple episodes of diarrhea daily. She denies any bloody or melanotic stools. She also endorses feeling generally fatigued and has mild dyspnea.   She is concerned as her son who lives with her tested positive for COVID-19 yesterday. Patient denies any CP, ABD pain, nausea/vomiting, dysuria/changes in urinary habits, myalgias/arthralgias, cough or fevers. Initial EKG demonstrating NSR without any acute changes and CXR is demonstrating trace bilateral effusions. Pertinent labs include: Na: 133, K: 5.1, BUN: 22, CRT: 1.44 (baseline ~0.9), Glucose: 308, pro-bnp: 795, Trop: 42-->42, HGB: 10.7 and COVID-19: Negative. Patient was given 1L IV fluid bolus in the ER and is admitted to [facility] for further management of MARIELLE. Review of Systems:     Constitutional:  negative for chills, fevers, sweats  Respiratory:  negative for cough, dyspnea on exertion, shortness of breath, wheezing  Cardiovascular:  negative for chest pain, chest pressure/discomfort, lower extremity edema, palpitations  Gastrointestinal:  negative for abdominal pain, constipation, diarrhea, nausea, vomiting  Neurological:  negative for dizziness, headache    Medications: Allergies:     Allergies   Allergen Reactions    Penicillins      Other reaction(s): Hives    Sulfa Antibiotics      Other reaction(s): Rash       Current Meds:   Scheduled Meds:    amLODIPine  5 mg Oral Daily    [START ON 9/4/2021] spironolactone  50 mg Oral Daily    carvedilol  25 mg Oral BID WC    isosorbide mononitrate  30 mg Oral Daily    [Held by provider] lisinopril  10 mg Oral Daily    escitalopram  20 mg Oral Daily    fluticasone  2 spray Each Nostril Daily    cetirizine  10 mg Oral Daily    pantoprazole  40 mg Oral QAM AC    rivaroxaban  20 mg Oral Daily with breakfast    rosuvastatin  40 mg Oral Daily    ticagrelor  90 mg Oral BID    Vitamin D  1,000 Units Oral Daily    vitamin E  400 Units Oral Daily    sodium chloride flush  5-40 mL IntraVENous 2 times per day    insulin lispro  0-18 Units SubCUTAneous TID WC    insulin lispro  0-9 Units SubCUTAneous Nightly    insulin glargine  60 Units SubCUTAneous BID Continuous Infusions:    dextrose      sodium chloride Stopped (21 1718)    sodium chloride       PRN Meds: loperamide, albuterol sulfate HFA, nitroGLYCERIN, glucose, dextrose, glucagon (rDNA), dextrose, sodium chloride flush, sodium chloride, ondansetron **OR** ondansetron, acetaminophen **OR** acetaminophen    Data:     Past Medical History:   has a past medical history of Anxiety, Atrial fibrillation ([facility] Benign positional vertigo, CAD (coronary artery disease), Chest pain, Depression, Diabetes mellitus ([facility] Diabetic neuropathy [facility] Hyperlipidemia, Hypertension, and Migraine. Social History:   reports that she has never smoked. She has never used smokeless tobacco. She reports that she does not drink alcohol and does not use drugs. Family History:   Family History   Problem Relation Age of Onset   Flores Cancer Mother     Cancer Father        Vitals:  BP (!) 177/66   Pulse 65   Temp 98.3 °F (36.8 °C) (Oral)   Resp 18   Ht 5' 10\" (1.778 m)   Wt 225 lb (102.1 kg)   SpO2 99%   BMI 32.28 kg/m²   Temp (24hrs), Av.3 °F (36.8 °C), Min:98.2 °F (36.8 °C), Max:98.3 °F (36.8 °C)    Recent Labs     21  1705 21  1854 21  0738 21  1150   POCGLU 150* 136* 95 182*       I/O (24Hr):   No intake or output data in the 24 hours ending 21 1702    Labs:  Hematology:  Recent Labs     21  1710 21  0650 21  0927 21  0620   WBC 5.5 5.7  --  5.8   RBC 3.60* 3.51*  --  3.50*   HGB 10.7* 10.3*  --  10.4*   HCT 33.8* 32.2*  --  33.3*   MCV 93.9 91.7  --  95.1   MCH 29.7 29.3  --  29.7   MCHC 31.7 32.0  --  31.2   RDW 12.7 12.8  --  12.8    146  --  166   MPV 11.5 11.3  --  11.8   INR  --   --  1.1  --      Chemistry:  Recent Labs     21  1710 21  0650 21  0620   *  --  133* 143   K 5.1  --  4.3 4.1     --  102 107   CO2 21  --  22 20   GLUCOSE 308*  --  164* 110*   BUN 22  --  20 18   CREATININE 1.44*  -- 1.04* 1.34*   MG  --   --  2.3  --    ANIONGAP 10  --  9 16   LABGLOM 35*  --  52* 38*   GFRAA 43*  --  >60 47*   CALCIUM 9.4  --  9.5 9.3   PROBNP 795*  --   --   --    TROPHS 42* 42*  --   --      Recent Labs     09/01/21  1710 09/02/21  0027 09/02/21  0650 09/02/21  0721 09/02/21  1223 09/02/21  1705 09/02/21  1854 09/03/21  0738 09/03/21  1150   PROT 7.3  --  6.9  --   --   --   --   --   --    LABALBU 4.4  --  4.0  --   --   --   --   --   --    LABA1C  --   --  10.4*  --   --   --   --   --   --    AST 12  --  11  --   --   --   --   --   --    ALT 8  --  6  --   --   --   --   --   --    ALKPHOS 85  --  76  --   --   --   --   --   --    BILITOT 0.32  --  0.46  --   --   --   --   --   --    POCGLU  --    < >  --  169* 227* 150* 136* 95 182*    < > = values in this interval not displayed. ABG:No results found for: POCPH, PHART, PH, POCPCO2, MUV1PSQ, PCO2, POCPO2, PO2ART, PO2, POCHCO3, VSB5UJC, HCO3, NBEA, PBEA, BEART, BE, THGBART, THB, XZB2JXT, TKXS3YZO, D3TRWGBE, O2SAT, FIO2  Lab Results   Component Value Date/Time    SPECIAL NOT REPORTED 09/03/2020 01:36 PM     Lab Results   Component Value Date/Time    CULTURE NO GROWTH 6 DAYS 07/13/2020 02:00 AM    CULTURE NO GROWTH 6 DAYS 07/13/2020 02:00 AM       Radiology:  XR CHEST PORTABLE    Result Date: 9/1/2021  Cardiomegaly. Interstitial prominence favoring Cathi venous congestion. Trace bilateral effusions. US RETROPERITONEAL COMPLETE    Result Date: 9/2/2021  1. Patient had no urge to void during the exam. 2. Bilateral renal cysts with the largest at the mid-inferior pole left kidney measuring 4.6 cm. 3. No hydronephrosis.        Physical Examination:        General appearance:  alert, cooperative and no distress  Mental Status:  oriented to person, place and time and normal affect  Lungs:  clear to auscultation bilaterally, normal effort  Heart:  regular rate and rhythm, no murmur  Abdomen:  soft, nontender, nondistended, normal bowel sounds, no masses, hepatomegaly, splenomegaly  Extremities:  no edema, redness, tenderness in the calves  Skin:  no gross lesions, rashes, induration    Assessment:        Hospital Problems         Last Modified POA    * (Principal) Viral gastroenteritis 9/2/2021 Yes    MARIELLE (acute kidney injury) ([address] 9/2/2021 Yes    Diarrhea 9/2/2021 Yes    Chronic diastolic (congestive) heart failure ([address] 9/2/2021 Yes    Cervical myelopathy ([address] 9/2/2021 Yes    Type 2 diabetes mellitus with diabetic polyneuropathy, with long-term current use of insulin ([address] (Chronic) 9/2/2021 Yes    Essential hypertension 9/2/2021 Yes    CAD (coronary artery disease) 9/2/2021 Yes    Overview Signed 3/27/2017  4:39 PM by Matilda Pollack MD     S/P 4 + 2 stents               Plan:        Hold lisinopril for acute kidney injury, continue IV fluids overnight. Add Norvasc for hypertension.   Diarrhea has improved  Discharge possibly tomorrow if blood pressure improved and MARIELLE improves    Trey Alexander, DO  9/3/2021  5:02 PM

## 2021-09-04 VITALS
BODY MASS INDEX: 32.21 KG/M2 | WEIGHT: 225 LBS | OXYGEN SATURATION: 99 % | RESPIRATION RATE: 18 BRPM | SYSTOLIC BLOOD PRESSURE: 139 MMHG | TEMPERATURE: 98.3 F | DIASTOLIC BLOOD PRESSURE: 62 MMHG | HEIGHT: 70 IN | HEART RATE: 59 BPM

## 2021-09-04 LAB
ALBUMIN SERPL-MCNC: 3.7 G/DL (ref 3.5–5.2)
ANION GAP SERPL CALCULATED.3IONS-SCNC: 11 MMOL/L (ref 9–17)
BUN BLDV-MCNC: 20 MG/DL (ref 8–23)
BUN/CREAT BLD: ABNORMAL (ref 9–20)
CALCIUM SERPL-MCNC: 9.2 MG/DL (ref 8.6–10.4)
CHLORIDE BLD-SCNC: 107 MMOL/L (ref 98–107)
CO2: 22 MMOL/L (ref 20–31)
CREAT SERPL-MCNC: 1.45 MG/DL (ref 0.5–0.9)
GFR AFRICAN AMERICAN: 43 ML/MIN
GFR NON-AFRICAN AMERICAN: 35 ML/MIN
GFR SERPL CREATININE-BSD FRML MDRD: ABNORMAL ML/MIN/{1.73_M2}
GFR SERPL CREATININE-BSD FRML MDRD: ABNORMAL ML/MIN/{1.73_M2}
GLUCOSE BLD-MCNC: 124 MG/DL (ref 65–105)
GLUCOSE BLD-MCNC: 67 MG/DL (ref 65–105)
GLUCOSE BLD-MCNC: 67 MG/DL (ref 70–99)
GLUCOSE BLD-MCNC: 96 MG/DL (ref 65–105)
HCT VFR BLD CALC: 31.6 % (ref 36.3–47.1)
HEMOGLOBIN: 9.5 G/DL (ref 11.9–15.1)
MAGNESIUM: 2.1 MG/DL (ref 1.6–2.6)
MCH RBC QN AUTO: 29.1 PG (ref 25.2–33.5)
MCHC RBC AUTO-ENTMCNC: 30.1 G/DL (ref 28.4–34.8)
MCV RBC AUTO: 96.9 FL (ref 82.6–102.9)
NRBC AUTOMATED: 0 PER 100 WBC
PDW BLD-RTO: 13 % (ref 11.8–14.4)
PHOSPHORUS: 3.9 MG/DL (ref 2.6–4.5)
PLATELET # BLD: 164 K/UL (ref 138–453)
PMV BLD AUTO: 11 FL (ref 8.1–13.5)
POTASSIUM SERPL-SCNC: 3.8 MMOL/L (ref 3.7–5.3)
RBC # BLD: 3.26 M/UL (ref 3.95–5.11)
SODIUM BLD-SCNC: 140 MMOL/L (ref 135–144)
WBC # BLD: 5.6 K/UL (ref 3.5–11.3)

## 2021-09-04 PROCEDURE — 80069 RENAL FUNCTION PANEL: CPT

## 2021-09-04 PROCEDURE — 99239 HOSP IP/OBS DSCHRG MGMT >30: CPT | Performed by: INTERNAL MEDICINE

## 2021-09-04 PROCEDURE — 6370000000 HC RX 637 (ALT 250 FOR IP): Performed by: INTERNAL MEDICINE

## 2021-09-04 PROCEDURE — 83735 ASSAY OF MAGNESIUM: CPT

## 2021-09-04 PROCEDURE — 6370000000 HC RX 637 (ALT 250 FOR IP): Performed by: NURSE PRACTITIONER

## 2021-09-04 PROCEDURE — 85027 COMPLETE CBC AUTOMATED: CPT

## 2021-09-04 PROCEDURE — 94761 N-INVAS EAR/PLS OXIMETRY MLT: CPT

## 2021-09-04 PROCEDURE — 82947 ASSAY GLUCOSE BLOOD QUANT: CPT

## 2021-09-04 PROCEDURE — 36415 COLL VENOUS BLD VENIPUNCTURE: CPT

## 2021-09-04 RX ORDER — NIFEDIPINE 30 MG/1
30 TABLET, EXTENDED RELEASE ORAL DAILY
Status: DISCONTINUED | OUTPATIENT
Start: 2021-09-04 | End: 2021-09-04

## 2021-09-04 RX ORDER — MECLIZINE HCL 12.5 MG/1
12.5 TABLET ORAL 3 TIMES DAILY PRN
Status: DISCONTINUED | OUTPATIENT
Start: 2021-09-04 | End: 2021-09-04 | Stop reason: HOSPADM

## 2021-09-04 RX ORDER — INSULIN DETEMIR 100 [IU]/ML
50 INJECTION, SOLUTION SUBCUTANEOUS 2 TIMES DAILY
Qty: 15 PEN | Refills: 11
Start: 2021-09-04 | End: 2022-02-28

## 2021-09-04 RX ORDER — INSULIN GLARGINE 100 [IU]/ML
50 INJECTION, SOLUTION SUBCUTANEOUS 2 TIMES DAILY
Status: DISCONTINUED | OUTPATIENT
Start: 2021-09-04 | End: 2021-09-04 | Stop reason: HOSPADM

## 2021-09-04 RX ORDER — SPIRONOLACTONE 25 MG/1
25 TABLET ORAL DAILY
Status: DISCONTINUED | OUTPATIENT
Start: 2021-09-04 | End: 2021-09-04 | Stop reason: HOSPADM

## 2021-09-04 RX ORDER — MECLIZINE HCL 12.5 MG/1
12.5 TABLET ORAL 3 TIMES DAILY PRN
Qty: 10 TABLET | Refills: 0 | Status: SHIPPED | OUTPATIENT
Start: 2021-09-04 | End: 2021-09-28 | Stop reason: SDUPTHER

## 2021-09-04 RX ADMIN — PANTOPRAZOLE SODIUM 40 MG: 40 TABLET, DELAYED RELEASE ORAL at 10:10

## 2021-09-04 RX ADMIN — TICAGRELOR 90 MG: 90 TABLET ORAL at 10:10

## 2021-09-04 RX ADMIN — FLUTICASONE PROPIONATE 2 SPRAY: 50 SPRAY, METERED NASAL at 10:10

## 2021-09-04 RX ADMIN — RIVAROXABAN 20 MG: 20 TABLET, FILM COATED ORAL at 10:10

## 2021-09-04 RX ADMIN — Medication 400 UNITS: at 10:10

## 2021-09-04 RX ADMIN — Medication 1000 UNITS: at 10:10

## 2021-09-04 RX ADMIN — MECLIZINE HCL 12.5 MG: 12.5 TABLET ORAL at 10:10

## 2021-09-04 RX ADMIN — SPIRONOLACTONE 25 MG: 25 TABLET ORAL at 10:10

## 2021-09-04 RX ADMIN — ROSUVASTATIN CALCIUM 40 MG: 20 TABLET, FILM COATED ORAL at 10:10

## 2021-09-04 RX ADMIN — ESCITALOPRAM OXALATE 20 MG: 10 TABLET ORAL at 10:10

## 2021-09-04 NOTE — DISCHARGE SUMMARY
[facility]  Office: [address], DO, Neeta Blackburn, DO, Esmer Crowley, DO, Osminisabel itage Blood, DO, Debbie Graham MD, Horacio Morse MD, Agatha Sanchez MD, Dawood Davila MD, Levi Kline MD, Tish Durán MD, Baron Bond MD, Jas Craven DO, Nishi Cee MD, Amanda Hilario DO, Nicole Han MD,  Cuauhtemoc Gonzalez DO, Julissa Rivera MD, Jaylyn Weller MD, Fransisca June MD, Byorn Ferreira MD, Yue Leger MD, Alice Velazquez MD, Donna Mayes MD, Jose Francisco [facility], [facility], [facility], Kt Gregory, [facility], Wade Piper, CNS, Dianna Ochoa, CNP, Iman Marrero, CNP, Elías Arroyo, CNP, Sherine Marinelli, CNP, Liz Green, CNP, Yana lCements PA-C, Artemio Primrose, [facility], Ricco Bauer, [facility], Dakotah Cortes, CNP, Rena Guerra, CNP, Wilda Lewis, CNP, Susana Mirza, CNP, Greyson Atwood, CNP, Lazarus Mais, CNP, Sebastián Null, [address] [place] [address]    Discharge Summary     Patient ID: Queenie Maya  :  1945   MRN: [id number]     ACCOUNT:  [de-identified]   Patient's PCP: Giorgi Rivers MD  Admit Date: 2021   Discharge Date: 2021     Length of Stay: 3  Code Status:  Full Code  Admitting Physician: Elvin Rivera DO  Discharge Physician: Amanda Hilario DO     Active Discharge Diagnoses:     Hospital Problem Lists:  Principal Problem:    Viral gastroenteritis  Active Problems:    MARIELLE (acute kidney injury) ([facility] [address]    Diarrhea    Chronic diastolic (congestive) heart failure (HCC)    Cervical myelopathy ([facility] [address]    Type 2 diabetes mellitus with diabetic polyneuropathy, with long-term current use of insulin [facility])    Essential hypertension    CAD (coronary artery disease)  Resolved Problems:    * No resolved hospital problems.  *      Admission Condition:  good     Discharged Condition: good    Hospital Stay:     Hospital Course:  Queenie Maya is a 68 y.o. female who was admitted for the management of   Viral gastroenteritis , presented to ER with Shortness of Breath  Patient was seen by the emergency department, admitted to medicine for dehydration and diarrhea. Viral panel was ordered for diarrhea, Imodium was ordered. Patient symptoms improved, patient tolerated diet. Blood pressures were somewhat elevated on second day of admission and blood pressure medications were adjusted. Patient felt much better the following day, only had 1 episode of diarrhea in 2 days. Patient was discharged home to follow-up outpatient with primary care physician, of note before discharge patient had developed a mild acute kidney injury, she was told to hold her lisinopril and have a kidney function recheck the following week. She also complained of dizziness with sensation of movement. She has a history of vertigo and symptoms appear similar, she was given meclizine with improvement in symptoms. Also noted to have a left upper extremity boil with no erythema, no drainage that was slightly itchy.   She was told to continue to monitor and see her PCP if needed    Significant therapeutic interventions: none    Significant Diagnostic Studies:   Labs / Micro:  CBC:   Lab Results   Component Value Date    WBC 5.6 09/04/2021    RBC 3.26 09/04/2021    HGB 9.5 09/04/2021    HCT 31.6 09/04/2021    MCV 96.9 09/04/2021    MCH 29.1 09/04/2021    MCHC 30.1 09/04/2021    RDW 13.0 09/04/2021     09/04/2021     BMP:    Lab Results   Component Value Date    GLUCOSE 67 09/04/2021     09/04/2021    K 3.8 09/04/2021     09/04/2021    CO2 22 09/04/2021    ANIONGAP 11 09/04/2021    BUN 20 09/04/2021    CREATININE 1.45 09/04/2021    BUNCRER NOT REPORTED 09/04/2021    CALCIUM 9.2 09/04/2021    LABGLOM 35 09/04/2021    GFRAA 43 09/04/2021    GFR      09/04/2021    GFR NOT REPORTED 09/04/2021     HFP:    Lab Results   Component Value Date    PROT 6.9 09/02/2021     CMP:    Lab Results   Component Value Date    GLUCOSE 67 09/04/2021    NA 140 09/04/2021    K 3.8 09/04/2021     09/04/2021    CO2 22 09/04/2021    BUN 20 09/04/2021    CREATININE 1.45 09/04/2021    ANIONGAP 11 09/04/2021    ALKPHOS 76 09/02/2021    ALT 6 09/02/2021    AST 11 09/02/2021    BILITOT 0.46 09/02/2021    LABALBU 3.7 09/04/2021    ALBUMIN 1.4 09/02/2021    LABGLOM 35 09/04/2021    GFRAA 43 09/04/2021    GFR      09/04/2021    GFR NOT REPORTED 09/04/2021    PROT 6.9 09/02/2021    CALCIUM 9.2 09/04/2021          Radiology:  XR CHEST PORTABLE    Result Date: 9/1/2021  Cardiomegaly. Interstitial prominence favoring Cathi venous congestion. Trace bilateral effusions. US RETROPERITONEAL COMPLETE    Result Date: 9/2/2021  1. Patient had no urge to void during the exam. 2. Bilateral renal cysts with the largest at the mid-inferior pole left kidney measuring 4.6 cm. 3. No hydronephrosis. Consultations:    Consults:     Final Specialist Recommendations/Findings:   None      The patient was seen and examined on day of discharge and this discharge summary is in conjunction with any daily progress note from day of discharge.     Discharge plan:     Disposition: Home    Physician Follow Up:     Nirmal York, [address]  [address] [address]  [phone]    Schedule an appointment as soon as possible for a visit today         Requiring Further Evaluation/Follow Up POST HOSPITALIZATION/Incidental Findings: none    Diet: regular diet    Activity: As tolerated    Instructions to Patient: none    Discharge Medications:      Medication List      START taking these medications    meclizine 12.5 MG tablet  Commonly known as: ANTIVERT  Take 1 tablet by mouth 3 times daily as needed for Dizziness        CHANGE how you take these medications    Levemir FlexTouch 100 UNIT/ML injection pen  Generic drug: insulin detemir  Inject 50 Units into the skin 2 times daily  What changed: how much to take        CONTINUE taking these medications    albuterol sulfate HFA 108 (90 Base) MCG/ACT inhaler  Inhale 2 puffs into the lungs every 6 hours as needed for Wheezing     carvedilol 25 MG tablet  Commonly known as: COREG  Take 1 tablet by mouth 2 times daily (with meals) Hold for sbp<120 or hr <50, give 1 hour from other bp meds     clonazePAM 0.5 MG tablet  Commonly known as: KLONOPIN  take 1 tablet by mouth twice a day if needed for anxiety     docusate sodium 100 MG capsule  Commonly known as: COLACE  Take 1 capsule by mouth daily as needed for Constipation     DuoDERM CGF Dressing Misc  Apply 2 each topically daily     escitalopram 20 MG tablet  Commonly known as: LEXAPRO  Take 1 tablet by mouth daily     fluticasone 50 MCG/ACT nasal spray  Commonly known as: FLONASE  2 sprays by Each Nostril route daily     FreeStyle Richard 14 Day Sarepta Larry Love  1 each by Does not apply route continuous [facility] 14 Day Sensor Misc  1 each by Does not apply route every 14 days     gabapentin 600 MG tablet  Commonly known as: NEURONTIN  TAKE ONE TABLET BY MOUTH THREE TIMES A DAY     glucose monitoring kit  1 kit by Does not apply route 4 times daily [facility] . Dx E11.65, has tremors and cannot use conventional meter without great difficulty. Please provide supplies for 3 months, rf 3,[facility].     hydrOXYzine 25 MG tablet  Commonly known as: ATARAX  take 1 tablet by mouth every 8 hours rectally for itching     isosorbide mononitrate 30 MG extended release tablet  Commonly known as: IMDUR  Take 1 tablet by mouth daily     loratadine 10 MG tablet  Commonly known as: Claritin  Take 1 tablet every day by oral route.      mupirocin 2 % ointment  Commonly known as: BACTROBAN     nitroGLYCERIN 0.4 MG SL tablet  Commonly known as: NITROSTAT  place 1 tablet under the tongue if needed every 5 minutes if needed for CHEST PAIN     NovoLOG FlexPen 100 UNIT/ML injection pen  Generic drug: insulin aspart  Use TID before meals according to scale - max 45 units a day     nystatin 722563 UNIT/GM powder  Commonly known as: MYCOSTATIN  Apply 3 times daily. omeprazole 20 MG delayed release capsule  Commonly known as: PRILOSEC  Take 1 capsule by mouth Daily     oxyCODONE-acetaminophen 5-325 MG per tablet  Commonly known as: PERCOCET     rivaroxaban 20 MG Tabs tablet  Commonly known as: Xarelto  Take 1 tablet by mouth daily (with breakfast)     rosuvastatin 40 MG tablet  Commonly known as: CRESTOR  Take 1 tablet by mouth daily     spironolactone 25 MG tablet  Commonly known as: ALDACTONE  Take 1 tablet by mouth daily     * Step N Rest Walker Misc  1 each by Does not apply route continuous Seated, wheeled walker     * Walker Misc  Diagnosis: right 5th metatarsal fracture, pain in limb    Duration: 3 months     * Commode Bedside Misc  1 Device by Does not apply route daily     * Step N Rest Walker Misc  1 each by Does not apply route continuous Seated, wheeled walker     ticagrelor 90 MG Tabs tablet  Commonly known as: BRILINTA  Take 1 tablet by mouth 2 times daily     vitamin D3 25 MCG (1000 UT) Tabs tablet  Commonly known as: CHOLECALCIFEROL  Take 1 tablet by mouth daily     vitamin E 400 UNIT capsule     Zinc Oxide 15 % Crea  Apply to buttocks up to 4 times daily - may use any concentratino         * This list has 4 medication(s) that are the same as other medications prescribed for you. Read the directions carefully, and ask your doctor or other care provider to review them with you.             STOP taking these medications    lisinopril 10 MG tablet  Commonly known as: PRINIVIL;ZESTRIL     NIFEdipine 30 MG extended release tablet  Commonly known as: PROCARDIA XL     NovoFine 32G X 6 MM Misc  Generic drug: Insulin Pen Needle     pregabalin 50 MG capsule  Commonly known as: Lyrica           Where to Get Your Medications      These medications were sent to [facility] [address], [address]  [address], [address]  [address]    Phone: [phone]   meclizine 12.5 MG tablet     Information about where to get these medications is not yet available    Ask your nurse or doctor about these medications  Levemir FlexTouch 100 UNIT/ML injection pen         No discharge procedures on file. Time Spent on discharge is  34 mins in patient examination, evaluation, counseling as well as medication reconciliation, prescriptions for required medications, discharge plan and follow up. Electronically signed by   Thor Malone DO  9/4/2021  1:22 PM      Thank you Dr. Juni Patton MD for the opportunity to be involved in this patient's care.

## 2021-09-04 NOTE — CARE COORDINATION
Discharge [id number] [facility]  Clinical Case Management Department  Written by: Steven Jacome RN    Patient Name: Andrews Beasley  Attending Provider: No att. providers found  Admit Date: 2021  4:20 PM  MRN: [id number]  Account: [de-identified]                     : 1945  Discharge Date: 2021      Disposition: home per family with Radha Pedraza, has cane/walker.     Steven Jacome, RN no

## 2021-09-04 NOTE — PROGRESS NOTES
CLINICAL PHARMACY NOTE: MEDS TO BEDS    Total # of Prescriptions Filled: 1   The following medications were delivered to the patient:  · Meclizine 12.5 mg    Additional Documentation:

## 2021-09-04 NOTE — PROGRESS NOTES
[facility]  Office: [address], DO, Ann Laurence, DO, Jeremy Parikh, DO, Megha Duque, DO, Shawna Rogel MD, Jairo Younger MD, Homero Padilla MD, Moises Lake MD, Nate Castro MD, Carolynn Snellen, MD, Jesus Downs MD, David Cedeño, DO, Kristin Reyes MD, Lia Thomas, DO, Basilia Egan MD,  Driss Knight DO, Rayo Ortiz MD, Amita Zavala MD, Frandy Andujar MD, Gris Esteban MD, Chary Licea MD, Vanessa Manning MD, Luis Fernando Estrada MD, Wesley Rodriguez [facility], [facility], CNP, Osmar Daniels, CNP, Andrea Briceño, CNS, Micaela Longo, CNP, Armani Pathak, CNP, Tad Ramirez, CNP, Juan Kahn, CNP, Solomon Grimm CNP, Kristin Salazar PA-C, Valerio Amador, [facility], Ester Watkins, CNP, Girish Gandhi, CNP, Ashlie Richard, CNP, Supa Bonilla, CNP, Francesca Espinoza CNP, Jordin Boswell, CNP, Annel De Los Santos, CNP, Kevin Fish, [address] [place] [address]    Progress Note    9/4/2021    1:12 PM    Name:   Jeana Hawkins  MRN:     [id number]     [place]:      [de-identified]   Room:   [address] [place], [place] [id number] Day:  3  Admit Date:  9/1/2021  4:20 PM    PCP:   Agustín Tomas MD  Code Status:  Full Code    Subjective:     C/C:   Chief Complaint   Patient presents with    Shortness of Breath     Interval History Status: improved. Patient has not had any further diarrhea, panel is negative. Patient was complaining of dizziness this morning, has a history of vertigo which she normally takes Antivert for. I gave her a dose this morning and she felt much better. Also had a episode of hypoglycemia this morning was treated with orange juice. Patient during examination this afternoon felt much better, she is quite about going home.   We discussed decreasing her insulin dose for next couple days until her diet improves, see your PCP in 1 week for recheck of her kidney function    Brief History:     From H&P  This is a 68 yr old female with history of CAD s/p stents, HTN, Afib and DVT/PE on xarelto and Type II DM who presents to the ER with diarrhea and generalized malaise. Symptoms started approximately 4 days ago with multiple episodes of diarrhea daily. She denies any bloody or melanotic stools. She also endorses feeling generally fatigued and has mild dyspnea. She is concerned as her son who lives with her tested positive for COVID-19 yesterday. Patient denies any CP, ABD pain, nausea/vomiting, dysuria/changes in urinary habits, myalgias/arthralgias, cough or fevers. Initial EKG demonstrating NSR without any acute changes and CXR is demonstrating trace bilateral effusions. Pertinent labs include: Na: 133, K: 5.1, BUN: 22, CRT: 1.44 (baseline ~0.9), Glucose: 308, pro-bnp: 795, Trop: 42-->42, HGB: 10.7 and COVID-19: Negative. Patient was given 1L IV fluid bolus in the ER and is admitted to [facility] for further management of MARIELLE. Review of Systems:     Constitutional:  negative for chills, fevers, sweats  Respiratory:  negative for cough, dyspnea on exertion, shortness of breath, wheezing  Cardiovascular:  negative for chest pain, chest pressure/discomfort, lower extremity edema, palpitations  Gastrointestinal:  negative for abdominal pain, constipation, diarrhea, nausea, vomiting  Neurological:  negative for dizziness, headache    Medications: Allergies:     Allergies   Allergen Reactions    Penicillins      Other reaction(s): Hives    Sulfa Antibiotics      Other reaction(s): Rash       Current Meds:   Scheduled Meds:    spironolactone  25 mg Oral Daily    insulin glargine  50 Units SubCUTAneous BID    carvedilol  25 mg Oral BID WC    isosorbide mononitrate  30 mg Oral Daily    [Held by provider] lisinopril  10 mg Oral Daily    escitalopram  20 mg Oral Daily    fluticasone  2 spray Each Nostril Daily    cetirizine  10 mg Oral Daily    pantoprazole  40 mg Oral QAM AC    rivaroxaban  20 mg Oral Daily with breakfast    rosuvastatin  40 mg Oral Daily    ticagrelor  90 mg Oral BID    Vitamin D  1,000 Units Oral Daily    vitamin E  400 Units Oral Daily    sodium chloride flush  5-40 mL IntraVENous 2 times per day    insulin lispro  0-18 Units SubCUTAneous TID WC    insulin lispro  0-9 Units SubCUTAneous Nightly     Continuous Infusions:    dextrose      sodium chloride       PRN Meds: meclizine, loperamide, albuterol sulfate HFA, nitroGLYCERIN, glucose, dextrose, glucagon (rDNA), dextrose, sodium chloride flush, sodium chloride, ondansetron **OR** ondansetron, acetaminophen **OR** acetaminophen    Data:     Past Medical History:   has a past medical history of Anxiety, Atrial fibrillation (HCC), Benign positional vertigo, CAD (coronary artery disease), Chest pain, Depression, Diabetes mellitus ([facility] [address] Diabetic neuropathy ([facility] [address] Hyperlipidemia, Hypertension, and Migraine. Social History:   reports that she has never smoked. She has never used smokeless tobacco. She reports that she does not drink alcohol and does not use drugs. Family History:   Family History   Problem Relation Age of Onset   [facility] Cancer Mother     Cancer Father        Vitals:  /62   Pulse 59   Temp 98.3 °F (36.8 °C) (Oral)   Resp 18   Ht 5' 10\" (1.778 m)   Wt 225 lb (102.1 kg)   SpO2 99%   BMI 32.28 kg/m²   Temp (24hrs), Av.2 °F (36.8 °C), Min:98 °F (36.7 °C), Max:98.3 °F (36.8 °C)    Recent Labs     21  0722 21  0808 21  1221   POCGLU 153* 67 96 124*       I/O (24Hr):     Intake/Output Summary (Last 24 hours) at 2021 1312  Last data filed at 2021 0531  Gross per 24 hour   Intake 130 ml   Output --   Net 130 ml       Labs:  Hematology:  Recent Labs     21  0650 21  0927 21  0620 21  0602   WBC 5.7  --  5.8 5.6   RBC 3.51*  --  3.50* 3.26*   HGB 10.3*  --  10.4* 9.5*   HCT 32.2*  --  33.3* 31.6*   MCV 91.7  --  95.1 96.9   MCH 29.3  --  29.7 29.1   MCHC 32.0  --  31.2 30.1 RDW 12.8  --  12.8 13.0     --  166 164   MPV 11.3  --  11.8 11.0   INR  --  1.1  --   --      Chemistry:  Recent Labs     09/01/21 1710 09/01/21  1710 09/01/21 2006 09/02/21  0650 09/03/21  0620 09/04/21  0602   *   < >  --  133* 143 140   K 5.1   < >  --  4.3 4.1 3.8      < >  --  102 107 107   CO2 21   < >  --  22 20 22   GLUCOSE 308*   < >  --  164* 110* 67*   BUN 22   < >  --  20 18 20   CREATININE 1.44*   < >  --  1.04* 1.34* 1.45*   MG  --   --   --  2.3  --  2.1   ANIONGAP 10   < >  --  9 16 11   LABGLOM 35*   < >  --  52* 38* 35*   GFRAA 43*   < >  --  >60 47* 43*   CALCIUM 9.4   < >  --  9.5 9.3 9.2   PHOS  --   --   --   --   --  3.9   PROBNP 795*  --   --   --   --   --    TROPHS 42*  --  42*  --   --   --     < > = values in this interval not displayed. Recent Labs     09/01/21 1710 09/02/21 0027 09/02/21 0650 09/02/21 0721 09/03/21  1150 09/03/21 1709 09/03/21 2049 09/04/21  0602 09/04/21  0722 09/04/21  0808 09/04/21  1221   PROT 7.3  --  6.9  --   --   --   --   --   --   --   --    LABALBU 4.4  --  4.0  --   --   --   --  3.7  --   --   --    LABA1C  --   --  10.4*  --   --   --   --   --   --   --   --    AST 12  --  11  --   --   --   --   --   --   --   --    ALT 8  --  6  --   --   --   --   --   --   --   --    ALKPHOS 85  --  76  --   --   --   --   --   --   --   --    BILITOT 0.32  --  0.46  --   --   --   --   --   --   --   --    POCGLU  --    < >  --    < > 182* 161* 153*  --  67 96 124*    < > = values in this interval not displayed.      ABG:No results found for: POCPH, PHART, PH, POCPCO2, ULB0ORJ, PCO2, POCPO2, PO2ART, PO2, POCHCO3, CQE1FZX, HCO3, NBEA, PBEA, BEART, BE, THGBART, THB, GRO8FUU, KZHG6HIJ, O4DJBZLV, O2SAT, FIO2  Lab Results   Component Value Date/Time    SPECIAL NOT REPORTED 09/03/2020 01:36 PM     Lab Results   Component Value Date/Time    CULTURE NO GROWTH 6 DAYS 07/13/2020 02:00 AM    CULTURE NO GROWTH 6 DAYS 07/13/2020 02:00 AM Radiology:  XR CHEST PORTABLE    Result Date: 9/1/2021  Cardiomegaly. Interstitial prominence favoring Cathi venous congestion. Trace bilateral effusions. US RETROPERITONEAL COMPLETE    Result Date: 9/2/2021  1. Patient had no urge to void during the exam. 2. Bilateral renal cysts with the largest at the mid-inferior pole left kidney measuring 4.6 cm. 3. No hydronephrosis.        Physical Examination:        General appearance:  alert, cooperative and no distress  Mental Status:  oriented to person, place and time and normal affect  Lungs:  clear to auscultation bilaterally, normal effort  Heart:  regular rate and rhythm, no murmur  Abdomen:  soft, nontender, nondistended, normal bowel sounds, no masses, hepatomegaly, splenomegaly  Extremities:  no edema, redness, tenderness in the calves  Skin:  no gross lesions, rashes, induration    Assessment:        Hospital Problems         Last Modified POA    * (Principal) Viral gastroenteritis 9/2/2021 Yes    MARIELLE (acute kidney injury) ([facility] [address] 9/2/2021 Yes    Diarrhea 9/2/2021 Yes    Chronic diastolic (congestive) heart failure ([facility] [address] 9/2/2021 Yes    Cervical myelopathy ([facility] [address] 9/2/2021 Yes    Type 2 diabetes mellitus with diabetic polyneuropathy, with long-term current use of insulin (HCC) (Chronic) 9/2/2021 Yes    Essential hypertension 9/2/2021 Yes    CAD (coronary artery disease) 9/2/2021 Yes    Overview Signed 3/27/2017  4:39 PM by Conrado Silva MD     S/P 4 + 2 stents               Plan:        Lantus decreased to 50 units twice daily, maintain until no hypoglycemia  Discharge today, hold lisinopril until seen by primary care physician     Ricke Bosworth, DO  9/4/2021  1:12 PM

## 2021-09-07 ENCOUNTER — CARE COORDINATION (OUTPATIENT)
Dept: CASE MANAGEMENT | Age: 76
End: 2021-09-07

## 2021-09-07 NOTE — CARE COORDINATION
Luis A [facility] Transitions Initial Follow Up Call    Call within 2 business days of discharge: Yes    Patient: Krysten Portillo Patient : 1945   MRN: [id number]  Reason for Admission: MARIELLE/dehydration/ diarreha  Discharge Date: 21 RARS: Readmission Risk Score: 25      Last Discharge [facility]       Complaint Diagnosis Description Type Department Provider    21 Shortness of Breath MARIELLE (acute kidney injury) ([facility] [address] . .. ED to Hosp-Admission (Discharged) (ADMITTED) STV[facility]ITTANY 4C ONC Shelbi Manning DO; [place] Smoker. ..           1st attempt to reach patient for Care Transitions. No answer and no voice mail. Called son and message left with contact information. Daughter then called and she stated son was with pt and to recall. No answer. Facility: [facility]    Non-face-to-face services provided:  Obtained and reviewed discharge summary and/or continuity of care documents    Care Transitions 24 Hour Call    Care Transitions Interventions         Follow Up  No future appointments.     Davina Price RN

## 2021-09-08 ENCOUNTER — CARE COORDINATION (OUTPATIENT)
Dept: CASE MANAGEMENT | Age: 76
End: 2021-09-08

## 2021-09-08 NOTE — CARE COORDINATION
Luis A [facility] Transitions Initial Follow Up Call    Call within 2 business days of discharge: Yes    Patient: Laron Moran Patient : 1945   MRN: [id number]  Reason for Admission: Dehydration/Diarrhea/MARIELLE  Discharge Date: 21 RARS: Readmission Risk Score: 25      Last Discharge [facility]       Complaint Diagnosis Description Type Department Provider    21 Shortness of Breath MARIELLE (acute kidney injury) ([facility] [address] . .. ED to Hosp-Admission (Discharged) (ADMITTED) JULIUS 4C ONC Pavan Salazar DO; Debra Wasserman. .. Final attempt to reach patient for care transitions. LM requesting return call. Contact information provided. Episode resolved at this time. Facility: [facility]    Care Transitions 24 Hour Call    Care Transitions Interventions         Follow Up  No future appointments.     Donna Mejia RN

## 2021-09-11 ENCOUNTER — APPOINTMENT (OUTPATIENT)
Dept: GENERAL RADIOLOGY | Age: 76
DRG: 683 | End: 2021-09-11
Payer: MEDICARE

## 2021-09-11 ENCOUNTER — APPOINTMENT (OUTPATIENT)
Dept: CT IMAGING | Age: 76
DRG: 683 | End: 2021-09-11
Payer: MEDICARE

## 2021-09-11 ENCOUNTER — HOSPITAL ENCOUNTER (INPATIENT)
Age: 76
LOS: 5 days | Discharge: HOME HEALTH | DRG: 683 | End: 2021-09-16
Attending: EMERGENCY MEDICINE | Admitting: INTERNAL MEDICINE
Payer: MEDICARE

## 2021-09-11 DIAGNOSIS — M62.82 NON-TRAUMATIC RHABDOMYOLYSIS: ICD-10-CM

## 2021-09-11 DIAGNOSIS — N17.9 AKI (ACUTE KIDNEY INJURY) (HCC): Primary | ICD-10-CM
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
## 2021-09-11 LAB
ABSOLUTE EOS #: 0.13 K/UL (ref 0–0.44)
ABSOLUTE IMMATURE GRANULOCYTE: <0.03 K/UL (ref 0–0.3)
ABSOLUTE LYMPH #: 1.41 K/UL (ref 1.1–3.7)
ABSOLUTE MONO #: 0.86 K/UL (ref 0.1–1.2)
ANION GAP SERPL CALCULATED.3IONS-SCNC: 11 MMOL/L (ref 9–17)
BASOPHILS # BLD: 0 % (ref 0–2)
BASOPHILS ABSOLUTE: <0.03 K/UL (ref 0–0.2)
BUN BLDV-MCNC: 30 MG/DL (ref 8–23)
BUN/CREAT BLD: ABNORMAL (ref 9–20)
CALCIUM SERPL-MCNC: 9.3 MG/DL (ref 8.6–10.4)
CHLORIDE BLD-SCNC: 101 MMOL/L (ref 98–107)
CO2: 23 MMOL/L (ref 20–31)
CREAT SERPL-MCNC: 1.77 MG/DL (ref 0.5–0.9)
DIFFERENTIAL TYPE: ABNORMAL
EOSINOPHILS RELATIVE PERCENT: 2 % (ref 1–4)
GFR AFRICAN AMERICAN: 34 ML/MIN
GFR NON-AFRICAN AMERICAN: 28 ML/MIN
GFR SERPL CREATININE-BSD FRML MDRD: ABNORMAL ML/MIN/{1.73_M2}
GFR SERPL CREATININE-BSD FRML MDRD: ABNORMAL ML/MIN/{1.73_M2}
GLUCOSE BLD-MCNC: 289 MG/DL (ref 70–99)
GLUCOSE BLD-MCNC: 296 MG/DL (ref 65–105)
HCT VFR BLD CALC: 30.9 % (ref 36.3–47.1)
HEMOGLOBIN: 9.9 G/DL (ref 11.9–15.1)
IMMATURE GRANULOCYTES: 0 %
LYMPHOCYTES # BLD: 17 % (ref 24–43)
MCH RBC QN AUTO: 29.2 PG (ref 25.2–33.5)
MCHC RBC AUTO-ENTMCNC: 32 G/DL (ref 28.4–34.8)
MCV RBC AUTO: 91.2 FL (ref 82.6–102.9)
MONOCYTES # BLD: 10 % (ref 3–12)
MYOGLOBIN: 256 NG/ML (ref 25–58)
NRBC AUTOMATED: 0 PER 100 WBC
PDW BLD-RTO: 13 % (ref 11.8–14.4)
PLATELET # BLD: 158 K/UL (ref 138–453)
PLATELET ESTIMATE: ABNORMAL
PMV BLD AUTO: 11.2 FL (ref 8.1–13.5)
POTASSIUM SERPL-SCNC: 4.5 MMOL/L (ref 3.7–5.3)
RBC # BLD: 3.39 M/UL (ref 3.95–5.11)
RBC # BLD: ABNORMAL 10*6/UL
SEG NEUTROPHILS: 71 % (ref 36–65)
SEGMENTED NEUTROPHILS ABSOLUTE COUNT: 6.07 K/UL (ref 1.5–8.1)
SODIUM BLD-SCNC: 135 MMOL/L (ref 135–144)
TOTAL CK: 563 U/L (ref 26–192)
TROPONIN INTERP: ABNORMAL
TROPONIN T: ABNORMAL NG/ML
TROPONIN, HIGH SENSITIVITY: 47 NG/L (ref 0–14)
WBC # BLD: 8.5 K/UL (ref 3.5–11.3)
WBC # BLD: ABNORMAL 10*3/UL

## 2021-09-11 PROCEDURE — 80048 BASIC METABOLIC PNL TOTAL CA: CPT

## 2021-09-11 PROCEDURE — 70450 CT HEAD/BRAIN W/O DYE: CPT

## 2021-09-11 PROCEDURE — 96374 THER/PROPH/DIAG INJ IV PUSH: CPT

## 2021-09-11 PROCEDURE — 73502 X-RAY EXAM HIP UNI 2-3 VIEWS: CPT

## 2021-09-11 PROCEDURE — 6360000002 HC RX W HCPCS: Performed by: STUDENT IN AN ORGANIZED HEALTH CARE EDUCATION/TRAINING PROGRAM

## 2021-09-11 PROCEDURE — 84484 ASSAY OF TROPONIN QUANT: CPT

## 2021-09-11 PROCEDURE — 85025 COMPLETE CBC W/AUTO DIFF WBC: CPT

## 2021-09-11 PROCEDURE — 82550 ASSAY OF CK (CPK): CPT

## 2021-09-11 PROCEDURE — 82947 ASSAY GLUCOSE BLOOD QUANT: CPT

## 2021-09-11 PROCEDURE — 2500000003 HC RX 250 WO HCPCS: Performed by: INTERNAL MEDICINE

## 2021-09-11 PROCEDURE — 73700 CT LOWER EXTREMITY W/O DYE: CPT

## 2021-09-11 PROCEDURE — 71045 X-RAY EXAM CHEST 1 VIEW: CPT

## 2021-09-11 PROCEDURE — 83874 ASSAY OF MYOGLOBIN: CPT

## 2021-09-11 PROCEDURE — 73562 X-RAY EXAM OF KNEE 3: CPT

## 2021-09-11 PROCEDURE — 73030 X-RAY EXAM OF SHOULDER: CPT

## 2021-09-11 PROCEDURE — 6370000000 HC RX 637 (ALT 250 FOR IP): Performed by: INTERNAL MEDICINE

## 2021-09-11 PROCEDURE — 99222 1ST HOSP IP/OBS MODERATE 55: CPT | Performed by: INTERNAL MEDICINE

## 2021-09-11 PROCEDURE — 2580000003 HC RX 258: Performed by: INTERNAL MEDICINE

## 2021-09-11 PROCEDURE — 73552 X-RAY EXAM OF FEMUR 2/>: CPT

## 2021-09-11 PROCEDURE — 2580000003 HC RX 258: Performed by: STUDENT IN AN ORGANIZED HEALTH CARE EDUCATION/TRAINING PROGRAM

## 2021-09-11 PROCEDURE — 99284 EMERGENCY DEPT VISIT MOD MDM: CPT

## 2021-09-11 PROCEDURE — 72125 CT NECK SPINE W/O DYE: CPT

## 2021-09-11 PROCEDURE — 93005 ELECTROCARDIOGRAM TRACING: CPT | Performed by: STUDENT IN AN ORGANIZED HEALTH CARE EDUCATION/TRAINING PROGRAM

## 2021-09-11 PROCEDURE — 73630 X-RAY EXAM OF FOOT: CPT

## 2021-09-11 PROCEDURE — 1200000000 HC SEMI PRIVATE

## 2021-09-11 PROCEDURE — 99232 SBSQ HOSP IP/OBS MODERATE 35: CPT | Performed by: ORTHOPAEDIC SURGERY

## 2021-09-11 RX ORDER — FLUTICASONE PROPIONATE 50 MCG
2 SPRAY, SUSPENSION (ML) NASAL DAILY
Status: DISCONTINUED | OUTPATIENT
Start: 2021-09-12 | End: 2021-09-16 | Stop reason: HOSPADM

## 2021-09-11 RX ORDER — POTASSIUM CHLORIDE 20 MEQ/1
40 TABLET, EXTENDED RELEASE ORAL PRN
Status: DISCONTINUED | OUTPATIENT
Start: 2021-09-11 | End: 2021-09-16 | Stop reason: HOSPADM

## 2021-09-11 RX ORDER — POTASSIUM CHLORIDE 7.45 MG/ML
10 INJECTION INTRAVENOUS PRN
Status: DISCONTINUED | OUTPATIENT
Start: 2021-09-11 | End: 2021-09-16 | Stop reason: HOSPADM

## 2021-09-11 RX ORDER — ALBUTEROL SULFATE 90 UG/1
2 AEROSOL, METERED RESPIRATORY (INHALATION) EVERY 6 HOURS PRN
Status: DISCONTINUED | OUTPATIENT
Start: 2021-09-11 | End: 2021-09-16 | Stop reason: HOSPADM

## 2021-09-11 RX ORDER — ONDANSETRON 4 MG/1
4 TABLET, ORALLY DISINTEGRATING ORAL EVERY 8 HOURS PRN
Status: DISCONTINUED | OUTPATIENT
Start: 2021-09-11 | End: 2021-09-16 | Stop reason: HOSPADM

## 2021-09-11 RX ORDER — SODIUM CHLORIDE 0.9 % (FLUSH) 0.9 %
5-40 SYRINGE (ML) INJECTION EVERY 12 HOURS SCHEDULED
Status: DISCONTINUED | OUTPATIENT
Start: 2021-09-11 | End: 2021-09-16 | Stop reason: HOSPADM

## 2021-09-11 RX ORDER — MAGNESIUM SULFATE 1 G/100ML
1000 INJECTION INTRAVENOUS PRN
Status: DISCONTINUED | OUTPATIENT
Start: 2021-09-11 | End: 2021-09-16 | Stop reason: HOSPADM

## 2021-09-11 RX ORDER — ACETAMINOPHEN 325 MG/1
650 TABLET ORAL EVERY 6 HOURS PRN
Status: DISCONTINUED | OUTPATIENT
Start: 2021-09-11 | End: 2021-09-16 | Stop reason: HOSPADM

## 2021-09-11 RX ORDER — OXYCODONE HYDROCHLORIDE AND ACETAMINOPHEN 5; 325 MG/1; MG/1
1 TABLET ORAL EVERY 6 HOURS PRN
Status: DISCONTINUED | OUTPATIENT
Start: 2021-09-11 | End: 2021-09-16 | Stop reason: HOSPADM

## 2021-09-11 RX ORDER — ACETAMINOPHEN 650 MG/1
650 SUPPOSITORY RECTAL EVERY 6 HOURS PRN
Status: DISCONTINUED | OUTPATIENT
Start: 2021-09-11 | End: 2021-09-16 | Stop reason: HOSPADM

## 2021-09-11 RX ORDER — ESCITALOPRAM OXALATE 10 MG/1
20 TABLET ORAL DAILY
Status: DISCONTINUED | OUTPATIENT
Start: 2021-09-11 | End: 2021-09-16 | Stop reason: HOSPADM

## 2021-09-11 RX ORDER — CETIRIZINE HYDROCHLORIDE 10 MG/1
10 TABLET ORAL DAILY
Status: DISCONTINUED | OUTPATIENT
Start: 2021-09-12 | End: 2021-09-16 | Stop reason: HOSPADM

## 2021-09-11 RX ORDER — PANTOPRAZOLE SODIUM 40 MG/1
40 TABLET, DELAYED RELEASE ORAL
Status: DISCONTINUED | OUTPATIENT
Start: 2021-09-12 | End: 2021-09-16 | Stop reason: HOSPADM

## 2021-09-11 RX ORDER — GABAPENTIN 600 MG/1
600 TABLET ORAL 3 TIMES DAILY
Status: DISCONTINUED | OUTPATIENT
Start: 2021-09-11 | End: 2021-09-16

## 2021-09-11 RX ORDER — SODIUM CHLORIDE 9 MG/ML
25 INJECTION, SOLUTION INTRAVENOUS PRN
Status: DISCONTINUED | OUTPATIENT
Start: 2021-09-11 | End: 2021-09-16 | Stop reason: HOSPADM

## 2021-09-11 RX ORDER — VITAMIN E 268 MG
400 CAPSULE ORAL DAILY
Status: DISCONTINUED | OUTPATIENT
Start: 2021-09-12 | End: 2021-09-16 | Stop reason: HOSPADM

## 2021-09-11 RX ORDER — POLYETHYLENE GLYCOL 3350 17 G/17G
17 POWDER, FOR SOLUTION ORAL DAILY PRN
Status: DISCONTINUED | OUTPATIENT
Start: 2021-09-11 | End: 2021-09-16 | Stop reason: HOSPADM

## 2021-09-11 RX ORDER — HYDROXYZINE HYDROCHLORIDE 25 MG/1
TABLET, FILM COATED ORAL
Status: CANCELLED | OUTPATIENT
Start: 2021-09-11

## 2021-09-11 RX ORDER — CLONAZEPAM 0.5 MG/1
0.5 TABLET ORAL 2 TIMES DAILY PRN
Status: DISCONTINUED | OUTPATIENT
Start: 2021-09-11 | End: 2021-09-16 | Stop reason: HOSPADM

## 2021-09-11 RX ORDER — ROSUVASTATIN CALCIUM 20 MG/1
40 TABLET, COATED ORAL DAILY
Status: DISCONTINUED | OUTPATIENT
Start: 2021-09-11 | End: 2021-09-16 | Stop reason: HOSPADM

## 2021-09-11 RX ORDER — ISOSORBIDE MONONITRATE 30 MG/1
30 TABLET, EXTENDED RELEASE ORAL DAILY
Status: DISCONTINUED | OUTPATIENT
Start: 2021-09-12 | End: 2021-09-16 | Stop reason: HOSPADM

## 2021-09-11 RX ORDER — DOCUSATE SODIUM 100 MG/1
100 CAPSULE, LIQUID FILLED ORAL DAILY PRN
Status: DISCONTINUED | OUTPATIENT
Start: 2021-09-11 | End: 2021-09-16 | Stop reason: HOSPADM

## 2021-09-11 RX ORDER — ONDANSETRON 2 MG/ML
4 INJECTION INTRAMUSCULAR; INTRAVENOUS EVERY 6 HOURS PRN
Status: DISCONTINUED | OUTPATIENT
Start: 2021-09-11 | End: 2021-09-16 | Stop reason: HOSPADM

## 2021-09-11 RX ORDER — INSULIN GLARGINE 100 [IU]/ML
50 INJECTION, SOLUTION SUBCUTANEOUS 2 TIMES DAILY
Status: DISCONTINUED | OUTPATIENT
Start: 2021-09-11 | End: 2021-09-16 | Stop reason: HOSPADM

## 2021-09-11 RX ORDER — SODIUM CHLORIDE 0.9 % (FLUSH) 0.9 %
10 SYRINGE (ML) INJECTION PRN
Status: DISCONTINUED | OUTPATIENT
Start: 2021-09-11 | End: 2021-09-16 | Stop reason: HOSPADM

## 2021-09-11 RX ORDER — FENTANYL CITRATE 50 UG/ML
25 INJECTION, SOLUTION INTRAMUSCULAR; INTRAVENOUS ONCE
Status: COMPLETED | OUTPATIENT
Start: 2021-09-11 | End: 2021-09-11

## 2021-09-11 RX ORDER — CARVEDILOL 12.5 MG/1
25 TABLET ORAL 2 TIMES DAILY WITH MEALS
Status: DISCONTINUED | OUTPATIENT
Start: 2021-09-11 | End: 2021-09-13

## 2021-09-11 RX ORDER — NITROGLYCERIN 0.4 MG/1
0.4 TABLET SUBLINGUAL EVERY 5 MIN PRN
Status: DISCONTINUED | OUTPATIENT
Start: 2021-09-11 | End: 2021-09-16 | Stop reason: HOSPADM

## 2021-09-11 RX ORDER — SODIUM CHLORIDE 9 MG/ML
INJECTION, SOLUTION INTRAVENOUS CONTINUOUS
Status: DISCONTINUED | OUTPATIENT
Start: 2021-09-11 | End: 2021-09-13

## 2021-09-11 RX ORDER — VITAMIN B COMPLEX
1000 TABLET ORAL DAILY
Status: DISCONTINUED | OUTPATIENT
Start: 2021-09-12 | End: 2021-09-16 | Stop reason: HOSPADM

## 2021-09-11 RX ORDER — 0.9 % SODIUM CHLORIDE 0.9 %
500 INTRAVENOUS SOLUTION INTRAVENOUS ONCE
Status: COMPLETED | OUTPATIENT
Start: 2021-09-11 | End: 2021-09-11

## 2021-09-11 RX ADMIN — MICONAZOLE NITRATE: 20 POWDER TOPICAL at 21:54

## 2021-09-11 RX ADMIN — ROSUVASTATIN CALCIUM 40 MG: 20 TABLET, FILM COATED ORAL at 18:52

## 2021-09-11 RX ADMIN — INSULIN GLARGINE 50 UNITS: 100 INJECTION, SOLUTION SUBCUTANEOUS at 21:55

## 2021-09-11 RX ADMIN — SODIUM CHLORIDE: 9 INJECTION, SOLUTION INTRAVENOUS at 21:53

## 2021-09-11 RX ADMIN — ESCITALOPRAM OXALATE 20 MG: 10 TABLET ORAL at 18:51

## 2021-09-11 RX ADMIN — FENTANYL CITRATE 25 MCG: 50 INJECTION, SOLUTION INTRAMUSCULAR; INTRAVENOUS at 14:37

## 2021-09-11 RX ADMIN — CARVEDILOL 25 MG: 12.5 TABLET, FILM COATED ORAL at 18:51

## 2021-09-11 RX ADMIN — SODIUM CHLORIDE 500 ML: 9 INJECTION, SOLUTION INTRAVENOUS at 14:38

## 2021-09-11 RX ADMIN — TICAGRELOR 90 MG: 90 TABLET ORAL at 21:54

## 2021-09-11 RX ADMIN — GABAPENTIN 600 MG: 600 TABLET ORAL at 21:54

## 2021-09-11 RX ADMIN — OXYCODONE HYDROCHLORIDE AND ACETAMINOPHEN 1 TABLET: 5; 325 TABLET ORAL at 18:51

## 2021-09-11 ASSESSMENT — ENCOUNTER SYMPTOMS
SHORTNESS OF BREATH: 0
ABDOMINAL PAIN: 0
NAUSEA: 0
EYE PAIN: 0
DIARRHEA: 0
CHEST TIGHTNESS: 0
VOMITING: 0
CONSTIPATION: 0
COUGH: 0
RHINORRHEA: 0
SORE THROAT: 0

## 2021-09-11 ASSESSMENT — PAIN SCALES - GENERAL
PAINLEVEL_OUTOF10: 7
PAINLEVEL_OUTOF10: 8

## 2021-09-11 NOTE — ED NOTES
Pt updated on plan of care. Awaiting ct results and ortho to see. Family updated as well.       Blaise Davies, RN  09/11/21 8139

## 2021-09-11 NOTE — ED NOTES
Pt asking for pain medication. Pt rates pain 9/10. Resident notified, awaiting orders.       Sandeep Brar RN  09/11/21 1400

## 2021-09-11 NOTE — CONSULTS
CARDIAC CATHETERIZATION      2012    CORONARY ANGIOPLASTY WITH STENT PLACEMENT  02/25/2017    4 SYNERGY HEART STENTS DRUG ELUTING ALL MRI CONDITONAL 3T OK, SAFE IMMEDIATELY.  CORONARY ANGIOPLASTY WITH STENT PLACEMENT  07/11/2012    XIENCE HEART STENT/ MRI CONDITIONAL 3T OK, SAFE IMMEDIATELY    CORONARY ANGIOPLASTY WITH STENT PLACEMENT  12/11/2020    PTCA       Medications Prior to Admission:   Prior to Admission medications    Medication Sig Start Date End Date Taking? Authorizing Provider   insulin detemir (LEVEMIR FLEXTOUCH) 100 UNIT/ML injection pen Inject 50 Units into the skin 2 times daily 9/4/21   Ayla Crowder DO   nystatin (MYCOSTATIN) 697587 UNIT/GM powder Apply 3 times daily. 8/27/21   Severa Barge, MD   spironolactone (ALDACTONE) 25 MG tablet Take 1 tablet by mouth daily 8/20/21   Severa Barge, MD   Continuous Blood Gluc Sensor (FREESTYLE SYLVIA 14 DAY SENSOR) MISC 1 each by Does not apply route every 14 days 7/6/21   Severa Barge, MD   omeprazole (PRILOSEC) 20 MG delayed release capsule Take 1 capsule by mouth Daily 5/28/21   Severa Barge, MD   Control Gel Formula Dressing (DUODERM CGF DRESSING) MISC Apply 2 each topically daily 5/21/21   Severa Barge, MD   Misc.  Devices (STEP N REST WALKER) MISC 1 each by Does not apply route continuous Seated, wheeled walker 5/20/21   Severa Barge, MD   gabapentin (NEURONTIN) 600 MG tablet TAKE ONE TABLET BY MOUTH THREE TIMES A DAY 5/18/21 9/2/21  Severa Barge, MD   fluticasone [facility]) 50 MCG/ACT nasal spray 2 sprays by Each Nostril route daily 5/11/21   Severa Barge, MD   Zinc Oxide 15 % CREA Apply to buttocks up to 4 times daily - may use any concentratino 5/6/21   Severa Barge, MD   carvedilol (COREG) 25 MG tablet Take 1 tablet by mouth 2 times daily (with meals) Hold for sbp<120 or hr <50, give 1 hour from other bp meds 5/5/21   Severa Barge, MD   isosorbide mononitrate (IMDUR) 30 MG extended release tablet Take 1 tablet by mouth daily 4/27/21   Rosalba Hall MD   oxyCODONE-acetaminophen (PERCOCET) 5-325 MG per tablet  3/25/21   Historical Provider, MD   ticagrelor (BRILINTA) 90 MG TABS tablet Take 1 tablet by mouth 2 times daily 4/5/21   Rosalba Hall MD   vitamin D3 (CHOLECALCIFEROL) 25 MCG (1000 UT) TABS tablet Take 1 tablet by mouth daily 3/17/21   Raymondo Galeazzi, DPM   nitroGLYCERIN (NITROSTAT) 0.4 MG SL tablet place 1 tablet under the tongue if needed every 5 minutes if needed for CHEST PAIN 2/19/21   Rosalba Hall MD   clonazePAM Giovanni Fitting) 0.5 MG tablet take 1 tablet by mouth twice a day if needed for anxiety 2/4/21 9/2/21  Rosalba Hall MD   [facility]. Devices [facility]) MISC Diagnosis: right 5th metatarsal fracture, pain in limb    Duration: 3 months 2/3/21   Raymondo Galeazzi, DPM   docusate sodium (COLACE) 100 MG capsule Take 1 capsule by mouth daily as needed for Constipation 2/3/21   Raymondo Galeazzi, DPM   Misc. Devices (COMMODE BEDSIDE) MISC 1 Device by Does not apply route daily 2/3/21   Raymondo Galeazzi, DPM   Continuous Blood Gluc  (FREESTYLE RICHARD 14 DAY READER) JAQUELINE 1 each by Does not apply route continuous [facility] 10/30/20   Rosalba Hall MD   rivaroxaban (XARELTO) 20 MG TABS tablet Take 1 tablet by mouth daily (with breakfast) 10/21/20   Rosalba Hall MD   glucose monitoring kit (FREESTYLE) monitoring kit 1 kit by Does not apply route 4 times daily Freestyle Richard . Dx E11.65, has tremors and cannot use conventional meter without great difficulty. Please provide supplies for 3 months, rf 3,Pharmacy Counter [facility]. 10/6/20   MD Merritt Noble.  Devices (STEP N REST WALKER) MISC 1 each by Does not apply route continuous Seated, wheeled walker 10/6/20   Rosalba Hall MD   escitalopram (LEXAPRO) 20 MG tablet Take 1 tablet by mouth daily 8/11/20   Rosalba Hall MD   hydrOXYzine (ATARAX) 25 MG tablet take 1 tablet by mouth every 8 hours rectally for itching 8/5/20   Edgar Lobato MD   pirocin [facility]) 2 % ointment apply topically to affected area three times a day 4/21/20   Historical Provider, MD   insulin aspart (NOVOLOG FLEXPEN) 100 UNIT/ML injection pen Use TID before meals according to scale - max 45 units a day 5/11/20   Edgar Lobato MD   loratadine (CLARITIN) 10 MG tablet Take 1 tablet every day by oral route. 5/11/20   Edgar Lobato MD   rosuvastatin (CRESTOR) 40 MG tablet Take 1 tablet by mouth daily 5/1/20   Edgar Lobato MD   albuterol sulfate  (90 Base) MCG/ACT inhaler Inhale 2 puffs into the lungs every 6 hours as needed for Wheezing 3/15/18   Shy Uriostegui MD   vitamin E 400 UNIT capsule Take 400 Units by mouth daily. Historical Provider, MD     Allergies:    Penicillins and Sulfa antibiotics  Social History:   Social History     Socioeconomic History    Marital status:      Spouse name: Not on file    Number of children: Not on file    Years of education: Not on file    Highest education level: Not on file   Occupational History    Not on file   Tobacco Use    Smoking status: Never Smoker    Smokeless tobacco: Never Used   Substance and Sexual Activity    Alcohol use: No    Drug use: No    Sexual activity: Never   Other Topics Concern    Not on file   Social History Narrative    Not on file     Social Determinants of Health     Financial Resource Strain:     Difficulty of Paying Living Expenses:    Food Insecurity:     Worried About Running Out of Food in the Last Year:     [address] in the Last Year:    Transportation Needs:     Lack of Transportation (Medical):      Lack of Transportation (Non-Medical):    Physical Activity:     Days of Exercise per Week:     Minutes of Exercise per Session:    Stress:     Feeling of Stress :    Social Connections:     Frequency of Communication with Friends and Family:     Frequency of Social Gatherings with Friends and Family:     Attends Judaism Services:     Active Member of Clubs or Organizations:     Attends Club or Organization Meetings:     Marital Status:    Intimate Partner Violence:     Fear of Current or Ex-Partner:     Emotionally Abused:     Physically Abused:     Sexually Abused:      Family History:  Family History   Problem Relation Age of Onset    Cancer Mother     Cancer Father      ROS:   Constitutional: Negative for fever and chills. Respiratory: Negative for cough. Cardiovascular: Negative for chest pain. Musculoskeletal: Positive for right shoulder, right hip, left great toe pain. Skin: Negative for itching and rash. Neurological: Positive for numbness, tingling. PE:  Blood pressure (!) 165/75, pulse 65, temperature 96.9 °F (36.1 °C), temperature source Oral, resp. rate 16, weight 225 lb (102.1 kg), SpO2 100 %. Gen: Alert and oriented, NAD, cooperative. Head: Normocephalic, atraumatic. Cardiovascular: Normal rate. Respiratory: Chest symmetric, no accessory muscle use. Pelvis: Stable to anterior and lateral compression. RUE: Skin intact. No ecchymoses, deformity, or lacerations. Small abrasion over the posterior elbow. Tender to palpation over the anterior shoulder. No crepitus. Compartments soft and easily compressible. Limited ROM at shoulder with pain. Full ROM at elbow without pain. Ulnar/median/AIN/PIN/radial motor intact. Axillary/MCN/median/ulnar/radial nerves SILT. Radial pulse 2+ with BCR.    LUE: Skin intact. No ecchymoses, deformity, or lacerations. Small abrasion over the posterior elbow. Non tender to palpation. No crepitus. Compartments soft and easily compressible. ROM at shoulder without pain. Full ROM at elbow without pain. Ulnar/median/AIN/PIN/radial motor intact. Axillary/MCN/median/ulnar/radial nerves SILT. Radial pulse 2+ with BCR. RLE: Skin intact. No ecchymoses, abrasions, deformity, or lacerations.  Tender to palpation over the greater trochanter and right knee. No crepitus. Compartments soft and easily compressible. EHL/FHL/TA/GS complex motor intact. Sural/saphenous/SPN/DPN/plantar nerve distribution SILT but dysthetic. This is her baseline with neuropathy. Knee appears to be stable to varus and valgus stress. Patient has no groin pain with log roll maneuver. DP and PT pulses 1+ with BCR. LLE: Skin intact. No ecchymoses, abrasions, deformity, or lacerations. Tender to palpation over the great toe. No crepitus. Compartments soft and easily compressible. EHL/FHL/TA/GS complex motor intact. Sural/saphenous/SPN/DPN/plantar nerve distribution SILT, but dysthetic. This is her baseline with neuropathy. Patient has no groin pain with log roll maneuver. Knee appears stable to varus and valgus stress test at 0 and 30 degrees. DP and PT pulses 1+ with BCR. Labs:  Recent Labs     09/11/21  1033   WBC 8.5   HGB 9.9*   HCT 30.9*         K 4.5   BUN 30*   CREATININE 1.77*   GLUCOSE 289*        Imaging:   Ap/lateral films and CT of the right hip are negative for acute osseus abnormalities. No fracture, subluxations, or dislocations noted.  Mild arthritic changes    Assessment/Plan: 68 y.o. female who fall from standing height, being seen for:    -Right hip contusion    -No acute orthopaedic surgical intervention indicated  -X-rays and hip CT-scan are negative for fracture  -WB status: WBAT RLE  -Diet: Ok for general diet from orthopedic perspective  -DVT ppx: Per primary discretion  -Pain control per primary discretion  -PT/OT: Recommend to evaluate and treat for mobilization, gait training  -Kristin Delgadillo for discharge from orthopedic perspective  -Follow up with Dr. Tito Matthew in office in 7-10 days  -Please contact ortho with any questions    Trent Klein DO  Resident Physician, PGY-1   Orthopaedic Surgery  2:31 PM 9/11/2021    This note is created with the assistance of a speech recognition program. While intending to generate a document that actually reflects the content of the visit, the document can still have some errors including those of syntax and sound a like substitutions which may escape proof reading. In such instances, actual meaning can be extrapolated by contextual diversion. PGY-2 Addendum    Patient seen and examined. Agree with Dr. Mireya Mc history, assessment and plan except where changes were made above (may be highlighted by Epic's \"added by\" selection feature). Patient has had recurrent falls lately. She does have a history of vertigo but prior to Thursday, had never fallen. She has no significant orthopaedic findings on examination, save some right lower/upper extremity tenderness, but all radiographic images are negative for pertinent findings. Recommend patient be appropriately worked up for new onset falls and possible PT/OT. Otherwise, no intervention recommended from the orthopaedic perspective. OK to weight bear as tolerated; follow up in clinic with Dr. Alena Wong as needed. Ashley Hernandez MD, PGY-2  Orthopedic Surgery Resident  [facility], [facility], [facility]    Attending Physician Statement  I have discussed the case, including pertinent history and exam findings with the resident. I have seen and examined the patient on 9,11,2021 and the key elements of the encounter have been performed by me. I agree with the assessment, plan and orders as documented by the resident.

## 2021-09-11 NOTE — ED NOTES
Pt to ed with son from home. Pt states she lives at home alone, has a home RN that comes. Pt states last night around 2000 her right knee buckled and she fell to the floor. Pt states her phone was next to her on the table but she didn't want to bother anyone for help. Pt states she laid on the floor all night, attempting to get herself up, pt states this am after not being able to get up on her own she called her home RN who contacted her family to come and assist her. Pt states she had felt dizzy yesterday but has been eating, drinking normally, denies chest pain, sob. Pt states she hit her head, denies LOC. Pt arrives alert, oriented, speaking in full, complete sentences. Pt c/o thoracic back pain, worse on the right that radiates into the right breast, head pain, left elbow pain, right his pain, bilateral knee pain. Pt is able to ambulate.       Pj Lilly RN  09/11/21 [id number]

## 2021-09-11 NOTE — H&P
[facility]  Office: [address], DO, Espinoza Lorene, DO, Teo Child, DO, Kay Duque, DO, Bharathi De Los Santos MD, Fernando Salazar MD, Manuel Flores MD, Coco Saucedo MD, Harmony Sanders MD, Tank Cam MD, Patrick Cummings MD, Tavares Shankar, DO, Lia Knight, DO, Ubaldo Busch MD,  Sharad Watkins DO, Maciej Cantrell MD, Sania Jin MD, Blanca Natarajan MD, Jessie Spencer MD, , Karen Mayes MD, Rosita Dawson MD, Nusrat Smith MD, Mallika Handley, [facility], [facility], CNP, Yousif Lopes, CNP, Tammy Keith, [facility], Heriberto Cerda, CNP, Meg Soriano, CNP, Burton Agudelo, CNP, Aurelio Israel, CNP, Shannen Perez, CNP, Kylie Cortes PA-C, Paty Lyman, [facility], Noreen Velásquez, CNP, Amanda Bear, CNP, Shiva Ivy, CNP, Fausto Ye, CNP, Maximo Curran, CNP, Nishi Sarkar, CNP, Micha Blankenship, CNP, Syl Cherry, CNP         [facility]   [address] [facility]    HISTORY AND PHYSICAL EXAMINATION            Date:   9/11/2021  Patient name:  Daphne Blas  Date of admission:  9/11/2021  9:59 AM  MRN:   [id number]  Account:  [de-identified]  YOB: 1945  PCP:    Kika Cisneros MD  Room:   28/28  Code Status:    Prior    Chief Complaint:     Chief Complaint   Patient presents with    Fall     right side leg weakness, hit head, no LOC       History Obtained From:     patient, electronic medical record    History of Present Illness:     Daphne Blas is a 68 y.o. AA female who presents with Fall (right side leg weakness, hit head, no LOC)   and is admitted to the hospital for the management of Fall at home, initial encounter. This 68 yof presents with multiple falls. She was just admitted with gastroenteritis and rosa, went home and now returns after multiple falls. After one fall, EMS called but she refused to come to ER. After a fall last night, she laid on the floor for 11 hours. Her right side is sore, as is her buttocks.     Past THREE TIMES A DAY 5/18/21 9/2/21  Lynnette Honeycutt MD   fluticasone [facility]) 50 MCG/ACT nasal spray 2 sprays by Each Nostril route daily 5/11/21   Lynnette Honeycutt MD   Zinc Oxide 15 % CREA Apply to buttocks up to 4 times daily - may use any concentratino 5/6/21   Lynnette Honeycutt MD   carvedilol (COREG) 25 MG tablet Take 1 tablet by mouth 2 times daily (with meals) Hold for sbp<120 or hr <50, give 1 hour from other bp meds 5/5/21   Lynnette Honeycutt MD   isosorbide mononitrate (IMDUR) 30 MG extended release tablet Take 1 tablet by mouth daily 4/27/21   Lynnette Honeycutt MD   oxyCODONE-acetaminophen (PERCOCET) 5-325 MG per tablet  3/25/21   Attila Vicente MD   ticagrelor (BRILINTA) 90 MG TABS tablet Take 1 tablet by mouth 2 times daily 4/5/21   Lynnette Honeycutt MD   vitamin D3 (CHOLECALCIFEROL) 25 MCG (1000 UT) TABS tablet Take 1 tablet by mouth daily 3/17/21   Molina Walters DPM   nitroGLYCERIN (NITROSTAT) 0.4 MG SL tablet place 1 tablet under the tongue if needed every 5 minutes if needed for CHEST PAIN 2/19/21   Lynnette Honeycutt MD   clonazePAM Christain Bakari) 0.5 MG tablet take 1 tablet by mouth twice a day if needed for anxiety 2/4/21 9/2/21  Lynnette Honeycutt MD   [facility] Devices [facility]) MISC Diagnosis: right 5th metatarsal fracture, pain in limb    Duration: 3 months 2/3/21   Molina Walters DPM   docusate sodium (COLACE) 100 MG capsule Take 1 capsule by mouth daily as needed for Constipation 2/3/21   Molina Walters DPM   Misc.  Devices (COMMODE BEDSIDE) MISC 1 Device by Does not apply route daily 2/3/21   Molina Walters DPM   Continuous Blood Gluc  (FREESTYLE SYLVIA 14 DAY READER) JAQUELINE 1 each by Does not apply route continuous [facility] [address] 10/30/20   Lynnette Honeycutt MD   rivaroxaban (XARELTO) 20 MG TABS tablet Take 1 tablet by mouth daily (with breakfast) 10/21/20   Lynnette Honeycutt MD   glucose monitoring kit (FREESTYLE) monitoring kit 1 kit by Does not apply route 4 times daily Freestyle Richard . Dx E11.65, has tremors and cannot use conventional meter without great difficulty. Please provide supplies for 3 months, rf 3,Pharmacy Counter [facility]. 10/6/20   Kelsi Caruso MD   Misc. Devices (STEP N REST WALKER) MISC 1 each by Does not apply route continuous Seated, wheeled walker 10/6/20   Kelsi Caruso MD   escitalopram (LEXAPRO) 20 MG tablet Take 1 tablet by mouth daily 8/11/20   Kelsi Caruso MD   hydrOXYzine (ATARAX) 25 MG tablet take 1 tablet by mouth every 8 hours rectally for itching 8/5/20   Kelsi Caruso MD   mupirocin [facility]) 2 % ointment apply topically to affected area three times a day 4/21/20   Historical Provider, MD   insulin aspart (NOVOLOG FLEXPEN) 100 UNIT/ML injection pen Use TID before meals according to scale - max 45 units a day 5/11/20   Kelsi Caruso MD   loratadine (CLARITIN) 10 MG tablet Take 1 tablet every day by oral route. 5/11/20   Kelsi Caruso MD   rosuvastatin (CRESTOR) 40 MG tablet Take 1 tablet by mouth daily 5/1/20   Kelsi Caruso MD   albuterol sulfate  (90 Base) MCG/ACT inhaler Inhale 2 puffs into the lungs every 6 hours as needed for Wheezing 3/15/18   Rosario Ocampo MD   vitamin E 400 UNIT capsule Take 400 Units by mouth daily. Historical Provider, MD        Allergies:     Penicillins and Sulfa antibiotics    Social History:     Tobacco:    reports that she has never smoked. She has never used smokeless tobacco.  Alcohol:      reports no history of alcohol use. Drug Use:  reports no history of drug use. Family History:     Family History   Problem Relation Age of Onset    Cancer Mother     Cancer Father        Review of Systems:     Positive and Negative as described in HPI.     CONSTITUTIONAL:  negative for fevers, chills, sweats, weight loss  HEENT:  negative for vision, hearing changes, runny nose, throat pain  RESPIRATORY:  negative for shortness of cranial nerves II through XII grossly intact  Skin: No gross lesions, rashes, bruising or bleeding on exposed skin area  Extremities: peripheral pulses palpable, no calf pain with palpation; 2+ ble edema  Psych: normal affect    Investigations:      Laboratory Testing:  Recent Results (from the past 24 hour(s))   CBC Auto Differential    Collection Time: 09/11/21 10:33 AM   Result Value Ref Range    WBC 8.5 3.5 - 11.3 k/uL    RBC 3.39 (L) 3.95 - 5.11 m/uL    Hemoglobin 9.9 (L) 11.9 - 15.1 g/dL    Hematocrit 30.9 (L) 36.3 - 47.1 %    MCV 91.2 82.6 - 102.9 fL    MCH 29.2 25.2 - 33.5 pg    MCHC 32.0 28.4 - 34.8 g/dL    RDW 13.0 11.8 - 14.4 %    Platelets 247 662 - 523 k/uL    MPV 11.2 8.1 - 13.5 fL    NRBC Automated 0.0 0.0 per 100 WBC    Differential Type NOT REPORTED     WBC Morphology NOT REPORTED     RBC Morphology NOT REPORTED     Platelet Estimate NOT REPORTED     Seg Neutrophils 71 (H) 36 - 65 %    Lymphocytes 17 (L) 24 - 43 %    Monocytes 10 3 - 12 %    Eosinophils % 2 1 - 4 %    Basophils 0 0 - 2 %    Immature Granulocytes 0 0 %    Segs Absolute 6.07 1.50 - 8.10 k/uL    Absolute Lymph # 1.41 1.10 - 3.70 k/uL    Absolute Mono # 0.86 0.10 - 1.20 k/uL    Absolute Eos # 0.13 0.00 - 0.44 k/uL    Basophils Absolute <0.03 0.00 - 0.20 k/uL    Absolute Immature Granulocyte <0.03 0.00 - 0.30 k/uL   Basic Metabolic Panel w/ Reflex to MG    Collection Time: 09/11/21 10:33 AM   Result Value Ref Range    Glucose 289 (H) 70 - 99 mg/dL    BUN 30 (H) 8 - 23 mg/dL    CREATININE 1.77 (H) 0.50 - 0.90 mg/dL    Bun/Cre Ratio NOT REPORTED 9 - 20    Calcium 9.3 8.6 - 10.4 mg/dL    Sodium 135 135 - 144 mmol/L    Potassium 4.5 3.7 - 5.3 mmol/L    Chloride 101 98 - 107 mmol/L    CO2 23 20 - 31 mmol/L    Anion Gap 11 9 - 17 mmol/L    GFR Non-African American 28 (L) >60 mL/min    GFR  34 (L) >60 mL/min    GFR Comment          GFR Staging NOT REPORTED    Troponin    Collection Time: 09/11/21 10:33 AM   Result Value Ref Bony pelvis intact. RECOMMENDATION: CT scan of the right hip suggested for confirmation of suspected hip fracture. XR FOOT LEFT (MIN 3 VIEWS)    Result Date: 9/11/2021  No acute bony abnormalities are noted FINDINGS: The visualized bones are osteopenic. There is no evidence of fracture or dislocation. Degenerative changes of the right hip and right knee. The soft tissues are unremarkable. IMPRESSION: No acute bony abnormalities are noted     CT Head WO Contrast    Result Date: 9/11/2021  No acute intracranial abnormality. CT Cervical Spine WO Contrast    Result Date: 9/11/2021  No acute abnormality of the cervical spine. XR CHEST PORTABLE    Result Date: 9/11/2021  Mild right basilar atelectasis. No pleural effusion, pneumothorax, or displaced rib fracture appreciated. CT HIP RIGHT WO CONTRAST    Result Date: 9/11/2021  1. Mild right hip osteoarthrosis. 2. Enthesopathy as detailed above. 3. Moderate sigmoid diverticulosis. 4. Slight wall thickening and stranding of the wall of the urinary bladder which can be seen with cystitis. Correlate with urinalysis. 5. Probable partially visualized inferior pole left renal cyst.  Confirmation with nonemergent renal ultrasound recommended. 6. No acute intratrochanteric fracture evident by CT. If pain persists, further evaluation with MRI should be performed to assess for marrow edema. XR HIP 2-3 VW W PELVIS RIGHT    Result Date: 9/11/2021  Suggestion of nondisplaced 2 part intertrochanteric fracture of the right hip. Moderate to severe osteoarthritis involving the medial compartment and patellofemoral joints of both knees. Moderate AC joint arthropathy. No acute abnormality in the right shoulder or either knee. Bony pelvis intact. RECOMMENDATION: CT scan of the right hip suggested for confirmation of suspected hip fracture.        Assessment :      Hospital Problems         Last Modified POA    * (Principal) Fall at home, initial encounter 9/11/2021 Yes    MARIELLE (acute kidney injury) ([facility] [address] 9/11/2021 Yes    Chronic diastolic (congestive) heart failure ([facility] [address] 9/11/2021 Yes    Type 2 diabetes mellitus with diabetic polyneuropathy, with long-term current use of insulin ([facility] [address] (Chronic) 9/11/2021 Yes    Essential hypertension 9/11/2021 Yes    CAD (coronary artery disease) 9/11/2021 Yes    Overview Signed 3/27/2017  4:39 PM by Raoul Jackson MD     S/P 4 + 2 stents         Generalized weakness 9/11/2021 Yes      possible mild rhabdo    Plan:     Patient status inpatient in the Med/Surge    Ortho consult  ivf  Treat htn  Repeat labs in am  Pt/ot  Monitor/control glucose    Consultations:   IP CONSULT TO ORTHOPEDIC SURGERY  IP CONSULT TO INTERNAL MEDICINE  IP CONSULT TO HOSPITALIST     Patient is admitted as inpatient status because of co-morbidities listed above, severity of signs and symptoms as outlined, requirement for current medical therapies and most importantly because of direct risk to patient if care not provided in a hospital setting. Expected length of stay > 48 hours.     Sammy Cord Blood, DO  9/11/2021  3:53 PM    Copy sent to Dr. Severa Barge, MD

## 2021-09-11 NOTE — ED PROVIDER NOTES
[facility]  ED  Emergency Department Encounter  Emergency Medicine Resident     Pt Name: Addison Herron  MRN: [id number]  Keithgfyamila 1945  Date of evaluation: 9/11/21  PCP:  Jayne Murray MD    CHIEF COMPLAINT       Chief Complaint   Patient presents with    Fall     right side leg weakness, hit head, no LOC       HISTORY OFPRESENT ILLNESS  (Location/Symptom, Timing/Onset, Context/Setting, Quality, Duration, Modifying [place].)      Addison Herron is a 68 y.o. female with past medical history significant for diabetes mellitus, vertigo, hypertension, peripheral vascular disease, hyperlipidemia, CAD who presents with multiple falls over the past 3 days. Son is at bedside helping provide history. Patient does live live alone at home and she has had multiple falls over the past 3 days. Patient was discharged on 9/5/2021 after a stay in the hospital for dehydration and acute kidney injury. Patient states that she walks with a walker and has had mechanical falls of unknown etiology. She states that she has had some lightheadedness and dizziness. On the falls she has hit her head although she denies any loss of consciousness. She has had 2 falls which landed on her right side and one which landed on her left knee. Her right side is what is hurting her currently including her right shoulder, right ribs, right hip, right knee. Also having left knee pain. Patient denies any radiation of pain. Denies any chest pain, shortness of breath. Does have right-sided abdominal pain. No nausea or vomiting. Has not had any bloody emesis or blood in her stool. Has been eating and drinking appropriately per patient. Denies any changes in vision. Does have a tremor in the right arm which has been present for some time although son does believe that this is contributing to her falls.   Yesterday evening patient states she fell around 10 PM and she \"did not want to bother anybody\" so she laid on the ground all evening into the morning until she called her home health nurse who called her son who picked up patient. Patient laid on the ground for about 12 hours. No other acute complaints at this time. PAST MEDICAL / SURGICAL / SOCIAL / FAMILY HISTORY      has a past medical history of Anxiety, Atrial fibrillation (HCC), Benign positional vertigo, CAD (coronary artery disease), Chest pain, Depression, Diabetes mellitus ([address] Diabetic neuropathy ([facility] [address] Hyperlipidemia, Hypertension, and Migraine. has a past surgical history that includes Percutaneous Transluminal Coronary Angio; Cardiac catheterization; Coronary angioplasty with stent (02/25/2017); Appendectomy; Coronary angioplasty with stent (07/11/2012); and Coronary angioplasty with stent (12/11/2020). Social:  reports that she has never smoked. She has never used smokeless tobacco. She reports that she does not drink alcohol and does not use drugs. Family Hx:   Family History   Problem Relation Age of Onset   Nathalia Her Cancer Mother     Cancer Father         Allergies:  Penicillins and Sulfa antibiotics    Home Medications:  Prior to Admission medications    Medication Sig Start Date End Date Taking? Authorizing Provider   insulin detemir (LEVEMIR FLEXTOUCH) 100 UNIT/ML injection pen Inject 50 Units into the skin 2 times daily 9/4/21   Vinay Cedeno DO   nystatin (MYCOSTATIN) 660104 UNIT/GM powder Apply 3 times daily.  8/27/21   Oly Mednoza MD   spironolactone (ALDACTONE) 25 MG tablet Take 1 tablet by mouth daily 8/20/21   Oly Mendoza MD   Continuous Blood Gluc Sensor (FREESTYLE SYLVIA 14 DAY SENSOR) MISC 1 each by Does not apply route every 14 days 7/6/21   Oly Mendoza MD   omeprazole (PRILOSEC) 20 MG delayed release capsule Take 1 capsule by mouth Daily 5/28/21   Oly Mendoza MD   Control Gel Formula Dressing (DUODERM CGF DRESSING) MISC Apply 2 each topically daily 5/21/21   Oly Mendoza MD Misc. Devices (STEP N REST WALKER) MISC 1 each by Does not apply route continuous Seated, wheeled walker 5/20/21   Jordana Wen MD   gabapentin (NEURONTIN) 600 MG tablet TAKE ONE TABLET BY MOUTH THREE TIMES A DAY 5/18/21 9/2/21  Jordana Wen MD   fluticasone [facility]) 50 MCG/ACT nasal spray 2 sprays by Each Nostril route daily 5/11/21   Jordana Wen MD   Zinc Oxide 15 % CREA Apply to buttocks up to 4 times daily - may use any concentratino 5/6/21   Jordana Wen MD   carvedilol (COREG) 25 MG tablet Take 1 tablet by mouth 2 times daily (with meals) Hold for sbp<120 or hr <50, give 1 hour from other bp meds 5/5/21   Jordana Wen MD   isosorbide mononitrate (IMDUR) 30 MG extended release tablet Take 1 tablet by mouth daily 4/27/21   Jordana Wen MD   oxyCODONE-acetaminophen (PERCOCET) 5-325 MG per tablet  3/25/21   Historical Provider, MD   ticagrelor (BRILINTA) 90 MG TABS tablet Take 1 tablet by mouth 2 times daily 4/5/21   Jordana Wen MD   vitamin D3 (CHOLECALCIFEROL) 25 MCG (1000 UT) TABS tablet Take 1 tablet by mouth daily 3/17/21   Delmi Robb DPM   nitroGLYCERIN (NITROSTAT) 0.4 MG SL tablet place 1 tablet under the tongue if needed every 5 minutes if needed for CHEST PAIN 2/19/21   Jordana Wen MD   clonazePAM Denia Coup) 0.5 MG tablet take 1 tablet by mouth twice a day if needed for anxiety 2/4/21 9/2/21  Jordana Wen MD   Misc. Devices [facility]) MISC Diagnosis: right 5th metatarsal fracture, pain in limb    Duration: 3 months 2/3/21   Delmi Robb DPMIRI   docusate sodium (COLACE) 100 MG capsule Take 1 capsule by mouth daily as needed for Constipation 2/3/21   Delmi Robb DPMIRI   Misc.  Devices (COMMODE BEDSIDE) MISC 1 Device by Does not apply route daily 2/3/21   Delmi Robb, DPM   Continuous Blood Gluc  (FREESTYLE SYLVIA 14 DAY READER) JAQUELINE 1 each by Does not apply route continuous [facility] 10/30/20   Sarah Orozco Lizzie Lester MD   rivaroxaban (XARELTO) 20 MG TABS tablet Take 1 tablet by mouth daily (with breakfast) 10/21/20   Aayush Dee MD   glucose monitoring kit (FREESTYLE) monitoring kit 1 kit by Does not apply route 4 times daily Freestyle Richard . Dx E11.65, has tremors and cannot use conventional meter without great difficulty. Please provide supplies for 3 months, rf 3,Pharm[facility]. 10/6/20   Aayush Dee MD   [facility] Devices (STEP N REST WALKER) MISC 1 each by Does not apply route continuous Seated, wheeled walker 10/6/20   Aayush Dee MD   escitalopram (LEXAPRO) 20 MG tablet Take 1 tablet by mouth daily 8/11/20   Aayush Dee MD   hydrOXYzine (ATARAX) 25 MG tablet take 1 tablet by mouth every 8 hours rectally for itching 8/5/20   Aayush Dee MD   mupirocin [facility]) 2 % ointment apply topically to affected area three times a day 4/21/20   Historical Provider, MD   insulin aspart (NOVOLOG FLEXPEN) 100 UNIT/ML injection pen Use TID before meals according to scale - max 45 units a day 5/11/20   Aayush Dee MD   loratadine (CLARITIN) 10 MG tablet Take 1 tablet every day by oral route. 5/11/20   Aayush Dee MD   rosuvastatin (CRESTOR) 40 MG tablet Take 1 tablet by mouth daily 5/1/20   Aayush Dee MD   albuterol sulfate  (90 Base) MCG/ACT inhaler Inhale 2 puffs into the lungs every 6 hours as needed for Wheezing 3/15/18   Judson Singh MD   vitamin E 400 UNIT capsule Take 400 Units by mouth daily. Historical Provider, MD       REVIEW OFSYSTEMS    (2-9 systems for level 4, 10 or more for level 5)      Review of Systems   Constitutional: Positive for fatigue. Negative for activity change, chills and fever. HENT: Negative for congestion, rhinorrhea and sore throat. Eyes: Negative for pain and visual disturbance. Respiratory: Negative for cough, chest tightness and shortness of breath.     Cardiovascular: Positive for leg swelling (baseline). Negative for chest pain and palpitations. Gastrointestinal: Negative for abdominal pain, constipation, diarrhea, nausea and vomiting. Genitourinary: Negative for difficulty urinating and frequency. Musculoskeletal: Positive for gait problem (unsteady). Negative for arthralgias and myalgias. Right Sided pain after multiple falls over the past 3 days. Skin: Negative for rash and wound. Neurological: Positive for dizziness, tremors (right upper extremity ), weakness and headaches. PHYSICAL EXAM   (up to 7 for level 4, 8 or more forlevel 5)      INITIAL VITALS:   Vitals:    09/11/21 1702   BP: (!) 198/84   Pulse: 67   Resp: 17   Temp:    SpO2:         Physical Exam  Vitals and nursing note reviewed. Constitutional:       General: She is not in acute distress. Appearance: Normal appearance. She is obese. She is not ill-appearing, toxic-appearing or diaphoretic. Comments: Pleasent, Conversational, elderly female   HENT:      Head: Normocephalic and atraumatic. Nose: Nose normal. No congestion or rhinorrhea. Mouth/Throat:      Mouth: Mucous membranes are moist.      Pharynx: Oropharynx is clear. No oropharyngeal exudate or posterior oropharyngeal erythema. Eyes:      General:         Right eye: No discharge. Left eye: No discharge. Extraocular Movements: Extraocular movements intact. Pupils: Pupils are equal, round, and reactive to light. Neck:      Comments: No C-spine, T-spine, L-spine midline tenderness. Does have tenderness at the paraspinal of the right upper T-spine. Cardiovascular:      Rate and Rhythm: Normal rate and regular rhythm. Pulses: Normal pulses. Comments: Pulses equal and palpable bilateral radial arteries. Pulmonary:      Effort: Pulmonary effort is normal. No respiratory distress. Breath sounds: Normal breath sounds. No wheezing, rhonchi or rales. Abdominal:      General: There is no distension. Palpations: Abdomen is soft. Tenderness: There is no abdominal tenderness. There is no guarding or rebound. Comments: Subjective tenderness in right lower quadrant although no significant tenderness to palpation upon my examination. Soft, nondistended nonperitoneal abdomen. Musculoskeletal:      Cervical back: Normal range of motion. No rigidity. Right lower leg: Edema present. Left lower leg: Edema present. Comments: Nonpitting edema noted in bilateral lower extremities. To palpation of right shoulder, right ribs, right knee, right hip, left knee   Skin:     General: Skin is warm and dry. Capillary Refill: Capillary refill takes less than 2 seconds. Neurological:      General: No focal deficit present. Mental Status: She is alert and oriented to person, place, and time. Comments: Cranial nerves 2-12 intact and equal bilaterally. Patient able to smile symmetrically, puff cheeks out equally, track appropriately, eyes are equal and reactive, sensation intact in forehead cheeks and chin, able to squeeze eyes shut, able to stick out tongue and wiggle it, tongue and uvula midline.  strength intact and equal bilateral upper extremities. Dorsflexion and plantarflexion intact and equal bilateral lower extremities. Notable for a resting tremor and intention tremor of the right upper extremity. Psychiatric:         Mood and Affect: Mood normal.         Thought Content: Thought content normal.         DIFFERENTIAL  DIAGNOSIS       Initial MDM/Plan: 68 y.o. female who presents with multiple falls over the past 3 days. Upon my initial examination patient is a pleasant, conversational, elderly female in no acute distress, no respiratory distress, speaking full sentences, GCS 15, alert, oriented, answering all questions appropriately, moving all extremities equally. Patient was brought in via wheelchair by her son.     Differential to include intracranial abnormality versus dehydration versus acute kidney injury versus rhabdomyolysis from patient being on the ground for 12 hours versus musculoskeletal injury versus hypoglycemia versus hyperglycemia versus electrolight abnormality. We will plan for CBC to evaluate hemoglobin as well as white blood cell count, BMP to evaluate electrolytes and renal function, chest x-ray due to right-sided rib pain. Right shoulder x-ray, right hip x-ray, right knee x-ray, left knee x-ray. CK myoglobin due to patient's downtime. We will plan to establish IV access. EKG, troponin to evaluate cardiac causes of falls. Number showing MARIELLE as well as elevated CK and myoglobin. Will admit patient for MARIELLE with concerns for increasing CK and myoglobin and concerns for rhabdomyolysis. IV fluids ordered. Please see ED course for pertinent findings during work-up. patient admitted to intermed team for further work-up and management of dizziness as well as multiple falls. MARIELLE. Concerns for increasing CK and myoglobin. Observation. Patient admitted in stable condition. DIAGNOSTIC RESULTS / EMERGENCYDEPARTMENT COURSE / MDM     LABS:  Labs Reviewed   CBC WITH AUTO DIFFERENTIAL - Abnormal; Notable for the following components:       Result Value    RBC 3.39 (*)     Hemoglobin 9.9 (*)     Hematocrit 30.9 (*)     Seg Neutrophils 71 (*)     Lymphocytes 17 (*)     All other components within normal limits   BASIC METABOLIC PANEL W/ REFLEX TO MG FOR LOW K - Abnormal; Notable for the following components:    Glucose 289 (*)     BUN 30 (*)     CREATININE 1.77 (*)     GFR Non- 28 (*)     GFR  34 (*)     All other components within normal limits   TROPONIN - Abnormal; Notable for the following components:    Troponin, High Sensitivity 47 (*)     All other components within normal limits   CK - Abnormal; Notable for the following components:     Total  (*)     All other components within normal limits MYOGLOBIN, SERUM - Abnormal; Notable for the following components:    Myoglobin 256 (*)     All other components within normal limits         RADIOLOGY:  XR SHOULDER RIGHT (MIN 2 VIEWS)    Result Date: 9/11/2021  EXAMINATION: THREE XRAY VIEWS OF THE RIGHT SHOULDER; THREE XRAY VIEWS OF THE RIGHT KNEE; ONE XRAY VIEW OF THE PELVIS AND TWO XRAY VIEWS RIGHT HIP; THREE XRAY VIEWS OF THE LEFT KNEE 9/11/2021 10:34 am COMPARISON: None. HISTORY: ORDERING SYSTEM PROVIDED HISTORY: fall on right side, continued pain TECHNOLOGIST PROVIDED HISTORY: fall on right side, continued pain Reason for Exam: trauma; ORDERING SYSTEM PROVIDED HISTORY: fall on right side, continued pain TECHNOLOGIST PROVIDED HISTORY: fall on right side, continued pain; ORDERING SYSTEM PROVIDED HISTORY: hx of multiple falls, continued pain TECHNOLOGIST PROVIDED HISTORY: hx of multiple falls, continued pain Reason for Exam: trauma FINDINGS: Right shoulder: No acute fracture or dislocation. Moderate AC joint arthropathy. Left knee: Moderate narrowing of the medial compartment with moderate spurring along the medial joint line. Osteoporosis. Narrowing and spurring of the patellofemoral joint. No acute fracture or soft tissue swelling. Minimal fluid in the suprapatellar bursa. Right knee: Moderate to severe narrowing of the medial compartment with moderate spurring along the medial joint line. Moderate spurring along the patellofemoral joint. No acute fracture or joint effusion. Osteoporosis. Pelvis and right hip: Irregular lucent line questioned in the intertrochanteric region. Pubic rami and remainder of the bony pelvis intact. Left hip intact. Slight osteoarthritis of both hips. Soft tissues appear normal.     Suggestion of nondisplaced 2 part intertrochanteric fracture of the right hip. Moderate to severe osteoarthritis involving the medial compartment and patellofemoral joints of both knees. Moderate AC joint arthropathy.   No acute abnormality in the right shoulder or either knee. Bony pelvis intact. RECOMMENDATION: CT scan of the right hip suggested for confirmation of suspected hip fracture. XR FEMUR RIGHT (MIN 2 VIEWS)    Result Date: 9/11/2021  EXAMINATION: XRAY VIEWS OF THE RIGHT FEMUR; THREE XRAY VIEWS OF THE LEFT FOOT 9/11/2021 11:57 am COMPARISON: None. HISTORY: ORDERING SYSTEM PROVIDED HISTORY: Trauma/Fracture TECHNOLOGIST PROVIDED HISTORY: Ap/lateral. Thank you. Trauma/Fracture; ORDERING SYSTEM PROVIDED HISTORY: Trauma/Fracture TECHNOLOGIST PROVIDED HISTORY: Ap/lat/oblique. Thank you Trauma/Fracture LEFT FOOT FINDINGS: The visualized bones are osteopenic. There is no evidence of fracture or dislocation. Hammertoe deformities. Mild subtalar collapse. The remaining joint spaces appear well maintained. Soft tissue swelling. Calcaneal spurs     No acute bony abnormalities are noted FINDINGS: The visualized bones are osteopenic. There is no evidence of fracture or dislocation. Degenerative changes of the right hip and right knee. The soft tissues are unremarkable. IMPRESSION: No acute bony abnormalities are noted     XR KNEE LEFT (3 VIEWS)    Result Date: 9/11/2021  EXAMINATION: THREE XRAY VIEWS OF THE RIGHT SHOULDER; THREE XRAY VIEWS OF THE RIGHT KNEE; ONE XRAY VIEW OF THE PELVIS AND TWO XRAY VIEWS RIGHT HIP; THREE XRAY VIEWS OF THE LEFT KNEE 9/11/2021 10:34 am COMPARISON: None. HISTORY: ORDERING SYSTEM PROVIDED HISTORY: fall on right side, continued pain TECHNOLOGIST PROVIDED HISTORY: fall on right side, continued pain Reason for Exam: trauma; ORDERING SYSTEM PROVIDED HISTORY: fall on right side, continued pain TECHNOLOGIST PROVIDED HISTORY: fall on right side, continued pain; ORDERING SYSTEM PROVIDED HISTORY: hx of multiple falls, continued pain TECHNOLOGIST PROVIDED HISTORY: hx of multiple falls, continued pain Reason for Exam: trauma FINDINGS: Right shoulder: No acute fracture or dislocation. Moderate AC joint arthropathy.  Left knee: Moderate narrowing of the medial compartment with moderate spurring along the medial joint line. Osteoporosis. Narrowing and spurring of the patellofemoral joint. No acute fracture or soft tissue swelling. Minimal fluid in the suprapatellar bursa. Right knee: Moderate to severe narrowing of the medial compartment with moderate spurring along the medial joint line. Moderate spurring along the patellofemoral joint. No acute fracture or joint effusion. Osteoporosis. Pelvis and right hip: Irregular lucent line questioned in the intertrochanteric region. Pubic rami and remainder of the bony pelvis intact. Left hip intact. Slight osteoarthritis of both hips. Soft tissues appear normal.     Suggestion of nondisplaced 2 part intertrochanteric fracture of the right hip. Moderate to severe osteoarthritis involving the medial compartment and patellofemoral joints of both knees. Moderate AC joint arthropathy. No acute abnormality in the right shoulder or either knee. Bony pelvis intact. RECOMMENDATION: CT scan of the right hip suggested for confirmation of suspected hip fracture. XR FOOT LEFT (MIN 3 VIEWS)    Result Date: 9/11/2021  EXAMINATION: XRAY VIEWS OF THE RIGHT FEMUR; THREE XRAY VIEWS OF THE LEFT FOOT 9/11/2021 11:57 am COMPARISON: None. HISTORY: ORDERING SYSTEM PROVIDED HISTORY: Trauma/Fracture TECHNOLOGIST PROVIDED HISTORY: Ap/lateral. Thank you. Trauma/Fracture; ORDERING SYSTEM PROVIDED HISTORY: Trauma/Fracture TECHNOLOGIST PROVIDED HISTORY: Ap/lat/oblique. Thank you Trauma/Fracture LEFT FOOT FINDINGS: The visualized bones are osteopenic. There is no evidence of fracture or dislocation. Hammertoe deformities. Mild subtalar collapse. The remaining joint spaces appear well maintained. Soft tissue swelling. Calcaneal spurs     No acute bony abnormalities are noted FINDINGS: The visualized bones are osteopenic. There is no evidence of fracture or dislocation.  Degenerative changes of the right hip and right knee. The soft tissues are unremarkable. IMPRESSION: No acute bony abnormalities are noted     CT Head WO Contrast    Result Date: 9/11/2021  EXAMINATION: CT OF THE HEAD WITHOUT CONTRAST  9/11/2021 11:02 am TECHNIQUE: CT of the head was performed without the administration of intravenous contrast. Dose modulation, iterative reconstruction, and/or weight based adjustment of the mA/kV was utilized to reduce the radiation dose to as low as reasonably achievable. COMPARISON: 01/02/2020 HISTORY: ORDERING SYSTEM PROVIDED HISTORY: multiple falls over 2 days, 3 falls, continued head pain TECHNOLOGIST PROVIDED HISTORY: multiple falls over 2 days, 3 falls, continued head pain Decision Support Exception - unselect if not a suspected or confirmed emergency medical condition->Emergency Medical Condition (MA) Reason for Exam: fall FINDINGS: BRAIN/VENTRICLES: The ventricles and sulci are diffusely enlarged. Low attenuation is seen in the periventricular and subcortical white matter. No acute intracranial hemorrhage or acute infarct is identified. ORBITS: The visualized portion of the orbits demonstrate no acute abnormality. SINUSES: The visualized paranasal sinuses and mastoid air cells demonstrate no acute abnormality. SOFT TISSUES/SKULL:  No acute abnormality of the visualized skull or soft tissues. No acute intracranial abnormality. CT Cervical Spine WO Contrast    Result Date: 9/11/2021  EXAMINATION: CT OF THE CERVICAL SPINE WITHOUT CONTRAST 9/11/2021 11:02 am TECHNIQUE: CT of the cervical spine was performed without the administration of intravenous contrast. Multiplanar reformatted images are provided for review. Dose modulation, iterative reconstruction, and/or weight based adjustment of the mA/kV was utilized to reduce the radiation dose to as low as reasonably achievable.  COMPARISON: 09/09/2020 HISTORY: ORDERING SYSTEM PROVIDED HISTORY: multiple falls over 3 days TECHNOLOGIST PROVIDED HISTORY: multiple falls over 3 days Decision Support Exception - unselect if not a suspected or confirmed emergency medical condition->Emergency Medical Condition (MA) Reason for Exam: multiple falls over 2 days, 3 falls, continued head pain FINDINGS: BONES/ALIGNMENT: There is no acute fracture or traumatic malalignment. DEGENERATIVE CHANGES: Multilevel degenerative changes. SOFT TISSUES: There is no prevertebral soft tissue swelling. No acute abnormality of the cervical spine. XR CHEST PORTABLE    Result Date: 9/11/2021  EXAMINATION: ONE XRAY VIEW OF THE CHEST 9/11/2021 10:34 am COMPARISON: 09/01/2021 HISTORY: ORDERING SYSTEM PROVIDED HISTORY: fall on right side, continued pain TECHNOLOGIST PROVIDED HISTORY: fall on right side, continued pain FINDINGS: Heart size is normal.  Lung volumes somewhat low. Small amount of increased opacity medially in the right lung base favored to be due to atelectasis. No pneumothorax or pleural effusion. No displaced rib fracture appreciated. Coronary stent appears present. Mild right basilar atelectasis. No pleural effusion, pneumothorax, or displaced rib fracture appreciated. CT HIP RIGHT WO CONTRAST    Result Date: 9/11/2021  EXAMINATION: CT OF THE RIGHT HIP WITHOUT CONTRAST 9/11/2021 12:42 pm TECHNIQUE: CT of the right hip was performed without the administration of intravenous contrast.  Multiplanar reformatted images are provided for review. Dose modulation, iterative reconstruction, and/or weight based adjustment of the mA/kV was utilized to reduce the radiation dose to as low as reasonably achievable.  COMPARISON: Pelvis radiographs from 09/11/2021, 1039 hours HISTORY ORDERING SYSTEM PROVIDED HISTORY: Suggestion of nondisplaced 2 part intertrochanteric fracture of the right, CT to confirm TECHNOLOGIST PROVIDED HISTORY: Suggestion of nondisplaced 2 part intertrochanteric fracture of the right, CT to confirm 70-year-old female with suggestion of nondisplaced 2 part TECHNOLOGIST PROVIDED HISTORY: fall on right side, continued pain Reason for Exam: trauma; ORDERING SYSTEM PROVIDED HISTORY: fall on right side, continued pain TECHNOLOGIST PROVIDED HISTORY: fall on right side, continued pain; ORDERING SYSTEM PROVIDED HISTORY: hx of multiple falls, continued pain TECHNOLOGIST PROVIDED HISTORY: hx of multiple falls, continued pain Reason for Exam: trauma FINDINGS: Right shoulder: No acute fracture or dislocation. Moderate AC joint arthropathy. Left knee: Moderate narrowing of the medial compartment with moderate spurring along the medial joint line. Osteoporosis. Narrowing and spurring of the patellofemoral joint. No acute fracture or soft tissue swelling. Minimal fluid in the suprapatellar bursa. Right knee: Moderate to severe narrowing of the medial compartment with moderate spurring along the medial joint line. Moderate spurring along the patellofemoral joint. No acute fracture or joint effusion. Osteoporosis. Pelvis and right hip: Irregular lucent line questioned in the intertrochanteric region. Pubic rami and remainder of the bony pelvis intact. Left hip intact. Slight osteoarthritis of both hips. Soft tissues appear normal.     Suggestion of nondisplaced 2 part intertrochanteric fracture of the right hip. Moderate to severe osteoarthritis involving the medial compartment and patellofemoral joints of both knees. Moderate AC joint arthropathy. No acute abnormality in the right shoulder or either knee. Bony pelvis intact. RECOMMENDATION: CT scan of the right hip suggested for confirmation of suspected hip fracture. EKG  Rate normal, normal sinus rhythm, axis normal no deviation noted, intervals within normal limits including IN, QRS, QT/QTc no ST segment elevation, no ST segment depression. T wave flattening noted in lead I to III T wave inversions in lead V4, V5, V6. Non-specific EKG, wave flattening has been seen in previous EKGs.   Similar when compared to previous EKGs done on 11/6/2020 and 9/1/2021      All EKG's are interpreted by the Emergency Department Physicianwho either signs or Co-signs this chart in the absence of a cardiologist.    EMERGENCY DEPARTMENT COURSE:  ED Course as of Sep 11 1757   Sat Sep 11, 2021   1210 No acute abnormality of the cervical spine.      CT Cervical Spine WO Contrast [MA]   1210 IMPRESSION:  No acute intracranial abnormality. CT Head WO Contrast [MA]   1210 XR CHEST PORTABLE [MA]   1210 IMPRESSION:  Mild right basilar atelectasis. No pleural effusion, pneumothorax, or  displaced rib fracture appreciated. XR CHEST PORTABLE [MA]   1210 XR SHOULDER RIGHT (MIN 2 VIEWS) [MA]   1232 Did 500 mL bolus to run over 4 hours ordered. Due to patient's age. Creatinine(!): 1.77 [MA]   1335 IMPRESSION:  1. Mild right hip osteoarthrosis. 2. Enthesopathy as detailed above. 3. Moderate sigmoid diverticulosis. 4. Slight wall thickening and stranding of the wall of the urinary bladder  which can be seen with cystitis. Correlate with urinalysis. 5. Probable partially visualized inferior pole left renal cyst.  Confirmation  with nonemergent renal ultrasound recommended. 6. No acute intratrochanteric fracture evident by CT. If pain persists,  further evaluation with MRI should be performed to assess for marrow edema    [MA]   1421 Patient has continued pain, ordered 25 mcg of fentanyl     [MA]      ED Course User Index  [MA] Timbo Moon, DO          PROCEDURES:  None    CONSULTS:  IP CONSULT TO ORTHOPEDIC SURGERY  IP CONSULT TO INTERNAL MEDICINE  IP CONSULT TO HOSPITALIST      FINAL IMPRESSION      1. MARIELLE (acute kidney injury) ([address]    2. Non-traumatic rhabdomyolysis    3. Fall, initial encounter          DISPOSITION / PLAN     DISPOSITION        PATIENT REFERRED TO:  No follow-up provider specified.     DISCHARGE MEDICATIONS:  New Prescriptions    No medications on file       Timbo Solders, DO  Emergency Medicine Resident    (Please note that portions of this note were completed with a voice recognition program.Efforts were made to edit the dictations but occasionally words are mis-transcribed.)       Vania Maria DO  Resident  09/11/21 6741

## 2021-09-11 NOTE — ED PROVIDER NOTES
EKG    Attending Physician Additional  Notes    Patient is brought for evaluation after 3 falls last night. She was recently admitted for gastroenteritis, had mild MARIELLE, started on meclizine that she takes for vertigo. She feels lightheaded with standing. She fell injuring her head left elbow right shoulder right knee. She is unable to get back up again. She denies loss of consciousness. She does have a headache. She denies neck pain. She denies strokelike symptoms. She is on Brilinta and multiple other medications. She has history of multiple conditions including type II by diabetes, cervical myelopathy, CHF, CAD, PE, right-sided tremor thought not to be Parkinson's per your family's history. On exam GCS is 15, she appears uncomfortable but nontoxic, minimally hypertensive other vital signs are normal.  Neck is supple nontender. Head is tender but without bruising. There is mild tenderness over the dorsal spine diffusely more paraspinal on the right side. She has full range of motion of the joints but significant tenderness of the right shoulder and right knee to palpation. There is abrasion of the left elbow. Lungs are clear. Abdomen is benign. She has bilateral leg edema. Impression is fall, rule out fracture, rule out ICH, consider dehydration rule out rhabdomyolysis, consider UTI, anticipate admission. Haile Lazo.  Tara England MD, [facility]  Attending Emergency  Physician               Zoila Smith MD  09/11/21 1024       Zoila Smith MD  09/11/21 1024

## 2021-09-11 NOTE — ED PROVIDER NOTES
Faculty Sign-Out Attestation  Handoff taken on the following patient from prior Attending Physician: Leticia Ellison    I was available and discussed any additional care issues that arose and coordinated the management plans with the resident(s) caring for the patient during my duty period. Any areas of disagreement with residents documentation of care or procedures are noted on the chart. I was personally present for the key portions of any/all procedures during my duty period. I have documented in the chart those procedures where I was not present during the key portions. 70-year-old female, 3 fall since yesterday, on the floor for approximately 12 hours prior to arrival.  Dehydrated. Initial concern for hip fracture, not visualized on CT. No other traumatic injuries. Being admitted for further management.     Celestine Thompson MD  Attending Physician        Celestine Thompson MD  09/11/21 9988

## 2021-09-11 NOTE — ED NOTES
Pt linens changed, pure wick catheter re-adjusted. Pt asking for pain medication, will evaluate orders.       Patria Grimes RN  09/11/21 7732

## 2021-09-11 NOTE — ED NOTES
Onset: \"Middle of March\"  Location/description: ring worm on chin  Associated Symptoms: itchy  What improves/worsens symptoms: Lotrimin cream helping. Mom states that it is not getting any bigger and seems to be fading, but is not completely gone  Symptom specific medications: Lotrimin cream twice daily  Recent Care: 3/18/20-seen for ring worm and prescribed Lotrimin cream.     When trying to ask additional questions about the rash, mom kept stating \"it's just the ring worm. We just need a refill of the cream because we are out.\" Explained to mom that since he has been using the cream for over a month now, additional information was needed for Dr. Olea to make any recommendations. Will forward to Dr. Olea to see if he wants to refill the cream. Pharmacy confirmed.     Reason for Disposition  • [1] On treatment > 4 weeks AND [2] rash is not gone    Protocols used: [id number]-P-AH       Ortho at bedside to evaluate pt     Alexx Pratt, RN  09/11/21 9314

## 2021-09-11 NOTE — ED NOTES
Pt sleeping on cot, rr even and non labored, no distress noted. Awaiting admission orders and bed placement.       Rina Daniels RN  09/11/21 1640

## 2021-09-11 NOTE — ED NOTES
Ortho resident at bedside to evaluate pt. Ok to eat from an ortho standpoint. Will order meal tray.       Kristal La RN  09/11/21 9239

## 2021-09-12 LAB
ABSOLUTE EOS #: 0.2 K/UL (ref 0–0.44)
ABSOLUTE IMMATURE GRANULOCYTE: 0.03 K/UL (ref 0–0.3)
ABSOLUTE LYMPH #: 1.17 K/UL (ref 1.1–3.7)
ABSOLUTE MONO #: 0.69 K/UL (ref 0.1–1.2)
ALBUMIN SERPL-MCNC: 3.5 G/DL (ref 3.5–5.2)
ALBUMIN/GLOBULIN RATIO: 1.2 (ref 1–2.5)
ALP BLD-CCNC: 67 U/L (ref 35–104)
ALT SERPL-CCNC: 10 U/L (ref 5–33)
ANION GAP SERPL CALCULATED.3IONS-SCNC: 8 MMOL/L (ref 9–17)
AST SERPL-CCNC: 15 U/L
BASOPHILS # BLD: 1 % (ref 0–2)
BASOPHILS ABSOLUTE: 0.03 K/UL (ref 0–0.2)
BILIRUB SERPL-MCNC: 0.24 MG/DL (ref 0.3–1.2)
BUN BLDV-MCNC: 25 MG/DL (ref 8–23)
BUN/CREAT BLD: ABNORMAL (ref 9–20)
CALCIUM SERPL-MCNC: 8.8 MG/DL (ref 8.6–10.4)
CHLORIDE BLD-SCNC: 105 MMOL/L (ref 98–107)
CO2: 25 MMOL/L (ref 20–31)
CREAT SERPL-MCNC: 1.65 MG/DL (ref 0.5–0.9)
DIFFERENTIAL TYPE: ABNORMAL
EOSINOPHILS RELATIVE PERCENT: 3 % (ref 1–4)
GFR AFRICAN AMERICAN: 37 ML/MIN
GFR NON-AFRICAN AMERICAN: 30 ML/MIN
GFR SERPL CREATININE-BSD FRML MDRD: ABNORMAL ML/MIN/{1.73_M2}
GFR SERPL CREATININE-BSD FRML MDRD: ABNORMAL ML/MIN/{1.73_M2}
GLUCOSE BLD-MCNC: 176 MG/DL (ref 70–99)
GLUCOSE BLD-MCNC: 260 MG/DL (ref 65–105)
HCT VFR BLD CALC: 29.9 % (ref 36.3–47.1)
HEMOGLOBIN: 9.1 G/DL (ref 11.9–15.1)
IMMATURE GRANULOCYTES: 1 %
LYMPHOCYTES # BLD: 20 % (ref 24–43)
MCH RBC QN AUTO: 29.3 PG (ref 25.2–33.5)
MCHC RBC AUTO-ENTMCNC: 30.4 G/DL (ref 28.4–34.8)
MCV RBC AUTO: 96.1 FL (ref 82.6–102.9)
MONOCYTES # BLD: 12 % (ref 3–12)
NRBC AUTOMATED: 0 PER 100 WBC
PDW BLD-RTO: 13 % (ref 11.8–14.4)
PLATELET # BLD: 159 K/UL (ref 138–453)
PLATELET ESTIMATE: ABNORMAL
PMV BLD AUTO: 11 FL (ref 8.1–13.5)
POTASSIUM SERPL-SCNC: 4.1 MMOL/L (ref 3.7–5.3)
RBC # BLD: 3.11 M/UL (ref 3.95–5.11)
RBC # BLD: ABNORMAL 10*6/UL
SEG NEUTROPHILS: 63 % (ref 36–65)
SEGMENTED NEUTROPHILS ABSOLUTE COUNT: 3.71 K/UL (ref 1.5–8.1)
SODIUM BLD-SCNC: 138 MMOL/L (ref 135–144)
TOTAL CK: 359 U/L (ref 26–192)
TOTAL PROTEIN: 6.4 G/DL (ref 6.4–8.3)
TROPONIN INTERP: ABNORMAL
TROPONIN T: ABNORMAL NG/ML
TROPONIN, HIGH SENSITIVITY: 43 NG/L (ref 0–14)
WBC # BLD: 5.8 K/UL (ref 3.5–11.3)
WBC # BLD: ABNORMAL 10*3/UL

## 2021-09-12 PROCEDURE — 85025 COMPLETE CBC W/AUTO DIFF WBC: CPT

## 2021-09-12 PROCEDURE — 6370000000 HC RX 637 (ALT 250 FOR IP): Performed by: INTERNAL MEDICINE

## 2021-09-12 PROCEDURE — 2580000003 HC RX 258: Performed by: INTERNAL MEDICINE

## 2021-09-12 PROCEDURE — 6370000000 HC RX 637 (ALT 250 FOR IP): Performed by: NURSE PRACTITIONER

## 2021-09-12 PROCEDURE — 1200000000 HC SEMI PRIVATE

## 2021-09-12 PROCEDURE — 82550 ASSAY OF CK (CPK): CPT

## 2021-09-12 PROCEDURE — 82947 ASSAY GLUCOSE BLOOD QUANT: CPT

## 2021-09-12 PROCEDURE — 80053 COMPREHEN METABOLIC PANEL: CPT

## 2021-09-12 PROCEDURE — 36415 COLL VENOUS BLD VENIPUNCTURE: CPT

## 2021-09-12 PROCEDURE — 84484 ASSAY OF TROPONIN QUANT: CPT

## 2021-09-12 PROCEDURE — 2500000003 HC RX 250 WO HCPCS: Performed by: INTERNAL MEDICINE

## 2021-09-12 PROCEDURE — 99232 SBSQ HOSP IP/OBS MODERATE 35: CPT | Performed by: FAMILY MEDICINE

## 2021-09-12 RX ORDER — BENZONATATE 100 MG/1
200 CAPSULE ORAL 3 TIMES DAILY PRN
Status: DISCONTINUED | OUTPATIENT
Start: 2021-09-12 | End: 2021-09-16 | Stop reason: HOSPADM

## 2021-09-12 RX ADMIN — TICAGRELOR 90 MG: 90 TABLET ORAL at 17:24

## 2021-09-12 RX ADMIN — FLUTICASONE PROPIONATE 2 SPRAY: 50 SPRAY, METERED NASAL at 17:25

## 2021-09-12 RX ADMIN — RIVAROXABAN 20 MG: 20 TABLET, FILM COATED ORAL at 17:24

## 2021-09-12 RX ADMIN — ESCITALOPRAM OXALATE 20 MG: 10 TABLET ORAL at 17:24

## 2021-09-12 RX ADMIN — INSULIN GLARGINE 50 UNITS: 100 INJECTION, SOLUTION SUBCUTANEOUS at 10:37

## 2021-09-12 RX ADMIN — OXYCODONE HYDROCHLORIDE AND ACETAMINOPHEN 1 TABLET: 5; 325 TABLET ORAL at 06:41

## 2021-09-12 RX ADMIN — OXYCODONE HYDROCHLORIDE AND ACETAMINOPHEN 1 TABLET: 5; 325 TABLET ORAL at 20:26

## 2021-09-12 RX ADMIN — MICONAZOLE NITRATE: 20 POWDER TOPICAL at 17:27

## 2021-09-12 RX ADMIN — INSULIN GLARGINE 50 UNITS: 100 INJECTION, SOLUTION SUBCUTANEOUS at 21:30

## 2021-09-12 RX ADMIN — CETIRIZINE HYDROCHLORIDE 10 MG: 10 TABLET ORAL at 17:24

## 2021-09-12 RX ADMIN — Medication 1000 UNITS: at 17:24

## 2021-09-12 RX ADMIN — CARVEDILOL 25 MG: 12.5 TABLET, FILM COATED ORAL at 10:44

## 2021-09-12 RX ADMIN — BENZONATATE 200 MG: 100 CAPSULE ORAL at 22:08

## 2021-09-12 RX ADMIN — Medication 400 UNITS: at 17:24

## 2021-09-12 RX ADMIN — GABAPENTIN 600 MG: 600 TABLET ORAL at 10:44

## 2021-09-12 RX ADMIN — OXYCODONE HYDROCHLORIDE AND ACETAMINOPHEN 1 TABLET: 5; 325 TABLET ORAL at 14:49

## 2021-09-12 RX ADMIN — ROSUVASTATIN CALCIUM 40 MG: 20 TABLET, FILM COATED ORAL at 17:24

## 2021-09-12 RX ADMIN — PANTOPRAZOLE SODIUM 40 MG: 40 TABLET, DELAYED RELEASE ORAL at 06:41

## 2021-09-12 RX ADMIN — CARVEDILOL 25 MG: 12.5 TABLET, FILM COATED ORAL at 17:23

## 2021-09-12 RX ADMIN — ISOSORBIDE MONONITRATE 30 MG: 30 TABLET ORAL at 10:44

## 2021-09-12 RX ADMIN — SODIUM CHLORIDE: 9 INJECTION, SOLUTION INTRAVENOUS at 12:45

## 2021-09-12 RX ADMIN — GABAPENTIN 600 MG: 600 TABLET ORAL at 17:23

## 2021-09-12 ASSESSMENT — PAIN SCALES - GENERAL
PAINLEVEL_OUTOF10: 8
PAINLEVEL_OUTOF10: 3
PAINLEVEL_OUTOF10: 8
PAINLEVEL_OUTOF10: 4
PAINLEVEL_OUTOF10: 8
PAINLEVEL_OUTOF10: 7

## 2021-09-12 NOTE — ED NOTES
Pt request to take meds after she eats breakfast, Family to bring pt breakfast.     Dianna Knight RN  09/12/21 7994

## 2021-09-12 NOTE — ED NOTES
Pt repositioned for comfort, purewick readjusted to assist pt with voiding. Pt resting in bed, no needs voiced at this time.      Myles Bradley RN  09/12/21 7329

## 2021-09-12 NOTE — ED NOTES
Report given to Torie Galeas RN on [facility]. All questions answered.      Jeana Machuca RN  09/12/21 0116

## 2021-09-12 NOTE — PROGRESS NOTES
[facility]  Office: [address], DO, Raymundo Mclaughlin, DO, Ambrocio Bartholomew, DO, Batsheva Duque, DO, Mil Manuel MD, Rhoda Lawson MD, Ifrah Soriano MD, Jacinta Christiansen MD, Yao Joseph MD, Mary Cam MD, Kristin Bartholomew MD, Huma Ellington, DO, Vinay Cedeno DO, Bucky Dodge MD,  Abena Fajardo DO, Afsaneh Rodriguez MD, Denman Najjar, MD, Saeed Arthur MD, Jon Payne MD, , Lanny Marie MD, Franciso Schaumann, MD, Hector Blanc MD, Brandan Sanabria, [facility], [facility], CNP, Ary Grullon, CNP, Robina Croft, CNS, Chalino Sol, CNP, Claudio Avila, CNP, Tish Braga, CNP, Bertha Quispe, CNP, Rylan Hadley, CNP, MARGARITA KennedyC, Harjit Trujillo, [facility], Marbella Ragsdale, CNP, Josh Rehman, CNP, Allison Lentz, CNP, Angel Joaquin, CNP, Ahsan Bee CNP, Deepak Saucedo CNP, Lan Florentino, CNP, Merlyn Steele, [address] [place] [address]    Progress Note    9/12/2021    11:46 AM    Name:   Karlie Connell  MRN:     [id number]     Acct:      [de-identified]   Room:   [address] [place] [address] Day:  1  Admit Date:  9/11/2021  9:59 AM    PCP:   Viktoria Enciso MD  Code Status:  Full Code    Subjective:     C/C:   Chief Complaint   Patient presents with   Nathalia Her Fall     right side leg weakness, hit head, no LOC      Interval History Status: improved. Pt was seen and examined this morning  States that she is feeling much better   She remains slightly sore but overall improved  No new complaints   No acute events overnight       Review of Systems:     12 point ROS performed and negative for anything other than what was stated in subjective     Medications: Allergies:     Allergies   Allergen Reactions    Penicillins      Other reaction(s): Hives    Sulfa Antibiotics      Other reaction(s): Rash       Current Meds:   Scheduled Meds:    vitamin E  400 Units Oral Daily    rosuvastatin  40 mg Oral Daily    mupirocin   Topical TID    cetirizine  10 mg Oral Daily    escitalopram  20 mg Oral Daily    rivaroxaban  20 mg Oral Daily with breakfast    Vitamin D  1,000 Units Oral Daily    ticagrelor  90 mg Oral BID    isosorbide mononitrate  30 mg Oral Daily    carvedilol  25 mg Oral BID WC    fluticasone  2 spray Each Nostril Daily    gabapentin  600 mg Oral TID    pantoprazole  40 mg Oral QAM AC    miconazole   Topical BID    sodium chloride flush  5-40 mL IntraVENous 2 times per day    insulin glargine  50 Units SubCUTAneous BID     Continuous Infusions:    sodium chloride      sodium chloride 100 mL/hr at 09/11/21 2153     PRN Meds: albuterol sulfate HFA, docusate sodium, clonazePAM, nitroGLYCERIN, oxyCODONE-acetaminophen, sodium chloride flush, sodium chloride, potassium chloride **OR** potassium alternative oral replacement **OR** potassium chloride, magnesium sulfate, ondansetron **OR** ondansetron, polyethylene glycol, acetaminophen **OR** acetaminophen    Data:     Past Medical History:   has a past medical history of Anxiety, Atrial fibrillation ([facility] [address] Benign positional vertigo, CAD (coronary artery disease), Chest pain, Depression, Diabetes mellitus ([facility] [address] Diabetic neuropathy ([facility] [address] Hyperlipidemia, Hypertension, and Migraine. Social History:   reports that she has never smoked. She has never used smokeless tobacco. She reports that she does not drink alcohol and does not use drugs. Family History:   Family History   Problem Relation Age of Onset    Cancer Mother     Cancer Father        Vitals:  /60   Pulse 65   Temp 96.9 °F (36.1 °C) (Oral)   Resp 13   Wt 225 lb (102.1 kg)   SpO2 99%   BMI 32.28 kg/m²   No data recorded. Recent Labs     09/11/21  2201   POCGLU 296*       I/O (24Hr):   No intake or output data in the 24 hours ending 09/12/21 1146    Labs:  Hematology:  Recent Labs     09/11/21  1033 09/12/21  0829   WBC 8.5 5.8   RBC 3.39* 3.11*   HGB 9.9* 9.1*   HCT 30.9* 29.9*   MCV 91.2 96.1   MCH 29.2 29.3   MCHC 32.0 30.4   RDW 13.0 13.0    159   MPV 11.2 11.0     Chemistry:  Recent Labs     09/11/21  1033 09/12/21  0829    138   K 4.5 4.1    105   CO2 23 25   GLUCOSE 289* 176*   BUN 30* 25*   CREATININE 1.77* 1.65*   ANIONGAP 11 8*   LABGLOM 28* 30*   GFRAA 34* 37*   CALCIUM 9.3 8.8   TROPHS 47*  --    CKTOTAL 563* 359*   MYOGLOBIN 256*  --      Recent Labs     09/11/21  2201 09/12/21  0829   PROT  --  6.4   LABALBU  --  3.5   AST  --  15   ALT  --  10   ALKPHOS  --  67   BILITOT  --  0.24*   POCGLU 296*  --      ABG:No results found for: POCPH, PHART, PH, POCPCO2, HKD1URW, PCO2, POCPO2, PO2ART, PO2, POCHCO3, BPM7GEX, HCO3, NBEA, PBEA, BEART, BE, THGBART, THB, SWY1CYL, LWQD4MZD, Q8DZYRFO, O2SAT, FIO2  Lab Results   Component Value Date/Time    SPECIAL NOT REPORTED 09/03/2020 01:36 PM     Lab Results   Component Value Date/Time    CULTURE NO GROWTH 6 DAYS 07/13/2020 02:00 AM    CULTURE NO GROWTH 6 DAYS 07/13/2020 02:00 AM       Radiology:  XR SHOULDER RIGHT (MIN 2 VIEWS)    Result Date: 9/11/2021  Suggestion of nondisplaced 2 part intertrochanteric fracture of the right hip. Moderate to severe osteoarthritis involving the medial compartment and patellofemoral joints of both knees. Moderate AC joint arthropathy. No acute abnormality in the right shoulder or either knee. Bony pelvis intact. RECOMMENDATION: CT scan of the right hip suggested for confirmation of suspected hip fracture. XR FEMUR RIGHT (MIN 2 VIEWS)    Result Date: 9/11/2021  No acute bony abnormalities are noted FINDINGS: The visualized bones are osteopenic. There is no evidence of fracture or dislocation. Degenerative changes of the right hip and right knee. The soft tissues are unremarkable. IMPRESSION: No acute bony abnormalities are noted     XR KNEE LEFT (3 VIEWS)    Result Date: 9/11/2021  Suggestion of nondisplaced 2 part intertrochanteric fracture of the right hip.   Moderate to severe osteoarthritis involving the medial compartment and patellofemoral joints of both knees. Moderate AC joint arthropathy. No acute abnormality in the right shoulder or either knee. Bony pelvis intact. RECOMMENDATION: CT scan of the right hip suggested for confirmation of suspected hip fracture. XR KNEE RIGHT (3 VIEWS)    Result Date: 9/11/2021  Suggestion of nondisplaced 2 part intertrochanteric fracture of the right hip. Moderate to severe osteoarthritis involving the medial compartment and patellofemoral joints of both knees. Moderate AC joint arthropathy. No acute abnormality in the right shoulder or either knee. Bony pelvis intact. RECOMMENDATION: CT scan of the right hip suggested for confirmation of suspected hip fracture. XR FOOT LEFT (MIN 3 VIEWS)    Result Date: 9/11/2021  No acute bony abnormalities are noted FINDINGS: The visualized bones are osteopenic. There is no evidence of fracture or dislocation. Degenerative changes of the right hip and right knee. The soft tissues are unremarkable. IMPRESSION: No acute bony abnormalities are noted     CT Head WO Contrast    Result Date: 9/11/2021  No acute intracranial abnormality. CT Cervical Spine WO Contrast    Result Date: 9/11/2021  No acute abnormality of the cervical spine. XR CHEST PORTABLE    Result Date: 9/11/2021  Mild right basilar atelectasis. No pleural effusion, pneumothorax, or displaced rib fracture appreciated. CT HIP RIGHT WO CONTRAST    Result Date: 9/11/2021  1. Mild right hip osteoarthrosis. 2. Enthesopathy as detailed above. 3. Moderate sigmoid diverticulosis. 4. Slight wall thickening and stranding of the wall of the urinary bladder which can be seen with cystitis. Correlate with urinalysis. 5. Probable partially visualized inferior pole left renal cyst.  Confirmation with nonemergent renal ultrasound recommended. 6. No acute intratrochanteric fracture evident by CT.   If pain persists, further evaluation with MRI should be performed to assess for marrow edema. XR HIP 2-3 VW W PELVIS RIGHT    Result Date: 9/11/2021  Suggestion of nondisplaced 2 part intertrochanteric fracture of the right hip. Moderate to severe osteoarthritis involving the medial compartment and patellofemoral joints of both knees. Moderate AC joint arthropathy. No acute abnormality in the right shoulder or either knee. Bony pelvis intact. RECOMMENDATION: CT scan of the right hip suggested for confirmation of suspected hip fracture.        Physical Examination:        General appearance:  alert, cooperative and no distress  Mental Status:  oriented to person, place and time and normal affect  Lungs:  clear to auscultation bilaterally, normal effort  Heart:  regular rate and rhythm, no murmur  Abdomen:  soft, nontender, nondistended, normal bowel sounds, no masses   Extremities:  no edema, redness, BL hip pain noted   Skin:  no gross lesions, rashes, induration    Assessment:        Hospital Problems         Last Modified POA    * (Principal) Fall at home, initial encounter 9/11/2021 Yes    MARIELLE (acute kidney injury) ([facility] [address] 9/11/2021 Yes    Chronic diastolic (congestive) heart failure ([facility] [address] 9/11/2021 Yes    Type 2 diabetes mellitus with diabetic polyneuropathy, with long-term current use of insulin (HCC) (Chronic) 9/11/2021 Yes    Essential hypertension 9/11/2021 Yes    CAD (coronary artery disease) 9/11/2021 Yes    Overview Signed 3/27/2017  4:39 PM by Neal Monroe MD     S/P 4 + 2 stents         Generalized weakness 9/11/2021 Yes          Plan:        - CT hip did not reveal a fracture  - seen by orthopedic surgery who states no intervention from their end   - PT/OT   - repeat troponin   - CK trending down, continue IVF   - telemetry   - BUN/Cr improved slightly   - continue home meds  - v/s per unit protocol   - check orthostatics  - echo ordered  - pt has a hx of CHF, CAD, HTN, HLD, DMII   - given her history will obtain cardiology consult if repeat troponin is abnormal or echo shows change       Maciej Cantrell MD  9/12/2021  11:46 AM

## 2021-09-12 NOTE — CARE COORDINATION
Transition plan   Pt sleeping noone else in room/ unable to complete intake assessment will return to attempt at later date.

## 2021-09-13 LAB
ANION GAP SERPL CALCULATED.3IONS-SCNC: 9 MMOL/L (ref 9–17)
BUN BLDV-MCNC: 23 MG/DL (ref 8–23)
BUN/CREAT BLD: ABNORMAL (ref 9–20)
CALCIUM SERPL-MCNC: 7.9 MG/DL (ref 8.6–10.4)
CHLORIDE BLD-SCNC: 103 MMOL/L (ref 98–107)
CO2: 21 MMOL/L (ref 20–31)
CREAT SERPL-MCNC: 1.95 MG/DL (ref 0.5–0.9)
EKG ATRIAL RATE: 77 BPM
EKG P AXIS: 86 DEGREES
EKG P-R INTERVAL: 170 MS
EKG Q-T INTERVAL: 396 MS
EKG QRS DURATION: 92 MS
EKG QTC CALCULATION (BAZETT): 448 MS
EKG R AXIS: 19 DEGREES
EKG T AXIS: 109 DEGREES
EKG VENTRICULAR RATE: 77 BPM
GFR AFRICAN AMERICAN: 30 ML/MIN
GFR NON-AFRICAN AMERICAN: 25 ML/MIN
GFR SERPL CREATININE-BSD FRML MDRD: ABNORMAL ML/MIN/{1.73_M2}
GFR SERPL CREATININE-BSD FRML MDRD: ABNORMAL ML/MIN/{1.73_M2}
GLUCOSE BLD-MCNC: 139 MG/DL (ref 65–105)
GLUCOSE BLD-MCNC: 157 MG/DL (ref 70–99)
GLUCOSE BLD-MCNC: 199 MG/DL (ref 65–105)
GLUCOSE BLD-MCNC: 220 MG/DL (ref 65–105)
GLUCOSE BLD-MCNC: 53 MG/DL (ref 65–105)
GLUCOSE BLD-MCNC: 80 MG/DL (ref 65–105)
LV EF: 54 %
LVEF MODALITY: NORMAL
POTASSIUM SERPL-SCNC: 3.9 MMOL/L (ref 3.7–5.3)
SODIUM BLD-SCNC: 133 MMOL/L (ref 135–144)

## 2021-09-13 PROCEDURE — 80048 BASIC METABOLIC PNL TOTAL CA: CPT

## 2021-09-13 PROCEDURE — 93880 EXTRACRANIAL BILAT STUDY: CPT

## 2021-09-13 PROCEDURE — 97535 SELF CARE MNGMENT TRAINING: CPT

## 2021-09-13 PROCEDURE — 6370000000 HC RX 637 (ALT 250 FOR IP): Performed by: FAMILY MEDICINE

## 2021-09-13 PROCEDURE — 36415 COLL VENOUS BLD VENIPUNCTURE: CPT

## 2021-09-13 PROCEDURE — 6370000000 HC RX 637 (ALT 250 FOR IP): Performed by: INTERNAL MEDICINE

## 2021-09-13 PROCEDURE — 97530 THERAPEUTIC ACTIVITIES: CPT

## 2021-09-13 PROCEDURE — 93306 TTE W/DOPPLER COMPLETE: CPT

## 2021-09-13 PROCEDURE — 82947 ASSAY GLUCOSE BLOOD QUANT: CPT

## 2021-09-13 PROCEDURE — 93010 ELECTROCARDIOGRAM REPORT: CPT | Performed by: INTERNAL MEDICINE

## 2021-09-13 PROCEDURE — 97167 OT EVAL HIGH COMPLEX 60 MIN: CPT

## 2021-09-13 PROCEDURE — 1200000000 HC SEMI PRIVATE

## 2021-09-13 PROCEDURE — 99232 SBSQ HOSP IP/OBS MODERATE 35: CPT | Performed by: FAMILY MEDICINE

## 2021-09-13 PROCEDURE — 97162 PT EVAL MOD COMPLEX 30 MIN: CPT

## 2021-09-13 RX ORDER — GLUCAGON 1 MG/ML
1 KIT INJECTION PRN
Status: DISCONTINUED | OUTPATIENT
Start: 2021-09-13 | End: 2021-09-16 | Stop reason: HOSPADM

## 2021-09-13 RX ORDER — DEXTROSE MONOHYDRATE 25 G/50ML
12.5 INJECTION, SOLUTION INTRAVENOUS PRN
Status: DISCONTINUED | OUTPATIENT
Start: 2021-09-13 | End: 2021-09-16 | Stop reason: HOSPADM

## 2021-09-13 RX ORDER — SODIUM CHLORIDE 9 MG/ML
INJECTION, SOLUTION INTRAVENOUS CONTINUOUS
Status: DISCONTINUED | OUTPATIENT
Start: 2021-09-13 | End: 2021-09-14

## 2021-09-13 RX ORDER — DEXTROSE MONOHYDRATE 50 MG/ML
100 INJECTION, SOLUTION INTRAVENOUS PRN
Status: DISCONTINUED | OUTPATIENT
Start: 2021-09-13 | End: 2021-09-16 | Stop reason: HOSPADM

## 2021-09-13 RX ORDER — LOPERAMIDE HYDROCHLORIDE 2 MG/1
2 CAPSULE ORAL 4 TIMES DAILY PRN
Status: DISCONTINUED | OUTPATIENT
Start: 2021-09-13 | End: 2021-09-16 | Stop reason: HOSPADM

## 2021-09-13 RX ORDER — NICOTINE POLACRILEX 4 MG
15 LOZENGE BUCCAL PRN
Status: DISCONTINUED | OUTPATIENT
Start: 2021-09-13 | End: 2021-09-16 | Stop reason: HOSPADM

## 2021-09-13 RX ORDER — CARVEDILOL 6.25 MG/1
6.25 TABLET ORAL 2 TIMES DAILY WITH MEALS
Status: DISCONTINUED | OUTPATIENT
Start: 2021-09-13 | End: 2021-09-16 | Stop reason: HOSPADM

## 2021-09-13 RX ADMIN — INSULIN GLARGINE 50 UNITS: 100 INJECTION, SOLUTION SUBCUTANEOUS at 09:22

## 2021-09-13 RX ADMIN — INSULIN GLARGINE 50 UNITS: 100 INJECTION, SOLUTION SUBCUTANEOUS at 21:08

## 2021-09-13 RX ADMIN — ROSUVASTATIN CALCIUM 40 MG: 20 TABLET, FILM COATED ORAL at 12:21

## 2021-09-13 RX ADMIN — FLUTICASONE PROPIONATE 2 SPRAY: 50 SPRAY, METERED NASAL at 09:21

## 2021-09-13 RX ADMIN — MUPIROCIN: 20 OINTMENT TOPICAL at 21:06

## 2021-09-13 RX ADMIN — OXYCODONE HYDROCHLORIDE AND ACETAMINOPHEN 1 TABLET: 5; 325 TABLET ORAL at 02:04

## 2021-09-13 RX ADMIN — GABAPENTIN 600 MG: 600 TABLET ORAL at 21:06

## 2021-09-13 RX ADMIN — MICONAZOLE NITRATE: 20 POWDER TOPICAL at 09:21

## 2021-09-13 RX ADMIN — LOPERAMIDE HYDROCHLORIDE 2 MG: 2 CAPSULE ORAL at 09:21

## 2021-09-13 RX ADMIN — GABAPENTIN 600 MG: 600 TABLET ORAL at 12:21

## 2021-09-13 RX ADMIN — ISOSORBIDE MONONITRATE 30 MG: 30 TABLET ORAL at 12:21

## 2021-09-13 RX ADMIN — CETIRIZINE HYDROCHLORIDE 10 MG: 10 TABLET ORAL at 12:22

## 2021-09-13 RX ADMIN — ESCITALOPRAM OXALATE 20 MG: 10 TABLET ORAL at 12:21

## 2021-09-13 RX ADMIN — Medication 1000 UNITS: at 12:20

## 2021-09-13 RX ADMIN — TICAGRELOR 90 MG: 90 TABLET ORAL at 21:06

## 2021-09-13 RX ADMIN — PANTOPRAZOLE SODIUM 40 MG: 40 TABLET, DELAYED RELEASE ORAL at 06:05

## 2021-09-13 RX ADMIN — OXYCODONE HYDROCHLORIDE AND ACETAMINOPHEN 1 TABLET: 5; 325 TABLET ORAL at 19:04

## 2021-09-13 RX ADMIN — TICAGRELOR 90 MG: 90 TABLET ORAL at 12:21

## 2021-09-13 RX ADMIN — Medication 400 UNITS: at 12:20

## 2021-09-13 ASSESSMENT — PAIN SCALES - GENERAL
PAINLEVEL_OUTOF10: 7
PAINLEVEL_OUTOF10: 7
PAINLEVEL_OUTOF10: 5
PAINLEVEL_OUTOF10: 7
PAINLEVEL_OUTOF10: 7
PAINLEVEL_OUTOF10: 3

## 2021-09-13 ASSESSMENT — PAIN DESCRIPTION - LOCATION
LOCATION: ARM;LEG

## 2021-09-13 ASSESSMENT — PAIN DESCRIPTION - ORIENTATION
ORIENTATION: RIGHT

## 2021-09-13 NOTE — PROGRESS NOTES
[facility]  Office: [address], DO, Jamar Perez, DO, Alen Mejia, DO, Amy Perezcristiana Blood, DO, Guillermo Sanchez MD, Dionne Rodriguez MD, Rufino Reed MD, Naa Valdovinos MD, Isreal Crook MD, Renetta Norton MD, Mayra Rolon MD, Milind Rodriguez, DO, Ayla Crowder, DO, Concepción Brennan MD,  Rashel Sheriff, DO, Sera Zambrano MD, Carrington Sunshine MD, Anjana Hernandez MD, Sina Verduzco MD, , Elisabet Wood MD, Karen Smith MD, Justina Aranda MD, Daksha Bear, [facility], [facility], CNP, Trent Rodriguez, CNP, Vamsi Baker, CNS, Noah Barthel, CNP, Luci Mack, CNP, Vignesh Tyler, CNP, Shereen Mathews, CNP, Cordelia Sanchez, CNP, Judy Wood PA-C, [facility], [facility], Luis Crawley, CNP, Daly Kerr, CNP, Andrew Uriarte, CNP, Brittani Meredith, CNP, Nena Kay, CNP, Montana Perez, CNP, Mag Seals, [facility], Isabel Santo, [address] [place] [address]    Progress Note    9/13/2021    7:41 AM    Name:   Concepción Davalos  MRN:     [id number]     Acct:      [de-identified]   Room:   [address] [place] [address] Day:  2  Admit Date:  9/11/2021  9:59 AM    PCP:   Severa Barge, MD  Code Status:  Full Code    Subjective:     C/C:   Chief Complaint   Patient presents with   Akira Loser Fall     right side leg weakness, hit head, no LOC      Interval History Status: improved. Pt was seen and examined this morning  No acute events overnight   States that she experienced chest pain prior to her fall along with dizziness  Hx of CAD  No complaints at this time other than hip pain   Good urine output       Brief History: This 68 yof presents with multiple falls. She was just admitted with gastroenteritis and rosa, went home and now returns after multiple falls. After one fall, EMS called but she refused to come to ER. After a fall last night, she laid on the floor for 11 hours. Her right side is sore, as is her buttocks.     Review of Systems:     12 point KISHA performed and negative for anything other than what was stated in subjective    Medications: Allergies: Allergies   Allergen Reactions    Penicillins      Other reaction(s): Hives    Sulfa Antibiotics      Other reaction(s): Rash       Current Meds:   Scheduled Meds:    vitamin E  400 Units Oral Daily    rosuvastatin  40 mg Oral Daily    mupirocin   Topical TID    cetirizine  10 mg Oral Daily    escitalopram  20 mg Oral Daily    rivaroxaban  20 mg Oral Daily with breakfast    Vitamin D  1,000 Units Oral Daily    ticagrelor  90 mg Oral BID    isosorbide mononitrate  30 mg Oral Daily    carvedilol  25 mg Oral BID WC    fluticasone  2 spray Each Nostril Daily    gabapentin  600 mg Oral TID    pantoprazole  40 mg Oral QAM AC    miconazole   Topical BID    sodium chloride flush  5-40 mL IntraVENous 2 times per day    insulin glargine  50 Units SubCUTAneous BID     Continuous Infusions:    dextrose      sodium chloride      sodium chloride 100 mL/hr at 09/12/21 1245     PRN Meds: glucose, dextrose, glucagon (rDNA), dextrose, benzonatate, albuterol sulfate HFA, docusate sodium, clonazePAM, nitroGLYCERIN, oxyCODONE-acetaminophen, sodium chloride flush, sodium chloride, potassium chloride **OR** potassium alternative oral replacement **OR** potassium chloride, magnesium sulfate, ondansetron **OR** ondansetron, polyethylene glycol, acetaminophen **OR** acetaminophen    Data:     Past Medical History:   has a past medical history of Anxiety, Atrial fibrillation ([facility] [address] Benign positional vertigo, CAD (coronary artery disease), Chest pain, Depression, [facility] Diabetic neuropathy [facility] Hyperlipidemia, Hypertension, and Migraine. Social History:   reports that she has never smoked. She has never used smokeless tobacco. She reports that she does not drink alcohol and does not use drugs.      Family History:   Family History   Problem Relation Age of Onset    Cancer Mother     Cancer Father Vitals:  BP (!) 109/52   Pulse 61   Temp 98 °F (36.7 °C) (Oral)   Resp 16   Ht 5' 10\" (1.778 m)   Wt 225 lb (102.1 kg)   SpO2 99%   BMI 32.28 kg/m²   Temp (24hrs), Av.2 °F (36.8 °C), Min:97.9 °F (36.6 °C), Max:98.7 °F (37.1 °C)    Recent Labs     21   POCGLU 296* 260* 139*       I/O (24Hr): No intake or output data in the 24 hours ending 21 0741    Labs:  Hematology:  Recent Labs     21   WBC 8.5 5.8   RBC 3.39* 3.11*   HGB 9.9* 9.1*   HCT 30.9* 29.9*   MCV 91.2 96.1   MCH 29.2 29.3   MCHC 32.0 30.4   RDW 13.0 13.0    159   MPV 11.2 11.0     Chemistry:  Recent Labs     21  1215 21  0509    138  --  133*   K 4.5 4.1  --  3.9    105  --  103   CO2 23 25  --  21   GLUCOSE 289* 176*  --  157*   BUN 30* 25*  --  23   CREATININE 1.77* 1.65*  --  1.95*   ANIONGAP 11 8*  --  9   LABGLOM 28* 30*  --  25*   GFRAA 34* 37*  --  30*   CALCIUM 9.3 8.8  --  7.9*   TROPHS 47*  --  43*  --    CKTOTAL 563* 359*  --   --    MYOGLOBIN 256*  --   --   --      Recent Labs     21   PROT  --  6.4  --   --    LABALBU  --  3.5  --   --    AST  --  15  --   --    ALT  --  10  --   --    ALKPHOS  --  67  --   --    BILITOT  --  0.24*  --   --    POCGLU 296*  --  260* 139*     ABG:No results found for: POCPH, PHART, PH, POCPCO2, WPG9XDJ, PCO2, POCPO2, PO2ART, PO2, POCHCO3, ZDL3AFI, HCO3, NBEA, PBEA, BEART, BE, THGBART, THB, BPT8UUA, IGZH0RNG, U5NGYWYS, O2SAT, FIO2  Lab Results   Component Value Date/Time    SPECIAL NOT REPORTED 2020 01:36 PM     Lab Results   Component Value Date/Time    CULTURE NO GROWTH 6 DAYS 2020 02:00 AM    CULTURE NO GROWTH 6 DAYS 2020 02:00 AM       Radiology:  XR SHOULDER RIGHT (MIN 2 VIEWS)    Result Date: 2021  Suggestion of nondisplaced 2 part intertrochanteric fracture of the right hip. Moderate to severe osteoarthritis involving the medial compartment and patellofemoral joints of both knees. Moderate AC joint arthropathy. No acute abnormality in the right shoulder or either knee. Bony pelvis intact. RECOMMENDATION: CT scan of the right hip suggested for confirmation of suspected hip fracture. XR FEMUR RIGHT (MIN 2 VIEWS)    Result Date: 9/11/2021  No acute bony abnormalities are noted FINDINGS: The visualized bones are osteopenic. There is no evidence of fracture or dislocation. Degenerative changes of the right hip and right knee. The soft tissues are unremarkable. IMPRESSION: No acute bony abnormalities are noted     XR KNEE LEFT (3 VIEWS)    Result Date: 9/11/2021  Suggestion of nondisplaced 2 part intertrochanteric fracture of the right hip. Moderate to severe osteoarthritis involving the medial compartment and patellofemoral joints of both knees. Moderate AC joint arthropathy. No acute abnormality in the right shoulder or either knee. Bony pelvis intact. RECOMMENDATION: CT scan of the right hip suggested for confirmation of suspected hip fracture. XR KNEE RIGHT (3 VIEWS)    Result Date: 9/11/2021  Suggestion of nondisplaced 2 part intertrochanteric fracture of the right hip. Moderate to severe osteoarthritis involving the medial compartment and patellofemoral joints of both knees. Moderate AC joint arthropathy. No acute abnormality in the right shoulder or either knee. Bony pelvis intact. RECOMMENDATION: CT scan of the right hip suggested for confirmation of suspected hip fracture. XR FOOT LEFT (MIN 3 VIEWS)    Result Date: 9/11/2021  No acute bony abnormalities are noted FINDINGS: The visualized bones are osteopenic. There is no evidence of fracture or dislocation. Degenerative changes of the right hip and right knee. The soft tissues are unremarkable.  IMPRESSION: No acute bony abnormalities are noted     CT Head WO Contrast    Result Date: 9/11/2021  No acute intracranial abnormality. CT Cervical Spine WO Contrast    Result Date: 9/11/2021  No acute abnormality of the cervical spine. XR CHEST PORTABLE    Result Date: 9/11/2021  Mild right basilar atelectasis. No pleural effusion, pneumothorax, or displaced rib fracture appreciated. CT HIP RIGHT WO CONTRAST    Result Date: 9/11/2021  1. Mild right hip osteoarthrosis. 2. Enthesopathy as detailed above. 3. Moderate sigmoid diverticulosis. 4. Slight wall thickening and stranding of the wall of the urinary bladder which can be seen with cystitis. Correlate with urinalysis. 5. Probable partially visualized inferior pole left renal cyst.  Confirmation with nonemergent renal ultrasound recommended. 6. No acute intratrochanteric fracture evident by CT. If pain persists, further evaluation with MRI should be performed to assess for marrow edema. XR HIP 2-3 VW W PELVIS RIGHT    Result Date: 9/11/2021  Suggestion of nondisplaced 2 part intertrochanteric fracture of the right hip. Moderate to severe osteoarthritis involving the medial compartment and patellofemoral joints of both knees. Moderate AC joint arthropathy. No acute abnormality in the right shoulder or either knee. Bony pelvis intact. RECOMMENDATION: CT scan of the right hip suggested for confirmation of suspected hip fracture.        Physical Examination:        General appearance:  alert, cooperative and no distress  Mental Status:  oriented to person, place and time and normal affect  Lungs:  clear to auscultation bilaterally, normal effort  Heart:  regular rate and rhythm, no murmur  Abdomen:  soft, nontender, nondistended, normal bowel sounds  Extremities:  no edema, redness, tenderness in the calves  Skin:  no gross lesions, rashes, induration    Assessment:        Hospital Problems         Last Modified POA    * (Principal) Fall at home, initial encounter 9/11/2021 Yes    MARIELLE (acute kidney injury) ([facility] [address] 9/11/2021 Yes    Chronic diastolic

## 2021-09-13 NOTE — CARE COORDINATION
Case Management Initial Discharge Plan  Meryl Navarro             Met with:patient to discuss discharge plans. Information verified: address, contacts, phone number, , insurance Yes  Insurance Provider: [facility]    Emergency Contact/Next of Kin name & number:   Arely Shah (son) [phone]  Basil Gregory (daughter) [phone]  Who are involved in patient's support system? Her son Nivia Truong and a grandson    PCP: Shantel Chiang MD  Date of last visit: unsure      Discharge Planning    Living Arrangements:  Alone     Home has 1 story   3 stairs to climb to get into front door    Patient able to perform ADL's:Assisted    Current Services (outpatient & in home) [facility]   DME equipment: rollator, wheelchair,shower chair,glucometer  DME provider:     Is patient receiving oral anticoagulation therapy? Yes, Xarelto    If indicated:   Physician managing anticoagulation treatment:  Where does patient obtain lab work for ATC treatment? n/a      Potential Assistance Needed:  [address] Stern Jluis    Patient agreeable to home care: Yes  Freedom of choice provided:  yes    Prior SNF/Rehab Placement and Facility: none  Agreeable to SNF/Rehab: No  [place] of choice provided: n/a     Evaluation: n/a    Expected Discharge date:  21    Patient expects to be discharged to: If home: is the family and/or caregiver wiling & able to provide support at home? yes  Who will be providing this support? Patient states son and grandson checks on her frequently. At times son stays overnight with patient. Follow Up Appointment: Best Day/ Time: Monday AM    Transportation provider: son  Transportation arrangements needed for discharge: No    Readmission Risk              Risk of Unplanned Readmission:  27             Does patient have a readmission risk score greater than 14?: Yes  If yes, follow-up appointment must be made within 7 days of discharge.      Goals of Care:       Educated patient on transitional options, provided freedom of choice and are agreeable with plan      Discharge Plan: Home with family support and [facility] care. Patient refuses SNF.  [id number] Spoke with France Sterling at [facility] care, states patient is not current with them- patient has [facility].           Electronically signed by Randee Blanton RN on 9/13/21 at 3:40 PM EDT

## 2021-09-13 NOTE — CONSULTS
[facility]    Consult / H&P               Today's Date: 2021  Patient Name: Meryl Navarro  Date of admission: 2021  9:59 AM  Patient's age: 68 y.o., 1945  Admission Dx: MARIELLE (acute kidney injury) ([facility] [address] [N17.9]  Fall, initial encounter [W19. XXXA]  Fall at home, initial encounter [T46. Marisa Lofton, Q66.331]  Non-traumatic rhabdomyolysis [M62.82]    Reason for Consult:  Cardiac evaluation    Requesting Physician: No admitting provider for patient encounter. CHIEF COMPLAINT:  Fall-R side-leg weakness, hit head, no LOC    History Obtained From:  patient, electronic medical record      HISTORY OF PRESENT ILLNESS:      The patient is a 68 y.o.  female h/o HFpEF (58% per 2020 TTE, repeat pending), HTN, PAF ([id number] at least 6), CAD s/p 2020 staged PCI of OM with JAMA  & PTCA/JAMA of restenosed distal LAD, IDDM2, HLD who is admitted to the hospital for a fall. Recent discharge - after admission for dehydration, gastroenteritis, MARIELLE. Now presenting w/ h/o multiple recent falls-after last fall, was down for 11h. Cardiology consulted for: \"recurrent falls w/ chest pain and elevated troponin. \"    Prior Cardiac hx/testin) TTE/Echo 2021-ordered, pending. .. 2) Stress Test:  20 : Negative Lexiscan stress test.    3) TTE/Echo 2020:  Summary  Left ventricle is normal in size, mildly increased wall thickness, global  left ventricular systolic function is normal, calculated ejection fraction  is 58%. Grade III (severe) left ventricular diastolic dysfunction. Left atrium is moderately dilated. Inter-atrial septum is intact with no evidence for an atrial septal defect. Right atrium is moderately dilated . Normal right ventricular size and function. Mild mitral regurgitation. Tricuspid valve was not well visualized. Mild tricuspid regurgitation. Estimated right ventricular systolic pressure is 18.2 mmHg.     4) 2020 Cath:  Findings:  LMCA: (COLACE) 100 MG capsule Take 1 capsule by mouth daily as needed for Constipation 2/3/21   Tammy Aishaeze, DPM   Misc. Devices (COMMODE BEDSIDE) MISC 1 Device by Does not apply route daily 2/3/21   Tammy Parisi DPM   Continuous Blood Gluc  (FREESTYLE RICHARD 14 DAY READER) JAQUELINE 1 each by Does not apply route continuous [facility] 10/30/20   Senia Lara MD   rivaroxaban (XARELTO) 20 MG TABS tablet Take 1 tablet by mouth daily (with breakfast) 10/21/20   Senia Lara MD   glucose monitoring kit (FREESTYLE) monitoring kit 1 kit by Does not apply route 4 times daily Freestyle Richard . Dx E11.65, has tremors and cannot use conventional meter without great difficulty. Please provide supplies for 3 months, rf 3,Pharmacy [facility]. 10/6/20   Senia Lara MD   [facility]. Devices (STEP N REST WALKER) MISC 1 each by Does not apply route continuous Seated, wheeled walker 10/6/20   Senia Lara MD   escitalopram (LEXAPRO) 20 MG tablet Take 1 tablet by mouth daily 8/11/20   Senia Lara MD   hydrOXYzine (ATARAX) 25 MG tablet take 1 tablet by mouth every 8 hours rectally for itching 8/5/20   Senia Lara MD   mupirocin [facility]) 2 % ointment apply topically to affected area three times a day 4/21/20   Historical Provider, MD   insulin aspart (NOVOLOG FLEXPEN) 100 UNIT/ML injection pen Use TID before meals according to scale - max 45 units a day 5/11/20   Senia Lara MD   loratadine (CLARITIN) 10 MG tablet Take 1 tablet every day by oral route. 5/11/20   Senia Lara MD   rosuvastatin (CRESTOR) 40 MG tablet Take 1 tablet by mouth daily 5/1/20   Senia Lara MD   albuterol sulfate  (90 Base) MCG/ACT inhaler Inhale 2 puffs into the lungs every 6 hours as needed for Wheezing 3/15/18   Bela Torres MD   vitamin E 400 UNIT capsule Take 400 Units by mouth daily.     Historical Provider, MD      Current Facility-Administered Medications: glucose (GLUTOSE) 40 % oral gel 15 g, 15 g, Oral, PRN  dextrose 50 % IV solution, 12.5 g, IntraVENous, PRN  glucagon injection 1 mg, 1 mg, IntraMUSCular, PRN  dextrose 5 % solution, 100 mL/hr, IntraVENous, PRN  loperamide (IMODIUM) capsule 2 mg, 2 mg, Oral, 4x Daily PRN  benzonatate (TESSALON) capsule 200 mg, 200 mg, Oral, TID PRN  vitamin E capsule 400 Units, 400 Units, Oral, Daily  albuterol sulfate  (90 Base) MCG/ACT inhaler 2 puff, 2 puff, Inhalation, Q6H PRN  rosuvastatin (CRESTOR) tablet 40 mg, 40 mg, Oral, Daily  mupirocin (BACTROBAN) 2 % ointment, , Topical, TID  cetirizine (ZYRTEC) tablet 10 mg, 10 mg, Oral, Daily  escitalopram (LEXAPRO) tablet 20 mg, 20 mg, Oral, Daily  rivaroxaban (XARELTO) tablet 20 mg, 20 mg, Oral, Daily with breakfast  docusate sodium (COLACE) capsule 100 mg, 100 mg, Oral, Daily PRN  clonazePAM (KLONOPIN) tablet 0.5 mg, 0.5 mg, Oral, BID PRN  nitroGLYCERIN (NITROSTAT) SL tablet 0.4 mg, 0.4 mg, SubLINGual, Q5 Min PRN  Vitamin D (CHOLECALCIFEROL) tablet 1,000 Units, 1,000 Units, Oral, Daily  ticagrelor (BRILINTA) tablet 90 mg, 90 mg, Oral, BID  oxyCODONE-acetaminophen (PERCOCET) 5-325 MG per tablet 1 tablet, 1 tablet, Oral, Q6H PRN  isosorbide mononitrate (IMDUR) extended release tablet 30 mg, 30 mg, Oral, Daily  carvedilol (COREG) tablet 25 mg, 25 mg, Oral, BID WC  fluticasone (FLONASE) 50 MCG/ACT nasal spray 2 spray, 2 spray, Each Nostril, Daily  gabapentin (NEURONTIN) tablet 600 mg, 600 mg, Oral, TID  pantoprazole (PROTONIX) tablet 40 mg, 40 mg, Oral, QAM AC  miconazole (MICOTIN) 2 % powder, , Topical, BID  sodium chloride flush 0.9 % injection 5-40 mL, 5-40 mL, IntraVENous, 2 times per day  sodium chloride flush 0.9 % injection 10 mL, 10 mL, IntraVENous, PRN  0.9 % sodium chloride infusion, 25 mL, IntraVENous, PRN  potassium chloride (KLOR-CON M) extended release tablet 40 mEq, 40 mEq, Oral, PRN **OR** potassium bicarb-citric acid (EFFER-K) effervescent tablet 40 mEq, 40 mEq, Oral, PRN **OR** potassium chloride 10 mEq/100 mL IVPB (Peripheral Line), 10 mEq, IntraVENous, PRN  magnesium sulfate 1000 mg in dextrose 5% 100 mL IVPB, 1,000 mg, IntraVENous, PRN  ondansetron (ZOFRAN-ODT) disintegrating tablet 4 mg, 4 mg, Oral, Q8H PRN **OR** ondansetron (ZOFRAN) injection 4 mg, 4 mg, IntraVENous, Q6H PRN  polyethylene glycol (GLYCOLAX) packet 17 g, 17 g, Oral, Daily PRN  acetaminophen (TYLENOL) tablet 650 mg, 650 mg, Oral, Q6H PRN **OR** acetaminophen (TYLENOL) suppository 650 mg, 650 mg, Rectal, Q6H PRN  insulin glargine (LANTUS) injection vial 50 Units, 50 Units, SubCUTAneous, BID    Allergies:  Penicillins and Sulfa antibiotics    Social History:   reports that she has never smoked. She has never used smokeless tobacco. She reports that she does not drink alcohol and does not use drugs. Family History: family history includes Cancer in her father and mother. REVIEW OF SYSTEMS:    Constitutional: there has been no unanticipated weight loss. There's been No change in energy level, No change in activity level. Eyes: No visual changes or diplopia. No scleral icterus. ENT: No Headaches  Cardiovascular: No active CP/SOB  Respiratory: No previous pulmonary problems, No cough  Gastrointestinal: No abdominal pain. No change in bowel or bladder habits. Genitourinary: No dysuria, trouble voiding, or hematuria. Musculoskeletal:  No gait disturbance, No weakness or joint complaints. Integumentary: No rash or pruritis. Neurological: No headache, diplopia, change in muscle strength, numbness or tingling. No change in gait, balance, coordination, mood, affect, memory, mentation, behavior. Psychiatric: No anxiety, or depression. Endocrine: No temperature intolerance. No excessive thirst, fluid intake, or urination. No tremor. Hematologic/Lymphatic: No abnormal bruising or bleeding, blood clots or swollen lymph nodes.   Allergic/Immunologic: No nasal congestion or hives.      PHYSICAL EXAM:      BP (!) 109/52   Pulse 61   Temp 98 °F (36.7 °C) (Oral)   Resp 16   Ht 5' 10\" (1.778 m)   Wt 225 lb (102.1 kg)   SpO2 99%   BMI 32.28 kg/m²    Constitutional and General Appearance: alert, cooperative, no distress and appears stated age  HEENT: PERRL, no cervical lymphadenopathy. No masses palpable. Normal oral mucosa  Respiratory:  Normal excursion and expansion without use of accessory muscles  Resp Auscultation: Good respiratory effort. No for increased work of breathing. On auscultation: clear to auscultation bilaterally  Cardiovascular: The apical impulse is not displaced  Heart tones are crisp and normal. regular S1 and S2.  Jugular venous pulsation Normal  The carotid upstroke is normal in amplitude and contour without delay or bruit  Peripheral pulses are symmetrical and full   Abdomen:   No masses or tenderness  Bowel sounds present  Extremities:   No Cyanosis or Clubbing   Lower extremity edema: No   Skin: Warm and dry  Neurological:  Alert and oriented. Moves all extremities well  No abnormalities of mood, affect, memory, mentation, or behavior are noted    DATA:    Diagnostics:      EK21- NSR, normal rate, possible new TWI V4-V5 when compared to prior EKG(s)    1) TTE/Echo 2021-ordered, pending. .. 2) Stress Test:  20 : Negative Lexiscan stress test.    3) TTE/Echo 2020:  Summary  Left ventricle is normal in size, mildly increased wall thickness, global  left ventricular systolic function is normal, calculated ejection fraction  is 58%. Grade III (severe) left ventricular diastolic dysfunction. Left atrium is moderately dilated. Inter-atrial septum is intact with no evidence for an atrial septal defect. Right atrium is moderately dilated . Normal right ventricular size and function. Mild mitral regurgitation. Tricuspid valve was not well visualized. Mild tricuspid regurgitation.   Estimated right ventricular systolic pressure is 21.9 mmHg.    4) 12/2020 Cath:  Findings:  LMCA: Mild irregularities 10-20%. LAD: Patent proximal and mid stents with 20-30% diffuse instent restenosis   Distally vessel is small to intermediate with long 75% re-stenosis, treated with PTCA/JAMA     LCx: Mild irregularities 10-20%. OM has mid 80% stenosis, reduced to 0% with PTCA/JAMA       Coronary Tree        Dominance: Right    The LV gram was not performed.        Procedure Summary        1. Successful Staged PCI of OM with JAMA    2. Restenosis of distal LAD, required, PTCA/JAMA     Labs:     CBC:   Recent Labs     09/11/21  1033 09/12/21  0829   WBC 8.5 5.8   HGB 9.9* 9.1*   HCT 30.9* 29.9*    159     BMP:   Recent Labs     09/12/21  0829 09/13/21  0509    133*   K 4.1 3.9   CO2 25 21   BUN 25* 23   CREATININE 1.65* 1.95*   LABGLOM 30* 25*   GLUCOSE 176* 157*     BNP: No results for input(s): BNP in the last 72 hours. PT/INR: No results for input(s): PROTIME, INR in the last 72 hours. APTT:No results for input(s): APTT in the last 72 hours.   CARDIAC ENZYMES:  Recent Labs     09/11/21  1033 09/12/21  0829   CKTOTAL 563* 359*     FASTING LIPID PANEL:  Lab Results   Component Value Date    HDL 51 12/14/2019    TRIG 61 12/14/2019     LIVER PROFILE:  Recent Labs     09/12/21  0829   AST 15   ALT 10   LABALBU 3.5       IMPRESSION:    Patient Active Problem List   Diagnosis    Atypical chest pain    Type 2 diabetes mellitus with diabetic polyneuropathy, with long-term current use of insulin (HCC)    Vertigo    Essential hypertension    CAD (coronary artery disease)    Dyspnea    Claudication in peripheral vascular disease ([address]    Acute pulmonary embolism without acute cor pulmonale ([facility] [address]    Diabetic polyneuropathy associated with type 2 diabetes mellitus (HCC)    Other hyperlipidemia    Chronic systolic heart failure (HCC)    Multiple subsegmental pulmonary emboli without acute cor pulmonale (HCC)    Congestive heart failure ([address]    Pyelonephritis of right kidney    History of pulmonary embolism    Elevated troponin I level    Sepsis (HCC)    Suspected COVID-19 virus infection    MARIELLE (acute kidney injury) (HCC)    Diarrhea    Chronic diastolic (congestive) heart failure (HCC)    Generalized weakness    Anemia, normocytic normochromic    Class 1 obesity in adult    Gastroenteritis    Acute on chronic diastolic CHF (congestive heart failure) (HCC)    Acute diastolic CHF (congestive heart failure) (HCC)    Arterial hypotension    Moderate malnutrition (HCC)    S/P PTCA (percutaneous transluminal coronary angioplasty)    Cervical myelopathy (HCC)    Viral gastroenteritis    Fall at home, initial encounter     1. Elevated troponin s/p fall-also MARIELLE-likely type 2 demand ischemia from ongoing MARIELLE; trops downtrending, CP is noncardiac-reproducible on palpation-likely related to recent falls  2. HFpEF (58% per 11/2020 TTE, repeat pending)   3. HTN- coreg 25 bid, imdur 30 qd  4. PAF-[id number] at least 6, HASBLED=3- on coreg per above + xarelto-s/p multiple recent falls  5. CAD s/p 12/2020 staged PCI of OM with JAMA  & PTCA/JAMA of restenosed distal LAD-coreg 25 bid, imdur 30 qd, crestor 40qd, brilinta -90 bid; no ASA given on Xarelto, age>70+multiple recent falls   6. Recurrent falls/syncope?- -repeat TTE + carotid dopplers-ordered, pending completion. ..on AC per above for PAF/CAD; orthostatic sx per hx-ongoing MARIELLE, recent admission for dehydration/MARIELLE      RECOMMENDATIONS:  F/u repeat TTE + carotid dopplers  Hold Xarelto given recurrent recent falls  Start IVF for MARIELLE-gentle hydration w/ NS @ 75cc/h  Further w/u pending 1. We will follow    Final plan/recommendations pending d/w attending cardiologist Dr. Patsy Stover. Noel Sevilla.      Electronically signed by Fozia Grey MD on 9/13/2021 at 9:20 [address] [place] [address] [facility]      [phone]      Attestation signed by      Attending Physician Statement:    I have discussed the care of  Margarito Kenyon Jasmin Pena , including pertinent history and exam findings, with the Cardiology fellow/resident. I have seen and examined the patient and the key elements of all parts of the encounter have been performed by me. I agree with the assessment, plan and orders as documented by the fellow/resident, after I modified exam findings and plan of treatments, and the final version is my approved version of the assessment. Additional Comments:     Mechanical falls of wheel chair, reports occasional dizzy spells on sitting or standing, no angina or dyspnea, ECG : NSR with NS ST-T changes,  HS trops minimally elevated with flat trend likely from MARIELLE. Recommend IV hydration. Agree with repeating TTE. Continue brilinta, statin and imdur. Decrease dose of coreg to 6.25 mg bid. Might need rehab for few weeks. Will need discussion regarding continuing xarelto if continues to have recurrent falls.

## 2021-09-13 NOTE — PROGRESS NOTES
Occupational Therapy   Occupational Therapy Initial Assessment  Date: 2021   Patient Name: Karlie Connell  MRN: [id number]     : 1945    Date of Service: 2021   Obtained from medical chart: \"The patient is a 68 y.o. female that presents to [address] [place] [address] with a history of falls. Son is at bedside and is able to provide some of the history. Patient states that she has had 3 falls over the past few days. First fall happened on Thursday evening 2021. She fell around 8 PM and was unable to get to a phone. Her home nurse came to the house and found her on the floor at 10 AM.  EMS was called and was able to get her up. Second fall happened Friday afternoon when she was in the bathroom. EMS was again called and able to get her up. Last fall happened Friday evening 9/10/2021 around 7 PM.  Again she was unable to get to the telephone until about 7 AM this morning. When she was found this morning her nurse and son brought her to the ER. X-ray showed concern that there was a right hip intertrochanteric fracture. Ortho was consulted for further evaluation. \"    Discharge Recommendations:  Patient would benefit from continued therapy after discharge. Pt unsafe to return to prior living environment at this time d/t deficits impacting pt safety. OT Equipment Recommendations  Equipment Needed: Yes  Mobility Devices: ADL Assistive Devices  ADL Assistive Devices: Toileting - 3-in-1 Commode;Sock-Aid Hard;Long-handled Sponge;Dressing Stick;Long-handled Shoe Horn;Feeding Devices;Grab Bars - toilet  Other: Built up handle for feeding/grooming task    Assessment   Performance deficits / Impairments: Decreased functional mobility ; Decreased endurance;Decreased ADL status; Decreased sensation;Decreased posture;Decreased coordination;Decreased ROM; Decreased balance;Decreased strength;Decreased high-level IADLs;Decreased safe awareness;Decreased cognition;Decreased fine motor control  Assessment: Pt presents to hospital after a fall. OT/PT facilitated supine>sit t/f at min A for trunk progression; pt demo'd good static sitting tolerance at EOB prior to engaging in sit>stand t/f at min A x2. Pt engaged in functional mobility household distances and demo'd short, shuffled steps with forward flexed posture. Pt retired to bedside recliner to engage in grooming task. Pt with decreased endurance, balance, and R UE tremors impacting functional performance this date. Pt left with call light in reach, chair alarm on, and RN notified. Pt would benefit from continued skilled OT services to address the above deficits impacting safety and independence in ADLs/IADLs. Prognosis: Good  Decision making: High complexity  Patient Education: OT role, OT POC, purpose of eval, benefits of functional activity, proper hand placement for safe t/f, appropriate use and navigation with RW, ADL adaptive strategies, adaptive dressing tech - good return  REQUIRES OT FOLLOW UP: Yes  Activity Tolerance  Activity Tolerance: Patient Tolerated treatment well  Safety Devices  Safety Devices in place: Yes  Type of devices: Left in chair;Nurse notified;Call light within reach; Chair alarm in place;Gait belt  Restraints  Initially in place: No           Patient Diagnosis(es): The primary encounter diagnosis was MARIELLE (acute kidney injury) ([address] Diagnoses of Non-traumatic rhabdomyolysis and Fall, initial encounter were also pertinent to this visit. has a past medical history of Anxiety, Atrial fibrillation ([address] Benign positional vertigo, CAD (coronary artery disease), Chest pain, Depression, Diabetes mellitus ([address] Diabetic neuropathy ([address] Hyperlipidemia, Hypertension, and Migraine. has a past surgical history that includes Percutaneous Transluminal Coronary Angio; Cardiac catheterization; Coronary angioplasty with stent (02/25/2017);  Appendectomy; Coronary angioplasty with stent (07/11/2012); and Coronary angioplasty with stent (12/11/2020). Restrictions  Restrictions/Precautions  Required Braces or Orthoses?: No  Position Activity Restriction  Other position/activity restrictions: Up with assist, R UE tremors    Subjective   General  Patient assessed for rehabilitation services?: Yes  Family / Caregiver Present: No  General Comment  Comments: RN ok'd for OT/PT co-eval this date. Pt agreeable to session and pleasant/cooperative throughout  Patient Currently in Pain: Yes  Pain Assessment  Pain Assessment: 0-10  Pain Level: 7  Pain Location: Arm;Leg  Pain Orientation: Right  Non-Pharmaceutical Pain Intervention(s): Ambulation/Increased Activity;Repositioned;Distraction; Therapeutic presence  Response to Pain Intervention: Patient Satisfied  Vital Signs  Patient Currently in Pain: Yes     Social/Functional History  Social/Functional History  Lives With: Alone  Type of Home: Trailer  Home Access: Stairs to enter with rails  Entrance Stairs - Number of Steps: 3  Entrance Stairs - Rails: Right (R rail in front; L rail in back)  Bathroom Shower/Tub: Tub/Shower unit  Bathroom Toilet: Standard  Bathroom Equipment: Tub transfer bench, Grab bars in shower  Home Equipment: [employer], Standard walker (Uses rollator to pivot into manual wheelchair)  Receives Help From: Family, Home health  ADL Assistance: Needs assistance  Bath:  (RN completes)  Dressing:  (RN dresses LB; pt dresses UB)  Toileting:  (Pt reports requiring assistance, but able to complete when home alone)  Homemaking Assistance: Needs assistance (Daughters complete cooking and cleaning. Daughters, nurse, or patient complete laundry)  Homemaking Responsibilities: No  Active : No  Patient's  Info:  Son or son-in-law  Leisure & Hobbies: Has been staying in d/t COVID-19  [facility] ~2.5 hours       Objective   Vision: Impaired  Vision Exceptions: Wears glasses at all times  Hearing: Within functional limits    Orientation  Overall Orientation Status: Within Functional Limits     Balance  Sitting Balance: Stand by assistance (Static sitting EOB ~8 mins at SBA)  Standing Balance: Minimal assistance  Standing Balance  Time: ~2 mins  Activity: in prep for/after functional mobility  Comment: with RW  Functional Mobility  Functional - Mobility Device: Rolling Walker  Activity: Other (bed> household distances> bedside recliner)  Assist Level: Minimal assistance  Functional Mobility Comments: Pt demo'd appropriate use and navigation with RW; short, shuffled steps with forward flexed posture  ADL  Feeding: Contact guard assistance ;Setup; Increased time to complete  Grooming: Setup; Increased time to complete;Contact guard assistance;Verbal cueing (Pt washed face sitting supported in bedside recliner at mod I; pt would req increased assistance manipulating ADL supplies)  UE Bathing: Setup; Increased time to complete;Verbal cueing; Moderate assistance  LE Bathing: Maximum assistance;Setup; Increased time to complete;Verbal cueing  UE Dressing: Moderate assistance;Setup;Verbal cueing; Increased time to complete  LE Dressing: Maximum assistance;Setup;Verbal cueing; Increased time to complete (Pt attempted to doff/don L sock in long sitting in bedside recliner but unable to reach distal extremities or attain figure four adaptive position)  Toileting: Maximum assistance;Setup; Increased time to complete  Tone RUE  RUE Tone: Normotonic  Tone LUE  LUE Tone: Normotonic  Coordination  Movements Are Fluid And Coordinated: No  Coordination and Movement description: Fine motor impairments; Resting tremors; Intention tremors;Decreased speed;Decreased accuracy; Right UE  Quality of Movement Other  Comment: R UE tremors inc with functional movement     Bed mobility  Supine to Sit: Minimal assistance (for trunk progression)  Sit to Supine: Unable to assess (Retired to bedside recliner)  Scooting: Minimal assistance  Transfers  Sit to stand: Minimal assistance;2 Person assistance  Stand to sit: Minimal assistance  Transfer Comments: Education provided on proper hand placement for safe t/f with good carryover after additional VC/tactile cues     Cognition  Overall Cognitive Status: Exceptions  Arousal/Alertness: Delayed responses to stimuli  Following Commands: Follows one step commands with increased time; Follows one step commands with repetition  Attention Span: Difficulty dividing attention  Safety Judgement: Decreased awareness of need for assistance;Decreased awareness of need for safety  Problem Solving: Assistance required to generate solutions;Assistance required to correct errors made;Assistance required to identify errors made;Assistance required to implement solutions;Decreased awareness of errors  Insights: Decreased awareness of deficits  Initiation: Requires cues for some  Sequencing: Requires cues for some        Sensation  Overall Sensation Status: Impaired (reports history of diabetic neuropathy in B hands/feet)        LUE AROM : WFL  Left Hand AROM: WFL  RUE AROM : WFL  RUE General AROM: ~100 degrees shoulder flexion, pt reports pain  Right Hand AROM: Exceptions  Right Hand General AROM: Increased tremors while attemping AROM  LUE Strength  Gross LUE Strength: WFL  L Hand General: 4+/5  LUE Strength Comment: LUE grossly 4+/5  RUE Strength  Gross RUE Strength: Exceptions to [facility]  R Hand General: NT  RUE Strength Comment: NANDA R UE strength d/t increased tremors with functional movement         Plan   Plan  Times per week: 3-x/wk  Current Treatment Recommendations: Strengthening, Patient/Caregiver Education & Training, Home Management Training, Equipment Evaluation, Education, & procurement, ROM, Balance Training, Functional Mobility Training, Endurance Training, Cognitive/Perceptual Training, Self-Care / ADL, Safety Education & Training    AM-PAC Score   AM-[facility] Inpatient Daily Activity Raw Score: 14 (09/13/21 0947)  AM-PAC Inpatient ADL T-Scale Score : 33.39 (09/13/21 0947)  ADL Inpatient CMS 0-100% Score: 59.67 (09/13/21 0947)  ADL Inpatient CMS G-Code Modifier : CK (09/13/21 0947)    Goals  Short term goals  Time Frame for Short term goals: Patient will, by discharge:  Short term goal 1: demo UB ADLs at [facility] using AE PRN  Short term goal 2: demo LB ADLs/toileting at min A using AE PRN  Short term goal 3: demo grooming tasks at [facility] I using AE PRN  Short term goal 4: demo functional transfers/mobility at [facility] using LRD to increase engagement in ADLs/IADLs  Short term goal 5: demo 8+ mins dynamic standing tolerance at [facility] while reaching outside [place] to promote participation in functional tasks  Short term goal 6: demo good safety awareness during functional tasks with no more than 1 VC to promote safe participation in ADLs/IADLs       Therapy Time   Individual Concurrent Group Co-treatment   Time In 0803         Time Out 0836         Minutes 33         Timed Code Treatment Minutes: [address] S/OT

## 2021-09-13 NOTE — PROGRESS NOTES
Physical Therapy    Facility/Department: [address] ORTHO/MED SURG  Initial Assessment    NAME: Vikki Valderrama  : 1945  MRN: [id number]    Date of Service: 2021  Chief Complaint   Patient presents with    Fall     right side leg weakness, hit head, no LOC     Discharge Recommendations:  Patient would benefit from continued therapy after discharge   Assessment   Body structures, Functions, Activity limitations: Decreased functional mobility ; Decreased strength;Decreased endurance; Increased pain  Assessment: The pt ambulated 25 ft with a RW x min assist. She moved slowly with a decreased stride length. She could benefit frpom a continuation of PT for gait and strengthening following her DC  Prognosis: Good  Decision Making: Medium Complexity  PT Education: Goals;PT Role;Plan of Care  REQUIRES PT FOLLOW UP: Yes  Activity Tolerance  Activity Tolerance: Patient limited by fatigue;Patient limited by pain       Patient Diagnosis(es): The primary encounter diagnosis was MARIELLE (acute kidney injury) ([address] Diagnoses of Non-traumatic rhabdomyolysis and Fall, initial encounter were also pertinent to this visit. has a past medical history of Anxiety, Atrial fibrillation ([address] Benign positional vertigo, CAD (coronary artery disease), Chest pain, Depression, Diabetes mellitus ([address] Diabetic neuropathy ([address] Hyperlipidemia, Hypertension, and Migraine. has a past surgical history that includes Percutaneous Transluminal Coronary Angio; Cardiac catheterization; Coronary angioplasty with stent (2017); Appendectomy; Coronary angioplasty with stent (2012); and Coronary angioplasty with stent (2020). Restrictions  Restrictions/Precautions  Required Braces or Orthoses?: No  Position Activity Restriction  Other position/activity restrictions:  Up with assist, R UE tremors  Vision/Hearing  Vision: Impaired  Vision Exceptions: Wears glasses at all times  Hearing: Within functional limits Subjective  General  Patient assessed for rehabilitation services?: Yes  Response To Previous Treatment: Not applicable  Family / Caregiver Present: No  Follows Commands: Within Functional Limits  Subjective  Subjective: RN and pt agreeable to PT eval  Pain Screening  Patient Currently in Pain: Yes  Pain Assessment  Pain Assessment: 0-10  Pain Level: 7  Pain Location: Arm;Leg  Pain Orientation: Right  Vital Signs  Patient Currently in Pain: Yes       Orientation  Orientation  Overall Orientation Status: Within Functional Limits  Social/Functional History  Social/Functional History  Lives With: Alone  Type of Home: Trailer  Home Access: Stairs to enter with rails  Entrance Stairs - Number of Steps: 3  Entrance Stairs - Rails: Right (R rail in front; L rail in back)  Bathroom Shower/Tub: Tub/Shower unit  Bathroom Toilet: Standard  Bathroom Equipment: Tub transfer bench, Grab bars in shower  Home Equipment: [employer], Standard walker (Uses rollator to pivot into manual wheelchair)  Receives Help From: Family, Home health  ADL Assistance: Needs assistance  Bath:  (RN completes)  Dressing:  (RN dresses LB; pt dresses UB)  Toileting:  (Pt reports requiring assistance, but able to complete when home alone)  Homemaking Assistance: Needs assistance (Daughters complete cooking and cleaning. Daughters, nurse, or patient complete laundry)  Homemaking Responsibilities: No  Active : No  Patient's  Info:  Son or son-in-law  Leisure & Hobbies: Has been staying in d/t COVID-19  Additional [facility] for ~2.5 hours  Cognition      Objective     AROM RLE (degrees)  RLE AROM: WFL  AROM LLE (degrees)  LLE AROM : WFL  AROM RUE (degrees)  RUE AROM : WFL  AROM LUE (degrees)  LUE AROM : WFL  Strength RLE  Comment: N/T  Strength LLE  Strength LLE: WFL  Strength RUE  Strength RUE: WFL  Strength LUE  Strength LUE: WFL     Sensation  Overall Sensation Status: Impaired  Bed mobility  Supine to Sit: Minimal assistance  Sit to Supine: Minimal assistance  Scooting: Minimal assistance  Transfers  Sit to Stand: Minimal Assistance  Stand to sit: Minimal Assistance  Ambulation  Ambulation?: Yes  Ambulation 1  Surface: level tile  Device: Rolling Walker  Assistance: Minimal assistance  Gait Deviations: Slow Cherise;Decreased step height;Decreased step length  Distance: amb 25 ft with a RW x min assist     Balance  Posture: Good  Sitting - Static: Good  Sitting - Dynamic: Fair  Standing - Static: Fair  Standing - Dynamic: [address]  Times per week: 5-6x wk  Current Treatment Recommendations: Strengthening, Functional Mobility Training, Gait Training, Safety Education & Training, Endurance Training, Stair training, Pain Management  Safety Devices  Type of devices: Nurse notified, Left in chair, Chair alarm in place, Call light within reach, Patient at risk for falls    G-Code     OutComes Score     AM-PAC Score  AM-PAC Inpatient Mobility Raw Score : 16 (09/13/21 1126)  AM-PAC Inpatient T-Scale Score : 40.78 (09/13/21 1126)  Mobility Inpatient CMS 0-100% Score: 54.16 (09/13/21 1126)  Mobility Inpatient CMS G-Code Modifier : CK (09/13/21 1126)     Goals  Short term goals  Time Frame for Short term goals: 10 visits  Short term goal 1: transfers with SBA  Short term goal 2: amb 150 ft with a RW x SBA  Short term goal 3: ascend/descend 4 steps with SBA  Short term goal 4: 20 min exercise program x SBA  Patient Goals   Patient goals : Return home     Therapy Time   Individual Concurrent Group Co-treatment   Time In 0803         Time Out 0830         Minutes 27             [address] of [address], PT

## 2021-09-13 NOTE — CARE COORDINATION
Readmission Assessment  Number of Days since last admission?: 8-30 days  Previous Disposition: Home with Home Health  Who is being Interviewed: Patient  What was the patient's/caregiver's perception as to why they think they needed to return back to the hospital?: Other (Comment) (Krys Sox at home and couldn't get up off the floor)  Did you visit your Primary Care Physician after you left the hospital, before you returned this time?: No  Why weren't you able to visit your PCP?: Other (Comment)  Did you see a specialist, such as Cardiac, Pulmonary, Orthopedic Physician, etc. after you left the hospital?: No  Who advised the patient to return to the hospital?: [address] Staff (Found on floor by home health nurse)  Does the patient report anything that got in the way of taking their medications?: No  In our efforts to provide the best possible care to you and others like you, can you think of anything that we could have done to help you after you left the hospital the first time, so that you might not have needed to return so soon?: Other (Comment) (Wants a rolling walker)

## 2021-09-14 LAB
-: NORMAL
ANION GAP SERPL CALCULATED.3IONS-SCNC: 11 MMOL/L (ref 9–17)
BUN BLDV-MCNC: 20 MG/DL (ref 8–23)
BUN/CREAT BLD: ABNORMAL (ref 9–20)
CALCIUM SERPL-MCNC: 8.8 MG/DL (ref 8.6–10.4)
CHLORIDE BLD-SCNC: 107 MMOL/L (ref 98–107)
CO2: 24 MMOL/L (ref 20–31)
COMPLEMENT C3: 140 MG/DL (ref 90–180)
COMPLEMENT C4: 43 MG/DL (ref 10–40)
CREAT SERPL-MCNC: 2.13 MG/DL (ref 0.5–0.9)
GFR AFRICAN AMERICAN: 27 ML/MIN
GFR NON-AFRICAN AMERICAN: 23 ML/MIN
GFR SERPL CREATININE-BSD FRML MDRD: ABNORMAL ML/MIN/{1.73_M2}
GFR SERPL CREATININE-BSD FRML MDRD: ABNORMAL ML/MIN/{1.73_M2}
GLUCOSE BLD-MCNC: 233 MG/DL (ref 65–105)
GLUCOSE BLD-MCNC: 240 MG/DL (ref 65–105)
GLUCOSE BLD-MCNC: 263 MG/DL (ref 65–105)
GLUCOSE BLD-MCNC: 35 MG/DL (ref 65–105)
GLUCOSE BLD-MCNC: 69 MG/DL (ref 70–99)
GLUCOSE BLD-MCNC: 84 MG/DL (ref 65–105)
MAGNESIUM: 2.4 MG/DL (ref 1.6–2.6)
POTASSIUM SERPL-SCNC: 4.2 MMOL/L (ref 3.7–5.3)
REASON FOR REJECTION: NORMAL
SODIUM BLD-SCNC: 142 MMOL/L (ref 135–144)
TROPONIN INTERP: ABNORMAL
TROPONIN T: ABNORMAL NG/ML
TROPONIN, HIGH SENSITIVITY: 43 NG/L (ref 0–14)
ZZ NTE CLEAN UP: ORDERED TEST: NORMAL
ZZ NTE WITH NAME CLEAN UP: SPECIMEN SOURCE: NORMAL

## 2021-09-14 PROCEDURE — 86225 DNA ANTIBODY NATIVE: CPT

## 2021-09-14 PROCEDURE — 6370000000 HC RX 637 (ALT 250 FOR IP): Performed by: INTERNAL MEDICINE

## 2021-09-14 PROCEDURE — 80048 BASIC METABOLIC PNL TOTAL CA: CPT

## 2021-09-14 PROCEDURE — 36415 COLL VENOUS BLD VENIPUNCTURE: CPT

## 2021-09-14 PROCEDURE — 1200000000 HC SEMI PRIVATE

## 2021-09-14 PROCEDURE — 84165 PROTEIN E-PHORESIS SERUM: CPT

## 2021-09-14 PROCEDURE — 84155 ASSAY OF PROTEIN SERUM: CPT

## 2021-09-14 PROCEDURE — 6370000000 HC RX 637 (ALT 250 FOR IP): Performed by: NURSE PRACTITIONER

## 2021-09-14 PROCEDURE — 83735 ASSAY OF MAGNESIUM: CPT

## 2021-09-14 PROCEDURE — 86160 COMPLEMENT ANTIGEN: CPT

## 2021-09-14 PROCEDURE — 99233 SBSQ HOSP IP/OBS HIGH 50: CPT | Performed by: INTERNAL MEDICINE

## 2021-09-14 PROCEDURE — 82947 ASSAY GLUCOSE BLOOD QUANT: CPT

## 2021-09-14 PROCEDURE — 84484 ASSAY OF TROPONIN QUANT: CPT

## 2021-09-14 PROCEDURE — 99222 1ST HOSP IP/OBS MODERATE 55: CPT | Performed by: INTERNAL MEDICINE

## 2021-09-14 PROCEDURE — 83883 ASSAY NEPHELOMETRY NOT SPEC: CPT

## 2021-09-14 PROCEDURE — 86038 ANTINUCLEAR ANTIBODIES: CPT

## 2021-09-14 RX ORDER — ASPIRIN 81 MG/1
81 TABLET, CHEWABLE ORAL DAILY
Status: DISCONTINUED | OUTPATIENT
Start: 2021-09-14 | End: 2021-09-16 | Stop reason: HOSPADM

## 2021-09-14 RX ADMIN — GABAPENTIN 600 MG: 600 TABLET ORAL at 08:10

## 2021-09-14 RX ADMIN — OXYCODONE HYDROCHLORIDE AND ACETAMINOPHEN 1 TABLET: 5; 325 TABLET ORAL at 08:23

## 2021-09-14 RX ADMIN — INSULIN GLARGINE 50 UNITS: 100 INJECTION, SOLUTION SUBCUTANEOUS at 20:29

## 2021-09-14 RX ADMIN — ESCITALOPRAM OXALATE 20 MG: 10 TABLET ORAL at 08:10

## 2021-09-14 RX ADMIN — MUPIROCIN: 20 OINTMENT TOPICAL at 08:12

## 2021-09-14 RX ADMIN — MUPIROCIN: 20 OINTMENT TOPICAL at 15:31

## 2021-09-14 RX ADMIN — CETIRIZINE HYDROCHLORIDE 10 MG: 10 TABLET ORAL at 08:10

## 2021-09-14 RX ADMIN — TICAGRELOR 90 MG: 90 TABLET ORAL at 20:29

## 2021-09-14 RX ADMIN — ISOSORBIDE MONONITRATE 30 MG: 30 TABLET ORAL at 08:10

## 2021-09-14 RX ADMIN — MUPIROCIN: 20 OINTMENT TOPICAL at 20:30

## 2021-09-14 RX ADMIN — ROSUVASTATIN CALCIUM 40 MG: 20 TABLET, FILM COATED ORAL at 08:11

## 2021-09-14 RX ADMIN — GABAPENTIN 600 MG: 600 TABLET ORAL at 14:36

## 2021-09-14 RX ADMIN — OXYCODONE HYDROCHLORIDE AND ACETAMINOPHEN 1 TABLET: 5; 325 TABLET ORAL at 20:29

## 2021-09-14 RX ADMIN — ASPIRIN 81 MG: 81 TABLET, CHEWABLE ORAL at 13:40

## 2021-09-14 RX ADMIN — CARVEDILOL 6.25 MG: 6.25 TABLET, FILM COATED ORAL at 18:14

## 2021-09-14 RX ADMIN — PANTOPRAZOLE SODIUM 40 MG: 40 TABLET, DELAYED RELEASE ORAL at 07:38

## 2021-09-14 RX ADMIN — GABAPENTIN 600 MG: 600 TABLET ORAL at 20:29

## 2021-09-14 RX ADMIN — MICONAZOLE NITRATE: 20 POWDER TOPICAL at 20:30

## 2021-09-14 RX ADMIN — Medication 400 UNITS: at 08:09

## 2021-09-14 RX ADMIN — TICAGRELOR 90 MG: 90 TABLET ORAL at 08:11

## 2021-09-14 RX ADMIN — Medication 1000 UNITS: at 08:10

## 2021-09-14 RX ADMIN — CARVEDILOL 6.25 MG: 6.25 TABLET, FILM COATED ORAL at 08:11

## 2021-09-14 RX ADMIN — MICONAZOLE NITRATE: 20 POWDER TOPICAL at 08:12

## 2021-09-14 RX ADMIN — FLUTICASONE PROPIONATE 2 SPRAY: 50 SPRAY, METERED NASAL at 08:11

## 2021-09-14 RX ADMIN — OXYCODONE HYDROCHLORIDE AND ACETAMINOPHEN 1 TABLET: 5; 325 TABLET ORAL at 14:36

## 2021-09-14 ASSESSMENT — PAIN DESCRIPTION - PROGRESSION
CLINICAL_PROGRESSION: NOT CHANGED
CLINICAL_PROGRESSION: GRADUALLY IMPROVING

## 2021-09-14 ASSESSMENT — PAIN DESCRIPTION - ONSET: ONSET: ON-GOING

## 2021-09-14 ASSESSMENT — PAIN DESCRIPTION - DESCRIPTORS
DESCRIPTORS: SHARP
DESCRIPTORS: SHARP

## 2021-09-14 ASSESSMENT — PAIN SCALES - GENERAL
PAINLEVEL_OUTOF10: 5
PAINLEVEL_OUTOF10: 5
PAINLEVEL_OUTOF10: 4
PAINLEVEL_OUTOF10: 5
PAINLEVEL_OUTOF10: 7
PAINLEVEL_OUTOF10: 8

## 2021-09-14 ASSESSMENT — PAIN - FUNCTIONAL ASSESSMENT: PAIN_FUNCTIONAL_ASSESSMENT: PREVENTS OR INTERFERES SOME ACTIVE ACTIVITIES AND ADLS

## 2021-09-14 ASSESSMENT — PAIN DESCRIPTION - PAIN TYPE
TYPE: ACUTE PAIN
TYPE: ACUTE PAIN

## 2021-09-14 ASSESSMENT — PAIN DESCRIPTION - ORIENTATION
ORIENTATION: RIGHT
ORIENTATION: RIGHT

## 2021-09-14 ASSESSMENT — PAIN DESCRIPTION - LOCATION
LOCATION: LEG
LOCATION: HAND;HEAD;LEG

## 2021-09-14 ASSESSMENT — PAIN DESCRIPTION - FREQUENCY
FREQUENCY: CONTINUOUS
FREQUENCY: INTERMITTENT

## 2021-09-14 NOTE — PROGRESS NOTES
Physical Therapy        Physical Therapy Cancel Note      DATE: 2021    NAME: Vickie Cummings  MRN: [id number]   : 1945      Patient not seen this date for Physical Therapy due to:    Patient Declined: Pt not wanting to get up today, stating she is too tired and did not get any sleep last night.       Electronically signed by Flor Reese PTA on 2021 at 10:43 AM

## 2021-09-14 NOTE — CONSULTS
Renal Consult Note    Patient :  Shalom Charles; 68 y.o. MRN# [id number]  Location:  [facility]1294-  Attending:  Laura Skaggs MD  Admit Date:  9/11/2021   Hospital Day: 3    Reason for Consult:     Asked by Dr Laura Skaggs MD to see for MARIELLE/Elevated Creatinine. History Obtained From:     patient    History of Present Illness:     Shalom Charles; 68 y.o. female with past medical history as mentioned below. Known history of type 2 diabetes diagnosed more than 10 years ago without any retinopathy, known history of coronary artery disease history of angioplasty and stent placement last one in December 2020. Also has known history of atrial fibrillation. Her baseline creatinine up until November was 0.9-1.0 range. Subsequently I believe she had cardiac catheterization in December 2020. No labs have been checked subsequently till September 2021. At which time creatinine has been in the 1.1-1.3 range. She was recently admitted to the hospital after an episode of viral enterocolitis, presenting with symptoms of diarrhea nausea and vomiting. .  Developed acute kidney injury at that time, creatinine peaked at 1.4. She was discharged home. She tells me that she has been having recurrent vertigo and as result has been following. On the day of admission she had a fall after she tripped according to her and then was on the ground for 11 hours. She was finally brought into the hospital.  On presentation she had a creatinine of 1.7 which is now gone up to 2.1. CPK was in the 500-600 range. Nephrology has been consulted for acute kidney injury. Work-up so far shows urinalysis to be benign and bland with no RBCs, ultrasound shows right kidney to be 11.2 and left to be 15.1 cm. Previously JAZZY and hepatitis A, B, and C serologies have been negative.   Proteinuria is minimal.      No history of recent contrast exposure, No h/o prolonged NSAIDs use in the past, No h/o nephrolithiasis, No recent skin rashes or arthralgias, No hematuria or pyuria noticed in the recent past. Doesn't report any reduction in the urine output recently. Non report of any obstructive urinary symptoms (urgency, frequency, weak stream, straining while urination). No h/o recurrent UTIs in the past.    Past History/Allergies? Social History:     Past Medical History:   Diagnosis Date    Anxiety     Atrial fibrillation (HCC)     chronic-takes xarelto    Benign positional vertigo     CAD (coronary artery disease)     Chest pain     Depression     Diabetes mellitus (HCC)     Diabetic neuropathy (HCC)     Hyperlipidemia     Hypertension     Migraine     Migraine headaches aggravated with sublingual nitroglycerin       Allergies   Allergen Reactions    Penicillins      Other reaction(s): Hives    Sulfa Antibiotics      Other reaction(s): Rash       Social History     Socioeconomic History    Marital status:      Spouse name: Not on file    Number of children: Not on file    Years of education: Not on file    Highest education level: Not on file   Occupational History    Not on file   Tobacco Use    Smoking status: Never Smoker    Smokeless tobacco: Never Used   Substance and Sexual Activity    Alcohol use: No    Drug use: No    Sexual activity: Never   Other Topics Concern    Not on file   Social History Narrative    Not on file     Social Determinants of Health     Financial Resource Strain:     Difficulty of Paying Living Expenses:    Food Insecurity:     Worried About Running Out of Food in the Last Year:     Ran Out of Food in the Last Year:    Transportation Needs:     Lack of Transportation (Medical):      Lack of Transportation (Non-Medical):    Physical Activity:     Days of Exercise per Week:     Minutes of Exercise per Session:    Stress:     Feeling of Stress :    Social Connections:     Frequency of Communication with Friends and Family:     Frequency of Social Gatherings with Friends and Family:     Attends [employer] Services:     Active Member of Clubs or Organizations:     Attends Club or Organization Meetings:     Marital Status:    Intimate Partner Violence:     Fear of Current or Ex-Partner:     Emotionally Abused:     Physically Abused:     Sexually Abused:        Family History:        Family History   Problem Relation Age of Onset    Cancer Mother     Cancer Father        Outpatient Medications:     Medications Prior to Admission: insulin detemir (LEVEMIR FLEXTOUCH) 100 UNIT/ML injection pen, Inject 50 Units into the skin 2 times daily  nystatin (MYCOSTATIN) 389119 UNIT/GM powder, Apply 3 times daily. spironolactone (ALDACTONE) 25 MG tablet, Take 1 tablet by mouth daily  Continuous Blood Gluc Sensor (FREESTYLE SYLVIA 14 DAY SENSOR) MISC, 1 each by Does not apply route every 14 days  omeprazole (PRILOSEC) 20 MG delayed release capsule, Take 1 capsule by mouth Daily  Control Gel Formula Dressing (DUODERM CGF DRESSING) MISC, Apply 2 each topically daily  Misc.  Devices (STEP N REST WALKER) MISC, 1 each by Does not apply route continuous Seated, wheeled walker  gabapentin (NEURONTIN) 600 MG tablet, TAKE ONE TABLET BY MOUTH THREE TIMES A DAY  fluticasone (FLONASE) 50 MCG/ACT nasal spray, 2 sprays by Each Nostril route daily  Zinc Oxide 15 % CREA, Apply to buttocks up to 4 times daily - may use any concentratino  carvedilol (COREG) 25 MG tablet, Take 1 tablet by mouth 2 times daily (with meals) Hold for sbp<120 or hr <50, give 1 hour from other bp meds  isosorbide mononitrate (IMDUR) 30 MG extended release tablet, Take 1 tablet by mouth daily  oxyCODONE-acetaminophen (PERCOCET) 5-325 MG per tablet,   ticagrelor (BRILINTA) 90 MG TABS tablet, Take 1 tablet by mouth 2 times daily  vitamin D3 (CHOLECALCIFEROL) 25 MCG (1000 UT) TABS tablet, Take 1 tablet by mouth daily  nitroGLYCERIN (NITROSTAT) 0.4 MG SL tablet, place 1 tablet under the tongue if needed every 5 minutes if needed for CHEST PAIN  clonazePAM (KLONOPIN) 0.5 MG tablet, take 1 tablet by mouth twice a day if needed for anxiety  Misc. Devices (WALKER) MISC, Diagnosis: right 5th metatarsal fracture, pain in limb  Duration: 3 months  docusate sodium (COLACE) 100 MG capsule, Take 1 capsule by mouth daily as needed for Constipation  Misc. Devices (COMMODE BEDSIDE) MISC, 1 Device by Does not apply route daily  Continuous Blood Gluc  (FREESTYLE SYLVIA 14 DAY READER) JAQUELINE, 1 each by Does not apply route continuous [facility]  rivaroxaban (XARELTO) 20 MG TABS tablet, Take 1 tablet by mouth daily (with breakfast)  glucose monitoring kit (FREESTYLE) monitoring kit, 1 kit by Does not apply route 4 times daily . Jayla Pomogatel . Dx E11.65, has tremors and cannot use conventional meter without great difficulty. Please provide supplies for 3 months, rf 3,[facility] [address].  Misc. Devices (STEP N REST WALKER) MISC, 1 each by Does not apply route continuous Seated, wheeled walker  escitalopram (LEXAPRO) 20 MG tablet, Take 1 tablet by mouth daily  hydrOXYzine (ATARAX) 25 MG tablet, take 1 tablet by mouth every 8 hours rectally for itching  mupirocin (BACTROBAN) 2 % ointment, apply topically to affected area three times a day  insulin aspart (NOVOLOG FLEXPEN) 100 UNIT/ML injection pen, Use TID before meals according to scale - max 45 units a day  loratadine (CLARITIN) 10 MG tablet, Take 1 tablet every day by oral route. rosuvastatin (CRESTOR) 40 MG tablet, Take 1 tablet by mouth daily  albuterol sulfate  (90 Base) MCG/ACT inhaler, Inhale 2 puffs into the lungs every 6 hours as needed for Wheezing  vitamin E 400 UNIT capsule, Take 400 Units by mouth daily.     Current Medications:     Scheduled Meds:    aspirin  81 mg Oral Daily    carvedilol  6.25 mg Oral BID WC    vitamin E  400 Units Oral Daily    rosuvastatin  40 mg Oral Daily    mupirocin   Topical TID    cetirizine  10 mg Oral Daily    escitalopram  20 mg Oral Daily    [Held by provider] rivaroxaban  20 mg Oral Daily with breakfast    Vitamin D  1,000 Units Oral Daily    ticagrelor  90 mg Oral BID    isosorbide mononitrate  30 mg Oral Daily    fluticasone  2 spray Each Nostril Daily    gabapentin  600 mg Oral TID    pantoprazole  40 mg Oral QAM AC    miconazole   Topical BID    sodium chloride flush  5-40 mL IntraVENous 2 times per day    insulin glargine  50 Units SubCUTAneous BID     Continuous Infusions:    dextrose      sodium chloride       PRN Meds:  glucose, dextrose, glucagon (rDNA), dextrose, loperamide, benzonatate, albuterol sulfate HFA, docusate sodium, clonazePAM, nitroGLYCERIN, oxyCODONE-acetaminophen, sodium chloride flush, sodium chloride, potassium chloride **OR** potassium alternative oral replacement **OR** potassium chloride, magnesium sulfate, ondansetron **OR** ondansetron, polyethylene glycol, acetaminophen **OR** acetaminophen    Review of Systems:     Constitutional: No fever, no chills, no lethargy, no weakness. HEENT:  No headache, otalgia, itchy eyes, nasal discharge or sore throat. Cardiac:  No chest pain, dyspnea, orthopnea or PND. Chest:  No cough, phlegm or wheezing. Abdomen:  No abdominal pain, nausea or vomiting. Neuro:  No focal weakness, abnormal movements orseizure like activity. Skin:   No rashes, no itching. :   No hematuria, no pyuria, no dysuria, no flank pain. Extremities:  No swelling or joint pains. ROS was otherwise negative except as mentioned in the [address] Input/Output:       I/O last 3 completed shifts:   In: 200 [P.O.:200]  Out: -   Patient Vitals for the past 96 hrs (Last 3 readings):   Weight   21 1745 225 lb (102.1 kg)   21 0951 225 lb (102.1 kg)     Vital Signs:   Temperature:  Temp: 98.6 °F (37 °C)  TMax:   Temp (24hrs), Av °F (37.2 °C), Min:98.6 °F (37 °C), Max:99.3 °F (37.4 °C)    Respirations:  Resp: 23  Pulse:   Pulse: 62  BP:    BP: (!) 143/76  BP Range: Systolic (03ZIS), RNC:459 , Min:143 , KGM:730       Diastolic (55KBU), AGW:18, Min:71, Max:76      Physical Examination:     General:  AAO x 3, speaking in full sentences, no accessory muscle use. HEENT: Atraumatic, normocephalic, no throat congestion, moist mucosa. Eyes:   Pupils equal, round and reactive to light, EOMI. Neck:   No JVD, no thyromegaly, no lymphadenopathy. Chest:  Bilateral vesicular breath sounds, no rales or wheezes. Cardiac:  S1 S2 RR, no murmurs, gallops or rubs, JVP not raised. Abdomen: Soft, non-tender, no masses or organomegaly, BS audible. :   No suprapubic or flank tenderness. Neuro:  AAO x 3, No FND. SKIN:  No rashes, good skin turgor. Extremities:  No edema, palpable peripheral pulses, no calf tenderness. Labs:       Recent Labs     09/12/21  0829   WBC 5.8   RBC 3.11*   HGB 9.1*   HCT 29.9*   MCV 96.1   MCH 29.3   MCHC 30.4   RDW 13.0      MPV 11.0      BMP:   Recent Labs     09/12/21  0829 09/13/21  0509 09/14/21  0509    133* 142   K 4.1 3.9 4.2    103 107   CO2 25 21 24   BUN 25* 23 20   CREATININE 1.65* 1.95* 2.13*   GLUCOSE 176* 157* 69*   CALCIUM 8.8 7.9* 8.8      Phosphorus:   No results for input(s): PHOS in the last 72 hours.   Magnesium:    Recent Labs     09/14/21  0509   MG 2.4     Albumin:    Recent Labs     09/12/21  0829   LABALBU 3.5     BNP:      Lab Results   Component Value Date    BNP 94 07/13/2012     JAZZY:      Lab Results   Component Value Date    JAZZY NEGATIVE 11/19/2019     SPEP:  Lab Results   Component Value Date    PROT 6.4 09/12/2021     UPEP:   No results found for: LABPE  C3:   No results found for: C3  C4:   No results found for: C4  MPO ANCA:   No results found for: MPO  PR3 ANCA:   No results found for: PR3  Anti-GBM:   No results found for: GBMABIGG  Hep BsAg:         Lab Results   Component Value Date    HEPBSAG NONREACTIVE 11/19/2019     Hep C AB:          Lab Results   Component Value Date    HEPCAB NONREACTIVE 11/19/2019 Urinalysis/Chemistries:      Lab Results   Component Value Date    NITRU NEGATIVE 09/01/2021    COLORU YELLOW 09/01/2021    PHUR 7.5 09/01/2021    WBCUA 2 TO 5 09/01/2021    RBCUA None 09/01/2021    MUCUS NOT REPORTED 09/01/2021    TRICHOMONAS NOT REPORTED 09/01/2021    YEAST NOT REPORTED 09/01/2021    BACTERIA MANY 09/01/2021    CLARITYU clear 12/03/2019    SPECGRAV 1.009 09/01/2021    LEUKOCYTESUR NEGATIVE 09/01/2021    UROBILINOGEN Normal 09/01/2021    BILIRUBINUR NEGATIVE 09/01/2021    BILIRUBINUR neg 12/03/2019    BLOODU neg 12/03/2019    GLUCOSEU 1+ 09/01/2021    KETUA NEGATIVE 09/01/2021    AMORPHOUS NOT REPORTED 09/01/2021     Urine Sodium:     Lab Results   Component Value Date    KARTHIK 141 09/02/2021     Urine Potassium:  No results found for: KUR  Urine Chloride:  No results found for: CLUR  Urine Osmolarity: No results found for: OSMOU  Urine Protein:   No results found for: TPU  Urine Creatinine:     Lab Results   Component Value Date    LABCREA 107.9 03/16/2017    LABCREA 172.6 03/22/2016     UPC:     Urine Eosinophils:  No components found for: UEOS    Radiology:     CXR:     Assessment:     1. Acute Kidney Injury: Secondary to ischemic ATN from intravascularly depletion from insensible losses, post fall and patient was in the ground for 11 hours, high suspicion for decreased renal perfusion. Baseline creatinine I suspect is in the 1.1-1.3 range which is now up to 2.1, urine output not accurate likely evolving  2. 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 Type 2 diabetes with complications  4. Atherosclerotic heart disease history of stent placement preserve ejection fraction  5. Recurrent falls  6. Recent enterocolitis    Plan:   1. Change IV fluids to 0.9 saline at 75 mils an hour  2.   Keep systolic pressure more than

## 2021-09-14 NOTE — PROGRESS NOTES
[facility]  Progress Note                   Date:   9/14/2021  Patient name: Charleen Gomez  Date of admission:  9/11/2021  9:59 AM  MRN:   [id number]  YOB: 1945  PCP: Sheldon Smith MD    Reason for Admission: MARIELLE (acute kidney injury) [facility]) [N17.9]  Fall, initial encounter [W19. XXXA]  Fall at home, initial encounter [U03. XXXA, X17.333]  Non-traumatic rhabdomyolysis [M62.82]    Subjective:       Clinical Changes /Abnormalities:  Patient seen and examined in bed in room. Reports ongoing dizziness, SOB with exertion and reproducible chest discomfort. Telemetry ordered.   SR on tele HR 74    Review of Systems    Medications:   Scheduled Meds:   carvedilol  6.25 mg Oral BID WC    vitamin E  400 Units Oral Daily    rosuvastatin  40 mg Oral Daily    mupirocin   Topical TID    cetirizine  10 mg Oral Daily    escitalopram  20 mg Oral Daily    [Held by provider] rivaroxaban  20 mg Oral Daily with breakfast    Vitamin D  1,000 Units Oral Daily    ticagrelor  90 mg Oral BID    isosorbide mononitrate  30 mg Oral Daily    fluticasone  2 spray Each Nostril Daily    gabapentin  600 mg Oral TID    pantoprazole  40 mg Oral QAM AC    miconazole   Topical BID    sodium chloride flush  5-40 mL IntraVENous 2 times per day    insulin glargine  50 Units SubCUTAneous BID     Continuous Infusions:   dextrose      sodium chloride      sodium chloride       CBC:   Recent Labs     09/11/21  1033 09/12/21  0829   WBC 8.5 5.8   HGB 9.9* 9.1*    159     BMP:    Recent Labs     09/12/21  0829 09/13/21  0509 09/14/21  0509    133* 142   K 4.1 3.9 4.2    103 107   CO2 25 21 24   BUN 25* 23 20   CREATININE 1.65* 1.95* 2.13*   GLUCOSE 176* 157* 69*     Hepatic:  Recent Labs     09/12/21  0829   AST 15   ALT 10   BILITOT 0.24*   ALKPHOS 67     Troponin:   Recent Labs     09/11/21  1033 09/12/21  1215   TROPHS 47* 43*     BNP: No results for input(s): BNP in the last 72 hours. Lipids: No results for input(s): CHOL, HDL in the last 72 hours. Invalid input(s): LDLCALCU  INR: No results for input(s): INR in the last 72 hours. DIAGNOSTIC DATA:     EKG 9/11/2021   Normal sinus rhythm  Nonspecific T wave abnormality  Abnormal ECG  When compared with ECG of 01-SEP-2021 16:51,  Nonspecific T wave abnormality now evident in Inferior leads    ECHO 9/13/2021  Left ventricle is normal in size with normal systolic function globally. Calculated ejection fraction is 54%. Mild left ventricular wall thickness. Evidence of diastolic dysfunction. Left atrium is moderately dilated. Mildly dilated right atrium. Aortic valve is sclerotic but opens well. Mild mitral regurgitation. Mild tricuspid regurgitation. Estimated right ventricular systolic pressure  is 66.7 mmHg. Holly Misti CAROTID SCAN 9/13/2021      Mild < 50% stenosis of the internal carotid arteries bilaterally.    Patent vertebral arteries bilaterally with antegrade flow. CT HEAD 9/11/2021  FINDINGS:   BRAIN/VENTRICLES: The ventricles and sulci are diffusely enlarged.  Low   attenuation is seen in the periventricular and subcortical white matter.  No   acute intracranial hemorrhage or acute infarct is identified.       ORBITS: The visualized portion of the orbits demonstrate no acute abnormality.       SINUSES: The visualized paranasal sinuses and mastoid air cells demonstrate   no acute abnormality.       SOFT TISSUES/SKULL:  No acute abnormality of the visualized skull or soft   tissues.           Stress Test:  07/12/20 : Negative Lexiscan stress test.     Echo 11/2020:  Summary  Left ventricle is normal in size, mildly increased wall thickness, global  left ventricular systolic function is normal, calculated ejection fraction  is 58%. Grade III (severe) left ventricular diastolic dysfunction. Left atrium is moderately dilated. Inter-atrial septum is intact with no evidence for an atrial septal defect.   Right atrium is moderately dilated . Normal right ventricular size and function. Mild mitral regurgitation. Tricuspid valve was not well visualized. Mild tricuspid regurgitation. Estimated right ventricular systolic pressure is 86.7 mmHg.     CATH 12/2020   Findings:  LMCA: Mild irregularities 10-20%. LAD: Patent proximal and mid stents with 20-30% diffuse instent restenosis   Distally vessel is small to intermediate with long 75% re-stenosis, treated with PTCA/JAMA     LCx: Mild irregularities 10-20%. OM has mid 80% stenosis, reduced to 0% with PTCA/JAMA       Coronary Tree        Dominance: Right    The LV gram was not performed.        Procedure Summary        1. Successful Staged PCI of OM with JAMA    2. Restenosis of distal LAD, required, PTCA/JAMA     Objective:   Vitals: BP (!) 143/76   Pulse 62   Temp 98.6 °F (37 °C) (Oral)   Resp 23   Ht 5' 10\" (1.778 m)   Wt 225 lb (102.1 kg)   SpO2 99%   BMI 32.28 kg/m²   General appearance: alert and cooperative with exam  HEENT: Head: Normocephalic, no lesions, without obvious abnormality. Neck:no JVD, trachea midline, no adenopathy  Lungs: Clear to auscultation  Heart: Regular rate and rhythm, s1/s2 auscultated, no murmurs. SR on tele HR 74  Abdomen: soft, non-tender, bowel sounds active  Extremities: no edema  Neurologic: not done    [id number] Score for Atrial Fibrillation Stroke Risk   Risk   Factors  Component Value   C CHF Yes 1   H HTN Yes 1   A2 Age >= 76 Yes,  (77 y.o.) 2   D DM Yes 1   S2 Prior Stroke/TIA No 0   V Vascular Disease No 0   A Age 74-69 No,  (77 y.o.) 0   Sc Sex female 1    [id number]  Score  6   Score last updated 9/14/21 29:65 AM EDT    Click here for a link to the UpToDate guideline \"Atrial Fibrillation: Anticoagulation therapy to prevent embolization    Disclaimer: Risk Score calculation is dependent on accuracy of patient problem list and past encounter diagnosis. Assessment / Acute Cardiac Problems:   1. Elevated troponin s/p fall.   MARIELLE likely type 2 demand ischemia trops trending down  2. Chest pain. Noncardiac-reproducable on palpitation likely related to recent falls  3. HFpEF  LVEF 58% on ECHO 11/7/2020    4. [id number] Score for Atrial Fibrillation Stroke Risk 6. Xarelto held d/t recurrent falls. 5. HTN  6. CAD s/p CATH with staged PCI 12/2020.  7. Recurrent Falls. Patient Active Problem List:     Atypical chest pain     Type 2 diabetes mellitus with diabetic polyneuropathy, with long-term current use of insulin (HCC)     Vertigo     Essential hypertension     CAD (coronary artery disease)     Dyspnea     Claudication in peripheral vascular disease (HCC)     Acute pulmonary embolism without acute cor pulmonale (HCC)     Diabetic polyneuropathy associated with type 2 diabetes mellitus ([facility] [address]     Other hyperlipidemia     Chronic systolic heart failure (HCC)     Multiple subsegmental pulmonary emboli without acute cor pulmonale (HCC)     Congestive heart failure (HCC)     Pyelonephritis of right kidney     History of pulmonary embolism     Elevated troponin I level     Sepsis (HCC)     Suspected COVID-19 virus infection     MARIELLE (acute kidney injury) (HCC)     Diarrhea     Chronic diastolic (congestive) heart failure (HCC)     Generalized weakness     Anemia, normocytic normochromic     Class 1 obesity in adult     Gastroenteritis     Acute on chronic diastolic CHF (congestive heart failure) (HCC)     Acute diastolic CHF (congestive heart failure) (HCC)     Arterial hypotension     Moderate malnutrition (HCC)     S/P PTCA (percutaneous transluminal coronary angioplasty)     Cervical myelopathy (HCC)     Viral gastroenteritis     Fall at home, initial encounter      Plan of Treatment:   1. Atypical chest pain. ECHO reviewed. LVEF preserved 54%  Troponin trending down will order one additional troponin and EKG. If stable no further ischemic work up   2. Carotid reviewed. < 50% stenosis bilaterally  3. PAF  Not on tele.   Will order telemetry. SR on tele currently. [id number] Score for Atrial Fibrillation Stroke Risk 6. Not on Xarelto d/t falls. Will order ASA. 4. Dizziness. Will order orthostatic BP  5. HTN elevated prior to AM medications. 143/76  Continue BB and IMDUR  6. CAD  Continue BB, IMDUR, statin, Brilinta, and SL NTG PRN. Will order ASA d/t Xarelto stopped. 7. Keep K > 4.0 and Mag > 2.0 K 4.2 Will order Mag  8. Creatinine continues to rise. Currently 2. 13. Recommend considering nephrology consult.    9. Rest of care managed per Primary Team.    Electronically signed by MAYELA Babcock NP on 9/14/2021 at [address].  [phone]

## 2021-09-14 NOTE — PROGRESS NOTES
subjective    Medications: Allergies: Allergies   Allergen Reactions    Penicillins      Other reaction(s): Hives    Sulfa Antibiotics      Other reaction(s): Rash       Current Meds:   Scheduled Meds:    aspirin  81 mg Oral Daily    carvedilol  6.25 mg Oral BID WC    vitamin E  400 Units Oral Daily    rosuvastatin  40 mg Oral Daily    mupirocin   Topical TID    cetirizine  10 mg Oral Daily    escitalopram  20 mg Oral Daily    [Held by provider] rivaroxaban  20 mg Oral Daily with breakfast    Vitamin D  1,000 Units Oral Daily    ticagrelor  90 mg Oral BID    isosorbide mononitrate  30 mg Oral Daily    fluticasone  2 spray Each Nostril Daily    gabapentin  600 mg Oral TID    pantoprazole  40 mg Oral QAM AC    miconazole   Topical BID    sodium chloride flush  5-40 mL IntraVENous 2 times per day    insulin glargine  50 Units SubCUTAneous BID     Continuous Infusions:    dextrose      sodium chloride      sodium chloride       PRN Meds: glucose, dextrose, glucagon (rDNA), dextrose, loperamide, benzonatate, albuterol sulfate HFA, docusate sodium, clonazePAM, nitroGLYCERIN, oxyCODONE-acetaminophen, sodium chloride flush, sodium chloride, potassium chloride **OR** potassium alternative oral replacement **OR** potassium chloride, magnesium sulfate, ondansetron **OR** ondansetron, polyethylene glycol, acetaminophen **OR** acetaminophen    Data:     Past Medical History:   has a past medical history of Anxiety, Atrial fibrillation (HCC), Benign positional vertigo, CAD (coronary artery disease), Chest pain, Depression, Diabetes mellitus ([facility] [address] Diabetic neuropathy ([facility] [address] Hyperlipidemia, Hypertension, and Migraine. Social History:   reports that she has never smoked. She has never used smokeless tobacco. She reports that she does not drink alcohol and does not use drugs.      Family History:   Family History   Problem Relation Age of Onset   Rufino Spina Cancer Mother     Cancer Father        Vitals:  BP (!) 143/76   Pulse 62   Temp 98.6 °F (37 °C) (Oral)   Resp 23   Ht 5' 10\" (1.778 m)   Wt 225 lb (102.1 kg)   SpO2 99%   BMI 32.28 kg/m²   Temp (24hrs), Av °F (37.2 °C), Min:98.6 °F (37 °C), Max:99.3 °F (37.4 °C)    Recent Labs     21  0645 21  0714 21  1107   POCGLU 220* 35* 84 263*       I/O (24Hr): Intake/Output Summary (Last 24 hours) at 2021 1255  Last data filed at 2021 0826  Gross per 24 hour   Intake 200 ml   Output --   Net 200 ml       Labs:  Hematology:  Recent Labs     21  0829   WBC 5.8   RBC 3.11*   HGB 9.1*   HCT 29.9*   MCV 96.1   MCH 29.3   MCHC 30.4   RDW 13.0      MPV 11.0     Chemistry:  Recent Labs     21  0829 21  1215 21  0509 21  0509     --  133* 142   K 4.1  --  3.9 4.2     --  103 107   CO2 25  --  21 24   GLUCOSE 176*  --  157* 69*   BUN 25*  --  23 20   CREATININE 1.65*  --  1.95* 2.13*   MG  --   --   --  2.4   ANIONGAP 8*  --  9 11   LABGLOM 30*  --  25* 23*   GFRAA 37*  --  30* 27*   CALCIUM 8.8  --  7.9* 8.8   TROPHS  --  43*  --  43*   CKTOTAL 359*  --   --   --      Recent Labs     21  0829 21  1241 21  1546 21  0645 21  0714 21  1107   PROT 6.4  --   --   --   --   --   --   --    LABALBU 3.5  --   --   --   --   --   --   --    AST 15  --   --   --   --   --   --   --    ALT 10  --   --   --   --   --   --   --    ALKPHOS 67  --   --   --   --   --   --   --    BILITOT 0.24*  --   --   --   --   --   --   --    POCGLU  --    < > 80 199* 220* 35* 84 263*    < > = values in this interval not displayed.      ABG:No results found for: POCPH, PHART, PH, POCPCO2, HWA5CHW, PCO2, POCPO2, PO2ART, PO2, POCHCO3, YHV7MDW, HCO3, NBEA, PBEA, BEART, BE, THGBART, THB, MMI5SFE, DUJP2VEA, F0TMCATZ, O2SAT, FIO2  Lab Results   Component Value Date/Time    SPECIAL NOT REPORTED 2020 01:36 PM     Lab Results   Component Value Date/Time    CULTURE NO GROWTH 6 DAYS 07/13/2020 02:00 AM    CULTURE NO GROWTH 6 DAYS 07/13/2020 02:00 AM       Radiology:  XR SHOULDER RIGHT (MIN 2 VIEWS)    Result Date: 9/11/2021  Suggestion of nondisplaced 2 part intertrochanteric fracture of the right hip. Moderate to severe osteoarthritis involving the medial compartment and patellofemoral joints of both knees. Moderate AC joint arthropathy. No acute abnormality in the right shoulder or either knee. Bony pelvis intact. RECOMMENDATION: CT scan of the right hip suggested for confirmation of suspected hip fracture. XR FEMUR RIGHT (MIN 2 VIEWS)    Result Date: 9/11/2021  No acute bony abnormalities are noted FINDINGS: The visualized bones are osteopenic. There is no evidence of fracture or dislocation. Degenerative changes of the right hip and right knee. The soft tissues are unremarkable. IMPRESSION: No acute bony abnormalities are noted     XR KNEE LEFT (3 VIEWS)    Result Date: 9/11/2021  Suggestion of nondisplaced 2 part intertrochanteric fracture of the right hip. Moderate to severe osteoarthritis involving the medial compartment and patellofemoral joints of both knees. Moderate AC joint arthropathy. No acute abnormality in the right shoulder or either knee. Bony pelvis intact. RECOMMENDATION: CT scan of the right hip suggested for confirmation of suspected hip fracture. XR KNEE RIGHT (3 VIEWS)    Result Date: 9/11/2021  Suggestion of nondisplaced 2 part intertrochanteric fracture of the right hip. Moderate to severe osteoarthritis involving the medial compartment and patellofemoral joints of both knees. Moderate AC joint arthropathy. No acute abnormality in the right shoulder or either knee. Bony pelvis intact. RECOMMENDATION: CT scan of the right hip suggested for confirmation of suspected hip fracture.      XR FOOT LEFT (MIN 3 VIEWS)    Result Date: 9/11/2021  No acute bony abnormalities are noted FINDINGS: The visualized bones are osteopenic. There is no evidence of fracture or dislocation. Degenerative changes of the right hip and right knee. The soft tissues are unremarkable. IMPRESSION: No acute bony abnormalities are noted     CT Head WO Contrast    Result Date: 9/11/2021  No acute intracranial abnormality. CT Cervical Spine WO Contrast    Result Date: 9/11/2021  No acute abnormality of the cervical spine. XR CHEST PORTABLE    Result Date: 9/11/2021  Mild right basilar atelectasis. No pleural effusion, pneumothorax, or displaced rib fracture appreciated. CT HIP RIGHT WO CONTRAST    Result Date: 9/11/2021  1. Mild right hip osteoarthrosis. 2. Enthesopathy as detailed above. 3. Moderate sigmoid diverticulosis. 4. Slight wall thickening and stranding of the wall of the urinary bladder which can be seen with cystitis. Correlate with urinalysis. 5. Probable partially visualized inferior pole left renal cyst.  Confirmation with nonemergent renal ultrasound recommended. 6. No acute intratrochanteric fracture evident by CT. If pain persists, further evaluation with MRI should be performed to assess for marrow edema. XR HIP 2-3 VW W PELVIS RIGHT    Result Date: 9/11/2021  Suggestion of nondisplaced 2 part intertrochanteric fracture of the right hip. Moderate to severe osteoarthritis involving the medial compartment and patellofemoral joints of both knees. Moderate AC joint arthropathy. No acute abnormality in the right shoulder or either knee. Bony pelvis intact. RECOMMENDATION: CT scan of the right hip suggested for confirmation of suspected hip fracture.        Physical Examination:        General appearance:  alert, cooperative and no distress  Mental Status:  oriented to person, place and time and normal affect  Lungs:  clear to auscultation bilaterally, normal effort  Heart:  regular rate and rhythm, no murmur  Abdomen:  soft, nontender, nondistended, normal bowel sounds  Extremities:  no edema, redness, tenderness in the calves  Skin:  no gross lesions, rashes, induration    Assessment:        Hospital Problems         Last Modified POA    * (Principal) Fall at home, initial encounter 9/11/2021 Yes    MARIELLE (acute kidney injury) ([facility] [address] 9/11/2021 Yes    Chronic diastolic (congestive) heart failure ([facility] [address] 9/11/2021 Yes    Type 2 diabetes mellitus with diabetic polyneuropathy, with long-term current use of insulin ([facility] [address] (Chronic) 9/11/2021 Yes    Essential hypertension 9/11/2021 Yes    CAD (coronary artery disease) 9/11/2021 Yes    Overview Signed 3/27/2017  4:39 PM by Raoul Jackson MD     S/P 4 + 2 stents         Generalized weakness 9/11/2021 Yes          Plan:        Fall  - CT hip did not reveal a fracture  - seen by orthopedic surgery who state no intervention from their end   - PT/OT     Chest pain with history of CAD  - repeat troponin trending down   - CK trending down  - telemetry  -Cardiology on board  -Ejection fraction 54%  -Continue aspirin, Brilinta    Acute kidney injury  - BUN/Cr trending back up this morning  -Nephrology consulted  - monitor urine output   - continue home meds  - v/s per unit protocol   - check orthostatics    Paroxysmal A. fib  -Not on anticoagulation due to falls  -Continue aspirin    Hypertension  -Continue Coreg    Hyperlipidemia  -Continue Crestor    Diabetes  -Patient had episode of hypoglycemia in the morning. Morning Lantus held. Continue sugar checks.       Carrington Sunshine MD  9/14/2021  12:55 PM

## 2021-09-15 ENCOUNTER — APPOINTMENT (OUTPATIENT)
Dept: ULTRASOUND IMAGING | Age: 76
DRG: 683 | End: 2021-09-15
Payer: MEDICARE

## 2021-09-15 LAB
-: ABNORMAL
AMORPHOUS: ABNORMAL
ANION GAP SERPL CALCULATED.3IONS-SCNC: 9 MMOL/L (ref 9–17)
BACTERIA: ABNORMAL
BILIRUBIN URINE: NEGATIVE
BUN BLDV-MCNC: 18 MG/DL (ref 8–23)
BUN/CREAT BLD: ABNORMAL (ref 9–20)
CALCIUM SERPL-MCNC: 8.7 MG/DL (ref 8.6–10.4)
CASTS UA: ABNORMAL /LPF (ref 0–8)
CHLORIDE BLD-SCNC: 106 MMOL/L (ref 98–107)
CO2: 25 MMOL/L (ref 20–31)
COLOR: YELLOW
COMMENT UA: ABNORMAL
CREAT SERPL-MCNC: 2.22 MG/DL (ref 0.5–0.9)
CREATININE URINE: 31.4 MG/DL (ref 28–217)
CRYSTALS, UA: ABNORMAL /HPF
EOSINOPHIL,URINE: NORMAL
EPITHELIAL CELLS UA: ABNORMAL /HPF (ref 0–5)
GFR AFRICAN AMERICAN: 26 ML/MIN
GFR NON-AFRICAN AMERICAN: 21 ML/MIN
GFR SERPL CREATININE-BSD FRML MDRD: ABNORMAL ML/MIN/{1.73_M2}
GFR SERPL CREATININE-BSD FRML MDRD: ABNORMAL ML/MIN/{1.73_M2}
GLUCOSE BLD-MCNC: 102 MG/DL (ref 65–105)
GLUCOSE BLD-MCNC: 108 MG/DL (ref 65–105)
GLUCOSE BLD-MCNC: 135 MG/DL (ref 65–105)
GLUCOSE BLD-MCNC: 137 MG/DL (ref 70–99)
GLUCOSE URINE: NEGATIVE
KETONES, URINE: NEGATIVE
LEUKOCYTE ESTERASE, URINE: ABNORMAL
MUCUS: ABNORMAL
NITRITE, URINE: POSITIVE
OTHER OBSERVATIONS UA: ABNORMAL
PH UA: 5.5 (ref 5–8)
POTASSIUM SERPL-SCNC: 4.3 MMOL/L (ref 3.7–5.3)
PROTEIN UA: ABNORMAL
RBC UA: ABNORMAL /HPF (ref 0–4)
RENAL EPITHELIAL, UA: ABNORMAL /HPF
SODIUM BLD-SCNC: 140 MMOL/L (ref 135–144)
SODIUM,UR: 48 MMOL/L
SPECIFIC GRAVITY UA: 1.01 (ref 1–1.03)
TOTAL PROTEIN, URINE: 34 MG/DL
TRICHOMONAS: ABNORMAL
TURBIDITY: ABNORMAL
URINE HGB: ABNORMAL
UROBILINOGEN, URINE: NORMAL
WBC UA: ABNORMAL /HPF (ref 0–5)
YEAST: ABNORMAL

## 2021-09-15 PROCEDURE — 2580000003 HC RX 258: Performed by: INTERNAL MEDICINE

## 2021-09-15 PROCEDURE — 87077 CULTURE AEROBIC IDENTIFY: CPT

## 2021-09-15 PROCEDURE — 97116 GAIT TRAINING THERAPY: CPT

## 2021-09-15 PROCEDURE — 84300 ASSAY OF URINE SODIUM: CPT

## 2021-09-15 PROCEDURE — 1200000000 HC SEMI PRIVATE

## 2021-09-15 PROCEDURE — 87086 URINE CULTURE/COLONY COUNT: CPT

## 2021-09-15 PROCEDURE — 6370000000 HC RX 637 (ALT 250 FOR IP): Performed by: INTERNAL MEDICINE

## 2021-09-15 PROCEDURE — 80048 BASIC METABOLIC PNL TOTAL CA: CPT

## 2021-09-15 PROCEDURE — 76937 US GUIDE VASCULAR ACCESS: CPT

## 2021-09-15 PROCEDURE — 82947 ASSAY GLUCOSE BLOOD QUANT: CPT

## 2021-09-15 PROCEDURE — 82570 ASSAY OF URINE CREATININE: CPT

## 2021-09-15 PROCEDURE — 84156 ASSAY OF PROTEIN URINE: CPT

## 2021-09-15 PROCEDURE — 97110 THERAPEUTIC EXERCISES: CPT

## 2021-09-15 PROCEDURE — 87186 SC STD MICRODIL/AGAR DIL: CPT

## 2021-09-15 PROCEDURE — 99232 SBSQ HOSP IP/OBS MODERATE 35: CPT | Performed by: INTERNAL MEDICINE

## 2021-09-15 PROCEDURE — 87205 SMEAR GRAM STAIN: CPT

## 2021-09-15 PROCEDURE — 76770 US EXAM ABDO BACK WALL COMP: CPT

## 2021-09-15 PROCEDURE — 36415 COLL VENOUS BLD VENIPUNCTURE: CPT

## 2021-09-15 PROCEDURE — 6370000000 HC RX 637 (ALT 250 FOR IP): Performed by: NURSE PRACTITIONER

## 2021-09-15 PROCEDURE — 81001 URINALYSIS AUTO W/SCOPE: CPT

## 2021-09-15 RX ADMIN — PANTOPRAZOLE SODIUM 40 MG: 40 TABLET, DELAYED RELEASE ORAL at 07:48

## 2021-09-15 RX ADMIN — OXYCODONE HYDROCHLORIDE AND ACETAMINOPHEN 1 TABLET: 5; 325 TABLET ORAL at 18:13

## 2021-09-15 RX ADMIN — MICONAZOLE NITRATE: 20 POWDER TOPICAL at 09:33

## 2021-09-15 RX ADMIN — Medication 1000 UNITS: at 09:34

## 2021-09-15 RX ADMIN — INSULIN GLARGINE 50 UNITS: 100 INJECTION, SOLUTION SUBCUTANEOUS at 09:39

## 2021-09-15 RX ADMIN — ROSUVASTATIN CALCIUM 40 MG: 20 TABLET, FILM COATED ORAL at 09:37

## 2021-09-15 RX ADMIN — TICAGRELOR 90 MG: 90 TABLET ORAL at 09:34

## 2021-09-15 RX ADMIN — ASPIRIN 81 MG: 81 TABLET, CHEWABLE ORAL at 09:34

## 2021-09-15 RX ADMIN — SODIUM CHLORIDE, PRESERVATIVE FREE 10 ML: 5 INJECTION INTRAVENOUS at 20:31

## 2021-09-15 RX ADMIN — GABAPENTIN 600 MG: 600 TABLET ORAL at 09:34

## 2021-09-15 RX ADMIN — TICAGRELOR 90 MG: 90 TABLET ORAL at 20:31

## 2021-09-15 RX ADMIN — CARVEDILOL 6.25 MG: 6.25 TABLET, FILM COATED ORAL at 09:34

## 2021-09-15 RX ADMIN — CETIRIZINE HYDROCHLORIDE 10 MG: 10 TABLET ORAL at 09:34

## 2021-09-15 RX ADMIN — ESCITALOPRAM OXALATE 20 MG: 10 TABLET ORAL at 09:33

## 2021-09-15 RX ADMIN — GABAPENTIN 600 MG: 600 TABLET ORAL at 18:09

## 2021-09-15 RX ADMIN — Medication 400 UNITS: at 09:34

## 2021-09-15 RX ADMIN — MUPIROCIN: 20 OINTMENT TOPICAL at 18:10

## 2021-09-15 RX ADMIN — MUPIROCIN: 20 OINTMENT TOPICAL at 09:35

## 2021-09-15 RX ADMIN — ISOSORBIDE MONONITRATE 30 MG: 30 TABLET ORAL at 09:36

## 2021-09-15 RX ADMIN — FLUTICASONE PROPIONATE 2 SPRAY: 50 SPRAY, METERED NASAL at 09:35

## 2021-09-15 RX ADMIN — SODIUM CHLORIDE, PRESERVATIVE FREE 10 ML: 5 INJECTION INTRAVENOUS at 09:44

## 2021-09-15 RX ADMIN — CARVEDILOL 6.25 MG: 6.25 TABLET, FILM COATED ORAL at 18:09

## 2021-09-15 ASSESSMENT — PAIN DESCRIPTION - LOCATION
LOCATION: LEG

## 2021-09-15 ASSESSMENT — PAIN DESCRIPTION - ORIENTATION
ORIENTATION: LOWER
ORIENTATION: RIGHT
ORIENTATION: RIGHT

## 2021-09-15 ASSESSMENT — PAIN SCALES - GENERAL
PAINLEVEL_OUTOF10: 5
PAINLEVEL_OUTOF10: 0
PAINLEVEL_OUTOF10: 5
PAINLEVEL_OUTOF10: 5
PAINLEVEL_OUTOF10: 8

## 2021-09-15 ASSESSMENT — PAIN DESCRIPTION - PAIN TYPE
TYPE: ACUTE PAIN

## 2021-09-15 ASSESSMENT — PAIN DESCRIPTION - DESCRIPTORS
DESCRIPTORS: ACHING;DISCOMFORT;DULL
DESCRIPTORS: ACHING;DULL

## 2021-09-15 ASSESSMENT — PAIN - FUNCTIONAL ASSESSMENT: PAIN_FUNCTIONAL_ASSESSMENT: PREVENTS OR INTERFERES SOME ACTIVE ACTIVITIES AND ADLS

## 2021-09-15 ASSESSMENT — PAIN DESCRIPTION - FREQUENCY: FREQUENCY: INTERMITTENT

## 2021-09-15 ASSESSMENT — PAIN DESCRIPTION - PROGRESSION: CLINICAL_PROGRESSION: NOT CHANGED

## 2021-09-15 ASSESSMENT — PAIN DESCRIPTION - ONSET: ONSET: ON-GOING

## 2021-09-15 NOTE — PROGRESS NOTES
[facility]  Progress Note                   Date:   9/15/2021  Patient name: Demetria Child  Date of admission:  9/11/2021  9:59 AM  MRN:   [id number]  YOB: 1945  PCP: Aayush Dee MD    Reason for Admission: MARIELLE (acute kidney injury) [facility]) [N17.9]  Fall, initial encounter [W19. XXXA]  Fall at home, initial encounter [V41. XXXA, M49.292]  Non-traumatic rhabdomyolysis [M62.82]    Subjective:       Clinical Changes /Abnormalities:  Patient seen and examined in bed in room. Reports ongoing dizziness, SOB with exertion and reproducible chest discomfort. Telemetry ordered. SR on tele HR 74    Review of Systems    Medications:   Scheduled Meds:   aspirin  81 mg Oral Daily    carvedilol  6.25 mg Oral BID WC    vitamin E  400 Units Oral Daily    rosuvastatin  40 mg Oral Daily    mupirocin   Topical TID    cetirizine  10 mg Oral Daily    escitalopram  20 mg Oral Daily    [Held by provider] rivaroxaban  20 mg Oral Daily with breakfast    Vitamin D  1,000 Units Oral Daily    ticagrelor  90 mg Oral BID    isosorbide mononitrate  30 mg Oral Daily    fluticasone  2 spray Each Nostril Daily    gabapentin  600 mg Oral TID    pantoprazole  40 mg Oral QAM AC    miconazole   Topical BID    sodium chloride flush  5-40 mL IntraVENous 2 times per day    insulin glargine  50 Units SubCUTAneous BID     Continuous Infusions:   dextrose      sodium chloride       CBC:   No results for input(s): WBC, HGB, PLT in the last 72 hours. BMP:    Recent Labs     09/13/21  0509 09/14/21  0509 09/15/21  0428   * 142 140   K 3.9 4.2 4.3    107 106   CO2 21 24 25   BUN 23 20 18   CREATININE 1.95* 2.13* 2.22*   GLUCOSE 157* 69* 137*     Hepatic:  No results for input(s): AST, ALT, ALB, BILITOT, ALKPHOS in the last 72 hours. Troponin:   Recent Labs     09/12/21  1215 09/14/21  0509   TROPHS 43* 43*     BNP: No results for input(s): BNP in the last 72 hours.   Lipids: No results for input(s): CHOL, HDL in the last 72 hours. Invalid input(s): LDLCALCU  INR: No results for input(s): INR in the last 72 hours. DIAGNOSTIC DATA:     EKG 9/11/2021   Normal sinus rhythm  Nonspecific T wave abnormality  Abnormal ECG  When compared with ECG of 01-SEP-2021 16:51,  Nonspecific T wave abnormality now evident in Inferior leads    ECHO 9/13/2021  Left ventricle is normal in size with normal systolic function globally. Calculated ejection fraction is 54%. Mild left ventricular wall thickness. Evidence of diastolic dysfunction. Left atrium is moderately dilated. Mildly dilated right atrium. Aortic valve is sclerotic but opens well. Mild mitral regurgitation. Mild tricuspid regurgitation. Estimated right ventricular systolic pressure  is 08.0 mmHg. Marnell Pillo CAROTID SCAN 9/13/2021      Mild < 50% stenosis of the internal carotid arteries bilaterally.    Patent vertebral arteries bilaterally with antegrade flow. CT HEAD 9/11/2021  FINDINGS:   BRAIN/VENTRICLES: The ventricles and sulci are diffusely enlarged.  Low   attenuation is seen in the periventricular and subcortical white matter.  No   acute intracranial hemorrhage or acute infarct is identified.       ORBITS: The visualized portion of the orbits demonstrate no acute abnormality.       SINUSES: The visualized paranasal sinuses and mastoid air cells demonstrate   no acute abnormality.       SOFT TISSUES/SKULL:  No acute abnormality of the visualized skull or soft   tissues.           Stress Test:  07/12/20 : Negative Lexiscan stress test.     Echo 11/2020:  Summary  Left ventricle is normal in size, mildly increased wall thickness, global  left ventricular systolic function is normal, calculated ejection fraction  is 58%. Grade III (severe) left ventricular diastolic dysfunction. Left atrium is moderately dilated. Inter-atrial septum is intact with no evidence for an atrial septal defect. Right atrium is moderately dilated .   Normal right ventricular size and function. Mild mitral regurgitation. Tricuspid valve was not well visualized. Mild tricuspid regurgitation. Estimated right ventricular systolic pressure is 44.5 mmHg.     CATH 12/2020   Findings:  LMCA: Mild irregularities 10-20%. LAD: Patent proximal and mid stents with 20-30% diffuse instent restenosis   Distally vessel is small to intermediate with long 75% re-stenosis, treated with PTCA/JAMA     LCx: Mild irregularities 10-20%. OM has mid 80% stenosis, reduced to 0% with PTCA/JAMA       Coronary Tree        Dominance: Right    The LV gram was not performed.        Procedure Summary        1. Successful Staged PCI of OM with JAMA    2. Restenosis of distal LAD, required, PTCA/JAMA     Objective:   Vitals: BP (!) 140/55   Pulse 64   Temp 97.7 °F (36.5 °C) (Oral)   Resp 18   Ht 5' 10\" (1.778 m)   Wt 225 lb (102.1 kg)   SpO2 99%   BMI 32.28 kg/m²   General appearance: alert and cooperative with exam  HEENT: Head: Normocephalic, no lesions, without obvious abnormality. Neck:no JVD, trachea midline, no adenopathy  Lungs: Clear to auscultation  Heart: Regular rate and rhythm, s1/s2 auscultated, no murmurs. SR on tele HR 74  Abdomen: soft, non-tender, bowel sounds active  Extremities: no edema  Neurologic: not done    [id number] Score for Atrial Fibrillation Stroke Risk   Risk   Factors  Component Value   C CHF Yes 1   H HTN Yes 1   A2 Age >= 76 Yes,  (77 y.o.) 2   D DM Yes 1   S2 Prior Stroke/TIA No 0   V Vascular Disease No 0   A Age 74-69 No,  (77 y.o.) 0   Sc Sex female 1    [id number]  Score  6   Score last updated 9/14/21 42:90 AM EDT    Click here for a link to the UpToDate guideline \"Atrial Fibrillation: Anticoagulation therapy to prevent embolization    Disclaimer: Risk Score calculation is dependent on accuracy of patient problem list and past encounter diagnosis. Assessment / Acute Cardiac Problems:   1. Elevated troponin s/p fall.   MARIELLE likely type 2 demand ischemia trops trending down  2. Chest pain. Noncardiac-reproducable on palpitation likely related to recent falls  3. HFpEF  LVEF 58% on ECHO 11/7/2020    4. [id number] Score for Atrial Fibrillation Stroke Risk 6. Xarelto held d/t recurrent falls. 5. HTN  6. CAD s/p CATH with staged PCI 12/2020.  7. Recurrent Falls. Patient Active Problem List:     Atypical chest pain     Type 2 diabetes mellitus with diabetic polyneuropathy, with long-term current use of insulin (HCC)     Vertigo     Essential hypertension     CAD (coronary artery disease)     Dyspnea     Claudication in peripheral vascular disease (HCC)     Acute pulmonary embolism without acute cor pulmonale (HCC)     Diabetic polyneuropathy associated with type 2 diabetes mellitus ([facility] [address]     Other hyperlipidemia     Chronic systolic heart failure (HCC)     Multiple subsegmental pulmonary emboli without acute cor pulmonale (HCC)     Congestive heart failure (HCC)     Pyelonephritis of right kidney     History of pulmonary embolism     Elevated troponin I level     Sepsis (HCC)     Suspected COVID-19 virus infection     MARIELLE (acute kidney injury) (HCC)     Diarrhea     Chronic diastolic (congestive) heart failure (HCC)     Generalized weakness     Anemia, normocytic normochromic     Class 1 obesity in adult     Gastroenteritis     Acute on chronic diastolic CHF (congestive heart failure) (HCC)     Acute diastolic CHF (congestive heart failure) (HCC)     Arterial hypotension     Moderate malnutrition (HCC)     S/P PTCA (percutaneous transluminal coronary angioplasty)     Cervical myelopathy (HCC)     Viral gastroenteritis     Fall at home, initial encounter      Plan of Treatment:   1. Atypical chest pain. ECHO reviewed. LVEF preserved 54%  . No plans for further ischemic work-up. 2. Carotid reviewed. < 50% stenosis bilaterally  3. PAF  - remains SR. No AC d/t falls. 4.  HTN - Continue BB and IMDUR  5. CAD -  Continue BB, IMDUR, statin, Brilinta, ASA, and SL NTG PRN. 6. Keep K > 4.0 and Mag > 2  7. No further CV work-up at this time. Will sign off. Please call with further questions/concerns.     Electronically signed by MAYELA Barrios CNP on 9/15/2021 at 9:35 AM  [address]  [phone]

## 2021-09-15 NOTE — CARE COORDINATION
Transitional Planning    3552 Met with patient to check in on thoughts and feelings regarding discharge. Pt still adamantly refusing SNF/Rehab placement. States she has family that can help at home along with home care.  Pt understandable to update RN/CM if she wants placement     3364 Pt requesting rolling walker, perfect serve sent to Dr. Jackelyn Castillo    1233 Justen Biggs order, facesheet, and face to face faxed to [employer]

## 2021-09-15 NOTE — PROGRESS NOTES
Lukas Ornelas was evaluated today and a DME order was entered for a wheeled walker because she requires this to successfully complete daily living tasks of toileting and personal cares. A wheeled walker is necessary due to the patient's unsteady gait, upper body weakness, and inability to  an ambulation device; and she can ambulate only by pushing a walker instead of a lesser assistive device such as a cane, crutch, or standard walker. The need for this equipment was discussed with the patient and she understands and is in agreement.

## 2021-09-15 NOTE — PROGRESS NOTES
[facility]  Office: [address], DO, Alis Degroot, DO, Jaiden Pink, DO, Annemarie Duque, DO, Brett Ramirez MD, Tita Mohan MD, Shelbi Rodríguez MD, Danisha Ayala MD, Kayli Lucio MD, Kenny Willams MD, Shelby Hartmann MD, Noelle Ayon, DO, Jessica Moreno DO, Matt Mcmahon MD,  Yobani Gutierrez DO, Ramya Jerry MD, Fiona Flower MD, Serg Funk MD, Tiffani Pike MD, , Tanmay Torre MD, Amita Holbrook MD, Michelle Gonsales MD, Marcelino Ruiz, [facility], [facility], CNP, Moises Brush, CNP, [place], CNS, Kaitlynn De La Rosa, CNP, Claude Leep, CNP, Vickie Le, CNP, Candida Simmons, CNP, Kyler Brewer, CNP, Nj Ventura PA-C, Cortez Meade, [facility], Neetu Kaplan, CNP, Lottie Muhammad, CNP, Emely Slater, CNP, Errol Alexis, CNP, Elvie Staton, CNP, Day Reed, CNP, Jos Gunderson, [facility], Odilon Sneed, [address] [place] [address]    Progress Note    9/15/2021    11:05 AM    Name:   Lukas Ornelas  MRN:     [id number]     Acct:      [de-identified]   Room:   [address] [place] [address] Day:  4  Admit Date:  9/11/2021  9:59 AM    PCP:   Kana Dueñas MD  Code Status:  Full Code    Subjective:     C/C:   Chief Complaint   Patient presents with   Yvonne Kale Fall     right side leg weakness, hit head, no LOC      Interval History Status: Worsened. Pt was seen and examined this morning. Patient reported her chest pain has resolved. She still continues to complain of shortness of breath upon exertion. .  Creatinine continues to worsen. Brief History: This 68 yof presents with multiple falls. She was just admitted with gastroenteritis and rosa, went home and now returns after multiple falls. After one fall, EMS called but she refused to come to ER. After a fall last night, she laid on the floor for 11 hours. Her right side is sore, as is her buttocks.     Review of Systems:     12 point ROS performed and negative for anything other than what was stated in subjective    Medications: Allergies: Allergies   Allergen Reactions    Penicillins      Other reaction(s): Hives    Sulfa Antibiotics      Other reaction(s): Rash       Current Meds:   Scheduled Meds:    aspirin  81 mg Oral Daily    carvedilol  6.25 mg Oral BID WC    vitamin E  400 Units Oral Daily    rosuvastatin  40 mg Oral Daily    mupirocin   Topical TID    cetirizine  10 mg Oral Daily    escitalopram  20 mg Oral Daily    [Held by provider] rivaroxaban  20 mg Oral Daily with breakfast    Vitamin D  1,000 Units Oral Daily    ticagrelor  90 mg Oral BID    isosorbide mononitrate  30 mg Oral Daily    fluticasone  2 spray Each Nostril Daily    gabapentin  600 mg Oral TID    pantoprazole  40 mg Oral QAM AC    miconazole   Topical BID    sodium chloride flush  5-40 mL IntraVENous 2 times per day    insulin glargine  50 Units SubCUTAneous BID     Continuous Infusions:    dextrose      sodium chloride       PRN Meds: glucose, dextrose, glucagon (rDNA), dextrose, loperamide, benzonatate, albuterol sulfate HFA, docusate sodium, clonazePAM, nitroGLYCERIN, oxyCODONE-acetaminophen, sodium chloride flush, sodium chloride, potassium chloride **OR** potassium alternative oral replacement **OR** potassium chloride, magnesium sulfate, ondansetron **OR** ondansetron, polyethylene glycol, acetaminophen **OR** acetaminophen    Data:     Past Medical History:   has a past medical history of Anxiety, Atrial fibrillation (HCC), Benign positional vertigo, CAD (coronary artery disease), Chest pain, Depression, Diabetes mellitus ([facility] Diabetic neuropathy ([facility] Hyperlipidemia, Hypertension, and Migraine. Social History:   reports that she has never smoked. She has never used smokeless tobacco. She reports that she does not drink alcohol and does not use drugs.      Family History:   Family History   Problem Relation Age of Onset   Nacho Knox Cancer Mother     Cancer Father        Vitals:  BP (!) 140/55   Pulse 64   Temp 97.7 °F (36.5 °C) (Oral)   Resp 18   Ht 5' 10\" (1.778 m)   Wt 225 lb (102.1 kg)   SpO2 99%   BMI 32.28 kg/m²   Temp (24hrs), Av.2 °F (36.8 °C), Min:97.7 °F (36.5 °C), Max:98.5 °F (36.9 °C)    Recent Labs     21  0721  15521   POCGLU 84 263* 233* 240*       I/O (24Hr): Intake/Output Summary (Last 24 hours) at 9/15/2021 110  Last data filed at 9/15/2021 0943  Gross per 24 hour   Intake 1592 ml   Output 1200 ml   Net 392 ml       Labs:  Hematology:  No results for input(s): WBC, RBC, HGB, HCT, MCV, MCH, MCHC, RDW, PLT, MPV, SEDRATE, CRP, INR, DDIMER, XM4VOQDO, LABABSO in the last 72 hours. Invalid input(s): PT  Chemistry:  Recent Labs     21  1215 21  0509 21  0509 09/15/21  0428   NA  --  133* 142 140   K  --  3.9 4.2 4.3   CL  --  103 107 106   CO2  --  21 24 25   GLUCOSE  --  157* 69* 137*   BUN  --  23 20 18   CREATININE  --  1.95* 2.13* 2.22*   MG  --   --  2.4  --    ANIONGAP  --  9 11 9   LABGLOM  --  25* 23* 21*   GFRAA  --  30* 27* 26*   CALCIUM  --  7.9* 8.8 8.7   TROPHS 43*  --  43*  --      Recent Labs     21  0645 21  0721  1550 21   POCGLU 220* 35* 84 263* 233* 240*     ABG:No results found for: POCPH, PHART, PH, POCPCO2, ZUX7NYU, PCO2, POCPO2, PO2ART, PO2, POCHCO3, KQE8UOL, HCO3, NBEA, PBEA, BEART, BE, THGBART, THB, JBL2UGF, YXSR5XVC, G5UQGWGO, O2SAT, FIO2  Lab Results   Component Value Date/Time    SPECIAL NOT REPORTED 2020 01:36 PM     Lab Results   Component Value Date/Time    CULTURE NO GROWTH 6 DAYS 2020 02:00 AM    CULTURE NO GROWTH 6 DAYS 2020 02:00 AM       Radiology:  XR SHOULDER RIGHT (MIN 2 VIEWS)    Result Date: 2021  Suggestion of nondisplaced 2 part intertrochanteric fracture of the right hip.   Moderate to severe osteoarthritis involving the medial compartment and patellofemoral joints of both of the cervical spine. XR CHEST PORTABLE    Result Date: 9/11/2021  Mild right basilar atelectasis. No pleural effusion, pneumothorax, or displaced rib fracture appreciated. CT HIP RIGHT WO CONTRAST    Result Date: 9/11/2021  1. Mild right hip osteoarthrosis. 2. Enthesopathy as detailed above. 3. Moderate sigmoid diverticulosis. 4. Slight wall thickening and stranding of the wall of the urinary bladder which can be seen with cystitis. Correlate with urinalysis. 5. Probable partially visualized inferior pole left renal cyst.  Confirmation with nonemergent renal ultrasound recommended. 6. No acute intratrochanteric fracture evident by CT. If pain persists, further evaluation with MRI should be performed to assess for marrow edema. XR HIP 2-3 VW W PELVIS RIGHT    Result Date: 9/11/2021  Suggestion of nondisplaced 2 part intertrochanteric fracture of the right hip. Moderate to severe osteoarthritis involving the medial compartment and patellofemoral joints of both knees. Moderate AC joint arthropathy. No acute abnormality in the right shoulder or either knee. Bony pelvis intact. RECOMMENDATION: CT scan of the right hip suggested for confirmation of suspected hip fracture.        Physical Examination:        General appearance:  alert, cooperative and no distress  Mental Status:  oriented to person, place and time and normal affect  Lungs:  clear to auscultation bilaterally, normal effort  Heart:  regular rate and rhythm, no murmur  Abdomen:  soft, nontender, nondistended, normal bowel sounds  Extremities:  no edema, redness, tenderness in the calves  Skin:  no gross lesions, rashes, induration    Assessment:        Hospital Problems         Last Modified POA    * (Principal) Fall at home, initial encounter 9/11/2021 Yes    MARIELLE (acute kidney injury) ([address] 9/11/2021 Yes    Chronic diastolic (congestive) heart failure ([address] 9/11/2021 Yes    Type 2 diabetes mellitus with diabetic polyneuropathy, with long-term current use of insulin (HCC) (Chronic) 9/11/2021 Yes    Essential hypertension 9/11/2021 Yes    CAD (coronary artery disease) 9/11/2021 Yes    Overview Signed 3/27/2017  4:39 PM by Nitish Mack MD     S/P 4 + 2 stents         Generalized weakness 9/11/2021 Yes          Plan:        Fall  - CT hip did not reveal a fracture  - seen by orthopedic surgery who state no intervention from their end   - PT/OT     Chest pain with history of CAD  -Resolved  -Continue aspirin, Brilinta    Acute kidney injury  - BUN/Cr trending up this morning  -Nephrology following  - monitor urine output   - continue home meds  - v/s per unit protocol   - check orthostatics  -Continue IV fluids    Paroxysmal A. fib  -Not on anticoagulation due to falls  -Continue aspirin    Hypertension  -Continue Coreg    Hyperlipidemia  -Continue Crestor    Diabetes  -Continue insulin therapy      Kyle Rodriguez MD  9/15/2021  11:05 AM

## 2021-09-15 NOTE — PROGRESS NOTES
Physical Therapy  Facility/Department: [address] ORTHO/MED SURG  Daily Treatment Note  NAME: Ramon Omer  : 1945  MRN: [id number]    Date of Service: 9/15/2021    Discharge Recommendations:  Patient would benefit from continued therapy after discharge   PT Equipment Recommendations  Equipment Needed: Yes  Mobility Devices: Constantin Champagne: Rolling  Other: Pt currently requires a RW for safe ambulation. Assessment   Body structures, Functions, Activity limitations: Decreased functional mobility ; Decreased strength;Decreased endurance; Increased pain  Assessment: Pt ambulated 50ft with RW and Vanesa requiring verbal cues throughout for safety awareness. Pt limited by decreased strength and endurance and is therefore a fall risk. Would recommend continued PT to address all functional mobility deficits and return to prior level of independence. Prognosis: Good  PT Education: Goals;PT Role;Plan of Care;Home Exercise Program  REQUIRES PT FOLLOW UP: Yes  Activity Tolerance  Activity Tolerance: Patient limited by fatigue;Patient limited by pain; Patient limited by endurance     Patient Diagnosis(es): The primary encounter diagnosis was MARIELLE (acute kidney injury) ([address] Diagnoses of Non-traumatic rhabdomyolysis and Fall, initial encounter were also pertinent to this visit. has a past medical history of Anxiety, Atrial fibrillation ([address] Benign positional vertigo, CAD (coronary artery disease), Chest pain, Depression, Diabetes mellitus ([address] Diabetic neuropathy ([address] Hyperlipidemia, Hypertension, and Migraine. has a past surgical history that includes Percutaneous Transluminal Coronary Angio; Cardiac catheterization; Coronary angioplasty with stent (2017); Appendectomy; Coronary angioplasty with stent (2012); and Coronary angioplasty with stent (2020).     Restrictions  Restrictions/Precautions  Restrictions/Precautions: Fall Risk  Required Braces or Orthoses?: No  Position Activity Restriction  Other position/activity restrictions: Up with assist, R UE tremors  Subjective   General  Chart Reviewed: Yes  Response To Previous Treatment: Patient with no complaints from previous session. Family / Caregiver Present: No  Subjective  Subjective: Pt and RN agreeable to PT this morning. Pt supine in bed upon arrival with c/o 5/10 pain in R LE. Pt pleasant and cooperative throughout session. General Comment  Comments: Pt retired back to bed at end of session with call light in reach. Pain Screening  Patient Currently in Pain: Yes  Pain Assessment  Pain Assessment: 0-10  Pain Level: 5  Pain Type: Acute pain  Pain Location: Leg  Pain Orientation: Right  Vital Signs  Patient Currently in Pain: Yes       Orientation  Orientation  Overall Orientation Status: Within Functional Limits  Cognition   Cognition  Overall Cognitive Status: Exceptions  Safety Judgement: Decreased awareness of need for assistance;Decreased awareness of need for safety  Problem Solving: Decreased awareness of errors  Insights: Decreased awareness of deficits  Objective   Bed mobility  Supine to Sit: Contact guard assistance  Sit to Supine: Minimal assistance (Required Vanesa for BLE progression back to bed.)  Scooting: Contact guard assistance  Comment: HOB elevated, increased time/effort to perform. Transfers  Sit to Stand: Minimal Assistance  Stand to sit: Minimal Assistance  Comment: RW used, cueing provided for correct hand placement with good return demo. Ambulation  Ambulation?: Yes  More Ambulation?: No  Ambulation 1  Surface: level tile  Device: Rolling Walker  Assistance: Minimal assistance  Gait Deviations: Slow Cherise;Decreased step height;Decreased step length  Distance: 50ft  Comments: Pt with forward flexed posture, mildly unsteady, cueing provided to keep RW within GIOVANA for safety.  poor safety awareness  Stairs/Curb  Stairs?: No     Balance  Posture: Fair  Sitting - Static: Good  Sitting - Dynamic: Fair  Standing - Static: Fair;+  Standing - Dynamic: Fair;-  Comments: RW used to assess standing balance. Exercises  Hip Flexion: seated marches: x10  Hip Abduction: x10  Knee Long Arc Quad: x10  Ankle Pumps: x20  Comments: Pt performed LE exercises while seated EOB.   Other exercises  Other exercises?: Yes        AM-PAC Score  AM-PAC Inpatient Mobility Raw Score : 17 (09/15/21 1126)  AM-PAC Inpatient T-Scale Score : 42.13 (09/15/21 1126)  Mobility Inpatient CMS 0-100% Score: 50.57 (09/15/21 1126)  Mobility Inpatient CMS G-Code Modifier : CK (09/15/21 1126)          Goals  Short term goals  Time Frame for Short term goals: 10 visits  Short term goal 1: transfers with SBA  Short term goal 2: amb 150 ft with a RW x SBA  Short term goal 3: ascend/descend 4 steps with SBA  Short term goal 4: 20 min exercise program x SBA  Patient Goals   Patient goals : Return home    Plan    Plan  Times per week: 5-6x wk  Current Treatment Recommendations: Strengthening, Functional Mobility Training, Gait Training, Safety Education & Training, Endurance Training, Stair training, Pain Management  Safety Devices  Type of devices: Nurse notified, Chair alarm in place, Call light within reach, Patient at risk for falls, Left in bed  Restraints  Initially in place: No     Therapy Time   Individual Concurrent Group Co-treatment   Time In 1017         Time Out 1040         Minutes 23         Timed Code Treatment Minutes: [address] Kent PTA

## 2021-09-15 NOTE — PLAN OF CARE
Problem: Falls - Risk of:  Goal: Will remain free from falls  9/15/2021 0833 by Denzel Davies RN  Outcome: Ongoing  9/14/2021 2259 by Maisha Valenzuela RN  Outcome: Ongoing  9/14/2021 1857 by Lauren Almanzar RN  Outcome: Ongoing  Goal: Absence of physical injury  9/15/2021 0833 by Denzel Davies RN  Outcome: Ongoing  9/14/2021 2259 by Maisha Valenzuela RN  Outcome: Ongoing  9/14/2021 1857 by Lauren Almanzar RN  Outcome: Ongoing     Problem: Pain:  Description: Pain management should include both nonpharmacologic and pharmacologic interventions.   Goal: Pain level will decrease  9/15/2021 0833 by Denzel Davies RN  Outcome: Ongoing  9/14/2021 2259 by Maisha Valenzuela RN  Outcome: Ongoing  9/14/2021 1857 by Lauren Almanzar RN  Outcome: Ongoing  Goal: Control of acute pain  9/15/2021 0833 by Denzel Davies RN  Outcome: Ongoing  9/14/2021 2259 by Maisha Valenzuela RN  Outcome: Ongoing  9/14/2021 1857 by Lauren Almanzar RN  Outcome: Ongoing  Goal: Control of chronic pain  9/15/2021 0833 by Denzel Davies RN  Outcome: Ongoing  9/14/2021 2259 by Maisha Valenzuela RN  Outcome: Ongoing  9/14/2021 1857 by Lauren Almanzar RN  Outcome: Ongoing     Problem: Musculor/Skeletal Functional Status  Goal: Highest potential functional level  9/15/2021 0833 by Denzel Davies RN  Outcome: Ongoing  9/14/2021 2259 by Maisha Valenzuela RN  Outcome: Ongoing  9/14/2021 1857 by Lauren Almanzar RN  Outcome: Ongoing

## 2021-09-15 NOTE — PROGRESS NOTES
Renal Progress Note    Patient :  Bret Citizen; 68 y.o. MRN# [id number]  Location:  55/6601-84  Attending:  Naomi Claudio MD  Admit Date:  9/11/2021   Hospital Day: 4      Subjective:     C/C-fall and recurrent vertigo  PMH- DM2, CAD s/p stent in 2020, A fib, last creatinine 1.1-1.3 in December. Nephrology-MARIELLE  Patient reported having some dysuria but no fevers or chills. Pt was seen and examined at bedside. Resting comfortably in the bed. Vitals stable. Labs reviewed. Creatinine 2.22 this morning. Slightly worse than yesterday 2.13. Urine output-950+2 uncharted urine output  Patient not on any fluids. Outpatient Medications:     Medications Prior to Admission: insulin detemir (LEVEMIR FLEXTOUCH) 100 UNIT/ML injection pen, Inject 50 Units into the skin 2 times daily  nystatin (MYCOSTATIN) 972729 UNIT/GM powder, Apply 3 times daily. spironolactone (ALDACTONE) 25 MG tablet, Take 1 tablet by mouth daily  Continuous Blood Gluc Sensor (FREESTYLE SYLVIA 14 DAY SENSOR) MISC, 1 each by Does not apply route every 14 days  omeprazole (PRILOSEC) 20 MG delayed release capsule, Take 1 capsule by mouth Daily  Control Gel Formula Dressing (DUODERM CGF DRESSING) MISC, Apply 2 each topically daily  Misc.  Devices (STEP N REST WALKER) MISC, 1 each by Does not apply route continuous Seated, wheeled walker  gabapentin (NEURONTIN) 600 MG tablet, TAKE ONE TABLET BY MOUTH THREE TIMES A DAY  fluticasone (FLONASE) 50 MCG/ACT nasal spray, 2 sprays by Each Nostril route daily  Zinc Oxide 15 % CREA, Apply to buttocks up to 4 times daily - may use any concentratino  carvedilol (COREG) 25 MG tablet, Take 1 tablet by mouth 2 times daily (with meals) Hold for sbp<120 or hr <50, give 1 hour from other bp meds  isosorbide mononitrate (IMDUR) 30 MG extended release tablet, Take 1 tablet by mouth daily  oxyCODONE-acetaminophen (PERCOCET) 5-325 MG per tablet,   ticagrelor (BRILINTA) 90 MG TABS tablet, Take 1 tablet by mouth 2 times daily  vitamin D3 (CHOLECALCIFEROL) 25 MCG (1000 UT) TABS tablet, Take 1 tablet by mouth daily  nitroGLYCERIN (NITROSTAT) 0.4 MG SL tablet, place 1 tablet under the tongue if needed every 5 minutes if needed for CHEST PAIN  clonazePAM (KLONOPIN) 0.5 MG tablet, take 1 tablet by mouth twice a day if needed for anxiety  Misc. Devices (WALKER) MISC, Diagnosis: right 5th metatarsal fracture, pain in limb  Duration: 3 months  docusate sodium (COLACE) 100 MG capsule, Take 1 capsule by mouth daily as needed for Constipation  Misc. Devices (COMMODE BEDSIDE) MISC, 1 Device by Does not apply route daily  Continuous Blood Gluc  (FREESTYLE SYLVIA 14 DAY READER) JAQUELINE, 1 each by Does not apply route continuous [facility]  rivaroxaban (XARELTO) 20 MG TABS tablet, Take 1 tablet by mouth daily (with breakfast)  glucose monitoring kit (FREESTYLE) monitoring kit, 1 kit by Does not apply route 4 times daily [place] . Dx E11.65, has tremors and cannot use conventional meter without great difficulty. Please provide supplies for 3 months, rf 3,Ph[facility].  Misc. Devices (STEP N REST WALKER) MISC, 1 each by Does not apply route continuous Seated, wheeled walker  escitalopram (LEXAPRO) 20 MG tablet, Take 1 tablet by mouth daily  hydrOXYzine (ATARAX) 25 MG tablet, take 1 tablet by mouth every 8 hours rectally for itching  mupirocin (BACTROBAN) 2 % ointment, apply topically to affected area three times a day  insulin aspart (NOVOLOG FLEXPEN) 100 UNIT/ML injection pen, Use TID before meals according to scale - max 45 units a day  loratadine (CLARITIN) 10 MG tablet, Take 1 tablet every day by oral route. rosuvastatin (CRESTOR) 40 MG tablet, Take 1 tablet by mouth daily  albuterol sulfate  (90 Base) MCG/ACT inhaler, Inhale 2 puffs into the lungs every 6 hours as needed for Wheezing  vitamin E 400 UNIT capsule, Take 400 Units by mouth daily.     Current Medications:     Scheduled Meds:    aspirin  81 mg Oral Daily    carvedilol  6.25 mg Oral BID WC    vitamin E  400 Units Oral Daily    rosuvastatin  40 mg Oral Daily    mupirocin   Topical TID    cetirizine  10 mg Oral Daily    escitalopram  20 mg Oral Daily    [Held by provider] rivaroxaban  20 mg Oral Daily with breakfast    Vitamin D  1,000 Units Oral Daily    ticagrelor  90 mg Oral BID    isosorbide mononitrate  30 mg Oral Daily    fluticasone  2 spray Each Nostril Daily    gabapentin  600 mg Oral TID    pantoprazole  40 mg Oral QAM AC    miconazole   Topical BID    sodium chloride flush  5-40 mL IntraVENous 2 times per day    insulin glargine  50 Units SubCUTAneous BID     Continuous Infusions:    dextrose      sodium chloride       PRN Meds:  glucose, dextrose, glucagon (rDNA), dextrose, loperamide, benzonatate, albuterol sulfate HFA, docusate sodium, clonazePAM, nitroGLYCERIN, oxyCODONE-acetaminophen, sodium chloride flush, sodium chloride, potassium chloride **OR** potassium alternative oral replacement **OR** potassium chloride, magnesium sulfate, ondansetron **OR** ondansetron, polyethylene glycol, acetaminophen **OR** acetaminophen    Input/Output:           Intake/Output Summary (Last 24 hours) at 9/15/2021 0921  Last data filed at 9/15/2021 0548  Gross per 24 hour   Intake 1592 ml   Output 950 ml   Net 642 ml   . Patient Vitals for the past 96 hrs (Last 3 readings):   Weight   21 1745 225 lb (102.1 kg)   21 0951 225 lb (102.1 kg)       Vital Signs:   Temperature:  Temp: 97.7 °F (36.5 °C)  TMax:   Temp (24hrs), Av.2 °F (36.8 °C), Min:97.7 °F (36.5 °C), Max:98.5 °F (36.9 °C)    Respirations:  Resp: 18  Pulse:   Pulse: 64  BP:    BP: (!) 140/55  BP Range: Systolic (44UMM), BLT:828 , Min:117 , MKT:788       Diastolic (71QMX), WVE:76, Min:53, Max:58      Physical Examination:       General:  AAO x 3, speaking in full sentences, no accessory muscle use.   HEENT: Atraumatic, normocephalic, no throat congestion, moist mucosa. Eyes:   Pupils equal and reactive  Neck:   Supple  Chest:   Clear on auscultation. no rales or ronchi  Cardiac:  S1 S2 RR, no gallops, murmur or rubs. Abdomen: Soft, non-tender, no masses or organomegaly, BS present. :   No suprapubic or, positive for left flank tenderness  Neuro:  AAO x 3, No FND. SKIN:  No rashes, good skin turgor. Extremities:  No edema.     Labs:       BMP:   Recent Labs     09/13/21  0509 09/14/21  0509 09/15/21  0428   * 142 140   K 3.9 4.2 4.3    107 106   CO2 21 24 25   BUN 23 20 18   CREATININE 1.95* 2.13* 2.22*   GLUCOSE 157* 69* 137*   CALCIUM 7.9* 8.8 8.7      Magnesium:    Recent Labs     09/14/21  0509   MG 2.4     BNP:      Lab Results   Component Value Date    BNP 94 07/13/2012     JAZZY:      Lab Results   Component Value Date    JAZZY NEGATIVE 11/19/2019     SPEP:  Lab Results   Component Value Date    PROT 6.4 09/12/2021     C3:     Lab Results   Component Value Date    C3 140 09/14/2021     C4:     Lab Results   Component Value Date    C4 43 09/14/2021     Hep BsAg:         Lab Results   Component Value Date    HEPBSAG NONREACTIVE 11/19/2019     Hep C AB:          Lab Results   Component Value Date    HEPCAB NONREACTIVE 11/19/2019       Urinalysis/Chemistries:      Lab Results   Component Value Date    NITRU NEGATIVE 09/01/2021    COLORU YELLOW 09/01/2021    PHUR 7.5 09/01/2021    WBCUA 2 TO 5 09/01/2021    RBCUA None 09/01/2021    MUCUS NOT REPORTED 09/01/2021    TRICHOMONAS NOT REPORTED 09/01/2021    YEAST NOT REPORTED 09/01/2021    BACTERIA MANY 09/01/2021    CLARITYU clear 12/03/2019    SPECGRAV 1.009 09/01/2021    LEUKOCYTESUR NEGATIVE 09/01/2021    UROBILINOGEN Normal 09/01/2021    BILIRUBINUR NEGATIVE 09/01/2021    BILIRUBINUR neg 12/03/2019    BLOODU neg 12/03/2019    GLUCOSEU 1+ 09/01/2021    KETUA NEGATIVE 09/01/2021    AMORPHOUS NOT REPORTED 09/01/2021     Urine Sodium:     Lab Results   Component Value Date    KARTHIK 141 09/02/2021 Urine Creatinine:     Lab Results   Component Value Date    LABCREA 107.9 03/16/2017    LABCREA 172.6 03/22/2016       Radiology:     Reviewed as available    Assessment:       1. Acute Kidney Injury: Secondary to ischemic ATN from intravascularly depletion from insensible losses, post fall and patient was in the ground for 11 hours, high suspicion for decreased renal perfusion. Baseline creatinine I suspect is in the 1.1-1.3 range which is now up to 2.2, urine output not accurate likely evolving  2. 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 Type 2 diabetes with complications  4. Atherosclerotic heart disease history of stent placement preserve ejection fraction  5. Recurrent falls  6. Recent enterocolitis  7. Normal renal ultrasound with bilateral renal cysts, no hydronephrosis-9/2/2021    Plan:     1. Further orders pending urine studies. 2.  Keep systolic pressure more than 110  3. Comprehensive urine testing including Urinalysis, Urine sodium, Urine protein and creatinine to quantify the proteinuria if any at all. Will check urinary eosinophils as well. 5. Strict intake output record and daily weights  7. Following renal function closely    Nutrition   Keep patient on renal diet/TF. Avoid nephrotoxic drugs/contrast exposure. We will continue to follow this patient along with you. Raul Roy MD  Internal Medicine Resident  [facility]  9/15/2021 9:21 AM    Attending Physician Statement  I have discussed the care of Vikki Valderrama, including pertinent history and exam findings with the resident/fellow. I have reviewed the key elements of all parts of the encounter with the resident/fellow. I have seen and examined the patient with the resident/fellow.   I agree with the assessment and plan and status of the problem list as documented.       Mike Hayward MD   Nephrology Attending Physician  [facility]  9/15/2021

## 2021-09-15 NOTE — PLAN OF CARE
Problem: Falls - Risk of:  Goal: Will remain free from falls  Description: Will remain free from falls  9/14/2021 2259 by Adalgisa Cao RN  Outcome: Ongoing  9/14/2021 1857 by Dell Tello RN  Outcome: Ongoing  Goal: Absence of physical injury  Description: Absence of physical injury  9/14/2021 2259 by Adalgisa Cao RN  Outcome: Ongoing  9/14/2021 1857 by Dell Tello RN  Outcome: Ongoing     Problem: Pain:  Goal: Pain level will decrease  Description: Pain level will decrease  9/14/2021 2259 by Adalgisa Cao RN  Outcome: Ongoing  9/14/2021 1857 by Dell Tello RN  Outcome: Ongoing  Goal: Control of acute pain  Description: Control of acute pain  9/14/2021 2259 by Adalgisa Cao RN  Outcome: Ongoing  9/14/2021 1857 by Dell Tello RN  Outcome: Ongoing  Goal: Control of chronic pain  Description: Control of chronic pain  9/14/2021 2259 by Adalgisa Cao RN  Outcome: Ongoing  9/14/2021 1857 by Dell Tello RN  Outcome: Ongoing     Problem: Musculor/Skeletal Functional Status  Goal: Highest potential functional level  9/14/2021 2259 by Adalgisa Cao RN  Outcome: Ongoing  9/14/2021 1857 by Dell Tello RN  Outcome: Ongoing

## 2021-09-16 VITALS
SYSTOLIC BLOOD PRESSURE: 139 MMHG | HEART RATE: 71 BPM | BODY MASS INDEX: 32.21 KG/M2 | DIASTOLIC BLOOD PRESSURE: 64 MMHG | TEMPERATURE: 98.3 F | OXYGEN SATURATION: 99 % | HEIGHT: 70 IN | RESPIRATION RATE: 24 BRPM | WEIGHT: 225 LBS

## 2021-09-16 PROBLEM — N17.9 AKI (ACUTE KIDNEY INJURY) (HCC): Status: RESOLVED | Noted: 2020-07-13 | Resolved: 2021-09-16

## 2021-09-16 LAB
ANION GAP SERPL CALCULATED.3IONS-SCNC: 12 MMOL/L (ref 9–17)
BUN BLDV-MCNC: 18 MG/DL (ref 8–23)
BUN/CREAT BLD: ABNORMAL (ref 9–20)
CALCIUM SERPL-MCNC: 9.3 MG/DL (ref 8.6–10.4)
CHLORIDE BLD-SCNC: 105 MMOL/L (ref 98–107)
CO2: 22 MMOL/L (ref 20–31)
CREAT SERPL-MCNC: 2.19 MG/DL (ref 0.5–0.9)
GFR AFRICAN AMERICAN: 26 ML/MIN
GFR NON-AFRICAN AMERICAN: 22 ML/MIN
GFR SERPL CREATININE-BSD FRML MDRD: ABNORMAL ML/MIN/{1.73_M2}
GFR SERPL CREATININE-BSD FRML MDRD: ABNORMAL ML/MIN/{1.73_M2}
GLUCOSE BLD-MCNC: 51 MG/DL (ref 65–105)
GLUCOSE BLD-MCNC: 87 MG/DL (ref 70–99)
GLUCOSE BLD-MCNC: 88 MG/DL (ref 65–105)
POTASSIUM SERPL-SCNC: 4.5 MMOL/L (ref 3.7–5.3)
SODIUM BLD-SCNC: 139 MMOL/L (ref 135–144)

## 2021-09-16 PROCEDURE — 99232 SBSQ HOSP IP/OBS MODERATE 35: CPT | Performed by: INTERNAL MEDICINE

## 2021-09-16 PROCEDURE — 6370000000 HC RX 637 (ALT 250 FOR IP): Performed by: INTERNAL MEDICINE

## 2021-09-16 PROCEDURE — 97530 THERAPEUTIC ACTIVITIES: CPT

## 2021-09-16 PROCEDURE — 6370000000 HC RX 637 (ALT 250 FOR IP): Performed by: NURSE PRACTITIONER

## 2021-09-16 PROCEDURE — 82947 ASSAY GLUCOSE BLOOD QUANT: CPT

## 2021-09-16 PROCEDURE — 99239 HOSP IP/OBS DSCHRG MGMT >30: CPT | Performed by: INTERNAL MEDICINE

## 2021-09-16 PROCEDURE — 80048 BASIC METABOLIC PNL TOTAL CA: CPT

## 2021-09-16 RX ORDER — GABAPENTIN 300 MG/1
300 CAPSULE ORAL 3 TIMES DAILY
Qty: 30 CAPSULE | Refills: 0 | Status: SHIPPED | OUTPATIENT
Start: 2021-09-16 | End: 2021-10-04 | Stop reason: SDUPTHER

## 2021-09-16 RX ORDER — GABAPENTIN 300 MG/1
300 CAPSULE ORAL 3 TIMES DAILY
Status: DISCONTINUED | OUTPATIENT
Start: 2021-09-16 | End: 2021-09-16 | Stop reason: HOSPADM

## 2021-09-16 RX ORDER — CARVEDILOL 6.25 MG/1
6.25 TABLET ORAL 2 TIMES DAILY WITH MEALS
Qty: 60 TABLET | Refills: 3 | Status: SHIPPED | OUTPATIENT
Start: 2021-09-16 | End: 2021-10-21 | Stop reason: SDUPTHER

## 2021-09-16 RX ADMIN — TICAGRELOR 90 MG: 90 TABLET ORAL at 10:27

## 2021-09-16 RX ADMIN — Medication 1000 UNITS: at 10:26

## 2021-09-16 RX ADMIN — ISOSORBIDE MONONITRATE 30 MG: 30 TABLET ORAL at 10:26

## 2021-09-16 RX ADMIN — CARVEDILOL 6.25 MG: 6.25 TABLET, FILM COATED ORAL at 08:24

## 2021-09-16 RX ADMIN — INSULIN GLARGINE 50 UNITS: 100 INJECTION, SOLUTION SUBCUTANEOUS at 10:29

## 2021-09-16 RX ADMIN — ASPIRIN 81 MG: 81 TABLET, CHEWABLE ORAL at 10:26

## 2021-09-16 RX ADMIN — ROSUVASTATIN CALCIUM 40 MG: 20 TABLET, FILM COATED ORAL at 10:27

## 2021-09-16 RX ADMIN — FLUTICASONE PROPIONATE 2 SPRAY: 50 SPRAY, METERED NASAL at 10:26

## 2021-09-16 RX ADMIN — MICONAZOLE NITRATE: 20 POWDER TOPICAL at 10:28

## 2021-09-16 RX ADMIN — PANTOPRAZOLE SODIUM 40 MG: 40 TABLET, DELAYED RELEASE ORAL at 08:19

## 2021-09-16 RX ADMIN — ESCITALOPRAM OXALATE 20 MG: 10 TABLET ORAL at 10:24

## 2021-09-16 RX ADMIN — GABAPENTIN 600 MG: 600 TABLET ORAL at 10:26

## 2021-09-16 RX ADMIN — MUPIROCIN: 20 OINTMENT TOPICAL at 10:26

## 2021-09-16 RX ADMIN — Medication 400 UNITS: at 10:28

## 2021-09-16 RX ADMIN — CETIRIZINE HYDROCHLORIDE 10 MG: 10 TABLET ORAL at 10:26

## 2021-09-16 ASSESSMENT — PAIN SCALES - GENERAL
PAINLEVEL_OUTOF10: 3
PAINLEVEL_OUTOF10: 5

## 2021-09-16 ASSESSMENT — PAIN DESCRIPTION - PAIN TYPE: TYPE: ACUTE PAIN

## 2021-09-16 ASSESSMENT — PAIN DESCRIPTION - LOCATION
LOCATION: LEG;ARM
LOCATION: LEG

## 2021-09-16 ASSESSMENT — PAIN DESCRIPTION - PROGRESSION
CLINICAL_PROGRESSION: NOT CHANGED

## 2021-09-16 ASSESSMENT — PAIN DESCRIPTION - FREQUENCY: FREQUENCY: INTERMITTENT

## 2021-09-16 ASSESSMENT — PAIN DESCRIPTION - ONSET: ONSET: ON-GOING

## 2021-09-16 ASSESSMENT — PAIN DESCRIPTION - ORIENTATION
ORIENTATION: RIGHT
ORIENTATION: RIGHT

## 2021-09-16 ASSESSMENT — PAIN DESCRIPTION - DESCRIPTORS: DESCRIPTORS: ACHING

## 2021-09-16 ASSESSMENT — PAIN - FUNCTIONAL ASSESSMENT: PAIN_FUNCTIONAL_ASSESSMENT: PREVENTS OR INTERFERES SOME ACTIVE ACTIVITIES AND ADLS

## 2021-09-16 NOTE — DISCHARGE INSTR - COC
Continuity of Care Form    Patient Name: Flaco Iniguez   :  1945  MRN:  [id number]    Admit date:  2021  Discharge date:  2021    Code Status Order: Full Code   Advance Directives:      Admitting Physician:  No admitting provider for patient encounter. PCP: Jordana Wen MD    Discharging Nurse: James Simmons Unit/Room#: 0145/9979-43  Discharging Unit Phone Number: [phone]      Emergency Contact:   Extended Emergency Contact Information  Primary Emergency Contact: Rah Matthews  Address: [place]  Home Phone: [phone]  Relation: Child  Secondary Emergency Contact: Frank Gracia. Phone: [phone]  Mobile Phone: [phone]  Relation: Child    Past Surgical History:  Past Surgical History:   Procedure Laterality Date    APPENDECTOMY      CARDIAC CATHETERIZATION          CORONARY ANGIOPLASTY WITH STENT PLACEMENT  2017    4 SYNERGY HEART STENTS DRUG ELUTING ALL MRI CONDITONAL 3T OK, SAFE IMMEDIATELY.      CORONARY ANGIOPLASTY WITH STENT PLACEMENT  2012    XIENCE HEART STENT/ MRI CONDITIONAL 3T OK, SAFE IMMEDIATELY    CORONARY ANGIOPLASTY WITH STENT PLACEMENT  2020    PTCA         Immunization History:   Immunization History   Administered Date(s) Administered    COVID-19, Pfizer, PF, 30mcg/0.3mL 2021, 2021    Influenza A ([id number]) Vaccine PF IM 2009    Influenza Vaccine, unspecified formulation 2013, 10/22/2015, 2016    Influenza Virus Vaccine 10/03/2016    Influenza, High Dose (Fluzone 65 yrs and older) 10/12/2018, 2019    Influenza, Quadv, IM, PF (6 mo and older Fluzone, Flulaval, Fluarix, and 3 yrs and older Afluria) 2018, 2020    Pneumococcal Conjugate 13-valent ([id number]) 2017    Pneumococcal Polysaccharide ([id number]) 2012       Active Problems:  Patient Active Problem List   Diagnosis Code    Atypical chest pain R07.89    Type 2 diabetes mellitus with diabetic polyneuropathy, with long-term current use of insulin ([facility]) E11.42, Z79.4    Vertigo R42    Essential hypertension I10    CAD (coronary artery disease) I25.10    Dyspnea R06.00    Claudication in peripheral vascular disease ([facility]) I73.9    Acute pulmonary embolism without acute cor pulmonale ([facility]) I26.99    Diabetic polyneuropathy associated with type 2 diabetes mellitus ([facility]) E11.42    Other hyperlipidemia E78.49    Chronic systolic heart failure ([facility]) I50.22    Multiple subsegmental pulmonary emboli without acute cor pulmonale ([facility]) I26.94    Congestive heart failure ([facility]) I50.9    Pyelonephritis of right kidney N12    History of pulmonary embolism Z86.711    Elevated troponin I level R77.8    Sepsis ([facility]) A41.9    Suspected COVID-19 virus infection Z20.822    Diarrhea R19.7    Chronic diastolic (congestive) heart failure ([facility]) I50.32    Generalized weakness R53.1    Anemia, normocytic normochromic D64.9    Class 1 obesity in adult E66.9    Gastroenteritis K52.9    Acute on chronic diastolic CHF (congestive heart failure) ([facility]) I50.33    Acute diastolic CHF (congestive heart failure) ([facility]) I50.31    Arterial hypotension I95.9    Moderate malnutrition ([facility]) E44.0    S/P PTCA (percutaneous transluminal coronary angioplasty) Z98.61    Cervical myelopathy ([facility]) G95.9    Viral gastroenteritis A08.4    Fall at home, initial encounter W19. Marilee Montesinos, Y92.009       Isolation/Infection:   Isolation            No Isolation          Patient Infection Status       Infection Onset Added Last Indicated Last Indicated By Review Planned Expiration Resolved Resolved By    None active    Resolved    C-diff Rule Out 09/02/21 09/02/21 09/02/21 Gastrointestinal Panel, Molecular (Ordered)   09/03/21 Genoveva Stein RN    COVID-19 Rule Out 09/01/21 09/01/21 09/01/21 COVID-19, Rapid (Ordered)   09/01/21 Rule-Out Test Resulted    C-diff Rule Out 11/12/20 11/12/20 11/12/20 Gastrointestinal Panel, Molecular (Ordered)   11/13/20 Sarah Keenan RN    GI panel does not test for C-diff    COVID-19 Rule Out 11/06/20 11/06/20 11/06/20 COVID-19 (Ordered)   11/06/20 Rule-Out Test Resulted    C-diff Rule Out 09/03/20 09/03/20 09/03/20 Gastrointestinal Panel, Molecular (Ordered)   09/04/20 Sarah Keenan RN    GI panel does not test for C-diff    C-diff Rule Out 09/02/20 09/02/20 09/02/20 Gastrointestinal Panel, Molecular (Ordered)   09/03/20 Sarah Keenan RN    C-diff is not tested in GI Panel    C-diff Rule Out 07/13/20 07/13/20 07/13/20 C DIFF TOXIN/ANTIGEN (Ordered)   07/14/20 Sarah Keenan RN    COVID-19 Rule Out 07/12/20 07/12/20 07/12/20 COVID-19 (Ordered)   07/13/20 Rule-Out Test Resulted            Nurse Assessment:  Last Vital Signs: /64   Pulse 71   Temp 98.3 °F (36.8 °C) (Oral)   Resp 24   Ht 5' 10\" (1.778 m)   Wt 225 lb (102.1 kg)   SpO2 99%   BMI 32.28 kg/m²     Last documented pain score (0-10 scale): Pain Level: 5  Last Weight:   Wt Readings from Last 1 Encounters:   09/12/21 225 lb (102.1 kg)     Mental Status:  oriented    IV Access:  - None    Nursing Mobility/ADLs:  Walking   Assisted  Transfer  Assisted  Bathing  Assisted  Dressing  Assisted  Toileting  Assisted  Feeding  Assisted  Med Admin  Assisted  Med Delivery   whole    Wound Care Documentation and Therapy:        Elimination:  Continence:   · Bowel: Yes  · Bladder: Yes  Urinary Catheter: None   Colostomy/Ileostomy/Ileal Conduit: No       Date of Last BM: 9/15/2021      Intake/Output Summary (Last 24 hours) at 9/16/2021 1129  Last data filed at 9/16/2021 0619  Gross per 24 hour   Intake --   Output 1400 ml   Net -1400 ml     I/O last 3 completed shifts:  In: -   Out: [address] [Urine:1650]    Safety Concerns: At Risk for Falls    Impairments/Disabilities:      None    Nutrition Therapy:  Current Nutrition Therapy:   - Oral Diet:  General    Routes of Feeding: Oral  Liquids:  Thin Liquids  Daily Fluid Restriction: no  Last Modified Barium Swallow with Video (Video Swallowing Test): not done    Treatments at the Time of Hospital Discharge:   Respiratory Treatments: n/a    Oxygen Therapy:  is not on home oxygen therapy. Ventilator:    - No ventilator support    Rehab Therapies: Physical Therapy and Occupational Therapy  Weight Bearing Status/Restrictions: No weight bearing restirctions  Other Medical Equipment (for information only, NOT a DME order):  walker  Other Treatments: n/a      Patient's personal belongings (please select all that are sent with patient):  None  RN SIGNATURE:  Electronically signed by Juliann Weiner RN on 9/16/2021 at 2:55 PM      CASE MANAGEMENT/SOCIAL WORK SECTION    Inpatient Status Date: ***    Readmission Risk Assessment Score:  Readmission Risk              Risk of Unplanned Readmission:  24           Discharging to Facility/ Agency   · Name:   · Address:  · Phone:  · Fax:    Dialysis Facility (if applicable)   · Name:  · Address:  · Dialysis Schedule:  · Phone:  · Fax:    / signature: {Esignature:[id number]:::0}    PHYSICIAN SECTION    Prognosis: Fair    Condition at Discharge: Stable    Rehab Potential (if transferring to Rehab): Fair    Recommended Labs or Other Treatments After Discharge: Discontinue spironolactone, decrease gabapentin dose    Physician Certification: I certify the above information and transfer of Nya Bai  is necessary for the continuing treatment of the diagnosis listed and that she requires Home Care for greater 30 days.      Update Admission H&P: No change in H&P    PHYSICIAN SIGNATURE:  Electronically signed by Kyle Rodriguez MD on 9/16/21 at 11:29 AM EDT

## 2021-09-16 NOTE — PROGRESS NOTES
Renal Progress Note    Patient :  Jazz Perez; 68 y.o. MRN# [id number]  Location:  [facility]/4723-08  Attending:  Rick Kay MD  Admit Date:  9/11/2021   Hospital Day: 5      Subjective:     C/C-fall and recurrent vertigo  PMH- DM2, CAD s/p stent in 2020, A fib, last creatinine 1.1-1.3 in December. Nephrology-MARIELLE  Patient reported having some dysuria but no fevers or chills. Pt was seen and examined at bedside. Resting comfortably in the bed. Vitals stable. Labs reviewed. Creatinine  2.1 this morning. Fluctuated in 2.0-2.2 range. Urine output-good, not accurate  Patient not on any fluids. Work-up so far unremarkable, urine sediment benign. Outpatient Medications:     Medications Prior to Admission: insulin detemir (LEVEMIR FLEXTOUCH) 100 UNIT/ML injection pen, Inject 50 Units into the skin 2 times daily  nystatin (MYCOSTATIN) 025932 UNIT/GM powder, Apply 3 times daily. spironolactone (ALDACTONE) 25 MG tablet, Take 1 tablet by mouth daily  Continuous Blood Gluc Sensor (FREESTYLE SYLVIA 14 DAY SENSOR) MISC, 1 each by Does not apply route every 14 days  omeprazole (PRILOSEC) 20 MG delayed release capsule, Take 1 capsule by mouth Daily  Control Gel Formula Dressing (DUODERM CGF DRESSING) MISC, Apply 2 each topically daily  Misc.  Devices (STEP N REST WALKER) MISC, 1 each by Does not apply route continuous Seated, wheeled walker  gabapentin (NEURONTIN) 600 MG tablet, TAKE ONE TABLET BY MOUTH THREE TIMES A DAY  fluticasone (FLONASE) 50 MCG/ACT nasal spray, 2 sprays by Each Nostril route daily  Zinc Oxide 15 % CREA, Apply to buttocks up to 4 times daily - may use any concentratino  carvedilol (COREG) 25 MG tablet, Take 1 tablet by mouth 2 times daily (with meals) Hold for sbp<120 or hr <50, give 1 hour from other bp meds  isosorbide mononitrate (IMDUR) 30 MG extended release tablet, Take 1 tablet by mouth daily  oxyCODONE-acetaminophen (PERCOCET) 5-325 MG per tablet,   ticagrelor (BRILINTA) 90 MG TABS tablet, Take 1 tablet by mouth 2 times daily  vitamin D3 (CHOLECALCIFEROL) 25 MCG (1000 UT) TABS tablet, Take 1 tablet by mouth daily  nitroGLYCERIN (NITROSTAT) 0.4 MG SL tablet, place 1 tablet under the tongue if needed every 5 minutes if needed for CHEST PAIN  clonazePAM (KLONOPIN) 0.5 MG tablet, take 1 tablet by mouth twice a day if needed for anxiety  Misc. Devices (WALKER) MISC, Diagnosis: right 5th metatarsal fracture, pain in limb  Duration: 3 months  docusate sodium (COLACE) 100 MG capsule, Take 1 capsule by mouth daily as needed for Constipation  Misc. Devices (COMMODE BEDSIDE) MISC, 1 Device by Does not apply route daily  Continuous Blood Gluc  (FREESTYLE SYLVIA 14 DAY READER) JAQUELINE, 1 each by Does not apply route continuous [facility]  rivaroxaban (XARELTO) 20 MG TABS tablet, Take 1 tablet by mouth daily (with breakfast)  glucose monitoring kit (FREESTYLE) monitoring kit, 1 kit by Does not apply route 4 times daily [facility] . Dx E11.65, has tremors and cannot use conventional meter without great difficulty. Please provide supplies for 3 months, rf 3,[facility].  Misc. Devices (STEP N REST WALKER) MISC, 1 each by Does not apply route continuous Seated, wheeled walker  escitalopram (LEXAPRO) 20 MG tablet, Take 1 tablet by mouth daily  hydrOXYzine (ATARAX) 25 MG tablet, take 1 tablet by mouth every 8 hours rectally for itching  mupirocin (BACTROBAN) 2 % ointment, apply topically to affected area three times a day  insulin aspart (NOVOLOG FLEXPEN) 100 UNIT/ML injection pen, Use TID before meals according to scale - max 45 units a day  loratadine (CLARITIN) 10 MG tablet, Take 1 tablet every day by oral route. rosuvastatin (CRESTOR) 40 MG tablet, Take 1 tablet by mouth daily  albuterol sulfate  (90 Base) MCG/ACT inhaler, Inhale 2 puffs into the lungs every 6 hours as needed for Wheezing  vitamin E 400 UNIT capsule, Take 400 Units by mouth daily.     Current moist mucosa. Eyes:   Pupils equal and reactive  Neck:   Supple  Chest:   Clear on auscultation. no rales or ronchi  Cardiac:  S1 S2 RR, no gallops, murmur or rubs. Abdomen: Soft, non-tender, no masses or organomegaly, BS present. :   No suprapubic or, positive for left flank tenderness  Neuro:  AAO x 3, No FND. SKIN:  No rashes, good skin turgor. Extremities:  No edema.     Labs:       BMP:   Recent Labs     09/14/21  0509 09/15/21  0428 09/16/21  0723    140 139   K 4.2 4.3 4.5    106 105   CO2 24 25 22   BUN 20 18 18   CREATININE 2.13* 2.22* 2.19*   GLUCOSE 69* 137* 87   CALCIUM 8.8 8.7 9.3      Magnesium:    Recent Labs     09/14/21  0509   MG 2.4     BNP:      Lab Results   Component Value Date    BNP 94 07/13/2012     JAZZY:      Lab Results   Component Value Date    JAZZY NEGATIVE 11/19/2019     SPEP:  Lab Results   Component Value Date    PROT 6.4 09/12/2021     C3:     Lab Results   Component Value Date    C3 140 09/14/2021     C4:     Lab Results   Component Value Date    C4 43 09/14/2021     Hep BsAg:         Lab Results   Component Value Date    HEPBSAG NONREACTIVE 11/19/2019     Hep C AB:          Lab Results   Component Value Date    HEPCAB NONREACTIVE 11/19/2019       Urinalysis/Chemistries:      Lab Results   Component Value Date    NITRU POSITIVE 09/15/2021    COLORU YELLOW 09/15/2021    PHUR 5.5 09/15/2021    WBCUA TOO NUMEROUS TO COUNT 09/15/2021    RBCUA 2 TO 5 09/15/2021    MUCUS NOT REPORTED 09/15/2021    TRICHOMONAS NOT REPORTED 09/15/2021    YEAST NOT REPORTED 09/15/2021    BACTERIA MANY 09/15/2021    CLARITYU clear 12/03/2019    SPECGRAV 1.006 09/15/2021    LEUKOCYTESUR LARGE 09/15/2021    UROBILINOGEN Normal 09/15/2021    BILIRUBINUR NEGATIVE 09/15/2021    BILIRUBINUR neg 12/03/2019    BLOODU neg 12/03/2019    GLUCOSEU NEGATIVE 09/15/2021    KETUA NEGATIVE 09/15/2021    AMORPHOUS NOT REPORTED 09/15/2021     Urine Sodium:     Lab Results   Component Value Date    KARTHIK 48

## 2021-09-16 NOTE — PROGRESS NOTES
[facility]  Office: [address], DO, Nakia Ramirez, DO, Angus Brittaniee, DO, Mague Spire Blood, DO, Sean Isidro MD, Lisa Bonilla MD, Chico Culver MD, Segundo Voss MD, Dmitry Mcdonald MD, Ladi Morales MD, Marquis Hilton MD, Jennifer Dutton, DO, Kyra Ureña, DO, Jabari Arreguin MD,  Lizeth Rota, DO, Dharmesh Buckley MD, Rohan Gavin MD, Alonzo Beverly MD, Barrett Hauser MD, , Sae Florence MD, Zenaida Desai MD, Madison Dc MD, Daniel Resendez, [facility], [facility], CNP, Alejandro Cuba, CNP, Vivian Saleh, CNS, Mark Arce, CNP, Keya Salvador, CNP, Wade Slacido, CNP, Heddy Sacks, CNP, Lizette Ritchie, CNP, Iman Yoon PA-C, Hortencia Berger [facility], Juan Ovalle, CNP, Karissa Mattson, CNP, Ji Rodgers, CNP, John Keita, CNP, Janelle Traylor, CNP, Fabien Lyons, CNP, Fabi Delacruz, CNP, Gloria Hahn, [address] [place] [address]    Progress Note    9/16/2021    10:37 AM    Name:   Steve Velazquez  MRN:     [id number]     Acct:      [de-identified]   Room:   [address] [place] [address] Day:  5  Admit Date:  9/11/2021  9:59 AM    PCP:   Jc Narvaez MD  Code Status:  Full Code    Subjective:     C/C:   Chief Complaint   Patient presents with   [employer] Fall     right side leg weakness, hit head, no LOC      Interval History Status: Improved. Pt was seen and examined this morning. Patient reported her chest pain and shortness of breath have resolved. Patient reports that she is doing lousy today as she was up all night due to urinating frequently    Brief History: This 68 yof presents with multiple falls. She was just admitted with gastroenteritis and rosa, went home and now returns after multiple falls. After one fall, EMS called but she refused to come to ER. After a fall last night, she laid on the floor for 11 hours. Her right side is sore, as is her buttocks.     Review of Systems:     12 point ROS performed and negative for anything other than what was stated in subjective    Medications: Allergies: Allergies   Allergen Reactions    Penicillins      Other reaction(s): Hives    Sulfa Antibiotics      Other reaction(s): Rash       Current Meds:   Scheduled Meds:    aspirin  81 mg Oral Daily    carvedilol  6.25 mg Oral BID WC    vitamin E  400 Units Oral Daily    rosuvastatin  40 mg Oral Daily    mupirocin   Topical TID    cetirizine  10 mg Oral Daily    escitalopram  20 mg Oral Daily    [Held by provider] rivaroxaban  20 mg Oral Daily with breakfast    Vitamin D  1,000 Units Oral Daily    ticagrelor  90 mg Oral BID    isosorbide mononitrate  30 mg Oral Daily    fluticasone  2 spray Each Nostril Daily    gabapentin  600 mg Oral TID    pantoprazole  40 mg Oral QAM AC    miconazole   Topical BID    sodium chloride flush  5-40 mL IntraVENous 2 times per day    insulin glargine  50 Units SubCUTAneous BID     Continuous Infusions:    dextrose      sodium chloride       PRN Meds: glucose, dextrose, glucagon (rDNA), dextrose, loperamide, benzonatate, albuterol sulfate HFA, docusate sodium, clonazePAM, nitroGLYCERIN, oxyCODONE-acetaminophen, sodium chloride flush, sodium chloride, potassium chloride **OR** potassium alternative oral replacement **OR** potassium chloride, magnesium sulfate, ondansetron **OR** ondansetron, polyethylene glycol, acetaminophen **OR** acetaminophen    Data:     Past Medical History:   has a past medical history of Anxiety, Atrial fibrillation (HCC), Benign positional vertigo, CAD (coronary artery disease), Chest pain, Depression, Diabetes mellitus ([facility] [address] Diabetic neuropathy ([facility] [address] Hyperlipidemia, Hypertension, and Migraine. Social History:   reports that she has never smoked. She has never used smokeless tobacco. She reports that she does not drink alcohol and does not use drugs.      Family History:   Family History   Problem Relation Age of Onset    Cancer Mother     Cancer Father Vitals:  /64   Pulse 71   Temp 98.3 °F (36.8 °C) (Oral)   Resp 24   Ht 5' 10\" (1.778 m)   Wt 225 lb (102.1 kg)   SpO2 99%   BMI 32.28 kg/m²   Temp (24hrs), Av.5 °F (36.9 °C), Min:98.3 °F (36.8 °C), Max:98.7 °F (37.1 °C)    Recent Labs     09/15/21  1558 09/15/21  1927 09/16/21  0621  0704   POCGLU 102 108* 51* 88       I/O (24Hr): Intake/Output Summary (Last 24 hours) at 2021 [facility]  Last data filed at 2021 0619  Gross per 24 hour   Intake --   Output 1400 ml   Net -1400 ml       Labs:  Hematology:  No results for input(s): WBC, RBC, HGB, HCT, MCV, MCH, MCHC, RDW, PLT, MPV, SEDRATE, CRP, INR, DDIMER, VJ7AGBLM, LABABSO in the last 72 hours. Invalid input(s): PT  Chemistry:  Recent Labs     21  0509 09/15/21  0428 21  0723    140 139   K 4.2 4.3 4.5    106 105   CO2 24 25 22   GLUCOSE 69* 137* 87   BUN 20 18 18   CREATININE 2.13* 2.22* 2.19*   MG 2.4  --   --    ANIONGAP 11 9 12   LABGLOM 23* 21* 22*   GFRAA 27* 26* 26*   CALCIUM 8.8 8.7 9.3   TROPHS 43*  --   --      Recent Labs     09/14/21  2027 09/15/21  1108 09/15/21  1558 09/15/21  1927 09/16/21  0624 21  0704   POCGLU 240* 135* 102 108* 51* 88     ABG:No results found for: POCPH, PHART, PH, POCPCO2, FAI5YMA, PCO2, POCPO2, PO2ART, PO2, POCHCO3, VSF0XOQ, HCO3, NBEA, PBEA, BEART, BE, THGBART, THB, UMU3GIE, GLMO8CSN, D8NNTLTL, O2SAT, FIO2  Lab Results   Component Value Date/Time    SPECIAL NOT REPORTED 2020 01:36 PM     Lab Results   Component Value Date/Time    CULTURE NO GROWTH 6 DAYS 2020 02:00 AM    CULTURE NO GROWTH 6 DAYS 2020 02:00 AM       Radiology:  XR SHOULDER RIGHT (MIN 2 VIEWS)    Result Date: 2021  Suggestion of nondisplaced 2 part intertrochanteric fracture of the right hip. Moderate to severe osteoarthritis involving the medial compartment and patellofemoral joints of both knees. Moderate AC joint arthropathy.   No acute abnormality in the right shoulder or either knee. Bony pelvis intact. RECOMMENDATION: CT scan of the right hip suggested for confirmation of suspected hip fracture. XR FEMUR RIGHT (MIN 2 VIEWS)    Result Date: 9/11/2021  No acute bony abnormalities are noted FINDINGS: The visualized bones are osteopenic. There is no evidence of fracture or dislocation. Degenerative changes of the right hip and right knee. The soft tissues are unremarkable. IMPRESSION: No acute bony abnormalities are noted     XR KNEE LEFT (3 VIEWS)    Result Date: 9/11/2021  Suggestion of nondisplaced 2 part intertrochanteric fracture of the right hip. Moderate to severe osteoarthritis involving the medial compartment and patellofemoral joints of both knees. Moderate AC joint arthropathy. No acute abnormality in the right shoulder or either knee. Bony pelvis intact. RECOMMENDATION: CT scan of the right hip suggested for confirmation of suspected hip fracture. XR KNEE RIGHT (3 VIEWS)    Result Date: 9/11/2021  Suggestion of nondisplaced 2 part intertrochanteric fracture of the right hip. Moderate to severe osteoarthritis involving the medial compartment and patellofemoral joints of both knees. Moderate AC joint arthropathy. No acute abnormality in the right shoulder or either knee. Bony pelvis intact. RECOMMENDATION: CT scan of the right hip suggested for confirmation of suspected hip fracture. XR FOOT LEFT (MIN 3 VIEWS)    Result Date: 9/11/2021  No acute bony abnormalities are noted FINDINGS: The visualized bones are osteopenic. There is no evidence of fracture or dislocation. Degenerative changes of the right hip and right knee. The soft tissues are unremarkable. IMPRESSION: No acute bony abnormalities are noted     CT Head WO Contrast    Result Date: 9/11/2021  No acute intracranial abnormality. CT Cervical Spine WO Contrast    Result Date: 9/11/2021  No acute abnormality of the cervical spine.      XR CHEST PORTABLE    Result Date: 9/11/2021  Mild right basilar atelectasis. No pleural effusion, pneumothorax, or displaced rib fracture appreciated. CT HIP RIGHT WO CONTRAST    Result Date: 9/11/2021  1. Mild right hip osteoarthrosis. 2. Enthesopathy as detailed above. 3. Moderate sigmoid diverticulosis. 4. Slight wall thickening and stranding of the wall of the urinary bladder which can be seen with cystitis. Correlate with urinalysis. 5. Probable partially visualized inferior pole left renal cyst.  Confirmation with nonemergent renal ultrasound recommended. 6. No acute intratrochanteric fracture evident by CT. If pain persists, further evaluation with MRI should be performed to assess for marrow edema. XR HIP 2-3 VW W PELVIS RIGHT    Result Date: 9/11/2021  Suggestion of nondisplaced 2 part intertrochanteric fracture of the right hip. Moderate to severe osteoarthritis involving the medial compartment and patellofemoral joints of both knees. Moderate AC joint arthropathy. No acute abnormality in the right shoulder or either knee. Bony pelvis intact. RECOMMENDATION: CT scan of the right hip suggested for confirmation of suspected hip fracture.        Physical Examination:        General appearance:  alert, cooperative and no distress  Mental Status:  oriented to person, place and time and normal affect  Lungs:  clear to auscultation bilaterally, normal effort  Heart:  regular rate and rhythm, no murmur  Abdomen:  soft, nontender, nondistended, normal bowel sounds  Extremities:  no edema, redness, tenderness in the calves  Skin:  no gross lesions, rashes, induration    Assessment:        Hospital Problems         Last Modified POA    * (Principal) Fall at home, initial encounter 9/11/2021 Yes    MARIELLE (acute kidney injury) ([address] 9/11/2021 Yes    Chronic diastolic (congestive) heart failure ([address] 9/11/2021 Yes    Type 2 diabetes mellitus with diabetic polyneuropathy, with long-term current use of insulin ([address] (Chronic) 9/11/2021 Yes Essential hypertension 9/11/2021 Yes    CAD (coronary artery disease) 9/11/2021 Yes    Overview Signed 3/27/2017  4:39 PM by Stephanie Glaser MD     S/P 4 + 2 stents         Generalized weakness 9/11/2021 Yes          Plan:        Fall  - CT hip did not reveal a fracture  - seen by orthopedic surgery who state no intervention from their end   - PT/OT     Chest pain with history of CAD  -Resolved  -Continue aspirin, Brilinta    Acute kidney injury  -Improving  -Nephrology following  - monitor urine output   - continue home meds  - v/s per unit protocol   - check orthostatics  -Continue IV fluids    Paroxysmal A. fib  -Not on anticoagulation due to falls  -Continue aspirin    Hypertension  -Continue Coreg    Hyperlipidemia  -Continue Crestor    Diabetes  -Continue insulin therapy      Jasen Longo MD  9/16/2021  10:37 AM

## 2021-09-16 NOTE — PLAN OF CARE
Problem: Falls - Risk of:  Goal: Will remain free from falls  Description: Will remain free from falls  Outcome: Ongoing  Goal: Absence of physical injury  Description: Absence of physical injury  Outcome: Ongoing     Problem: Pain:  Goal: Pain level will decrease  Description: Pain level will decrease  Outcome: Ongoing  Goal: Control of acute pain  Description: Control of acute pain  Outcome: Ongoing  Goal: Control of chronic pain  Description: Control of chronic pain  Outcome: Ongoing     Problem: Musculor/Skeletal Functional Status  Goal: Highest potential functional level  Outcome: Ongoing     Problem: Skin Integrity:  Goal: Will show no infection signs and symptoms  Description: Will show no infection signs and symptoms  Outcome: Ongoing  Goal: Absence of new skin breakdown  Description: Absence of new skin breakdown  Outcome: Ongoing

## 2021-09-16 NOTE — PROGRESS NOTES
Pain Management  Safety Devices  Type of devices: Nurse notified, Call light within reach, Patient at risk for falls, Left in chair  Restraints  Initially in place: No     Therapy Time   Individual Concurrent Group Co-treatment   Time In 0950         Time Out 1013         Minutes [address] [facility], PT

## 2021-09-16 NOTE — PROGRESS NOTES
CLINICAL PHARMACY NOTE: MEDS TO BEDS    Total # of Prescriptions Filled: 2   The following medications were delivered to the patient:  · Carvedilol  · Gabapentin      Additional Documentation:

## 2021-09-16 NOTE — DISCHARGE SUMMARY
[facility]  Office: [address], DO, Sylwia Mosssar, DO, Josue Narrow, DO, Carolina Obregon Blood, DO, Wang Smith MD, Xavier Rankin MD, Heraclio Miramontes MD, Latoya Diaz MD, Fuentes Wilson MD, Petr Pablo MD, Cherry Rutledge MD, Segundo Fields, DO, Gordo rAguello, DO, Apollo Heck MD,  Shalom Rodriguez, DO, Dora Herrera MD, Rick Kay MD, Romina Zhou MD, Jon Peterson MD, , Pato Madrid MD, True Sanchez MD, Susan Washington MD, Alex Parrish, [facility], [facility], CNP, Haritha Gunderson, CNP, Laura Councilman, CNS, Sonu Levy, CNP, Chanda Camp, CNP, Jaye Mccann, CNP, Alma Dang, CNP, Ailyn Otoole, CNP, Te Traore PA-C, Zehra Phillips, [facility], Enrico Crane, CNP, Tamie Child, CNP, Trinda Starch, CNP, Carlie Aylin, CNP, Fouzia Kugel, CNP, Carmencita Copper, CNP, Tequila Pedroza, CNP, Buck Teresa,  [facility]    Discharge Summary     Patient ID: Jazz Perez  :  1945   MRN: [id number]     ACCOUNT:  [de-identified]   Patient's PCP: Hernandez Rodriguez MD  Admit Date: 2021   Discharge Date: 2021     Length of Stay: 5  Code Status:  Full Code  Admitting Physician: No admitting provider for patient encounter. Discharge Physician: Rick Kay MD     Active Discharge Diagnoses:     Hospital Problem Lists:  Principal Problem:    Fall at home, initial encounter  Active Problems:    Chronic diastolic (congestive) heart failure (HCC)    Type 2 diabetes mellitus with diabetic polyneuropathy, with long-term current use of insulin (HCC)    Essential hypertension    CAD (coronary artery disease)    Generalized weakness  Resolved Problems:    MARIELLE (acute kidney injury) [facility])      Admission Condition:  poor     Discharged Condition: fair    Hospital Stay:     Hospital Course:       This 68 y.o presents with multiple falls. Tony Pacheco was just admitted with gastroenteritis and marielle, went home and now returns after multiple falls. After one fall, EMS called but she refused to come to ER. After a fall last night, she laid on the floor for 11 hours.  Her right side is sore, as is her buttocks. Patient was seen by orthopedic surgery and no interventions were planned. Patient was also complaining of chest pain shortness of breath and was evaluated by cardiology. No intervention was planned by cardiology either. Patient was noted to have acute kidney injury for which she was seen by nephrology. Her creatinine started to improve. Patient has been cleared for discharge.     Significant therapeutic interventions: IV fluids    Significant Diagnostic Studies:   Labs / Micro:  CBC:   Lab Results   Component Value Date    WBC 5.8 09/12/2021    RBC 3.11 09/12/2021    HGB 9.1 09/12/2021    HCT 29.9 09/12/2021    MCV 96.1 09/12/2021    MCH 29.3 09/12/2021    MCHC 30.4 09/12/2021    RDW 13.0 09/12/2021     09/12/2021     BMP:    Lab Results   Component Value Date    GLUCOSE 87 09/16/2021     09/16/2021    K 4.5 09/16/2021     09/16/2021    CO2 22 09/16/2021    ANIONGAP 12 09/16/2021    BUN 18 09/16/2021    CREATININE 2.19 09/16/2021    BUNCRER NOT REPORTED 09/16/2021    CALCIUM 9.3 09/16/2021    LABGLOM 22 09/16/2021    GFRAA 26 09/16/2021    GFR      09/16/2021    GFR NOT REPORTED 09/16/2021     HFP:    Lab Results   Component Value Date    PROT 6.4 09/12/2021     CMP:    Lab Results   Component Value Date    GLUCOSE 87 09/16/2021     09/16/2021    K 4.5 09/16/2021     09/16/2021    CO2 22 09/16/2021    BUN 18 09/16/2021    CREATININE 2.19 09/16/2021    ANIONGAP 12 09/16/2021    ALKPHOS 67 09/12/2021    ALT 10 09/12/2021    AST 15 09/12/2021    BILITOT 0.24 09/12/2021    LABALBU 3.5 09/12/2021    ALBUMIN 1.2 09/12/2021    LABGLOM 22 09/16/2021    GFRAA 26 09/16/2021    GFR      09/16/2021    GFR NOT REPORTED 09/16/2021    PROT 6.4 09/12/2021    CALCIUM 9.3 09/16/2021     PT/INR:    Lab Results Component Value Date    PROTIME 11.5 09/02/2021    INR 1.1 09/02/2021     PTT:   Lab Results   Component Value Date    APTT 58.5 11/11/2020     FLP:    Lab Results   Component Value Date    CHOL 148 12/14/2019    TRIG 61 12/14/2019    HDL 51 12/14/2019     U/A:    Lab Results   Component Value Date    COLORU YELLOW 09/15/2021    TURBIDITY CLOUDY 09/15/2021    SPECGRAV 1.006 09/15/2021    HGBUR SMALL 09/15/2021    PHUR 5.5 09/15/2021    PROTEINU 1+ 09/15/2021    GLUCOSEU NEGATIVE 09/15/2021    KETUA NEGATIVE 09/15/2021    BILIRUBINUR NEGATIVE 09/15/2021    BILIRUBINUR neg 12/03/2019    UROBILINOGEN Normal 09/15/2021    NITRU POSITIVE 09/15/2021    LEUKOCYTESUR LARGE 09/15/2021     TSH:    Lab Results   Component Value Date    TSH 0.64 11/06/2020        Radiology:  XR SHOULDER RIGHT (MIN 2 VIEWS)    Result Date: 9/11/2021  Suggestion of nondisplaced 2 part intertrochanteric fracture of the right hip. Moderate to severe osteoarthritis involving the medial compartment and patellofemoral joints of both knees. Moderate AC joint arthropathy. No acute abnormality in the right shoulder or either knee. Bony pelvis intact. RECOMMENDATION: CT scan of the right hip suggested for confirmation of suspected hip fracture. XR FEMUR RIGHT (MIN 2 VIEWS)    Result Date: 9/11/2021  No acute bony abnormalities are noted FINDINGS: The visualized bones are osteopenic. There is no evidence of fracture or dislocation. Degenerative changes of the right hip and right knee. The soft tissues are unremarkable. IMPRESSION: No acute bony abnormalities are noted     XR KNEE LEFT (3 VIEWS)    Result Date: 9/11/2021  Suggestion of nondisplaced 2 part intertrochanteric fracture of the right hip. Moderate to severe osteoarthritis involving the medial compartment and patellofemoral joints of both knees. Moderate AC joint arthropathy. No acute abnormality in the right shoulder or either knee. Bony pelvis intact.  RECOMMENDATION: CT scan of the right hip suggested for confirmation of suspected hip fracture. XR KNEE RIGHT (3 VIEWS)    Result Date: 9/11/2021  Suggestion of nondisplaced 2 part intertrochanteric fracture of the right hip. Moderate to severe osteoarthritis involving the medial compartment and patellofemoral joints of both knees. Moderate AC joint arthropathy. No acute abnormality in the right shoulder or either knee. Bony pelvis intact. RECOMMENDATION: CT scan of the right hip suggested for confirmation of suspected hip fracture. XR FOOT LEFT (MIN 3 VIEWS)    Result Date: 9/11/2021  No acute bony abnormalities are noted FINDINGS: The visualized bones are osteopenic. There is no evidence of fracture or dislocation. Degenerative changes of the right hip and right knee. The soft tissues are unremarkable. IMPRESSION: No acute bony abnormalities are noted     CT Head WO Contrast    Result Date: 9/11/2021  No acute intracranial abnormality. CT Cervical Spine WO Contrast    Result Date: 9/11/2021  No acute abnormality of the cervical spine. US RENAL COMPLETE    Result Date: 9/15/2021  Stable bilateral renal cysts     XR CHEST PORTABLE    Result Date: 9/11/2021  Mild right basilar atelectasis. No pleural effusion, pneumothorax, or displaced rib fracture appreciated. CT HIP RIGHT WO CONTRAST    Result Date: 9/11/2021  1. Mild right hip osteoarthrosis. 2. Enthesopathy as detailed above. 3. Moderate sigmoid diverticulosis. 4. Slight wall thickening and stranding of the wall of the urinary bladder which can be seen with cystitis. Correlate with urinalysis. 5. Probable partially visualized inferior pole left renal cyst.  Confirmation with nonemergent renal ultrasound recommended. 6. No acute intratrochanteric fracture evident by CT. If pain persists, further evaluation with MRI should be performed to assess for marrow edema.      XR HIP 2-3 VW W PELVIS RIGHT    Result Date: 9/11/2021  Suggestion of nondisplaced 2 part intertrochanteric fracture of the right hip. Moderate to severe osteoarthritis involving the medial compartment and patellofemoral joints of both knees. Moderate AC joint arthropathy. No acute abnormality in the right shoulder or either knee. Bony pelvis intact. RECOMMENDATION: CT scan of the right hip suggested for confirmation of suspected hip fracture. Consultations:    Consults:     Final Specialist Recommendations/Findings:   IP CONSULT TO ORTHOPEDIC SURGERY  IP CONSULT TO INTERNAL MEDICINE  IP CONSULT TO HOSPITALIST  IP CONSULT TO CARDIOLOGY  IP CONSULT TO IV TEAM  IP CONSULT TO NEPHROLOGY      The patient was seen and examined on day of discharge and this discharge summary is in conjunction with any daily progress note from day of discharge. Discharge plan:     Disposition: Home with home care    Physician Follow Up: [facility]  [address] [place] [address] [address] [address]  [phone]  On 9/29/2021  at 1:30pm with Oly Linda CNP. Follow up [facility]        Requiring Further Evaluation/Follow Up POST HOSPITALIZATION/Incidental Findings: Follow-up with nephrology in 1 week after discharge    Diet: cardiac diet    Activity: As tolerated    Instructions to Patient: Be compliant with your diet, medication, follow-ups    Discharge Medications:      Medication List      START taking these medications    gabapentin 300 MG capsule  Commonly known as: NEURONTIN  Take 1 capsule by mouth 3 times daily for 10 days.   Replaces: gabapentin 600 MG tablet        CHANGE how you take these medications    carvedilol 6.25 MG tablet  Commonly known as: COREG  Take 1 tablet by mouth 2 times daily (with meals)  What changed:   medication strength  how much to take  additional instructions        CONTINUE taking these medications    albuterol sulfate  (90 Base) MCG/ACT inhaler  Inhale 2 puffs into the lungs every 6 hours as needed for Wheezing     clonazePAM 0.5 MG tablet  Commonly known as: Caesar Ka  take 1 tablet by mouth twice a day if needed for anxiety     docusate sodium 100 MG capsule  Commonly known as: COLACE  Take 1 capsule by mouth daily as needed for Constipation     DuoDERM CGF Dressing Misc  Apply 2 each topically daily     escitalopram 20 MG tablet  Commonly known as: LEXAPRO  Take 1 tablet by mouth daily     fluticasone 50 MCG/ACT nasal spray  Commonly known as: FLONASE  2 sprays by Each Nostril route daily     FreeStyle Richard 14 Day [place] Etienne Carmenza  1 each by Does not apply route continuous [facility] on [employer] 14 Day Sensor Misc  1 each by Does not apply route every 14 days     glucose monitoring kit  1 kit by Does not apply route 4 times daily Ivonne Aponte . Dx E11.65, has tremors and cannot use conventional meter without great difficulty. Please provide supplies for 3 months, rf 3,[facility].     hydrOXYzine 25 MG tablet  Commonly known as: ATARAX  take 1 tablet by mouth every 8 hours rectally for itching     isosorbide mononitrate 30 MG extended release tablet  Commonly known as: IMDUR  Take 1 tablet by mouth daily     Levemir FlexTouch 100 UNIT/ML injection pen  Generic drug: insulin detemir  Inject 50 Units into the skin 2 times daily     loratadine 10 MG tablet  Commonly known as: Claritin  Take 1 tablet every day by oral route. mupirocin 2 % ointment  Commonly known as: BACTROBAN     nitroGLYCERIN 0.4 MG SL tablet  Commonly known as: NITROSTAT  place 1 tablet under the tongue if needed every 5 minutes if needed for CHEST PAIN     NovoLOG FlexPen 100 UNIT/ML injection pen  Generic drug: insulin aspart  Use TID before meals according to scale - max 45 units a day     nystatin 782308 UNIT/GM powder  Commonly known as: MYCOSTATIN  Apply 3 times daily.      omeprazole 20 MG delayed release capsule  Commonly known as: PRILOSEC  Take 1 capsule by mouth Daily     oxyCODONE-acetaminophen 5-325 MG per tablet  Commonly known as: PERCOCET     rivaroxaban 20 MG Tabs tablet  Commonly known as: Xarelto  Take 1 tablet by mouth daily (with breakfast)     rosuvastatin 40 MG tablet  Commonly known as: CRESTOR  Take 1 tablet by mouth daily     * Step N Rest Walker Misc  1 each by Does not apply route continuous Seated, wheeled walker     * Walker Misc  Diagnosis: right 5th metatarsal fracture, pain in limb    Duration: 3 months     * Commode Bedside Misc  1 Device by Does not apply route daily     * Step N Rest Walker Misc  1 each by Does not apply route continuous Seated, wheeled walker     ticagrelor 90 MG Tabs tablet  Commonly known as: BRILINTA  Take 1 tablet by mouth 2 times daily     vitamin D3 25 MCG (1000 UT) Tabs tablet  Commonly known as: CHOLECALCIFEROL  Take 1 tablet by mouth daily     vitamin E 400 UNIT capsule     Zinc Oxide 15 % Crea  Apply to buttocks up to 4 times daily - may use any concentratino         * This list has 4 medication(s) that are the same as other medications prescribed for you. Read the directions carefully, and ask your doctor or other care provider to review them with you. STOP taking these medications    gabapentin 600 MG tablet  Commonly known as: NEURONTIN  Replaced by: gabapentin 300 MG capsule     spironolactone 25 MG tablet  Commonly known as: ALDACTONE           Where to Get Your Medications      These medications were sent to [facility], [address] [place] [address]  [address] [facility], [address]  [address]    Phone: [phone]   carvedilol 6.25 MG tablet  gabapentin 300 MG capsule         Discharge Procedure Orders   Basic Metabolic Panel   Standing Status: Future Standing Exp. Date: 09/16/22   Order Comments: Fax to [phone]       Time Spent on discharge is  40 mins in patient examination, evaluation, counseling as well as medication reconciliation, prescriptions for required medications, discharge plan and follow up.     Electronically signed by Nasir Armas MD  9/16/2021  11:30 AM      Thank you Dr. Tiago Breen MD for the opportunity to be involved in this patient's care.

## 2021-09-16 NOTE — CARE COORDINATION
Transitional Planning    1025 Checked in with patient, pt received walker, and is comfortable with plan of home with [facility].

## 2021-09-16 NOTE — FLOWSHEET NOTE
SPIRITUAL CARE DEPARTMENT - [facility] 83  PROGRESS NOTE    Shift date: 9/15/21  Shift day: Wednesday   Shift # 2    Room # 0160/8778-67   Name: Luksa Ornelas            Age: 68 y.o. Gender: female          [employer]ian: Unknown   Place of Lutheran: Unknown    Referral: Routine Visit    Admit Date & Time: 9/11/2021  9:59 AM    PATIENT/EVENT DESCRIPTION:  Lukas Ornelas is a 68 y.o. female is in room 234. SPIRITUAL ASSESSMENT/INTERVENTION:   checked on morale well fare of Pt. Pt's family are aware that Pt is present at [facility]. V's.  offered up prayer for Pt. Pt accepted request and wanted to be prayed for.  provided prayers and L[employer] literature to bring encouragement to the Pt. SPIRITUAL CARE FOLLOW-UP PLAN:  Chaplains will remain available to offer spiritual and emotional support as needed. Electronically signed by Francisco Meyers, on 9/15/2021 at 10:07 PM.  [facility]  [phone]       09/15/21 2205   Encounter Summary   Services provided to: Patient   Referral/Consult From: [address] [facility])   Continue Visiting   (9/15/21)   Length of Encounter 15 minutes   Spiritual Assessment Completed Yes   Routine   Type Initial   Assessment Calm; Approachable; Hopeful;Coping;Peaceful   Intervention Active listening;Nurtured hope;Prayer;Sustaining presence/ Ministry of presence;Provided reading materials/devotional materials;Scripture   Outcome Acceptance;Comfort;Expressed gratitude;Engaged in conversation;Expressed feelings/needs/concerns;Coping; Hopeful;Receptive;Venting emotion

## 2021-09-16 NOTE — PLAN OF CARE
Problem: Falls - Risk of:  Goal: Will remain free from falls  9/16/2021 1130 by Cynthia Colon RN  Outcome: Ongoing  9/16/2021 0404 by Toya Clark RN  Outcome: Ongoing  Goal: Absence of physical injury  9/16/2021 1130 by Cynthia Colon RN  Outcome: Ongoing  9/16/2021 0404 by Toya Clark RN  Outcome: Ongoing     Problem: Pain:  Description: Pain management should include both nonpharmacologic and pharmacologic interventions.   Goal: Pain level will decrease  9/16/2021 1130 by Cynthia Colon RN  Outcome: Ongoing  9/16/2021 0404 by Toya Clark RN  Outcome: Ongoing  Goal: Control of acute pain  9/16/2021 1130 by Cynthia Colon RN  Outcome: Ongoing  9/16/2021 0404 by Toya Clark RN  Outcome: Ongoing  Goal: Control of chronic pain  9/16/2021 1130 by Cynthia Colon RN  Outcome: Ongoing  9/16/2021 0404 by Toya Clark RN  Outcome: Ongoing     Problem: Musculor/Skeletal Functional Status  Goal: Highest potential functional level  9/16/2021 1130 by Cynthia Colon RN  Outcome: Ongoing  9/16/2021 0404 by Toya Clark RN  Outcome: Ongoing     Problem: Skin Integrity:  Goal: Will show no infection signs and symptoms  9/16/2021 1130 by Cynthia Colon RN  Outcome: Ongoing  9/16/2021 0404 by Toya Clark RN  Outcome: Ongoing  Goal: Absence of new skin breakdown  9/16/2021 1130 by Cynthia Colon RN  Outcome: Ongoing  9/16/2021 0404 by Toya Clark RN  Outcome: Ongoing

## 2021-09-16 NOTE — PROGRESS NOTES
Occupational [address]  Occupational Therapy Not Seen Note    DATE: 2021    NAME: Shalom Charles  MRN: [id number]   : 1945      Patient not seen this date for Occupational Therapy due to:    Patient Declined: Pt states not getting any sleep during the night d/t frequent urination. Pt states will get up later to complete simple grooming. Pt educated on importance of participation in therapy with poor return.         Electronically signed by KEMAL Orozco on 2021 at 9:17 AM

## 2021-09-17 ENCOUNTER — CARE COORDINATION (OUTPATIENT)
Dept: CASE MANAGEMENT | Age: 76
End: 2021-09-17
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
## 2021-09-17 PROCEDURE — 1111F DSCHRG MED/CURRENT MED MERGE: CPT | Performed by: FAMILY MEDICINE

## 2021-09-17 NOTE — CARE COORDINATION
and decision makers verified. Was this a readmission? No  Patient stated reason for admission: Fall at home  Patients top risk factors for readmission: functional physical ability, lack of knowledge about disease and medical condition-MARIELLE/safety    Care Transition Nurse (CTN) contacted the patient by telephone to perform post hospital discharge assessment. Verified name and  with patient as identifiers. Provided introduction to self, and explanation of the CTN role. CTN reviewed discharge instructions, medical action plan and red flags with patient who verbalized understanding. Patient given an opportunity to ask questions and does not have any further questions or concerns at this time. Were discharge instructions available to patient? Yes. Reviewed appropriate site of care based on symptoms and resources available to patient including: PCP, Specialist, Home health and When to call 911. The patient agrees to contact the PCP office for questions related to their healthcare. Medication reconciliation was performed with patient, who verbalizes understanding of administration of home medications. .     Covid Risk Education     Educated patient about risk for severe COVID-19 due to risk factors according to CDC guidelines. CTN reviewed discharge instructions, medical action plan and red flag symptoms with the patient who verbalized understanding. Discussed COVID vaccination status: Yes and pt has been vaccinated. Education provided on COVID-19 vaccination as appropriate. Discussed exposure protocols and quarantine with CDC Guidelines. Patient was given an opportunity to verbalize any questions and concerns and agrees to contact CTN or health care provider for questions related to their healthcare. Reviewed and educated patient on any new and changed medications related to discharge diagnosis. Was patient discharged with a pulse oximeter?  Yes Discussed and confirmed pulse oximeter discharge instructions and when to notify provider or seek emergency care. CTN provided contact information. Plan for follow-up call in 5-7 days based on severity of symptoms and risk factors.   Plan for next call: follow up/pcp appointment        Care Transitions 24 Hour Call    Do you have any ongoing symptoms?: Yes  Patient-reported symptoms: Pain, Other (Comment: dizziness)  Do you have a copy of your discharge instructions?: Yes  Do you have all of your prescriptions and are they filled?: Yes  Have you been contacted by a [facility]?: No  Have you scheduled your follow up appointment?: No  Were you discharged with any Home Care or Post Acute Services: Yes  Post Acute Services: [facility] Renan Velazquez (Comment: Med 1)  Do you feel like you have everything you need to keep you well at home?: Yes  Care Transitions Interventions         Follow Up  Future Appointments   Date Time Provider Олег Cheng   10/27/2021  4:30  [address], MD [address], RN

## 2021-09-18 LAB
CULTURE: ABNORMAL
Lab: ABNORMAL
SPECIMEN DESCRIPTION: ABNORMAL

## 2021-09-22 ENCOUNTER — CARE COORDINATION (OUTPATIENT)
Dept: CASE MANAGEMENT | Age: 76
End: 2021-09-22

## 2021-09-22 NOTE — CARE COORDINATION
[facility]    2021    Patient: Demetria Child  Patient : 1945   MRN: [id number]  Reason for Admission: MARIELLE/Fall at home (down 11 hours)  Discharge Date: 21 RARS: Readmission Risk Score: 24         Spoke with: Demetria Chlid    Was able to contact Venu Panda for transitional outreach. She stated that she was doing \"pretty good\". She denied chest pain, breathing is \"good\" and the dizziness is better. She said that she is eating and drinking. She did say that she is having pain in her Rt leg and her Lt great toe. She denied any further falls and home care continues. She said that she has an appointment with PCP tomorrow. No questions or further concerns. Care Transitions Follow Up Call    Needs to be reviewed by the provider   Additional needs identified to be addressed with provider: No  none             Method of communication with provider : none      Care Transition Nurse (CTN) contacted the patient by telephone to follow up after admission on 21. Verified name and  with patient as identifiers. Addressed changes since last contact: none  Discussed follow-up appointments. CTN reviewed discharge instructions, medical action plan and red flags with patient and discussed any barriers to care and/or understanding of plan of care after discharge. Discussed appropriate site of care based on symptoms and resources available to patient including: PCP, Home health and When to call 911. The patient agrees to contact the PCP office for questions related to their healthcare. Patients top risk factors for readmission: functional physical ability, lack of knowledge about disease and medical condition-MARIELLE  Interventions to address risk factors: Obtained and reviewed discharge summary and/or continuity of care documents      Non-[facility] follow up appointment(s):     CTN provided contact information for future needs.  Plan for follow-up call in 7-10 days based on severity of symptoms and risk factors. Plan for next call: follow up            Care Transitions Subsequent and Final Call    Subsequent and Final Calls  Do you have any ongoing symptoms?: Yes  Onset of Patient-reported symptoms: In the past 7 days  Patient-reported symptoms: Pain  Have your medications changed?: No  Do you have any questions related to your medications?: No  Do you currently have any active services?: Yes  Are you currently active with any services?: Home Health  Do you have any needs or concerns that I can assist you with?: No  Care Transitions Interventions  Other Interventions:            Follow Up  Future Appointments   Date Time Provider Олег Cheng   9/23/2021  2:20 PM Kika Cisneros MD [address] [address]   10/27/2021  4:30 PM Erasto Maurice MD [facility] None       Angeline Smith RN

## 2021-09-29 ENCOUNTER — CARE COORDINATION (OUTPATIENT)
Dept: CASE MANAGEMENT | Age: 76
End: 2021-09-29

## 2021-09-29 NOTE — CARE COORDINATION
[facility] Transitions Follow Up Call    2021    Patient: Pradeep Ghosh  Patient : 1945   MRN: [id number]  Reason for Admission: MARIELLE/Fall at home (down 11 hours)  Discharge Date: 21 RARS: Readmission Risk Score: 24         Attempt to reach patient for Care Transitions. No answer and no voice mail. Will attempt to contact at a later date/time. Care Transitions Subsequent and Final Call    Subsequent and Final Calls  Are you currently active with any services?: Home Health  Care Transitions Interventions  Other Interventions:            Follow Up  Future Appointments   Date Time Provider Олег Cheng   10/27/2021  4:30 PM Mendez Hernandez MD [facility] None       Orlando Skaggs RN

## 2021-09-30 ENCOUNTER — CARE COORDINATION (OUTPATIENT)
Dept: CASE MANAGEMENT | Age: 76
End: 2021-09-30

## 2021-09-30 NOTE — CARE COORDINATION
[facility] Transitions Follow Up Call    2021    Patient: Chris De La Fuente  Patient : 1945   MRN: [id number]  Reason for Admission: MARIELLE/Fall at home (down 11 hours)  Discharge Date: 21 RARS: Readmission Risk Score: 24         Spoke with: Chris De La Fuente    Was able to contact [facility] for transitional outreach. She stated that the nurse said that she was doing \"good\". She denied chest pain, continues with the Rt leg pain and Lt toe pain, no increased shortness of breath, is dizzy, but hasn't fallen and had diarrhea today. She said that the nurse gave her something for the diarrhea and it has slowed down. She will be seeing her PCP next week. No questions or concerns. Care Transitions Follow Up Call          Needs to be reviewed by the provider    Additional needs identified to be addressed with provider: No  none                 Method of communication with provider : none        Care Transition Nurse (CTN) contacted the patient by telephone to follow up after admission on 21. Verified name and  with patient as identifiers.     Addressed changes since last contact: none  Discussed follow-up appointments.      CTN reviewed discharge instructions, medical action plan and red flags with patient and discussed any barriers to care and/or understanding of plan of care after discharge. Discussed appropriate site of care based on symptoms and resources available to patient including: PCP, Home health and When to call 911. The patient agrees to contact the PCP office for questions related to their healthcare.      Patients top risk factors for readmission: functional physical ability, lack of knowledge about disease and medical condition-MARIELLE  Interventions to address risk factors: Obtained and reviewed discharge summary and/or continuity of care documents        Non-[facility] follow up appointment(s):      CTN provided contact information for future needs.  Plan for follow-up call in 7-10 days based on severity of symptoms and risk factors. Plan for next call: follow up         Care Transitions Subsequent and Final Call    Subsequent and Final Calls  Do you have any ongoing symptoms?: Yes  Onset of Patient-reported symptoms: In the past 7 days  Patient-reported symptoms: Pain  Have your medications changed?: No  Do you have any questions related to your medications?: No  Do you currently have any active services?: Yes  Are you currently active with any services?: Home Health  Do you have any needs or concerns that I can assist you with?: No  Care Transitions Interventions  Other Interventions:            Follow Up  Future Appointments   Date Time Provider Олег Cheng   10/27/2021  4:30 PM Ritchie Guzman MD [facility] None       Jesse Gaines RN

## 2021-10-07 ENCOUNTER — CARE COORDINATION (OUTPATIENT)
Dept: CASE MANAGEMENT | Age: 76
End: 2021-10-07

## 2021-10-07 NOTE — CARE COORDINATION
[facility] Transitions Follow Up Call    10/7/2021    Patient: Erika Benedict  Patient : 1945   MRN: [id number]  Reason for Admission: MARIELLE/Fall at home (down 11 hours)  Discharge Date: 21 RARS: Readmission Risk Score: 24         Spoke with: Erika Benedict    Was able to contact R[facility] Andrade for transitional outreach. She stated that she was doing \"good\". She denied falls, and has periodical diarrhea that is helped by medications. She said that her nurse made a PCP appointment. She wants therapy, but has to see the physician. No questions or concerns. Care Transitions Follow Up Call             Needs to be reviewed by the provider     Additional needs identified to be addressed with provider: No  none                 Method of communication with provider : none        Care Transition Nurse (CTN) contacted the patient by telephone to follow up after admission on 21. Verified name and  with patient as identifiers.     Addressed changes since last contact: none  Discussed follow-up appointments.      CTN reviewed discharge instructions, medical action plan and red flags with patient and discussed any barriers to care and/or understanding of plan of care after discharge. Discussed appropriate site of care based on symptoms and resources available to patient including: PCP, Home health and When to call 911. The patient agrees to contact the PCP office for questions related to their healthcare.      Patients top risk factors for readmission: functional physical ability, lack of knowledge about disease and medical condition-MARIELLE  Interventions to address risk factors: Obtained and reviewed discharge summary and/or continuity of care documents        Non-[facility] follow up appointment(s):      CTN provided contact information for future needs. Plan for follow-up call in 7-10 days based on severity of symptoms and risk factors.   Plan for next call: follow up         Care Transitions Subsequent and Final Call    Subsequent and Final Calls  Do you have any ongoing symptoms?: No  Have your medications changed?: No  Do you have any questions related to your medications?: No  Do you currently have any active services?: Yes  Are you currently active with any services?: Home Health  Do you have any needs or concerns that I can assist you with?: No  Care Transitions Interventions  Other Interventions:            Follow Up  Future Appointments   Date Time Provider Олег Cheng   10/18/2021  2:20 PM Chandni Menendez MD [address] [address]   10/27/2021  4:30 PM Payton Pat MD [facility] None       Brandon Wolfe RN

## 2021-10-14 ENCOUNTER — CARE COORDINATION (OUTPATIENT)
Dept: CASE MANAGEMENT | Age: 76
End: 2021-10-14

## 2021-10-14 NOTE — CARE COORDINATION
[facility] Transitions Follow Up Call    10/14/2021    Patient: Marbin Sullivan  Patient : 1945   MRN: [id number]  Reason for Admission: MARIELLE/Fall at home (down 11 hours)   Discharge Date: 21 RARS: Readmission Risk Score: 24         Attempt to reach patient for Care Transitions. No answer and no voice mail. Will attempt to contact at a later date/time. Care Transitions Subsequent and Final Call    Subsequent and Final Calls  Are you currently active with any services?: Home Health  Care Transitions Interventions  Other Interventions:            Follow Up  Future Appointments   Date Time Provider Олег Cheng   10/18/2021  2:20 PM Neal Bridges MD [id number] [address]   10/27/2021  4:30 PM Romeo Keith MD [facility] None       Rayne Flores RN

## 2021-10-15 ENCOUNTER — CARE COORDINATION (OUTPATIENT)
Dept: CASE MANAGEMENT | Age: 76
End: 2021-10-15

## 2021-10-15 NOTE — CARE COORDINATION
[facility] Transitions Follow Up Call    10/15/2021    Patient: Merly Hogan  Patient : 1945   MRN: [id number]  Reason for Admission: MARIELLE/Fall at home (down 11 hours)  Discharge Date: 21 RARS: Readmission Risk Score: 24         Spoke with: Merly Hogan      Was able to contact Michael Medrano for final outreach. She stated that she was [place] \"pretty good\". She said that the pain was \"a little bit better\". She said that her nurse was there to visit and she had her PCP appointment on Monday. No questions or concerns. Informed of final call. Care Transitions Follow Up Call    Needs to be reviewed by the provider   Additional needs identified to be addressed with provider: No  none             Method of communication with provider : none      Care Transition Nurse (CTN) contacted the patient by telephone to follow up after admission on 21. Verified name and  with patient as identifiers. Addressed changes since last contact: none  Discussed follow-up appointments. CTN reviewed discharge instructions, medical action plan and red flags with patient and discussed any barriers to care and/or understanding of plan of care after discharge. Discussed appropriate site of care based on symptoms and resources available to patient including: PCP, Home health and When to call 911. The patient agrees to contact the PCP office for questions related to their healthcare. Patients top risk factors for readmission: functional physical ability and lack of knowledge about disease  Interventions to address risk factors: Obtained and reviewed discharge summary and/or continuity of care documents      Non-[facility] follow up appointment(s):     CTN provided contact information for future needs. No further follow-up call indicated based on severity of symptoms and risk factors. Plan for next call: final call episode ended.             Care Transitions Subsequent and Final Call    Subsequent and Final Calls  Do you have any ongoing symptoms?: No  Have your medications changed?: No  Do you have any questions related to your medications?: No  Do you currently have any active services?: Yes  Are you currently active with any services?: Home Health  Do you have any needs or concerns that I can assist you with?: No  Care Transitions Interventions  Other Interventions:            Follow Up  Future Appointments   Date Time Provider Олег Cheng   10/18/2021  2:20 PM Earle Johnson MD [address] [address]   10/27/2021  4:30 PM Vale Merlin, MD [facility] None       Angeli Caban RN

## 2021-10-24 NOTE — ED NOTES
Pt resting on cot with eyes closed, RR even and unlabored, NAD, call light in reach     Ruthie Sun RN  11/07/20 7221 [facility]

## 2021-10-29 PROBLEM — M35.00 SICCA, UNSPECIFIED TYPE (HCC): Status: ACTIVE | Noted: 2021-10-29

## 2021-10-29 PROBLEM — I25.118 ATHEROSCLEROTIC HEART DISEASE OF NATIVE CORONARY ARTERY WITH OTHER FORMS OF ANGINA PECTORIS (HCC): Status: ACTIVE | Noted: 2017-03-27

## 2021-10-29 PROBLEM — F33.1 MODERATE EPISODE OF RECURRENT MAJOR DEPRESSIVE DISORDER (HCC): Status: ACTIVE | Noted: 2021-10-29

## 2021-11-24 PROBLEM — R30.0 DYSURIA: Status: ACTIVE | Noted: 2021-11-24

## 2021-11-24 PROBLEM — N18.30 CKD (CHRONIC KIDNEY DISEASE), STAGE III (HCC): Status: ACTIVE | Noted: 2021-11-24

## 2021-11-24 PROBLEM — N17.0 ACUTE RENAL FAILURE WITH TUBULAR NECROSIS (HCC): Status: ACTIVE | Noted: 2021-11-24

## 2022-03-09 ENCOUNTER — CARE COORDINATION (OUTPATIENT)
Dept: CARE COORDINATION | Age: 77
End: 2022-03-09

## 2022-03-09 NOTE — CARE COORDINATION
Call placed to patient for CC Enrollment. No answer received. Unable to leave voicemail message as none was available. Introductory letter sent to patient via [employer]. Will F/U in one week if no response. Corby June

## 2022-03-09 NOTE — LETTER
3/9/2022    [address] [place] [address]  [address] [address] [facility] [address] [facility] [address]      Dear Ms. Linnette Villarreal,    My name is Mora De La Rosa. I am a registered nurse who partners with Arturo Anderson MD to assist patients with their health care plan and I would like to offer my service to you. As a member of your health care team, I work with Dr. Blanca Mcdaniel and other providers involved in your care, offer education for your specific health conditions, and connect you with additional resources as needed. The additional support I provide is no additional cost to you. My primary focus is to support you in following your treatment plan, help you achieve specific goals, and improve your health. We are committed to walk with you on this journey and look forward to working with you. Please call me to further discuss your healthcare needs. I look forward to speaking with you. You can reach me at [phone].     In good health,       Mora De La Rosa RN  Ambulatory Care Manager  [address] [facility]

## 2022-03-10 ENCOUNTER — CARE COORDINATION (OUTPATIENT)
Dept: CARE COORDINATION | Age: 77
End: 2022-03-10

## 2022-04-01 ENCOUNTER — CARE COORDINATION (OUTPATIENT)
Dept: CARE COORDINATION | Age: 77
End: 2022-04-01

## 2022-04-01 NOTE — CARE COORDINATION
Second call placed to patient. Patient was still out running errands with her son. States she is interested in care coordination.   Agrees to have ACM call on Monday between 1:00 and 11:30 AM.

## 2022-04-01 NOTE — CARE COORDINATION
Call placed to patient. Spoke with Daphne Blas. Writer introduced self and explained reason for call is to explain care coordination program and offer services. Patient states she was just leaving for an appointment. Requests ACM call back this afternoon.   Will call approximately 3:00 PM.

## 2022-04-04 ENCOUNTER — CARE COORDINATION (OUTPATIENT)
Dept: CARE COORDINATION | Age: 77
End: 2022-04-04

## 2022-04-04 NOTE — CARE COORDINATION
Call placed to patient for CC Enrollment. No answer received. Unable to leave voicemail as service has not been established. Will make one final attempt to contact patient. If unsuccessful, will suspend enrollments efforts d/t inability to contact.

## 2022-04-07 ENCOUNTER — CARE COORDINATION (OUTPATIENT)
Dept: CARE COORDINATION | Age: 77
End: 2022-04-07

## 2022-05-29 ENCOUNTER — HOSPITAL ENCOUNTER (INPATIENT)
Age: 77
LOS: 3 days | Discharge: HOME HEALTH | DRG: 280 | End: 2022-06-01
Attending: EMERGENCY MEDICINE | Admitting: INTERNAL MEDICINE
Payer: MEDICARE

## 2022-05-29 ENCOUNTER — APPOINTMENT (OUTPATIENT)
Dept: GENERAL RADIOLOGY | Age: 77
DRG: 280 | End: 2022-05-29
Payer: MEDICARE

## 2022-05-29 DIAGNOSIS — R10.9 ABDOMINAL PAIN, UNSPECIFIED ABDOMINAL LOCATION: ICD-10-CM

## 2022-05-29 DIAGNOSIS — R11.2 NON-INTRACTABLE VOMITING WITH NAUSEA, UNSPECIFIED VOMITING TYPE: ICD-10-CM

## 2022-05-29 DIAGNOSIS — I48.91 ATRIAL FIBRILLATION WITH RVR (HCC): ICD-10-CM

## 2022-05-29 DIAGNOSIS — N18.32 STAGE 3B CHRONIC KIDNEY DISEASE (HCC): Primary | ICD-10-CM

## 2022-05-29 DIAGNOSIS — I50.9 CONGESTIVE HEART FAILURE, UNSPECIFIED HF CHRONICITY, UNSPECIFIED HEART FAILURE TYPE (HCC): ICD-10-CM

## 2022-05-29 PROBLEM — I48.0 PAROXYSMAL ATRIAL FIBRILLATION (HCC): Status: ACTIVE | Noted: 2022-05-29

## 2022-05-29 LAB
ABSOLUTE EOS #: 0.09 K/UL (ref 0–0.44)
ABSOLUTE IMMATURE GRANULOCYTE: 0.03 K/UL (ref 0–0.3)
ABSOLUTE LYMPH #: 2.35 K/UL (ref 1.1–3.7)
ABSOLUTE MONO #: 0.63 K/UL (ref 0.1–1.2)
ALBUMIN SERPL-MCNC: 4.2 G/DL (ref 3.5–5.2)
ALBUMIN/GLOBULIN RATIO: 1.3 (ref 1–2.5)
ALP BLD-CCNC: 74 U/L (ref 35–104)
ALT SERPL-CCNC: 6 U/L (ref 5–33)
ANION GAP SERPL CALCULATED.3IONS-SCNC: 15 MMOL/L (ref 9–17)
AST SERPL-CCNC: 13 U/L
BASOPHILS # BLD: 1 % (ref 0–2)
BASOPHILS ABSOLUTE: 0.04 K/UL (ref 0–0.2)
BETA-HYDROXYBUTYRATE: 0.31 MMOL/L (ref 0.02–0.27)
BILIRUB SERPL-MCNC: 0.36 MG/DL (ref 0.3–1.2)
BUN BLDV-MCNC: 21 MG/DL (ref 8–23)
C-REACTIVE PROTEIN: <3 MG/L (ref 0–5)
CALCIUM SERPL-MCNC: 10 MG/DL (ref 8.6–10.4)
CHLORIDE BLD-SCNC: 101 MMOL/L (ref 98–107)
CO2: 20 MMOL/L (ref 20–31)
CREAT SERPL-MCNC: 1.5 MG/DL (ref 0.5–0.9)
D-DIMER QUANTITATIVE: 0.92 MG/L FEU
EOSINOPHILS RELATIVE PERCENT: 1 % (ref 1–4)
GFR AFRICAN AMERICAN: 41 ML/MIN
GFR NON-AFRICAN AMERICAN: 34 ML/MIN
GFR SERPL CREATININE-BSD FRML MDRD: ABNORMAL ML/MIN/{1.73_M2}
GLUCOSE BLD-MCNC: 168 MG/DL (ref 65–105)
GLUCOSE BLD-MCNC: 225 MG/DL (ref 65–105)
GLUCOSE BLD-MCNC: 253 MG/DL (ref 70–99)
HCT VFR BLD CALC: 39.1 % (ref 36.3–47.1)
HEMOGLOBIN: 12.2 G/DL (ref 11.9–15.1)
IMMATURE GRANULOCYTES: 0 %
INR BLD: 1.1
LACTIC ACID, SEPSIS WHOLE BLOOD: 1.7 MMOL/L (ref 0.5–1.9)
LACTIC ACID, SEPSIS WHOLE BLOOD: 2.8 MMOL/L (ref 0.5–1.9)
LIPASE: 25 U/L (ref 13–60)
LYMPHOCYTES # BLD: 33 % (ref 24–43)
MCH RBC QN AUTO: 28.9 PG (ref 25.2–33.5)
MCHC RBC AUTO-ENTMCNC: 31.2 G/DL (ref 28.4–34.8)
MCV RBC AUTO: 92.7 FL (ref 82.6–102.9)
MONOCYTES # BLD: 9 % (ref 3–12)
NRBC AUTOMATED: 0 PER 100 WBC
PARTIAL THROMBOPLASTIN TIME: 25.6 SEC (ref 20.5–30.5)
PDW BLD-RTO: 14.5 % (ref 11.8–14.4)
PLATELET # BLD: 193 K/UL (ref 138–453)
PMV BLD AUTO: 11.7 FL (ref 8.1–13.5)
POTASSIUM SERPL-SCNC: 4 MMOL/L (ref 3.7–5.3)
PRO-BNP: 3681 PG/ML
PROCALCITONIN: 0.07 NG/ML
PROTHROMBIN TIME: 12 SEC (ref 9.1–12.3)
RBC # BLD: 4.22 M/UL (ref 3.95–5.11)
RBC # BLD: ABNORMAL 10*6/UL
SARS-COV-2, RAPID: NOT DETECTED
SEDIMENTATION RATE, ERYTHROCYTE: 33 MM/HR (ref 0–30)
SEG NEUTROPHILS: 56 % (ref 36–65)
SEGMENTED NEUTROPHILS ABSOLUTE COUNT: 4 K/UL (ref 1.5–8.1)
SODIUM BLD-SCNC: 136 MMOL/L (ref 135–144)
SPECIMEN DESCRIPTION: NORMAL
TOTAL PROTEIN: 7.4 G/DL (ref 6.4–8.3)
TROPONIN, HIGH SENSITIVITY: 58 NG/L (ref 0–14)
TROPONIN, HIGH SENSITIVITY: 59 NG/L (ref 0–14)
WBC # BLD: 7.1 K/UL (ref 3.5–11.3)

## 2022-05-29 PROCEDURE — 87635 SARS-COV-2 COVID-19 AMP PRB: CPT

## 2022-05-29 PROCEDURE — 85730 THROMBOPLASTIN TIME PARTIAL: CPT

## 2022-05-29 PROCEDURE — 86140 C-REACTIVE PROTEIN: CPT

## 2022-05-29 PROCEDURE — 6360000002 HC RX W HCPCS

## 2022-05-29 PROCEDURE — 93005 ELECTROCARDIOGRAM TRACING: CPT | Performed by: STUDENT IN AN ORGANIZED HEALTH CARE EDUCATION/TRAINING PROGRAM

## 2022-05-29 PROCEDURE — 93005 ELECTROCARDIOGRAM TRACING: CPT

## 2022-05-29 PROCEDURE — 2580000003 HC RX 258

## 2022-05-29 PROCEDURE — 1200000000 HC SEMI PRIVATE

## 2022-05-29 PROCEDURE — 83880 ASSAY OF NATRIURETIC PEPTIDE: CPT

## 2022-05-29 PROCEDURE — 80053 COMPREHEN METABOLIC PANEL: CPT

## 2022-05-29 PROCEDURE — 96374 THER/PROPH/DIAG INJ IV PUSH: CPT

## 2022-05-29 PROCEDURE — 85025 COMPLETE CBC W/AUTO DIFF WBC: CPT

## 2022-05-29 PROCEDURE — 83605 ASSAY OF LACTIC ACID: CPT

## 2022-05-29 PROCEDURE — 87086 URINE CULTURE/COLONY COUNT: CPT

## 2022-05-29 PROCEDURE — 96375 TX/PRO/DX INJ NEW DRUG ADDON: CPT

## 2022-05-29 PROCEDURE — 71045 X-RAY EXAM CHEST 1 VIEW: CPT

## 2022-05-29 PROCEDURE — 2500000003 HC RX 250 WO HCPCS

## 2022-05-29 PROCEDURE — 82947 ASSAY GLUCOSE BLOOD QUANT: CPT

## 2022-05-29 PROCEDURE — 82010 KETONE BODYS QUAN: CPT

## 2022-05-29 PROCEDURE — 99285 EMERGENCY DEPT VISIT HI MDM: CPT

## 2022-05-29 PROCEDURE — 83690 ASSAY OF LIPASE: CPT

## 2022-05-29 PROCEDURE — 84484 ASSAY OF TROPONIN QUANT: CPT

## 2022-05-29 PROCEDURE — 85652 RBC SED RATE AUTOMATED: CPT

## 2022-05-29 PROCEDURE — 84145 PROCALCITONIN (PCT): CPT

## 2022-05-29 PROCEDURE — 85610 PROTHROMBIN TIME: CPT

## 2022-05-29 PROCEDURE — 85379 FIBRIN DEGRADATION QUANT: CPT

## 2022-05-29 PROCEDURE — 99222 1ST HOSP IP/OBS MODERATE 55: CPT | Performed by: INTERNAL MEDICINE

## 2022-05-29 PROCEDURE — 36415 COLL VENOUS BLD VENIPUNCTURE: CPT

## 2022-05-29 PROCEDURE — 87040 BLOOD CULTURE FOR BACTERIA: CPT

## 2022-05-29 RX ORDER — GABAPENTIN 300 MG/1
300 CAPSULE ORAL 3 TIMES DAILY
Status: DISCONTINUED | OUTPATIENT
Start: 2022-05-29 | End: 2022-06-01 | Stop reason: HOSPADM

## 2022-05-29 RX ORDER — MECLIZINE HYDROCHLORIDE CHEWABLE TABLETS 25 MG/1
TABLET, CHEWABLE ORAL
Status: ON HOLD | COMMUNITY
End: 2022-05-31 | Stop reason: HOSPADM

## 2022-05-29 RX ORDER — ISOSORBIDE MONONITRATE 30 MG/1
30 TABLET, EXTENDED RELEASE ORAL DAILY
Status: DISCONTINUED | OUTPATIENT
Start: 2022-05-30 | End: 2022-06-01 | Stop reason: HOSPADM

## 2022-05-29 RX ORDER — ACETAMINOPHEN 650 MG/1
650 SUPPOSITORY RECTAL EVERY 6 HOURS PRN
Status: DISCONTINUED | OUTPATIENT
Start: 2022-05-29 | End: 2022-06-01 | Stop reason: HOSPADM

## 2022-05-29 RX ORDER — ACETAMINOPHEN 325 MG/1
650 TABLET ORAL EVERY 6 HOURS PRN
Status: DISCONTINUED | OUTPATIENT
Start: 2022-05-29 | End: 2022-06-01 | Stop reason: HOSPADM

## 2022-05-29 RX ORDER — FENTANYL CITRATE 50 UG/ML
50 INJECTION, SOLUTION INTRAMUSCULAR; INTRAVENOUS ONCE
Status: COMPLETED | OUTPATIENT
Start: 2022-05-29 | End: 2022-05-29

## 2022-05-29 RX ORDER — DIPHENHYDRAMINE HCL 50 MG
50 CAPSULE ORAL EVERY 6 HOURS PRN
Status: ON HOLD | COMMUNITY
End: 2022-05-31 | Stop reason: HOSPADM

## 2022-05-29 RX ORDER — DILTIAZEM HYDROCHLORIDE 5 MG/ML
INJECTION INTRAVENOUS
Status: COMPLETED
Start: 2022-05-29 | End: 2022-05-29

## 2022-05-29 RX ORDER — INSULIN LISPRO 100 [IU]/ML
0-6 INJECTION, SOLUTION INTRAVENOUS; SUBCUTANEOUS NIGHTLY
Status: DISCONTINUED | OUTPATIENT
Start: 2022-05-29 | End: 2022-06-01 | Stop reason: HOSPADM

## 2022-05-29 RX ORDER — HYDROXYZINE HYDROCHLORIDE 25 MG/1
25 TABLET, FILM COATED ORAL 3 TIMES DAILY PRN
Status: DISCONTINUED | OUTPATIENT
Start: 2022-05-29 | End: 2022-05-29

## 2022-05-29 RX ORDER — ONDANSETRON 4 MG/1
4 TABLET, ORALLY DISINTEGRATING ORAL EVERY 8 HOURS PRN
Status: DISCONTINUED | OUTPATIENT
Start: 2022-05-29 | End: 2022-06-01 | Stop reason: HOSPADM

## 2022-05-29 RX ORDER — NITROGLYCERIN 0.4 MG/1
0.4 TABLET SUBLINGUAL EVERY 5 MIN PRN
Status: DISCONTINUED | OUTPATIENT
Start: 2022-05-29 | End: 2022-05-31

## 2022-05-29 RX ORDER — LOPERAMIDE HYDROCHLORIDE 2 MG/1
2 CAPSULE ORAL 4 TIMES DAILY PRN
Status: DISCONTINUED | OUTPATIENT
Start: 2022-05-29 | End: 2022-05-29

## 2022-05-29 RX ORDER — POTASSIUM CHLORIDE 20 MEQ/1
40 TABLET, EXTENDED RELEASE ORAL PRN
Status: DISCONTINUED | OUTPATIENT
Start: 2022-05-29 | End: 2022-06-01 | Stop reason: HOSPADM

## 2022-05-29 RX ORDER — DILTIAZEM HYDROCHLORIDE 5 MG/ML
20 INJECTION INTRAVENOUS ONCE
Status: COMPLETED | OUTPATIENT
Start: 2022-05-29 | End: 2022-05-29

## 2022-05-29 RX ORDER — DOCUSATE SODIUM 100 MG/1
100 CAPSULE, LIQUID FILLED ORAL DAILY PRN
Status: DISCONTINUED | OUTPATIENT
Start: 2022-05-29 | End: 2022-06-01 | Stop reason: HOSPADM

## 2022-05-29 RX ORDER — INSULIN GLARGINE 100 [IU]/ML
7 INJECTION, SOLUTION SUBCUTANEOUS ONCE
Status: COMPLETED | OUTPATIENT
Start: 2022-05-29 | End: 2022-05-30

## 2022-05-29 RX ORDER — INSULIN LISPRO 100 [IU]/ML
0-12 INJECTION, SOLUTION INTRAVENOUS; SUBCUTANEOUS
Status: DISCONTINUED | OUTPATIENT
Start: 2022-05-30 | End: 2022-06-01 | Stop reason: HOSPADM

## 2022-05-29 RX ORDER — SODIUM CHLORIDE 0.9 % (FLUSH) 0.9 %
5-40 SYRINGE (ML) INJECTION EVERY 12 HOURS SCHEDULED
Status: DISCONTINUED | OUTPATIENT
Start: 2022-05-29 | End: 2022-05-31 | Stop reason: SDUPTHER

## 2022-05-29 RX ORDER — SODIUM CHLORIDE 0.9 % (FLUSH) 0.9 %
10 SYRINGE (ML) INJECTION PRN
Status: DISCONTINUED | OUTPATIENT
Start: 2022-05-29 | End: 2022-05-31 | Stop reason: SDUPTHER

## 2022-05-29 RX ORDER — POLYETHYLENE GLYCOL 3350 17 G/17G
17 POWDER, FOR SOLUTION ORAL DAILY PRN
Status: DISCONTINUED | OUTPATIENT
Start: 2022-05-29 | End: 2022-06-01 | Stop reason: HOSPADM

## 2022-05-29 RX ORDER — FLUTICASONE PROPIONATE 50 MCG
2 SPRAY, SUSPENSION (ML) NASAL DAILY
Status: DISCONTINUED | OUTPATIENT
Start: 2022-05-30 | End: 2022-06-01 | Stop reason: HOSPADM

## 2022-05-29 RX ORDER — ESCITALOPRAM OXALATE 10 MG/1
20 TABLET ORAL DAILY
Status: DISCONTINUED | OUTPATIENT
Start: 2022-05-30 | End: 2022-06-01 | Stop reason: HOSPADM

## 2022-05-29 RX ORDER — CLONAZEPAM 0.5 MG/1
0.5 TABLET ORAL EVERY 12 HOURS PRN
Status: DISCONTINUED | OUTPATIENT
Start: 2022-05-29 | End: 2022-06-01 | Stop reason: HOSPADM

## 2022-05-29 RX ORDER — PANTOPRAZOLE SODIUM 40 MG/1
40 TABLET, DELAYED RELEASE ORAL
Status: DISCONTINUED | OUTPATIENT
Start: 2022-05-30 | End: 2022-06-01 | Stop reason: HOSPADM

## 2022-05-29 RX ORDER — DEXTROSE MONOHYDRATE 50 MG/ML
100 INJECTION, SOLUTION INTRAVENOUS PRN
Status: DISCONTINUED | OUTPATIENT
Start: 2022-05-29 | End: 2022-06-01 | Stop reason: HOSPADM

## 2022-05-29 RX ORDER — TORSEMIDE 20 MG/1
10 TABLET ORAL
Status: DISCONTINUED | OUTPATIENT
Start: 2022-05-30 | End: 2022-05-31

## 2022-05-29 RX ORDER — ROSUVASTATIN CALCIUM 20 MG/1
40 TABLET, COATED ORAL DAILY
Status: DISCONTINUED | OUTPATIENT
Start: 2022-05-30 | End: 2022-06-01 | Stop reason: HOSPADM

## 2022-05-29 RX ORDER — 0.9 % SODIUM CHLORIDE 0.9 %
500 INTRAVENOUS SOLUTION INTRAVENOUS ONCE
Status: COMPLETED | OUTPATIENT
Start: 2022-05-29 | End: 2022-05-29

## 2022-05-29 RX ORDER — SODIUM CHLORIDE 9 MG/ML
INJECTION, SOLUTION INTRAVENOUS PRN
Status: DISCONTINUED | OUTPATIENT
Start: 2022-05-29 | End: 2022-05-31 | Stop reason: SDUPTHER

## 2022-05-29 RX ORDER — POTASSIUM CHLORIDE 7.45 MG/ML
10 INJECTION INTRAVENOUS PRN
Status: DISCONTINUED | OUTPATIENT
Start: 2022-05-29 | End: 2022-06-01 | Stop reason: HOSPADM

## 2022-05-29 RX ORDER — ONDANSETRON 2 MG/ML
4 INJECTION INTRAMUSCULAR; INTRAVENOUS ONCE
Status: COMPLETED | OUTPATIENT
Start: 2022-05-29 | End: 2022-05-29

## 2022-05-29 RX ORDER — DIPHENHYDRAMINE HCL 50 MG
50 CAPSULE ORAL EVERY 6 HOURS PRN
Status: DISCONTINUED | OUTPATIENT
Start: 2022-05-29 | End: 2022-06-01 | Stop reason: HOSPADM

## 2022-05-29 RX ORDER — CARVEDILOL 12.5 MG/1
12.5 TABLET ORAL 2 TIMES DAILY
Status: DISCONTINUED | OUTPATIENT
Start: 2022-05-29 | End: 2022-06-01 | Stop reason: HOSPADM

## 2022-05-29 RX ORDER — MAGNESIUM SULFATE 1 G/100ML
1000 INJECTION INTRAVENOUS PRN
Status: DISCONTINUED | OUTPATIENT
Start: 2022-05-29 | End: 2022-06-01 | Stop reason: HOSPADM

## 2022-05-29 RX ORDER — ONDANSETRON 2 MG/ML
4 INJECTION INTRAMUSCULAR; INTRAVENOUS EVERY 6 HOURS PRN
Status: DISCONTINUED | OUTPATIENT
Start: 2022-05-29 | End: 2022-06-01 | Stop reason: HOSPADM

## 2022-05-29 RX ORDER — CETIRIZINE HYDROCHLORIDE 10 MG/1
5 TABLET ORAL DAILY
Status: DISCONTINUED | OUTPATIENT
Start: 2022-05-30 | End: 2022-06-01 | Stop reason: HOSPADM

## 2022-05-29 RX ADMIN — DILTIAZEM HYDROCHLORIDE 20 MG: 5 INJECTION INTRAVENOUS at 18:59

## 2022-05-29 RX ADMIN — FENTANYL CITRATE 50 MCG: 50 INJECTION, SOLUTION INTRAMUSCULAR; INTRAVENOUS at 18:58

## 2022-05-29 RX ADMIN — MEROPENEM 1000 MG: 1 INJECTION, POWDER, FOR SOLUTION INTRAVENOUS at 21:04

## 2022-05-29 RX ADMIN — Medication 1750 MG: at 21:40

## 2022-05-29 RX ADMIN — SODIUM CHLORIDE 500 ML: 0.9 INJECTION, SOLUTION INTRAVENOUS at 19:02

## 2022-05-29 RX ADMIN — ONDANSETRON 4 MG: 2 INJECTION INTRAMUSCULAR; INTRAVENOUS at 18:59

## 2022-05-29 ASSESSMENT — PAIN DESCRIPTION - ONSET: ONSET: ON-GOING

## 2022-05-29 ASSESSMENT — ENCOUNTER SYMPTOMS
SHORTNESS OF BREATH: 1
NAUSEA: 1
PHOTOPHOBIA: 0
SORE THROAT: 0
VOMITING: 1
COUGH: 0
DIARRHEA: 1
BACK PAIN: 0
ABDOMINAL PAIN: 1

## 2022-05-29 ASSESSMENT — PAIN - FUNCTIONAL ASSESSMENT: PAIN_FUNCTIONAL_ASSESSMENT: ACTIVITIES ARE NOT PREVENTED

## 2022-05-29 ASSESSMENT — PAIN SCALES - GENERAL
PAINLEVEL_OUTOF10: 10
PAINLEVEL_OUTOF10: 2

## 2022-05-29 ASSESSMENT — PAIN DESCRIPTION - ORIENTATION: ORIENTATION: MID;UPPER

## 2022-05-29 ASSESSMENT — PAIN DESCRIPTION - DESCRIPTORS: DESCRIPTORS: DULL;DISCOMFORT

## 2022-05-29 ASSESSMENT — PAIN DESCRIPTION - LOCATION: LOCATION: CHEST

## 2022-05-29 ASSESSMENT — PAIN DESCRIPTION - FREQUENCY: FREQUENCY: CONTINUOUS

## 2022-05-29 ASSESSMENT — HEART SCORE: ECG: 1

## 2022-05-29 ASSESSMENT — PAIN DESCRIPTION - PAIN TYPE: TYPE: ACUTE PAIN

## 2022-05-29 NOTE — ED PROVIDER NOTES
[facility]//MED SURG  Emergency Department Encounter  EmergencyMedicine Resident     Pt Marlene Sawant  MRN: [id number]  Jan 1945  Date of evaluation: 5/29/22  PCP:  Janene Bell MD    This patient was evaluated in the Emergency Department for symptoms described in the history of present illness. The patient was evaluated in the context of the global COVID-19 pandemic, which necessitated consideration that the patient might be at risk for infection with the SARS-CoV-2 virus that causes COVID-19. Institutional protocols and algorithms that pertain to the evaluation of patients at risk for COVID-19 are in a state of rapid change based on information released by regulatory bodies including the CDC and federal and state organizations. These policies and algorithms were followed during the patient's care in the ED. CHIEF COMPLAINT       Chief Complaint   Patient presents with    Emesis    Abdominal Pain   Nausea, vomiting, diarrhea, abdominal pain, chest pain    HISTORY OF PRESENT ILLNESS  (Location/Symptom, Timing/Onset, Context/Setting, Quality, Duration, Modifying Factors, Severity.)      Buddy Lamas is a 68 y.o. female who presents with complaints of nausea, vomiting, diarrhea, nonbloody that started earlier today with abdominal pain. Notes she has had history of cholecystectomy but still has appendix. Notes subjective fevers. Also complains of chest pain that she only gets when she is vomiting and shortness of breath that she has had for the last few days. Patient is also not had her Xarelto and 3 doses due to running out of her medication. Denied any leg pain or swelling. No recent surgeries. No history of malignancy. Notes some lightheadedness and has not been eating well the last couple days. Denied any urinary symptoms.   Has had history of UTIs in the past.    PAST MEDICAL / SURGICAL / SOCIAL / FAMILY HISTORY      has a past medical history of Acute renal failure with tubular necrosis ([facility] Anxiety, Atrial fibrillation (HCC), Benign positional vertigo, CAD (coronary artery disease), Chest pain, CKD (chronic kidney disease), stage III ([facility] Depression, Diabetes mellitus ([facility] Diabetic neuropathy ([facility] Dysuria, Hyperlipidemia, Hypertension, and Migraine. Reviewed with patient     has a past surgical history that includes Percutaneous Transluminal Coronary Angio; Cardiac catheterization; Coronary angioplasty with stent (02/25/2017); Appendectomy; Coronary angioplasty with stent (07/11/2012); and Coronary angioplasty with stent (12/11/2020). Reviewed with patient    Social History     Socioeconomic History    Marital status:      Spouse name: Not on file    Number of children: Not on file    Years of education: Not on file    Highest education level: Not on file   Occupational History    Not on file   Tobacco Use    Smoking status: Never Smoker    Smokeless tobacco: Never Used   Substance and Sexual Activity    Alcohol use: No    Drug use: No    Sexual activity: Never   Other Topics Concern    Not on file   Social History Narrative    Not on file     Social Determinants of Health     Financial Resource Strain:     Difficulty of Paying Living Expenses: Not on file   Food Insecurity:     Worried About Running Out of Food in the Last Year: Not on file    Francisco J of Food in the Last Year: Not on file   Transportation Needs:     Lack of Transportation (Medical): Not on file    Lack of Transportation (Non-Medical):  Not on file   Physical Activity:     Days of Exercise per Week: Not on file    Minutes of Exercise per Session: Not on file   Stress:     Feeling of Stress : Not on file   Social Connections:     Frequency of Communication with Friends and Family: Not on file    Frequency of Social Gatherings with Friends and Family: Not on file    Attends Mandaen Services: Not on file    Active Member of Clubs or Organizations: Not on file   Ardyth Moritz Attends Club or Organization Meetings: Not on file    Marital Status: Not on file   Intimate Partner Violence:     Fear of Current or Ex-Partner: Not on file    Emotionally Abused: Not on file    Physically Abused: Not on file    Sexually Abused: Not on file   Housing Stability:     Unable to Pay for Housing in the Last Year: Not on file    Number of Ronel in the Last Year: Not on file    Unstable Housing in the Last Year: Not on file       Family History   Problem Relation Age of Onset    Cancer Mother     Cancer Father        Allergies:  Humalog [insulin lispro], Penicillins, and Sulfa antibiotics    Home Medications:  Prior to Admission medications    Medication Sig Start Date End Date Taking?  Authorizing Provider   diphenhydrAMINE (BANOPHEN) 50 MG capsule Take 50 mg by mouth every 6 hours as needed for Itching   Yes Historical Provider, MD   clonazePAM (KLONOPIN PO) Take by mouth as needed   Yes Historical Provider, MD   meclizine (ANTIVERT) 25 MG CHEW 1 tablet as needed    Historical Provider, MD   BRILINTA 90 MG TABS tablet TAKE ONE TABLET BY MOUTH TWICE A DAY 4/19/22   MAYELA Marina NP   isosorbide mononitrate (IMDUR) 30 MG extended release tablet Take 1 tablet by mouth daily 3/15/22   Lynnette Honeycutt MD   LEVEMIR FLEXTOUCH 100 UNIT/ML injection pen INJECT 60 UNITS INTO THE SKIN TWO TIMES A DAY  Patient taking differently: 50 Units  2/28/22   Lynnette Honeycutt MD   insulin aspart (NOVOLOG FLEXPEN) 100 UNIT/ML injection pen Use TID before meals according to scale - max 45 units a day 12/2/21   Lynnette Honeycutt MD   Lancets MISC 1 each by Does not apply route 2 times daily 12/2/21   Lynnette Honeycutt MD   Insulin Pen Needle 32G X 4 MM MISC 5 each by Does not apply route daily 12/2/21   Lynnette Honeycutt MD   loperamide (IMODIUM) 2 MG capsule TAKE ONE CAPSULE BY MOUTH FOUR TIMES A DAY AS NEEDED FOR DIARRHEA 11/29/21   MAYELA Marina NP   carvedilol (COREG) 12.5 MG tablet Take 1 tablet by mouth 2 times daily 11/24/21 2/22/22  Carolyn Han MD   torsemide (DEMADEX) 10 MG tablet Take 1 tablet by mouth three times a week Mon Wed Fri 11/24/21 2/22/22  Carolyn Han MD   escitalopram (LEXAPRO) 20 MG tablet TAKE ONE TABLET BY MOUTH DAILY 11/22/21   Aayush Dee MD   XARELTO 20 MG TABS tablet TAKE ONE TABLET BY MOUTH DAILY WITH BREAKFAST 11/22/21   Aayush Dee MD   hydrOXYzine (ATARAX) 25 MG tablet TAKE ONE TABLET BY MOUTH EVERY 8 HOURS FOR ITCHING 10/21/21   Aayush Dee MD   gabapentin (NEURONTIN) 300 MG capsule Take 1 capsule by mouth 3 times daily for 30 days. 10/4/21 11/24/21  Aayush Dee MD   rosuvastatin (CRESTOR) 40 MG tablet Take 1 tablet by mouth daily 9/28/21   Aayush Dee MD   vitamin D3 (CHOLECALCIFEROL) 25 MCG (1000 UT) TABS tablet Take 1 tablet by mouth daily  Patient not taking: Reported on 5/29/2022 9/28/21   Aayush Dee MD   nystatin (MYCOSTATIN) 234477 UNIT/GM powder Apply 3 times daily.   Patient not taking: Reported on 5/29/2022 8/27/21   Aayush Dee MD   Continuous Blood Gluc Sensor (FREESTYLE SYLVIA 14 DAY SENSOR) MISC 1 each by Does not apply route every 14 days 7/6/21   Aayush Dee MD   omeprazole (PRILOSEC) 20 MG delayed release capsule Take 1 capsule by mouth Daily 5/28/21   Aayush Dee MD   Control Gel Formula Dressing (DUODERM CGF DRESSING) MISC Apply 2 each topically daily  Patient not taking: Reported on 5/29/2022 5/21/21   Aayush Dee MD   fluticasone [facility]) 50 MCG/ACT nasal spray 2 sprays by Each Nostril route daily  Patient not taking: Reported on 5/29/2022 5/11/21   Aayush Dee MD   Zinc Oxide 15 % CREA Apply to buttocks up to 4 times daily - may use any concentratino  Patient not taking: Reported on 5/29/2022 5/6/21   Aayush Dee MD   nitroGLYCERIN (NITROSTAT) 0.4 MG SL tablet place 1 tablet under the tongue if needed every 5 minutes if needed for CHEST PAIN 2/19/21   Eli Cummings MD   clonazePAM [facility]) 0.5 MG tablet take 1 tablet by mouth twice a day if needed for anxiety 2/4/21 11/24/21  Eli Cummings MD   [facility] Devices [facility]) MISC Diagnosis: right 5th metatarsal fracture, pain in limb    Duration: 3 months 2/3/21   Katrin Garrett DPM   docusate sodium (COLACE) 100 MG capsule Take 1 capsule by mouth daily as needed for Constipation  Patient not taking: Reported on 5/29/2022 2/3/21   Katrin Garrett DPM   Misc. Devices (COMMODE BEDSIDE) MISC 1 Device by Does not apply route daily 2/3/21   Katrin Garrett DPM   Continuous Blood Gluc  (FREESTYLE RICHARD 14 DAY READER) JAQUELINE 1 each by Does not apply route continuous [facility] 10/30/20   Eli Cummings MD   glucose monitoring kit (FREESTYLE) monitoring kit 1 kit by Does not apply route 4 times daily Freestyle Richard . Dx E11.65, has tremors and cannot use conventional meter without great difficulty. Please provide supplies for 3 months, rf 3,Pharmacy [facility]. 10/6/20   Eli Cummings MD   pirocin [facility]) 2 % ointment apply topically to affected area three times a day  Patient not taking: Reported on 5/29/2022 4/21/20   Historical Provider, MD   loratadine (CLARITIN) 10 MG tablet Take 1 tablet every day by oral route. 5/11/20   Eli Cummings MD   albuterol sulfate  (90 Base) MCG/ACT inhaler Inhale 2 puffs into the lungs every 6 hours as needed for Wheezing  Patient not taking: Reported on 5/29/2022 3/15/18   Ra Chaves MD   vitamin E 400 UNIT capsule Take 400 Units by mouth daily. Historical Provider, MD       REVIEW OF SYSTEMS    (2-9 systems for level 4, 10 or more for level 5)      Review of Systems   Constitutional: Positive for appetite change and fever. Negative for chills. HENT: Negative for congestion and sore throat. Eyes: Negative for photophobia and visual disturbance.    Respiratory: Positive for shortness of breath. Negative for cough. Cardiovascular: Positive for chest pain. Gastrointestinal: Positive for abdominal pain, diarrhea, nausea and vomiting. Genitourinary: Negative for dysuria and frequency. Musculoskeletal: Negative for back pain and neck pain. Skin: Negative for rash and wound. Neurological: Positive for light-headedness. Negative for headaches. PHYSICAL EXAM   (up to 7 for level 4, 8 or more for level 5)      INITIAL VITALS:   BP (!) 157/81   Pulse 77   Temp 97.5 °F (36.4 °C) (Oral)   Resp 20   Ht 5' 10\" (1.778 m)   Wt 245 lb (111.1 kg)   SpO2 97%   BMI 35.15 kg/m²     Physical Exam  Vitals and nursing note reviewed. Constitutional:       General: She is not in acute distress. HENT:      Head: Atraumatic. Right Ear: External ear normal.      Left Ear: External ear normal.      Nose: Nose normal.      Mouth/Throat:      Mouth: Mucous membranes are moist.      Pharynx: Oropharynx is clear. Eyes:      Conjunctiva/sclera: Conjunctivae normal.   Cardiovascular:      Rate and Rhythm: Tachycardia present. Rhythm irregular. Pulses: Normal pulses. Pulmonary:      Effort: Pulmonary effort is normal. No respiratory distress. Breath sounds: Normal breath sounds. Abdominal:      Palpations: Abdomen is soft. Tenderness: There is abdominal tenderness in the right upper quadrant, right lower quadrant, epigastric area and suprapubic area. There is no guarding or rebound. Musculoskeletal:         General: Normal range of motion. Cervical back: Normal range of motion. Right lower leg: Edema present. Left lower leg: Edema present. Skin:     General: Skin is warm and dry. Capillary Refill: Capillary refill takes less than 2 seconds. Neurological:      General: No focal deficit present. Mental Status: She is alert and oriented to person, place, and time. Sensory: No sensory deficit. Motor: No weakness. DIFFERENTIAL  DIAGNOSIS     PLAN (LABS / IMAGING / EKG):  Orders Placed This Encounter   Procedures    Culture, Blood 1    Culture, Blood 1    COVID-19, Rapid    Culture, Urine    C DIFF TOXIN/ANTIGEN    O&P PANEL (TRAVEL ASSOCIATED) #1    Culture, Aerobic    XR CHEST PORTABLE    CT ABDOMEN PELVIS WO CONTRAST Additional Contrast? Radiologist Recommendation    CBC with Auto Differential    Comprehensive Metabolic Panel    Lactate, Sepsis    Protime-INR    APTT    Troponin    Urinalysis with Microscopic    D-Dimer, Quantitative    Troponin    Beta-Hydroxybutyrate    Brain Natriuretic Peptide    Lipase    Protime-INR    Comprehensive Metabolic Panel w/ Reflex to MG    Troponin    TSH with Reflex    Urinalysis    C-Reactive Protein    Sedimentation Rate    Procalcitonin    ADULT DIET;  Regular; 4 carb choices (60 gm/meal)    If diarrhea: have patient collect stool sample    HYPOGLYCEMIA TREATMENT: blood glucose less than 50 mg/dL and patient  ALERT and TOLERATING PO    HYPOGLYCEMIA TREATMENT: blood glucose less than 70 mg/dL and patient ALERT and TOLERATING PO    HYPOGLYCEMIA TREATMENT: blood glucose less than 70 mg/dL and patient NOT ALERT or NPO    Vital signs per unit routine    Notify physician    Up with assistance    Daily weights    Intake and output    Full Code    Inpatient consult to Internal Medicine    C diff contact isolation    OT eval and treat    PT evaluation and treat    Initiate Oxygen Therapy Protocol    Pulse Oximetry Spot Check    POC Glucose Fingerstick    POC Glucose Fingerstick    POCT Glucose    POCT Glucose    EKG 12 Lead    ADMIT TO INPATIENT    ADMIT TO INPATIENT       MEDICATIONS ORDERED:  Orders Placed This Encounter   Medications    0.9 % sodium chloride bolus    ondansetron (ZOFRAN) injection 4 mg    fentaNYL (SUBLIMAZE) injection 50 mcg    dilTIAZem injection 20 mg    DISCONTD: dilTIAZem 125 mg in dextrose 5 % 125 mL infusion     Order Specific Question:   Titrate Infusion? Answer:   Yes     Order Specific Question:   Initial Infusion Dose: Answer:   5 mg/hr     Order Specific Question:   Goal of Therapy is: Answer:   HR less than 120 bpm     Order Specific Question:   Contact Provider if:     Answer:   SBP less than 90 mmHg     Order Specific Question:   Contact Provider if:     Answer:   HR less than 60 bpm     Order Specific Question:   HOLD for     Answer:   SBP less than 90 mmHg     Order Specific Question:   HOLD for     Answer:   HR less than 60 bpm    dilTIAZem 25 MG/5ML injection     Durenda Pian: cabinet override    vancomycin (VANCOCIN) 1750 mg in sodium chloride 0.9 % 500 mL IVPB     Order Specific Question:   Antimicrobial Indications     Answer:   Sepsis of Unknown Etiology    meropenem (MERREM) 1,000 mg in sodium chloride 0.9 % 100 mL IVPB (mini-bag)     Order Specific Question:   Antimicrobial Indications     Answer:   Sepsis of Unknown Etiology    ticagrelor (BRILINTA) tablet 90 mg    carvedilol (COREG) tablet 12.5 mg    clonazePAM (KLONOPIN) tablet 0.5 mg    diphenhydrAMINE (BENADRYL) capsule 50 mg    docusate sodium (COLACE) capsule 100 mg    escitalopram (LEXAPRO) tablet 20 mg    fluticasone (FLONASE) 50 MCG/ACT nasal spray 2 spray    gabapentin (NEURONTIN) capsule 300 mg    DISCONTD: hydrOXYzine (ATARAX) tablet 25 mg    insulin lispro (HUMALOG) injection vial 0-12 Units    insulin lispro (HUMALOG) injection vial 0-6 Units    glucose chewable tablet 16 g    OR Linked Order Group     dextrose bolus 10% 125 mL     dextrose bolus 10% 250 mL    glucagon (rDNA) injection 1 mg    dextrose 5 % solution    isosorbide mononitrate (IMDUR) extended release tablet 30 mg    insulin detemir (LEVEMIR) injection pen 50 Units     Order Specific Question:   Please select a reason the therapeutic interchange was not accepted:      Answer:   [facility] to [address] [place] [address]: loperamide (IMODIUM) capsule 2 mg    cetirizine (ZYRTEC) tablet 5 mg    nitroGLYCERIN (NITROSTAT) SL tablet 0.4 mg    pantoprazole (PROTONIX) tablet 40 mg    rosuvastatin (CRESTOR) tablet 40 mg    torsemide (DEMADEX) tablet 10 mg    rivaroxaban (XARELTO) tablet 20 mg     Order Specific Question:   Indication of Use     Answer:   A Fib/A Flutter    sodium chloride flush 0.9 % injection 5-40 mL    sodium chloride flush 0.9 % injection 10 mL    0.9 % sodium chloride infusion    OR Linked Order Group     potassium chloride (KLOR-CON M) extended release tablet 40 mEq     potassium bicarb-citric acid (EFFER-K) effervescent tablet 40 mEq     potassium chloride 10 mEq/100 mL IVPB (Peripheral Line)    magnesium sulfate 1000 mg in dextrose 5% 100 mL IVPB    OR Linked Order Group     ondansetron (ZOFRAN-ODT) disintegrating tablet 4 mg     ondansetron (ZOFRAN) injection 4 mg    polyethylene glycol (GLYCOLAX) packet 17 g    OR Linked Order Group     acetaminophen (TYLENOL) tablet 650 mg     acetaminophen (TYLENOL) suppository 650 mg    HYDROmorphone (DILAUDID) injection 0.5 mg    insulin glargine (LANTUS) injection vial 7 Units    iohexol (OMNIPAQUE 240) injection 50 mL       DDX: ACS, arrhythmia, PE, CHF exacerbation, appendicitis, nephrolithiasis, UTI, pneumonia, gastroenteritis, DKA    DIAGNOSTIC RESULTS / EMERGENCY DEPARTMENT COURSE / MDM   LAB RESULTS:  Results for orders placed or performed during the hospital encounter of 05/29/22   Culture, Blood 1    Specimen: Blood   Result Value Ref Range    Specimen Description . BLOOD     Special Requests RAC, 10ML     Culture NO GROWTH <24 HRS    Culture, Blood 1    Specimen: Blood   Result Value Ref Range    Specimen Description . BLOOD     Special Requests RFOREARM, 10ML     Culture NO GROWTH <24 HRS    COVID-19, Rapid    Specimen: Nasopharyngeal Swab   Result Value Ref Range    Specimen Description . NASOPHARYNGEAL SWAB     SARS-CoV-2, Rapid Not Detected Not Detected   CBC with Auto Differential   Result Value Ref Range    WBC 7.1 3.5 - 11.3 k/uL    RBC 4.22 3.95 - 5.11 m/uL    Hemoglobin 12.2 11.9 - 15.1 g/dL    Hematocrit 39.1 36.3 - 47.1 %    MCV 92.7 82.6 - 102.9 fL    MCH 28.9 25.2 - 33.5 pg    MCHC 31.2 28.4 - 34.8 g/dL    RDW 14.5 (H) 11.8 - 14.4 %    Platelets 799 573 - 139 k/uL    MPV 11.7 8.1 - 13.5 fL    NRBC Automated 0.0 0.0 per 100 WBC    Seg Neutrophils 56 36 - 65 %    Lymphocytes 33 24 - 43 %    Monocytes 9 3 - 12 %    Eosinophils % 1 1 - 4 %    Basophils 1 0 - 2 %    Immature Granulocytes 0 0 %    Segs Absolute 4.00 1.50 - 8.10 k/uL    Absolute Lymph # 2.35 1.10 - 3.70 k/uL    Absolute Mono # 0.63 0.10 - 1.20 k/uL    Absolute Eos # 0.09 0.00 - 0.44 k/uL    Basophils Absolute 0.04 0.00 - 0.20 k/uL    Absolute Immature Granulocyte 0.03 0.00 - 0.30 k/uL    RBC Morphology ANISOCYTOSIS PRESENT    Comprehensive Metabolic Panel   Result Value Ref Range    Glucose 253 (H) 70 - 99 mg/dL    BUN 21 8 - 23 mg/dL    CREATININE 1.50 (H) 0.50 - 0.90 mg/dL    Calcium 10.0 8.6 - 10.4 mg/dL    Sodium 136 135 - 144 mmol/L    Potassium 4.0 3.7 - 5.3 mmol/L    Chloride 101 98 - 107 mmol/L    CO2 20 20 - 31 mmol/L    Anion Gap 15 9 - 17 mmol/L    Alkaline Phosphatase 74 35 - 104 U/L    ALT 6 5 - 33 U/L    AST 13 <32 U/L    Total Bilirubin 0.36 0.3 - 1.2 mg/dL    Total Protein 7.4 6.4 - 8.3 g/dL    Albumin 4.2 3.5 - 5.2 g/dL    Albumin/Globulin Ratio 1.3 1.0 - 2.5    GFR Non- 34 (L) >60 mL/min    GFR  41 (L) >60 mL/min    GFR Comment         Lactate, Sepsis   Result Value Ref Range    Lactic Acid, Sepsis, Whole Blood 2.8 (H) 0.5 - 1.9 mmol/L   Lactate, Sepsis   Result Value Ref Range    Lactic Acid, Sepsis, Whole Blood 1.7 0.5 - 1.9 mmol/L   Protime-INR   Result Value Ref Range    Protime 12.0 9.1 - 12.3 sec    INR 1.1    APTT   Result Value Ref Range    PTT 25.6 20.5 - 30.5 sec   Troponin   Result Value Ref Range    Troponin, High Sensitivity 58 (HH) 0 - 14 ng/L   D-Dimer, Quantitative   Result Value Ref Range    D-Dimer, Quant 0.92 mg/L FEU   Troponin   Result Value Ref Range    Troponin, High Sensitivity 59 (HH) 0 - 14 ng/L   Beta-Hydroxybutyrate   Result Value Ref Range    Beta-Hydroxybutyrate 0.31 (H) 0.02 - 0.27 mmol/L   Brain Natriuretic Peptide   Result Value Ref Range    Pro-BNP 3,681 (H) <300 pg/mL   Lipase   Result Value Ref Range    Lipase 25 13 - 60 U/L   C-Reactive Protein   Result Value Ref Range    CRP <3.0 0.0 - 5.0 mg/L   Sedimentation Rate   Result Value Ref Range    Sed Rate 33 (H) 0 - 30 mm/Hr   Procalcitonin   Result Value Ref Range    Procalcitonin 0.07 <0.09 ng/mL   POC Glucose Fingerstick   Result Value Ref Range    POC Glucose 225 (H) 65 - 105 mg/dL   POC Glucose Fingerstick   Result Value Ref Range    POC Glucose 168 (H) 65 - 105 mg/dL       IMPRESSION: CHF exacerbation,    RADIOLOGY:  XR CHEST PORTABLE   Final Result   Mild pulmonary vascular congestion         CT ABDOMEN PELVIS WO CONTRAST Additional Contrast? Radiologist Recommendation    (Results Pending)       EKG  A. fib RVR with inferior and lateral ST depression, inferior improved on subsequent EKG after conversion to sinus rhythm still some mild depression V4, V5, V6 after conversion    All EKG's are interpreted by the Emergency Department Physician who either signs or Co-signs this chart in the absence of a cardiologist.    EMERGENCY DEPARTMENT COURSE:  42-year-old female, history CAD with multiple stents in the past, type 2 diabetes insulin-dependent, CHF, A. fib, DVT and on Xarelto presented to ED with complaints of nausea, vomiting, diarrhea, abdominal pain over the past 1 day associated with shortness of breath over the past few days after running out of Xarelto 3 doses ago, subjective fevers, lightheadedness, not eating as well the last couple days, chest pain when vomiting.   On exam, heart rate in the 160s but fluctuating, irregular rhythm, tachypneic, afebrile, and fentanyl [AR]   1914 Heart score at least is 6 before troponins [AR]   1921 Patient noted feels okay. Heart rate dropped to 62. Patient placed on [place]. We will continue to monitor. [AR]   1924 Lactic Acid, Sepsis, Whole Blood(!): 2.8 [AR]   1924 WBC: 7.1 [AR]   1924 D-Dimer, Quant: 0.92  Plan to not get CT PE based on years criteria [AR]   1949 Verified with patient and patient notes she missed her Xarelto for 3 doses but has been taking her Brilinta [AR]   2002 Troponin, High Sensitivity(!!): 58 [AR]   2002 Pro-BNP(!): 3,681 [AR]   2002 Beta-Hydroxybutyrate(!): 0.31 [AR]   2002 Creatinine(!): 1.50 [AR]   2005 9/14/2021 [address] [AR]   2005 Lactic Acid, Sepsis, Whole Blood: 1.7 [AR]   2009 11/6/2020 BNP around 2000 [AR]   2009 SARS-CoV-2, Rapid: Not Detected [AR]   2009 Lipase: 25 [AR]   2009 Mild pulmonary vascular congestion on chest x-ray [AR]   2019 Troponin, High Sensitivity(!!): 59 [AR]   2033 Beta-Hydroxybutyrate(!): 0.31  B hydroxybutyrate minimally elevated, less likely DKA with glucose only 253 and no elevated AG. [AR]   2034 Creatinine 1.71 12/7/21 [AR]      ED Course User Index  [AR] Di Lang MD       No notes of [facility] Admission Criteria type on file. PROCEDURES:  None    CONSULTS:  IP CONSULT TO INTERNAL MEDICINE    CRITICAL CARE:  None    FINAL IMPRESSION      1. Non-intractable vomiting with nausea, unspecified vomiting type    2. Abdominal pain, unspecified abdominal location    3. Atrial fibrillation with RVR ([facility]    4. Congestive heart failure, unspecified HF chronicity, unspecified heart failure type ([facility]          DISPOSITION / PLAN     DISPOSITION Admitted 05/29/2022 09:36:45 PM      PATIENT REFERRED TO:  No follow-up provider specified.     DISCHARGE MEDICATIONS:  Current Discharge Medication List          Jayshree Galloway MD  Emergency Medicine Resident    (Please note that portions of thisnote were completed with a voice recognition program.  Efforts were made to edit the dictations but occasionally words are mis-transcribed.)     Bisi Mcmahon MD  Resident  05/30/22 [id number]

## 2022-05-29 NOTE — ED NOTES
The following labs were labeled with patient stickers & tubed to lab;    []Lavender   []On Ice  []Blue  []Green/ Yellow  []Green/ Black []On Ice  []Pink  []Red  []Yellow    [x]COVID-19 Swab [x]Rapid    []Urine Sample  []Pelvic Cultures    [x]Blood Cultures       Daly Butler RN  05/29/22 [id number]

## 2022-05-29 NOTE — ED PROVIDER NOTES
I performed a history and physical examination of the patient and discussed management with the resident. I reviewed the residents note and agree with the documented findings and plan of care. Any areas of disagreement are noted on the chart. I was personally present for the key portions of any procedures. I have documented in the chart those procedures where I was not present during the key portions. I have reviewed the emergency nurses triage note. I agree with the chief complaint, past medical history, past surgical history, allergies, medications, social and family history as documented unless otherwise noted below. Documentation of the HPI, Physical Exam and Medical Decision Making performed by medical students or scribes is based on my personal performance of the HPI, PE and MDM. For Phys Assistant/ Nurse Practitioner cases/documentation I have personally evaluated this patient and have completed at least one if not all key elements of the E/M (history, physical exam, and MDM). I find the patient's history and physical exam are consistent with the NP/PA documentation. I agree with the care provided, treatment rendered, disposition and followup plan. Additional findings are as noted. Chema Taveras. Trudy Brink MD  Attending Emergency  Physician      This patient presents emerged part with various complaints including mild increase in swelling of the legs and feet bilaterally for the past several weeks. She also complaining of some increasing shortness of breath and a nonproductive cough for the past several days. She is also complaining of onset of nausea, vomiting, diarrhea today with some mild shortness of breath. Patient also notes diaphoresis which she noted yesterday. Denies any chest pain. No abdominal pain. No melena, hematochezia, hematemesis. No dysuria, frequency, urgency. No abnormal vaginal discharge or bleeding. Awake, alert, coop, responsive. Speech fluent, normal comprehension.   Lungs clear bilaterally. No rales, rhonchi, wheezes, stridor, retractions. Cardiac-S1S2, irregularly irregular, no murmur, rub, or gallop. Heart rate at the time of my evaluation was approxione 100 however during my examination the patient reverted to a sinus bradycardia in the upper 50s and low 60s. Abdomen soft, nondistended, nontender. No organomegaly, mass, bruit. Normal bowel sounds. No jugular venous distention noted. Patient does has 1-2+ pitting edema both lower extremities. Impression: Nausea, vomiting, and diarrhea. Atrial fibrillation with rapid ventricular response. Bilateral pedal edema. Plan: Lab work and radiographs are pending at this point time. Pacer patches were placed on the patient expectantly. EKG Interpretation    Interpreted by me    Rhythm: Atrial fibrillation with rapid ventricular response  Rate: 154  Axis: normal  Ectopy: none  Conduction: normal  ST Segments: Sagging ST segments noted in leads I, II, aVF, aVL, V4, V5, and V6. T Waves: T wave inversions noted in leads I, 2, V5, V6. Q Waves: none    Clinical Impression: no acute changes and normal EKG    EKG Interpretation(postconversion ECG)    Interpreted by me    Rhythm: normal sinus   Rate: 64  Axis: normal  Ectopy: none  Conduction: normal  ST Segments: Mild depression in V4, V5, V6. T Waves: Flattened or biphasic T waves noted in V4, V5, V6. Q Waves: none    Clinical Impression: Findings as noted above. Tracing appears quite similar to previous tracing dated September 1, 2021. Lab results have been reviewed. Note is made of the elevated BNP, sed rate, troponin. Chest x-ray was reviewed as was radiology interpretation. Treatment was initiated as per sepsis protocol. Patient was subsequently admitted.             Uday Marin MD  05/30/22 0000

## 2022-05-29 NOTE — ED NOTES
Patient placed on life pack as precaution. .  Dr Suma Kim at bedside. Patient's HR now sinus bradycardia. EKG repeated.      Celeste Sheehan RN  05/29/22 [id number]

## 2022-05-29 NOTE — ED NOTES
Patient placed on full cardiac monitor. Dr Mortimer Colla at bedside.      Yvette Davila RN  05/29/22 2083

## 2022-05-29 NOTE — ED NOTES
The following labs were labeled with patient stickers & tubed to lab;    [x]Lavender   []On Ice  [x]Blue  [x]Green/ Yellow  [x]Green/ Black []On Ice  []Pink  []Red  []Yellow    []COVID-19 Swab []Rapid    []Urine Sample  []Pelvic Cultures    []Blood Cultures       She Augustin RN  05/29/22 3111

## 2022-05-29 NOTE — ED NOTES
Patient to ED room 27 via wheelchair with her son who reports that the patient is generally weak and diaphoretic. Patient reports she is an insulin dependent diabetic who began having diarrhea and vomiting (six times) that started this morning. Patient is reporting severe diffuse abdominal pain. She is alert and oriented x4.      Esther Paez RN  05/29/22 0493

## 2022-05-30 ENCOUNTER — APPOINTMENT (OUTPATIENT)
Dept: CT IMAGING | Age: 77
DRG: 280 | End: 2022-05-30
Payer: MEDICARE

## 2022-05-30 LAB
-: ABNORMAL
ALBUMIN SERPL-MCNC: 3.5 G/DL (ref 3.5–5.2)
ALBUMIN/GLOBULIN RATIO: 1.5 (ref 1–2.5)
ALP BLD-CCNC: 57 U/L (ref 35–104)
ALT SERPL-CCNC: <5 U/L (ref 5–33)
ANION GAP SERPL CALCULATED.3IONS-SCNC: 13 MMOL/L (ref 9–17)
AST SERPL-CCNC: 13 U/L
BILIRUB SERPL-MCNC: 0.26 MG/DL (ref 0.3–1.2)
BILIRUBIN URINE: NEGATIVE
BUN BLDV-MCNC: 23 MG/DL (ref 8–23)
CALCIUM SERPL-MCNC: 8.8 MG/DL (ref 8.6–10.4)
CASTS UA: ABNORMAL /LPF (ref 0–8)
CHLORIDE BLD-SCNC: 101 MMOL/L (ref 98–107)
CHOLESTEROL/HDL RATIO: 3.5
CHOLESTEROL: 125 MG/DL
CO2: 21 MMOL/L (ref 20–31)
COLOR: YELLOW
CREAT SERPL-MCNC: 1.56 MG/DL (ref 0.5–0.9)
EPITHELIAL CELLS UA: ABNORMAL /HPF (ref 0–5)
GFR AFRICAN AMERICAN: 39 ML/MIN
GFR NON-AFRICAN AMERICAN: 32 ML/MIN
GFR SERPL CREATININE-BSD FRML MDRD: ABNORMAL ML/MIN/{1.73_M2}
GLUCOSE BLD-MCNC: 222 MG/DL (ref 65–105)
GLUCOSE BLD-MCNC: 249 MG/DL (ref 65–105)
GLUCOSE BLD-MCNC: 283 MG/DL (ref 70–99)
GLUCOSE BLD-MCNC: 330 MG/DL (ref 65–105)
GLUCOSE BLD-MCNC: 380 MG/DL (ref 65–105)
GLUCOSE URINE: ABNORMAL
HCT VFR BLD CALC: 31.6 % (ref 36.3–47.1)
HDLC SERPL-MCNC: 36 MG/DL
HEMOGLOBIN: 9.8 G/DL (ref 11.9–15.1)
INR BLD: 1.4
KETONES, URINE: NEGATIVE
LDL CHOLESTEROL: 63 MG/DL (ref 0–130)
LEUKOCYTE ESTERASE, URINE: NEGATIVE
MCH RBC QN AUTO: 28.6 PG (ref 25.2–33.5)
MCHC RBC AUTO-ENTMCNC: 31 G/DL (ref 28.4–34.8)
MCV RBC AUTO: 92.1 FL (ref 82.6–102.9)
NITRITE, URINE: NEGATIVE
NRBC AUTOMATED: 0 PER 100 WBC
PARTIAL THROMBOPLASTIN TIME: 29.6 SEC (ref 20.5–30.5)
PARTIAL THROMBOPLASTIN TIME: 32.4 SEC (ref 20.5–30.5)
PARTIAL THROMBOPLASTIN TIME: 35.6 SEC (ref 20.5–30.5)
PDW BLD-RTO: 14.3 % (ref 11.8–14.4)
PH UA: 5 (ref 5–8)
PLATELET # BLD: 139 K/UL (ref 138–453)
PMV BLD AUTO: 11.5 FL (ref 8.1–13.5)
POTASSIUM SERPL-SCNC: 3.6 MMOL/L (ref 3.7–5.3)
PROTEIN UA: NEGATIVE
PROTHROMBIN TIME: 14.9 SEC (ref 9.1–12.3)
RBC # BLD: 3.43 M/UL (ref 3.95–5.11)
RBC UA: ABNORMAL /HPF (ref 0–4)
SODIUM BLD-SCNC: 135 MMOL/L (ref 135–144)
SPECIFIC GRAVITY UA: 1.01 (ref 1–1.03)
TOTAL PROTEIN: 5.8 G/DL (ref 6.4–8.3)
TRIGL SERPL-MCNC: 132 MG/DL
TROPONIN, HIGH SENSITIVITY: 135 NG/L (ref 0–14)
TROPONIN, HIGH SENSITIVITY: 168 NG/L (ref 0–14)
TROPONIN, HIGH SENSITIVITY: 98 NG/L (ref 0–14)
TSH SERPL DL<=0.05 MIU/L-ACNC: 0.71 UIU/ML (ref 0.3–5)
TURBIDITY: CLEAR
URINE HGB: NEGATIVE
UROBILINOGEN, URINE: NORMAL
WBC # BLD: 7.3 K/UL (ref 3.5–11.3)
WBC UA: ABNORMAL /HPF (ref 0–5)

## 2022-05-30 PROCEDURE — 85027 COMPLETE CBC AUTOMATED: CPT

## 2022-05-30 PROCEDURE — 6360000002 HC RX W HCPCS: Performed by: INTERNAL MEDICINE

## 2022-05-30 PROCEDURE — 87205 SMEAR GRAM STAIN: CPT

## 2022-05-30 PROCEDURE — 82947 ASSAY GLUCOSE BLOOD QUANT: CPT

## 2022-05-30 PROCEDURE — 1200000000 HC SEMI PRIVATE

## 2022-05-30 PROCEDURE — 84443 ASSAY THYROID STIM HORMONE: CPT

## 2022-05-30 PROCEDURE — 2580000003 HC RX 258: Performed by: INTERNAL MEDICINE

## 2022-05-30 PROCEDURE — 99232 SBSQ HOSP IP/OBS MODERATE 35: CPT | Performed by: INTERNAL MEDICINE

## 2022-05-30 PROCEDURE — 6370000000 HC RX 637 (ALT 250 FOR IP): Performed by: CLINICAL NURSE SPECIALIST

## 2022-05-30 PROCEDURE — 94761 N-INVAS EAR/PLS OXIMETRY MLT: CPT

## 2022-05-30 PROCEDURE — 97162 PT EVAL MOD COMPLEX 30 MIN: CPT

## 2022-05-30 PROCEDURE — 97116 GAIT TRAINING THERAPY: CPT

## 2022-05-30 PROCEDURE — 2580000003 HC RX 258: Performed by: CLINICAL NURSE SPECIALIST

## 2022-05-30 PROCEDURE — 84156 ASSAY OF PROTEIN URINE: CPT

## 2022-05-30 PROCEDURE — 97530 THERAPEUTIC ACTIVITIES: CPT

## 2022-05-30 PROCEDURE — 6370000000 HC RX 637 (ALT 250 FOR IP): Performed by: INTERNAL MEDICINE

## 2022-05-30 PROCEDURE — 6360000004 HC RX CONTRAST MEDICATION: Performed by: INTERNAL MEDICINE

## 2022-05-30 PROCEDURE — 81001 URINALYSIS AUTO W/SCOPE: CPT

## 2022-05-30 PROCEDURE — 36415 COLL VENOUS BLD VENIPUNCTURE: CPT

## 2022-05-30 PROCEDURE — 85610 PROTHROMBIN TIME: CPT

## 2022-05-30 PROCEDURE — 99222 1ST HOSP IP/OBS MODERATE 55: CPT | Performed by: INTERNAL MEDICINE

## 2022-05-30 PROCEDURE — 83036 HEMOGLOBIN GLYCOSYLATED A1C: CPT

## 2022-05-30 PROCEDURE — 82570 ASSAY OF URINE CREATININE: CPT

## 2022-05-30 PROCEDURE — 6360000002 HC RX W HCPCS: Performed by: CLINICAL NURSE SPECIALIST

## 2022-05-30 PROCEDURE — 6370000000 HC RX 637 (ALT 250 FOR IP): Performed by: STUDENT IN AN ORGANIZED HEALTH CARE EDUCATION/TRAINING PROGRAM

## 2022-05-30 PROCEDURE — 85730 THROMBOPLASTIN TIME PARTIAL: CPT

## 2022-05-30 PROCEDURE — 80061 LIPID PANEL: CPT

## 2022-05-30 PROCEDURE — 84484 ASSAY OF TROPONIN QUANT: CPT

## 2022-05-30 PROCEDURE — 74176 CT ABD & PELVIS W/O CONTRAST: CPT

## 2022-05-30 PROCEDURE — 80053 COMPREHEN METABOLIC PANEL: CPT

## 2022-05-30 RX ORDER — HEPARIN SODIUM 1000 [USP'U]/ML
4000 INJECTION, SOLUTION INTRAVENOUS; SUBCUTANEOUS PRN
Status: DISCONTINUED | OUTPATIENT
Start: 2022-05-31 | End: 2022-05-31

## 2022-05-30 RX ORDER — HEPARIN SODIUM AND DEXTROSE 10000; 5 [USP'U]/100ML; G/100ML
5-30 INJECTION INTRAVENOUS CONTINUOUS
Status: DISCONTINUED | OUTPATIENT
Start: 2022-05-31 | End: 2022-05-31

## 2022-05-30 RX ORDER — METHYLPREDNISOLONE SODIUM SUCCINATE 125 MG/2ML
60 INJECTION, POWDER, LYOPHILIZED, FOR SOLUTION INTRAMUSCULAR; INTRAVENOUS ONCE
Status: COMPLETED | OUTPATIENT
Start: 2022-05-30 | End: 2022-05-30

## 2022-05-30 RX ORDER — HEPARIN SODIUM 1000 [USP'U]/ML
2000 INJECTION, SOLUTION INTRAVENOUS; SUBCUTANEOUS PRN
Status: DISCONTINUED | OUTPATIENT
Start: 2022-05-31 | End: 2022-05-31

## 2022-05-30 RX ORDER — AMIODARONE HYDROCHLORIDE 200 MG/1
400 TABLET ORAL 2 TIMES DAILY
Status: DISCONTINUED | OUTPATIENT
Start: 2022-05-30 | End: 2022-06-01 | Stop reason: HOSPADM

## 2022-05-30 RX ORDER — SODIUM CHLORIDE 9 MG/ML
INJECTION, SOLUTION INTRAVENOUS CONTINUOUS
Status: DISCONTINUED | OUTPATIENT
Start: 2022-05-30 | End: 2022-05-31

## 2022-05-30 RX ORDER — ACETYLCYSTEINE 200 MG/ML
600 SOLUTION ORAL; RESPIRATORY (INHALATION) 2 TIMES DAILY
Status: COMPLETED | OUTPATIENT
Start: 2022-05-30 | End: 2022-06-01

## 2022-05-30 RX ORDER — ASPIRIN 81 MG/1
324 TABLET, CHEWABLE ORAL ONCE
Status: COMPLETED | OUTPATIENT
Start: 2022-05-30 | End: 2022-05-30

## 2022-05-30 RX ORDER — INSULIN GLARGINE 100 [IU]/ML
20 INJECTION, SOLUTION SUBCUTANEOUS 2 TIMES DAILY
Status: DISCONTINUED | OUTPATIENT
Start: 2022-05-30 | End: 2022-05-31

## 2022-05-30 RX ORDER — HEPARIN SODIUM 1000 [USP'U]/ML
4000 INJECTION, SOLUTION INTRAVENOUS; SUBCUTANEOUS PRN
Status: DISCONTINUED | OUTPATIENT
Start: 2022-05-30 | End: 2022-05-30

## 2022-05-30 RX ORDER — HEPARIN SODIUM 1000 [USP'U]/ML
60 INJECTION, SOLUTION INTRAVENOUS; SUBCUTANEOUS ONCE
Status: DISCONTINUED | OUTPATIENT
Start: 2022-05-30 | End: 2022-05-30

## 2022-05-30 RX ORDER — FUROSEMIDE 10 MG/ML
40 INJECTION INTRAMUSCULAR; INTRAVENOUS ONCE
Status: COMPLETED | OUTPATIENT
Start: 2022-05-30 | End: 2022-05-30

## 2022-05-30 RX ORDER — METOCLOPRAMIDE HYDROCHLORIDE 5 MG/ML
10 INJECTION INTRAMUSCULAR; INTRAVENOUS EVERY 6 HOURS
Status: DISCONTINUED | OUTPATIENT
Start: 2022-05-30 | End: 2022-06-01 | Stop reason: HOSPADM

## 2022-05-30 RX ADMIN — PANTOPRAZOLE SODIUM 40 MG: 40 TABLET, DELAYED RELEASE ORAL at 08:17

## 2022-05-30 RX ADMIN — DIPHENHYDRAMINE HCL 50 MG: 50 CAPSULE ORAL at 21:35

## 2022-05-30 RX ADMIN — GABAPENTIN 300 MG: 300 CAPSULE ORAL at 08:17

## 2022-05-30 RX ADMIN — INSULIN LISPRO 4 UNITS: 100 INJECTION, SOLUTION INTRAVENOUS; SUBCUTANEOUS at 08:19

## 2022-05-30 RX ADMIN — ASPIRIN 324 MG: 81 TABLET, CHEWABLE ORAL at 12:12

## 2022-05-30 RX ADMIN — METOCLOPRAMIDE 10 MG: 5 INJECTION, SOLUTION INTRAMUSCULAR; INTRAVENOUS at 15:04

## 2022-05-30 RX ADMIN — FUROSEMIDE 40 MG: 10 INJECTION, SOLUTION INTRAMUSCULAR; INTRAVENOUS at 08:18

## 2022-05-30 RX ADMIN — SODIUM CHLORIDE: 9 INJECTION, SOLUTION INTRAVENOUS at 18:47

## 2022-05-30 RX ADMIN — GABAPENTIN 300 MG: 300 CAPSULE ORAL at 15:04

## 2022-05-30 RX ADMIN — METOCLOPRAMIDE 10 MG: 5 INJECTION, SOLUTION INTRAMUSCULAR; INTRAVENOUS at 20:10

## 2022-05-30 RX ADMIN — TICAGRELOR 90 MG: 90 TABLET ORAL at 08:17

## 2022-05-30 RX ADMIN — CETIRIZINE HYDROCHLORIDE 5 MG: 10 TABLET ORAL at 08:18

## 2022-05-30 RX ADMIN — SODIUM CHLORIDE, PRESERVATIVE FREE 10 ML: 5 INJECTION INTRAVENOUS at 08:18

## 2022-05-30 RX ADMIN — ONDANSETRON 4 MG: 2 INJECTION INTRAMUSCULAR; INTRAVENOUS at 02:00

## 2022-05-30 RX ADMIN — IOHEXOL 50 ML: 240 INJECTION, SOLUTION INTRATHECAL; INTRAVASCULAR; INTRAVENOUS; ORAL at 01:49

## 2022-05-30 RX ADMIN — RIVAROXABAN 20 MG: 20 TABLET, FILM COATED ORAL at 00:42

## 2022-05-30 RX ADMIN — METHYLPREDNISOLONE SODIUM SUCCINATE 60 MG: 125 INJECTION, POWDER, FOR SOLUTION INTRAMUSCULAR; INTRAVENOUS at 10:42

## 2022-05-30 RX ADMIN — TICAGRELOR 90 MG: 90 TABLET ORAL at 21:35

## 2022-05-30 RX ADMIN — FLUTICASONE PROPIONATE 2 SPRAY: 50 SPRAY, METERED NASAL at 08:17

## 2022-05-30 RX ADMIN — ROSUVASTATIN CALCIUM 40 MG: 20 TABLET, FILM COATED ORAL at 08:17

## 2022-05-30 RX ADMIN — INSULIN GLARGINE 7 UNITS: 100 INJECTION, SOLUTION SUBCUTANEOUS at 00:43

## 2022-05-30 RX ADMIN — INSULIN GLARGINE 20 UNITS: 100 INJECTION, SOLUTION SUBCUTANEOUS at 21:39

## 2022-05-30 RX ADMIN — CARVEDILOL 12.5 MG: 12.5 TABLET, FILM COATED ORAL at 21:35

## 2022-05-30 RX ADMIN — INSULIN LISPRO 8 UNITS: 100 INJECTION, SOLUTION INTRAVENOUS; SUBCUTANEOUS at 17:29

## 2022-05-30 RX ADMIN — INSULIN LISPRO 4 UNITS: 100 INJECTION, SOLUTION INTRAVENOUS; SUBCUTANEOUS at 12:12

## 2022-05-30 RX ADMIN — ISOSORBIDE MONONITRATE 30 MG: 30 TABLET ORAL at 08:17

## 2022-05-30 RX ADMIN — AMIODARONE HYDROCHLORIDE 400 MG: 200 TABLET ORAL at 15:04

## 2022-05-30 RX ADMIN — METOCLOPRAMIDE 10 MG: 5 INJECTION, SOLUTION INTRAMUSCULAR; INTRAVENOUS at 08:18

## 2022-05-30 RX ADMIN — ESCITALOPRAM OXALATE 20 MG: 10 TABLET ORAL at 08:18

## 2022-05-30 RX ADMIN — TICAGRELOR 90 MG: 90 TABLET ORAL at 00:42

## 2022-05-30 RX ADMIN — Medication 600 MG: at 20:10

## 2022-05-30 RX ADMIN — CARVEDILOL 12.5 MG: 12.5 TABLET, FILM COATED ORAL at 08:18

## 2022-05-30 RX ADMIN — GABAPENTIN 300 MG: 300 CAPSULE ORAL at 00:42

## 2022-05-30 RX ADMIN — CARVEDILOL 12.5 MG: 12.5 TABLET, FILM COATED ORAL at 00:43

## 2022-05-30 RX ADMIN — GABAPENTIN 300 MG: 300 CAPSULE ORAL at 21:35

## 2022-05-30 RX ADMIN — AMIODARONE HYDROCHLORIDE 400 MG: 200 TABLET ORAL at 21:35

## 2022-05-30 RX ADMIN — INSULIN LISPRO 5 UNITS: 100 INJECTION, SOLUTION INTRAVENOUS; SUBCUTANEOUS at 21:39

## 2022-05-30 RX ADMIN — DIPHENHYDRAMINE HCL 50 MG: 50 CAPSULE ORAL at 00:42
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
## 2022-05-30 NOTE — PROGRESS NOTES
Physical Therapy  Facility/Department: [facility] RENAL//MED SURG  Physical Therapy Initial Assessment    Name: Yenifer Bray  : 1945  MRN: [id number]  Date of Service: 2022  Chief Complaint   Patient presents with    Emesis    Abdominal Pain     History from EMR (cardiology )     The patient is a 68 y.o. female with with background history of hypertension, type 2 diabetes mellitus, CAD and chronic kidney disease stage III is presented to the hospital with a history of diarrhea and chest pain. According to the patient she developed diarrhea about 2 days ago and she then also started vomiting yesterday. Patient then developed chest tightness yesterday which lasted for about 15 minutes. Patient reports associated symptoms of shortness of breath and also diaphoresis along with chest pain. Patient was brought into the emergency department and was found to be in atrial fibrillation with RVR. Cardiology was consulted for A. fib with RVR and elevated troponins. Discharge Recommendations:  Patient would benefit from continued therapy after discharge   PT Equipment Recommendations  Equipment Needed: No  Other: Pt states she owns walkers and bathroom equipment,  has no DME needs      Patient Diagnosis(es): The primary encounter diagnosis was Non-intractable vomiting with nausea, unspecified vomiting type. Diagnoses of Abdominal pain, unspecified abdominal location, Atrial fibrillation with RVR ([facility] [address] and Congestive heart failure, unspecified HF chronicity, unspecified heart failure type [facility]) were also pertinent to this visit. Past Medical History:  has a past medical history of Acute renal failure with tubular necrosis ([address] Anxiety, Atrial fibrillation (HCC), Benign positional vertigo, CAD (coronary artery disease), Chest pain, CKD (chronic kidney disease), stage III ([address] Depression, Diabetes mellitus ([address] Diabetic neuropathy ([address] Dysuria, Hyperlipidemia, Hypertension, and Migraine.   Past Surgical History:  has a past surgical history that includes Percutaneous Transluminal Coronary Angio; Cardiac catheterization; Coronary angioplasty with stent (02/25/2017); Appendectomy; Coronary angioplasty with stent (07/11/2012); and Coronary angioplasty with stent (12/11/2020). Assessment   Body Structures, Functions, Activity Limitations Requiring Skilled Therapeutic Intervention: Decreased functional mobility ; Decreased ROM; Decreased tolerance to work activity; Decreased strength;Decreased endurance;Decreased posture  Assessment: Pt presents with general weakness and deconditioning, decline in functional independence. Pt able to ambulate with walker and SBA short distance limited by dyspnea, complete bed mobilty with min A, assist to get LE's into bed. Pt will continue to benefit from PT  intervention to progress activity and promote independence needed to return home at highest level of independence.   Specific Instructions for Next Treatment: progress activitty as indicated and tolerated following pending cardiology recommendations  Therapy Prognosis: Good  Decision Making: Medium Complexity  Clinical Presentation: evolving, pending further cardiology management, recommendations and possible procudure  Requires PT Follow-Up: Yes  Activity Tolerance  Activity Tolerance: Patient tolerated evaluation without incident;Patient limited by endurance  Activity Tolerance Comments: Pt demonstrate decreased activity tolerance and dysnea with exertion     Plan   Plan  Plan: 5-7 times per week  Specific Instructions for Next Treatment: progress activitty as indicated and tolerated following pending cardiology recommendations  Current Treatment Recommendations: Strengthening,Balance training,Functional mobility training,Transfer training,Safety education & training,Therapeutic activities,Gait training,Stair training,Neuromuscular re-education  Plan Comment: Note: pending cardiology recommendations  Safety Devices  Type of Devices: Call light within reach,Gait belt,Left in bed,Nurse notified  Restraints  Restraints Initially in Place: No     Restrictions  Restrictions/Precautions  Restrictions/Precautions: Isolation,Cardiac,Contact Precautions,General Precautions,Up as Tolerated (enteric precautions R/O C- Diff)  Required Braces or Orthoses?: No  Position Activity Restriction  Other position/activity restrictions: rule out C Diff, enteric precautions, up with assist, cardiac (pending cardiac recommendations, test and management, with possible procedure)    Subjective   Pain: no complaint of pain or discomfort with PT assessment  General  Chart Reviewed: Yes  Patient assessed for rehabilitation services?: Yes  Other (Comment): NOTE: further cardiology testing, management and recommendations to follow, possible cardiac cath. General Comment  Comments: Pt awake and alert in agreement with PT evaluation and mobalization of pt, Okay per RN to see  Subjective  Subjective: Pt presents with complaint of fatigue and eager to return home. Pt demonstrates dyspnea with activity and report diarrhea has resolved.          Social/Functional History  Social/Functional History  Lives With: Alone  Type of Home: Mobile home  Home Layout: One level  Home Access: Stairs to enter with rails  Entrance Stairs - Number of Steps: 3  Entrance Stairs - Rails: Right  Bathroom Shower/Tub: Tub/Shower unit  Bathroom Toilet: Standard  Bathroom Equipment: Grab bars in shower,Shower chair  Bathroom Accessibility: Accessible  Home Equipment: Wheelchair-manual,Walker, rolling,Walker, 4 wheeled  Has the patient had two or more falls in the past year or any fall with injury in the past year?: No  Receives Help From: Family  ADL Assistance: [address] [place] [address] Avenue: [address] [place] [address] Responsibilities: Yes  Meal Prep Responsibility: Primary  Laundry Responsibility: Primary  Cleaning Responsibility: Primary  Bill Paying/Finance Responsibility: Primary  Shopping Responsibility: Primary  Dependent Care Responsibility: Secondary  Health Care Management: Primary  Other (Comment): Pt states she has a supportive son who provides transportation and manage medication on week end, nurse care 5 days /week manage medication and assist with showers  Ambulation Assistance: Independent  Transfer Assistance: Independent  Active : No  Patient's  Info:  Son or son-in-law  Mode of Transportation: Car  Occupation: Retired  Type of Occupation: school administration  Leisure & Hobbies: puzzles  Additional Comments: Pt report baseline independence with mobiltiy using walker with all gait and transfers, no falls within 12 months  Vision/Hearing  Vision Exceptions: Wears glasses for reading  Hearing: Within functional limits    Cognition   Orientation  Overall Orientation Status: Within Normal Limits  Orientation Level: Oriented X4;Oriented to place;Oriented to time;Oriented to situation  Cognition  Overall Cognitive Status: WFL  Cognition Comment: Pt able to make needs known with good awareness of safety and functional limitations     Objective   Heart Rate: 66  Heart Rate Source: Monitor  BP: (!) 130/59  BP Location: Right upper arm  BP Method: Automatic  Patient Position: Semi fowlers  MAP (Calculated): 82.67  Resp: 16  SpO2: 96 %  O2 Device: None (Room air)     Observation/Palpation  Posture: Good  Observation: slightly forward flexed posture  Edema: none noted  Gross Assessment  AROM: Within functional limits  PROM: Within functional limits  Strength: Generally decreased, functional  Coordination: Within functional limits  Tone: Normal  Sensation: Intact     AROM RLE (degrees)  RLE AROM: WFL  AROM LLE (degrees)  LLE AROM : WFL  AROM RUE (degrees)  RUE AROM : WFL  AROM LUE (degrees)  LUE AROM : WFL  Strength RLE  Strength RLE: WNL  Strength LLE  Strength LLE: WNL  Strength RUE  Strength RUE: WNL  Strength LUE  Strength LUE: WNL  Strength Other  Other: Pt demonstrates general weakness and deconditioning with dyspnea on minimal exertion           Bed mobility  Bridging: Moderate assistance  Rolling to Left: Independent  Rolling to Right: Independent  Supine to Sit: Modified independent  Sit to Supine: Minimal assistance  Scooting: Stand by assistance  Bed Mobility Comments: Activity required extra time and effort with external support  Transfers  Sit to Stand: Stand by assistance  Stand to sit: Stand by assistance  Bed to Chair: Stand by assistance  Comment: task require extra time and effort with multiple attempts using rocking momentum to stand from sitting,  assist needed to get LE's into bed from sit > supine  Ambulation  WB Status: FWB  Ambulation  Surface: level tile  Device: Standard Walker  Assistance: Contact guard assistance  Quality of Gait: slow guarded gait with slightly forward flexed posture, leaning onto walker  Gait Deviations: Decreased step length  Distance: 15 ft x 2 std walker SBA  Comments: Pt demonstrates forward posture, decreased valeria and step length  More Ambulation?: No  Stairs/Curb  Stairs?: No     Balance  Posture: Good  Sitting - Static: Good  Sitting - Dynamic: Good  Standing - Static: Fair  Standing - Dynamic: Fair;-  Comments: fair- balance  and reliance on walker for all gait and transfers  Functional Reach Test  Fall Risk (Score of <10 inches is fall risk):  Yes                                                         AM-PAC Score     AM-PAC Inpatient Mobility without Stair Climbing Raw Score : 15 (05/30/22 1142)  AM-PAC Inpatient without Stair Climbing T-Scale Score : 43.03 (05/30/22 1142)  Mobility Inpatient CMS 0-100% Score: 47.43 (05/30/22 1142)  Mobility Inpatient without Stair CMS G-Code Modifier : CK (05/30/22 1142)       Goals  Short Term Goals  Time Frame for Short term goals: 10 visits  Short term goal 1: Pt to demonstrate independence with mobility  Short term goal 2: Pt to complete functional transfer MOD I  Short term goal 3: Pt to tolerate bilateral LE ROM exercises to improve endurance  Long Term Goals  Time Frame for Long term goals : 14 visits  Long term goal 1: Pt to ambualte 100 ft RW MOD I  Patient Goals   Patient goals : to return home as soon as possible       Education  Patient Education  Education Given To: Patient  Education Provided: Role of Therapy;Plan of Care;Precautions;Transfer Training;Equipment  Education Provided Comments: Pt educated on pacing and activity tolearnce, safety with use of walker and standing from low surface  Education Method: Demonstration  Barriers to Learning: None  Education Outcome: Verbalized understanding;Demonstrated understanding      Therapy Time   Individual Concurrent Group Co-treatment   Time In 0929         Time Out 1006         Minutes 37         Timed Code Treatment Minutes: 23 Minutes       Brett Or PT , DPT

## 2022-05-30 NOTE — PROGRESS NOTES
[facility]  Office: [address], DO, Noel [place], DO, Vivian Balint, DO, Suly Light Blood, DO, Frankie Crockett MD, Zoila Leon MD, Isai Mistry MD, Karthik Sampson MD, Zeenat Horn MD, Gabby Santo MD, Pat Lazar MD, Lamonte Cooks, DO, Shy Lozano, DO, Beto Mcgill MD,  Senia Hayward, DO, Khalif Henriquez MD, Annalisa Petty MD, Aruna Whittington MD, Jeremy Dakins, DO, Marsha Avila MD, Marcelina Linda MD, Andry Kearney MD, Juan Antonio Davidson [facility], [facility], CNP, Jeanie Faulkner, CNP, Evelina Pham, CNP, Ousmane Hilliard, CNP, Lydia Oswald, CNP, Ida Augustine PA-C, Catherine Ledesma, [facility], Manolo Erazo, CNP, Jojo Dotson, CNP, Unique Layne, CNP, Angel Jurado, CNS, Issac King, [facility], Janet Riley, CNP, Cindy Santos, CNP, Tonya Mckay, [facility]   [address] [facility]    Progress Note    5/30/2022    12:20 PM    Name:   Trena Orozco  MRN:     [id number]     [place]:      [de-identified]   Room:   [address] [place] [address] Day:  1  Admit Date:  5/29/2022  6:27 PM    PCP:   Catie Napoles MD  Code Status:  Full Code    Subjective:     C/C:   Chief Complaint   Patient presents with   Harjite Ross Emesis    Abdominal Pain     Interval History Status: not changed. Patient is sleepy because she had a test early this morning. No more abdominal pain, N/V or chest pain. She does get SOB with exertion which is chronic for her. Last Echo 9/21    EF: 54% with normal LV function, + evidence of diastolic dysfunction    Brief History:     Per the Nocturnist:  \"Ms. Bárbara Erazo is  A 68year old AAF with underlying a fib on ac, DM with gastropathy, CKD3 ,CAD, multiple UTIs      who presents with 1 day of multiple symptoms including nausea with nonbilious non bloody vomiting.  Generalized abdominal pain worse after her bouts of vomiting and a few bouts of loose stools.      she isnt sure if these symptoms are related to her SOB and chest discomfort she has experience over past 2-5 days. She reports led swelling in lower extremities, worse then prevously. But denies PND or orthopnea. She denies cough, sick contacts, recent travel. She says she did miss a few days of her AC leading up to this hospitalization.      She denies any food associated to her symptoms. And reports unable to tolerate most Oral intake at this time. \"    Review of Systems:     Constitutional:  negative for chills, fevers, sweats  Respiratory:  negative for cough, + dyspnea on exertion, shortness of breath  Cardiovascular:  negative for chest pain, chest pressure/discomfort, lower extremity edema, palpitations  Gastrointestinal:  negative for abdominal pain, constipation, diarrhea, nausea, vomiting  Neurological:  negative for dizziness, headache    Medications: Allergies:     Allergies   Allergen Reactions    Humalog [Insulin Lispro] Other (See Comments)     Blisters at site given    Penicillins      Other reaction(s): Hives    Sulfa Antibiotics      Other reaction(s): Rash       Current Meds:   Scheduled Meds:    metoclopramide  10 mg IntraVENous Q6H    heparin (porcine)  60 Units/kg IntraVENous Once    amiodarone  400 mg Oral BID    insulin glargine  20 Units SubCUTAneous BID    ticagrelor  90 mg Oral BID    carvedilol  12.5 mg Oral BID    escitalopram  20 mg Oral Daily    fluticasone  2 spray Each Nostril Daily    gabapentin  300 mg Oral TID    insulin lispro  0-12 Units SubCUTAneous TID WC    insulin lispro  0-6 Units SubCUTAneous Nightly    isosorbide mononitrate  30 mg Oral Daily    cetirizine  5 mg Oral Daily    pantoprazole  40 mg Oral QAM AC    rosuvastatin  40 mg Oral Daily    [Held by provider] torsemide  10 mg Oral Once per day on Mon Wed Fri    [Held by provider] rivaroxaban  20 mg Oral Daily    sodium chloride flush  5-40 mL IntraVENous 2 times per day     Continuous Infusions:    [START ON 5/31/2022] heparin (PORCINE) Infusion      dextrose      sodium chloride       PRN Meds: [START ON 2022] heparin (porcine), [START ON 2022] heparin (porcine), clonazePAM, diphenhydrAMINE, docusate sodium, glucose, dextrose bolus **OR** dextrose bolus, glucagon (rDNA), dextrose, [Held by provider] nitroGLYCERIN, sodium chloride flush, sodium chloride, potassium chloride **OR** potassium alternative oral replacement **OR** potassium chloride, magnesium sulfate, ondansetron **OR** ondansetron, polyethylene glycol, acetaminophen **OR** acetaminophen, HYDROmorphone    Data:     Past Medical History:   has a past medical history of Acute renal failure with tubular necrosis ([facility] [address] Anxiety, Atrial fibrillation ([facility] [address] Benign positional vertigo, CAD (coronary artery disease), Chest pain, CKD (chronic kidney disease), stage III ([facility] [address] Depression, Diabetes mellitus ([facility] [address] Diabetic neuropathy ([facility] [address] Dysuria, Hyperlipidemia, Hypertension, and Migraine. Social History:   reports that she has never smoked. She has never used smokeless tobacco. She reports that she does not drink alcohol and does not use drugs. Family History:   Family History   Problem Relation Age of Onset   [employer] Cancer Mother     Cancer Father        Vitals:  BP (!) 130/59   Pulse 66   Temp 98.1 °F (36.7 °C) (Oral)   Resp 16   Ht 5' 10\" (1.778 m)   Wt 245 lb (111.1 kg)   SpO2 96%   BMI 35.15 kg/m²   Temp (24hrs), Av.6 °F (36.4 °C), Min:97 °F (36.1 °C), Max:98.1 °F (36.7 °C)    Recent Labs     22  1819 22  2239 22  0725 22  1131   POCGLU 225* 168* 249* 222*       I/O (24Hr):     Intake/Output Summary (Last 24 hours) at 2022 1220  Last data filed at 2022 1215  Gross per 24 hour   Intake 999 ml   Output 400 ml   Net 599 ml       Labs:  Hematology:  Recent Labs     22  1903 22  0645 22  1002   WBC 7.1  --  7.3   RBC 4.22  --  3.43*   HGB 12.2  --  9.8*   HCT 39.1  --  31.6*   MCV 92.7  --  92.1   MCH 28.9  --  28.6   MCHC 31.2  -- 31.0   RDW 14.5*  --  14.3     --  139   MPV 11.7  --  11.5   SEDRATE 33*  --   --    CRP <3.0  --   --    INR 1.1 1.4  --    DDIMER 0.92  --   --      Chemistry:  Recent Labs     05/29/22 1903 05/29/22  1950 05/30/22  0645     --  135   K 4.0  --  3.6*     --  101   CO2 20  --  21   GLUCOSE 253*  --  283*   BUN 21  --  23   CREATININE 1.50*  --  1.56*   ANIONGAP 15  --  13   LABGLOM 34*  --  32*   GFRAA 41*  --  39*   CALCIUM 10.0  --  8.8   PROBNP 3,681*  --   --    TROPHS 58* 59* 168*     Recent Labs     05/29/22  1819 05/29/22 1903 05/29/22 2239 05/30/22  0645 05/30/22  0725 05/30/22  1131   PROT  --  7.4  --  5.8*  --   --    LABALBU  --  4.2  --  3.5  --   --    TSH  --   --   --  0.71  --   --    AST  --  13  --  13  --   --    ALT  --  6  --  <5*  --   --    ALKPHOS  --  74  --  57  --   --    BILITOT  --  0.36  --  0.26*  --   --    LIPASE  --  25  --   --   --   --    POCGLU 225*  --  168*  --  249* 222*     ABG:No results found for: POCPH, PHART, PH, POCPCO2, PSZ7DPN, PCO2, POCPO2, PO2ART, PO2, POCHCO3, QET3XKW, HCO3, NBEA, PBEA, BEART, BE, THGBART, THB, KDY0WDW, OXZB2QPJ, C5XWKLIT, O2SAT, FIO2  Lab Results   Component Value Date/Time    SPECIAL RFOREARM, 10ML 05/29/2022 07:02 PM     Lab Results   Component Value Date/Time    CULTURE NO GROWTH 12 HOURS 05/29/2022 07:02 PM       Radiology:  CT ABDOMEN PELVIS WO CONTRAST Additional Contrast? Radiologist Recommendation    Addendum Date: 5/30/2022    ADDENDUM: Addendum being added as this study was accidentally signed off prior to completion. Additional body sections will be included as well as a final impression. Additional Findings: GI/bowel: No ileus or obstruction is identified. Mild colonic diverticulosis. No acute diverticulitis. Pelvis: The bladder is decompressed but otherwise unremarkable. No free fluid in the pelvis. No pelvic mass. Hysterectomy. Phleboliths are noted. Peritoneum/retroperitoneum: No aortic aneurysm. Atherosclerosis. No bulky retroperitoneal or mesenteric lymphadenopathy. No bowel herniation. Bones/soft tissues: No acute subcutaneous soft tissue abnormality. No acute osseous fracture. Multilevel degenerative changes are seen in the spine. Degenerative changes seen in the pubic symphysis, sacroiliac joints as well as hip joints. Chronic compression deformity at L2 along the superior endplate with an associated Schmorl's node, similar to the previous exam. Multilevel spinal canal stenosis as well as osseous foraminal stenosis, with levels of severe stenosis noted. This includes right-sided severe foraminal stenosis at L4-L5, and left-sided stenosis at L2-L3. IMPRESSION 1. No perinephric inflammatory changes are seen to suggest pyelonephritis. Limited evaluation given the lack of contrast. 2. No obstructive urinary tract calculi. 3. Atherosclerosis including coronary artery involvement. 4. Hysterectomy and cholecystectomy. 5. Pancreatic atrophy.      XR CHEST PORTABLE    Result Date: 5/29/2022  Mild pulmonary vascular congestion       Physical Examination:        General appearance:  drowsy, cooperative and no distress  Mental Status:  oriented to person, place and time and normal affect  Lungs:  Bibasilar crackles, normal effort  Heart:  regular rate and rhythm, no murmur  Abdomen:  soft, nontender, nondistended, normal bowel sounds, no masses, hepatomegaly, splenomegaly  Extremities:  trace edema bilat, no redness, tenderness in the calves  Skin:  no gross lesions, rashes, induration    Assessment:        Hospital Problems           Last Modified POA    * (Principal) Paroxysmal atrial fibrillation ([address] 5/29/2022 Yes    Atypical chest pain 5/30/2022 Yes    Type 2 diabetes mellitus ([address] 5/30/2022 Yes    Essential hypertension 5/30/2022 Yes    CKD (chronic kidney disease), stage III ([address] 5/30/2022 Yes    Overview Signed 11/24/2021  3:27 PM by Charmayne Roys, MD     Secondary to ischemic and diabetic nephrosclerosis baseline 1.2-1.4               Plan:        P. A.fib with RVR- HR improved now, Cardiology starting Heparin gtt, Cardizem gtt stopped, on [facility] [place] started  Elevated Trop/ Nstemi- Heparin gtt, check Echo, may need cardiac cath,  Dyspnea likely related to Acute/ Chronic diastolic CHF- BNP elevated, will need to resume Demadex, currently on hold, given IV lasix x 1, monitor I/Os, CXR- mild pulm vascular congestion, pt doesn't see a Cardiologist  CAD- statin, check lipid panel  HTn- BP mildly elevated, on Coreg, Indur, monitor  CKD 3- Cr 1.7- 2.2 baseline, will consult Nephrology for clearance for possible cardiac cath  Depression- stable  Abd pain, N/V- resolved, on Reglan  DM2- resume Lantus 20 units BID, SSI, add on HbA1C  Gi proph  PT/OT    Kamini Myers MD  5/30/2022  12:20 PM

## 2022-05-30 NOTE — CARE COORDINATION
Case Management Initial Discharge Plan  Pradeep Ghosh,             Met with:patient to discuss discharge plans. Information verified: address, contacts, phone number, , insurance Yes  Insurance Provider: Debra 207: No    Emergency Contact/Next of Kin name & number: Sherley Dasilva as per face sheet per pt  Who are involved in patient's support system? son    PCP: Segundo Wright MD  Date of last visit: one month ago      Discharge Planning    Living Arrangements:  [address] 1 stories  3 stairs to climb to get into front door, no stairs to climb to reach second floor  Location of bedroom/bathroom in home main    Patient able to perform ADL's:Independent    Current Services (outpatient & in home) DME  DME equipment: walker, wheelchair, cane, glucometer  DME provider: na    Is patient receiving oral anticoagulation therapy? Yes    If indicated:  Brilinta and xarelto pcp manages  Physician managing anticoagulation treatment: pcp  Where does patient obtain lab work for [employer] treatment? [facility]    Does patient have any issues/concerns obtaining medications? No  If yes, what are patient's concerns? Is there a preferred Pharmacy after hours or on weekends? Yes    If yes, which pharmacy?  [facility]    Potential Assistance Needed:  Home Care    Patient agreeable to home care: Yes  Freedom of choice provided:  [facility] has a homecare nurse but she does not know the providder she knows her nurses name is Joie    Prior SNF/Rehab Placement and Facility: none  Agreeable to SNF/Rehab: No  [place] of choice provided: n/a     Evaluation: no    Expected Discharge date:       Patient expects to be discharged to:   home    If home: is the family and/or caregiver wiling & able to provide support at home? yes  Who will be providing this support? son    Follow Up Appointment: Best Day/ Time:      Transportation provider: julia  Transportation arrangements needed for discharge: No    Readmission Risk              Risk of Unplanned Readmission:  23             Does patient have a readmission risk score greater than 14?: Yes  If yes, follow-up appointment must be made within 7 days of discharge.      Goals of Care: self care      Educated pt on transitional options, provided freedom of choice and are agreeable with plan      Discharge Plan: will need one week f//u r/t readmission risk, goal is home has transportation          Electronically signed by Paige Multani RN on 5/30/22 at 9:07 AM EDT

## 2022-05-30 NOTE — H&P
@[facility]@    [facility]    HISTORY AND PHYSICAL EXAMINATION            Date:   5/29/2022  Patient name:  Krysten Portillo  Date of admission:  5/29/2022  6:27 PM  MRN:   [id number]  Account:  [de-identified]  YOB: 1945  PCP:    Melvin Millan MD  Room:   27/27  Code Status:    Prior    Chief Complaint:     2 complaints;     -1st complaint is 1 day of n/V/D/ with abdominal pain after vomtiing  -2nd complaints is SOB and chest tightness with associated leg edema for 3-5 days leading up to hospitalization    History of Present Illness:     Ms. Reilly is  A 68year old AAF with underlying a fib on ac, DM with gastropathy, CKD3 ,CAD, multiple UTIs     who presents with 1 day of multiple symptoms including nausea with nonbilious non bloody vomiting. Generalized abdominal pain worse after her bouts of vomiting and a few bouts of loose stools. she isnt sure if these symptoms are related to her SOB and chest discomfort she has experience over past 2-5 days. She reports led swelling in lower extremities, worse then prevously. But denies PND or orthopnea. She denies cough, sick contacts, recent travel. She says she did miss a few days of her AC leading up to this hospitalization. She denies any food associated to her symptoms. And reports unable to tolerate most Oral intake at this time. Past Medical History:     Past Medical History:   Diagnosis Date    Acute renal failure with tubular necrosis ([facility] [address] 11/24/2021    Secondary to ischemic ATN post fall intravascularly depletion hypotension and low flow baseline creatinine 1.2-1.4 peaked up to 2.1 during her hospitalization in September 2020. Work-up showed benign urine sediment, kidney size to be 11.2 on the right 15.1 on the left serological work-up negative.     Anxiety     Atrial fibrillation (HCC)     chronic-takes xarelto    Benign positional vertigo     CAD (coronary artery disease)     Chest pain     CKD (chronic kidney disease), stage III ([facility] [address] 11/24/2021    Secondary to ischemic and diabetic nephrosclerosis baseline 1.2-1.4    Depression     Diabetes mellitus ([facility] [address]     Diabetic neuropathy ([facility] [address]     Dysuria 11/24/2021    Hyperlipidemia     Hypertension     Migraine     Migraine headaches aggravated with sublingual nitroglycerin        Past Surgical History:     Past Surgical History:   Procedure Laterality Date    APPENDECTOMY      CARDIAC CATHETERIZATION      2012    CORONARY ANGIOPLASTY WITH STENT PLACEMENT  02/25/2017    4 SYNERGY HEART STENTS DRUG ELUTING ALL MRI CONDITONAL 3T OK, SAFE IMMEDIATELY.  CORONARY ANGIOPLASTY WITH STENT PLACEMENT  07/11/2012    XIENCE HEART STENT/ MRI CONDITIONAL 3T OK, SAFE IMMEDIATELY    CORONARY ANGIOPLASTY WITH STENT PLACEMENT  12/11/2020    PTCA          Medications Prior to Admission:     Prior to Admission medications    Medication Sig Start Date End Date Taking?  Authorizing Provider   BRILINTA 90 MG TABS tablet TAKE ONE TABLET BY MOUTH TWICE A DAY 4/19/22   Caralyn Adis, APRN - NP   isosorbide mononitrate (IMDUR) 30 MG extended release tablet Take 1 tablet by mouth daily 3/15/22   Rosalba Hall MD   LEVEMIR FLEXTOUCH 100 UNIT/ML injection pen INJECT 60 UNITS INTO THE SKIN TWO TIMES A DAY 2/28/22   Rosalba Hall MD   insulin lispro, 1 Unit Dial, ([facility]) 100 UNIT/ML SOPN Use according to scale AC TID max 45 units/day 12/8/21   Rosalba Hall MD   insulin aspart (NOVOLOG FLEXPEN) 100 UNIT/ML injection pen Use TID before meals according to scale - max 45 units a day 12/2/21   Rosalba Hall MD   Lancets MISC 1 each by Does not apply route 2 times daily 12/2/21   Rosalba Hall MD   Insulin Pen Needle 32G X 4 MM MISC 5 each by Does not apply route daily 12/2/21   Rosalba Hall MD   loperamide (IMODIUM) 2 MG capsule TAKE ONE CAPSULE BY MOUTH FOUR TIMES A DAY AS NEEDED FOR DIARRHEA 11/29/21   MAYELA Williamson - NP   carvedilol (COREG) 12.5 MG tablet Take 1 tablet by mouth 2 times daily 11/24/21 2/22/22  Cory Dean MD   torsemide (DEMADEX) 10 MG tablet Take 1 tablet by mouth three times a week Mon Wed Fri 11/24/21 2/22/22  Chantal Magana MD   escitalopram (LEXAPRO) 20 MG tablet TAKE ONE TABLET BY MOUTH DAILY 11/22/21   Janene Bell MD   XARELTO 20 MG TABS tablet TAKE ONE TABLET BY MOUTH DAILY WITH BREAKFAST 11/22/21   Janene Bell MD   hydrOXYzine (ATARAX) 25 MG tablet TAKE ONE TABLET BY MOUTH EVERY 8 HOURS FOR ITCHING 10/21/21   Janene Bell MD   gabapentin (NEURONTIN) 300 MG capsule Take 1 capsule by mouth 3 times daily for 30 days. 10/4/21 11/24/21  Janene Bell MD   rosuvastatin (CRESTOR) 40 MG tablet Take 1 tablet by mouth daily 9/28/21   Janene Bell MD   vitamin D3 (CHOLECALCIFEROL) 25 MCG (1000 UT) TABS tablet Take 1 tablet by mouth daily 9/28/21   Janene Bell MD   nystatin (MYCOSTATIN) 842006 UNIT/GM powder Apply 3 times daily.  8/27/21   Janene Bell MD   Continuous Blood Gluc Sensor (FREESTYLE SYLVIA 14 DAY SENSOR) [facility] 1 each by Does not apply route every 14 days 7/6/21   Janene Bell MD   omeprazole (PRILOSEC) 20 MG delayed release capsule Take 1 capsule by mouth Daily 5/28/21   Janene Bell MD   Control Gel Formula Dressing (DUODERM CGF DRESSING) MISC Apply 2 each topically daily 5/21/21   Janene Bell MD   fluticasone [facility]) 50 MCG/ACT nasal spray 2 sprays by Each Nostril route daily 5/11/21   Janene Bell MD   Zinc Oxide 15 % CREA Apply to buttocks up to 4 times daily - may use any concentratino 5/6/21   Janene Bell MD   oxyCODONE-acetaminophen (PERCOCET) 5-325 MG per tablet  3/25/21   Historical Provider, MD   nitroGLYCERIN (NITROSTAT) 0.4 MG SL tablet place 1 tablet under the tongue if needed every 5 minutes if needed for CHEST PAIN 2/19/21   Janene Bell MD clonazePAM (KLONOPIN) 0.5 MG tablet take 1 tablet by mouth twice a day if needed for anxiety 2/4/21 11/24/21  Reyes Maynard MD   [facility]) MISC Diagnosis: right 5th metatarsal fracture, pain in limb    Duration: 3 months 2/3/21   Redge Feast, DPM   docusate sodium (COLACE) 100 MG capsule Take 1 capsule by mouth daily as needed for Constipation 2/3/21   Redge Feast, DPM   [facility] (COMMODE BEDSIDE) MISC 1 Device by Does not apply route daily 2/3/21   Redge Feast, DPM   Continuous Blood Gluc  (FREESTYLE RICHARD 14 DAY READER) JAQUELINE 1 each by Does not apply route continuous [facility] 10/30/20   Reyes Maynard MD   glucose monitoring kit (FREESTYLE) monitoring kit 1 kit by Does not apply route 4 times daily Freestyle Richard . Dx E11.65, has tremors and cannot use conventional meter without great difficulty. Please provide supplies for 3 months, rf 3,Pharmacy [facility]. 10/6/20   Reyes Maynard MD   mupirocin [facility]) 2 % ointment apply topically to affected area three times a day 4/21/20   Historical Provider, MD   loratadine (CLARITIN) 10 MG tablet Take 1 tablet every day by oral route. 5/11/20   Reyes Maynard MD   albuterol sulfate  (90 Base) MCG/ACT inhaler Inhale 2 puffs into the lungs every 6 hours as needed for Wheezing 3/15/18   Matt Rhodes MD   vitamin E 400 UNIT capsule Take 400 Units by mouth daily. Historical Provider, MD        Allergies:     Penicillins and Sulfa antibiotics    Social History:     Tobacco:    reports that she has never smoked. She has never used smokeless tobacco.  Alcohol:      reports no history of alcohol use. Drug Use:  reports no history of drug use. Family History:     Family History   Problem Relation Age of Onset    Cancer Mother     Cancer Father        Review of Systems:     Positive and Negative as described in HPI.     Ros negative then above listed    Physical Exam:   /72 Pulse 73   Temp 97 °F (36.1 °C)   Resp 19   Wt 245 lb (111.1 kg)   SpO2 96%   BMI 35.15 kg/m²   Temp (24hrs), Av °F (36.1 °C), Min:97 °F (36.1 °C), Max:97 °F (36.1 °C)    Recent Labs     22  1819   POCGLU 225*       Intake/Output Summary (Last 24 hours) at 2022  Last data filed at 2022  Gross per 24 hour   Intake 999 ml   Output --   Net 999 ml       General Appearance: alert, well appearing, and in mild distress  Mental status: oriented to person, place, and time  Head: normocephalic, atraumatic  Eye: no icterus, redness, pupils equal and reactive, extraocular eye movements intact, conjunctiva clear  Ear: normal external ear, no discharge, hearing intact  Nose: no drainage noted  Mouth: mucous membranes moist  Neck: supple, no carotid bruits, thyroid not palpable  Lungs: Bilateral equal air entry, clear to ausculation,no rales or rhonchi, normal effort. Intermittent expiratory wheezes. Cardiovascular: normal rate, regular rhythm, no murmur, gallop, rub  Abdomen: Soft, nondistended, no guarding. But there is  tenderness in the right upper quadrant, right lower quadrant, epigastric area and suprapubic area.    Neurologic: There are no new focal motor or sensory deficits, normal muscle tone and bulk, no abnormal sensation, normal speech, cranial nerves II through XII grossly intact  Skin: No gross lesions, rashes, bruising or bleeding on exposed skin area  Extremities: peripheral pulses palpable, +2 pedal edema or calf pain with palpation      Investigations:      Laboratory Testing:  Recent Results (from the past 24 hour(s))   POC Glucose Fingerstick    Collection Time: 22  6:19 PM   Result Value Ref Range    POC Glucose 225 (H) 65 - 105 mg/dL   CBC with Auto Differential    Collection Time: 22  7:03 PM   Result Value Ref Range    WBC 7.1 3.5 - 11.3 k/uL    RBC 4.22 3.95 - 5.11 m/uL    Hemoglobin 12.2 11.9 - 15.1 g/dL    Hematocrit 39.1 36.3 - 47.1 %    MCV 92.7 82.6 - 102.9 fL    MCH 28.9 25.2 - 33.5 pg    MCHC 31.2 28.4 - 34.8 g/dL    RDW 14.5 (H) 11.8 - 14.4 %    Platelets 725 553 - 180 k/uL    MPV 11.7 8.1 - 13.5 fL    NRBC Automated 0.0 0.0 per 100 WBC    Seg Neutrophils 56 36 - 65 %    Lymphocytes 33 24 - 43 %    Monocytes 9 3 - 12 %    Eosinophils % 1 1 - 4 %    Basophils 1 0 - 2 %    Immature Granulocytes 0 0 %    Segs Absolute 4.00 1.50 - 8.10 k/uL    Absolute Lymph # 2.35 1.10 - 3.70 k/uL    Absolute Mono # 0.63 0.10 - 1.20 k/uL    Absolute Eos # 0.09 0.00 - 0.44 k/uL    Basophils Absolute 0.04 0.00 - 0.20 k/uL    Absolute Immature Granulocyte 0.03 0.00 - 0.30 k/uL    RBC Morphology ANISOCYTOSIS PRESENT    Comprehensive Metabolic Panel    Collection Time: 05/29/22  7:03 PM   Result Value Ref Range    Glucose 253 (H) 70 - 99 mg/dL    BUN 21 8 - 23 mg/dL    CREATININE 1.50 (H) 0.50 - 0.90 mg/dL    Calcium 10.0 8.6 - 10.4 mg/dL    Sodium 136 135 - 144 mmol/L    Potassium 4.0 3.7 - 5.3 mmol/L    Chloride 101 98 - 107 mmol/L    CO2 20 20 - 31 mmol/L    Anion Gap 15 9 - 17 mmol/L    Alkaline Phosphatase 74 35 - 104 U/L    ALT 6 5 - 33 U/L    AST 13 <32 U/L    Total Bilirubin 0.36 0.3 - 1.2 mg/dL    Total Protein 7.4 6.4 - 8.3 g/dL    Albumin 4.2 3.5 - 5.2 g/dL    Albumin/Globulin Ratio 1.3 1.0 - 2.5    GFR Non- 34 (L) >60 mL/min    GFR  41 (L) >60 mL/min    GFR Comment         Lactate, Sepsis    Collection Time: 05/29/22  7:03 PM   Result Value Ref Range    Lactic Acid, Sepsis, Whole Blood 2.8 (H) 0.5 - 1.9 mmol/L   Protime-INR    Collection Time: 05/29/22  7:03 PM   Result Value Ref Range    Protime 12.0 9.1 - 12.3 sec    INR 1.1    APTT    Collection Time: 05/29/22  7:03 PM   Result Value Ref Range    PTT 25.6 20.5 - 30.5 sec   Troponin    Collection Time: 05/29/22  7:03 PM   Result Value Ref Range    Troponin, High Sensitivity 58 (HH) 0 - 14 ng/L   D-Dimer, Quantitative    Collection Time: 05/29/22  7:03 PM   Result Value Ref Range []  -Methylpred x1 and assess response, aggie       #CAD  -Cath report in 2020: Successful Staged PCI of OM with JAMA,  Restenosis of distal LAD, required, PTCA/JAMA  -On DAPT      # Afib with RVR  -on Xeralto and coreg at home  -started on diltiaxem infusion which will be weaned off and placed back on pO      #HTN  -elevated on admission  -Juan meds include: coreg, turosemide, imdur,       #DM2 with neuropathy   -glucose in upper 200s on admission-->now 168  -Inpatient glucose goal 140-180  -Sliding scale, DM diet, resume home meds  -Obtian HAB1c []  -concern for gastroparesis [][]      #CKD3  -Due to DM and ischmemic, CT abdomen showed small wedge-shaped focus of hypoenhancement and volume loss at the inferior left kidney, which is unchanged. Bilateral renal cysts are unchanged  -Cr 1.5 on admission.  Better then previously       #BPPV  -meclizine 25mg PRN    #Anxiety  -On clonazapam PRN    #Hx of Migrane  -stable at this time    #HLD  -On crestor 40mg     #Depression  -on lexepro

## 2022-05-30 NOTE — ACP (ADVANCE CARE PLANNING)
Advance Care Planning     Advance Care Planning Activator (Inpatient)  Conversation Note      Date of ACP Conversation: 5/29/2022     Conversation Conducted with: Patient with Decision Making Capacity    ACP Activator: Gordotomeka Rinaldi, [address] Decision Maker:     Current Designated Health Care Decision Maker:     Click here to complete [address] including section of the Healthcare Decision Maker Relationship (ie \"Primary\")  Today we documented Decision Maker(s) consistent with Legal Next of Kin hierarchy. Care Preferences    Ventilation: \"If you were in your present state of health and suddenly became very ill and were unable to breathe on your own, what would your preference be about the use of a ventilator (breathing machine) if it were available to you? \"      Would the patient desire the use of ventilator (breathing machine)?: yes    \"If your health worsens and it becomes clear that your chance of recovery is unlikely, what would your preference be about the use of a ventilator (breathing machine) if it were available to you? \"     Would the patient desire the use of ventilator (breathing machine)?: Yes      Resuscitation  \"CPR works best to restart the heart when there is a sudden event, like a heart attack, in someone who is otherwise healthy. Unfortunately, CPR does not typically restart the heart for people who have serious health conditions or who are very sick. \"    \"In the event your heart stopped as a result of an underlying serious health condition, would you want attempts to be made to restart your heart (answer \"yes\" for attempt to resuscitate) or would you prefer a natural death (answer \"no\" for do not attempt to resuscitate)? \" yes       [] Yes   [x] No   Educated Patient / Elizabeth Santoro regarding differences between Advance Directives and portable DNR orders.     Length of ACP Conversation in minutes:      Conversation Outcomes:  [x] ACP discussion completed  [] Existing advance directive reviewed with patient; no changes to patient's previously recorded wishes  [] New Advance Directive completed  [] Portable Do Not Rescitate prepared for Provider review and signature  [] POLST/POST/MOLST/MOST prepared for Provider review and signature      Follow-up plan:    [x] Schedule follow-up conversation to continue planning  [] Referred individual to Provider for additional questions/concerns   [] Advised patient/agent/surrogate to review completed ACP document and update if needed with changes in condition, patient preferences or care setting    [] This note routed to one or more involved healthcare providers

## 2022-05-30 NOTE — CONSULTS
[facility]   Consult Note         Today's Date: 5/30/2022  Patient Name: Soni Lockett  Date of admission: 5/29/2022  6:27 PM  Patient's age: 68 y.o., 1945  Admission Dx: Paroxysmal atrial fibrillation ([address] [I48.0]  Atrial fibrillation with RVR ([address] [I48.91]  Abdominal pain, unspecified abdominal location [R10.9]  Non-intractable vomiting with nausea, unspecified vomiting type [R11.2]  Congestive heart failure, unspecified HF chronicity, unspecified heart failure type ([address] [I50.9]    Reason for Consult:  Cardiac evaluation    Requesting Physician: Jon Pandya MD    REASON FOR CONSULT: A. fib with RVR and elevated troponins    History Obtained From:  Patient, chart, staff, records    HISTORY OF PRESENT ILLNESS:      The patient is a 68 y.o. female with with background history of hypertension, type 2 diabetes mellitus, CAD and chronic kidney disease stage III is presented to the hospital with a history of diarrhea and chest pain. According to the patient she developed diarrhea about 2 days ago and she then also started vomiting yesterday. Patient then developed chest tightness yesterday which lasted for about 15 minutes. Patient reports associated symptoms of shortness of breath and also diaphoresis along with chest pain. Patient was brought into the emergency department and was found to be in atrial fibrillation with RVR. Cardiology was consulted for A. fib with RVR and elevated troponins. Past Medical History:   has a past medical history of Acute renal failure with tubular necrosis ([address] Anxiety, Atrial fibrillation (HCC), Benign positional vertigo, CAD (coronary artery disease), Chest pain, CKD (chronic kidney disease), stage III ([address] Depression, Diabetes mellitus ([address] Diabetic neuropathy ([address] Dysuria, Hyperlipidemia, Hypertension, and Migraine.     Past Surgical History:   has a past surgical history that includes Percutaneous Transluminal Coronary Angio; Cardiac 25 MG tablet TAKE ONE TABLET BY MOUTH EVERY 8 HOURS FOR ITCHING 10/21/21   Claire Cantor MD   gabapentin (NEURONTIN) 300 MG capsule Take 1 capsule by mouth 3 times daily for 30 days. 10/4/21 11/24/21  Claire Cantor MD   rosuvastatin (CRESTOR) 40 MG tablet Take 1 tablet by mouth daily 9/28/21   Claire Cantor MD   vitamin D3 (CHOLECALCIFEROL) 25 MCG (1000 UT) TABS tablet Take 1 tablet by mouth daily  Patient not taking: Reported on 5/29/2022 9/28/21   Claire Cantor MD   nystatin (MYCOSTATIN) 082988 UNIT/GM powder Apply 3 times daily. Patient not taking: Reported on 5/29/2022 8/27/21   Claire Cantor MD   Continuous Blood Gluc Sensor (FREESTYLE SYLVIA 14 DAY SENSOR) MIS 1 each by Does not apply route every 14 days 7/6/21   Claire Cantor MD   omeprazole (PRILOSEC) 20 MG delayed release capsule Take 1 capsule by mouth Daily 5/28/21   Claire Cantor MD   Control Gel Formula Dressing (DUODERM CGF DRESSING) MISC Apply 2 each topically daily  Patient not taking: Reported on 5/29/2022 5/21/21   Claire Cantor MD   fluticasone Verlin Grime) 50 MCG/ACT nasal spray 2 sprays by Each Nostril route daily  Patient not taking: Reported on 5/29/2022 5/11/21   Claire Cantor MD   Zinc Oxide 15 % CREA Apply to buttocks up to 4 times daily - may use any concentratino  Patient not taking: Reported on 5/29/2022 5/6/21   Claire Cantor MD   nitroGLYCERIN (NITROSTAT) 0.4 MG SL tablet place 1 tablet under the tongue if needed every 5 minutes if needed for CHEST PAIN 2/19/21   Claire Cantor MD   clonazePAM Teretha Boone) 0.5 MG tablet take 1 tablet by mouth twice a day if needed for anxiety 2/4/21 11/24/21  Claire Cantor MD   Misc.  Devices Cookie Creed) MISC Diagnosis: right 5th metatarsal fracture, pain in limb    Duration: 3 months 2/3/21   Ozzie Clinton DPM   docusate sodium (COLACE) 100 MG capsule Take 1 capsule by mouth daily as needed for Constipation  Patient not taking: Reported on 5/29/2022 2/3/21   Molina Walters DPM   Misc. Devices (COMMODE BEDSIDE) MISC 1 Device by Does not apply route daily 2/3/21   Molina Walters DPM   Continuous Blood Gluc  (FREESTYLE RICHARD 14 DAY READER) JAQUELINE 1 each by Does not apply route continuous [facility] 10/30/20   Lynnette Honeycutt MD   glucose monitoring kit (FREESTYLE) monitoring kit 1 kit by Does not apply route 4 times daily Freestyle Richard . Dx E11.65, has tremors and cannot use conventional meter without great difficulty. Please provide supplies for 3 months, rf 3,Pharmacy [facility]. 10/6/20   Lynnette Honeycutt MD   pirocin [facility]) 2 % ointment apply topically to affected area three times a day  Patient not taking: Reported on 5/29/2022 4/21/20   Historical Provider, MD   loratadine (CLARITIN) 10 MG tablet Take 1 tablet every day by oral route. 5/11/20   Lynnette Honeycutt MD   albuterol sulfate  (90 Base) MCG/ACT inhaler Inhale 2 puffs into the lungs every 6 hours as needed for Wheezing  Patient not taking: Reported on 5/29/2022 3/15/18   Johny Streeter MD   vitamin E 400 UNIT capsule Take 400 Units by mouth daily.     Historical Provider, MD       methylPREDNISolone, 60 mg, IntraVENous, Once    metoclopramide, 10 mg, IntraVENous, Q6H    ticagrelor, 90 mg, Oral, BID    carvedilol, 12.5 mg, Oral, BID    escitalopram, 20 mg, Oral, Daily    fluticasone, 2 spray, Each Nostril, Daily    gabapentin, 300 mg, Oral, TID    insulin lispro, 0-12 Units, SubCUTAneous, TID WC    insulin lispro, 0-6 Units, SubCUTAneous, Nightly    isosorbide mononitrate, 30 mg, Oral, Daily    [Held by provider] insulin detemir, 50 Units, SubCUTAneous, BID    cetirizine, 5 mg, Oral, Daily    pantoprazole, 40 mg, Oral, QAM AC    rosuvastatin, 40 mg, Oral, Daily    [Held by provider] torsemide, 10 mg, Oral, Once per day on Mon Wed Fri    rivaroxaban, 20 mg, Oral, Daily    sodium chloride flush, 5-40 mL, IntraVENous, 2 times per day      Allergies:  Humalog [insulin lispro], Penicillins, and Sulfa antibiotics    Social History:   reports that she has never smoked. She has never used smokeless tobacco. She reports that she does not drink alcohol and does not use drugs. Family History: family history includes Cancer in her father and mother. No h/o sudden cardiac death. REVIEW OF SYSTEMS:    · Constitutional: there has been no unanticipated weight loss. There's been No change in energy level, No change in activity level. · Eyes: No visual changes or diplopia. No scleral icterus. · ENT: No Headaches, hearing loss or vertigo. No mouth sores or sore throat. · Cardiovascular: per HPI  · Respiratory: per HPI  · Gastrointestinal: No abdominal pain, appetite loss, blood in stools. No change in bowel or bladder habits. · Genitourinary: No dysuria, trouble voiding, or hematuria. · Musculoskeletal:  No gait disturbance, No weakness or joint complaints. · Integumentary: No rash or pruritis. · Neurological: No headache, diplopia, change in muscle strength, numbness or tingling. No change in gait, balance, coordination, mood, affect, memory, mentation, behavior. PHYSICAL EXAM:      BP (!) 142/55   Pulse 59   Temp 97.9 °F (36.6 °C) (Oral)   Resp 20   Ht 5' 10\" (1.778 m)   Wt 245 lb (111.1 kg)   SpO2 98%   BMI 35.15 kg/m²    Constitutional and General Appearance: alert, cooperative, no distress and appears stated age  HEENT: PERRL, no cervical lymphadenopathy. No masses palpable. Normal oral mucosa  Respiratory:  · Normal excursion and expansion without use of accessory muscles  · Resp Auscultation: Good respiratory effort. No for increased work of breathing.  On auscultation: clear to auscultation bilaterally  Cardiovascular:  · The apical impulse is not displaced  · Heart tones are crisp and normal. regular S1 and S2.  · Jugular venous pulsation Normal  · The carotid upstroke is normal in amplitude History of pulmonary embolism    Elevated troponin I level    Sepsis ([facility])    Suspected COVID-19 virus infection    Diarrhea    Chronic diastolic (congestive) heart failure ([facility])    Generalized weakness    Anemia, normocytic normochromic    Class 1 obesity in adult    Gastroenteritis    Acute on chronic diastolic CHF (congestive heart failure) (HCC)    Acute diastolic CHF (congestive heart failure) ([facility])    Arterial hypotension    Moderate malnutrition ([facility])    S/P PTCA (percutaneous transluminal coronary angioplasty)    Cervical myelopathy ([facility])    Viral gastroenteritis    Fall at home, initial encounter    Sicca, unspecified type ([facility]    Moderate episode of recurrent major depressive disorder ([facility]    Acute renal failure with tubular necrosis ([facility]    CKD (chronic kidney disease), stage III ([facility]    Dysuria    Paroxysmal atrial fibrillation ([facility])           IMPRESSION:  1. NSTEMI, typical chest pain for 15 minutes, new EKG changes, tropes are trending up. 2. Atrial fibrillation with RVR, [id number] score 6, TSH  3. Acute on chronic HFpEF, EF 54% on echo from 09/12/2021  4. Multivessel CAD, not candidate for CABG s/p rocco-Ostial RPDA and Proximal LAD 11/11/2020 s/p  ROCCO-OM, ROCCO-distal LAD 12/11/2020  5. Hypertension  6. CKD stage III Baseline creatinine 1.1-1.3  7. Type 2 diabetes mellitus  8. Hx of falls/syncope  9. Carotid artery stenosis  10. Diarrhea  11. Hypokalemia  12. MARIELLE on CKD stage III  13. Recommendations:    1. Start IV heparin drip and load with aspirin  2. On hold Xarelto  3. Continue Brilinta 90 mg twice daily, Crestor 40 mg daily, Coreg 12.5 mg twice daily  4. Start Amiodarone 400 mg BID for 7 days  Then 200 mg BID for 7 days and then 200 mg OD continuous. 5. Trend troponin's  6. We will get an echocardiogram  7. Consult nephrology due to MARIELLE on CKD and need of dialysis and clearance for heart cath  8.  Further recommendations to follow        Discussed with patient, family, and Nurse. Electronically signed by Neli Salazar MD on 5/30/2022 at 9:36 AM    Neli Salazar MD  PGY-2, [id number] [facility],  10:10 AM 05/30/22     Attending Physician Statement  I have discussed the care of Bret Emanuelzen, including pertinent history and exam findings,  with the cardiology fellow/resident. I have seen and examined the patient and the key elements of all parts of the encounter have been performed by me. I have completed at least one if not all key elements of the E/M (history, physical exam, and MDM). I agree with the assessment, plan and orders as documented by the resident with additional recommendations as below:     CAD with hx of multivessel PCI in past, last stent in dLAD and OM in 12/2020 admitted with new onset atrial fibrillation with RVR associated with chest pain and ischemic changes on ECG during RVR. Now back in NSR and feeling much better. HS trop elevated to 168, 135 and 98 (downward trend). Cr 1.56. does have CKD stage III. Load with po amiodarone  Heparin drip, will need DOAC on discharge   Continue brilinta, statin and BB  Will need repeat coronary angiogram given ischemic changes/chest pain during rvr and elevated troponins. Will schedule after nephrology evaluation.      Brigette Valerio MD, [facility]

## 2022-05-31 ENCOUNTER — APPOINTMENT (OUTPATIENT)
Dept: CARDIAC CATH/INVASIVE PROCEDURES | Age: 77
DRG: 280 | End: 2022-05-31
Payer: MEDICARE

## 2022-05-31 LAB
ANION GAP SERPL CALCULATED.3IONS-SCNC: 15 MMOL/L (ref 9–17)
BUN BLDV-MCNC: 25 MG/DL (ref 8–23)
CALCIUM SERPL-MCNC: 9.3 MG/DL (ref 8.6–10.4)
CHLORIDE BLD-SCNC: 104 MMOL/L (ref 98–107)
CO2: 21 MMOL/L (ref 20–31)
CREAT SERPL-MCNC: 1.35 MG/DL (ref 0.5–0.9)
CREATININE URINE: 47.2 MG/DL (ref 28–217)
CULTURE: NORMAL
EKG ATRIAL RATE: 163 BPM
EKG ATRIAL RATE: 64 BPM
EKG P AXIS: 56 DEGREES
EKG P-R INTERVAL: 170 MS
EKG Q-T INTERVAL: 248 MS
EKG Q-T INTERVAL: 458 MS
EKG QRS DURATION: 84 MS
EKG QRS DURATION: 88 MS
EKG QTC CALCULATION (BAZETT): 397 MS
EKG QTC CALCULATION (BAZETT): 472 MS
EKG R AXIS: 5 DEGREES
EKG R AXIS: 8 DEGREES
EKG T AXIS: -148 DEGREES
EKG T AXIS: 149 DEGREES
EKG VENTRICULAR RATE: 154 BPM
EKG VENTRICULAR RATE: 64 BPM
EOSINOPHIL,URINE: NORMAL
ESTIMATED AVERAGE GLUCOSE: 246 MG/DL
ESTIMATED AVERAGE GLUCOSE: 255 MG/DL
GFR AFRICAN AMERICAN: 46 ML/MIN
GFR NON-AFRICAN AMERICAN: 38 ML/MIN
GFR SERPL CREATININE-BSD FRML MDRD: ABNORMAL ML/MIN/{1.73_M2}
GLUCOSE BLD-MCNC: 203 MG/DL (ref 65–105)
GLUCOSE BLD-MCNC: 213 MG/DL (ref 65–105)
GLUCOSE BLD-MCNC: 215 MG/DL (ref 65–105)
GLUCOSE BLD-MCNC: 258 MG/DL (ref 65–105)
GLUCOSE BLD-MCNC: 258 MG/DL (ref 70–99)
HBA1C MFR BLD: 10.2 % (ref 4–6)
HBA1C MFR BLD: 10.5 % (ref 4–6)
HCT VFR BLD CALC: 34.7 % (ref 36.3–47.1)
HEMOGLOBIN: 11.1 G/DL (ref 11.9–15.1)
LV EF: 55 %
LVEF MODALITY: NORMAL
PARTIAL THROMBOPLASTIN TIME: 26.1 SEC (ref 20.5–30.5)
PARTIAL THROMBOPLASTIN TIME: 28.1 SEC (ref 20.5–30.5)
PARTIAL THROMBOPLASTIN TIME: 99.2 SEC (ref 20.5–30.5)
POTASSIUM SERPL-SCNC: 4 MMOL/L (ref 3.7–5.3)
SODIUM BLD-SCNC: 140 MMOL/L (ref 135–144)
SPECIMEN DESCRIPTION: NORMAL
TOTAL PROTEIN, URINE: 7 MG/DL
TROPONIN, HIGH SENSITIVITY: 131 NG/L (ref 0–14)
TROPONIN, HIGH SENSITIVITY: 94 NG/L (ref 0–14)

## 2022-05-31 PROCEDURE — B2111ZZ FLUOROSCOPY OF MULTIPLE CORONARY ARTERIES USING LOW OSMOLAR CONTRAST: ICD-10-PCS | Performed by: INTERNAL MEDICINE

## 2022-05-31 PROCEDURE — 97535 SELF CARE MNGMENT TRAINING: CPT

## 2022-05-31 PROCEDURE — 6360000002 HC RX W HCPCS: Performed by: STUDENT IN AN ORGANIZED HEALTH CARE EDUCATION/TRAINING PROGRAM

## 2022-05-31 PROCEDURE — 99232 SBSQ HOSP IP/OBS MODERATE 35: CPT | Performed by: INTERNAL MEDICINE

## 2022-05-31 PROCEDURE — C1894 INTRO/SHEATH, NON-LASER: HCPCS

## 2022-05-31 PROCEDURE — B41F1ZZ FLUOROSCOPY OF RIGHT LOWER EXTREMITY ARTERIES USING LOW OSMOLAR CONTRAST: ICD-10-PCS | Performed by: INTERNAL MEDICINE

## 2022-05-31 PROCEDURE — C1760 CLOSURE DEV, VASC: HCPCS

## 2022-05-31 PROCEDURE — 97166 OT EVAL MOD COMPLEX 45 MIN: CPT

## 2022-05-31 PROCEDURE — 80048 BASIC METABOLIC PNL TOTAL CA: CPT

## 2022-05-31 PROCEDURE — 1200000000 HC SEMI PRIVATE

## 2022-05-31 PROCEDURE — C1892 INTRO/SHEATH,FIXED,PEEL-AWAY: HCPCS

## 2022-05-31 PROCEDURE — 85018 HEMOGLOBIN: CPT

## 2022-05-31 PROCEDURE — 6360000002 HC RX W HCPCS: Performed by: INTERNAL MEDICINE

## 2022-05-31 PROCEDURE — 85730 THROMBOPLASTIN TIME PARTIAL: CPT

## 2022-05-31 PROCEDURE — 6370000000 HC RX 637 (ALT 250 FOR IP): Performed by: STUDENT IN AN ORGANIZED HEALTH CARE EDUCATION/TRAINING PROGRAM

## 2022-05-31 PROCEDURE — 82947 ASSAY GLUCOSE BLOOD QUANT: CPT

## 2022-05-31 PROCEDURE — 93458 L HRT ARTERY/VENTRICLE ANGIO: CPT

## 2022-05-31 PROCEDURE — 6360000004 HC RX CONTRAST MEDICATION

## 2022-05-31 PROCEDURE — 2580000003 HC RX 258: Performed by: STUDENT IN AN ORGANIZED HEALTH CARE EDUCATION/TRAINING PROGRAM

## 2022-05-31 PROCEDURE — 36415 COLL VENOUS BLD VENIPUNCTURE: CPT

## 2022-05-31 PROCEDURE — B2151ZZ FLUOROSCOPY OF LEFT HEART USING LOW OSMOLAR CONTRAST: ICD-10-PCS | Performed by: INTERNAL MEDICINE

## 2022-05-31 PROCEDURE — 99232 SBSQ HOSP IP/OBS MODERATE 35: CPT | Performed by: STUDENT IN AN ORGANIZED HEALTH CARE EDUCATION/TRAINING PROGRAM

## 2022-05-31 PROCEDURE — 4A023N7 MEASUREMENT OF CARDIAC SAMPLING AND PRESSURE, LEFT HEART, PERCUTANEOUS APPROACH: ICD-10-PCS | Performed by: INTERNAL MEDICINE

## 2022-05-31 PROCEDURE — 2500000003 HC RX 250 WO HCPCS

## 2022-05-31 PROCEDURE — 6360000002 HC RX W HCPCS

## 2022-05-31 PROCEDURE — 85014 HEMATOCRIT: CPT

## 2022-05-31 PROCEDURE — 2709999900 HC NON-CHARGEABLE SUPPLY

## 2022-05-31 PROCEDURE — 84484 ASSAY OF TROPONIN QUANT: CPT

## 2022-05-31 RX ORDER — AMIODARONE HYDROCHLORIDE 200 MG/1
200 TABLET ORAL 2 TIMES DAILY
Status: DISCONTINUED | OUTPATIENT
Start: 2022-06-07 | End: 2022-06-01 | Stop reason: HOSPADM

## 2022-05-31 RX ORDER — KETOROLAC TROMETHAMINE 4 MG/ML
1 SOLUTION/ DROPS OPHTHALMIC 4 TIMES DAILY
Status: ON HOLD | COMMUNITY
End: 2022-05-31 | Stop reason: HOSPADM

## 2022-05-31 RX ORDER — INSULIN GLARGINE 100 [IU]/ML
30 INJECTION, SOLUTION SUBCUTANEOUS 2 TIMES DAILY
Status: DISCONTINUED | OUTPATIENT
Start: 2022-05-31 | End: 2022-06-01 | Stop reason: HOSPADM

## 2022-05-31 RX ORDER — AMIODARONE HYDROCHLORIDE 400 MG/1
400 TABLET ORAL DAILY
Qty: 30 TABLET | Refills: 3 | Status: SHIPPED | OUTPATIENT
Start: 2022-06-06 | End: 2022-06-01 | Stop reason: HOSPADM

## 2022-05-31 RX ORDER — SODIUM CHLORIDE 0.9 % (FLUSH) 0.9 %
5-40 SYRINGE (ML) INJECTION PRN
Status: DISCONTINUED | OUTPATIENT
Start: 2022-05-31 | End: 2022-06-01 | Stop reason: HOSPADM

## 2022-05-31 RX ORDER — KETOROLAC TROMETHAMINE 5 MG/ML
1 SOLUTION OPHTHALMIC 4 TIMES DAILY
Status: DISCONTINUED | OUTPATIENT
Start: 2022-05-31 | End: 2022-06-01 | Stop reason: HOSPADM

## 2022-05-31 RX ORDER — SODIUM CHLORIDE 0.9 % (FLUSH) 0.9 %
5-40 SYRINGE (ML) INJECTION EVERY 12 HOURS SCHEDULED
Status: DISCONTINUED | OUTPATIENT
Start: 2022-05-31 | End: 2022-06-01 | Stop reason: HOSPADM

## 2022-05-31 RX ORDER — ACETAMINOPHEN 325 MG/1
650 TABLET ORAL EVERY 4 HOURS PRN
Status: DISCONTINUED | OUTPATIENT
Start: 2022-05-31 | End: 2022-05-31 | Stop reason: SDUPTHER

## 2022-05-31 RX ORDER — PREDNISOLONE ACETATE 10 MG/ML
1 SUSPENSION/ DROPS OPHTHALMIC 3 TIMES DAILY
COMMUNITY

## 2022-05-31 RX ORDER — SODIUM CHLORIDE 9 MG/ML
INJECTION, SOLUTION INTRAVENOUS PRN
Status: DISCONTINUED | OUTPATIENT
Start: 2022-05-31 | End: 2022-06-01 | Stop reason: HOSPADM

## 2022-05-31 RX ORDER — PREDNISOLONE ACETATE 10 MG/ML
1 SUSPENSION/ DROPS OPHTHALMIC 3 TIMES DAILY
Status: DISCONTINUED | OUTPATIENT
Start: 2022-05-31 | End: 2022-06-01 | Stop reason: HOSPADM

## 2022-05-31 RX ORDER — AMIODARONE HYDROCHLORIDE 200 MG/1
200 TABLET ORAL DAILY
Status: DISCONTINUED | OUTPATIENT
Start: 2022-06-14 | End: 2022-06-01 | Stop reason: HOSPADM

## 2022-05-31 RX ORDER — HYDRALAZINE HYDROCHLORIDE 20 MG/ML
10 INJECTION INTRAMUSCULAR; INTRAVENOUS ONCE
Status: COMPLETED | OUTPATIENT
Start: 2022-05-31 | End: 2022-05-31

## 2022-05-31 RX ORDER — AMIODARONE HYDROCHLORIDE 400 MG/1
400 TABLET ORAL 2 TIMES DAILY
Qty: 12 TABLET | Refills: 0 | Status: SHIPPED | OUTPATIENT
Start: 2022-05-31 | End: 2022-06-01

## 2022-05-31 RX ADMIN — INSULIN LISPRO 6 UNITS: 100 INJECTION, SOLUTION INTRAVENOUS; SUBCUTANEOUS at 13:04

## 2022-05-31 RX ADMIN — INSULIN LISPRO 2 UNITS: 100 INJECTION, SOLUTION INTRAVENOUS; SUBCUTANEOUS at 19:44

## 2022-05-31 RX ADMIN — SODIUM CHLORIDE, PRESERVATIVE FREE 10 ML: 5 INJECTION INTRAVENOUS at 21:00

## 2022-05-31 RX ADMIN — HEPARIN SODIUM 9 UNITS/KG/HR: 5000 INJECTION, SOLUTION INTRAVENOUS; SUBCUTANEOUS at 00:24

## 2022-05-31 RX ADMIN — KETOROLAC TROMETHAMINE 1 DROP: 5 SOLUTION/ DROPS OPHTHALMIC at 17:06

## 2022-05-31 RX ADMIN — METOCLOPRAMIDE 10 MG: 5 INJECTION, SOLUTION INTRAMUSCULAR; INTRAVENOUS at 19:44

## 2022-05-31 RX ADMIN — RIVAROXABAN 20 MG: 20 TABLET, FILM COATED ORAL at 18:29

## 2022-05-31 RX ADMIN — Medication 600 MG: at 19:45

## 2022-05-31 RX ADMIN — ESCITALOPRAM OXALATE 20 MG: 10 TABLET ORAL at 12:44

## 2022-05-31 RX ADMIN — KETOROLAC TROMETHAMINE 1 DROP: 5 SOLUTION/ DROPS OPHTHALMIC at 20:57

## 2022-05-31 RX ADMIN — ACETAMINOPHEN 650 MG: 325 TABLET ORAL at 12:51

## 2022-05-31 RX ADMIN — AMIODARONE HYDROCHLORIDE 400 MG: 200 TABLET ORAL at 20:59

## 2022-05-31 RX ADMIN — AMIODARONE HYDROCHLORIDE 400 MG: 200 TABLET ORAL at 12:44

## 2022-05-31 RX ADMIN — KETOROLAC TROMETHAMINE 1 DROP: 5 SOLUTION/ DROPS OPHTHALMIC at 13:02

## 2022-05-31 RX ADMIN — TICAGRELOR 90 MG: 90 TABLET ORAL at 20:58

## 2022-05-31 RX ADMIN — HYDRALAZINE HYDROCHLORIDE 10 MG: 20 INJECTION, SOLUTION INTRAMUSCULAR; INTRAVENOUS at 09:22

## 2022-05-31 RX ADMIN — INSULIN GLARGINE 30 UNITS: 100 INJECTION, SOLUTION SUBCUTANEOUS at 19:44

## 2022-05-31 RX ADMIN — ROSUVASTATIN CALCIUM 40 MG: 20 TABLET, FILM COATED ORAL at 12:46

## 2022-05-31 RX ADMIN — GABAPENTIN 300 MG: 300 CAPSULE ORAL at 15:30

## 2022-05-31 RX ADMIN — CLONAZEPAM 0.5 MG: 0.5 TABLET ORAL at 21:02

## 2022-05-31 RX ADMIN — TICAGRELOR 90 MG: 90 TABLET ORAL at 12:44

## 2022-05-31 RX ADMIN — ISOSORBIDE MONONITRATE 30 MG: 30 TABLET ORAL at 12:45

## 2022-05-31 RX ADMIN — METOCLOPRAMIDE 10 MG: 5 INJECTION, SOLUTION INTRAMUSCULAR; INTRAVENOUS at 02:11

## 2022-05-31 RX ADMIN — FLUTICASONE PROPIONATE 2 SPRAY: 50 SPRAY, METERED NASAL at 12:52

## 2022-05-31 RX ADMIN — INSULIN LISPRO 8 UNITS: 100 INJECTION, SOLUTION INTRAVENOUS; SUBCUTANEOUS at 17:06

## 2022-05-31 RX ADMIN — CETIRIZINE HYDROCHLORIDE 5 MG: 10 TABLET ORAL at 12:46

## 2022-05-31 RX ADMIN — SODIUM CHLORIDE, PRESERVATIVE FREE 10 ML: 5 INJECTION INTRAVENOUS at 13:38

## 2022-05-31 RX ADMIN — INSULIN LISPRO 4 UNITS: 100 INJECTION, SOLUTION INTRAVENOUS; SUBCUTANEOUS at 08:27

## 2022-05-31 RX ADMIN — PREDNISOLONE ACETATE 1 DROP: 10 SUSPENSION/ DROPS OPHTHALMIC at 15:30

## 2022-05-31 RX ADMIN — Medication 600 MG: at 08:26

## 2022-05-31 RX ADMIN — GABAPENTIN 300 MG: 300 CAPSULE ORAL at 20:58

## 2022-05-31 RX ADMIN — CARVEDILOL 12.5 MG: 12.5 TABLET, FILM COATED ORAL at 20:58

## 2022-05-31 RX ADMIN — HEPARIN SODIUM 4000 UNITS: 1000 INJECTION INTRAVENOUS; SUBCUTANEOUS at 00:21

## 2022-05-31 RX ADMIN — PREDNISOLONE ACETATE 1 DROP: 10 SUSPENSION/ DROPS OPHTHALMIC at 21:06

## 2022-05-31 RX ADMIN — ACETAMINOPHEN 650 MG: 325 TABLET ORAL at 21:06

## 2022-05-31 RX ADMIN — CARVEDILOL 12.5 MG: 12.5 TABLET, FILM COATED ORAL at 12:44

## 2022-05-31 ASSESSMENT — PAIN SCALES - GENERAL
PAINLEVEL_OUTOF10: 6
PAINLEVEL_OUTOF10: 3
PAINLEVEL_OUTOF10: 5
PAINLEVEL_OUTOF10: 7
PAINLEVEL_OUTOF10: 3
PAINLEVEL_OUTOF10: 8
PAINLEVEL_OUTOF10: 6

## 2022-05-31 NOTE — PROGRESS NOTES
Physical Therapy        Physical Therapy Cancel Note      DATE: 2022    NAME: Chelsea Murray  MRN: [id number]   : 1945      Patient not seen this date for Physical Therapy due to:    Testing: Plan for cath lab this AM, will await results and final POC prior to mobilization.        Electronically signed by Mandeep Montano PT on 2022 at 8:26 AM

## 2022-05-31 NOTE — PROGRESS NOTES
Occupational Therapy  Facility/Department: [facility] RENAL//MED SURG  Occupational Therapy Initial Assessment    Name: Chris De La Fuente  : 1945  MRN: [id number]  Date of Service: 2022     Obtained from medical chart:   Donte Pimentel is a 68 y.o. female who presents with complaints of nausea, vomiting, diarrhea, nonbloody that started earlier today with abdominal pain. Notes she has had history of cholecystectomy but still has appendix. Notes subjective fevers. Also complains of chest pain that she only gets when she is vomiting and shortness of breath that she has had for the last few days. Patient is also not had her Xarelto and 3 doses due to running out of her medication. Denied any leg pain or swelling. No recent surgeries. No history of malignancy. Notes some lightheadedness and has not been eating well the last couple days. Denied any urinary symptoms. Has had history of UTIs in the past.\"    Discharge Recommendations:  Patient would benefit from continued therapy after discharge  OT Equipment Recommendations  Equipment Needed: Yes  Mobility Devices: ADL Assistive Devices  ADL Assistive Devices: Shower Chair with back, Emergency alert       Patient Diagnosis(es): The primary encounter diagnosis was Non-intractable vomiting with nausea, unspecified vomiting type. Diagnoses of Abdominal pain, unspecified abdominal location, Atrial fibrillation with RVR ([facility] and Congestive heart failure, unspecified HF chronicity, unspecified heart failure type [facility]) were also pertinent to this visit. Past Medical History:  has a past medical history of Acute renal failure with tubular necrosis ([address] Anxiety, Atrial fibrillation (HCC), Benign positional vertigo, CAD (coronary artery disease), Chest pain, CKD (chronic kidney disease), stage III ([address] Depression, Diabetes mellitus ([address] Diabetic neuropathy ([address] Dysuria, Hyperlipidemia, Hypertension, and Migraine.   Past Surgical History:  has a past surgical history that includes Percutaneous Transluminal Coronary Angio; Cardiac catheterization; Coronary angioplasty with stent (02/25/2017); Appendectomy; Coronary angioplasty with stent (07/11/2012); and Coronary angioplasty with stent (12/11/2020). Assessment   Performance deficits / Impairments: Decreased functional mobility ; Decreased high-level IADLs;Decreased safe awareness;Decreased strength;Decreased balance;Decreased endurance;Decreased ADL status  Assessment: Patient demonstrated decreased balance, endurance, and safety awareness impacting ADL performance on this date. Patient would benefit from continued OT services to address functional deficits, promote independence with ADLS, and safe functional transfers for engagement in ADLs. Prognosis: Good  Decision Making: Medium Complexity  REQUIRES OT FOLLOW-UP: Yes  Activity Tolerance  Activity Tolerance: Patient Tolerated treatment well;Patient limited by fatigue        Plan   Plan  Times per Week: 3-4x  Current Treatment Recommendations: Strengthening,Balance training,Functional mobility training,Endurance training,Safety education & training,Patient/Caregiver education & training,Self-Care / ADL     Restrictions  Restrictions/Precautions  Restrictions/Precautions: Contact Precautions  Required Braces or Orthoses?: No  Position Activity Restriction  Other position/activity restrictions: up with assist; C-Diff rule out    Subjective   General  Patient assessed for rehabilitation services?: Yes  Family / Caregiver Present: Yes (granddaughter)  General Comment  Comments: RN ok'd patient for OT evaluation. Patient was agreeable, pleasant and cooperative throughout. Pain at present: 6 in R lower back;  Patient able to continue with treatment; Non-pharmaceutical interventions: Repositioned; increased activity; therapeutic presence     Social/Functional History  Social/Functional History  Lives With: Alone  Type of Home: Mobile home  Home Layout: One level  Home Access: Stairs to enter with rails  Entrance Stairs - Number of Steps: 3  Entrance Stairs - Rails: Right  Bathroom Shower/Tub: Tub/Shower unit  Bathroom Toilet: Standard  Bathroom Equipment: Grab bars in shower,Shower chair  Bathroom Accessibility: Accessible  Home Equipment: Wheelchair-manual,Walker, rolling,Walker, 4 wheeled,Lift chair  Has the patient had two or more falls in the past year or any fall with injury in the past year?: No  Receives Help From: Family (Son, daughter, grandson, granddaughter all assist her throughout the week)  ADL Assistance: Needs assistance  Bath: Minimal assistance - to assist with lower body, back, and jong area  Dressing: Independent  Grooming: Modified independent   Feeding: Modified independent   Toileting: Independent  Homemaking Assistance: Independent  Homemaking Responsibilities: Yes  Meal Prep Responsibility: Primary  Laundry Responsibility: Primary  Cleaning Responsibility: Primary  Bill Paying/Finance Responsibility: Primary  Shopping Responsibility: Primary  Dependent Care Responsibility: Secondary  Health Care Management: Primary  Other (Comment): Pt states she has a supportive son who provides transportation and manage medication on week end, nurse care 7 days /week manage medication and assist with showers  Ambulation Assistance: Independent  Transfer Assistance: Independent  Active : No  Patient's  Info: Son or son-in-law  Mode of Transportation: Car  Occupation: Retired  Type of Occupation: school administration  Leisure & Hobbies: puzzles  Additional Comments: Patient has [employer] aid 3-4 hours everyday and family stops in the evening. Does not have 24/7 support at home.        Objective   Vision Exceptions: Wears glasses for reading  Hearing: Within functional limits          Safety Devices  Type of Devices: Call light within reach;Gait belt;Left in bed;Nurse notified (granddaughter in the room)  Restraints  Restraints Initially in Place: No  Bed Mobility Training  Bed Mobility Training: Yes  Supine to Sit: Stand-by assistance  Sit to Supine: Minimum assistance (for LE progression)  Scooting: Maximum assistance  Balance  Sitting: With support (static- SBA; dynamic- CGA; tolerated ~40 minutes EOB/toilet/chair)  Standing: With support (static- CGA; dynamic- CGA tolerated ~ 4 minutes hygiene/ dressing/ EOB)  Transfer Training  Transfer Training: Yes  Interventions: Tactile cues; Verbal cues; Visual cues (hand placement and safety)  Sit to Stand: Minimum assistance  Stand to Sit: Contact-guard assistance  Toilet Transfer: Minimum assistance  Gait  Overall Level of Assistance: Contact-guard assistance  Interventions: Tactile cues; Verbal cues; Visual cues (for safety and walker management)  Assistive Device: Gait belt;Walker, rolling     AROM: Generally decreased, functional (B shoulders ~ 100)  Strength: Generally decreased, functional (B UE gross strength 4-/5)  Coordination: Within functional limits  Sensation: Intact (Denies any numbness/tingling)  ADL  Feeding: Modified independent ; Increased time to complete  Grooming: Modified independent ; Increased time to complete  UE Bathing: Minimal assistance;Setup;Verbal cueing; Increased time to complete  LE Bathing: Maximum assistance;Setup;Verbal cueing; Increased time to complete  UE Dressing: Minimal assistance;Setup;Verbal cueing; Increased time to complete  LE Dressing: Maximum assistance;Setup;Verbal cueing; Increased time to complete  Toileting: Maximum assistance;Setup; Increased time to complete  Additional Comments: Patient completed UB bathing with min A, washed face with mod I seated EOB. Patient had an incontinent episode with max A for perihygiene standing EOB. Patient required max A to don brief. Patient was able to doff/don gown with SBA requiring verbal cues to complete task and pull gown to shoulders.  Patient was able to grasp cup, bring to mouth and drink through a straw with mod I        Bed mobility  Bed Mobility Comments: Patient supine in bed upon arrival and exit        Cognition  Overall Cognitive Status: Exceptions  Arousal/Alertness: Appropriate responses to stimuli  Following Commands: Follows multistep commands with increased time; Follows multistep commands with repitition  Attention Span: Attends with cues to redirect  Safety Judgement: Decreased awareness of need for assistance;Decreased awareness of need for safety  Insights: Decreased awareness of deficits  Initiation: Requires cues for some  Sequencing: Requires cues for some   Oriented x 4                  Education Given To: Patient; Family  Education Provided: Role of Therapy;Plan of Care;Transfer Training  Education Provided Comments: Patient was educated on OT role, OT POC, purpose of evaluation, pursed lip breathing, hand placement with transfer, walker management, safety, and activity promotion. -- Good/fair return  Education Method: Demonstration;Verbal  Barriers to Learning: None  Education Outcome: Verbalized understanding;Continued education needed             AM-PAC Score        AM-PAC Inpatient Daily Activity Raw Score: 17 (05/31/22 1651)  AM-PAC Inpatient ADL T-Scale Score : 37.26 (05/31/22 1651)  ADL Inpatient CMS 0-100% Score: 50.11 (05/31/22 1651)  ADL Inpatient CMS G-Code Modifier : CK (05/31/22 1651)    Goals  Short Term Goals  Time Frame for Short term goals: Patient will, by discharge.   Short Term Goal 1: Perform UB ADLs with SBA  Short Term Goal 2: Perform LB ADLs with min A using AE PRN  Short Term Goal 3: Perform toileting with min A  Short Term Goal 4: Demo ~ 10 minutes standing tolerancewith CGA to engage in functional tasks  Short Term Goal 5: Demo safe Bed mobility with SBA  Short Term Goal 6: Demo safe functional transfers/mobilty with SBA       Therapy Time   Individual Concurrent Group Co-treatment   Time In 1351         Time Out 1441         Minutes 50         Timed Code Treatment Minutes: 45 Minutes Obed Kumar  S/OT

## 2022-05-31 NOTE — PROGRESS NOTES
[facility])  Occupational Therapy Not Seen Note    DATE: 2022    NAME: Laron Moran  MRN: [id number]   : 1945      Patient not seen this date for Occupational Therapy due to:    Testing: Patient off floor in Cath lab.      Next Scheduled Treatment: Check     Electronically signed by Raymundo Cortez 2022 at 9:28 AM

## 2022-05-31 NOTE — PROGRESS NOTES
Patient admitted, consent signed, all questions answered. Pt ready for procedure. Bed in low position, call light to reach with side rails up 2 of 2.   amalia groins shaved no family at bedside with patient. Bp elevated, Dr. Hendricks Gip fellow notified, orders received.

## 2022-05-31 NOTE — OP NOTE
[facility]    CARDIAC CATHETERIZATION    Date:   5/31/2022  Patient name:  Yesika Dooley  Date of admission:  5/29/2022  6:27 PM  MRN:   [id number]  YOB: 1945    Operators:  Primary:   VIJI Lomas MD (Attending Physician)      Procedure performed:     [x] Left Heart Catheterization. [] Graft Angiography. [x] Left Ventriculography. [] Right Heart Catheterization. [] Coronary Angiography. [] Aortic Valve Studies. [] PCI:      [] Other:       Pre Procedure Conscious Sedation Data:  ASA Class:    [] I [] II [x] III [] IV    Mallampati Class:  [x] I [] II [] III [] IV      Indication:  - ACS with ekg changes   - H/o CAD with PCI      Procedure:  Access:  [x] Femoral  [] Radial  artery       [x] Right  [] Left    Procedure: After informed consent was obtained with explanation of the risks and benefits, patient was brought to the cath lab. The access area was prepped and draped in sterile fashion. 1% lidocaine was used for local block. The artery was cannulated with 6  Fr sheath with brisk arterial blood return. The side port was frequently flushed and aspirated with normal saline. Findings:  LMCA: Normal 0% stenosis. LAD: has patent proximal to distal stents with 20% instent restenosis. LCx: has proximal 20% stenosis. The OM1 has patent previously placed stent   with distal 30% stenosis. The OM2 is a small vessel jailed with OM1 stent. The OM2 has 85% diffuse disease. RCA: has patent proximal and mid stents followed by 50% stenosis. The distal   RCA-RPDA stents are patent. The RPL is jailed. There is proximal 80%   stenosis. There is left to right collaterals to RPL.      Coronary Tree        Dominance: Right       Conclusions        Procedure Summary        Patent LAD, OM1, RCA and RPDA stent.  RPL disease with left to collaterals.    RPL is jailed at ostium with RCA-RPDA stent.    Normal LV systolic function; LVEF 94%.        Recommendations      Medical therapy as needed.    Risk factor modification.    BP control           Estimated Blood Loss: 10 mL    ____________________________________________________________________    Electronically signed on 5/31/2022 at 10:06 AM by:    Katie Brito MD  Fellow, [address] I was present during entire procedure and performed all critical elements of the procedure.     Kiki Jiménez MD

## 2022-05-31 NOTE — PROGRESS NOTES
[facility]  Progress Note                   Date:   5/31/2022  Patient name: Yesika Dooley  Date of admission:  5/29/2022  6:27 PM  MRN:   [id number]  YOB: 1945  PCP: Kelsi Caruso MD    Reason for Admission: Paroxysmal atrial fibrillation ([facility] [address] [I48.0]  Atrial fibrillation with RVR ([address] [I48.91]  Abdominal pain, unspecified abdominal location [R10.9]  Non-intractable vomiting with nausea, unspecified vomiting type [R11.2]  Congestive heart failure, unspecified HF chronicity, unspecified heart failure type ([address] [I50.9]    Subjective:       Clinical Changes /Abnormalities: Seen & examined in room. No CV issues/concerns overnight. Had cath this AM as below. Remains SR. C/O right sided \"kidney pain. \" Groin site CDI.     Review of Systems    Medications:   Scheduled Meds:   prednisoLONE acetate  1 drop Both Eyes TID    ketorolac  1 drop Both Eyes 4x Daily    insulin glargine  30 Units SubCUTAneous BID    sodium chloride flush  5-40 mL IntraVENous 2 times per day    metoclopramide  10 mg IntraVENous Q6H    amiodarone  400 mg Oral BID    acetylcysteine  600 mg Oral BID    ticagrelor  90 mg Oral BID    carvedilol  12.5 mg Oral BID    escitalopram  20 mg Oral Daily    fluticasone  2 spray Each Nostril Daily    gabapentin  300 mg Oral TID    insulin lispro  0-12 Units SubCUTAneous TID WC    insulin lispro  0-6 Units SubCUTAneous Nightly    isosorbide mononitrate  30 mg Oral Daily    cetirizine  5 mg Oral Daily    pantoprazole  40 mg Oral QAM AC    rosuvastatin  40 mg Oral Daily    [Held by provider] rivaroxaban  20 mg Oral Daily     Continuous Infusions:   sodium chloride      sodium chloride 50 mL/hr at 05/30/22 1847    dextrose       CBC:   Recent Labs     05/29/22 1903 05/30/22  1002   WBC 7.1 7.3   HGB 12.2 9.8*    139     BMP:    Recent Labs     05/29/22  1903 05/30/22  0645 05/31/22  0634    135 140   K 4.0 3.6* 4.0    101 104   CO2 20 21 21   BUN 21 23 25*   CREATININE 1.50* 1.56* 1.35*   GLUCOSE 253* 283* 258*     Hepatic:  Recent Labs     05/29/22  1903 05/30/22  0645   AST 13 13   ALT 6 <5*   BILITOT 0.36 0.26*   ALKPHOS 74 57     Troponin:   Recent Labs     05/30/22  1547 05/30/22  2146 05/31/22  0634   TROPHS 135* 98* 94*     BNP: No results for input(s): BNP in the last 72 hours. Lipids:   Recent Labs     05/30/22  0645   CHOL 125   HDL 36*     INR:   Recent Labs     05/29/22  1903 05/30/22  0645   INR 1.1 1.4       Objective:   Vitals: BP (!) 134/50   Pulse 76   Temp 97 °F (36.1 °C) (Temporal)   Resp 16   Ht 5' 10\" (1.778 m)   Wt 240 lb 12.8 oz (109.2 kg)   SpO2 100%   BMI 34.55 kg/m²   General appearance: alert and cooperative with exam  HEENT: Head: Normocephalic, no lesions, without obvious abnormality. Neck:no JVD, trachea midline, no adenopathy  Lungs: Clear to auscultation, NC without distresss  Heart: Regular rate and rhythm, s1/s2 auscultated, no murmurs, remains SR  Abdomen: soft, non-tender, bowel sounds active, obese  Right groin site CDI with bandaid intact  Extremities: no edema  Neurologic: not done    LAST ECHO:  Date: 09/12/2021  Findings Summary:  Left ventricle is normal in size with normal systolic function globally. Calculated ejection fraction is 54%. Mild left ventricular wall thickness. Evidence of diastolic dysfunction. Left atrium is moderately dilated. Mildly dilated right atrium. Aortic valve is sclerotic but opens well. Mild mitral regurgitation. Mild tricuspid regurgitation. Estimated right ventricular systolic pressure  is 84.1 mmHg. Cardiac Cath 5/31/22  Procedure Summary      Patent LAD, OM1, RCA and RPDA stent. RPL disease with left to collaterals. RPL is jailed at ostium with RCA-RPDA stent. Normal LV systolic function; LVEF 13%. Recommendations      Medical therapy as needed. Risk factor modification.    BP control    Assessment / Acute Cardiac Problems: 1. NSTEMI. 2. Atrial fibrillation with RVR, [id number] score 6, - new onset  3. Acute on chronic HFpEF, EF 54% on echo from 09/12/2021  4. Multivessel CAD, not candidate for CABG s/p rocco-Ostial RPDA and Proximal LAD 11/11/2020 s/p  ROCCO-OM, ROCCO-distal LAD 12/11/2020  5. Hypertension  6. CKD stage III Baseline creatinine 1.1-1.3  7. Type 2 diabetes mellitus  8. Hx of falls/syncope  9. Carotid artery stenosis  10. Diarrhea  11. Hypokalemia  12.  MARIELLE on CKD stage III      Patient Active Problem List:     Atypical chest pain     Type 2 diabetes mellitus (HCC)     Vertigo     Essential hypertension     Atherosclerotic heart disease of native coronary artery with other forms of angina pectoris (HCC)     Dyspnea     Claudication in peripheral vascular disease (HCC)     Acute pulmonary embolism without acute cor pulmonale (HCC)     Diabetic polyneuropathy associated with type 2 diabetes mellitus ([address]     Other hyperlipidemia     Chronic systolic heart failure (HCC)     Multiple subsegmental pulmonary emboli without acute cor pulmonale (HCC)     Congestive heart failure (HCC)     Pyelonephritis of right kidney     History of pulmonary embolism     Elevated troponin I level     Sepsis (HCC)     Suspected COVID-19 virus infection     Diarrhea     Chronic diastolic (congestive) heart failure (HCC)     Generalized weakness     Anemia, normocytic normochromic     Class 1 obesity in adult     Gastroenteritis     Acute on chronic diastolic CHF (congestive heart failure) (HCC)     Acute diastolic CHF (congestive heart failure) (HCC)     Arterial hypotension     Moderate malnutrition (HCC)     S/P PTCA (percutaneous transluminal coronary angioplasty)     Cervical myelopathy (HCC)     Viral gastroenteritis     Fall at home, initial encounter     Sicca, unspecified type ([address]     Moderate episode of recurrent major depressive disorder ([address]     Acute renal failure with tubular necrosis (HCC)     CKD (chronic kidney disease), stage III ([facility] [address]     Dysuria     Paroxysmal atrial fibrillation (HCC)     Non-intractable vomiting      Plan of Treatment:   1. Stable. Cath as above. Continue medical management. 2. Continue PO statin, BB, Imdur, Brilinta  3. Remains SR, no further Afib. Continue PO BB & Amio. Eliquis on discharge. 4. OK for discharge later today. F/U in clinic in 2 weeks.      Electronically signed by MAYELA Wilkes CNP on 5/31/2022 at 11:57 [address] [facility].  [phone]

## 2022-05-31 NOTE — PLAN OF CARE
Problem: Discharge Planning  Goal: Discharge to home or other facility with appropriate resources  Outcome: Progressing     Problem: Pain  Goal: Verbalizes/displays adequate comfort level or baseline comfort level  Outcome: Progressing     Problem: Safety - Adult  Goal: Free from fall injury  5/31/2022 0511 by Nik Tinajero RN  Outcome: Progressing  5/30/2022 1645 by Pat Vargas RN  Outcome: Progressing     Problem: ABCDS Injury Assessment  Goal: Absence of physical injury  Outcome: Progressing     Problem: Chronic Conditions and Co-morbidities  Goal: Patient's chronic conditions and co-morbidity symptoms are monitored and maintained or improved  Outcome: Progressing     Problem: Skin/Tissue Integrity  Goal: Absence of new skin breakdown  Description: 1. Monitor for areas of redness and/or skin breakdown  2. Assess vascular access sites hourly  3. Every 4-6 hours minimum:  Change oxygen saturation probe site  4. Every 4-6 hours:  If on nasal continuous positive airway pressure, respiratory therapy assess nares and determine need for appliance change or resting period.   Outcome: Progressing

## 2022-05-31 NOTE — CARE COORDINATION
Met with patient and daughter to discuss transitional planning. The patient reports that plan is to go home with home health care through [facility]. She denies needs for DME or further services and state she has transportation home. Referral placed to [facility] through [address] Call also placed to [facility] health @ [phone] and left message on intake line to notify that patient is currently at [facility] and to verify that she is current with them. Perfect serve message to Dr Poly Mcdaniel to request home health care order and to complete the STEPHEN.

## 2022-05-31 NOTE — PROGRESS NOTES
CLINICAL PHARMACY NOTE: MEDS TO BEDS    Total # of Prescriptions Filled: 1   The following medications were delivered to the patient:  · amiodarone    Additional Documentation:

## 2022-05-31 NOTE — PROGRESS NOTES
Renal Progress Note    Patient :  Cait Castro; 68 y.o. MRN# [id number]  Location:  9249/4874-18  Attending:  Frederick Nicholas MD  Admit Date:  5/29/2022   Hospital Day: 2    Subjective:     Patient seen and examined bedside. Labs and charts reviewed. No acute overnight events. Patient just returned from Cath Lab postcardiac catheterization. Hemodynamically stable, afebrile. Patient complaining of pain in the right lower back since the procedure. Creatinine trended down to 1.35 today. Baseline around 1.3-1.5. Urine protein creatinine ratio 0.1. Patient continuing on IV fluids at 50 mill per hour. Received 2 doses of Mucomyst yesterday. Due for 2 more doses today. Urine output not charted. Brief history:  66-year-old female with past medical history of CKD stage III with baseline creatinine 1.3-1.5, type 2 diabetes mellitus, hypertension, multivessel CAD- not candidate for CABG s/p JAMA stents in ostial-RPDA and proximal LAD on 11/7/2020, OM and distal LAD 12/12/2020, A. fib on AC, HFpEF presented with complaints of profuse watery diarrhea, nausea, vomiting. CT abdomen showed chronic changes of diverticulosis without any inflammation. Chest x-ray showed mild pulmonary vascular congestion. Troponins 59> 168. With significant cardiac history and uptrending troponins, patient was taken to cardiac catheterization this morning. Patient was started on IV fluids and Mucomyst as precautions for BRIT.     Outpatient Medications:     Medications Prior to Admission: prednisoLONE acetate (PRED FORTE) 1 % ophthalmic suspension, Place 1 drop into both eyes 3 times daily  ketorolac 0.4 % SOLN ophthalmic solution, Place 1 drop into both eyes 4 times daily  diphenhydrAMINE (BANOPHEN) 50 MG capsule, Take 50 mg by mouth every 6 hours as needed for Itching  clonazePAM (KLONOPIN PO), Take by mouth as needed  meclizine (ANTIVERT) 25 MG CHEW, 1 tablet as needed  BRILINTA 90 MG TABS tablet, TAKE ONE TABLET BY MOUTH taking: Reported on 5/29/2022)  nitroGLYCERIN (NITROSTAT) 0.4 MG SL tablet, place 1 tablet under the tongue if needed every 5 minutes if needed for CHEST PAIN  clonazePAM (KLONOPIN) 0.5 MG tablet, take 1 tablet by mouth twice a day if needed for anxiety  Misc. Devices (WALKER) MISC, Diagnosis: right 5th metatarsal fracture, pain in limb  Duration: 3 months  docusate sodium (COLACE) 100 MG capsule, Take 1 capsule by mouth daily as needed for Constipation (Patient not taking: Reported on 5/29/2022)  Misc. Devices (COMMODE BEDSIDE) MISC, 1 Device by Does not apply route daily  Continuous Blood Gluc  (FREESTYLE SYLVIA 14 DAY READER) JAQUELINE, 1 each by Does not apply route continuous [facility]  glucose monitoring kit (FREESTYLE) monitoring kit, 1 kit by Does not apply route 4 times daily Ivonne Aponte . Dx E11.65, has tremors and cannot use conventional meter without great difficulty. Please provide supplies for 3 months, rf 3,Pharmacy [facility].  mupirocin (BACTROBAN) 2 % ointment, apply topically to affected area three times a day (Patient not taking: Reported on 5/29/2022)  loratadine (CLARITIN) 10 MG tablet, Take 1 tablet every day by oral route. albuterol sulfate  (90 Base) MCG/ACT inhaler, Inhale 2 puffs into the lungs every 6 hours as needed for Wheezing (Patient not taking: Reported on 5/29/2022)  vitamin E 400 UNIT capsule, Take 400 Units by mouth daily.     Current Medications:     Scheduled Meds:    prednisoLONE acetate  1 drop Both Eyes TID    ketorolac  1 drop Both Eyes 4x Daily    insulin glargine  30 Units SubCUTAneous BID    sodium chloride flush  5-40 mL IntraVENous 2 times per day    metoclopramide  10 mg IntraVENous Q6H    amiodarone  400 mg Oral BID    acetylcysteine  600 mg Oral BID    ticagrelor  90 mg Oral BID    carvedilol  12.5 mg Oral BID    escitalopram  20 mg Oral Daily    fluticasone  2 spray Each Nostril Daily    gabapentin  300 mg Oral TID  insulin lispro  0-12 Units SubCUTAneous TID     insulin lispro  0-6 Units SubCUTAneous Nightly    isosorbide mononitrate  30 mg Oral Daily    cetirizine  5 mg Oral Daily    pantoprazole  40 mg Oral QAM AC    rosuvastatin  40 mg Oral Daily    [Held by provider] rivaroxaban  20 mg Oral Daily     Continuous Infusions:    sodium chloride      sodium chloride 50 mL/hr at 22 1847    dextrose       PRN Meds:  sodium chloride flush, sodium chloride, clonazePAM, diphenhydrAMINE, docusate sodium, glucose, dextrose bolus **OR** dextrose bolus, glucagon (rDNA), dextrose, potassium chloride **OR** potassium alternative oral replacement **OR** potassium chloride, magnesium sulfate, ondansetron **OR** ondansetron, polyethylene glycol, acetaminophen **OR** acetaminophen, HYDROmorphone    Input/Output:       I/O last 3 completed shifts: In: 999 [IV Piggyback:999]  Out: 550 [Urine:500; Stool:50]    Vital Signs:   Temperature:  Temp: 97 °F (36.1 °C)  TMax:   Temp (24hrs), Av.4 °F (36.3 °C), Min:97 °F (36.1 °C), Max:98.4 °F (36.9 °C)    Respirations:  Resp: 16  Pulse:   Heart Rate: 76  BP:    BP: (!) 134/50  BP Range: Systolic (93CBZ), YTX:725 , Min:112 , HME:349       Diastolic (74PZY), FAD:19, Min:46, Max:89      Physical Examination:     General:  Alert and oriented x4. In mild painful distress. Postprocedure. HEENT:  Atraumatic, normocephalic, no throat congestion, moist mucosa. Eyes:   Pupils equal, round and reactive to light, pallor, no icterus. Neck:   No JVD, no thyromegaly, no lymphadenopathy. Chest:   Air entry fair bilaterally. Cardiac: S1 S2 heard. Abdomen:  Soft, non-tender, no masses or organomegally appreciable, BS sluggish. :   No suprapubic or flank tenderness. Right groin catheter site is fresh, no bleeding. Neuro:  No focal neurological deficits. Skin:   No rashes, good skin turgor.   Extremities:  No edema, palpable peripheral pulses, no cyanosis, no calf tenderness. Labs:       Recent Labs     05/29/22  1903 05/30/22  1002   WBC 7.1 7.3   RBC 4.22 3.43*   HGB 12.2 9.8*   HCT 39.1 31.6*   MCV 92.7 92.1   MCH 28.9 28.6   MCHC 31.2 31.0   RDW 14.5* 14.3    139   MPV 11.7 11.5      BMP:   Recent Labs     05/29/22  1903 05/30/22  0645 05/31/22  0634    135 140   K 4.0 3.6* 4.0    101 104   CO2 20 21 21   BUN 21 23 25*   CREATININE 1.50* 1.56* 1.35*   GLUCOSE 253* 283* 258*   CALCIUM 10.0 8.8 9.3      Phosphorus:   No results for input(s): PHOS in the last 72 hours. Magnesium:  No results for input(s): MG in the last 72 hours.   Albumin:    Recent Labs     05/29/22  1903 05/30/22  0645   LABALBU 4.2 3.5     BNP:      Lab Results   Component Value Date    BNP 94 07/13/2012     JAZZY:      Lab Results   Component Value Date    JAZZY NEGATIVE 11/19/2019     SPEP:  Lab Results   Component Value Date    PROT 5.8 05/30/2022     UPEP:   No results found for: LABPE  C3:     Lab Results   Component Value Date    C3 140 09/14/2021     C4:     Lab Results   Component Value Date    C4 43 09/14/2021     MPO ANCA:   No results found for: MPO  PR3 ANCA:   No results found for: PR3  Anti-GBM:   No results found for: GBMABIGG  Hep BsAg:         Lab Results   Component Value Date    HEPBSAG NONREACTIVE 11/19/2019     Hep C AB:          Lab Results   Component Value Date    HEPCAB NONREACTIVE 11/19/2019       Urinalysis/Chemistries:      Lab Results   Component Value Date    NITRU NEGATIVE 05/30/2022    COLORU Yellow 05/30/2022    PHUR 5.0 05/30/2022    WBCUA 0 TO 2 05/30/2022    RBCUA 2 TO 5 05/30/2022    MUCUS NOT REPORTED 09/15/2021    TRICHOMONAS NOT REPORTED 09/15/2021    YEAST NOT REPORTED 09/15/2021    BACTERIA MANY 09/15/2021    CLARITYU clear 12/03/2019    SPECGRAV 1.010 05/30/2022    LEUKOCYTESUR NEGATIVE 05/30/2022    UROBILINOGEN Normal 05/30/2022    BILIRUBINUR NEGATIVE 05/30/2022    BILIRUBINUR neg 12/03/2019    BLOODU neg 12/03/2019    GLUCOSEU TRACE 05/30/2022    KETUA NEGATIVE 05/30/2022    AMORPHOUS NOT REPORTED 09/15/2021     Urine Sodium:     Lab Results   Component Value Date    KARTHIK 48 09/15/2021     Urine Potassium:  No results found for: KUR  Urine Chloride:  No results found for: CLUR  Urine Osmolarity: No results found for: OSMOU  Urine Protein:   No results found for: TPU  Urine Creatinine:     Lab Results   Component Value Date    LABCREA 47.2 05/30/2022     Urine Eosinophils:  No components found for: UEOS    Radiology:     CXR:     Assessment:     1. Chronic kidney disease stage IIIb secondary to suspected diabetic nephrosclerosis and atheroembolic disease post heart catheterization in December 2020 following which creatinine has been in the range of 1.3-1.5, prior to that was 0.8-0.8 range. Renal function stable with proteinuria in the past 1 g range. Previous work-up including ultrasound and cytologies have been negative. Urine sediment has been benign. 2.  NSTEMI-s/p cardiac catheterization today. 3.  Diabetes mellitus type 2 with diabetic nephropathy  4. Essential hypertension  5. Morbid obesity  6. Multivessel CAD- not a candidate for CABG. S/p JAMA stent in ostial-RPDA and proximal LAD on 11/7/2020, OM and distal LAD on 12/12/2020    Plan:   1. Continue IV fluids at 50 mill per hour for next 6 hours and can be stopped. 2.  Mucomyst for 2 more doses. 3.  BMP tomorrow a.m. Expect creatinine to trend up with contrast.  4.  We will follow. Nutrition   Please ensure that patient is on a renal diet/TF. Avoid nephrotoxic drugs/contrast exposure. We will continue to follow along with you. Dariusz Miranda MD  Internal Medicine Resident, PGY-3  [address] [facility]; [place], [place]  5/31/2022, 11:49 AM     Attending Physician Statement  I have discussed the care of Bret Citizen, including pertinent history and exam findings with the resident/fellow.  I have reviewed the key elements of all parts of the encounter with the resident/fellow. I have seen and examined the patient with the resident/fellow. I agree with the assessment and plan and status of the problem list as documented. Addiitionally I recommend discontinue IV fluids.   Discontinue Mucomyst.  Check BMP in the a.m.  .  Aries Valdovinos MD   Nephrology Attending Physician  [facility]  5/31/2022

## 2022-05-31 NOTE — DISCHARGE INSTR - COC
Continuity of Care Form    Patient Name: Chelsea Murray   :  1945  MRN:  [id number]    Admit date:  2022  Discharge date: 2022    Code Status Order: Full Code   Advance Directives:      Admitting Physician:  Chelsea James MD  PCP: Sandra Zapien MD    Discharging Nurse: [facility] Unit/Room#: 0316/0316-01  Discharging Unit Phone Number: [phone]    Emergency Contact:   Extended Emergency Contact Information  Primary Emergency Contact: Rah Matthews  Address: [place]  Home Phone: [phone]  Relation: Child  Secondary Emergency Contact: Frank Gracia. Phone: [phone]  Mobile Phone: [phone]  Relation: Child    Past Surgical History:  Past Surgical History:   Procedure Laterality Date    APPENDECTOMY      CARDIAC CATHETERIZATION          CORONARY ANGIOPLASTY WITH STENT PLACEMENT  2017    4 SYNERGY HEART STENTS DRUG ELUTING ALL MRI CONDITONAL 3T OK, SAFE IMMEDIATELY.      CORONARY ANGIOPLASTY WITH STENT PLACEMENT  2012    XIENCE HEART STENT/ MRI CONDITIONAL 3T OK, SAFE IMMEDIATELY    CORONARY ANGIOPLASTY WITH STENT PLACEMENT  2020    PTCA         Immunization History:   Immunization History   Administered Date(s) Administered    COVID-19, Pfizer Purple top, DILUTE for use, 12+ yrs, 30mcg/0.3mL dose 2021, 2021    Influenza A ([id number]) Vaccine PF IM 2009    Influenza Vaccine, unspecified formulation 2013, 10/22/2015, 2016    Influenza Virus Vaccine 10/03/2016    Influenza, High Dose (Fluzone 65 yrs and older) 10/12/2018, 2019    Influenza, Quadv, IM, PF (6 mo and older Fluzone, Flulaval, Fluarix, and 3 yrs and older Afluria) 2018, 2020, 10/29/2021    Pneumococcal Conjugate 13-valent ([id number]) 2017    Pneumococcal Polysaccharide (Fovsaqkcz26) 2012       Active Problems:  Patient Active Problem List   Diagnosis Code    Atypical chest pain R07.89    Type 2 diabetes mellitus ([facility] [address] E11.9    Vertigo R42    Essential hypertension I10    Atherosclerotic heart disease of native coronary artery with other forms of angina pectoris ([facility]) I25.118    Dyspnea R06.00    Claudication in peripheral vascular disease (HCC) I73.9    Acute pulmonary embolism without acute cor pulmonale (HCC) I26.99    Diabetic polyneuropathy associated with type 2 diabetes mellitus (HCC) E11.42    Other hyperlipidemia E78.49    Chronic systolic heart failure (HCC) I50.22    Multiple subsegmental pulmonary emboli without acute cor pulmonale (HCC) I26.94    Congestive heart failure (HCC) I50.9    Pyelonephritis of right kidney N12    History of pulmonary embolism Z86.711    Elevated troponin I level R77.8    Sepsis (HCC) A41.9    Suspected COVID-19 virus infection Z20.822    Diarrhea R19.7    Chronic diastolic (congestive) heart failure (HCC) I50.32    Generalized weakness R53.1    Anemia, normocytic normochromic D64.9    Class 1 obesity in adult E66.9    Gastroenteritis K52.9    Acute on chronic diastolic CHF (congestive heart failure) (HCC) W31.94    Acute diastolic CHF (congestive heart failure) (HCC) I50.31    Arterial hypotension I95.9    Moderate malnutrition ([facility]) E44.0    S/P PTCA (percutaneous transluminal coronary angioplasty) Z98.61    Cervical myelopathy ([facility]) G95.9    Viral gastroenteritis A08.4    Fall at home, initial encounter W19. XXXA, Y92.009    Sicca, unspecified type ([facility] [address] M35.00    Moderate episode of recurrent major depressive disorder ([facility] [address] F33.1    Acute renal failure with tubular necrosis ([facility]) N17.0    CKD (chronic kidney disease), stage III ([facility]) N18.30    Dysuria R30.0    Paroxysmal atrial fibrillation ([facility]) I48.0    Non-intractable vomiting R11.10       Isolation/Infection:   Isolation            C Diff Contact          Patient Infection Status       Infection Onset Added Last Indicated Last Indicated By Review Planned Expiration Resolved Resolved By    C-diff Rule Out 05/29/22 05/29/22 05/29/22 MARKEL DIFF TOXIN/ANTIGEN (Ordered)        Resolved    COVID-19 (Rule Out) 05/29/22 05/29/22 05/29/22 COVID-19, Rapid (Ordered)   05/29/22 Rule-Out Test Resulted    C-diff Rule Out 09/02/21 09/02/21 09/02/21 Gastrointestinal Panel, Molecular (Ordered)   09/03/21 Meliza Tucker RN    COVID-19 (Rule Out) 09/01/21 09/01/21 09/01/21 COVID-19, Rapid (Ordered)   09/01/21 Rule-Out Test Resulted    C-diff Rule Out 11/12/20 11/12/20 11/12/20 Gastrointestinal Panel, Molecular (Ordered)   11/13/20 Meliza Tucker RN    GI panel does not test for C-diff    COVID-19 (Rule Out) 11/06/20 11/06/20 11/06/20 COVID-19 (Ordered)   11/06/20 Rule-Out Test Resulted    C-diff Rule Out 09/03/20 09/03/20 09/03/20 Gastrointestinal Panel, Molecular (Ordered)   09/04/20 Meliza Tucker RN    GI panel does not test for C-diff    C-diff Rule Out 09/02/20 09/02/20 09/02/20 Gastrointestinal Panel, Molecular (Ordered)   09/03/20 Meliza Tucker RN    C-diff is not tested in GI Panel    C-diff Rule Out 07/13/20 07/13/20 07/13/20 C DIFF TOXIN/ANTIGEN (Ordered)   07/14/20 Meliza Tucker RN    COVID-19 (Rule Out) 07/12/20 07/12/20 07/12/20 COVID-19 (Ordered)   07/13/20 Rule-Out Test Resulted            Nurse Assessment:  Last Vital Signs: /62   Pulse 64   Temp 97 °F (36.1 °C) (Temporal)   Resp 16   Ht 5' 10\" (1.778 m)   Wt 240 lb 12.8 oz (109.2 kg)   SpO2 96%   BMI 34.55 kg/m²     Last documented pain score (0-10 scale): Pain Level: 6  Last Weight:   Wt Readings from Last 1 Encounters:   05/31/22 240 lb 12.8 oz (109.2 kg)     Mental Status:  alert, coherent, and logical    IV Access:  - None    Nursing Mobility/ADLs:  Walking   Assisted  Transfer  Independent  Bathing  Independent  Dressing  Independent  [address]  Independent  Med Delivery   whole    Wound Care Documentation and Therapy:        Elimination:  Continence:    Bowel: Yes  Bladder: Yes  Urinary Catheter: None Colostomy/Ileostomy/Ileal Conduit: No       Date of Last BM: ***  No intake or output data in the 24 hours ending 05/31/22 1614  I/O last 3 completed shifts: In: 999 [IV Piggyback:999]  Out: 26 [Urine:500; Stool:50]    Safety Concerns: At Risk for Falls    Impairments/Disabilities:      None    Nutrition Therapy:  Current Nutrition Therapy:   - Oral Diet:  General    Routes of Feeding: Oral  Liquids: Thin Liquids  Daily Fluid Restriction: no  Last Modified Barium Swallow with Video (Video Swallowing Test): not done    Treatments at the Time of Hospital Discharge:   Respiratory Treatments: ***  Oxygen Therapy:  is not on home oxygen therapy. Ventilator:    - No ventilator support    Rehab Therapies: Physical Therapy and Occupational Therapy  Weight Bearing Status/Restrictions: No weight bearing restrictions  Other Medical Equipment (for information only, NOT a DME order):  ***  Other Treatments: BMP in 3 days     Patient's personal belongings (please select all that are sent with patient):  None    RN SIGNATURE:  Electronically signed by Khurram Aden RN on 6/1/22 at 7:46 AM EDT    CASE MANAGEMENT/SOCIAL WORK SECTION    Inpatient Status Date: ***    Readmission Risk Assessment Score:  Readmission Risk              Risk of Unplanned Readmission:  28           Discharging to Facility/ Agency   Name: [facility]  Address:   [address] [place] [address]         Phone: [phone]       Fax: [phone]        Phone:  Fax:    Dialysis Facility (if applicable)   Name:  Address:  Dialysis Schedule:  Phone:  Fax:    / signature: Electronically signed by Justina Aranda MD on 5/31/22 at 4:22 PM EDT    PHYSICIAN SECTION    Prognosis: Good    Condition at Discharge: Stable    Rehab Potential (if transferring to Rehab): Good    Recommended Labs or Other Treatments After Discharge: BMP in 3 days    Physician Certification: I certify the above information and transfer of Wanda Montero Aleksandra Sniff  is necessary for the continuing treatment of the diagnosis listed and that she requires Home Care for less 30 days.      Update Admission H&P: No change in H&P    PHYSICIAN SIGNATURE:  Electronically signed by Susan Washington MD on 5/31/22 at 4:22 PM EDT

## 2022-05-31 NOTE — PROGRESS NOTES
[facility]  Office: [address], DO, Cliff Sunshine, DO, Akhil Martines, DO, Sherrigraciela Red Blood, DO, Vasiliy Medina MD, Amy Hernandez MD, Ulisses Tijerina MD, Teresa Duffy MD, Nils Chang MD, Vilma Lee MD, Heather Mora MD, Lorna Salter, DO, Faisal Perry, DO, Ariana Brooks MD,  Mariano Hopper, DO, Andres Baker MD, Amee Parsons MD, Facundo Cooper MD, Marty Bah DO, Lima Packer MD, Lottie Pack MD, Andrew Watkins MD, Theopranay Urbano [facility], [facility], CNP, Charli Ny, CNP, Marialuisa Castro, CNP, Karl Garcia, CNP, Nimesh Xiong, CNP, Andrew Devine PA-C, Zaida Gomez, [facility], Tianna Garcia, [facility], Cat Hopkins, CNP, Nils Hare, CNP, Gail Treviño, CNS, Meena Dias, [facility], Cristopher Strong, CNP, Marlene Brown, CNP, Hanny Brown, [facility] [place] [address]    Progress Note    5/31/2022    2:28 PM    Name:   Rose Regalado  MRN:     [id number]     [place]:      [de-identified]   Room:   [address] [place] [address] Day:  2  Admit Date:  5/29/2022  6:27 PM    PCP:   Edna Delacruz MD  Code Status:  Full Code    Subjective:     C/C:   Chief Complaint   Patient presents with   [facility] Emesis    Abdominal Pain   Was  Interval History Status: improved.          Brief History:     70-year-old female multiple chronic conditions significant with CAD and CKD 3 who presented to hospital with acute onset diarrhea and chest pain    She developed diarrhea initially and subsequently began to vomit likely secondary to gastroenteritis    She developed chest pain and lightheadedness as well prior to arrival    She does have a history of A. fib she is on anticoagulation    Diagnosed with NSTEMI troponins were trending up  A. fib with RVR [id number] 6  Heart failure with preserved ejection fraction  Multivessel CAD not a candidate for CABG  Diarrhea continues to have loose stool following return from Cath Lab  Was on IV heparin and aspirin  Xarelto being held  [facility]    Review of Systems:     Constitutional:  negative for chills, fevers, sweats  Respiratory:  negative for cough, dyspnea on exertion, shortness of breath, wheezing  Cardiovascular:  negative for chest pain, chest pressure/discomfort, lower extremity edema, palpitations  Gastrointestinal:  negative for abdominal pain, constipation, diarrhea, nausea, vomiting  Neurological:  negative for dizziness, headache    Medications: Allergies:     Allergies   Allergen Reactions    Humalog [Insulin Lispro] Other (See Comments)     Blisters at site given    Penicillins      Other reaction(s): Hives    Sulfa Antibiotics      Other reaction(s): Rash       Current Meds:   Scheduled Meds:    prednisoLONE acetate  1 drop Both Eyes TID    ketorolac  1 drop Both Eyes 4x Daily    insulin glargine  30 Units SubCUTAneous BID    sodium chloride flush  5-40 mL IntraVENous 2 times per day    metoclopramide  10 mg IntraVENous Q6H    amiodarone  400 mg Oral BID    acetylcysteine  600 mg Oral BID    ticagrelor  90 mg Oral BID    carvedilol  12.5 mg Oral BID    escitalopram  20 mg Oral Daily    fluticasone  2 spray Each Nostril Daily    gabapentin  300 mg Oral TID    insulin lispro  0-12 Units SubCUTAneous TID WC    insulin lispro  0-6 Units SubCUTAneous Nightly    isosorbide mononitrate  30 mg Oral Daily    cetirizine  5 mg Oral Daily    pantoprazole  40 mg Oral QAM AC    rosuvastatin  40 mg Oral Daily    rivaroxaban  20 mg Oral Daily     Continuous Infusions:    sodium chloride      sodium chloride 50 mL/hr at 05/30/22 1847    dextrose       PRN Meds: sodium chloride flush, sodium chloride, clonazePAM, diphenhydrAMINE, docusate sodium, glucose, dextrose bolus **OR** dextrose bolus, glucagon (rDNA), dextrose, potassium chloride **OR** potassium alternative oral replacement **OR** potassium chloride, magnesium sulfate, ondansetron **OR** ondansetron, polyethylene glycol, acetaminophen **OR** acetaminophen, HYDROmorphone    Data:     Past Medical History:   has a past medical history of Acute renal failure with tubular necrosis (HCC), Anxiety, Atrial fibrillation (HCC), Benign positional vertigo, CAD (coronary artery disease), Chest pain, CKD (chronic kidney disease), stage III ([facility] [address] Depression, Diabetes mellitus ([facility] [address] [facility] Dysuria, Hyperlipidemia, Hypertension, and Migraine. Social History:   reports that she has never smoked. She has never used smokeless tobacco. She reports that she does not drink alcohol and does not use drugs. Family History:   Family History   Problem Relation Age of Onset   Henrietta Ramos Cancer Mother     Cancer Father        Vitals:  BP (!) 140/67   Pulse 77   Temp 97 °F (36.1 °C) (Temporal)   Resp 16   Ht 5' 10\" (1.778 m)   Wt 240 lb 12.8 oz (109.2 kg)   SpO2 100%   BMI 34.55 kg/m²   Temp (24hrs), Av.4 °F (36.3 °C), Min:97 °F (36.1 °C), Max:98.4 °F (36.9 °C)    Recent Labs     22  1614 22  2027 22  0747 22  1135   POCGLU 330* 380* 213* 203*       I/O (24Hr):   No intake or output data in the 24 hours ending 22 1428    Labs:  Hematology:  Recent Labs     22  1903 22  0645 22  1002 22  1301   WBC 7.1  --  7.3  --    RBC 4.22  --  3.43*  --    HGB 12.2  --  9.8* 11.1*   HCT 39.1  --  31.6* 34.7*   MCV 92.7  --  92.1  --    MCH 28.9  --  28.6  --    MCHC 31.2  --  31.0  --    RDW 14.5*  --  14.3  --      --  139  --    MPV 11.7  --  11.5  --    SEDRATE 33*  --   --   --    CRP <3.0  --   --   --    INR 1.1 1.4  --   --    DDIMER 0.92  --   --   --      Chemistry:  Recent Labs     22  1903 22  1950 22  0645 22  0645 22  1547 22  2146 22  0634     --  135  --   --   --  140   K 4.0  --  3.6*  --   --   --  4.0     --  101  --   --   --  104   CO2 20  --  21  --   --   --  21   GLUCOSE 253*  --  283*  --   --   -- 258*   BUN 21  --  23  --   --   --  25*   CREATININE 1.50*  --  1.56*  --   --   --  1.35*   ANIONGAP 15  --  13  --   --   --  15   LABGLOM 34*  --  32*  --   --   --  38*   GFRAA 41*  --  39*  --   --   --  46*   CALCIUM 10.0  --  8.8  --   --   --  9.3   PROBNP 3,681*  --   --   --   --   --   --    TROPHS 58*   < > 168*   < > 135* 98* 94*    < > = values in this interval not displayed. Recent Labs     05/29/22  1903 05/29/22  2239 05/30/22  0645 05/30/22  0645 05/30/22  0725 05/30/22  1002 05/30/22  1131 05/30/22  1614 05/30/22 2027 05/31/22  0747 05/31/22  1135   PROT 7.4  --  5.8*  --   --   --   --   --   --   --   --    LABALBU 4.2  --  3.5  --   --   --   --   --   --   --   --    LABA1C  --   --  10.2*   < >  --  10.5*  --   --   --   --   --    TSH  --   --  0.71  --   --   --   --   --   --   --   --    AST 13  --  13  --   --   --   --   --   --   --   --    ALT 6  --  <5*  --   --   --   --   --   --   --   --    ALKPHOS 74  --  57  --   --   --   --   --   --   --   --    BILITOT 0.36  --  0.26*  --   --   --   --   --   --   --   --    LIPASE 25  --   --   --   --   --   --   --   --   --   --    CHOL  --   --  125  --   --   --   --   --   --   --   --    HDL  --   --  36*  --   --   --   --   --   --   --   --    LDLCHOLESTEROL  --   --  63  --   --   --   --   --   --   --   --    CHOLHDLRATIO  --   --  3.5  --   --   --   --   --   --   --   --    TRIG  --   --  132  --   --   --   --   --   --   --   --    POCGLU  --    < >  --   --  249*  --  222* 330* 380* 213* 203*    < > = values in this interval not displayed.      ABG:No results found for: POCPH, PHART, PH, POCPCO2, EYS1JPN, PCO2, POCPO2, PO2ART, PO2, POCHCO3, NZK1URB, HCO3, NBEA, PBEA, BEART, BE, THGBART, THB, UHG2AYQ, TCPE4XQR, G8QIOYRQ, O2SAT, FIO2  Lab Results   Component Value Date/Time    SPECIAL RFOREARM, 10ML 05/29/2022 07:02 PM     Lab Results   Component Value Date/Time    CULTURE NO GROWTH 1 DAY 05/29/2022 07:02 PM Radiology:  CT ABDOMEN PELVIS WO CONTRAST Additional Contrast? Radiologist Recommendation    Addendum Date: 5/30/2022    ADDENDUM: Addendum being added as this study was accidentally signed off prior to completion. Additional body sections will be included as well as a final impression. Additional Findings: GI/bowel: No ileus or obstruction is identified. Mild colonic diverticulosis. No acute diverticulitis. Pelvis: The bladder is decompressed but otherwise unremarkable. No free fluid in the pelvis. No pelvic mass. Hysterectomy. Phleboliths are noted. Peritoneum/retroperitoneum: No aortic aneurysm. Atherosclerosis. No bulky retroperitoneal or mesenteric lymphadenopathy. No bowel herniation. Bones/soft tissues: No acute subcutaneous soft tissue abnormality. No acute osseous fracture. Multilevel degenerative changes are seen in the spine. Degenerative changes seen in the pubic symphysis, sacroiliac joints as well as hip joints. Chronic compression deformity at L2 along the superior endplate with an associated Schmorl's node, similar to the previous exam. Multilevel spinal canal stenosis as well as osseous foraminal stenosis, with levels of severe stenosis noted. This includes right-sided severe foraminal stenosis at L4-L5, and left-sided stenosis at L2-L3. IMPRESSION 1. No perinephric inflammatory changes are seen to suggest pyelonephritis. Limited evaluation given the lack of contrast. 2. No obstructive urinary tract calculi. 3. Atherosclerosis including coronary artery involvement. 4. Hysterectomy and cholecystectomy. 5. Pancreatic atrophy.      XR CHEST PORTABLE    Result Date: 5/29/2022  Mild pulmonary vascular congestion       Physical Examination:        General appearance:  alert, cooperative and no distress  Mental Status:  oriented to person, place and time and normal affect  Lungs:  clear to auscultation bilaterally, normal effort  Heart:  regular rate and rhythm, no murmur  Abdomen: soft, nontender, nondistended, normal bowel sounds, no masses, hepatomegaly, splenomegaly  Extremities:  no edema, redness, tenderness in the calves  Skin:  no gross lesions, rashes, induration    Assessment:        Hospital Problems           Last Modified POA    * (Principal) Paroxysmal atrial fibrillation ([facility] [address] 5/29/2022 Yes    Non-intractable vomiting 5/30/2022 Yes    Atypical chest pain 5/30/2022 Yes    Type 2 diabetes mellitus ([facility] [address] 5/30/2022 Yes    Essential hypertension 5/30/2022 Yes    CKD (chronic kidney disease), stage III ([facility] [address] 5/30/2022 Yes    Overview Signed 11/24/2021  3:27 PM by Hillary Staley MD     Secondary to ischemic and diabetic nephrosclerosis baseline 1.2-1.4               Plan:         1.  A. fib with RVR heart rate normal sinus rhythm at time of examination off heparin drip off Cardizem drip on Coreg and amiodarone  2. NSTEMI doing well status post echo and heart cath  3. FRANKS improved slightly but seems to be chronic at baseline  4.   She does have HFpEF would benefit from more targeted GDMT SGLT2 an appropriate option at this point  Continue high intensity statin  Monitor creatinine electrolytes  Continue rest of home meds  Stable for discharge today, patient requesting to stay 1 more night    Herb Ascencio MD  5/31/2022  2:28 PM

## 2022-06-01 VITALS
DIASTOLIC BLOOD PRESSURE: 68 MMHG | SYSTOLIC BLOOD PRESSURE: 131 MMHG | OXYGEN SATURATION: 96 % | HEIGHT: 70 IN | BODY MASS INDEX: 34.2 KG/M2 | RESPIRATION RATE: 16 BRPM | HEART RATE: 64 BPM | TEMPERATURE: 98.5 F | WEIGHT: 238.9 LBS

## 2022-06-01 LAB
ANION GAP SERPL CALCULATED.3IONS-SCNC: 10 MMOL/L (ref 9–17)
BUN BLDV-MCNC: 25 MG/DL (ref 8–23)
CALCIUM SERPL-MCNC: 9.1 MG/DL (ref 8.6–10.4)
CHLORIDE BLD-SCNC: 104 MMOL/L (ref 98–107)
CO2: 25 MMOL/L (ref 20–31)
CREAT SERPL-MCNC: 1.41 MG/DL (ref 0.5–0.9)
GFR AFRICAN AMERICAN: 44 ML/MIN
GFR NON-AFRICAN AMERICAN: 36 ML/MIN
GFR SERPL CREATININE-BSD FRML MDRD: ABNORMAL ML/MIN/{1.73_M2}
GLUCOSE BLD-MCNC: 125 MG/DL (ref 65–105)
GLUCOSE BLD-MCNC: 150 MG/DL (ref 70–99)
GLUCOSE BLD-MCNC: 94 MG/DL (ref 65–105)
HCT VFR BLD CALC: 29.7 % (ref 36.3–47.1)
HEMOGLOBIN: 9.5 G/DL (ref 11.9–15.1)
MAGNESIUM: 2.2 MG/DL (ref 1.6–2.6)
MCH RBC QN AUTO: 29.2 PG (ref 25.2–33.5)
MCHC RBC AUTO-ENTMCNC: 32 G/DL (ref 28.4–34.8)
MCV RBC AUTO: 91.4 FL (ref 82.6–102.9)
NRBC AUTOMATED: 0 PER 100 WBC
PARTIAL THROMBOPLASTIN TIME: 29.6 SEC (ref 20.5–30.5)
PDW BLD-RTO: 14.5 % (ref 11.8–14.4)
PLATELET # BLD: 140 K/UL (ref 138–453)
PMV BLD AUTO: 11.6 FL (ref 8.1–13.5)
POTASSIUM SERPL-SCNC: 3.5 MMOL/L (ref 3.7–5.3)
RBC # BLD: 3.25 M/UL (ref 3.95–5.11)
SODIUM BLD-SCNC: 139 MMOL/L (ref 135–144)
TROPONIN, HIGH SENSITIVITY: 180 NG/L (ref 0–14)
TROPONIN, HIGH SENSITIVITY: 225 NG/L (ref 0–14)
WBC # BLD: 8.9 K/UL (ref 3.5–11.3)

## 2022-06-01 PROCEDURE — 36415 COLL VENOUS BLD VENIPUNCTURE: CPT

## 2022-06-01 PROCEDURE — 84484 ASSAY OF TROPONIN QUANT: CPT

## 2022-06-01 PROCEDURE — 99232 SBSQ HOSP IP/OBS MODERATE 35: CPT | Performed by: STUDENT IN AN ORGANIZED HEALTH CARE EDUCATION/TRAINING PROGRAM

## 2022-06-01 PROCEDURE — 6360000002 HC RX W HCPCS: Performed by: STUDENT IN AN ORGANIZED HEALTH CARE EDUCATION/TRAINING PROGRAM

## 2022-06-01 PROCEDURE — 6370000000 HC RX 637 (ALT 250 FOR IP): Performed by: STUDENT IN AN ORGANIZED HEALTH CARE EDUCATION/TRAINING PROGRAM

## 2022-06-01 PROCEDURE — 2580000003 HC RX 258: Performed by: STUDENT IN AN ORGANIZED HEALTH CARE EDUCATION/TRAINING PROGRAM

## 2022-06-01 PROCEDURE — 85027 COMPLETE CBC AUTOMATED: CPT

## 2022-06-01 PROCEDURE — 82947 ASSAY GLUCOSE BLOOD QUANT: CPT

## 2022-06-01 PROCEDURE — 99232 SBSQ HOSP IP/OBS MODERATE 35: CPT | Performed by: INTERNAL MEDICINE

## 2022-06-01 PROCEDURE — 85730 THROMBOPLASTIN TIME PARTIAL: CPT

## 2022-06-01 PROCEDURE — 80048 BASIC METABOLIC PNL TOTAL CA: CPT

## 2022-06-01 PROCEDURE — 83735 ASSAY OF MAGNESIUM: CPT

## 2022-06-01 RX ORDER — AMIODARONE HYDROCHLORIDE 400 MG/1
400 TABLET ORAL 2 TIMES DAILY
Qty: 8 TABLET | Refills: 0 | Status: SHIPPED | OUTPATIENT
Start: 2022-06-01 | End: 2022-06-13

## 2022-06-01 RX ORDER — LOPERAMIDE HYDROCHLORIDE 2 MG/1
2 CAPSULE ORAL 4 TIMES DAILY PRN
Qty: 40 CAPSULE | Refills: 5 | Status: SHIPPED | OUTPATIENT
Start: 2022-06-01 | End: 2022-07-21 | Stop reason: SDUPTHER

## 2022-06-01 RX ORDER — DICYCLOMINE HYDROCHLORIDE 10 MG/1
10 CAPSULE ORAL
Qty: 120 CAPSULE | Refills: 3 | Status: SHIPPED | OUTPATIENT
Start: 2022-06-01 | End: 2022-09-30

## 2022-06-01 RX ORDER — AMIODARONE HYDROCHLORIDE 200 MG/1
200 TABLET ORAL 2 TIMES DAILY
Qty: 12 TABLET | Refills: 0 | Status: SHIPPED | OUTPATIENT
Start: 2022-06-07 | End: 2022-06-01

## 2022-06-01 RX ORDER — AMIODARONE HYDROCHLORIDE 200 MG/1
200 TABLET ORAL 2 TIMES DAILY
Qty: 14 TABLET | Refills: 0 | Status: SHIPPED | OUTPATIENT
Start: 2022-06-07 | End: 2022-06-01

## 2022-06-01 RX ORDER — DICYCLOMINE HYDROCHLORIDE 10 MG/1
10 CAPSULE ORAL
Status: DISCONTINUED | OUTPATIENT
Start: 2022-06-01 | End: 2022-06-01 | Stop reason: HOSPADM

## 2022-06-01 RX ORDER — AMIODARONE HYDROCHLORIDE 400 MG/1
400 TABLET ORAL 2 TIMES DAILY
Qty: 24 TABLET | Refills: 0 | Status: SHIPPED | OUTPATIENT
Start: 2022-06-01 | End: 2022-06-01

## 2022-06-01 RX ORDER — AMIODARONE HYDROCHLORIDE 200 MG/1
200 TABLET ORAL DAILY
Qty: 60 TABLET | Refills: 5 | Status: SHIPPED | OUTPATIENT
Start: 2022-06-13 | End: 2022-09-30

## 2022-06-01 RX ORDER — LOPERAMIDE HYDROCHLORIDE 2 MG/1
2 CAPSULE ORAL 4 TIMES DAILY PRN
Status: DISCONTINUED | OUTPATIENT
Start: 2022-06-01 | End: 2022-06-01 | Stop reason: HOSPADM

## 2022-06-01 RX ORDER — AMIODARONE HYDROCHLORIDE 200 MG/1
200 TABLET ORAL DAILY
Qty: 30 TABLET | Refills: 1 | Status: SHIPPED | OUTPATIENT
Start: 2022-06-14 | End: 2022-06-01

## 2022-06-01 RX ORDER — AMIODARONE HYDROCHLORIDE 200 MG/1
200 TABLET ORAL 2 TIMES DAILY
Qty: 12 TABLET | Refills: 0 | Status: SHIPPED | OUTPATIENT
Start: 2022-06-06 | End: 2022-06-13

## 2022-06-01 RX ADMIN — KETOROLAC TROMETHAMINE 1 DROP: 5 SOLUTION/ DROPS OPHTHALMIC at 09:23

## 2022-06-01 RX ADMIN — TICAGRELOR 90 MG: 90 TABLET ORAL at 09:23

## 2022-06-01 RX ADMIN — SODIUM CHLORIDE, PRESERVATIVE FREE 10 ML: 5 INJECTION INTRAVENOUS at 09:26

## 2022-06-01 RX ADMIN — Medication 600 MG: at 09:23

## 2022-06-01 RX ADMIN — LOPERAMIDE HYDROCHLORIDE 2 MG: 2 CAPSULE ORAL at 13:44

## 2022-06-01 RX ADMIN — POTASSIUM CHLORIDE 40 MEQ: 1500 TABLET, EXTENDED RELEASE ORAL at 06:37

## 2022-06-01 RX ADMIN — FLUTICASONE PROPIONATE 2 SPRAY: 50 SPRAY, METERED NASAL at 09:23

## 2022-06-01 RX ADMIN — ISOSORBIDE MONONITRATE 30 MG: 30 TABLET ORAL at 09:23

## 2022-06-01 RX ADMIN — ROSUVASTATIN CALCIUM 40 MG: 20 TABLET, FILM COATED ORAL at 09:23

## 2022-06-01 RX ADMIN — PREDNISOLONE ACETATE 1 DROP: 10 SUSPENSION/ DROPS OPHTHALMIC at 09:23

## 2022-06-01 RX ADMIN — CARVEDILOL 12.5 MG: 12.5 TABLET, FILM COATED ORAL at 09:22

## 2022-06-01 RX ADMIN — ESCITALOPRAM OXALATE 20 MG: 10 TABLET ORAL at 09:23

## 2022-06-01 RX ADMIN — CETIRIZINE HYDROCHLORIDE 5 MG: 10 TABLET ORAL at 09:23

## 2022-06-01 RX ADMIN — PANTOPRAZOLE SODIUM 40 MG: 40 TABLET, DELAYED RELEASE ORAL at 06:38

## 2022-06-01 RX ADMIN — GABAPENTIN 300 MG: 300 CAPSULE ORAL at 09:23

## 2022-06-01 RX ADMIN — METOCLOPRAMIDE 10 MG: 5 INJECTION, SOLUTION INTRAMUSCULAR; INTRAVENOUS at 02:03

## 2022-06-01 RX ADMIN — METOCLOPRAMIDE 10 MG: 5 INJECTION, SOLUTION INTRAMUSCULAR; INTRAVENOUS at 09:23

## 2022-06-01 RX ADMIN — AMIODARONE HYDROCHLORIDE 400 MG: 200 TABLET ORAL at 09:22

## 2022-06-01 NOTE — PROGRESS NOTES
Renal Progress Note    Patient :  Maru Sanchez; 68 y.o. MRN# [id number]  Location:  9565/8632-34  Attending:  Liz Gomez MD  Admit Date:  5/29/2022   Hospital Day: 3    Subjective:     Patient seen and examined bedside. Labs and charts reviewed. No acute overnight events. Hemodynamically stable. Afebrile. Patient is awake alert and oriented x4. No apparent distress. Denies any complaints at this time. Creatinine trended up slightly high, 1.41 today, baseline creatinine seems to be around 1.3-1.5 mg/dl. Potassium 3.5 status post replacement. Status post cardiac catheterization 5/31/2022 no new stent placement required. Good urine okay. Good oral intake. Not on any IV fluids. Brief history:  70-year-old female with past medical history of CKD stage III with baseline creatinine 1.3-1.5, type 2 diabetes mellitus, hypertension, multivessel CAD- not candidate for CABG s/p JAMA stents in ostial-RPDA and proximal LAD on 11/7/2020, OM and distal LAD 12/12/2020, A. fib on AC, HFpEF presented with complaints of profuse watery diarrhea, nausea, vomiting. CT abdomen showed chronic changes of diverticulosis without any inflammation. Chest x-ray showed mild pulmonary vascular congestion. Troponins 59> 168. With significant cardiac history and uptrending troponins, patient was taken to cardiac catheterization this morning. Patient was started on IV fluids and Mucomyst as precautions for BRIT.     Outpatient Medications:     Medications Prior to Admission: prednisoLONE acetate (PRED FORTE) 1 % ophthalmic suspension, Place 1 drop into both eyes 3 times daily  [DISCONTINUED] ketorolac 0.4 % SOLN ophthalmic solution, Place 1 drop into both eyes 4 times daily  [DISCONTINUED] meclizine (ANTIVERT) 25 MG CHEW, 1 tablet as needed  [DISCONTINUED] diphenhydrAMINE (BANOPHEN) 50 MG capsule, Take 50 mg by mouth every 6 hours as needed for Itching  [DISCONTINUED] clonazePAM (KLONOPIN PO), Take by mouth as Nostril route daily (Patient not taking: Reported on 5/29/2022)  [DISCONTINUED] Zinc Oxide 15 % CREA, Apply to buttocks up to 4 times daily - may use any concentratino (Patient not taking: Reported on 5/29/2022)  nitroGLYCERIN (NITROSTAT) 0.4 MG SL tablet, place 1 tablet under the tongue if needed every 5 minutes if needed for CHEST PAIN  [DISCONTINUED] clonazePAM (KLONOPIN) 0.5 MG tablet, take 1 tablet by mouth twice a day if needed for anxiety  Misc. Devices (WALKER) MISC, Diagnosis: right 5th metatarsal fracture, pain in limb  Duration: 3 months  Misc. Devices (COMMODE BEDSIDE) MISC, 1 Device by Does not apply route daily  [DISCONTINUED] docusate sodium (COLACE) 100 MG capsule, Take 1 capsule by mouth daily as needed for Constipation (Patient not taking: Reported on 5/29/2022)  Continuous Blood Gluc  (FREESTYLE SYLVIA 14 DAY READER) JAQUELINE, 1 each by Does not apply route continuous [facility]  glucose monitoring kit (FREESTYLE) monitoring kit, 1 kit by Does not apply route 4 times daily Ivonne Aponte . Dx E11.65, has tremors and cannot use conventional meter without great difficulty. Please provide supplies for 3 months, rf 3,Pharm[facility].  [DISCONTINUED] mupirocin (BACTROBAN) 2 % ointment, apply topically to affected area three times a day (Patient not taking: Reported on 5/29/2022)  [DISCONTINUED] loratadine (CLARITIN) 10 MG tablet, Take 1 tablet every day by oral route. [DISCONTINUED] albuterol sulfate  (90 Base) MCG/ACT inhaler, Inhale 2 puffs into the lungs every 6 hours as needed for Wheezing (Patient not taking: Reported on 5/29/2022)  vitamin E 400 UNIT capsule, Take 400 Units by mouth daily.     Current Medications:     Scheduled Meds:    dicyclomine  10 mg Oral TID AC    apixaban  5 mg Oral BID    prednisoLONE acetate  1 drop Both Eyes TID    ketorolac  1 drop Both Eyes 4x Daily    insulin glargine  30 Units SubCUTAneous BID    sodium chloride flush 5-40 mL IntraVENous 2 times per day    [START ON 2022] amiodarone  200 mg Oral BID    [START ON 2022] amiodarone  200 mg Oral Daily    metoclopramide  10 mg IntraVENous Q6H    amiodarone  400 mg Oral BID    ticagrelor  90 mg Oral BID    carvedilol  12.5 mg Oral BID    escitalopram  20 mg Oral Daily    fluticasone  2 spray Each Nostril Daily    gabapentin  300 mg Oral TID    insulin lispro  0-12 Units SubCUTAneous TID WC    insulin lispro  0-6 Units SubCUTAneous Nightly    isosorbide mononitrate  30 mg Oral Daily    cetirizine  5 mg Oral Daily    pantoprazole  40 mg Oral QAM AC    rosuvastatin  40 mg Oral Daily     Continuous Infusions:    sodium chloride      dextrose       PRN Meds:  loperamide, sodium chloride flush, sodium chloride, clonazePAM, diphenhydrAMINE, docusate sodium, glucose, dextrose bolus **OR** dextrose bolus, glucagon (rDNA), dextrose, potassium chloride **OR** potassium alternative oral replacement **OR** potassium chloride, magnesium sulfate, ondansetron **OR** ondansetron, polyethylene glycol, acetaminophen **OR** acetaminophen, HYDROmorphone    Input/Output:       No intake/output data recorded. Vital Signs:   Temperature:  Temp: 98.5 °F (36.9 °C)  TMax:   Temp (24hrs), Av.5 °F (36.4 °C), Min:97 °F (36.1 °C), Max:98.5 °F (36.9 °C)    Respirations:  Resp: 16  Pulse:   Heart Rate: 64  BP:    BP: 131/68  BP Range: Systolic (56GBL), UBN:940 , Min:131 , QKT:742       Diastolic (77KXR), PCB:95, Min:62, Max:72      Physical Examination:     General:  Alert and oriented x4. In mild painful distress. Postprocedure. HEENT:  Atraumatic, normocephalic, no throat congestion, moist mucosa. Eyes:   Pupils equal, round and reactive to light, pallor, no icterus. Neck:   No JVD, no thyromegaly, no lymphadenopathy. Chest:   Air entry fair bilaterally. Cardiac: S1 S2 heard. Abdomen:  Soft, non-tender, no masses or organomegally appreciable, BS sluggish.   :   No suprapubic or flank tenderness. Right groin catheter site is fresh, no bleeding. Neuro:  No focal neurological deficits. Skin:   No rashes, good skin turgor. Extremities:  No edema. Labs:       Recent Labs     05/29/22  1903 05/29/22  1903 05/30/22  1002 05/31/22  1301 06/01/22  0247   WBC 7.1  --  7.3  --  8.9   RBC 4.22  --  3.43*  --  3.25*   HGB 12.2   < > 9.8* 11.1* 9.5*   HCT 39.1   < > 31.6* 34.7* 29.7*   MCV 92.7  --  92.1  --  91.4   MCH 28.9  --  28.6  --  29.2   MCHC 31.2  --  31.0  --  32.0   RDW 14.5*  --  14.3  --  14.5*     --  139  --  140   MPV 11.7  --  11.5  --  11.6    < > = values in this interval not displayed.       BMP:   Recent Labs     05/30/22  0645 05/31/22  0634 06/01/22  0247    140 139   K 3.6* 4.0 3.5*    104 104   CO2 21 21 25   BUN 23 25* 25*   CREATININE 1.56* 1.35* 1.41*   GLUCOSE 283* 258* 150*   CALCIUM 8.8 9.3 9.1      Magnesium:    Recent Labs     06/01/22  0247   MG 2.2     Albumin:    Recent Labs     05/29/22  1903 05/30/22  0645   LABALBU 4.2 3.5     BNP:      Lab Results   Component Value Date    BNP 94 07/13/2012     JAZZY:      Lab Results   Component Value Date    JAZZY NEGATIVE 11/19/2019     SPEP:  Lab Results   Component Value Date    PROT 5.8 05/30/2022     C3:     Lab Results   Component Value Date    C3 140 09/14/2021     C4:     Lab Results   Component Value Date    C4 43 09/14/2021     Hep BsAg:         Lab Results   Component Value Date    HEPBSAG NONREACTIVE 11/19/2019     Hep C AB:          Lab Results   Component Value Date    HEPCAB NONREACTIVE 11/19/2019     Urinalysis/Chemistries:      Lab Results   Component Value Date    NITRU NEGATIVE 05/30/2022    COLORU Yellow 05/30/2022    PHUR 5.0 05/30/2022    WBCUA 0 TO 2 05/30/2022    RBCUA 2 TO 5 05/30/2022    MUCUS NOT REPORTED 09/15/2021    TRICHOMONAS NOT REPORTED 09/15/2021    YEAST NOT REPORTED 09/15/2021    BACTERIA MANY 09/15/2021    CLARITYU clear 12/03/2019    SPECGRAV 1.010 05/30/2022 LEUKOCYTESUR NEGATIVE 05/30/2022    UROBILINOGEN Normal 05/30/2022    BILIRUBINUR NEGATIVE 05/30/2022    BILIRUBINUR neg 12/03/2019    BLOODU neg 12/03/2019    GLUCOSEU TRACE 05/30/2022    KETUA NEGATIVE 05/30/2022    AMORPHOUS NOT REPORTED 09/15/2021     Urine Sodium:     Lab Results   Component Value Date    KARTHIK 48 09/15/2021     Urine Creatinine:     Lab Results   Component Value Date    LABCREA 47.2 05/30/2022     Assessment:     1. 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 Ba Henriquez. Renal function stable with proteinuria in the past 1 g range. Previous work-up including ultrasound and cytologies have been negative. Urine sediment has been benign. 2.  NSTEMI-s/p cardiac catheterization today. 3.  Diabetes mellitus type 2 with diabetic nephropathy  4. Essential hypertension  5. Morbid obesity  6. Multivessel CAD- not a candidate for CABG. S/p JAMA stent in ostial-RPDA and proximal LAD on 11/7/2020, OM and distal LAD on 12/12/2020    Plan:   1. Agree with potassium replacements. 2.  Okay to discharge from nephrology standpoint. 3.  BMP in 1 week post discharge and fax results to Dr. Ba Henriquez office; Fax # [phone].  4.  Follow up with Dr. Ba Henriquez in 3-4 weeks post d/c. Nutrition   Please ensure that patient is on a renal diet/TF. Avoid nephrotoxic drugs/contrast exposure. We will continue to follow along with you. Bobby Knihgt MD  Internal Medicine Resident, PGY-3  [address] [place], [place]  6/1/2022, 12:07 PM       Attending Physician Statement  I have discussed the care of this patient, including pertinent history and exam findings, with the Resident/CNP. I have reviewed and edited the key elements of all parts of the encounter with the Resident/CNP.   I agree with the assessment, plan and orders as documented by the Resident/CNP. Boyd Hicks MD   Nephrology [address] [place] [address] Drive    This note is created with the assistance of a speech-recognition program. While intending to generate a document that actually reflects the content of the visit, no guarantees can be provided that every mistake has been identified and corrected by editing.

## 2022-06-01 NOTE — PROGRESS NOTES
CLINICAL PHARMACY NOTE: MEDS TO BEDS    Total # of Prescriptions Filled: 4   The following medications were delivered to the patient:  · eliquis 5 mg tab  · Dicyclomine 10 mg cap  · Loperamide 2 mg cap  · Amiodarone 200 mg tab    Additional Documentation:Medications delivered to the patient in room 316 @ 2:49 pm,  $ 19.40 co pay clover payment.  Told patient and her daughter to discard previously delivered amiodarone because of dose change (daughter took it home on 5/31 and did not want to bring it back) and we can't take back after it left the hospital.

## 2022-06-01 NOTE — PROGRESS NOTES
[facility]  Office: [address], DO, Darrin Murillokirit, DO, Elzbieta Hardy, DO, Magdaleno Duque, DO, Starla Keating MD, Abelino Sol MD, Shannan Valiente MD, Kenyon Keene MD, Soto Abreu MD, Heraclio Kennedy MD, Schuyler Gomez MD, Leisa Berry, DO, Aric Moreland, DO, Fidel Valiente MD,  [place], DO, Amrik Merrill MD, Cynthia Raphael MD, Cara Chiang MD, Endy Castro DO, Khadar Burger MD, Orlin Marie MD, Jonah Martell MD, Sotero Peck [facility], [facility], CNP, Quiana Mayes, CNP, Alpha Grain, CNP, Adrián Ing, CNP, Adrian Mejia, CNP, Zuleika Holly PA-C, Nino Porter, DNP, Dmitriy Villeda, CNP, Salomon Brush, CNP, Sae Rosales, CNP, Yvonne Ratliff, CNS, Tara Harrington, [facility], Ronn Carr, CNP, Dara Patel, CNP, Glen Rangel, CNP         Jero Sneed [address]    Progress Note    6/1/2022    12:30 PM    Name:   Demetria Child  MRN:     [id number]     [place]:      [de-identified]   Room:   [address] [place] [address] Day:  3  Admit Date:  5/29/2022  6:27 PM    PCP:   Aayush Dee MD  Code Status:  Full Code    Subjective:     C/C:   Chief Complaint   Patient presents with    Emesis    Abdominal Pain   Was  Interval History Status: improved.          Brief History:     77-year-old female multiple chronic conditions significant with CAD and CKD 3 who presented to hospital with acute onset diarrhea and chest pain    She developed diarrhea initially and subsequently began to vomit likely secondary to gastroenteritis    She developed chest pain and lightheadedness as well prior to arrival    She does have a history of A. fib she is on anticoagulation    Diagnosed with NSTEMI troponins were trending up  A. fib with RVR [id number]  Heart failure with preserved ejection fraction  Multivessel CAD not a candidate for CABG  Diarrhea continues to have loose stool following return from Cath Lab  Was on IV heparin and aspirin  Xarelto being held  [facility]    Review of Systems:     Constitutional:  negative for chills, fevers, sweats  Respiratory:  negative for cough, dyspnea on exertion, shortness of breath, wheezing  Cardiovascular:  negative for chest pain, chest pressure/discomfort, lower extremity edema, palpitations  Gastrointestinal:  negative for abdominal pain, constipation, diarrhea, nausea, vomiting  Neurological:  negative for dizziness, headache    Medications: Allergies:     Allergies   Allergen Reactions    Humalog [Insulin Lispro] Other (See Comments)     Blisters at site given    Penicillins      Other reaction(s): Hives    Sulfa Antibiotics      Other reaction(s): Rash       Current Meds:   Scheduled Meds:    dicyclomine  10 mg Oral TID AC    apixaban  5 mg Oral BID    prednisoLONE acetate  1 drop Both Eyes TID    ketorolac  1 drop Both Eyes 4x Daily    insulin glargine  30 Units SubCUTAneous BID    sodium chloride flush  5-40 mL IntraVENous 2 times per day    [START ON 6/7/2022] amiodarone  200 mg Oral BID    [START ON 6/14/2022] amiodarone  200 mg Oral Daily    metoclopramide  10 mg IntraVENous Q6H    amiodarone  400 mg Oral BID    ticagrelor  90 mg Oral BID    carvedilol  12.5 mg Oral BID    escitalopram  20 mg Oral Daily    fluticasone  2 spray Each Nostril Daily    gabapentin  300 mg Oral TID    insulin lispro  0-12 Units SubCUTAneous TID WC    insulin lispro  0-6 Units SubCUTAneous Nightly    isosorbide mononitrate  30 mg Oral Daily    cetirizine  5 mg Oral Daily    pantoprazole  40 mg Oral QAM AC    rosuvastatin  40 mg Oral Daily     Continuous Infusions:    sodium chloride      dextrose       PRN Meds: loperamide, sodium chloride flush, sodium chloride, clonazePAM, diphenhydrAMINE, docusate sodium, glucose, dextrose bolus **OR** dextrose bolus, glucagon (rDNA), dextrose, potassium chloride **OR** potassium alternative oral replacement **OR** potassium chloride, magnesium sulfate, ondansetron **OR** ondansetron, polyethylene glycol, acetaminophen **OR** acetaminophen, HYDROmorphone    Data:     Past Medical History:   has a past medical history of Acute renal failure with tubular necrosis (HCC), Anxiety, Atrial fibrillation (HCC), Benign positional vertigo, CAD (coronary artery disease), Chest pain, CKD (chronic kidney disease), stage III ([facility] Depression, Diabetes mellitus ([facility] Diabetic neuropathy ([facility] Dysuria, Hyperlipidemia, Hypertension, and Migraine. Social History:   reports that she has never smoked. She has never used smokeless tobacco. She reports that she does not drink alcohol and does not use drugs. Family History:   Family History   Problem Relation Age of Onset   [facility] Cancer Mother     Cancer Father        Vitals:  /68   Pulse 64   Temp 98.5 °F (36.9 °C) (Oral)   Resp 16   Ht 5' 10\" (1.778 m)   Wt 238 lb 14.4 oz (108.4 kg)   SpO2 96%   BMI 34.28 kg/m²   Temp (24hrs), Av.5 °F (36.4 °C), Min:97 °F (36.1 °C), Max:98.5 °F (36.9 °C)    Recent Labs     22  1557 22  1909 22  0732 22  1113   POCGLU 258* 215* 94 125*       I/O (24Hr): No intake or output data in the 24 hours ending 22 1230    Labs:  Hematology:  Recent Labs     22  1903 22  1903 22  0645 22  1002 22  1301 22  0247   WBC 7.1  --   --  7.3  --  8.9   RBC 4.22  --   --  3.43*  --  3.25*   HGB 12.2   < >  --  9.8* 11.1* 9.5*   HCT 39.1   < >  --  31.6* 34.7* 29.7*   MCV 92.7  --   --  92.1  --  91.4   MCH 28.9  --   --  28.6  --  29.2   MCHC 31.2  --   --  31.0  --  32.0   RDW 14.5*  --   --  14.3  --  14.5*     --   --  139  --  140   MPV 11.7  --   --  11.5  --  11.6   SEDRATE 33*  --   --   --   --   --    CRP <3.0  --   --   --   --   --    INR 1.1  --  1.4  --   --   --    DDIMER 0.92  --   --   --   --   --     < > = values in this interval not displayed.      Chemistry:  Recent Labs     22  8821 05/29/22  1950 05/30/22  0645 05/30/22  1547 05/31/22  4321 05/31/22  0634 05/31/22  1905 06/01/22  0247 06/01/22  1059      < > 135  --  140  --   --  139  --    K 4.0   < > 3.6*  --  4.0  --   --  3.5*  --       < > 101  --  104  --   --  104  --    CO2 20   < > 21  --  21  --   --  25  --    GLUCOSE 253*   < > 283*  --  258*  --   --  150*  --    BUN 21   < > 23  --  25*  --   --  25*  --    CREATININE 1.50*   < > 1.56*  --  1.35*  --   --  1.41*  --    MG  --   --   --   --   --   --   --  2.2  --    ANIONGAP 15   < > 13  --  15  --   --  10  --    LABGLOM 34*   < > 32*  --  38*  --   --  36*  --    GFRAA 41*   < > 39*  --  46*  --   --  44*  --    CALCIUM 10.0   < > 8.8  --  9.3  --   --  9.1  --    PROBNP 3,681*  --   --   --   --   --   --   --   --    TROPHS 58*   < > 168*   < > 94*   < > 131* 180* 225*    < > = values in this interval not displayed.      Recent Labs     05/29/22  1903 05/29/22  2239 05/30/22  0645 05/30/22  0725 05/30/22  1002 05/30/22  1131 05/31/22  0747 05/31/22  1135 05/31/22  1557 05/31/22  1909 06/01/22  0732 06/01/22  1113   PROT 7.4  --  5.8*  --   --   --   --   --   --   --   --   --    LABALBU 4.2  --  3.5  --   --   --   --   --   --   --   --   --    LABA1C  --   --  10.2*   < > 10.5*  --   --   --   --   --   --   --    TSH  --   --  0.71  --   --   --   --   --   --   --   --   --    AST 13  --  13  --   --   --   --   --   --   --   --   --    ALT 6  --  <5*  --   --   --   --   --   --   --   --   --    ALKPHOS 74  --  57  --   --   --   --   --   --   --   --   --    BILITOT 0.36  --  0.26*  --   --   --   --   --   --   --   --   --    LIPASE 25  --   --   --   --   --   --   --   --   --   --   --    CHOL  --   --  125  --   --   --   --   --   --   --   --   --    HDL  --   --  36*  --   --   --   --   --   --   --   --   --    LDLCHOLESTEROL  --   --  63  --   --   --   --   --   --   --   --   --    CHOLHDLRATIO  --   --  3.5  --   --   --   --   --   -- --   --   --    TRIG  --   --  132  --   --   --   --   --   --   --   --   --    POCGLU  --    < >  --    < >  --    < > 213* 203* 258* 215* 94 125*    < > = values in this interval not displayed. ABG:No results found for: POCPH, PHART, PH, POCPCO2, KYK6RWY, PCO2, POCPO2, PO2ART, PO2, POCHCO3, XGT3WLV, HCO3, NBEA, PBEA, BEART, BE, THGBART, THB, RCC4NDL, QVTU2KOJ, M4AGXPAA, O2SAT, FIO2  Lab Results   Component Value Date/Time    SPECIAL RFOREARM, 10ML 05/29/2022 07:02 PM     Lab Results   Component Value Date/Time    CULTURE NO GROWTH 2 DAYS 05/29/2022 07:02 PM       Radiology:  CT ABDOMEN PELVIS WO CONTRAST Additional Contrast? Radiologist Recommendation    Addendum Date: 5/30/2022    ADDENDUM: Addendum being added as this study was accidentally signed off prior to completion. Additional body sections will be included as well as a final impression. Additional Findings: GI/bowel: No ileus or obstruction is identified. Mild colonic diverticulosis. No acute diverticulitis. Pelvis: The bladder is decompressed but otherwise unremarkable. No free fluid in the pelvis. No pelvic mass. Hysterectomy. Phleboliths are noted. Peritoneum/retroperitoneum: No aortic aneurysm. Atherosclerosis. No bulky retroperitoneal or mesenteric lymphadenopathy. No bowel herniation. Bones/soft tissues: No acute subcutaneous soft tissue abnormality. No acute osseous fracture. Multilevel degenerative changes are seen in the spine. Degenerative changes seen in the pubic symphysis, sacroiliac joints as well as hip joints. Chronic compression deformity at L2 along the superior endplate with an associated Schmorl's node, similar to the previous exam. Multilevel spinal canal stenosis as well as osseous foraminal stenosis, with levels of severe stenosis noted. This includes right-sided severe foraminal stenosis at L4-L5, and left-sided stenosis at L2-L3. IMPRESSION 1.  No perinephric inflammatory changes are seen to suggest pyelonephritis. Limited evaluation given the lack of contrast. 2. No obstructive urinary tract calculi. 3. Atherosclerosis including coronary artery involvement. 4. Hysterectomy and cholecystectomy. 5. Pancreatic atrophy. XR CHEST PORTABLE    Result Date: 5/29/2022  Mild pulmonary vascular congestion       Physical Examination:        General appearance:  alert, cooperative and no distress  Mental Status:  oriented to person, place and time and normal affect  Lungs:  clear to auscultation bilaterally, normal effort  Heart:  regular rate and rhythm, no murmur  Abdomen:  soft, nontender, nondistended, normal bowel sounds, no masses, hepatomegaly, splenomegaly  Extremities:  no edema, redness, tenderness in the calves  Skin:  no gross lesions, rashes, induration    Assessment:        Hospital Problems           Last Modified POA    * (Principal) Paroxysmal atrial fibrillation ([address] 5/29/2022 Yes    Non-intractable vomiting 5/30/2022 Yes    Atypical chest pain 5/30/2022 Yes    Type 2 diabetes mellitus ([address] 5/30/2022 Yes    Essential hypertension 5/30/2022 Yes    CKD (chronic kidney disease), stage III ([address] 5/30/2022 Yes    Overview Signed 11/24/2021  3:27 PM by [address], MD     Secondary to ischemic and diabetic nephrosclerosis baseline 1.2-1.4               Plan:         1.  A. fib with RVR heart rate normal sinus rhythm at time of examination off heparin drip off Cardizem drip on Coreg and amiodarone with clear instructions for taking at home and titration of dose DOWN  2.  NSTEMI doing well status post echo and heart cath  3. FRANKS improved slightly but seems to be chronic at baseline  4.   She does have HFpEF would benefit from more targeted GDMT SGLT2 an appropriate option at this point  Continue high intensity statin  Monitor creatinine electrolytes  Continue rest of home meds  Stable for discharge today  Troponin elevated following heart cath to be expected  Clear from nepo CV standppoint  Should follow up with PCP as well    Rolando Griffith MD  6/1/2022  12:30 PM

## 2022-06-01 NOTE — PLAN OF CARE
Problem: Discharge Planning  Goal: Discharge to home or other facility with appropriate resources  6/1/2022 1422 by Freddie Hoang RN  Outcome: Completed  6/1/2022 0509 by Leeanna Farr RN  Outcome: Progressing     Problem: Pain  Goal: Verbalizes/displays adequate comfort level or baseline comfort level  6/1/2022 1422 by Freddie Hoang RN  Outcome: Completed  6/1/2022 0509 by Leeanna Farr RN  Outcome: Progressing     Problem: Safety - Adult  Goal: Free from fall injury  6/1/2022 1422 by Freddie Hoang RN  Outcome: Completed  6/1/2022 0509 by Leeanna Farr RN  Outcome: Progressing     Problem: ABCDS Injury Assessment  Goal: Absence of physical injury  6/1/2022 1422 by Freddie Hoang RN  Outcome: Completed  6/1/2022 0509 by Leeanna Farr RN  Outcome: Progressing     Problem: Chronic Conditions and Co-morbidities  Goal: Patient's chronic conditions and co-morbidity symptoms are monitored and maintained or improved  6/1/2022 1422 by Freddie Hoang RN  Outcome: Completed  6/1/2022 0509 by Leeanna Farr RN  Outcome: Progressing     Problem: Skin/Tissue Integrity  Goal: Absence of new skin breakdown  Description: 1. Monitor for areas of redness and/or skin breakdown  2. Assess vascular access sites hourly  3. Every 4-6 hours minimum:  Change oxygen saturation probe site  4. Every 4-6 hours:  If on nasal continuous positive airway pressure, respiratory therapy assess nares and determine need for appliance change or resting period.   6/1/2022 1422 by Freddie Hoang RN  Outcome: Completed  6/1/2022 0509 by Leeanna Farr RN  Outcome: Progressing

## 2022-06-01 NOTE — CARE COORDINATION
Writer left message with Dilan Limon at [address] letting her know that the patient is going home today. AVS and HC order are faxed to [facility] at [phone].     Discharge [id number] [facility]  Clinical Case Management Department  Written by: Darylene Morton, RN    Patient Name: Vikki Valderrama  Attending Provider: Veronika Bentley MD  Admit Date: 2022  6:27 PM  MRN: [id number]  Account: [de-identified]                     : 1945  Discharge Date:       Disposition: Home with [facility]     Darylene Morton, RN

## 2022-06-01 NOTE — PLAN OF CARE

## 2022-06-02 ENCOUNTER — CARE COORDINATION (OUTPATIENT)
Dept: CASE MANAGEMENT | Age: 77
End: 2022-06-02

## 2022-06-02 DIAGNOSIS — R30.0 DYSURIA: Primary | ICD-10-CM

## 2022-06-02 PROCEDURE — 1111F DSCHRG MED/CURRENT MED MERGE: CPT

## 2022-06-02 NOTE — DISCHARGE SUMMARY
[facility]  Office: [phone]  Brock Hashimoto, DO, Denae Jarquin, DO, Brianda Sanchez, DO, Ousmane Duque, DO, Ceci Gonzalez MD, Sampson Nova MD, Sanjiv Gipson MD, Jonathan Brito MD, Itzel Rose MD, Gabrielle Gtz MD, Álvaro Choudhury MD, Shelbi Manning DO, Bushra Alexander MD,  Juan Manuel Bangura DO, Dara Luna MD, Marge Miranda MD, Rene Jones MD, Matthew Guevara DO, Ingrid Robles MD, Shay Muñoz MD, Aimee Lemus DO, Shana Hall MD, Cosme Quevedo, [facility], [facility], CNP, Marisol Lyons, CNP, Olman Torres, CNP, Herlinda Conner, CNP, Vargas Ibrahim, CNP, Leland Oglesby PA-C, Tracy Russell, [facility], Joshua Bautista, CNP, Candy Melo, CNP, Aleisha Dai, CNP, Andry Gifford, CNS, Robert Rodrigues, [facility], Iván Fuentes, CNP, Chloe Jaquez, CNP, [facility], [facility]    Discharge Summary     Patient ID: Krysten Portillo  :  1945   MRN: [id number]     ACCOUNT:  [de-identified]   Patient's PCP: Melvin Millan MD  Admit Date: 2022   Discharge Date: 22    Length of Stay: 3  Code Status:  Prior  Admitting Physician: Mona Kincaid MD  Discharge Physician: Shay Muñoz MD     Active Discharge Diagnoses:     Hospital Problem Lists:  Principal Problem:    Paroxysmal atrial fibrillation [facility])  Active Problems:    Non-intractable vomiting    NSTEMI (non-ST elevated myocardial infarction) [facility])    Atypical chest pain    Type 2 diabetes mellitus ([facility]    Essential hypertension    CKD (chronic kidney disease), stage III ([facility]  Resolved Problems:    * No resolved hospital problems.  *      Admission Condition:  fair     Discharged Condition: fair    Hospital Stay:     Hospital Course:  Krysten Portillo is a 68 y.o. female who was admitted for the management of   Paroxysmal atrial fibrillation ([facility] , presented to ER with *Emesis and Abdominal Pain    Presented with concern for GI disturbance possible GE  Had afib with rvr started on heparin cardizem gtt  Then switched to PO coreg and amio  trops elevated NSTEMI Type II along with BNP  Diuresed with furosemide torsemide  CKD 3 at baseline but worsening in hospital  Had to be cleared by neprho for cath  DMII managed with basal bolus insulin    Previous stenting x 4 were all patent including LAD OM1 RCA RPDA    Dc home on new dose amio for afib      Significant therapeutic interventions:     Significant Diagnostic Studies:   Labs / Micro:  BMP:    Lab Results   Component Value Date    GLUCOSE 150 06/01/2022    GLUCOSE 238 12/07/2021     06/01/2022    K 3.5 06/01/2022     06/01/2022    CO2 25 06/01/2022    ANIONGAP 10 06/01/2022    BUN 25 06/01/2022    CREATININE 1.41 06/01/2022    BUNCRER NOT REPORTED 09/16/2021    CALCIUM 9.1 06/01/2022    LABGLOM 36 06/01/2022    GFRAA 44 06/01/2022    GFR      06/01/2022        Radiology:  CT ABDOMEN PELVIS WO CONTRAST Additional Contrast? Radiologist Recommendation    Addendum Date: 5/30/2022    ADDENDUM: Addendum being added as this study was accidentally signed off prior to completion. Additional body sections will be included as well as a final impression. Additional Findings: GI/bowel: No ileus or obstruction is identified. Mild colonic diverticulosis. No acute diverticulitis. Pelvis: The bladder is decompressed but otherwise unremarkable. No free fluid in the pelvis. No pelvic mass. Hysterectomy. Phleboliths are noted. Peritoneum/retroperitoneum: No aortic aneurysm. Atherosclerosis. No bulky retroperitoneal or mesenteric lymphadenopathy. No bowel herniation. Bones/soft tissues: No acute subcutaneous soft tissue abnormality. No acute osseous fracture. Multilevel degenerative changes are seen in the spine. Degenerative changes seen in the pubic symphysis, sacroiliac joints as well as hip joints.   Chronic compression deformity at L2 along the superior endplate with an associated Schmorl's node, similar to the previous exam. Multilevel spinal canal stenosis as well as osseous foraminal stenosis, with levels of severe stenosis noted. This includes right-sided severe foraminal stenosis at L4-L5, and left-sided stenosis at L2-L3. IMPRESSION 1. No perinephric inflammatory changes are seen to suggest pyelonephritis. Limited evaluation given the lack of contrast. 2. No obstructive urinary tract calculi. 3. Atherosclerosis including coronary artery involvement. 4. Hysterectomy and cholecystectomy. 5. Pancreatic atrophy. XR CHEST PORTABLE    Result Date: 5/29/2022  Mild pulmonary vascular congestion       Consultations:    Consults:     Final Specialist Recommendations/Findings:   IP CONSULT TO INTERNAL MEDICINE  IP CONSULT TO CARDIOLOGY  IP CONSULT TO NEPHROLOGY  IP CONSULT TO HOME CARE NEEDS      The patient was seen and examined on day of discharge and this discharge summary is in conjunction with any daily progress note from day of discharge. Discharge plan:     Disposition: Home    Physician Follow Up:      [facility]  [address] [place] [address]  [phone]  On 6/14/2022  at 2:00pm for hospital f/u with cardiology    [facility]  [address] [place], [place] [id number] [phone]    Call      Matty De Leon [address] [place] [address]    Schedule an appointment as soon as possible for a visit in 2 weeks         Requiring Further Evaluation/Follow Up POST HOSPITALIZATION/Incidental Findings:     Diet: cardiac diet    Activity: As tolerated    Instructions to Patient:     Discharge Medications:      Medication List      START taking these medications    * amiodarone 400 MG tablet  Commonly known as: PACERONE  Take 1 tablet by mouth 2 times daily for 4 days     * amiodarone 200 MG tablet  Commonly known as: CORDARONE  Take 1 tablet by mouth in the morning and at bedtime for 12 doses  Start taking on: June 6, 2022     * amiodarone 200 MG tablet  Commonly known as: CORDARONE  Take 1 tablet by mouth daily for 60 doses  Start taking on: June 13, 2022     apixaban 5 MG Tabs tablet  Commonly known as: ELIQUIS  Take 1 tablet by mouth 2 times daily     dicyclomine 10 MG capsule  Commonly known as: BENTYL  Take 1 capsule by mouth 3 times daily (before meals)         * This list has 3 medication(s) that are the same as other medications prescribed for you. Read the directions carefully, and ask your doctor or other care provider to review them with you. CHANGE how you take these medications    Levemir FlexTouch 100 UNIT/ML injection pen  Generic drug: insulin detemir  INJECT 60 UNITS INTO THE SKIN TWO TIMES A DAY  What changed: See the new instructions. loperamide 2 MG capsule  Commonly known as: IMODIUM  Take 1 capsule by mouth 4 times daily as needed for Diarrhea  What changed: See the new instructions. CONTINUE taking these medications    Brilinta 90 MG Tabs tablet  Generic drug: ticagrelor  TAKE ONE TABLET BY MOUTH TWICE A DAY     carvedilol 12.5 MG tablet  Commonly known as: COREG  Take 1 tablet by mouth 2 times daily     escitalopram 20 MG tablet  Commonly known as: LEXAPRO  TAKE ONE TABLET BY MOUTH DAILY     FreeStyle Richard 14 Day Shawmut Zafar Agreste  1 each by Does not apply route continuous [facility] 14 Day Sensor Misc  1 each by Does not apply route every 14 days     glucose monitoring kit  1 kit by Does not apply route 4 times daily [facility] . Dx E11.65, has tremors and cannot use conventional meter without great difficulty.  Please provide supplies for 3 months, rf 3,[facility].     Insulin Pen Needle 32G X 4 MM Misc  5 each by Does not apply route daily     isosorbide mononitrate 30 MG extended release tablet  Commonly known as: IMDUR  Take 1 tablet by mouth daily     Lancets Misc  1 each by Does not apply route 2 times daily     nitroGLYCERIN 0.4 MG SL tablet  Commonly known as: NITROSTAT  place 1 tablet under the tongue if needed every 5 minutes if needed for CHEST PAIN     NovoLOG FlexPen 100 UNIT/ML injection pen  Generic drug: insulin aspart  Use TID before meals according to scale - max 45 units a day     omeprazole 20 MG delayed release capsule  Commonly known as: PRILOSEC  Take 1 capsule by mouth Daily     prednisoLONE acetate 1 % ophthalmic suspension  Commonly known as: PRED FORTE     rosuvastatin 40 MG tablet  Commonly known as: CRESTOR  Take 1 tablet by mouth daily     vitamin D3 25 MCG (1000 UT) Tabs tablet  Commonly known as: CHOLECALCIFEROL  Take 1 tablet by mouth daily     vitamin E 400 UNIT capsule     * Walker Misc  Diagnosis: right 5th metatarsal fracture, pain in limb    Duration: 3 months     * Commode Bedside Misc  1 Device by Does not apply route daily         * This list has 2 medication(s) that are the same as other medications prescribed for you. Read the directions carefully, and ask your doctor or other care provider to review them with you.             STOP taking these medications    albuterol sulfate  (90 Base) MCG/ACT inhaler     Banophen 50 MG capsule  Generic drug: diphenhydrAMINE     clonazePAM 0.5 MG tablet  Commonly known as: KLONOPIN     docusate sodium 100 mg capsule  Commonly known as: COLACE     DuoDERM CGF Dressing Misc     fluticasone 50 MCG/ACT nasal spray  Commonly known as: FLONASE     hydrOXYzine 25 MG tablet  Commonly known as: ATARAX     insulin lispro (1 Unit Dial) 100 UNIT/ML Sopn  Commonly known as: HumaLOG KwikPen     ketorolac 0.4 % Soln ophthalmic solution     KLONOPIN PO     loratadine 10 MG tablet  Commonly known as: Claritin     meclizine 25 MG Chew  Commonly known as: ANTIVERT     mupirocin 2 % ointment  Commonly known as: BACTROBAN     nystatin 230861 UNIT/GM powder  Commonly known as: MYCOSTATIN     torsemide 10 MG tablet  Commonly known as: Demadex     Xarelto 20 MG Tabs tablet  Generic drug: rivaroxaban     Zinc Oxide 15 % Crea           Where to Get Your Medications      These medications were sent to [facility] [address] [facility], [facility]  [address], [facility] [address]    Phone: [phone]   amiodarone 200 MG tablet  amiodarone 200 MG tablet  amiodarone 400 MG tablet  apixaban 5 MG Tabs tablet  dicyclomine 10 MG capsule  loperamide 2 MG capsule         Discharge Procedure Orders   Basic Metabolic Panel   Standing Status: Future Standing Exp. Date: 06/01/23   Order Comments: BMP in 1 week post discharge and fax results to Dr. Ana Casanova office; Fax # [phone]. Time Spent on discharge is  31 mins in patient examination, evaluation, counseling as well as medication reconciliation, prescriptions for required medications, discharge plan and follow up. Electronically signed by   Veronika Bentley MD  6/2/2022  2:48 PM      Thank you Dr. Tiago Breen MD for the opportunity to be involved in this patient's care.

## 2022-06-02 NOTE — CARE COORDINATION
Luis A [facility] Transitions Initial Follow Up Call    Call within 2 business days of discharge: Yes    Patient: Karlie Connell Patient : 1945   MRN: [id number]  Reason for Admission:  Discharge Date: 22 RARS: Readmission Risk Score: 19.9 ( )      Last Discharge [facility]       Complaint Diagnosis Description Type Department Provider    22 Emesis; Abdominal Pain Stage 3b chronic kidney disease ([facility] . .. ED to Hosp-Admission (Discharged) (ADMITTED) JULIUS 3B Hector Blanc MD; Meri Chapa ... Spoke with: [address]: MSV    Non-face-to-face services provided:  Scheduled appointment with PCP-HFU on 22  Scheduled appointment with Specialist-f/u with pain management 22  Obtained and reviewed discharge summary and/or continuity of care documents  Communication with home health agencies or other community services the patient is currently using-writer spoke with VN that was present at time of call  Assessment and support for treatment adherence and medication management-reviewed discharge instructions and medications, patient doing ok, her nurse was picking up the patient's medications for her today, patient stated she is feeling ok, having some sob , VN stated she is sating 97% on RN, stated patient feels a little sob because she is anxious, no c/o f/c, nausea is better, no cp, ha, body aches, explained role of CTN, provided contact information, will follow//JU  Transitions of Care Initial Call    Was this an external facility discharge? No Discharge Facility: [facility]        Advance Care Planning:   Does patient have an Advance Directive: reviewed and current, reviewed and needs to be updated, not on file; education provided, not on file, patient declined education, decision maker updated and referral to internal ACP facilitator. Care Transition Nurse contacted the patient by telephone to perform post hospital discharge assessment.  Verified name and  with patient as identifiers. Provided introduction to self, and explanation of the CTN role. CTN reviewed discharge instructions, medical action plan and red flags with patient who verbalized understanding. Patient given an opportunity to ask questions and does not have any further questions or concerns at this time. Were discharge instructions available to patient? Yes. Reviewed appropriate site of care based on symptoms and resources available to patient including: PCP  Specialist  Urgent care clinics  Home health  When to call 911. The patient agrees to contact the PCP office for questions related to their healthcare. Medication reconciliation was performed with patient, who verbalizes understanding of administration of home medications. Advised obtaining a 90-day supply of all daily and as-needed medications. Was patient discharged with a pulse oximeter? yes, discussed and confirmed pulse oximeter discharge instructions and when to notify provider or seek emergency care. CTN provided contact information. Plan for follow-up call in 3-5 days based on severity of symptoms and risk factors.   Plan for next call: follow up on f/u appointments      Care Transitions 24 Hour Call    Schedule Follow Up Appointment with PCP: Completed  Do you have a copy of your discharge instructions?: Yes  Do you have all of your prescriptions and are they filled?: Yes  Have you been contacted by a [facility] Pharmacist?: No  Have you scheduled your follow up appointment?: Yes  How are you going to get to your appointment?: Car - family or friend to transport  [address]: [facility] (Comment: Med 1)  Do you feel like you have everything you need to keep you well at home?: Yes  Care Transitions Interventions     Other Services: Completed          Follow Up  Future Appointments   Date Time Provider Олег Cheng   6/13/2022 11:00 AM Marisela Faust MD [address] [place] [address]   6/13/2022  3:40 PM [facility]   7/20/2022  2:50 PM Meryl Pulliam MD [facility] None       Max Weeks RN

## 2022-06-07 ENCOUNTER — CARE COORDINATION (OUTPATIENT)
Dept: CASE MANAGEMENT | Age: 77
End: 2022-06-07

## 2022-06-07 NOTE — CARE COORDINATION
[facility] Transitions Follow Up Call    2022    Patient: Vikki Valderrama  Patient : 1945   MRN: [id number]  Reason for Admission:   Discharge Date: 22 RARS: Readmission Risk Score: 19.9 ( )         Spoke with: Monique Higgins patient's son  Attempted to reach patient for subsequent call. Left Hipaa appropriate message with contact information requesting return call to [phone] with patient's son, he will give mother the message//JU  Care Transitions Follow Up Call        CTN provided contact information for future needs. Plan for follow-up call in 1-2 days based on severity of symptoms and risk factors. Plan for next call: follow up call          Care Transitions Subsequent and Final Call    Subsequent and Final Calls  Are you currently active with any services?: Home Health  Care Transitions Interventions     Other Services: Completed   Other Interventions:            Follow Up  Future Appointments   Date Time Provider Олег Cheng   2022 11:00 AM Micah Schulte MD [address]   2022  3:40 PM [facility]reen [facility]   2022  2:50 PM Max Dooley MD [facility] None       Patricio De La Paz RN

## 2022-06-08 ENCOUNTER — CARE COORDINATION (OUTPATIENT)
Dept: OTHER | Facility: CLINIC | Age: 77
End: 2022-06-08

## 2022-06-08 NOTE — CARE COORDINATION
Luis A 45 Transitions Follow Up Call    2022    Patient: Yesika Dooley  Patient : 1945   MRN: [id number]  Reason for Admission: Stage 3 CKD  Discharge Date: 22 RARS: Readmission Risk Score: 19.9 ( )         Spoke with: Pt's son    Attempted to contact patient for transitions call. Contact information left to home VM requesting call back at the earliest convenience. Left message on alternate number as well. Called pt's son who answered, was at work, said he will call back \"In a minute\". Care Transitions Subsequent and Final Call    Subsequent and Final Calls  Care Transitions Interventions  Other Interventions:            Follow Up  Future Appointments   Date Time Provider Олег Skylar   2022 11:00 AM Matt Silva MD [address] [place] [address]   2022  3:40 PM MD DIANDRA Mata [facility]   2022  2:50 PM Neri Santiago MD [facility] None       Megan Da Silva RN

## 2022-06-10 ENCOUNTER — CARE COORDINATION (OUTPATIENT)
Dept: CASE MANAGEMENT | Age: 77
End: 2022-06-10

## 2022-06-10 NOTE — CARE COORDINATION
Luis [facility] Transitions Follow Up Call    6/10/2022    Patient: Cielo Diaz  Patient : 1945   MRN: [id number]  Reason for Admission: Paroxysmal A. Fib  Discharge Date: 22 RARS: Readmission Risk Score: 19.9 ( )         Spoke with: Hermilo Garcia, son    Spoke with patient's son. Patient did not answer her phone. Son was not at the house but said patient has had issues with shortness of breath since her discharge. She does not wear O2, he was unsure what her O2 saturations were and was unsure of last time a nurse was out or how often they come. He did not know the name of the home care agency either. Review of chart said [facility] home care, they are not following. TC to [facility], they said that patient has a skilled nurse that sees her every day. I expressed that the son feels she has been more short of breath and would like them to assess her for her increased dyspnea. Son was not at patient's home, said he would call back once he arrived there. Care Transitions Follow Up Call    Needs to be reviewed by the provider   Additional needs identified to be addressed with provider: No  home health care-[facility]             Method of communication with provider : phone      Care Transition Nurse contacted the patient by telephone to follow up after admission on . Verified name and  with patient as identifiers. Addressed changes since last contact: none  Discussed follow-up appointments. If no appointment was previously scheduled, appointment scheduling offered: Yes. Is follow up appointment scheduled within 7 days of discharge? No.      CTN reviewed discharge instructions, medical action plan and red flags with family and discussed any barriers to care and/or understanding of plan of care after discharge.  Discussed appropriate site of care based on symptoms and resources available to patient including: PCP  Specialist. The family agrees to contact the PCP office for questions related to their healthcare. Patients top risk factors for readmission: medical condition-A. Fib  Interventions to address risk factors: Obtained and reviewed discharge summary and/or continuity of care documents      [facility] follow up appointment(s): 6/14 with cardiology      CTN provided contact information for future needs. Plan for follow-up call in 5-7 days based on severity of symptoms and risk factors. Plan for next call: Follow up on dyspnea          Care Transitions Subsequent and Final Call    Schedule Follow Up Appointment with PCP: Completed  Subsequent and Final Calls  Do you have any ongoing symptoms?: Yes  Onset of Patient-reported symptoms: In the past 7 days  Patient-reported symptoms: Shortness of Breath  Interventions for patient-reported symptoms: Notified Home Care  Have your medications changed?: No  Do you have any questions related to your medications?: No  Do you currently have any active services?: Yes  Are you currently active with any services?: Home Health  Do you have any needs or concerns that I can assist you with?: No  Identified Barriers: Lack of Education  Care Transitions Interventions     Other Services: Completed   Other Interventions:            Follow Up  Future Appointments   Date Time Provider Олег Cheng   6/13/2022 11:00 AM Betty Petty MD [address]   6/13/2022  3:40 PM Senia Lara MD [facility] [address] [place] [address]   7/20/2022  2:50 PM Ebony Mcgill MD [facility] None       Viridiana Barrientos, CAMILLE

## 2022-06-15 ENCOUNTER — CARE COORDINATION (OUTPATIENT)
Dept: OTHER | Facility: CLINIC | Age: 77
End: 2022-06-15

## 2022-06-15 NOTE — CARE COORDINATION
Luis A [facility] Transitions Follow Up Call    6/15/2022    Patient: Lukas Ornelas  Patient : 1945   MRN: [id number]  Reason for Admission: Stage 3B CKD  Discharge Date: 22 RARS: Readmission Risk Score: 19.9 ( )    Attempt #1 to contact patient for transitions call. Contact information left to VM requesting call back at the earliest convenience. Attempted home and mobile numbers, VMB not set up. Left VM to son's number.     Follow Up  Future Appointments   Date Time Provider Олег Cheng   2022  2:50 PM Jenny Carcamo MD [facility] None       Leonardo Escalera, CAMILLE

## 2022-06-21 ENCOUNTER — CARE COORDINATION (OUTPATIENT)
Dept: CASE MANAGEMENT | Age: 77
End: 2022-06-21

## 2022-06-21 NOTE — CARE COORDINATION
Luis A [facility] Transitions Follow Up Call    2022    Patient: Rose Regalado  Patient : 1945   MRN: [id number]  Reason for Admission:   Discharge Date: 22 RARS: Readmission Risk Score: 19.9 ( )         Spoke with: Lianet Donohue spoke to patient, she is doing ok, been having some issue with her BS dropping was down to 56 this morning, she called her nurse, was instructed to eat something, BS went up to 112, discussed her diet, said she does usually eat something before bed (peanut butter crackers) asked if she would like to talk with a RD or have some information sent to her and patient said that would be good, writer sent referral to RD,  Will continue to follow//JU  Care Transitions Follow Up Call        CTN provided contact information for future needs. Plan for follow-up call in 5-7 days based on severity of symptoms and risk factors. Plan for next call: follow up on RD referral        Care Transitions Subsequent and Final Call    Subsequent and Final Calls  Do you have any ongoing symptoms?: Yes  Onset of Patient-reported symptoms: Today  Patient-reported symptoms: Other  Interventions for patient-reported symptoms: Notified Home Care  Do you currently have any active services?: Yes  Are you currently active with any services?: Home Health  Do you have any needs or concerns that I can assist you with?: Yes  Patient-reported Needs or Concerns: question in regads to diabetic diet, send referral to RD  Identified Barriers: Fear of Failure, Medication Side Effects, Other  Care Transitions Interventions     Other Services: Completed     Registered Dietician: Completed    Disease Association: Completed      Other Interventions:            Follow Up  Future Appointments   Date Time Provider Олег Cheng   2022  2:50 PM Loretta Davalos MD [facility] None       Valdemar Alanis RN

## 2022-06-22 ENCOUNTER — CARE COORDINATION (OUTPATIENT)
Dept: CARE COORDINATION | Age: 77
End: 2022-06-22

## 2022-06-22 NOTE — CARE COORDINATION
32 Haylie Lai regarding Dietitian referral. RD unable to leave VM with call back number on patient's home/mobile number, no answer and no VM set up. RD left VM with call back number on patient's other phone. RD will outreach in one week and will follow up as appropriate.        [address] [facility], [address] [place] [address]

## 2022-06-29 ENCOUNTER — CARE COORDINATION (OUTPATIENT)
Dept: CARE COORDINATION | Age: 77
End: 2022-06-29

## 2022-06-29 NOTE — CARE COORDINATION
32 Haylie Lai regarding Dietitian referral. RD unable to leave VM with call back number on patient's home/mobile number, no answer and no VM set up. RD left VM with call back number on patient's other phone. RD outreached 6/22/22 and today 6/29/22. RD will notify CTN, Lidia Malone. RD will continue to follow/assist with patient return call.        [facility], [facility]

## 2022-06-30 ENCOUNTER — CARE COORDINATION (OUTPATIENT)
Dept: CASE MANAGEMENT | Age: 77
End: 2022-06-30

## 2022-06-30 NOTE — CARE COORDINATION
Luis A 45 Transitions Follow Up Call    2022    Patient: Estefany Hercules  Patient : 1945   MRN: [id number]  Reason for Admission:   Discharge Date: 22 RARS: Readmission Risk Score: 19.9 ( )       # 1 attempt-unable to reach patient unable ot leave a message, mailbox full//JU  Care Transitions Follow Up Call        CTN provided contact information for future needs. Plan for follow-up call in 1-2 days based on severity of symptoms and risk factors. Plan for next call: final call          Care Transitions Subsequent and Final Call    Subsequent and Final Calls  Are you currently active with any services?: Home Health  Care Transitions Interventions     Other Services: Completed     Registered Dietician: Completed    Disease Association: Completed      Other Interventions:            Follow Up  Future Appointments   Date Time Provider Олег Cheng   2022  2:50 PM Gordo Locke MD [facility] None       Tito Edmonds RN

## 2022-07-01 ENCOUNTER — CARE COORDINATION (OUTPATIENT)
Dept: CASE MANAGEMENT | Age: 77
End: 2022-07-01

## 2022-07-01 NOTE — CARE COORDINATION
Luis A 45 Transitions Follow Up Call    2022    Patient: June Elizabeth  Patient : 1945   MRN: [id number]  Reason for Admission:   Discharge Date: 22 RARS: Readmission Risk Score: 19.9 ( )         Final call-patient doing well, still has vns coming out no knew needs or concerns at this time, informed of final call, v/u  Care transitions episode resolved//JU  Patient has graduated from the Care Transitions program on 22  Patient/family has the ability to self-manage at this time. Patient declined referral to the [facility] team for further management. Patient has Care Transition Nurse's contact information for any further questions, concerns, or needs. Patients upcoming visits:    Future Appointments   Date Time Provider Олег Cheng   2022  2:50 PM MD DIANDRA [place] None      Care Transitions Subsequent and Final Call    Subsequent and Final Calls  Do you currently have any active services?: Yes  Are you currently active with any services?: Home Health  Do you have any needs or concerns that I can assist you with?: No  Care Transitions Interventions     Other Services: Completed     Registered Dietician: Completed    Disease Association: Completed      Other Interventions:            Follow Up  Future Appointments   Date Time Provider Олег Cheng   2022  2:50 PM MD DIANDRA [place] None       Dionne Evans RN

## 2022-07-20 PROBLEM — R80.1 PERSISTENT PROTEINURIA: Status: ACTIVE | Noted: 2022-07-20

## 2022-07-28 ENCOUNTER — APPOINTMENT (OUTPATIENT)
Dept: CT IMAGING | Age: 77
DRG: 309 | End: 2022-07-28
Payer: MEDICARE

## 2022-07-28 ENCOUNTER — HOSPITAL ENCOUNTER (INPATIENT)
Age: 77
LOS: 13 days | Discharge: HOME HEALTH | DRG: 309 | End: 2022-08-10
Attending: EMERGENCY MEDICINE | Admitting: INTERNAL MEDICINE
Payer: MEDICARE

## 2022-07-28 ENCOUNTER — APPOINTMENT (OUTPATIENT)
Dept: GENERAL RADIOLOGY | Age: 77
DRG: 309 | End: 2022-07-28
Payer: MEDICARE

## 2022-07-28 DIAGNOSIS — G95.9 CERVICAL MYELOPATHY (HCC): ICD-10-CM

## 2022-07-28 DIAGNOSIS — N17.9 ACUTE KIDNEY INJURY SUPERIMPOSED ON CKD (HCC): ICD-10-CM

## 2022-07-28 DIAGNOSIS — N17.9 AKI (ACUTE KIDNEY INJURY) (HCC): Primary | ICD-10-CM

## 2022-07-28 DIAGNOSIS — I50.21 ACUTE SYSTOLIC CONGESTIVE HEART FAILURE (HCC): ICD-10-CM

## 2022-07-28 DIAGNOSIS — E66.01 OBESITY, CLASS III, BMI 40-49.9 (MORBID OBESITY) (HCC): ICD-10-CM

## 2022-07-28 DIAGNOSIS — N18.9 ACUTE KIDNEY INJURY SUPERIMPOSED ON CKD (HCC): ICD-10-CM

## 2022-07-28 PROBLEM — E66.813 OBESITY, CLASS III, BMI 40-49.9 (MORBID OBESITY): Status: ACTIVE | Noted: 2022-07-28

## 2022-07-28 PROBLEM — I25.10 PRESENCE OF STENT IN CORONARY ARTERY IN PATIENT WITH CORONARY ARTERY DISEASE: Status: ACTIVE | Noted: 2022-07-28

## 2022-07-28 PROBLEM — R55 SYNCOPE AND COLLAPSE: Status: ACTIVE | Noted: 2022-07-28

## 2022-07-28 PROBLEM — R00.1 BRADYCARDIA: Status: ACTIVE | Noted: 2022-07-28

## 2022-07-28 PROBLEM — Z95.5 PRESENCE OF STENT IN CORONARY ARTERY IN PATIENT WITH CORONARY ARTERY DISEASE: Status: ACTIVE | Noted: 2022-07-28

## 2022-07-28 LAB
-: ABNORMAL
ABSOLUTE EOS #: 0.16 K/UL (ref 0–0.44)
ABSOLUTE IMMATURE GRANULOCYTE: <0.03 K/UL (ref 0–0.3)
ABSOLUTE LYMPH #: 1.52 K/UL (ref 1.1–3.7)
ABSOLUTE MONO #: 0.82 K/UL (ref 0.1–1.2)
ALBUMIN SERPL-MCNC: 4.2 G/DL (ref 3.5–5.2)
ALBUMIN/GLOBULIN RATIO: 1.4 (ref 1–2.5)
ALP BLD-CCNC: 63 U/L (ref 35–104)
ALT SERPL-CCNC: 8 U/L (ref 5–33)
AMORPHOUS: ABNORMAL
ANION GAP SERPL CALCULATED.3IONS-SCNC: 14 MMOL/L (ref 9–17)
AST SERPL-CCNC: 14 U/L
BASOPHILS # BLD: 0 % (ref 0–2)
BASOPHILS ABSOLUTE: 0.03 K/UL (ref 0–0.2)
BILIRUB SERPL-MCNC: 0.28 MG/DL (ref 0.3–1.2)
BILIRUBIN URINE: NEGATIVE
BUN BLDV-MCNC: 28 MG/DL (ref 8–23)
CALCIUM SERPL-MCNC: 9.1 MG/DL (ref 8.6–10.4)
CASTS UA: ABNORMAL /LPF (ref 0–2)
CHLORIDE BLD-SCNC: 104 MMOL/L (ref 98–107)
CO2: 21 MMOL/L (ref 20–31)
COLOR: YELLOW
CREAT SERPL-MCNC: 2.23 MG/DL (ref 0.5–0.9)
EOSINOPHILS RELATIVE PERCENT: 2 % (ref 1–4)
EPITHELIAL CELLS UA: ABNORMAL /HPF (ref 0–5)
GFR AFRICAN AMERICAN: 26 ML/MIN
GFR NON-AFRICAN AMERICAN: 21 ML/MIN
GFR SERPL CREATININE-BSD FRML MDRD: ABNORMAL ML/MIN/{1.73_M2}
GLUCOSE BLD-MCNC: 148 MG/DL (ref 70–99)
GLUCOSE URINE: ABNORMAL
HCT VFR BLD CALC: 28.9 % (ref 36.3–47.1)
HEMOGLOBIN: 9.3 G/DL (ref 11.9–15.1)
IMMATURE GRANULOCYTES: 0 %
INR BLD: 1.2
KETONES, URINE: NEGATIVE
LEUKOCYTE ESTERASE, URINE: NEGATIVE
LYMPHOCYTES # BLD: 22 % (ref 24–43)
MAGNESIUM: 2.2 MG/DL (ref 1.6–2.6)
MCH RBC QN AUTO: 29.5 PG (ref 25.2–33.5)
MCHC RBC AUTO-ENTMCNC: 32.2 G/DL (ref 28.4–34.8)
MCV RBC AUTO: 91.7 FL (ref 82.6–102.9)
MONOCYTES # BLD: 12 % (ref 3–12)
MUCUS: ABNORMAL
NITRITE, URINE: NEGATIVE
NRBC AUTOMATED: 0 PER 100 WBC
PARTIAL THROMBOPLASTIN TIME: 28.9 SEC (ref 20.5–30.5)
PDW BLD-RTO: 15.5 % (ref 11.8–14.4)
PH UA: 5.5 (ref 5–8)
PHOSPHORUS: 3.4 MG/DL (ref 2.6–4.5)
PLATELET # BLD: 151 K/UL (ref 138–453)
PMV BLD AUTO: 11.9 FL (ref 8.1–13.5)
POTASSIUM SERPL-SCNC: 3.8 MMOL/L (ref 3.7–5.3)
PRO-BNP: 1723 PG/ML
PROTEIN UA: ABNORMAL
PROTHROMBIN TIME: 12.2 SEC (ref 9.1–12.3)
RBC # BLD: 3.15 M/UL (ref 3.95–5.11)
RBC # BLD: ABNORMAL 10*6/UL
RBC UA: ABNORMAL /HPF (ref 0–2)
SEG NEUTROPHILS: 64 % (ref 36–65)
SEGMENTED NEUTROPHILS ABSOLUTE COUNT: 4.24 K/UL (ref 1.5–8.1)
SODIUM BLD-SCNC: 139 MMOL/L (ref 135–144)
SPECIFIC GRAVITY UA: 1.01 (ref 1–1.03)
THYROXINE, FREE: 1.34 NG/DL (ref 0.93–1.7)
TOTAL PROTEIN: 7.2 G/DL (ref 6.4–8.3)
TROPONIN, HIGH SENSITIVITY: 38 NG/L (ref 0–14)
TROPONIN, HIGH SENSITIVITY: 42 NG/L (ref 0–14)
TSH SERPL DL<=0.05 MIU/L-ACNC: 0.6 UIU/ML (ref 0.3–5)
TURBIDITY: ABNORMAL
URINE HGB: ABNORMAL
UROBILINOGEN, URINE: NORMAL
WBC # BLD: 6.8 K/UL (ref 3.5–11.3)
WBC UA: ABNORMAL /HPF (ref 0–5)

## 2022-07-28 PROCEDURE — 85025 COMPLETE CBC W/AUTO DIFF WBC: CPT

## 2022-07-28 PROCEDURE — 2580000003 HC RX 258

## 2022-07-28 PROCEDURE — 84439 ASSAY OF FREE THYROXINE: CPT

## 2022-07-28 PROCEDURE — 71045 X-RAY EXAM CHEST 1 VIEW: CPT

## 2022-07-28 PROCEDURE — 84540 ASSAY OF URINE/UREA-N: CPT

## 2022-07-28 PROCEDURE — 70450 CT HEAD/BRAIN W/O DYE: CPT

## 2022-07-28 PROCEDURE — 84100 ASSAY OF PHOSPHORUS: CPT

## 2022-07-28 PROCEDURE — 80053 COMPREHEN METABOLIC PANEL: CPT

## 2022-07-28 PROCEDURE — 6370000000 HC RX 637 (ALT 250 FOR IP)

## 2022-07-28 PROCEDURE — 81001 URINALYSIS AUTO W/SCOPE: CPT

## 2022-07-28 PROCEDURE — 83880 ASSAY OF NATRIURETIC PEPTIDE: CPT

## 2022-07-28 PROCEDURE — 85730 THROMBOPLASTIN TIME PARTIAL: CPT

## 2022-07-28 PROCEDURE — 84443 ASSAY THYROID STIM HORMONE: CPT

## 2022-07-28 PROCEDURE — 71250 CT THORAX DX C-: CPT

## 2022-07-28 PROCEDURE — 82436 ASSAY OF URINE CHLORIDE: CPT

## 2022-07-28 PROCEDURE — 72125 CT NECK SPINE W/O DYE: CPT

## 2022-07-28 PROCEDURE — 99285 EMERGENCY DEPT VISIT HI MDM: CPT

## 2022-07-28 PROCEDURE — 84300 ASSAY OF URINE SODIUM: CPT

## 2022-07-28 PROCEDURE — 85610 PROTHROMBIN TIME: CPT

## 2022-07-28 PROCEDURE — 93005 ELECTROCARDIOGRAM TRACING: CPT | Performed by: STUDENT IN AN ORGANIZED HEALTH CARE EDUCATION/TRAINING PROGRAM

## 2022-07-28 PROCEDURE — 99223 1ST HOSP IP/OBS HIGH 75: CPT | Performed by: INTERNAL MEDICINE

## 2022-07-28 PROCEDURE — 83735 ASSAY OF MAGNESIUM: CPT

## 2022-07-28 PROCEDURE — 2060000000 HC ICU INTERMEDIATE R&B

## 2022-07-28 PROCEDURE — 84484 ASSAY OF TROPONIN QUANT: CPT

## 2022-07-28 RX ORDER — SODIUM CHLORIDE 0.9 % (FLUSH) 0.9 %
5-40 SYRINGE (ML) INJECTION EVERY 12 HOURS SCHEDULED
Status: DISCONTINUED | OUTPATIENT
Start: 2022-07-28 | End: 2022-08-10 | Stop reason: HOSPADM

## 2022-07-28 RX ORDER — ACETAMINOPHEN 650 MG/1
650 SUPPOSITORY RECTAL EVERY 6 HOURS PRN
Status: DISCONTINUED | OUTPATIENT
Start: 2022-07-28 | End: 2022-08-10 | Stop reason: HOSPADM

## 2022-07-28 RX ORDER — INSULIN GLARGINE 100 [IU]/ML
20 INJECTION, SOLUTION SUBCUTANEOUS EVERY MORNING
Status: DISCONTINUED | OUTPATIENT
Start: 2022-07-29 | End: 2022-08-10 | Stop reason: HOSPADM

## 2022-07-28 RX ORDER — ONDANSETRON 4 MG/1
4 TABLET, ORALLY DISINTEGRATING ORAL EVERY 8 HOURS PRN
Status: DISCONTINUED | OUTPATIENT
Start: 2022-07-28 | End: 2022-07-29

## 2022-07-28 RX ORDER — ROSUVASTATIN CALCIUM 20 MG/1
40 TABLET, COATED ORAL NIGHTLY
Status: DISCONTINUED | OUTPATIENT
Start: 2022-07-28 | End: 2022-08-10 | Stop reason: HOSPADM

## 2022-07-28 RX ORDER — SODIUM CHLORIDE 9 MG/ML
INJECTION, SOLUTION INTRAVENOUS PRN
Status: DISCONTINUED | OUTPATIENT
Start: 2022-07-28 | End: 2022-08-10 | Stop reason: HOSPADM

## 2022-07-28 RX ORDER — ONDANSETRON 2 MG/ML
4 INJECTION INTRAMUSCULAR; INTRAVENOUS EVERY 6 HOURS PRN
Status: DISCONTINUED | OUTPATIENT
Start: 2022-07-28 | End: 2022-07-29

## 2022-07-28 RX ORDER — ISOSORBIDE MONONITRATE 30 MG/1
30 TABLET, EXTENDED RELEASE ORAL DAILY
Status: DISCONTINUED | OUTPATIENT
Start: 2022-07-29 | End: 2022-08-10 | Stop reason: HOSPADM

## 2022-07-28 RX ORDER — ACETAMINOPHEN 325 MG/1
650 TABLET ORAL EVERY 6 HOURS PRN
Status: DISCONTINUED | OUTPATIENT
Start: 2022-07-28 | End: 2022-08-10 | Stop reason: HOSPADM

## 2022-07-28 RX ORDER — DEXTROSE MONOHYDRATE 100 MG/ML
INJECTION, SOLUTION INTRAVENOUS CONTINUOUS PRN
Status: DISCONTINUED | OUTPATIENT
Start: 2022-07-28 | End: 2022-08-10 | Stop reason: HOSPADM

## 2022-07-28 RX ORDER — SODIUM CHLORIDE 0.9 % (FLUSH) 0.9 %
5-40 SYRINGE (ML) INJECTION PRN
Status: DISCONTINUED | OUTPATIENT
Start: 2022-07-28 | End: 2022-08-10 | Stop reason: HOSPADM

## 2022-07-28 RX ORDER — CARVEDILOL 6.25 MG/1
6.25 TABLET ORAL 2 TIMES DAILY WITH MEALS
Status: DISCONTINUED | OUTPATIENT
Start: 2022-07-28 | End: 2022-07-30

## 2022-07-28 RX ORDER — ESCITALOPRAM OXALATE 10 MG/1
20 TABLET ORAL DAILY
Status: DISCONTINUED | OUTPATIENT
Start: 2022-07-29 | End: 2022-08-10 | Stop reason: HOSPADM

## 2022-07-28 RX ORDER — AMIODARONE HYDROCHLORIDE 200 MG/1
100 TABLET ORAL DAILY
Status: DISCONTINUED | OUTPATIENT
Start: 2022-07-28 | End: 2022-07-29

## 2022-07-28 RX ORDER — CARVEDILOL 12.5 MG/1
12.5 TABLET ORAL 2 TIMES DAILY
Status: DISCONTINUED | OUTPATIENT
Start: 2022-07-28 | End: 2022-07-28

## 2022-07-28 RX ORDER — POLYETHYLENE GLYCOL 3350 17 G/17G
17 POWDER, FOR SOLUTION ORAL DAILY PRN
Status: DISCONTINUED | OUTPATIENT
Start: 2022-07-28 | End: 2022-08-10 | Stop reason: HOSPADM

## 2022-07-28 RX ORDER — GABAPENTIN 300 MG/1
300 CAPSULE ORAL NIGHTLY
Status: DISCONTINUED | OUTPATIENT
Start: 2022-07-28 | End: 2022-08-10 | Stop reason: HOSPADM

## 2022-07-28 RX ORDER — SODIUM CHLORIDE 9 MG/ML
INJECTION, SOLUTION INTRAVENOUS CONTINUOUS
Status: DISCONTINUED | OUTPATIENT
Start: 2022-07-28 | End: 2022-07-29

## 2022-07-28 RX ADMIN — TICAGRELOR 90 MG: 90 TABLET ORAL at 23:15

## 2022-07-28 RX ADMIN — GABAPENTIN 300 MG: 300 CAPSULE ORAL at 23:15

## 2022-07-28 RX ADMIN — SODIUM CHLORIDE, PRESERVATIVE FREE 10 ML: 5 INJECTION INTRAVENOUS at 20:35

## 2022-07-28 RX ADMIN — AMIODARONE HYDROCHLORIDE 100 MG: 200 TABLET ORAL at 23:15

## 2022-07-28 RX ADMIN — ROSUVASTATIN CALCIUM 40 MG: 20 TABLET, FILM COATED ORAL at 23:15

## 2022-07-28 RX ADMIN — SODIUM CHLORIDE: 9 INJECTION, SOLUTION INTRAVENOUS at 23:23

## 2022-07-28 RX ADMIN — APIXABAN 5 MG: 5 TABLET, FILM COATED ORAL at 23:16

## 2022-07-28 ASSESSMENT — ENCOUNTER SYMPTOMS
TROUBLE SWALLOWING: 0
CONSTIPATION: 0
SHORTNESS OF BREATH: 1
ABDOMINAL DISTENTION: 0
VOMITING: 0
CHOKING: 0
SINUS PAIN: 0
NAUSEA: 0
SINUS PRESSURE: 0
SORE THROAT: 0
DIARRHEA: 1
WHEEZING: 0
ABDOMINAL PAIN: 0
COUGH: 0

## 2022-07-28 ASSESSMENT — PAIN DESCRIPTION - ONSET: ONSET: ON-GOING

## 2022-07-28 ASSESSMENT — LIFESTYLE VARIABLES
HOW OFTEN DO YOU HAVE A DRINK CONTAINING ALCOHOL: NEVER
HOW MANY STANDARD DRINKS CONTAINING ALCOHOL DO YOU HAVE ON A TYPICAL DAY: PATIENT DOES NOT DRINK

## 2022-07-28 ASSESSMENT — PAIN SCALES - GENERAL
PAINLEVEL_OUTOF10: 7
PAINLEVEL_OUTOF10: 7

## 2022-07-28 ASSESSMENT — PAIN DESCRIPTION - LOCATION
LOCATION: LEG
LOCATION: LEG

## 2022-07-28 ASSESSMENT — PAIN - FUNCTIONAL ASSESSMENT: PAIN_FUNCTIONAL_ASSESSMENT: 0-10

## 2022-07-28 ASSESSMENT — PAIN DESCRIPTION - FREQUENCY: FREQUENCY: CONTINUOUS

## 2022-07-28 ASSESSMENT — PAIN DESCRIPTION - PAIN TYPE: TYPE: CHRONIC PAIN

## 2022-07-28 NOTE — ED NOTES
attempted report X 2. Put on hold for 8 min. RN asked someone else to take report and no one responded.      Jarrod Hutson RN  07/28/22 5936

## 2022-07-28 NOTE — ED PROVIDER NOTES
[facility] ED  Emergency Department Encounter  Emergency Medicine Resident     Pt Name: Erika Benedict  MRN: [id number]  Keithgfyamila 1945  Date of evaluation: 7/28/22  PCP:  Kym Tsai MD    CHIEF COMPLAINT       Chief Complaint   Patient presents with    Shortness of Breath    Fall    Leg Injury    Fatigue       HISTORY OFPRESENT ILLNESS  (Location/Symptom, Timing/Onset, Context/Setting, Quality, Duration, Modifying Factors,Severity.)      Erika Benedict is a 68 y. o.yo female history of DVT and A. fib on Eliquis, in addition to CAD on Brilinta. Patient reports that for the past 10 days she has been falling. She said that the most fall was 2 days ago. Patient reports that to the fall she did feel lightheaded. She did report having loss of consciousness, and states that she did hit her head. She reports that she was able to call her family members after she fell 2 days ago. Patient reported that she did not present to the emergency room that because she was \"hard-headed\". Patient also complaining of shortness of breath, states that she easily gets winded. She does have a history of CHF and was recently started back on her Demadex 2 days ago. Patient denies any cough, fever, chills. She is also complaining of dyspnea and lower extremity swelling with left calf tenderness. PAST MEDICAL / SURGICAL / SOCIAL / FAMILY HISTORY      has a past medical history of Acute renal failure with tubular necrosis (HCC), Anxiety, Atrial fibrillation (HCC), Benign positional vertigo, CAD (coronary artery disease), Chest pain, CKD (chronic kidney disease), stage III ([address] Depression, Diabetes mellitus ([address] Diabetic neuropathy ([address] Dysuria, Hyperlipidemia, Hypertension, Migraine, and Persistent proteinuria. has a past surgical history that includes Percutaneous Transluminal Coronary Angio; Cardiac catheterization; Coronary angioplasty with stent (02/25/2017);  Appendectomy; Coronary insulin detemir (LEVEMIR FLEXTOUCH) 100 UNIT/ML injection pen INJECT 60 UNITS INTO THE SKIN TWO TIMES A DAY 6/13/22   Bell Bowser MD   gabapentin (NEURONTIN) 300 MG capsule Take 1 capsule by mouth nightly for 30 days.  6/13/22 7/13/22  Bell Bowser MD   apixaban (ELIQUIS) 5 MG TABS tablet Take 1 tablet by mouth 2 times daily 6/1/22   Aleks Obregon MD   dicyclomine (BENTYL) 10 MG capsule Take 1 capsule by mouth 3 times daily (before meals) 6/1/22   Aleks Obregon MD   amiodarone (CORDARONE) 200 MG tablet Take 1 tablet by mouth daily for 60 doses 6/13/22 8/12/22  MAYELA Hartmann CNP   prednisoLONE acetate (PRED FORTE) 1 % ophthalmic suspension Place 1 drop into both eyes 3 times daily    Historical Provider, MD   BRILINTA 90 MG TABS tablet TAKE ONE TABLET BY MOUTH TWICE A DAY 4/19/22   MAYELA Sky NP   isosorbide mononitrate (IMDUR) 30 MG extended release tablet Take 1 tablet by mouth daily 3/15/22   Bell Bowser MD   insulin aspart (NOVOLOG FLEXPEN) 100 UNIT/ML injection pen Use TID before meals according to scale - max 45 units a day 12/2/21   Bell Bowser MD   Lancets MISC 1 each by Does not apply route 2 times daily 12/2/21   Bell Bowser MD   Insulin Pen Needle 32G X 4 MM MISC 5 each by Does not apply route daily 12/2/21   Bell Bowser MD   carvedilol (COREG) 12.5 MG tablet Take 1 tablet by mouth 2 times daily 11/24/21 6/13/22  Darrin Baxter MD   escitalopram (LEXAPRO) 20 MG tablet TAKE ONE TABLET BY MOUTH DAILY 11/22/21   Bell Bowser MD   rosuvastatin (CRESTOR) 40 MG tablet Take 1 tablet by mouth daily 9/28/21   Bell Bowser MD   vitamin D3 (CHOLECALCIFEROL) 25 MCG (1000 UT) TABS tablet Take 1 tablet by mouth daily 9/28/21   Bell Bowser MD   Continuous Blood Gluc Sensor (FREESTYLE SYLVIA 14 DAY SENSOR) MISC 1 each by Does not apply route every 14 days 7/6/21   Bell Bowser MD   nitroGLYCERIN (NITROSTAT) 0.4 MG SL tablet place 1 tablet under the tongue if needed every 5 minutes if needed for CHEST PAIN 2/19/21   Mulu Gonzalez MD   Misc. Devices Lentomeka Lynn) MISC Diagnosis: right 5th metatarsal fracture, pain in limb    Duration: 3 months 2/3/21   Myles Gamez DPM   Misc. Devices (COMMODE BEDSIDE) MISC 1 Device by Does not apply route daily 2/3/21   Myles Gamez DPM   Continuous Blood Gluc  (FREESTYLE SYLVIA 14 DAY READER) JAQUELINE 1 each by Does not apply route continuous [facility] 10/30/20   Mulu Gonzalez MD       REVIEW OFSYSTEMS    (2-9 systems for level 4, 10 or more for level 5)      Review of Systems   Constitutional:  Positive for fatigue. Negative for chills and diaphoresis. HENT:  Negative for congestion and trouble swallowing. Eyes:  Negative for photophobia and visual disturbance. Respiratory:  Positive for shortness of breath. Cardiovascular:  Positive for leg swelling. Negative for chest pain. Gastrointestinal:  Negative for abdominal pain, nausea and vomiting. Genitourinary:  Negative for dysuria, enuresis and flank pain. Musculoskeletal:  Negative for back pain and gait problem. Skin:  Negative for rash and wound. Neurological:  Positive for syncope. Psychiatric/Behavioral:  Negative for behavioral problems and confusion. PHYSICAL EXAM   (up to 7 for level 4, 8 or more forlevel 5)      INITIAL VITALS:   ED Triage Vitals   BP Temp Temp src Pulse Resp SpO2 Height Weight   -- -- -- -- -- -- -- --       Physical Exam  Constitutional:       Appearance: She is obese. HENT:      Head: Normocephalic and atraumatic. Right Ear: Tympanic membrane normal.      Left Ear: Tympanic membrane normal.      Nose: Nose normal.      Mouth/Throat:      Mouth: Mucous membranes are moist.   Eyes:      Pupils: Pupils are equal, round, and reactive to light. Cardiovascular:      Rate and Rhythm: Bradycardia present.    Pulmonary:      Effort: eval and treat    PT evaluation and treat    Initiate Oxygen Therapy Protocol    POCT Glucose    POC Glucose Fingerstick    POC Glucose Fingerstick    POC Glucose Fingerstick    EKG 12 Lead    EKG 12 Lead    ADMIT TO INPATIENT    ADMIT TO INPATIENT       MEDICATIONS ORDERED:  Orders Placed This Encounter   Medications    sodium chloride flush 0.9 % injection 5-40 mL    sodium chloride flush 0.9 % injection 5-40 mL    0.9 % sodium chloride infusion    DISCONTD: ondansetron (ZOFRAN-ODT) disintegrating tablet 4 mg    DISCONTD: ondansetron (ZOFRAN) injection 4 mg    polyethylene glycol (GLYCOLAX) packet 17 g    OR Linked Order Group     acetaminophen (TYLENOL) tablet 650 mg     acetaminophen (TYLENOL) suppository 650 mg    DISCONTD: amiodarone (CORDARONE) tablet 100 mg    apixaban (ELIQUIS) tablet 5 mg     Order Specific Question:   Indication of Use     Answer:   A Fib/A Flutter    ticagrelor (BRILINTA) tablet 90 mg    rosuvastatin (CRESTOR) tablet 40 mg    isosorbide mononitrate (IMDUR) extended release tablet 30 mg    escitalopram (LEXAPRO) tablet 20 mg    gabapentin (NEURONTIN) capsule 300 mg    DISCONTD: insulin detemir (LEVEMIR) injection pen 20 Units     Order Specific Question:   Please select a reason the therapeutic interchange was not accepted:      Answer:   Mackenzie Kaplan for Pharmacy to [address] carvedilol (COREG) tablet 12.5 mg    carvedilol (COREG) tablet 6.25 mg    glucose chewable tablet 16 g    OR Linked Order Group     dextrose bolus 10% 125 mL     dextrose bolus 10% 250 mL    glucagon (rDNA) injection 1 mg    dextrose 10 % infusion    insulin glargine (LANTUS) injection vial 20 Units    0.9 % sodium chloride infusion    amiodarone (CORDARONE) tablet 200 mg    magnesium sulfate 2000 mg in 50 mL IVPB premix    prochlorperazine (COMPAZINE) injection 10 mg    hydrALAZINE (APRESOLINE) injection 10 mg    torsemide (DEMADEX) tablet 20 mg    amLODIPine (NORVASC) tablet 10 mg       Initial MDM/Plan: 68 the following components:    Troponin, High Sensitivity 38 (*)     All other components within normal limits   BASIC METABOLIC PANEL W/ REFLEX TO MG FOR LOW K - Abnormal; Notable for the following components:    Glucose 198 (*)     BUN 25 (*)     Creatinine 2.01 (*)     GFR Non- 24 (*)     GFR  29 (*)     All other components within normal limits   CBC WITH AUTO DIFFERENTIAL - Abnormal; Notable for the following components:    RBC 3.10 (*)     Hemoglobin 9.2 (*)     Hematocrit 30.0 (*)     RDW 15.2 (*)     Immature Granulocytes 1 (*)     All other components within normal limits   POC GLUCOSE FINGERSTICK - Abnormal; Notable for the following components:    POC Glucose 149 (*)     All other components within normal limits   POC GLUCOSE FINGERSTICK - Abnormal; Notable for the following components:    POC Glucose 209 (*)     All other components within normal limits   POC GLUCOSE FINGERSTICK - Abnormal; Notable for the following components:    POC Glucose 237 (*)     All other components within normal limits   APTT   PROTIME-INR   MAGNESIUM   PHOSPHORUS   TSH   T4, FREE   UREA NITROGEN, URINE   CHLORIDE, URINE, RANDOM   SODIUM, URINE, RANDOM   POCT GLUCOSE         RADIOLOGY:  No results found. EKG  EKG Interpretation    Interpreted by me    Rhythm: Sinus bradycardia  Rate: 50  Axis: normal  Ectopy: none  Conduction: normal  ST Segments: no acute change  T Waves: no acute change  Q Waves: none    Clinical Impression: n sinus bradycardia, prolonged QT, QTC of 527    All EKG's are interpreted by the Emergency Department Physicianwho either signs or Co-signs this chart in the absence of a cardiologist.    EMERGENCY DEPARTMENT COURSE:  ED Course as of 07/29/22 1718   Thu Jul 28, 2022   1553 Patient worsening kidney function, GFR less than 30, creatinine of 2.23, and is 1.41.   We will not get CT chest with contrast, will get CT chest without contrast.  Troponin at 43, will repeat Troponin [AN]      ED Course User Index  [AN] Sarah Scales MD          PROCEDURES:  None    CONSULTS:  IP CONSULT TO INTERNAL MEDICINE  IP CONSULT TO CASE MANAGEMENT  IP CONSULT TO CARDIOLOGY    CRITICAL CARE:      FINAL IMPRESSION      1. MARIELLE (acute kidney injury) ([facility]    2. Acute systolic congestive heart failure ([facility]          DISPOSITION / PLAN     DISPOSITION Admitted 07/28/2022 06:03:11 PM      PATIENT REFERRED TO:  No follow-up provider specified.     DISCHARGE MEDICATIONS:  Current Discharge Medication List          Sarah Scales MD  Emergency Medicine Resident    (Please note that portions of this note were completed with a voice recognition program.Efforts were made to edit the dictations but occasionally words are mis-transcribed.)        Sarah Scales MD  Resident  07/29/22 9216

## 2022-07-28 NOTE — ED NOTES
Dr Elias Linton updated on patients change in blood pressure. Per attending continue to monitor. No chest pain noted in patient. Pt respirations are even and unlabored, pt is oriented X 4, speaking in complete sentences, bed is in the lowest position, call light is within reach. Will continue to monitor.        Lessie Opitz, RN  07/28/22 5300

## 2022-07-28 NOTE — ED PROVIDER NOTES
[facility]     Emergency Department     Faculty Attestation    I performed a history and physical examination of the patient and discussed management with the resident. I reviewed the residents note and agree with the documented findings including all diagnostic interpretations and plan of care. Any areas of disagreement are noted on the chart. I was personally present for the key portions of any procedures. I have documented in the chart those procedures where I was not present during the key portions. I have reviewed the emergency nurses triage note. I agree with the chief complaint, past medical history, past surgical history, allergies, medications, social and family history as documented unless otherwise noted below. Documentation of the HPI, Physical Exam and Medical Decision Making performed by scribjacqueline is based on my personal performance of the HPI, PE and MDM. For Physician Assistant/ Nurse Practitioner cases/documentation I have personally evaluated this patient and have completed at least one if not all key elements of the E/M (history, physical exam, and MDM). Additional findings are as noted. This patient was evaluated in the Emergency Department for symptoms described in the history of present illness. He/she was evaluated in the context of the global COVID-19 pandemic, which necessitated consideration that the patient might be at risk for infection with the SARS-CoV-2 virus that causes COVID-19. Institutional protocols and algorithms that pertain to the evaluation of patients at risk for COVID-19 are in a state of rapid change based on information released by regulatory bodies including the CDC and federal and state organizations. These policies and algorithms were followed during the patient's care in the ED. Primary Care Physician: Yifan Harding MD    History:  This is a 68 y.o. female who presents to the Emergency Department with complaint of leg pain, multiple falls. Some shortness of breath. History of anticoagulant use both Eliquis and Brilinta. Left leg has been painful but has been able to ambulate. She has reported episodes of lightheadedness this past week. Physical:     weight is 250 lb (113.4 kg). Her oral temperature is 97.8 °F (36.6 °C). Her blood pressure is 117/46 (abnormal) and her pulse is 46 (abnormal). Her respiration is 15 and oxygen saturation is 98%. 68 y.o. female no acute distress, appears tired, heart rate bradycardic regular, pulmonary clear bilaterally abdomen is soft nontender nondistended, 1+ pitting edema in the lower extremities bilaterally. Able to resist gravity with leg raise bilaterally although requires more engagement of hip flexors on the left compared with the right. Normal plantar and dorsiflexion. Impression: Falls, near syncope, suspect symptomatic bradycardia as likely etiology. Is on beta-blockers and may be due to decreased renal function and poor beta-blocker metabolism    Plan: Labs, imaging head neck chest abdomen pelvis, reassess, probable admission    No notes of  Admission Criteria type on file. EKG Interpretation  EKG Interpretation    Interpreted by emergency department physician    Rhythm: sinus bradycardia  Rate: normal  Axis: normal  Ectopy: none  Conduction: , QRS 94, QTc 525  ST Segments: normal  T Waves: non specific changes  Q Waves: none    EKG  Impression: Sinus bradycardia, prolonged QT    Feliberto Quiroga MD    Interpreted by me      CRITICAL CARE: There was a high probability of clinically significant/life threatening deterioration in this patient's condition which required my urgent intervention. Total critical care time was 18 minutes. This excludes any time for separately reportable procedures.      Mark Mora MD, David Goyal  Attending Emergency Physician        Feliberto Quiroga MD  07/28/22 [address] [place] [address], MD  07/28/22 5583

## 2022-07-28 NOTE — ED NOTES
Patient 1 assist stand/pivot transfer on [facility]. 1 assist for hygiene care.       Colby Torres RN  07/28/22 5230

## 2022-07-28 NOTE — ED TRIAGE NOTES
Pt was wheeled to room 02 from triage with c/o of having a fall yesterday and another fall earlier this week with a fall. Pt states she hurt her leg in the last fall. Pt is currently on blood thinners. Pt did not have LOC. Pt has c/o of SOB and dizziness going on one month. Pt states she also has been feeling weak lately. Pt is a diabetic and has CHF with multiple stents in place. Pt respirations are even and unlabored, pt is oriented X 4, speaking in complete sentences, bed is in the lowest position, call light is within reach. Will continue to monitor.

## 2022-07-29 LAB
ABSOLUTE EOS #: 0.19 K/UL (ref 0–0.44)
ABSOLUTE IMMATURE GRANULOCYTE: 0.03 K/UL (ref 0–0.3)
ABSOLUTE LYMPH #: 1.61 K/UL (ref 1.1–3.7)
ABSOLUTE MONO #: 0.68 K/UL (ref 0.1–1.2)
ANION GAP SERPL CALCULATED.3IONS-SCNC: 12 MMOL/L (ref 9–17)
BASOPHILS # BLD: 0 % (ref 0–2)
BASOPHILS ABSOLUTE: <0.03 K/UL (ref 0–0.2)
BUN BLDV-MCNC: 25 MG/DL (ref 8–23)
CALCIUM SERPL-MCNC: 9.1 MG/DL (ref 8.6–10.4)
CHLORIDE BLD-SCNC: 106 MMOL/L (ref 98–107)
CHLORIDE, UR: 101 MMOL/L
CO2: 25 MMOL/L (ref 20–31)
CREAT SERPL-MCNC: 2.01 MG/DL (ref 0.5–0.9)
EKG ATRIAL RATE: 50 BPM
EKG P AXIS: 82 DEGREES
EKG P-R INTERVAL: 194 MS
EKG Q-T INTERVAL: 576 MS
EKG QRS DURATION: 94 MS
EKG QTC CALCULATION (BAZETT): 525 MS
EKG R AXIS: 9 DEGREES
EKG T AXIS: -77 DEGREES
EKG VENTRICULAR RATE: 50 BPM
EOSINOPHILS RELATIVE PERCENT: 3 % (ref 1–4)
GFR AFRICAN AMERICAN: 29 ML/MIN
GFR NON-AFRICAN AMERICAN: 24 ML/MIN
GFR SERPL CREATININE-BSD FRML MDRD: ABNORMAL ML/MIN/{1.73_M2}
GLUCOSE BLD-MCNC: 149 MG/DL (ref 65–105)
GLUCOSE BLD-MCNC: 198 MG/DL (ref 70–99)
GLUCOSE BLD-MCNC: 202 MG/DL (ref 65–105)
GLUCOSE BLD-MCNC: 209 MG/DL (ref 65–105)
GLUCOSE BLD-MCNC: 216 MG/DL (ref 65–105)
GLUCOSE BLD-MCNC: 237 MG/DL (ref 65–105)
HCT VFR BLD CALC: 30 % (ref 36.3–47.1)
HEMOGLOBIN: 9.2 G/DL (ref 11.9–15.1)
IMMATURE GRANULOCYTES: 1 %
LYMPHOCYTES # BLD: 27 % (ref 24–43)
MCH RBC QN AUTO: 29.7 PG (ref 25.2–33.5)
MCHC RBC AUTO-ENTMCNC: 30.7 G/DL (ref 28.4–34.8)
MCV RBC AUTO: 96.8 FL (ref 82.6–102.9)
MONOCYTES # BLD: 11 % (ref 3–12)
NRBC AUTOMATED: 0 PER 100 WBC
PDW BLD-RTO: 15.2 % (ref 11.8–14.4)
PLATELET # BLD: 145 K/UL (ref 138–453)
PMV BLD AUTO: 12.1 FL (ref 8.1–13.5)
POTASSIUM SERPL-SCNC: 3.7 MMOL/L (ref 3.7–5.3)
RBC # BLD: 3.1 M/UL (ref 3.95–5.11)
RBC # BLD: ABNORMAL 10*6/UL
SEG NEUTROPHILS: 58 % (ref 36–65)
SEGMENTED NEUTROPHILS ABSOLUTE COUNT: 3.47 K/UL (ref 1.5–8.1)
SODIUM BLD-SCNC: 143 MMOL/L (ref 135–144)
SODIUM,UR: 82 MMOL/L
UREA NITROGEN, UR: 321 MG/DL
WBC # BLD: 6 K/UL (ref 3.5–11.3)

## 2022-07-29 PROCEDURE — 6360000002 HC RX W HCPCS

## 2022-07-29 PROCEDURE — 97166 OT EVAL MOD COMPLEX 45 MIN: CPT

## 2022-07-29 PROCEDURE — 2060000000 HC ICU INTERMEDIATE R&B

## 2022-07-29 PROCEDURE — 93010 ELECTROCARDIOGRAM REPORT: CPT | Performed by: INTERNAL MEDICINE

## 2022-07-29 PROCEDURE — 80048 BASIC METABOLIC PNL TOTAL CA: CPT

## 2022-07-29 PROCEDURE — 97535 SELF CARE MNGMENT TRAINING: CPT

## 2022-07-29 PROCEDURE — 99233 SBSQ HOSP IP/OBS HIGH 50: CPT | Performed by: INTERNAL MEDICINE

## 2022-07-29 PROCEDURE — 94761 N-INVAS EAR/PLS OXIMETRY MLT: CPT

## 2022-07-29 PROCEDURE — 97162 PT EVAL MOD COMPLEX 30 MIN: CPT

## 2022-07-29 PROCEDURE — 82947 ASSAY GLUCOSE BLOOD QUANT: CPT

## 2022-07-29 PROCEDURE — 85025 COMPLETE CBC W/AUTO DIFF WBC: CPT

## 2022-07-29 PROCEDURE — 97530 THERAPEUTIC ACTIVITIES: CPT

## 2022-07-29 PROCEDURE — 36415 COLL VENOUS BLD VENIPUNCTURE: CPT

## 2022-07-29 PROCEDURE — 2580000003 HC RX 258

## 2022-07-29 PROCEDURE — 6370000000 HC RX 637 (ALT 250 FOR IP)

## 2022-07-29 RX ORDER — AMLODIPINE BESYLATE 10 MG/1
10 TABLET ORAL DAILY
Status: DISCONTINUED | OUTPATIENT
Start: 2022-07-29 | End: 2022-08-10 | Stop reason: HOSPADM

## 2022-07-29 RX ORDER — TORSEMIDE 20 MG/1
20 TABLET ORAL DAILY
Status: DISCONTINUED | OUTPATIENT
Start: 2022-07-29 | End: 2022-07-30

## 2022-07-29 RX ORDER — PROCHLORPERAZINE EDISYLATE 5 MG/ML
10 INJECTION INTRAMUSCULAR; INTRAVENOUS EVERY 6 HOURS PRN
Status: DISCONTINUED | OUTPATIENT
Start: 2022-07-29 | End: 2022-08-10 | Stop reason: HOSPADM

## 2022-07-29 RX ORDER — MAGNESIUM SULFATE IN WATER 40 MG/ML
2000 INJECTION, SOLUTION INTRAVENOUS ONCE
Status: COMPLETED | OUTPATIENT
Start: 2022-07-29 | End: 2022-07-29

## 2022-07-29 RX ORDER — INSULIN LISPRO 100 [IU]/ML
0-8 INJECTION, SOLUTION INTRAVENOUS; SUBCUTANEOUS
Status: DISCONTINUED | OUTPATIENT
Start: 2022-07-30 | End: 2022-08-09

## 2022-07-29 RX ORDER — INSULIN LISPRO 100 [IU]/ML
0-4 INJECTION, SOLUTION INTRAVENOUS; SUBCUTANEOUS NIGHTLY
Status: DISCONTINUED | OUTPATIENT
Start: 2022-07-29 | End: 2022-08-09

## 2022-07-29 RX ORDER — AMIODARONE HYDROCHLORIDE 200 MG/1
200 TABLET ORAL DAILY
Status: DISCONTINUED | OUTPATIENT
Start: 2022-07-30 | End: 2022-07-30

## 2022-07-29 RX ORDER — DEXTROSE MONOHYDRATE 100 MG/ML
INJECTION, SOLUTION INTRAVENOUS CONTINUOUS PRN
Status: DISCONTINUED | OUTPATIENT
Start: 2022-07-29 | End: 2022-08-10 | Stop reason: HOSPADM

## 2022-07-29 RX ORDER — HYDRALAZINE HYDROCHLORIDE 20 MG/ML
10 INJECTION INTRAMUSCULAR; INTRAVENOUS EVERY 6 HOURS PRN
Status: DISCONTINUED | OUTPATIENT
Start: 2022-07-29 | End: 2022-08-10 | Stop reason: HOSPADM

## 2022-07-29 RX ADMIN — AMLODIPINE BESYLATE 10 MG: 10 TABLET ORAL at 17:37

## 2022-07-29 RX ADMIN — PROCHLORPERAZINE EDISYLATE 10 MG: 5 INJECTION INTRAMUSCULAR; INTRAVENOUS at 14:19

## 2022-07-29 RX ADMIN — ESCITALOPRAM OXALATE 20 MG: 10 TABLET ORAL at 10:48

## 2022-07-29 RX ADMIN — GABAPENTIN 300 MG: 300 CAPSULE ORAL at 21:41

## 2022-07-29 RX ADMIN — TICAGRELOR 90 MG: 90 TABLET ORAL at 21:41

## 2022-07-29 RX ADMIN — APIXABAN 5 MG: 5 TABLET, FILM COATED ORAL at 10:49

## 2022-07-29 RX ADMIN — AMIODARONE HYDROCHLORIDE 100 MG: 200 TABLET ORAL at 10:49

## 2022-07-29 RX ADMIN — ROSUVASTATIN CALCIUM 40 MG: 20 TABLET, FILM COATED ORAL at 21:42

## 2022-07-29 RX ADMIN — SODIUM CHLORIDE, PRESERVATIVE FREE 10 ML: 5 INJECTION INTRAVENOUS at 20:35

## 2022-07-29 RX ADMIN — HYDRALAZINE HYDROCHLORIDE 10 MG: 20 INJECTION INTRAMUSCULAR; INTRAVENOUS at 20:33

## 2022-07-29 RX ADMIN — MAGNESIUM SULFATE HEPTAHYDRATE 2000 MG: 40 INJECTION, SOLUTION INTRAVENOUS at 14:19

## 2022-07-29 RX ADMIN — APIXABAN 5 MG: 5 TABLET, FILM COATED ORAL at 21:41

## 2022-07-29 RX ADMIN — SODIUM CHLORIDE, PRESERVATIVE FREE 10 ML: 5 INJECTION INTRAVENOUS at 10:49

## 2022-07-29 RX ADMIN — INSULIN GLARGINE 20 UNITS: 100 INJECTION, SOLUTION SUBCUTANEOUS at 10:50

## 2022-07-29 RX ADMIN — SODIUM CHLORIDE: 9 INJECTION, SOLUTION INTRAVENOUS at 14:26

## 2022-07-29 RX ADMIN — TICAGRELOR 90 MG: 90 TABLET ORAL at 10:49

## 2022-07-29 RX ADMIN — CARVEDILOL 6.25 MG: 6.25 TABLET, FILM COATED ORAL at 17:37

## 2022-07-29 RX ADMIN — ISOSORBIDE MONONITRATE 30 MG: 30 TABLET ORAL at 10:48

## 2022-07-29 ASSESSMENT — ENCOUNTER SYMPTOMS
WHEEZING: 0
SINUS PAIN: 0
SINUS PRESSURE: 0
CHEST TIGHTNESS: 0
ABDOMINAL PAIN: 0
VOMITING: 0
COUGH: 0
DIARRHEA: 0
BACK PAIN: 0
SHORTNESS OF BREATH: 0
TROUBLE SWALLOWING: 0
PHOTOPHOBIA: 0
CONSTIPATION: 0
SORE THROAT: 0
NAUSEA: 0

## 2022-07-29 NOTE — CARE COORDINATION
Oh, pt's son at bedside during interview and agreeing with answers pt states. 07/29/22 1234   Service Assessment   Patient Orientation Alert and Oriented   Cognition Alert   History Provided By Patient   [address] is: Legal Next of Kin   Can patient return to prior living arrangement Yes   Ability to make needs known: Good   Family able to assist with home care needs: Yes   Social/Functional History   Lives With Alone   Type of Home [address] level   Home Access Stairs to enter with rails   Entrance Stairs - Number of Steps 3   Entrance Stairs - Rails Right   Bathroom Shower/Tub Tub/Shower unit   Bathroom Toilet Standard   Bathroom Equipment Shower chair   Home Equipment Wheelchair-manual;Walker, rolling   Receives Help From Family   ADL Assistance Needs assistance   [facility] Needs assistance   Meal Prep Other (comment)  (Family cooks for pt, she does not cook)   Laundry Other (comment)  (kids and [facility])   Vacuuming Other (comment)  (kids and she uses zoom broom)   Cleaning Other (comment)  (kids clean)   Gardening Other (comment)  (pt pays for upkeep)   Yard Work Other (comment)  (pays for yard work)   Driving Other (comment)  (one of her children drives her or her grandchildren)   Shopping Other (comment)  (her children)   Homemaking Responsibilities Yes   Laundry Responsibility Secondary   [address] [place] [employer] Secondary   Ambulation Assistance Needs assistance   Transfer Assistance Needs assistance   Active  No   Patient's  Info son or son-in-law, for pt to go anywhere, they have to  the pt from inside the house.    Education [employer]   Occupation Retired   Type of Occupation [employer],    Discharge Planning   Type of Residence Trailer/Mobile Home   Living Arrangements Alone   Current Services Prior To Admission Durable Medical Equipment   DME Wheelchair; Shower Chair;Walker   DME Ordered? Other (comment)  (Pt requesting Hospital Bed)   Potential Assistance Purchasing Medications No   Meds-to-Beds: Does the patient want to have any new prescriptions delivered to bedside prior to discharge? Not Assessed   History of falls? 1   Services At/After Discharge   Transition of Care Consult (CM Consult) Home Health   Internal Home Health No   [facility] Resource Information Provided? No   Condition of Participation: Discharge Planning   Freedom of Choice list was provided with basic dialogue that supports the patient's individualized plan of care/goals, treatment preferences, and shares the quality data associated with the providers?   Yes

## 2022-07-29 NOTE — FLOWSHEET NOTE
[address] [facility] [address]  PROGRESS NOTE    Shift date: 2022   Shift day: Friday   Shift # 1    Room # 7651/9768-65   Name: Merly Hogan                Mormon: [address] [place] [address],[address] of Restorationism: None at this time    Referral: Routine Visit    Admit Date & Time: 2022  2:11 PM    Assessment:  Merly Hogan is a 68 y.o. female. Upon entering the room writer observes patient sitting in chair. Pt engages well in conversation. Patient has 8 children, three of whom have . Patient spoke of her  children and became tearful. Patient's alie appears to be strong. Intervention:  Writer introduced self and title as  .  provided support through active listening and words of comfort and affirmation.  assisted patient with her coffee. Outcome:  Patient appeared comforted by visit and thanked . Plan:  Chaplains will remain available to offer spiritual and emotional support as needed. Electronically signed by Loida Guthrie on 2022 at 11:54 AM.  [facility]  [phone]        22 1152   Encounter Summary   Service Provided For: Patient   Referral/Consult From: [facility]   Support System Children   Last Encounter  22   Complexity of Encounter Moderate   Begin Time 1120   End Time  1150   Total Time Calculated 30 min   Encounter    Type Initial Screen/Assessment   Spiritual/Emotional needs   Type Spiritual Support   Assessment/Intervention/Outcome   Assessment Calm;Coping   Intervention Active listening;Discussed belief system/Baptism practices/alie; Explored/Affirmed feelings, thoughts, concerns;Grief Care   Outcome Comfort;Engaged in conversation;Expressed feelings, needs, and concerns;Expressed Gratitude;Receptive

## 2022-07-29 NOTE — H&P
[address]     Department of Internal Medicine - Staff Internal Medicine Teaching Service          ADMISSION NOTE/HISTORY AND PHYSICAL EXAMINATION   Date: 7/28/2022  Patient Name: Maddie Alvarez  Date of admission: 7/28/2022  2:11 PM  YOB: 1945  PCP: Maday Pascal MD  History Obtained From:  patient, electronic medical record    CHIEF COMPLAINT     Chief complaint: multiple falls, with leg weakness    HISTORY OF PRESENTING ILLNESS     The patient is a pleasant 68 y.o. female with past medical history of  Afib (on amiodarone), CAD with stent x9 (on brilinta), PE(on eliquis) HTN, DM with nephropathy, HLD, CKD stage III (baseline cr 5.9-7.3), diastolic HF     presents  to the ED with a chief complaint of multiple falls with leg weakness and dizziness. The patient states that she has been having increased falls, she states that she has had 2 in the last 10 days which is what made her come in. Patient states that the first fall she felt lightheaded and dizzy after standing up and fell. The most recent fall, however, she states that her left leg \"gave out\". The patient states that the dizziness and lightheadedness has been affecting her for about 2 months now, dizziness is described as \"the room spinning\" and even occurs at rest sometimes. Patient also stated that during these falls she has felt her heart racing, beating out of her chest, feeling like \"a double beat\". Patient states that when she gets the feeling of lightheadedness and dizziness she also gets chest pain which she locates retrosternally but more right-sided. She says that when she gets this pain she takes a nitroglycerin tab which helps but sometimes requires 2 tabs, never 3. She states that she has been using her nitro tabs about once a day and they do help relieve the chest pain.        Patient also states that she gets short of breath on exertion, she sleeps in a chair, and was recently started on Demadex for brawny leg swelling bilaterally. The patient also endorses diarrhea 2 days ago and a decreased appetite. Pt denies headache, cough, abdominal pain, constipation, urinary frequency or urgency, snoring    Review of Systems   Constitutional:  Positive for appetite change (decreased). Negative for chills, fatigue and fever. HENT:  Negative for congestion, drooling, hearing loss, nosebleeds, sinus pressure, sinus pain, sneezing, sore throat and trouble swallowing. Respiratory:  Positive for shortness of breath. Negative for cough, choking and wheezing. Cardiovascular:  Positive for chest pain, palpitations and leg swelling. Gastrointestinal:  Positive for diarrhea (2 days ago). Negative for abdominal distention, abdominal pain, constipation, nausea and vomiting. Genitourinary:  Negative for difficulty urinating, dysuria, frequency, hematuria and urgency. Neurological:  Positive for dizziness, syncope, weakness (left leg), light-headedness and numbness (left foot). Negative for headaches. Initial Vitals on presentation :   Temp 97.8, RR 19, HR 50, /75, SPO2 96% on room air    Initial Course in the ED:   Patient came to the ED in no acute distress, CT head and neck showed no signs of bleeding, EKG showed sinus bradycardia with a prolonged QT. Significant Labs :    BMP 1723, BUN 28, creatinine 2.23    Treatment in ED :    None    PAST MEDICAL HISTORY     Past Medical History:   Diagnosis Date    Acute renal failure with tubular necrosis ([facility] [address] 11/24/2021    Secondary to ischemic ATN post fall intravascularly depletion hypotension and low flow baseline creatinine 1.2-1.4 peaked up to 2.1 during her hospitalization in September 2020. Work-up showed benign urine sediment, kidney size to be 11.2 on the right 15.1 on the left serological work-up negative.     Anxiety     Atrial fibrillation (HCC)     chronic-takes xarelto    Benign positional vertigo     CAD (coronary artery disease)     Chest pain     CKD (chronic kidney disease), stage III ([facility] [address] 11/24/2021    Secondary to ischemic and diabetic nephrosclerosis baseline 1.2-1.4    Depression     Diabetes mellitus ([facility] [address]     Diabetic neuropathy ([facility] [address]     Dysuria 11/24/2021    Hyperlipidemia     Hypertension     Migraine     Migraine headaches aggravated with sublingual nitroglycerin    Persistent proteinuria 7/20/2022    From diabetic nephrosclerosis UPC 1.0       PAST SURGICAL HISTORY     Past Surgical History:   Procedure Laterality Date    APPENDECTOMY      CARDIAC CATHETERIZATION      2012    CORONARY ANGIOPLASTY WITH STENT PLACEMENT  02/25/2017    4 SYNERGY HEART STENTS DRUG ELUTING ALL MRI CONDITONAL 3T OK, SAFE IMMEDIATELY. CORONARY ANGIOPLASTY WITH STENT PLACEMENT  07/11/2012    XIENCE HEART STENT/ MRI CONDITIONAL 3T OK, SAFE IMMEDIATELY    CORONARY ANGIOPLASTY WITH STENT PLACEMENT  12/11/2020    PTCA         ALLERGIES     Penicillins and Sulfa antibiotics    MEDICATIONS PRIOR TO ADMISSION     Prior to Admission medications    Medication Sig Start Date End Date Taking? Authorizing Provider   loperamide (IMODIUM) 2 MG capsule Take 1 capsule by mouth 4 times daily as needed for Diarrhea 7/21/22 7/31/22  Shubham Wynn MD   traMADol (ULTRAM) 50 MG tablet Take 1 tablet by mouth every 6 hours as needed for Pain for up to 7 days. 7/21/22 7/28/22  Shubham Wynn MD   acetaminophen (TYLENOL) 325 MG tablet Take 650 mg by mouth every 6 hours as needed for Pain    Historical Provider, MD   torsemide (DEMADEX) 20 MG tablet Take 1 tablet by mouth in the morning. 7/20/22 10/18/22  Juancarlos Valentino MD   carvedilol (COREG) 12.5 MG tablet Take 1 tablet by mouth 2 times daily (with meals) 6/22/22   Shubham Wynn MD   omeprazole (PRILOSEC) 20 MG delayed release capsule TAKE ONE CAPSULE BY MOUTH DAILY 6/22/22   Shubham Wynn MD   LORazepam (ATIVAN) 0.5 MG tablet Take 1 tablet by mouth 3 times daily.     Historical Provider, MD   vitamin E 400 UNIT capsule Take 1 capsule by mouth daily    Historical Provider, MD   insulin detemir (LEVEMIR FLEXTOUCH) 100 UNIT/ML injection pen INJECT 60 UNITS INTO THE SKIN TWO TIMES A DAY 6/13/22   Ally Arreola MD   gabapentin (NEURONTIN) 300 MG capsule Take 1 capsule by mouth nightly for 30 days.  6/13/22 7/13/22  Ally Arreola MD   apixaban (ELIQUIS) 5 MG TABS tablet Take 1 tablet by mouth 2 times daily 6/1/22   Lani Rodriguez MD   dicyclomine (BENTYL) 10 MG capsule Take 1 capsule by mouth 3 times daily (before meals) 6/1/22   Lani Rodriguez MD   amiodarone (CORDARONE) 200 MG tablet Take 1 tablet by mouth daily for 60 doses 6/13/22 8/12/22  MAYELA Rebolledo CNP   prednisoLONE acetate (PRED FORTE) 1 % ophthalmic suspension Place 1 drop into both eyes 3 times daily    Historical Provider, MD   BRILINTA 90 MG TABS tablet TAKE ONE TABLET BY MOUTH TWICE A DAY 4/19/22   MAYELA Ramon NP   isosorbide mononitrate (IMDUR) 30 MG extended release tablet Take 1 tablet by mouth daily 3/15/22   Ally Arreola MD   insulin aspart (NOVOLOG FLEXPEN) 100 UNIT/ML injection pen Use TID before meals according to scale - max 45 units a day 12/2/21   Ally Arreola MD   Lancets MISC 1 each by Does not apply route 2 times daily 12/2/21   Ally Arreola MD   Insulin Pen Needle 32G X 4 MM MISC 5 each by Does not apply route daily 12/2/21   Ally Arreola MD   carvedilol (COREG) 12.5 MG tablet Take 1 tablet by mouth 2 times daily 11/24/21 6/13/22  Kavon Felder MD   escitalopram (LEXAPRO) 20 MG tablet TAKE ONE TABLET BY MOUTH DAILY 11/22/21   Ally Arreola MD   rosuvastatin (CRESTOR) 40 MG tablet Take 1 tablet by mouth daily 9/28/21   Ally Arreola MD   vitamin D3 (CHOLECALCIFEROL) 25 MCG (1000 UT) TABS tablet Take 1 tablet by mouth daily 9/28/21   Ally Arreola MD   Continuous Blood Gluc Sensor (FREESTYLE SYLVIA 14 DAY SENSOR) [facility] 1 each by Does not apply route every 14 days 21   Shubham Wynn MD   nitroGLYCERIN (NITROSTAT) 0.4 MG SL tablet place 1 tablet under the tongue if needed every 5 minutes if needed for CHEST PAIN 21   Shubham Wynn MD   [facility]) MISC Diagnosis: right 5th metatarsal fracture, pain in limb    Duration: 3 months 2/3/21   Sebastian Gutierres DPM   Misc. Devices (COMMODE BEDSIDE) MISC 1 Device by Does not apply route daily 2/3/21   Sebastian Gutierres DPM   Continuous Blood Gluc  (FREESTYLE SYLVIA 15 DAY READER) JAQUELINE 1 each by Does not apply route continuous [facility] 10/30/20   Shubham Wynn MD       SOCIAL HISTORY     Tobacco: denies  Social History     Tobacco Use   Smoking Status Never   Smokeless Tobacco Never      Alcohol: denies  Illicits: denies  Occupation: retired    FAMILY HISTORY     Family History   Problem Relation Age of Onset    Cancer Mother     Cancer Father        PHYSICAL EXAM     Vitals: BP (!) 140/72 Comment: Doc aware  Pulse 57   Temp 97.9 °F (36.6 °C) (Oral)   Resp 19   Ht 5' 6\" (1.676 m)   Wt 260 lb 2.3 oz (118 kg)   SpO2 98%   BMI 41.99 kg/m²   Tmax: Temp (24hrs), Av.8 °F (36.6 °C), Min:97.5 °F (36.4 °C), Max:98 °F (36.7 °C)    Last Body weight:   Wt Readings from Last 3 Encounters:   22 260 lb 2.3 oz (118 kg)   22 248 lb (112.5 kg)   22 232 lb (105.2 kg)     Body Mass Index : Body mass index is 41.99 kg/m². PHYSICAL EXAMINATION:    Constitutional: This is a well developed, well nourished, Greater than 36 - Morbid Obesity / Extreme Obesity / Grade III 68y.o. year old female who is alert, oriented, cooperative and in no apparent distress. Head: Atraumatic and Normocephalic   EENT:  PERRLA. No conjunctival injections. Septum was midline, mucosa was without erythema, exudates or cobblestoning. No thrush was noted. Neck: Supple without thyromegaly. No elevated JVP. Trachea was midline.   Respiratory: Chest was symmetrical without dullness to percussion. Breath sounds bilaterally were clear to auscultation. There were no wheezes, rhonchi or rales. There is no intercostal retraction or use of accessory muscles. Cardiovascular: bradycardic without murmur, clicks, gallops or rubs. Abdomen: Slightly rounded and soft without organomegaly. No rebound, rigidity or guarding was appreciated. Musculoskeletal: Normal curvature of the spine. No gross muscle weakness. Left leg weak on hip flexion compared to right. Extremities:  No lower extremity edema, ulcerations, tenderness, varicosities or erythema. Muscle size, tone are normal.  No involuntary movements are noted. Skin:  Warm and dry. Good color, turgor and pigmentation. No lesions or scars. No cyanosis or clubbing  Neurological/Psychiatric: The patient's general behavior, level of consciousness, thought content and emotional status is normal.  Left plantar foot decreased sensation, no sensation deficits on right.       INVESTIGATIONS     Laboratory Testing:     Recent Results (from the past 24 hour(s))   CBC with Auto Differential    Collection Time: 07/28/22  2:30 PM   Result Value Ref Range    WBC 6.8 3.5 - 11.3 k/uL    RBC 3.15 (L) 3.95 - 5.11 m/uL    Hemoglobin 9.3 (L) 11.9 - 15.1 g/dL    Hematocrit 28.9 (L) 36.3 - 47.1 %    MCV 91.7 82.6 - 102.9 fL    MCH 29.5 25.2 - 33.5 pg    MCHC 32.2 28.4 - 34.8 g/dL    RDW 15.5 (H) 11.8 - 14.4 %    Platelets 488 095 - 504 k/uL    MPV 11.9 8.1 - 13.5 fL    NRBC Automated 0.0 0.0 per 100 WBC    Seg Neutrophils 64 36 - 65 %    Lymphocytes 22 (L) 24 - 43 %    Monocytes 12 3 - 12 %    Eosinophils % 2 1 - 4 %    Basophils 0 0 - 2 %    Immature Granulocytes 0 0 %    Segs Absolute 4.24 1.50 - 8.10 k/uL    Absolute Lymph # 1.52 1.10 - 3.70 k/uL    Absolute Mono # 0.82 0.10 - 1.20 k/uL    Absolute Eos # 0.16 0.00 - 0.44 k/uL    Basophils Absolute 0.03 0.00 - 0.20 k/uL    Absolute Immature Granulocyte <0.03 0.00 - 0.30 k/uL RBC Morphology ANISOCYTOSIS PRESENT    Comprehensive Metabolic Panel    Collection Time: 07/28/22  2:30 PM   Result Value Ref Range    Glucose 148 (H) 70 - 99 mg/dL    BUN 28 (H) 8 - 23 mg/dL    Creatinine 2.23 (H) 0.50 - 0.90 mg/dL    Calcium 9.1 8.6 - 10.4 mg/dL    Sodium 139 135 - 144 mmol/L    Potassium 3.8 3.7 - 5.3 mmol/L    Chloride 104 98 - 107 mmol/L    CO2 21 20 - 31 mmol/L    Anion Gap 14 9 - 17 mmol/L    Alkaline Phosphatase 63 35 - 104 U/L    ALT 8 5 - 33 U/L    AST 14 <32 U/L    Total Bilirubin 0.28 (L) 0.3 - 1.2 mg/dL    Total Protein 7.2 6.4 - 8.3 g/dL    Albumin 4.2 3.5 - 5.2 g/dL    Albumin/Globulin Ratio 1.4 1.0 - 2.5    GFR Non- 21 (L) >60 mL/min    GFR  26 (L) >60 mL/min    GFR Comment         Brain Natriuretic Peptide    Collection Time: 07/28/22  2:30 PM   Result Value Ref Range    Pro-BNP 1,723 (H) <300 pg/mL   Troponin    Collection Time: 07/28/22  2:30 PM   Result Value Ref Range    Troponin, High Sensitivity 42 (H) 0 - 14 ng/L   APTT    Collection Time: 07/28/22  2:30 PM   Result Value Ref Range    PTT 28.9 20.5 - 30.5 sec   Protime-INR    Collection Time: 07/28/22  2:30 PM   Result Value Ref Range    Protime 12.2 9.1 - 12.3 sec    INR 1.2    Magnesium    Collection Time: 07/28/22  2:30 PM   Result Value Ref Range    Magnesium 2.2 1.6 - 2.6 mg/dL   Phosphorus    Collection Time: 07/28/22  2:30 PM   Result Value Ref Range    Phosphorus 3.4 2.6 - 4.5 mg/dL   TSH    Collection Time: 07/28/22  2:30 PM   Result Value Ref Range    TSH 0.60 0.30 - 5.00 uIU/mL   T4, Free    Collection Time: 07/28/22  2:30 PM   Result Value Ref Range    Thyroxine, Free 1.34 0.93 - 1.70 ng/dL   Urinalysis with Microscopic    Collection Time: 07/28/22  3:44 PM   Result Value Ref Range    Color, UA Yellow Yellow    Turbidity UA Cloudy (A) Clear    Glucose, Ur TRACE (A) NEGATIVE    Bilirubin Urine NEGATIVE NEGATIVE    Ketones, Urine NEGATIVE NEGATIVE    Specific Gravity, UA 1.012 1.005 - 1.030    Urine Hgb SMALL (A) NEGATIVE    pH, UA 5.5 5.0 - 8.0    Protein, UA 1+ (A) NEGATIVE    Urobilinogen, Urine Normal Normal    Nitrite, Urine NEGATIVE NEGATIVE    Leukocyte Esterase, Urine NEGATIVE NEGATIVE    -          WBC, UA 2 TO 5 0 - 5 /HPF    RBC, UA 5 TO 10 0 - 2 /HPF    Casts UA HYALINE 0 - 2 /LPF    Casts UA 10 TO 20 0 - 2 /LPF    Casts UA FINE GRANULAR 0 - 2 /LPF    Casts UA 2 TO 5 0 - 2 /LPF    Casts UA COARSELY GRANULAR 0 - 2 /LPF    Casts UA 2 TO 5 0 - 2 /LPF    Epithelial Cells UA 10 TO 20 0 - 5 /HPF    Mucus, UA 1+ (A) None    Amorphous, UA 1+ (A) None   Troponin    Collection Time: 07/28/22  3:57 PM   Result Value Ref Range    Troponin, High Sensitivity 38 (H) 0 - 14 ng/L       Imaging:   CT HEAD WO CONTRAST    Result Date: 7/28/2022  Mild central and cortical cerebral atrophy. Mild chronic deep white matter ischemic changes No acute intracranial abnormalities are noted. CT CHEST WO CONTRAST    Result Date: 7/28/2022  1. No acute intrathoracic abnormality. 2. 5 mm right upper lobe nodule. RECOMMENDATIONS: Pathology: 5 mm right part-solid pulmonary nodule within the upper lobe. No routine follow-up imaging is recommended per Fleischner Society Guidelines. These guidelines do not apply to immunocompromised patients and patients with cancer. Follow up in patients with significant comorbidities as clinically warranted. For lung cancer screening, adhere to Lung-RADS guidelines. Reference: Radiology. 2017; 284(1):228-43     CT CERVICAL SPINE WO CONTRAST    Result Date: 7/28/2022  Multilevel cervical spondylosis and degenerative disc disease. Mild spinal stenosis C5-6 and C6-7 Evidence of paracervical spasm. No acute bony abnormalities are noted in the cervical spine RECOMMENDATIONS: Further evaluation of the cervical spine should be obtained with MR imaging if clinically indicated. XR CHEST PORTABLE    Result Date: 7/28/2022  No acute process.  Stable cardiomegaly       ASSESSMENT & PLAN ASSESSMENT / PLAN:     IMPRESSION    This is a 68 y.o. female who presented with multiple falls and leg weakness and found to have bradycardia and MARIELLE. Patient admitted to inpatient status for syncope work-up and treatment of above. Principal Problem:    Syncope and collapse  Active Problems:    Chronic diastolic (congestive) heart failure (HCC)    Paroxysmal atrial fibrillation (HCC)    Bradycardia    Acute kidney injury superimposed on CKD (HCC)    Obesity, Class III, BMI 40-49.9 (morbid obesity) ([facility] [address]    Presence of stent in coronary artery in patient with coronary artery disease    Essential hypertension    Diabetic polyneuropathy associated with type 2 diabetes mellitus ([facility] [address]    Other hyperlipidemia    CKD (chronic kidney disease), stage III ([facility] [address]  Resolved Problems:    * No resolved hospital problems. *        Syncope with collapse,most likely due to orthostatic hypotension in the setting of bradycardia (on betablockers) and afib(on amiodarone) vs. Left foot neuropathy in the setting of DM with polyneuropathy  -Hold patients beta-blocker, Orthostats ordered, will consult Cardio       MARIELLE on CKD stage III, in the setting of chronic diastolic HF  - Cr 1.64, baseline (1.3-.1. 5), monitor Cr  -On fluids, 75mL/hr, monitor for fluid overload       Bradycardia  -TSH 0.6, patient on both amiodarone and coreg, we will hold coreg.        Diabetes Mellitus with nephropathy and neuropathy  - resume lantus at 20units QAM, continue to monitor glucose, hypoglycemia protocol, continue home dose gabapentin       HTN, uncontrolled  - resume home dose imdur       CAD s/p stenting x9  - resume home dose crestor brilinta and eliquis       Paroxysmal A.fib  - resume home dose amiodarone and eliquis    DVT ppx: on eliquis  GI ppx: not indicated  PT/OT/SW: on board  Discharge Planning:  consulted to help with discharge planning    Barbara Garcia MD  Internal Medicine Resident, PGY- [address] [facility] [address]; [place], [place]  7/28/2022,     Attestation and add on       I have discussed the care of Yamile Dimas , including pertinent history and exam findings,      7/28/22    with the resident. I have seen and examined the patient and the key elements of all parts of the encounter have been performed by me . I agree with the assessment, plan and orders as documented by the resident. Hospital Problems             Last Modified POA    * (Principal) Syncope and collapse 7/28/2022 Yes    Chronic diastolic (congestive) heart failure (HCC) 7/28/2022 Yes    Paroxysmal atrial fibrillation ([address] 7/28/2022 Yes    Bradycardia 7/28/2022 Yes    Acute kidney injury superimposed on CKD ([address] 7/28/2022 Yes    Obesity, Class III, BMI 40-49.9 (morbid obesity) ([address] 7/28/2022 Yes    Presence of stent in coronary artery in patient with coronary artery disease 7/28/2022 Yes    Essential hypertension 7/28/2022 Yes    Diabetic polyneuropathy associated with type 2 diabetes mellitus ([address] 7/28/2022 Yes    Other hyperlipidemia 7/28/2022 Yes    CKD (chronic kidney disease), stage III ([address] 7/28/2022 Yes    Overview Signed 11/24/2021  3:27 PM by Sammie Whipple MD     Secondary to ischemic and diabetic nephrosclerosis baseline 1.2-1.4               '''''.orthostatic syncope with bradycardia noted   Cardiology consulted '''''       MD DAVID DurhamSoutheast Missouri [facility]  [address] [facility], [address] [place] [address].    Phone [phone]   Fax: [phone]  Answering Service: [phone]

## 2022-07-29 NOTE — PROGRESS NOTES
Physical Therapy  Facility/Department: [facility] NEURO  Physical Therapy Initial Assessment    Name: Adryan Jaime  : 1945  MRN: [id number]  Date of Service: 2022  Hx copied and pasted from H&P:  22 The patient is a pleasant 68 y.o. female with past medical history of  Afib (on amiodarone), CAD with stent x9 (on brilinta), PE(on eliquis) HTN, DM with nephropathy, HLD, CKD stage III (baseline cr 1.0-6.1), diastolic HF presents  to the ED with a chief complaint of multiple falls with leg weakness and dizziness. Discharge Recommendations:    Further therapy recommended at discharge. PT Equipment Recommendations  Equipment Needed: No      Patient Diagnosis(es): There were no encounter diagnoses. Past Medical History:  has a past medical history of Acute renal failure with tubular necrosis ([address] Anxiety, Atrial fibrillation (HCC), Benign positional vertigo, CAD (coronary artery disease), Chest pain, CKD (chronic kidney disease), stage III ([address] Depression, Diabetes mellitus ([address] Diabetic neuropathy ([address] Dysuria, Hyperlipidemia, Hypertension, Migraine, and Persistent proteinuria. Past Surgical History:  has a past surgical history that includes Percutaneous Transluminal Coronary Angio; Cardiac catheterization; Coronary angioplasty with stent (2017); Appendectomy; Coronary angioplasty with stent (2012); and Coronary angioplasty with stent (2020). Assessment   Body Structures, Functions, Activity Limitations Requiring Skilled Therapeutic Intervention: Increased pain;Decreased strength;Decreased functional mobility ; Decreased balance;Decreased endurance;Decreased high-level IADLs;Decreased safe awareness;Decreased ADL status; Decreased body mechanics  Assessment: Pt required Vanesa*1 with RW use to complete transfers and ambulate 40ft. Pt required additional time, rest breaks between bouts of activity, and handful of incidents of losing balance due to L knee buckling.  Pt vocalizes pain along joint line in full weightbearing and burning sensation in distal quads. Pt demostates decreased functional strength, balance deficits, and increased pain, limiting his functional independence. Pt is unsafe to return home without assistance and will benefit from further physical therapy to address deficits and increase independence. Therapy Prognosis: Good  Decision Making: Medium Complexity  Requires PT Follow-Up: Yes  Activity Tolerance  Activity Tolerance: Patient limited by fatigue;Patient limited by pain     Plan   Plan  Plan:  (5-6x)  Current Treatment Recommendations: Therapeutic activities, Strengthening, ROM, Balance training, ADL/Self-care training, IADL training, Neuromuscular re-education, Functional mobility training, Transfer training, Gait training, Stair training, Safety education & training, Home exercise program, Endurance training  Safety Devices  Type of Devices: Gait belt, All fall risk precautions in place, Left in chair, Call light within reach  Restraints  Restraints Initially in Place: No     Restrictions  Restrictions/Precautions  Restrictions/Precautions: Up as Tolerated, General Precautions  Required Braces or Orthoses?: No  Position Activity Restriction  Other position/activity restrictions: continuous telemetry     Subjective   General  Patient assessed for rehabilitation services?: Yes  Response To Previous Treatment: Not applicable  Family / Caregiver Present: No  Follows Commands: Within Functional Limits  Subjective  Subjective: Pt denies any numbness or tingling. No Pain.          Social/Functional History  Social/Functional History  Lives With: Alone (son stops by everyday to help out with anything needed)  Type of Home: Trailer  Home Layout: One level  Home Access: Stairs to enter with rails  Entrance Stairs - Number of Steps: 3 (always needs assistance to ascend steps)  Entrance Stairs - Rails: Right  Home Equipment: [place], rolling, Wheelchair-manual  Has the patient had two or more falls in the past year or any fall with injury in the past year?: Yes (LLE giving out, combination with dizziness)  ADL Assistance: Needs assistance  Homemaking Assistance: Needs assistance  Ambulation Assistance: Needs assistance (with rollator)  Transfer Assistance: Needs assistance  Active : No  Additional Comments: Nurse comes in am to assist with ADLs, son comes everyday to assist with other needs. Vision/Hearing  Vision  Vision: Impaired  Vision Exceptions:  (retinopathy from diabetes per pt)  Hearing  Hearing: Within functional limits    Cognition   Cognition  Overall Cognitive Status: WFL     Objective   Heart Rate: 50s bpm pre and post mobility  SpO2: high 90s % pre and post mobility  O2 Device: None (Room air)  Comment: no complaints of increased dizziness or lightheadedness     Observation/Palpation  Posture: Fair        AROM RLE (degrees)  RLE AROM: WFL  AROM LLE (degrees)  LLE AROM : WFL  AROM RUE (degrees)  RUE AROM : WFL  AROM LUE (degrees)  LUE AROM : WFL  Strength RLE  Strength RLE: Exception  Comment: 4-/5 hip flexion dorsiflexion. 4/5 knee extension  Strength LLE  Strength LLE: Exception  Comment: 4-/5 hip flexion dorsiflexion. 4/5 knee extension  Strength RUE  Strength RUE: WFL  Strength LUE  Strength LUE: WFL  Strength Other  Other: generalized weakness with decreased functional strength, BUE assist out of chair decreased gait speed. Bed mobility  Bed Mobility Comments: pt in chair upon arrival/departure  Transfers  Sit to Stand: Minimal Assistance  Stand to sit: Minimal Assistance  Comment: additional time needed to complete  Ambulation  Surface: level tile  Device: Rolling Walker  Assistance: Minimal assistance  Quality of Gait: Fwd lean on RW with slight knee flexion, slight crouch gait. Gait Deviations: Slow Cherise; Increased GIOVANA; Decreased step length  Distance: 22ft+40ft  Comments: Handful of episodes of L knee buckling and posterior leaning resulting in loss of balance, required Vanesa*1 to modA*1 to recover. Pt no complaints of dizziness or lightheadedness during ambulation. Fatigues easily and requires rest breaks inbetween bouts of physical activity. L knee \"burns\"  in distal quads and pain along joint line when ambulating  More Ambulation?: No  Stairs/Curb  Stairs?: No     Balance  Posture: Fair  Sitting - Static: Good;-  Sitting - Dynamic: +;Fair  Standing - Static: Fair  Standing - Dynamic: Fair;-  Comments: Pt unable to stand upright for more than 20 seconds without AD use, posterior leans and loses balance             AM-PAC Score  AM-PAC Inpatient Mobility Raw Score : 17 (07/29/22 1158)  AM-PAC Inpatient T-Scale Score : 42.13 (07/29/22 1158)  Mobility Inpatient CMS 0-100% Score: 50.57 (07/29/22 1158)  Mobility Inpatient CMS G-Code Modifier : CK (07/29/22 1158)            Goals  Short Term Goals  Time Frame for Short term goals: 12  Short term goal 1: Pt able to complete bed mobility modified independently  Short term goal 2: Pt able to complete transfers with RW use supervision  Short term goal 3: Pt able to ambulate 100ft with RW use SBA*1  Short term goal 4: Pt able to climb 4 steps with UE on R handrail CGA*1       Education  Patient Education  Education Given To: Patient  Education Provided: Role of Therapy;Plan of Care  Education Method: Demonstration  Barriers to Learning: None  Education Outcome: Demonstrated understanding      Therapy Time   Individual Concurrent Group Co-treatment   Time In 1032         Time Out 1117         Minutes 45         Timed Code Treatment Minutes: 28 Minutes       This evaluation was performed by student physical therapist, Steve Fitzpatrick , under the supervision of cosigning PT who has read and agrees with all documentation.

## 2022-07-29 NOTE — CARE COORDINATION
Transitional planning:  Received hand written note from unit RN. States call [facility] back  at [phone], ext [phone]. Spoke with Roque Padgett, states pt is current with them. They will need completed STEPHEN/AVS faxed to them when pt discharged. Fax to [phone].

## 2022-07-29 NOTE — ED PROVIDER NOTES
Faculty Sign-Out Attestation  Handoff taken on the following patient from prior Attending Physician: Marlene Mckay    I was available and discussed any additional care issues that arose and coordinated the management plans with the resident(s) caring for the patient during my duty period. Any areas of disagreement with residents documentation of care or procedures are noted on the chart. I was personally present for the key portions of any/all procedures during my duty period. I have documented in the chart those procedures where I was not present during the key portions. 40-year-old female presenting with shortness of breath, falls. On Eliquis and Brilinta. Found to have sinus bradycardia, dipping down into the 40s. New MARIELLE, possible unintentional beta-blocker overdose with poor creatinine clearance. We will hold beta-blockers at this time, awaiting imaging and anticipate admission for further management.     Valerio Hoover MD  Attending Physician        Valerio Hoover MD  07/29/22 1085

## 2022-07-29 NOTE — PROGRESS NOTES
Physician Progress Note      PATIENT:               Sean Morales  CSN #:                  [id number]  :                       1945  ADMIT DATE:       2022 2:11 PM  DISCH DATE:  RESPONDING  PROVIDER #:        Emeline Lundborg          QUERY TEXT:    Patient admitted with bradycardia/syncope, noted to be on Coreg. If possible,   please document in progress notes and discharge summary if you are evaluating   and/or treating any of the following: The medical record reflects the following:  Risk Factors: takes Coreg  Clinical Indicators: per ED notes \"Found to have sinus bradycardia, dipping   down into the 40s, possible unintentional beta-blocker overdose\", per H&P   \"Syncope with collapse, most likely due to orthostatic hypotension in the   setting of bradycardia (on betablockers)\"  Treatment: Coreg on hold, orthostatic BP-WNL, EKG, CT, cardiology consult    Thank you, MARYANN Galvez  email - [email or web address]  cell- [phone]  office hours M-F-7A-3P  Options provided:  -- Accidental overdose of Coreg  -- Adverse effect of Coreg, properly administered  -- Other - I will add my own diagnosis  -- Disagree - Not applicable / Not valid  -- Disagree - Clinically unable to determine / Unknown  -- Refer to Clinical Documentation Reviewer    PROVIDER RESPONSE TEXT:    bradycardia/syncope is due to an adverse effect of properly administered   Coreg.     Query created by: Kirstie Villegas on 2022 7:02 AM      Electronically signed by:  Emeline Lundborg 2022 7:23 AM

## 2022-07-29 NOTE — PROGRESS NOTES
Occupational Therapy  Facility/Department: [facility] NEURO  Occupational Therapy Initial Assessment      Name: Lilliam Mosher  : 1945  MRN: [id number]  Date of Service: 2022    Discharge Recommendations:  Patient would benefit from continued therapy after discharge  OT Equipment Recommendations  Equipment Needed: Yes  Other: Mobility Device per PT. Hospital Bed ( with Bilateral handrails). Grab bars in Tub/Shower x2. Elevated toilet with Bilateral grab bars. Patient Diagnosis(es): There were no encounter diagnoses. Past Medical History:  has a past medical history of Acute renal failure with tubular necrosis ([facility] [address] Anxiety, Atrial fibrillation (HCC), Benign positional vertigo, CAD (coronary artery disease), Chest pain, CKD (chronic kidney disease), stage III ([address] Depression, Diabetes mellitus ([facility] [address] Diabetic neuropathy ([facility] [address] Dysuria, Hyperlipidemia, Hypertension, Migraine, and Persistent proteinuria. Past Surgical History:  has a past surgical history that includes Percutaneous Transluminal Coronary Angio; Cardiac catheterization; Coronary angioplasty with stent (2017); Appendectomy; Coronary angioplasty with stent (2012); and Coronary angioplasty with stent (2020). Assessment   Performance deficits / Impairments: Decreased functional mobility ; Decreased endurance;Decreased coordination;Decreased ADL status; Decreased posture;Decreased balance;Decreased strength;Decreased vision/visual deficit; Decreased safe awareness;Decreased cognition;Decreased fine motor control  Assessment: Pt currently has deficits / impairments which impact her ability to return to prior living situation / prior level of functioning. Pt will benefit from continued participation in Acute OT and Post-Acute OT services to improve those skills / functions.   Prognosis: Fair  Decision Making: Medium Complexity  REQUIRES OT FOLLOW-UP: Yes  Activity Tolerance  Activity Tolerance: Patient limited by pain;Treatment limited secondary to decreased cognition;Patient limited by fatigue          Plan   Plan  Times per Week: 3-5x/week  Current Treatment Recommendations: Strengthening, Balance training, Functional mobility training, Endurance training, Pain management, Coordination training, Self-Care / ADL, Safety education & training, Patient/Caregiver education & training, Equipment evaluation, education, & procurement, Cognitive/Perceptual training       Restrictions  Restrictions/Precautions  Restrictions/Precautions: Up as Tolerated, General Precautions  Required Braces or Orthoses?: No  Position Activity Restriction  Other position/activity restrictions: continuous telemetry      Subjective   General  Patient assessed for rehabilitation services?: Yes  Family / Caregiver Present:  (Pt's son was present for beginning and end of evaluation)  Diagnosis: Bradycardia  Subjective  Subjective: RN approved Pt to be seen for OT Evaluation. General Comment  Comments: Pt was agreeable and cooperative.   Reports being fatigued, as she has been awake and moving since early this AM.       Social/Functional History  Social/Functional History  Lives With: Alone  Type of Home: Trailer  Home Layout: One level  Home Access: Stairs to enter with rails  Entrance Stairs - Number of Steps: 3  Entrance Stairs - Rails: Right  Bathroom Shower/Tub: Tub/Shower unit  Bathroom Toilet: Standard  Bathroom Equipment: Shower chair  Home Equipment: [place], rolling  Has the patient had two or more falls in the past year or any fall with injury in the past year?: Yes (LLE giving out, combination with dizziness)  Receives Help From: Family  ADL Assistance: Needs assistance (Pt reports she uses Rollator (4WW c Seat) in-home, that she is able to take it in the trailer, in each room, and in-bathroom)  Bath: Modified independent  Dressing: Modified independent  Grooming: Modified independent   Feeding: Independent  Toileting: Independent  Homemaking Assistance: Needs assistance  Meal Prep: Other (comment) (Family cooks for pt, she does not cook)  Laundry: Other (comment) (kids and [facility])  Vacuuming: Other (comment) (kids and she uses zoom broom)  Cleaning: Other (comment) (kids clean)  Gardening: Other (comment) (pt pays for upkeep)  Yard Work: Other (comment) (pays for yard work)  Driving: Other (comment) (one of her children drives her or her grandchildren)  Shopping: Other (comment) (her children)  Homemaking Responsibilities: Yes  Laundry Responsibility: Secondary  Cleaning Responsibility: Secondary  Health Care Management: Secondary  Ambulation Assistance: Needs assistance (Uses Rollator / 3NR C seat)  Transfer Assistance: Needs assistance  Active : No  Patient's  Info: son or son-in-law, for pt to go anywhere, they have to  the pt from inside the house. Education: H.S.  Occupation: Retired  Type of Occupation: [employer],   Additional Comments: Nurse comes in am to assist with ADLs, son comes everyday to assist with other needs. Objective   Comment: O2 on Room Air. Pt reported some FRANKS; but SpO2 remained >94% (on room air) with activity; remained >96% at rest.     Observation/Palpation  Posture: Fair  Safety Devices  Type of Devices: Gait belt; All fall risk precautions in place; Left in chair;Call light within reach  Restraints  Restraints Initially in Place: No    Bed Mobility Training  Bed Mobility Training: Yes (Began session in recliner chair. Ended session in bed.)  Interventions: Verbal cues; Safety awareness training;Manual cues (Mod Cues task initiation / sequencing / accuracy with DME.)  Sit to Supine: Minimum assistance; Additional time; Adaptive equipment [facility] Bed, HOB elevated ~30*, Bilateral Bedrails)  Scooting: Stand-by assistance; Additional time; Adaptive equipment [facility] Bed,  Bilateral Bedrails)    Balance  Standing: With support (Use of RW / DME.   Fair- balance with UE support. CGA for standing balance (several trials during ADLs / transitional movements, ~1-2 mins for each trial). )  Transfer Training  Transfer Training: Yes  Overall Level of Assistance: Minimum assistance; Additional time; Adaptive equipment ([address] with use of DME / RW for transfers and transitional movements)  Interventions: Verbal cues; Safety awareness training;Manual cues (Mod Cues for task initiation / sequencing / accuracy with DME)  Sit to Stand: Minimum assistance; Additional time; Adaptive equipment (Using RW)  Stand to Sit: Minimum assistance; Additional time  Toilet Transfer: Minimum assistance; Additional time; Adaptive equipment (Using elevated toilet with Bilateral grab bars. Increased time / effort and Cues (verbal and manual) for safety.)  Gait  Overall Level of Assistance: Minimum assistance;Contact-guard assistance; Additional time; Adaptive equipment (Using RW. Min progressing to [address]  Interventions: Verbal cues; Safety awareness training;Manual cues (Mod Assist for task initiation / sequencing. Manual  Cues for safe /a ccurate use of DME.)     ADL  Feeding: Modified independent   Feeding Skilled Clinical Factors: Sitting upright in recliner chair. Use of Right hand to open containers / setup food items / complete all feeding (food and beverage mgmt). No use of AE. Grooming: Contact guard assistance; Increased time to complete;Verbal cueing; Adaptive equipment  Grooming Skilled Clinical Factors: Dynamic standing balance while completing grooming tasks (hand hygiene / face hygiene) at the sink. Use of RW for standing. Mod Verbal and Manual Cues for safe / accurate use of RW at sink. UE Dressing: Stand by assistance;Verbal cueing; Increased time to complete  UE Dressing Skilled Clinical Factors: Sitting upright in recliner to don gown like robe. Increased time / effort by Pt. LE Dressing:  Moderate assistance  LE Dressing Skilled Clinical Factors: Pt required Max Assist for Bilaterral socks (reports she does not typically wear socks). Sitting upright in recliner was able to don / doff Bilateral Shoes with Setup / Supervision (no use of AE, reports typically uses long-handled AE). Toileting: Minimal assistance; Adaptive equipment; Increased time to complete  Toileting Skilled Clinical Factors: Use of elevated toilet with Bilateral Grab Bars, Pt was able to complete toilet hygiene and brief mgmt in standing (Min Assist for standing balance, Min assist for brief / underwear mgmt). Activity Tolerance  Activity Tolerance: Patient limited by fatigue;Patient limited by pain     Vision  Vision: Impaired  Vision Exceptions:  (retinopathy from diabetes per Pt, reports vision has declined more in the last 2 years)  Hearing  Hearing: Within functional limits  Cognition  Overall Cognitive Status: Exceptions  Arousal/Alertness: Delayed responses to stimuli  Following Commands: Follows one step commands with increased time; Follows one step commands with repetition  Initiation: Requires cues for some  Sequencing: Requires cues for some  Orientation  Overall Orientation Status: Within Functional Limits     LUE AROM (degrees)  LUE AROM : WF  LUE General AROM: LUE MMT 4-/5. No tremor, good coordination. Left Hand AROM (degrees)  Left Hand AROM: WFL  Left Hand General AROM: Left Hand Grasp MMT 4-/5. RUE AROM (degrees)  RUE AROM : WF  RUE General AROM: RUE MMT 4-/5. Ataxia c Tremor noted (moderate in severity). Right Hand AROM (degrees)  Right Hand AROM: WFL  Right Hand General AROM: Right Hand Grasp MMT 4-/5. Right hand dominant.        AM-PAC Score  AM-[facility] Inpatient Daily Activity Raw Score: 16 (07/29/22 1501)  AM-PAC Inpatient ADL T-Scale Score : 35.96 (07/29/22 1501)  ADL Inpatient CMS 0-100% Score: 53.32 (07/29/22 1501)  ADL Inpatient CMS G-Code Modifier : CK (07/29/22 1501)      Goals  Short Term Goals  Time Frame for Short term goals: By Discharge  Short Term Goal 1: Pt will demo Mod I and Good Integration of EC / Ax Pacing during functional tasks. Short Term Goal 2: Pt will demo Mod I and Good Integration of AE during UB and LB ADLs. Short Term Goal 3: Pt will participate in Bathroom Transfers and Toileting while maintaining Fair Balance / Safety. Short Term Goal 4: Pt will complete Functional Mobility (in-room / in-home) with Supervision Assist with Correct Use of AD / DME.        Therapy Time   Individual Concurrent Group Co-treatment   Time In 1312         Time Out 1409         Minutes 57         Timed Code Treatment Minutes: 52 Minutes (ADL)       AMAURY Cuellar, OTR/L

## 2022-07-29 NOTE — PROGRESS NOTES
[facility]  Internal Medicine Teaching Residency Program  Inpatient Daily Progress Note  ______________________________________________________________________________    Patient: Luanne Su  YOB: 1945   HSZ:[id number]    Acct: [de-identified]     Room: [facility]  Admit date: 7/28/2022  Today's date: 07/29/22  Number of days in the hospital: 1    SUBJECTIVE   Admitting Diagnosis: Syncope and collapse  CC: Multiple falls with leg weakness    Pt examined at bedside. Chart & results reviewed. No acute events overnight, patient hypertensive (196/81) prior to any antihypertensive medications this morning, improved to 159/62 after taking medications. patient has no complaints today  Afebrile, hemodynamically stable, SPO2 98% on RA    ROS:  Review of Systems   Constitutional:  Negative for appetite change, diaphoresis and fever. HENT:  Negative for congestion, drooling, hearing loss, sinus pressure, sinus pain and sore throat. Respiratory:  Negative for cough, chest tightness, shortness of breath and wheezing. Cardiovascular:  Negative for chest pain, palpitations and leg swelling. Gastrointestinal:  Negative for abdominal pain, constipation, diarrhea, nausea and vomiting. Genitourinary:  Negative for decreased urine volume, difficulty urinating, dysuria, enuresis, frequency, hematuria and urgency. Neurological:  Negative for tremors, seizures, light-headedness, numbness and headaches. BRIEF HISTORY     Patient presented to ED in no acute distress. EKG showed sinus bradycardia with prolonged QT. CT head, chest, cervical spine negative for any acute abnormality, incidental finding of 5 mm part solid lung nodule in right upper lobe. On labs was found to have an MARIELLE. Patient admitted for work up of syncopal episode and IV fluid.     OBJECTIVE     Vital Signs:  BP (!) 196/81   Pulse 56   Temp 98.2 °F (36.8 °C) (Oral)   Resp 19   Ht 5' 6\" (1.676 m)   Wt 260 lb 2.3 oz (118 kg)   SpO2 99%   BMI 41.99 kg/m²     Temp (24hrs), Av.9 °F (36.6 °C), Min:97.5 °F (36.4 °C), Max:98.2 °F (36.8 °C)    In: -   Out: 900 [Urine:900]    Physical Exam:  Physical Exam   Constitutional: This is a well developed, well nourished, Greater than 40 - Morbid Obesity / Extreme Obesity / Grade III 68y.o. year old female who is alert, oriented, cooperative and in no apparent distress. Head:normocephalic and atraumatic. EENT:  PERRLA. No conjunctival injections. Septum was midline, mucosa was without erythema, exudates or cobblestoning. No thrush was noted. Neck: Supple without thyromegaly. No elevated JVP. Trachea was midline. Respiratory: Chest was symmetrical without dullness to percussion. Breath sounds bilaterally were clear to auscultation. There were no wheezes, rhonchi or rales. There is no intercostal retraction or use of accessory muscles. No egophony noted. Cardiovascular: Regular without murmur, clicks, gallops or rubs. Abdomen: Slightly rounded and soft without organomegaly. No rebound, rigidity or guarding was appreciated. Musculoskeletal: Normal curvature of the spine. No gross muscle weakness. Extremities:  No lower extremity ulcerations, tenderness, varicosities or erythema. Muscle size, tone and strength are normal.  No involuntary movements are noted. 1+ pitting edema in b/l lower extremities. Skin:  Warm and dry. Good color, turgor and pigmentation. No lesions or scars. No cyanosis or clubbing.    Neurological/Psychiatric: The patient's general behavior, level of consciousness, thought content and emotional status is normal.        Medications:  Scheduled Medications:    sodium chloride flush  5-40 mL IntraVENous 2 times per day    amiodarone  100 mg Oral Daily    apixaban  5 mg Oral BID    ticagrelor  90 mg Oral BID    rosuvastatin  40 mg Oral Nightly    isosorbide mononitrate  30 mg Oral Daily    escitalopram  20 mg Oral Daily    gabapentin  300 mg Oral Nightly    [Held by provider] carvedilol  6.25 mg Oral BID WC    insulin glargine  20 Units SubCUTAneous QAM     Continuous Infusions:    sodium chloride      dextrose      sodium chloride 75 mL/hr at 07/28/22 2323     PRN Medicationssodium chloride flush, 5-40 mL, PRN  sodium chloride, , PRN  ondansetron, 4 mg, Q8H PRN   Or  ondansetron, 4 mg, Q6H PRN  polyethylene glycol, 17 g, Daily PRN  acetaminophen, 650 mg, Q6H PRN   Or  acetaminophen, 650 mg, Q6H PRN  glucose, 4 tablet, PRN  dextrose bolus, 125 mL, PRN   Or  dextrose bolus, 250 mL, PRN  glucagon (rDNA), 1 mg, PRN  dextrose, , Continuous PRN        Diagnostic Labs:  CBC:   Recent Labs     07/28/22  1430 07/29/22  0511   WBC 6.8 6.0   RBC 3.15* 3.10*   HGB 9.3* 9.2*   HCT 28.9* 30.0*   MCV 91.7 96.8   RDW 15.5* 15.2*    145     BMP:   Recent Labs     07/28/22  1430      K 3.8      CO2 21   PHOS 3.4   BUN 28*   CREATININE 2.23*     BNP: No results for input(s): BNP in the last 72 hours. PT/INR:   Recent Labs     07/28/22  1430   PROTIME 12.2   INR 1.2     APTT:   Recent Labs     07/28/22  1430   APTT 28.9     CARDIAC ENZYMES: No results for input(s): CKMB, CKMBINDEX, TROPONINI in the last 72 hours. Invalid input(s): CKTOTAL;3  FASTING LIPID PANEL:  Lab Results   Component Value Date    CHOL 125 05/30/2022    HDL 36 (L) 05/30/2022    TRIG 132 05/30/2022     LIVER PROFILE:   Recent Labs     07/28/22  1430   AST 14   ALT 8   BILITOT 0.28*   ALKPHOS 61      MICROBIOLOGY:   Lab Results   Component Value Date/Time    CULTURE NO GROWTH 5 DAYS 05/29/2022 07:02 PM       Imaging:    CT HEAD WO CONTRAST    Result Date: 7/28/2022  Mild central and cortical cerebral atrophy. Mild chronic deep white matter ischemic changes No acute intracranial abnormalities are noted. CT CHEST WO CONTRAST    Result Date: 7/28/2022  1. No acute intrathoracic abnormality. 2. 5 mm right upper lobe nodule.  RECOMMENDATIONS: Pathology: 5 mm right part-solid pulmonary nodule within the upper lobe. No routine follow-up imaging is recommended per Fleischner Society Guidelines. These guidelines do not apply to immunocompromised patients and patients with cancer. Follow up in patients with significant comorbidities as clinically warranted. For lung cancer screening, adhere to Lung-RADS guidelines. Reference: Radiology. 2017; 284(1):228-17     CT CERVICAL SPINE WO CONTRAST    Result Date: 7/28/2022  Multilevel cervical spondylosis and degenerative disc disease. Mild spinal stenosis C5-6 and C6-7 Evidence of paracervical spasm. No acute bony abnormalities are noted in the cervical spine RECOMMENDATIONS: Further evaluation of the cervical spine should be obtained with MR imaging if clinically indicated. XR CHEST PORTABLE    Result Date: 7/28/2022  No acute process. Stable cardiomegaly       ASSESSMENT & PLAN     ASSESSMENT / PLAN:   Principal Problem:    Syncope and collapse  Active Problems:    Chronic diastolic (congestive) heart failure (HCC)    Paroxysmal atrial fibrillation (HCC)    Bradycardia    Acute kidney injury superimposed on CKD (HCC)    Obesity, Class III, BMI 40-49.9 (morbid obesity) ([address]    Presence of stent in coronary artery in patient with coronary artery disease    Essential hypertension    Diabetic polyneuropathy associated with type 2 diabetes mellitus ([address]    Other hyperlipidemia    CKD (chronic kidney disease), stage III ([address]  Resolved Problems:    * No resolved hospital problems. *     This is a 68 y.o. female with past medical history of A. fib (on amiodarone), CAD with multiple stents (on Brilinta), PE (on Eliquis), HTN, DM with nephropathy, CKD stage III (baseline Cr 0.6-4.3), diastolic HF, who presented with multiple falls and leg weakness and found to have bradycardia and MARIELLE.         Syncope with collapse,most likely due to orthostatic hypotension in the setting of bradycardia (on betablockers) and afib(on amiodarone) disease), stage III ([facility] 7/28/2022 Yes    Overview Signed 11/24/2021  3:27 PM by Ami Bateman MD     Secondary to ischemic and diabetic nephrosclerosis baseline 1.2-1.4               '''''.'''''       MD DAVID Correa[address], [address] [place] [address].    Phone [phone]   Fax: [phone]  Answering Service: [phone]

## 2022-07-30 PROBLEM — E87.6 HYPOKALEMIA: Status: ACTIVE | Noted: 2022-07-30

## 2022-07-30 LAB
ABSOLUTE EOS #: 0.19 K/UL (ref 0–0.44)
ABSOLUTE IMMATURE GRANULOCYTE: <0.03 K/UL (ref 0–0.3)
ABSOLUTE LYMPH #: 1.14 K/UL (ref 1.1–3.7)
ABSOLUTE MONO #: 0.71 K/UL (ref 0.1–1.2)
ANION GAP SERPL CALCULATED.3IONS-SCNC: 10 MMOL/L (ref 9–17)
BASOPHILS # BLD: 1 % (ref 0–2)
BASOPHILS ABSOLUTE: 0.03 K/UL (ref 0–0.2)
BUN BLDV-MCNC: 20 MG/DL (ref 8–23)
CALCIUM SERPL-MCNC: 8.9 MG/DL (ref 8.6–10.4)
CHLORIDE BLD-SCNC: 107 MMOL/L (ref 98–107)
CO2: 24 MMOL/L (ref 20–31)
CREAT SERPL-MCNC: 1.61 MG/DL (ref 0.5–0.9)
EOSINOPHILS RELATIVE PERCENT: 3 % (ref 1–4)
GFR AFRICAN AMERICAN: 38 ML/MIN
GFR NON-AFRICAN AMERICAN: 31 ML/MIN
GFR SERPL CREATININE-BSD FRML MDRD: ABNORMAL ML/MIN/{1.73_M2}
GLUCOSE BLD-MCNC: 168 MG/DL (ref 65–105)
GLUCOSE BLD-MCNC: 174 MG/DL (ref 70–99)
GLUCOSE BLD-MCNC: 183 MG/DL (ref 65–105)
GLUCOSE BLD-MCNC: 202 MG/DL (ref 65–105)
GLUCOSE BLD-MCNC: 251 MG/DL (ref 65–105)
HCT VFR BLD CALC: 28 % (ref 36.3–47.1)
HEMOGLOBIN: 8.6 G/DL (ref 11.9–15.1)
IMMATURE GRANULOCYTES: 0 %
LYMPHOCYTES # BLD: 20 % (ref 24–43)
MAGNESIUM: 2.5 MG/DL (ref 1.6–2.6)
MCH RBC QN AUTO: 29.6 PG (ref 25.2–33.5)
MCHC RBC AUTO-ENTMCNC: 30.7 G/DL (ref 28.4–34.8)
MCV RBC AUTO: 96.2 FL (ref 82.6–102.9)
MONOCYTES # BLD: 12 % (ref 3–12)
NRBC AUTOMATED: 0 PER 100 WBC
PDW BLD-RTO: 15.2 % (ref 11.8–14.4)
PLATELET # BLD: 149 K/UL (ref 138–453)
PMV BLD AUTO: 11.7 FL (ref 8.1–13.5)
POTASSIUM SERPL-SCNC: 3.6 MMOL/L (ref 3.7–5.3)
RBC # BLD: 2.91 M/UL (ref 3.95–5.11)
RBC # BLD: ABNORMAL 10*6/UL
SEG NEUTROPHILS: 64 % (ref 36–65)
SEGMENTED NEUTROPHILS ABSOLUTE COUNT: 3.78 K/UL (ref 1.5–8.1)
SODIUM BLD-SCNC: 141 MMOL/L (ref 135–144)
WBC # BLD: 5.9 K/UL (ref 3.5–11.3)

## 2022-07-30 PROCEDURE — 83735 ASSAY OF MAGNESIUM: CPT

## 2022-07-30 PROCEDURE — 82947 ASSAY GLUCOSE BLOOD QUANT: CPT

## 2022-07-30 PROCEDURE — 85025 COMPLETE CBC W/AUTO DIFF WBC: CPT

## 2022-07-30 PROCEDURE — 6370000000 HC RX 637 (ALT 250 FOR IP): Performed by: STUDENT IN AN ORGANIZED HEALTH CARE EDUCATION/TRAINING PROGRAM

## 2022-07-30 PROCEDURE — 99232 SBSQ HOSP IP/OBS MODERATE 35: CPT | Performed by: INTERNAL MEDICINE

## 2022-07-30 PROCEDURE — 2060000000 HC ICU INTERMEDIATE R&B

## 2022-07-30 PROCEDURE — 2580000003 HC RX 258

## 2022-07-30 PROCEDURE — 36415 COLL VENOUS BLD VENIPUNCTURE: CPT

## 2022-07-30 PROCEDURE — 80048 BASIC METABOLIC PNL TOTAL CA: CPT

## 2022-07-30 PROCEDURE — 6360000002 HC RX W HCPCS

## 2022-07-30 PROCEDURE — 6370000000 HC RX 637 (ALT 250 FOR IP)

## 2022-07-30 RX ORDER — CARVEDILOL 3.12 MG/1
3.12 TABLET ORAL 2 TIMES DAILY WITH MEALS
Status: DISCONTINUED | OUTPATIENT
Start: 2022-07-30 | End: 2022-07-30

## 2022-07-30 RX ORDER — TORSEMIDE 20 MG/1
10 TABLET ORAL DAILY
Status: DISCONTINUED | OUTPATIENT
Start: 2022-07-31 | End: 2022-08-10 | Stop reason: HOSPADM

## 2022-07-30 RX ORDER — LOSARTAN POTASSIUM 50 MG/1
50 TABLET ORAL ONCE
Status: COMPLETED | OUTPATIENT
Start: 2022-07-30 | End: 2022-07-30

## 2022-07-30 RX ORDER — DIPHENHYDRAMINE HCL 25 MG
25 TABLET ORAL NIGHTLY PRN
Status: DISPENSED | OUTPATIENT
Start: 2022-07-30 | End: 2022-08-01

## 2022-07-30 RX ORDER — POTASSIUM CHLORIDE 20 MEQ/1
40 TABLET, EXTENDED RELEASE ORAL ONCE
Status: COMPLETED | OUTPATIENT
Start: 2022-07-30 | End: 2022-07-30

## 2022-07-30 RX ORDER — LOSARTAN POTASSIUM 50 MG/1
50 TABLET ORAL DAILY
Status: DISCONTINUED | OUTPATIENT
Start: 2022-07-30 | End: 2022-07-30

## 2022-07-30 RX ORDER — LOSARTAN POTASSIUM 50 MG/1
100 TABLET ORAL DAILY
Status: DISCONTINUED | OUTPATIENT
Start: 2022-07-31 | End: 2022-08-10 | Stop reason: HOSPADM

## 2022-07-30 RX ADMIN — HYDRALAZINE HYDROCHLORIDE 10 MG: 20 INJECTION INTRAMUSCULAR; INTRAVENOUS at 20:32

## 2022-07-30 RX ADMIN — AMLODIPINE BESYLATE 10 MG: 10 TABLET ORAL at 09:46

## 2022-07-30 RX ADMIN — TICAGRELOR 90 MG: 90 TABLET ORAL at 20:31

## 2022-07-30 RX ADMIN — POTASSIUM CHLORIDE 40 MEQ: 1500 TABLET, EXTENDED RELEASE ORAL at 09:47

## 2022-07-30 RX ADMIN — ESCITALOPRAM OXALATE 20 MG: 10 TABLET ORAL at 09:46

## 2022-07-30 RX ADMIN — SODIUM CHLORIDE, PRESERVATIVE FREE 10 ML: 5 INJECTION INTRAVENOUS at 20:32

## 2022-07-30 RX ADMIN — GABAPENTIN 300 MG: 300 CAPSULE ORAL at 20:32

## 2022-07-30 RX ADMIN — APIXABAN 5 MG: 5 TABLET, FILM COATED ORAL at 20:32

## 2022-07-30 RX ADMIN — TICAGRELOR 90 MG: 90 TABLET ORAL at 09:46

## 2022-07-30 RX ADMIN — INSULIN LISPRO 4 UNITS: 100 INJECTION, SOLUTION INTRAVENOUS; SUBCUTANEOUS at 11:56

## 2022-07-30 RX ADMIN — APIXABAN 5 MG: 5 TABLET, FILM COATED ORAL at 09:47

## 2022-07-30 RX ADMIN — ROSUVASTATIN CALCIUM 40 MG: 20 TABLET, FILM COATED ORAL at 20:32

## 2022-07-30 RX ADMIN — AMIODARONE HYDROCHLORIDE 200 MG: 200 TABLET ORAL at 09:47

## 2022-07-30 RX ADMIN — SODIUM CHLORIDE, PRESERVATIVE FREE 10 ML: 5 INJECTION INTRAVENOUS at 09:55

## 2022-07-30 RX ADMIN — LOSARTAN POTASSIUM 50 MG: 50 TABLET, FILM COATED ORAL at 11:50

## 2022-07-30 RX ADMIN — INSULIN GLARGINE 20 UNITS: 100 INJECTION, SOLUTION SUBCUTANEOUS at 09:47

## 2022-07-30 RX ADMIN — DIPHENHYDRAMINE HCL 25 MG: 25 TABLET ORAL at 20:31

## 2022-07-30 RX ADMIN — LOSARTAN POTASSIUM 50 MG: 50 TABLET, FILM COATED ORAL at 13:22

## 2022-07-30 ASSESSMENT — ENCOUNTER SYMPTOMS
NAUSEA: 0
DIARRHEA: 0
ABDOMINAL PAIN: 0
COUGH: 0
CONSTIPATION: 0
SORE THROAT: 0
SHORTNESS OF BREATH: 1
VOMITING: 0
SINUS PAIN: 0
TROUBLE SWALLOWING: 0
CHEST TIGHTNESS: 0
WHEEZING: 0
SINUS PRESSURE: 0

## 2022-07-30 NOTE — CONSULTS
[facility]    Consult / H&P               Today's Date: 7/30/2022  Patient Name: Erika Benedict  Date of admission: 7/28/2022  2:11 PM  Patient's age: 68 y.o., 1945  Admission Dx: Bradycardia [R00.1]  Syncope and collapse [R55]    Reason for Consult:  Cardiac evaluation    Requesting Physician: Niecy Avendano MD    CHIEF COMPLAINT: SOB and fall    History Obtained From: Patient and chart review    HISTORY OF PRESENT ILLNESS:      The patient is a 68 y.o.  female who is admitted to the hospital for fall. The patient has past medical history of MV CAD, s/p staged PCI to RCA, LAD and OM, HFpEF, DM, HTN, CKD stage III, atrial fibrillation, on Eliquis and Brilinta who presented to the ED with chief complaint of shortness of breath and fall. She reports that she felt weak and fell, more in left leg than right and was unable to get up. This was associated with right-sided pleuritic chest pain and lightheadedness. In the ED, she was evaluated, noted to be bradycardic with HR in 40s to 50s per minute, EKG revealed no acute ST or T wave changes. Troponin 42-38, proBNP 1723, noted to have MARIELLE on CKD with creatinine 2.23. Cardiology was consulted for evaluation of bradycardia and syncope. Past Medical History:   has a past medical history of Acute renal failure with tubular necrosis ([address] Anxiety, Atrial fibrillation (HCC), Benign positional vertigo, CAD (coronary artery disease), Chest pain, CKD (chronic kidney disease), stage III ([address] Depression, Diabetes mellitus ([address] Diabetic neuropathy ([address] Dysuria, Hyperlipidemia, Hypertension, Migraine, and Persistent proteinuria. Past Surgical History:   has a past surgical history that includes Percutaneous Transluminal Coronary Angio; Cardiac catheterization; Coronary angioplasty with stent (02/25/2017); Appendectomy; Coronary angioplasty with stent (07/11/2012); and Coronary angioplasty with stent (12/11/2020).      Home Medications:    Prior to Admission medications    Medication Sig Start Date End Date Taking? Authorizing Provider   loperamide (IMODIUM) 2 MG capsule Take 1 capsule by mouth 4 times daily as needed for Diarrhea 7/21/22 7/31/22  Cramencita Padilla MD   acetaminophen (TYLENOL) 325 MG tablet Take 650 mg by mouth every 6 hours as needed for Pain    Historical Provider, MD   torsemide (DEMADEX) 20 MG tablet Take 1 tablet by mouth in the morning. 7/20/22 10/18/22  Ami Bateman MD   carvedilol (COREG) 12.5 MG tablet Take 1 tablet by mouth 2 times daily (with meals) 6/22/22   Carmencita Padilla MD   omeprazole (PRILOSEC) 20 MG delayed release capsule TAKE ONE CAPSULE BY MOUTH DAILY 6/22/22   Carmencita Padilla MD   LORazepam (ATIVAN) 0.5 MG tablet Take 1 tablet by mouth 3 times daily. Historical Provider, MD   vitamin E 400 UNIT capsule Take 1 capsule by mouth daily    Historical Provider, MD   insulin detemir (LEVEMIR FLEXTOUCH) 100 UNIT/ML injection pen INJECT 60 UNITS INTO THE SKIN TWO TIMES A DAY 6/13/22   Carmencita Padilla MD   gabapentin (NEURONTIN) 300 MG capsule Take 1 capsule by mouth nightly for 30 days.  6/13/22 7/13/22  Carmencita Padilla MD   apixaban (ELIQUIS) 5 MG TABS tablet Take 1 tablet by mouth 2 times daily 6/1/22   Jemma Sánchez MD   dicyclomine (BENTYL) 10 MG capsule Take 1 capsule by mouth 3 times daily (before meals) 6/1/22   Jemma Sánchez MD   amiodarone (CORDARONE) 200 MG tablet Take 1 tablet by mouth daily for 60 doses 6/13/22 8/12/22  MAYELA Vincent - CNP   prednisoLONE acetate (PRED FORTE) 1 % ophthalmic suspension Place 1 drop into both eyes 3 times daily    Historical Provider, MD   BRILINTA 90 MG TABS tablet TAKE ONE TABLET BY MOUTH TWICE A DAY 4/19/22   MAYELA Robin NP   isosorbide mononitrate (IMDUR) 30 MG extended release tablet Take 1 tablet by mouth daily 3/15/22   Carmencita Padilla MD   insulin aspart (NOVOLOG FLEXPEN) 100 UNIT/ML injection pen Use TID before meals according to scale - max 45 units a day 12/2/21   Loulou Mckeon MD   Lancets MISC 1 each by Does not apply route 2 times daily 12/2/21   Loulou Mckeon MD   Insulin Pen Needle 32G X 4 MM MISC 5 each by Does not apply route daily 12/2/21   Loulou Mckeon MD   carvedilol (COREG) 12.5 MG tablet Take 1 tablet by mouth 2 times daily 11/24/21 6/13/22  Janet Kumar MD   escitalopram (LEXAPRO) 20 MG tablet TAKE ONE TABLET BY MOUTH DAILY 11/22/21   Loulou Mckeon MD   rosuvastatin (CRESTOR) 40 MG tablet Take 1 tablet by mouth daily 9/28/21   Loulou Mckeon MD   vitamin D3 (CHOLECALCIFEROL) 25 MCG (1000 UT) TABS tablet Take 1 tablet by mouth daily 9/28/21   Loulou Mckeon MD   Continuous Blood Gluc Sensor (FREESTYLE SYLVIA 14 DAY SENSOR) MISC 1 each by Does not apply route every 14 days 7/6/21   Loulou Mckeon MD   nitroGLYCERIN (NITROSTAT) 0.4 MG SL tablet place 1 tablet under the tongue if needed every 5 minutes if needed for CHEST PAIN 2/19/21   Loulou Mckeon MD   [facility]. Devices [facility]) MISC Diagnosis: right 5th metatarsal fracture, pain in limb    Duration: 3 months 2/3/21   Clarissa Richardson DPM   Misc.  Devices (COMMODE BEDSIDE) MISC 1 Device by Does not apply route daily 2/3/21   Clarissa Richardson DPM   Continuous Blood Gluc  (FREESTYLE SYLVIA 14 DAY READER) JAQUELINE 1 each by Does not apply route continuous [facility] 10/30/20   Loulou Mckeon MD      Current Facility-Administered Medications: [START ON 7/31/2022] torsemide (DEMADEX) tablet 10 mg, 10 mg, Oral, Daily  [START ON 7/31/2022] losartan (COZAAR) tablet 100 mg, 100 mg, Oral, Daily  diphenhydrAMINE (BENADRYL) tablet 25 mg, 25 mg, Oral, Nightly PRN  prochlorperazine (COMPAZINE) injection 10 mg, 10 mg, IntraVENous, Q6H PRN  hydrALAZINE (APRESOLINE) injection 10 mg, 10 mg, IntraVENous, Q6H PRN  amLODIPine (NORVASC) tablet 10 mg, 10 mg, Oral, Daily  insulin lispro (HUMALOG) injection vial 0-8 Units, 0-8 Units, SubCUTAneous, TID WC  insulin lispro (HUMALOG) injection vial 0-4 Units, 0-4 Units, SubCUTAneous, Nightly  glucose chewable tablet 16 g, 4 tablet, Oral, PRN  dextrose bolus 10% 125 mL, 125 mL, IntraVENous, PRN **OR** dextrose bolus 10% 250 mL, 250 mL, IntraVENous, PRN  glucagon (rDNA) injection 1 mg, 1 mg, SubCUTAneous, PRN  dextrose 10 % infusion, , IntraVENous, Continuous PRN  sodium chloride flush 0.9 % injection 5-40 mL, 5-40 mL, IntraVENous, 2 times per day  sodium chloride flush 0.9 % injection 5-40 mL, 5-40 mL, IntraVENous, PRN  0.9 % sodium chloride infusion, , IntraVENous, PRN  polyethylene glycol (GLYCOLAX) packet 17 g, 17 g, Oral, Daily PRN  acetaminophen (TYLENOL) tablet 650 mg, 650 mg, Oral, Q6H PRN **OR** acetaminophen (TYLENOL) suppository 650 mg, 650 mg, Rectal, Q6H PRN  apixaban (ELIQUIS) tablet 5 mg, 5 mg, Oral, BID  ticagrelor (BRILINTA) tablet 90 mg, 90 mg, Oral, BID  rosuvastatin (CRESTOR) tablet 40 mg, 40 mg, Oral, Nightly  isosorbide mononitrate (IMDUR) extended release tablet 30 mg, 30 mg, Oral, Daily  escitalopram (LEXAPRO) tablet 20 mg, 20 mg, Oral, Daily  gabapentin (NEURONTIN) capsule 300 mg, 300 mg, Oral, Nightly  dextrose 10 % infusion, , IntraVENous, Continuous PRN  insulin glargine (LANTUS) injection vial 20 Units, 20 Units, SubCUTAneous, QAM    Allergies:  Penicillins and Sulfa antibiotics    Social History:   reports that she has never smoked. She has never used smokeless tobacco. She reports that she does not drink alcohol and does not use drugs. Family History: family history includes Cancer in her father and mother. No h/o sudden cardiac death. No for premature CAD    REVIEW OF SYSTEMS:    Constitutional: there has been no unanticipated weight loss. There's been No change in energy level, No change in activity level. Eyes: No visual changes or diplopia. No scleral icterus. ENT: No Headaches  Cardiovascular:  As above  Respiratory: No previous pulmonary problems, No cough  Gastrointestinal: No abdominal pain. No change in bowel or bladder habits. Genitourinary: No dysuria, trouble voiding, or hematuria. Musculoskeletal:  No gait disturbance, No weakness or joint complaints. Integumentary: No rash or pruritis. Neurological: No headache, diplopia, change in muscle strength, numbness or tingling. No change in gait, balance, coordination, mood, affect, memory, mentation, behavior. Psychiatric: No anxiety, or depression. Endocrine: No temperature intolerance. No excessive thirst, fluid intake, or urination. No tremor. Hematologic/Lymphatic: No abnormal bruising or bleeding, blood clots or swollen lymph nodes. Allergic/Immunologic: No nasal congestion or hives. PHYSICAL EXAM:      BP (!) 169/52   Pulse (!) 47   Temp 98.2 °F (36.8 °C) (Oral)   Resp 20   Ht 5' 6\" (1.676 m)   Wt 260 lb 2.3 oz (118 kg)   SpO2 100%   BMI 41.99 kg/m²    Constitutional and General Appearance: alert, cooperative, no distress and appears stated age  HEENT: PERRL, no cervical lymphadenopathy. No masses palpable. Normal oral mucosa  Respiratory:  Normal excursion and expansion without use of accessory muscles  Resp Auscultation: Good respiratory effort. No for increased work of breathing. On auscultation: clear to auscultation bilaterally  Cardiovascular: The apical impulse is not displaced  Heart tones are crisp and normal. regular S1 and S2.  Jugular venous pulsation Normal  The carotid upstroke is normal in amplitude and contour without delay or bruit  Peripheral pulses are symmetrical and full   Abdomen:   No masses or tenderness  Bowel sounds present  Extremities:   No Cyanosis or Clubbing   Lower extremity edema: No   Skin: Warm and dry  Neurological:  Alert and oriented.   Moves all extremities well  No abnormalities of mood, affect, memory, mentation, or behavior are noted    DATA:    Diagnostics:    EKG: Atrial fibrillation with SVR, no acute ST or T wave changes  ECHO: 9/13/2021  Summary  Left ventricle is normal in size with normal systolic function globally. Calculated ejection fraction is 54%. Mild left ventricular wall thickness. Evidence of diastolic dysfunction. Left atrium is moderately dilated. Mildly dilated right atrium. Aortic valve is sclerotic but opens well. Mild mitral regurgitation. Mild tricuspid regurgitation. Estimated right ventricular systolic pressure  is 42.1 mmHg. Stress Test: not obtained. Cardiac Angiography: 5/31/22     Procedure Summary      Patent LAD, OM1, RCA and RPDA stent. RPL disease with left to collaterals. RPL is jailed at ostium with RCA-RPDA stent. Normal LV systolic function; LVEF 32%. Recommendations      Medical therapy as needed. Risk factor modification. BP control      Signature    12/11/2020   Procedure Summary      1. Successful Staged PCI of OM with JAMA   2. Restenosis of distal LAD, required, PTCA/JAMA      11/11/2020  Procedure Summary     Successful image guided PCI of ostial RPDA and Proximal LAD with JAMA, Balloon angioplasty of ostial RPL and distal LAD due to small caliber of the vessels      Labs:     CBC:   Recent Labs     07/29/22  0511 07/30/22  0427   WBC 6.0 5.9   HGB 9.2* 8.6*   HCT 30.0* 28.0*    149     BMP:   Recent Labs     07/29/22  0511 07/30/22  0427    141   K 3.7 3.6*   CO2 25 24   BUN 25* 20   CREATININE 2.01* 1.61*   LABGLOM 24* 31*   GLUCOSE 198* 174*     BNP: No results for input(s): BNP in the last 72 hours. PT/INR:   Recent Labs     07/28/22  1430   PROTIME 12.2   INR 1.2     APTT:  Recent Labs     07/28/22  1430   APTT 28.9     CARDIAC ENZYMES:No results for input(s): CKTOTAL, CKMB, CKMBINDEX, TROPONINI in the last 72 hours.   FASTING LIPID PANEL:  Lab Results   Component Value Date/Time    HDL 36 05/30/2022 06:45 AM    TRIG 132 05/30/2022 06:45 AM     LIVER PROFILE:  Recent Labs     07/28/22  1430   AST 14   ALT 8   LABALBU 4. 2       IMPRESSION:    Patient Active Problem List   Diagnosis    Atypical chest pain    Type 2 diabetes mellitus (HCC)    Vertigo    Essential hypertension    Atherosclerotic heart disease of native coronary artery with other forms of angina pectoris (HCC)    Dyspnea    Claudication in peripheral vascular disease (HCC)    Acute pulmonary embolism without acute cor pulmonale (HCC)    Diabetic polyneuropathy associated with type 2 diabetes mellitus ([facility]    Other hyperlipidemia    Chronic systolic heart failure (HCC)    Multiple subsegmental pulmonary emboli without acute cor pulmonale (HCC)    Congestive heart failure (HCC)    Pyelonephritis of right kidney    History of pulmonary embolism    Elevated troponin I level    Sepsis ([facility])    Suspected COVID-19 virus infection    Diarrhea    Chronic diastolic (congestive) heart failure (HCC)    Generalized weakness    Anemia, normocytic normochromic    Class 1 obesity in adult    Gastroenteritis    Acute on chronic diastolic CHF (congestive heart failure) (HCC)    Acute diastolic CHF (congestive heart failure) (HCC)    Arterial hypotension    Moderate malnutrition (HCC)    S/P PTCA (percutaneous transluminal coronary angioplasty)    Cervical myelopathy (HCC)    Viral gastroenteritis    Fall at home, initial encounter    Sicca, unspecified type ([facility]    Moderate episode of recurrent major depressive disorder ([facility]    Acute renal failure with tubular necrosis (HCC)    CKD (chronic kidney disease), stage III ([facility])    Dysuria    Paroxysmal atrial fibrillation (HCC)    Non-intractable vomiting    NSTEMI (non-ST elevated myocardial infarction) (HCC)    Persistent proteinuria    Bradycardia    Syncope and collapse    Acute kidney injury superimposed on CKD (HCC)    Obesity, Class III, BMI 40-49.9 (morbid obesity) ([facility]    Presence of stent in coronary artery in patient with coronary artery disease    Hypokalemia     Symptomatic bradycardia  Atrial fibrillation with slow ventricular response, on Eliquis  History of MVCAD s/p PCI(staged) to RCA, LAD, OM, on Brilinta  HFpEF  Type 2 diabetes mellitus  Hypertension  Hyperlipidemia  MARIELLE on CKD stage III(baseline creatinine 1.3-1.4)  Obesity    RECOMMENDATIONS:  Recommend decreasing dose of Coreg to 3.125 mg twice daily. Add losartan 50 mg daily. Will order event monitor on discharge. If the patient continues to have symptomatic bradycardia, will consider pacemaker placement, discussed with the patient. Replace electrolytes to keep K>4 and Mg>2. We will continue to follow. Nely Avendano MD  Fellow, [address] [place] [address]      I reviewed the patient history personally, and examined by me during the visit. I have reviewed the H&P/Consult / Progress note as completed, and made appropriate changes to the patient exam and treatment plans.       I have reviewed the case in details including physical exam and treatment plan with resident / fellow / NP    Patient treatment plan was explained to patient, correction in notes was made as appropriate, and discussed final arrangement based on  my evaluation and exam.    Additional Recommendations: Symptomatic bradycardia: reducing BB dose and monitor for 48 hours important  To compensate fopr BP management will add ARB to his treatments      Toya Stanton MD  [facility]

## 2022-07-30 NOTE — PROGRESS NOTES
Congestive Heart Failure Education completed and charted. CHF booklet given. Patient was receptive to education. Pt is interested in following up at [address] [place] [address] Str.. Will have CHF Clinic CNP ,Pk Harrison , order referral on Monday 8/1/22. Then will contact pt for appt. Discussed the  importance of medication compliance. Discussed the importance of a heart healthy diet. Discussed 2000 mg sodium-restricted daily diet. Patient instructed to limit fluid intake to  1.5 to 2 liters per day. Patient instructed to weigh self at the same time of each day each morning, reinforced teaching to monitor for 3-5 lb weight increase over 1-2 days notify physician if change noted. Signs and symptoms of CHF discussed with patient, such as feeling more tired than normal, feeling short of breath, coughing that increases when lying down, sudden weight gain, swelling of the feet, legs or belly. Patient verbalized understanding to notify physician office if these symptoms occur.

## 2022-07-30 NOTE — PLAN OF CARE
Problem: Discharge Planning  Goal: Discharge to home or other facility with appropriate resources  7/30/2022 1039 by Madeline Boas, RN  Outcome: Progressing  7/30/2022 5569 by Giovanni Pearl RN  Outcome: Progressing     Problem: Pain  Goal: Verbalizes/displays adequate comfort level or baseline comfort level  7/30/2022 1039 by Madeline Boas, RN  Outcome: Progressing  7/30/2022 0638 by Giovanni Pearl RN  Outcome: Progressing     Problem: Chronic Conditions and Co-morbidities  Goal: Patient's chronic conditions and co-morbidity symptoms are monitored and maintained or improved  7/30/2022 1039 by Madeline Boas, RN  Outcome: Progressing  7/30/2022 0638 by Giovanni Pearl RN  Outcome: Progressing     Problem: Safety - Adult  Goal: Free from fall injury  7/30/2022 1039 by Madeline Boas, RN  Outcome: Progressing  7/30/2022 0974 by Giovanni Pearl RN  Outcome: Progressing

## 2022-07-30 NOTE — PLAN OF CARE
Went to bedside , as per family request .    Had a lengthy discussion with the patients daughter and two sons. The patients family expressed concerns that it is noted safe for the patient to go home. The patient does not have the strength for her daily regular activities. Patient is not at all mobile at home , cant take care of herself , cant eat , cant cook. The patients family takes care of her , but they feel that it is safe for her to stay home anymore. The patient has H/0 multiple falls recently . Patient sleeps on bed throughout the day . They mentioned that the patient tries to manipulate that she is doing fine and okay to go home during social service evaluation , but in their opinion they can no longer take care of her and they feel it is not safe for her to go home. They also feel that the temperature at her home is very high always. And she has multiple episodes of feeling dizzy , lightheaded and drained in sweat. The feel that is it also very difficult just moving the patient in and out of her house and it is very difficult to attend any doctors appointments. The family wants the patient to go to a [address]. She wants us to contact Doyle Rankin at [facility] at [phone]. As per daughter , she has the decision making capacity for her . And the family expressed that they will call adult protection services , if the patient refuses to go to a rehab facility. Explained that it is very difficult to send the patient against her wishes to a Inpatient facility and that will mention all their concerns to the primary team.    The patients family want to be contacted tomorrow by the primary team at [phone].

## 2022-07-30 NOTE — CARE COORDINATION
Transitional Planning  Spoke with patient. She still is requesting a hospital bed. LOWELL JETER regarding need for order and face to face. Plan to go home with family has ride. 1231 pm Order received for hospital bed. Awaiting co sign of face to face. 1233 pm Sarita called, voicemail left for [facility] regarding hospital bed available. [facility] called, spoke with Malcolm Barros, they have hospital beds or will be able to order one.         500 pm Randall Dias dtr, approached CM stating patient is not able to go home safely. She has assistance from children, although Randall Dias dtr, has concerns about frequent falls. States that she has been in contact with [address] [place] [address] rehab and requesting to have referral sent. Discussed with dtr, about having patient qualify for rehab unit. She states [address] [place] [address] would be 1 snf choice. She will review list and provide choice once they are reviewed with family members. 550 pm Referrals called and messages left and faxed to [address] [place] [address] and [address] [place] [address]. 555 pm LOWELL JETER group on call regarding need for PM & R consult.

## 2022-07-30 NOTE — PROGRESS NOTES
[facility]  Internal Medicine Teaching Residency Program  Inpatient Daily Progress Note  ______________________________________________________________________________    Patient: Erika Benedict  YOB: 1945   GES:[id number]    Acct: [de-identified]     Room: [facility]  Admit date: 7/28/2022  Today's date: 07/30/22  Number of days in the hospital: 2    SUBJECTIVE   Admitting Diagnosis: Syncope and collapse  CC: Multiple falls with leg weakness    Pt examined at bedside. Chart & results reviewed. Patient having slightly elevated blood sugars in the 200s, medium dose sliding scale insulin was added, patient also had blood pressure last night up to 190s/70s with bradycardia and was given a dose of hydralazine. Patient resting comfortably in chair upon interview, no new complaints only some dyspnea on exertion. Afebrile, bradycardic and hypertensive, SPO2 97% on 2 L NC    ROS:  Review of Systems   HENT:  Negative for congestion, sinus pressure, sinus pain, sore throat, tinnitus and trouble swallowing. Respiratory:  Positive for shortness of breath (dyspnea on exertion). Negative for cough, chest tightness and wheezing. Cardiovascular:  Negative for chest pain, palpitations and leg swelling. Gastrointestinal:  Negative for abdominal pain, constipation, diarrhea, nausea and vomiting. Genitourinary:  Negative for difficulty urinating, dysuria, frequency, hematuria and urgency. Neurological:  Negative for dizziness, tremors, light-headedness, numbness and headaches. BRIEF HISTORY     Patient presented to ED in no acute distress. EKG showed sinus bradycardia with prolonged QT. CT head, chest, cervical spine negative for any acute abnormality, incidental finding of 5 mm part solid lung nodule in right upper lobe. On labs was found to have an MARIELLE. Patient admitted for work up of syncopal episode and IV fluid.     OBJECTIVE     Vital Signs:  BP (!) 129/44   Pulse (!) 43   Temp 98 °F (36.7 °C) (Oral)   Resp 20   Ht 5' 6\" (1.676 m)   Wt 260 lb 2.3 oz (118 kg)   SpO2 97%   BMI 41.99 kg/m²     Temp (24hrs), Av.1 °F (36.7 °C), Min:97.9 °F (36.6 °C), Max:98.2 °F (36.8 °C)    In: 410   Out: 500 [Urine:500]    Physical Exam:  Physical Exam   Constitutional: This is a well developed, well nourished, Greater than 40 - Morbid Obesity / Extreme Obesity / Grade III 68y.o. year old female who is alert, oriented, cooperative and in no apparent distress. Head:normocephalic and atraumatic. EENT:  PERRLA. No conjunctival injections. Septum was midline, mucosa was without erythema, exudates or cobblestoning. No thrush was noted. Neck: Supple without thyromegaly. No elevated JVP. Trachea was midline. Respiratory: Chest was symmetrical without dullness to percussion. Breath sounds bilaterally were clear to auscultation. There were no wheezes, rhonchi or rales. There is no intercostal retraction or use of accessory muscles. Cardiovascular: Regular without murmur, clicks, gallops or rubs. Abdomen: Slightly rounded and soft without organomegaly. No rebound, rigidity or guarding was appreciated. Musculoskeletal: Normal curvature of the spine. No gross muscle weakness. Extremities:  No lower extremity ulcerations, tenderness, varicosities or erythema. Muscle size, tone and strength are normal.  No involuntary movements are noted. +1 pitting edema present in bilateral lower extremities  Skin:  Warm and dry. Good color, turgor and pigmentation. No lesions or scars.   No cyanosis or clubbing  Neurological/Psychiatric: The patient's general behavior, level of consciousness, thought content and emotional status is normal.        Medications:  Scheduled Medications:    amiodarone  200 mg Oral Daily    [Held by provider] torsemide  20 mg Oral Daily    amLODIPine  10 mg Oral Daily    insulin lispro  0-8 Units SubCUTAneous TID WC    insulin lispro  0-4 Units SubCUTAneous Nightly    sodium chloride flush  5-40 mL IntraVENous 2 times per day    apixaban  5 mg Oral BID    ticagrelor  90 mg Oral BID    rosuvastatin  40 mg Oral Nightly    isosorbide mononitrate  30 mg Oral Daily    escitalopram  20 mg Oral Daily    gabapentin  300 mg Oral Nightly    carvedilol  6.25 mg Oral BID WC    insulin glargine  20 Units SubCUTAneous QAM     Continuous Infusions:    dextrose      sodium chloride      dextrose       PRN Medicationsprochlorperazine, 10 mg, Q6H PRN  hydrALAZINE, 10 mg, Q6H PRN  glucose, 4 tablet, PRN  dextrose bolus, 125 mL, PRN   Or  dextrose bolus, 250 mL, PRN  glucagon (rDNA), 1 mg, PRN  dextrose, , Continuous PRN  sodium chloride flush, 5-40 mL, PRN  sodium chloride, , PRN  polyethylene glycol, 17 g, Daily PRN  acetaminophen, 650 mg, Q6H PRN   Or  acetaminophen, 650 mg, Q6H PRN  dextrose, , Continuous PRN      Diagnostic Labs:  CBC:   Recent Labs     07/28/22  1430 07/29/22  0511 07/30/22  0427   WBC 6.8 6.0 5.9   RBC 3.15* 3.10* 2.91*   HGB 9.3* 9.2* 8.6*   HCT 28.9* 30.0* 28.0*   MCV 91.7 96.8 96.2   RDW 15.5* 15.2* 15.2*    145 149     BMP:   Recent Labs     07/28/22  1430 07/29/22  0511 07/30/22  0427    143 141   K 3.8 3.7 3.6*    106 107   CO2 21 25 24   PHOS 3.4  --   --    BUN 28* 25* 20   CREATININE 2.23* 2.01* 1.61*     BNP: No results for input(s): BNP in the last 72 hours. PT/INR:   Recent Labs     07/28/22  1430   PROTIME 12.2   INR 1.2     APTT:   Recent Labs     07/28/22  1430   APTT 28.9     CARDIAC ENZYMES: No results for input(s): CKMB, CKMBINDEX, TROPONINI in the last 72 hours.     Invalid input(s): CKTOTAL;3  FASTING LIPID PANEL:  Lab Results   Component Value Date    CHOL 125 05/30/2022    HDL 36 (L) 05/30/2022    TRIG 132 05/30/2022     LIVER PROFILE:   Recent Labs     07/28/22  1430   AST 14   ALT 8   BILITOT 0.28*   ALKPHOS 63      MICROBIOLOGY:   Lab Results   Component Value Date/Time    CULTURE NO GROWTH 5 DAYS 05/29/2022 07:02 PM       Imaging:    CT HEAD WO CONTRAST    Result Date: 7/28/2022  Mild central and cortical cerebral atrophy. Mild chronic deep white matter ischemic changes No acute intracranial abnormalities are noted. CT CHEST WO CONTRAST    Result Date: 7/28/2022  1. No acute intrathoracic abnormality. 2. 5 mm right upper lobe nodule. RECOMMENDATIONS: Pathology: 5 mm right part-solid pulmonary nodule within the upper lobe. No routine follow-up imaging is recommended per Fleischner Society Guidelines. These guidelines do not apply to immunocompromised patients and patients with cancer. Follow up in patients with significant comorbidities as clinically warranted. For lung cancer screening, adhere to Lung-RADS guidelines. Reference: Radiology. 2017; 284(1):228-38     CT CERVICAL SPINE WO CONTRAST    Result Date: 7/28/2022  Multilevel cervical spondylosis and degenerative disc disease. Mild spinal stenosis C5-6 and C6-7 Evidence of paracervical spasm. No acute bony abnormalities are noted in the cervical spine RECOMMENDATIONS: Further evaluation of the cervical spine should be obtained with MR imaging if clinically indicated. XR CHEST PORTABLE    Result Date: 7/28/2022  No acute process. Stable cardiomegaly       ASSESSMENT & PLAN     ASSESSMENT / PLAN:   Principal Problem:    Syncope and collapse  Active Problems:    Chronic diastolic (congestive) heart failure (HCC)    Paroxysmal atrial fibrillation (HCC)    Bradycardia    Acute kidney injury superimposed on CKD (HCC)    Obesity, Class III, BMI 40-49.9 (morbid obesity) ([address]    Presence of stent in coronary artery in patient with coronary artery disease    Hypokalemia    Essential hypertension    Diabetic polyneuropathy associated with type 2 diabetes mellitus ([address]    Other hyperlipidemia    CKD (chronic kidney disease), stage III ([address]  Resolved Problems:    * No resolved hospital problems.  *     This is a 68 y.o. female with past medical history of A. fib (on amiodarone), CAD with multiple stents (on Brilinta), PE (on Eliquis), HTN, DM with nephropathy, CKD stage III (baseline Cr 5.2-7.7), diastolic HF, who presented with multiple falls and leg weakness and found to have bradycardia and MARIELLE. Syncope with collapse,most likely due to orthostatic hypotension in the setting of bradycardia (on betablockers) and afib(on amiodarone) vs. Left foot neuropathy, in the setting of DM with polyneuropathy  -Hold patients beta-blocker, 7/28 orthostats negative  -Per PT note, patient able to ambulate 200+ feet but did have multiple instances of the left knee giving out        MARIELLE on CKD stage III, in the setting of chronic diastolic HF  -Improved  - Cr 1.61, baseline (1.3-.1. 5), monitor Cr  -On gentle fluids, 75mL/hr, monitor for fluid overload, holding Demadex        Bradycardia  - hold coreg, cardio consulted        Diabetes Mellitus with nephropathy and neuropathy  - on lantus at 20units QAM, continue to monitor glucose, MD SSI added, hypoglycemia protocol, on gabapentin        HTN, uncontrolled  - on imdur, on Norvasc        CAD s/p stenting x9  - on crestor, brilinta, and eliquis        Paroxysmal A.fib  - on amiodarone and eliquis       Hypokalemia  -K replaced, continue to monitor, keep K > 4     DVT ppx: on eliquis  GI ppx: not indicated  PT/OT/SW: on board  Discharge Planning:  on board, plan for home with ride from family, patient requesting hospital bed    Jarett Pineda MD  Internal Medicine Resident, PGY-1  [facility];  [place], [place]  7/30/2022, 10:03 AM

## 2022-07-30 NOTE — PLAN OF CARE
Jody Sheehan was evaluated today and a DME order was entered for a non-electric hospital bed because she requires assistance for complexity of body positioning needs. Patient requires frequent and immediate positioning changes for relief of pain and care needs related to medical diagnoses. Patient needs variability of bed height requirements to perform patient transfers and for eating, toileting, personal cares, ambulating, grooming, hygiene, dressing upper body, dressing lower body, meal preparation, and taking own medications. Current body Weight: 260 lb 2.3 oz (118 kg). The need for this equipment was discussed with the patient and she understands and is in agreement. Sridhar Tenorio MD  Internal Medicine Resident, PGY-2  [facility]; [place], [place]  7/30/2022, 11:24 AM  Attending Physician Statement  I have discussed the care of Jody Sheehan and I have examined the patient myselft and taken ros and hpi , including pertinent history and exam findings,  with the resident. I have reviewed the key elements of all parts of the encounter with the resident. I agree with the assessment, plan and orders as documented by the resident.       Electronically signed by Khari Sinha MD

## 2022-07-31 PROBLEM — K52.9 GASTROENTERITIS: Status: RESOLVED | Noted: 2020-07-14 | Resolved: 2022-07-31

## 2022-07-31 PROBLEM — A08.4 VIRAL GASTROENTERITIS: Status: RESOLVED | Noted: 2021-09-02 | Resolved: 2022-07-31

## 2022-07-31 PROBLEM — N12 PYELONEPHRITIS OF RIGHT KIDNEY: Status: RESOLVED | Noted: 2020-01-03 | Resolved: 2022-07-31

## 2022-07-31 PROBLEM — I26.99 ACUTE PULMONARY EMBOLISM WITHOUT ACUTE COR PULMONALE (HCC): Status: RESOLVED | Noted: 2019-11-18 | Resolved: 2022-07-31

## 2022-07-31 PROBLEM — R79.89 ELEVATED TROPONIN I LEVEL: Status: RESOLVED | Noted: 2020-01-03 | Resolved: 2022-07-31

## 2022-07-31 PROBLEM — N17.0 ACUTE RENAL FAILURE WITH TUBULAR NECROSIS (HCC): Status: RESOLVED | Noted: 2021-11-24 | Resolved: 2022-07-31

## 2022-07-31 PROBLEM — J96.01 ACUTE RESPIRATORY FAILURE WITH HYPOXIA (HCC): Status: ACTIVE | Noted: 2022-07-31

## 2022-07-31 PROBLEM — E66.813 CLASS 3 SEVERE OBESITY IN ADULT: Status: ACTIVE | Noted: 2020-07-14

## 2022-07-31 PROBLEM — A41.9 SEPSIS (HCC): Status: RESOLVED | Noted: 2020-07-12 | Resolved: 2022-07-31

## 2022-07-31 PROBLEM — E66.01 CLASS 3 SEVERE OBESITY IN ADULT (HCC): Status: ACTIVE | Noted: 2020-07-14

## 2022-07-31 PROBLEM — R19.7 DIARRHEA: Status: RESOLVED | Noted: 2020-07-13 | Resolved: 2022-07-31

## 2022-07-31 PROBLEM — R77.8 ELEVATED TROPONIN I LEVEL: Status: RESOLVED | Noted: 2020-01-03 | Resolved: 2022-07-31

## 2022-07-31 LAB
ABSOLUTE EOS #: 0.13 K/UL (ref 0–0.44)
ABSOLUTE IMMATURE GRANULOCYTE: <0.03 K/UL (ref 0–0.3)
ABSOLUTE LYMPH #: 1.32 K/UL (ref 1.1–3.7)
ABSOLUTE MONO #: 0.65 K/UL (ref 0.1–1.2)
ANION GAP SERPL CALCULATED.3IONS-SCNC: 10 MMOL/L (ref 9–17)
BASOPHILS # BLD: 1 % (ref 0–2)
BASOPHILS ABSOLUTE: 0.04 K/UL (ref 0–0.2)
BUN BLDV-MCNC: 18 MG/DL (ref 8–23)
CALCIUM SERPL-MCNC: 8.9 MG/DL (ref 8.6–10.4)
CHLORIDE BLD-SCNC: 108 MMOL/L (ref 98–107)
CO2: 23 MMOL/L (ref 20–31)
CREAT SERPL-MCNC: 1.59 MG/DL (ref 0.5–0.9)
EOSINOPHILS RELATIVE PERCENT: 3 % (ref 1–4)
GFR AFRICAN AMERICAN: 38 ML/MIN
GFR NON-AFRICAN AMERICAN: 31 ML/MIN
GFR SERPL CREATININE-BSD FRML MDRD: ABNORMAL ML/MIN/{1.73_M2}
GLUCOSE BLD-MCNC: 114 MG/DL (ref 65–105)
GLUCOSE BLD-MCNC: 115 MG/DL (ref 70–99)
GLUCOSE BLD-MCNC: 179 MG/DL (ref 65–105)
GLUCOSE BLD-MCNC: 184 MG/DL (ref 65–105)
GLUCOSE BLD-MCNC: 191 MG/DL (ref 65–105)
HCT VFR BLD CALC: 30.3 % (ref 36.3–47.1)
HEMOGLOBIN: 8.9 G/DL (ref 11.9–15.1)
IMMATURE GRANULOCYTES: 0 %
LYMPHOCYTES # BLD: 27 % (ref 24–43)
MCH RBC QN AUTO: 28.7 PG (ref 25.2–33.5)
MCHC RBC AUTO-ENTMCNC: 29.4 G/DL (ref 28.4–34.8)
MCV RBC AUTO: 97.7 FL (ref 82.6–102.9)
MONOCYTES # BLD: 13 % (ref 3–12)
NRBC AUTOMATED: 0 PER 100 WBC
PDW BLD-RTO: 15.6 % (ref 11.8–14.4)
PLATELET # BLD: 150 K/UL (ref 138–453)
PMV BLD AUTO: 11.3 FL (ref 8.1–13.5)
POTASSIUM SERPL-SCNC: 3.7 MMOL/L (ref 3.7–5.3)
RBC # BLD: 3.1 M/UL (ref 3.95–5.11)
RBC # BLD: ABNORMAL 10*6/UL
SEG NEUTROPHILS: 56 % (ref 36–65)
SEGMENTED NEUTROPHILS ABSOLUTE COUNT: 2.81 K/UL (ref 1.5–8.1)
SODIUM BLD-SCNC: 141 MMOL/L (ref 135–144)
WBC # BLD: 5 K/UL (ref 3.5–11.3)

## 2022-07-31 PROCEDURE — 6370000000 HC RX 637 (ALT 250 FOR IP)

## 2022-07-31 PROCEDURE — 99232 SBSQ HOSP IP/OBS MODERATE 35: CPT | Performed by: INTERNAL MEDICINE

## 2022-07-31 PROCEDURE — 94761 N-INVAS EAR/PLS OXIMETRY MLT: CPT

## 2022-07-31 PROCEDURE — 2580000003 HC RX 258

## 2022-07-31 PROCEDURE — 97530 THERAPEUTIC ACTIVITIES: CPT

## 2022-07-31 PROCEDURE — 2060000000 HC ICU INTERMEDIATE R&B

## 2022-07-31 PROCEDURE — 97110 THERAPEUTIC EXERCISES: CPT

## 2022-07-31 PROCEDURE — 85025 COMPLETE CBC W/AUTO DIFF WBC: CPT

## 2022-07-31 PROCEDURE — 82947 ASSAY GLUCOSE BLOOD QUANT: CPT

## 2022-07-31 PROCEDURE — 97116 GAIT TRAINING THERAPY: CPT

## 2022-07-31 PROCEDURE — 80048 BASIC METABOLIC PNL TOTAL CA: CPT

## 2022-07-31 PROCEDURE — 2700000000 HC OXYGEN THERAPY PER DAY

## 2022-07-31 PROCEDURE — 36415 COLL VENOUS BLD VENIPUNCTURE: CPT

## 2022-07-31 RX ORDER — POTASSIUM CHLORIDE 20 MEQ/1
40 TABLET, EXTENDED RELEASE ORAL ONCE
Status: COMPLETED | OUTPATIENT
Start: 2022-07-31 | End: 2022-07-31

## 2022-07-31 RX ADMIN — TICAGRELOR 90 MG: 90 TABLET ORAL at 10:11

## 2022-07-31 RX ADMIN — LOSARTAN POTASSIUM 100 MG: 50 TABLET, FILM COATED ORAL at 10:11

## 2022-07-31 RX ADMIN — POTASSIUM CHLORIDE 40 MEQ: 1500 TABLET, EXTENDED RELEASE ORAL at 10:11

## 2022-07-31 RX ADMIN — GABAPENTIN 300 MG: 300 CAPSULE ORAL at 21:31

## 2022-07-31 RX ADMIN — SODIUM CHLORIDE, PRESERVATIVE FREE 10 ML: 5 INJECTION INTRAVENOUS at 21:33

## 2022-07-31 RX ADMIN — AMLODIPINE BESYLATE 10 MG: 10 TABLET ORAL at 10:11

## 2022-07-31 RX ADMIN — INSULIN GLARGINE 20 UNITS: 100 INJECTION, SOLUTION SUBCUTANEOUS at 10:16

## 2022-07-31 RX ADMIN — APIXABAN 5 MG: 5 TABLET, FILM COATED ORAL at 10:15

## 2022-07-31 RX ADMIN — ROSUVASTATIN CALCIUM 40 MG: 20 TABLET, FILM COATED ORAL at 21:32

## 2022-07-31 RX ADMIN — APIXABAN 5 MG: 5 TABLET, FILM COATED ORAL at 21:31

## 2022-07-31 RX ADMIN — SODIUM CHLORIDE, PRESERVATIVE FREE 10 ML: 5 INJECTION INTRAVENOUS at 10:15

## 2022-07-31 RX ADMIN — ESCITALOPRAM OXALATE 20 MG: 10 TABLET ORAL at 10:11

## 2022-07-31 RX ADMIN — TORSEMIDE 10 MG: 20 TABLET ORAL at 10:11

## 2022-07-31 RX ADMIN — DIPHENHYDRAMINE HCL 25 MG: 25 TABLET ORAL at 22:15

## 2022-07-31 RX ADMIN — TICAGRELOR 90 MG: 90 TABLET ORAL at 21:32

## 2022-07-31 NOTE — PROGRESS NOTES
[facility]  Internal Medicine Teaching Residency Program  Inpatient Daily Progress Note  ______________________________________________________________________________    Patient: Kin Farley  YOB: 1945   HQQ:[id number]    Acct: [de-identified]     Room: [facility]  Admit date: 2022  Today's date: 22  Number of days in the hospital: 3    SUBJECTIVE   Admitting Diagnosis: Syncope and collapse  CC: multiple falls with leg weakness    Pt examined at bedside. Chart & results reviewed. No acute events overnight, patient has no new complaints today, she is agreeable to go to a rehab facility pending PM&R recommendations. Afebrile, bradycardic but hemodynamically stable, O2 saturating well      ROS:  Review of Systems  HENT:  Negative for congestion, sinus pressure, sinus pain, sore throat, tinnitus and trouble swallowing. Respiratory:  Positive for shortness of breath (dyspnea on exertion). Negative for cough, chest tightness and wheezing. Cardiovascular:  Negative for chest pain, palpitations and leg swelling. Gastrointestinal:  Negative for abdominal pain, constipation, diarrhea, nausea and vomiting. Genitourinary:  Negative for difficulty urinating, dysuria, frequency, hematuria and urgency. Neurological:  Negative for dizziness, tremors, light-headedness, numbness and headaches. BRIEF HISTORY     Patient presented to ED in no acute distress. EKG showed sinus bradycardia with prolonged QT. CT head, chest, cervical spine negative for any acute abnormality, incidental finding of 5 mm part solid lung nodule in right upper lobe. On labs was found to have an MARIELLE. Patient admitted for work up of syncopal episode and IV fluid.      OBJECTIVE     Vital Signs:  BP (!) 124/54   Pulse (!) 48   Temp 98.1 °F (36.7 °C) (Oral)   Resp 20   Ht 5' 6\" (1.676 m)   Wt 260 lb 2.3 oz (118 kg)   SpO2 98%   BMI 41.99 kg/m²     Temp (24hrs), Av.2 °F (36.8 °C), Min:98.1 °F (36.7 °C), Max:98.2 °F (36.8 °C)    In: -   Out: 500 [Urine:500]    Physical Exam:  Physical Exam   Constitutional: This is a well developed, well nourished, Greater than 40 - Morbid Obesity / Extreme Obesity / Grade III 68y.o. year old female who is alert, oriented, cooperative and in no apparent distress. Head:normocephalic and atraumatic. EENT:  PERRLA. No conjunctival injections. Septum was midline, mucosa was without erythema, exudates or cobblestoning. No thrush was noted. Neck: Supple without thyromegaly. No elevated JVP. Trachea was midline. Respiratory: Chest was symmetrical without dullness to percussion. Breath sounds bilaterally were clear to auscultation. There were no wheezes, rhonchi or rales. There is no intercostal retraction or use of accessory muscles. Cardiovascular: Regular without murmur, clicks, gallops or rubs. Abdomen: Slightly rounded and soft without organomegaly. No rebound, rigidity or guarding was appreciated. Musculoskeletal: Normal curvature of the spine. No gross muscle weakness. Extremities:  No lower extremity ulcerations, tenderness, varicosities or erythema. Muscle size, tone and strength are normal.  No involuntary movements are noted. +1 pitting edema present in bilateral lower extremities  Skin:  Warm and dry. Good color, turgor and pigmentation. No lesions or scars.   No cyanosis or clubbing  Neurological/Psychiatric: The patient's general behavior, level of consciousness, thought content and emotional status is normal.     Medications:  Scheduled Medications:    potassium chloride  40 mEq Oral Once    torsemide  10 mg Oral Daily    losartan  100 mg Oral Daily    amLODIPine  10 mg Oral Daily    insulin lispro  0-8 Units SubCUTAneous TID     insulin lispro  0-4 Units SubCUTAneous Nightly    sodium chloride flush  5-40 mL IntraVENous 2 times per day    apixaban  5 mg Oral BID    ticagrelor  90 mg Oral BID    rosuvastatin  40 mg atrophy. Mild chronic deep white matter ischemic changes No acute intracranial abnormalities are noted. CT CHEST WO CONTRAST    Result Date: 7/28/2022  1. No acute intrathoracic abnormality. 2. 5 mm right upper lobe nodule. RECOMMENDATIONS: Pathology: 5 mm right part-solid pulmonary nodule within the upper lobe. No routine follow-up imaging is recommended per Fleischner Society Guidelines. These guidelines do not apply to immunocompromised patients and patients with cancer. Follow up in patients with significant comorbidities as clinically warranted. For lung cancer screening, adhere to Lung-RADS guidelines. Reference: Radiology. 2017; 284(1):228-43     CT CERVICAL SPINE WO CONTRAST    Result Date: 7/28/2022  Multilevel cervical spondylosis and degenerative disc disease. Mild spinal stenosis C5-6 and C6-7 Evidence of paracervical spasm. No acute bony abnormalities are noted in the cervical spine RECOMMENDATIONS: Further evaluation of the cervical spine should be obtained with MR imaging if clinically indicated. XR CHEST PORTABLE    Result Date: 7/28/2022  No acute process. Stable cardiomegaly       ASSESSMENT & PLAN     ASSESSMENT / PLAN:   Principal Problem:    Syncope and collapse  Active Problems:    Chronic diastolic (congestive) heart failure (HCC)    Paroxysmal atrial fibrillation (HCC)    Bradycardia    Acute kidney injury superimposed on CKD (HCC)    Obesity, Class III, BMI 40-49.9 (morbid obesity) ([address]    Presence of stent in coronary artery in patient with coronary artery disease    Hypokalemia    Essential hypertension    Diabetic polyneuropathy associated with type 2 diabetes mellitus ([address]    Other hyperlipidemia    CKD (chronic kidney disease), stage III ([address]  Resolved Problems:    * No resolved hospital problems.  *     This is a 68 y.o. female with past medical history of A. fib (on amiodarone), CAD with multiple stents (on Brilinta), PE (on Eliquis), HTN, DM with nephropathy, CKD stage III (baseline Cr 1.8-9.2), diastolic HF, who presented with multiple falls and leg weakness and found to have bradycardia and MARIELLE. Syncope with collapse,most likely due to orthostatic hypotension in the setting of bradycardia (on betablockers) and afib(on amiodarone) vs. Left foot neuropathy, in the setting of DM with polyneuropathy  -off coreg, 7/28 orthostats negative        MARIELLE on CKD stage III, in the setting of chronic diastolic HF  -Improved  - Cr 1.59, baseline (1.3-.1. 5), monitor Cr        Bradycardia  - off coreg, cardio on board, event monitor on discharge, if continues may consider pacemaker        Diabetes Mellitus with nephropathy and neuropathy  - on lantus at 20units QAM, continue to monitor glucose, MD SSI, hypoglycemia protocol, on gabapentin        HTN, uncontrolled  - on imdur, on Norvasc, Cozaar added and increased to 100mg QD        CAD s/p stenting x9  - on crestor, brilinta, and eliquis        Paroxysmal A.fib  - on amiodarone and eliquis       Hypokalemia  -K replaced today, continue to monitor, keep K > 4     DVT ppx: on eliquis  GI ppx: not indicated  PT/OT/SW: on board  Discharge Planning:  on board, plan for home with ride from family, patient requesting hospital bed    Paul Arellano MD  Internal Medicine Resident, PGY-1  [address] [place], [place]  7/31/2022, 7:45 AM   Attending Physician Statement  I have discussed the care of Montez Hidalgo and I have examined the patient myselft and taken ros and hpi , including pertinent history and exam findings,  with the resident. I have reviewed the key elements of all parts of the encounter with the resident. I agree with the assessment, plan and orders as documented by the resident.   Pending ecf   Off bb      Electronically signed by Mireille Jaramillo MD

## 2022-07-31 NOTE — PROGRESS NOTES
Physical Therapy  Facility/Department: [facility] NEURO  Physical Therapy    Name: Vivian Aguilar  : 1945  MRN: [id number]  Date of Service: 2022    Discharge Recommendations:  Patient would benefit from continued therapy after discharge          Patient Diagnosis(es): The primary encounter diagnosis was MARIELLE (acute kidney injury) ([facility] Diagnoses of Acute systolic congestive heart failure (HCC) and Obesity, Class III, BMI 40-49.9 (morbid obesity) ([facility] were also pertinent to this visit. Past Medical History:  has a past medical history of Acute renal failure with tubular necrosis ([address] Anxiety, Atrial fibrillation (HCC), Benign positional vertigo, CAD (coronary artery disease), Chest pain, CKD (chronic kidney disease), stage III ([facility] Depression, Diabetes mellitus ([facility] Diabetic neuropathy ([facility] Dysuria, Hyperlipidemia, Hypertension, Migraine, and Persistent proteinuria. Past Surgical History:  has a past surgical history that includes Percutaneous Transluminal Coronary Angio; Cardiac catheterization; Coronary angioplasty with stent (2017); Appendectomy; Coronary angioplasty with stent (2012); and Coronary angioplasty with stent (2020). Assessment   Body Structures, Functions, Activity Limitations Requiring Skilled Therapeutic Intervention: Decreased strength;Decreased functional mobility ; Decreased balance;Decreased endurance;Decreased safe awareness;Decreased ADL status; Decreased body mechanics  Assessment: Patient appears to be cognitively improved. Follows commands immediately, makes several witty statements. Completed several exercises and ambulated with good motivation and stamina for treatment. Family present and being supportive and encouraging. Recommend further PT to regain functional independence.   Therapy Prognosis: Good        Plan   Plan  Plan: 6-7 times per week  Current Treatment Recommendations: Therapeutic activities, Strengthening, ROM, Balance training, ADL/Self-care training, IADL training, Neuromuscular re-education, Functional mobility training, Transfer training, Gait training, Stair training, Safety education & training, Home exercise program, Endurance training  Safety Devices  Type of Devices: Gait belt, All fall risk precautions in place, Call light within reach, Left in bed, Bed alarm in place, Nurse notified  Restraints  Restraints Initially in Place: No     Restrictions  Restrictions/Precautions  Restrictions/Precautions: Up as Tolerated, General Precautions  Required Braces or Orthoses?: No  Position Activity Restriction  Other position/activity restrictions: continuous telemetry     Subjective   General  Chart Reviewed: Yes  Patient assessed for rehabilitation services?: Yes  Family / Caregiver Present: Yes  Follows Commands: Within Functional Limits         Social/Functional History  Social/Functional History  Lives With: Alone  Type of Home: Trailer  Home Layout: One level  Home Access: Stairs to enter with rails  Entrance Stairs - Number of Steps: 3  Entrance Stairs - Rails: Right  Bathroom Shower/Tub: Tub/Shower unit  Bathroom Toilet: Standard  Bathroom Equipment: Shower chair  Home Equipment: Salvador Champagne, rolling  Has the patient had two or more falls in the past year or any fall with injury in the past year?: Yes (LLE giving out, combination with dizziness)  Receives Help From: Family  ADL Assistance: Needs assistance (Pt reports she uses Rollator (4WW c Seat) in-home, that she is able to take it in the trailer, in each room, and in-bathroom)  Bath: Modified independent  Dressing: Modified independent  Grooming: Modified independent   Feeding: Independent  Toileting: Independent  Homemaking Assistance: Needs assistance  Meal Prep:  Other (comment) (Family cooks for pt, she does not cook)  Laundry: Other (comment) (kids and [employer])  Vacuuming: Other (comment) (kids and she uses zoom broom)  Cleaning: Other (comment) (kids clean)  Gardening: Other (comment) (pt pays for upkeep)  Yard Work: Other (comment) (pays for yard work)  Driving: Other (comment) (one of her children drives her or her grandchildren)  Shopping: Other (comment) (her children)  Homemaking Responsibilities: Yes  Laundry Responsibility: Secondary  Cleaning Responsibility: Secondary  Health Care Management: Secondary  Ambulation Assistance: Needs assistance (Uses Rollator / 7UZ C seat)  Transfer Assistance: Needs assistance  Active : No  Patient's  Info: son or son-in-law, for pt to go anywhere, they have to  the pt from inside the house. Education: H.S.  Occupation: Retired  Type of Occupation: [employer],   Additional Comments: Nurse comes in am to assist with ADLs, son comes everyday to assist with other needs. Cognition   Orientation  Overall Orientation Status: Within Functional Limits  Cognition  Overall Cognitive Status: WFL     Objective   Heart Rate: 58  Heart Rate Source: Monitor  BP: (!) 151/67  BP Location: Left upper arm  BP Method: Automatic  Patient Position: Semi fowlers  MAP (Calculated): 95  Resp: 18  SpO2: 97 %  O2 Device: None (Room air)      Bed mobility  Supine to Sit: Minimal assistance  Sit to Supine: Minimal assistance  Transfers  Sit to Stand: Minimal Assistance  Stand to sit: Contact guard assistance  Ambulation  Surface: level tile  Device: Rolling Walker  Assistance: Contact guard assistance  Gait Deviations: Slow Cherise; Increased GIOVANA; Decreased step length  Distance: 54'  Comments: No observed episodes of knee buckling. Patient fatigued with gait. A/AROM Exercises: Standing marches at bedside x8 reps.   Supine ankle pumps, heel slides, SAQ, resisted hip extensions x15 reps          AM-PAC Score  AM-PAC Inpatient Mobility Raw Score : 18 (07/31/22 1628)  AM-PAC Inpatient T-Scale Score : 43.63 (07/31/22 1628)  Mobility Inpatient CMS 0-100% Score: 46.58 (07/31/22 1628)  Mobility Inpatient CMS G-Code Modifier : CK (07/31/22 1628)      Goals  Short Term Goals  Time Frame for Short term goals: 12  Short term goal 1: Pt able to complete bed mobility modified independently  Short term goal 2: Pt able to complete transfers with RW use supervision  Short term goal 3: Pt able to ambulate 100ft with RW use SBA*1  Short term goal 4: Pt able to climb 4 steps with UE on R handrail CGA*1       Education  Patient Education  Education Given To: Patient  Education Provided: Role of Therapy;Plan of Care  Education Method: Verbal;Demonstration  Barriers to Learning: None  Education Outcome: Demonstrated understanding;Verbalized understanding      Therapy Time   Individual Concurrent Group Co-treatment   Time In 1500         Time Out 1540         Minutes 40         Timed Code Treatment Minutes: Landon 1690, PT

## 2022-07-31 NOTE — CARE COORDINATION
Transitional Planning  Call from Yocasta at [facility] called. They will continue to follow patient and wait for PM&R assessment.

## 2022-07-31 NOTE — PROGRESS NOTES
[facility]   Progress Note                   Date:   7/31/2022  Patient name: Omar Ewing  Date of admission:  7/28/2022  2:11 PM  MRN:   [id number]  YOB: 1945  PCP: Itzel Badillo MD    Reason for Admission: Bradycardia [R00.1]  Syncope and collapse [R55]    Subjective:       Clinical Changes / Abnormalities: Pt seen and examined in the room. Hr stable. Pt states that she is feeling better. Pt does have tremors today       Medications:   Scheduled Meds:   potassium chloride  40 mEq Oral Once    torsemide  10 mg Oral Daily    losartan  100 mg Oral Daily    amLODIPine  10 mg Oral Daily    insulin lispro  0-8 Units SubCUTAneous TID WC    insulin lispro  0-4 Units SubCUTAneous Nightly    sodium chloride flush  5-40 mL IntraVENous 2 times per day    apixaban  5 mg Oral BID    ticagrelor  90 mg Oral BID    rosuvastatin  40 mg Oral Nightly    isosorbide mononitrate  30 mg Oral Daily    escitalopram  20 mg Oral Daily    gabapentin  300 mg Oral Nightly    insulin glargine  20 Units SubCUTAneous QAM     Continuous Infusions:   dextrose      sodium chloride      dextrose       CBC:   Recent Labs     07/29/22  0511 07/30/22  0427 07/31/22  0633   WBC 6.0 5.9 5.0   HGB 9.2* 8.6* 8.9*    149 150     BMP:    Recent Labs     07/29/22  0511 07/30/22  0427 07/31/22  0633    141 141   K 3.7 3.6* 3.7    107 108*   CO2 25 24 23   BUN 25* 20 18   CREATININE 2.01* 1.61* 1.59*   GLUCOSE 198* 174* 115*     Hepatic:   Recent Labs     07/28/22  1430   AST 14   ALT 8   BILITOT 0.28*   ALKPHOS 63     Troponin:   Recent Labs     07/28/22  1430 07/28/22  1557   TROPHS 42* 38*     BNP: No results for input(s): BNP in the last 72 hours. Lipids: No results for input(s): CHOL, HDL in the last 72 hours.     Invalid input(s): LDLCALCU  INR:   Recent Labs     07/28/22  1430   INR 1.2       Objective:   Vitals: BP (!) 135/56   Pulse (!) 42   Temp 97.7 °F (36.5 °C) (Oral)   Resp 18   Ht 5' 6\" (1.676 m)   Wt 260 lb 2.3 oz (118 kg)   SpO2 100%   BMI 41.99 kg/m²   General appearance: alert and cooperative with exam  HEENT: Head: Normocephalic, no lesions, without obvious abnormality.   Neck: no JVD, trachea midline, no adenopathy  Lungs: Clear to auscultation  Heart: Regular rate and rhythm, s1/s2 auscultated, no murmurs  Abdomen: soft, non-tender, bowel sounds active  Extremities: no edema  Neurologic: not done        Assessment / Acute Cardiac Problems:   Symptomatic bradycardia  Atrial fibrillation with slow ventricular response, on Eliquis  History of MVCAD s/p PCI(staged) to RCA, LAD, OM, on Brilinta  HFpEF  Type 2 diabetes mellitus  Hypertension  Hyperlipidemia  MARIELLE on CKD stage III(baseline creatinine 1.3-1.4)  Obesity    Patient Active Problem List:     Atypical chest pain     Type 2 diabetes mellitus (HCC)     Vertigo     Essential hypertension     Atherosclerotic heart disease of native coronary artery with other forms of angina pectoris (HCC)     Dyspnea     Claudication in peripheral vascular disease (HCC)     Acute pulmonary embolism without acute cor pulmonale (HCC)     Diabetic polyneuropathy associated with type 2 diabetes mellitus ([facility]     Other hyperlipidemia     Chronic systolic heart failure (HCC)     Multiple subsegmental pulmonary emboli without acute cor pulmonale (HCC)     Congestive heart failure (HCC)     Pyelonephritis of right kidney     History of pulmonary embolism     Elevated troponin I level     Sepsis (HCC)     Suspected COVID-19 virus infection     Diarrhea     Chronic diastolic (congestive) heart failure (HCC)     Generalized weakness     Anemia, normocytic normochromic     Class 1 obesity in adult     Gastroenteritis     Acute on chronic diastolic CHF (congestive heart failure) (HCC)     Acute diastolic CHF (congestive heart failure) (HCC)     Arterial hypotension     Moderate malnutrition (HCC)     S/P PTCA (percutaneous transluminal coronary angioplasty) Cervical myelopathy ([facility]     Viral gastroenteritis     Fall at home, initial encounter     Sicca, unspecified type ([facility]     Moderate episode of recurrent major depressive disorder ([facility]     Acute renal failure with tubular necrosis ([facility])     CKD (chronic kidney disease), stage III ([facility])     Dysuria     Paroxysmal atrial fibrillation ([facility])     Non-intractable vomiting     NSTEMI (non-ST elevated myocardial infarction) ([facility])     Persistent proteinuria     Bradycardia     Syncope and collapse     Acute kidney injury superimposed on CKD ([facility])     Obesity, Class III, BMI 40-49.9 (morbid obesity) ([facility]     Presence of stent in coronary artery in patient with coronary artery disease     Hypokalemia      Plan of Treatment:   Bradycardia. Will dc coreg completely    Continue cozaar and norvasc. Afib. Continue eliquis. Plan for Event monitor on discharge.    Keep k>4 and Mag > 2     Electronically signed by MAYELA Weber CNP on 7/31/2022 at [address] [place] [address].  [phone]

## 2022-08-01 LAB
ABSOLUTE EOS #: 0.15 K/UL (ref 0–0.44)
ABSOLUTE IMMATURE GRANULOCYTE: 0.04 K/UL (ref 0–0.3)
ABSOLUTE LYMPH #: 1.52 K/UL (ref 1.1–3.7)
ABSOLUTE MONO #: 0.78 K/UL (ref 0.1–1.2)
ANION GAP SERPL CALCULATED.3IONS-SCNC: 11 MMOL/L (ref 9–17)
BASOPHILS # BLD: 1 % (ref 0–2)
BASOPHILS ABSOLUTE: 0.04 K/UL (ref 0–0.2)
BUN BLDV-MCNC: 17 MG/DL (ref 8–23)
CALCIUM SERPL-MCNC: 9.5 MG/DL (ref 8.6–10.4)
CHLORIDE BLD-SCNC: 103 MMOL/L (ref 98–107)
CO2: 22 MMOL/L (ref 20–31)
CREAT SERPL-MCNC: 1.45 MG/DL (ref 0.5–0.9)
EOSINOPHILS RELATIVE PERCENT: 3 % (ref 1–4)
GFR AFRICAN AMERICAN: 42 ML/MIN
GFR NON-AFRICAN AMERICAN: 35 ML/MIN
GFR SERPL CREATININE-BSD FRML MDRD: ABNORMAL ML/MIN/{1.73_M2}
GLUCOSE BLD-MCNC: 150 MG/DL (ref 65–105)
GLUCOSE BLD-MCNC: 161 MG/DL (ref 70–99)
GLUCOSE BLD-MCNC: 208 MG/DL (ref 65–105)
GLUCOSE BLD-MCNC: 230 MG/DL (ref 65–105)
GLUCOSE BLD-MCNC: 284 MG/DL (ref 65–105)
HCT VFR BLD CALC: 32.6 % (ref 36.3–47.1)
HEMOGLOBIN: 10 G/DL (ref 11.9–15.1)
IMMATURE GRANULOCYTES: 1 %
LYMPHOCYTES # BLD: 25 % (ref 24–43)
MAGNESIUM: 2.4 MG/DL (ref 1.6–2.6)
MCH RBC QN AUTO: 29.2 PG (ref 25.2–33.5)
MCHC RBC AUTO-ENTMCNC: 30.7 G/DL (ref 28.4–34.8)
MCV RBC AUTO: 95.3 FL (ref 82.6–102.9)
MONOCYTES # BLD: 13 % (ref 3–12)
NRBC AUTOMATED: 0 PER 100 WBC
PDW BLD-RTO: 15.3 % (ref 11.8–14.4)
PLATELET # BLD: 168 K/UL (ref 138–453)
PMV BLD AUTO: 11.4 FL (ref 8.1–13.5)
POTASSIUM SERPL-SCNC: 3.4 MMOL/L (ref 3.7–5.3)
RBC # BLD: 3.42 M/UL (ref 3.95–5.11)
RBC # BLD: ABNORMAL 10*6/UL
SEG NEUTROPHILS: 57 % (ref 36–65)
SEGMENTED NEUTROPHILS ABSOLUTE COUNT: 3.59 K/UL (ref 1.5–8.1)
SODIUM BLD-SCNC: 136 MMOL/L (ref 135–144)
WBC # BLD: 6.1 K/UL (ref 3.5–11.3)

## 2022-08-01 PROCEDURE — 97535 SELF CARE MNGMENT TRAINING: CPT

## 2022-08-01 PROCEDURE — 83735 ASSAY OF MAGNESIUM: CPT

## 2022-08-01 PROCEDURE — 6370000000 HC RX 637 (ALT 250 FOR IP): Performed by: STUDENT IN AN ORGANIZED HEALTH CARE EDUCATION/TRAINING PROGRAM

## 2022-08-01 PROCEDURE — 99223 1ST HOSP IP/OBS HIGH 75: CPT | Performed by: PHYSICAL MEDICINE & REHABILITATION

## 2022-08-01 PROCEDURE — 2060000000 HC ICU INTERMEDIATE R&B

## 2022-08-01 PROCEDURE — 6370000000 HC RX 637 (ALT 250 FOR IP)

## 2022-08-01 PROCEDURE — 85025 COMPLETE CBC W/AUTO DIFF WBC: CPT

## 2022-08-01 PROCEDURE — 36415 COLL VENOUS BLD VENIPUNCTURE: CPT

## 2022-08-01 PROCEDURE — 97530 THERAPEUTIC ACTIVITIES: CPT

## 2022-08-01 PROCEDURE — 82947 ASSAY GLUCOSE BLOOD QUANT: CPT

## 2022-08-01 PROCEDURE — 80048 BASIC METABOLIC PNL TOTAL CA: CPT

## 2022-08-01 PROCEDURE — 6360000002 HC RX W HCPCS

## 2022-08-01 PROCEDURE — 99232 SBSQ HOSP IP/OBS MODERATE 35: CPT | Performed by: INTERNAL MEDICINE

## 2022-08-01 PROCEDURE — 97110 THERAPEUTIC EXERCISES: CPT

## 2022-08-01 PROCEDURE — 2580000003 HC RX 258

## 2022-08-01 RX ORDER — POTASSIUM CHLORIDE 20 MEQ/1
40 TABLET, EXTENDED RELEASE ORAL ONCE
Status: DISCONTINUED | OUTPATIENT
Start: 2022-08-01 | End: 2022-08-01

## 2022-08-01 RX ADMIN — INSULIN LISPRO 2 UNITS: 100 INJECTION, SOLUTION INTRAVENOUS; SUBCUTANEOUS at 21:10

## 2022-08-01 RX ADMIN — HYDRALAZINE HYDROCHLORIDE 10 MG: 20 INJECTION INTRAMUSCULAR; INTRAVENOUS at 00:32

## 2022-08-01 RX ADMIN — ESCITALOPRAM OXALATE 20 MG: 10 TABLET ORAL at 10:25

## 2022-08-01 RX ADMIN — TICAGRELOR 90 MG: 90 TABLET ORAL at 10:26

## 2022-08-01 RX ADMIN — AMLODIPINE BESYLATE 10 MG: 10 TABLET ORAL at 10:25

## 2022-08-01 RX ADMIN — TICAGRELOR 90 MG: 90 TABLET ORAL at 21:00

## 2022-08-01 RX ADMIN — APIXABAN 5 MG: 5 TABLET, FILM COATED ORAL at 20:59

## 2022-08-01 RX ADMIN — INSULIN GLARGINE 20 UNITS: 100 INJECTION, SOLUTION SUBCUTANEOUS at 10:15

## 2022-08-01 RX ADMIN — ROSUVASTATIN CALCIUM 40 MG: 20 TABLET, FILM COATED ORAL at 20:59

## 2022-08-01 RX ADMIN — POTASSIUM BICARBONATE 40 MEQ: 782 TABLET, EFFERVESCENT ORAL at 11:09

## 2022-08-01 RX ADMIN — SODIUM CHLORIDE, PRESERVATIVE FREE 10 ML: 5 INJECTION INTRAVENOUS at 11:10

## 2022-08-01 RX ADMIN — ISOSORBIDE MONONITRATE 30 MG: 30 TABLET ORAL at 10:25

## 2022-08-01 RX ADMIN — SODIUM CHLORIDE, PRESERVATIVE FREE 10 ML: 5 INJECTION INTRAVENOUS at 21:00

## 2022-08-01 RX ADMIN — GABAPENTIN 300 MG: 300 CAPSULE ORAL at 21:00

## 2022-08-01 RX ADMIN — TORSEMIDE 10 MG: 20 TABLET ORAL at 10:25

## 2022-08-01 RX ADMIN — APIXABAN 5 MG: 5 TABLET, FILM COATED ORAL at 10:25

## 2022-08-01 RX ADMIN — LOSARTAN POTASSIUM 100 MG: 50 TABLET, FILM COATED ORAL at 10:26

## 2022-08-01 RX ADMIN — INSULIN LISPRO 4 UNITS: 100 INJECTION, SOLUTION INTRAVENOUS; SUBCUTANEOUS at 17:35

## 2022-08-01 NOTE — PROGRESS NOTES
CHF education teaching reinforced with pt. Pt interested in referral to [facility]'s [facility] and follows with [facility]. Referral to CHF Clinic was OK'd and signed by [facility] CNP on 7/30/22. First appt at the CHF Clinic was made for pt on 8/8/22 at 1 PM. An appt card was given to pt. for the CHF Clinic appointment. Pt states she may be going to acute rehab facility before going home and will call if needs to reschedule first appt.

## 2022-08-01 NOTE — CONSULTS
Precautions-  Restrictions/Precautions  Restrictions/Precautions: Up as Tolerated, General Precautions  Required Braces or Orthoses?: No    PT:    Bed Mobility-  Bed mobility  Supine to Sit: Minimal assistance  Sit to Supine: Minimal assistance   Bed Mobility Training  Bed Mobility Training: Yes (Began session in recliner chair. Ended session in bed.)  Interventions: Verbal cues, Safety awareness training, Manual cues (Mod Cues task initiation / sequencing / accuracy with DME.)  Sit to Supine: Minimum assistance, Additional time, Adaptive equipment [facility] Bed, HOB elevated ~30*, Bilateral Bedrails)  Scooting: Stand-by assistance, Additional time, Adaptive equipment [facility] Bed,  Bilateral Bedrails)     Transfers-  Transfers  Sit to Stand: Minimal Assistance  Stand to sit: Contact guard assistance   Transfer Training  Transfer Training: Yes  Overall Level of Assistance: Minimum assistance, Additional time, Adaptive equipment (Min Assist with use of DME / RW for transfers and transitional movements)  Interventions: Verbal cues, Safety awareness training, Manual cues (Mod Cues for task initiation / sequencing / accuracy with DME)  Sit to Stand: Minimum assistance, Additional time, Adaptive equipment (Using RW)  Stand to Sit: Minimum assistance, Additional time  Toilet Transfer: Minimum assistance, Additional time, Adaptive equipment (Using elevated toilet with Bilateral grab bars. Increased time / effort and Cues (verbal and manual) for safety.)     Ambulation-  Ambulation  Surface: level tile  Device: Rolling Walker  Assistance: Contact guard assistance  Gait Deviations: Slow Cherise; Increased GIOVANA; Decreased step length  Distance: 54'  Comments: No observed episodes of knee buckling. Patient fatigued with gait. OT:   ADLS-   ADL  Feeding: Modified independent   Feeding Skilled Clinical Factors: Sitting upright in recliner chair.   Use of Right hand to open containers / setup food items / complete all feeding (food and beverage mgmt). No use of AE. Grooming: Contact guard assistance, Increased time to complete, Verbal cueing, Adaptive equipment  Grooming Skilled Clinical Factors: Dynamic standing balance while completing grooming tasks (hand hygiene / face hygiene) at the sink. Use of RW for standing. Mod Verbal and Manual Cues for safe / accurate use of RW at sink. UE Dressing: Stand by assistance, Verbal cueing, Increased time to complete  UE Dressing Skilled Clinical Factors: Sitting upright in recliner to don gown like robe. Increased time / effort by Pt. LE Dressing: Moderate assistance  LE Dressing Skilled Clinical Factors: Pt required Max Assist for Bilaterral socks (reports she does not typically wear socks). Sitting upright in recliner was able to don / doff Bilateral Shoes with Setup / Supervision (no use of AE, reports typically uses long-handled AE). Toileting: Minimal assistance, Adaptive equipment, Increased time to complete  Toileting Skilled Clinical Factors: Use of elevated toilet with Bilateral Grab Bars, Pt was able to complete toilet hygiene and brief mgmt in standing (Min Assist for standing balance, Min assist for brief / underwear mgmt). SLP:      Past Medical History:        Diagnosis Date    Acute renal failure with tubular necrosis ([address] 11/24/2021    Secondary to ischemic ATN post fall intravascularly depletion hypotension and low flow baseline creatinine 1.2-1.4 peaked up to 2.1 during her hospitalization in September 2020. Work-up showed benign urine sediment, kidney size to be 11.2 on the right 15.1 on the left serological work-up negative.     Anxiety     Atrial fibrillation (HCC)     chronic-takes xarelto    Benign positional vertigo     CAD (coronary artery disease)     Chest pain     CKD (chronic kidney disease), stage III ([address] 11/24/2021    Secondary to ischemic and diabetic nephrosclerosis baseline 1.2-1.4    Depression     Diabetes mellitus ([address]     Diabetic neuropathy ([address]     Dysuria 11/24/2021    Hyperlipidemia     Hypertension     Migraine     Migraine headaches aggravated with sublingual nitroglycerin    Persistent proteinuria 7/20/2022    From diabetic nephrosclerosis [facility] [place].0       Past Surgical History:        Procedure Laterality Date    APPENDECTOMY      CARDIAC CATHETERIZATION      2012    CORONARY ANGIOPLASTY WITH STENT PLACEMENT  02/25/2017    4 SYNERGY HEART STENTS DRUG ELUTING ALL MRI CONDITONAL 3T OK, SAFE IMMEDIATELY. CORONARY ANGIOPLASTY WITH STENT PLACEMENT  07/11/2012    XIENCE HEART STENT/ MRI CONDITIONAL 3T OK, SAFE IMMEDIATELY    CORONARY ANGIOPLASTY WITH STENT PLACEMENT  12/11/2020    PTCA         Allergies:     Allergies   Allergen Reactions    Penicillins      Other reaction(s): Hives    Sulfa Antibiotics      Other reaction(s): Rash        Current Medications:   Current Facility-Administered Medications: potassium bicarb-citric acid (EFFER-K) effervescent tablet 40 mEq, 40 mEq, Oral, Once  torsemide (DEMADEX) tablet 10 mg, 10 mg, Oral, Daily  losartan (COZAAR) tablet 100 mg, 100 mg, Oral, Daily  diphenhydrAMINE (BENADRYL) tablet 25 mg, 25 mg, Oral, Nightly PRN  prochlorperazine (COMPAZINE) injection 10 mg, 10 mg, IntraVENous, Q6H PRN  hydrALAZINE (APRESOLINE) injection 10 mg, 10 mg, IntraVENous, Q6H PRN  amLODIPine (NORVASC) tablet 10 mg, 10 mg, Oral, Daily  insulin lispro (HUMALOG) injection vial 0-8 Units, 0-8 Units, SubCUTAneous, TID WC  insulin lispro (HUMALOG) injection vial 0-4 Units, 0-4 Units, SubCUTAneous, Nightly  glucose chewable tablet 16 g, 4 tablet, Oral, PRN  dextrose bolus 10% 125 mL, 125 mL, IntraVENous, PRN **OR** dextrose bolus 10% 250 mL, 250 mL, IntraVENous, PRN  glucagon (rDNA) injection 1 mg, 1 mg, SubCUTAneous, PRN  dextrose 10 % infusion, , IntraVENous, Continuous PRN  sodium chloride flush 0.9 % injection 5-40 mL, 5-40 mL, IntraVENous, 2 times per day  sodium chloride flush 0.9 % injection 5-40 mL, 5-40 mL, IntraVENous, PRN  0.9 % sodium chloride infusion, , IntraVENous, PRN  polyethylene glycol (GLYCOLAX) packet 17 g, 17 g, Oral, Daily PRN  acetaminophen (TYLENOL) tablet 650 mg, 650 mg, Oral, Q6H PRN **OR** acetaminophen (TYLENOL) suppository 650 mg, 650 mg, Rectal, Q6H PRN  apixaban (ELIQUIS) tablet 5 mg, 5 mg, Oral, BID  ticagrelor (BRILINTA) tablet 90 mg, 90 mg, Oral, BID  rosuvastatin (CRESTOR) tablet 40 mg, 40 mg, Oral, Nightly  isosorbide mononitrate (IMDUR) extended release tablet 30 mg, 30 mg, Oral, Daily  escitalopram (LEXAPRO) tablet 20 mg, 20 mg, Oral, Daily  gabapentin (NEURONTIN) capsule 300 mg, 300 mg, Oral, Nightly  dextrose 10 % infusion, , IntraVENous, Continuous PRN  insulin glargine (LANTUS) injection vial 20 Units, 20 Units, SubCUTAneous, QAM    Social History:  Lives With: Alone (son stops by everyday to help out with anything needed)  Type of Home: Trailer  Home Layout: One level  Home Access: Stairs to enter with rails  Entrance Stairs - Number of Steps: 3 (always needs assistance to ascend steps)  Entrance Stairs - Rails: Right  Home Equipment: debra Carlson Wheelchair-manual  Has the patient had two or more falls in the past year or any fall with injury in the past year?: Yes (LLE giving out, combination with dizziness)  ADL Assistance: Needs assistance  Homemaking Assistance: Needs assistance  Ambulation Assistance: Needs assistance (with rollator)  Transfer Assistance: Needs assistance  Active : No  Additional Comments: Nurse comes in am to assist with ADLs, son comes everyday to assist with other needs.   Social History     Socioeconomic History    Marital status:      Spouse name: None    Number of children: None    Years of education: None    Highest education level: None   Tobacco Use    Smoking status: Never    Smokeless tobacco: Never   Substance and Sexual Activity    Alcohol use: No    Drug use: No    Sexual activity: Never       Family History:       Problem Relation Age of Onset    Cancer Mother     Cancer Father        Diagnostics:    CBC:   Recent Labs     07/30/22 0427 07/31/22  0633 08/01/22  0445   WBC 5.9 5.0 6.1   RBC 2.91* 3.10* 3.42*   HGB 8.6* 8.9* 10.0*   HCT 28.0* 30.3* 32.6*   MCV 96.2 97.7 95.3   RDW 15.2* 15.6* 15.3*    150 168     BMP:    Recent Labs     07/30/22 0427 07/31/22 0633 08/01/22  0445   GLUCOSE 174* 115* 161*   BUN 20 18 17   CREATININE 1.61* 1.59* 1.45*   CALCIUM 8.9 8.9 9.5    141 136   K 3.6* 3.7 3.4*    108* 103   CO2 24 23 22   ANIONGAP 10 10 11   LABGLOM 31* 31* 35*   GFRAA 38* 38* 42*   GFR                    HbA1c:   Lab Results   Component Value Date    LABA1C 10.5 (H) 05/30/2022     BNP: No results for input(s): BNP in the last 72 hours. PT/INR: No results for input(s): PROTIME, INR in the last 72 hours. APTT: No results for input(s): APTT in the last 72 hours. CARDIAC ENZYMES: No results for input(s): CKMB, CKMBINDEX, TROPONINT, TROPHS, TROPII in the last 72 hours. Invalid input(s): CKTOTAL;3   FASTING LIPID PANEL:  Lab Results   Component Value Date    CHOL 125 05/30/2022    HDL 36 (L) 05/30/2022    TRIG 132 05/30/2022     LIVER PROFILE: No results for input(s): AST, ALT, ALB, BILIDIR, BILITOT, ALKPHOS in the last 72 hours. Radiology:    CT HEAD WO CONTRAST     Result Date: 7/28/2022  Mild central and cortical cerebral atrophy. Mild chronic deep white matter ischemic changes No acute intracranial abnormalities are noted. CT CHEST WO CONTRAST     Result Date: 7/28/2022  1. No acute intrathoracic abnormality. 2. 5 mm right upper lobe nodule. RECOMMENDATIONS: Pathology: 5 mm right part-solid pulmonary nodule within the upper lobe. No routine follow-up imaging is recommended per Fleischner Society Guidelines. These guidelines do not apply to immunocompromised patients and patients with cancer. Follow up in patients with significant comorbidities as clinically warranted.  For lung cancer screening, adhere to Lung-RADS guidelines. Reference: Radiology. 2017; 284(1):228-43     CT CERVICAL SPINE WO CONTRAST     Result Date: 7/28/2022  Multilevel cervical spondylosis and degenerative disc disease. Mild spinal stenosis C5-6 and C6-7 Evidence of paracervical spasm. No acute bony abnormalities are noted in the cervical spine RECOMMENDATIONS: Further evaluation of the cervical spine should be obtained with MR imaging if clinically indicated. XR CHEST PORTABLE     Result Date: 7/28/2022  No acute process. Stable cardiomegaly     Physical Exam:  BP (!) 160/63   Pulse 60   Temp 97.7 °F (36.5 °C) (Oral)   Resp 24   Ht 5' 6\" (1.676 m)   Wt 239 lb 6.7 oz (108.6 kg)   SpO2 96%   BMI 38.64 kg/m²     GEN: Well developed, well nourished, no acute distress. HEENT: NCAT, PERRL, EOMI, mucous membranes pink and moist.  RESP: Lungs clear to auscultation. No rales or rhonchi. Respirations WNL and unlabored. CV: irregularly irregular rhythm bradycardic rate. No murmur, rubs, or gallops. ABD: soft, non-distended, non-tender. BS+ and equal.  NEURO: A&O x 3. Sensation intact to light touch. DTRs 2+. Resting tremor. Intention tremor, worse RUE.   MSK: Functional ROM. Muscle tone and bulk are normal bilaterally. Strength 4/5 key muscles BUEs. Strength 4-/5 key muscles BLEs. EXT: No calf tenderness to palpation. 1+ edema BLEs. SKIN: Warm dry and intact with good turgor. PSYCH: Mood WNL. Affect WNL. Appropriately interactive. Impression:  Syncope and collapse  Paroxysmal atrial fibrillation: On Eliquis. Rate controlled on amiodarone  Symptomatic Bradycardia: Failed reduced dose carvedilol - discontinued. Chronic diastolic CHF  MARIELLE on CKD III: Baseline Ct 1.3 - 1.5. Improved to baseline  CAD/Hyperlipidemia:  Hx PCI/stent, on Crestor, Brilinta  HTN: On Imdur  DM II with polyneuropathy  Tremor: worse, dominant RUE.  May benefit from neurologic evaluation    Recommendations:    Diagnosis:  Debility secondary to Symptomatic bradycardia  Therapy: Has PT/OT needs  Medical Necessity: As above  Support: has supportive son who stops by daily  Rehab Recommendation: The patient will benefit from acute inpatient rehabilitation once medically stable per primary and consulting services. Anticipate she will be able to tolerate 3 hours of therapy per day or 900 minutes per week in rehabilitation. The patient requires multidisciplinary rehabilitation treatment including medical management by a PM&R physician, 24 hour rehabilitation nursing, Physical/Occupational therapy, rehabilitation social work, and nutrition services. Patient and family also require education in post-hospital precautions and home exercise routine, adaptive techniques and deficit compensation strategies, strengthening and conditioning, equipment prescription and instructions in use. She would benefit from closer medical management in acute rehabilitation for her symptomatic bradycardia with recent medication changes to adjust for hypertension management without beta blocker. She will also benefit from medical management/evaluation of her neuropathy and tremor affecting her mobility and ADLs. DVT Prophylaxis: on Eliquis    It was my pleasure to evaluate Luanne Su today. Please call with questions. Noble Romberg, MD        This note is created with the assistance of a speech recognition program.  While intending to generate a document that actually reflects the content of the visit, the document can still have some errors including those of syntax and sound a like substitutions which may escape proof reading. In such instances, actual meaning can be extrapolated by contextual diversion.

## 2022-08-01 NOTE — PROGRESS NOTES
[facility]   Progress Note                   Date:   8/1/2022  Patient name: Maren Aaron  Date of admission:  7/28/2022  2:11 PM  MRN:   [id number]  YOB: 1945  PCP: Fareed Bolanos MD    Reason for Admission: Bradycardia [R00.1]  Syncope and collapse [R55]    Subjective:       Clinical Changes / Abnormalities: Patient seen and examined. Denies chest pain or shortness of breath. Tele/vitals/labs reviewed . Discussed case with RN. HR in 60-70 sinus rhythm       Medications:   Scheduled Meds:   torsemide  10 mg Oral Daily    losartan  100 mg Oral Daily    amLODIPine  10 mg Oral Daily    insulin lispro  0-8 Units SubCUTAneous TID WC    insulin lispro  0-4 Units SubCUTAneous Nightly    sodium chloride flush  5-40 mL IntraVENous 2 times per day    apixaban  5 mg Oral BID    ticagrelor  90 mg Oral BID    rosuvastatin  40 mg Oral Nightly    isosorbide mononitrate  30 mg Oral Daily    escitalopram  20 mg Oral Daily    gabapentin  300 mg Oral Nightly    insulin glargine  20 Units SubCUTAneous QAM     Continuous Infusions:   dextrose      sodium chloride      dextrose       CBC:   Recent Labs     07/30/22 0427 07/31/22  0633 08/01/22  0445   WBC 5.9 5.0 6.1   HGB 8.6* 8.9* 10.0*    150 168       BMP:    Recent Labs     07/30/22  0427 07/31/22  0633 08/01/22  0445    141 136   K 3.6* 3.7 3.4*    108* 103   CO2 24 23 22   BUN 20 18 17   CREATININE 1.61* 1.59* 1.45*   GLUCOSE 174* 115* 161*       Hepatic:   No results for input(s): AST, ALT, ALB, BILITOT, ALKPHOS in the last 72 hours. Troponin:   No results for input(s): TROPHS in the last 72 hours. BNP: No results for input(s): BNP in the last 72 hours. Lipids: No results for input(s): CHOL, HDL in the last 72 hours. Invalid input(s): LDLCALCU  INR:   No results for input(s): INR in the last 72 hours.     DATA:    Diagnostics:    EKG: Atrial fibrillation with SVR, no acute ST or T wave changes  ECHO: 9/13/2021  Summary  Left ventricle is normal in size with normal systolic function globally. Calculated ejection fraction is 54%. Mild left ventricular wall thickness. Evidence of diastolic dysfunction. Left atrium is moderately dilated. Mildly dilated right atrium. Aortic valve is sclerotic but opens well. Mild mitral regurgitation. Mild tricuspid regurgitation. Estimated right ventricular systolic pressure  is 18.7 mmHg. Stress Test: not obtained. Cardiac Angiography: 5/31/22      Procedure Summary      Patent LAD, OM1, RCA and RPDA stent. RPL disease with left to collaterals. RPL is jailed at ostium with RCA-RPDA stent. Normal LV systolic function; LVEF 57%. Recommendations      Medical therapy as needed. Risk factor modification. BP control      Signature     12/11/2020   Procedure Summary      1. Successful Staged PCI of OM with JAMA   2. Restenosis of distal LAD, required, PTCA/JAMA      11/11/2020  Procedure Summary     Successful image guided PCI of ostial RPDA and Proximal LAD with JAMA, Balloon angioplasty of ostial RPL and distal LAD due to small caliber of the vessels        Objective:   Vitals: BP (!) 183/66   Pulse 58   Temp 97.7 °F (36.5 °C) (Oral)   Resp 18   Ht 5' 6\" (1.676 m)   Wt 239 lb 6.7 oz (108.6 kg)   SpO2 97%   BMI 38.64 kg/m²   General appearance: alert and cooperative with exam  HEENT: Head: Normocephalic, no lesions, without obvious abnormality.   Neck: no JVD, trachea midline, no adenopathy  Lungs: Clear to auscultation  Heart: Regular rate and rhythm, s1/s2 auscultated, no murmurs  Abdomen: soft, non-tender, bowel sounds active  Extremities: no edema  Neurologic: not done        Assessment / Acute Cardiac Problems:   Symptomatic bradycardia  Atrial fibrillation with slow ventricular response, on Eliquis  History of MVCAD s/p PCI(staged) to RCA, LAD, OM, on Brilinta  HFpEF  Type 2 diabetes mellitus  Hypertension  Hyperlipidemia  MARIELEL on CKD stage III(baseline creatinine 1.3-1.4)  Obesity    Patient Active Problem List:     Atypical chest pain     Type 2 diabetes mellitus (HCC)     Vertigo     Essential hypertension     Atherosclerotic heart disease of native coronary artery with other forms of angina pectoris (HCC)     Dyspnea     Claudication in peripheral vascular disease (HCC)     Acute pulmonary embolism without acute cor pulmonale (HCC)     Diabetic polyneuropathy associated with type 2 diabetes mellitus ([address]     Other hyperlipidemia     Chronic systolic heart failure (HCC)     Multiple subsegmental pulmonary emboli without acute cor pulmonale (HCC)     Congestive heart failure (HCC)     Pyelonephritis of right kidney     History of pulmonary embolism     Elevated troponin I level     Sepsis (HCC)     Suspected COVID-19 virus infection     Diarrhea     Chronic diastolic (congestive) heart failure (HCC)     Generalized weakness     Anemia, normocytic normochromic     Class 1 obesity in adult     Gastroenteritis     Acute on chronic diastolic CHF (congestive heart failure) (HCC)     Acute diastolic CHF (congestive heart failure) (HCC)     Arterial hypotension     Moderate malnutrition (HCC)     S/P PTCA (percutaneous transluminal coronary angioplasty)     Cervical myelopathy (HCC)     Viral gastroenteritis     Fall at home, initial encounter     Sicca, unspecified type ([address]     Moderate episode of recurrent major depressive disorder ([address]     Acute renal failure with tubular necrosis (HCC)     CKD (chronic kidney disease), stage III (HCC)     Dysuria     Paroxysmal atrial fibrillation (HCC)     Non-intractable vomiting     NSTEMI (non-ST elevated myocardial infarction) (HCC)     Persistent proteinuria     Bradycardia     Syncope and collapse     Acute kidney injury superimposed on CKD (HCC)     Obesity, Class III, BMI 40-49.9 (morbid obesity) ([address]     Presence of stent in coronary artery in patient with coronary artery disease     Hypokalemia      Plan of Treatment:      Sinus bradycardia - stable - off coreg and heart rates stable , Continue cozaar and norvasc. Afib. Continue eliquis.      Okay to discharge from Cardiology standpoint after event monitor placed,  follow-up with cardiology in 2 weeks     Electronically signed by MAYELA Lee NP on 8/1/2022 at [facility]. [phone]

## 2022-08-01 NOTE — PROGRESS NOTES
Physical Therapy        Physical Therapy Cancel Note      DATE: 2022    NAME: Marbin Sullivan  MRN: [id number]   : 1945      Patient not seen this date for Physical Therapy due to: Other: Pt working with OT upon PT checking in. Will ck back as able.       Electronically signed by Gil Melgoza PT on 2022 at 2:45 PM

## 2022-08-01 NOTE — PROGRESS NOTES
Physical Therapy  Facility/Department: [facility] NEURO  Daily treatment ntoe    Name: June Elizabeth  : 1945  MRN: [id number]  Date of Service: 2022    Discharge Recommendations:  Further therapy recommended at discharge. The patient should be able to tolerate at least 3 hours of therapy per day over 5 days or 15 hours over 7 days. This patient may benefit from a Physical Medicine and Rehab consult. PT Equipment Recommendations  Equipment Needed: No (owns RW)      Patient Diagnosis(es): The primary encounter diagnosis was MARIELLE (acute kidney injury) ([facility] [address] Diagnoses of Acute systolic congestive heart failure (HCC) and Obesity, Class III, BMI 40-49.9 (morbid obesity) ([facility] [address] were also pertinent to this visit. Past Medical History:  has a past medical history of Acute renal failure with tubular necrosis ([address] Anxiety, Atrial fibrillation (HCC), Benign positional vertigo, CAD (coronary artery disease), Chest pain, CKD (chronic kidney disease), stage III ([address] Depression, Diabetes mellitus ([address] Diabetic neuropathy ([address] Dysuria, Hyperlipidemia, Hypertension, Migraine, and Persistent proteinuria. Past Surgical History:  has a past surgical history that includes Percutaneous Transluminal Coronary Angio; Cardiac catheterization; Coronary angioplasty with stent (2017); Appendectomy; Coronary angioplasty with stent (2012); and Coronary angioplasty with stent (2020). Assessment   Body Structures, Functions, Activity Limitations Requiring Skilled Therapeutic Intervention: Decreased strength;Decreased functional mobility ; Decreased balance;Decreased endurance;Decreased safe awareness;Decreased ADL status; Decreased body mechanics  Assessment: Pt ambulates 70 ft RW and CGA. Pt limited by endurance, and is a fall risk d/t decreased balance and endurance, benefitting from 24 hr support for functional mobility.  pt would benefit from continued therapy to promote endurance, balance, and strengthening. Therapy Prognosis: Good  Decision Making: Medium Complexity  Barriers to Learning: none  Requires PT Follow-Up: Yes  Activity Tolerance  Activity Tolerance: Patient limited by fatigue;Patient limited by endurance     Plan   Plan  Plan: 6-7 times per week  Current Treatment Recommendations: Therapeutic activities, Strengthening, ROM, Balance training, ADL/Self-care training, IADL training, Neuromuscular re-education, Functional mobility training, Transfer training, Gait training, Stair training, Safety education & training, Home exercise program, Endurance training  Safety Devices  Type of Devices: Gait belt, Call light within reach, All fall risk precautions in place, Left in chair, Chair alarm in place  Restraints  Restraints Initially in Place: No     Restrictions  Restrictions/Precautions  Restrictions/Precautions: Up as Tolerated, General Precautions  Required Braces or Orthoses?: No  Position Activity Restriction  Other position/activity restrictions: continuous telemetry     Subjective   General  Patient assessed for rehabilitation services?: Yes  Response To Previous Treatment: Patient with no complaints from previous session. Family / Caregiver Present: No  Follows Commands: Within Functional Limits  Subjective  Subjective: RN and pt agreeable to PT. pt agreeable and pleasant. pt seated in bathroom with OT present. pt c/o no pain. Cognition   Orientation  Overall Orientation Status: Within Functional Limits  Cognition  Overall Cognitive Status: WFL                 Bed mobility  Supine to Sit: Unable to assess  Sit to Supine: Unable to assess  Scooting: Stand by assistance  Bed Mobility Comments: Pt begins seated in bathroom at start of session, ends in recliner  Transfers  Sit to Stand: Unable to assess  Stand to sit: Contact guard assistance  Comment: Pt transferred to stand with OT.   Ambulation  Surface: level tile  Device: Rolling Walker  Assistance: Contact guard assistance  Gait Deviations: Slow Cherise; Increased GIOVANA; Decreased step length  Distance: 70 ft  Comments: No observed episodes of knee buckling. Patient fatigued with gait. More Ambulation?: No  Stairs/Curb  Stairs?: No     Balance  Posture: Fair  Sitting - Static: Good  Sitting - Dynamic: Good;-  Standing - Static: Fair  Standing - Dynamic: Fair  Comments: RW for UE support. Seated LE exercise program: Long Arc Quads, hip abduction/adduction, heel/toe raises, and marches.  Reps: 15x AROM BLE                                     AM-PAC Score  AM-PAC Inpatient Mobility Raw Score : 18 (08/01/22 1553)  AM-PAC Inpatient T-Scale Score : 43.63 (08/01/22 1553)  Mobility Inpatient CMS 0-100% Score: 46.58 (08/01/22 1553)  Mobility Inpatient CMS G-Code Modifier : CK (08/01/22 1553)          Goals  Short Term Goals  Time Frame for Short term goals: 12  Short term goal 1: Pt able to complete bed mobility modified independently  Short term goal 2: Pt able to complete transfers with RW use supervision  Short term goal 3: Pt able to ambulate 100ft with RW use SBA*1  Short term goal 4: Pt able to climb 4 steps with UE on R handrail CGA*1       Education  Patient Education  Education Given To: Patient  Education Provided: Role of Therapy;Plan of Care  Education Method: Verbal;Demonstration  Barriers to Learning: None  Education Outcome: Demonstrated understanding;Verbalized understanding      Therapy Time   Individual Concurrent Group Co-treatment   Time In 1510         Time Out 1533         Minutes 23         Timed Code Treatment Minutes: 2017 Michael Leach PT

## 2022-08-01 NOTE — PROGRESS NOTES
[facility]  Internal Medicine Teaching Residency Program  Inpatient Daily Progress Note  ______________________________________________________________________________    Patient: Darryle Maes  YOB: 1945   BBM:[id number]    Acct: [de-identified]     Room: [facility]  Admit date: 2022  Today's date: 22  Number of days in the hospital: 4    SUBJECTIVE   Admitting Diagnosis: Syncope and collapse  CC: Multiple falls with leg weakness  Pt examined at bedside. Chart & results reviewed. -/63, HR 60, hemodynamically stable  -No acute events overnight  -saturating well on room air, afebrile  -She complains of shortness of breath, denies any chest pain, nausea/vomiting  -K 3.4, Cr 1.45  -Cardiology consulted, will follow up with recommendations. ROS:  Constitutional:  negative for chills, fevers, sweats  Respiratory:  negative for cough,positive dyspnea on exertion, hemoptysis, shortness of breath, wheezing  Cardiovascular:  negative for chest pain, chest pressure/discomfort, lower extremity edema, palpitations  Gastrointestinal:  negative for abdominal pain, constipation, diarrhea, nausea, vomiting  Neurological:  negative for dizziness, headache  BRIEF HISTORY     Patient presented to ED in no acute distress. EKG showed sinus bradycardia with prolonged QT. CT head, chest, cervical spine negative for any acute abnormality, incidental finding of 5 mm part solid lung nodule in right upper lobe. On labs was found to have an MARIELLE. Patient admitted for work up of syncopal episode and IV fluid    OBJECTIVE     Vital Signs:  BP (!) 160/63   Pulse 60   Temp 97.7 °F (36.5 °C) (Oral)   Resp 24   Ht 5' 6\" (1.676 m)   Wt 239 lb 6.7 oz (108.6 kg)   SpO2 96%   BMI 38.64 kg/m²     Temp (24hrs), Av.1 °F (36.7 °C), Min:97.7 °F (36.5 °C), Max:98.8 °F (37.1 °C)    No intake/output data recorded.     Physical Exam:  Constitutional: This is a well developed, well nourished, 35-39.9 - Obesity Grade II 68y.o. year old female who is alert, oriented, cooperative and in no apparent distress. Respiratory:   Breath sounds bilaterally were clear to auscultation. There were no wheezes, rhonchi or rales. There is no intercostal retraction or use of accessory muscles. Cardiovascular: Regular without murmur, clicks, gallops or rubs. Abdomen: Slightly rounded and soft without organomegaly. No rebound, rigidity or guarding was appreciated. Musculoskeletal: Normal curvature of the spine. No gross muscle weakness. Extremities:  + 1 pitting lower extremity edema, ulcerations, tenderness, varicosities or erythema. Muscle size, tone and strength are normal.  No involuntary movements are noted. Skin:  Warm and dry. Good color, turgor and pigmentation. No lesions or scars.   No cyanosis or clubbing  Neurological/Psychiatric: The patient's general behavior, level of consciousness, thought content and emotional status is normal.        Medications:  Scheduled Medications:    torsemide  10 mg Oral Daily    losartan  100 mg Oral Daily    amLODIPine  10 mg Oral Daily    insulin lispro  0-8 Units SubCUTAneous TID WC    insulin lispro  0-4 Units SubCUTAneous Nightly    sodium chloride flush  5-40 mL IntraVENous 2 times per day    apixaban  5 mg Oral BID    ticagrelor  90 mg Oral BID    rosuvastatin  40 mg Oral Nightly    isosorbide mononitrate  30 mg Oral Daily    escitalopram  20 mg Oral Daily    gabapentin  300 mg Oral Nightly    insulin glargine  20 Units SubCUTAneous QAM     Continuous Infusions:    dextrose      sodium chloride      dextrose       PRN MedicationsdiphenhydrAMINE, 25 mg, Nightly PRN  prochlorperazine, 10 mg, Q6H PRN  hydrALAZINE, 10 mg, Q6H PRN  glucose, 4 tablet, PRN  dextrose bolus, 125 mL, PRN   Or  dextrose bolus, 250 mL, PRN  glucagon (rDNA), 1 mg, PRN  dextrose, , Continuous PRN  sodium chloride flush, 5-40 mL, PRN  sodium chloride, , PRN  polyethylene glycol, 17 g, Daily PRN  acetaminophen, 650 mg, Q6H PRN   Or  acetaminophen, 650 mg, Q6H PRN  dextrose, , Continuous PRN        Diagnostic Labs:  CBC:   Recent Labs     07/30/22 0427 07/31/22 0633 08/01/22 0445   WBC 5.9 5.0 6.1   RBC 2.91* 3.10* 3.42*   HGB 8.6* 8.9* 10.0*   HCT 28.0* 30.3* 32.6*   MCV 96.2 97.7 95.3   RDW 15.2* 15.6* 15.3*    150 168     BMP:   Recent Labs     07/30/22 0427 07/31/22 0633 08/01/22 0445    141 136   K 3.6* 3.7 3.4*    108* 103   CO2 24 23 22   BUN 20 18 17   CREATININE 1.61* 1.59* 1.45*     BNP: No results for input(s): BNP in the last 72 hours. PT/INR: No results for input(s): PROTIME, INR in the last 72 hours. APTT: No results for input(s): APTT in the last 72 hours. CARDIAC ENZYMES: No results for input(s): CKMB, CKMBINDEX, TROPONINI in the last 72 hours. Invalid input(s): CKTOTAL;3  FASTING LIPID PANEL:  Lab Results   Component Value Date    CHOL 125 05/30/2022    HDL 36 (L) 05/30/2022    TRIG 132 05/30/2022     LIVER PROFILE: No results for input(s): AST, ALT, ALB, BILIDIR, BILITOT, ALKPHOS in the last 72 hours. MICROBIOLOGY:   Lab Results   Component Value Date/Time    CULTURE NO GROWTH 5 DAYS 05/29/2022 07:02 PM       Imaging:    CT HEAD WO CONTRAST    Result Date: 7/28/2022  Mild central and cortical cerebral atrophy. Mild chronic deep white matter ischemic changes No acute intracranial abnormalities are noted. CT CHEST WO CONTRAST    Result Date: 7/28/2022  1. No acute intrathoracic abnormality. 2. 5 mm right upper lobe nodule. RECOMMENDATIONS: Pathology: 5 mm right part-solid pulmonary nodule within the upper lobe. No routine follow-up imaging is recommended per Fleischner Society Guidelines. These guidelines do not apply to immunocompromised patients and patients with cancer. Follow up in patients with significant comorbidities as clinically warranted. For lung cancer screening, adhere to Lung-RADS guidelines. Reference: Radiology. 2017; 284(1):228-61     CT CERVICAL SPINE WO CONTRAST    Result Date: 7/28/2022  Multilevel cervical spondylosis and degenerative disc disease. Mild spinal stenosis C5-6 and C6-7 Evidence of paracervical spasm. No acute bony abnormalities are noted in the cervical spine RECOMMENDATIONS: Further evaluation of the cervical spine should be obtained with MR imaging if clinically indicated. XR CHEST PORTABLE    Result Date: 7/28/2022  No acute process. Stable cardiomegaly       ASSESSMENT & PLAN     ASSESSMENT / PLAN:     Principal Problem:    Syncope and collapse  Active Problems:    MARIELLE (acute kidney injury) (HCC)    Chronic diastolic (congestive) heart failure (HCC)    Paroxysmal atrial fibrillation (HCC)    Bradycardia    Acute kidney injury superimposed on CKD ([facility])    Obesity, Class III, BMI 40-49.9 (morbid obesity) ([facility] [address]    Presence of stent in coronary artery in patient with coronary artery disease    Hypokalemia    Acute respiratory failure with hypoxia ([facility])    Essential hypertension    Diabetic polyneuropathy associated with type 2 diabetes mellitus ([facility] [address]    Other hyperlipidemia    Class 3 severe obesity in adult [facility])    CKD (chronic kidney disease), stage III ([facility] [address]  Resolved Problems:    * No resolved hospital problems. *     This is a 68 y.o. female with past medical history of A. fib (on amiodarone), CAD with multiple stents (on Brilinta), PE (on Eliquis), HTN, DM with nephropathy, CKD stage III (baseline Cr 1.2-3.9), diastolic HF, who presented with multiple falls and leg weakness and found to have bradycardia and MARIELLE. Syncope with collapse, most likely due to orthostatic hypotension in the setting of bradycardia  (on beta blockers) and afib (on amiodarone)  -off coreg, 7/28 orthostats negative.     MARIELLE on CKD stage III, in the setting of chronic diastolic HF  -improved  -Cr 1.45, (baseline 1.3-1.5), continue to monitor Cr    Bradycardia  -off coreg, cardio on board, event monitor on discharge, if symptomatic bradycardia continues may consider bradycardia. Diabetes mellitus with neuropathy   -On Lantus 20 units, medium dose sliding scale, hypoglycemia protocol   -Will continue to monitor blood glucose levels.   -On gabapentin    HTN  -on Norvasc 10 and Cozaar 100    CAD s/p stenting  -on crestor, brilinta, and eliquis    Hypokalemia  K 3.4, replaced today, continue to monitor      DVT Prophylaxis: Eliquis  GI ppx: Not indicated      PT/OT: Following  Discharge Planning / SW:  consulted    Don Ridley MD  Internal Medicine Resident, PGY-1  [address], [place]  8/1/2022, 7:50 AM   Attending Physician Statement  I have discussed the care of Montez Hidalgo and I have examined the patient myselft and taken ros and hpi , including pertinent history and exam findings,  with the resident. I have reviewed the key elements of all parts of the encounter with the resident. I agree with the assessment, plan and orders as documented by the resident. Spent 35 minutes in reviewing data/medicines/talking to patient/family,  explaining and answering all the questions.         Electronically signed by Harry Galvez MD

## 2022-08-01 NOTE — PROGRESS NOTES
Occupational Therapy  Facility/Department: [facility] NEURO  Occupational Therapy Daily Treatment Note    Name: Tanya Cummings  : 1945  MRN: [id number]  Date of Service: 2022    Discharge Recommendations:  Patient would benefit from continued therapy after discharge in order to increase pt balance, strength and independence              Assessment   Performance deficits / Impairments: Decreased functional mobility ; Decreased endurance;Decreased ADL status; Decreased posture;Decreased balance;Decreased strength;Decreased safe awareness  Prognosis: Good  REQUIRES OT FOLLOW-UP: Yes  Activity Tolerance  Activity Tolerance: Patient Tolerated treatment well;Patient limited by fatigue        Plan   Plan  Times per Week: 3-5x/week  Current Treatment Recommendations: Strengthening, Balance training, Functional mobility training, Endurance training, Pain management, Coordination training, Self-Care / ADL, Safety education & training, Patient/Caregiver education & training, Equipment evaluation, education, & procurement, Cognitive/Perceptual training     Restrictions  Restrictions/Precautions  Restrictions/Precautions: Up as Tolerated, General Precautions  Required Braces or Orthoses?: No  Position Activity Restriction  Other position/activity restrictions: continuous telemetry    Subjective   General  Chart Reviewed: Yes  Family / Caregiver Present: No  Diagnosis: Bradycardia  General Comment  Comments: Pt and RN agreeable to therapy this day. Pt seated in chair at start of session and retired to supine in bed at session end with bed alarm on, call light in reach and all needs met. Pt denied pain throughout session         Objective           Safety Devices  Type of Devices: Gait belt;Call light within reach; All fall risk precautions in place; Left in chair;Chair alarm in place  Restraints  Restraints Initially in Place: No  Balance  Sitting: Without support (Pt tolerated approx 30 min static/dynamic sitting supported/unsupported req SUP)  Standing: With support (CGA static/dynamic standing at sink utilizing RW and 1-2 hand support for approx 6 min)  Gait  Overall Level of Assistance: Contact-guard assistance (chair>bathroom>EOB utilizing RW)  Shower Transfers  Shower Transfers Comments: Pt declined showering this date requesting bathing seated at sinks     ADL  Grooming: Modified independent ;Setup  Grooming Skilled Clinical Factors: Face washing facilitated seated at sink  UE Bathing: Stand by assistance;Setup  UE Bathing Skilled Clinical Factors: facilitated seated at sink  LE Bathing: Setup;Verbal cueing; Increased time to complete;Maximum assistance  LE Bathing Skilled Clinical Factors: Seated/standing at sink with 0-2 hand support pt able to wash B thighs to knees req assist from knees to feet, assist for jong/post hygiene standing pt able to complete jong care req assist with buttocks  UE Dressing: Setup;Minimal assistance  UE Dressing Skilled Clinical Factors: To doff/don gown seated at sink req assist for threading  LE Dressing: Maximum assistance;Setup  LE Dressing Skilled Clinical Factors: To doff/don brief and socks seated/standing  Additional Comments:  Throughout session pt limited per fatigue, decreased cognition and decreased balance     Activity Tolerance  Activity Tolerance: Patient limited by fatigue;Patient limited by endurance  Bed mobility  Bed Mobility Comments: pt seated in chair at start of session and completing functional mobility with PT at session end  Transfers  Sit to stand: Minimal assistance  Stand to sit: Minimal assistance  Transfer Comments: utilizing RW     Cognition  Overall Cognitive Status: [facility]  Orientation  Overall Orientation Status: Within Functional Limits                  Education Given To: Patient  Education Provided: Role of Therapy;Transfer Training;ADL Adaptive Strategies;Precautions  Education Provided Comments: proper hand and foot placement; safety precautions; walker management; balance maitaince-F carry over  Education Method: Demonstration;Verbal  Education Outcome: Continued education needed                                                                     AM-PAC Score        AM-PAC Inpatient Daily Activity Raw Score: 17 (08/01/22 1458)  AM-PAC Inpatient ADL T-Scale Score : 37.26 (08/01/22 1458)  ADL Inpatient CMS 0-100% Score: 50.11 (08/01/22 1458)  ADL Inpatient CMS G-Code Modifier : CK (08/01/22 1458)      Goals  Short Term Goals  Time Frame for Short term goals: By Discharge  Short Term Goal 1: Pt will demo Mod I and Good Integration of EC / Ax Pacing during functional tasks. Short Term Goal 2: Pt will demo Mod I and Good Integration of AE during UB and LB ADLs. Short Term Goal 3: Pt will participate in Bathroom Transfers and Toileting while maintaining Fair Balance / Safety. Short Term Goal 4: Pt will complete Functional Mobility (in-room / in-home) with Supervision Assist with Correct Use of AD / DME.        Therapy Time   Individual Concurrent Group Co-treatment   Time In 1410         Time Out 1510         Minutes 60         Timed Code Treatment Minutes: 9400 No Name Jose, ZAVALA/L

## 2022-08-02 LAB
ABSOLUTE EOS #: 0.19 K/UL (ref 0–0.44)
ABSOLUTE IMMATURE GRANULOCYTE: 0.03 K/UL (ref 0–0.3)
ABSOLUTE LYMPH #: 1.83 K/UL (ref 1.1–3.7)
ABSOLUTE MONO #: 0.82 K/UL (ref 0.1–1.2)
ANION GAP SERPL CALCULATED.3IONS-SCNC: 13 MMOL/L (ref 9–17)
BASOPHILS # BLD: 1 % (ref 0–2)
BASOPHILS ABSOLUTE: 0.06 K/UL (ref 0–0.2)
BUN BLDV-MCNC: 18 MG/DL (ref 8–23)
CALCIUM SERPL-MCNC: 9.6 MG/DL (ref 8.6–10.4)
CHLORIDE BLD-SCNC: 104 MMOL/L (ref 98–107)
CO2: 22 MMOL/L (ref 20–31)
CREAT SERPL-MCNC: 1.65 MG/DL (ref 0.5–0.9)
EOSINOPHILS RELATIVE PERCENT: 3 % (ref 1–4)
GFR AFRICAN AMERICAN: 37 ML/MIN
GFR NON-AFRICAN AMERICAN: 30 ML/MIN
GFR SERPL CREATININE-BSD FRML MDRD: ABNORMAL ML/MIN/{1.73_M2}
GLUCOSE BLD-MCNC: 122 MG/DL (ref 70–99)
GLUCOSE BLD-MCNC: 145 MG/DL (ref 65–105)
GLUCOSE BLD-MCNC: 194 MG/DL (ref 65–105)
GLUCOSE BLD-MCNC: 240 MG/DL (ref 65–105)
HCT VFR BLD CALC: 32.4 % (ref 36.3–47.1)
HEMOGLOBIN: 10.2 G/DL (ref 11.9–15.1)
IMMATURE GRANULOCYTES: 0 %
LYMPHOCYTES # BLD: 27 % (ref 24–43)
MAGNESIUM: 2.3 MG/DL (ref 1.6–2.6)
MCH RBC QN AUTO: 29.8 PG (ref 25.2–33.5)
MCHC RBC AUTO-ENTMCNC: 31.5 G/DL (ref 28.4–34.8)
MCV RBC AUTO: 94.7 FL (ref 82.6–102.9)
MONOCYTES # BLD: 12 % (ref 3–12)
NRBC AUTOMATED: 0 PER 100 WBC
PDW BLD-RTO: 15.6 % (ref 11.8–14.4)
PLATELET # BLD: 214 K/UL (ref 138–453)
PMV BLD AUTO: 13 FL (ref 8.1–13.5)
POTASSIUM SERPL-SCNC: 3.7 MMOL/L (ref 3.7–5.3)
RBC # BLD: 3.42 M/UL (ref 3.95–5.11)
RBC # BLD: ABNORMAL 10*6/UL
SEG NEUTROPHILS: 57 % (ref 36–65)
SEGMENTED NEUTROPHILS ABSOLUTE COUNT: 3.87 K/UL (ref 1.5–8.1)
SODIUM BLD-SCNC: 139 MMOL/L (ref 135–144)
WBC # BLD: 6.8 K/UL (ref 3.5–11.3)

## 2022-08-02 PROCEDURE — 85025 COMPLETE CBC W/AUTO DIFF WBC: CPT

## 2022-08-02 PROCEDURE — 6370000000 HC RX 637 (ALT 250 FOR IP): Performed by: STUDENT IN AN ORGANIZED HEALTH CARE EDUCATION/TRAINING PROGRAM

## 2022-08-02 PROCEDURE — 2580000003 HC RX 258

## 2022-08-02 PROCEDURE — 99232 SBSQ HOSP IP/OBS MODERATE 35: CPT | Performed by: INTERNAL MEDICINE

## 2022-08-02 PROCEDURE — 80048 BASIC METABOLIC PNL TOTAL CA: CPT

## 2022-08-02 PROCEDURE — 6370000000 HC RX 637 (ALT 250 FOR IP)

## 2022-08-02 PROCEDURE — 97110 THERAPEUTIC EXERCISES: CPT

## 2022-08-02 PROCEDURE — 97116 GAIT TRAINING THERAPY: CPT

## 2022-08-02 PROCEDURE — 36415 COLL VENOUS BLD VENIPUNCTURE: CPT

## 2022-08-02 PROCEDURE — 2060000000 HC ICU INTERMEDIATE R&B

## 2022-08-02 PROCEDURE — 83735 ASSAY OF MAGNESIUM: CPT

## 2022-08-02 PROCEDURE — 82947 ASSAY GLUCOSE BLOOD QUANT: CPT

## 2022-08-02 RX ORDER — LOSARTAN POTASSIUM 100 MG/1
100 TABLET ORAL DAILY
Qty: 30 TABLET | Refills: 3 | Status: SHIPPED | OUTPATIENT
Start: 2022-08-03 | End: 2022-09-30

## 2022-08-02 RX ORDER — TRAMADOL HYDROCHLORIDE 50 MG/1
50 TABLET ORAL EVERY 6 HOURS PRN
Qty: 14 TABLET | Refills: 0 | Status: SHIPPED | OUTPATIENT
Start: 2022-08-02 | End: 2022-08-09

## 2022-08-02 RX ORDER — AMLODIPINE BESYLATE 10 MG/1
10 TABLET ORAL DAILY
Qty: 30 TABLET | Refills: 3 | Status: SHIPPED | OUTPATIENT
Start: 2022-08-03 | End: 2022-09-30 | Stop reason: SDUPTHER

## 2022-08-02 RX ORDER — LOPERAMIDE HYDROCHLORIDE 2 MG/1
2 CAPSULE ORAL 4 TIMES DAILY PRN
Qty: 40 CAPSULE | Refills: 5 | Status: ON HOLD | OUTPATIENT
Start: 2022-08-02 | End: 2022-08-15 | Stop reason: HOSPADM

## 2022-08-02 RX ORDER — INSULIN GLARGINE 100 [IU]/ML
10 INJECTION, SOLUTION SUBCUTANEOUS ONCE
Status: DISCONTINUED | OUTPATIENT
Start: 2022-08-02 | End: 2022-08-02

## 2022-08-02 RX ORDER — DIPHENHYDRAMINE HCL 25 MG
25 TABLET ORAL ONCE
Status: COMPLETED | OUTPATIENT
Start: 2022-08-02 | End: 2022-08-02

## 2022-08-02 RX ADMIN — ROSUVASTATIN CALCIUM 40 MG: 20 TABLET, FILM COATED ORAL at 21:04

## 2022-08-02 RX ADMIN — DIPHENHYDRAMINE HCL 25 MG: 25 TABLET ORAL at 21:07

## 2022-08-02 RX ADMIN — TORSEMIDE 10 MG: 20 TABLET ORAL at 08:52

## 2022-08-02 RX ADMIN — ISOSORBIDE MONONITRATE 30 MG: 30 TABLET ORAL at 08:52

## 2022-08-02 RX ADMIN — LOSARTAN POTASSIUM 100 MG: 50 TABLET, FILM COATED ORAL at 08:54

## 2022-08-02 RX ADMIN — APIXABAN 5 MG: 5 TABLET, FILM COATED ORAL at 21:05

## 2022-08-02 RX ADMIN — ESCITALOPRAM OXALATE 20 MG: 10 TABLET ORAL at 08:55

## 2022-08-02 RX ADMIN — SODIUM CHLORIDE, PRESERVATIVE FREE 10 ML: 5 INJECTION INTRAVENOUS at 08:55

## 2022-08-02 RX ADMIN — INSULIN GLARGINE 20 UNITS: 100 INJECTION, SOLUTION SUBCUTANEOUS at 09:35

## 2022-08-02 RX ADMIN — TICAGRELOR 90 MG: 90 TABLET ORAL at 21:05

## 2022-08-02 RX ADMIN — TICAGRELOR 90 MG: 90 TABLET ORAL at 08:55

## 2022-08-02 RX ADMIN — INSULIN LISPRO 4 UNITS: 100 INJECTION, SOLUTION INTRAVENOUS; SUBCUTANEOUS at 18:41

## 2022-08-02 RX ADMIN — AMLODIPINE BESYLATE 10 MG: 10 TABLET ORAL at 08:52

## 2022-08-02 RX ADMIN — GABAPENTIN 300 MG: 300 CAPSULE ORAL at 21:05

## 2022-08-02 RX ADMIN — APIXABAN 5 MG: 5 TABLET, FILM COATED ORAL at 08:52

## 2022-08-02 NOTE — PROGRESS NOTES
Physical Therapy  Facility/Department: [facility] NEURO  Physical Therapy Daily Treatment Note    Name: John Newby  : 1945  MRN: [id number]  Date of Service: 2022    Discharge Recommendations:  Further therapy recommended at discharge. The patient should be able to tolerate at least 3 hours of therapy per day over 5 days or 15 hours over 7 days. This patient may benefit from a Physical Medicine and Rehab consult. PT Equipment Recommendations  Equipment Needed: No  Other: pt owns RW      Patient Diagnosis(es): The primary encounter diagnosis was MARIELLE (acute kidney injury) ([address] Diagnoses of Acute systolic congestive heart failure (HCC) and Obesity, Class III, BMI 40-49.9 (morbid obesity) ([address] were also pertinent to this visit. Past Medical History:  has a past medical history of Acute renal failure with tubular necrosis ([address] Anxiety, Atrial fibrillation (HCC), Benign positional vertigo, CAD (coronary artery disease), Chest pain, CKD (chronic kidney disease), stage III ([address] Depression, Diabetes mellitus ([address] Diabetic neuropathy ([address] Dysuria, Hyperlipidemia, Hypertension, Migraine, and Persistent proteinuria. Past Surgical History:  has a past surgical history that includes Percutaneous Transluminal Coronary Angio; Cardiac catheterization; Coronary angioplasty with stent (2017); Appendectomy; Coronary angioplasty with stent (2012); and Coronary angioplasty with stent (2020). Assessment   Body Structures, Functions, Activity Limitations Requiring Skilled Therapeutic Intervention: Decreased strength;Decreased functional mobility ; Decreased balance;Decreased endurance;Decreased safe awareness;Decreased ADL status; Decreased body mechanics  Assessment: Pt ambulates 80 ft RW and CGA. Pt limited by endurance, and is a fall risk d/t decreased balance and endurance.  Pt would benefit from continued PT to adress B LE weakness and endurance deficits to progress toward prior level of independence  Therapy Prognosis: Good  Barriers to Learning: none  Requires PT Follow-Up: Yes  Activity Tolerance  Activity Tolerance: Patient limited by fatigue;Patient limited by endurance     Plan   Plan  Plan: 6-7 times per week  Current Treatment Recommendations: Therapeutic activities, Strengthening, ROM, Balance training, ADL/Self-care training, IADL training, Neuromuscular re-education, Functional mobility training, Transfer training, Gait training, Stair training, Safety education & training, Home exercise program, Endurance training  Safety Devices  Type of Devices: Gait belt, Call light within reach, All fall risk precautions in place, Left in chair, Nurse notified  Restraints  Restraints Initially in Place: No     Restrictions  Restrictions/Precautions  Restrictions/Precautions: Up as Tolerated, General Precautions  Required Braces or Orthoses?: No  Position Activity Restriction  Other position/activity restrictions: continuous telemetry     Subjective   Pain: pt denies pain  General  Chart Reviewed: Yes  Patient assessed for rehabilitation services?: Yes  Response To Previous Treatment: Patient with no complaints from previous session. Family / Caregiver Present: No  Follows Commands: Within Functional Limits  General Comment  Comments: Pt left seated in recliner with call light within reach  Subjective  Subjective: Pt and RN agreeable to PT. Pt alert in bed upon arrival, pleasant and cooperative t/o treatment. Pt denies pain at this time. Pt with noted tremor in B hands, R>L.        Cognition   Orientation  Overall Orientation Status: Within Functional Limits  Cognition  Overall Cognitive Status: WFL     Objective   SpO2: 94 %  O2 Device: None (Room air)     Observation/Palpation  Posture: Good     Bed mobility  Bed Mobility Comments: pt seated in chair at start of session and returned to recliner after amb  Transfers  Sit to Stand: Contact guard assistance  Stand to sit: Contact guard assistance  Comment: vc's for UE placement with good return demo noted  Ambulation  Surface: level tile  Device: Rolling Walker  Assistance: Contact guard assistance  Quality of Gait: Fwd lean on RW with slight knee flexion, slight flexed posture with gait but able to correct with cueing  Gait Deviations: Slow Cherise; Increased GIOVANA; Decreased step length  Distance: 80ft  Comments: No LOB. Patient fatigued with gait. More Ambulation?: No  Stairs/Curb  Stairs?: No     Balance  Posture: Good  Sitting - Static: Good  Sitting - Dynamic: Good  Standing - Static: Fair  Standing - Dynamic: Fair  Comments: RW for UE support. Exercise Treatment:   Seated LE exercise program: Long Arc Quads, hip abduction/adduction, heel/toe raises, and marches.  Reps: 20x     AM-PAC Score  AM-PAC Mobility Inpatient   How much difficulty turning over in bed?: A Little  How much difficulty sitting down on / standing up from a chair with arms?: A Little  How much difficulty moving from lying on back to sitting on side of bed?: A Little  How much help from another person moving to and from a bed to a chair?: A Little  How much help from another person needed to walk in hospital room?: A Little  How much help from another person for climbing 3-5 steps with a railing?: A Lot  AM-PAC Inpatient Mobility Raw Score : 17  AM-PAC Inpatient T-Scale Score : 42.13  Mobility Inpatient CMS 0-100% Score: 50.57  Mobility Inpatient CMS G-Code Modifier : CK       Goals  Short Term Goals  Time Frame for Short term goals: 15  Short term goal 1: Pt able to complete bed mobility modified independently  Short term goal 2: Pt able to complete transfers with RW use supervision  Short term goal 3: Pt able to ambulate 100ft with RW use SBA*1  Short term goal 4: Pt able to climb 4 steps with UE on R handrail CGA*1       Education  Patient Education  Education Given To: Patient  Education Provided: Role of Therapy;Plan of Care;Home Exercise Program;Fall Prevention Strategies; Energy Conservation;Transfer Training  Education Method: Demonstration;Verbal  Barriers to Learning: None  Education Outcome: Demonstrated understanding;Verbalized understanding      Therapy Time   Individual Concurrent Group Co-treatment   Time In 1102         Time Out 1125         Minutes 23         Timed Code Treatment Minutes: [address], [place]

## 2022-08-02 NOTE — DISCHARGE INSTRUCTIONS
You were admitted with symptomatic bradycardia due to beta-blocker use (Coreg ). Beta-blockers are stopped, HR is stable now. No more syncopal episodes. Extensive cardiac history. Cardiology is recommending outpatient follow-up in 2 weeks. Continue taking your medication as prescribed    Stop taking Ativan    Continue on Cozaar and amlodipine for blood pressure    Follow-up with PCP and cardiology as outpatient     PMNR recommends ARU for you    Renal follow-up in 4 to 6 weeks.   Please call [phone] for an appointment     Repeat BMP a week after this discharge and fax results to [phone]

## 2022-08-02 NOTE — PROGRESS NOTES
[facility]  Internal Medicine Teaching Residency Program  Inpatient Daily Progress Note  ______________________________________________________________________________    Patient: Lara Laurent  YOB: 1945   PXB:[id number]    Acct: [de-identified]     Room: [facility]/9012-27  Admit date: 2022  Today's date: 22  Number of days in the hospital: 5    SUBJECTIVE   Admitting Diagnosis: Syncope and collapse  CC: Multiple falls with leg weakness. Pt examined at bedside. Chart & results reviewed. -/66, afebrile, HR 65, hemodynamically stable  -Saturating well on room air  -No acute events overnight  -Patient has no complaints today  -Patient doing well as per nursing staff    ROS:  Constitutional:  negative for chills, fevers, sweats  Respiratory:  negative for cough, dyspnea on exertion, hemoptysis, shortness of breath, wheezing  Cardiovascular:  negative for chest pain, chest pressure/discomfort, lower extremity edema, palpitations  Gastrointestinal:  negative for abdominal pain, constipation, diarrhea, nausea, vomiting  Neurological:  negative for dizziness, headache  BRIEF HISTORY     Patient presented to ED in no acute distress. EKG showed sinus bradycardia with prolonged QT. CT head, chest, cervical spine negative for any acute abnormality, incidental finding of 5 mm part solid lung nodule in right upper lobe. On labs was found to have an MARIELLE. Patient admitted for work up of syncopal episode and IV fluid    OBJECTIVE     Vital Signs:  BP (!) 163/63   Pulse 61   Temp 98.4 °F (36.9 °C) (Oral)   Resp 16   Ht 5' 6\" (1.676 m)   Wt 239 lb 6.7 oz (108.6 kg)   SpO2 96%   BMI 38.64 kg/m²     Temp (24hrs), Av.9 °F (36.6 °C), Min:97.6 °F (36.4 °C), Max:98.4 °F (36.9 °C)    No intake/output data recorded.     Physical Exam:  Constitutional: This is a well developed, well nourished, 35-39.9 - Obesity Grade II 68y.o. year old female who is alert, oriented, cooperative and in no apparent distress. Respiratory: Breath sounds bilaterally were clear to auscultation. There were no wheezes, rhonchi or rales. There is no intercostal retraction or use of accessory muscles. Cardiovascular: Regular without murmur, clicks, gallops or rubs. Abdomen: Slightly rounded and soft without organomegaly. No rebound, rigidity or guarding was appreciated. Musculoskeletal:  No gross muscle weakness. Extremities:  + 1 lower extremity edema, ulcerations, tenderness, varicosities or erythema. Muscle size, tone and strength are normal.  No involuntary movements are noted. Skin:  Warm and dry. Good color, turgor and pigmentation. No lesions or scars.   No cyanosis or clubbing  Neurological/Psychiatric: The patient's general behavior, level of consciousness, thought content and emotional status is normal.        Medications:  Scheduled Medications:    insulin glargine  10 Units SubCUTAneous Once    torsemide  10 mg Oral Daily    losartan  100 mg Oral Daily    amLODIPine  10 mg Oral Daily    insulin lispro  0-8 Units SubCUTAneous TID WC    insulin lispro  0-4 Units SubCUTAneous Nightly    sodium chloride flush  5-40 mL IntraVENous 2 times per day    apixaban  5 mg Oral BID    ticagrelor  90 mg Oral BID    rosuvastatin  40 mg Oral Nightly    isosorbide mononitrate  30 mg Oral Daily    escitalopram  20 mg Oral Daily    gabapentin  300 mg Oral Nightly    insulin glargine  20 Units SubCUTAneous QAM     Continuous Infusions:    dextrose      sodium chloride      dextrose       PRN Medicationsprochlorperazine, 10 mg, Q6H PRN  hydrALAZINE, 10 mg, Q6H PRN  glucose, 4 tablet, PRN  dextrose bolus, 125 mL, PRN   Or  dextrose bolus, 250 mL, PRN  glucagon (rDNA), 1 mg, PRN  dextrose, , Continuous PRN  sodium chloride flush, 5-40 mL, PRN  sodium chloride, , PRN  polyethylene glycol, 17 g, Daily PRN  acetaminophen, 650 mg, Q6H PRN   Or  acetaminophen, 650 mg, Q6H PRN  dextrose, , Continuous PRN        Diagnostic Labs:  CBC:   Recent Labs     07/31/22  0633 08/01/22  0445   WBC 5.0 6.1   RBC 3.10* 3.42*   HGB 8.9* 10.0*   HCT 30.3* 32.6*   MCV 97.7 95.3   RDW 15.6* 15.3*    168     BMP:   Recent Labs     07/31/22  0633 08/01/22  0445    136   K 3.7 3.4*   * 103   CO2 23 22   BUN 18 17   CREATININE 1.59* 1.45*     BNP: No results for input(s): BNP in the last 72 hours. PT/INR: No results for input(s): PROTIME, INR in the last 72 hours. APTT: No results for input(s): APTT in the last 72 hours. CARDIAC ENZYMES: No results for input(s): CKMB, CKMBINDEX, TROPONINI in the last 72 hours. Invalid input(s): CKTOTAL;3  FASTING LIPID PANEL:  Lab Results   Component Value Date    CHOL 125 05/30/2022    HDL 36 (L) 05/30/2022    TRIG 132 05/30/2022     LIVER PROFILE: No results for input(s): AST, ALT, ALB, BILIDIR, BILITOT, ALKPHOS in the last 72 hours. MICROBIOLOGY:   Lab Results   Component Value Date/Time    CULTURE NO GROWTH 5 DAYS 05/29/2022 07:02 PM       Imaging:    CT HEAD WO CONTRAST    Result Date: 7/28/2022  Mild central and cortical cerebral atrophy. Mild chronic deep white matter ischemic changes No acute intracranial abnormalities are noted. CT CHEST WO CONTRAST    Result Date: 7/28/2022  1. No acute intrathoracic abnormality. 2. 5 mm right upper lobe nodule. RECOMMENDATIONS: Pathology: 5 mm right part-solid pulmonary nodule within the upper lobe. No routine follow-up imaging is recommended per Fleischner Society Guidelines. These guidelines do not apply to immunocompromised patients and patients with cancer. Follow up in patients with significant comorbidities as clinically warranted. For lung cancer screening, adhere to Lung-RADS guidelines. Reference: Radiology. 2017; 284(1):228-43     CT CERVICAL SPINE WO CONTRAST    Result Date: 7/28/2022  Multilevel cervical spondylosis and degenerative disc disease.   Mild spinal stenosis C5-6 and C6-7 Evidence of paracervical spasm. No acute bony abnormalities are noted in the cervical spine RECOMMENDATIONS: Further evaluation of the cervical spine should be obtained with MR imaging if clinically indicated. XR CHEST PORTABLE    Result Date: 7/28/2022  No acute process. Stable cardiomegaly       ASSESSMENT & PLAN     ASSESSMENT / PLAN:     Principal Problem:    Syncope and collapse  Active Problems:    MARIELLE (acute kidney injury) (HCC)    Chronic diastolic (congestive) heart failure (HCC)    Paroxysmal atrial fibrillation (HCC)    Bradycardia    Acute kidney injury superimposed on CKD ([facility])    Obesity, Class III, BMI 40-49.9 (morbid obesity) ([facility]    Presence of stent in coronary artery in patient with coronary artery disease    Hypokalemia    Acute respiratory failure with hypoxia ([facility])    Essential hypertension    Diabetic polyneuropathy associated with type 2 diabetes mellitus ([facility]    Other hyperlipidemia    Class 3 severe obesity in [facility])    CKD (chronic kidney disease), stage III ([facility]  Resolved Problems:    * No resolved hospital problems. *      Syncope with collapse, most likely due to orthostatic hypotension in the setting of bradycardia  (on beta blockers) and afib (on amiodarone)  -off coreg, 7/28 orthostats negative. MARIELLE on CKD stage III, in the setting of chronic diastolic HF  -improved  -Cr 1.65, (baseline 1.3-1.5), continue to monitor Cr     Bradycardia  -off coreg, cardio on board, event monitor on discharge, if symptomatic bradycardia continues may consider pacemaker.      Diabetes mellitus with neuropathy             -On Lantus 20 units, medium dose sliding scale, hypoglycemia protocol             -Will continue to monitor blood glucose levels.             -On gabapentin     HTN  -on Norvasc 10 and Cozaar 100     CAD s/p stenting  -on crestor, brilinta, and eliquis     Hypokalemia  K 3.7, replaced today, continue to monitor    DVT ppx : Eliquis  GI ppx: Not indicated    PT/OT: Following  Discharge Planning / SW:  nataliia Fay MD  Internal Medicine Resident, PGY-1  [address], [place]  8/2/2022, 5:47 AM   Attending Physician Statement  I have discussed the care of Rodolfo Sandoval and I have examined the patient myselft and taken ros and hpi , including pertinent history and exam findings,  with the resident. I have reviewed the key elements of all parts of the encounter with the resident. I agree with the assessment, plan and orders as documented by the resident. Spent 35 minutes in reviewing data/medicines/talking to patient/family,  explaining and answering all the questions.         Electronically signed by Raymundo Shah MD

## 2022-08-03 LAB
ABSOLUTE EOS #: 0.22 K/UL (ref 0–0.44)
ABSOLUTE IMMATURE GRANULOCYTE: 0.05 K/UL (ref 0–0.3)
ABSOLUTE LYMPH #: 2.1 K/UL (ref 1.1–3.7)
ABSOLUTE MONO #: 0.83 K/UL (ref 0.1–1.2)
ANION GAP SERPL CALCULATED.3IONS-SCNC: 10 MMOL/L (ref 9–17)
ANION GAP SERPL CALCULATED.3IONS-SCNC: 13 MMOL/L (ref 9–17)
BASOPHILS # BLD: 1 % (ref 0–2)
BASOPHILS ABSOLUTE: 0.05 K/UL (ref 0–0.2)
BUN BLDV-MCNC: 22 MG/DL (ref 8–23)
BUN BLDV-MCNC: 23 MG/DL (ref 8–23)
CALCIUM SERPL-MCNC: 9.4 MG/DL (ref 8.6–10.4)
CALCIUM SERPL-MCNC: 9.4 MG/DL (ref 8.6–10.4)
CHLORIDE BLD-SCNC: 103 MMOL/L (ref 98–107)
CHLORIDE BLD-SCNC: 106 MMOL/L (ref 98–107)
CO2: 21 MMOL/L (ref 20–31)
CO2: 23 MMOL/L (ref 20–31)
CREAT SERPL-MCNC: 2.02 MG/DL (ref 0.5–0.9)
CREAT SERPL-MCNC: 2.05 MG/DL (ref 0.5–0.9)
EOSINOPHILS RELATIVE PERCENT: 3 % (ref 1–4)
GFR AFRICAN AMERICAN: 28 ML/MIN
GFR AFRICAN AMERICAN: 29 ML/MIN
GFR NON-AFRICAN AMERICAN: 23 ML/MIN
GFR NON-AFRICAN AMERICAN: 24 ML/MIN
GFR SERPL CREATININE-BSD FRML MDRD: ABNORMAL ML/MIN/{1.73_M2}
GFR SERPL CREATININE-BSD FRML MDRD: ABNORMAL ML/MIN/{1.73_M2}
GLUCOSE BLD-MCNC: 137 MG/DL (ref 65–105)
GLUCOSE BLD-MCNC: 142 MG/DL (ref 70–99)
GLUCOSE BLD-MCNC: 173 MG/DL (ref 65–105)
GLUCOSE BLD-MCNC: 178 MG/DL (ref 70–99)
GLUCOSE BLD-MCNC: 204 MG/DL (ref 65–105)
GLUCOSE BLD-MCNC: 236 MG/DL (ref 65–105)
GLUCOSE BLD-MCNC: 279 MG/DL (ref 65–105)
HCT VFR BLD CALC: 32 % (ref 36.3–47.1)
HEMOGLOBIN: 10.1 G/DL (ref 11.9–15.1)
IMMATURE GRANULOCYTES: 1 %
LYMPHOCYTES # BLD: 31 % (ref 24–43)
MAGNESIUM: 2.5 MG/DL (ref 1.6–2.6)
MCH RBC QN AUTO: 30.1 PG (ref 25.2–33.5)
MCHC RBC AUTO-ENTMCNC: 31.6 G/DL (ref 28.4–34.8)
MCV RBC AUTO: 95.5 FL (ref 82.6–102.9)
MONOCYTES # BLD: 12 % (ref 3–12)
NRBC AUTOMATED: 0 PER 100 WBC
PDW BLD-RTO: 15.5 % (ref 11.8–14.4)
PLATELET # BLD: 189 K/UL (ref 138–453)
PMV BLD AUTO: 11.1 FL (ref 8.1–13.5)
POTASSIUM SERPL-SCNC: 3.5 MMOL/L (ref 3.7–5.3)
POTASSIUM SERPL-SCNC: 3.8 MMOL/L (ref 3.7–5.3)
RBC # BLD: 3.35 M/UL (ref 3.95–5.11)
RBC # BLD: ABNORMAL 10*6/UL
SEG NEUTROPHILS: 52 % (ref 36–65)
SEGMENTED NEUTROPHILS ABSOLUTE COUNT: 3.56 K/UL (ref 1.5–8.1)
SODIUM BLD-SCNC: 137 MMOL/L (ref 135–144)
SODIUM BLD-SCNC: 139 MMOL/L (ref 135–144)
WBC # BLD: 6.8 K/UL (ref 3.5–11.3)

## 2022-08-03 PROCEDURE — 36415 COLL VENOUS BLD VENIPUNCTURE: CPT

## 2022-08-03 PROCEDURE — 6370000000 HC RX 637 (ALT 250 FOR IP)

## 2022-08-03 PROCEDURE — 6370000000 HC RX 637 (ALT 250 FOR IP): Performed by: STUDENT IN AN ORGANIZED HEALTH CARE EDUCATION/TRAINING PROGRAM

## 2022-08-03 PROCEDURE — 2580000003 HC RX 258

## 2022-08-03 PROCEDURE — 97116 GAIT TRAINING THERAPY: CPT

## 2022-08-03 PROCEDURE — 97110 THERAPEUTIC EXERCISES: CPT

## 2022-08-03 PROCEDURE — 80048 BASIC METABOLIC PNL TOTAL CA: CPT

## 2022-08-03 PROCEDURE — 83735 ASSAY OF MAGNESIUM: CPT

## 2022-08-03 PROCEDURE — 85025 COMPLETE CBC W/AUTO DIFF WBC: CPT

## 2022-08-03 PROCEDURE — 99232 SBSQ HOSP IP/OBS MODERATE 35: CPT | Performed by: INTERNAL MEDICINE

## 2022-08-03 PROCEDURE — 2060000000 HC ICU INTERMEDIATE R&B

## 2022-08-03 RX ORDER — 0.9 % SODIUM CHLORIDE 0.9 %
500 INTRAVENOUS SOLUTION INTRAVENOUS ONCE
Status: COMPLETED | OUTPATIENT
Start: 2022-08-03 | End: 2022-08-03

## 2022-08-03 RX ORDER — DIPHENHYDRAMINE HCL 25 MG
25 TABLET ORAL ONCE
Status: COMPLETED | OUTPATIENT
Start: 2022-08-03 | End: 2022-08-03

## 2022-08-03 RX ORDER — SODIUM CHLORIDE 9 MG/ML
INJECTION, SOLUTION INTRAVENOUS CONTINUOUS
Status: DISCONTINUED | OUTPATIENT
Start: 2022-08-03 | End: 2022-08-10 | Stop reason: HOSPADM

## 2022-08-03 RX ORDER — POTASSIUM CHLORIDE 20 MEQ/1
40 TABLET, EXTENDED RELEASE ORAL ONCE
Status: COMPLETED | OUTPATIENT
Start: 2022-08-03 | End: 2022-08-03

## 2022-08-03 RX ADMIN — APIXABAN 5 MG: 5 TABLET, FILM COATED ORAL at 19:50

## 2022-08-03 RX ADMIN — ISOSORBIDE MONONITRATE 30 MG: 30 TABLET ORAL at 08:34

## 2022-08-03 RX ADMIN — AMLODIPINE BESYLATE 10 MG: 10 TABLET ORAL at 08:33

## 2022-08-03 RX ADMIN — DIPHENHYDRAMINE HCL 25 MG: 25 TABLET ORAL at 21:34

## 2022-08-03 RX ADMIN — INSULIN GLARGINE 20 UNITS: 100 INJECTION, SOLUTION SUBCUTANEOUS at 08:32

## 2022-08-03 RX ADMIN — SODIUM CHLORIDE, PRESERVATIVE FREE 10 ML: 5 INJECTION INTRAVENOUS at 08:35

## 2022-08-03 RX ADMIN — SODIUM CHLORIDE: 9 INJECTION, SOLUTION INTRAVENOUS at 19:45

## 2022-08-03 RX ADMIN — SODIUM CHLORIDE 500 ML: 9 INJECTION, SOLUTION INTRAVENOUS at 09:25

## 2022-08-03 RX ADMIN — LOSARTAN POTASSIUM 100 MG: 50 TABLET, FILM COATED ORAL at 08:33

## 2022-08-03 RX ADMIN — APIXABAN 5 MG: 5 TABLET, FILM COATED ORAL at 08:33

## 2022-08-03 RX ADMIN — TICAGRELOR 90 MG: 90 TABLET ORAL at 08:33

## 2022-08-03 RX ADMIN — ESCITALOPRAM OXALATE 20 MG: 10 TABLET ORAL at 08:33

## 2022-08-03 RX ADMIN — SODIUM CHLORIDE: 9 INJECTION, SOLUTION INTRAVENOUS at 13:05

## 2022-08-03 RX ADMIN — POTASSIUM CHLORIDE 40 MEQ: 1500 TABLET, EXTENDED RELEASE ORAL at 08:34

## 2022-08-03 RX ADMIN — INSULIN LISPRO 2 UNITS: 100 INJECTION, SOLUTION INTRAVENOUS; SUBCUTANEOUS at 17:36

## 2022-08-03 RX ADMIN — GABAPENTIN 300 MG: 300 CAPSULE ORAL at 19:50

## 2022-08-03 RX ADMIN — TICAGRELOR 90 MG: 90 TABLET ORAL at 21:34

## 2022-08-03 RX ADMIN — ROSUVASTATIN CALCIUM 40 MG: 20 TABLET, FILM COATED ORAL at 21:34

## 2022-08-03 NOTE — CARE COORDINATION
Call received from [facility]ег Edne [address] has been denied. Namita states Peer to Peer needs completed by 12pm today. Requested expedited appeal be started. 1200- [facility], confirming that expedited appeal has been started. Patient updated verbalizes that she feels that ARU is her first choice for transition plan. 1600- Call received [facility] - Request for expedited appeal has been denied. Standard appeal will be done that can take up to 30 days. Олег Eden stated the that the patient and her family have the option to file a patient appeal by calling [phone] and giving reference # [id number]. Patient updated on appeal denial and is requesting follow up on SNF referrals to [facility] and [facility] [place]. 1615- Call to [facility] with [facility] - They are unable to accept patient d/t current contract with [facility]. 1620- Call to [facility] with [facility] - They are unable to accept patient d/t bed availability. They will not have any beds available until midweek next week. Madison states that she will keep patient on list.    [phone]- Call to patient's daughter Cynthia Esqueda - Updated Cynthia Esqueda with AR[facility] expedited appeal denial and explained option of filing a patient appeal. Cynthia Esqueda states she will be at the hospital in the morning to start the appeal process with her mother. Additional SNF choices provided of 1. [facility]. [facility] 3. [facility]. Referrals sent to all 3 choices. 1700- CM left messages for Ricci with Evelyne Gtz with [facility] and Mendel Car with Luis Mtz at [facility], requesting call back from all 3 facilities.

## 2022-08-03 NOTE — PROGRESS NOTES
[facility]  Internal Medicine Teaching Residency Program  Inpatient Daily Progress Note  ______________________________________________________________________________    Patient: Nikole Ards  YOB: 1945   KVN:[id number]    Acct: [de-identified]     Room: [address] [place] [address]  Admit date: 2022  Today's date: 22  Number of days in the hospital: 6    SUBJECTIVE   Admitting Diagnosis: Syncope and collapse  CC: Multiple falls with leg weakness  Pt examined at bedside. Chart & results reviewed. /55, afebrile, HR 57, hemodynamically stable  -Saturating well on room air  -No acute events overnight  -Patient is alert and oriented and resting comfortably  -Patient doing well as per nursing staff  -Cr 2.02 <--1.65    ROS:  Constitutional:  negative for chills, fevers, sweats  Respiratory:  negative for cough, dyspnea on exertion, hemoptysis, shortness of breath, wheezing  Cardiovascular:  negative for chest pain, chest pressure/discomfort, lower extremity edema, palpitations  Gastrointestinal:  negative for abdominal pain, constipation, diarrhea, nausea, vomiting  Neurological:  negative for dizziness, headache  BRIEF HISTORY     Patient presented to ED in no acute distress. EKG showed sinus bradycardia with prolonged QT. CT head, chest, cervical spine negative for any acute abnormality, incidental finding of 5 mm part solid lung nodule in right upper lobe. On labs was found to have an MARIELLE. Patient admitted for work up of syncopal episode and IV fluid    OBJECTIVE     Vital Signs:  BP (!) 151/55   Pulse 57   Temp 97.4 °F (36.3 °C) (Axillary)   Resp 16   Ht 5' 6\" (1.676 m)   Wt 239 lb 6.7 oz (108.6 kg)   SpO2 96%   BMI 38.64 kg/m²     Temp (24hrs), Av °F (36.7 °C), Min:97.4 °F (36.3 °C), Max:98.5 °F (36.9 °C)    No intake/output data recorded.     Physical Exam:  Constitutional: This is a well developed, well nourished, 35-39.9 - Obesity Grade II 68y.o. year old female who is alert, oriented, cooperative and in no apparent distress. Respiratory: Chest was symmetrical without dullness to percussion. Breath sounds bilaterally were clear to auscultation. There were no wheezes, rhonchi or rales. There is no intercostal retraction or use of accessory muscles. No egophony noted. Cardiovascular: Regular without murmur, clicks, gallops or rubs. Abdomen: Slightly rounded and soft without organomegaly. No rebound, rigidity or guarding was appreciated. Lymphatic: No lymphadenopathy. Musculoskeletal: Normal curvature of the spine. No gross muscle weakness. Extremities:  No lower extremity edema, ulcerations, tenderness, varicosities or erythema. Muscle size, tone and strength are normal.  Tremors noted on the right arm  Skin:  Warm and dry. Good color, turgor and pigmentation. No lesions or scars.   No cyanosis or clubbing  Neurological/Psychiatric: The patient's general behavior, level of consciousness, thought content and emotional status is normal.        Medications:  Scheduled Medications:    sodium chloride  500 mL IntraVENous Once    [Held by provider] torsemide  10 mg Oral Daily    losartan  100 mg Oral Daily    amLODIPine  10 mg Oral Daily    insulin lispro  0-8 Units SubCUTAneous TID WC    insulin lispro  0-4 Units SubCUTAneous Nightly    sodium chloride flush  5-40 mL IntraVENous 2 times per day    apixaban  5 mg Oral BID    ticagrelor  90 mg Oral BID    rosuvastatin  40 mg Oral Nightly    isosorbide mononitrate  30 mg Oral Daily    escitalopram  20 mg Oral Daily    gabapentin  300 mg Oral Nightly    insulin glargine  20 Units SubCUTAneous QAM     Continuous Infusions:    sodium chloride      dextrose      sodium chloride      dextrose       PRN Medicationsprochlorperazine, 10 mg, Q6H PRN  hydrALAZINE, 10 mg, Q6H PRN  glucose, 4 tablet, PRN  dextrose bolus, 125 mL, PRN   Or  dextrose bolus, 250 mL, PRN  glucagon (rDNA), 1 mg, PRN  dextrose, , Continuous PRN  sodium chloride flush, 5-40 mL, PRN  sodium chloride, , PRN  polyethylene glycol, 17 g, Daily PRN  acetaminophen, 650 mg, Q6H PRN   Or  acetaminophen, 650 mg, Q6H PRN  dextrose, , Continuous PRN        Diagnostic Labs:  CBC:   Recent Labs     08/01/22 0445 08/02/22  0621 08/03/22  0600   WBC 6.1 6.8 6.8   RBC 3.42* 3.42* 3.35*   HGB 10.0* 10.2* 10.1*   HCT 32.6* 32.4* 32.0*   MCV 95.3 94.7 95.5   RDW 15.3* 15.6* 15.5*    214 189     BMP:   Recent Labs     08/01/22 0445 08/02/22  0621 08/03/22  0600    139 137   K 3.4* 3.7 3.5*    104 103   CO2 22 22 21   BUN 17 18 22   CREATININE 1.45* 1.65* 2.02*     BNP: No results for input(s): BNP in the last 72 hours. PT/INR: No results for input(s): PROTIME, INR in the last 72 hours. APTT: No results for input(s): APTT in the last 72 hours. CARDIAC ENZYMES: No results for input(s): CKMB, CKMBINDEX, TROPONINI in the last 72 hours. Invalid input(s): CKTOTAL;3  FASTING LIPID PANEL:  Lab Results   Component Value Date    CHOL 125 05/30/2022    HDL 36 (L) 05/30/2022    TRIG 132 05/30/2022     LIVER PROFILE: No results for input(s): AST, ALT, ALB, BILIDIR, BILITOT, ALKPHOS in the last 72 hours. MICROBIOLOGY:   Lab Results   Component Value Date/Time    CULTURE NO GROWTH 5 DAYS 05/29/2022 07:02 PM       Imaging:    CT HEAD WO CONTRAST    Result Date: 7/28/2022  Mild central and cortical cerebral atrophy. Mild chronic deep white matter ischemic changes No acute intracranial abnormalities are noted. CT CHEST WO CONTRAST    Result Date: 7/28/2022  1. No acute intrathoracic abnormality. 2. 5 mm right upper lobe nodule. RECOMMENDATIONS: Pathology: 5 mm right part-solid pulmonary nodule within the upper lobe. No routine follow-up imaging is recommended per Fleischner Society Guidelines. These guidelines do not apply to immunocompromised patients and patients with cancer.  Follow up in patients with significant comorbidities as clinically warranted. For lung cancer screening, adhere to Lung-RADS guidelines. Reference: Radiology. 2017; 284(1):228-43     CT CERVICAL SPINE WO CONTRAST    Result Date: 7/28/2022  Multilevel cervical spondylosis and degenerative disc disease. Mild spinal stenosis C5-6 and C6-7 Evidence of paracervical spasm. No acute bony abnormalities are noted in the cervical spine RECOMMENDATIONS: Further evaluation of the cervical spine should be obtained with MR imaging if clinically indicated. XR CHEST PORTABLE    Result Date: 7/28/2022  No acute process. Stable cardiomegaly       ASSESSMENT & PLAN     ASSESSMENT / PLAN:     Principal Problem:    Syncope and collapse  Active Problems:    MARIELLE (acute kidney injury) (HCC)    Chronic diastolic (congestive) heart failure (HCC)    Paroxysmal atrial fibrillation (HCC)    Bradycardia    Acute kidney injury superimposed on CKD (HCC)    Obesity, Class III, BMI 40-49.9 (morbid obesity) ([facility] [address]    Presence of stent in coronary artery in patient with coronary artery disease    Hypokalemia    Acute respiratory failure with hypoxia ([facility])    Essential hypertension    Diabetic polyneuropathy associated with type 2 diabetes mellitus ([facility] [address]    Other hyperlipidemia    Class 3 severe obesity in adult [facility])    CKD (chronic kidney disease), stage III ([facility] [address]  Resolved Problems:    * No resolved hospital problems. *      Syncope with collapse, most likely due to orthostatic hypotension in the setting of bradycardia  (on beta blockers) and afib (on amiodarone)  -off coreg, 7/28 orthostats negative     MARIELLE on CKD stage III, in the setting of chronic diastolic HF  -Cr 4.59<--5.21, (baseline 1.3-1.5),  -Started on Normal saline at 125 cc/hr, demadex on hold  -received  normal saline bolus  -Will continue to monitor Cr     Bradycardia  -off coreg, cardio on board, event monitor on discharge, if symptomatic bradycardia continues may consider pacemaker.      Diabetes mellitus with neuropathy -On Lantus 20 units, medium dose sliding scale, hypoglycemia protocol             -Will continue to monitor blood glucose levels.             -On gabapentin     HTN  -on Norvasc 10 and Cozaar 100, demadex on hold due to MARIELLE     CAD s/p stenting  -on crestor, brilinta, eliquis, imdur     Hypokalemia  K 3.5, continue to monitor    DVT ppx : Eliquis  GI ppx: Not indicated    PT/OT: Recommended further therapy after discharge  Discharge Planning / SW:  consulted, planning to discharge patient to Inpatient rehab. Kt Whiteside MD  Internal Medicine Resident, PGY-1  [facility]; [place], [address] [place] [address] [address]  8/3/2022, 9:51 AM   Attending Physician Statement  I have discussed the care of Erika Cleverly and I have examined the patient myselft and taken ros and hpi , including pertinent history and exam findings,  with the resident. I have reviewed the key elements of all parts of the encounter with the resident. I agree with the assessment, plan and orders as documented by the resident. Spent 35 minutes in reviewing data/medicines/talking to patient/family,  explaining and answering all the questions.         Electronically signed by Apollo Lucas MD

## 2022-08-03 NOTE — PROGRESS NOTES
Physical Therapy  Facility/Department: [facility] NEURO  Physical Therapy Daily Treatment Note    Name: Carlee Ventura  : 1945  MRN: [id number]  Date of Service: 8/3/2022    Discharge Recommendations:  Patient would benefit from continued therapy after discharge   PT Equipment Recommendations  Equipment Needed: No  Other: pt owns RW      Patient Diagnosis(es): The primary encounter diagnosis was MARIELLE (acute kidney injury) ([facility] [address] Diagnoses of Acute systolic congestive heart failure ([address] Obesity, Class III, BMI 40-49.9 (morbid obesity) ([address] and Cervical myelopathy (HCC) were also pertinent to this visit. Past Medical History:  has a past medical history of Acute renal failure with tubular necrosis ([address] Anxiety, Atrial fibrillation (HCC), Benign positional vertigo, CAD (coronary artery disease), Chest pain, CKD (chronic kidney disease), stage III ([address] Depression, Diabetes mellitus ([address] Diabetic neuropathy ([address] Dysuria, Hyperlipidemia, Hypertension, Migraine, and Persistent proteinuria. Past Surgical History:  has a past surgical history that includes Percutaneous Transluminal Coronary Angio; Cardiac catheterization; Coronary angioplasty with stent (2017); Appendectomy; Coronary angioplasty with stent (2012); and Coronary angioplasty with stent (2020). Assessment   Body Structures, Functions, Activity Limitations Requiring Skilled Therapeutic Intervention: Decreased strength;Decreased functional mobility ; Decreased balance;Decreased endurance;Decreased safe awareness;Decreased ADL status; Decreased body mechanics  Assessment: Pt ambulated 90 ft RW and CGA. Pt most limited by decreased endurance, making her at risk for falls.  Pt would benefit from continued PT to kadi MEEKS LE weakness and endurance deficits to progress toward prior level of independence  Therapy Prognosis: Good  Barriers to Learning: none  Requires PT Follow-Up: Yes  Activity Tolerance  Activity Tolerance: Patient limited by fatigue;Patient limited by endurance     Plan   Plan  Plan: 6-7 times per week  Current Treatment Recommendations: Therapeutic activities, Strengthening, ROM, Balance training, ADL/Self-care training, IADL training, Neuromuscular re-education, Functional mobility training, Transfer training, Gait training, Stair training, Safety education & training, Home exercise program, Endurance training  Safety Devices  Type of Devices: Gait belt, Call light within reach, Left in chair, Nurse notified, Patient at risk for falls  Restraints  Restraints Initially in Place: No     Restrictions  Restrictions/Precautions  Restrictions/Precautions: Up as Tolerated, General Precautions  Required Braces or Orthoses?: No  Position Activity Restriction  Other position/activity restrictions: continuous telemetry     Subjective   Pain: pt denies pain  General  Chart Reviewed: Yes  Patient assessed for rehabilitation services?: Yes  Response To Previous Treatment: Patient with no complaints from previous session. Family / Caregiver Present: No  Follows Commands: Within Functional Limits  General Comment  Comments: Pt left seated in recliner with call light within reach  Subjective  Subjective: Pt and RN agreeable to PT. Pt seated in recliner upon arrival, pleasant and cooperative t/o treatment.  Pt denies pain at this time       Cognition   Orientation  Overall Orientation Status: Within Functional Limits  Cognition  Overall Cognitive Status: WFL     Objective      Observation/Palpation  Posture: Good    Bed mobility  Bed Mobility Comments: pt seated in chair at start of session and returned to recliner after amb  Transfers  Sit to Stand: Contact guard assistance  Stand to sit: Contact guard assistance  Comment: vc's for UE placement with good return demo noted, 3 STS trials completed t/o session  Ambulation  Surface: level tile  Device: Rolling Walker  Assistance: Contact guard assistance  Quality of Gait: Fwd lean on RW with slight knee flexion, slight flexed posture with gait but able to correct with cueing  Gait Deviations: Slow Cherise; Increased GIOVANA; Decreased step length  Distance: 90ft  Comments: No LOB. Patient fatigued with gait stating \"my legs are burning\"  More Ambulation?: No  Stairs/Curb  Stairs?: No (pt declined secondary to B LE fatigue)     Balance  Posture: Good  Sitting - Static: Good  Sitting - Dynamic: Good  Standing - Static: Fair;+  Standing - Dynamic: Fair  Comments: RW for UE support. Exercise Treatment:   Seated LE exercise program: Long Arc Quads, hip abduction/adduction, heel/toe raises, and marches. Reps: 15x   Gluteal sets, Hamstring Sets, Quad Sets. Reps:  10x long sitting in recliner    AM-PAC Score  AM-PAC Inpatient Mobility Raw Score : 17 (08/02/22 1246)  AM-PAC Inpatient T-Scale Score : 42.13 (08/02/22 1246)  Mobility Inpatient CMS 0-100% Score: 50.57 (08/02/22 1246)  Mobility Inpatient CMS G-Code Modifier : CK (08/02/22 1246)    Goals  Short Term Goals  Time Frame for Short term goals: 12  Short term goal 1: Pt able to complete bed mobility modified independently  Short term goal 2: Pt able to complete transfers with RW use supervision  Short term goal 3: Pt able to ambulate 100ft with RW use SBA*1  Short term goal 4: Pt able to climb 4 steps with UE on R handrail CGA*1       Education  Patient Education  Education Given To: Patient  Education Provided: Role of Therapy;Plan of Care;Home Exercise Program;Fall Prevention Strategies; Energy Conservation;Transfer Training  Education Method: Demonstration;Verbal  Barriers to Learning: None  Education Outcome: Demonstrated understanding;Verbalized understanding      Therapy Time   Individual Concurrent Group Co-treatment   Time In 1367         Time Out 1109         Minutes 27         Timed Code Treatment Minutes: [address] [place], [place]

## 2022-08-03 NOTE — PROGRESS NOTES
Physician Progress Note      PATIENT:               Allison Duncan  CSN #:                  [id number]  :                       1945  ADMIT DATE:       2022 2:11 PM  [address] [place] DATE:  RESPONDING  PROVIDER #:        Juan Cueva          QUERY TEXT:    Patient admitted with syncope/bradycardia. Noted documentation of acute   respiratory failure in IM progress notes on -8/3. In order to support the   diagnosis of acute respiratory failure, please include additional clinical   indicators in your documentation. Or please document if the diagnosis of   acute respiratory failure has been ruled out after further study. The medical record reflects the following:  Risk Factors: chronic diastolic CHF  Clinical Indicators: noted documentation of Acute respiratory failure with   hypoxia ([facility] [address]-- documented -8/3 in IM prog notes, no evidence to support dx   noted- on RA, intermittent O2 @ 2L NC, no documented desats, no distress per   prog notes, documentation of some dyspnea with exertion. no ABG/VBG's  Treatment: O2@ 2L at times with no documented hypoxia, CXR, CT, monitoring    Acute Respiratory Failure Clinical Indicators per  MS-DRG Training Guide and   Quick Reference Guide:  pO2 < 60 mmHg or SpO2 (pulse oximetry) < 91% breathing room air  pCO2 > 50 and pH < 7.35  P/F ratio (pO2 / FIO2) < 300  pO2 decrease or pCO2 increase by 10 mmHg from baseline (if known)  Supplemental oxygen of 40% or more  Presence of respiratory distress, tachypnea, dyspnea, shortness of breath,   wheezing  Unable to speak in complete sentences  Use of accessory muscles to breathe  Extreme anxiety and feeling of impending doom  Tripod position  Confusion/altered mental status/obtunded    Thank you, Osiris Shukla, CDI  email - [email or web address]  cell- [phone]  office hours --7A-3P  Options provided:  -- Acute Respiratory Failure as evidenced by, Please document evidence.   -- Acute Respiratory Failure ruled out after study  -- Other - I will add my own diagnosis  -- Disagree - Not applicable / Not valid  -- Disagree - Clinically unable to determine / Unknown  -- Refer to Clinical Documentation Reviewer    PROVIDER RESPONSE TEXT:      Acute Respiratory Failure ruled out after study        Query created by: Cyrus Crisostomo on 8/3/2022 12:18 PM      Electronically signed by:  Juni Valiente 8/3/2022 12:30 PM

## 2022-08-04 LAB
ABSOLUTE EOS #: 0.22 K/UL (ref 0–0.44)
ABSOLUTE IMMATURE GRANULOCYTE: <0.03 K/UL (ref 0–0.3)
ABSOLUTE LYMPH #: 2.2 K/UL (ref 1.1–3.7)
ABSOLUTE MONO #: 0.84 K/UL (ref 0.1–1.2)
ANION GAP SERPL CALCULATED.3IONS-SCNC: 10 MMOL/L (ref 9–17)
BASOPHILS # BLD: 0 % (ref 0–2)
BASOPHILS ABSOLUTE: 0.03 K/UL (ref 0–0.2)
BUN BLDV-MCNC: 21 MG/DL (ref 8–23)
CALCIUM SERPL-MCNC: 9 MG/DL (ref 8.6–10.4)
CHLORIDE BLD-SCNC: 108 MMOL/L (ref 98–107)
CO2: 21 MMOL/L (ref 20–31)
CREAT SERPL-MCNC: 1.7 MG/DL (ref 0.5–0.9)
EOSINOPHILS RELATIVE PERCENT: 3 % (ref 1–4)
GFR AFRICAN AMERICAN: 35 ML/MIN
GFR NON-AFRICAN AMERICAN: 29 ML/MIN
GFR SERPL CREATININE-BSD FRML MDRD: ABNORMAL ML/MIN/{1.73_M2}
GLUCOSE BLD-MCNC: 138 MG/DL (ref 65–105)
GLUCOSE BLD-MCNC: 154 MG/DL (ref 65–105)
GLUCOSE BLD-MCNC: 165 MG/DL (ref 70–99)
GLUCOSE BLD-MCNC: 176 MG/DL (ref 65–105)
GLUCOSE BLD-MCNC: 241 MG/DL (ref 65–105)
HCT VFR BLD CALC: 32.1 % (ref 36.3–47.1)
HEMOGLOBIN: 9.7 G/DL (ref 11.9–15.1)
IMMATURE GRANULOCYTES: 0 %
LYMPHOCYTES # BLD: 32 % (ref 24–43)
MCH RBC QN AUTO: 29.3 PG (ref 25.2–33.5)
MCHC RBC AUTO-ENTMCNC: 30.2 G/DL (ref 28.4–34.8)
MCV RBC AUTO: 97 FL (ref 82.6–102.9)
MONOCYTES # BLD: 12 % (ref 3–12)
NRBC AUTOMATED: 0 PER 100 WBC
PDW BLD-RTO: 15.3 % (ref 11.8–14.4)
PLATELET # BLD: 182 K/UL (ref 138–453)
PMV BLD AUTO: 10.4 FL (ref 8.1–13.5)
POTASSIUM SERPL-SCNC: 3.7 MMOL/L (ref 3.7–5.3)
RBC # BLD: 3.31 M/UL (ref 3.95–5.11)
RBC # BLD: ABNORMAL 10*6/UL
SEG NEUTROPHILS: 53 % (ref 36–65)
SEGMENTED NEUTROPHILS ABSOLUTE COUNT: 3.5 K/UL (ref 1.5–8.1)
SODIUM BLD-SCNC: 139 MMOL/L (ref 135–144)
WBC # BLD: 6.8 K/UL (ref 3.5–11.3)

## 2022-08-04 PROCEDURE — 6370000000 HC RX 637 (ALT 250 FOR IP)

## 2022-08-04 PROCEDURE — 2580000003 HC RX 258

## 2022-08-04 PROCEDURE — 99222 1ST HOSP IP/OBS MODERATE 55: CPT | Performed by: INTERNAL MEDICINE

## 2022-08-04 PROCEDURE — 97110 THERAPEUTIC EXERCISES: CPT

## 2022-08-04 PROCEDURE — 85025 COMPLETE CBC W/AUTO DIFF WBC: CPT

## 2022-08-04 PROCEDURE — 6370000000 HC RX 637 (ALT 250 FOR IP): Performed by: STUDENT IN AN ORGANIZED HEALTH CARE EDUCATION/TRAINING PROGRAM

## 2022-08-04 PROCEDURE — 97116 GAIT TRAINING THERAPY: CPT

## 2022-08-04 PROCEDURE — 36415 COLL VENOUS BLD VENIPUNCTURE: CPT

## 2022-08-04 PROCEDURE — 94761 N-INVAS EAR/PLS OXIMETRY MLT: CPT

## 2022-08-04 PROCEDURE — 82947 ASSAY GLUCOSE BLOOD QUANT: CPT

## 2022-08-04 PROCEDURE — 2060000000 HC ICU INTERMEDIATE R&B

## 2022-08-04 PROCEDURE — 80048 BASIC METABOLIC PNL TOTAL CA: CPT

## 2022-08-04 RX ORDER — DIPHENHYDRAMINE HCL 25 MG
25 TABLET ORAL ONCE
Status: COMPLETED | OUTPATIENT
Start: 2022-08-04 | End: 2022-08-04

## 2022-08-04 RX ORDER — ECHINACEA PURPUREA EXTRACT 125 MG
1 TABLET ORAL PRN
Status: DISCONTINUED | OUTPATIENT
Start: 2022-08-04 | End: 2022-08-10 | Stop reason: HOSPADM

## 2022-08-04 RX ADMIN — LOSARTAN POTASSIUM 100 MG: 50 TABLET, FILM COATED ORAL at 09:18

## 2022-08-04 RX ADMIN — INSULIN LISPRO 2 UNITS: 100 INJECTION, SOLUTION INTRAVENOUS; SUBCUTANEOUS at 17:56

## 2022-08-04 RX ADMIN — SALINE NASAL SPRAY 1 SPRAY: 1.5 SOLUTION NASAL at 22:21

## 2022-08-04 RX ADMIN — ESCITALOPRAM OXALATE 20 MG: 10 TABLET ORAL at 09:17

## 2022-08-04 RX ADMIN — GABAPENTIN 300 MG: 300 CAPSULE ORAL at 21:36

## 2022-08-04 RX ADMIN — ROSUVASTATIN CALCIUM 40 MG: 20 TABLET, FILM COATED ORAL at 21:36

## 2022-08-04 RX ADMIN — ISOSORBIDE MONONITRATE 30 MG: 30 TABLET ORAL at 09:17

## 2022-08-04 RX ADMIN — TICAGRELOR 90 MG: 90 TABLET ORAL at 09:19

## 2022-08-04 RX ADMIN — SODIUM CHLORIDE: 9 INJECTION, SOLUTION INTRAVENOUS at 03:43

## 2022-08-04 RX ADMIN — APIXABAN 5 MG: 5 TABLET, FILM COATED ORAL at 09:18

## 2022-08-04 RX ADMIN — APIXABAN 5 MG: 5 TABLET, FILM COATED ORAL at 21:36

## 2022-08-04 RX ADMIN — DIPHENHYDRAMINE HCL 25 MG: 25 TABLET ORAL at 22:20

## 2022-08-04 RX ADMIN — AMLODIPINE BESYLATE 10 MG: 10 TABLET ORAL at 09:17

## 2022-08-04 RX ADMIN — INSULIN GLARGINE 20 UNITS: 100 INJECTION, SOLUTION SUBCUTANEOUS at 10:32

## 2022-08-04 RX ADMIN — TICAGRELOR 90 MG: 90 TABLET ORAL at 21:36

## 2022-08-04 ASSESSMENT — PAIN SCALES - GENERAL: PAINLEVEL_OUTOF10: 0

## 2022-08-04 NOTE — PROGRESS NOTES
Writer present in the room for the patient appeal for the insurance denial for acute rehab admission. Along with the patient, patient's son is in the room, RN taking care of the patient Major Cleveland is in the room and myself on speaker phone. Was told that the standard appeal will take up to 30 days, asked if we can do an expedited  I explained to the staff about why we think she would benefit from rehab and and asked if we can apply expedited appeal.  Was told they can and it may take up to 72 hours for the decision. Gave all the details. Staff appealed for expedited appeal, was told that we will hear back within 72 hours about the decision.     Thank you    Davonte Murillo  PGY-2  Internal Medicine  [address] [facility]   11:32 AM 8/4/2022

## 2022-08-04 NOTE — PROGRESS NOTES
Nephrology Consult Note    Reason for Consult: Elevated creatinine  Requesting Physician:  Brianna Chung    Chief Complaint: Near syncope  History Obtained From:  patient and chart review    History of Present Illness: This is a 68 y.o. female who has a past medical history significant for longstanding type 2 diabetes more than 10 years ago, history of neuropathy but no history of diabetic retinopathy. Patient also has a history of coronary artery disease and prior intervention in the past.  Patient was also admitted to [facility] in June 2022 with acute renal failure that resulted from severe diarrhea. Her last angiogram shows EF of 60%, and there was no intervention needed. Her most recent baseline creatinine which was documented by Dr. Cindy Connell is 1.6 mg/dL. Patient presented to hospital because of recurrent falls. She had 2 falls within the last 10 days prior to hospitalization. She was also complaining of intermittent lightheadedness and dizziness. She found to be bradycardic heart rate in 50s. Patient was admitted for further evaluation. Patient was seen and evaluated by cardiology. T she was taking Coreg 12.5 mg twice daily which was discontinued. Her current antihypertensive medications include amlodipine 10 mg once a day, losartan 100 mg once a day, her Demadex is on hold. The reason nephrology was consulted because of elevated creatinine. Pattern of her creatinines are as follows:   Latest Reference Range & Units 8/1/22 04:45 8/2/22 06:21 8/3/22 06:00 8/3/22 13:36 8/4/22 03:33   Creatinine 0.50 - 0.90 mg/dL 1.45 (H) 1.65 (H) 2.02 (H) 2.05 (H) 1.70 (H)            There is no intake and output charting available. Pt denies any hx of heavy or prolonged NSAID use. There is no hx of jaundice or hepatitis or sexually transmitted disease. Pt has no hx of collagen vascular disease or vasculitis. No history of dysuria or frequency. No recent procedures involving IV contrast. There is no hx of paraprotein disease. Pt denies any hx of recurrent UTI , incontinence or recurrent nephrolithiasis. Medication review shows use of ace/arb, diuretic. Chest x-ray is not suggestive of decompensated CHF  Her creatinine is improving and down down to 1.7 mg/dL  Patient did not receive any CT scan with contrast  Patient was not on any NSAIDs or treated with nephrotoxic antibiotic during this hospitalization. Past Medical History:        Diagnosis Date    Acute renal failure with tubular necrosis ([address] 11/24/2021    Secondary to ischemic ATN post fall intravascularly depletion hypotension and low flow baseline creatinine 1.2-1.4 peaked up to 2.1 during her hospitalization in September 2020. Work-up showed benign urine sediment, kidney size to be 11.2 on the right 15.1 on the left serological work-up negative. Anxiety     Atrial fibrillation (HCC)     chronic-takes xarelto    Benign positional vertigo     CAD (coronary artery disease)     Chest pain     CKD (chronic kidney disease), stage III ([address] 11/24/2021    Secondary to ischemic and diabetic nephrosclerosis baseline 1.2-1.4    Depression     Diabetes mellitus ([address]     Diabetic neuropathy ([address]     Dysuria 11/24/2021    Hyperlipidemia     Hypertension     Migraine     Migraine headaches aggravated with sublingual nitroglycerin    Persistent proteinuria 7/20/2022    From diabetic nephrosclerosis UPC 1.0       Past Surgical History:        Procedure Laterality Date    APPENDECTOMY      CARDIAC CATHETERIZATION      2012    CORONARY ANGIOPLASTY WITH STENT PLACEMENT  02/25/2017    4 SYNERGY HEART STENTS DRUG ELUTING ALL MRI CONDITONAL 3T OK, SAFE IMMEDIATELY.      CORONARY ANGIOPLASTY WITH STENT PLACEMENT  07/11/2012    XIENCE HEART STENT/ MRI CONDITIONAL 3T OK, SAFE IMMEDIATELY    CORONARY ANGIOPLASTY WITH STENT PLACEMENT  12/11/2020    PTCA         Current Medications:    sodium chloride (OCEAN) 0.65 % nasal spray 1 spray, PRN  0.9 % sodium chloride infusion, Continuous  [Held by provider] torsemide (DEMADEX) tablet 10 mg, Daily  losartan (COZAAR) tablet 100 mg, Daily  prochlorperazine (COMPAZINE) injection 10 mg, Q6H PRN  hydrALAZINE (APRESOLINE) injection 10 mg, Q6H PRN  amLODIPine (NORVASC) tablet 10 mg, Daily  insulin lispro (HUMALOG) injection vial 0-8 Units, TID WC  insulin lispro (HUMALOG) injection vial 0-4 Units, Nightly  glucose chewable tablet 16 g, PRN  dextrose bolus 10% 125 mL, PRN   Or  dextrose bolus 10% 250 mL, PRN  glucagon (rDNA) injection 1 mg, PRN  dextrose 10 % infusion, Continuous PRN  sodium chloride flush 0.9 % injection 5-40 mL, 2 times per day  sodium chloride flush 0.9 % injection 5-40 mL, PRN  0.9 % sodium chloride infusion, PRN  polyethylene glycol (GLYCOLAX) packet 17 g, Daily PRN  acetaminophen (TYLENOL) tablet 650 mg, Q6H PRN   Or  acetaminophen (TYLENOL) suppository 650 mg, Q6H PRN  apixaban (ELIQUIS) tablet 5 mg, BID  ticagrelor (BRILINTA) tablet 90 mg, BID  rosuvastatin (CRESTOR) tablet 40 mg, Nightly  isosorbide mononitrate (IMDUR) extended release tablet 30 mg, Daily  escitalopram (LEXAPRO) tablet 20 mg, Daily  gabapentin (NEURONTIN) capsule 300 mg, Nightly  dextrose 10 % infusion, Continuous PRN  insulin glargine (LANTUS) injection vial 20 Units, QAM        Allergies:  Penicillins and Sulfa antibiotics    Social History:   Social History     Socioeconomic History    Marital status:      Spouse name: Not on file    Number of children: Not on file    Years of education: Not on file    Highest education level: Not on file   Occupational History    Not on file   Tobacco Use    Smoking status: Never    Smokeless tobacco: Never   Substance and Sexual Activity    Alcohol use: No    Drug use: No    Sexual activity: Never   Other Topics Concern    Not on file   Social History Narrative    Not on file     Social Determinants of Health     Financial Resource Strain: Not on file   Food Insecurity: Not on file   Transportation Needs: Not on file   Physical Activity: Not on file   Stress: Not on file   Social Connections: Not on file   Intimate Partner Violence: Not on file   Housing Stability: Not on file       Family History:   Family History   Problem Relation Age of Onset    Cancer Mother     Cancer Father        Review of Systems:    Constitutional: No fever or chills   HEENT:  No headache or sore throat   cardiac:  No chest pain, dyspnea, orthopnea or PND. Chest:  Cough or wheezing  Abdomen:  No abdominal pain, nausea or vomiting. Neuro:  No focal weakness, abnormal movements orseizure like activity. Skin:   No rashes, no itching. :   No hematuria, no pyuria, no dysuria, no flank pain.   Extremities:  No joint swelling    Objective:  CURRENT TEMPERATURE:  Temp: 98.1 °F (36.7 °C)  MAXIMUM TEMPERATURE OVER 24HRS:  Temp (24hrs), Av.9 °F (36.6 °C), Min:97.3 °F (36.3 °C), Max:98.2 °F (36.8 °C)    CURRENT RESPIRATORY RATE:  Resp: 16  CURRENT PULSE:  Heart Rate: 71  CURRENT BLOOD PRESSURE:  BP: (!) 160/71  24HR BLOOD PRESSURE RANGE:  Systolic (72IQE), CUS:428 , Min:144 , GFT:152   ; Diastolic (36ZAQ), EVB:15, Min:54, Max:71    24HR INTAKE/OUTPUT:    Intake/Output Summary (Last 24 hours) at 2022 1351  Last data filed at 2022 1230  Gross per 24 hour   Intake 440 ml   Output --   Net 440 ml     Patient Vitals for the past 96 hrs (Last 3 readings):   Weight   22 0529 239 lb 6.7 oz (108.6 kg)     Physical Exam:  General appearance: Awake and alert x3   skin: Warm to touch  Eyes: Conjunctiva was pink  ENT: : No thrush or pharyngeal congestion  Neck: No carotid bruit or JVD  Pulmonary: Lateral air entry and clear  Cardiovascular: Normal S1 & S2,  No S3 or  S4, no Pericardial Rub no Murmur   Abdomen: soft nontender, bowel sounds present, no organomegaly,  no ascites  Extremities: Trace edema  Labs:   CBC:  Recent Labs     22  0621 22  0600 22  0333   WBC 6.8 6.8 6.8   RBC 3.42* 3.35* 3.31*   HGB 10.2* 10.1* 9.7*   HCT 32.4* 32.0* 32.1*   MCV 94.7 95.5 97.0   MCH 29.8 30.1 29.3   MCHC 31.5 31.6 30.2   RDW 15.6* 15.5* 15.3*    189 182   MPV 13.0 11.1 10.4      BMP:   Recent Labs     08/03/22  0600 08/03/22  1336 08/04/22  0333    139 139   K 3.5* 3.8 3.7    106 108*   CO2 21 23 21   BUN 22 23 21   CREATININE 2.02* 2.05* 1.70*   GLUCOSE 142* 178* 165*   CALCIUM 9.4 9.4 9.0        Phosphorus:  No results for input(s): PHOS in the last 72 hours. Magnesium:   Recent Labs     08/02/22  2151 08/03/22  0600   MG 2.3 2.5     Albumin: No results for input(s): LABALBU in the last 72 hours. IRON:    Lab Results   Component Value Date/Time    IRON 57 11/19/2019 06:33 AM     Iron Saturation:  No components found for: PERCENTFE  TIBC:    Lab Results   Component Value Date/Time    TIBC 212 11/19/2019 06:33 AM     FERRITIN:    Lab Results   Component Value Date/Time    FERRITIN 325 07/12/2020 06:44 PM     SPEP:   Lab Results   Component Value Date/Time    PROT 7.2 07/28/2022 02:30 PM     UPEP: No results found for: TPU     C3:   Lab Results   Component Value Date    C3 140 09/14/2021     C4:   Lab Results   Component Value Date    C4 43 (H) 09/14/2021     MPO ANCA:  No results found for: MPO .   PR3 ANCA:  No results found for: PR3  Urine Sodium:    Lab Results   Component Value Date/Time    KARTHIK 82 07/28/2022 03:44 PM      Urine Potassium:  No results found for: KUR  Urine Chloride:  No results found for: CLU  Urine Ph:  No components found for: PO4U  Urine Osmolarity:  No results found for: OSMOU  Urine Creatinine:    Lab Results   Component Value Date/Time    LABCREA 47.2 05/30/2022 12:43 PM     Urine Eosinophils: No components found for: EOSU  Urine Protein:  No results found for: TPU  Urinalysis:  U/A:   Lab Results   Component Value Date/Time    NITRU NEGATIVE 07/28/2022 03:44 PM    COLORU Yellow 07/28/2022 03:44 PM    PHUR 5.5 07/28/2022 03:44 PM    WBCUA 2 TO 5 07/28/2022 03:44 PM    RBCUA 5 TO 10 07/28/2022 03:44 PM    MUCUS 1+ 07/28/2022 03:44 PM    TRICHOMONAS NOT REPORTED 09/15/2021 08:38 PM    YEAST NOT REPORTED 09/15/2021 08:38 PM    BACTERIA MANY 09/15/2021 08:38 PM    CLARITYU clear 12/03/2019 03:02 PM    SPECGRAV 1.012 07/28/2022 03:44 PM    LEUKOCYTESUR NEGATIVE 07/28/2022 03:44 PM    UROBILINOGEN Normal 07/28/2022 03:44 PM    BILIRUBINUR NEGATIVE 07/28/2022 03:44 PM    BILIRUBINUR neg 12/03/2019 03:02 PM    BLOODU neg 12/03/2019 03:02 PM    GLUCOSEU TRACE 07/28/2022 03:44 PM    KETUA NEGATIVE 07/28/2022 03:44 PM    AMORPHOUS 1+ 07/28/2022 03:44 PM         Radiology:  Reviewed as available. Assessment:  1. Acute kidney injury most likely due to prerenal azotemia secondary to bradycardia. Now her heart rate has improved. Creatinine is improving and creatinine down to 1.7 mg/dL. 2.  Chronic kidney disease baseline creatinine 1.4 to 1.6 mg/dL due to nephrosclerosis  3. History of coronary artery disease and intervention in the past  4. History of congestive cardiac failure  5. Longstanding type 2 diabetes  6. Obesity  7. Bradycardia work-up is in progress and cardiology is on board    Plan:  1. Agree to hold diuretics for today  2. Resume Demadex 20 mg once a day at the time of discharge  3. CBC and BMP in a.m.  4.  Please maintain intake and output charting and daily weights  Thank you very much and will follow with you  Thank you for the consultation. Please do not hesitate to call with questions.     Electronically signed by Carlee Acevedo MD on 8/4/2022 at 1:51 PM

## 2022-08-04 NOTE — ACP (ADVANCE CARE PLANNING)
..Advance Care Planning     Advance Care Planning Activator (Inpatient)  Conversation Note      Date of ACP Conversation: 8/4/2022     Conversation Conducted with: Patient with Decision Making Capacity    ACP Activator: Quinton Armendariz RN      Health Care Decision Maker:     Current Designated Health Care Decision Maker:     Primary Decision Maker: Kahlil Quintana Child - [phone]    Secondary Decision Maker: Maria Luisa Hendricks Child - [phone]  Click here to complete Healthcare Decision Makers including section of the Healthcare Decision Maker Relationship (ie \"Primary\")  Today we documented Decision Maker(s) consistent with Legal Next of Kin hierarchy. Care Preferences    Ventilation: \"If you were in your present state of health and suddenly became very ill and were unable to breathe on your own, what would your preference be about the use of a ventilator (breathing machine) if it were available to you? \"      Would the patient desire the use of ventilator (breathing machine)?: yes    \"If your health worsens and it becomes clear that your chance of recovery is unlikely, what would your preference be about the use of a ventilator (breathing machine) if it were available to you? \"     Would the patient desire the use of ventilator (breathing machine)?: No      Resuscitation  \"CPR works best to restart the heart when there is a sudden event, like a heart attack, in someone who is otherwise healthy. Unfortunately, CPR does not typically restart the heart for people who have serious health conditions or who are very sick. \"    \"In the event your heart stopped as a result of an underlying serious health condition, would you want attempts to be made to restart your heart (answer \"yes\" for attempt to resuscitate) or would you prefer a natural death (answer \"no\" for do not attempt to resuscitate)? \" yes       [] Yes   [] No   Educated Patient / Decision Maker regarding differences between Advance Directives and portable DNR orders.     Length of ACP Conversation in minutes:      Conversation Outcomes:  [] ACP discussion completed  [] Existing advance directive reviewed with patient; no changes to patient's previously recorded wishes  [] New Advance Directive completed  [] Portable Do Not Rescitate prepared for Provider review and signature  [] POLST/POST/MOLST/MOST prepared for Provider review and signature      Follow-up plan:    [] Schedule follow-up conversation to continue planning  [] Referred individual to Provider for additional questions/concerns   [] Advised patient/agent/surrogate to review completed ACP document and update if needed with changes in condition, patient preferences or care setting    [] This note routed to one or more involved healthcare providers

## 2022-08-04 NOTE — PROGRESS NOTES
[facility]  Internal Medicine Teaching Residency Program  Inpatient Daily Progress Note  ______________________________________________________________________________    Patient: Merly Hogan  YOB: 1945   IUH:[id number]    Acct: [de-identified]     Room: [facility]  Admit date: 2022  Today's date: 22  Number of days in the hospital: 7    SUBJECTIVE   Admitting Diagnosis: Syncope and collapse  CC: Fall     No acute issues overnight  Pt examined at bedside. Chart & results reviewed. Patient comfortably lying on the bed  Denies any new complaints    Has been afebrile  Heart rate stable  Blood pressure on the higher side  On room air    Labs -   Sodium 139  Potassium 3.7  Creatinine 2.05 > 1.7  WBC 6.8  Hemoglobin 9.7      ROS:  Constitutional:  negative for chills, fevers, sweats  Respiratory:  negative for cough, dyspnea on exertion, hemoptysis, shortness of breath, wheezing  Cardiovascular:  negative for chest pain, chest pressure/discomfort, lower extremity edema, palpitations  Gastrointestinal:  negative for abdominal pain, constipation, diarrhea, nausea, vomiting  Neurological: Positive for dizziness    BRIEF HISTORY     Patient presented to ED in no acute distress. EKG showed sinus bradycardia with prolonged QT. CT head, chest, cervical spine negative for any acute abnormality, incidental finding of 5 mm part solid lung nodule in right upper lobe. On labs was found to have an MARIELLE. Patient admitted for work up of syncopal episode and IV fluid    OBJECTIVE     Vital Signs:  BP (!) 166/71   Pulse 67   Temp 97.3 °F (36.3 °C) (Oral)   Resp 16   Ht 5' 6\" (1.676 m)   Wt 239 lb 6.7 oz (108.6 kg)   SpO2 97%   BMI 38.64 kg/m²     Temp (24hrs), Av.9 °F (36.6 °C), Min:97.3 °F (36.3 °C), Max:98.2 °F (36.8 °C)    In: 520   Out: -     Physical Exam:  Physical Exam  Constitutional:       Appearance: Normal appearance.    HENT:      Head: Normocephalic and atraumatic. Mouth/Throat:      Mouth: Mucous membranes are dry. Eyes:      Extraocular Movements: Extraocular movements intact. Cardiovascular:      Rate and Rhythm: Normal rate and regular rhythm. Pulses: Normal pulses. Heart sounds: Normal heart sounds. Pulmonary:      Effort: Pulmonary effort is normal. No respiratory distress. Breath sounds: Normal breath sounds. Abdominal:      General: There is no distension. Palpations: Abdomen is soft. Tenderness: There is no abdominal tenderness. Musculoskeletal:         General: No swelling. Cervical back: Normal range of motion. Right lower leg: No edema. Left lower leg: No edema. Skin:     General: Skin is warm. Coloration: Skin is not jaundiced. Findings: No bruising. Neurological:      General: No focal deficit present. Mental Status: She is alert and oriented to person, place, and time.    Psychiatric:         Mood and Affect: Mood normal.         Behavior: Behavior normal.         Medications:  Scheduled Medications:    [Held by provider] torsemide  10 mg Oral Daily    losartan  100 mg Oral Daily    amLODIPine  10 mg Oral Daily    insulin lispro  0-8 Units SubCUTAneous TID WC    insulin lispro  0-4 Units SubCUTAneous Nightly    sodium chloride flush  5-40 mL IntraVENous 2 times per day    apixaban  5 mg Oral BID    ticagrelor  90 mg Oral BID    rosuvastatin  40 mg Oral Nightly    isosorbide mononitrate  30 mg Oral Daily    escitalopram  20 mg Oral Daily    gabapentin  300 mg Oral Nightly    insulin glargine  20 Units SubCUTAneous QAM     Continuous Infusions:    sodium chloride 125 mL/hr at 08/04/22 0343    dextrose      sodium chloride      dextrose       PRN Medicationssodium chloride, 1 spray, PRN  prochlorperazine, 10 mg, Q6H PRN  hydrALAZINE, 10 mg, Q6H PRN  glucose, 4 tablet, PRN  dextrose bolus, 125 mL, PRN   Or  dextrose bolus, 250 mL, PRN  glucagon (rDNA), 1 mg, PRN  dextrose, , Continuous PRN  sodium chloride flush, 5-40 mL, PRN  sodium chloride, , PRN  polyethylene glycol, 17 g, Daily PRN  acetaminophen, 650 mg, Q6H PRN   Or  acetaminophen, 650 mg, Q6H PRN  dextrose, , Continuous PRN        Diagnostic Labs:  CBC:   Recent Labs     08/02/22  0621 08/03/22  0600 08/04/22  0333   WBC 6.8 6.8 6.8   RBC 3.42* 3.35* 3.31*   HGB 10.2* 10.1* 9.7*   HCT 32.4* 32.0* 32.1*   MCV 94.7 95.5 97.0   RDW 15.6* 15.5* 15.3*    189 182     BMP:   Recent Labs     08/03/22  0600 08/03/22  1336 08/04/22  0333    139 139   K 3.5* 3.8 3.7    106 108*   CO2 21 23 21   BUN 22 23 21   CREATININE 2.02* 2.05* 1.70*     BNP: No results for input(s): BNP in the last 72 hours. PT/INR: No results for input(s): PROTIME, INR in the last 72 hours. APTT: No results for input(s): APTT in the last 72 hours. CARDIAC ENZYMES: No results for input(s): CKMB, CKMBINDEX, TROPONINI in the last 72 hours. Invalid input(s): CKTOTAL;3  FASTING LIPID PANEL:  Lab Results   Component Value Date    CHOL 125 05/30/2022    HDL 36 (L) 05/30/2022    TRIG 132 05/30/2022     LIVER PROFILE: No results for input(s): AST, ALT, ALB, BILIDIR, BILITOT, ALKPHOS in the last 72 hours. MICROBIOLOGY:   Lab Results   Component Value Date/Time    CULTURE NO GROWTH 5 DAYS 05/29/2022 07:02 PM       Imaging:    CT HEAD WO CONTRAST    Result Date: 7/28/2022  Mild central and cortical cerebral atrophy. Mild chronic deep white matter ischemic changes No acute intracranial abnormalities are noted. CT CHEST WO CONTRAST    Result Date: 7/28/2022  1. No acute intrathoracic abnormality. 2. 5 mm right upper lobe nodule. RECOMMENDATIONS: Pathology: 5 mm right part-solid pulmonary nodule within the upper lobe. No routine follow-up imaging is recommended per Fleischner Society Guidelines. These guidelines do not apply to immunocompromised patients and patients with cancer.  Follow up in patients with significant comorbidities as recommendations    3. Chronic CHFpEF -   - diuretic on hold     4. Bradycardia -   - off coreg, cardio on board, event monitor on discharge, if symptomatic bradycardia continues may consider pacemaker. 5. Type 2 Diabetes mellitus with neuropathy-   - On Lantus 20 units, medium dose sliding scale, hypoglycemia protocol  - Will continue to monitor blood glucose levels. - continue gabapentin     6. Essential HTN -   - on Norvasc 10 and Cozaar 100, demadex on hold due to MARIELLE     7. CAD s/p stenting -   - on crestor, brilinta, eliquis, imdur     8. Hypokalemia -   - monitor BMP daily       DVT ppx : on Eliquis     PT/OT: Recommended further therapy after discharge  Discharge Planning / SW:  consulted, working on acute rehab vs SNF      Davonte Murillo  PGY-2  Internal Medicine  [facility]   11:32 AM 8/4/2022    Attending Physician Statement  I have discussed the care of Elton Umanzor and I have examined the patient myselft and taken ros and hpi , including pertinent history and exam findings,  with the resident. I have reviewed the key elements of all parts of the encounter with the resident. I agree with the assessment, plan and orders as documented by the resident. Spent 35 minutes in reviewing data/medicines/talking to patient/family,  explaining and answering all the questions.         Electronically signed by Velta Bernheim, MD normal...

## 2022-08-04 NOTE — CARE COORDINATION
Transitional planning-call from Dian Bills at [facility] review chart. [phone] call from Dian Bills at [facility] accept patient. Informed her that family is doing an expedited appeal for [facility]    1210 family started expedited appeal- was asked by Maki Macdonald to fax additional notes. Faxed PT/OT notes, progress notes, and ARU doctor note to [phone]. Could take up to 72 hours. [phone] call from [facility] accept patient-informed them that patient was already accepted at [facility].

## 2022-08-04 NOTE — PROGRESS NOTES
Physical Therapy  Facility/Department: [facility] NEURO  Physical Therapy Daily treatment note    Name: Larry Beckman  : 1945  MRN: [id number]  Date of Service: 2022    Discharge Recommendations:  Further therapy recommended at discharge. The patient should be able to tolerate at least 3 hours of therapy per day over 5 days or 15 hours over 7 days. This patient may benefit from a Physical Medicine and Rehab consult. PT Equipment Recommendations  Equipment Needed: No      Patient Diagnosis(es): The primary encounter diagnosis was MARIELLE (acute kidney injury) ([address] Diagnoses of Acute systolic congestive heart failure ([address] Obesity, Class III, BMI 40-49.9 (morbid obesity) ([address] and Cervical myelopathy (HCC) were also pertinent to this visit. Past Medical History:  has a past medical history of Acute renal failure with tubular necrosis ([address] Anxiety, Atrial fibrillation (HCC), Benign positional vertigo, CAD (coronary artery disease), Chest pain, CKD (chronic kidney disease), stage III ([address] Depression, Diabetes mellitus ([address] Diabetic neuropathy ([address] Dysuria, Hyperlipidemia, Hypertension, Migraine, and Persistent proteinuria. Past Surgical History:  has a past surgical history that includes Percutaneous Transluminal Coronary Angio; Cardiac catheterization; Coronary angioplasty with stent (2017); Appendectomy; Coronary angioplasty with stent (2012); and Coronary angioplasty with stent (2020). Assessment   Body Structures, Functions, Activity Limitations Requiring Skilled Therapeutic Intervention: Decreased strength;Decreased functional mobility ; Decreased balance;Decreased endurance;Decreased safe awareness;Decreased ADL status; Decreased body mechanics  Assessment: Pt ambulated 40ft x 2 RW CGA with no LOB experienced. Pt required standing rest break ~30sec during ambulation, pt is most limited by fatigue. Post LE exercises pt stated \"my legs are burning. \" Pt would benefit from further PT to address BLE weakness and endurance deficits to progress toward prior level of independence. Therapy Prognosis: Good  Decision Making: Medium Complexity    Plan   Plan  Plan: 6-7 times per week  Current Treatment Recommendations: Therapeutic activities, Strengthening, ROM, Balance training, ADL/Self-care training, IADL training, Neuromuscular re-education, Functional mobility training, Transfer training, Gait training, Stair training, Safety education & training, Home exercise program, Endurance training  Safety Devices  Type of Devices: Gait belt, Call light within reach, Left in chair, Nurse notified, Patient at risk for falls  Restraints  Restraints Initially in Place: No     Restrictions  Restrictions/Precautions  Restrictions/Precautions: Up as Tolerated, General Precautions  Required Braces or Orthoses?: No  Position Activity Restriction  Other position/activity restrictions: continuous telemetry     Subjective   General  Chart Reviewed: Yes  Patient assessed for rehabilitation services?: Yes  Response To Previous Treatment: Patient with no complaints from previous session. Family / Caregiver Present: No  Follows Commands: Within Functional Limits  General Comment  Comments: Pt left seated in recliner w/call light in reach  Subjective  Subjective: Pt and RN agreeable to PT, pt seated in recliner upon arrival, motivated and pleasant throughout treatment, pt has no c/o pain this date. Cognition   Orientation  Overall Orientation Status: Within Functional Limits  Orientation Level: Oriented X4  Cognition  Overall Cognitive Status: WFL  Following Commands:  Follows one step commands consistently  Memory: Appears intact  Initiation: Requires cues for some  Sequencing: Requires cues for some    Objective  Bed mobility  Supine to Sit: Unable to assess  Sit to Supine: Unable to assess  Scooting: Stand by assistance  Bed Mobility Comments: pt in chair at start of session and returned to chair post ambulation  Transfers  Sit to Stand: Contact guard assistance  Stand to sit: Contact guard assistance  Comment: VC's required for hand placement, good return deomonstrated  Ambulation  WB Status: FWB  Ambulation  Surface: level tile  Device: Rolling Walker  Assistance: Contact guard assistance  Quality of Gait: Flexed forward posture, corrected w/cueing  Gait Deviations: Slow Cherise; Increased GIOVANA; Decreased step length  Distance: 40ft x 2  Comments: No LOB experienced, fatigued toward end of ambulation requiring standing rest break 30sec  More Ambulation?: No  Stairs/Curb  Stairs?: No    Balance  Posture: Good  Sitting - Static: Good  Sitting - Dynamic: Good  Standing - Static: Fair;+  Standing - Dynamic: Fair  Comments: assessed with RW     Exercises  Seated marches, heel toe raises, LAQ's, hip abd, glute sets x15 BLE  Long sitting heel slides x15 BLE    AM-PAC Score  AM-PAC Inpatient Mobility Raw Score : 17 (08/04/22 1505)  AM-PAC Inpatient T-Scale Score : 42.13 (08/04/22 1505)  Mobility Inpatient CMS 0-100% Score: 50.57 (08/04/22 1505)  Mobility Inpatient CMS G-Code Modifier : CK (08/04/22 1505)    Goals  Short Term Goals  Time Frame for Short term goals: 12  Short term goal 1: Pt able to complete bed mobility modified independently  Short term goal 2: Pt able to complete transfers with RW use supervision  Short term goal 3: Pt able to ambulate 100ft with RW use SBA*1  Short term goal 4: Pt able to climb 4 steps with UE on R handrail CGA*1    Therapy Time   Individual Concurrent Group Co-treatment   Time In 0226         Time Out 0251         Minutes 25         Timed Code Treatment Minutes: Rosy Wall Treatment performed by Student PTA under the supervision of co-signing PTA who agrees with all treatment and documentation.    Nica Ewing PTA

## 2022-08-04 NOTE — PROGRESS NOTES
Writer called appeal line along with dr. Nick Barroso, patient, son, and daughter on speakerphone for a last attempt of an appeal for acute rehab per family/patient request.      We answered all questions asked and filled out necessary paperwork, in which the  faxed over for review. Appeal representative stated it could take up to 72 hours for a decision.

## 2022-08-04 NOTE — PLAN OF CARE
Problem: Discharge Planning  Goal: Discharge to home or other facility with appropriate resources  Outcome: Progressing     Problem: Pain  Goal: Verbalizes/displays adequate comfort level or baseline comfort level  Outcome: Progressing     Problem: Chronic Conditions and Co-morbidities  Goal: Patient's chronic conditions and co-morbidity symptoms are monitored and maintained or improved  Outcome: Progressing     Problem: Safety - Adult  Goal: Free from fall injury  Outcome: Progressing     Problem: Skin/Tissue Integrity  Goal: Absence of new skin breakdown  Outcome: Progressing     Problem: ABCDS Injury Assessment  Goal: Absence of physical injury  Outcome: Progressing

## 2022-08-05 LAB
ANION GAP SERPL CALCULATED.3IONS-SCNC: 9 MMOL/L (ref 9–17)
BUN BLDV-MCNC: 16 MG/DL (ref 8–23)
CALCIUM SERPL-MCNC: 9.2 MG/DL (ref 8.6–10.4)
CHLORIDE BLD-SCNC: 109 MMOL/L (ref 98–107)
CO2: 23 MMOL/L (ref 20–31)
CREAT SERPL-MCNC: 1.57 MG/DL (ref 0.5–0.9)
GFR AFRICAN AMERICAN: 39 ML/MIN
GFR NON-AFRICAN AMERICAN: 32 ML/MIN
GFR SERPL CREATININE-BSD FRML MDRD: ABNORMAL ML/MIN/{1.73_M2}
GLUCOSE BLD-MCNC: 170 MG/DL (ref 65–105)
GLUCOSE BLD-MCNC: 177 MG/DL (ref 70–99)
GLUCOSE BLD-MCNC: 191 MG/DL (ref 65–105)
GLUCOSE BLD-MCNC: 200 MG/DL (ref 65–105)
GLUCOSE BLD-MCNC: 202 MG/DL (ref 65–105)
MAGNESIUM: 2 MG/DL (ref 1.6–2.6)
POTASSIUM SERPL-SCNC: 3.5 MMOL/L (ref 3.7–5.3)
SODIUM BLD-SCNC: 141 MMOL/L (ref 135–144)

## 2022-08-05 PROCEDURE — 99232 SBSQ HOSP IP/OBS MODERATE 35: CPT | Performed by: INTERNAL MEDICINE

## 2022-08-05 PROCEDURE — 82947 ASSAY GLUCOSE BLOOD QUANT: CPT

## 2022-08-05 PROCEDURE — 6370000000 HC RX 637 (ALT 250 FOR IP)

## 2022-08-05 PROCEDURE — 97535 SELF CARE MNGMENT TRAINING: CPT

## 2022-08-05 PROCEDURE — 6370000000 HC RX 637 (ALT 250 FOR IP): Performed by: STUDENT IN AN ORGANIZED HEALTH CARE EDUCATION/TRAINING PROGRAM

## 2022-08-05 PROCEDURE — 2580000003 HC RX 258

## 2022-08-05 PROCEDURE — 83735 ASSAY OF MAGNESIUM: CPT

## 2022-08-05 PROCEDURE — 97530 THERAPEUTIC ACTIVITIES: CPT

## 2022-08-05 PROCEDURE — 2060000000 HC ICU INTERMEDIATE R&B

## 2022-08-05 PROCEDURE — 36415 COLL VENOUS BLD VENIPUNCTURE: CPT

## 2022-08-05 PROCEDURE — 80048 BASIC METABOLIC PNL TOTAL CA: CPT

## 2022-08-05 RX ORDER — LOPERAMIDE HYDROCHLORIDE 2 MG/1
2 CAPSULE ORAL 4 TIMES DAILY PRN
Status: DISCONTINUED | OUTPATIENT
Start: 2022-08-05 | End: 2022-08-10 | Stop reason: HOSPADM

## 2022-08-05 RX ORDER — DIPHENHYDRAMINE HCL 25 MG
25 TABLET ORAL ONCE
Status: COMPLETED | OUTPATIENT
Start: 2022-08-05 | End: 2022-08-05

## 2022-08-05 RX ADMIN — LOSARTAN POTASSIUM 100 MG: 50 TABLET, FILM COATED ORAL at 11:03

## 2022-08-05 RX ADMIN — DIPHENHYDRAMINE HCL 25 MG: 25 TABLET ORAL at 22:31

## 2022-08-05 RX ADMIN — AMLODIPINE BESYLATE 10 MG: 10 TABLET ORAL at 11:04

## 2022-08-05 RX ADMIN — TICAGRELOR 90 MG: 90 TABLET ORAL at 11:03

## 2022-08-05 RX ADMIN — ISOSORBIDE MONONITRATE 30 MG: 30 TABLET ORAL at 11:03

## 2022-08-05 RX ADMIN — INSULIN GLARGINE 20 UNITS: 100 INJECTION, SOLUTION SUBCUTANEOUS at 11:04

## 2022-08-05 RX ADMIN — SODIUM CHLORIDE: 9 INJECTION, SOLUTION INTRAVENOUS at 00:09

## 2022-08-05 RX ADMIN — SODIUM CHLORIDE, PRESERVATIVE FREE 5 ML: 5 INJECTION INTRAVENOUS at 23:05

## 2022-08-05 RX ADMIN — APIXABAN 5 MG: 5 TABLET, FILM COATED ORAL at 22:30

## 2022-08-05 RX ADMIN — POTASSIUM BICARBONATE 40 MEQ: 782 TABLET, EFFERVESCENT ORAL at 06:45

## 2022-08-05 RX ADMIN — TICAGRELOR 90 MG: 90 TABLET ORAL at 22:30

## 2022-08-05 RX ADMIN — APIXABAN 5 MG: 5 TABLET, FILM COATED ORAL at 11:03

## 2022-08-05 RX ADMIN — ESCITALOPRAM OXALATE 20 MG: 10 TABLET ORAL at 11:04

## 2022-08-05 RX ADMIN — INSULIN LISPRO 2 UNITS: 100 INJECTION, SOLUTION INTRAVENOUS; SUBCUTANEOUS at 17:22

## 2022-08-05 RX ADMIN — ROSUVASTATIN CALCIUM 40 MG: 20 TABLET, FILM COATED ORAL at 22:30

## 2022-08-05 RX ADMIN — INSULIN LISPRO 2 UNITS: 100 INJECTION, SOLUTION INTRAVENOUS; SUBCUTANEOUS at 12:03

## 2022-08-05 RX ADMIN — LOPERAMIDE HYDROCHLORIDE 2 MG: 2 CAPSULE ORAL at 11:17

## 2022-08-05 RX ADMIN — GABAPENTIN 300 MG: 300 CAPSULE ORAL at 22:31

## 2022-08-05 NOTE — CONSULTS
reduced; fully conscious/some confusion  ___20%  Bed bound; extensive disease; total care; intake minimal; drowsy/coma  ___10%  Bed bound; extensive disease; total care; mouth care only; drowsy/coma  ___0       Death       Risk Assessments:    Roosevelt Risk Score: [unfilled]    Readmission Risk Score: 22.9        1 Year Mortality Risk Score: @1YEARMORTALITYRISK@     Plan      PLAN:   - patient is very alert and oriented and aware of her goals to get stronger and live at home again independently  - patient wants to go to a rehab and get her legs stronger  - she has 5 kids and they are very supportive   - patient's daughter Alek Dong is a nurse and she is her HCPOA  - patient remains a full code  - will follow for support    Palliative Interaction:I get an update from the ISHA Mao that the patient is in the chair. She told me that they are confirming rehab placement. Patient is in the chair and her daughter Alek Dong is at the bedside. Alek Dong is her [employer] and is an [place] states that her Mom lives alone but that there are 5 living children and that they all help. Per Alek Dong her Mom has a cardiac history, DM, MARIELLE stage and chronic a-fib. Cardiologywanted to do a CABG but her Mom did not have good veins to harvest for the grafts. She is as well on blood thinners for a-fib and history of PE. Per the patient her legs have been really weak and not working and as well she has been dizzy and has fallen many times at home. She has a left hand fracture and a right shoulder injury from her falls. She has a CHF history and has chronic leg swelling. Patient tells me that she gets to her doctor's appointment and was even going to cardiac rehab with the help of family transporting her. The patient's goal is to go for rehab and to get stronger. She states: I need something. \" She is motivated to be home again and independent.  Alek Dong tells me that her Mom raised 8 kids, and was very independent and worked hard her entire life. Per progress notes the patient is now off Coreg and if she has symptomatic bradycardia, they may consider a pacemaker. The patient as well has hand tremors and it was recommended that she will see a neurologist as an outpatient. Patient is motivated to get stronger and be independent at home again. Patient remains a full code. Principle Problem/Diagnosis:  Bradycardia [R00.1]  Syncope and collapse [R55]    Goals of care evaluation:  The patient goals of care are live longer and preserve independence/autonomy/control   Goals of care discussed with:    [] Patient independently    [x] Patient and Family    [] Family or Healthcare DPOA independently    [] Unable to discuss with patient, family/DPOA not present    Code Status  Full Code    Other recommendations:  Please call with any palliative questions or concerns. Palliative Care Team is available via perfect serve or via phone - [phone]. Palliative Care will continue to follow Ms. Matthews's care as needed. Thank you for allowing Palliative Care to participate in the care of Ms. Jesenia Turcios .     Electronically signed by   Robinson Infante RN  Palliative Care Team  on 8/5/2022 at 12:45 PM    Palliative care office: [phone]

## 2022-08-05 NOTE — PROGRESS NOTES
SUBJECTIVE    Patient was seen and examined. Patient was sitting comfortably alert and awake. Her blood pressure was slightly elevated and 154/50. She is on IV fluid at 125 mL/h  Patient states that she had diarrhea this morning and had 2 bowel movement which were watery in nature. States that she is feeling better. She is not lightheaded or dizzy      OBJECTIVE      CURRENT TEMPERATURE:  Temp: 97.2 °F (36.2 °C)  MAXIMUM TEMPERATURE OVER 24HRS:  Temp (24hrs), Av °F (36.7 °C), Min:97.2 °F (36.2 °C), Max:98.2 °F (36.8 °C)    CURRENT RESPIRATORY RATE:  Resp: 18  CURRENT PULSE:  Heart Rate: 56  CURRENT BLOOD PRESSURE:  BP: (!) 154/51  24HR BLOOD PRESSURE RANGE:  Systolic (34HHT), XOE:610 , Min:135 , NGX:616   ; Diastolic (08FXZ), VOO:41, Min:51, Max:65    24HR INTAKE/OUTPUT:    Intake/Output Summary (Last 24 hours) at 2022 1457  Last data filed at 2022 5431  Gross per 24 hour   Intake 2055 ml   Output 900 ml   Net 1155 ml     WEIGHT :Patient Vitals for the past 96 hrs (Last 3 readings):   Weight   22 0453 233 lb 7.5 oz (105.9 kg)     PHYSICAL EXAM      GENERAL APPEARANCE: Alert and awake x3   SKIN: warm and dry, no rash or erythema  EYES: conjunctivae normal and sclera anicteric  ENT: no thrush no pharyngeal congestion   NECK:   No JVD. No carotid bruits and no carotid lymphadenopathy . PULMONARY: Lateral air entry and clear. CADRDIOVASCULAR: S1 and S2 normally heard NO S3 and NO S4 . No rubs and no murmur.    ABDOMEN: soft nontender, bowel sounds present, no organomegaly,  no ascites   EXTREMITIES: no cyanosis, clubbing or edema     CURRENT MEDICATIONS      loperamide (IMODIUM) capsule 2 mg, 4x Daily PRN  sodium chloride (OCEAN) 0.65 % nasal spray 1 spray, PRN  0.9 % sodium chloride infusion, Continuous  [Held by provider] torsemide (DEMADEX) tablet 10 mg, Daily  losartan (COZAAR) tablet 100 mg, Daily  prochlorperazine (COMPAZINE) injection 10 mg, Q6H PRN  hydrALAZINE (APRESOLINE) injection 10 mg, Q6H PRN  amLODIPine (NORVASC) tablet 10 mg, Daily  insulin lispro (HUMALOG) injection vial 0-8 Units, TID WC  insulin lispro (HUMALOG) injection vial 0-4 Units, Nightly  glucose chewable tablet 16 g, PRN  dextrose bolus 10% 125 mL, PRN   Or  dextrose bolus 10% 250 mL, PRN  glucagon (rDNA) injection 1 mg, PRN  dextrose 10 % infusion, Continuous PRN  sodium chloride flush 0.9 % injection 5-40 mL, 2 times per day  sodium chloride flush 0.9 % injection 5-40 mL, PRN  0.9 % sodium chloride infusion, PRN  polyethylene glycol (GLYCOLAX) packet 17 g, Daily PRN  acetaminophen (TYLENOL) tablet 650 mg, Q6H PRN   Or  acetaminophen (TYLENOL) suppository 650 mg, Q6H PRN  apixaban (ELIQUIS) tablet 5 mg, BID  ticagrelor (BRILINTA) tablet 90 mg, BID  rosuvastatin (CRESTOR) tablet 40 mg, Nightly  isosorbide mononitrate (IMDUR) extended release tablet 30 mg, Daily  escitalopram (LEXAPRO) tablet 20 mg, Daily  gabapentin (NEURONTIN) capsule 300 mg, Nightly  dextrose 10 % infusion, Continuous PRN  insulin glargine (LANTUS) injection vial 20 Units, QAM          LABS      CBC:   Recent Labs     08/03/22  0600 08/04/22  0333   WBC 6.8 6.8   RBC 3.35* 3.31*   HGB 10.1* 9.7*   HCT 32.0* 32.1*   MCV 95.5 97.0   MCH 30.1 29.3   MCHC 31.6 30.2   RDW 15.5* 15.3*    182   MPV 11.1 10.4      BMP:   Recent Labs     08/03/22  1336 08/04/22  0333 08/05/22  0444    139 141   K 3.8 3.7 3.5*    108* 109*   CO2 23 21 23   BUN 23 21 16   CREATININE 2.05* 1.70* 1.57*   GLUCOSE 178* 165* 177*   CALCIUM 9.4 9.0 9.2      BNP:  Lab Results   Component Value Date/Time    BNP 94 07/13/2012 08:28 AM     PHOSPHORUS:  No results for input(s): PHOS in the last 72 hours. MAGNESIUM:   Recent Labs     08/02/22  2151 08/03/22  0600 08/05/22  0444   MG 2.3 2.5 2.0     ALBUMIN: No results for input(s): LABALBU in the last 72 hours.   IRON:    Lab Results   Component Value Date/Time    IRON 57 11/19/2019 06:33 AM     IRON SATURATION: Lab Results   Component Value Date/Time    LABIRON 27 11/19/2019 06:33 AM     TIBC:    Lab Results   Component Value Date/Time    TIBC 212 11/19/2019 06:33 AM     FERRITIN:    Lab Results   Component Value Date/Time    FERRITIN 325 07/12/2020 06:44 PM     JAZZY:   Lab Results   Component Value Date    JAZZY NEGATIVE 11/19/2019       SPEP:   Lab Results   Component Value Date/Time    PROT 7.2 07/28/2022 02:30 PM     UPEP: No results found for: TPU   HEPBSAG:  Lab Results   Component Value Date/Time    HEPBSAG NONREACTIVE 11/19/2019 06:33 AM     HEPCAB:  Lab Results   Component Value Date/Time    HEPCAB NONREACTIVE 11/19/2019 06:33 AM     C3:   Lab Results   Component Value Date    C3 140 09/14/2021     C4:   Lab Results   Component Value Date    C4 43 (H) 09/14/2021   URINE SODIUM:    Lab Results   Component Value Date/Time    KARTHIK 82 07/28/2022 03:44 PM    URINE CREATININE:    Lab Results   Component Value Date/Time    LABCREA 47.2 05/30/2022 12:43 PM     URINE PROTEIN:  No results found for: TPU  URINALYSIS:  U/A:   Lab Results   Component Value Date/Time    NITRU NEGATIVE 07/28/2022 03:44 PM    COLORU Yellow 07/28/2022 03:44 PM    PHUR 5.5 07/28/2022 03:44 PM    WBCUA 2 TO 5 07/28/2022 03:44 PM    RBCUA 5 TO 10 07/28/2022 03:44 PM    MUCUS 1+ 07/28/2022 03:44 PM    TRICHOMONAS NOT REPORTED 09/15/2021 08:38 PM    YEAST NOT REPORTED 09/15/2021 08:38 PM    BACTERIA MANY 09/15/2021 08:38 PM    CLARITYU clear 12/03/2019 03:02 PM    SPECGRAV 1.012 07/28/2022 03:44 PM    LEUKOCYTESUR NEGATIVE 07/28/2022 03:44 PM    UROBILINOGEN Normal 07/28/2022 03:44 PM    BILIRUBINUR NEGATIVE 07/28/2022 03:44 PM    BILIRUBINUR neg 12/03/2019 03:02 PM    BLOODU neg 12/03/2019 03:02 PM    GLUCOSEU TRACE 07/28/2022 03:44 PM    KETUA NEGATIVE 07/28/2022 03:44 PM    AMORPHOUS 1+ 07/28/2022 03:44 PM     ANTIGBM:No results found for: GBMABIGG      RADIOLOGY      Reviewed as available. ASSESSMENT    Assessment:  1.   Acute kidney injury most likely due to prerenal azotemia secondary to bradycardia. Now her heart rate has improved. Creatinine is improved and down to 1.5 mg/dL   2. Chronic kidney disease baseline creatinine 1.4 to 1.6 mg/dL due to nephrosclerosis. Patient is established care at our office. She was last seen by Dr. Dc Rodrigues. 3.  History of coronary artery disease and intervention in the past  4. History of congestive cardiac failure  5. Longstanding type 2 diabetes  6. Obesity  7. Bradycardia work-up is in progress and cardiology is on board    PLAN    1.  DC IV fluids  2. Restart Demadex 10 mg once a day  3. Nephrology sign off at this point, please call if you have any question  4. Renal follow-up in 4 to 6 weeks. Please call [phone] for an appointment  5.,  Repeat BMP a week after this discharge and fax results to [phone]  Please do not hesitate to call with questions.     Electronically signed by Bethany Iverson MD on 8/5/2022 at 2:57 PM

## 2022-08-05 NOTE — PROGRESS NOTES
Physical Therapy        Physical Therapy Cancel Note      DATE: 2022    NAME: Carlee Ventura  MRN: [id number]   : 1945      Patient not seen this date for Physical Therapy due to:    Patient Declined: Pt politely declined stated she has had Emmit Bench and feels physically drained, therapy will resume 22      Electronically signed by Jay Sparrow PTA on 2022 at 3:24 PM

## 2022-08-05 NOTE — PROGRESS NOTES
Occupational Therapy  Facility/Department: [facility] NEURO  Occupational Therapy Initial Assessment    Name: Maren Aaron  : 1945  MRN: [id number]  Date of Service: 2022    Discharge Recommendations:  Patient would benefit from continued therapy after discharge    Patient Diagnosis(es): The primary encounter diagnosis was MARIELLE (acute kidney injury) ([facility] Diagnoses of Acute systolic congestive heart failure ([address] Obesity, Class III, BMI 40-49.9 (morbid obesity) ([facility] and Cervical myelopathy (HCC) were also pertinent to this visit. Past Medical History:  has a past medical history of Acute renal failure with tubular necrosis ([facility] Anxiety, Atrial fibrillation (HCC), Benign positional vertigo, CAD (coronary artery disease), Chest pain, CKD (chronic kidney disease), stage III ([facility] Depression, Diabetes mellitus ([facility] Diabetic neuropathy ([facility] Dysuria, Hyperlipidemia, Hypertension, Migraine, and Persistent proteinuria. Past Surgical History:  has a past surgical history that includes Percutaneous Transluminal Coronary Angio; Cardiac catheterization; Coronary angioplasty with stent (2017); Appendectomy; Coronary angioplasty with stent (2012); and Coronary angioplasty with stent (2020). Assessment   Performance deficits / Impairments: Decreased functional mobility ; Decreased endurance;Decreased ADL status; Decreased posture;Decreased balance;Decreased strength;Decreased safe awareness  Prognosis: Good  Activity Tolerance  Activity Tolerance: Patient Tolerated treatment well        Plan   Plan  Times per Week: 3-5x/week  Current Treatment Recommendations: Strengthening, Balance training, Functional mobility training, Endurance training, Pain management, Coordination training, Self-Care / ADL, Safety education & training, Patient/Caregiver education & training, Equipment evaluation, education, & procurement, Cognitive/Perceptual training Restrictions  Restrictions/Precautions  Restrictions/Precautions: Up as Tolerated, General Precautions  Required Braces or Orthoses?: No  Position Activity Restriction  Other position/activity restrictions: continuous telemetry  Pain assessment: Pt denies pain this day. Subjective   Safety Devices  Type of Devices: All fall risk precautions in place;Call light within reach; Chair alarm in place;Gait belt;Nurse notified; Left in chair  Balance  Sitting: Without support (Seated in chair/toilet.)  Standing: With support (CGA static standing at chair using RW, functional mobility to/from bathroom, toilet transfer, simple grooming standing at sink. Total time 10 minutes.)  Gait  Overall Level of Assistance: Contact-guard assistance (Verbal cues for walker placement with fair return. Pt placing RW too far out in front.)  Assistive Device: Walker, rolling  Toilet Transfers  Toilet - Technique: Ambulating  Equipment Used: Grab bars  Toilet Transfer: Contact guard assistance     ADL  UE Bathing: Setup; Increased time to complete;Stand by assistance (wash hands standing at sink.)  Toileting: Setup; Increased time to complete;Stand by assistance (Pericare seated on toilet.)  Additional Comments: Pt seated in chair upon therapist arrival. Pt stated bathing earlier this day. Pt displayed significant tremors RUE. Therapist introduced weighted silverware to pt in attempt to increase independence with RUE for ADLs with poor return. Weighted silverware did not change intensity of tremors to facilitate increased independence with feeding. Pt currently utilizes LUE for ADLs. Sit/stand transfer from chair uisng RW for functional mobility to bathroom. Toilet transfer with min A to pull underwear down over hips. Pericare completed seated on toilet. Mod A to pull underwear up over hips d/t pt needing to hold onto grab bar for support. Pt transferred to standing at sink to wash hands. Pt exited bathroom to return to seated in chair. Transfers  Sit to stand: Contact guard assistance  Stand to sit: Stand by assistance  Transfer Comments: Verbal cues for hand placement with good return. Cognition  Overall Cognitive Status: WFL  Orientation  Overall Orientation Status: Within Functional Limits     Education Given To: Patient  Education Provided Comments: OT POC, transfer/walker safety, AE (weighted silverware), adaptive techniques to promote independence with ADLs, leisure activity independence with good return. AM-PAC Score        AM-PAC Inpatient Daily Activity Raw Score: 16 (08/05/22 1421)  AM-PAC Inpatient ADL T-Scale Score : 35.96 (08/05/22 1421)  ADL Inpatient CMS 0-100% Score: 53.32 (08/05/22 1421)  ADL Inpatient CMS G-Code Modifier : CK (08/05/22 1421)    Goals  Short Term Goals  Time Frame for Short term goals: By Discharge  Short Term Goal 1: Pt will demo Mod I and Good Integration of EC / Ax Pacing during functional tasks. Short Term Goal 2: Pt will demo Mod I and Good Integration of AE during UB and LB ADLs. Short Term Goal 3: Pt will participate in Bathroom Transfers and Toileting while maintaining Fair Balance / Safety. Short Term Goal 4: Pt will complete Functional Mobility (in-room / in-home) with Supervision Assist with Correct Use of AD / DME. Therapy Time   Individual Concurrent Group Co-treatment   Time In 1323         Time Out 1402         Minutes 39         Timed Code Treatment Minutes: 44 Minutes    Pt seated in chair upon therapist arrival. Pleasant and agreeable to therapy. See above for LOF for all tasks. Pt retired to seated in chair at end of session with chair alarm activated and call light within reach.       KEMAL Rangel

## 2022-08-05 NOTE — PLAN OF CARE
Problem: Discharge Planning  Goal: Discharge to home or other facility with appropriate resources  8/5/2022 0209 by Angela Rider RN  Outcome: Progressing  8/4/2022 1441 by Renee Major RN  Outcome: Progressing     Problem: Pain  Goal: Verbalizes/displays adequate comfort level or baseline comfort level  8/5/2022 0209 by Angela Rider RN  Outcome: Progressing  8/4/2022 1441 by Renee Major RN  Outcome: Progressing     Problem: Chronic Conditions and Co-morbidities  Goal: Patient's chronic conditions and co-morbidity symptoms are monitored and maintained or improved  8/5/2022 0209 by Angela Rider RN  Outcome: Progressing  8/4/2022 1441 by Renee Major RN  Outcome: Progressing     Problem: Safety - Adult  Goal: Free from fall injury  8/5/2022 0209 by Angela Rider RN  Outcome: Progressing  8/4/2022 1441 by Renee Major RN  Outcome: Progressing     Problem: Skin/Tissue Integrity  Goal: Absence of new skin breakdown  Description: 1. Monitor for areas of redness and/or skin breakdown  2. Assess vascular access sites hourly  3. Every 4-6 hours minimum:  Change oxygen saturation probe site  4. Every 4-6 hours:  If on nasal continuous positive airway pressure, respiratory therapy assess nares and determine need for appliance change or resting period.   8/5/2022 0209 by Angela Rider RN  Outcome: Progressing  8/4/2022 1441 by Renee Major RN  Outcome: Progressing     Problem: ABCDS Injury Assessment  Goal: Absence of physical injury  8/5/2022 0209 by Angela Ridre RN  Outcome: Progressing  8/4/2022 1441 by Renee Major RN  Outcome: Progressing

## 2022-08-05 NOTE — PROGRESS NOTES
distress. Respiratory: Chest was symmetrical without dullness to percussion. Breath sounds bilaterally were clear to auscultation. There were no wheezes, rhonchi or rales. There is no intercostal retraction or use of accessory muscles. No egophony noted. Cardiovascular: Regular without murmur, clicks, gallops or rubs. Abdomen: Slightly rounded and soft without organomegaly. No rebound, rigidity or guarding was appreciated. Musculoskeletal:No gross muscle weakness. Extremities:  No lower extremity edema, ulcerations, tenderness, varicosities or erythema. Muscle size, tone and strength are normal.  No involuntary movements are noted. Skin:  Warm and dry. Good color, turgor and pigmentation. No lesions or scars.   No cyanosis or clubbing  Neurological/Psychiatric: The patient's general behavior, level of consciousness, thought content and emotional status is normal.        Medications:  Scheduled Medications:    [Held by provider] torsemide  10 mg Oral Daily    losartan  100 mg Oral Daily    amLODIPine  10 mg Oral Daily    insulin lispro  0-8 Units SubCUTAneous TID WC    insulin lispro  0-4 Units SubCUTAneous Nightly    sodium chloride flush  5-40 mL IntraVENous 2 times per day    apixaban  5 mg Oral BID    ticagrelor  90 mg Oral BID    rosuvastatin  40 mg Oral Nightly    isosorbide mononitrate  30 mg Oral Daily    escitalopram  20 mg Oral Daily    gabapentin  300 mg Oral Nightly    insulin glargine  20 Units SubCUTAneous QAM     Continuous Infusions:    sodium chloride 125 mL/hr at 08/05/22 0009    dextrose      sodium chloride      dextrose       PRN Medicationsloperamide, 2 mg, 4x Daily PRN  sodium chloride, 1 spray, PRN  prochlorperazine, 10 mg, Q6H PRN  hydrALAZINE, 10 mg, Q6H PRN  glucose, 4 tablet, PRN  dextrose bolus, 125 mL, PRN   Or  dextrose bolus, 250 mL, PRN  glucagon (rDNA), 1 mg, PRN  dextrose, , Continuous PRN  sodium chloride flush, 5-40 mL, PRN  sodium chloride, , PRN  polyethylene glycol, 17 g, Daily PRN  acetaminophen, 650 mg, Q6H PRN   Or  acetaminophen, 650 mg, Q6H PRN  dextrose, , Continuous PRN        Diagnostic Labs:  CBC:   Recent Labs     08/03/22  0600 08/04/22  0333   WBC 6.8 6.8   RBC 3.35* 3.31*   HGB 10.1* 9.7*   HCT 32.0* 32.1*   MCV 95.5 97.0   RDW 15.5* 15.3*    182     BMP:   Recent Labs     08/03/22  1336 08/04/22  0333 08/05/22  0444    139 141   K 3.8 3.7 3.5*    108* 109*   CO2 23 21 23   BUN 23 21 16   CREATININE 2.05* 1.70* 1.57*     BNP: No results for input(s): BNP in the last 72 hours. PT/INR: No results for input(s): PROTIME, INR in the last 72 hours. APTT: No results for input(s): APTT in the last 72 hours. CARDIAC ENZYMES: No results for input(s): CKMB, CKMBINDEX, TROPONINI in the last 72 hours. Invalid input(s): CKTOTAL;3  FASTING LIPID PANEL:  Lab Results   Component Value Date    CHOL 125 05/30/2022    HDL 36 (L) 05/30/2022    TRIG 132 05/30/2022     LIVER PROFILE: No results for input(s): AST, ALT, ALB, BILIDIR, BILITOT, ALKPHOS in the last 72 hours. MICROBIOLOGY:   Lab Results   Component Value Date/Time    CULTURE NO GROWTH 5 DAYS 05/29/2022 07:02 PM       Imaging:    No results found. ASSESSMENT & PLAN       Principal Problem:    Syncope and collapse  Active Problems:    MARIELLE (acute kidney injury) (HCC)    Chronic diastolic (congestive) heart failure (HCC)    Paroxysmal atrial fibrillation (HCC)    Bradycardia    Acute kidney injury superimposed on CKD (HCC)    Obesity, Class III, BMI 40-49.9 (morbid obesity) ([facility]    Presence of stent in coronary artery in patient with coronary artery disease    Hypokalemia    Acute respiratory failure with hypoxia (HCC)    Essential hypertension    Diabetic polyneuropathy associated with type 2 diabetes mellitus ([facility]    Other hyperlipidemia    Class 3 severe obesity in adult [facility])    CKD (chronic kidney disease), stage III ([facility]  Resolved Problems:    * No resolved hospital problems.  * Syncope with collapse -  - most likely due to orthostatic hypotension in the setting of bradycardia  (on beta blockers) and afib (on amiodarone)  - off coreg, 7/28 orthostats negative     2. MARIELLE on CKD stage III, -  -  baseline 1.3-1.5  - Cr today 1.57  - Demadex was held on admission, creatinine trended down  - Demadex restarted on 7/31, on half of the home dose  - creatinine restarted trending up  - Started on Normal saline at 125 cc/hr, demadex on hold  - received  normal saline bolus  - Will continue to monitor Cr  - consulted nephrology, follow up on recommendations     3. Chronic CHFpEF -  - diuretic on hold     4. Bradycardia -  - off coreg, cardio on board, event monitor on discharge, if symptomatic bradycardia continues may consider pacemaker. 5. Type 2 Diabetes mellitus with neuropathy-  - On Lantus 20 units, medium dose sliding scale, hypoglycemia protocol  - Will continue to monitor blood glucose levels. - continue gabapentin     6. Essential HTN -  - on Norvasc 10 and Cozaar 100, demadex on hold due to MARIELLE     7. CAD s/p stenting -  - on crestor, brilinta, eliquis, imdur     8. Hypokalemia -  -K 3.5 today, replaced  - monitor BMP daily      DVT ppx : Eliquis       PT/OT: Recommended further therapy after discharge  Discharge Planning / SW:  consulted, working on acute rehab vs SNF    Juancho Ramos MD  Internal Medicine Resident, PGY-1  [address], [place]  8/5/2022, 12:53 PM   Attending Physician Statement  I have discussed the care of Maren Aaron and I have examined the patient myselft and taken ros and hpi , including pertinent history and exam findings,  with the resident. I have reviewed the key elements of all parts of the encounter with the resident. I agree with the assessment, plan and orders as documented by the resident.   Spent 35 minutes in reviewing data/medicines/talking to patient/family,  explaining and answering all the questions.         Electronically signed by Paulino Cedeno MD

## 2022-08-05 NOTE — CARE COORDINATION
Transitional planning-call from Paige Townsend at [facility] PT notes and updated Progress notes. Faxed them to [phone].

## 2022-08-05 NOTE — DISCHARGE INSTR - COC
Continuity of Care Form    Patient Name: Pari Mcduffie   :  1945  MRN:  [id number]    Admit date:  2022  Discharge date:  8/10/22    Code Status Order: Full Code   Advance Directives:     Admitting Physician:  Mark Pickard MD  PCP: Osman Palm MD    Discharging Nurse: [facility] Unit/Room#: 5414/5727-13  Discharging Unit Phone Number: [phone]    Emergency Contact:   Extended Emergency Contact Information  Primary Emergency Contact: Rah Matthews  Address: [address]  Home Phone: [phone]  Relation: Child  Secondary Emergency Contact: Frank Gracia. Phone: [phone]  Mobile Phone: [phone]  Relation: Child    Past Surgical History:  Past Surgical History:   Procedure Laterality Date    APPENDECTOMY      CARDIAC CATHETERIZATION          CORONARY ANGIOPLASTY WITH STENT PLACEMENT  2017    4 SYNERGY HEART STENTS DRUG ELUTING ALL MRI CONDITONAL 3T OK, SAFE IMMEDIATELY.      CORONARY ANGIOPLASTY WITH STENT PLACEMENT  2012    XIENCE HEART STENT/ MRI CONDITIONAL 3T OK, SAFE IMMEDIATELY    CORONARY ANGIOPLASTY WITH STENT PLACEMENT  2020    PTCA         Immunization History:   Immunization History   Administered Date(s) Administered    COVID-19, PFIZER PURPLE top, DILUTE for use, (age 15 y+), 30mcg/0.3mL 2021, 2021    Influenza A ([id number]) Vaccine PF IM 2009    Influenza Vaccine, unspecified formulation 2013, 10/22/2015, 2016    Influenza Virus Vaccine 10/03/2016    Influenza, High Dose (Fluzone 65 yrs and older) 10/12/2018, 2019    Influenza, Quadv, IM, PF (6 mo and older Fluzone, Flulaval, Fluarix, and 3 yrs and older Afluria) 2018, 2020, 10/29/2021    Pneumococcal Conjugate 13-valent ([id number]) 2017    Pneumococcal Polysaccharide ([id number]) 2012       Active Problems:  Patient Active Problem List   Diagnosis Code    Type 2 diabetes mellitus ([facility] E11.9    Vertigo R42    Essential Added Last Indicated Last Indicated By Review Planned Expiration Resolved Resolved By    None active    Resolved    C-diff Rule Out 22 C DIFF TOXIN/ANTIGEN (Ordered)   22 Kendra Herron RN    Order       COVID-19 (Rule Out) 22 COVID-19, Rapid (Ordered)   22 Rule-Out Test Resulted            Nurse Assessment:  Last Vital Signs: BP (!) 151/65   Pulse 68   Temp 98.2 °F (36.8 °C) (Oral)   Resp 18   Ht 5' 6\" (1.676 m)   Wt 233 lb 7.5 oz (105.9 kg)   SpO2 95%   BMI 37.68 kg/m²     Last documented pain score (0-10 scale): Pain Level: 0  Last Weight:   Wt Readings from Last 1 Encounters:   22 233 lb 7.5 oz (105.9 kg)     Mental Status:  oriented, alert, coherent, logical, thought processes intact, and able to concentrate and follow conversation    IV Access:  - None    Nursing Mobility/ADLs:  Walking   Assisted  Transfer  Assisted  Bathing  Assisted  Dressing  Assisted  Toileting  Assisted  Feeding  Assisted  Med Admin  Assisted  Med Delivery   whole    Wound Care Documentation and Therapy:        Elimination:  Continence: Bowel: Yes  Bladder: Yes  Urinary Catheter: None   Colostomy/Ileostomy/Ileal Conduit: No       Date of Last BM: Per patient     Intake/Output Summary (Last 24 hours) at 2022 0856  Last data filed at 2022 4221  Gross per 24 hour   Intake 2245 ml   Output 900 ml   Net 1345 ml     I/O last 3 completed shifts: In: 0956 [P.O.:995; I.V.:1500]  Out: 900 [Urine:900]    Safety Concerns:      At Risk for Falls    Impairments/Disabilities:      None    Nutrition Therapy:  Current Nutrition Therapy:   - Oral Diet:  Carb Control 4 carbs/meal (1800kcals/day)    Routes of Feeding: Oral  Liquids: No Restrictions  Daily Fluid Restriction: no  Last Modified Barium Swallow with Video (Video Swallowing Test): not done    Treatments at the Time of Hospital Discharge:   Respiratory Treatments: None  Oxygen Therapy:  is not on home oxygen therapy. Ventilator:    - No ventilator support    Rehab Therapies: Physical Therapy and Occupational Therapy  Weight Bearing Status/Restrictions: No weight bearing restrictions  Other Medical Equipment (for information only, NOT a DME order):  cane, walker, bath bench, and bedside commode  Other Treatments: skilled nursing, home health aid services    Patient's personal belongings (please select all that are sent with patient):  Per patient     RN SIGNATURE:  Electronically signed by Perez Bahena RN on 8/10/22 at 1:57 PM EDT    CASE MANAGEMENT/SOCIAL WORK SECTION    Inpatient Status Date: 7-    Readmission Risk Assessment Score:  Readmission Risk              Risk of Unplanned Readmission:  [id number]           Discharging to Facility/ Agency   Name: [facility]  Address:  Phone:  Fax:    Dialysis Facility (if applicable)   Name:  Address:  Dialysis Schedule:  Phone:  Fax:    / signature: Electronically signed by Serina Burden RN on 8/10/22 at 3:42 PM EDT    PHYSICIAN SECTION    Prognosis: Fair    Condition at Discharge: Stable    Rehab Potential (if transferring to Rehab): Fair    Recommended Labs or Other Treatments After Discharge: PT/OT eval and treat, home health aid, skilled nursing    Physician Certification: I certify the above information and transfer of Yesika Gomez  is necessary for the continuing treatment of the diagnosis listed and that she requires Home Care for greater 30 days.      Update Admission H&P: No change in H&P    PHYSICIAN SIGNATURE:  Electronically signed by Adeline Goldmann, MD on 8/5/22 at 11:18 AM EDT

## 2022-08-06 LAB
ABSOLUTE EOS #: 0.2 K/UL (ref 0–0.44)
ABSOLUTE IMMATURE GRANULOCYTE: <0.03 K/UL (ref 0–0.3)
ABSOLUTE LYMPH #: 2.02 K/UL (ref 1.1–3.7)
ABSOLUTE MONO #: 0.57 K/UL (ref 0.1–1.2)
ANION GAP SERPL CALCULATED.3IONS-SCNC: 13 MMOL/L (ref 9–17)
BASOPHILS # BLD: 1 % (ref 0–2)
BASOPHILS ABSOLUTE: 0.05 K/UL (ref 0–0.2)
BUN BLDV-MCNC: 14 MG/DL (ref 8–23)
CALCIUM SERPL-MCNC: 9.5 MG/DL (ref 8.6–10.4)
CHLORIDE BLD-SCNC: 107 MMOL/L (ref 98–107)
CO2: 21 MMOL/L (ref 20–31)
CREAT SERPL-MCNC: 1.46 MG/DL (ref 0.5–0.9)
EOSINOPHILS RELATIVE PERCENT: 3 % (ref 1–4)
GFR AFRICAN AMERICAN: 42 ML/MIN
GFR NON-AFRICAN AMERICAN: 35 ML/MIN
GFR SERPL CREATININE-BSD FRML MDRD: ABNORMAL ML/MIN/{1.73_M2}
GLUCOSE BLD-MCNC: 112 MG/DL (ref 70–99)
GLUCOSE BLD-MCNC: 121 MG/DL (ref 65–105)
GLUCOSE BLD-MCNC: 197 MG/DL (ref 65–105)
GLUCOSE BLD-MCNC: 203 MG/DL (ref 65–105)
GLUCOSE BLD-MCNC: 211 MG/DL (ref 65–105)
HCT VFR BLD CALC: 33.1 % (ref 36.3–47.1)
HEMOGLOBIN: 10.1 G/DL (ref 11.9–15.1)
IMMATURE GRANULOCYTES: 0 %
LYMPHOCYTES # BLD: 35 % (ref 24–43)
MCH RBC QN AUTO: 28.7 PG (ref 25.2–33.5)
MCHC RBC AUTO-ENTMCNC: 30.5 G/DL (ref 28.4–34.8)
MCV RBC AUTO: 94 FL (ref 82.6–102.9)
MONOCYTES # BLD: 10 % (ref 3–12)
NRBC AUTOMATED: 0 PER 100 WBC
PDW BLD-RTO: 15.2 % (ref 11.8–14.4)
PLATELET # BLD: 207 K/UL (ref 138–453)
PMV BLD AUTO: 11.3 FL (ref 8.1–13.5)
POTASSIUM SERPL-SCNC: 3.7 MMOL/L (ref 3.7–5.3)
RBC # BLD: 3.52 M/UL (ref 3.95–5.11)
RBC # BLD: ABNORMAL 10*6/UL
SEG NEUTROPHILS: 51 % (ref 36–65)
SEGMENTED NEUTROPHILS ABSOLUTE COUNT: 2.94 K/UL (ref 1.5–8.1)
SODIUM BLD-SCNC: 141 MMOL/L (ref 135–144)
WBC # BLD: 5.8 K/UL (ref 3.5–11.3)

## 2022-08-06 PROCEDURE — 80048 BASIC METABOLIC PNL TOTAL CA: CPT

## 2022-08-06 PROCEDURE — 82947 ASSAY GLUCOSE BLOOD QUANT: CPT

## 2022-08-06 PROCEDURE — 99232 SBSQ HOSP IP/OBS MODERATE 35: CPT | Performed by: INTERNAL MEDICINE

## 2022-08-06 PROCEDURE — 6370000000 HC RX 637 (ALT 250 FOR IP): Performed by: STUDENT IN AN ORGANIZED HEALTH CARE EDUCATION/TRAINING PROGRAM

## 2022-08-06 PROCEDURE — 2580000003 HC RX 258

## 2022-08-06 PROCEDURE — 6370000000 HC RX 637 (ALT 250 FOR IP)

## 2022-08-06 PROCEDURE — 85025 COMPLETE CBC W/AUTO DIFF WBC: CPT

## 2022-08-06 PROCEDURE — 2060000000 HC ICU INTERMEDIATE R&B

## 2022-08-06 PROCEDURE — 93005 ELECTROCARDIOGRAM TRACING: CPT | Performed by: STUDENT IN AN ORGANIZED HEALTH CARE EDUCATION/TRAINING PROGRAM

## 2022-08-06 PROCEDURE — 36415 COLL VENOUS BLD VENIPUNCTURE: CPT

## 2022-08-06 RX ORDER — DIPHENHYDRAMINE HCL 25 MG
25 TABLET ORAL ONCE
Status: DISCONTINUED | OUTPATIENT
Start: 2022-08-06 | End: 2022-08-06

## 2022-08-06 RX ORDER — HYDROXYZINE HYDROCHLORIDE 10 MG/1
10 TABLET, FILM COATED ORAL ONCE
Status: COMPLETED | OUTPATIENT
Start: 2022-08-06 | End: 2022-08-06

## 2022-08-06 RX ADMIN — ESCITALOPRAM OXALATE 20 MG: 10 TABLET ORAL at 10:20

## 2022-08-06 RX ADMIN — INSULIN GLARGINE 20 UNITS: 100 INJECTION, SOLUTION SUBCUTANEOUS at 09:24

## 2022-08-06 RX ADMIN — INSULIN LISPRO 2 UNITS: 100 INJECTION, SOLUTION INTRAVENOUS; SUBCUTANEOUS at 11:53

## 2022-08-06 RX ADMIN — SODIUM CHLORIDE, PRESERVATIVE FREE 10 ML: 5 INJECTION INTRAVENOUS at 10:19

## 2022-08-06 RX ADMIN — INSULIN LISPRO 2 UNITS: 100 INJECTION, SOLUTION INTRAVENOUS; SUBCUTANEOUS at 18:06

## 2022-08-06 RX ADMIN — ISOSORBIDE MONONITRATE 30 MG: 30 TABLET ORAL at 09:22

## 2022-08-06 RX ADMIN — TICAGRELOR 90 MG: 90 TABLET ORAL at 21:19

## 2022-08-06 RX ADMIN — SODIUM CHLORIDE, PRESERVATIVE FREE 10 ML: 5 INJECTION INTRAVENOUS at 21:21

## 2022-08-06 RX ADMIN — AMLODIPINE BESYLATE 10 MG: 10 TABLET ORAL at 10:20

## 2022-08-06 RX ADMIN — APIXABAN 5 MG: 5 TABLET, FILM COATED ORAL at 21:19

## 2022-08-06 RX ADMIN — LOSARTAN POTASSIUM 100 MG: 50 TABLET, FILM COATED ORAL at 10:19

## 2022-08-06 RX ADMIN — ROSUVASTATIN CALCIUM 40 MG: 20 TABLET, FILM COATED ORAL at 21:20

## 2022-08-06 RX ADMIN — HYDROXYZINE HYDROCHLORIDE 10 MG: 10 TABLET ORAL at 19:44

## 2022-08-06 RX ADMIN — TORSEMIDE 10 MG: 20 TABLET ORAL at 09:23

## 2022-08-06 RX ADMIN — TICAGRELOR 90 MG: 90 TABLET ORAL at 10:19

## 2022-08-06 RX ADMIN — APIXABAN 5 MG: 5 TABLET, FILM COATED ORAL at 10:20

## 2022-08-06 RX ADMIN — GABAPENTIN 300 MG: 300 CAPSULE ORAL at 21:18

## 2022-08-06 NOTE — PLAN OF CARE
Problem: Discharge Planning  Goal: Discharge to home or other facility with appropriate resources  8/6/2022 1805 by Jacy Argueta RN  Outcome: Progressing  8/6/2022 0628 by Antonino Mays RN  Outcome: Progressing     Problem: Pain  Goal: Verbalizes/displays adequate comfort level or baseline comfort level  8/6/2022 1805 by Jacy Argueta RN  Outcome: Progressing  8/6/2022 0628 by Antonino Mays RN  Outcome: Progressing     Problem: Chronic Conditions and Co-morbidities  Goal: Patient's chronic conditions and co-morbidity symptoms are monitored and maintained or improved  8/6/2022 1805 by Jacy Argueta RN  Outcome: Progressing  8/6/2022 0628 by Antonino Mays RN  Outcome: Progressing     Problem: Safety - Adult  Goal: Free from fall injury  8/6/2022 1805 by Jacy Argueta RN  Outcome: Progressing  8/6/2022 0628 by Antonino Mays RN  Outcome: Progressing     Problem: ABCDS Injury Assessment  Goal: Absence of physical injury  8/6/2022 1805 by Jacy Argueta RN  Outcome: Progressing  8/6/2022 0628 by Antonino Mays RN  Outcome: Progressing   - will continue to monitor the patient

## 2022-08-06 NOTE — PROGRESS NOTES
Writer spoke with daughter Randall Dias and provided an update about the patients plan of care.  Randall Dias would like to be called soon as possible when heading to [facility]

## 2022-08-06 NOTE — CARE COORDINATION
Transitional planning    Writer received vm from [employer] that appeal denied, patient updated. Call to [facility] [place], left vm with request for return call on [employer] line.

## 2022-08-06 NOTE — PROGRESS NOTES
Respiratory:  Breath sounds bilaterally were clear to auscultation. There were no wheezes, rhonchi or rales. There is no intercostal retraction or use of accessory muscles. Cardiovascular: Regular without murmur, clicks, gallops or rubs. Abdomen: Slightly rounded and soft without organomegaly. No rebound, rigidity or guarding was appreciated. Musculoskeletal: No gross muscle weakness. Extremities:  No lower extremity edema, ulcerations, tenderness, varicosities or erythema. Muscle size, tone and strength are normal.  No involuntary movements are noted. Skin:  Warm and dry. Good color, turgor and pigmentation. No lesions or scars.   No cyanosis or clubbing  Neurological/Psychiatric: The patient's general behavior, level of consciousness, thought content and emotional status is normal.        Medications:  Scheduled Medications:    torsemide  10 mg Oral Daily    losartan  100 mg Oral Daily    amLODIPine  10 mg Oral Daily    insulin lispro  0-8 Units SubCUTAneous TID WC    insulin lispro  0-4 Units SubCUTAneous Nightly    sodium chloride flush  5-40 mL IntraVENous 2 times per day    apixaban  5 mg Oral BID    ticagrelor  90 mg Oral BID    rosuvastatin  40 mg Oral Nightly    isosorbide mononitrate  30 mg Oral Daily    escitalopram  20 mg Oral Daily    gabapentin  300 mg Oral Nightly    insulin glargine  20 Units SubCUTAneous QAM     Continuous Infusions:    sodium chloride 125 mL/hr at 08/05/22 0009    dextrose      sodium chloride      dextrose       PRN Medicationsloperamide, 2 mg, 4x Daily PRN  sodium chloride, 1 spray, PRN  prochlorperazine, 10 mg, Q6H PRN  hydrALAZINE, 10 mg, Q6H PRN  glucose, 4 tablet, PRN  dextrose bolus, 125 mL, PRN   Or  dextrose bolus, 250 mL, PRN  glucagon (rDNA), 1 mg, PRN  dextrose, , Continuous PRN  sodium chloride flush, 5-40 mL, PRN  sodium chloride, , PRN  polyethylene glycol, 17 g, Daily PRN  acetaminophen, 650 mg, Q6H PRN   Or  acetaminophen, 650 mg, Q6H PRN  dextrose, , Continuous PRN        Diagnostic Labs:  CBC:   Recent Labs     08/03/22  0600 08/04/22  0333   WBC 6.8 6.8   RBC 3.35* 3.31*   HGB 10.1* 9.7*   HCT 32.0* 32.1*   MCV 95.5 97.0   RDW 15.5* 15.3*    182     BMP:   Recent Labs     08/03/22  1336 08/04/22  0333 08/05/22  0444    139 141   K 3.8 3.7 3.5*    108* 109*   CO2 23 21 23   BUN 23 21 16   CREATININE 2.05* 1.70* 1.57*     BNP: No results for input(s): BNP in the last 72 hours. PT/INR: No results for input(s): PROTIME, INR in the last 72 hours. APTT: No results for input(s): APTT in the last 72 hours. CARDIAC ENZYMES: No results for input(s): CKMB, CKMBINDEX, TROPONINI in the last 72 hours. Invalid input(s): CKTOTAL;3  FASTING LIPID PANEL:  Lab Results   Component Value Date    CHOL 125 05/30/2022    HDL 36 (L) 05/30/2022    TRIG 132 05/30/2022     LIVER PROFILE: No results for input(s): AST, ALT, ALB, BILIDIR, BILITOT, ALKPHOS in the last 72 hours. MICROBIOLOGY:   Lab Results   Component Value Date/Time    CULTURE NO GROWTH 5 DAYS 05/29/2022 07:02 PM       Imaging:    No results found. ASSESSMENT & PLAN     ASSESSMENT / PLAN:     Principal Problem:    Syncope and collapse  Active Problems:    MARIELLE (acute kidney injury) (HCC)    Chronic diastolic (congestive) heart failure (HCC)    Paroxysmal atrial fibrillation (HCC)    Bradycardia    Acute kidney injury superimposed on CKD (HCC)    Obesity, Class III, BMI 40-49.9 (morbid obesity) ([facility]    Presence of stent in coronary artery in patient with coronary artery disease    Hypokalemia    Acute respiratory failure with hypoxia (HCC)    Essential hypertension    Diabetic polyneuropathy associated with type 2 diabetes mellitus ([facility]    Other hyperlipidemia    Class 3 severe obesity in adult [facility])    CKD (chronic kidney disease), stage III ([facility]  Resolved Problems:    * No resolved hospital problems.  *     Syncope with collapse -  - most likely due to orthostatic hypotension in the setting of bradycardia  (on beta blockers) and afib (on amiodarone)  - off coreg, 7/28 orthostats negative     2. MARIELLE on CKD stage III, -  -  baseline 1.3-1.5  - Cr today 1.46  - Demadex was held on admission, creatinine trended down  - Demadex restarted on 7/31, on half of the home dose  - creatinine restarted trending up  - Started on Normal saline at 25 cc/hr, demadex on hold  - received  normal saline bolus  - Will continue to monitor Cr  - consulted nephrology, follow up on recommendations     3. Chronic CHFpEF -  - diuretic on hold due to concern for MARIELLE     4. Bradycardia -  - off coreg, cardio on board, event monitor on discharge, if symptomatic bradycardia continues may consider pacemaker. 5. Type 2 Diabetes mellitus with neuropathy-  - On Lantus 20 units, medium dose sliding scale, hypoglycemia protocol  - Will continue to monitor blood glucose levels. - continue gabapentin     6. Essential HTN -  - on Norvasc 10 and Cozaar 100, demadex on hold due to MARIELLE     7. CAD s/p stenting -  - on crestor, brilinta, eliquis, imdur     8. Hypokalemia -  -K 3.7  - monitor BMP daily      DVT ppx :  Eliquis      PT/OT: Recommends continued therapy after discharge  Discharge Planning / SW:  consulted, working on placement    Shaun Odom MD  Internal Medicine Resident, PGY-1  [facility]; [place], [place]  8/6/2022, 5:37 AM   Attending Physician Statement  I have discussed the care of Yamile Dimas and I have examined the patient myselft and taken ros and hpi , including pertinent history and exam findings,  with the resident. I have reviewed the key elements of all parts of the encounter with the resident. I agree with the assessment, plan and orders as documented by the resident. Spent 35 minutes in reviewing data/medicines/talking to patient/family,  explaining and answering all the questions.         Electronically signed by Eddi Gilbert MD

## 2022-08-07 PROBLEM — N17.9 ACUTE KIDNEY INJURY SUPERIMPOSED ON CKD (HCC): Status: RESOLVED | Noted: 2022-07-28 | Resolved: 2022-08-07

## 2022-08-07 PROBLEM — N18.9 ACUTE KIDNEY INJURY SUPERIMPOSED ON CKD (HCC): Status: RESOLVED | Noted: 2022-07-28 | Resolved: 2022-08-07

## 2022-08-07 PROBLEM — Z79.01 CURRENT USE OF LONG TERM ANTICOAGULATION: Status: ACTIVE | Noted: 2022-08-07

## 2022-08-07 PROBLEM — J96.01 ACUTE RESPIRATORY FAILURE WITH HYPOXIA (HCC): Status: RESOLVED | Noted: 2022-07-31 | Resolved: 2022-08-07

## 2022-08-07 PROBLEM — R00.1 BRADYCARDIA: Status: RESOLVED | Noted: 2022-07-28 | Resolved: 2022-08-07

## 2022-08-07 PROBLEM — E66.9 OBESITY (BMI 35.0-39.9 WITHOUT COMORBIDITY): Status: ACTIVE | Noted: 2022-08-07

## 2022-08-07 LAB
ANION GAP SERPL CALCULATED.3IONS-SCNC: 9 MMOL/L (ref 9–17)
BUN BLDV-MCNC: 17 MG/DL (ref 8–23)
CALCIUM SERPL-MCNC: 9.3 MG/DL (ref 8.6–10.4)
CHLORIDE BLD-SCNC: 106 MMOL/L (ref 98–107)
CO2: 24 MMOL/L (ref 20–31)
CREAT SERPL-MCNC: 1.54 MG/DL (ref 0.5–0.9)
EKG ATRIAL RATE: 66 BPM
EKG P AXIS: 66 DEGREES
EKG P-R INTERVAL: 180 MS
EKG Q-T INTERVAL: 470 MS
EKG QRS DURATION: 100 MS
EKG QTC CALCULATION (BAZETT): 492 MS
EKG R AXIS: 1 DEGREES
EKG T AXIS: 91 DEGREES
EKG VENTRICULAR RATE: 66 BPM
GFR AFRICAN AMERICAN: 40 ML/MIN
GFR NON-AFRICAN AMERICAN: 33 ML/MIN
GFR SERPL CREATININE-BSD FRML MDRD: ABNORMAL ML/MIN/{1.73_M2}
GLUCOSE BLD-MCNC: 124 MG/DL (ref 70–99)
GLUCOSE BLD-MCNC: 131 MG/DL (ref 65–105)
GLUCOSE BLD-MCNC: 183 MG/DL (ref 65–105)
GLUCOSE BLD-MCNC: 194 MG/DL (ref 65–105)
GLUCOSE BLD-MCNC: 218 MG/DL (ref 65–105)
POTASSIUM SERPL-SCNC: 3.6 MMOL/L (ref 3.7–5.3)
SODIUM BLD-SCNC: 139 MMOL/L (ref 135–144)

## 2022-08-07 PROCEDURE — 93010 ELECTROCARDIOGRAM REPORT: CPT | Performed by: INTERNAL MEDICINE

## 2022-08-07 PROCEDURE — 6370000000 HC RX 637 (ALT 250 FOR IP)

## 2022-08-07 PROCEDURE — 2060000000 HC ICU INTERMEDIATE R&B

## 2022-08-07 PROCEDURE — 97110 THERAPEUTIC EXERCISES: CPT

## 2022-08-07 PROCEDURE — 2580000003 HC RX 258

## 2022-08-07 PROCEDURE — 82947 ASSAY GLUCOSE BLOOD QUANT: CPT

## 2022-08-07 PROCEDURE — 6360000002 HC RX W HCPCS

## 2022-08-07 PROCEDURE — 6370000000 HC RX 637 (ALT 250 FOR IP): Performed by: STUDENT IN AN ORGANIZED HEALTH CARE EDUCATION/TRAINING PROGRAM

## 2022-08-07 PROCEDURE — 36415 COLL VENOUS BLD VENIPUNCTURE: CPT

## 2022-08-07 PROCEDURE — 97535 SELF CARE MNGMENT TRAINING: CPT

## 2022-08-07 PROCEDURE — 80048 BASIC METABOLIC PNL TOTAL CA: CPT

## 2022-08-07 PROCEDURE — 97116 GAIT TRAINING THERAPY: CPT

## 2022-08-07 RX ORDER — DIPHENHYDRAMINE HYDROCHLORIDE 50 MG/ML
12.5 INJECTION INTRAMUSCULAR; INTRAVENOUS EVERY 6 HOURS PRN
Status: DISCONTINUED | OUTPATIENT
Start: 2022-08-07 | End: 2022-08-08

## 2022-08-07 RX ORDER — POTASSIUM CHLORIDE 20 MEQ/1
20 TABLET, EXTENDED RELEASE ORAL ONCE
Status: COMPLETED | OUTPATIENT
Start: 2022-08-07 | End: 2022-08-07

## 2022-08-07 RX ADMIN — TORSEMIDE 10 MG: 20 TABLET ORAL at 10:11

## 2022-08-07 RX ADMIN — LOSARTAN POTASSIUM 100 MG: 50 TABLET, FILM COATED ORAL at 10:12

## 2022-08-07 RX ADMIN — AMLODIPINE BESYLATE 10 MG: 10 TABLET ORAL at 10:12

## 2022-08-07 RX ADMIN — APIXABAN 5 MG: 5 TABLET, FILM COATED ORAL at 21:12

## 2022-08-07 RX ADMIN — POTASSIUM CHLORIDE 20 MEQ: 1500 TABLET, EXTENDED RELEASE ORAL at 11:02

## 2022-08-07 RX ADMIN — ISOSORBIDE MONONITRATE 30 MG: 30 TABLET ORAL at 10:12

## 2022-08-07 RX ADMIN — TICAGRELOR 90 MG: 90 TABLET ORAL at 21:11

## 2022-08-07 RX ADMIN — DIPHENHYDRAMINE HYDROCHLORIDE 12.5 MG: 50 INJECTION, SOLUTION INTRAMUSCULAR; INTRAVENOUS at 23:54

## 2022-08-07 RX ADMIN — INSULIN GLARGINE 20 UNITS: 100 INJECTION, SOLUTION SUBCUTANEOUS at 11:02

## 2022-08-07 RX ADMIN — APIXABAN 5 MG: 5 TABLET, FILM COATED ORAL at 10:12

## 2022-08-07 RX ADMIN — DIPHENHYDRAMINE HYDROCHLORIDE 12.5 MG: 50 INJECTION, SOLUTION INTRAMUSCULAR; INTRAVENOUS at 18:14

## 2022-08-07 RX ADMIN — TICAGRELOR 90 MG: 90 TABLET ORAL at 10:11

## 2022-08-07 RX ADMIN — ROSUVASTATIN CALCIUM 40 MG: 20 TABLET, FILM COATED ORAL at 23:08

## 2022-08-07 RX ADMIN — SODIUM CHLORIDE, PRESERVATIVE FREE 10 ML: 5 INJECTION INTRAVENOUS at 11:09

## 2022-08-07 RX ADMIN — GABAPENTIN 300 MG: 300 CAPSULE ORAL at 21:11

## 2022-08-07 RX ADMIN — INSULIN LISPRO 2 UNITS: 100 INJECTION, SOLUTION INTRAVENOUS; SUBCUTANEOUS at 18:14

## 2022-08-07 RX ADMIN — SODIUM CHLORIDE, PRESERVATIVE FREE 5 ML: 5 INJECTION INTRAVENOUS at 22:00

## 2022-08-07 RX ADMIN — ESCITALOPRAM OXALATE 20 MG: 10 TABLET ORAL at 10:12

## 2022-08-07 NOTE — PROGRESS NOTES
Physical Therapy  Facility/Department: [facility] NEURO  Physical Therapy Daily Treatment Note    Name: Yesika Gomez  : 1945  MRN: [id number]  Date of Service: 2022    Discharge Recommendations:  Patient would benefit from continued therapy after discharge   PT Equipment Recommendations  Equipment Needed: No  Other: pt owns RW      Patient Diagnosis(es): The primary encounter diagnosis was MARIELLE (acute kidney injury) ([facility] [address] Diagnoses of Acute systolic congestive heart failure ([address] Obesity, Class III, BMI 40-49.9 (morbid obesity) ([address] Cervical myelopathy ([address] and Acute kidney injury superimposed on CKD [facility]) were also pertinent to this visit. Past Medical History:  has a past medical history of Acute renal failure with tubular necrosis ([address] Anxiety, Atrial fibrillation (HCC), Benign positional vertigo, CAD (coronary artery disease), Chest pain, CKD (chronic kidney disease), stage III ([address] Depression, Diabetes mellitus ([address] Diabetic neuropathy ([address] Dysuria, Hyperlipidemia, Hypertension, Migraine, and Persistent proteinuria. Past Surgical History:  has a past surgical history that includes Percutaneous Transluminal Coronary Angio; Cardiac catheterization; Coronary angioplasty with stent (2017); Appendectomy; Coronary angioplasty with stent (2012); and Coronary angioplasty with stent (2020). Assessment   Body Structures, Functions, Activity Limitations Requiring Skilled Therapeutic Intervention: Decreased strength;Decreased functional mobility ; Decreased balance;Decreased endurance;Decreased safe awareness;Decreased ADL status; Decreased body mechanics  Assessment: Pt ambulated 50ft x 2 RW CGA with no LOB experienced. Pt completed functional transfers with CGA,  is most limited by decreased endurance and B LE fatigue with mobility. Pt would benefit from further PT to address BLE weakness and endurance deficits to progress toward prior level of independence.   Therapy Prognosis: Good  Barriers to Learning: none  Requires PT Follow-Up: Yes  Activity Tolerance  Activity Tolerance: Patient limited by fatigue;Patient limited by endurance     Plan   Plan  Plan: 6-7 times per week  Current Treatment Recommendations: Therapeutic activities, Strengthening, ROM, Balance training, ADL/Self-care training, IADL training, Neuromuscular re-education, Functional mobility training, Transfer training, Gait training, Stair training, Safety education & training, Home exercise program, Endurance training  Safety Devices  Type of Devices: All fall risk precautions in place, Call light within reach, Chair alarm in place, Gait belt, Nurse notified, Left in chair  Restraints  Restraints Initially in Place: No     Restrictions  Restrictions/Precautions  Restrictions/Precautions: Up as Tolerated, General Precautions  Required Braces or Orthoses?: No  Position Activity Restriction  Other position/activity restrictions: continuous telemetry     Subjective   Pain: pt denies pain  General  Chart Reviewed: Yes  Patient assessed for rehabilitation services?: Yes  Response To Previous Treatment: Patient with no complaints from previous session. Family / Caregiver Present: No  Follows Commands: Within Functional Limits  General Comment  Comments: Pt left seated in recliner w/call light in reach. Alarm activated  Subjective  Subjective: Pt and RN agreeable to PT, pt seated in recliner upon arrival, motivated and pleasant throughout treatment, pt has no c/o pain this date.        Cognition   Orientation  Overall Orientation Status: Within Functional Limits  Orientation Level: Oriented X4  Cognition  Overall Cognitive Status: WFL     Objective   SpO2: 97 %  O2 Device: None (Room air)     Observation/Palpation  Posture: Good     Bed mobility  Bed Mobility Comments: pt in chair at start of session and returned to chair post ambulation  Transfers  Sit to Stand: Contact guard assistance  Stand to sit: Contact guard assistance  Comment: VC's required for hand placement, good return demo noted  Ambulation  WB Status: FWB  Ambulation  Surface: level tile  Device: Rolling Walker  Assistance: Contact guard assistance  Quality of Gait: Flexed forward posture, corrected w/cueing. Vc's to maintain GIOVANA within RW with fair return demo  Gait Deviations: Slow Cherise; Increased GIOVANA; Decreased step length  Distance: 50ft x2  Comments: No LOB experienced, fatigued toward end of ambulation requiring standing rest break  More Ambulation?: No  Stairs/Curb  Stairs?: No     Balance  Posture: Good  Sitting - Static: Good  Sitting - Dynamic: Good  Standing - Static: Fair;+  Standing - Dynamic: Fair  Comments: assessed with RW  Exercise Treatment:   Seated LE exercise program: Long Arc Quads, hip abduction/adduction, heel/toe raises, and marches. Reps: x15-20 reps each with 2# weight     AM-PAC Score  AM-PAC Inpatient Mobility Raw Score : 17 (08/07/22 1117)  AM-PAC Inpatient T-Scale Score : 42.13 (08/07/22 1117)  Mobility Inpatient CMS 0-100% Score: 50.57 (08/07/22 1117)  Mobility Inpatient CMS G-Code Modifier : CK (08/07/22 1117)    Goals  Short Term Goals  Time Frame for Short term goals: 12  Short term goal 1: Pt able to complete bed mobility modified independently  Short term goal 2: Pt able to complete transfers with RW use supervision  Short term goal 3: Pt able to ambulate 100ft with RW use SBA*1  Short term goal 4: Pt able to climb 4 steps with UE on R handrail CGA*1       Education  Patient Education  Education Given To: Patient  Education Provided: Role of Therapy;Plan of Care;Home Exercise Program;Fall Prevention Strategies; Energy Conservation;Transfer Training  Education Method: Demonstration;Verbal  Barriers to Learning: None  Education Outcome: Demonstrated understanding;Verbalized understanding      Therapy Time   Individual Concurrent Group Co-treatment   Time In 1010         Time Out 1035         Minutes 25         Timed Code Treatment Minutes: [address] [id number], [place]

## 2022-08-07 NOTE — PROGRESS NOTES
SUP)  Standing: With support (5 min static/dynamic standing during ADLs with RW and 1-2 hand support req SBA increasing to CGA with increased fatigue)  Gait  Overall Level of Assistance: Minimum assistance (EOB>bathroom>chair utilizing RW demo 0 LOB however unsteady)  Toilet Transfers  Toilet - Technique: Ambulating  Equipment Used: Standard toilet (with hand rails)  Toilet Transfer: Contact guard assistance  Toilet Transfers Comments: utilizing RW     ADL  Grooming: Contact guard assistance;Setup; Increased time to complete  Grooming Skilled Clinical Factors: Oral care facilitated standing at sink with RW and 1-2 hand/arm support demo trunk flexion req CGA for safety. Pt demo multiple loss of  sec to BUE tremors R>L  LE Bathing: Maximum assistance;Setup  LE Bathing Skilled Clinical Factors: to apply lotion to BLE seated from knees to feet sec to pt decreased hip flexion  LE Dressing: Maximum assistance;Setup;Verbal cueing; Increased time to complete  LE Dressing Skilled Clinical Factors: To doff/don socks seated in chair. Education/demonstration provided on figure 4 technique pt demo P hip flexion able to doff R sock req assist to doff L sock and don B socks  Toileting: Minimal assistance;Setup; Increased time to complete  Toileting Skilled Clinical Factors: CGA for toilet transfer with RW, SBA for personal hygiene seated on toilet. Min A for brief management as simulate underwear pt able to pull down req Min A to pull up over hips  Additional Comments: Throughout session pt limited per BUE tremors, fatigue and decreased balance.  Pt particular throughout activities req increased time and min redirection     Activity Tolerance  Activity Tolerance: Patient limited by fatigue;Patient limited by endurance  Bed mobility  Supine to Sit: Stand by assistance  Scooting: Stand by assistance  Bed Mobility Comments: HOB elevated, utilizing bed rails  Transfers  Sit to stand: Contact guard assistance  Stand to sit: Contact guard assistance  Transfer Comments: utilizing RW and grab bars     Cognition  Overall Cognitive Status: WFL  Orientation  Overall Orientation Status: Within Functional Limits  Orientation Level: Oriented X4                  Education Given To: Patient  Education Provided: Role of Therapy;Transfer Training;ADL Adaptive Strategies  Education Provided Comments: proper hand and foot placement' balance maintaince; figure 4 technique-F carry over  Education Method: Demonstration;Verbal  Education Outcome: Continued education needed                                                    AM-PAC Score        AM-PAC Inpatient Daily Activity Raw Score: 16 (08/07/22 1518)  AM-PAC Inpatient ADL T-Scale Score : 35.96 (08/07/22 1518)  ADL Inpatient CMS 0-100% Score: 53.32 (08/07/22 1518)  ADL Inpatient CMS G-Code Modifier : CK (08/07/22 1518)      Goals  Short Term Goals  Time Frame for Short term goals: By Discharge  Short Term Goal 1: Pt will demo Mod I and Good Integration of EC / Ax Pacing during functional tasks. Short Term Goal 2: Pt will demo Mod I and Good Integration of AE during UB and LB ADLs. Short Term Goal 3: Pt will participate in Bathroom Transfers and Toileting while maintaining Fair Balance / Safety. Short Term Goal 4: Pt will complete Functional Mobility (in-room / in-home) with Supervision Assist with Correct Use of AD / DME.        Therapy Time   Individual Concurrent Group Co-treatment   Time In 0743         Time Out 0822         Minutes 39         Timed Code Treatment Minutes: [address], [place]/L

## 2022-08-07 NOTE — PROGRESS NOTES
[facility]  Internal Medicine Teaching Residency Program  Inpatient Daily Progress Note  ______________________________________________________________________________    Patient: Talat Gordillo  YOB: 1945   LPC:[id number]    Acct: [de-identified]     Room: [address] [place] [address]  Admit date: 2022  Today's date: 22  Number of days in the hospital: 10    SUBJECTIVE   Admitting Diagnosis: Syncope and collapse  CC: fall   Pt examined at bedside. Chart & results reviewed. No acute events overnight  Hemodynamically stable  Afebrile   Saturating on room air  Sitting comfortably in chair, denying any complaints  Potassium 3.6- replaced  Cr 1.54 at baseline    ROS:  Constitutional:  negative for chills, fevers, sweats  Respiratory:  negative for cough, dyspnea on exertion, hemoptysis, shortness of breath, wheezing  Cardiovascular:  negative for chest pain, chest pressure/discomfort, lower extremity edema, palpitations  Gastrointestinal:  negative for abdominal pain, constipation, diarrhea, nausea, vomiting  Neurological:  negative for dizziness, headache  BRIEF HISTORY     Patient presented to ED in no acute distress. EKG showed sinus bradycardia with prolonged QT. CT head, chest, cervical spine negative for any acute abnormality, incidental finding of 5 mm part solid lung nodule in right upper lobe. On labs was found to have an MARIELLE. Patient admitted for work up of syncopal episode and IV fluid. MARIELLE on CKD resolved. Patient is waiting on placement    OBJECTIVE     Vital Signs:  BP (!) 141/70   Pulse 60   Temp 98.2 °F (36.8 °C) (Oral)   Resp 20   Ht 5' 6\" (1.676 m)   Wt 233 lb 7.5 oz (105.9 kg)   SpO2 96%   BMI 37.68 kg/m²     Temp (24hrs), Av.1 °F (36.7 °C), Min:97.8 °F (36.6 °C), Max:98.4 °F (36.9 °C)    In: 850   Out: 1670 [Urine:1670]    Physical Exam:  Constitutional: This is a well developed, well nourished, 35-39.9 - Obesity Grade II 68 y.o. year old female who is alert, oriented, cooperative and in no apparent distress. Head:normocephalic and atraumatic. EENT:  PERRLA. No conjunctival injections. Septum was midline, mucosa was without erythema, exudates or cobblestoning. No thrush was noted. Neck: Supple without thyromegaly. No elevated JVP. Trachea was midline. Respiratory: Chest was symmetrical without dullness to percussion. Breath sounds bilaterally were clear to auscultation. There were no wheezes, rhonchi or rales. There is no intercostal retraction or use of accessory muscles. No egophony noted. Cardiovascular: Regular without murmur, clicks, gallops or rubs. Abdomen: Slightly rounded and soft without organomegaly. No rebound, rigidity or guarding was appreciated. Lymphatic: No lymphadenopathy. Musculoskeletal: Normal curvature of the spine. No gross muscle weakness. Extremities:  No lower extremity edema, ulcerations, tenderness, varicosities or erythema. Muscle size, tone and strength are normal.  No involuntary movements are noted. Skin:  Warm and dry. Good color, turgor and pigmentation. No lesions or scars.   No cyanosis or clubbing  Neurological/Psychiatric: The patient's general behavior, level of consciousness, thought content and emotional status is normal.        Medications:  Scheduled Medications:    potassium chloride  20 mEq Oral Once    torsemide  10 mg Oral Daily    losartan  100 mg Oral Daily    amLODIPine  10 mg Oral Daily    insulin lispro  0-8 Units SubCUTAneous TID     insulin lispro  0-4 Units SubCUTAneous Nightly    sodium chloride flush  5-40 mL IntraVENous 2 times per day    apixaban  5 mg Oral BID    ticagrelor  90 mg Oral BID    rosuvastatin  40 mg Oral Nightly    isosorbide mononitrate  30 mg Oral Daily    escitalopram  20 mg Oral Daily    gabapentin  300 mg Oral Nightly    insulin glargine  20 Units SubCUTAneous QAM     Continuous Infusions:    sodium chloride Stopped (08/06/22 1032) dextrose      sodium chloride      dextrose       PRN Medicationsloperamide, 2 mg, 4x Daily PRN  sodium chloride, 1 spray, PRN  prochlorperazine, 10 mg, Q6H PRN  hydrALAZINE, 10 mg, Q6H PRN  glucose, 4 tablet, PRN  dextrose bolus, 125 mL, PRN   Or  dextrose bolus, 250 mL, PRN  glucagon (rDNA), 1 mg, PRN  dextrose, , Continuous PRN  sodium chloride flush, 5-40 mL, PRN  sodium chloride, , PRN  polyethylene glycol, 17 g, Daily PRN  acetaminophen, 650 mg, Q6H PRN   Or  acetaminophen, 650 mg, Q6H PRN  dextrose, , Continuous PRN      Diagnostic Labs:  CBC:   Recent Labs     08/06/22  0719   WBC 5.8   RBC 3.52*   HGB 10.1*   HCT 33.1*   MCV 94.0   RDW 15.2*        BMP:   Recent Labs     08/05/22  0444 08/06/22  0719 08/07/22  0633    141 139   K 3.5* 3.7 3.6*   * 107 106   CO2 23 21 24   BUN 16 14 17   CREATININE 1.57* 1.46* 1.54*     BNP: No results for input(s): BNP in the last 72 hours. PT/INR: No results for input(s): PROTIME, INR in the last 72 hours. APTT: No results for input(s): APTT in the last 72 hours. CARDIAC ENZYMES: No results for input(s): CKMB, CKMBINDEX, TROPONINI in the last 72 hours. Invalid input(s): CKTOTAL;3  FASTING LIPID PANEL:  Lab Results   Component Value Date    CHOL 125 05/30/2022    HDL 36 (L) 05/30/2022    TRIG 132 05/30/2022     LIVER PROFILE: No results for input(s): AST, ALT, ALB, BILIDIR, BILITOT, ALKPHOS in the last 72 hours. MICROBIOLOGY:   Lab Results   Component Value Date/Time    CULTURE NO GROWTH 5 DAYS 05/29/2022 07:02 PM       Imaging:    No results found.     ASSESSMENT & PLAN     Principal Problem:    Syncope and collapse  Active Problems:    Chronic diastolic (congestive) heart failure (HCC)    Paroxysmal atrial fibrillation (HCC)    Presence of stent in coronary artery in patient with coronary artery disease    Hypokalemia    Obesity (BMI 35.0-39.9 without comorbidity)    Current use of long term anticoagulation    Essential hypertension    Diabetic polyneuropathy associated with type 2 diabetes mellitus ([facility] [address]    Other hyperlipidemia    Moderate episode of recurrent major depressive disorder (HCC)    CKD (chronic kidney disease), stage III (HCC)  Resolved Problems:    Bradycardia    Acute kidney injury superimposed on CKD ([facility] [address]    Acute respiratory failure with hypoxia ([facility])    Syncope and collapse likely due to hypotension due to bradycardia due to beta-blockers and amiodarone. Resolved. Off of Coreg. Follow-up with PCP cardiology as outpatient  MARIELLE on CKD stage III. Resolved. Baseline creatinine 1.4-1.6. Cr 1.54 today. Continue on Demadex 10 daily. BMP in 1 week. Follow-up with nephrology in 4 to 6 weeks. HFpEF Echo shows EF 54%, evidence of diastolic dysfunction 1/37. Continue on Demadex 10 daily  Paroxysmal atrial fibrillation. Currently NSR 60 bpm.  TSH 0.60 7/28. [id number] Score: 7. Continue on Eliquis. Continue on amiodarone 100 daily on discharge. Follow-up with cardiology as outpatient for consideration of pacemaker if persistently bradycardic. Symptomatic bradycardia on arrival.  Resolved. Off of Coreg. Follow-up with cardiology as outpatient for consideration of pacemaker persistently symptomatic  T2DM with neuropathy with long-term use of insulin. Continue Lantus 20 units daily. MD SSI. POCT glucose 4 times daily before meals and at bedtime. hypolycemia protocol. HTN. Currently hypertensive. Continue on Norvasc 10, Cozaar 100 and Demadex 10 daily. Follow-up with PCP  MVCAD s/p PCI(staged) to RCA, LAD, OM. Continue on Crestor, Brilinta, Eliquis and Inderal.  Follow-up with cardiology as outpatient  MDD. Continue on Lexapro 20 daily. Follow-up with PCP as outpatient  Hypokalemia. Potassium 3.6 today. Replaced. Counseled regarding consuming potassium rich diet.       DVT ppx : Eliquis  GI ppx: None    PT/OT: Following  Discharge Planning / SW:  to assist with    Walt Salcido MD  Internal Medicine Resident, PGY-2  [facility];  [place], [address]  8/7/2022, 10:54 AM

## 2022-08-07 NOTE — PLAN OF CARE
Problem: Discharge Planning  Goal: Discharge to home or other facility with appropriate resources  8/7/2022 0618 by Macrina Matias RN  Outcome: Progressing  8/6/2022 1805 by Sofia Chaves RN  Outcome: Progressing     Problem: Pain  Goal: Verbalizes/displays adequate comfort level or baseline comfort level  8/7/2022 0618 by Macrina Matias RN  Outcome: Progressing  8/6/2022 1805 by Sofia Chaves RN  Outcome: Progressing     Problem: Chronic Conditions and Co-morbidities  Goal: Patient's chronic conditions and co-morbidity symptoms are monitored and maintained or improved  8/7/2022 0618 by Macrina Matias RN  Outcome: Progressing  8/6/2022 1805 by Sofia Chaves RN  Outcome: Progressing     Problem: Safety - Adult  Goal: Free from fall injury  8/7/2022 0618 by Macrina Matias RN  Outcome: Progressing  8/6/2022 1805 by Sofia Chaves RN  Outcome: Progressing     Problem: Skin/Tissue Integrity  Goal: Absence of new skin breakdown  Description: 1. Monitor for areas of redness and/or skin breakdown  2. Assess vascular access sites hourly  3. Every 4-6 hours minimum:  Change oxygen saturation probe site  4. Every 4-6 hours:  If on nasal continuous positive airway pressure, respiratory therapy assess nares and determine need for appliance change or resting period.   8/7/2022 0618 by Macrina Matias RN  Outcome: Progressing  8/6/2022 1805 by Sofia Chaves RN  Outcome: Progressing     Problem: ABCDS Injury Assessment  Goal: Absence of physical injury  8/7/2022 0618 by Macrina Matias RN  Outcome: Progressing  8/6/2022 1805 by Sofia Chaves RN  Outcome: Progressing

## 2022-08-08 LAB
GLUCOSE BLD-MCNC: 165 MG/DL (ref 65–105)
GLUCOSE BLD-MCNC: 201 MG/DL (ref 65–105)
GLUCOSE BLD-MCNC: 217 MG/DL (ref 65–105)
GLUCOSE BLD-MCNC: 262 MG/DL (ref 65–105)

## 2022-08-08 PROCEDURE — 6370000000 HC RX 637 (ALT 250 FOR IP)

## 2022-08-08 PROCEDURE — 97116 GAIT TRAINING THERAPY: CPT

## 2022-08-08 PROCEDURE — 2580000003 HC RX 258

## 2022-08-08 PROCEDURE — 99233 SBSQ HOSP IP/OBS HIGH 50: CPT | Performed by: INTERNAL MEDICINE

## 2022-08-08 PROCEDURE — 6370000000 HC RX 637 (ALT 250 FOR IP): Performed by: STUDENT IN AN ORGANIZED HEALTH CARE EDUCATION/TRAINING PROGRAM

## 2022-08-08 PROCEDURE — 2060000000 HC ICU INTERMEDIATE R&B

## 2022-08-08 PROCEDURE — 97110 THERAPEUTIC EXERCISES: CPT

## 2022-08-08 PROCEDURE — 82947 ASSAY GLUCOSE BLOOD QUANT: CPT

## 2022-08-08 RX ORDER — DIPHENHYDRAMINE HCL 25 MG
25 TABLET ORAL EVERY 8 HOURS PRN
Status: DISCONTINUED | OUTPATIENT
Start: 2022-08-08 | End: 2022-08-10 | Stop reason: HOSPADM

## 2022-08-08 RX ADMIN — GABAPENTIN 300 MG: 300 CAPSULE ORAL at 22:52

## 2022-08-08 RX ADMIN — DIPHENHYDRAMINE HCL 25 MG: 25 TABLET ORAL at 16:14

## 2022-08-08 RX ADMIN — SODIUM CHLORIDE, PRESERVATIVE FREE 10 ML: 5 INJECTION INTRAVENOUS at 22:55

## 2022-08-08 RX ADMIN — TICAGRELOR 90 MG: 90 TABLET ORAL at 22:52

## 2022-08-08 RX ADMIN — APIXABAN 5 MG: 5 TABLET, FILM COATED ORAL at 09:01

## 2022-08-08 RX ADMIN — INSULIN LISPRO 2 UNITS: 100 INJECTION, SOLUTION INTRAVENOUS; SUBCUTANEOUS at 13:59

## 2022-08-08 RX ADMIN — TICAGRELOR 90 MG: 90 TABLET ORAL at 09:02

## 2022-08-08 RX ADMIN — APIXABAN 5 MG: 5 TABLET, FILM COATED ORAL at 22:52

## 2022-08-08 RX ADMIN — SODIUM CHLORIDE, PRESERVATIVE FREE 10 ML: 5 INJECTION INTRAVENOUS at 10:00

## 2022-08-08 RX ADMIN — LOSARTAN POTASSIUM 100 MG: 50 TABLET, FILM COATED ORAL at 09:02

## 2022-08-08 RX ADMIN — ISOSORBIDE MONONITRATE 30 MG: 30 TABLET ORAL at 09:02

## 2022-08-08 RX ADMIN — AMLODIPINE BESYLATE 10 MG: 10 TABLET ORAL at 09:01

## 2022-08-08 RX ADMIN — TORSEMIDE 10 MG: 20 TABLET ORAL at 09:02

## 2022-08-08 RX ADMIN — INSULIN LISPRO 4 UNITS: 100 INJECTION, SOLUTION INTRAVENOUS; SUBCUTANEOUS at 19:07

## 2022-08-08 RX ADMIN — ROSUVASTATIN CALCIUM 40 MG: 20 TABLET, FILM COATED ORAL at 22:52

## 2022-08-08 RX ADMIN — INSULIN GLARGINE 20 UNITS: 100 INJECTION, SOLUTION SUBCUTANEOUS at 08:55

## 2022-08-08 RX ADMIN — ESCITALOPRAM OXALATE 20 MG: 10 TABLET ORAL at 09:02

## 2022-08-08 NOTE — PROGRESS NOTES
Physical Therapy  Facility/Department: [facility] NEURO  Daily Treatment Note     Name: Nikole Whelan  : 1945  MRN: [id number]  Date of Service: 2022    Discharge Recommendations:  Patient would benefit from continued therapy after discharge   PT Equipment Recommendations  Equipment Needed: No  Other: pt owns RW      Patient Diagnosis(es): The primary encounter diagnosis was MARIELLE (acute kidney injury) ([facility] [address] Diagnoses of Acute systolic congestive heart failure ([address] Obesity, Class III, BMI 40-49.9 (morbid obesity) ([address] Cervical myelopathy ([facility] [address] and Acute kidney injury superimposed on CKD [facility]) were also pertinent to this visit. Past Medical History:  has a past medical history of Acute renal failure with tubular necrosis ([address] Anxiety, Atrial fibrillation (HCC), Benign positional vertigo, CAD (coronary artery disease), Chest pain, CKD (chronic kidney disease), stage III ([address] Depression, Diabetes mellitus ([address] Diabetic neuropathy ([address] Dysuria, Hyperlipidemia, Hypertension, Migraine, and Persistent proteinuria. Past Surgical History:  has a past surgical history that includes Percutaneous Transluminal Coronary Angio; Cardiac catheterization; Coronary angioplasty with stent (2017); Appendectomy; Coronary angioplasty with stent (2012); and Coronary angioplasty with stent (2020). Assessment   Body Structures, Functions, Activity Limitations Requiring Skilled Therapeutic Intervention: Decreased strength;Decreased functional mobility ; Decreased balance;Decreased endurance;Decreased safe awareness;Decreased ADL status; Decreased body mechanics  Assessment: Pt ambulated 150 ft. in the clemente with CGA using a RW. Pt required multiple standing rest breaks during gait training due to fatigue. Pt is limited by decreased endurance, decreased balance, and decreased BLE strength. Pt would benefit from continued PT following discharge to address functional deficits.   Therapy Prognosis: Good  Decision Making: Medium Complexity  Barriers to Learning: none  Requires PT Follow-Up: Yes  Activity Tolerance  Activity Tolerance: Patient limited by fatigue;Patient limited by endurance     Plan   Plan  Plan: 6-7 times per week  Current Treatment Recommendations: Therapeutic activities, Strengthening, ROM, Balance training, ADL/Self-care training, IADL training, Neuromuscular re-education, Functional mobility training, Transfer training, Gait training, Stair training, Safety education & training, Home exercise program, Endurance training  Safety Devices  Type of Devices: Gait belt, Patient at risk for falls, Call light within reach, Chair alarm in place, Left in chair  Restraints  Restraints Initially in Place: No     Restrictions  Restrictions/Precautions  Restrictions/Precautions: Up as Tolerated, General Precautions  Required Braces or Orthoses?: No  Position Activity Restriction  Other position/activity restrictions: continuous telemetry     Subjective   General  Chart Reviewed: Yes  Response To Previous Treatment: Patient with no complaints from previous session. Family / Caregiver Present: No  Follows Commands: Within Functional Limits  General Comment  Comments: Pt left seated in recliner w/call light in reach. Alarm activated  Subjective  Subjective: Pt and RN agreeable to PT, pt seated in recliner upon arrival, motivated and pleasant throughout treatment, pt has no c/o pain this date. Cognition   Orientation  Overall Orientation Status: Within Functional Limits  Orientation Level: Oriented X4  Cognition  Overall Cognitive Status: WFL  Arousal/Alertness: Delayed responses to stimuli  Following Commands:  Follows one step commands consistently  Memory: Appears intact  Safety Judgement: Decreased awareness of need for assistance  Initiation: Requires cues for some  Sequencing: Requires cues for some     Objective   Bed mobility  Supine to Sit: Unable to assess  Sit to Supine: Unable to assess  Scooting: Stand by assistance  Bed Mobility Comments: Pt upright in chair upon arrival/ exit. Transfers  Sit to Stand: Contact guard assistance  Stand to sit: Contact guard assistance  Ambulation  WB Status: FWB  Ambulation  Surface: level tile  Device: Rolling Walker  Assistance: Contact guard assistance  Quality of Gait: Flexed forward posture, corrected w/cueing. Vc's to maintain GIOVANA within RW with fair return demo  Gait Deviations: Slow Cherise; Increased GIOVANA; Decreased step length  Distance: 150 ft. Comments: No LOB experienced, fatigued toward end of ambulation requiring multiple standing rest breaks  More Ambulation?: No  Stairs/Curb  Stairs?: No     Balance  Posture: Good  Sitting - Static: Good  Sitting - Dynamic: Good  Standing - Static: Fair;+  Standing - Dynamic: Fair  Comments: assessed with RW  A/AROM Exercises: Ankle pumps x10 BLE, SLRs x10 BLE, LAQs x10 BLE, KTC x10 BLE.           AM-PAC Score  AM-PAC Inpatient Mobility Raw Score : 17 (08/08/22 1442)  AM-PAC Inpatient T-Scale Score : 42.13 (08/08/22 1442)  Mobility Inpatient CMS 0-100% Score: 50.57 (08/08/22 1442)  Mobility Inpatient CMS G-Code Modifier : CK (08/08/22 1442)          Goals  Short Term Goals  Time Frame for Short term goals: 12  Short term goal 1: Pt able to complete bed mobility modified independently  Short term goal 2: Pt able to complete transfers with RW use supervision  Short term goal 3: Pt able to ambulate 100ft with RW use SBA*1  Short term goal 4: Pt able to climb 4 steps with UE on R handrail CGA*1           Therapy Time   Individual Concurrent Group Co-treatment   Time In 1402         Time Out 1430         Minutes 28         Timed Code Treatment Minutes: [address], PTA

## 2022-08-08 NOTE — CARE COORDINATION
PT/OT notes utd, call to [phone] to check appeal, Spoke with representative Qing Le at [facility] and he provided another number at [phone], I spoke with a representative Bradford Farah who relates there has been no update after 8/4 for any type of appeal    0911 Call to Michelle to check if they have attempted to precert pt yet, Message left with Business office for a return call from admissions     1110 Call to [facility] who relates she approved referral this am to MELINA, call to [facility]  [phone] who relates she started precert today after appeal for ARU was denied.

## 2022-08-08 NOTE — PROGRESS NOTES
[facility]  Internal Medicine Teaching Residency Program  Inpatient Daily Progress Note  ______________________________________________________________________________    Patient: Anamika Jhaveri  YOB: 1945   UXN:[id number]    Acct: [de-identified]     Room: [facility]7/0007-10  Admit date: 2022  Today's date: 22  Number of days in the hospital: 11    SUBJECTIVE   Admitting Diagnosis: Syncope and collapse  CC: fall   Pt examined at bedside. Chart & results reviewed. No acute significant events overnight  Afebrile   Saturating on room air  Hypertensive this am  HR 60-70  Waiting on placement     ROS:  Constitutional:  negative for chills, fevers, sweats  Respiratory:  negative for cough, dyspnea on exertion, hemoptysis, shortness of breath, wheezing  Cardiovascular:  negative for chest pain, chest pressure/discomfort, lower extremity edema, palpitations  Gastrointestinal:  negative for abdominal pain, constipation, diarrhea, nausea, vomiting  Neurological:  negative for dizziness, headache  BRIEF HISTORY     Patient presented to ED in no acute distress. EKG showed sinus bradycardia with prolonged QT. CT head, chest, cervical spine negative for any acute abnormality, incidental finding of 5 mm part solid lung nodule in right upper lobe. On labs was found to have an MARIELLE. Patient admitted for work up of syncopal episode and IV fluid. MARIELLE on CKD is resolved.  Patient is waiting on placement    OBJECTIVE     Vital Signs:  BP (!) 141/62   Pulse 61   Temp 97.9 °F (36.6 °C) (Axillary)   Resp 19   Ht 5' 6\" (1.676 m)   Wt 233 lb 7.5 oz (105.9 kg)   SpO2 96%   BMI 37.68 kg/m²     Temp (24hrs), Av.9 °F (36.6 °C), Min:97.8 °F (36.6 °C), Max:98 °F (36.7 °C)    In: 900   Out: 350 [Urine:350]    Physical Exam:  Constitutional: This is a well developed, well nourished, 35-39.9 - Obesity Grade II 68y.o. year old female who is alert, oriented, cooperative and in no apparent distress. Head:normocephalic and atraumatic. EENT:  PERRLA. No conjunctival injections. Septum was midline, mucosa was without erythema, exudates or cobblestoning. No thrush was noted. Respiratory: Chest was symmetrical without dullness to percussion. Breath sounds bilaterally were clear to auscultation. There were no wheezes, rhonchi or rales. There is no intercostal retraction or use of accessory muscles. No egophony noted. Cardiovascular: Regular without murmur, clicks, gallops or rubs. Abdomen: Slightly rounded and soft without organomegaly. No rebound, rigidity or guarding was appreciated. Musculoskeletal: Normal curvature of the spine. No gross muscle weakness. Extremities:  No lower extremity edema, ulcerations, tenderness, varicosities or erythema. Muscle size, tone and strength are normal.  No involuntary movements are noted. Skin:  Warm and dry. Good color, turgor and pigmentation. No lesions or scars.   No cyanosis or clubbing  Neurological/Psychiatric: The patient's general behavior, level of consciousness, thought content and emotional status is normal.        Medications:  Scheduled Medications:    torsemide  10 mg Oral Daily    losartan  100 mg Oral Daily    amLODIPine  10 mg Oral Daily    insulin lispro  0-8 Units SubCUTAneous TID WC    insulin lispro  0-4 Units SubCUTAneous Nightly    sodium chloride flush  5-40 mL IntraVENous 2 times per day    apixaban  5 mg Oral BID    ticagrelor  90 mg Oral BID    rosuvastatin  40 mg Oral Nightly    isosorbide mononitrate  30 mg Oral Daily    escitalopram  20 mg Oral Daily    gabapentin  300 mg Oral Nightly    insulin glargine  20 Units SubCUTAneous QAM     Continuous Infusions:    sodium chloride Stopped (08/06/22 1032)    dextrose      sodium chloride      dextrose       PRN MedicationsdiphenhydrAMINE, 12.5 mg, Q6H PRN  loperamide, 2 mg, 4x Daily PRN  sodium chloride, 1 spray, PRN  prochlorperazine, 10 mg, Q6H PRN  hydrALAZINE, 10 mg, Q6H PRN  glucose, 4 tablet, PRN  dextrose bolus, 125 mL, PRN   Or  dextrose bolus, 250 mL, PRN  glucagon (rDNA), 1 mg, PRN  dextrose, , Continuous PRN  sodium chloride flush, 5-40 mL, PRN  sodium chloride, , PRN  polyethylene glycol, 17 g, Daily PRN  acetaminophen, 650 mg, Q6H PRN   Or  acetaminophen, 650 mg, Q6H PRN  dextrose, , Continuous PRN        Diagnostic Labs:  CBC:   Recent Labs     08/06/22 0719   WBC 5.8   RBC 3.52*   HGB 10.1*   HCT 33.1*   MCV 94.0   RDW 15.2*        BMP:   Recent Labs     08/06/22 0719 08/07/22  0633    139   K 3.7 3.6*    106   CO2 21 24   BUN 14 17   CREATININE 1.46* 1.54*     BNP: No results for input(s): BNP in the last 72 hours. PT/INR: No results for input(s): PROTIME, INR in the last 72 hours. APTT: No results for input(s): APTT in the last 72 hours. CARDIAC ENZYMES: No results for input(s): CKMB, CKMBINDEX, TROPONINI in the last 72 hours. Invalid input(s): CKTOTAL;3  FASTING LIPID PANEL:  Lab Results   Component Value Date    CHOL 125 05/30/2022    HDL 36 (L) 05/30/2022    TRIG 132 05/30/2022     LIVER PROFILE: No results for input(s): AST, ALT, ALB, BILIDIR, BILITOT, ALKPHOS in the last 72 hours. MICROBIOLOGY:   Lab Results   Component Value Date/Time    CULTURE NO GROWTH 5 DAYS 05/29/2022 07:02 PM       Imaging:    No results found.     ASSESSMENT & PLAN     Principal Problem:    Syncope and collapse  Active Problems:    Chronic diastolic (congestive) heart failure (HCC)    Paroxysmal atrial fibrillation (HCC)    Presence of stent in coronary artery in patient with coronary artery disease    Hypokalemia    Obesity (BMI 35.0-39.9 without comorbidity)    Current use of long term anticoagulation    Essential hypertension    Diabetic polyneuropathy associated with type 2 diabetes mellitus ([facility] [address]    Other hyperlipidemia    Moderate episode of recurrent major depressive disorder (HCC)    CKD (chronic kidney disease), stage III [facility])  Resolved Problems:    Bradycardia    Acute kidney injury superimposed on CKD ([address]    Acute respiratory failure with hypoxia (HCC)    Syncope and collapse likely due to hypotension due to bradycardia due to beta-blockers and amiodarone. Resolved. Off of Coreg. Follow-up with PCP cardiology as outpatient  MARIELLE on CKD stage III. Resolved. Baseline creatinine 1.4-1.6. Cr at baseline. Continue on Demadex 10 daily. BMP in 1 week. Follow-up with nephrology in 4 to 6 weeks. HFpEF Echo shows EF 54%, evidence of diastolic dysfunction 4/77. Continue on Demadex 10 daily  Paroxysmal atrial fibrillation. Currently NSR 60 bpm.  TSH 0.60 7/28. [id number] Score: 7. Continue on Eliquis. Continue on amiodarone 100 daily on discharge. Follow-up with cardiology as outpatient for consideration of pacemaker if persistently bradycardic. Symptomatic bradycardia on arrival.  Resolved. Off of Coreg. Follow-up with cardiology as outpatient for consideration of pacemaker persistently symptomatic  T2DM with neuropathy with long-term use of insulin. Continue Lantus 20 units daily. MD SSI. POCT glucose 4 times daily before meals and at bedtime. hypolycemia protocol. HTN. Currently hypertensive. Continue on Norvasc 10, Cozaar 100 and Demadex 10 daily. Follow-up with PCP  MVCAD s/p PCI(staged) to RCA, LAD, OM. Continue on Crestor, Brilinta, Eliquis and Inderal.  Follow-up with cardiology as outpatient  MDD. Continue on Lexapro 20 daily. Follow-up with PCP as outpatient  Hypokalemia. Replaced. Counseled regarding consuming potassium rich diet. DVT ppx : Eliquis  GI ppx: none     PT/OT: following   Discharge Planning / SW: CM to assist with    Angie Danielle MD  Internal Medicine Resident, PGY-2  [facility];  [place], [place]  8/8/2022, 7:54 AM     Attestation and add on       I have discussed the care of Kecia Mckeon , including pertinent history and exam findings,     8/8/22    with the resident. I have seen and examined the patient and the key elements of all parts of the encounter have been performed by me . I agree with the assessment, plan and orders as documented by the resident. Hospital Problems             Last Modified POA    * (Principal) Syncope and collapse 7/28/2022 Yes    Chronic diastolic (congestive) heart failure (HCC) 7/28/2022 Yes    Paroxysmal atrial fibrillation ([facility] [address] 7/28/2022 Yes    Presence of stent in coronary artery in patient with coronary artery disease 7/28/2022 Yes    Hypokalemia 7/30/2022 Yes    Obesity (BMI 35.0-39.9 without comorbidity) 8/7/2022 Yes    Current use of long term anticoagulation 8/7/2022 Yes    Essential hypertension 7/28/2022 Yes    Diabetic polyneuropathy associated with type 2 diabetes mellitus ([facility] [address] 7/28/2022 Yes    Other hyperlipidemia 7/28/2022 Yes    Moderate episode of recurrent major depressive disorder ([facility] [address] 8/7/2022 Yes    CKD (chronic kidney disease), stage III ([facility] [address] 7/28/2022 Yes    Overview Signed 11/24/2021  3:27 PM by Yissel Camp MD     Secondary to ischemic and diabetic nephrosclerosis baseline 1.2-1.4               '''''.'''''       Vilma President, MD DAVID SMITH [address], [address] [facility].    Phone [phone]   Fax: [phone]  Answering Service: [phone]

## 2022-08-08 NOTE — PLAN OF CARE
Problem: Discharge Planning  Goal: Discharge to home or other facility with appropriate resources  8/8/2022 1650 by Polly Flaherty RN  Outcome: Progressing  8/8/2022 0627 by Madhavi Gustafson RN  Outcome: Progressing     Problem: Pain  Goal: Verbalizes/displays adequate comfort level or baseline comfort level  8/8/2022 1650 by Polly Flaherty RN  Outcome: Progressing  8/8/2022 0627 by Madhavi Gustafson RN  Outcome: Progressing     Problem: Chronic Conditions and Co-morbidities  Goal: Patient's chronic conditions and co-morbidity symptoms are monitored and maintained or improved  8/8/2022 1650 by Polly Flaherty RN  Outcome: Progressing  8/8/2022 0627 by Madhavi Gustafson RN  Outcome: Progressing     Problem: Safety - Adult  Goal: Free from fall injury  8/8/2022 1650 by Polly Flaherty RN  Outcome: Progressing  8/8/2022 0627 by Madhavi Gustafson RN  Outcome: Progressing     Problem: Skin/Tissue Integrity  Goal: Absence of new skin breakdown  Description: 1. Monitor for areas of redness and/or skin breakdown  2. Assess vascular access sites hourly  3. Every 4-6 hours minimum:  Change oxygen saturation probe site  4. Every 4-6 hours:  If on nasal continuous positive airway pressure, respiratory therapy assess nares and determine need for appliance change or resting period.   8/8/2022 1650 by Polly Flaherty RN  Outcome: Progressing  8/8/2022 0627 by Madhavi Gustafson RN  Outcome: Progressing     Problem: ABCDS Injury Assessment  Goal: Absence of physical injury  8/8/2022 1650 by Polly Flaherty RN  Outcome: Progressing  8/8/2022 0627 by Madhavi Gustafson RN  Outcome: Progressing

## 2022-08-08 NOTE — PROGRESS NOTES
Physical Therapy        Physical Therapy Cancel Note      DATE: 2022    NAME: June Elizabeth  MRN: [id number]   : 1945      Patient not seen this date for Physical Therapy due to:    Patient Declined: Pt refusing to participate in PT this date even after max encouragement, will check back tomorrow.        Electronically signed by Geni Garcia PTA on 2022 at 10:50 AM

## 2022-08-09 LAB
GLUCOSE BLD-MCNC: 162 MG/DL (ref 65–105)
GLUCOSE BLD-MCNC: 169 MG/DL (ref 65–105)
GLUCOSE BLD-MCNC: 230 MG/DL (ref 65–105)
GLUCOSE BLD-MCNC: 244 MG/DL (ref 65–105)

## 2022-08-09 PROCEDURE — 2580000003 HC RX 258

## 2022-08-09 PROCEDURE — 97535 SELF CARE MNGMENT TRAINING: CPT

## 2022-08-09 PROCEDURE — 6370000000 HC RX 637 (ALT 250 FOR IP)

## 2022-08-09 PROCEDURE — 97116 GAIT TRAINING THERAPY: CPT

## 2022-08-09 PROCEDURE — 94761 N-INVAS EAR/PLS OXIMETRY MLT: CPT

## 2022-08-09 PROCEDURE — 82947 ASSAY GLUCOSE BLOOD QUANT: CPT

## 2022-08-09 PROCEDURE — 2060000000 HC ICU INTERMEDIATE R&B

## 2022-08-09 PROCEDURE — 97110 THERAPEUTIC EXERCISES: CPT

## 2022-08-09 PROCEDURE — 99232 SBSQ HOSP IP/OBS MODERATE 35: CPT | Performed by: INTERNAL MEDICINE

## 2022-08-09 RX ORDER — DIPHENHYDRAMINE HCL 25 MG
25 TABLET ORAL ONCE
Status: COMPLETED | OUTPATIENT
Start: 2022-08-09 | End: 2022-08-09

## 2022-08-09 RX ORDER — INSULIN LISPRO 100 [IU]/ML
0-16 INJECTION, SOLUTION INTRAVENOUS; SUBCUTANEOUS
Status: DISCONTINUED | OUTPATIENT
Start: 2022-08-09 | End: 2022-08-10 | Stop reason: HOSPADM

## 2022-08-09 RX ORDER — INSULIN LISPRO 100 [IU]/ML
0-4 INJECTION, SOLUTION INTRAVENOUS; SUBCUTANEOUS NIGHTLY
Status: DISCONTINUED | OUTPATIENT
Start: 2022-08-09 | End: 2022-08-10 | Stop reason: HOSPADM

## 2022-08-09 RX ADMIN — DIPHENHYDRAMINE HCL 25 MG: 25 TABLET ORAL at 00:03

## 2022-08-09 RX ADMIN — TORSEMIDE 10 MG: 20 TABLET ORAL at 08:41

## 2022-08-09 RX ADMIN — APIXABAN 5 MG: 5 TABLET, FILM COATED ORAL at 21:08

## 2022-08-09 RX ADMIN — TICAGRELOR 90 MG: 90 TABLET ORAL at 08:40

## 2022-08-09 RX ADMIN — TICAGRELOR 90 MG: 90 TABLET ORAL at 21:08

## 2022-08-09 RX ADMIN — GABAPENTIN 300 MG: 300 CAPSULE ORAL at 21:08

## 2022-08-09 RX ADMIN — ESCITALOPRAM OXALATE 20 MG: 10 TABLET ORAL at 08:41

## 2022-08-09 RX ADMIN — APIXABAN 5 MG: 5 TABLET, FILM COATED ORAL at 08:41

## 2022-08-09 RX ADMIN — ROSUVASTATIN CALCIUM 40 MG: 20 TABLET, FILM COATED ORAL at 21:49

## 2022-08-09 RX ADMIN — AMLODIPINE BESYLATE 10 MG: 10 TABLET ORAL at 08:41

## 2022-08-09 RX ADMIN — INSULIN LISPRO 4 UNITS: 100 INJECTION, SOLUTION INTRAVENOUS; SUBCUTANEOUS at 12:53

## 2022-08-09 RX ADMIN — DIPHENHYDRAMINE HCL 25 MG: 25 TABLET ORAL at 21:08

## 2022-08-09 RX ADMIN — DIPHENHYDRAMINE HCL 25 MG: 25 TABLET ORAL at 17:18

## 2022-08-09 RX ADMIN — ISOSORBIDE MONONITRATE 30 MG: 30 TABLET ORAL at 08:40

## 2022-08-09 RX ADMIN — INSULIN GLARGINE 20 UNITS: 100 INJECTION, SOLUTION SUBCUTANEOUS at 08:41

## 2022-08-09 RX ADMIN — LOSARTAN POTASSIUM 100 MG: 50 TABLET, FILM COATED ORAL at 08:40

## 2022-08-09 RX ADMIN — SODIUM CHLORIDE, PRESERVATIVE FREE 10 ML: 5 INJECTION INTRAVENOUS at 08:40

## 2022-08-09 RX ADMIN — SODIUM CHLORIDE, PRESERVATIVE FREE 10 ML: 5 INJECTION INTRAVENOUS at 21:12

## 2022-08-09 ASSESSMENT — PAIN SCALES - GENERAL: PAINLEVEL_OUTOF10: 0

## 2022-08-09 NOTE — CARE COORDINATION
Spoke with [facility]. Request for SNF auth still pending. Ryanne Baer CM @ [facility] reviewing. She can be reached @ [phone]. Pita Neely will send message to her notifying patient has been ready for discharge and for decision today.

## 2022-08-09 NOTE — PROGRESS NOTES
[facility]  Internal Medicine Teaching Residency Program  Inpatient Daily Progress Note  ______________________________________________________________________________    Patient: Luanne Su  YOB: 1945   AHW:[id number]    Acct: [de-identified]     Room: [facility]4886-  Admit date: 2022  Today's date: 22  Number of days in the hospital: 12    SUBJECTIVE   Admitting Diagnosis: Syncope and collapse  CC: Fall    Pt examined at bedside. Chart & results reviewed. No acute significant events overnight  Afebrile  Saturating on room air  /70  Waiting on placement        ROS:  Constitutional:  negative for chills, fevers, sweats  Respiratory:  negative for cough, dyspnea on exertion, hemoptysis, shortness of breath, wheezing  Cardiovascular:  negative for chest pain, chest pressure/discomfort, lower extremity edema, palpitations  Gastrointestinal:  negative for abdominal pain, constipation, diarrhea, nausea, vomiting  Neurological:  negative for dizziness, headache  BRIEF HISTORY     Patient presented to ED in no acute distress. EKG showed sinus bradycardia with prolonged QT. CT head, chest, cervical spine negative for any acute abnormality, incidental finding of 5 mm part solid lung nodule in right upper lobe. On labs was found to have an MARIELLE. Patient admitted for work up of syncopal episode and IV fluid. MARIELLE on CKD is resolved    OBJECTIVE     Vital Signs:  BP (!) 144/70   Pulse (!) 46   Temp 97.7 °F (36.5 °C) (Axillary)   Resp 16   Ht 5' 6\" (1.676 m)   Wt 233 lb 7.5 oz (105.9 kg)   SpO2 95%   BMI 37.68 kg/m²     Temp (24hrs), Av °F (36.7 °C), Min:97.7 °F (36.5 °C), Max:98.1 °F (36.7 °C)    No intake/output data recorded. Physical Exam:  Constitutional: This is a well developed, well nourished, 35-39.9 - Obesity Grade II 68y.o. year old female who is alert, oriented, cooperative and in no apparent distress.     Respiratory:  Breath sounds bilaterally were clear to auscultation. There were no wheezes, rhonchi or rales. There is no intercostal retraction or use of accessory muscles. Cardiovascular: Regular without murmur, clicks, gallops or rubs. Abdomen: Slightly rounded and soft without organomegaly. No rebound, rigidity or guarding was appreciated. Musculoskeletal:  No gross muscle weakness. Extremities:  No lower extremity edema, ulcerations, tenderness, varicosities or erythema. Muscle size, tone and strength are normal.  No involuntary movements are noted. Skin:  Warm and dry. Good color, turgor and pigmentation. No lesions or scars.   No cyanosis or clubbing  Neurological/Psychiatric: The patient's general behavior, level of consciousness, thought content and emotional status is normal.        Medications:  Scheduled Medications:    insulin lispro  0-16 Units SubCUTAneous TID WC    insulin lispro  0-4 Units SubCUTAneous Nightly    torsemide  10 mg Oral Daily    losartan  100 mg Oral Daily    amLODIPine  10 mg Oral Daily    sodium chloride flush  5-40 mL IntraVENous 2 times per day    apixaban  5 mg Oral BID    ticagrelor  90 mg Oral BID    rosuvastatin  40 mg Oral Nightly    isosorbide mononitrate  30 mg Oral Daily    escitalopram  20 mg Oral Daily    gabapentin  300 mg Oral Nightly    insulin glargine  20 Units SubCUTAneous QAM     Continuous Infusions:    sodium chloride Stopped (08/06/22 1032)    dextrose      sodium chloride      dextrose       PRN MedicationsdiphenhydrAMINE, 25 mg, Q8H PRN  loperamide, 2 mg, 4x Daily PRN  sodium chloride, 1 spray, PRN  prochlorperazine, 10 mg, Q6H PRN  hydrALAZINE, 10 mg, Q6H PRN  glucose, 4 tablet, PRN  dextrose bolus, 125 mL, PRN   Or  dextrose bolus, 250 mL, PRN  glucagon (rDNA), 1 mg, PRN  dextrose, , Continuous PRN  sodium chloride flush, 5-40 mL, PRN  sodium chloride, , PRN  polyethylene glycol, 17 g, Daily PRN  acetaminophen, 650 mg, Q6H PRN   Or  acetaminophen, 650 mg, Q6H PRN  dextrose, , Continuous PRN        Diagnostic Labs:  CBC: No results for input(s): WBC, RBC, HGB, HCT, MCV, RDW, PLT in the last 72 hours. BMP:   Recent Labs     08/07/22  0633      K 3.6*      CO2 24   BUN 17   CREATININE 1.54*     BNP: No results for input(s): BNP in the last 72 hours. PT/INR: No results for input(s): PROTIME, INR in the last 72 hours. APTT: No results for input(s): APTT in the last 72 hours. CARDIAC ENZYMES: No results for input(s): CKMB, CKMBINDEX, TROPONINI in the last 72 hours. Invalid input(s): CKTOTAL;3  FASTING LIPID PANEL:  Lab Results   Component Value Date    CHOL 125 05/30/2022    HDL 36 (L) 05/30/2022    TRIG 132 05/30/2022     LIVER PROFILE: No results for input(s): AST, ALT, ALB, BILIDIR, BILITOT, ALKPHOS in the last 72 hours. MICROBIOLOGY:   Lab Results   Component Value Date/Time    CULTURE NO GROWTH 5 DAYS 05/29/2022 07:02 PM       Imaging:    No results found. ASSESSMENT & PLAN     ASSESSMENT / PLAN:     Principal Problem:    Syncope and collapse  Active Problems:    Chronic diastolic (congestive) heart failure (HCC)    Paroxysmal atrial fibrillation (HCC)    Presence of stent in coronary artery in patient with coronary artery disease    Hypokalemia    Obesity (BMI 35.0-39.9 without comorbidity)    Current use of long term anticoagulation    Essential hypertension    Diabetic polyneuropathy associated with type 2 diabetes mellitus ([facility]    Other hyperlipidemia    Moderate episode of recurrent major depressive disorder (HCC)    CKD (chronic kidney disease), stage III (HCC)  Resolved Problems:    Bradycardia    Acute kidney injury superimposed on CKD ([facility]    Acute respiratory failure with hypoxia (HCC)     Syncope and collapse likely due to hypotension due to bradycardia due to beta-blockers and amiodarone. Resolved. Off of Coreg. Follow-up with PCP cardiology as outpatient  MARIELLE on CKD stage III. Resolved. Baseline creatinine 1.4-1.6.   Cr at [facility]ox[facility] 7/28/2022 Yes    Presence of stent in coronary artery in patient with coronary artery disease 7/28/2022 Yes    Hypokalemia 7/30/2022 Yes    Obesity (BMI 35.0-39.9 without comorbidity) 8/7/2022 Yes    Current use of long term anticoagulation 8/7/2022 Yes    Essential hypertension 7/28/2022 Yes    Diabetic polyneuropathy associated with type 2 diabetes mellitus ([facility] 7/28/2022 Yes    Other hyperlipidemia 7/28/2022 Yes    Moderate episode of recurrent major depressive disorder ([facility] 8/7/2022 Yes    CKD (chronic kidney disease), stage III ([facility] 7/28/2022 Yes    Overview Signed 11/24/2021  3:27 PM by Amy Frankel MD     Secondary to ischemic and diabetic nephrosclerosis baseline 1.2-1.4               '''''.'''''       MD DAVID Anto[address]    Phone [phone]   Fax: [phone]  Answering Service: [phone]

## 2022-08-09 NOTE — CARE COORDINATION
Transitional planning-called Zana Williamson and left message with Pierre Members regarding precert. [phone] call from Pierre Members at [facility] waiting on precert. [phone] call from Pierre Members at [facility] denied placement. Peer to peer before 12 noon on 8-. [phone] opt#4. Ref# [id number]. PS Dr. Keke Love.  1500 notified patient of denial.

## 2022-08-09 NOTE — PROGRESS NOTES
Good  Decision Making: Medium Complexity  Barriers to Learning: none  Requires PT Follow-Up: Yes  Activity Tolerance  Activity Tolerance: Patient limited by fatigue;Patient limited by endurance     Plan   Plan  Plan: 6-7 times per week  Current Treatment Recommendations: Therapeutic activities, Strengthening, ROM, Balance training, ADL/Self-care training, IADL training, Neuromuscular re-education, Functional mobility training, Transfer training, Gait training, Stair training, Safety education & training, Home exercise program, Endurance training  Safety Devices  Type of Devices: Gait belt, Patient at risk for falls, Call light within reach, Chair alarm in place, Left in chair  Restraints  Restraints Initially in Place: No     Restrictions  Restrictions/Precautions  Restrictions/Precautions: Up as Tolerated, General Precautions  Required Braces or Orthoses?: No  Position Activity Restriction  Other position/activity restrictions: continuous telemetry     Subjective   General  Chart Reviewed: Yes  Response To Previous Treatment: Patient with no complaints from previous session. Family / Caregiver Present: No  Follows Commands: Within Functional Limits  General Comment  Comments: Pt left seated in recliner w/call light in reach. Alarm activated  Subjective  Subjective: Pt and RN agreeable to PT, pt seated in recliner upon arrival, motivated and pleasant throughout treatment, pt has no c/o pain this date. Cognition   Orientation  Overall Orientation Status: Within Functional Limits  Orientation Level: Oriented X4  Cognition  Overall Cognitive Status: WFL     Objective   Bed mobility  Supine to Sit: Unable to assess  Sit to Supine: Unable to assess  Scooting: Stand by assistance  Bed Mobility Comments: Pt upright in chair upon arrival/ exit.   Transfers  Sit to Stand: Contact guard assistance  Stand to sit: Contact guard assistance  Ambulation  WB Status: FWB  Ambulation  Surface: level tile  Device: Rolling Walker  Assistance: Contact guard assistance  Quality of Gait: Flexed forward posture, corrected w/cueing. Vc's to maintain GIOVANA within RW with fair return demo  Gait Deviations: Slow Cherise; Increased GIOVANA; Decreased step length  Distance: 150 ft. Comments: No LOB experienced, fatigued toward end of ambulation requiring multiple standing rest breaks  More Ambulation?: No  Stairs/Curb  Stairs?: No     Balance  Posture: Good  Sitting - Static: Good  Sitting - Dynamic: Good  Standing - Static: Fair;+  Standing - Dynamic: Fair  Comments: assessed with RW  A/AROM Exercises: Ankle pumps x20 BLE, SLRs x10 BLE, LAQs x15 BLE, KTC x15 BLE.         AM-PAC Score  AM-PAC Inpatient Mobility Raw Score : 17 (08/09/22 1421)  AM-PAC Inpatient T-Scale Score : 42.13 (08/09/22 1421)  Mobility Inpatient CMS 0-100% Score: 50.57 (08/09/22 1421)  Mobility Inpatient CMS G-Code Modifier : CK (08/09/22 1421)          Goals  Short Term Goals  Time Frame for Short term goals: 12  Short term goal 1: Pt able to complete bed mobility modified independently  Short term goal 2: Pt able to complete transfers with RW use supervision  Short term goal 3: Pt able to ambulate 100ft with RW use SBA*1  Short term goal 4: Pt able to climb 4 steps with UE on R handrail CGA*1         Therapy Time   Individual Concurrent Group Co-treatment   Time In 1323         Time Out 1346         Minutes 23         Timed Code Treatment Minutes: [address], PTA

## 2022-08-09 NOTE — PLAN OF CARE
Problem: Discharge Planning  Goal: Discharge to home or other facility with appropriate resources  8/9/2022 1120 by Jackie Hart RN  Outcome: Progressing  8/9/2022 0448 by Yessi Friedman RN  Outcome: Progressing     Problem: Pain  Goal: Verbalizes/displays adequate comfort level or baseline comfort level  8/9/2022 1120 by Jackie Hart RN  Outcome: Progressing  8/9/2022 0448 by Yessi Friedman RN  Outcome: Progressing     Problem: Chronic Conditions and Co-morbidities  Goal: Patient's chronic conditions and co-morbidity symptoms are monitored and maintained or improved  8/9/2022 1120 by Jackie Hart RN  Outcome: Progressing  8/9/2022 0448 by Yessi Friedman RN  Outcome: Progressing     Problem: Safety - Adult  Goal: Free from fall injury  8/9/2022 1120 by Jackie Hart RN  Outcome: Progressing  8/9/2022 0448 by Yessi Friedman RN  Outcome: Progressing     Problem: Skin/Tissue Integrity  Goal: Absence of new skin breakdown  Description: 1. Monitor for areas of redness and/or skin breakdown  2. Assess vascular access sites hourly  3. Every 4-6 hours minimum:  Change oxygen saturation probe site  4. Every 4-6 hours:  If on nasal continuous positive airway pressure, respiratory therapy assess nares and determine need for appliance change or resting period.   8/9/2022 1120 by Jackie Hart RN  Outcome: Progressing  8/9/2022 0448 by Yessi Friedman RN  Outcome: Progressing     Problem: ABCDS Injury Assessment  Goal: Absence of physical injury  8/9/2022 1120 by Jackie Hart RN  Outcome: Progressing  8/9/2022 0448 by Yessi Friedman RN  Outcome: Progressing

## 2022-08-10 ENCOUNTER — TELEPHONE (OUTPATIENT)
Dept: PALLATIVE CARE | Age: 77
End: 2022-08-10

## 2022-08-10 VITALS
TEMPERATURE: 97.9 F | HEART RATE: 49 BPM | HEIGHT: 66 IN | RESPIRATION RATE: 16 BRPM | OXYGEN SATURATION: 95 % | BODY MASS INDEX: 37.52 KG/M2 | WEIGHT: 233.47 LBS | SYSTOLIC BLOOD PRESSURE: 109 MMHG | DIASTOLIC BLOOD PRESSURE: 46 MMHG

## 2022-08-10 LAB
EKG ATRIAL RATE: 59 BPM
EKG P AXIS: 58 DEGREES
EKG P-R INTERVAL: 188 MS
EKG Q-T INTERVAL: 436 MS
EKG QRS DURATION: 98 MS
EKG QTC CALCULATION (BAZETT): 431 MS
EKG T AXIS: 106 DEGREES
EKG VENTRICULAR RATE: 59 BPM
GLUCOSE BLD-MCNC: 131 MG/DL (ref 65–105)
GLUCOSE BLD-MCNC: 169 MG/DL (ref 65–105)

## 2022-08-10 PROCEDURE — 82947 ASSAY GLUCOSE BLOOD QUANT: CPT

## 2022-08-10 PROCEDURE — 93242 EXT ECG>48HR<7D RECORDING: CPT

## 2022-08-10 PROCEDURE — 2580000003 HC RX 258

## 2022-08-10 PROCEDURE — 93243 EXT ECG>48HR<7D SCAN A/R: CPT

## 2022-08-10 PROCEDURE — 93010 ELECTROCARDIOGRAM REPORT: CPT | Performed by: INTERNAL MEDICINE

## 2022-08-10 PROCEDURE — 6370000000 HC RX 637 (ALT 250 FOR IP)

## 2022-08-10 PROCEDURE — 93005 ELECTROCARDIOGRAM TRACING: CPT

## 2022-08-10 PROCEDURE — 99232 SBSQ HOSP IP/OBS MODERATE 35: CPT | Performed by: INTERNAL MEDICINE

## 2022-08-10 RX ADMIN — APIXABAN 5 MG: 5 TABLET, FILM COATED ORAL at 08:07

## 2022-08-10 RX ADMIN — ISOSORBIDE MONONITRATE 30 MG: 30 TABLET ORAL at 08:06

## 2022-08-10 RX ADMIN — AMLODIPINE BESYLATE 10 MG: 10 TABLET ORAL at 08:07

## 2022-08-10 RX ADMIN — TORSEMIDE 10 MG: 20 TABLET ORAL at 08:07

## 2022-08-10 RX ADMIN — TICAGRELOR 90 MG: 90 TABLET ORAL at 08:06

## 2022-08-10 RX ADMIN — INSULIN GLARGINE 20 UNITS: 100 INJECTION, SOLUTION SUBCUTANEOUS at 08:07

## 2022-08-10 RX ADMIN — LOSARTAN POTASSIUM 100 MG: 50 TABLET, FILM COATED ORAL at 08:06

## 2022-08-10 RX ADMIN — SODIUM CHLORIDE, PRESERVATIVE FREE 10 ML: 5 INJECTION INTRAVENOUS at 08:07

## 2022-08-10 RX ADMIN — ESCITALOPRAM OXALATE 20 MG: 10 TABLET ORAL at 08:06

## 2022-08-10 ASSESSMENT — PAIN SCALES - GENERAL: PAINLEVEL_OUTOF10: 0

## 2022-08-10 NOTE — TELEPHONE ENCOUNTER
Faxed referral for [address] to [phone]. Included referral, demographic sheet, progress note and patient summary with medication list.    I called and left message for [facility]. Letting them know that patient has asked to be followed by them. Gave them patient name, , phone number and address. Left my name and contact number in case they need additional information.       [facility] Coordinator  Bri Lawson BSN, [facility]  [address] [phone]

## 2022-08-10 NOTE — PROGRESS NOTES
Physical Therapy        Physical Therapy Cancel Note      DATE: 8/10/2022    NAME: Leo Walter  MRN: [id number]   : 1945      Patient not seen this date for Physical Therapy due to:    Patient Declined: Pt refusing PT, stated \"I'm going home soon. \"      Electronically signed by Jaquan Navarro PTA on 8/10/2022 at 1:41 PM

## 2022-08-10 NOTE — PROGRESS NOTES
Comprehensive Nutrition Assessment    Type and Reason for Visit:  RD Nutrition Re-Screen/LOS    Nutrition Recommendations/Plan:   Continue carb controlled diet. Send diabetic ONS once daily. Malnutrition Assessment:  Malnutrition Status: At risk for malnutrition (Comment) (08/10/22 1313)    Context:  Acute Illness     Findings of the 6 clinical characteristics of malnutrition:  Energy Intake:  Mild decrease in energy intake (Comment)  Weight Loss:  Unable to assess     Body Fat Loss:  No significant body fat loss     Muscle Mass Loss:  No significant muscle mass loss    Fluid Accumulation:  No significant fluid accumulation     Strength:  Not Performed    Nutrition Assessment:    Pt reports good appetite/PO intake in the morning for breakfast (majority of meal), but states her appetite decreases slightly for lunch and dinner (~50%). Willing to try ONS. Nutrition Related Findings:    meds/labs reviewed Wound Type: None       Current Nutrition Intake & Therapies:    Average Meal Intake: 51-75%, %  Average Supplements Intake: None Ordered  ADULT DIET; Regular; 4 carb choices (60 gm/meal)  ADULT ORAL NUTRITION SUPPLEMENT; Dinner; Diabetic Oral Supplement    Anthropometric Measures:  Height: 5' 6\" (167.6 cm)  Ideal Body Weight (IBW): 130 lbs (59 kg)       Current Body Weight: 233 lb 7.5 oz (105.9 kg), 179.6 % IBW.     Current BMI (kg/m2): 37.7                          BMI Categories: Obese Class 2 (BMI 35.0 -39.9)    Estimated Daily Nutrient Needs:  Energy Requirements Based On: Formula  Weight Used for Energy Requirements: Current  Energy (kcal/day): 3191-1112 kcals/day  Weight Used for Protein Requirements: Ideal  Protein (g/day): 70-90 gm/day     Fluid (ml/day): per MD    Nutrition Diagnosis:   Predicted inadequate energy intake related to  (appetite) as evidenced by  (slight decrease in PO intake)    Nutrition Interventions:   Food and/or Nutrient Delivery: Continue Current Diet, Start Oral Nutrition Supplement  Nutrition Education/Counseling: No recommendation at this time  Coordination of Nutrition Care: Continue to monitor while inpatient  Plan of Care discussed with: Pt    Goals:  Previous Goal Met:  (goal set)  Goals: Meet at least 75% of estimated needs, prior to discharge       Nutrition Monitoring and Evaluation:   Behavioral-Environmental Outcomes: None Identified  Food/Nutrient Intake Outcomes: Food and Nutrient Intake, Supplement Intake  Physical Signs/Symptoms Outcomes: Weight, Biochemical Data, Nutrition Focused Physical Findings    Discharge Planning:     Too soon to determine     Daiana Barriga MS, RD, LD  Contact: [phone]

## 2022-08-10 NOTE — PROGRESS NOTES
bilaterally were clear to auscultation. There were no wheezes, rhonchi or rales. There is no intercostal retraction or use of accessory muscles. No egophony noted. Cardiovascular: Regular without murmur, clicks, gallops or rubs. Abdomen: Slightly rounded and soft without organomegaly. No rebound, rigidity or guarding was appreciated. Musculoskeletal: Normal curvature of the spine. No gross muscle weakness. Extremities:  No lower extremity edema, ulcerations, tenderness, varicosities or erythema. Muscle size, tone and strength are normal.  No involuntary movements are noted. Skin:  Warm and dry. Good color, turgor and pigmentation. No lesions or scars.   No cyanosis or clubbing  Neurological/Psychiatric: The patient's general behavior, level of consciousness, thought content and emotional status is normal.        Medications:  Scheduled Medications:    insulin lispro  0-16 Units SubCUTAneous TID WC    insulin lispro  0-4 Units SubCUTAneous Nightly    torsemide  10 mg Oral Daily    losartan  100 mg Oral Daily    amLODIPine  10 mg Oral Daily    sodium chloride flush  5-40 mL IntraVENous 2 times per day    apixaban  5 mg Oral BID    ticagrelor  90 mg Oral BID    rosuvastatin  40 mg Oral Nightly    isosorbide mononitrate  30 mg Oral Daily    escitalopram  20 mg Oral Daily    gabapentin  300 mg Oral Nightly    insulin glargine  20 Units SubCUTAneous QAM     Continuous Infusions:    sodium chloride Stopped (08/06/22 1032)    dextrose      sodium chloride      dextrose       PRN MedicationsdiphenhydrAMINE, 25 mg, Q8H PRN  loperamide, 2 mg, 4x Daily PRN  sodium chloride, 1 spray, PRN  prochlorperazine, 10 mg, Q6H PRN  hydrALAZINE, 10 mg, Q6H PRN  glucose, 4 tablet, PRN  dextrose bolus, 125 mL, PRN   Or  dextrose bolus, 250 mL, PRN  glucagon (rDNA), 1 mg, PRN  dextrose, , Continuous PRN  sodium chloride flush, 5-40 mL, PRN  sodium chloride, , PRN  polyethylene glycol, 17 g, Daily PRN  acetaminophen, 650 mg, Q6H PRN   Or  acetaminophen, 650 mg, Q6H PRN  dextrose, , Continuous PRN        Diagnostic Labs:  CBC: No results for input(s): WBC, RBC, HGB, HCT, MCV, RDW, PLT in the last 72 hours. BMP: No results for input(s): NA, K, CL, CO2, PHOS, BUN, CREATININE, CA in the last 72 hours. BNP: No results for input(s): BNP in the last 72 hours. PT/INR: No results for input(s): PROTIME, INR in the last 72 hours. APTT: No results for input(s): APTT in the last 72 hours. CARDIAC ENZYMES: No results for input(s): CKMB, CKMBINDEX, TROPONINI in the last 72 hours. Invalid input(s): CKTOTAL;3  FASTING LIPID PANEL:  Lab Results   Component Value Date    CHOL 125 05/30/2022    HDL 36 (L) 05/30/2022    TRIG 132 05/30/2022     LIVER PROFILE: No results for input(s): AST, ALT, ALB, BILIDIR, BILITOT, ALKPHOS in the last 72 hours. MICROBIOLOGY:   Lab Results   Component Value Date/Time    CULTURE NO GROWTH 5 DAYS 05/29/2022 07:02 PM       Imaging:    No results found. ASSESSMENT & PLAN     ASSESSMENT / PLAN:     Principal Problem:    Syncope and collapse  Active Problems:    Chronic diastolic (congestive) heart failure (HCC)    Paroxysmal atrial fibrillation (HCC)    Presence of stent in coronary artery in patient with coronary artery disease    Hypokalemia    Obesity (BMI 35.0-39.9 without comorbidity)    Current use of long term anticoagulation    Essential hypertension    Diabetic polyneuropathy associated with type 2 diabetes mellitus ([facility]    Other hyperlipidemia    Moderate episode of recurrent major depressive disorder (HCC)    CKD (chronic kidney disease), stage III (HCC)  Resolved Problems:    Bradycardia    Acute kidney injury superimposed on CKD ([facility] [address]    Acute respiratory failure with hypoxia (HCC)     Syncope and collapse likely due to hypotension due to bradycardia due to beta-blockers and amiodarone. Resolved. Off of Coreg. Follow-up with PCP cardiology as outpatient  MARIELLE on CKD stage III. Resolved.   Baseline creatinine 1.4-1.6. Cr at baseline. Continue on Demadex 10 daily. BMP in 1 week. Follow-up with nephrology in 4 to 6 weeks. HFpEF Echo shows EF 54%, evidence of diastolic dysfunction 5/72. Continue on Demadex 10 daily  Paroxysmal atrial fibrillation. Currently NSR 60 bpm.  TSH 0.60 7/28. [id number] Score: 7. Continue on Eliquis. Continue on amiodarone 100 daily on discharge. Follow-up with cardiology as outpatient for consideration of pacemaker if persistently bradycardic. Symptomatic bradycardia on arrival.  Resolved. Off of Coreg. Follow-up with cardiology as outpatient for consideration of pacemaker persistently symptomatic  T2DM with neuropathy with long-term use of insulin. Continue Lantus 20 units daily. HD SSI. POCT glucose 4 times daily before meals and at bedtime. hypolycemia protocol. HTN. Currently hypertensive. Continue on Norvasc 10, Cozaar 100 and Demadex 10 daily. Follow-up with PCP  MVCAD s/p PCI(staged) to RCA, LAD, OM. Continue on Crestor, Brilinta, Eliquis. Follow-up with cardiology as outpatient  MDD. Continue on Lexapro 20 daily. Follow-up with PCP as outpatient  Hypokalemia. Replaced. DVT ppx : Eliquis  GI ppx: Not indicated    PT/OT: Consulted  Discharge Planning / SW:  consulted    Errol Navarro MD  Internal Medicine Resident, PGY-1  [address] [facility]  8/10/2022, 6:40 AM     Attestation and add on       I have discussed the care of Yuval Vivar , including pertinent history and exam findings,      8/10/22    with the resident. I have seen and examined the patient and the key elements of all parts of the encounter have been performed by me . I agree with the assessment, plan and orders as documented by the resident.      Hospital Problems             Last Modified POA    * (Principal) Syncope and collapse 7/28/2022 Yes    Chronic diastolic (congestive) heart failure (HCC) 7/28/2022 Yes    [facility] 7/28/2022 Yes    Presence of stent in coronary artery in patient with coronary artery disease 7/28/2022 Yes    Hypokalemia 7/30/2022 Yes    Obesity (BMI 35.0-39.9 without comorbidity) 8/7/2022 Yes    Current use of long term anticoagulation 8/7/2022 Yes    Essential hypertension 7/28/2022 Yes    Diabetic polyneuropathy associated with type 2 diabetes mellitus [facility] 7/28/2022 Yes    Other hyperlipidemia 7/28/2022 Yes    Moderate episode of recurrent major depressive disorder ([facility] 8/7/2022 Yes    CKD (chronic kidney disease), stage III ([facility] 7/28/2022 Yes    Overview Signed 11/24/2021  3:27 PM by Samuel Potts MD     Secondary to ischemic and diabetic nephrosclerosis baseline 1.2-1.4               '''''.'''''       Jessica Gastelum MD      [address], [address] [place] [address].    Phone [phone]   Fax: [phone]  Answering Service: [phone]

## 2022-08-10 NOTE — PLAN OF CARE
Problem: Discharge Planning  Goal: Discharge to home or other facility with appropriate resources  8/10/2022 1357 by Alesha Calvo RN  Outcome: Completed  8/10/2022 1116 by Taylor Buchanan RN  Outcome: Progressing     Problem: Pain  Goal: Verbalizes/displays adequate comfort level or baseline comfort level  8/10/2022 1357 by Alesha Calvo RN  Outcome: Completed  8/10/2022 1116 by Taylor Buchanan RN  Outcome: Progressing     Problem: Chronic Conditions and Co-morbidities  Goal: Patient's chronic conditions and co-morbidity symptoms are monitored and maintained or improved  8/10/2022 1357 by Alesha Calvo RN  Outcome: Completed  8/10/2022 1116 by Taylor Buchanan RN  Outcome: Progressing     Problem: Safety - Adult  Goal: Free from fall injury  8/10/2022 1357 by Alesha Calvo RN  Outcome: Completed  8/10/2022 1116 by Taylor Buchanan RN  Outcome: Progressing     Problem: Skin/Tissue Integrity  Goal: Absence of new skin breakdown  Description: 1. Monitor for areas of redness and/or skin breakdown  2. Assess vascular access sites hourly  3. Every 4-6 hours minimum:  Change oxygen saturation probe site  4. Every 4-6 hours:  If on nasal continuous positive airway pressure, respiratory therapy assess nares and determine need for appliance change or resting period.   8/10/2022 1357 by Alesha Calvo RN  Outcome: Completed  8/10/2022 1116 by Taylor Buchanan RN  Outcome: Progressing     Problem: ABCDS Injury Assessment  Goal: Absence of physical injury  8/10/2022 1357 by Alesha Calvo RN  Outcome: Completed  8/10/2022 1116 by Taylor Buchanan RN  Outcome: Progressing     Problem: Nutrition Deficit:  Goal: Optimize nutritional status  Outcome: Completed

## 2022-08-10 NOTE — CARE COORDINATION
Discharge [facility] Case Management Department  Written by: Franco Rodgers RN    Patient Name: Erika Benedict  Attending Provider: Niecy Avendano MD  Admit Date: 2022  2:11 PM  MRN: [id number]  Account: [de-identified]                     : 1945  Discharge Date: 8-      Disposition: home with [facility], hospital bed ordered from [address], RN

## 2022-08-10 NOTE — PROGRESS NOTES
PALLIATIVE CARE NURSING ASSESSMENT    Patient: Carlee Ventura  Room: 9888/7463-65    Reason For Consult   Goals of care evaluation  Distress management  Guidance and support  Facilitate communications  Assistance in coordinating care    Code Status: Full Code    Summary:  Met and spoke with patient 1:1  Support, encouragement offered. Information given to patient on Palliative Care and Hospice Care. Pt is interested in Palliative care to follow her at home.  and bedside nurse notified of patients request.    Notified [address] of request.  Spencer Keller will contact patient after discharge. Plan home with home care [facility] and [address] services. Continue current treatment. Palliative Care will continue to follow. Impression: Carlee Ventura is a 68y.o. year old female  has a past medical history of Acute renal failure with tubular necrosis ([address] Anxiety, Atrial fibrillation ([address] Benign positional vertigo, CAD (coronary artery disease), Chest pain, CKD (chronic kidney disease), stage III ([address] Depression, Diabetes mellitus ([address] Diabetic neuropathy ([address] Dysuria, Hyperlipidemia, Hypertension, Migraine, and Persistent proteinuria. .  Currently hospitalized for the management of syncope/collapse. The Palliative Care Team is following to assist with patient and family support, goals of care. Vital Signs  Blood pressure (!) 109/46, pulse (!) 49, temperature 97.9 °F (36.6 °C), temperature source Oral, resp. rate 16, height 5' 6\" (1.676 m), weight 233 lb 7.5 oz (105.9 kg), SpO2 95 %.     Patient Active Problem List   Diagnosis    Type 2 diabetes mellitus (HCC)    Vertigo    Essential hypertension    Atherosclerotic heart disease of native coronary artery with other forms of angina pectoris (HCC)    Dyspnea    Claudication in peripheral vascular disease (HCC)    Diabetic polyneuropathy associated with type 2 diabetes mellitus ([address]    Other hyperlipidemia for home visits. David Kamara relates she would be more interested in home visits. I let her know that palliative care services are available for assessment of patient, symptom management, goals of care. Goals of palliative care are supporting patient, managing symptoms, giving patient's the best quality of life as possible and help them to stay out of hospital and live well. David Kamara relates she is interested in this. I gave her pamphlets on [facility] and [address] [place] [address]. I also explained the difference between palliative care and hospice care. Let David Kamara know that she should qualify for palliative care services. Let her know that she can still be on active treatment and be on palliative care with no time limit. Pt asked me to contact [facility] that she would like to see them. Explained that I will notify [facility] and that they will call her after discharge to set up appointment. David Kamara is appreciative. David Kamara relates she has home care already set up with [facility]. Discussed palliative care request with case management, referral sent electronically to [facility].  relates that patient has nurse, aide and physical therapy services available through her home care. We discussed benefit of therapy orders at discharge. Update given to  and patient's nurse. Called   at [facility] and gave them information about patient. Made sure that they had received referral from .       Goals/Plan of care  Education/support to staff  Education/support to patient  Discharge planning/helping to coordinate care  Communications with primary service  Providing support for coping/adaptation/distress of patient  Discussing meaning/purpose   Continue with current plan of care  Validating patient/family distress  Continued communication updates  Recognizing, reflecting, and empathizing with the patient's anticipatory grief  Explained palliative care services outpatient that patient could qualify for.  Explained the differences between palliative care and hospice to patient. Pt is interested in referral to [facility] for palliative care. Spoke with [facility]. Pt does not meet criteria for ARU or SNF. Plan is to go home with home care [facility] and [facility].         Palliative Care Coordinator  Hattie WALKER, RN, ONN-CG    [facility] Office: [facility] Office: [phone]  [facility] Office: [phone]    For Sympt[facility] Management Clinic scheduling please call [phone]

## 2022-08-10 NOTE — PROGRESS NOTES
CLINICAL PHARMACY NOTE: MEDS TO BEDS    Total # of Prescriptions Filled: 3   The following medications were delivered to the patient:  LOSARTAN 100  NORVASC 10   LOPERAMIDE 2     Additional Documentation:

## 2022-08-11 ENCOUNTER — TELEPHONE (OUTPATIENT)
Dept: OTHER | Age: 77
End: 2022-08-11

## 2022-08-11 ENCOUNTER — APPOINTMENT (OUTPATIENT)
Dept: CT IMAGING | Age: 77
DRG: 660 | End: 2022-08-11
Payer: MEDICARE

## 2022-08-11 ENCOUNTER — HOSPITAL ENCOUNTER (INPATIENT)
Age: 77
LOS: 6 days | Discharge: HOME HEALTH | DRG: 660 | End: 2022-08-17
Attending: EMERGENCY MEDICINE | Admitting: INTERNAL MEDICINE
Payer: MEDICARE

## 2022-08-11 ENCOUNTER — CARE COORDINATION (OUTPATIENT)
Dept: CASE MANAGEMENT | Age: 77
End: 2022-08-11

## 2022-08-11 DIAGNOSIS — N13.2 HYDRONEPHROSIS CONCURRENT WITH AND DUE TO CALCULI OF KIDNEY AND URETER: ICD-10-CM

## 2022-08-11 DIAGNOSIS — N20.0 NEPHROLITHIASIS: Primary | ICD-10-CM

## 2022-08-11 PROBLEM — N18.9 ACUTE KIDNEY INJURY SUPERIMPOSED ON CKD (HCC): Status: ACTIVE | Noted: 2022-08-11

## 2022-08-11 PROBLEM — N17.9 ACUTE KIDNEY INJURY SUPERIMPOSED ON CKD (HCC): Status: ACTIVE | Noted: 2022-08-11

## 2022-08-11 LAB
ABSOLUTE EOS #: 0.04 K/UL (ref 0–0.44)
ABSOLUTE IMMATURE GRANULOCYTE: 0.03 K/UL (ref 0–0.3)
ABSOLUTE LYMPH #: 1.58 K/UL (ref 1.1–3.7)
ABSOLUTE MONO #: 0.89 K/UL (ref 0.1–1.2)
ALBUMIN SERPL-MCNC: 4.2 G/DL (ref 3.5–5.2)
ALBUMIN/GLOBULIN RATIO: 1.3 (ref 1–2.5)
ALP BLD-CCNC: 68 U/L (ref 35–104)
ALT SERPL-CCNC: 10 U/L (ref 5–33)
ANION GAP SERPL CALCULATED.3IONS-SCNC: 13 MMOL/L (ref 9–17)
AST SERPL-CCNC: 15 U/L
BASOPHILS # BLD: 0 % (ref 0–2)
BASOPHILS ABSOLUTE: 0.03 K/UL (ref 0–0.2)
BILIRUB SERPL-MCNC: 0.42 MG/DL (ref 0.3–1.2)
BILIRUBIN URINE: NEGATIVE
BUN BLDV-MCNC: 29 MG/DL (ref 8–23)
CALCIUM SERPL-MCNC: 9.7 MG/DL (ref 8.6–10.4)
CASTS UA: ABNORMAL /LPF (ref 0–8)
CHLORIDE BLD-SCNC: 98 MMOL/L (ref 98–107)
CO2: 24 MMOL/L (ref 20–31)
COLOR: YELLOW
CREAT SERPL-MCNC: 2.31 MG/DL (ref 0.5–0.9)
EOSINOPHILS RELATIVE PERCENT: 0 % (ref 1–4)
EPITHELIAL CELLS UA: ABNORMAL /HPF (ref 0–5)
GFR AFRICAN AMERICAN: 25 ML/MIN
GFR NON-AFRICAN AMERICAN: 20 ML/MIN
GFR SERPL CREATININE-BSD FRML MDRD: ABNORMAL ML/MIN/{1.73_M2}
GLUCOSE BLD-MCNC: 213 MG/DL (ref 70–99)
GLUCOSE URINE: ABNORMAL
HCT VFR BLD CALC: 33.9 % (ref 36.3–47.1)
HEMOGLOBIN: 10.7 G/DL (ref 11.9–15.1)
IMMATURE GRANULOCYTES: 0 %
KETONES, URINE: NEGATIVE
LACTIC ACID, WHOLE BLOOD: 1.3 MMOL/L (ref 0.7–2.1)
LEUKOCYTE ESTERASE, URINE: NEGATIVE
LIPASE: 37 U/L (ref 13–60)
LYMPHOCYTES # BLD: 17 % (ref 24–43)
MAGNESIUM: 2.1 MG/DL (ref 1.6–2.6)
MCH RBC QN AUTO: 29.1 PG (ref 25.2–33.5)
MCHC RBC AUTO-ENTMCNC: 31.6 G/DL (ref 28.4–34.8)
MCV RBC AUTO: 92.1 FL (ref 82.6–102.9)
MONOCYTES # BLD: 9 % (ref 3–12)
NITRITE, URINE: NEGATIVE
NRBC AUTOMATED: 0 PER 100 WBC
PDW BLD-RTO: 15.1 % (ref 11.8–14.4)
PH UA: 5.5 (ref 5–8)
PLATELET # BLD: 217 K/UL (ref 138–453)
PMV BLD AUTO: 11.1 FL (ref 8.1–13.5)
POTASSIUM SERPL-SCNC: 4 MMOL/L (ref 3.7–5.3)
PROTEIN UA: ABNORMAL
RBC # BLD: 3.68 M/UL (ref 3.95–5.11)
RBC # BLD: ABNORMAL 10*6/UL
RBC UA: ABNORMAL /HPF (ref 0–4)
SEG NEUTROPHILS: 73 % (ref 36–65)
SEGMENTED NEUTROPHILS ABSOLUTE COUNT: 6.85 K/UL (ref 1.5–8.1)
SODIUM BLD-SCNC: 135 MMOL/L (ref 135–144)
SPECIFIC GRAVITY UA: 1.01 (ref 1–1.03)
TOTAL PROTEIN: 7.4 G/DL (ref 6.4–8.3)
TURBIDITY: CLEAR
URINE HGB: ABNORMAL
UROBILINOGEN, URINE: NORMAL
WBC # BLD: 9.4 K/UL (ref 3.5–11.3)
WBC UA: ABNORMAL /HPF (ref 0–5)

## 2022-08-11 PROCEDURE — 6360000002 HC RX W HCPCS: Performed by: EMERGENCY MEDICINE

## 2022-08-11 PROCEDURE — 96374 THER/PROPH/DIAG INJ IV PUSH: CPT

## 2022-08-11 PROCEDURE — 96376 TX/PRO/DX INJ SAME DRUG ADON: CPT

## 2022-08-11 PROCEDURE — 87086 URINE CULTURE/COLONY COUNT: CPT

## 2022-08-11 PROCEDURE — 80053 COMPREHEN METABOLIC PANEL: CPT

## 2022-08-11 PROCEDURE — 74176 CT ABD & PELVIS W/O CONTRAST: CPT

## 2022-08-11 PROCEDURE — 1200000000 HC SEMI PRIVATE

## 2022-08-11 PROCEDURE — 83735 ASSAY OF MAGNESIUM: CPT

## 2022-08-11 PROCEDURE — 83690 ASSAY OF LIPASE: CPT

## 2022-08-11 PROCEDURE — 83605 ASSAY OF LACTIC ACID: CPT

## 2022-08-11 PROCEDURE — 85025 COMPLETE CBC W/AUTO DIFF WBC: CPT

## 2022-08-11 PROCEDURE — 81001 URINALYSIS AUTO W/SCOPE: CPT

## 2022-08-11 PROCEDURE — 2580000003 HC RX 258: Performed by: EMERGENCY MEDICINE

## 2022-08-11 PROCEDURE — 99285 EMERGENCY DEPT VISIT HI MDM: CPT

## 2022-08-11 RX ORDER — SODIUM CHLORIDE 9 MG/ML
INJECTION, SOLUTION INTRAVENOUS CONTINUOUS
Status: DISCONTINUED | OUTPATIENT
Start: 2022-08-12 | End: 2022-08-17

## 2022-08-11 RX ORDER — MORPHINE SULFATE 4 MG/ML
2 INJECTION, SOLUTION INTRAMUSCULAR; INTRAVENOUS
Status: DISCONTINUED | OUTPATIENT
Start: 2022-08-11 | End: 2022-08-12

## 2022-08-11 RX ORDER — 0.9 % SODIUM CHLORIDE 0.9 %
500 INTRAVENOUS SOLUTION INTRAVENOUS ONCE
Status: COMPLETED | OUTPATIENT
Start: 2022-08-11 | End: 2022-08-11

## 2022-08-11 RX ORDER — MORPHINE SULFATE 4 MG/ML
4 INJECTION, SOLUTION INTRAMUSCULAR; INTRAVENOUS ONCE
Status: COMPLETED | OUTPATIENT
Start: 2022-08-11 | End: 2022-08-11

## 2022-08-11 RX ADMIN — MORPHINE SULFATE 2 MG: 4 INJECTION INTRAVENOUS at 23:42

## 2022-08-11 RX ADMIN — MORPHINE SULFATE 4 MG: 4 INJECTION INTRAVENOUS at 21:08

## 2022-08-11 RX ADMIN — SODIUM CHLORIDE 500 ML: 9 INJECTION, SOLUTION INTRAVENOUS at 21:45

## 2022-08-11 ASSESSMENT — PAIN SCALES - GENERAL
PAINLEVEL_OUTOF10: 10
PAINLEVEL_OUTOF10: 9

## 2022-08-11 NOTE — CARE COORDINATION
[facility] Initial Follow Up Call - unable to reach patient - voicemail not set up. Reached daughter Oz Dixon, [address] - she will call me back when she gets to patient's home later today      4:50pm - Daughter Oz Dixon did not call back as discussed - will reattempt contact again tomorrow    Call within 2 business days of discharge: Yes    Patient: Elton Umanzor   Patient : 1945   MRN: [id number]    Reason for Admission: syncope, falls, weakness - insurance denied placement upon DC  Discharge Date: 8/10/22   RARS: Readmission Risk Score: 19      Last Discharge [facility]       Date Complaint Diagnosis Description Type Department Provider    22 Shortness of Breath; Fall; Leg Injury; Fatigue MARIELLE (acute kidney injury) ([facility] . .. ED to Hosp-Admission (Discharged) (ADMITTED) Lelo Logan MD; Mabel Simms ... Spoke with: daughter Facundo Rush - she will call me back when she gets to patient's home later today. Patient lives alone, but daughter says several family members stop by during the day. Confirmed that [facility]. has initiated home care. Hospital bed is supposed to be delivered today. Noted [facility] in place.       Facility: [facility]    Non-face-to-face services provided:  Obtained and reviewed discharge summary and/or continuity of care documents        Follow Up  Future Appointments   Date Time Provider Олег Cheng   2022  3:10 PM Thania Gonzalez MD [facility] None       Mata Pineda RN

## 2022-08-11 NOTE — LETTER
[facility]  [address] [facility] [address]  Phone: [phone]            August 12, 2022     Patient: June Elizabeth   YOB: 1945   Date of Visit: 8/11/2022       To Whom It May Concern:    Vimal Barriga has surgery today 8/12/2022. Her son was here while the surgery was performed. If you have any questions or concerns, please don't hesitate to call.     Sincerely,        Shen Corea RN

## 2022-08-12 ENCOUNTER — ANESTHESIA (OUTPATIENT)
Dept: OPERATING ROOM | Age: 77
DRG: 660 | End: 2022-08-12
Payer: MEDICARE

## 2022-08-12 ENCOUNTER — ANESTHESIA EVENT (OUTPATIENT)
Dept: OPERATING ROOM | Age: 77
DRG: 660 | End: 2022-08-12
Payer: MEDICARE

## 2022-08-12 ENCOUNTER — CARE COORDINATION (OUTPATIENT)
Dept: CASE MANAGEMENT | Age: 77
End: 2022-08-12

## 2022-08-12 ENCOUNTER — APPOINTMENT (OUTPATIENT)
Dept: GENERAL RADIOLOGY | Age: 77
DRG: 660 | End: 2022-08-12
Payer: MEDICARE

## 2022-08-12 PROBLEM — N13.9 OBSTRUCTIVE UROPATHY: Status: ACTIVE | Noted: 2022-08-12

## 2022-08-12 LAB
ABSOLUTE EOS #: 0.07 K/UL (ref 0–0.44)
ABSOLUTE IMMATURE GRANULOCYTE: 0.03 K/UL (ref 0–0.3)
ABSOLUTE LYMPH #: 1.72 K/UL (ref 1.1–3.7)
ABSOLUTE MONO #: 1.03 K/UL (ref 0.1–1.2)
ANION GAP SERPL CALCULATED.3IONS-SCNC: 13 MMOL/L (ref 9–17)
BASOPHILS # BLD: 0 % (ref 0–2)
BASOPHILS ABSOLUTE: 0.03 K/UL (ref 0–0.2)
BUN BLDV-MCNC: 30 MG/DL (ref 8–23)
CALCIUM SERPL-MCNC: 9.4 MG/DL (ref 8.6–10.4)
CHLORIDE BLD-SCNC: 101 MMOL/L (ref 98–107)
CO2: 23 MMOL/L (ref 20–31)
CREAT SERPL-MCNC: 2.35 MG/DL (ref 0.5–0.9)
CREATININE URINE: 57.8 MG/DL (ref 28–217)
CULTURE: NORMAL
EOSINOPHILS RELATIVE PERCENT: 1 % (ref 1–4)
GFR AFRICAN AMERICAN: 24 ML/MIN
GFR NON-AFRICAN AMERICAN: 20 ML/MIN
GFR SERPL CREATININE-BSD FRML MDRD: ABNORMAL ML/MIN/{1.73_M2}
GLUCOSE BLD-MCNC: 159 MG/DL (ref 70–99)
GLUCOSE BLD-MCNC: 168 MG/DL (ref 65–105)
GLUCOSE BLD-MCNC: 180 MG/DL (ref 65–105)
GLUCOSE BLD-MCNC: 181 MG/DL (ref 65–105)
GLUCOSE BLD-MCNC: 189 MG/DL (ref 65–105)
GLUCOSE BLD-MCNC: 190 MG/DL (ref 65–105)
HCT VFR BLD CALC: 33.3 % (ref 36.3–47.1)
HCT VFR BLD CALC: 33.8 % (ref 36.3–47.1)
HEMOGLOBIN: 10.1 G/DL (ref 11.9–15.1)
HEMOGLOBIN: 10.9 G/DL (ref 11.9–15.1)
IMMATURE GRANULOCYTES: 0 %
LYMPHOCYTES # BLD: 19 % (ref 24–43)
MCH RBC QN AUTO: 30.3 PG (ref 25.2–33.5)
MCHC RBC AUTO-ENTMCNC: 32.7 G/DL (ref 28.4–34.8)
MCV RBC AUTO: 92.5 FL (ref 82.6–102.9)
MONOCYTES # BLD: 12 % (ref 3–12)
NRBC AUTOMATED: 0 PER 100 WBC
PDW BLD-RTO: 14.9 % (ref 11.8–14.4)
PLATELET # BLD: 203 K/UL (ref 138–453)
PMV BLD AUTO: 11.6 FL (ref 8.1–13.5)
POTASSIUM SERPL-SCNC: 4.3 MMOL/L (ref 3.7–5.3)
RBC # BLD: 3.6 M/UL (ref 3.95–5.11)
RBC # BLD: ABNORMAL 10*6/UL
SEG NEUTROPHILS: 68 % (ref 36–65)
SEGMENTED NEUTROPHILS ABSOLUTE COUNT: 6.07 K/UL (ref 1.5–8.1)
SODIUM BLD-SCNC: 137 MMOL/L (ref 135–144)
SODIUM,UR: 78 MMOL/L
SPECIMEN DESCRIPTION: NORMAL
WBC # BLD: 9 K/UL (ref 3.5–11.3)

## 2022-08-12 PROCEDURE — 6360000002 HC RX W HCPCS

## 2022-08-12 PROCEDURE — 2580000003 HC RX 258

## 2022-08-12 PROCEDURE — C1769 GUIDE WIRE: HCPCS | Performed by: UROLOGY

## 2022-08-12 PROCEDURE — 87040 BLOOD CULTURE FOR BACTERIA: CPT

## 2022-08-12 PROCEDURE — 36415 COLL VENOUS BLD VENIPUNCTURE: CPT

## 2022-08-12 PROCEDURE — 2500000003 HC RX 250 WO HCPCS

## 2022-08-12 PROCEDURE — 3600000014 HC SURGERY LEVEL 4 ADDTL 15MIN: Performed by: UROLOGY

## 2022-08-12 PROCEDURE — 2720000010 HC SURG SUPPLY STERILE: Performed by: UROLOGY

## 2022-08-12 PROCEDURE — 1200000000 HC SEMI PRIVATE

## 2022-08-12 PROCEDURE — 0TF68ZZ FRAGMENTATION IN RIGHT URETER, VIA NATURAL OR ARTIFICIAL OPENING ENDOSCOPIC: ICD-10-PCS | Performed by: UROLOGY

## 2022-08-12 PROCEDURE — 82570 ASSAY OF URINE CREATININE: CPT

## 2022-08-12 PROCEDURE — 0T768DZ DILATION OF RIGHT URETER WITH INTRALUMINAL DEVICE, VIA NATURAL OR ARTIFICIAL OPENING ENDOSCOPIC: ICD-10-PCS | Performed by: UROLOGY

## 2022-08-12 PROCEDURE — 85025 COMPLETE CBC W/AUTO DIFF WBC: CPT

## 2022-08-12 PROCEDURE — 7100000001 HC PACU RECOVERY - ADDTL 15 MIN: Performed by: UROLOGY

## 2022-08-12 PROCEDURE — 3700000001 HC ADD 15 MINUTES (ANESTHESIA): Performed by: UROLOGY

## 2022-08-12 PROCEDURE — C2617 STENT, NON-COR, TEM W/O DEL: HCPCS | Performed by: UROLOGY

## 2022-08-12 PROCEDURE — 2580000003 HC RX 258: Performed by: UROLOGY

## 2022-08-12 PROCEDURE — 80048 BASIC METABOLIC PNL TOTAL CA: CPT

## 2022-08-12 PROCEDURE — 99223 1ST HOSP IP/OBS HIGH 75: CPT | Performed by: INTERNAL MEDICINE

## 2022-08-12 PROCEDURE — 2580000003 HC RX 258: Performed by: STUDENT IN AN ORGANIZED HEALTH CARE EDUCATION/TRAINING PROGRAM

## 2022-08-12 PROCEDURE — 85018 HEMOGLOBIN: CPT

## 2022-08-12 PROCEDURE — 3209999900 FLUORO FOR SURGICAL PROCEDURES

## 2022-08-12 PROCEDURE — 84300 ASSAY OF URINE SODIUM: CPT

## 2022-08-12 PROCEDURE — 7100000000 HC PACU RECOVERY - FIRST 15 MIN: Performed by: UROLOGY

## 2022-08-12 PROCEDURE — 6370000000 HC RX 637 (ALT 250 FOR IP): Performed by: STUDENT IN AN ORGANIZED HEALTH CARE EDUCATION/TRAINING PROGRAM

## 2022-08-12 PROCEDURE — C1758 CATHETER, URETERAL: HCPCS | Performed by: UROLOGY

## 2022-08-12 PROCEDURE — 85014 HEMATOCRIT: CPT

## 2022-08-12 PROCEDURE — 3700000000 HC ANESTHESIA ATTENDED CARE: Performed by: UROLOGY

## 2022-08-12 PROCEDURE — 6370000000 HC RX 637 (ALT 250 FOR IP)

## 2022-08-12 PROCEDURE — 2709999900 HC NON-CHARGEABLE SUPPLY: Performed by: UROLOGY

## 2022-08-12 PROCEDURE — 6360000002 HC RX W HCPCS: Performed by: STUDENT IN AN ORGANIZED HEALTH CARE EDUCATION/TRAINING PROGRAM

## 2022-08-12 PROCEDURE — 82947 ASSAY GLUCOSE BLOOD QUANT: CPT

## 2022-08-12 PROCEDURE — 3600000004 HC SURGERY LEVEL 4 BASE: Performed by: UROLOGY

## 2022-08-12 DEVICE — STENT URET 6FR L26CM PERCFLX HYDR+ TAPR TIP GRAD: Type: IMPLANTABLE DEVICE | Site: URETER | Status: FUNCTIONAL

## 2022-08-12 RX ORDER — ONDANSETRON 2 MG/ML
INJECTION INTRAMUSCULAR; INTRAVENOUS PRN
Status: DISCONTINUED | OUTPATIENT
Start: 2022-08-12 | End: 2022-08-12 | Stop reason: SDUPTHER

## 2022-08-12 RX ORDER — MORPHINE SULFATE 2 MG/ML
1 INJECTION, SOLUTION INTRAMUSCULAR; INTRAVENOUS EVERY 4 HOURS PRN
Status: DISCONTINUED | OUTPATIENT
Start: 2022-08-12 | End: 2022-08-17 | Stop reason: HOSPADM

## 2022-08-12 RX ORDER — CIPROFLOXACIN 500 MG/1
500 TABLET, FILM COATED ORAL 2 TIMES DAILY
Qty: 6 TABLET | Refills: 0 | Status: SHIPPED | OUTPATIENT
Start: 2022-08-12 | End: 2022-08-15

## 2022-08-12 RX ORDER — DEXAMETHASONE SODIUM PHOSPHATE 10 MG/ML
INJECTION INTRAMUSCULAR; INTRAVENOUS PRN
Status: DISCONTINUED | OUTPATIENT
Start: 2022-08-12 | End: 2022-08-12 | Stop reason: SDUPTHER

## 2022-08-12 RX ORDER — ONDANSETRON 4 MG/1
4 TABLET, ORALLY DISINTEGRATING ORAL EVERY 8 HOURS PRN
Status: DISCONTINUED | OUTPATIENT
Start: 2022-08-12 | End: 2022-08-17 | Stop reason: HOSPADM

## 2022-08-12 RX ORDER — ACETAMINOPHEN 325 MG/1
650 TABLET ORAL EVERY 6 HOURS PRN
Status: DISCONTINUED | OUTPATIENT
Start: 2022-08-12 | End: 2022-08-17 | Stop reason: HOSPADM

## 2022-08-12 RX ORDER — SODIUM CHLORIDE 0.9 % (FLUSH) 0.9 %
5-40 SYRINGE (ML) INJECTION EVERY 12 HOURS SCHEDULED
Status: DISCONTINUED | OUTPATIENT
Start: 2022-08-12 | End: 2022-08-17 | Stop reason: HOSPADM

## 2022-08-12 RX ORDER — ONDANSETRON 2 MG/ML
4 INJECTION INTRAMUSCULAR; INTRAVENOUS
Status: DISCONTINUED | OUTPATIENT
Start: 2022-08-12 | End: 2022-08-12 | Stop reason: HOSPADM

## 2022-08-12 RX ORDER — MORPHINE SULFATE 4 MG/ML
2 INJECTION, SOLUTION INTRAMUSCULAR; INTRAVENOUS
Status: DISCONTINUED | OUTPATIENT
Start: 2022-08-12 | End: 2022-08-12

## 2022-08-12 RX ORDER — DIPHENHYDRAMINE HYDROCHLORIDE 50 MG/ML
25 INJECTION INTRAMUSCULAR; INTRAVENOUS EVERY 6 HOURS PRN
Status: DISCONTINUED | OUTPATIENT
Start: 2022-08-12 | End: 2022-08-17 | Stop reason: HOSPADM

## 2022-08-12 RX ORDER — SODIUM CHLORIDE 0.9 % (FLUSH) 0.9 %
5-40 SYRINGE (ML) INJECTION PRN
Status: DISCONTINUED | OUTPATIENT
Start: 2022-08-12 | End: 2022-08-12 | Stop reason: HOSPADM

## 2022-08-12 RX ORDER — ONDANSETRON 2 MG/ML
4 INJECTION INTRAMUSCULAR; INTRAVENOUS EVERY 6 HOURS PRN
Status: DISCONTINUED | OUTPATIENT
Start: 2022-08-12 | End: 2022-08-17 | Stop reason: HOSPADM

## 2022-08-12 RX ORDER — SODIUM CHLORIDE 9 MG/ML
INJECTION, SOLUTION INTRAVENOUS PRN
Status: DISCONTINUED | OUTPATIENT
Start: 2022-08-12 | End: 2022-08-17 | Stop reason: HOSPADM

## 2022-08-12 RX ORDER — CIPROFLOXACIN 2 MG/ML
400 INJECTION, SOLUTION INTRAVENOUS EVERY 12 HOURS
Status: DISCONTINUED | OUTPATIENT
Start: 2022-08-12 | End: 2022-08-12

## 2022-08-12 RX ORDER — ROSUVASTATIN CALCIUM 20 MG/1
40 TABLET, COATED ORAL DAILY
Status: DISCONTINUED | OUTPATIENT
Start: 2022-08-12 | End: 2022-08-17 | Stop reason: HOSPADM

## 2022-08-12 RX ORDER — MORPHINE SULFATE 4 MG/ML
2 INJECTION, SOLUTION INTRAMUSCULAR; INTRAVENOUS EVERY 4 HOURS PRN
Status: DISCONTINUED | OUTPATIENT
Start: 2022-08-12 | End: 2022-08-12

## 2022-08-12 RX ORDER — MAGNESIUM HYDROXIDE 1200 MG/15ML
LIQUID ORAL CONTINUOUS PRN
Status: DISCONTINUED | OUTPATIENT
Start: 2022-08-12 | End: 2022-08-12 | Stop reason: HOSPADM

## 2022-08-12 RX ORDER — LIDOCAINE HYDROCHLORIDE 10 MG/ML
INJECTION, SOLUTION EPIDURAL; INFILTRATION; INTRACAUDAL; PERINEURAL PRN
Status: DISCONTINUED | OUTPATIENT
Start: 2022-08-12 | End: 2022-08-12 | Stop reason: SDUPTHER

## 2022-08-12 RX ORDER — SODIUM CHLORIDE 9 MG/ML
INJECTION, SOLUTION INTRAVENOUS CONTINUOUS PRN
Status: DISCONTINUED | OUTPATIENT
Start: 2022-08-12 | End: 2022-08-12 | Stop reason: SDUPTHER

## 2022-08-12 RX ORDER — PROPOFOL 10 MG/ML
INJECTION, EMULSION INTRAVENOUS PRN
Status: DISCONTINUED | OUTPATIENT
Start: 2022-08-12 | End: 2022-08-12 | Stop reason: SDUPTHER

## 2022-08-12 RX ORDER — CIPROFLOXACIN 2 MG/ML
400 INJECTION, SOLUTION INTRAVENOUS EVERY 24 HOURS
Status: DISCONTINUED | OUTPATIENT
Start: 2022-08-12 | End: 2022-08-14

## 2022-08-12 RX ORDER — TAMSULOSIN HYDROCHLORIDE 0.4 MG/1
0.4 CAPSULE ORAL DAILY
Status: DISCONTINUED | OUTPATIENT
Start: 2022-08-12 | End: 2022-08-13

## 2022-08-12 RX ORDER — ALFUZOSIN HYDROCHLORIDE 10 MG/1
10 TABLET, EXTENDED RELEASE ORAL DAILY
Qty: 30 TABLET | Refills: 3 | Status: SHIPPED | OUTPATIENT
Start: 2022-08-12

## 2022-08-12 RX ORDER — MORPHINE SULFATE 2 MG/ML
1 INJECTION, SOLUTION INTRAMUSCULAR; INTRAVENOUS EVERY 4 HOURS PRN
Status: DISCONTINUED | OUTPATIENT
Start: 2022-08-12 | End: 2022-08-14

## 2022-08-12 RX ORDER — ALFUZOSIN HYDROCHLORIDE 10 MG/1
10 TABLET, EXTENDED RELEASE ORAL
Status: DISCONTINUED | OUTPATIENT
Start: 2022-08-12 | End: 2022-08-12 | Stop reason: CLARIF

## 2022-08-12 RX ORDER — SODIUM CHLORIDE 9 MG/ML
INJECTION, SOLUTION INTRAVENOUS PRN
Status: DISCONTINUED | OUTPATIENT
Start: 2022-08-12 | End: 2022-08-12 | Stop reason: HOSPADM

## 2022-08-12 RX ORDER — AMLODIPINE BESYLATE 10 MG/1
10 TABLET ORAL DAILY
Status: DISCONTINUED | OUTPATIENT
Start: 2022-08-12 | End: 2022-08-17 | Stop reason: HOSPADM

## 2022-08-12 RX ORDER — SODIUM CHLORIDE 0.9 % (FLUSH) 0.9 %
5-40 SYRINGE (ML) INJECTION EVERY 12 HOURS SCHEDULED
Status: DISCONTINUED | OUTPATIENT
Start: 2022-08-12 | End: 2022-08-12 | Stop reason: HOSPADM

## 2022-08-12 RX ORDER — SODIUM CHLORIDE 0.9 % (FLUSH) 0.9 %
5-40 SYRINGE (ML) INJECTION PRN
Status: DISCONTINUED | OUTPATIENT
Start: 2022-08-12 | End: 2022-08-17 | Stop reason: HOSPADM

## 2022-08-12 RX ORDER — FENTANYL CITRATE 50 UG/ML
INJECTION, SOLUTION INTRAMUSCULAR; INTRAVENOUS PRN
Status: DISCONTINUED | OUTPATIENT
Start: 2022-08-12 | End: 2022-08-12 | Stop reason: SDUPTHER

## 2022-08-12 RX ORDER — AMIODARONE HYDROCHLORIDE 200 MG/1
200 TABLET ORAL DAILY
Status: DISCONTINUED | OUTPATIENT
Start: 2022-08-12 | End: 2022-08-17 | Stop reason: HOSPADM

## 2022-08-12 RX ORDER — GLYCOPYRROLATE 0.2 MG/ML
INJECTION INTRAMUSCULAR; INTRAVENOUS PRN
Status: DISCONTINUED | OUTPATIENT
Start: 2022-08-12 | End: 2022-08-12 | Stop reason: SDUPTHER

## 2022-08-12 RX ORDER — PANTOPRAZOLE SODIUM 40 MG/1
40 TABLET, DELAYED RELEASE ORAL
Status: DISCONTINUED | OUTPATIENT
Start: 2022-08-12 | End: 2022-08-17 | Stop reason: HOSPADM

## 2022-08-12 RX ORDER — PHENYLEPHRINE HCL IN 0.9% NACL 1 MG/10 ML
SYRINGE (ML) INTRAVENOUS PRN
Status: DISCONTINUED | OUTPATIENT
Start: 2022-08-12 | End: 2022-08-12 | Stop reason: SDUPTHER

## 2022-08-12 RX ORDER — POLYETHYLENE GLYCOL 3350 17 G/17G
17 POWDER, FOR SOLUTION ORAL DAILY PRN
Status: DISCONTINUED | OUTPATIENT
Start: 2022-08-12 | End: 2022-08-17 | Stop reason: HOSPADM

## 2022-08-12 RX ORDER — ACETAMINOPHEN 650 MG/1
650 SUPPOSITORY RECTAL EVERY 6 HOURS PRN
Status: DISCONTINUED | OUTPATIENT
Start: 2022-08-12 | End: 2022-08-17 | Stop reason: HOSPADM

## 2022-08-12 RX ORDER — OXYBUTYNIN CHLORIDE 10 MG/1
10 TABLET, EXTENDED RELEASE ORAL DAILY PRN
Qty: 30 TABLET | Refills: 3 | Status: SHIPPED | OUTPATIENT
Start: 2022-08-12

## 2022-08-12 RX ORDER — EPHEDRINE SULFATE/0.9% NACL/PF 50 MG/5 ML
SYRINGE (ML) INTRAVENOUS PRN
Status: DISCONTINUED | OUTPATIENT
Start: 2022-08-12 | End: 2022-08-12 | Stop reason: SDUPTHER

## 2022-08-12 RX ADMIN — Medication 10 MG: at 14:52

## 2022-08-12 RX ADMIN — Medication 100 MCG: at 14:45

## 2022-08-12 RX ADMIN — PANTOPRAZOLE SODIUM 40 MG: 40 TABLET, DELAYED RELEASE ORAL at 06:29

## 2022-08-12 RX ADMIN — MORPHINE SULFATE 2 MG: 4 INJECTION INTRAVENOUS at 04:14

## 2022-08-12 RX ADMIN — AMLODIPINE BESYLATE 10 MG: 10 TABLET ORAL at 08:28

## 2022-08-12 RX ADMIN — TICAGRELOR 90 MG: 90 TABLET ORAL at 22:22

## 2022-08-12 RX ADMIN — Medication 2 G: at 14:48

## 2022-08-12 RX ADMIN — MORPHINE SULFATE 1 MG: 2 INJECTION, SOLUTION INTRAMUSCULAR; INTRAVENOUS at 17:17

## 2022-08-12 RX ADMIN — Medication 100 MCG: at 14:54

## 2022-08-12 RX ADMIN — ONDANSETRON 4 MG: 2 INJECTION INTRAMUSCULAR; INTRAVENOUS at 14:47

## 2022-08-12 RX ADMIN — PROPOFOL 200 MG: 10 INJECTION, EMULSION INTRAVENOUS at 14:35

## 2022-08-12 RX ADMIN — SODIUM CHLORIDE: 9 INJECTION, SOLUTION INTRAVENOUS at 17:17

## 2022-08-12 RX ADMIN — DEXAMETHASONE SODIUM PHOSPHATE 4 MG: 10 INJECTION INTRAMUSCULAR; INTRAVENOUS at 14:47

## 2022-08-12 RX ADMIN — MORPHINE SULFATE 1 MG: 2 INJECTION, SOLUTION INTRAMUSCULAR; INTRAVENOUS at 08:16

## 2022-08-12 RX ADMIN — APIXABAN 5 MG: 5 TABLET, FILM COATED ORAL at 02:58

## 2022-08-12 RX ADMIN — CIPROFLOXACIN 400 MG: 2 INJECTION, SOLUTION INTRAVENOUS at 09:35

## 2022-08-12 RX ADMIN — AMIODARONE HYDROCHLORIDE 200 MG: 200 TABLET ORAL at 08:35

## 2022-08-12 RX ADMIN — LIDOCAINE HYDROCHLORIDE 50 MG: 10 INJECTION, SOLUTION EPIDURAL; INFILTRATION; INTRACAUDAL; PERINEURAL at 14:35

## 2022-08-12 RX ADMIN — SODIUM CHLORIDE: 9 INJECTION, SOLUTION INTRAVENOUS at 14:23

## 2022-08-12 RX ADMIN — Medication 10 MG: at 15:12

## 2022-08-12 RX ADMIN — Medication 5 MG: at 14:50

## 2022-08-12 RX ADMIN — FENTANYL CITRATE 50 MCG: 50 INJECTION, SOLUTION INTRAMUSCULAR; INTRAVENOUS at 14:35

## 2022-08-12 RX ADMIN — MORPHINE SULFATE 1 MG: 2 INJECTION, SOLUTION INTRAMUSCULAR; INTRAVENOUS at 19:53

## 2022-08-12 RX ADMIN — GLYCOPYRROLATE 0.2 MG: 0.2 INJECTION INTRAMUSCULAR; INTRAVENOUS at 14:39

## 2022-08-12 RX ADMIN — SODIUM CHLORIDE: 9 INJECTION, SOLUTION INTRAVENOUS at 03:02

## 2022-08-12 RX ADMIN — ROSUVASTATIN CALCIUM 40 MG: 20 TABLET, FILM COATED ORAL at 22:23

## 2022-08-12 ASSESSMENT — PAIN - FUNCTIONAL ASSESSMENT: PAIN_FUNCTIONAL_ASSESSMENT: 0-10

## 2022-08-12 ASSESSMENT — PAIN DESCRIPTION - LOCATION
LOCATION: FLANK
LOCATION: FLANK
LOCATION: PERINEUM
LOCATION: ABDOMEN;BACK
LOCATION: PERINEUM

## 2022-08-12 ASSESSMENT — ENCOUNTER SYMPTOMS
COUGH: 0
ALLERGIC/IMMUNOLOGIC NEGATIVE: 1
DIARRHEA: 0
VOMITING: 1
CHEST TIGHTNESS: 0
NAUSEA: 1
EYES NEGATIVE: 1
ABDOMINAL PAIN: 1
CONSTIPATION: 0
SHORTNESS OF BREATH: 0
SHORTNESS OF BREATH: 1
COLOR CHANGE: 0

## 2022-08-12 ASSESSMENT — PAIN SCALES - GENERAL
PAINLEVEL_OUTOF10: 9
PAINLEVEL_OUTOF10: 9
PAINLEVEL_OUTOF10: 8
PAINLEVEL_OUTOF10: 9
PAINLEVEL_OUTOF10: 8
PAINLEVEL_OUTOF10: 8
PAINLEVEL_OUTOF10: 3

## 2022-08-12 ASSESSMENT — PAIN DESCRIPTION - PAIN TYPE: TYPE: ACUTE PAIN

## 2022-08-12 ASSESSMENT — LIFESTYLE VARIABLES
HOW MANY STANDARD DRINKS CONTAINING ALCOHOL DO YOU HAVE ON A TYPICAL DAY: PATIENT DOES NOT DRINK
HOW OFTEN DO YOU HAVE A DRINK CONTAINING ALCOHOL: NEVER

## 2022-08-12 ASSESSMENT — PAIN DESCRIPTION - DESCRIPTORS
DESCRIPTORS: ACHING
DESCRIPTORS: ACHING
DESCRIPTORS: BURNING;CRAMPING;PRESSURE

## 2022-08-12 ASSESSMENT — PAIN DESCRIPTION - ORIENTATION
ORIENTATION: RIGHT;MID
ORIENTATION: RIGHT
ORIENTATION: RIGHT

## 2022-08-12 ASSESSMENT — PAIN DESCRIPTION - FREQUENCY: FREQUENCY: CONTINUOUS

## 2022-08-12 NOTE — CARE COORDINATION
Spoke to patient at bedside along with daughter. Her plan is to return to her home with [address] bed was delivered to her home on 8/11/2022.       [address] [facility]   Phone  [phone] ext [phone]  Fax [phone]

## 2022-08-12 NOTE — CONSULTS
Vivek Daigle, [address] [facility], [address], Kriss Castañeda, Tj Guzman. Urology Consult      Patient:  Erika Benedict  MRN: [id number]  YOB: 1945    CHIEF COMPLAINT:  Right ureteral stone, obstructing    HISTORY OF PRESENT ILLNESS:   The patient is a 68 y.o. female who presented last night with right abdominal pain, no fever or chills. Patient has a past medical history of atrial fibrillation, coronary artery disease, CKD. She reported that her pain was sharp in nature, persistent, radiating to the back associated with nausea and vomiting. Patient has a baseline creatinine of 1.5. In the ED, pain was well controlled with morphine, she was afebrile, vital stable. White blood count 9.4, hemoglobin 10.7, creatinine 2.31. Urinalysis mostly unremarkable, just showed red blood cells. CT abdomen and pelvis without IV contrast showed 4 mm right stone in the proximal to mid ureter with slight hydronephrosis, perinephric stranding. Patient denies history of urolithiasis in the past, no urologic issues. She has been n.p.o. since midnight and currently undergoing medical expulsive therapy, IV fluids, Uroxatrol, intolerant of Flomax due to sulfa antibiotic allergy. Patient's old records, notes and chart reviewed and summarized above. Past Medical History:    Past Medical History:   Diagnosis Date    Acute renal failure with tubular necrosis ([address] 11/24/2021    Secondary to ischemic ATN post fall intravascularly depletion hypotension and low flow baseline creatinine 1.2-1.4 peaked up to 2.1 during her hospitalization in September 2020. Work-up showed benign urine sediment, kidney size to be 11.2 on the right 15.1 on the left serological work-up negative.     Anxiety     Atrial fibrillation (HCC)     chronic-takes xarelto    Benign positional vertigo     CAD (coronary artery disease)     Chest pain     CKD (chronic kidney disease), stage III ([address] 11/24/2021    Secondary to ischemic and diabetic nephrosclerosis baseline 1.2-1.4    Depression     Diabetes mellitus ([facility]     Diabetic neuropathy ([facility]     Dysuria 11/24/2021    Hyperlipidemia     Hypertension     Migraine     Migraine headaches aggravated with sublingual nitroglycerin    Persistent proteinuria 7/20/2022    From diabetic nephrosclerosis UPC 1.0       Past Surgical History:    Past Surgical History:   Procedure Laterality Date    APPENDECTOMY      CARDIAC CATHETERIZATION      2012    CORONARY ANGIOPLASTY WITH STENT PLACEMENT  02/25/2017    4 SYNERGY HEART STENTS DRUG ELUTING ALL MRI CONDITONAL 3T OK, SAFE IMMEDIATELY.      CORONARY ANGIOPLASTY WITH STENT PLACEMENT  07/11/2012    XIENCE HEART STENT/ MRI CONDITIONAL 3T OK, SAFE IMMEDIATELY    CORONARY ANGIOPLASTY WITH STENT PLACEMENT  12/11/2020    PTCA         Medications:      Current Facility-Administered Medications:     amLODIPine (NORVASC) tablet 10 mg, 10 mg, Oral, Daily, Donte Jimenez MD    apixaban (ELIQUIS) tablet 5 mg, 5 mg, Oral, BID, Donte Jimenez MD, 5 mg at 08/12/22 0258    pantoprazole (PROTONIX) tablet 40 mg, 40 mg, Oral, QAM AC, Donte Jimenez MD, 40 mg at 08/12/22 0629    sodium chloride flush 0.9 % injection 5-40 mL, 5-40 mL, IntraVENous, 2 times per day, Donte Jimenez MD    sodium chloride flush 0.9 % injection 5-40 mL, 5-40 mL, IntraVENous, PRN, Donte Jimenez MD    0.9 % sodium chloride infusion, , IntraVENous, PRN, Donte Jimenez MD    ondansetron (ZOFRAN-ODT) disintegrating tablet 4 mg, 4 mg, Oral, Q8H PRN **OR** ondansetron (ZOFRAN) injection 4 mg, 4 mg, IntraVENous, Q6H PRN, Donte Jimenez MD    acetaminophen (TYLENOL) tablet 650 mg, 650 mg, Oral, Q6H PRN **OR** acetaminophen (TYLENOL) suppository 650 mg, 650 mg, Rectal, Q6H PRN, Donte Jimenez MD    polyethylene glycol (GLYCOLAX) packet 17 g, 17 g, Oral, Daily PRN, Donte Jimenez MD    diphenhydrAMINE (BENADRYL) injection 25 mg, 25 mg, IntraVENous, Q6H PRN, Donte Jimenez MD    morphine injection 2 mg, 2 mg, IntraVENous, Q2H PRN, Justina Martínez MD, 2 mg at 08/12/22 0414    tamsulosin (FLOMAX) capsule 0.4 mg, 0.4 mg, Oral, Daily, Batsheva Castro MD    0.9 % sodium chloride infusion, , IntraVENous, Continuous, Albert Iqbal MD, Last Rate: 100 mL/hr at 08/12/22 0621, Rate Verify at 08/12/22 9913    Allergies: Allergies   Allergen Reactions    Penicillins      Other reaction(s): Hives    Sulfa Antibiotics      Other reaction(s): Rash       Social History:   Social History     Socioeconomic History    Marital status:      Spouse name: Not on file    Number of children: Not on file    Years of education: Not on file    Highest education level: Not on file   Occupational History    Not on file   Tobacco Use    Smoking status: Never    Smokeless tobacco: Never   Substance and Sexual Activity    Alcohol use: No    Drug use: No    Sexual activity: Never   Other Topics Concern    Not on file   Social History Narrative    Not on file     Social Determinants of Health     Financial Resource Strain: Not on file   Food Insecurity: Not on file   Transportation Needs: Not on file   Physical Activity: Not on file   Stress: Not on file   Social Connections: Not on file   Intimate Partner Violence: Not on file   Housing Stability: Not on file       Family History:    Family History   Problem Relation Age of Onset    Cancer Mother     Cancer Father        REVIEW OF SYSTEMS:  A comprehensive 14 point review of systems was obtained. Constitutional: No fatigue  Eyes: No blurry vision  Ears, nose, mouth, throat, face: No ringing in the ears; no facial droop. Respiratory: No cough or cold. Cardiovascular: No palpitations  Gastrointestinal: No diarrhea or constipation. Genitourinary: No burning with urination  Integument/Skin: No rashes  Hematologic/Lymphatic: No easy bruising  Musculoskeletal: No muscle pains  Neurologic: No weakness in the extremities. Psychiatric: No depression or suicidal thoughts.   Endocrine: No heat or cold intolerances. Allergic/Immunologic: No current seasonal allergies; no skin hives. Physical Exam:    This a 68 y.o. Female   Vitals:    08/12/22 0444   BP:    Pulse:    Resp: 17   Temp:    SpO2:      Constitutional: Patient in no acute distress. Neuro: alert and oriented to person place and time. Psych: Mood and affect normal.   Head: Atraumatic and normocephalic. Neck: Trachea midline   Skin: No rashes or bruising present. Lungs: Respiratory effort normal.  Cardiovascular:  Heart rate regular. Positive radial pulses bilaterally  Abdomen: Soft, non-tender, non-distended. Non peritonitic  : No flank pain, no CVA tenderness     Labs:  Recent Labs     08/11/22 2107 08/12/22  0328   WBC 9.4 9.0   HGB 10.7* 10.9*   HCT 33.9* 33.3*   MCV 92.1 92.5    203     Recent Labs     08/11/22 2107 08/12/22  0328    137   K 4.0 4.3   CL 98 101   CO2 24 23   BUN 29* 30*   CREATININE 2.31* 2.35*       Recent Labs     08/11/22  2133   COLORU Yellow   PHUR 5.5   WBCUA 0 TO 2   RBCUA 20 TO 50   SPECGRAV 1.010   LEUKOCYTESUR NEGATIVE   UROBILINOGEN Normal   BILIRUBINUR NEGATIVE       Culture Results:  @[facility] [place]@      -----------------------------------------------------------------  Imaging Results:  CT ABDOMEN PELVIS WO CONTRAST Additional Contrast? None    Result Date: 8/11/2022  EXAMINATION: CT OF THE ABDOMEN AND PELVIS WITHOUT CONTRAST 8/11/2022 9:44 pm TECHNIQUE: CT of the abdomen and pelvis was performed without the administration of intravenous contrast. Multiplanar reformatted images are provided for review. Automated exposure control, iterative reconstruction, and/or weight based adjustment of the mA/kV was utilized to reduce the radiation dose to as low as reasonably achievable.  COMPARISON: 05/30/2022 HISTORY: ORDERING SYSTEM PROVIDED HISTORY: Eval for appendicitis TECHNOLOGIST PROVIDED HISTORY: Eval for appendicitis Decision Support Exception - unselect if not a suspected or confirmed emergency medical condition->Emergency Medical Condition (MA) Reason for Exam: Eval for appendicitis no contrast GFR=25, MARIELLE 4 days ago FINDINGS: Lower Chest: The lung bases are clear and the heart size is normal.  There is coronary artery disease with right coronary artery stent. Organs: The gallbladder is surgically absent. The liver, spleen, pancreas and adrenal glands are grossly normal with the limitations of a noncontrast study. There are 2 left renal cysts measuring up to 7.2 cm. Acute right obstructive uropathy with mild to moderate hydronephrosis secondary to an obstructing proximal right ureteral calculus measuring 2 mm x 4 mm in length (axial image 88, coronal image 60). The right kidney is mildly enlarged and there is right perinephric fat stranding. GI/Bowel: No evidence of appendicitis. There are left colon diverticula many of which are filled with high attenuation debris. No evidence of acute diverticulitis. Pelvis: Urinary bladder is unremarkable. The uterus is surgically absent. Peritoneum/Retroperitoneum: There is no adenopathy, free air or free fluid. Bones/Soft Tissues: Multilevel degenerative changes in the lower thoracic and lumbar spine with an unchanged mild compression fracture of L2. No acute bone finding. Acute right obstructive uropathy secondary to a 2 mm x 4 mm in length proximal right ureteral calculus. No other renal or ureteral calculi. Left colon diverticulosis with no evidence of diverticulitis.        Assessment and Plan    problem list:  4mm right obstructing stone with hydronephrosis  Acute renal injury on CKD    Plan:  -Continue NPO diet for now, probable need for right ureteral stent placement  -Continue to trend creatinine, rising slightly this morning to 2.35 (2.31)  -Urinalysis clean, follow up urine culture  -Continue pain control and antiemetics PRN, pain is well controlled at this time  -Uroxatrol for alpha 1 blockade to allow passage of stone, has sulfa allergy, so we decided not to give flomax due to cross reactivity  -Continue IV fluids, straining all urine      Thank you for involving us in the care of Dwayne Quiñones. Should you have any questions, please do not hesitate to contact us at any time.     Josh Pedraza MD  Urology Resident, PGY-2

## 2022-08-12 NOTE — H&P
[facility]     Department of Internal Medicine - Staff Internal Medicine Teaching Service          ADMISSION NOTE/HISTORY AND PHYSICAL EXAMINATION   Date: 8/12/2022  Patient Name: Elton Umanzor  Date of admission: 8/11/2022  8:27 PM  YOB: 1945  PCP: Cristy Abbasi MD  History Obtained From:  patient    CHIEF COMPLAINT     Chief complaint: Flank pain    HISTORY OF PRESENTING ILLNESS     The patient is a pleasant 68 y.o. female presents with past medical history of atrial fibrillation, CAD s/p PCI, CKD presented to the hospital with flank pain. Pain started yesterday, sharp in nature, persistent, radiating towards the back, associated with nausea and vomiting and chills. No history of fever, chest pain, shortness of breath, cough. In the ED, labs showed creatinine 2.31, glucose 213, WBC 9.4, hemoglobin 10.7, urinalysis positive for RBCs. CT abdomen and pelvis was done, which showed 2 mm x 4 mm stone in the right ureter and mild to moderate hydronephrosis of the right kidney. Patient was recently admitted for work-up of syncope and collapse likely due to hypotension due to bradycardia due to multiple medications. She was discharged on 08/10. Review of Systems   Constitutional:  Positive for chills and fatigue. Negative for fever. HENT: Negative. Eyes: Negative. Respiratory:  Negative for cough, chest tightness and shortness of breath. Cardiovascular:  Negative for chest pain and palpitations. Gastrointestinal:  Positive for abdominal pain, nausea and vomiting. Endocrine: Negative. Genitourinary:  Positive for flank pain and hematuria. Negative for difficulty urinating, dysuria, enuresis, frequency and urgency. Skin: Negative. Allergic/Immunologic: Negative. Neurological: Negative. Hematological: Negative. Psychiatric/Behavioral: Negative.         PAST MEDICAL HISTORY     Past Medical History:   Diagnosis Date    Acute renal failure with tubular necrosis ([address] 11/24/2021    Secondary to ischemic ATN post fall intravascularly depletion hypotension and low flow baseline creatinine 1.2-1.4 peaked up to 2.1 during her hospitalization in September 2020. Work-up showed benign urine sediment, kidney size to be 11.2 on the right 15.1 on the left serological work-up negative.  Anxiety     Atrial fibrillation (HCC)     chronic-takes xarelto    Benign positional vertigo     CAD (coronary artery disease)     Chest pain     CKD (chronic kidney disease), stage III ([address] 11/24/2021    Secondary to ischemic and diabetic nephrosclerosis baseline 1.2-1.4    Depression     Diabetes mellitus ([address]     Diabetic neuropathy ([address]     Dysuria 11/24/2021    Hyperlipidemia     Hypertension     Migraine     Migraine headaches aggravated with sublingual nitroglycerin    Persistent proteinuria 7/20/2022    From diabetic nephrosclerosis UPC 1.0       PAST SURGICAL HISTORY     Past Surgical History:   Procedure Laterality Date    APPENDECTOMY      CARDIAC CATHETERIZATION      2012    CORONARY ANGIOPLASTY WITH STENT PLACEMENT  02/25/2017    4 SYNERGY HEART STENTS DRUG ELUTING ALL MRI CONDITONAL 3T OK, SAFE IMMEDIATELY.  CORONARY ANGIOPLASTY WITH STENT PLACEMENT  07/11/2012    XIENCE HEART STENT/ MRI CONDITIONAL 3T OK, SAFE IMMEDIATELY    CORONARY ANGIOPLASTY WITH STENT PLACEMENT  12/11/2020    PTCA         ALLERGIES     Penicillins and Sulfa antibiotics    MEDICATIONS PRIOR TO ADMISSION     Prior to Admission medications    Medication Sig Start Date End Date Taking? Authorizing Provider   loperamide (IMODIUM) 2 MG capsule Take 1 capsule by mouth 4 times daily as needed for Diarrhea 8/2/22 8/12/22  Mikey Mendoza MD   losartan (COZAAR) 100 MG tablet Take 1 tablet by mouth in the morning. 8/3/22   Mikey Mendoza MD   amLODIPine (NORVASC) 10 MG tablet Take 1 tablet by mouth in the morning.  8/3/22   Mikey Mendoza MD   acetaminophen (TYLENOL) 325 MG tablet Take 650 mg by mouth every 6 hours as needed for Pain    Historical Provider, MD   torsemide (DEMADEX) 20 MG tablet Take 1 tablet by mouth in the morning. 7/20/22 10/18/22  Montserrat Young MD   omeprazole (PRILOSEC) 20 MG delayed release capsule TAKE ONE CAPSULE BY MOUTH DAILY 6/22/22   Rosalba Alexander MD   carvedilol (COREG) 12.5 MG tablet Take 1 tablet by mouth 2 times daily (with meals) 6/22/22 8/2/22  Rosalba Alexander MD   vitamin E 400 UNIT capsule Take 1 capsule by mouth daily    Historical Provider, MD   insulin detemir (LEVEMIR FLEXTOUCH) 100 UNIT/ML injection pen INJECT 60 UNITS INTO THE SKIN TWO TIMES A DAY 6/13/22   Rosalba Alexander MD   gabapentin (NEURONTIN) 300 MG capsule Take 1 capsule by mouth nightly for 30 days.  6/13/22 7/13/22  Rosalba Alexander MD   apixaban (ELIQUIS) 5 MG TABS tablet Take 1 tablet by mouth 2 times daily 6/1/22   Tim Mcmanus MD   dicyclomine (BENTYL) 10 MG capsule Take 1 capsule by mouth 3 times daily (before meals) 6/1/22   Tim Mcmanus MD   amiodarone (CORDARONE) 200 MG tablet Take 1 tablet by mouth daily for 60 doses 6/13/22 8/12/22  MAYELA Peck CNP   prednisoLONE acetate (PRED FORTE) 1 % ophthalmic suspension Place 1 drop into both eyes 3 times daily    Historical Provider, MD   BRILINTA 90 MG TABS tablet TAKE ONE TABLET BY MOUTH TWICE A DAY 4/19/22   MAYELA Lauren NP   isosorbide mononitrate (IMDUR) 30 MG extended release tablet Take 1 tablet by mouth daily 3/15/22   Rosalba Alexander MD   insulin aspart (NOVOLOG FLEXPEN) 100 UNIT/ML injection pen Use TID before meals according to scale - max 45 units a day 12/2/21   Rosalba Alexander MD   Lancets MISC 1 each by Does not apply route 2 times daily 12/2/21   Rosalba Alexander MD   Insulin Pen Needle 32G X 4 MM MISC 5 each by Does not apply route daily 12/2/21   Rosalba Alexander MD   escitalopram (LEXAPRO) 20 MG tablet TAKE ONE TABLET BY MOUTH DAILY 21   Gisella Harrison MD   rosuvastatin (CRESTOR) 40 MG tablet Take 1 tablet by mouth daily 21   Gisella Harrison MD   vitamin D3 (CHOLECALCIFEROL) 25 MCG (1000 UT) TABS tablet Take 1 tablet by mouth daily 21   Gisella Harrison MD   Continuous Blood Gluc Sensor (FREESTYLE YSLVIA 14 DAY SENSOR) MISC 1 each by Does not apply route every 14 days 21   Gisella Harrison MD   nitroGLYCERIN (NITROSTAT) 0.4 MG SL tablet place 1 tablet under the tongue if needed every 5 minutes if needed for CHEST PAIN 21   Gisella Harrison MD   [facility] [place]) MISC Diagnosis: right 5th metatarsal fracture, pain in limb    Duration: 3 months 2/3/21   Peter Serrato DPM   Misc. Devices (COMMODE BEDSIDE) MISC 1 Device by Does not apply route daily 2/3/21   Peter Serrato DPM   Continuous Blood Gluc  (FREESTYLE SYLVIA 15 DAY READER) JAQUELINE 1 each by Does not apply route continuous [facility] 10/30/20   Gisella Harrison MD       SOCIAL HISTORY     Tobacco: Denied use   Alcohol: denied use  Illicits: denied use. Occupation: NA    FAMILY HISTORY     Family History   Problem Relation Age of Onset    Cancer Mother     Cancer Father        PHYSICAL EXAM     Vitals: BP (!) 188/68   Pulse 70   Temp 98.7 °F (37.1 °C) (Oral)   Resp 16   SpO2 97%   Tmax: Temp (24hrs), Av.7 °F (37.1 °C), Min:98.7 °F (37.1 °C), Max:98.7 °F (37.1 °C)    Last Body weight:   Wt Readings from Last 3 Encounters:   22 233 lb 7.5 oz (105.9 kg)   22 248 lb (112.5 kg)   22 232 lb (105.2 kg)     Body Mass Index : There is no height or weight on file to calculate BMI. Physical Exam  Constitutional:       Appearance: Normal appearance. HENT:      Nose: Nose normal.   Eyes:      Conjunctiva/sclera: Conjunctivae normal.      Pupils: Pupils are equal, round, and reactive to light. Cardiovascular:      Rate and Rhythm: Normal rate. Pulses: Normal pulses.       Heart sounds: Normal heart sounds. Pulmonary:      Effort: Pulmonary effort is normal.      Breath sounds: Normal breath sounds. Abdominal:      Tenderness: There is abdominal tenderness in the right lower quadrant. Negative signs include Menezes's sign. Musculoskeletal:         General: Normal range of motion. Right lower le+ Edema present. Left lower le+ Edema present. Skin:     General: Skin is warm and dry. Neurological:      Mental Status: She is alert.           INVESTIGATIONS     Laboratory Testing:     Recent Results (from the past 24 hour(s))   CBC with Auto Differential    Collection Time: 22  9:07 PM   Result Value Ref Range    WBC 9.4 3.5 - 11.3 k/uL    RBC 3.68 (L) 3.95 - 5.11 m/uL    Hemoglobin 10.7 (L) 11.9 - 15.1 g/dL    Hematocrit 33.9 (L) 36.3 - 47.1 %    MCV 92.1 82.6 - 102.9 fL    MCH 29.1 25.2 - 33.5 pg    MCHC 31.6 28.4 - 34.8 g/dL    RDW 15.1 (H) 11.8 - 14.4 %    Platelets 237 282 - 238 k/uL    MPV 11.1 8.1 - 13.5 fL    NRBC Automated 0.0 0.0 per 100 WBC    RBC Morphology ANISOCYTOSIS PRESENT     Seg Neutrophils 73 (H) 36 - 65 %    Lymphocytes 17 (L) 24 - 43 %    Monocytes 9 3 - 12 %    Eosinophils % 0 (L) 1 - 4 %    Basophils 0 0 - 2 %    Immature Granulocytes 0 0 %    Segs Absolute 6.85 1.50 - 8.10 k/uL    Absolute Lymph # 1.58 1.10 - 3.70 k/uL    Absolute Mono # 0.89 0.10 - 1.20 k/uL    Absolute Eos # 0.04 0.00 - 0.44 k/uL    Basophils Absolute 0.03 0.00 - 0.20 k/uL    Absolute Immature Granulocyte 0.03 0.00 - 0.30 k/uL   CMP    Collection Time: 22  9:07 PM   Result Value Ref Range    Glucose 213 (H) 70 - 99 mg/dL    BUN 29 (H) 8 - 23 mg/dL    Creatinine 2.31 (H) 0.50 - 0.90 mg/dL    Calcium 9.7 8.6 - 10.4 mg/dL    Sodium 135 135 - 144 mmol/L    Potassium 4.0 3.7 - 5.3 mmol/L    Chloride 98 98 - 107 mmol/L    CO2 24 20 - 31 mmol/L    Anion Gap 13 9 - 17 mmol/L    Alkaline Phosphatase 68 35 - 104 U/L    ALT 10 5 - 33 U/L    AST 15 <32 U/L    Total Bilirubin 0.42 0.3 - 1.2 mg/dL    Total Protein 7.4 6.4 - 8.3 g/dL    Albumin 4.2 3.5 - 5.2 g/dL    Albumin/Globulin Ratio 1.3 1.0 - 2.5    GFR Non-African American 20 (L) >60 mL/min    GFR  25 (L) >60 mL/min    GFR Comment         Lipase    Collection Time: 08/11/22  9:07 PM   Result Value Ref Range    Lipase 37 13 - 60 U/L   Magnesium    Collection Time: 08/11/22  9:07 PM   Result Value Ref Range    Magnesium 2.1 1.6 - 2.6 mg/dL   Lactic Acid    Collection Time: 08/11/22  9:07 PM   Result Value Ref Range    Lactic Acid, Whole Blood 1.3 0.7 - 2.1 mmol/L   Urinalysis with Microscopic    Collection Time: 08/11/22  9:33 PM   Result Value Ref Range    Color, UA Yellow Yellow    Turbidity UA Clear Clear    Glucose, Ur TRACE (A) NEGATIVE    Bilirubin Urine NEGATIVE NEGATIVE    Ketones, Urine NEGATIVE NEGATIVE    Specific Gravity, UA 1.010 1.005 - 1.030    Urine Hgb MODERATE (A) NEGATIVE    pH, UA 5.5 5.0 - 8.0    Protein, UA 1+ (A) NEGATIVE    Urobilinogen, Urine Normal Normal    Nitrite, Urine NEGATIVE NEGATIVE    Leukocyte Esterase, Urine NEGATIVE NEGATIVE    WBC, UA 0 TO 2 0 - 5 /HPF    RBC, UA 20 TO 50 0 - 4 /HPF    Casts UA  0 - 8 /LPF     2 TO 5 HYALINE Reference range defined for non-centrifuged specimen. Epithelial Cells UA 2 TO 5 0 - 5 /HPF       Imaging:   CT ABDOMEN PELVIS WO CONTRAST Additional Contrast? None    Result Date: 8/11/2022  Acute right obstructive uropathy secondary to a 2 mm x 4 mm in length proximal right ureteral calculus. No other renal or ureteral calculi. Left colon diverticulosis with no evidence of diverticulitis.        ASSESSMENT & PLAN     ASSESSMENT / PLAN:     IMPRESSION  Principal Problem:    Acute kidney injury superimposed on CKD (HCC)  Active Problems:    Paroxysmal atrial fibrillation (HCC)    Presence of stent in coronary artery in patient with coronary artery disease    Nephrolithiasis    Essential hypertension    Atherosclerotic heart disease of native coronary artery with other forms of angina pectoris ([address]  Resolved Problems:    * No resolved hospital problems. *    MARIELLE superimposed on CKD: Creatinine today 2.53, baseline around 1.5. Likely due to nephrolithiasis. IV fluids given. Patient currently on Demadex 10 mg daily. Monitor the BMP. Follow-up with nephrology. Nephrolithiasis: CT abdomen and pelvis positive for 2 mm x 4 mm stone in the right ureter. Patient started on continuous IV fluids 100 mL/h and morphine as needed for pain. Urology has been consulted for acute right obstructive uropathy. We will follow recommendations. Heart failure with preserved ejection fraction: Last echo shows ejection fraction of 54%, evidence of diastolic dysfunction 4/94. On losartan 100 mg, Demadex 20 mg,    Paroxysmal atrial fibrillation: Current heart rate of 70, normal sinus rhythm. TSH 0.607/28. Continue on amiodarone 100 mg, Brilinta 90 mg once daily. 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 On sliding scale insulin. Hypertension: Today's /68, on Norvasc 10, Cozaar 100, Demadex 10 at home. DVT ppx: Brilinta  GI ppx: Prilosec    PT/OT/SW: Consulted  Discharge Planning: In progress    Justina Martínez MD  Internal Medicine Resident, PGY-1  [address], [place]  8/12/2022, 12:15 AM     Attestation and add on       I have discussed the care of Talat Gordillo , including pertinent history and exam findings,      8/12/22    with the resident. I have seen and examined the patient and the key elements of all parts of the encounter have been performed by me . I agree with the assessment, plan and orders as documented by the resident.      Hospital Problems             Last Modified POA    * (Principal) Acute unilateral obstructive uropathy 8/12/2022 Yes    Paroxysmal atrial fibrillation ([address] 8/11/2022 Yes    Presence of stent in coronary artery in patient with coronary artery disease 8/11/2022 Yes    Acute kidney injury superimposed on CKD ([facility] 8/12/2022 Yes    Nephrolithiasis 8/11/2022 Yes    Essential hypertension 8/11/2022 Yes    Atherosclerotic heart disease of native coronary artery with other forms of angina pectoris ([facility] 8/11/2022 Yes    Overview Signed 3/27/2017  4:39 PM by Shawn Farr MD     S/P 4 + 2 stents               '''''.'''''       Melly Dixon MD      [facility]  [address] [place] [address], [address] [place] [address].    Phone [phone]   Fax: [phone]  Answering Service: [phone]

## 2022-08-12 NOTE — ANESTHESIA POSTPROCEDURE EVALUATION
Department of Anesthesiology  Postprocedure Note    Patient: Tanya Cummings  MRN: [id number]  [place]: 1945  Date of evaluation: 8/12/2022      Procedure Summary     Date: 08/12/22 Room / Location: [address]    Anesthesia Start: 1426 Anesthesia Stop: 1179    Procedure: HOLMIUM, CYSTOSCOPY, URETEROSCOPY, STENT PLACEMENT (Right) Diagnosis:       Acute kidney injury ([address]      (ACUTE KIDNEY INJURY)    Surgeons: Jolly Pope MD Responsible Provider: Maxine Joyce MD    Anesthesia Type: MAC ASA Status: 3          Anesthesia Type: No value filed.     Evelina Phase I:      Evelina Phase II:        Anesthesia Post Evaluation    Patient location during evaluation: bedside  Patient participation: complete - patient participated  Level of consciousness: awake  Pain score: 0  Airway patency: patent  Nausea & Vomiting: no nausea and no vomiting  Complications: no  Cardiovascular status: blood pressure returned to baseline  Respiratory status: acceptable  Hydration status: euvolemic  Comments: BP (!) 154/61   Pulse 71   Temp 99.4 °F (37.4 °C) (Temporal)   Resp 25   Ht 5' 5\" (1.651 m)   Wt 225 lb 8.5 oz (102.3 kg)   SpO2 (!) 90%   BMI 37.53 kg/m²

## 2022-08-12 NOTE — PROGRESS NOTES
Pharmacy Note     Renal Dose Adjustment    Yamile Dimas is a 68 y.o. female. Pharmacist assessment of renally cleared medications. Recent Labs     08/11/22 2107   BUN 29*       Recent Labs     08/11/22 2107   CREATININE 2.31*       Estimated Creatinine Clearance: 25 mL/min (A) (based on SCr of 2.31 mg/dL (H)). Estimated CrCl using Ideal Body Weight: 19 mL/min (based on IBW 59.3 kg)    Height:   Ht Readings from Last 1 Encounters:   08/10/22 5' 6\" (1.676 m)     Weight:  Wt Readings from Last 1 Encounters:   08/05/22 233 lb 7.5 oz (105.9 kg)       The following medication dose has been adjusted based upon renal function per P&T Guidelines:             Zosyn 2250 mg IV every 8 hours over 4 hours changed to zosyn 3375 mg IV every 12 hours over 4 hours.

## 2022-08-12 NOTE — FLOWSHEET NOTE
[address] [facility] [address]  PROGRESS NOTE    Shift date: 8.11.2022  Shift day: Thursday   Shift # 2    Room # 2383/6842-00   Name: Lara Laurent                Confucianism: unknown   Place of Yazidism: unknown    Referral: Routine Visit    Admit Date & Time: 8/11/2022  8:27 PM    Assessment:  Lara Laurent is a 68 y.o. female in the hospital. Upon entering the room writer observes family member needing assistance. Family member is requesting that the patient stay the night and was just recently released earlier.  provided space for feelings, thoughts, and concerns. Discussed issue with the physician who spoke to the patient and family. Family member indicated that the patient did not want to stay. Intervention:  Writer introduced self and title as  Writer offered space for the family  to express feelings, needs, and concerns and provided a ministry presence. Determined support to be available. Outcome:  Family coping but concerned    Plan:  Chaplains will remain available to offer spiritual and emotional support as needed. Electronically signed by Teodoro Marie on 8/12/2022 at 2:03 AM.  [facility]  [phone]       08/11/22 2100   Encounter Summary   Service Provided For: Family   Referral/Consult From: [address] Family members   Last Encounter  08/11/22   Complexity of Encounter Moderate   Begin Time 2100   End Time  2115   Total Time Calculated 15 min   Encounter    Type Initial Screen/Assessment   Assessment/Intervention/Outcome   Assessment Concerns with suffering   Intervention Active listening;Discussed illness injury and its impact; Explored/Affirmed feelings, thoughts, concerns;Explored Coping Skills/Resources   Outcome Engaged in conversation;Expressed feelings, needs, and concerns     Electronically signed by Vania Dubon on 8/12/2022 at 2:03 AM

## 2022-08-12 NOTE — ANESTHESIA PRE PROCEDURE
Department of Anesthesiology  Preprocedure Note       Name:  Dwayne Quiñones   Age:  68 y.o.  :  1945                                          MRN:  [id number]         Date:  2022      Surgeon: Kenney Dsouza):  Daxa Alves MD    Procedure: Procedure(s):  HOLMIUM, CYSTOSCOPY, URETEROSCOPY, STENT PLACEMENT    Medications prior to admission:   Prior to Admission medications    Medication Sig Start Date End Date Taking? Authorizing Provider   loperamide (IMODIUM) 2 MG capsule Take 1 capsule by mouth 4 times daily as needed for Diarrhea 22  Cailin Saunders MD   losartan (COZAAR) 100 MG tablet Take 1 tablet by mouth in the morning. 8/3/22   Cailin Saunders MD   amLODIPine (NORVASC) 10 MG tablet Take 1 tablet by mouth in the morning. 8/3/22   Cailin Saunders MD   acetaminophen (TYLENOL) 325 MG tablet Take 650 mg by mouth every 6 hours as needed for Pain    Historical Provider, MD   torsemide (DEMADEX) 20 MG tablet Take 1 tablet by mouth in the morning. 7/20/22 10/18/22  Vale Merlin, MD   omeprazole (PRILOSEC) 20 MG delayed release capsule TAKE ONE CAPSULE BY MOUTH DAILY 22   Earle Johnson MD   carvedilol (COREG) 12.5 MG tablet Take 1 tablet by mouth 2 times daily (with meals) 22  Earle Johnson MD   vitamin E 400 UNIT capsule Take 1 capsule by mouth daily    Historical Provider, MD   insulin detemir (LEVEMIR FLEXTOUCH) 100 UNIT/ML injection pen INJECT 60 UNITS INTO THE SKIN TWO TIMES A DAY 22   Earle Johnson MD   gabapentin (NEURONTIN) 300 MG capsule Take 1 capsule by mouth nightly for 30 days.  22  Earle Johnson MD   apixaban (ELIQUIS) 5 MG TABS tablet Take 1 tablet by mouth 2 times daily 22   Sravanthi Madden MD   dicyclomine (BENTYL) 10 MG capsule Take 1 capsule by mouth 3 times daily (before meals) 22   Sravanthi Madden MD   amiodarone (CORDARONE) 200 MG tablet Take 1 tablet by mouth daily for 60 doses 22 (NORVASC) tablet 10 mg  10 mg Oral Daily Wade Fisher MD   10 mg at 08/12/22 0828    apixaban (ELIQUIS) tablet 5 mg  5 mg Oral BID Wade Fisher MD   5 mg at 08/12/22 0258    pantoprazole (PROTONIX) tablet 40 mg  40 mg Oral QAM AC Wade Fisher MD   40 mg at 08/12/22 0629    sodium chloride flush 0.9 % injection 5-40 mL  5-40 mL IntraVENous 2 times per day Wade Fisher MD        sodium chloride flush 0.9 % injection 5-40 mL  5-40 mL IntraVENous PRN Wade Fisher MD        0.9 % sodium chloride infusion   IntraVENous PRN Wade Fisher MD        ondansetron (ZOFRAN-ODT) disintegrating tablet 4 mg  4 mg Oral Q8H PRN Wade Fisher MD        Or    ondansetron (ZOFRAN) injection 4 mg  4 mg IntraVENous Q6H PRN Wade Fisher MD        acetaminophen (TYLENOL) tablet 650 mg  650 mg Oral Q6H PRN Wade Fisher MD        Or    acetaminophen (TYLENOL) suppository 650 mg  650 mg Rectal Q6H PRN Wade Fisher MD        polyethylene glycol (GLYCOLAX) packet 17 g  17 g Oral Daily PRN Wade Fisher MD        diphenhydrAMINE (BENADRYL) injection 25 mg  25 mg IntraVENous Q6H PRN Wade Fisher MD        tamsulosin (FLOMAX) capsule 0.4 mg  0.4 mg Oral Daily Cherry Hawkins MD        amiodarone (CORDARONE) tablet 200 mg  200 mg Oral Daily Davonte Murillo MD   200 mg at 08/12/22 4832    rosuvastatin (CRESTOR) tablet 40 mg  40 mg Oral Daily Davonte Murillo MD        ticagror [facility]) tablet 90 mg  90 mg Oral BID Davonte Murillo MD        morphine (PF) injection 1 mg  1 mg IntraVENous Q4H PRN Davonte Murillo MD   1 mg at 08/12/22 0816    ciprofloxacin (CIPRO) IVPB 400 mg  400 mg IntraVENous Q24H Davonte Murillo MD   Stopped at 08/12/22 1208    0.9 % sodium chloride infusion   IntraVENous Continuous Wade Fisher  mL/hr at 08/12/22 0621 Rate Verify at 08/12/22 3040       Allergies:     Allergies   Allergen Reactions    Penicillins      Other reaction(s): Hives    Sulfa Antibiotics      Other reaction(s): Rash       Problem List:    Patient Active Problem List   Diagnosis Code    Type 2 diabetes mellitus ([facility]) E11.9    Vertigo R42    Essential hypertension I10    Atherosclerotic heart disease of native coronary artery with other forms of angina pectoris ([facility]) I25.118    Dyspnea R06.00    Claudication in peripheral vascular disease ([facility] I73.9    Diabetic polyneuropathy associated with type 2 diabetes mellitus ([facility] E11.42    Other hyperlipidemia E78.49    Chronic systolic heart failure ([facility]) I50.22    Multiple subsegmental pulmonary emboli without acute cor pulmonale ([facility]) I26.94    Congestive heart failure ([facility]) I50.9    History of pulmonary embolism Z86.711    Suspected COVID-19 virus infection Z20.822    MARIELLE (acute kidney injury) ([facility] N17.9    Chronic diastolic (congestive) heart failure ([facility]) I50.32    Generalized weakness R53.1    Anemia, normocytic normochromic D64.9    Class 3 severe obesity in adult ([facility]) E66.01    Acute on chronic diastolic CHF (congestive heart failure) ([facility]) I50.33    Acute diastolic CHF (congestive heart failure) ([facility]) I50.31    Moderate malnutrition ([facility]) E44.0    S/P PTCA (percutaneous transluminal coronary angioplasty) Z98.61    Cervical myelopathy ([facility] G95.9    Fall at home, initial encounter W19. Jens Fisher, Y92.009    Sicca, unspecified type ([facility] M35.00    Moderate episode of recurrent major depressive disorder ([facility] F33.1    CKD (chronic kidney disease), stage III ([facility]) N18.30    Dysuria R30.0    Paroxysmal atrial fibrillation ([facility]) I48.0    Non-intractable vomiting R11.10    NSTEMI (non-ST elevated myocardial infarction) ([facility] I21.4    Persistent proteinuria R80.1    Syncope and collapse R55    Obesity, Class III, BMI 40-49.9 (morbid obesity) ([facility]) E66.01    Presence of stent in coronary artery in patient with coronary artery disease I25.10, Z95.5    Hypokalemia E87.6    Obesity (BMI 35.0-39.9 without comorbidity) E66.9    Current use of long term anticoagulation Z79.01    Acute kidney injury superimposed on CKD ([address] N17.9, N18.9    Nephrolithiasis N20.0    Acute unilateral obstructive uropathy N13.9       Past Medical History:        Diagnosis Date    Acute renal failure with tubular necrosis ([address] 11/24/2021    Secondary to ischemic ATN post fall intravascularly depletion hypotension and low flow baseline creatinine 1.2-1.4 peaked up to 2.1 during her hospitalization in September 2020. Work-up showed benign urine sediment, kidney size to be 11.2 on the right 15.1 on the left serological work-up negative.  Anxiety     Atrial fibrillation (HCC)     chronic-takes xarelto    Benign positional vertigo     CAD (coronary artery disease)     Chest pain     CKD (chronic kidney disease), stage III ([address] 11/24/2021    Secondary to ischemic and diabetic nephrosclerosis baseline 1.2-1.4    Depression     Diabetes mellitus ([address]     Diabetic neuropathy ([address]     Dysuria 11/24/2021    Hyperlipidemia     Hypertension     Migraine     Migraine headaches aggravated with sublingual nitroglycerin    Persistent proteinuria 7/20/2022    From diabetic nephrosclerosis UPC 1.0       Past Surgical History:        Procedure Laterality Date    APPENDECTOMY      CARDIAC CATHETERIZATION      2012    CORONARY ANGIOPLASTY WITH STENT PLACEMENT  02/25/2017    4 SYNERGY HEART STENTS DRUG ELUTING ALL MRI CONDITONAL 3T OK, SAFE IMMEDIATELY.  CORONARY ANGIOPLASTY WITH STENT PLACEMENT  07/11/2012    XIENCE HEART STENT/ MRI CONDITIONAL 3T OK, SAFE IMMEDIATELY    CORONARY ANGIOPLASTY WITH STENT PLACEMENT  12/11/2020    PTCA         Social History:    Social History     Tobacco Use    Smoking status: Never    Smokeless tobacco: Never   Substance Use Topics    Alcohol use:  No                                Counseling given: Not Answered      Vital Signs (Current):   Vitals: 08/12/22 0146 08/12/22 0444 08/12/22 0820 08/12/22 1331   BP:   114/69 (!) 119/49   Pulse:   63 (!) 45   Resp:  17 18 20   Temp:   98.6 °F (37 °C) 99 °F (37.2 °C)   TempSrc:    Temporal   SpO2: 98%  98% 98%   Weight: 225 lb 8.5 oz (102.3 kg)      Height: 5' 5\" (1.651 m)                                                 BP Readings from Last 3 Encounters:   08/12/22 (!) 119/49   08/10/22 (!) 109/46   07/20/22 (!) 142/80       NPO Status: Time of last liquid consumption: 2100                        Time of last solid consumption: 2100                        Date of last liquid consumption: 08/11/22                        Date of last solid food consumption: 08/11/22    BMI:   Wt Readings from Last 3 Encounters:   08/12/22 225 lb 8.5 oz (102.3 kg)   08/05/22 233 lb 7.5 oz (105.9 kg)   07/20/22 248 lb (112.5 kg)     Body mass index is 37.53 kg/m².     CBC:   Lab Results   Component Value Date/Time    WBC 9.0 08/12/2022 03:28 AM    RBC 3.60 08/12/2022 03:28 AM    RBC 3.19 12/07/2021 01:39 PM    HGB 10.9 08/12/2022 03:28 AM    HCT 33.3 08/12/2022 03:28 AM    MCV 92.5 08/12/2022 03:28 AM    RDW 14.9 08/12/2022 03:28 AM     08/12/2022 03:28 AM       CMP:   Lab Results   Component Value Date/Time     08/12/2022 03:28 AM    K 4.3 08/12/2022 03:28 AM     08/12/2022 03:28 AM    CO2 23 08/12/2022 03:28 AM    BUN 30 08/12/2022 03:28 AM    CREATININE 2.35 08/12/2022 03:28 AM    GFRAA 24 08/12/2022 03:28 AM    LABGLOM 20 08/12/2022 03:28 AM    GLUCOSE 159 08/12/2022 03:28 AM    GLUCOSE 238 12/07/2021 01:39 PM    PROT 7.4 08/11/2022 09:07 PM    CALCIUM 9.4 08/12/2022 03:28 AM    BILITOT 0.42 08/11/2022 09:07 PM    ALKPHOS 68 08/11/2022 09:07 PM    AST 15 08/11/2022 09:07 PM    ALT 10 08/11/2022 09:07 PM       POC Tests:   Recent Labs     08/12/22  1350   POCGLU 168*       Coags:   Lab Results   Component Value Date/Time    PROTIME 12.2 07/28/2022 02:30 PM    INR 1.2 07/28/2022 02:30 PM    APTT 28.9 07/28/2022 02:30 PM       HCG (If Applicable): No results found for: PREGTESTUR, PREGSERUM, HCG, HCGQUANT     ABGs: No results found for: PHART, PO2ART, NKH4RVE, VQJ9BMM, BEART, M9FBQKRP     Type & Screen (If Applicable):  No results found for: LABABO, LABRH    Drug/Infectious Status (If Applicable):  Lab Results   Component Value Date/Time    HEPCAB NONREACTIVE 11/19/2019 06:33 AM       COVID-19 Screening (If Applicable):   Lab Results   Component Value Date/Time    COVID19 Not Detected 05/29/2022 07:10 PM    COVID19 Not Detected 07/12/2020 08:20 PM           Anesthesia Evaluation  Patient summary reviewed and Nursing notes reviewed history of anesthetic complications:   Airway: Mallampati: III  TM distance: >3 FB   Neck ROM: full  Mouth opening: > = 3 FB   Dental:    (+) edentulous      Pulmonary: breath sounds clear to auscultation  (+) shortness of breath:                             Cardiovascular:    (+) hypertension:, past MI: no interval change, CAD: no interval change, CHF:,         Rhythm: regular  Rate: normal                 ROS comment: ECHO 2021: Left ventricle is normal in size with normal systolic function globally. Calculated ejection fraction is 54%. Mild left ventricular wall thickness. Evidence of diastolic dysfunction. Left atrium is moderately dilated. Mildly dilated right atrium. Aortic valve is sclerotic but opens well. Mild mitral regurgitation. Mild tricuspid regurgitation. Estimated right ventricular systolic pressure  is 68.1 mmHg. Neuro/Psych:   (+) neuromuscular disease:, headaches:, psychiatric history:            GI/Hepatic/Renal:             Endo/Other:    (+) DiabetesType II DM, using insulin, : arthritis:., .                 Abdominal:             Vascular: Other Findings:           Anesthesia Plan      MAC     ASA 3       Induction: intravenous. MIPS: Postoperative opioids intended and Prophylactic antiemetics administered.   Anesthetic plan and risks discussed with patient and child/children. Plan discussed with CRNA.                     Tavon Cavanaugh MD   8/12/2022

## 2022-08-12 NOTE — OP NOTE
Operative Note      Patient: Rodolfo Sandoval  YOB: 1945  MRN: [id number]    Date of Procedure: 8/12/2022    Pre-Op Diagnosis: RT MID URETERAL STONE     Post-Op Diagnosis: Same       Procedure(s):  CYSTOSCOPY  RT URETEROSCOPY  RIGHT-SIDED HOLMIUM LASER LITHOTRIPSY   RIGHT URETERAL STENT PLACEMENT    Surgeon(s):  Carlos Eduardo Basurto MD    Assistant:   Meg Henson MD       Anesthesia: General    Estimated Blood Loss (mL): Minimal    Complications: None    Specimens:   * No specimens in log *    Implants:  6 x 26 cm Rt ureteral stent       Drains: * No LDAs found *        INDICATIONS FOR PROCEDURE:  Rodolfo Sandoval is a 68 y.o. female presents for Right sided mid ureteral calculus with acute kidney injury. After the risks, benefits, alternatives, of the procedure were discussed with the patient, informed consent was obtained. The patient elected to proceed. DETAILS OF THE PROCEDURE:  The patient was brought back from the preoperative holding area to the  operating suite, and was transferred to the operating table where the patient lay in  supine position. EPC's were in place, connected to the machine and the machine was turned on before induction. General endotracheal anesthesia was induced, and patient was prepped and draped in standard surgical fashion after being placed in dorsolithotomy position. A proper timeout was performed, preoperative antibiotics were given. We began by inserting a cystoscope with a 22 Romanian sheath and 30 degree lens into the patient's urethral meatus and advancing into the bladder without complication. A pan cystoscopy was preformed and the bladder appeared unremarkable. We then focused our attention on the Right ureteral orifice, which we cannulated with our glidewire, advanced up to renal pelvis. We then used a 10 Fr dual lumen catheter to place a second wire.   Once in good position, we advanced our flexible ureteroscope over the working wire to the renal pelvis under direct visualization. We identified the ureteral stones x2 that were pushed up into the lower pole and using a 200 micron holmium laser fiber we fragmented the calculi. They were dusted and the fragments appeared to be under 1 millimeter and could pass easily. At this time a complete pyeloscopy was preformed and no other substantial fragments were identified. The entire kidney was filled with debris, we withdrew the ureteroscope and visualized the entire ureter. No stone fragments were identified. No damage to the ureter was identified. At this time, over the remaining glidewire, we placed a 6 x 26 cm double J ureteral stent in the usual fashion, and we noted appropriate placement in the upper collecting system using fluoroscopy. There was a good curl noted in the bladder. We decided to leave a string at the end of the stent, which was attached with benzoin and steri-strips. The patient's bladder was drained and removed the scope and the procedure was subsequently terminated. Dr. Chico Ley was present for all critical portions of the procedure. Plan:  Patient will be transferred to floor and ok to discharge if pain well controlled.    The patient can pull the stent in 5 days     [address]       Electronically signed by Kuldeep Ayala MD on 8/12/2022 at 3:24 PM

## 2022-08-12 NOTE — ED PROVIDER NOTES
[facility] [address] [facility]  Emergency Department  Faculty Sign-Out Addendum     Care of Erika Benedict was assumed from previous attending and is being seen for Abdominal Pain (Right lower back flank to RLQ abdomin)  . The patient's initial evaluation and plan have been discussed with the prior provider who initially evaluated the patient. Handoff taken on the following patient from prior Attending Physician:    Akil Morrissey    I was available and discussed any additional care issues that arose and coordinated the management plans with the resident(s) caring for the patient during my duty period. Any areas of disagreement with residents documentation of care or procedures are noted on the chart. I was personally present for the key portions of any/all procedures during my duty period. I have documented in the chart those procedures where I was not present during the key portions.       EMERGENCY DEPARTMENT COURSE / MEDICAL DECISION MAKING:       MEDICATIONS GIVEN:  Orders Placed This Encounter   Medications    morphine injection 4 mg    0.9 % sodium chloride bolus       LABS / RADIOLOGY:     Labs Reviewed   CBC WITH AUTO DIFFERENTIAL - Abnormal; Notable for the following components:       Result Value    RBC 3.68 (*)     Hemoglobin 10.7 (*)     Hematocrit 33.9 (*)     RDW 15.1 (*)     Seg Neutrophils 73 (*)     Lymphocytes 17 (*)     Eosinophils % 0 (*)     All other components within normal limits   COMPREHENSIVE METABOLIC PANEL - Abnormal; Notable for the following components:    Glucose 213 (*)     BUN 29 (*)     Creatinine 2.31 (*)     GFR Non- 20 (*)     GFR  25 (*)     All other components within normal limits   URINALYSIS WITH MICROSCOPIC - Abnormal; Notable for the following components:    Glucose, Ur TRACE (*)     Urine Hgb MODERATE (*)     Protein, UA 1+ (*)     All other components within normal limits   CULTURE, URINE   LIPASE   MAGNESIUM   LACTIC ACID No acute bone finding. Acute right obstructive uropathy secondary to a 2 mm x 4 mm in length proximal right ureteral calculus. No other renal or ureteral calculi. Left colon diverticulosis with no evidence of diverticulitis. CT HEAD WO CONTRAST    Result Date: 7/28/2022  EXAMINATION: CT OF THE HEAD WITHOUT CONTRAST  7/28/2022 12:59 pm TECHNIQUE: CT of the head was performed without the administration of intravenous contrast. Automated exposure control, iterative reconstruction, and/or weight based adjustment of the mA/kV was utilized to reduce the radiation dose to as low as reasonably achievable. COMPARISON: None. HISTORY: ORDERING SYSTEM PROVIDED HISTORY: [employer] TECHNOLOGIST PROVIDED HISTORY: [employer] Decision Support Exception - unselect if not a suspected or confirmed emergency medical condition->Emergency Medical Condition (MA) CT BRAIN FINDINGS: BRAIN/VENTRICLES: The cerebral hemispheres, brainstem, and cerebellum have a normal appearance for the patient's age. The falx is midline. The ventricles and peripheral sulci are mildly dilated. There is decreased attenuation in the periventricular white matter. There is no sign of a space occupying lesion, infarction, or hemorrhage. Orbits: Portion of the orbits demonstrate no acute abnormality. SINUSES: . The  imaged portions of the paranasal sinuses are clear. The mastoids and the middle ear chambers are clear. SOFT TISSUES/SKULL:  No acute abnormality of the visualized skull or soft tissues. Vascular calcifications are seen compatible with atherosclerotic disease. Mild central and cortical cerebral atrophy. Mild chronic deep white matter ischemic changes No acute intracranial abnormalities are noted.      CT CHEST WO CONTRAST    Result Date: 7/28/2022  EXAMINATION: CT OF THE CHEST WITHOUT CONTRAST 7/28/2022 3:59 pm TECHNIQUE: CT of the chest was performed without the administration of intravenous contrast. Multiplanar reformatted images are provided for review. Automated exposure control, iterative reconstruction, and/or weight based adjustment of the mA/kV was utilized to reduce the radiation dose to as low as reasonably achievable. COMPARISON: 11/06/2020 HISTORY: ORDERING SYSTEM PROVIDED HISTORY: sob TECHNOLOGIST PROVIDED HISTORY: sob Decision Support Exception - unselect if not a suspected or confirmed emergency medical condition->Emergency Medical Condition (MA) FINDINGS: Mediastinum: Heart size is normal without pericardial effusion. Coronary artery atherosclerosis. Main pulmonary artery is mildly dilated to 3.1 cm. Thoracic aorta is upper limits of normal in caliber. There are shotty mediastinal lymph nodes. Lungs/pleura: There is no pleural effusion. There is no pneumothorax. There is no pulmonary consolidation. There is scattered, subsegmental atelectasis. 5 mm sub solid nodule in the right upper lobe. Upper Abdomen: No acute abnormality in the visualized upper abdomen. Prior cholecystectomy. 7.2 cm simple appearing left renal cyst.  No further imaging follow-up is required. Soft Tissues/Bones: Multilevel degenerative changes in the thoracic spine. 1. No acute intrathoracic abnormality. 2. 5 mm right upper lobe nodule. RECOMMENDATIONS: Pathology: 5 mm right part-solid pulmonary nodule within the upper lobe. No routine follow-up imaging is recommended per Fleischner Society Guidelines. These guidelines do not apply to immunocompromised patients and patients with cancer. Follow up in patients with significant comorbidities as clinically warranted. For lung cancer screening, adhere to Lung-RADS guidelines. Reference: Radiology.  2017; 284(1):228-43     CT CERVICAL SPINE WO CONTRAST    Result Date: 7/28/2022  EXAMINATION: CT OF THE CERVICAL SPINE WITHOUT CONTRAST 7/28/2022 12:59 pm TECHNIQUE: CT of the cervical spine was performed without the administration of intravenous contrast. Multiplanar reformatted images are provided for review. Automated exposure control, iterative reconstruction, and/or weight based adjustment of the mA/kV was utilized to reduce the radiation dose to as low as reasonably achievable. COMPARISON: None. HISTORY: ORDERING SYSTEM PROVIDED HISTORY: fall TECHNOLOGIST PROVIDED HISTORY: fall Decision Support Exception - unselect if not a suspected or confirmed emergency medical condition->Emergency Medical Condition (MA) CT CERVICAL SPINE FINDINGS: The cervical spine demonstrates normal mineralization with straightening of the  cervical lordosis. There is no evidence of fracture or subluxation. There is loss of disc height with eburnation of the vertebral endplates at the Z0-1, I4-6, C5-6, C6-7 levels. There are anterior and posterior marginal osteophytes at multiple levels. Mild spinal stenosis at C5-6 and C6-7. There is bilateral facet hypertrophy at multiple levels throughout the cervical spine. The pedicles and posterior elements are otherwise intact. The prevertebral and paravertebral soft tissues are unremarkable. The atlanto-dens interval and dens are intact. The visualized lung apices are clear. Vascular calcifications are seen compatible with atherosclerotic disease. Multilevel cervical spondylosis and degenerative disc disease. Mild spinal stenosis C5-6 and C6-7 Evidence of paracervical spasm. No acute bony abnormalities are noted in the cervical spine RECOMMENDATIONS: Further evaluation of the cervical spine should be obtained with MR imaging if clinically indicated. XR CHEST PORTABLE    Result Date: 7/28/2022  EXAMINATION: ONE XRAY VIEW OF THE CHEST 7/28/2022 2:57 pm COMPARISON: 05/29/2022 HISTORY: ORDERING SYSTEM PROVIDED HISTORY: Syncope TECHNOLOGIST PROVIDED HISTORY: Syncope FINDINGS: The lungs are without acute focal process. There is no effusion or pneumothorax. The cardiomediastinal silhouette is stable. The osseous structures are stable. No acute process.  Stable cardiomegaly       RECENT VITALS:     Temp: 98.7 °F (37.1 °C),  Heart Rate: 70, Resp: 16, BP: (!) 188/68, SpO2: 97 %    This patient is a 68 y.o. Female with abdominal pain. Known CKD. Baseline creatinine 1.5. Right flank pain with CT showing 4 mm stone. Creatinine increased to 2.3.   Plan is admission    OUTSTANDING TASKS / RECOMMENDATIONS:    Admission with urology consultation      Ivett Locke MD, Ayla Alberts  Attending Emergency Physician  [facility] ED       [place] MD  08/11/22 [id number]

## 2022-08-12 NOTE — CARE COORDINATION
08/12/22 1002   Service Assessment   Patient Orientation Alert and Oriented   Cognition Alert   History Provided By Patient   Primary Chucky Canales is: Named in [address] [place] [address]   PCP Verified by CM Yes   Last Visit to PCP Within last 3 months   Can patient return to prior living arrangement Yes   Ability to make needs known: Good   Family able to assist with home care needs: Yes   Would you like for me to discuss the discharge plan with any other family members/significant others, and if so, who? Yes   Financial Resources Medicare   CM/SW Referral DME   Social/Functional History   Lives With Alone   Type of Home Trailer   Home Layout One level   Home Access Stairs to enter with rails   Entrance Stairs - Number of Steps few   Entrance Stairs - Rails Right   Bathroom Shower/Tub Tub/Shower unit   Bathroom Toilet Standard   Bathroom Equipment Shower chair   Home Equipment [address] Help From Family   ADL Assistance Needs assistance   Bath Modified independent   Dressing Modified independent   Grooming Modified independent    Feeding Independent   [address] Needs assistance   Ambulation Assistance Needs assistance   Patient's  Info family takes her where she needs to go   Discharge Planning   Type of Residence Trailer/Mobile Home   Living Arrangements Alone   Current Services Prior To Admission None   Potential Assistance Needed Home Care   DME Ordered?  Mahnaz August

## 2022-08-12 NOTE — ED PROVIDER NOTES
[facility]  Emergency Department Encounter  EmergencyMedicine Resident     Pt David Buitrago  MRN: [id number]  Keithgfyamila 1945  Date of evaluation: 8/11/22  PCP:  Carissa Patino MD    CHIEF COMPLAINT       Chief Complaint   Patient presents with    Abdominal Pain     Right lower back flank to RLQ abdomin       HISTORY OF PRESENT ILLNESS  (Location/Symptom, Timing/Onset, Context/Setting, Quality, Duration, Modifying Factors, Severity.)      Yuval Vivar is a 68 y.o. female who presents with right abdominal pain and flank pain that woke her up from sleep this morning. Patient's had associated nausea with this pain. Patient rates the pain as severe 10 out of 10, localized to the right flank and abdomen, pain is not improved, unknown what makes it better, palpation and movement make it worse. Patient had some vomiting emesis. Patient denies any chest pain, lightheadedness, dizziness, change in urination or bowel habits. Denies any fevers or chills. PAST MEDICAL / SURGICAL / SOCIAL / FAMILY HISTORY      has a past medical history of Acute renal failure with tubular necrosis (HCC), Anxiety, Atrial fibrillation (HCC), Benign positional vertigo, CAD (coronary artery disease), Chest pain, CKD (chronic kidney disease), stage III ([address] Depression, Diabetes mellitus ([address] Diabetic neuropathy ([address] Dysuria, Hyperlipidemia, Hypertension, Migraine, and Persistent proteinuria. No additional pertinent     has a past surgical history that includes Percutaneous Transluminal Coronary Angio; Cardiac catheterization; Coronary angioplasty with stent (02/25/2017); Appendectomy; Coronary angioplasty with stent (07/11/2012); and Coronary angioplasty with stent (12/11/2020).   No additional pertinent    Social History     Socioeconomic History    Marital status:      Spouse name: Not on file    Number of children: Not on file    Years of education: Not on file    Highest education level: Not on file   Occupational History    Not on file   Tobacco Use    Smoking status: Never    Smokeless tobacco: Never   Substance and Sexual Activity    Alcohol use: No    Drug use: No    Sexual activity: Never   Other Topics Concern    Not on file   Social History Narrative    Not on file     Social Determinants of Health     Financial Resource Strain: Not on file   Food Insecurity: Not on file   Transportation Needs: Not on file   Physical Activity: Not on file   Stress: Not on file   Social Connections: Not on file   Intimate Partner Violence: Not on file   Housing Stability: Not on file       Family History   Problem Relation Age of Onset    Cancer Mother     Cancer Father        Allergies:  Penicillins and Sulfa antibiotics    Home Medications:  Prior to Admission medications    Medication Sig Start Date End Date Taking? Authorizing Provider   loperamide (IMODIUM) 2 MG capsule Take 1 capsule by mouth 4 times daily as needed for Diarrhea 8/2/22 8/12/22  Lottie Bhakta MD   losartan (COZAAR) 100 MG tablet Take 1 tablet by mouth in the morning. 8/3/22   Lottie Bhakta MD   amLODIPine (NORVASC) 10 MG tablet Take 1 tablet by mouth in the morning.  8/3/22   Lottie Bhakta MD   acetaminophen (TYLENOL) 325 MG tablet Take 650 mg by mouth every 6 hours as needed for Pain    Historical Provider, MD   torsemide (DEMADEX) 20 MG tablet Take 1 tablet by mouth in the morning. 7/20/22 10/18/22  Jaylen Barnett MD   omeprazole (PRILOSEC) 20 MG delayed release capsule TAKE ONE CAPSULE BY MOUTH DAILY 6/22/22   Stephanie Echavarria MD   carvedilol (COREG) 12.5 MG tablet Take 1 tablet by mouth 2 times daily (with meals) 6/22/22 8/2/22  Stephanie Echavarria MD   vitamin E 400 UNIT capsule Take 1 capsule by mouth daily    Historical Provider, MD   insulin detemir (LEVEMIR FLEXTOUCH) 100 UNIT/ML injection pen INJECT 60 UNITS INTO THE SKIN TWO TIMES A DAY 6/13/22   Stephanie Echavarria MD   gabapentin (NEURONTIN) 300 MG capsule Take 1 capsule by mouth nightly for 30 days. 6/13/22 7/13/22  Earle Johnson MD   apixaban (ELIQUIS) 5 MG TABS tablet Take 1 tablet by mouth 2 times daily 6/1/22   Sravanthi Madden MD   dicyclomine (BENTYL) 10 MG capsule Take 1 capsule by mouth 3 times daily (before meals) 6/1/22   Sravanthi Madden MD   amiodarone (CORDARONE) 200 MG tablet Take 1 tablet by mouth daily for 60 doses 6/13/22 8/12/22  MAYELA Rios CNP   prednisoLONE acetate (PRED FORTE) 1 % ophthalmic suspension Place 1 drop into both eyes 3 times daily    Historical Provider, MD   BRILINTA 90 MG TABS tablet TAKE ONE TABLET BY MOUTH TWICE A DAY 4/19/22   MAYELA Melendez NP   isosorbide mononitrate (IMDUR) 30 MG extended release tablet Take 1 tablet by mouth daily 3/15/22   Earle Johnson MD   insulin aspart (NOVOLOG FLEXPEN) 100 UNIT/ML injection pen Use TID before meals according to scale - max 45 units a day 12/2/21   Earle Johnson MD   Lancets MISC 1 each by Does not apply route 2 times daily 12/2/21   Earle Johnson MD   Insulin Pen Needle 32G X 4 MM MISC 5 each by Does not apply route daily 12/2/21   Earle Johnson MD   escitalopram (LEXAPRO) 20 MG tablet TAKE ONE TABLET BY MOUTH DAILY 11/22/21   Earle Johnson MD   rosuvastatin (CRESTOR) 40 MG tablet Take 1 tablet by mouth daily 9/28/21   Earle Johnson MD   vitamin D3 (CHOLECALCIFEROL) 25 MCG (1000 UT) TABS tablet Take 1 tablet by mouth daily 9/28/21   Earle Johnson MD   Continuous Blood Gluc Sensor (FREESTYLE SYLVIA 14 DAY SENSOR) MISC 1 each by Does not apply route every 14 days 7/6/21   Earle Johnson MD   nitroGLYCERIN (NITROSTAT) 0.4 MG SL tablet place 1 tablet under the tongue if needed every 5 minutes if needed for CHEST PAIN 2/19/21   Earle Johnson MD   Misc. Devices [facility]) MISC Diagnosis: right 5th metatarsal fracture, pain in limb    Duration: 3 months 2/3/21   Leim Blush, DPM   Misc.  Devices (COMMODE BEDSIDE) MISC 1 Device by Does not apply route daily 2/3/21   [address], DPM   Continuous Blood Gluc  (FREESTYLE SYLVIA 14 DAY READER) JAQUELINE 1 each by Does not apply route continuous [facility] 10/30/20   Yifan Harding MD       REVIEW OF SYSTEMS    (2-9 systems for level 4, 10 or more for level 5)      Review of Systems   Constitutional:  Negative for chills and fever. HENT:  Negative for congestion. Respiratory:  Negative for chest tightness and shortness of breath. Cardiovascular:  Negative for chest pain and leg swelling. Gastrointestinal:  Positive for abdominal pain, nausea and vomiting. Negative for constipation and diarrhea. Endocrine: Negative for polyuria. Genitourinary:  Positive for flank pain. Negative for difficulty urinating. Skin:  Negative for color change. Neurological:  Negative for dizziness, weakness, light-headedness and headaches. Psychiatric/Behavioral:  Negative for confusion. PHYSICAL EXAM   (up to 7 for level 4, 8 or more for level 5)      INITIAL VITALS:   BP (!) 147/73   Pulse 74   Temp 98.1 °F (36.7 °C) (Oral)   Resp 16   Ht 5' 5\" (1.651 m)   Wt 225 lb 8.5 oz (102.3 kg)   SpO2 98%   BMI 37.53 kg/m²     Physical Exam  Constitutional:       Appearance: Normal appearance. HENT:      Head: Normocephalic and atraumatic. Mouth/Throat:      Mouth: Mucous membranes are moist.      Pharynx: Oropharynx is clear. Eyes:      Extraocular Movements: Extraocular movements intact. Conjunctiva/sclera: Conjunctivae normal.   Cardiovascular:      Rate and Rhythm: Normal rate and regular rhythm. Pulses: Normal pulses. Heart sounds: Normal heart sounds. No murmur heard. Pulmonary:      Effort: Pulmonary effort is normal.      Breath sounds: Normal breath sounds. Abdominal:      General: Bowel sounds are normal. There is no distension. Palpations: Abdomen is soft. Tenderness:  There is abdominal tenderness in the right lower quadrant. There is right CVA tenderness and guarding. Musculoskeletal:         General: Normal range of motion. Comments: Range of motion noted to be normal with patient's natural movements   Skin:     General: Skin is warm and dry. Findings: No rash (On exposed skin). Neurological:      General: No focal deficit present. Mental Status: She is alert and oriented to person, place, and time.    Psychiatric:         Mood and Affect: Mood normal.         Behavior: Behavior normal.       DIFFERENTIAL  DIAGNOSIS     PLAN (LABS / IMAGING / EKG):  Orders Placed This Encounter   Procedures    Culture, Urine    Culture, Blood 1    Culture, Blood 2    CT ABDOMEN PELVIS WO CONTRAST Additional Contrast? None    CBC with Auto Differential    CMP    Lipase    Magnesium    Lactic Acid    Urinalysis with Microscopic    Basic Metabolic Panel w/ Reflex to MG    CBC with Auto Differential    Diet NPO    Vital signs per unit routine    Notify physician    Up as tolerated    Daily weights    Intake and output    Elevate legs    Full Code    Inpatient consult to Hospitalist    Inpatient consult to Urology    Consult to Urology    OT eval and treat    PT evaluation and treat    Initiate Oxygen Therapy Protocol    ADMIT TO INPATIENT       MEDICATIONS ORDERED:  Orders Placed This Encounter   Medications    morphine injection 4 mg    0.9 % sodium chloride bolus    DISCONTD: morphine injection 2 mg    amLODIPine (NORVASC) tablet 10 mg    apixaban (ELIQUIS) tablet 5 mg     Order Specific Question:   Indication of Use     Answer:   A Fib/A Flutter    pantoprazole (PROTONIX) tablet 40 mg    sodium chloride flush 0.9 % injection 5-40 mL    sodium chloride flush 0.9 % injection 5-40 mL    0.9 % sodium chloride infusion    OR Linked Order Group     ondansetron (ZOFRAN-ODT) disintegrating tablet 4 mg     ondansetron (ZOFRAN) injection 4 mg    OR Linked Order Group     acetaminophen (TYLENOL) tablet 650 mg     acetaminophen (TYLENOL) suppository 650 mg    polyethylene glycol (GLYCOLAX) packet 17 g    diphenhydrAMINE (BENADRYL) injection 25 mg    piperacillin-tazobactam (ZOSYN) 2,250 mg in dextrose 5 % 50 mL IVPB (mini-bag)     Order Specific Question:   Antimicrobial Indications     Answer:   Urinary Tract Infection     Order Specific Question:   UTI duration of therapy     Answer:   10 days    0.9 % sodium chloride infusion    DISCONTD: morphine injection 2 mg    morphine injection 2 mg       DIAGNOSTIC RESULTS / EMERGENCY DEPARTMENT COURSE / MDM   LAB RESULTS:  Results for orders placed or performed during the hospital encounter of 08/11/22   CBC with Auto Differential   Result Value Ref Range    WBC 9.4 3.5 - 11.3 k/uL    RBC 3.68 (L) 3.95 - 5.11 m/uL    Hemoglobin 10.7 (L) 11.9 - 15.1 g/dL    Hematocrit 33.9 (L) 36.3 - 47.1 %    MCV 92.1 82.6 - 102.9 fL    MCH 29.1 25.2 - 33.5 pg    MCHC 31.6 28.4 - 34.8 g/dL    RDW 15.1 (H) 11.8 - 14.4 %    Platelets 607 500 - 550 k/uL    MPV 11.1 8.1 - 13.5 fL    NRBC Automated 0.0 0.0 per 100 WBC    RBC Morphology ANISOCYTOSIS PRESENT     Seg Neutrophils 73 (H) 36 - 65 %    Lymphocytes 17 (L) 24 - 43 %    Monocytes 9 3 - 12 %    Eosinophils % 0 (L) 1 - 4 %    Basophils 0 0 - 2 %    Immature Granulocytes 0 0 %    Segs Absolute 6.85 1.50 - 8.10 k/uL    Absolute Lymph # 1.58 1.10 - 3.70 k/uL    Absolute Mono # 0.89 0.10 - 1.20 k/uL    Absolute Eos # 0.04 0.00 - 0.44 k/uL    Basophils Absolute 0.03 0.00 - 0.20 k/uL    Absolute Immature Granulocyte 0.03 0.00 - 0.30 k/uL   CMP   Result Value Ref Range    Glucose 213 (H) 70 - 99 mg/dL    BUN 29 (H) 8 - 23 mg/dL    Creatinine 2.31 (H) 0.50 - 0.90 mg/dL    Calcium 9.7 8.6 - 10.4 mg/dL    Sodium 135 135 - 144 mmol/L    Potassium 4.0 3.7 - 5.3 mmol/L    Chloride 98 98 - 107 mmol/L    CO2 24 20 - 31 mmol/L    Anion Gap 13 9 - 17 mmol/L    Alkaline Phosphatase 68 35 - 104 U/L    ALT 10 5 - 33 U/L    AST 15 <32 U/L    Total Bilirubin 0.42 0.3 - 1.2 mg/dL Total Protein 7.4 6.4 - 8.3 g/dL    Albumin 4.2 3.5 - 5.2 g/dL    Albumin/Globulin Ratio 1.3 1.0 - 2.5    GFR Non-African American 20 (L) >60 mL/min    GFR  25 (L) >60 mL/min    GFR Comment         Lipase   Result Value Ref Range    Lipase 37 13 - 60 U/L   Magnesium   Result Value Ref Range    Magnesium 2.1 1.6 - 2.6 mg/dL   Lactic Acid   Result Value Ref Range    Lactic Acid, Whole Blood 1.3 0.7 - 2.1 mmol/L   Urinalysis with Microscopic   Result Value Ref Range    Color, UA Yellow Yellow    Turbidity UA Clear Clear    Glucose, Ur TRACE (A) NEGATIVE    Bilirubin Urine NEGATIVE NEGATIVE    Ketones, Urine NEGATIVE NEGATIVE    Specific Gravity, UA 1.010 1.005 - 1.030    Urine Hgb MODERATE (A) NEGATIVE    pH, UA 5.5 5.0 - 8.0    Protein, UA 1+ (A) NEGATIVE    Urobilinogen, Urine Normal Normal    Nitrite, Urine NEGATIVE NEGATIVE    Leukocyte Esterase, Urine NEGATIVE NEGATIVE    WBC, UA 0 TO 2 0 - 5 /HPF    RBC, UA 20 TO 50 0 - 4 /HPF    Casts UA  0 - 8 /LPF     2 TO 5 HYALINE Reference range defined for non-centrifuged specimen. Epithelial Cells UA 2 TO 5 0 - 5 /HPF       RADIOLOGY:  CT ABDOMEN PELVIS WO CONTRAST Additional Contrast? None   Final Result   Acute right obstructive uropathy secondary to a 2 mm x 4 mm in length   proximal right ureteral calculus. No other renal or ureteral calculi. Left colon diverticulosis with no evidence of diverticulitis. EMERGENCY DEPARTMENT COURSE:  ED Course as of 08/12/22 0155   Thu Aug 11, 2022   3475 Discussed with urology, they recommend keeping patient n.p.o. and will evaluate patient in the morning.  Non-Infectious nephrolithiasis at this time, just recommending pain control and they will evaluate. [CR]      ED Course User Index  [CR] Jaqueline Shipley,           PROCEDURES:  None    CONSULTS:  IP CONSULT TO HOSPITALIST  IP CONSULT TO UROLOGY  IP CONSULT TO UROLOGY    MEDICAL DECISION MAKING:  Patient presenting with right flank pain and right abdominal pain. Patient concerning for appendicitis. Patient has no leukocytosis and is afebrile. Patient's labs were obtained and patient demonstrated no leukocytosis. Patient appeared clinically well and no lactic acidosis no concerns for mesenteric ischemia at this time. Patient has elevated creatinine of 2.3, this is a major acute jump from previous creatinine 1.5. Patient had CT scan without contrast due to concerns for acute kidney injury. CT scan was obtained and demonstrated no concerns for appendicitis, gallbladder appeared normal, hysterectomy was present. CT did demonstrate renal calculi of the right kidney with concerns for hydronephrosis and perinephric stranding. Yue Jacob was measured to be 4 mm x 2 mm. With increasing creatinine function, concerns for obstruction, with dilated ureters and hydronephrosis patient was admitted for further treatment of MARIELLE and nephrolithiasis. Urology was consulted, they recommend patient be n.p.o. at midnight, will possibly perform procedure tomorrow. Patient was admitted for pain control, urology evaluation due to concerns of worsening MARIELLE. Patient was given 500 mils of fluid. Patient has history of CHF with an ejection fraction in the 50%, fluid was given judiciously at this time. Patient was admitted to internal medicine for further work-up and treatment of nephrolithiasis and hydronephrosis. The kidney stone is noninfectious as patient has no leukocytosis, afebrile, and urine that demonstrates no concerns for infectious process. CRITICAL CARE:  Please see attending note    FINAL IMPRESSION      1. Nephrolithiasis    2. Hydronephrosis concurrent with and due to calculi of kidney and ureter        DISPOSITION / PLAN     DISPOSITION Admitted 08/11/2022 11:54:50 PM      PATIENT REFERRED TO:  No follow-up provider specified.     DISCHARGE MEDICATIONS:  Current Discharge Medication List          Quinten Serna DO  Emergency Medicine Resident    (Please note that portions of thisnote were completed with a voice recognition program.  Efforts were made to edit the dictations but occasionally words are mis-transcribed.)        Oh Mendez DO  Resident  08/12/22 8000

## 2022-08-12 NOTE — ED PROVIDER NOTES
[address] [place] [address] ED     Emergency Department     Faculty Attestation        I performed a history and physical examination of the patient and discussed management with the resident. I reviewed the residents note and agree with the documented findings and plan of care. Any areas of disagreement are noted on the chart. I was personally present for the key portions of any procedures. I have documented in the chart those procedures where I was not present during the key portions. I have reviewed the emergency nurses triage note. I agree with the chief complaint, past medical history, past surgical history, allergies, medications, social and family history as documented unless otherwise noted below. For Physician Assistant/ Nurse Practitioner cases/documentation I have personally evaluated this patient and have completed at least one if not all key elements of the E/M (history, physical exam, and MDM). Additional findings are as noted. Vital Signs: BP: (!) 188/68  Heart Rate: 70  Resp: 16  Temp: 98.7 °F (37.1 °C) SpO2: 97 %  PCP:  Miroslava Sanchez MD    Pertinent Comments:     Patient is a 69-year-old female who had onset several hours ago in the early morning hours of right lower quadrant pain associate with nausea and vomiting but no diarrhea. Denies any chest pain or shortness of breath. Patient does have history of atrial fibrillation and acute kidney problems however is anticoagulated on Eliquis. On exam patient does have rather exquisite right lower quadrant tenderness to palpation. No rash or ecchymosis seen on the abdomen or flanks. Does not have right CVA tenderness. Assessment/plan: Patient with right-sided abdominal pain lower area concern for appendicitis or other process.    Will obtain abdominal laboratories including urinalysis and obtain noncontrast CT of the abdomen/pelvis given kidney function and reevaluate after attempted symptomatic control    Critical Care  None    This patient was evaluated in the Emergency Department for symptoms described in the history of present illness. He/she was evaluated in the context of the global COVID-19 pandemic, which necessitated consideration that the patient might be at risk for infection with the SARS-CoV-2 virus that causes COVID-19. Institutional protocols and algorithms that pertain to the evaluation of patients at risk for COVID-19 are in a state of rapid change based on information released by regulatory bodies including the CDC and federal and state organizations. These policies and algorithms were followed during the patient's care in the ED. (Please note that portions of this note were completed with a voice recognition program. Efforts were made to edit the dictations but occasionally words are mis-transcribed.  Whenever words are used in this note in any gender, they shall be construed as though they were used in the gender appropriate to the circumstances; and whenever words are used in this note in the singular or plural form, they shall be construed as though they were used in the form appropriate to the circumstances.)    MD Mary Anne Damian  Attending Emergency Medicine Physician           Karyl Osler, MD  08/11/22 2050       Karyl Osler, MD  08/11/22 2115

## 2022-08-12 NOTE — ACP (ADVANCE CARE PLANNING)
..Advance Care Planning     Advance Care Planning Activator (Inpatient)  Conversation Note      Date of ACP Conversation: 8/11/2022     Conversation Conducted with: Patient with Decision Making Capacity    ACP Activator: Lulu Steel, [address] :     Current Designated Health Care Decision Maker:     Primary Decision Maker: Favio Diamnod Child - [phone]    Secondary Decision Maker: Mike Roper Child - [phone]  Click here to complete Healthcare Decision Makers including section of the Healthcare Decision Maker Relationship (ie \"Primary\")  Today we documented Decision Maker(s) consistent with Legal Next of Kin hierarchy. Care Preferences    Ventilation: \"If you were in your present state of health and suddenly became very ill and were unable to breathe on your own, what would your preference be about the use of a ventilator (breathing machine) if it were available to you? \"      Would the patient desire the use of ventilator (breathing machine)?: yes    \"If your health worsens and it becomes clear that your chance of recovery is unlikely, what would your preference be about the use of a ventilator (breathing machine) if it were available to you? \"     Would the patient desire the use of ventilator (breathing machine)?: No      Resuscitation  \"CPR works best to restart the heart when there is a sudden event, like a heart attack, in someone who is otherwise healthy. Unfortunately, CPR does not typically restart the heart for people who have serious health conditions or who are very sick. \"    \"In the event your heart stopped as a result of an underlying serious health condition, would you want attempts to be made to restart your heart (answer \"yes\" for attempt to resuscitate) or would you prefer a natural death (answer \"no\" for do not attempt to resuscitate)? \" yes       [] Yes   [x] No   Educated Patient / Decision Maker regarding differences between Advance Directives and portable DNR orders.     Length of ACP Conversation in minutes:      Conversation Outcomes:  [x] ACP discussion completed  [] Existing advance directive reviewed with patient; no changes to patient's previously recorded wishes  [] New Advance Directive completed  [] Portable Do Not Rescitate prepared for Provider review and signature  [] POLST/POST/MOLST/MOST prepared for Provider review and signature      Follow-up plan:    [] Schedule follow-up conversation to continue planning  [] Referred individual to Provider for additional questions/concerns   [] Advised patient/agent/surrogate to review completed ACP document and update if needed with changes in condition, patient preferences or care setting    [] This note routed to one or more involved healthcare providers

## 2022-08-12 NOTE — DISCHARGE INSTR - COC
Continuity of Care Form    Patient Name: Anamika Jhaveri   :  1945  MRN:  [id number]    Admit date:  2022  Discharge date:  8/15/2022    Code Status Order: Full Code   Advance Directives:     Admitting Physician:  Melly Dixon MD  PCP: Katheryn Shelton MD    Discharging Nurse: [facility] Unit/Room#: 7333/2699-72  Discharging Unit Phone Number: [phone]    Emergency Contact:   Extended Emergency Contact Information  Primary Emergency Contact: Rah Matthews  Address: [place]  Home Phone: [phone]  Relation: Child  Secondary Emergency Contact: Frank Gracia. Phone: [phone]  Mobile Phone: [phone]  Relation: Child    Past Surgical History:  Past Surgical History:   Procedure Laterality Date    APPENDECTOMY      CARDIAC CATHETERIZATION          CORONARY ANGIOPLASTY WITH STENT PLACEMENT  2017    4 SYNERGY HEART STENTS DRUG ELUTING ALL MRI CONDITONAL 3T OK, SAFE IMMEDIATELY.      CORONARY ANGIOPLASTY WITH STENT PLACEMENT  2012    XIENCE HEART STENT/ MRI CONDITIONAL 3T OK, SAFE IMMEDIATELY    CORONARY ANGIOPLASTY WITH STENT PLACEMENT  2020    PTCA         Immunization History:   Immunization History   Administered Date(s) Administered    COVID-19, PFIZER PURPLE top, DILUTE for use, (age 15 y+), 30mcg/0.3mL 2021, 2021    Influenza A (T4L0-33) Vaccine PF IM 2009    Influenza Vaccine, unspecified formulation 2013, 10/22/2015, 2016    Influenza Virus Vaccine 10/03/2016    Influenza, High Dose (Fluzone 65 yrs and older) 10/12/2018, 2019    Influenza, Quadv, IM, PF (6 mo and older Fluzone, Flulaval, Fluarix, and 3 yrs and older Afluria) 2018, 2020, 10/29/2021    Pneumococcal Conjugate 13-valent ([id number]) 2017    Pneumococcal Polysaccharide (Pzyfyoljf50) 2012       Active Problems:  Patient Active Problem List   Diagnosis Code    Type 2 diabetes mellitus ([facility] E11.9    Vertigo R42 not on home oxygen therapy. Ventilator:    - No ventilator support    Rehab Therapies: Skilled Nursing, Physical Therapy, HHA and Occupational Therapy eval and treat. Weight Bearing Status/Restrictions: No weight bearing restrictions  Other Medical Equipment (for information only, NOT a DME order):  walker  Other Treatments:     Patient's personal belongings (please select all that are sent with patient):  None    RN SIGNATURE:  Electronically signed by Donnel Leyden, RN on 8/15/22 at 12:14 PM EDT    CASE MANAGEMENT/SOCIAL WORK SECTION    Inpatient Status Date: 8/11/22    Readmission Risk Assessment Score:  Readmission Risk              Risk of Unplanned Readmission:  35           Discharging to Facility/ Agency   Name: [address] [place] [address]   Address:  Phone:  Fax:    Dialysis Facility (if applicable)   Name:  Address:  Dialysis Schedule:  Phone:  Fax:    / signature: Electronically signed by Gordo Broussard RN on 8/15/22 at 12:45 PM EDT    PHYSICIAN SECTION    Prognosis: Fair    Condition at Discharge: Stable    Rehab Potential (if transferring to Rehab): Fair    Recommended Labs or Other Treatments After Discharge: take ciprofloxacin for 3 more days, please follow urology instructions for pulling out the stent, resume eliquis 1 day after pulling the stent out    Physician Certification: I certify the above information and transfer of Maren Aaron  is necessary for the continuing treatment of the diagnosis listed and that she requires Home Care for greater 30 days.      Update Admission H&P: No change in H&P    PHYSICIAN SIGNATURE:  Electronically signed by Mercedes Wu MD on 8/15/22 at 11:55 AM EDT

## 2022-08-12 NOTE — ED TRIAGE NOTES
Patient arrived to with c/o RLQ abdominal pain that has been persistent for several hours. Patient reports pain is from right lower flank that radiates to RLQ. Patient reports N/V with abdominal pain, but denies CP, SOB, and diarrhea. Patient is Aox4 and shows no signs of distress at this time.   Dr. Kay Leroy is at bedside for eval.

## 2022-08-12 NOTE — PROGRESS NOTES
Pharmacy Note     Renal Dose Adjustment    Kecia Mckeon is a 68 y.o. female. Pharmacist assessment of renally cleared medications. Recent Labs     08/11/22 2107 08/12/22 0328   BUN 29* 30*       Recent Labs     08/11/22 2107 08/12/22  0328   CREATININE 2.31* 2.35*       Estimated Creatinine Clearance: 24 mL/min (A) (based on SCr of 2.35 mg/dL (H)).       Height:   Ht Readings from Last 1 Encounters:   08/12/22 5' 5\" (1.651 m)     Weight:  Wt Readings from Last 1 Encounters:   08/12/22 225 lb 8.5 oz (102.3 kg)       The following medication dose has been adjusted based upon renal function per P&T Guidelines:             Ciprofloxacin reduced from 400 mg bid to 400 mg daily    Charlene Calvin PharmD, JOSHUA, BCPS 8/12/2022 8:14 AM

## 2022-08-12 NOTE — DISCHARGE SUMMARY
[address]     Department of Internal Medicine - Staff Internal Medicine Teaching Service    INPATIENT DISCHARGE SUMMARY      Patient Identification:  Carlee Ventura is a 68 y.o. female. :  1945  MRN: [id number]     Acct: [de-identified]   PCP: Renetta Coronel MD  Admit Date:  2022  Discharge date and time: 8/10/2022  4:11 PM   Attending Provider: No att. providers found                                     [address] Problem Lists:  Principal Problem:    Syncope and collapse  Active Problems:    Chronic diastolic (congestive) heart failure (HCC)    Paroxysmal atrial fibrillation (HCC)    Presence of stent in coronary artery in patient with coronary artery disease    Hypokalemia    Obesity (BMI 35.0-39.9 without comorbidity)    Current use of long term anticoagulation    Essential hypertension    Diabetic polyneuropathy associated with type 2 diabetes mellitus ([facility]    Other hyperlipidemia    Moderate episode of recurrent major depressive disorder ([facility] [address] [facility]    CKD (chronic kidney disease), stage III ([facility] [address]  Resolved Problems:    Bradycardia    Acute kidney injury superimposed on CKD ([facility] [address]    Acute respiratory failure with hypoxia [facility])      HOSPITAL STAY     Brief Inpatient course:   Carlee Ventura is a 68 y.o. female who was admitted for the management of Syncope and collapse, presented to the emergency department with chief complaint of multiple falls with leg weakness and dizziness. Past medical history significant for A. Fib (on amiodarone), CAD with stent x9(on Brilinta), PE(on Eliquis), HTN, DM with nephropathy, HLD, CKD stage III, diastolic HF. Patient was bradycardic on presentation, Coreg was held and patient started on IV fluids. Orthostats were negative. Cardiology was consulted, they recommended an event monitor on discharge. If patient continues to have symptomatic bradycardia they may consider pacemaker placement.   Continue Disp-15 pen, R-11NO PRINT      gabapentin (NEURONTIN) 300 MG capsule Take 1 capsule by mouth nightly for 30 days. , Disp-30 capsule, R-5NO PRINT      apixaban (ELIQUIS) 5 MG TABS tablet Take 1 tablet by mouth 2 times daily, Disp-120 tablet, R-5Normal      dicyclomine (BENTYL) 10 MG capsule Take 1 capsule by mouth 3 times daily (before meals), Disp-120 capsule, R-3Normal      amiodarone (CORDARONE) 200 MG tablet Take 1 tablet by mouth daily for 60 doses, Disp-60 tablet, R-5Normal      prednisoLONE acetate (PRED FORTE) 1 % ophthalmic suspension Place 1 drop into both eyes 3 times dailyHistorical Med      BRILINTA 90 MG TABS tablet TAKE ONE TABLET BY MOUTH TWICE A DAY, Disp-60 tablet, R-11Normal      isosorbide mononitrate (IMDUR) 30 MG extended release tablet Take 1 tablet by mouth daily, Disp-30 tablet, R-11Normal      insulin aspart (NOVOLOG FLEXPEN) 100 UNIT/ML injection pen Use TID before meals according to scale - max 45 units a day, Disp-10 pen, R-11Normal      Lancets MISC 2 TIMES DAILY Starting Thu 12/2/2021, Disp-300 each, R-0, Normal      Insulin Pen Needle 32G X 4 MM MISC DAILY Starting u 12/2/2021, Disp-200 each, R-11, Normal      escitalopram (LEXAPRO) 20 MG tablet TAKE ONE TABLET BY MOUTH DAILY, Disp-90 tablet, R-3Normal      rosuvastatin (CRESTOR) 40 MG tablet Take 1 tablet by mouth daily, Disp-90 tablet, R-3Normal      vitamin D3 (CHOLECALCIFEROL) 25 MCG (1000 UT) TABS tablet Take 1 tablet by mouth daily, Disp-90 tablet, R-3Normal      Continuous Blood Gluc Sensor (FREESTYLE SYLVIA 14 DAY SENSOR) MISC 1 each by Does not apply route every 14 days, Disp-2 each, R-12Normal      nitroGLYCERIN (NITROSTAT) 0.4 MG SL tablet place 1 tablet under the tongue if needed every 5 minutes if needed for CHEST PAIN, Disp-25 tablet, R-3Normal      !! Misc. Devices (WALKER) MISC Disp-1 each, R-0, PrintDiagnosis: right 5th metatarsal fracture, pain in limb  Duration: 3 months      !! Misc.  Devices (COMMODE BEDSIDE) MISC DAILY Starting Wed 2/3/2021, Disp-1 each, R-0, Print      Continuous Blood Gluc  (Danal d/b/a [employer] SYLVIA 15 DAY READER) JAQUELINE 1 each by Does not apply route continuous [facility] [address], Disp-1 Device,R-11Print       !! - Potential duplicate medications found. Please discuss with provider. STOP taking these medications       carvedilol (COREG) 12.5 MG tablet Comments:   Reason for Stopping:         LORazepam (ATIVAN) 0.5 MG tablet Comments:   Reason for Stopping:         carvedilol (COREG) 12.5 MG tablet Comments:   Reason for Stopping:               Activity: activity as tolerated    Diet: regular diet    Follow-up:    Anamika Oglesby MD  [facility] [address] [facility] [phone]    Follow up  Cardiology -- Monday, October 24 at 4:00 at [facility]. office    Carissa Patino, [address] [facility] [address]  [phone]    Follow up in 1 week(s)  [facility] follow-up    Nona Abel MD  [address] [place] [address], [facility], [address] [place] [address]  [phone]    Follow up in 4 week(s)  MARIELLE on CKD, post hospital follow-up    [address]  [phone]        Carissalogan Patino, [address]  [address]39 [facility] [address] [phone]    Follow up      Carissa Patino, [address]  [address]39 [facility] [address] [phone]            Patient Instructions: You were admitted with symptomatic bradycardia due to beta-blocker use (Coreg ). Beta-blockers are stopped, HR is stable now. No more syncopal episodes. Extensive cardiac history. Cardiology is recommending outpatient follow-up in 2 weeks. Continue taking your medication as prescribed     Stop taking Ativan    Continue on Cozaar and amlodipine for blood pressure    Follow-up with PCP and cardiology as outpatient     PMNR recommends ARU for you     Renal follow-up in 4 to 6 weeks.   Please call [phone] for an appointment      Repeat BMP a week after this discharge and fax results to [phone]    Note that over 30 minutes was spent in preparing discharge papers, discussing discharge with patient, medication review, etc.      Cheryl Bonilla MD,   Internal Medicine Resident, PGY-1  [facility];  [place], [place]  8/12/2022, 1:27 PM

## 2022-08-13 PROBLEM — D68.69 SECONDARY HYPERCOAGULABLE STATE (HCC): Status: ACTIVE | Noted: 2022-08-13

## 2022-08-13 PROBLEM — Z96.0 S/P URETERAL STENT PLACEMENT: Status: ACTIVE | Noted: 2022-08-13

## 2022-08-13 PROBLEM — Z98.890 STATUS POST LASER LITHOTRIPSY OF URETERAL CALCULUS: Status: ACTIVE | Noted: 2022-08-13

## 2022-08-13 LAB
ABSOLUTE EOS #: <0.03 K/UL (ref 0–0.44)
ABSOLUTE IMMATURE GRANULOCYTE: 0.07 K/UL (ref 0–0.3)
ABSOLUTE LYMPH #: 1.18 K/UL (ref 1.1–3.7)
ABSOLUTE MONO #: 0.82 K/UL (ref 0.1–1.2)
ANION GAP SERPL CALCULATED.3IONS-SCNC: 11 MMOL/L (ref 9–17)
BASOPHILS # BLD: 0 % (ref 0–2)
BASOPHILS ABSOLUTE: <0.03 K/UL (ref 0–0.2)
BUN BLDV-MCNC: 28 MG/DL (ref 8–23)
CALCIUM SERPL-MCNC: 8.9 MG/DL (ref 8.6–10.4)
CHLORIDE BLD-SCNC: 103 MMOL/L (ref 98–107)
CO2: 22 MMOL/L (ref 20–31)
CREAT SERPL-MCNC: 2.1 MG/DL (ref 0.5–0.9)
EOSINOPHILS RELATIVE PERCENT: 0 % (ref 1–4)
GFR AFRICAN AMERICAN: 28 ML/MIN
GFR NON-AFRICAN AMERICAN: 23 ML/MIN
GFR SERPL CREATININE-BSD FRML MDRD: ABNORMAL ML/MIN/{1.73_M2}
GLUCOSE BLD-MCNC: 248 MG/DL (ref 70–99)
HCT VFR BLD CALC: 29.1 % (ref 36.3–47.1)
HCT VFR BLD CALC: 29.9 % (ref 36.3–47.1)
HEMOGLOBIN: 9.3 G/DL (ref 11.9–15.1)
HEMOGLOBIN: 9.5 G/DL (ref 11.9–15.1)
IMMATURE GRANULOCYTES: 1 %
LYMPHOCYTES # BLD: 11 % (ref 24–43)
MCH RBC QN AUTO: 29 PG (ref 25.2–33.5)
MCHC RBC AUTO-ENTMCNC: 31.1 G/DL (ref 28.4–34.8)
MCV RBC AUTO: 93.1 FL (ref 82.6–102.9)
MONOCYTES # BLD: 7 % (ref 3–12)
NRBC AUTOMATED: 0 PER 100 WBC
PDW BLD-RTO: 15.1 % (ref 11.8–14.4)
PLATELET # BLD: 180 K/UL (ref 138–453)
PMV BLD AUTO: 11.4 FL (ref 8.1–13.5)
POTASSIUM SERPL-SCNC: 4.2 MMOL/L (ref 3.7–5.3)
RBC # BLD: 3.21 M/UL (ref 3.95–5.11)
RBC # BLD: ABNORMAL 10*6/UL
SEG NEUTROPHILS: 81 % (ref 36–65)
SEGMENTED NEUTROPHILS ABSOLUTE COUNT: 9.04 K/UL (ref 1.5–8.1)
SODIUM BLD-SCNC: 136 MMOL/L (ref 135–144)
WBC # BLD: 11.1 K/UL (ref 3.5–11.3)

## 2022-08-13 PROCEDURE — 99233 SBSQ HOSP IP/OBS HIGH 50: CPT | Performed by: INTERNAL MEDICINE

## 2022-08-13 PROCEDURE — 6360000002 HC RX W HCPCS: Performed by: STUDENT IN AN ORGANIZED HEALTH CARE EDUCATION/TRAINING PROGRAM

## 2022-08-13 PROCEDURE — 85014 HEMATOCRIT: CPT

## 2022-08-13 PROCEDURE — 36415 COLL VENOUS BLD VENIPUNCTURE: CPT

## 2022-08-13 PROCEDURE — 85018 HEMOGLOBIN: CPT

## 2022-08-13 PROCEDURE — 80048 BASIC METABOLIC PNL TOTAL CA: CPT

## 2022-08-13 PROCEDURE — 6370000000 HC RX 637 (ALT 250 FOR IP)

## 2022-08-13 PROCEDURE — 6370000000 HC RX 637 (ALT 250 FOR IP): Performed by: STUDENT IN AN ORGANIZED HEALTH CARE EDUCATION/TRAINING PROGRAM

## 2022-08-13 PROCEDURE — 6360000002 HC RX W HCPCS

## 2022-08-13 PROCEDURE — 1200000000 HC SEMI PRIVATE

## 2022-08-13 PROCEDURE — 85025 COMPLETE CBC W/AUTO DIFF WBC: CPT

## 2022-08-13 PROCEDURE — 2580000003 HC RX 258: Performed by: STUDENT IN AN ORGANIZED HEALTH CARE EDUCATION/TRAINING PROGRAM

## 2022-08-13 RX ORDER — ALFUZOSIN HYDROCHLORIDE 10 MG/1
10 TABLET, EXTENDED RELEASE ORAL NIGHTLY
Status: DISCONTINUED | OUTPATIENT
Start: 2022-08-13 | End: 2022-08-17 | Stop reason: HOSPADM

## 2022-08-13 RX ORDER — ECHINACEA PURPUREA EXTRACT 125 MG
1 TABLET ORAL PRN
Status: DISCONTINUED | OUTPATIENT
Start: 2022-08-13 | End: 2022-08-17 | Stop reason: HOSPADM

## 2022-08-13 RX ADMIN — AMIODARONE HYDROCHLORIDE 200 MG: 200 TABLET ORAL at 08:57

## 2022-08-13 RX ADMIN — MORPHINE SULFATE 1 MG: 2 INJECTION, SOLUTION INTRAMUSCULAR; INTRAVENOUS at 02:51

## 2022-08-13 RX ADMIN — SODIUM CHLORIDE: 9 INJECTION, SOLUTION INTRAVENOUS at 13:13

## 2022-08-13 RX ADMIN — TICAGRELOR 90 MG: 90 TABLET ORAL at 08:57

## 2022-08-13 RX ADMIN — TICAGRELOR 90 MG: 90 TABLET ORAL at 21:53

## 2022-08-13 RX ADMIN — CIPROFLOXACIN 400 MG: 2 INJECTION, SOLUTION INTRAVENOUS at 16:52

## 2022-08-13 RX ADMIN — MORPHINE SULFATE 1 MG: 2 INJECTION, SOLUTION INTRAMUSCULAR; INTRAVENOUS at 07:51

## 2022-08-13 RX ADMIN — MORPHINE SULFATE 1 MG: 2 INJECTION, SOLUTION INTRAMUSCULAR; INTRAVENOUS at 21:53

## 2022-08-13 RX ADMIN — AMLODIPINE BESYLATE 10 MG: 10 TABLET ORAL at 08:57

## 2022-08-13 RX ADMIN — MORPHINE SULFATE 1 MG: 2 INJECTION, SOLUTION INTRAMUSCULAR; INTRAVENOUS at 15:35

## 2022-08-13 RX ADMIN — DIPHENHYDRAMINE HYDROCHLORIDE 25 MG: 50 INJECTION, SOLUTION INTRAMUSCULAR; INTRAVENOUS at 21:53

## 2022-08-13 RX ADMIN — ONDANSETRON 4 MG: 2 INJECTION INTRAMUSCULAR; INTRAVENOUS at 08:05

## 2022-08-13 RX ADMIN — ROSUVASTATIN CALCIUM 40 MG: 20 TABLET, FILM COATED ORAL at 21:53

## 2022-08-13 RX ADMIN — SALINE NASAL SPRAY 1 SPRAY: 1.5 SOLUTION NASAL at 21:58

## 2022-08-13 RX ADMIN — ALFUZOSIN HYDROCHLORIDE 10 MG: 10 TABLET, FILM COATED, EXTENDED RELEASE ORAL at 21:53

## 2022-08-13 RX ADMIN — PANTOPRAZOLE SODIUM 40 MG: 40 TABLET, DELAYED RELEASE ORAL at 05:39

## 2022-08-13 ASSESSMENT — PAIN DESCRIPTION - DESCRIPTORS
DESCRIPTORS: ACHING
DESCRIPTORS: ACHING

## 2022-08-13 ASSESSMENT — PAIN DESCRIPTION - LOCATION
LOCATION: PERINEUM
LOCATION: PERINEUM

## 2022-08-13 ASSESSMENT — PAIN SCALES - GENERAL
PAINLEVEL_OUTOF10: 7
PAINLEVEL_OUTOF10: 7
PAINLEVEL_OUTOF10: 8
PAINLEVEL_OUTOF10: 8

## 2022-08-13 NOTE — PROGRESS NOTES
[facility]  Internal Medicine Teaching Residency Program  Inpatient Daily Progress Note  ______________________________________________________________________________    Patient: Leo Dill  YOB: 1945   [facility]:[id number]    Acct: [de-identified]     Room: 26/1-12  Admit date: 8/11/2022  Today's date: 08/13/22  Number of days in the hospital: 2    SUBJECTIVE   Admitting Diagnosis: Acute unilateral obstructive uropathy  CC: Flank Pain    Pt examined at bedside. Chart & results reviewed. Vital signs stable, afebrile  S/p right ureteroscopy, holmium laser lithotripsy for right ureteral obstructing stone on 8/12/2022  No acute events overnight  Complaining of hematuria as expected post ureteroscopy, eliquis on hold, continue to monitor hemoglobin. Denies any fever chills, nausea or vomiting  Pain well-controlled  Creatinine 2.10        ROS:  Constitutional:  negative for chills, fevers, sweats  Respiratory:  negative for cough, dyspnea on exertion, hemoptysis, shortness of breath, wheezing  Cardiovascular:  negative for chest pain, chest pressure/discomfort, lower extremity edema, palpitations  Gastrointestinal:  negative for abdominal pain, constipation, diarrhea, nausea, vomiting  Neurological:  negative for dizziness, headache  BRIEF HISTORY     The patient is a pleasant 68 y.o. female presents with past medical history of atrial fibrillation, CAD s/p PCI, CKD presented to the hospital with flank pain. Pain started yesterday, sharp in nature, persistent, radiating towards the back, associated with nausea and vomiting and chills. No history of fever, chest pain, shortness of breath, cough. In the ED, labs showed creatinine 2.31, glucose 213, WBC 9.4, hemoglobin 10.7, urinalysis positive for RBCs. CT abdomen and pelvis was done, which showed 2 mm x 4 mm stone in the right ureter and mild to moderate hydronephrosis of the right kidney.   Patient was recently admitted for work-up of syncope and collapse likely due to hypotension due to bradycardia due to multiple medications. She was discharged on 08/10    OBJECTIVE     Vital Signs:  BP (!) 143/53   Pulse 53   Temp 99 °F (37.2 °C) (Oral)   Resp 18   Ht 5' 5\" (1.651 m)   Wt 243 lb 6.2 oz (110.4 kg)   SpO2 94%   BMI 40.50 kg/m²     Temp (24hrs), Av.7 °F (37.1 °C), Min:97.5 °F (36.4 °C), Max:99.4 °F (37.4 °C)    In: 3525   Out: 900 [Urine:900]    Physical Exam:  Constitutional: This is a well developed, well nourished, Greater than 40 - Morbid Obesity / Extreme Obesity / Grade III 68y.o. year old female who is alert, oriented, cooperative and in no apparent distress. Respiratory:  Breath sounds bilaterally were clear to auscultation. There were no wheezes, rhonchi or rales. There is no intercostal retraction or use of accessory muscles. Cardiovascular: Regular without murmur, clicks, gallops or rubs. Abdomen: Slightly rounded and soft without organomegaly. No rebound, rigidity or guarding was appreciated. Musculoskeletal: No gross muscle weakness. Extremities:  No lower extremity edema, ulcerations, tenderness, varicosities or erythema. Muscle size, tone and strength are normal.  No involuntary movements are noted. Skin:  Warm and dry. Good color, turgor and pigmentation. No lesions or scars.   No cyanosis or clubbing  Neurological/Psychiatric: The patient's general behavior, level of consciousness, thought content and emotional status is normal.        Medications:  Scheduled Medications:    amLODIPine  10 mg Oral Daily    [Held by provider] apixaban  5 mg Oral BID    pantoprazole  40 mg Oral QAM AC    sodium chloride flush  5-40 mL IntraVENous 2 times per day    tamsulosin  0.4 mg Oral Daily    amiodarone  200 mg Oral Daily    rosuvastatin  40 mg Oral Daily    ticagrelor  90 mg Oral BID    ciprofloxacin  400 mg IntraVENous Q24H     Continuous Infusions:    sodium chloride  sodium chloride 100 mL/hr at 08/12/22 1717     PRN Medicationssodium chloride flush, 5-40 mL, PRN  sodium chloride, , PRN  ondansetron, 4 mg, Q8H PRN   Or  ondansetron, 4 mg, Q6H PRN  acetaminophen, 650 mg, Q6H PRN   Or  acetaminophen, 650 mg, Q6H PRN  polyethylene glycol, 17 g, Daily PRN  diphenhydrAMINE, 25 mg, Q6H PRN  morphine, 1 mg, Q4H PRN  morphine, 1 mg, Q4H PRN        Diagnostic Labs:  CBC:   Recent Labs     08/11/22 2107 08/12/22 0328 08/12/22  2140   WBC 9.4 9.0  --    RBC 3.68* 3.60*  --    HGB 10.7* 10.9* 10.1*   HCT 33.9* 33.3* 33.8*   MCV 92.1 92.5  --    RDW 15.1* 14.9*  --     203  --      BMP:   Recent Labs     08/11/22 2107 08/12/22 0328    137   K 4.0 4.3   CL 98 101   CO2 24 23   BUN 29* 30*   CREATININE 2.31* 2.35*     BNP: No results for input(s): BNP in the last 72 hours. PT/INR: No results for input(s): PROTIME, INR in the last 72 hours. APTT: No results for input(s): APTT in the last 72 hours. CARDIAC ENZYMES: No results for input(s): CKMB, CKMBINDEX, TROPONINI in the last 72 hours. Invalid input(s): CKTOTAL;3  FASTING LIPID PANEL:  Lab Results   Component Value Date    CHOL 125 05/30/2022    HDL 36 (L) 05/30/2022    TRIG 132 05/30/2022     LIVER PROFILE:   Recent Labs     08/11/22 2107   AST 15   ALT 10   BILITOT 0.42   ALKPHOS 68      MICROBIOLOGY:   Lab Results   Component Value Date/Time    CULTURE NO GROWTH 1 DAY 08/12/2022 03:28 AM    CULTURE NO GROWTH 1 DAY 08/12/2022 03:28 AM       Imaging:    CT ABDOMEN PELVIS WO CONTRAST Additional Contrast? None    Result Date: 8/11/2022  Acute right obstructive uropathy secondary to a 2 mm x 4 mm in length proximal right ureteral calculus. No other renal or ureteral calculi. Left colon diverticulosis with no evidence of diverticulitis.        ASSESSMENT & PLAN     ASSESSMENT / PLAN:     Principal Problem:    Acute unilateral obstructive uropathy  Active Problems:    Chronic diastolic (congestive) heart failure ([address] Paroxysmal atrial fibrillation (HCC)    Obesity, Class III, BMI 40-49.9 (morbid obesity) ([facility]    Presence of stent in coronary artery in patient with coronary artery disease    Current use of long term anticoagulation    Acute kidney injury superimposed on CKD ([facility] [address]    Nephrolithiasis    Secondary hypercoagulable state ([facility] [address] [facility]    S/P ureteral stent placement    Status post laser lithotripsy of ureteral calculus    Type 2 diabetes mellitus ([facility] [address]    Essential hypertension    Atherosclerotic heart disease of native coronary artery with other forms of angina pectoris ([facility] [address]    Other hyperlipidemia    Moderate episode of recurrent major depressive disorder (HCC)    CKD (chronic kidney disease), stage III ([facility] [address]  Resolved Problems:    * No resolved hospital problems. *     MARIELLE superimposed on CKD: Creatinine today 2.35-->2.10, baseline around 1.5. Likely due to nephrolithiasis. On IV fluids 100 cc/hr. Monitor the BMP. Follow-up with nephrology. Continue to trend creatinine     Nephrolithiasis: CT abdomen and pelvis positive for 2 mm x 4 mm stone in the right ureter. Patient started on continuous IV fluids 100 mL/h and morphine as needed for pain. Urology consulted for acute right obstructive uropathy, S/p right ureteral stent placement on 8/12/2022. She is having hematuria post ureteroscopy, Eliquis on hold, continue to monitor H & H     Heart failure with preserved ejection fraction: Last echo shows ejection fraction of 54%, evidence of diastolic dysfunction 3/60. On losartan 100 mg, Demadex 20 mg,     Paroxysmal atrial fibrillation: TSH 0.607/28. Continue on amiodarone 100 mg, Brilinta 90 mg once daily. 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 On sliding scale insulin.      Hypertension: Today's /53, on Norvasc 10,    DVT ppx : Brilinta  GI ppx: Protonix    PT/OT: Consulted  Discharge Planning / SW:  consulted    Mustapha Mejia MD  Internal Medicine Resident, PGY-1  [facility]; [place], [place]  8/13/2022, 7:59 AM     Attestation and add on       I have discussed the care of Shannon Kapadia , including pertinent history and exam findings,      8/13/22    with the resident. I have seen and examined the patient and the key elements of all parts of the encounter have been performed by me . I agree with the assessment, plan and orders as documented by the resident.      Hospital Problems             Last Modified POA    * (Principal) Acute unilateral obstructive uropathy 8/12/2022 Yes    Chronic diastolic (congestive) heart failure (HCC) 8/13/2022 Yes    Paroxysmal atrial fibrillation ([address] 8/11/2022 Yes    Obesity, Class III, BMI 40-49.9 (morbid obesity) ([address] 8/13/2022 Yes    Presence of stent in coronary artery in patient with coronary artery disease 8/11/2022 Yes    Current use of long term anticoagulation 8/13/2022 Yes    Acute kidney injury superimposed on CKD ([address] 8/12/2022 Yes    Nephrolithiasis 8/11/2022 Yes    Secondary hypercoagulable state ([address] 8/13/2022 Yes    S/P ureteral stent placement 8/13/2022 Yes    Status post laser lithotripsy of ureteral calculus 8/13/2022 Yes    Type 2 diabetes mellitus ([address] 8/13/2022 Yes    Essential hypertension 8/11/2022 Yes    Atherosclerotic heart disease of native coronary artery with other forms of angina pectoris ([address] 8/11/2022 Yes    Overview Signed 3/27/2017  4:39 PM by Megan Nguyen MD     S/P 4 + 2 stents         Other hyperlipidemia 8/13/2022 Yes    Moderate episode of recurrent major depressive disorder ([address] 8/13/2022 Yes    CKD (chronic kidney disease), stage III ([address] 8/13/2022 Yes    Overview Signed 11/24/2021  3:27 PM by Darrin Baxter MD     Secondary to ischemic and diabetic nephrosclerosis baseline 1.2-1.4               '''''.'''''       Dariela Clark, MD DAVID SM[facility]  [address], [address] [place] [address].    Phone [phone]   Fax: [phone]  Answering Service: [phone]

## 2022-08-13 NOTE — PLAN OF CARE

## 2022-08-13 NOTE — PROGRESS NOTES
Tala Antonio Woodward Peaks, Timothy Schooling  Urology Progress Note     Subjective:   S/p right ureteroscopy, holmium laser lithotripsy for right ureteral obstructing stone on 8/12/2022  No acute events overnight  Patient complains of hematuria  Denies fever, chills, nausea and vomiting  External catheter in place with [place] colored urine    Patient Vitals for the past 24 hrs:   BP Temp Temp src Pulse Resp SpO2 Weight   08/13/22 0540 -- -- -- -- -- -- 243 lb 6.2 oz (110.4 kg)   08/13/22 0408 (!) 143/53 99 °F (37.2 °C) Oral 53 18 94 % --   08/13/22 0321 -- -- -- -- 20 -- --   08/12/22 2338 (!) 128/56 99.2 °F (37.3 °C) Oral 52 18 95 % --   08/12/22 2023 -- -- -- -- 20 -- --   08/12/22 1942 (!) 157/56 98.6 °F (37 °C) Oral 55 18 96 % --   08/12/22 1654 138/61 98 °F (36.7 °C) -- 77 17 98 % --   08/12/22 1615 (!) 138/53 97.5 °F (36.4 °C) -- 59 16 98 % --   08/12/22 1600 (!) 140/57 -- -- 68 25 98 % --   08/12/22 1545 (!) 154/61 -- -- 68 17 100 % --   08/12/22 1540 (!) 154/61 99.4 °F (37.4 °C) Temporal 71 25 (!) 9 % --   08/12/22 1331 (!) 119/49 99 °F (37.2 °C) Temporal (!) 45 20 98 % --       Intake/Output Summary (Last 24 hours) at 8/13/2022 0826  Last data filed at 8/13/2022 0540  Gross per 24 hour   Intake 3525 ml   Output 900 ml   Net 2625 ml       Recent Labs     08/11/22  2107 08/12/22  0328 08/12/22  2140 08/13/22  0738   WBC 9.4 9.0  --  11.1   HGB 10.7* 10.9* 10.1* 9.3*   HCT 33.9* 33.3* 33.8* 29.9*   MCV 92.1 92.5  --  93.1    203  --  180     Recent Labs     08/11/22 2107 08/12/22  0328    137   K 4.0 4.3   CL 98 101   CO2 24 23   BUN 29* 30*   CREATININE 2.31* 2.35*       Recent Labs     08/11/22  2133   COLORU Yellow   PHUR 5.5   WBCUA 0 TO 2   RBCUA 20 TO 50   SPECGRAV 1.010   LEUKOCYTESUR NEGATIVE   UROBILINOGEN Normal   BILIRUBINUR NEGATIVE       Additional Lab/culture results:    Physical Exam:   Constitutional: Patient in no acute distress.   Neuro: alert and oriented to person place and time. Psych: Mood and affect normal.  Head: Atraumatic and normocephalic. Neck: Trachea midline  Skin: No rashes or bruising present. Lungs: Respiratory effort normal.  Cardiovascular:  Heart rate regular. Positive radial pulses bilaterally  Abdomen: Soft, non-tender, non-distended. Non peritonitic  : No flank pain, no CVA tenderness    Interval Imaging Findings:    Impression:    Montez Hidalgo is a 51-year-old female with right ureteral obstructing stone, s/p right ureteroscopy, holmium laser lithotripsy with right ureteral stent placement on 8/12/2022  Acute kidney injury    Plan:   Regular diet  Continue to trend creatinine/a.m. labs pending  Patient having gross hematuria postprocedure. Continue to hold anticoagulation as able until urine clears up. Some amount of hematuria as expected post ureteroscopy. Hemoglobin 9.3 from 10.1  Pain medications, Uroxatrol for discharge  She can remove the stent by pulling on the string attached to her thigh on Wednesday.        Polo Rincon MD  8:26 AM 8/13/2022

## 2022-08-14 LAB
ABSOLUTE EOS #: 0.07 K/UL (ref 0–0.44)
ABSOLUTE IMMATURE GRANULOCYTE: <0.03 K/UL (ref 0–0.3)
ABSOLUTE LYMPH #: 1.44 K/UL (ref 1.1–3.7)
ABSOLUTE MONO #: 0.77 K/UL (ref 0.1–1.2)
ANION GAP SERPL CALCULATED.3IONS-SCNC: 7 MMOL/L (ref 9–17)
BASOPHILS # BLD: 0 % (ref 0–2)
BASOPHILS ABSOLUTE: <0.03 K/UL (ref 0–0.2)
BUN BLDV-MCNC: 24 MG/DL (ref 8–23)
CALCIUM SERPL-MCNC: 8.5 MG/DL (ref 8.6–10.4)
CHLORIDE BLD-SCNC: 105 MMOL/L (ref 98–107)
CO2: 23 MMOL/L (ref 20–31)
CREAT SERPL-MCNC: 1.73 MG/DL (ref 0.5–0.9)
EOSINOPHILS RELATIVE PERCENT: 1 % (ref 1–4)
GFR AFRICAN AMERICAN: 35 ML/MIN
GFR NON-AFRICAN AMERICAN: 29 ML/MIN
GFR SERPL CREATININE-BSD FRML MDRD: ABNORMAL ML/MIN/{1.73_M2}
GLUCOSE BLD-MCNC: 168 MG/DL (ref 65–105)
GLUCOSE BLD-MCNC: 217 MG/DL (ref 65–105)
GLUCOSE BLD-MCNC: 228 MG/DL (ref 65–105)
GLUCOSE BLD-MCNC: 235 MG/DL (ref 65–105)
GLUCOSE BLD-MCNC: 248 MG/DL (ref 70–99)
HCT VFR BLD CALC: 26.3 % (ref 36.3–47.1)
HCT VFR BLD CALC: 28.1 % (ref 36.3–47.1)
HCT VFR BLD CALC: 28.9 % (ref 36.3–47.1)
HEMOGLOBIN: 8.4 G/DL (ref 11.9–15.1)
HEMOGLOBIN: 8.5 G/DL (ref 11.9–15.1)
HEMOGLOBIN: 8.6 G/DL (ref 11.9–15.1)
IMMATURE GRANULOCYTES: 0 %
LYMPHOCYTES # BLD: 17 % (ref 24–43)
MCH RBC QN AUTO: 28.6 PG (ref 25.2–33.5)
MCHC RBC AUTO-ENTMCNC: 29.1 G/DL (ref 28.4–34.8)
MCV RBC AUTO: 98.3 FL (ref 82.6–102.9)
MONOCYTES # BLD: 9 % (ref 3–12)
NRBC AUTOMATED: 0 PER 100 WBC
PDW BLD-RTO: 15.1 % (ref 11.8–14.4)
PLATELET # BLD: 164 K/UL (ref 138–453)
PMV BLD AUTO: 11.4 FL (ref 8.1–13.5)
POTASSIUM SERPL-SCNC: 4.2 MMOL/L (ref 3.7–5.3)
RBC # BLD: 2.94 M/UL (ref 3.95–5.11)
RBC # BLD: ABNORMAL 10*6/UL
SEG NEUTROPHILS: 72 % (ref 36–65)
SEGMENTED NEUTROPHILS ABSOLUTE COUNT: 5.99 K/UL (ref 1.5–8.1)
SODIUM BLD-SCNC: 135 MMOL/L (ref 135–144)
WBC # BLD: 8.3 K/UL (ref 3.5–11.3)

## 2022-08-14 PROCEDURE — 85018 HEMOGLOBIN: CPT

## 2022-08-14 PROCEDURE — 85014 HEMATOCRIT: CPT

## 2022-08-14 PROCEDURE — 82947 ASSAY GLUCOSE BLOOD QUANT: CPT

## 2022-08-14 PROCEDURE — 6360000002 HC RX W HCPCS: Performed by: STUDENT IN AN ORGANIZED HEALTH CARE EDUCATION/TRAINING PROGRAM

## 2022-08-14 PROCEDURE — 85025 COMPLETE CBC W/AUTO DIFF WBC: CPT

## 2022-08-14 PROCEDURE — 6370000000 HC RX 637 (ALT 250 FOR IP)

## 2022-08-14 PROCEDURE — 2580000003 HC RX 258: Performed by: STUDENT IN AN ORGANIZED HEALTH CARE EDUCATION/TRAINING PROGRAM

## 2022-08-14 PROCEDURE — 6370000000 HC RX 637 (ALT 250 FOR IP): Performed by: STUDENT IN AN ORGANIZED HEALTH CARE EDUCATION/TRAINING PROGRAM

## 2022-08-14 PROCEDURE — 1200000000 HC SEMI PRIVATE

## 2022-08-14 PROCEDURE — 6360000002 HC RX W HCPCS

## 2022-08-14 PROCEDURE — 6360000002 HC RX W HCPCS: Performed by: INTERNAL MEDICINE

## 2022-08-14 PROCEDURE — 36415 COLL VENOUS BLD VENIPUNCTURE: CPT

## 2022-08-14 PROCEDURE — 80048 BASIC METABOLIC PNL TOTAL CA: CPT

## 2022-08-14 RX ORDER — DEXTROSE MONOHYDRATE 100 MG/ML
INJECTION, SOLUTION INTRAVENOUS CONTINUOUS PRN
Status: DISCONTINUED | OUTPATIENT
Start: 2022-08-14 | End: 2022-08-17 | Stop reason: HOSPADM

## 2022-08-14 RX ORDER — INSULIN LISPRO 100 [IU]/ML
0-4 INJECTION, SOLUTION INTRAVENOUS; SUBCUTANEOUS NIGHTLY
Status: DISCONTINUED | OUTPATIENT
Start: 2022-08-14 | End: 2022-08-15

## 2022-08-14 RX ORDER — CIPROFLOXACIN 2 MG/ML
400 INJECTION, SOLUTION INTRAVENOUS EVERY 12 HOURS
Status: DISCONTINUED | OUTPATIENT
Start: 2022-08-14 | End: 2022-08-17

## 2022-08-14 RX ORDER — INSULIN LISPRO 100 [IU]/ML
0-8 INJECTION, SOLUTION INTRAVENOUS; SUBCUTANEOUS
Status: DISCONTINUED | OUTPATIENT
Start: 2022-08-14 | End: 2022-08-16

## 2022-08-14 RX ADMIN — SODIUM CHLORIDE, PRESERVATIVE FREE 10 ML: 5 INJECTION INTRAVENOUS at 19:45

## 2022-08-14 RX ADMIN — INSULIN LISPRO 2 UNITS: 100 INJECTION, SOLUTION INTRAVENOUS; SUBCUTANEOUS at 12:45

## 2022-08-14 RX ADMIN — MORPHINE SULFATE 1 MG: 2 INJECTION, SOLUTION INTRAMUSCULAR; INTRAVENOUS at 12:38

## 2022-08-14 RX ADMIN — MORPHINE SULFATE 1 MG: 2 INJECTION, SOLUTION INTRAMUSCULAR; INTRAVENOUS at 19:44

## 2022-08-14 RX ADMIN — SALINE NASAL SPRAY 1 SPRAY: 1.5 SOLUTION NASAL at 09:03

## 2022-08-14 RX ADMIN — SODIUM CHLORIDE: 9 INJECTION, SOLUTION INTRAVENOUS at 09:46

## 2022-08-14 RX ADMIN — ROSUVASTATIN CALCIUM 40 MG: 20 TABLET, FILM COATED ORAL at 21:32

## 2022-08-14 RX ADMIN — SALINE NASAL SPRAY 1 SPRAY: 1.5 SOLUTION NASAL at 19:44

## 2022-08-14 RX ADMIN — CIPROFLOXACIN 400 MG: 2 INJECTION, SOLUTION INTRAVENOUS at 20:09

## 2022-08-14 RX ADMIN — DIPHENHYDRAMINE HYDROCHLORIDE 25 MG: 50 INJECTION, SOLUTION INTRAMUSCULAR; INTRAVENOUS at 19:44

## 2022-08-14 RX ADMIN — ALFUZOSIN HYDROCHLORIDE 10 MG: 10 TABLET, FILM COATED, EXTENDED RELEASE ORAL at 21:32

## 2022-08-14 RX ADMIN — SODIUM CHLORIDE: 9 INJECTION, SOLUTION INTRAVENOUS at 19:36

## 2022-08-14 RX ADMIN — AMLODIPINE BESYLATE 10 MG: 10 TABLET ORAL at 09:03

## 2022-08-14 RX ADMIN — INSULIN LISPRO 2 UNITS: 100 INJECTION, SOLUTION INTRAVENOUS; SUBCUTANEOUS at 09:04

## 2022-08-14 RX ADMIN — PANTOPRAZOLE SODIUM 40 MG: 40 TABLET, DELAYED RELEASE ORAL at 06:28

## 2022-08-14 RX ADMIN — ACETAMINOPHEN 650 MG: 325 TABLET ORAL at 09:42

## 2022-08-14 RX ADMIN — AMIODARONE HYDROCHLORIDE 200 MG: 200 TABLET ORAL at 09:02

## 2022-08-14 RX ADMIN — MORPHINE SULFATE 1 MG: 2 INJECTION, SOLUTION INTRAMUSCULAR; INTRAVENOUS at 06:27

## 2022-08-14 ASSESSMENT — PAIN SCALES - GENERAL
PAINLEVEL_OUTOF10: 8
PAINLEVEL_OUTOF10: 3
PAINLEVEL_OUTOF10: 7
PAINLEVEL_OUTOF10: 8
PAINLEVEL_OUTOF10: 8

## 2022-08-14 ASSESSMENT — PAIN DESCRIPTION - DESCRIPTORS
DESCRIPTORS: ACHING
DESCRIPTORS: ACHING

## 2022-08-14 ASSESSMENT — PAIN DESCRIPTION - LOCATION
LOCATION: PERINEUM
LOCATION: PERINEUM

## 2022-08-14 NOTE — PROGRESS NOTES
Dariela Crawford, Spencer Edgar Jannie Lagos  Urology Progress Note     Subjective:   S/p right ureteroscopy, holmium laser lithotripsy for right ureteral obstructing stone on 8/12/2022  No acute events overnight  Patient complains of hematuria  Denies fever, chills, nausea and vomiting  External catheter in place with tea colored urine    Patient Vitals for the past 24 hrs:   BP Temp Temp src Pulse Resp SpO2 Weight   08/14/22 0600 -- -- -- -- -- -- 244 lb (110.7 kg)   08/13/22 2223 -- -- -- -- 17 -- --   08/13/22 1951 (!) 123/46 98.1 °F (36.7 °C) Oral 69 -- 98 % --   08/13/22 1639 (!) 123/53 97.5 °F (36.4 °C) Oral 67 15 97 % --   08/13/22 1605 -- -- -- -- 16 -- --   08/13/22 1255 (!) 155/66 99.3 °F (37.4 °C) Oral 51 16 97 % --   08/13/22 0915 (!) 135/55 -- -- -- -- -- --   08/13/22 0913 (!) 172/104 98.1 °F (36.7 °C) Oral 69 16 96 % --       Intake/Output Summary (Last 24 hours) at 8/14/2022 0851  Last data filed at 8/14/2022 0629  Gross per 24 hour   Intake --   Output 1750 ml   Net -1750 ml       Recent Labs     08/12/22  0328 08/12/22  2140 08/13/22  0738 08/13/22  1636 08/14/22  0133   WBC 9.0  --  11.1  --  8.3   HGB 10.9*   < > 9.3* 9.5* 8.4*   HCT 33.3*   < > 29.9* 29.1* 28.9*   MCV 92.5  --  93.1  --  98.3     --  180  --  164    < > = values in this interval not displayed. Recent Labs     08/12/22  0328 08/13/22  0738 08/14/22  0133    136 135   K 4.3 4.2 4.2    103 105   CO2 23 22 23   BUN 30* 28* 24*   CREATININE 2.35* 2.10* 1.73*       Recent Labs     08/11/22  2133   COLORU Yellow   PHUR 5.5   WBCUA 0 TO 2   RBCUA 20 TO 50   SPECGRAV 1.010   LEUKOCYTESUR NEGATIVE   UROBILINOGEN Normal   BILIRUBINUR NEGATIVE       Additional Lab/culture results:    Physical Exam:   Constitutional: Patient in no acute distress. Neuro: alert and oriented to person place and time. Psych: Mood and affect normal.  Head: Atraumatic and normocephalic.   Neck: Trachea midline  Skin: No rashes or bruising present. Lungs: Respiratory effort normal.  Cardiovascular:  Heart rate regular. Positive radial pulses bilaterally  Abdomen: Soft, non-tender, non-distended. Non peritonitic  : No flank pain, no CVA tenderness/ Purewick in place with tea colored urine     Interval Imaging Findings:    Impression:    Merly Hogan is a 77-year-old female with right ureteral obstructing stone, s/p right ureteroscopy, holmium laser lithotripsy with right ureteral stent placement on 8/12/2022  Acute kidney injury    Plan:   Regular diet  Continue to trend creatinine/Cr improving 1.7 (2.1)  Continues to have persistent gross hematuria. Hemoglobin dropped to 8.4 from 9.3. Consider holding Brilinta due to persistent hematuria. Eliquis already on hold. Transfuse for hemoglobin less than 7. Check PVR x1 to ensure adequate bladder emptying. Continue to monitor without Coffey catheter as long as she is able to empty the bladder. Pain medications, Uroxatrol for discharge  She can remove the stent by pulling on the string attached to her thigh on Wednesday.        Lorena Hawkins MD  8:51 AM 8/14/2022

## 2022-08-14 NOTE — PLAN OF CARE
Problem: Discharge Planning  Goal: Discharge to home or other facility with appropriate resources  8/14/2022 0416 by Connie Aguilar RN  Outcome: Progressing  8/13/2022 1552 by Abby Coulter RN  Outcome: Progressing     Problem: Pain  Goal: Verbalizes/displays adequate comfort level or baseline comfort level  8/14/2022 0416 by Connie Aguilar RN  Outcome: Progressing  8/13/2022 1552 by Abby Coulter RN  Outcome: Progressing     Problem: Safety - Adult  Goal: Free from fall injury  8/14/2022 0416 by Connie Aguilar RN  Outcome: Progressing  8/13/2022 1552 by Abby Coulter RN  Outcome: Progressing     Problem: Skin/Tissue Integrity  Goal: Absence of new skin breakdown  Description: 1. Monitor for areas of redness and/or skin breakdown  2. Assess vascular access sites hourly  3. Every 4-6 hours minimum:  Change oxygen saturation probe site  4. Every 4-6 hours:  If on nasal continuous positive airway pressure, respiratory therapy assess nares and determine need for appliance change or resting period.   8/14/2022 0416 by Connie Aguilar RN  Outcome: Progressing  8/13/2022 1552 by Abby Coulter RN  Outcome: Progressing     Problem: Chronic Conditions and Co-morbidities  Goal: Patient's chronic conditions and co-morbidity symptoms are monitored and maintained or improved  8/14/2022 0416 by Connie Aguilar RN  Outcome: Progressing  8/13/2022 1552 by Abby Coulter RN  Outcome: Progressing     Problem: ABCDS Injury Assessment  Goal: Absence of physical injury  8/14/2022 0416 by Connie Aguilar RN  Outcome: Progressing  8/13/2022 1552 by Abby Coulter RN  Outcome: Progressing

## 2022-08-14 NOTE — PROGRESS NOTES
Pharmacy Note     Renal Dose Adjustment    Pari Mcduffie is a 68 y.o. female. Pharmacist assessment of renally cleared medications. Recent Labs     08/13/22  0738 08/14/22  0133   BUN 28* 24*       Recent Labs     08/13/22  0738 08/14/22  0133   CREATININE 2.10* 1.73*       Estimated Creatinine Clearance: 34 mL/min (A) (based on SCr of 1.73 mg/dL (H)). Height:   Ht Readings from Last 1 Encounters:   08/12/22 5' 5\" (1.651 m)     Weight:  Wt Readings from Last 1 Encounters:   08/14/22 244 lb (110.7 kg)       The following medication dose has been adjusted based upon renal function per P&T Guidelines:             SCr trending down, will increase ciprofloxacin back to 400 mg BID. Kathi Okeefe.  Priscila Macias, PharmD  8/14/2022 11:52 AM

## 2022-08-14 NOTE — PLAN OF CARE
Problem: Discharge Planning  Goal: Discharge to home or other facility with appropriate resources  8/14/2022 1605 by Joan Greer RN  Outcome: Progressing  8/14/2022 0416 by Toan Boateng RN  Outcome: Progressing     Problem: Pain  Goal: Verbalizes/displays adequate comfort level or baseline comfort level  8/14/2022 1605 by Joan Greer RN  Outcome: Progressing  8/14/2022 0416 by Toan Boateng RN  Outcome: Progressing     Problem: Safety - Adult  Goal: Free from fall injury  8/14/2022 1605 by Joan Greer RN  Outcome: Progressing  8/14/2022 0416 by Toan Boateng RN  Outcome: Progressing     Problem: Skin/Tissue Integrity  Goal: Absence of new skin breakdown  Description: 1. Monitor for areas of redness and/or skin breakdown  2. Assess vascular access sites hourly  3. Every 4-6 hours minimum:  Change oxygen saturation probe site  4. Every 4-6 hours:  If on nasal continuous positive airway pressure, respiratory therapy assess nares and determine need for appliance change or resting period.   8/14/2022 1605 by Joan Greer RN  Outcome: Progressing  8/14/2022 0416 by Toan Boateng RN  Outcome: Progressing     Problem: Chronic Conditions and Co-morbidities  Goal: Patient's chronic conditions and co-morbidity symptoms are monitored and maintained or improved  8/14/2022 1605 by Joan Greer RN  Outcome: Progressing  8/14/2022 0416 by Toan Boateng RN  Outcome: Progressing     Problem: ABCDS Injury Assessment  Goal: Absence of physical injury  8/14/2022 1605 by Joan Greer RN  Outcome: Progressing  8/14/2022 0416 by Toan Boateng RN  Outcome: Progressing

## 2022-08-14 NOTE — PROGRESS NOTES
Physical Therapy        Physical Therapy Cancel Note      DATE: 2022    NAME: Carlee Ventura  MRN: [id number]   : 1945      Patient not seen this date for Physical Therapy due to:    Patient Declined: Attempted multiple times and educated on the importance of mobility in the hospital but the pt continues to refuse. Will check back on 8/15.       Electronically signed by Bard Valentín PT on 2022 at 12:19 PM

## 2022-08-14 NOTE — PROGRESS NOTES
[facility]  Internal Medicine Teaching Residency Program  Inpatient Daily Progress Note  ______________________________________________________________________________    Patient: Maddie Alvarez  YOB: 1945   ELJ:[id number]    Acct: [de-identified]     Room: 26/1-12  Admit date: 8/11/2022  Today's date: 08/14/22  Number of days in the hospital: 3    SUBJECTIVE   Admitting Diagnosis: Acute unilateral obstructive uropathy  CC: Flank pain    Pt examined at bedside. Chart & results reviewed. Vital signs stable, afebrile  S/p right ureteroscopy, holmium laser lithotripsy for right ureteral obstructing stone on 8/12/2022  No acute events overnight  Complaining of hematuria, eliquis on hold, continue to monitor hemoglobin. Hb 9.3-->9.5-->8.4  Denies any fever chills, nausea or vomiting  Pain well-controlled  Creatinine 2.10-->1.73    ROS:  Constitutional:  negative for chills, fevers, sweats  Respiratory:  negative for cough, dyspnea on exertion, hemoptysis, shortness of breath, wheezing  Cardiovascular:  negative for chest pain, chest pressure/discomfort, lower extremity edema, palpitations  Gastrointestinal:  negative for abdominal pain, constipation, diarrhea, nausea, vomiting  Neurological:  negative for dizziness, headache  BRIEF HISTORY     The patient is a pleasant 68 y.o. female presents with past medical history of atrial fibrillation, CAD s/p PCI, CKD presented to the hospital with flank pain. Pain started yesterday, sharp in nature, persistent, radiating towards the back, associated with nausea and vomiting and chills. No history of fever, chest pain, shortness of breath, cough. In the ED, labs showed creatinine 2.31, glucose 213, WBC 9.4, hemoglobin 10.7, urinalysis positive for RBCs. CT abdomen and pelvis was done, which showed 2 mm x 4 mm stone in the right ureter and mild to moderate hydronephrosis of the right kidney.   Patient was recently admitted for work-up of syncope and collapse likely due to hypotension due to bradycardia due to multiple medications. She was discharged on 08/10    OBJECTIVE     Vital Signs:  BP (!) 123/46   Pulse 69   Temp 98.1 °F (36.7 °C) (Oral)   Resp 17   Ht 5' 5\" (1.651 m)   Wt 243 lb 6.2 oz (110.4 kg)   SpO2 98%   BMI 40.50 kg/m²     Temp (24hrs), Av.3 °F (36.8 °C), Min:97.5 °F (36.4 °C), Max:99.3 °F (37.4 °C)    In: 2900   Out: 1650 [Urine:1650]    Physical Exam:  Constitutional: This is a well developed, well nourished, Greater than 40 - Morbid Obesity / Extreme Obesity / Grade III 68y.o. year old female who is alert, oriented, cooperative and in no apparent distress. Respiratory:   Breath sounds bilaterally were clear to auscultation. There were no wheezes, rhonchi or rales. There is no intercostal retraction or use of accessory muscles. Cardiovascular: Regular without murmur, clicks, gallops or rubs. Abdomen: Slightly rounded and soft without organomegaly. No rebound, rigidity or guarding was appreciated. Musculoskeletal:  No gross muscle weakness. Extremities:  No lower extremity edema, ulcerations, tenderness, varicosities or erythema. Muscle size, tone and strength are normal.  No involuntary movements are noted. Skin:  Warm and dry. Good color, turgor and pigmentation. No lesions or scars.   No cyanosis or clubbing  Neurological/Psychiatric: The patient's general behavior, level of consciousness, thought content and emotional status is normal. Bilateral upper extremity tremors       Medications:  Scheduled Medications:    alfuzosin  10 mg Oral Nightly    amLODIPine  10 mg Oral Daily    [Held by provider] apixaban  5 mg Oral BID    pantoprazole  40 mg Oral QAM AC    sodium chloride flush  5-40 mL IntraVENous 2 times per day    amiodarone  200 mg Oral Daily    rosuvastatin  40 mg Oral Daily    ticagrelor  90 mg Oral BID    ciprofloxacin  400 mg IntraVENous Q24H     Continuous Infusions:    sodium chloride      sodium chloride 100 mL/hr at 08/13/22 1313     PRN Medicationssodium chloride, 1 spray, PRN  sodium chloride flush, 5-40 mL, PRN  sodium chloride, , PRN  ondansetron, 4 mg, Q8H PRN   Or  ondansetron, 4 mg, Q6H PRN  acetaminophen, 650 mg, Q6H PRN   Or  acetaminophen, 650 mg, Q6H PRN  polyethylene glycol, 17 g, Daily PRN  diphenhydrAMINE, 25 mg, Q6H PRN  morphine, 1 mg, Q4H PRN  morphine, 1 mg, Q4H PRN        Diagnostic Labs:  CBC:   Recent Labs     08/12/22  0328 08/12/22  2140 08/13/22  0738 08/13/22  1636 08/14/22  0133   WBC 9.0  --  11.1  --  8.3   RBC 3.60*  --  3.21*  --  2.94*   HGB 10.9*   < > 9.3* 9.5* 8.4*   HCT 33.3*   < > 29.9* 29.1* 28.9*   MCV 92.5  --  93.1  --  98.3   RDW 14.9*  --  15.1*  --  15.1*     --  180  --  164    < > = values in this interval not displayed. BMP:   Recent Labs     08/12/22 0328 08/13/22  0738 08/14/22  0133    136 135   K 4.3 4.2 4.2    103 105   CO2 23 22 23   BUN 30* 28* 24*   CREATININE 2.35* 2.10* 1.73*     BNP: No results for input(s): BNP in the last 72 hours. PT/INR: No results for input(s): PROTIME, INR in the last 72 hours. APTT: No results for input(s): APTT in the last 72 hours. CARDIAC ENZYMES: No results for input(s): CKMB, CKMBINDEX, TROPONINI in the last 72 hours. Invalid input(s): CKTOTAL;3  FASTING LIPID PANEL:  Lab Results   Component Value Date    CHOL 125 05/30/2022    HDL 36 (L) 05/30/2022    TRIG 132 05/30/2022     LIVER PROFILE:   Recent Labs     08/11/22 2107   AST 15   ALT 10   BILITOT 0.42   ALKPHOS 68      MICROBIOLOGY:   Lab Results   Component Value Date/Time    CULTURE NO GROWTH 1 DAY 08/12/2022 03:28 AM    CULTURE NO GROWTH 1 DAY 08/12/2022 03:28 AM       Imaging:    CT ABDOMEN PELVIS WO CONTRAST Additional Contrast? None    Result Date: 8/11/2022  Acute right obstructive uropathy secondary to a 2 mm x 4 mm in length proximal right ureteral calculus.  No other renal or ureteral diabetes mellitus with nephropathy with long-term use of insulin: Continue Lantus 20 units daily. On sliding scale insulin. Hypertension: Norvasc 10    Blood loss anemia: patient having gross hematuria post ureteroscopy, Hemoglobin trend 10.1-->9.5-->8. 4. Eliquis on hold, continue to monitor hemoglobin    DVT ppx : Brilinta  GI ppx: Protonix    PT/OT: Consulted  Discharge Planning / SW:  consulted    Janki Schuster MD  Internal Medicine Resident, PGY-1  [facility]  8/14/2022, 4:12 AM     Attestation and add on       I have discussed the care of Darryle Maes , including pertinent history and exam findings,      8/14/22    with the resident. I have seen and examined the patient and the key elements of all parts of the encounter have been performed by me . I agree with the assessment, plan and orders as documented by the resident. Marion Andres MD      [address]    Phone [phone]   Fax: [phone]  Answering Service: [phone]

## 2022-08-15 LAB
ANION GAP SERPL CALCULATED.3IONS-SCNC: 9 MMOL/L (ref 9–17)
BUN BLDV-MCNC: 15 MG/DL (ref 8–23)
CALCIUM SERPL-MCNC: 8.8 MG/DL (ref 8.6–10.4)
CHLORIDE BLD-SCNC: 105 MMOL/L (ref 98–107)
CO2: 24 MMOL/L (ref 20–31)
CREAT SERPL-MCNC: 1.37 MG/DL (ref 0.5–0.9)
GFR AFRICAN AMERICAN: 45 ML/MIN
GFR NON-AFRICAN AMERICAN: 37 ML/MIN
GFR SERPL CREATININE-BSD FRML MDRD: ABNORMAL ML/MIN/{1.73_M2}
GLUCOSE BLD-MCNC: 192 MG/DL (ref 70–99)
GLUCOSE BLD-MCNC: 197 MG/DL (ref 65–105)
GLUCOSE BLD-MCNC: 210 MG/DL (ref 65–105)
GLUCOSE BLD-MCNC: 222 MG/DL (ref 65–105)
GLUCOSE BLD-MCNC: 231 MG/DL (ref 65–105)
HCT VFR BLD CALC: 27.4 % (ref 36.3–47.1)
HCT VFR BLD CALC: 28.7 % (ref 36.3–47.1)
HCT VFR BLD CALC: 29.3 % (ref 36.3–47.1)
HEMOGLOBIN: 8.9 G/DL (ref 11.9–15.1)
HEMOGLOBIN: 9.4 G/DL (ref 11.9–15.1)
HEMOGLOBIN: 9.4 G/DL (ref 11.9–15.1)
POTASSIUM SERPL-SCNC: 3.8 MMOL/L (ref 3.7–5.3)
SODIUM BLD-SCNC: 138 MMOL/L (ref 135–144)

## 2022-08-15 PROCEDURE — 6370000000 HC RX 637 (ALT 250 FOR IP)

## 2022-08-15 PROCEDURE — 6370000000 HC RX 637 (ALT 250 FOR IP): Performed by: STUDENT IN AN ORGANIZED HEALTH CARE EDUCATION/TRAINING PROGRAM

## 2022-08-15 PROCEDURE — 2580000003 HC RX 258: Performed by: STUDENT IN AN ORGANIZED HEALTH CARE EDUCATION/TRAINING PROGRAM

## 2022-08-15 PROCEDURE — 6360000002 HC RX W HCPCS

## 2022-08-15 PROCEDURE — 97530 THERAPEUTIC ACTIVITIES: CPT

## 2022-08-15 PROCEDURE — 1200000000 HC SEMI PRIVATE

## 2022-08-15 PROCEDURE — 6360000002 HC RX W HCPCS: Performed by: STUDENT IN AN ORGANIZED HEALTH CARE EDUCATION/TRAINING PROGRAM

## 2022-08-15 PROCEDURE — 85014 HEMATOCRIT: CPT

## 2022-08-15 PROCEDURE — 80048 BASIC METABOLIC PNL TOTAL CA: CPT

## 2022-08-15 PROCEDURE — 36415 COLL VENOUS BLD VENIPUNCTURE: CPT

## 2022-08-15 PROCEDURE — 6360000002 HC RX W HCPCS: Performed by: INTERNAL MEDICINE

## 2022-08-15 PROCEDURE — 85018 HEMOGLOBIN: CPT

## 2022-08-15 PROCEDURE — 82947 ASSAY GLUCOSE BLOOD QUANT: CPT

## 2022-08-15 PROCEDURE — 97162 PT EVAL MOD COMPLEX 30 MIN: CPT

## 2022-08-15 PROCEDURE — 97112 NEUROMUSCULAR REEDUCATION: CPT

## 2022-08-15 PROCEDURE — 97116 GAIT TRAINING THERAPY: CPT

## 2022-08-15 PROCEDURE — 99232 SBSQ HOSP IP/OBS MODERATE 35: CPT | Performed by: INTERNAL MEDICINE

## 2022-08-15 RX ORDER — INSULIN GLARGINE 100 [IU]/ML
10 INJECTION, SOLUTION SUBCUTANEOUS DAILY
Status: DISCONTINUED | OUTPATIENT
Start: 2022-08-15 | End: 2022-08-17 | Stop reason: HOSPADM

## 2022-08-15 RX ADMIN — TICAGRELOR 90 MG: 90 TABLET ORAL at 21:36

## 2022-08-15 RX ADMIN — CIPROFLOXACIN 400 MG: 2 INJECTION, SOLUTION INTRAVENOUS at 09:46

## 2022-08-15 RX ADMIN — MORPHINE SULFATE 1 MG: 2 INJECTION, SOLUTION INTRAMUSCULAR; INTRAVENOUS at 11:04

## 2022-08-15 RX ADMIN — AMLODIPINE BESYLATE 10 MG: 10 TABLET ORAL at 09:40

## 2022-08-15 RX ADMIN — CIPROFLOXACIN 400 MG: 2 INJECTION, SOLUTION INTRAVENOUS at 20:40

## 2022-08-15 RX ADMIN — MORPHINE SULFATE 1 MG: 2 INJECTION, SOLUTION INTRAMUSCULAR; INTRAVENOUS at 21:38

## 2022-08-15 RX ADMIN — AMIODARONE HYDROCHLORIDE 200 MG: 200 TABLET ORAL at 09:40

## 2022-08-15 RX ADMIN — INSULIN LISPRO 2 UNITS: 100 INJECTION, SOLUTION INTRAVENOUS; SUBCUTANEOUS at 17:25

## 2022-08-15 RX ADMIN — PANTOPRAZOLE SODIUM 40 MG: 40 TABLET, DELAYED RELEASE ORAL at 05:07

## 2022-08-15 RX ADMIN — SODIUM CHLORIDE: 9 INJECTION, SOLUTION INTRAVENOUS at 05:05

## 2022-08-15 RX ADMIN — ROSUVASTATIN CALCIUM 40 MG: 20 TABLET, FILM COATED ORAL at 20:40

## 2022-08-15 RX ADMIN — SODIUM CHLORIDE: 9 INJECTION, SOLUTION INTRAVENOUS at 20:40

## 2022-08-15 RX ADMIN — ALFUZOSIN HYDROCHLORIDE 10 MG: 10 TABLET, FILM COATED, EXTENDED RELEASE ORAL at 20:40

## 2022-08-15 RX ADMIN — TICAGRELOR 90 MG: 90 TABLET ORAL at 09:40

## 2022-08-15 RX ADMIN — INSULIN GLARGINE 10 UNITS: 100 INJECTION, SOLUTION SUBCUTANEOUS at 09:46

## 2022-08-15 RX ADMIN — SODIUM CHLORIDE, PRESERVATIVE FREE 10 ML: 5 INJECTION INTRAVENOUS at 21:38

## 2022-08-15 RX ADMIN — ACETAMINOPHEN 650 MG: 325 TABLET ORAL at 09:40

## 2022-08-15 RX ADMIN — MORPHINE SULFATE 1 MG: 2 INJECTION, SOLUTION INTRAMUSCULAR; INTRAVENOUS at 17:29

## 2022-08-15 RX ADMIN — DIPHENHYDRAMINE HYDROCHLORIDE 25 MG: 50 INJECTION, SOLUTION INTRAMUSCULAR; INTRAVENOUS at 21:38

## 2022-08-15 RX ADMIN — MORPHINE SULFATE 1 MG: 2 INJECTION, SOLUTION INTRAMUSCULAR; INTRAVENOUS at 01:11

## 2022-08-15 RX ADMIN — ONDANSETRON 4 MG: 4 TABLET, ORALLY DISINTEGRATING ORAL at 09:43

## 2022-08-15 RX ADMIN — INSULIN LISPRO 2 UNITS: 100 INJECTION, SOLUTION INTRAVENOUS; SUBCUTANEOUS at 12:23

## 2022-08-15 ASSESSMENT — PAIN DESCRIPTION - ORIENTATION
ORIENTATION: RIGHT

## 2022-08-15 ASSESSMENT — PAIN DESCRIPTION - LOCATION
LOCATION: FLANK

## 2022-08-15 ASSESSMENT — PAIN SCALES - GENERAL
PAINLEVEL_OUTOF10: 0
PAINLEVEL_OUTOF10: 0
PAINLEVEL_OUTOF10: 8
PAINLEVEL_OUTOF10: 7
PAINLEVEL_OUTOF10: 8
PAINLEVEL_OUTOF10: 7
PAINLEVEL_OUTOF10: 0

## 2022-08-15 ASSESSMENT — PAIN DESCRIPTION - DESCRIPTORS
DESCRIPTORS: ACHING
DESCRIPTORS: ACHING

## 2022-08-15 NOTE — PROGRESS NOTES
[facility]  Internal Medicine Teaching Residency Program  Inpatient Daily Progress Note  ______________________________________________________________________________    Patient: Yuval Vivar  YOB: 1945   IXP:[id number]    Acct: [de-identified]     Room: 26/1-12  Admit date: 8/11/2022  Today's date: 08/15/22  Number of days in the hospital: 4    SUBJECTIVE   Admitting Diagnosis: Acute unilateral obstructive uropathy  CC: Flank pain  Pt examined at bedside. Chart & results reviewed. Pt examined at bedside. Chart & results reviewed. Vital signs stable, afebrile  S/p right ureteroscopy, holmium laser lithotripsy for right ureteral obstructing stone on 8/12/2022  No acute events overnight  Complaining of hematuria, eliquis and brilinta on hold, continue to monitor hemoglobin. Hb 9.3-->9.5-->8.4-->8.9  Denies any fever chills, nausea or vomiting  Pain well-controlled  Creatinine 2.10-->1.73-->1.37    ROS:  Constitutional:  negative for chills, fevers, sweats  Respiratory:  negative for cough, dyspnea on exertion, hemoptysis, shortness of breath, wheezing  Cardiovascular:  negative for chest pain, chest pressure/discomfort, lower extremity edema, palpitations  Gastrointestinal:  negative for abdominal pain, constipation, diarrhea, nausea, vomiting  Neurological:  negative for dizziness, headache  BRIEF HISTORY     The patient is a pleasant 68 y.o. female presents with past medical history of atrial fibrillation, CAD s/p PCI, CKD presented to the hospital with flank pain. Pain started yesterday, sharp in nature, persistent, radiating towards the back, associated with nausea and vomiting and chills. No history of fever, chest pain, shortness of breath, cough. In the ED, labs showed creatinine 2.31, glucose 213, WBC 9.4, hemoglobin 10.7, urinalysis positive for RBCs.   CT abdomen and pelvis was done, which showed 2 mm x 4 mm stone in the right ureter and mild to moderate hydronephrosis of the right kidney. Patient was recently admitted for work-up of syncope and collapse likely due to hypotension due to bradycardia due to multiple medications. She was discharged on 08/10    OBJECTIVE     Vital Signs:  BP (!) 158/69   Pulse 79   Temp 98.5 °F (36.9 °C) (Oral)   Resp 18   Ht 5' 5\" (1.651 m)   Wt 244 lb (110.7 kg)   SpO2 97%   BMI 40.60 kg/m²     Temp (24hrs), Av.5 °F (36.9 °C), Min:98.3 °F (36.8 °C), Max:99 °F (37.2 °C)    In: -   Out: 3700 [Urine:3700]    Physical Exam:  Constitutional: This is a well developed, well nourished, Greater than 40 - Morbid Obesity / Extreme Obesity / Grade III 68y.o. year old female who is alert, oriented, cooperative and in no apparent distress. Respiratory:  Breath sounds bilaterally were clear to auscultation. There were no wheezes, rhonchi or rales. There is no intercostal retraction or use of accessory muscles. Cardiovascular: Regular without murmur, clicks, gallops or rubs. Abdomen: Slightly rounded and soft without organomegaly. No rebound, rigidity or guarding was appreciated. Musculoskeletal:  No gross muscle weakness. Extremities:  No lower extremity edema, ulcerations, tenderness, varicosities or erythema. Muscle size, tone and strength are normal.  No involuntary movements are noted. Skin:  Warm and dry. Good color, turgor and pigmentation. No lesions or scars.   No cyanosis or clubbing  Neurological/Psychiatric: The patient's general behavior, level of consciousness, thought content and emotional status is normal.        Medications:  Scheduled Medications:    insulin glargine  10 Units SubCUTAneous Daily    insulin lispro  0-8 Units SubCUTAneous TID WC    ciprofloxacin  400 mg IntraVENous Q12H    alfuzosin  10 mg Oral Nightly    amLODIPine  10 mg Oral Daily    [Held by provider] apixaban  5 mg Oral BID    pantoprazole  40 mg Oral QAM AC    sodium chloride flush  5-40 mL IntraVENous 2 times per day    amiodarone  200 mg Oral Daily    rosuvastatin  40 mg Oral Daily    ticagrelor  90 mg Oral BID     Continuous Infusions:    dextrose      sodium chloride      sodium chloride 100 mL/hr at 08/15/22 0505     PRN Medicationsglucose, 4 tablet, PRN  dextrose bolus, 125 mL, PRN   Or  dextrose bolus, 250 mL, PRN  glucagon (rDNA), 1 mg, PRN  dextrose, , Continuous PRN  sodium chloride, 1 spray, PRN  sodium chloride flush, 5-40 mL, PRN  sodium chloride, , PRN  ondansetron, 4 mg, Q8H PRN   Or  ondansetron, 4 mg, Q6H PRN  acetaminophen, 650 mg, Q6H PRN   Or  acetaminophen, 650 mg, Q6H PRN  polyethylene glycol, 17 g, Daily PRN  diphenhydrAMINE, 25 mg, Q6H PRN  morphine, 1 mg, Q4H PRN      Diagnostic Labs:  CBC:   Recent Labs     08/13/22  0738 08/13/22  1636 08/14/22  0133 08/14/22  1108 08/14/22  1914 08/15/22  0100 08/15/22  0853   WBC 11.1  --  8.3  --   --   --   --    RBC 3.21*  --  2.94*  --   --   --   --    HGB 9.3*   < > 8.4*   < > 8.5* 8.9* 9.4*   HCT 29.9*   < > 28.9*   < > 28.1* 27.4* 29.3*   MCV 93.1  --  98.3  --   --   --   --    RDW 15.1*  --  15.1*  --   --   --   --      --  164  --   --   --   --     < > = values in this interval not displayed. BMP:   Recent Labs     08/13/22  0738 08/14/22  0133 08/15/22  0853    135 138   K 4.2 4.2 3.8    105 105   CO2 22 23 24   BUN 28* 24* 15   CREATININE 2.10* 1.73* 1.37*     BNP: No results for input(s): BNP in the last 72 hours. PT/INR: No results for input(s): PROTIME, INR in the last 72 hours. APTT: No results for input(s): APTT in the last 72 hours. CARDIAC ENZYMES: No results for input(s): CKMB, CKMBINDEX, TROPONINI in the last 72 hours. Invalid input(s): CKTOTAL;3  FASTING LIPID PANEL:  Lab Results   Component Value Date    CHOL 125 05/30/2022    HDL 36 (L) 05/30/2022    TRIG 132 05/30/2022     LIVER PROFILE: No results for input(s): AST, ALT, ALB, BILIDIR, BILITOT, ALKPHOS in the last 72 hours.    MICROBIOLOGY: Lab Results   Component Value Date/Time    CULTURE NO GROWTH 3 DAYS 08/12/2022 03:28 AM    CULTURE NO GROWTH 3 DAYS 08/12/2022 03:28 AM       Imaging:    CT ABDOMEN PELVIS WO CONTRAST Additional Contrast? None    Result Date: 8/11/2022  Acute right obstructive uropathy secondary to a 2 mm x 4 mm in length proximal right ureteral calculus. No other renal or ureteral calculi. Left colon diverticulosis with no evidence of diverticulitis. ASSESSMENT & PLAN     ASSESSMENT / PLAN:     Principal Problem:    Acute unilateral obstructive uropathy  Active Problems:    Chronic diastolic (congestive) heart failure (HCC)    Paroxysmal atrial fibrillation (HCC)    Obesity, Class III, BMI 40-49.9 (morbid obesity) ([address]    Presence of stent in coronary artery in patient with coronary artery disease    Current use of long term anticoagulation    Acute kidney injury superimposed on CKD ([address]    Nephrolithiasis    Secondary hypercoagulable state ([address]    S/P ureteral stent placement    Status post laser lithotripsy of ureteral calculus    Type 2 diabetes mellitus ([address]    Essential hypertension    Atherosclerotic heart disease of native coronary artery with other forms of angina pectoris ([address]    Other hyperlipidemia    Anemia, normocytic normochromic    Moderate episode of recurrent major depressive disorder (HCC)    CKD (chronic kidney disease), stage III ([address]  Resolved Problems:    * No resolved hospital problems. *     MARIELLE superimposed on CKD: Creatinine today 2.35-->1.73-->1.37, baseline around 1.5. Likely due to nephrolithiasis. On IV fluids 100 cc/hr. Monitor the BMP. Follow-up with nephrology. Continue to trend creatinine     Nephrolithiasis: CT abdomen and pelvis positive for 2 mm x 4 mm stone in the right ureter. Patient started on continuous IV fluids 100 mL/h and morphine as needed for pain. Urology consulted for acute right obstructive uropathy, S/p right ureteral stent placement on 8/12/2022.  She is having trend creatinine  No longer having gross hematuria. Consider holding Brilinta due to persistent hematuria. Eliquis already on hold. Transfuse for hemoglobin less than 7. Continue to monitor without Coffey catheter as long as she is able to empty the bladder. Pain medications, Uroxatrol for discharge  She can remove the stent by pulling on the string attached to her thigh on Wednesday. MD DAVID Correa[address], [address] [place] [address].    Phone [phone]   Fax: [phone]  Answering Service: [phone]

## 2022-08-15 NOTE — PROGRESS NOTES
Physical Therapy  Facility/Department: [facility] RENAL//MED SURG  Physical Therapy Initial Assessment    Name: Anamika Jhaveri  : 1945  MRN: [id number]  Date of Service: 8/15/2022  Chief Complaint   Patient presents with    Abdominal Pain     Right lower back flank to RLQ abdomin    S/p right ureteroscopy, holmium laser lithotripsy for right ureteral obstructing stone on 2022    Patient complains of hematuria  Discharge Recommendations:  Patient would benefit from continued therapy after discharge, Therapy recommended at discharge          Patient Diagnosis(es): The primary encounter diagnosis was Nephrolithiasis. A diagnosis of Hydronephrosis concurrent with and due to calculi of kidney and ureter was also pertinent to this visit. Past Medical History:  has a past medical history of Acute renal failure with tubular necrosis ([address] Anxiety, Atrial fibrillation (HCC), Benign positional vertigo, CAD (coronary artery disease), Chest pain, CKD (chronic kidney disease), stage III ([address] Depression, Diabetes mellitus ([address] Diabetic neuropathy ([address] Dysuria, Hyperlipidemia, Hypertension, Migraine, and Persistent proteinuria. Past Surgical History:  has a past surgical history that includes Percutaneous Transluminal Coronary Angio; Cardiac catheterization; Coronary angioplasty with stent (2017); Appendectomy; Coronary angioplasty with stent (2012); Coronary angioplasty with stent (2020); Cystoscopy (Right, 2022); and Ureter surgery (Right, 2022). Assessment   Body Structures, Functions, Activity Limitations Requiring Skilled Therapeutic Intervention: Decreased vision/visual deficit; Decreased functional mobility ; Decreased cognition;Decreased endurance;Decreased strength;Decreased tolerance to work activity  Assessment: Pt presents with fall risk, gait and balance deficits, general weakness and visual impairment along with post procedual pain.  Pt is able to complete bed mobilty with Close SBA, Sit <> stand with CGA and verbal instruction, ambulate with min assist and use of rolling walker. Pt will continue to benefit from PT intervention for strengthening , progressive gait and balance training to decrease fall risk and faciltate safety and functional indepenence needed to return home alone with family support as son is a  and pt is at home for long periods of time. Specific Instructions for Next Treatment: Progress activity promote functional independence  Therapy Prognosis: Guarded  Decision Making: Medium Complexity  Clinical Presentation: evolving  Requires PT Follow-Up: Yes  Activity Tolerance  Activity Tolerance: Patient tolerated evaluation without incident;Patient limited by endurance     Plan   Plan  Plan:  (5-6 x per wk)  Specific Instructions for Next Treatment: Progress activity promote functional independence  Current Treatment Recommendations: Transfer training, Functional mobility training, Gait training, Stair training, Therapeutic activities, Strengthening  Safety Devices  Type of Devices: Chair alarm in place, Call light within reach, Patient at risk for falls, Left in chair, Nurse notified  Restraints  Restraints Initially in Place: No     Restrictions  Restrictions/Precautions  Restrictions/Precautions: Up as Tolerated, General Precautions  Required Braces or Orthoses?: No  Position Activity Restriction  Other position/activity restrictions: fall risk, up with assist     Subjective   Pain: Pt report 6/10 abdominal pain  General  Chart Reviewed:  Yes  Additional Pertinent Hx: S/p right ureteroscopy, holmium laser lithotripsy for right ureteral obstructing stone on 8/12/2022  No acute events overnight  Patient complains of hematuria  Family / Caregiver Present: Yes  Follows Commands: Within Functional Limits  Subjective  Subjective: Pt report weakness, balance deficits impacting independence with ambulateing and negotiating steps due to recent /previous hospital with right UE tremor  Gross Assessment  AROM: Within functional limits  PROM: Within functional limits  Strength: Generally decreased, functional  Coordination: Generally decreased, functional  Tone: Abnormal  Sensation: Impaired     AROM RLE (degrees)  RLE AROM: WFL  AROM LLE (degrees)  LLE AROM : WFL  AROM RUE (degrees)  RUE AROM : WFL  AROM LUE (degrees)  LUE AROM : WFL  Strength RLE  Strength RLE: WNL  Strength LLE  Strength LLE: WNL  Strength RUE  Strength RUE: WNL  Strength LUE  Strength LUE: WNL  Strength Other  Other: general weakness and decondtioning noted rigth foot patholoty with neuropathy           Bed mobility  Bridging: Modified independent ;Stand by assistance  Rolling to Left: Stand by assistance  Supine to Sit: Contact guard assistance  Sit to Supine: Contact guard assistance  Transfers  Sit to Stand: Contact guard assistance  Stand to sit: Contact guard assistance  Ambulation  Surface: level tile  Device: Rolling Walker  Distance: 25 ft RW sighted guide  Comments: Pt require phsyical assist with ambulating due to visual deficits l  More Ambulation?: No     Balance  Posture: Good  Sitting - Static: Good  Sitting - Dynamic: Good  Standing - Static: Fair  Standing - Dynamic: Fair  Comments: Pt demonstrates rigth side tremmors to UE, neuropathy to bilateral hands.  educated on standing balance with focus on hand and foot placement positioing with walkerj  Functional Reach Test  Fall Risk (Score of <10 inches is fall risk): Yes (Pt with multiple falls within last month 2/2 unsteady gait)  Exercise Treatment: pt sit EOB x 12 min, sit <> stand x 4                                                          AM-PAC Score     AM-PAC Inpatient Mobility without Stair Climbing Raw Score : 13 (08/15/22 1705)  AM-PAC Inpatient without Stair Climbing T-Scale Score : 38.96 (08/15/22 1705)  Mobility Inpatient CMS 0-100% Score: 58.44 (08/15/22 1705)  Mobility Inpatient without Stair CMS G-Code Modifier : CK (08/15/22 1705)              Goals  Short Term Goals  Time Frame for Short term goals: 5 visits  Short term goal 1: Pt to ambulate 50 ft CGA with rollng walker  Short term goal 2: pt to tolerate 30 min ther ex and activity to improve general strength and endurance  Short term goal 3: Pt to transfers from alternate surface heightes demonstrating safety with use of RW  Long Term Goals  Time Frame for Long term goals : 14 days  Long term goal 1: Pt to ambulate with MOD I using RW provided verbal direction due to visual deficits  Long term goal 2: Pt to ascend/ descend 4 steps with CGA needed to gain access to home  Patient Goals   Patient goals : to regain strength       Education  Patient Education  Education Given To: Patient  Education Provided: Role of Therapy;Plan of Care;Precautions;Transfer Training  Education Provided Comments: pt educated on safety with fall risk  Education Method: Demonstration  Barriers to Learning: None  Education Outcome: Verbalized understanding;Continued education needed      Therapy Time   Individual Concurrent Group Co-treatment   Time In 1545         Time Out 1640         Minutes 55         Timed Code Treatment Minutes: [address], [place]

## 2022-08-15 NOTE — PLAN OF CARE
Problem: Discharge Planning  Goal: Discharge to home or other facility with appropriate resources  8/15/2022 0049 by Praneeth Conroy RN  Outcome: Progressing  8/14/2022 1605 by Lei Holbrook RN  Outcome: Progressing     Problem: Pain  Goal: Verbalizes/displays adequate comfort level or baseline comfort level  8/15/2022 0049 by Praneeth Conroy RN  Outcome: Progressing  8/14/2022 1605 by Lei Holbrook RN  Outcome: Progressing     Problem: Safety - Adult  Goal: Free from fall injury  8/15/2022 0049 by Praneeth Conroy RN  Outcome: Progressing  8/14/2022 1605 by Lei Holbrook RN  Outcome: Progressing     Problem: Skin/Tissue Integrity  Goal: Absence of new skin breakdown  Description: 1. Monitor for areas of redness and/or skin breakdown  2. Assess vascular access sites hourly  3. Every 4-6 hours minimum:  Change oxygen saturation probe site  4. Every 4-6 hours:  If on nasal continuous positive airway pressure, respiratory therapy assess nares and determine need for appliance change or resting period.   8/15/2022 0049 by Praneeth Conroy RN  Outcome: Progressing  8/14/2022 1605 by Lei Holbrook RN  Outcome: Progressing     Problem: Chronic Conditions and Co-morbidities  Goal: Patient's chronic conditions and co-morbidity symptoms are monitored and maintained or improved  8/15/2022 0049 by Praneeth Conroy RN  Outcome: Progressing  8/14/2022 1605 by Lei Holbrook RN  Outcome: Progressing     Problem: ABCDS Injury Assessment  Goal: Absence of physical injury  8/15/2022 0049 by Praneeth Conroy RN  Outcome: Progressing  8/14/2022 1605 by Lei Holbrook RN  Outcome: Progressing

## 2022-08-15 NOTE — PROGRESS NOTES
Nishi Crowder, [address] [facility], [address], [place]az[place], [facility]  Urology Progress Note     Subjective:   S/p right ureteroscopy, holmium laser lithotripsy for right ureteral obstructing stone on 8/12/2022  No acute events overnight  Patient complains of hematuria  Denies fever, chills, nausea and vomiting  Having right sided abdominal pain  External catheter in place with yellow urine   cc    Patient Vitals for the past 24 hrs:   BP Temp Temp src Pulse Resp SpO2   08/14/22 1933 (!) 142/92 99 °F (37.2 °C) Oral 77 18 98 %   08/14/22 1630 130/62 98.3 °F (36.8 °C) Oral 69 18 --   08/14/22 1230 (!) 141/62 98.3 °F (36.8 °C) Oral 69 18 --   08/14/22 0904 (!) 123/56 98.3 °F (36.8 °C) Oral 73 16 95 %         Intake/Output Summary (Last 24 hours) at 8/15/2022 0649  Last data filed at 8/15/2022 0507  Gross per 24 hour   Intake --   Output 2900 ml   Net -2900 ml         Recent Labs     08/13/22  0738 08/13/22  1636 08/14/22  0133 08/14/22  1108 08/14/22  1914 08/15/22  0100   WBC 11.1  --  8.3  --   --   --    HGB 9.3*   < > 8.4* 8.6* 8.5* 8.9*   HCT 29.9*   < > 28.9* 26.3* 28.1* 27.4*   MCV 93.1  --  98.3  --   --   --      --  164  --   --   --     < > = values in this interval not displayed. Recent Labs     08/13/22  0738 08/14/22  0133    135   K 4.2 4.2    105   CO2 22 23   BUN 28* 24*   CREATININE 2.10* 1.73*       No results for input(s): COLORU, PHUR, LABCAST, WBCUA, RBCUA, MUCUS, TRICHOMONAS, YEAST, BACTERIA, CLARITYU, SPECGRAV, LEUKOCYTESUR, UROBILINOGEN, BILIRUBINUR, BLOODU in the last 72 hours. Invalid input(s): NITRATE, GLUCOSEUKETONESUAMORPHOUS      Additional Lab/culture results:    Physical Exam:   Constitutional: Patient in no acute distress. Neuro: alert and oriented to person place and time. Psych: Mood and affect normal.  Head: Atraumatic and normocephalic. Neck: Trachea midline  Skin: No rashes or bruising present.   Lungs: Respiratory effort normal.  Cardiovascular:  Heart rate regular. Positive radial pulses bilaterally  Abdomen: Soft, mild right abdominal tenderness, non-distended. Non peritonitic  : No flank pain, no CVA tenderness/ Purewick in place with tea colored urine     Interval Imaging Findings:    Impression:    Lara Laurent is a 66-year-old female with right ureteral obstructing stone, s/p right ureteroscopy, holmium laser lithotripsy with right ureteral stent placement on 8/12/2022  Acute kidney injury    Plan:   Regular diet  Continue to trend creatinine  No longer having gross hematuria. Consider holding Brilinta due to persistent hematuria. Eliquis already on hold. Transfuse for hemoglobin less than 7. Continue to monitor without Coffey catheter as long as she is able to empty the bladder. Pain medications, Uroxatrol for discharge  She can remove the stent by pulling on the string attached to her thigh on Wednesday.        Heraclio Lujan MD  6:49 AM 8/15/2022    Not edited for attending attestation

## 2022-08-15 NOTE — CARE COORDINATION
Transitional Planning  Spoke with patient, plan to return  home with [facility]. Patient requesting a rollator. LOWELL JETER for order and face to face. Referral sent to [facility] care. 1202 pm Called [facility] care, message left to confirm if patient is still active with home care. 1240 pm Call from Maye Jimenez at [facility]. Patient is current with them and they are willing to accept patient back. Requests dc info faxed to [phone].     1250 pm Faxed home care order and STEPHEN to [facility] care     231 pm Spoke with RN-patient and family are requesting SNF placement. 1st choice is [facility] ([facility] in [facility], 2nd choice is [facility] in [facility]. Spoke with patient [facility] ([facility] of [facility]. Spoke with dtsuad Sutherland and Lorene David. 1st choice is [facility] in [facility], 2nd choice is [facility] in [facility], 3rd choice is [facility]. Referrals sent. 350 pm [facility], spoke with [facility]. No beds available. Called Michelle of [facility], message left for Elias Roa regarding referral.    Ronel Koenig on "[employer] message left for Nicole. 445 pm Voicemail received from Zenon at [facility] ([facility]), referral received to review clinical information. 450 pm Call from Nicole, at [facility]. Patient has been accepted to start precert. 500 pm Update given to patient. Called salvatore Sutherland and message left regarding [facility] starting precert. Rosana Sutherland called right back and update given verbally.

## 2022-08-15 NOTE — DISCHARGE INSTRUCTIONS
Please follow urology instructions for pulling out the stent  Start taking eliquis from the next day of pulling the stent out  Continue taking brilinta  Continue taking antibiotic for 3 more days  Follow up with PCP    Please start taking LANTUS with a low dose and titrate it up according to the sugars

## 2022-08-15 NOTE — PROGRESS NOTES
Yesika Gomez was evaluated today and a DME order was entered for a rollator because she requires this to successfully complete daily living tasks of personal cares and ambulating. A wheeled walker is necessary due to the patient's unsteady gait, upper body weakness, and inability to  an ambulation device; and she can ambulate only by pushing a walker instead of a lesser assistive device such as a cane, crutch, or standard walker. The need for this equipment was discussed with the patient and she understands and is in agreement.        Davonte Murillo  PGY-2  Internal Medicine  [facility]   11:58 [place] 8/15/2022

## 2022-08-15 NOTE — PROGRESS NOTES
Maddie Alvarez requires a bedside commode due to being confined to one level of the home, and is physically incapable of utilizing regular toilet facilities. Current body weight is Weight: 244 lb (110.7 kg).          Davonte Murillo  PGY-2  Internal Medicine  [facility]   1:31 [place] 8/15/2022

## 2022-08-16 PROBLEM — D62 ACUTE BLOOD LOSS ANEMIA (ABLA): Status: ACTIVE | Noted: 2022-08-16

## 2022-08-16 LAB
ANION GAP SERPL CALCULATED.3IONS-SCNC: 8 MMOL/L (ref 9–17)
BUN BLDV-MCNC: 14 MG/DL (ref 8–23)
CALCIUM SERPL-MCNC: 8.8 MG/DL (ref 8.6–10.4)
CHLORIDE BLD-SCNC: 107 MMOL/L (ref 98–107)
CO2: 25 MMOL/L (ref 20–31)
CREAT SERPL-MCNC: 1.53 MG/DL (ref 0.5–0.9)
GFR AFRICAN AMERICAN: 40 ML/MIN
GFR NON-AFRICAN AMERICAN: 33 ML/MIN
GFR SERPL CREATININE-BSD FRML MDRD: ABNORMAL ML/MIN/{1.73_M2}
GLUCOSE BLD-MCNC: 132 MG/DL (ref 65–105)
GLUCOSE BLD-MCNC: 164 MG/DL (ref 65–105)
GLUCOSE BLD-MCNC: 168 MG/DL (ref 70–99)
GLUCOSE BLD-MCNC: 182 MG/DL (ref 65–105)
GLUCOSE BLD-MCNC: 254 MG/DL (ref 65–105)
HCT VFR BLD CALC: 28.1 % (ref 36.3–47.1)
HEMOGLOBIN: 9.1 G/DL (ref 11.9–15.1)
POTASSIUM SERPL-SCNC: 3.8 MMOL/L (ref 3.7–5.3)
SODIUM BLD-SCNC: 140 MMOL/L (ref 135–144)

## 2022-08-16 PROCEDURE — 6370000000 HC RX 637 (ALT 250 FOR IP)

## 2022-08-16 PROCEDURE — 36415 COLL VENOUS BLD VENIPUNCTURE: CPT

## 2022-08-16 PROCEDURE — 82947 ASSAY GLUCOSE BLOOD QUANT: CPT

## 2022-08-16 PROCEDURE — 85018 HEMOGLOBIN: CPT

## 2022-08-16 PROCEDURE — 2580000003 HC RX 258: Performed by: STUDENT IN AN ORGANIZED HEALTH CARE EDUCATION/TRAINING PROGRAM

## 2022-08-16 PROCEDURE — 6360000002 HC RX W HCPCS: Performed by: STUDENT IN AN ORGANIZED HEALTH CARE EDUCATION/TRAINING PROGRAM

## 2022-08-16 PROCEDURE — 6360000002 HC RX W HCPCS: Performed by: INTERNAL MEDICINE

## 2022-08-16 PROCEDURE — 1200000000 HC SEMI PRIVATE

## 2022-08-16 PROCEDURE — 97535 SELF CARE MNGMENT TRAINING: CPT

## 2022-08-16 PROCEDURE — 97166 OT EVAL MOD COMPLEX 45 MIN: CPT

## 2022-08-16 PROCEDURE — 97530 THERAPEUTIC ACTIVITIES: CPT

## 2022-08-16 PROCEDURE — 6370000000 HC RX 637 (ALT 250 FOR IP): Performed by: STUDENT IN AN ORGANIZED HEALTH CARE EDUCATION/TRAINING PROGRAM

## 2022-08-16 PROCEDURE — 85014 HEMATOCRIT: CPT

## 2022-08-16 PROCEDURE — 6360000002 HC RX W HCPCS

## 2022-08-16 PROCEDURE — 97116 GAIT TRAINING THERAPY: CPT

## 2022-08-16 PROCEDURE — 99232 SBSQ HOSP IP/OBS MODERATE 35: CPT | Performed by: INTERNAL MEDICINE

## 2022-08-16 PROCEDURE — 80048 BASIC METABOLIC PNL TOTAL CA: CPT

## 2022-08-16 RX ORDER — INSULIN LISPRO 100 [IU]/ML
0-4 INJECTION, SOLUTION INTRAVENOUS; SUBCUTANEOUS NIGHTLY
Status: DISCONTINUED | OUTPATIENT
Start: 2022-08-16 | End: 2022-08-17 | Stop reason: HOSPADM

## 2022-08-16 RX ORDER — INSULIN LISPRO 100 [IU]/ML
0-16 INJECTION, SOLUTION INTRAVENOUS; SUBCUTANEOUS
Status: DISCONTINUED | OUTPATIENT
Start: 2022-08-16 | End: 2022-08-17 | Stop reason: HOSPADM

## 2022-08-16 RX ADMIN — MORPHINE SULFATE 1 MG: 2 INJECTION, SOLUTION INTRAMUSCULAR; INTRAVENOUS at 21:18

## 2022-08-16 RX ADMIN — MORPHINE SULFATE 1 MG: 2 INJECTION, SOLUTION INTRAMUSCULAR; INTRAVENOUS at 11:31

## 2022-08-16 RX ADMIN — PANTOPRAZOLE SODIUM 40 MG: 40 TABLET, DELAYED RELEASE ORAL at 05:06

## 2022-08-16 RX ADMIN — ROSUVASTATIN CALCIUM 40 MG: 20 TABLET, FILM COATED ORAL at 21:08

## 2022-08-16 RX ADMIN — DIPHENHYDRAMINE HYDROCHLORIDE 25 MG: 50 INJECTION, SOLUTION INTRAMUSCULAR; INTRAVENOUS at 21:04

## 2022-08-16 RX ADMIN — CIPROFLOXACIN 400 MG: 2 INJECTION, SOLUTION INTRAVENOUS at 21:11

## 2022-08-16 RX ADMIN — TICAGRELOR 90 MG: 90 TABLET ORAL at 08:56

## 2022-08-16 RX ADMIN — MORPHINE SULFATE 1 MG: 2 INJECTION, SOLUTION INTRAMUSCULAR; INTRAVENOUS at 05:06

## 2022-08-16 RX ADMIN — ALFUZOSIN HYDROCHLORIDE 10 MG: 10 TABLET, FILM COATED, EXTENDED RELEASE ORAL at 21:08

## 2022-08-16 RX ADMIN — AMLODIPINE BESYLATE 10 MG: 10 TABLET ORAL at 08:56

## 2022-08-16 RX ADMIN — POLYETHYLENE GLYCOL 3350 17 G: 17 POWDER, FOR SOLUTION ORAL at 11:35

## 2022-08-16 RX ADMIN — TICAGRELOR 90 MG: 90 TABLET ORAL at 21:08

## 2022-08-16 RX ADMIN — INSULIN GLARGINE 10 UNITS: 100 INJECTION, SOLUTION SUBCUTANEOUS at 08:58

## 2022-08-16 RX ADMIN — SODIUM CHLORIDE: 9 INJECTION, SOLUTION INTRAVENOUS at 06:51

## 2022-08-16 RX ADMIN — CIPROFLOXACIN 400 MG: 2 INJECTION, SOLUTION INTRAVENOUS at 10:13

## 2022-08-16 RX ADMIN — SODIUM CHLORIDE, PRESERVATIVE FREE 10 ML: 5 INJECTION INTRAVENOUS at 21:04

## 2022-08-16 RX ADMIN — AMIODARONE HYDROCHLORIDE 200 MG: 200 TABLET ORAL at 08:56

## 2022-08-16 RX ADMIN — INSULIN LISPRO 4 UNITS: 100 INJECTION, SOLUTION INTRAVENOUS; SUBCUTANEOUS at 12:06

## 2022-08-16 ASSESSMENT — PAIN SCALES - GENERAL
PAINLEVEL_OUTOF10: 7
PAINLEVEL_OUTOF10: 0
PAINLEVEL_OUTOF10: 0
PAINLEVEL_OUTOF10: 8
PAINLEVEL_OUTOF10: 7
PAINLEVEL_OUTOF10: 7

## 2022-08-16 ASSESSMENT — PAIN DESCRIPTION - ORIENTATION
ORIENTATION: RIGHT
ORIENTATION: RIGHT

## 2022-08-16 ASSESSMENT — PAIN DESCRIPTION - DESCRIPTORS: DESCRIPTORS: ACHING

## 2022-08-16 ASSESSMENT — PAIN DESCRIPTION - LOCATION
LOCATION: FLANK
LOCATION: FLANK

## 2022-08-16 NOTE — CARE COORDINATION
Transitional Planning  Faxed to [facility] order, face to face and face sheet for [facility] and Walker with a seat. 1000 am Call from [facility], patient unable to get rollator, patient's insurance will not cover it. Patient received a wheeled walker 3 yrs ago. Patient notified.  BSC to be delivered to hospital.

## 2022-08-16 NOTE — PROGRESS NOTES
Physical Therapy  Facility/Department: [facility] RENAL//MED SURG  Physical Therapy    Name: Rhonda Gallagher  : 1945  MRN: [id number]  Date of Service: 2022    Discharge Recommendations:  Patient would benefit from continued therapy after discharge, Therapy recommended at discharge          Patient Diagnosis(es): The primary encounter diagnosis was Nephrolithiasis. A diagnosis of Hydronephrosis concurrent with and due to calculi of kidney and ureter was also pertinent to this visit. Past Medical History:  has a past medical history of Acute renal failure with tubular necrosis ([address] Anxiety, Atrial fibrillation (HCC), Benign positional vertigo, CAD (coronary artery disease), Chest pain, CKD (chronic kidney disease), stage III ([address] Depression, Diabetes mellitus ([facility] Diabetic neuropathy ([facility] Dysuria, Hyperlipidemia, Hypertension, Migraine, and Persistent proteinuria. Past Surgical History:  has a past surgical history that includes Percutaneous Transluminal Coronary Angio; Cardiac catheterization; Coronary angioplasty with stent (2017); Appendectomy; Coronary angioplasty with stent (2012); Coronary angioplasty with stent (2020); Cystoscopy (Right, 2022); and Ureter surgery (Right, 2022). Assessment   Body Structures, Functions, Activity Limitations Requiring Skilled Therapeutic Intervention: Decreased vision/visual deficit; Decreased functional mobility ; Decreased cognition;Decreased endurance;Decreased strength;Decreased tolerance to work activity  Assessment: Improved gait distance with two losses of balance. Quickly fatigued but willing to exert herself for function. Supportive family present. Patient will need further PT to regain functional independence. Activity Tolerance  Activity Tolerance: Patient limited by endurance; Patient limited by fatigue     Plan   Plan  Plan:  (5-6x/wk)  Specific Instructions for Next Treatment: Progress activity promote functional independence  Current Treatment Recommendations: Transfer training, Functional mobility training, Gait training, Stair training, Therapeutic activities, Strengthening, Balance training, Patient/Caregiver education & training, Equipment evaluation, education, & procurement, Safety education & training, Home exercise program  Safety Devices  Type of Devices: Chair alarm in place, Call light within reach, Left in chair, Nurse notified, Gait belt, All fall risk precautions in place  Restrictions  Restrictions/Precautions  Restrictions/Precautions: Fall Risk, Up as Tolerated  Required Braces or Orthoses?: No  Position Activity Restriction  Other position/activity restrictions: 8/12 HOLMIUM, CYSTOSCOPY, URETEROSCOPY, STENT PLACEMENT. Watch string from stent which is taped to her right inner thigh. Subjective   General  Chart Reviewed: Yes  Patient assessed for rehabilitation services?: Yes  Follows Commands: Within Functional Limits  Subjective  Subjective: Pain in abdomen       Cognition   Cognition  Overall Cognitive Status: Exceptions  Arousal/Alertness: Appropriate responses to stimuli  Following Commands: Follows one step commands with repetition  Attention Span: Attends with cues to redirect  Safety Judgement: Decreased awareness of need for safety;Decreased awareness of need for assistance  Problem Solving: Assistance required to generate solutions  Insights: Fully aware of deficits  Initiation: Does not require cues  Sequencing: Requires cues for some     Objective   Heart Rate: 74  Heart Rate Source: Monitor  BP: 93/75  BP Location: Right upper arm  BP Method: Automatic  MAP (Calculated): 81  Resp: 24  SpO2: 99 %  O2 Device: None (Room air)     Observation/Palpation  Observation: Tremoring reduced over the course of her treatment of gait and several transfers.        Transfers  Sit to Stand: Contact guard assistance  Stand to sit: Contact guard assistance  Ambulation  Surface: level tile  Device: Rolling Timed Code Treatment Minutes: Ely Charles 5, PT

## 2022-08-16 NOTE — PROGRESS NOTES
[facility]  Internal Medicine Teaching Residency Program  Inpatient Daily Progress Note  ______________________________________________________________________________    Patient: Rhonda Gallagher  YOB: 1945   GYM:[id number]    Acct: [de-identified]     Room: 26/1-12  Admit date: 8/11/2022  Today's date: 08/16/22  Number of days in the hospital: 5    SUBJECTIVE   Admitting Diagnosis: Acute unilateral obstructive uropathy  CC: Flank pain with radiation to back  Pt examined at bedside. Chart & results reviewed. -Patient examined at bedside alert and oriented x4  -Not complain any increasing abdominal pain has been stable  -Saturating well on room air  -She is s/p right ureteroscopic stone removal on 8/12/2022.  -Initially Eliquis and Brilinta was on hold by urology, Delories Kate was started but Eliquis is still on hold  -We will start Eliquis after stent removal on Friday 8/19/2022.  -Her creatinine has been all over the place,2.1> 1.73> 1.37> 1.53  -Her hemoglobin8.6>8.9>9.6. ROS:  Constitutional:  negative for chills, fevers, sweats  Respiratory:  negative for cough, dyspnea on exertion, hemoptysis, shortness of breath, wheezing  Cardiovascular:  negative for chest pain, chest pressure/discomfort, lower extremity edema, palpitations  Gastrointestinal:  negative for abdominal pain, constipation, diarrhea, nausea, vomiting  Neurological:  negative for dizziness, headache  BRIEF HISTORY   The patient is a pleasant 68 y.o. female presents with past medical history of atrial fibrillation, CAD s/p PCI, CKD presented to the hospital with flank pain. Pain started yesterday, sharp in nature, persistent, radiating towards the back, associated with nausea and vomiting and chills. No history of fever, chest pain, shortness of breath, cough. In the ED, labs showed creatinine 2.31, glucose 213, WBC 9.4, hemoglobin 10.7, urinalysis positive for RBCs.   CT abdomen and pelvis was done, which showed 2 mm x 4 mm stone in the right ureter and mild to moderate hydronephrosis of the right kidney. Patient was recently admitted for work-up of syncope and collapse likely due to hypotension due to bradycardia due to multiple medications. She was discharged on 08/10      OBJECTIVE     Vital Signs:  BP 93/75   Pulse 74   Temp 98.3 °F (36.8 °C) (Oral)   Resp 24   Ht 5' 5\" (1.651 m)   Wt 243 lb 13.3 oz (110.6 kg)   SpO2 99%   BMI 40.58 kg/m²     Temp (24hrs), Av.4 °F (36.9 °C), Min:98.3 °F (36.8 °C), Max:98.6 °F (37 °C)    In: -   Out: 2350 [Urine:2350]    Physical Exam:  Constitutional: This is a well developed, well nourished, Greater than 40 - Morbid Obesity / Extreme Obesity / Grade III 68y.o. year old female who is alert, oriented, cooperative and in no apparent distress. Head:normocephalic and atraumatic. EENT:  PERRLA. No conjunctival injections. Septum was midline, mucosa was without erythema, exudates or cobblestoning. No thrush was noted. Neck: Supple without thyromegaly. No elevated JVP. Trachea was midline. Respiratory: Chest was symmetrical without dullness to percussion. Breath sounds bilaterally were clear to auscultation. There were no wheezes, rhonchi or rales. There is no intercostal retraction or use of accessory muscles. No egophony noted. Cardiovascular: Regular without murmur, clicks, gallops or rubs. Abdomen: Slightly rounded and soft without organomegaly. No rebound, rigidity or guarding was appreciated. Lymphatic: No lymphadenopathy. Musculoskeletal: Normal curvature of the spine. No gross muscle weakness. Extremities:  No lower extremity edema, ulcerations, tenderness, varicosities or erythema. Muscle size, tone and strength are normal.  No involuntary movements are noted. Skin:  Warm and dry. Good color, turgor and pigmentation. No lesions or scars.   No cyanosis or clubbing  Neurological/Psychiatric: The patient's general behavior, level of consciousness, thought content and emotional status is normal.        Medications:  Scheduled Medications:    insulin lispro  0-16 Units SubCUTAneous TID WC    insulin lispro  0-4 Units SubCUTAneous Nightly    insulin glargine  10 Units SubCUTAneous Daily    ciprofloxacin  400 mg IntraVENous Q12H    alfuzosin  10 mg Oral Nightly    amLODIPine  10 mg Oral Daily    [Held by provider] apixaban  5 mg Oral BID    pantoprazole  40 mg Oral QAM AC    sodium chloride flush  5-40 mL IntraVENous 2 times per day    amiodarone  200 mg Oral Daily    rosuvastatin  40 mg Oral Daily    ticagrelor  90 mg Oral BID     Continuous Infusions:    dextrose      sodium chloride      sodium chloride 100 mL/hr at 08/16/22 0651     PRN Medicationsglucose, 4 tablet, PRN  dextrose bolus, 125 mL, PRN   Or  dextrose bolus, 250 mL, PRN  glucagon (rDNA), 1 mg, PRN  dextrose, , Continuous PRN  sodium chloride, 1 spray, PRN  sodium chloride flush, 5-40 mL, PRN  sodium chloride, , PRN  ondansetron, 4 mg, Q8H PRN   Or  ondansetron, 4 mg, Q6H PRN  acetaminophen, 650 mg, Q6H PRN   Or  acetaminophen, 650 mg, Q6H PRN  polyethylene glycol, 17 g, Daily PRN  diphenhydrAMINE, 25 mg, Q6H PRN  morphine, 1 mg, Q4H PRN        Diagnostic Labs:  CBC:   Recent Labs     08/14/22 0133 08/14/22  1108 08/15/22  0853 08/15/22  1645 08/16/22  0504   WBC 8.3  --   --   --   --    RBC 2.94*  --   --   --   --    HGB 8.4*   < > 9.4* 9.4* 9.1*   HCT 28.9*   < > 29.3* 28.7* 28.1*   MCV 98.3  --   --   --   --    RDW 15.1*  --   --   --   --      --   --   --   --     < > = values in this interval not displayed. BMP:   Recent Labs     08/14/22  0133 08/15/22  0853 08/16/22  0504    138 140   K 4.2 3.8 3.8    105 107   CO2 23 24 25   BUN 24* 15 14   CREATININE 1.73* 1.37* 1.53*     BNP: No results for input(s): BNP in the last 72 hours. PT/INR: No results for input(s): PROTIME, INR in the last 72 hours.   APTT: No results for input(s): APTT in the last 72 hours. CARDIAC ENZYMES: No results for input(s): CKMB, CKMBINDEX, TROPONINI in the last 72 hours. Invalid input(s): CKTOTAL;3  FASTING LIPID PANEL:  Lab Results   Component Value Date    CHOL 125 05/30/2022    HDL 36 (L) 05/30/2022    TRIG 132 05/30/2022     LIVER PROFILE: No results for input(s): AST, ALT, ALB, BILIDIR, BILITOT, ALKPHOS in the last 72 hours. MICROBIOLOGY:   Lab Results   Component Value Date/Time    CULTURE NO GROWTH 4 DAYS 08/12/2022 03:28 AM    CULTURE NO GROWTH 4 DAYS 08/12/2022 03:28 AM       Imaging:    CT ABDOMEN PELVIS WO CONTRAST Additional Contrast? None    Result Date: 8/11/2022  Acute right obstructive uropathy secondary to a 2 mm x 4 mm in length proximal right ureteral calculus. No other renal or ureteral calculi. Left colon diverticulosis with no evidence of diverticulitis. ASSESSMENT & PLAN     ASSESSMENT / PLAN:     Principal Problem:    Acute unilateral obstructive uropathy  Active Problems:    Chronic diastolic (congestive) heart failure (HCC)    Paroxysmal atrial fibrillation (HCC)    Obesity, Class III, BMI 40-49.9 (morbid obesity) ([address]    Presence of stent in coronary artery in patient with coronary artery disease    Current use of long term anticoagulation    Acute kidney injury superimposed on CKD ([address]    Nephrolithiasis    Secondary hypercoagulable state ([address]    S/P ureteral stent placement    Status post laser lithotripsy of ureteral calculus    Acute blood loss anemia (ABLA)    Type 2 diabetes mellitus (HCC)    Essential hypertension    Atherosclerotic heart disease of native coronary artery with other forms of angina pectoris (HCC)    Other hyperlipidemia    Anemia, normocytic normochromic    Moderate episode of recurrent major depressive disorder (HCC)    CKD (chronic kidney disease), stage III ([address]  Resolved Problems:    * No resolved hospital problems.  *      MARIELLE superimposed on CKD: Creatinine today 2.35-->1.73-->1.37, baseline around 1.5. Likely due to nephrolithiasis. On IV fluids 100 cc/hr. Monitor the BMP. Follow-up with nephrology. Continue to trend creatinine. Nephrolithiasis: CT abdomen and pelvis positive for 2 mm x 4 mm stone in the right ureter. Patient started on continuous IV fluids 100 mL/h and morphine as needed for pain. Urology consulted for acute right obstructive uropathy, S/p right ureteral stent placement on 8/12/2022. She is having hematuria post ureteroscopy. Follow up with urology. We will restart the eliquis after stent removal on 9/19/2022. Heart failure with preserved ejection fraction: Last echo shows ejection fraction of 54%, evidence of diastolic dysfunction 8/48. On losartan 100 mg, Demadex 20 mg,     Paroxysmal atrial fibrillation: TSH 0.607/28. Continue on amiodarone 100 mg, Brilinta 90 mg once daily. 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 On sliding scale insulin. Hypertension: Norvasc 10     Blood loss anemia: , gross hematuria improved,  hemoglobin trend 10.1-->9.5-->8.4-->8.9. Eliquis and Brilinta on hold, will continue to monitor hemoglobin  DVT ppx : Eliquis on hold, will start after stent removal on Friday  GI ppx: Protonix 40 mg    PT/OT: We will consult PT OT  Discharge Planning / SW:  consulted    Linda Sutton MD  Internal Medicine Resident, PGY-1  [place], [place]  8/16/2022, 1:26 PM    Attending Physician Statement  I have discussed the care of Luanne Su with the resident team. I have examined the patient myself and taken ros and hpi , including pertinent history and exam findings,  with the resident. I have reviewed the key elements of all parts of the encounter with the resident. I agree with the assessment, plan and orders as documented by the resident.     Principal

## 2022-08-16 NOTE — CARE COORDINATION
Transitional Planning  Called Johanna, message left for [facility] regarding status of precert process. 1231 pm Call back from [facility] at [place]. Precert still pending.

## 2022-08-16 NOTE — PROGRESS NOTES
Occupational Therapy  Facility/Department: [facility] [[email or web address] RENAL//MED SURG  Occupational Therapy Initial Assessment    Name: Erika Benedict  : 1945  MRN: [id number]  Date of Service: 2022    Copied from Emergency Medicine: Erika Benedict is a 68 y.o. female who presents with right abdominal pain and flank pain that woke her up from sleep this morning. Patient's had associated nausea with this pain. Patient rates the pain as severe 10 out of 10, localized to the right flank and abdomen, pain is not improved, unknown what makes it better, palpation and movement make it worse. Patient had some vomiting emesis. Patient denies any chest pain, lightheadedness, dizziness, change in urination or bowel habits. Denies any fevers or chills    Discharge Recommendations:  Patient would benefit from continued therapy after discharge  OT Equipment Recommendations  Equipment Needed: Yes  Mobility Devices: Wild Rhein; ADL Assistive Devices  Walker: Rolling  ADL Assistive Devices: Toileting - Raised Toilet Seat with arms       Patient Diagnosis(es): The primary encounter diagnosis was Nephrolithiasis. A diagnosis of Hydronephrosis concurrent with and due to calculi of kidney and ureter was also pertinent to this visit. Past Medical History:  has a past medical history of Acute renal failure with tubular necrosis ([address] Anxiety, Atrial fibrillation (HCC), Benign positional vertigo, CAD (coronary artery disease), Chest pain, CKD (chronic kidney disease), stage III ([address] Depression, Diabetes mellitus ([address] Diabetic neuropathy ([address] Dysuria, Hyperlipidemia, Hypertension, Migraine, and Persistent proteinuria. Past Surgical History:  has a past surgical history that includes Percutaneous Transluminal Coronary Angio; Cardiac catheterization; Coronary angioplasty with stent (2017); Appendectomy; Coronary angioplasty with stent (2012); Coronary angioplasty with stent (2020);  Cystoscopy (Right, 2022); and Ureter surgery (Right, 8/12/2022). Assessment   Performance deficits / Impairments: Decreased functional mobility ; Decreased ADL status; Decreased safe awareness;Decreased balance;Decreased endurance;Decreased posture    Assessment: RN ok'd OT evaluation. Pt agreeable. Pt supine in bed on arrival requiring CGA to sit EOB pt sat EOB for approx 3 mins to relieve symptoms of dizziness. Pt completed sit to stand transfer with CGA to toilet with MARK grab bars required to sit on toilet. Toileting/ hygiene completed with MOD assist to pull pants up and over hips. Pt stood for hygiene x2 for 1-2 mins with BUE support on RW with difficulty maintaining upright position reverting to MARK forearm support on RW. Pt returned to sitting for face washing, UB bathing, and UB dressing tasks completing with SBA with VC's for initiation of task and sequencing. Pt to return to recliner with CGA and increased amount of time. Pt with decreased safety awareness during mobility. Pt c/o nausea at end of session. Pt in recliner with chair alarm on, call light within reach and feet propped up. Skilled OT services required to increase independence with mobility, ADL's, balance, and posture.     Prognosis: Good  Decision Making: Medium Complexity  REQUIRES OT FOLLOW-UP: Yes  Activity Tolerance  Activity Tolerance: Patient Tolerated treatment well        Plan   Plan  Times per Week: 3-4x/wk  Current Treatment Recommendations: Balance training, Functional mobility training, Endurance training, Positioning, Equipment evaluation, education, & procurement, Patient/Caregiver education & training, Safety education & training, Pain management, Self-Care / ADL     Restrictions  Restrictions/Precautions  Restrictions/Precautions: Fall Risk, Up as Tolerated  Required Braces or Orthoses?: No  Position Activity Restriction  Other position/activity restrictions: 8/12 HOLMIUM, CYSTOSCOPY, URETEROSCOPY, STENT PLACEMENT    Subjective   General  Patient assessed ensure safety when standing)  Interventions: Verbal cues; Visual cues; Safety awareness training  Sit to Stand: Contact-guard assistance;Assist X1;Additional time  Stand to Sit: Contact-guard assistance;Assist X1;Additional time  Toilet Transfer: Contact-guard assistance;Assist X1;Additional time (MARK grab bars utilized to stand from toilet x2 with CGA)  Gait  Overall Level of Assistance: Contact-guard assistance;Assist X1;Additional time (Pt completed functional mobility from bed>toilet>recliner)  Interventions: Verbal cues; Safety awareness training  Assistive Device: Walker, rolling;Gait belt     AROM: Within functional limits  Strength: Within functional limits (BUE's grossly 4/5)  Coordination: Within functional limits  Tone: Normal  Sensation: Impaired (Pt reports feeling of pins and needles in BUE/BLE's (Chronic))  ADL  Feeding: Modified independent ;Setup  Grooming: Stand by assistance;Setup;Verbal cueing; Increased time to complete  Grooming Skilled Clinical Factors: Pt completed face washing while seated on toilet with set up assistance and VC's for intiation of task. UE Bathing: Setup;Verbal cueing; Increased time to complete;Stand by assistance  UE Bathing Skilled Clinical Factors: SBA required for sequencing of task and initiation of task while seated on toilet  LE Bathing: Moderate assistance; Increased time to complete;Verbal cueing;Setup  UE Dressing: Stand by assistance;Verbal cueing; Increased time to complete;Setup  UE Dressing Skilled Clinical Factors: Pt don/doffed gown with SBA for sequencing and initiation of task  LE Dressing: Moderate assistance; Increased time to complete;Verbal cueing;Setup  Toileting: Moderate assistance; Increased time to complete;Setup  Toileting Skilled Clinical Factors: Pt completed toileting/ toilet hygiene with MOD assist for pulling pants up over hips.               Vision  Vision: Impaired  Vision Exceptions: Wears glasses for reading  Hearing  Hearing: Within functional limits  Cognition  Overall Cognitive Status: Exceptions  Following Commands:  Follows one step commands consistently  Safety Judgement: Decreased awareness of need for safety;Decreased awareness of need for assistance  Insights: Decreased awareness of deficits                  Education Given To: Patient  Education Provided: Role of Therapy;Plan of Care  Education Provided Comments: Pt educated on POC, Role of OT and safety awareness with good understanding  Education Method: Verbal  Education Outcome: Verbalized understanding;Continued education needed  LUE AROM (degrees)  LUE AROM : WFL  Left Hand AROM (degrees)  Left Hand AROM: WFL  RUE AROM (degrees)  RUE AROM : WFL  Right Hand AROM (degrees)  Right Hand AROM: WFL        Hand Dominance  Hand Dominance: Right                   AM-PAC Score        AM-PAC Inpatient Daily Activity Raw Score: 15 (08/16/22 1133)  AM-PAC Inpatient ADL T-Scale Score : 34.69 (08/16/22 1133)  ADL Inpatient CMS 0-100% Score: 56.46 (08/16/22 1133)  ADL Inpatient CMS G-Code Modifier : CK (08/16/22 1133)        Goals  Short Term Goals  Time Frame for Short term goals: By discharge, pt will  Short Term Goal 1: complete LBD with MOD I and use of AE prn to increase functional independence  Short Term Goal 2: demonstrate transfers and functional mobility with MOD I and use of LRD for greater participation with ADL tasks  Short Term Goal 3: engage in 7 mins of static/dynamic standing with MOD I and LRD during ADL tasks  Short Term Goal 4: complete UB ADL's with MOD I while incorporating EC techniques to increase functional independence  Short Term Goal 5: demonstrate toileting with CGA and good safety awareness to increase overall indepenence       Therapy Time   Individual Concurrent Group Co-treatment   Time In 1008         Time Out 1050         Minutes 42         Timed Code Treatment Minutes: 38 Minutes       Ivon Rosado, S/OT

## 2022-08-17 VITALS
TEMPERATURE: 98.6 F | SYSTOLIC BLOOD PRESSURE: 157 MMHG | RESPIRATION RATE: 16 BRPM | DIASTOLIC BLOOD PRESSURE: 67 MMHG | OXYGEN SATURATION: 99 % | HEIGHT: 65 IN | BODY MASS INDEX: 40.62 KG/M2 | WEIGHT: 243.83 LBS | HEART RATE: 88 BPM

## 2022-08-17 LAB
CULTURE: NORMAL
CULTURE: NORMAL
GLUCOSE BLD-MCNC: 167 MG/DL (ref 65–105)
GLUCOSE BLD-MCNC: 173 MG/DL (ref 65–105)
GLUCOSE BLD-MCNC: 220 MG/DL (ref 65–105)
Lab: NORMAL
Lab: NORMAL
SPECIMEN DESCRIPTION: NORMAL
SPECIMEN DESCRIPTION: NORMAL

## 2022-08-17 PROCEDURE — 6370000000 HC RX 637 (ALT 250 FOR IP): Performed by: STUDENT IN AN ORGANIZED HEALTH CARE EDUCATION/TRAINING PROGRAM

## 2022-08-17 PROCEDURE — 2580000003 HC RX 258: Performed by: STUDENT IN AN ORGANIZED HEALTH CARE EDUCATION/TRAINING PROGRAM

## 2022-08-17 PROCEDURE — 97530 THERAPEUTIC ACTIVITIES: CPT

## 2022-08-17 PROCEDURE — 6360000002 HC RX W HCPCS: Performed by: INTERNAL MEDICINE

## 2022-08-17 PROCEDURE — 99232 SBSQ HOSP IP/OBS MODERATE 35: CPT | Performed by: INTERNAL MEDICINE

## 2022-08-17 PROCEDURE — 97116 GAIT TRAINING THERAPY: CPT

## 2022-08-17 PROCEDURE — 6360000002 HC RX W HCPCS

## 2022-08-17 PROCEDURE — 82947 ASSAY GLUCOSE BLOOD QUANT: CPT

## 2022-08-17 PROCEDURE — 97535 SELF CARE MNGMENT TRAINING: CPT

## 2022-08-17 PROCEDURE — 6360000002 HC RX W HCPCS: Performed by: STUDENT IN AN ORGANIZED HEALTH CARE EDUCATION/TRAINING PROGRAM

## 2022-08-17 PROCEDURE — 6370000000 HC RX 637 (ALT 250 FOR IP)

## 2022-08-17 RX ORDER — CIPROFLOXACIN 500 MG/1
500 TABLET, FILM COATED ORAL EVERY 12 HOURS SCHEDULED
Status: DISCONTINUED | OUTPATIENT
Start: 2022-08-17 | End: 2022-08-17 | Stop reason: HOSPADM

## 2022-08-17 RX ADMIN — MORPHINE SULFATE 1 MG: 2 INJECTION, SOLUTION INTRAMUSCULAR; INTRAVENOUS at 11:16

## 2022-08-17 RX ADMIN — AMLODIPINE BESYLATE 10 MG: 10 TABLET ORAL at 09:07

## 2022-08-17 RX ADMIN — INSULIN GLARGINE 10 UNITS: 100 INJECTION, SOLUTION SUBCUTANEOUS at 09:50

## 2022-08-17 RX ADMIN — PANTOPRAZOLE SODIUM 40 MG: 40 TABLET, DELAYED RELEASE ORAL at 09:07

## 2022-08-17 RX ADMIN — CIPROFLOXACIN 400 MG: 2 INJECTION, SOLUTION INTRAVENOUS at 09:11

## 2022-08-17 RX ADMIN — TICAGRELOR 90 MG: 90 TABLET ORAL at 09:07

## 2022-08-17 RX ADMIN — SODIUM CHLORIDE: 9 INJECTION, SOLUTION INTRAVENOUS at 03:48

## 2022-08-17 RX ADMIN — AMIODARONE HYDROCHLORIDE 200 MG: 200 TABLET ORAL at 09:07

## 2022-08-17 RX ADMIN — POLYETHYLENE GLYCOL 3350 17 G: 17 POWDER, FOR SOLUTION ORAL at 15:17

## 2022-08-17 RX ADMIN — ONDANSETRON 4 MG: 2 INJECTION INTRAMUSCULAR; INTRAVENOUS at 15:17

## 2022-08-17 ASSESSMENT — PAIN SCALES - GENERAL
PAINLEVEL_OUTOF10: 5
PAINLEVEL_OUTOF10: 0
PAINLEVEL_OUTOF10: 0

## 2022-08-17 NOTE — PLAN OF CARE
Problem: Discharge Planning  Goal: Discharge to home or other facility with appropriate resources  8/17/2022 0148 by Praneeth Conroy RN  Outcome: Progressing  8/16/2022 1836 by Serge Zarate RN  Outcome: Progressing     Problem: Pain  Goal: Verbalizes/displays adequate comfort level or baseline comfort level  8/17/2022 0148 by Praneeth Conroy RN  Outcome: Progressing  8/16/2022 1836 by Serge Zarate RN  Outcome: Progressing     Problem: Safety - Adult  Goal: Free from fall injury  8/17/2022 0148 by Praneeth Conroy RN  Outcome: Progressing  8/16/2022 1836 by Serge Zarate RN  Outcome: Progressing     Problem: Skin/Tissue Integrity  Goal: Absence of new skin breakdown  Description: 1. Monitor for areas of redness and/or skin breakdown  2. Assess vascular access sites hourly  3. Every 4-6 hours minimum:  Change oxygen saturation probe site  4. Every 4-6 hours:  If on nasal continuous positive airway pressure, respiratory therapy assess nares and determine need for appliance change or resting period.   8/17/2022 0148 by Praneeth Conroy RN  Outcome: Progressing  8/16/2022 1836 by Serge Zarate RN  Outcome: Progressing     Problem: Chronic Conditions and Co-morbidities  Goal: Patient's chronic conditions and co-morbidity symptoms are monitored and maintained or improved  8/17/2022 0148 by Praneeth Conroy RN  Outcome: Progressing  8/16/2022 1836 by Serge Zarate RN  Outcome: Progressing     Problem: ABCDS Injury Assessment  Goal: Absence of physical injury  8/17/2022 0148 by Praneeth Conroy RN  Outcome: Progressing  8/16/2022 1836 by Serge Zarate RN  Outcome: Progressing

## 2022-08-17 NOTE — PLAN OF CARE
Writer called the number provided by the  for the peer to peer review, gave the details of the patient, the reference number and my details and the call back number, was told I will get a call back from the reviewing physician from the insurance company this afternoon.       Davonte Erickson  PGY-2  Internal Medicine  [facility]   2:21 [place] 8/17/2022

## 2022-08-17 NOTE — PROGRESS NOTES
Comprehensive Nutrition Assessment    Type and Reason for Visit:  RD Nutrition Re-Screen/LOS    Nutrition Recommendations/Plan:   Continue Regular diet  Start Diabetic ONS 3 x per day  Monitor labs, wt, plan of care     Malnutrition Assessment:  Malnutrition Status: At risk for malnutrition (08/17/22 1118)    Context:  Acute Illness     Findings of the 6 clinical characteristics of malnutrition:  Energy Intake:  50% or less of estimated energy requirements for 5 or more days  Weight Loss:  No significant weight loss     Body Fat Loss:  No significant body fat loss     Muscle Mass Loss:  No significant muscle mass loss    Fluid Accumulation:  No significant fluid accumulation     Strength:  Not Performed    Nutrition Assessment:    Pt admitted for acute unilateral obstructive uropathy. PMH: CHF, DM, CKD, CAD. S/p right ureteroscopic stone removal on 8/12/22. Pt reports a decreased appetite, eating less than 50% of meals. Would like ONS sent with meals (no flavor preference).  lbs, reports wt gain. Nutrition Related Findings:    labs/meds reviewed. + BLE edema noted. last recorded BM 8/11. Wound Type: None       Current Nutrition Intake & Therapies:    Average Meal Intake: 26-50%  Average Supplements Intake: None Ordered  ADULT DIET; Regular    Anthropometric Measures:  Height: 5' 5\" (165.1 cm)  Ideal Body Weight (IBW): 125 lbs (57 kg)    Admission Body Weight: 225 lb 8.5 oz (102.3 kg)  Current Body Weight: 243 lb 13.3 oz (110.6 kg),   IBW.  Weight Source: Bed Scale  Current BMI (kg/m2): 40.6  Usual Body Weight: 240 lb 12.8 oz (109.2 kg) (5/31/22 per wt hx review)  % Weight Change (Calculated): 1.3                    BMI Categories: Obese Class 3 (BMI 40.0 or greater)    Estimated Daily Nutrient Needs:  Energy Requirements Based On: Formula  Weight Used for Energy Requirements: Current  Energy (kcal/day): MSJ x 1.1-1.2 = 2115-8982 kcal/day  Weight Used for Protein Requirements: Ideal  Protein (g/day):

## 2022-08-17 NOTE — PROGRESS NOTES
[facility]  Internal Medicine Teaching Residency Program  Inpatient Daily Progress Note  ______________________________________________________________________________    Patient: Gely Arellano  YOB: 1945   LJW:[id number]    Acct: [de-identified]     Room: 26/1-12  Admit date: 8/11/2022  Today's date: 08/17/22  Number of days in the hospital: 6    SUBJECTIVE   Admitting Diagnosis: Acute unilateral obstructive uropathy  CC: Flank Pain with radiation to back      Pt examined at bedside. Chart & results reviewed. No acute events overnight  Vitals signs stable, afebrile  She is s/p right ureteroscopic stone removal on 8/12/2022  Saturating well on room air  Stable condition for discharge, awaiting placement        ROS:  Constitutional:  negative for chills, fevers, sweats  Respiratory:  negative for cough, dyspnea on exertion, hemoptysis, shortness of breath, wheezing  Cardiovascular:  negative for chest pain, chest pressure/discomfort, lower extremity edema, palpitations  Gastrointestinal:  negative for abdominal pain, constipation, diarrhea, nausea, vomiting  Neurological:  negative for dizziness, headache  BRIEF HISTORY     The patient is a pleasant 68 y.o. female presents with past medical history of atrial fibrillation, CAD s/p PCI, CKD presented to the hospital with flank pain. Pain started yesterday, sharp in nature, persistent, radiating towards the back, associated with nausea and vomiting and chills. No history of fever, chest pain, shortness of breath, cough. In the ED, labs showed creatinine 2.31, glucose 213, WBC 9.4, hemoglobin 10.7, urinalysis positive for RBCs. CT abdomen and pelvis was done, which showed 2 mm x 4 mm stone in the right ureter and mild to moderate hydronephrosis of the right kidney. Patient was recently admitted for work-up of syncope and collapse likely due to hypotension due to bradycardia due to multiple medications. She was discharged on 08/10    OBJECTIVE     Vital Signs:  BP (!) 145/89   Pulse 82   Temp 99.1 °F (37.3 °C) (Oral)   Resp 18   Ht 5' 5\" (1.651 m)   Wt 243 lb 13.3 oz (110.6 kg)   SpO2 97%   BMI 40.58 kg/m²     Temp (24hrs), Av.6 °F (37 °C), Min:98.3 °F (36.8 °C), Max:99.1 °F (37.3 °C)    No intake/output data recorded. Physical Exam:  Constitutional: This is a well developed, well nourished, Greater than 40 - Morbid Obesity / Extreme Obesity / Grade III 68y.o. year old female who is alert, oriented, cooperative and in no apparent distress. Respiratory: Breath sounds bilaterally were clear to auscultation. There were no wheezes, rhonchi or rales. There is no intercostal retraction or use of accessory muscles. Cardiovascular: Regular without murmur, clicks, gallops or rubs. Abdomen: Slightly rounded and soft without organomegaly. No rebound, rigidity or guarding was appreciated. Musculoskeletal:  No gross muscle weakness. Extremities:  No lower extremity edema, ulcerations, tenderness, varicosities or erythema. Muscle size, tone and strength are normal.  No involuntary movements are noted. Skin:  Warm and dry. Good color, turgor and pigmentation. No lesions or scars.   No cyanosis or clubbing  Neurological/Psychiatric: The patient's general behavior, level of consciousness, thought content and emotional status is normal.        Medications:  Scheduled Medications:    insulin lispro  0-16 Units SubCUTAneous TID     insulin lispro  0-4 Units SubCUTAneous Nightly    insulin glargine  10 Units SubCUTAneous Daily    ciprofloxacin  400 mg IntraVENous Q12H    alfuzosin  10 mg Oral Nightly    amLODIPine  10 mg Oral Daily    [Held by provider] apixaban  5 mg Oral BID    pantoprazole  40 mg Oral QAM AC    sodium chloride flush  5-40 mL IntraVENous 2 times per day    amiodarone  200 mg Oral Daily    rosuvastatin  40 mg Oral Daily    ticagrelor  90 mg Oral BID     Continuous Infusions: dextrose      sodium chloride      sodium chloride 100 mL/hr at 08/17/22 0348     PRN Medicationsglucose, 4 tablet, PRN  dextrose bolus, 125 mL, PRN   Or  dextrose bolus, 250 mL, PRN  glucagon (rDNA), 1 mg, PRN  dextrose, , Continuous PRN  sodium chloride, 1 spray, PRN  sodium chloride flush, 5-40 mL, PRN  sodium chloride, , PRN  ondansetron, 4 mg, Q8H PRN   Or  ondansetron, 4 mg, Q6H PRN  acetaminophen, 650 mg, Q6H PRN   Or  acetaminophen, 650 mg, Q6H PRN  polyethylene glycol, 17 g, Daily PRN  diphenhydrAMINE, 25 mg, Q6H PRN  morphine, 1 mg, Q4H PRN        Diagnostic Labs:  CBC:   Recent Labs     08/15/22  0853 08/15/22  1645 08/16/22  0504   HGB 9.4* 9.4* 9.1*   HCT 29.3* 28.7* 28.1*     BMP:   Recent Labs     08/15/22  0853 08/16/22  0504    140   K 3.8 3.8    107   CO2 24 25   BUN 15 14   CREATININE 1.37* 1.53*     BNP: No results for input(s): BNP in the last 72 hours. PT/INR: No results for input(s): PROTIME, INR in the last 72 hours. APTT: No results for input(s): APTT in the last 72 hours. CARDIAC ENZYMES: No results for input(s): CKMB, CKMBINDEX, TROPONINI in the last 72 hours. Invalid input(s): CKTOTAL;3  FASTING LIPID PANEL:  Lab Results   Component Value Date    CHOL 125 05/30/2022    HDL 36 (L) 05/30/2022    TRIG 132 05/30/2022     LIVER PROFILE: No results for input(s): AST, ALT, ALB, BILIDIR, BILITOT, ALKPHOS in the last 72 hours. MICROBIOLOGY:   Lab Results   Component Value Date/Time    CULTURE NO GROWTH 5 DAYS 08/12/2022 03:28 AM    CULTURE NO GROWTH 5 DAYS 08/12/2022 03:28 AM       Imaging:    CT ABDOMEN PELVIS WO CONTRAST Additional Contrast? None    Result Date: 8/11/2022  Acute right obstructive uropathy secondary to a 2 mm x 4 mm in length proximal right ureteral calculus. No other renal or ureteral calculi. Left colon diverticulosis with no evidence of diverticulitis.        ASSESSMENT & PLAN     ASSESSMENT / PLAN:     Principal Problem:    Acute unilateral obstructive uropathy  Active Problems:    Chronic diastolic (congestive) heart failure (HCC)    Paroxysmal atrial fibrillation (HCC)    Obesity, Class III, BMI 40-49.9 (morbid obesity) ([facility]    Presence of stent in coronary artery in patient with coronary artery disease    Current use of long term anticoagulation    Acute kidney injury superimposed on CKD ([facility]    Nephrolithiasis    Secondary hypercoagulable state ([facility]    S/P ureteral stent placement    Status post laser lithotripsy of ureteral calculus    Acute blood loss anemia (ABLA)    Type 2 diabetes mellitus ([facility]    Essential hypertension    Atherosclerotic heart disease of native coronary artery with other forms of angina pectoris (HCC)    Other hyperlipidemia    Anemia, normocytic normochromic    Moderate episode of recurrent major depressive disorder (HCC)    CKD (chronic kidney disease), stage III ([facility]  Resolved Problems:    * No resolved hospital problems. *     MARIELLE superimposed on CKD: Creatinine today 2.35-->1.73-->1.37-->1.53, baseline around 1.5. Likely due to nephrolithiasis. On IV fluids 100 cc/hr. Monitor the BMP. Follow-up with nephrology. Continue to trend creatinine. Nephrolithiasis: CT abdomen and pelvis positive for 2 mm x 4 mm stone in the right ureter. Patient started on continuous IV fluids 100 mL/h and morphine as needed for pain. S/p right ureteral stent placement on 8/12/2022. Hematuria resolved. Follow up with urology. We will restart the eliquis after stent removal on 8/19/2022. Heart failure with preserved ejection fraction: Last echo shows ejection fraction of 54%, evidence of diastolic dysfunction 2/01. On losartan 100 mg, Demadex 20 mg at home. Paroxysmal atrial fibrillation: TSH 0.60, 7/28. Continue on amiodarone 100 mg, Brilinta 90 mg once daily.   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 On sliding scale insulin. Hypertension: Norvasc 10     Blood loss anemia: , gross hematuria improved,  hemoglobin trend 10.1-->9.5-->8.9-->9.1. Eliquis on hold, will continue to monitor hemoglobin      DVT ppx : Eliquis on hold, Continue brilinta  GI ppx: Protonix    PT/OT: Consulted  Discharge Planning / SW:  consulted    Errol Navarro MD  Internal Medicine Resident, PGY-1  [facility]; [place], [place]  8/17/2022, 7:30 AM     Attending Physician Statement  I have discussed the care of Yuval Vivar with the resident team. I have examined the patient myself and taken ros and hpi , including pertinent history and exam findings,  with the resident. I have reviewed the key elements of all parts of the encounter with the resident. I agree with the assessment, plan and orders as documented by the resident. Principal Problem:    Acute unilateral obstructive uropathy  Active Problems:    Chronic diastolic (congestive) heart failure (HCC)    Paroxysmal atrial fibrillation (HCC)    Obesity, Class III, BMI 40-49.9 (morbid obesity) ([address]    Presence of stent in coronary artery in patient with coronary artery disease    Current use of long term anticoagulation    Acute kidney injury superimposed on CKD ([address]    Nephrolithiasis    Secondary hypercoagulable state ([address]    S/P ureteral stent placement    Status post laser lithotripsy of ureteral calculus    Acute blood loss anemia (ABLA)    Type 2 diabetes mellitus ([address]    Essential hypertension    Atherosclerotic heart disease of native coronary artery with other forms of angina pectoris (HCC)    Other hyperlipidemia    Anemia, normocytic normochromic    Moderate episode of recurrent major depressive disorder (HCC)    CKD (chronic kidney disease), stage III ([address]  Resolved Problems:    * No resolved hospital problems.  *    Ureteric stent removed today  Start eliquis tomorrow  Pt ready for discharge  Peer to peer for placement today  Discussed in detail with patient and daughter      Electronically signed by Sania Bey MD

## 2022-08-17 NOTE — PLAN OF CARE
Writer got a call for peer to peer review, explained to them about the patient's debility, that she is not able to even get onto the bedside commode without assistance, lives alone, did not have 24 x 7 assistance, has to climb steps up to get into the house, he is very weak and not able to walk without assistance and needs therapy to get better and then go home. Also explained to them of recurrent falls and multiple hospital admissions recently in the past few months  As per the physician from [employer], as per PT and OT who notes she is requiring minimal assistance and do not approve skilled nursing facility at this time and are Going to maintain Denial.  Was also told if required we can try with an updated PT/OT notes with better documentation and for another review.       Davonte Tejeda Poster  PGY-2  Internal Medicine  [facility]   0:56 [place] 8/17/2022

## 2022-08-17 NOTE — PROGRESS NOTES
Physical Therapy  Facility/Department: [facility] RENAL//MED SURG  Physical Therapy    Name: Nikole Whelan  : 1945  MRN: [id number]  Date of Service: 2022    Discharge Recommendations:  Patient would benefit from continued therapy after discharge          Patient Diagnosis(es): The primary encounter diagnosis was Nephrolithiasis. A diagnosis of Hydronephrosis concurrent with and due to calculi of kidney and ureter was also pertinent to this visit. Past Medical History:  has a past medical history of Acute renal failure with tubular necrosis ([address] Anxiety, Atrial fibrillation (HCC), Benign positional vertigo, CAD (coronary artery disease), Chest pain, CKD (chronic kidney disease), stage III ([address] Depression, Diabetes mellitus ([address] Diabetic neuropathy ([address] Dysuria, Hyperlipidemia, Hypertension, Migraine, and Persistent proteinuria. Past Surgical History:  has a past surgical history that includes Percutaneous Transluminal Coronary Angio; Cardiac catheterization; Coronary angioplasty with stent (2017); Appendectomy; Coronary angioplasty with stent (2012); Coronary angioplasty with stent (2020); Cystoscopy (Right, 2022); and Ureter surgery (Right, 2022). Assessment   Body Structures, Functions, Activity Limitations Requiring Skilled Therapeutic Intervention: Decreased vision/visual deficit; Decreased functional mobility ; Decreased cognition;Decreased endurance;Decreased strength;Decreased tolerance to work activity  Assessment: Less gait today due to fatigue with getting bathed on toilet prior to gait. Patient has a high fall risk and is deemed unable to negotiate up 3 steps which she'll need to do to enter her home. Stair training will not be attempted in her current condition due to the likelihood of a fall and injury. Therapy Prognosis: Good  Activity Tolerance  Activity Tolerance: Patient limited by endurance; Patient limited by fatigue     Plan   Plan  Plan: AM-PAC Score  AM-PAC Inpatient Mobility Raw Score : 16 (08/17/22 1322)  AM-PAC Inpatient T-Scale Score : 40.78 (08/17/22 1322)  Mobility Inpatient CMS 0-100% Score: 54.16 (08/17/22 1322)  Mobility Inpatient CMS G-Code Modifier : CK (08/17/22 1322)      Goals  Short Term Goals  Time Frame for Short term goals: 5 visits  Short term goal 1: Pt to ambulate 50 ft CGA with rollng walker  Short term goal 2: pt to tolerate 30 min ther ex and activity to improve general strength and endurance  Short term goal 3: Pt to transfers from alternate surface heightes demonstrating safety with use of RW  Long Term Goals  Time Frame for Long term goals : 14 days  Long term goal 1: Pt to ambulate with MOD I using RW provided verbal direction due to visual deficits  Long term goal 2: Pt to ascend/ descend 4 steps with CGA needed to gain access to home  Patient Goals   Patient goals : to regain strength       Education  Patient Education  Education Given To: Patient  Education Provided: Role of Therapy;Plan of Care;Precautions;Transfer Training  Barriers to Learning: None  Education Outcome: Verbalized understanding;Demonstrated understanding      Therapy Time   Individual Concurrent Group Co-treatment   Time In 1140         Time Out 1158         Minutes 18         Timed Code Treatment Minutes: 8 Minutes       Demond Aguirre PT

## 2022-08-17 NOTE — PROGRESS NOTES
Appointment scheduled for 4/20 placement however demonstrated good follow through for second 50%)  Interventions: Safety awareness training;Verbal cues; Tactile cues  Sit to Stand: Contact-guard assistance; Additional time; Adaptive equipment  Stand to Sit: Contact-guard assistance; Additional time; Adaptive equipment  Toilet Transfer: Contact-guard assistance; Additional time; Adaptive equipment  Gait  Overall Level of Assistance: Contact-guard assistance; Additional time; Adaptive equipment (pt completed functional mobility from EOB > bathroom, to hallway; then was provided with seated rest break. Pt then completed functional mobility with PT in hallway and was returned to room to finish OT session. CGA and RW utilized. Pt fatigues quickly)    ADL  Feeding: Modified independent ;Setup  Grooming: Modified independent ;Setup  UE Bathing: Contact guard assistance;Setup; Increased time to complete  LE Bathing: Setup; Increased time to complete; Moderate assistance  UE Dressing: Contact guard assistance;Setup; Increased time to complete  LE Dressing: Moderate assistance;Setup; Increased time to complete  Toileting: Moderate assistance;Setup; Increased time to complete  Additional Comments: Pt engaged in full body ADL task this date. Pt completed functional mobility to bathroom and was seated on toilet to engage in toileting task requiring mod A for clothing management and posterior pericare this date. Pt demonstrated ability to complete frontal pericare while seated. Pt requested to remain on toilet to engage in grooming task of washing face and completed oral hygiene independently following setup. Pt then completed doffing front/back gown and bathed UB prior to donning clean gowns. Pt required CGA for UB ADLs to wash back and for appropriate orientation of BUE into sleeves of gown. Pt completed LB bathing while seated with Mod A to bathe below knee level. Pt requires Mod A to dress LB for threading and pulling clothing to knee level.  Following functional mobility for a second trial, pt retired to recliner and was setup with breakfast, feeding self independently following setup. Activity Tolerance  Activity Tolerance: Patient limited by endurance; Patient limited by fatigue    Cognition  Overall Cognitive Status: Exceptions  Arousal/Alertness: Appropriate responses to stimuli  Following Commands: Follows multistep commands with increased time; Follows multistep commands with repitition  Attention Span: Attends with cues to redirect  Safety Judgement: Decreased awareness of need for safety  Problem Solving: Assistance required to correct errors made  Insights: Decreased awareness of deficits  Initiation: Requires cues for some  Sequencing: Requires cues for some  Orientation  Orientation Level: Oriented X4    Education Provided Comments: Pt ed on OT role, OT POC, safety awareness, transfer training, DME use, adaptive ADL tech, and importance of continued OT.  Good return noted    AM-PAC Score  AM-PAC Inpatient Daily Activity Raw Score: 17 (08/17/22 1635)  AM-PAC Inpatient ADL T-Scale Score : 37.26 (08/17/22 1635)  ADL Inpatient CMS 0-100% Score: 50.11 (08/17/22 1635)  ADL Inpatient CMS G-Code Modifier : CK (08/17/22 1635)    Goals  Short Term Goals  Time Frame for Short term goals: By discharge, pt will  Short Term Goal 1: complete LBD with MOD I and use of AE prn to increase functional independence  Short Term Goal 2: demonstrate transfers and functional mobility with MOD I and use of LRD for greater participation with ADL tasks  Short Term Goal 3: engage in 7 mins of static/dynamic standing with MOD I and LRD during ADL tasks  Short Term Goal 4: complete UB ADL's with MOD I while encorperating EC techniques to increase functional independence  Short Term Goal 5: demonstrate toileting with CGA and good safety awareness to increase overall indepenence       Therapy Time   Individual Concurrent Group Co-treatment   Time In 1118         Time Out 1218         Minutes 60 Timed Code Treatment Minutes: 53 Minutes (-7 minutes for partial co-tx with PT)       Luis Banerjee, OTR/L

## 2022-08-18 ENCOUNTER — CARE COORDINATION (OUTPATIENT)
Dept: CASE MANAGEMENT | Age: 77
End: 2022-08-18

## 2022-08-18 NOTE — CARE COORDINATION
Luis A [facility] Transitions Initial Follow Up Call #1 -Attempted initial 24 hour transitional call to patient. Left VM to return call directly to CTN - other contact # VM not set up - reached daughter, Erum Phoenix - unable to talk on phone & said she will return call, otherwise CTN will reattempt at later time. I reached [address] [facility] office, spoke with Katlein Abraham - confirmed that home visit is scheduled for today & requested call back from nurse who does the visit. Plan is for PT/OT/skilled nursing  (See additional note below) -    Call within 2 business days of discharge: Yes    Patient: Anamika Jhaveri   Patient : 1945   MRN: [id number]    Reason for Admission: acute MARIELLE on CKD, right kidney hydronephrosis due to obstructive ureter stone (right ureteral stent)  Discharge Date: 22   RARS: Readmission Risk Score: 32.4      Last Discharge [facility]       Date Complaint Diagnosis Description Type Department Provider    22 Abdominal Pain Nephrolithiasis . .. ED to Hosp-Admission (Discharged) (ADMITTED) Gaurav Centeno MD; Leonardo Herbert. .. Spoke briefly with Monica-(daughter -NATALY), but she said she had to get off phone quickly & would have to call me back another time. She did say that she is staying with patient & that patient is weak & \"scared of falling. \"  Patient did receive hospital bed after last discharge - insurance denied a rollator walker since she qualified & evidently received a walker 3 years ago according to note. Insurance did deny rehab or SNF placement @ this discharge () & last discharge (8/10). Noted that patient was readmitted back to hospital on  which day after last discharge. I was unable to review medications or complete this initial call information since patient was not reached & daughter was not able to finish this initial phone call at this time.       Plan for next call - review medications & if newly prescribed meds were

## 2022-08-19 ENCOUNTER — CARE COORDINATION (OUTPATIENT)
Dept: CASE MANAGEMENT | Age: 77
End: 2022-08-19

## 2022-08-19 DIAGNOSIS — N17.9 AKI (ACUTE KIDNEY INJURY) (HCC): Primary | ICD-10-CM

## 2022-08-19 PROCEDURE — 1111F DSCHRG MED/CURRENT MED MERGE: CPT | Performed by: INTERNAL MEDICINE

## 2022-08-19 NOTE — CARE COORDINATION
Luis A 45 Transitions Initial Follow Up Call - reached daughter who lives with patient. Unable to reach patient on any of her listed contact numbers    Call within 2 business days of discharge: Yes    Patient: Yamile Dimas   Patient : 1945   MRN: [id number]    Reason for Admission:  acute MARIELLE on CKD, right kidney hydronephrosis due to obstructive ureter stone (right ureteral stent) - (READMISSION - was discharged on 8/10 & readmitted ). Insurance denied discharge to facilities both times  Discharge Date: 22   RARS: Readmission Risk Score: 32.4      Last Discharge [facility]       Date Complaint Diagnosis Description Type Department Provider    22 Abdominal Pain Nephrolithiasis . .. ED to Hosp-Admission (Discharged) (ADMITTED) Luz Quevedo MD; Zach Andersen. .. Facility: [facility]    Non-face-to-face services provided:  Scheduled appointment with PCP- - office said could not get in sooner  Scheduled appointment with Specialist-urology 6 weeks  Obtained and reviewed discharge summary and/or continuity of care documents  Communication with home health agencies or other community services the patient is currently using-[facility] [facility].    Spoke with: patient's daughter, Lorene David, hipaa    Reviewed discharge instructions - daughter said she spoke with PCP office for  hospital f/u appt. Informed daughter that if patient has any symptom of concern, office should have a same day appt that should be able to be scheduled. Reports patient is weak, difficulty moving bowels, but doesn't move around much. [facility] is scheduled for today - plan is to evaluate for PT/OT in addition to skilled nursing visits. Patient has hospital bed - has walker, but rollator was denied by insurance. Voiding without difficulty after ureteral stent removal. Denies hematuria. No further flank or ABD pain.   Has some \"hand tremors\" which they plan to talk with PCP about. Informed of care transition & daughter has CTN contact #.  Daughter denies any further questions at this time. Plan for next call: symptom management-reassess voiding after stent removed - check flank pain  follow up appointment- hospital f/u    Check weakness - PT/OT/home care  Check if urology appt is scheduled yet    Care Transitions 24 Hour Call    Schedule Follow Up Appointment with PCP: Completed  Do you have a copy of your discharge instructions?: Yes  Do you have all of your prescriptions and are they filled?: Yes  Have you been contacted by a [facility] Pharmacist?: No  Have you scheduled your follow up appointment?: Yes (Comment: PCP - office gave daughter the date of )  How are you going to get to your appointment?: Car - family or friend to transport  [address] (Comment: [facility])  Do you feel like you have everything you need to keep you well at home?:  (Comment: has [facility] [place]  Care Transitions Interventions     Other Services: Completed     Registered Dietician: Completed    Disease Association: Completed                   Was this an external facility discharge? No     Challenges to be reviewed by the provider   Additional needs identified to be addressed with provider: No  none             Method of communication with provider : none    Advance Care Planning:   Does patient have an Advance Directive: not on file. Care Transition Nurse contacted the family DAUGHTER HIPAA by telephone to perform post hospital discharge assessment. Verified name and  with family as identifiers. Provided introduction to self, and explanation of the CTN role. CTN reviewed discharge instructions, medical action plan and red flags with family who verbalized understanding. Family given an opportunity to ask questions and does not have any further questions or concerns at this time. Were discharge instructions available to patient? Yes.  Reviewed appropriate site of care based on symptoms and resources available to patient including: PCP  Home health  CTN . The family agrees to contact the PCP office for questions related to their healthcare. Medication reconciliation was performed with family, who verbalizes understanding of administration of home medications. Was patient discharged with a pulse oximeter? no    CTN provided contact information. Plan for follow-up call in 3-5 days based on severity of symptoms and risk factors.   Plan for next call: symptom management-reassess voiding after stent removed - check flank pain  follow up appointment-8/30 [facility] f/u    Check if urology appt is scheduled yet      Follow Up  Future Appointments   Date Time Provider Олег Skylar   8/25/2022  1:30 PM STV CHF CLINIC RM 2 [facility] CHF [facility]   8/30/2022 11:20 AM Cristy Abbasi MD AFL [facility] [address] [facility]   11/16/2022  3:10 PM Thania Gonzalez MD AFL Neph Shawn None       Mata Pineda RN

## 2022-08-20 NOTE — DISCHARGE SUMMARY
[address] [place]     Department of Internal Medicine - Staff Internal Medicine Teaching Service    INPATIENT DISCHARGE SUMMARY      Patient Identification:  Montez Hidalgo is a 68 y.o. female. :  1945  MRN: [id number]     Acct: [de-identified]   PCP: Gisella Harrison MD  Admit Date:  2022  Discharge date and time: 2022  7:04 PM   Attending Provider: No att. 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-49.9 (morbid obesity) ([address]    Presence of stent in coronary artery in patient with coronary artery disease    Current use of long term anticoagulation    Acute kidney injury superimposed on CKD ([address]    Nephrolithiasis    Secondary hypercoagulable state ([address]    S/P ureteral stent placement    Status post laser lithotripsy of ureteral calculus    Acute blood loss anemia (ABLA)    Type 2 diabetes mellitus ([address]    Essential hypertension    Atherosclerotic heart disease of native coronary artery with other forms of angina pectoris ([address]    Other hyperlipidemia    Anemia, normocytic normochromic    Moderate episode of recurrent major depressive disorder (HCC)    CKD (chronic kidney disease), stage III ([address]  Resolved Problems:    * No resolved hospital problems. [facility] STAY     Brief Inpatient course:   Montez Hidalgo is a 68 y.o. female who was admitted for the management of Acute unilateral obstructive uropathy, presented to the emergency department with right sided flank pain. Past medical history significant for A. Fib, CAD s/p PCI, CKD, DM and hypertension. She was complaining of a sharp pain on her right flank.   CT abdomen showed stone in the right ureter and moderate hydronephrosis of the right kidney. Creatinine was 2.35 on admission    Urology performed right ureteral stent placement on 8/12/2022. Her Eliquis was held due to gross hematuria post-op,, her hemoglobin was stable and hematuria resolved after 2 days. She was advised to pull out the stent on 8/17/2022 and we advised her to restart her 2 days after pulling the stent. Creatinine continued to improve and was back to her baseline before her discharge. She remained hemodynamically stable, all her medications were optimized. She was discharged once medically cleared as per instructions given below.            Consults:     Consults:     Final Specialist Recommendations/Findings:   IP CONSULT TO HOSPITALIST  IP CONSULT TO UROLOGY  IP CONSULT TO UROLOGY  IP CONSULT TO HOME CARE NEEDS      Any Hospital Acquired Infections: none    Discharge Functional Status:  stable    DISCHARGE PLAN     Disposition: home    Patient Instructions:   Discharge Medication List as of 8/17/2022  5:22 PM        START taking these medications    Details   alfuzosin (UROXATRAL) 10 MG extended release tablet Take 1 tablet by mouth in the morning., Disp-30 tablet, R-3Normal      oxybutynin (DITROPAN XL) 10 MG extended release tablet Take 1 tablet by mouth daily as needed (Bladder spasms, stent pain), Disp-30 tablet, R-3Normal           CONTINUE these medications which have NOT CHANGED    Details   losartan (COZAAR) 100 MG tablet Take 1 tablet by mouth in the morning., Disp-30 tablet, R-3Normal      amLODIPine (NORVASC) 10 MG tablet Take 1 tablet by mouth in the morning., Disp-30 tablet, R-3Normal      acetaminophen (TYLENOL) 325 MG tablet Take 650 mg by mouth every 6 hours as needed for PainHistorical Med      torsemide (DEMADEX) 20 MG tablet Take 1 tablet by mouth in the morning., Disp-90 tablet, R-3Normal      omeprazole (PRILOSEC) 20 MG delayed release capsule TAKE ONE CAPSULE BY MOUTH DAILY, Disp-90 capsule, R-3Normal      vitamin E 400 UNIT capsule Take 1 capsule by mouth dailyHistorical Med      insulin detemir (LEVEMIR FLEXTOUCH) 100 UNIT/ML injection pen INJECT 60 UNITS INTO THE SKIN TWO TIMES A DAY, Disp-15 pen, R-11NO PRINT      gabapentin (NEURONTIN) 300 MG capsule Take 1 capsule by mouth nightly for 30 days. , Disp-30 capsule, R-5NO PRINT      apixaban (ELIQUIS) 5 MG TABS tablet Take 1 tablet by mouth 2 times daily, Disp-120 tablet, R-5Normal      dicyclomine (BENTYL) 10 MG capsule Take 1 capsule by mouth 3 times daily (before meals), Disp-120 capsule, R-3Normal      amiodarone (CORDARONE) 200 MG tablet Take 1 tablet by mouth daily for 60 doses, Disp-60 tablet, R-5Normal      prednisoLONE acetate (PRED FORTE) 1 % ophthalmic suspension Place 1 drop into both eyes 3 times dailyHistorical Med      BRILINTA 90 MG TABS tablet TAKE ONE TABLET BY MOUTH TWICE A DAY, Disp-60 tablet, R-11Normal      isosorbide mononitrate (IMDUR) 30 MG extended release tablet Take 1 tablet by mouth daily, Disp-30 tablet, R-11Normal      insulin aspart (NOVOLOG FLEXPEN) 100 UNIT/ML injection pen Use TID before meals according to scale - max 45 units a day, Disp-10 pen, R-11Normal      Lancets MISC 2 TIMES DAILY Starting u 12/2/2021, Disp-300 each, R-0, Normal      Insulin Pen Needle 32G X 4 MM MISC DAILY Starting Thu 12/2/2021, Disp-200 each, R-11, Normal      escitalopram (LEXAPRO) 20 MG tablet TAKE ONE TABLET BY MOUTH DAILY, Disp-90 tablet, R-3Normal      rosuvastatin (CRESTOR) 40 MG tablet Take 1 tablet by mouth daily, Disp-90 tablet, R-3Normal      vitamin D3 (CHOLECALCIFEROL) 25 MCG (1000 UT) TABS tablet Take 1 tablet by mouth daily, Disp-90 tablet, R-3Normal      Continuous Blood Gluc Sensor (FREESTYLE SYLVIA 14 DAY SENSOR) MISC 1 each by Does not apply route every 14 days, Disp-2 each, R-12Normal      nitroGLYCERIN (NITROSTAT) 0.4 MG SL tablet place 1 tablet under the tongue if needed every 5 minutes if needed for CHEST PAIN, Disp-25 tablet, R-3Normal      !! Misc.  Devices [facility]) MISC Disp-1 each, R-0, PrintDiagnosis: right 5th metatarsal fracture, pain in limb  Duration: 3 months      !! Misc. Devices (COMMODE BEDSIDE) MISC DAILY Starting Wed 2/3/2021, Disp-1 each, R-0, Print      Continuous Blood Gluc  (AMKAISTYLE SYLVIA 15 DAY READER) [address] 1 each by Does not apply route continuous [facility] [address], Disp-1 Device,R-11Print       !! - Potential duplicate medications found. Please discuss with provider. STOP taking these medications       ciprofloxacin (CIPRO) 500 MG tablet Comments:   Reason for Stopping:         loperamide (IMODIUM) 2 MG capsule Comments:   Reason for Stopping:         carvedilol (COREG) 12.5 MG tablet Comments:   Reason for Stopping:               Activity: activity as tolerated    Diet: regular diet    Follow-up:    Nelly Vargas, [address]  [address] [facility] AT THE [phone] [place] [address] [place]        Kameron Azevedo MD  [address], [address]  [phone]    Follow up in 6 week(s)  post ureteroscopy follow up      Patient Instructions:     Please follow urology instructions for pulling out the stent  Start taking eliquis from the next day of pulling the stent out  Continue taking brilinta  Continue taking antibiotic for 3 more days  Follow up with PCP     Please start taking LANTUS with a low dose and titrate it up according to the sugars          Julius Hood MD,   Internal Medicine Resident, PGY-1  [facility]; [place], [place]  8/20/2022, 11:54 AM     Attending Physician Statement  I have discussed the care of Nikole Ards and I have examined the patient myselft and taken ros and hpi , including pertinent history and exam findings,  with the resident. I have reviewed the key elements of all parts of the encounter with the resident.   I agree with the assessment, plan and orders as documented by Yes the resident. I spent approx 35 mins in direct patient care as above and discussing discharge with patient, reviewing medications and counseling for discharge .     Electronically signed by Abelino Potter MD

## 2022-08-23 ENCOUNTER — CARE COORDINATION (OUTPATIENT)
Dept: CASE MANAGEMENT | Age: 77
End: 2022-08-23

## 2022-08-23 NOTE — CARE COORDINATION
Luis A 45 Transitions Follow Up Call    2022    Patient: Leo Walter    Patient : 1945   MRN: [id number]    Reason for Admission: acute MARIELLE on CKD, right kidney hydronephrosis due to obstructive ureter stone (right ureteral stent)  Discharge Date: 22   RARS: Readmission Risk Score: 32.4      Spoke with: daughter, Feng - denied any new issues - voiding without difficulty or pain. Weakness is main complaint. Reminded of CHF clinic appt . Daughter has appt scheduled with PCP on . Plan for follow-up call in 5-7 days based on severity of symptoms and risk factors. Plan for next call: follow up appointment-follow up CHF clinic & remind of  appt with PCP    Care Transitions Subsequent and Final Call    Subsequent and Final Calls  Do you have any ongoing symptoms?: No  Have your medications changed?: No  Do you have any questions related to your medications?: No  Do you currently have any active services?: Yes  Are you currently active with any services?: Home Health  Do you have any needs or concerns that I can assist you with?: No  Care Transitions Interventions     Other Services: Completed     Registered Dietician: Completed    Disease Association: Completed      Other Interventions:               Needs to be reviewed by the provider   Additional needs identified to be addressed with provider: No  none             Method of communication with provider : none      Care Transition Nurse contacted the family DAUGHTER by telephone to follow up after admission Verified name and  with family as identifiers. CTN reviewed discharge instructions, medical action plan and red flags with family and discussed any barriers to care and/or understanding of plan of care after discharge. Discussed appropriate site of care based on symptoms and resources available to patient including: PCP  Specialist  Home health  CTN .  The family agrees to contact the PCP office for questions related to their healthcare. Patients top risk factors for readmission: functional physical ability  medical condition-MARIELLE, kidney stone  Interventions to address risk factors: Obtained and reviewed discharge summary and/or continuity of care documents  Home care, PCP appt, CHF clinic      CTN provided contact information for future needs. Plan for follow-up call in 5-7 days based on severity of symptoms and risk factors.   Plan for next call: follow up appointment-follow up CHF clinic & remind of 8/30 appt with PCP         Follow Up  Future Appointments   Date Time Provider Олег Cheng   8/25/2022  1:30 PM STV CHF CLINIC RM 2 [facility]F [facility]   8/30/2022 11:20 AM Chasidy Mills MD [facility] [address] [facility]   11/16/2022  3:10 PM Oz Steele MD AFL Neph Shawn None       Shazia Lunsford, CAM[place]

## 2022-08-25 ENCOUNTER — CARE COORDINATION (OUTPATIENT)
Dept: CASE MANAGEMENT | Age: 77
End: 2022-08-25

## 2022-08-25 NOTE — PROCEDURES
[address]                  [facility]                                 HOLTER MONITOR    PATIENT NAME: June Valle                    :        1945  MED REC NO:   [id number]                             ROOM:       [id number]  ACCOUNT NO:   [de-identified]                           ADMIT DATE: 2022  PROVIDER:     Kendra Kaminski    HOLTER MONITOR   37-HOURS    DATE OF STUDY:  08/10/2022      AUTHORIZING PROVIDER:  Malcolm Zabala MD    PRIMARY CARE PROVIDER:  Carmencita Padilla MD    INTERPRETING PHYSICIAN:  Kendra Kaminski      INDICATION:  Bradycardia, syncope      PHYSICIAN COMMENTS:  1. Normal Sinus Rhythm with Sinus Bradycardia and Sinus Tachycardia. 2.  Average Heart Rate of 64 bpm.  3.  Occasional PAC's with short runs of PAT 3-10 Beats. 4.  Rare PVC's.             Hartley Halsted    D: 2022 13:21:30       T: 2022 13:23:08     GITA/ALICE  Job#: [id number]     Doc#: Unknown    CC:

## 2022-08-26 ENCOUNTER — CARE COORDINATION (OUTPATIENT)
Dept: CASE MANAGEMENT | Age: 77
End: 2022-08-26

## 2022-08-26 NOTE — CARE COORDINATION
Luis A 45 Transitions Follow Up Call - daughter called CTN    2022    Patient: Gely Arellano    Patient : 1945   MRN: [id number]    Reason for Admission:  acute MARIELLE on CKD, right kidney hydronephrosis due to obstructive ureter stone (right ureteral stent)  Discharge Date: 22   RARS: Readmission Risk Score: 32.4      Spoke with: daughter, Tal Rodriguez. Informs me that patient fell again & patient's son took her to chiropractor this afternoon. Daughter plans to check with PCP re: elevated toilet seat & anything else that may help patient be safe in her home. Care Transitions Subsequent and Final Call    Schedule Follow Up Appointment with PCP: Completed  Subsequent and Final Calls  Do you have any ongoing symptoms?: Yes  Interventions for patient-reported symptoms: Notified PCP/Physician  Have your medications changed?: No  Do you have any questions related to your medications?: No  Do you currently have any active services?: Yes  Are you currently active with any services?: Home Health  Do you have any needs or concerns that I can assist you with?: Yes (Comment: daughter is concerned about fall)  Care Transitions Interventions     Other Services: Completed     Registered Dietician: Completed    Disease Association: Completed      Other Interventions:              Follow Up  Future Appointments   Date Time Provider Олег Cheng   2022  1:30 PM STV CHF CLINIC  2 STVZ CHF [facility]   2022 11:20 AM MD DIANDRA Lantigua V[facility]   2022  3:10 PM MD DIANDRA Dumont Neph Shawn None       Salvatore Arguelles RN

## 2022-08-29 ENCOUNTER — CARE COORDINATION (OUTPATIENT)
Dept: CASE MANAGEMENT | Age: 77
End: 2022-08-29

## 2022-08-29 ENCOUNTER — TELEPHONE (OUTPATIENT)
Dept: OTHER | Age: 77
End: 2022-08-29

## 2022-10-06 ENCOUNTER — HOSPITAL ENCOUNTER (OUTPATIENT)
Dept: WOUND CARE | Age: 77
Discharge: HOME | End: 2022-10-06
Payer: MEDICARE

## 2022-10-06 VITALS
DIASTOLIC BLOOD PRESSURE: 73 MMHG | HEART RATE: 80 BPM | HEIGHT: 66 IN | WEIGHT: 229 LBS | BODY MASS INDEX: 36.8 KG/M2 | RESPIRATION RATE: 20 BRPM | TEMPERATURE: 97.7 F | SYSTOLIC BLOOD PRESSURE: 169 MMHG

## 2022-10-06 DIAGNOSIS — R09.89 DECREASED PEDAL PULSES: ICD-10-CM

## 2022-10-06 DIAGNOSIS — E66.9 OBESITY (BMI 35.0-39.9 WITHOUT COMORBIDITY): ICD-10-CM

## 2022-10-06 DIAGNOSIS — L89.623 PRESSURE INJURY OF LEFT HEEL, STAGE 3 (HCC): Primary | ICD-10-CM

## 2022-10-06 DIAGNOSIS — E11.42 DIABETIC POLYNEUROPATHY ASSOCIATED WITH TYPE 2 DIABETES MELLITUS (HCC): ICD-10-CM

## 2022-10-06 DIAGNOSIS — R60.0 BILATERAL EDEMA OF LOWER EXTREMITY: ICD-10-CM

## 2022-10-06 PROBLEM — H26.492 PCO (POSTERIOR CAPSULAR OPACIFICATION), LEFT: Status: ACTIVE | Noted: 2022-10-06

## 2022-10-06 PROBLEM — Z91.81 HISTORY OF FALLING: Status: ACTIVE | Noted: 2022-09-04

## 2022-10-06 PROBLEM — H35.00 RETINOPATHY: Status: ACTIVE | Noted: 2022-10-06

## 2022-10-06 PROBLEM — I13.0 HYPERTENSIVE HEART AND CHRONIC KIDNEY DISEASE WITH HEART FAILURE AND STAGE 1 THROUGH STAGE 4 CHRONIC KIDNEY DISEASE, OR UNSPECIFIED CHRONIC KIDNEY DISEASE (HCC): Status: ACTIVE | Noted: 2022-08-28

## 2022-10-06 PROBLEM — R25.1 TREMOR, UNSPECIFIED: Status: ACTIVE | Noted: 2022-09-04

## 2022-10-06 PROBLEM — Z96.1 PRESENCE OF INTRAOCULAR LENS: Status: ACTIVE | Noted: 2022-10-06

## 2022-10-06 PROBLEM — I65.23 OCCLUSION AND STENOSIS OF BILATERAL CAROTID ARTERIES: Status: ACTIVE | Noted: 2022-10-06

## 2022-10-06 PROBLEM — R05.3 CHRONIC COUGH: Status: ACTIVE | Noted: 2022-10-06

## 2022-10-06 PROBLEM — H43.819 VITREOUS DEGENERATION: Status: ACTIVE | Noted: 2022-10-06

## 2022-10-06 PROBLEM — F41.9 ANXIETY: Status: ACTIVE | Noted: 2017-11-27

## 2022-10-06 PROBLEM — D50.9 IRON DEFICIENCY ANEMIA, UNSPECIFIED: Status: ACTIVE | Noted: 2022-08-28

## 2022-10-06 PROBLEM — K21.9 GASTRO-ESOPHAGEAL REFLUX DISEASE WITHOUT ESOPHAGITIS: Status: ACTIVE | Noted: 2022-08-28

## 2022-10-06 PROBLEM — F32.A DEPRESSIVE DISORDER: Status: ACTIVE | Noted: 2019-05-29

## 2022-10-06 PROBLEM — E11.40 DIABETIC NEUROPATHY (HCC): Status: ACTIVE | Noted: 2018-07-24

## 2022-10-06 PROBLEM — Z20.822 SUSPECTED COVID-19 VIRUS INFECTION: Status: RESOLVED | Noted: 2020-07-13 | Resolved: 2022-10-06

## 2022-10-06 PROBLEM — H26.9 CATARACT: Status: ACTIVE | Noted: 2022-10-06

## 2022-10-06 PROBLEM — E83.51 HYPOCALCEMIA: Status: ACTIVE | Noted: 2022-09-04

## 2022-10-06 PROBLEM — M15.9 GENERALIZED OSTEOARTHRITIS: Status: ACTIVE | Noted: 2022-10-06

## 2022-10-06 PROBLEM — R40.4 TRANSIENT ALTERATION OF AWARENESS: Status: ACTIVE | Noted: 2022-10-06

## 2022-10-06 PROBLEM — G47.00 INSOMNIA, UNSPECIFIED: Status: ACTIVE | Noted: 2022-09-14

## 2022-10-06 PROBLEM — H25.10 NUCLEAR SENILE CATARACT: Status: ACTIVE | Noted: 2022-10-06

## 2022-10-06 PROBLEM — I67.4 HYPERTENSIVE ENCEPHALOPATHY: Status: ACTIVE | Noted: 2022-10-06

## 2022-10-06 PROBLEM — E11.3393 MODERATE NONPROLIFERATIVE DIABETIC RETINOPATHY OF BOTH EYES WITHOUT MACULAR EDEMA ASSOCIATED WITH TYPE 2 DIABETES MELLITUS (HCC): Status: ACTIVE | Noted: 2022-10-06

## 2022-10-06 PROBLEM — H25.9 AGE-RELATED CATARACT OF RIGHT EYE: Status: ACTIVE | Noted: 2022-10-06

## 2022-10-06 PROBLEM — G89.29 CHRONIC PAIN: Status: ACTIVE | Noted: 2022-10-06

## 2022-10-06 PROBLEM — J30.2 SEASONAL ALLERGIC RHINITIS: Status: ACTIVE | Noted: 2019-08-13

## 2022-10-06 PROCEDURE — 11042 DBRDMT SUBQ TIS 1ST 20SQCM/<: CPT

## 2022-10-06 PROCEDURE — 99203 OFFICE O/P NEW LOW 30 MIN: CPT | Performed by: NURSE PRACTITIONER

## 2022-10-06 PROCEDURE — 11042 DBRDMT SUBQ TIS 1ST 20SQCM/<: CPT | Performed by: NURSE PRACTITIONER

## 2022-10-06 PROCEDURE — 99213 OFFICE O/P EST LOW 20 MIN: CPT

## 2022-10-06 RX ORDER — LIDOCAINE HYDROCHLORIDE 20 MG/ML
JELLY TOPICAL ONCE
Status: CANCELLED | OUTPATIENT
Start: 2022-10-06 | End: 2022-10-06

## 2022-10-06 RX ORDER — LIDOCAINE HYDROCHLORIDE 20 MG/ML
JELLY TOPICAL ONCE
Status: COMPLETED | OUTPATIENT
Start: 2022-10-06 | End: 2022-10-06

## 2022-10-06 RX ORDER — LIDOCAINE HYDROCHLORIDE 40 MG/ML
SOLUTION TOPICAL ONCE
Status: CANCELLED | OUTPATIENT
Start: 2022-10-06 | End: 2022-10-06

## 2022-10-06 RX ADMIN — LIDOCAINE HYDROCHLORIDE 6 ML: 20 JELLY TOPICAL at 10:08

## 2022-10-06 ASSESSMENT — PAIN - FUNCTIONAL ASSESSMENT: PAIN_FUNCTIONAL_ASSESSMENT: PREVENTS OR INTERFERES SOME ACTIVE ACTIVITIES AND ADLS

## 2022-10-06 ASSESSMENT — ENCOUNTER SYMPTOMS
DIARRHEA: 0
NAUSEA: 0
SHORTNESS OF BREATH: 0
COUGH: 0
RHINORRHEA: 0
VOMITING: 0

## 2022-10-06 ASSESSMENT — PAIN DESCRIPTION - ONSET: ONSET: ON-GOING

## 2022-10-06 ASSESSMENT — PAIN DESCRIPTION - ORIENTATION: ORIENTATION: LEFT

## 2022-10-06 ASSESSMENT — PAIN SCALES - GENERAL: PAINLEVEL_OUTOF10: 5

## 2022-10-06 ASSESSMENT — PAIN DESCRIPTION - DESCRIPTORS: DESCRIPTORS: ACHING

## 2022-10-06 ASSESSMENT — PAIN DESCRIPTION - FREQUENCY: FREQUENCY: CONTINUOUS

## 2022-10-06 NOTE — PROGRESS NOTES
[address]   Progress Note and Procedure Note      [facility] RECORD NUMBER:  [id number]  AGE: 68 y.o. GENDER: female  : 1945  EPISODE DATE:  10/6/2022    Subjective:     Chief Complaint   Patient presents with    Wound Check     Left heel         HISTORY of PRESENT ILLNESS HPI     Chacorta Hernandez is a 68 y.o. female who presents today for wound/ulcer evaluation. History of Wound Context: presents to wound clinic today for evaluation of left heel pressure ulcer. Developed during hospital stay several months ago. Had dry eschar that cracked and opened few weeks ago. She is here with her daughter today and has [facility]. Has not been putting any dressing on wound. It is painful when touching. Wound/Ulcer Pain Timing/Severity: intermittent  Quality of pain: sharp  Severity:  2 / 10   Modifying Factors: Pain worsens with touching   Associated Signs/Symptoms: none    Ulcer Identification:  Ulcer Type: pressure  Contributing Factors: edema, venous stasis, diabetes, poor glucose control, chronic pressure, decreased mobility, shear force, obesity, and arterial insufficiency         PAST MEDICAL HISTORY        Diagnosis Date    Acute renal failure with tubular necrosis ([address] 2021    Secondary to ischemic ATN post fall intravascularly depletion hypotension and low flow baseline creatinine 1.2-1.4 peaked up to 2.1 during her hospitalization in 2020. Work-up showed benign urine sediment, kidney size to be 11.2 on the right 15.1 on the left serological work-up negative.     Anxiety     Atrial fibrillation (HCC)     chronic-takes xarelto    Benign positional vertigo     CAD (coronary artery disease)     Chest pain     CKD (chronic kidney disease), stage III ([address] 2021    Secondary to ischemic and diabetic nephrosclerosis baseline 1.2-1.4    Depression     Diabetes mellitus ([address]     Diabetic neuropathy ([address]     Dysuria 2021 Hyperlipidemia     Hypertension     Migraine     Migraine headaches aggravated with sublingual nitroglycerin    Persistent proteinuria 7/20/2022    From diabetic nephrosclerosis UPC 1.0    Suspected COVID-19 virus infection 7/13/2020       PAST SURGICAL HISTORY    Past Surgical History:   Procedure Laterality Date    APPENDECTOMY      CARDIAC CATHETERIZATION      2012    CORONARY ANGIOPLASTY WITH STENT PLACEMENT  02/25/2017    4 SYNERGY HEART STENTS DRUG ELUTING ALL MRI CONDITONAL 3T OK, SAFE IMMEDIATELY. CORONARY ANGIOPLASTY WITH STENT PLACEMENT  07/11/2012    XIENCE HEART STENT/ MRI CONDITIONAL 3T OK, SAFE IMMEDIATELY    CORONARY ANGIOPLASTY WITH STENT PLACEMENT  12/11/2020    CYSTOSCOPY Right 08/12/2022    HOLMIUM, CYSTOSCOPY, URETEROSCOPY, STENT PLACEMENT    PTCA      URETER SURGERY Right 8/12/2022    HOLMIUM, CYSTOSCOPY, URETEROSCOPY, STENT PLACEMENT performed by David Razo MD at [address] [place] [address]    Family History   Problem Relation Age of Onset    Cancer Mother     Cancer Father        SOCIAL HISTORY    Social History     Tobacco Use    Smoking status: Never    Smokeless tobacco: Never   Substance Use Topics    Alcohol use: No    Drug use: No       ALLERGIES    Allergies   Allergen Reactions    Penicillins      Other reaction(s): Hives    Pregabalin     Sulfa Antibiotics      Other reaction(s): Rash       MEDICATIONS    Current Outpatient Medications on File Prior to Encounter   Medication Sig Dispense Refill    spironolactone (ALDACTONE) 25 MG tablet Take 1 tablet by mouth daily 30 tablet 11    gabapentin (NEURONTIN) 100 MG capsule Take 1 capsule by mouth in the morning and at bedtime for 30 days.  30 capsule 11    insulin detemir (LEVEMIR FLEXTOUCH) 100 UNIT/ML injection pen INJECT 10 UNITS INTO THE SKIN at bedtime 1 Adjustable Dose Pre-filled Pen Syringe 0    atorvastatin (LIPITOR) 80 MG tablet Take 1 tablet by mouth daily 30 tablet 11    pantoprazole (PROTONIX) 40 MG tablet Take 1 tablet by mouth every morning (before breakfast) 30 tablet 11    isosorbide mononitrate (IMDUR) 30 MG extended release tablet Take 1 tablet by mouth daily 30 tablet 11    clopidogrel (PLAVIX) 75 MG tablet Take 1 tablet by mouth daily 30 tablet 11    vitamin D3 (CHOLECALCIFEROL) 25 MCG (1000 UT) TABS tablet Take 1 tablet by mouth daily 90 tablet 3    ferrous sulfate (IRON 325) 325 (65 Fe) MG tablet Take 1 tablet by mouth daily (with breakfast) 30 tablet 11    potassium chloride (KLOR-CON M) 20 MEQ extended release tablet Take 1 tablet by mouth daily 30 tablet 11    amLODIPine (NORVASC) 10 MG tablet Take 1 tablet by mouth daily 30 tablet 11    gabapentin (NEURONTIN) 300 MG capsule Take 1 capsule by mouth nightly for 30 days. 30 capsule 11    alfuzosin (UROXATRAL) 10 MG extended release tablet Take 1 tablet by mouth in the morning.  30 tablet 3    oxybutynin (DITROPAN XL) 10 MG extended release tablet Take 1 tablet by mouth daily as needed (Bladder spasms, stent pain) 30 tablet 3    acetaminophen (TYLENOL) 325 MG tablet Take 650 mg by mouth every 6 hours as needed for Pain      [DISCONTINUED] carvedilol (COREG) 12.5 MG tablet Take 1 tablet by mouth 2 times daily (with meals) 180 tablet 3    vitamin E 400 UNIT capsule Take 1 capsule by mouth daily      apixaban (ELIQUIS) 5 MG TABS tablet Take 1 tablet by mouth 2 times daily 120 tablet 5    prednisoLONE acetate (PRED FORTE) 1 % ophthalmic suspension Place 1 drop into both eyes 3 times daily      insulin aspart (NOVOLOG FLEXPEN) 100 UNIT/ML injection pen Use TID before meals according to scale - max 45 units a day 10 pen 11    Lancets MISC 1 each by Does not apply route 2 times daily 300 each 0    Insulin Pen Needle 32G X 4 MM MISC 5 each by Does not apply route daily 200 each 11    escitalopram (LEXAPRO) 20 MG tablet TAKE ONE TABLET BY MOUTH DAILY 90 tablet 3    Continuous Blood Gluc Sensor (FREESTYLE SYLVIA 14 DAY SENSOR) MISC 1 each by Does not apply route every 14 days 2 each 12    nitroGLYCERIN (NITROSTAT) 0.4 MG SL tablet place 1 tablet under the tongue if needed every 5 minutes if needed for CHEST PAIN 25 tablet 3    Misc. Devices (WALKER) MISC Diagnosis: right 5th metatarsal fracture, pain in limb    Duration: 3 months 1 each 0    Misc. Devices (COMMODE BEDSIDE) MISC 1 Device by Does not apply route daily 1 each 0    Continuous Blood Gluc  (FREESTYLE SYLVIA 14 DAY READER) JAQUELINE 1 each by Does not apply route continuous [facility] 1 Device 11     No current facility-administered medications on file prior to encounter. REVIEW OF SYSTEMS    Review of Systems   Constitutional:  Negative for chills, fatigue and fever. HENT:  Negative for congestion and rhinorrhea. Respiratory:  Negative for cough and shortness of breath. Cardiovascular:  Positive for leg swelling (bilateral). Negative for chest pain. Gastrointestinal:  Negative for diarrhea, nausea and vomiting. Endocrine:        Neuropathy   Musculoskeletal:  Positive for gait problem (wheelchair bound). Skin:  Positive for wound (left heel pressure ulcer). Allergic/Immunologic: Negative for immunocompromised state. Neurological:  Positive for weakness. Negative for dizziness and syncope. Psychiatric/Behavioral:  Negative for agitation. The patient is nervous/anxious (slight).       Objective:      BP (!) 169/73   Pulse 80   Temp 97.7 °F (36.5 °C) (Tympanic)   Resp 20   Ht 5' 6\" (1.676 m)   Wt 229 lb (103.9 kg)   BMI 36.96 kg/m²     Wt Readings from Last 3 Encounters:   10/06/22 229 lb (103.9 kg)   09/30/22 226 lb (102.5 kg)   08/16/22 243 lb 13.3 oz (110.6 kg)       PHYSICAL EXAM    General Appearance: alert and oriented to person, place and time, well-developed and obese, in no acute distress  Skin: warm and dry, no rash or erythema, left heel pressure ulceration   Head: normocephalic and atraumatic  Eyes: pupils equal, round and conjunctivae normal  Pulmonary/Chest: normal air movement, no respiratory distress  Extremities: no cyanosis and no clubbing. Bilateral lower leg edema   Musculoskeletal: no joint swelling, deformity or tenderness  Neurologic: wheelchair bound, coordination normal and speech normal      Assessment:     Problem List Items Addressed This Visit       Bilateral edema of lower extremity    Relevant Orders    Reflux study/Reflux Venous Insufficiency Bilateral    Initiate Outpatient Wound Care Protocol    Decreased pedal pulses    Relevant Orders    VL LOWER EXTREMITY ARTERIAL SEGMENTAL PRESSURES W PPG    Obesity (BMI 35.0-39.9 without comorbidity)    Relevant Orders    Initiate Outpatient Wound Care Protocol    Pressure injury of left heel, stage 3 ([facility]) - Primary    Relevant Orders    Initiate Outpatient Wound Care Protocol    Diabetic polyneuropathy associated with type 2 diabetes mellitus ([facility]    Relevant Orders    Initiate Outpatient Wound Care Protocol        Procedure Note  Indications:  Based on my examination of this patient's wound(s)/ulcer(s) today, debridement is required to promote healing and evaluate the wound base. Performed by: MAYELA Espinoza - CNP    Consent obtained:  Yes    Time out taken:  Yes    Pain Control: Anesthetic  Anesthetic: 2% Lidocaine Gel Topical     Debridement:Excisional Debridement    Using curette, scissors, and forceps the wound(s)/ulcer(s) was/were sharply debrided down through and including the removal of subcutaneous tissue.         Devitalized Tissue Debrided:  fibrin, biofilm, slough, and necrotic/eschar    Pre Debridement Measurements:  Are located in the [facility]  Documentation Flow Sheet    Wound/Ulcer #: 1    Post Debridement Measurements:  Wound/Ulcer Descriptions are Pre Debridement except measurements:    Wound 10/06/22 Heel Left #1 left heel (Active)   Wound Image   10/06/22 0907   Wound Etiology Pressure Stage 3 10/06/22 0907   Dressing Status Old drainage noted;New drainage noted 10/06/22 5929 Wound Cleansed Cleansed with saline 10/06/22 0907   Wound Length (cm) 1.2 cm 10/06/22 0907   Wound Width (cm) 1.2 cm 10/06/22 0907   Wound Depth (cm) 0.3 cm 10/06/22 0907   Wound Surface Area (cm^2) 1.44 cm^2 10/06/22 0907   Wound Volume (cm^3) 0.432 cm^3 10/06/22 0907   Post-Procedure Length (cm) 1.2 cm 10/06/22 0907   Post-Procedure Width (cm) 1.2 cm 10/06/22 2803   Post-Procedure Depth (cm) 0.3 cm 10/06/22 0907   Post-Procedure Surface Area (cm^2) 1.44 cm^2 10/06/22 0907   Post-Procedure Volume (cm^3) 0.432 cm^3 10/06/22 0907   Wound Assessment Devitalized tissue 10/06/22 0907   Drainage Amount Moderate 10/06/22 0907   Drainage Description Serosanguinous 10/06/22 0907   Odor None 10/06/22 0907   Danika-wound Assessment Hyperkeratosis (callous) 10/06/22 0907   Margins Undefined edges 10/06/22 0907   Number of days: 0          Percent of Wound(s)/Ulcer(s) Debrided: 100%    Total Surface Area Debrided:  1.44 sq cm     Diabetic/Pressure/Non Pressure Ulcers only:  Ulcer: Pressure ulcer, Stage 3    Estimated Blood Loss:  None    Hemostasis Achieved:  not needed    Procedural Pain:  2  / 10     Post Procedural Pain:  0 / 10     Response to treatment:  Well tolerated by patient., With complaints of pain. Plan:     Treatment Note please see Discharge Instructions    Written patient dismissal instructions given to patient and signed by patient or POA.            Electronically signed by MAYELA Garber CNP on 10/6/2022 at 10:27 AM

## 2022-10-06 NOTE — DISCHARGE INSTRUCTIONS
[address] [facility],[address] -Phone: [phone] Fax: [phone]   Visit  Discharge Instructions / Physician Orders    DATE: 10/6/2022     Home Care: [facility]     SUPPLIES ORDERED THRU: Home Care     Wound Location: Left Heel     Cleanse with: Liquid antibacterial soap and water, rinse well      Dressing Orders: Santyl ointment to wound nickel thickness, saline moistened gauze, dry dressing, Light Compression ACE Wrap           Float Heels with pillows while in bed-prevent back of heels from being in direct contact with surface           May use Triad Cream if Santyl not covered by insurance     Frequency: Once Daily     Additional Orders: Increase protein to diet (meat, cheese, eggs, fish, peanut butter, nuts and beans)  Float Heels with pillows while in bed or chair-prevent back of heels from being in direct contact with surface  ELEVATE LEGS AS MUCH AS POSSIBLE    Your next appointment with [address] [place] [address] [facility] is in 1 week with Lesley Funk NP     (Please note your next appointment above and if you are unable to keep, kindly give a 24 hour notice. Thank you.)  If more than 15 min late we cannot guarantee you will be seen due to clinician schedule  Per Policy, Excessive cancellation will call for dismissal from program.     If you experience any of the following, please call the [address] [place] [address] during business hours:  [phone]  Your Phone call may be forwarded to [address] [employer] [address] during business hours that [facility] [address] [place] [address] is closed. * Increase in Pain  * Temperature over 101  * Increase in drainage from your wound  * Drainage with a foul odor  * Bleeding  * Increase in swelling  * Need for compression bandage changes due to slippage, breakthrough drainage. If you need medical attention outside of the business hours of the [address] [place] [address] please contact your PCP or go to the nearest emergency room.      The information contained in the After Visit Summary has been reviewed with me, the patient and/or responsible adult, by my health care provider(s). I had the opportunity to ask questions regarding this information.  I have elected to receive;      []After Visit Summary  [x]Comprehensive Discharge Instruction      Patient signature______________________________________Date:________  Electronically signed by Hawa Irizarry RN on 10/6/2022 at 9:33 AM  Electronically signed by MAYELA Meadows CNP on 10/6/2022 at 9:51 AM

## 2022-10-13 ENCOUNTER — HOSPITAL ENCOUNTER (OUTPATIENT)
Dept: WOUND CARE | Age: 77
Discharge: HOME | End: 2022-10-13
Payer: MEDICARE

## 2022-10-13 VITALS
DIASTOLIC BLOOD PRESSURE: 59 MMHG | HEART RATE: 86 BPM | SYSTOLIC BLOOD PRESSURE: 165 MMHG | WEIGHT: 229 LBS | HEIGHT: 66 IN | RESPIRATION RATE: 20 BRPM | TEMPERATURE: 98.6 F | BODY MASS INDEX: 36.8 KG/M2

## 2022-10-13 DIAGNOSIS — R60.0 BILATERAL EDEMA OF LOWER EXTREMITY: ICD-10-CM

## 2022-10-13 DIAGNOSIS — L89.623 PRESSURE INJURY OF LEFT HEEL, STAGE 3 (HCC): Primary | ICD-10-CM

## 2022-10-13 DIAGNOSIS — E11.42 DIABETIC POLYNEUROPATHY ASSOCIATED WITH TYPE 2 DIABETES MELLITUS (HCC): ICD-10-CM

## 2022-10-13 DIAGNOSIS — E66.9 OBESITY (BMI 35.0-39.9 WITHOUT COMORBIDITY): ICD-10-CM

## 2022-10-13 PROCEDURE — 11042 DBRDMT SUBQ TIS 1ST 20SQCM/<: CPT | Performed by: NURSE PRACTITIONER

## 2022-10-13 PROCEDURE — 11042 DBRDMT SUBQ TIS 1ST 20SQCM/<: CPT

## 2022-10-13 RX ORDER — LIDOCAINE HYDROCHLORIDE 20 MG/ML
JELLY TOPICAL ONCE
Status: COMPLETED | OUTPATIENT
Start: 2022-10-13 | End: 2022-10-13

## 2022-10-13 RX ORDER — LIDOCAINE HYDROCHLORIDE 20 MG/ML
JELLY TOPICAL ONCE
Status: CANCELLED | OUTPATIENT
Start: 2022-10-13 | End: 2022-10-13

## 2022-10-13 RX ORDER — LIDOCAINE HYDROCHLORIDE 40 MG/ML
SOLUTION TOPICAL ONCE
Status: CANCELLED | OUTPATIENT
Start: 2022-10-13 | End: 2022-10-13

## 2022-10-13 RX ADMIN — LIDOCAINE HYDROCHLORIDE 4 ML: 20 JELLY TOPICAL at 08:34

## 2022-10-13 ASSESSMENT — ENCOUNTER SYMPTOMS
SHORTNESS OF BREATH: 0
RHINORRHEA: 0
DIARRHEA: 0
NAUSEA: 0
VOMITING: 0
COUGH: 0

## 2022-10-13 ASSESSMENT — PAIN DESCRIPTION - ONSET: ONSET: ON-GOING

## 2022-10-13 ASSESSMENT — PAIN DESCRIPTION - FREQUENCY: FREQUENCY: INTERMITTENT

## 2022-10-13 ASSESSMENT — PAIN DESCRIPTION - ORIENTATION: ORIENTATION: LEFT

## 2022-10-13 ASSESSMENT — PAIN SCALES - GENERAL: PAINLEVEL_OUTOF10: 5

## 2022-10-13 ASSESSMENT — PAIN DESCRIPTION - DESCRIPTORS: DESCRIPTORS: THROBBING

## 2022-10-13 NOTE — PROGRESS NOTES
[address] [facility]:     [employer]   [facility]  Phone: [phone]   Fax: [phone]      Ordering Center:     [facility] [address] [address]  [phone]  WOUND CARE Dept: [address] NUMBER [phone]    Patient Information:      Elicia [address]  [address] [facility]   [phone]   : 1945  AGE: 68 y.o.      GENDER: female   TODAYS DATE:  10/13/2022    Insurance:      PRIMARY INSURANCE:  Danyelle Munoz PPO  Coverage: [employer] MEDICARE  Effective Date: 2022  [id number] - (Medicare Managed)  Group: [id number]        Patient Wound Information:      Diagnoses     Codes Comments   Pressure injury of left heel, stage 3 ([facility]  - Primary L95.509    Bilateral edema of lower extremity  R60.0    Obesity (BMI 35.0-39.9 without comorbidity)  E66.9    Diabetic polyneuropathy associated with type 2 diabetes mellitus ([address]  E11.42        WOUNDS REQUIRING DRESSING SUPPLIES:     Wound 10/06/22 Heel Left #1 left heel (Active)   Wound Image   10/06/22 0907   Wound Etiology Pressure Stage 3 10/13/22 0831   Dressing Status Old drainage noted;New drainage noted 10/13/22 0831   Wound Cleansed Cleansed with saline 10/13/22 0831   Wound Length (cm) 1 cm 10/13/22 0831   Wound Width (cm) 1 cm 10/13/22 0831   Wound Depth (cm) 0.3 cm 10/13/22 0831   Wound Surface Area (cm^2) 1 cm^2 10/13/22 0831   Change in Wound Size % (l*w) 30.56 10/13/22 0831   Wound Volume (cm^3) 0.3 cm^3 10/13/22 0831   Wound Healing % 31 10/13/22 0831   Post-Procedure Length (cm) 1 cm 10/13/22 0831   Post-Procedure Width (cm) 1 cm 10/13/22 0831   Post-Procedure Depth (cm) 0.3 cm 10/13/22 0831   Post-Procedure Surface Area (cm^2) 1 cm^2 10/13/22 0831   Post-Procedure Volume (cm^3) 0.3 cm^3 10/13/22 0831   Wound Assessment Pink/red 10/13/22 0831   Drainage Amount Moderate 10/13/22 0831   Drainage Description Serosanguinous 10/13/22 0831   Odor None 10/13/22 0831   Danika-wound Assessment Hyperkeratosis (callous) 10/13/22 0831   Margins Defined edges 10/13/22 0831   Number of days: 7          Supplies Requested :      WOUND #: 1   PRIMARY DRESSING:  Pharmaceutical medication Santyl   Cover and Secure with: 4X4 non woven gauze pad  ABD pad  Bulky roll gauze  Ace wrap     FREQUENCY OF DRESSING CHANGES:  Daily     ADDITIONAL ITEMS:  [] Gloves Small  [x] Gloves Medium [] Gloves Large [] Gloves XLarge  [] Tape 1\" [x] Tape 2\" [] Tape 3\"  [] Medipore Tape  [x] Saline  [] Skin Prep   [] Adhesive Remover   [] Cotton Tip Applicators   [] Other:    Patient Wound(s) Debrided: [x] Yes   [] No    Debribement Type: subcutaneous tissue    Debridement Date: 10/13/2022    Patient currently being seen by Home Health: [x] Yes   [] No    Duration for needed supplies:  []15  [x]30  []60  []90 Days    Provider Information:      PROVIDER NAME: MALISSA Bauman  NPI: [id number]

## 2022-10-13 NOTE — PROGRESS NOTES
[address]   Progress Note and Procedure Note      [facility] RECORD NUMBER:  [id number]  AGE: 68 y.o. GENDER: female  : 1945  EPISODE DATE:  10/13/2022    Subjective:     Chief Complaint   Patient presents with    Wound Check     Left heel         HISTORY of PRESENT ILLNESS HPI     Sahra Ramos is a 68 y.o. female who presents today for wound/ulcer evaluation. History of Wound Context: here to follow up on left heel pressure ulcer. She is here with her daughter today and has [facility]. Using Santyl ointment with dressing changes. Wound/Ulcer Pain Timing/Severity: intermittent  Quality of pain: sharp  Severity:  2  10   Modifying Factors: Pain worsens with touching  Associated Signs/Symptoms: none    Ulcer Identification:  Ulcer Type: pressure  Contributing Factors: edema, venous stasis, diabetes, poor glucose control, chronic pressure, decreased mobility, shear force, obesity, and arterial insufficiency         PAST MEDICAL HISTORY        Diagnosis Date    Acute renal failure with tubular necrosis ([address] 2021    Secondary to ischemic ATN post fall intravascularly depletion hypotension and low flow baseline creatinine 1.2-1.4 peaked up to 2.1 during her hospitalization in 2020. Work-up showed benign urine sediment, kidney size to be 11.2 on the right 15.1 on the left serological work-up negative.     Anxiety     Atrial fibrillation (HCC)     chronic-takes xarelto    Benign positional vertigo     CAD (coronary artery disease)     Chest pain     CKD (chronic kidney disease), stage III ([address] 2021    Secondary to ischemic and diabetic nephrosclerosis baseline 1.2-1.4    Depression     Diabetes mellitus ([address]     Diabetic neuropathy ([address]     Dysuria 2021    Hyperlipidemia     Hypertension     Migraine     Migraine headaches aggravated with sublingual nitroglycerin    Persistent proteinuria 2022    From diabetic nephrosclerosis UPC 1.0    Suspected COVID-19 virus infection 7/13/2020       PAST SURGICAL HISTORY    Past Surgical History:   Procedure Laterality Date    APPENDECTOMY      CARDIAC CATHETERIZATION      2012    CORONARY ANGIOPLASTY WITH STENT PLACEMENT  02/25/2017    4 SYNERGY HEART STENTS DRUG ELUTING ALL MRI CONDITONAL 3T OK, SAFE IMMEDIATELY. CORONARY ANGIOPLASTY WITH STENT PLACEMENT  07/11/2012    XIENCE HEART STENT/ MRI CONDITIONAL 3T OK, SAFE IMMEDIATELY    CORONARY ANGIOPLASTY WITH STENT PLACEMENT  12/11/2020    CYSTOSCOPY Right 08/12/2022    HOLMIUM, CYSTOSCOPY, URETEROSCOPY, STENT PLACEMENT    PTCA      URETER SURGERY Right 8/12/2022    HOLMIUM, CYSTOSCOPY, URETEROSCOPY, STENT PLACEMENT performed by Farhat Casey MD at [facility] HISTORY    Family History   Problem Relation Age of Onset    Cancer Mother     Cancer Father        SOCIAL HISTORY    Social History     Tobacco Use    Smoking status: Never    Smokeless tobacco: Never   Substance Use Topics    Alcohol use: No    Drug use: No       ALLERGIES    Allergies   Allergen Reactions    Penicillins      Other reaction(s): Hives    Pregabalin     Sulfa Antibiotics      Other reaction(s): Rash       MEDICATIONS    Current Outpatient Medications on File Prior to Encounter   Medication Sig Dispense Refill    BANOPHEN 50 MG capsule TAKE ONE CAPSULE BY MOUTH EVERY 6 HOURS AS NEEDED FOR ITCHING 120 capsule 1    collagenase (SANTYL) 250 UNIT/GM ointment Apply topically daily for 14 days. 30 g 1    spironolactone (ALDACTONE) 25 MG tablet Take 1 tablet by mouth daily 30 tablet 11    gabapentin (NEURONTIN) 100 MG capsule Take 1 capsule by mouth in the morning and at bedtime for 30 days.  30 capsule 11    insulin detemir (LEVEMIR FLEXTOUCH) 100 UNIT/ML injection pen INJECT 10 UNITS INTO THE SKIN at bedtime 1 Adjustable Dose Pre-filled Pen Syringe 0    atorvastatin (LIPITOR) 80 MG tablet Take 1 tablet by mouth daily 30 tablet 11    pantoprazole (PROTONIX) 40 MG tablet Take 1 tablet by mouth every morning (before breakfast) 30 tablet 11    isosorbide mononitrate (IMDUR) 30 MG extended release tablet Take 1 tablet by mouth daily 30 tablet 11    clopidogrel (PLAVIX) 75 MG tablet Take 1 tablet by mouth daily 30 tablet 11    vitamin D3 (CHOLECALCIFEROL) 25 MCG (1000 UT) TABS tablet Take 1 tablet by mouth daily 90 tablet 3    ferrous sulfate (IRON 325) 325 (65 Fe) MG tablet Take 1 tablet by mouth daily (with breakfast) 30 tablet 11    potassium chloride (KLOR-CON M) 20 MEQ extended release tablet Take 1 tablet by mouth daily 30 tablet 11    amLODIPine (NORVASC) 10 MG tablet Take 1 tablet by mouth daily 30 tablet 11    gabapentin (NEURONTIN) 300 MG capsule Take 1 capsule by mouth nightly for 30 days. 30 capsule 11    alfuzosin (UROXATRAL) 10 MG extended release tablet Take 1 tablet by mouth in the morning.  30 tablet 3    oxybutynin (DITROPAN XL) 10 MG extended release tablet Take 1 tablet by mouth daily as needed (Bladder spasms, stent pain) 30 tablet 3    acetaminophen (TYLENOL) 325 MG tablet Take 650 mg by mouth every 6 hours as needed for Pain      [DISCONTINUED] carvedilol (COREG) 12.5 MG tablet Take 1 tablet by mouth 2 times daily (with meals) 180 tablet 3    vitamin E 400 UNIT capsule Take 1 capsule by mouth daily      apixaban (ELIQUIS) 5 MG TABS tablet Take 1 tablet by mouth 2 times daily 120 tablet 5    prednisoLONE acetate (PRED FORTE) 1 % ophthalmic suspension Place 1 drop into both eyes 3 times daily      insulin aspart (NOVOLOG FLEXPEN) 100 UNIT/ML injection pen Use TID before meals according to scale - max 45 units a day 10 pen 11    Lancets MISC 1 each by Does not apply route 2 times daily 300 each 0    Insulin Pen Needle 32G X 4 MM MISC 5 each by Does not apply route daily 200 each 11    escitalopram (LEXAPRO) 20 MG tablet TAKE ONE TABLET BY MOUTH DAILY 90 tablet 3    Continuous Blood Gluc Sensor (FREESTYLE SYLVIA 14 DAY SENSOR) MISC 1 each by Does not apply route every 14 days 2 each 12    nitroGLYCERIN (NITROSTAT) 0.4 MG SL tablet place 1 tablet under the tongue if needed every 5 minutes if needed for CHEST PAIN 25 tablet 3    Misc. Devices (WALKER) MISC Diagnosis: right 5th metatarsal fracture, pain in limb    Duration: 3 months 1 each 0    Misc. Devices (COMMODE BEDSIDE) MISC 1 Device by Does not apply route daily 1 each 0    Continuous Blood Gluc  (FREESTYLE SYLVIA 14 DAY READER) JAQUELINE 1 each by Does not apply route continuous [facility] [address] 1 Device 11     No current facility-administered medications on file prior to encounter. REVIEW OF SYSTEMS    Review of Systems   Constitutional:  Negative for chills, fatigue and fever. HENT:  Negative for congestion and rhinorrhea. Respiratory:  Negative for cough and shortness of breath. Cardiovascular:  Positive for leg swelling (bilateral). Negative for chest pain. Gastrointestinal:  Negative for diarrhea, nausea and vomiting. Endocrine:        Neuropathy   Musculoskeletal:  Positive for gait problem (wheelchair bound). Skin:  Positive for wound (left heel pressure ulcer). Allergic/Immunologic: Negative for immunocompromised state. Neurological:  Positive for weakness. Negative for dizziness and syncope. Psychiatric/Behavioral:  Negative for agitation. The patient is nervous/anxious (slight).       Objective:      BP (!) 165/59   Pulse 86   Temp 98.6 °F (37 °C) (Tympanic)   Resp 20   Ht 5' 6\" (1.676 m)   Wt 229 lb (103.9 kg)   BMI 36.96 kg/m²     Wt Readings from Last 3 Encounters:   10/13/22 229 lb (103.9 kg)   10/06/22 229 lb (103.9 kg)   09/30/22 226 lb (102.5 kg)       PHYSICAL EXAM    General Appearance: alert and oriented to person, place and time, well-developed and obese, in no acute distress  Skin: warm and dry, no rash or erythema, left heel pressure ulceration   Head: normocephalic and atraumatic  Eyes: pupils equal, round and conjunctivae normal  Pulmonary/Chest: normal air movement, no respiratory distress  Extremities: no cyanosis and no clubbing. Bilateral lower leg edema   Musculoskeletal: no joint swelling, deformity or tenderness  Neurologic: wheelchair bound, coordination normal and speech normal      Assessment:     Problem List Items Addressed This Visit       Bilateral edema of lower extremity    Relevant Orders    Initiate Outpatient Wound Care Protocol    Obesity (BMI 35.0-39.9 without comorbidity)    Relevant Orders    Initiate Outpatient Wound Care Protocol    Pressure injury of left heel, stage 3 (HCC) - Primary    Relevant Orders    Initiate Outpatient Wound Care Protocol    Diabetic polyneuropathy associated with type 2 diabetes mellitus ([facility] [address]    Relevant Orders    Initiate Outpatient Wound Care Protocol        Procedure Note  Indications:  Based on my examination of this patient's wound(s)/ulcer(s) today, debridement is required to promote healing and evaluate the wound base. Performed by: MAYELA Salamanca - CNP    Consent obtained:  Yes    Time out taken:  Yes    Pain Control: Anesthetic  Anesthetic: 2% Lidocaine Gel Topical     Debridement:Excisional Debridement    Using curette the wound(s)/ulcer(s) was/were sharply debrided down through and including the removal of subcutaneous tissue.         Devitalized Tissue Debrided:  fibrin, biofilm, and slough    Pre Debridement Measurements:  Are located in the [place]  Documentation Flow Sheet    Wound/Ulcer #: 1    Post Debridement Measurements:  Wound/Ulcer Descriptions are Pre Debridement except measurements:    Wound 10/06/22 Heel Left #1 left heel (Active)   Wound Image   10/06/22 0907   Wound Etiology Pressure Stage 3 10/13/22 0831   Dressing Status Old drainage noted;New drainage noted 10/13/22 0831   Wound Cleansed Cleansed with saline 10/13/22 0831   Wound Length (cm) 1 cm 10/13/22 0831   Wound Width (cm) 1 cm 10/13/22 0831   Wound Depth (cm) 0.3 cm 10/13/22 0831 Wound Surface Area (cm^2) 1 cm^2 10/13/22 0831   Change in Wound Size % (l*w) 30.56 10/13/22 0831   Wound Volume (cm^3) 0.3 cm^3 10/13/22 0831   Wound Healing % 31 10/13/22 0831   Post-Procedure Length (cm) 1 cm 10/13/22 0831   Post-Procedure Width (cm) 1 cm 10/13/22 0831   Post-Procedure Depth (cm) 0.3 cm 10/13/22 0831   Post-Procedure Surface Area (cm^2) 1 cm^2 10/13/22 0831   Post-Procedure Volume (cm^3) 0.3 cm^3 10/13/22 0831   Wound Assessment Pink/red 10/13/22 0831   Drainage Amount Moderate 10/13/22 0831   Drainage Description Serosanguinous 10/13/22 0831   Odor None 10/13/22 0831   Danika-wound Assessment Hyperkeratosis (callous) 10/13/22 0831   Margins Defined edges 10/13/22 0831   Number of days: 6          Percent of Wound(s)/Ulcer(s) Debrided: 100%    Total Surface Area Debrided:  1.0 sq cm     Diabetic/Pressure/Non Pressure Ulcers only:  Ulcer: Pressure ulcer, Stage 3    Estimated Blood Loss:  Minimal    Hemostasis Achieved:  by pressure    Procedural Pain:  2  / 10     Post Procedural Pain:  0 / 10     Response to treatment:  Well tolerated by patient. Plan:     Treatment Note please see Discharge Instructions    Written patient dismissal instructions given to patient and signed by patient or POA.            Electronically signed by MAYELA Ayala CNP on 10/13/2022 at 8:51 AM

## 2022-10-13 NOTE — DISCHARGE INSTRUCTIONS
[address] [facility],[address] -Phone: [phone] Fax: [phone]    Visit  Discharge Instructions / Physician Orders     DATE: 10/13/2022     Home Care: [facility]     SUPPLIES ORDERED THRU: [facility] [facility]     Wound Location: Left Heel     Cleanse with: Liquid antibacterial soap and water, rinse well      Dressing Orders: Santyl ointment to wound nickel thickness, saline moistened gauze, ABD, Kerlix (may use paper tape to hold kerlix in place), Light Compression ACE Wrap            Float Heels with pillows while in bed-prevent back of heels from being in direct contact with surface     Frequency: Once Daily     Additional Orders: Increase protein to diet (meat, cheese, eggs, fish, peanut butter, nuts and beans)  Float Heels with pillows while in bed or chair-prevent back of heels from being in direct contact with surface  ELEVATE LEGS AS MUCH AS POSSIBLE     Your next appointment with [employer] is in 1 week with Irish Hart NP     (Please note your next appointment above and if you are unable to keep, kindly give a 24 hour notice. Thank you.)  If more than 15 min late we cannot guarantee you will be seen due to clinician schedule  Per Policy, Excessive cancellation will call for dismissal from program.     If you experience any of the following, please call the [facility] during business hours:  [phone]  Your Phone call may be forwarded to [employer] during business hours that [facility] is closed. * Increase in Pain  * Temperature over 101  * Increase in drainage from your wound  * Drainage with a foul odor  * Bleeding  * Increase in swelling  * Need for compression bandage changes due to slippage, breakthrough drainage. If you need medical attention outside of the business hours of the [facility] please contact your PCP or go to the nearest emergency room.      The information contained in the After Visit Summary has been reviewed with me, the

## 2022-10-17 NOTE — DISCHARGE INSTRUCTIONS
[address] [facility],[address] -Phone: [phone] Fax: [phone]    Visit  Discharge Instructions / Physician Orders     DATE: 10/20/2022     Home Care: [facility]     SUPPLIES ORDERED THRU: [facility]     Wound Location: Left Heel     Cleanse with: Liquid antibacterial soap and water, rinse well      Dressing Orders: Santyl ointment to wound nickel thickness, saline moistened gauze, ABD, Kerlix (may use paper tape to hold kerlix in place), Light Compression ACE Wrap            Float Heels with pillows while in bed-prevent back of heels from being in direct contact with surface     Frequency: Once Daily     Additional Orders: Increase protein to diet (meat, cheese, eggs, fish, peanut butter, nuts and beans)  Float Heels with pillows while in bed or chair-prevent back of heels from being in direct contact with surface  ELEVATE LEGS AS MUCH AS POSSIBLE     [facility] with Isabella Fierro NP     (Please note your next appointment above and if you are unable to keep, kindly give a 24 hour notice. Thank you.)  If more than 15 min late we cannot guarantee you will be seen due to clinician schedule  Per Policy, Excessive cancellation will call for dismissal from program.     If you experience any of the following, please call the [address] [place] [address] during business hours:  [phone]  Your Phone call may be forwarded to [employer] during business hours that [facility] is closed. * Increase in Pain  * Temperature over 101  * Increase in drainage from your wound  * Drainage with a foul odor  * Bleeding  * Increase in swelling  * Need for compression bandage changes due to slippage, breakthrough drainage. If you need medical attention outside of the business hours of the [address] [place] [address] please contact your PCP or go to the nearest emergency room.      The information contained in the After Visit Summary has been reviewed with me, the patient and/or responsible adult, by my health care provider(s). I had the opportunity to ask questions regarding this information.  I have elected to receive;      []After Visit Summary  [x]Comprehensive Discharge Instruction        Patient signature______________________________________Date:________

## 2022-10-20 ENCOUNTER — HOSPITAL ENCOUNTER (OUTPATIENT)
Dept: WOUND CARE | Age: 77
Discharge: HOME | End: 2022-10-20

## 2022-10-24 NOTE — DISCHARGE INSTRUCTIONS
[address] [facility],[address] -Phone: [phone] Fax: [phone]    Visit  Discharge Instructions / Physician Orders     DATE: 10/27/2022     Home Care: [facility]     SUPPLIES ORDERED THRU: [facility]     Wound Location: Left Heel     Cleanse with: Liquid antibacterial soap and water, rinse well      Dressing Orders: Santyl ointment to wound nickel thickness, saline moistened gauze, ABD, Kerlix (may use paper tape to hold kerlix in place), Light Compression ACE Wrap            Float Heels with pillows while in bed-prevent back of heels from being in direct contact with surface     Frequency: Once Daily     Additional Orders: Increase protein to diet (meat, cheese, eggs, fish, peanut butter, nuts and beans)  Float Heels with pillows while in bed or chair-prevent back of heels from being in direct contact with surface  ELEVATE LEGS AS MUCH AS POSSIBLE     Your next appointment with [address] [place] [address] is in 1 week with Derick Moreno NP     (Please note your next appointment above and if you are unable to keep, kindly give a 24 hour notice. Thank you.)  If more than 15 min late we cannot guarantee you will be seen due to clinician schedule  Per Policy, Excessive cancellation will call for dismissal from program.     If you experience any of the following, please call the [address] [place] [address] during business hours:  [phone]  Your Phone call may be forwarded to [employer] during business hours that [facility] [address] [place] [address] is closed. * Increase in Pain  * Temperature over 101  * Increase in drainage from your wound  * Drainage with a foul odor  * Bleeding  * Increase in swelling  * Need for compression bandage changes due to slippage, breakthrough drainage. If you need medical attention outside of the business hours of the [address] [place] [address] please contact your PCP or go to the nearest emergency room.      The information contained in the After Visit Summary has been reviewed with me, the patient and/or responsible adult, by my health care provider(s). I had the opportunity to ask questions regarding this information.  I have elected to receive;      []After Visit Summary  [x]Comprehensive Discharge Instruction        Patient signature______________________________________Date:________  Electronically signed by Celestine Rinaldi RN on 10/27/2022 at 9:06 AM  Electronically signed by MAYELA Rueda CNP on 10/27/2022 at 9:10 AM

## 2022-10-27 ENCOUNTER — HOSPITAL ENCOUNTER (OUTPATIENT)
Dept: WOUND CARE | Age: 77
Discharge: HOME | End: 2022-10-27
Payer: MEDICARE

## 2022-10-27 VITALS
RESPIRATION RATE: 18 BRPM | DIASTOLIC BLOOD PRESSURE: 48 MMHG | WEIGHT: 229 LBS | HEART RATE: 78 BPM | HEIGHT: 66 IN | BODY MASS INDEX: 36.8 KG/M2 | TEMPERATURE: 97.6 F | SYSTOLIC BLOOD PRESSURE: 119 MMHG

## 2022-10-27 DIAGNOSIS — E66.9 OBESITY (BMI 35.0-39.9 WITHOUT COMORBIDITY): ICD-10-CM

## 2022-10-27 DIAGNOSIS — R60.0 BILATERAL EDEMA OF LOWER EXTREMITY: ICD-10-CM

## 2022-10-27 DIAGNOSIS — E11.42 DIABETIC POLYNEUROPATHY ASSOCIATED WITH TYPE 2 DIABETES MELLITUS (HCC): ICD-10-CM

## 2022-10-27 DIAGNOSIS — L89.623 PRESSURE INJURY OF LEFT HEEL, STAGE 3 (HCC): Primary | ICD-10-CM

## 2022-10-27 PROCEDURE — 11042 DBRDMT SUBQ TIS 1ST 20SQCM/<: CPT | Performed by: NURSE PRACTITIONER

## 2022-10-27 PROCEDURE — 11042 DBRDMT SUBQ TIS 1ST 20SQCM/<: CPT

## 2022-10-27 RX ORDER — LIDOCAINE HYDROCHLORIDE 40 MG/ML
SOLUTION TOPICAL ONCE
Status: CANCELLED | OUTPATIENT
Start: 2022-10-27 | End: 2022-10-27

## 2022-10-27 RX ORDER — LIDOCAINE HYDROCHLORIDE 20 MG/ML
JELLY TOPICAL ONCE
Status: CANCELLED | OUTPATIENT
Start: 2022-10-27 | End: 2022-10-27

## 2022-10-27 RX ORDER — LIDOCAINE HYDROCHLORIDE 20 MG/ML
JELLY TOPICAL ONCE
Status: COMPLETED | OUTPATIENT
Start: 2022-10-27 | End: 2022-10-27

## 2022-10-27 RX ADMIN — LIDOCAINE HYDROCHLORIDE 6 ML: 20 JELLY TOPICAL at 08:48

## 2022-10-27 ASSESSMENT — ENCOUNTER SYMPTOMS
SHORTNESS OF BREATH: 0
DIARRHEA: 0
COUGH: 0
NAUSEA: 0
RHINORRHEA: 0
VOMITING: 0

## 2022-10-27 ASSESSMENT — PAIN SCALES - GENERAL: PAINLEVEL_OUTOF10: 0

## 2022-10-27 NOTE — PLAN OF CARE
Problem: Chronic Conditions and Co-morbidities  Goal: Patient's chronic conditions and co-morbidity symptoms are monitored and maintained or improved  Outcome: Progressing     Problem: Discharge Planning  Goal: Discharge to home or other facility with appropriate resources  Outcome: Progressing     Problem: Pain  Goal: Verbalizes/displays adequate comfort level or baseline comfort level  Outcome: Progressing     Problem: Wound:  Goal: Will show signs of wound healing; wound closure and no evidence of infection  Description: Will show signs of wound healing; wound closure and no evidence of infection  Outcome: Progressing     Problem: Falls - Risk of:  Goal: Will remain free from falls  Description: Will remain free from falls  Outcome: Progressing

## 2022-10-27 NOTE — PROGRESS NOTES
[address]   Progress Note and Procedure Note      [facility] RECORD NUMBER:  [id number]  AGE: 68 y.o. GENDER: female  : 1945  EPISODE DATE:  10/27/2022    Subjective:     Chief Complaint   Patient presents with    Wound Check     Left heel         HISTORY of PRESENT ILLNESS HPI     Duran Robins is a 68 y.o. female who presents today for wound/ulcer evaluation. History of Wound Context: here to follow up on left heel pressure ulcer. She is here with her son today and has [facility]. Using [email or web address] which is working well. Wound/Ulcer Pain Timing/Severity: intermittent  Quality of pain: sharp  Severity:  2 / 10   Modifying Factors: None  Associated Signs/Symptoms: none    Ulcer Identification:  Ulcer Type: pressure  Contributing Factors: edema, venous stasis, diabetes, poor glucose control, chronic pressure, decreased mobility, shear force, obesity, and arterial insufficiency         PAST MEDICAL HISTORY        Diagnosis Date    Acute renal failure with tubular necrosis ([address] 2021    Secondary to ischemic ATN post fall intravascularly depletion hypotension and low flow baseline creatinine 1.2-1.4 peaked up to 2.1 during her hospitalization in 2020. Work-up showed benign urine sediment, kidney size to be 11.2 on the right 15.1 on the left serological work-up negative.     Anxiety     Atrial fibrillation (HCC)     chronic-takes xarelto    Benign positional vertigo     CAD (coronary artery disease)     Chest pain     CKD (chronic kidney disease), stage III ([address] 2021    Secondary to ischemic and diabetic nephrosclerosis baseline 1.2-1.4    Depression     Diabetes mellitus ([address]     Diabetic neuropathy ([address]     Dysuria 2021    Hyperlipidemia     Hypertension     Migraine     Migraine headaches aggravated with sublingual nitroglycerin    Persistent proteinuria 2022    From diabetic nephrosclerosis UPC 1.0    Suspected COVID-19 virus infection 7/13/2020       PAST SURGICAL HISTORY    Past Surgical History:   Procedure Laterality Date    APPENDECTOMY      CARDIAC CATHETERIZATION      2012    CORONARY ANGIOPLASTY WITH STENT PLACEMENT  02/25/2017    4 SYNERGY HEART STENTS DRUG ELUTING ALL MRI CONDITONAL 3T OK, SAFE IMMEDIATELY. CORONARY ANGIOPLASTY WITH STENT PLACEMENT  07/11/2012    XIENCE HEART STENT/ MRI CONDITIONAL 3T OK, SAFE IMMEDIATELY    CORONARY ANGIOPLASTY WITH STENT PLACEMENT  12/11/2020    CYSTOSCOPY Right 08/12/2022    HOLMIUM, CYSTOSCOPY, URETEROSCOPY, STENT PLACEMENT    PTCA      URETER SURGERY Right 8/12/2022    HOLMIUM, CYSTOSCOPY, URETEROSCOPY, STENT PLACEMENT performed by Yonathan Alicea MD at [facility]    Family History   Problem Relation Age of Onset    Cancer Mother     Cancer Father        SOCIAL HISTORY    Social History     Tobacco Use    Smoking status: Never    Smokeless tobacco: Never   Substance Use Topics    Alcohol use: No    Drug use: No       ALLERGIES    Allergies   Allergen Reactions    Penicillins      Other reaction(s): Hives    Pregabalin     Sulfa Antibiotics      Other reaction(s): Rash       MEDICATIONS    Current Outpatient Medications on File Prior to Encounter   Medication Sig Dispense Refill    clopidogrel (PLAVIX) Take 5 mg by mouth daily      gabapentin (NEURONTIN) 100 MG capsule Take 1 capsule by mouth in the morning and at bedtime for 30 days.  60 capsule 11    BANOPHEN 50 MG capsule TAKE ONE CAPSULE BY MOUTH EVERY 6 HOURS AS NEEDED FOR ITCHING 120 capsule 1    spironolactone (ALDACTONE) 25 MG tablet Take 1 tablet by mouth daily 30 tablet 11    insulin detemir (LEVEMIR FLEXTOUCH) 100 UNIT/ML injection pen INJECT 10 UNITS INTO THE SKIN at bedtime 1 Adjustable Dose Pre-filled Pen Syringe 0    atorvastatin (LIPITOR) 80 MG tablet Take 1 tablet by mouth daily 30 tablet 11    pantoprazole (PROTONIX) 40 MG tablet Take 1 tablet by mouth every morning (before breakfast) 30 tablet 11    isosorbide mononitrate (IMDUR) 30 MG extended release tablet Take 1 tablet by mouth daily 30 tablet 11    clopidogrel (PLAVIX) 75 MG tablet Take 1 tablet by mouth daily 30 tablet 11    vitamin D3 (CHOLECALCIFEROL) 25 MCG (1000 UT) TABS tablet Take 1 tablet by mouth daily 90 tablet 3    ferrous sulfate (IRON 325) 325 (65 Fe) MG tablet Take 1 tablet by mouth daily (with breakfast) 30 tablet 11    potassium chloride (KLOR-CON M) 20 MEQ extended release tablet Take 1 tablet by mouth daily 30 tablet 11    amLODIPine (NORVASC) 10 MG tablet Take 1 tablet by mouth daily 30 tablet 11    gabapentin (NEURONTIN) 300 MG capsule Take 1 capsule by mouth nightly for 30 days. 30 capsule 11    alfuzosin (UROXATRAL) 10 MG extended release tablet Take 1 tablet by mouth in the morning.  30 tablet 3    oxybutynin (DITROPAN XL) 10 MG extended release tablet Take 1 tablet by mouth daily as needed (Bladder spasms, stent pain) 30 tablet 3    acetaminophen (TYLENOL) 325 MG tablet Take 650 mg by mouth every 6 hours as needed for Pain      [DISCONTINUED] carvedilol (COREG) 12.5 MG tablet Take 1 tablet by mouth 2 times daily (with meals) 180 tablet 3    vitamin E 400 UNIT capsule Take 1 capsule by mouth daily      apixaban (ELIQUIS) 5 MG TABS tablet Take 1 tablet by mouth 2 times daily 120 tablet 5    prednisoLONE acetate (PRED FORTE) 1 % ophthalmic suspension Place 1 drop into both eyes 3 times daily      insulin aspart (NOVOLOG FLEXPEN) 100 UNIT/ML injection pen Use TID before meals according to scale - max 45 units a day 10 pen 11    Lancets MISC 1 each by Does not apply route 2 times daily 300 each 0    Insulin Pen Needle 32G X 4 MM MISC 5 each by Does not apply route daily 200 each 11    escitalopram (LEXAPRO) 20 MG tablet TAKE ONE TABLET BY MOUTH DAILY 90 tablet 3    Continuous Blood Gluc Sensor (FREESTYLE SYLVIA 14 DAY SENSOR) MISC 1 each by Does not apply route every 14 days 2 each 12    nitroGLYCERIN (NITROSTAT) 0.4 MG SL tablet place 1 tablet under the tongue if needed every 5 minutes if needed for CHEST PAIN 25 tablet 3    Misc. Devices (WALKER) MISC Diagnosis: right 5th metatarsal fracture, pain in limb    Duration: 3 months 1 each 0    Misc. Devices (COMMODE BEDSIDE) MISC 1 Device by Does not apply route daily 1 each 0    Continuous Blood Gluc  (FREESTYLE SYLVIA 14 DAY READER) JAQUELINE 1 each by Does not apply route continuous [facility] 1 Device 11     No current facility-administered medications on file prior to encounter. REVIEW OF SYSTEMS    Review of Systems   Constitutional:  Negative for chills, fatigue and fever. HENT:  Negative for congestion and rhinorrhea. Respiratory:  Negative for cough and shortness of breath. Cardiovascular:  Positive for leg swelling (bilateral). Negative for chest pain. Gastrointestinal:  Negative for diarrhea, nausea and vomiting. Endocrine:        Neuropathy   Musculoskeletal:  Positive for gait problem (wheelchair bound). Skin:  Positive for wound (left heel pressure ulcer). Allergic/Immunologic: Negative for immunocompromised state. Neurological:  Positive for weakness. Negative for dizziness and syncope. Psychiatric/Behavioral:  Negative for agitation. The patient is nervous/anxious (slight).       Objective:      BP (!) 119/48   Pulse 78   Temp 97.6 °F (36.4 °C) (Tympanic)   Resp 18   Ht 5' 6\" (1.676 m)   Wt 229 lb (103.9 kg)   BMI 36.96 kg/m²     Wt Readings from Last 3 Encounters:   10/27/22 229 lb (103.9 kg)   10/13/22 229 lb (103.9 kg)   10/06/22 229 lb (103.9 kg)       PHYSICAL EXAM    General Appearance: alert and oriented to person, place and time, well-developed and obese, in no acute distress  kin: warm and dry, no rash or erythema, left heel pressure ulceration   Head: normocephalic and atraumatic  Eyes: pupils equal, round and conjunctivae normal  Pulmonary/Chest: normal air movement, no respiratory distress  Extremities: no cyanosis and no clubbing. Bilateral lower leg edema   Musculoskeletal: no joint swelling, deformity or tenderness  Neurologic: wheelchair bound, coordination normal and speech normal      Assessment:     Problem List Items Addressed This Visit       Bilateral edema of lower extremity    Relevant Orders    Initiate Outpatient Wound Care Protocol    Obesity (BMI 35.0-39.9 without comorbidity)    Relevant Orders    Initiate Outpatient Wound Care Protocol    Pressure injury of left heel, stage 3 (HCC) - Primary    Relevant Orders    Initiate Outpatient Wound Care Protocol    Diabetic polyneuropathy associated with type 2 diabetes mellitus ([facility]    Relevant Orders    Initiate Outpatient Wound Care Protocol        Procedure Note  Indications:  Based on my examination of this patient's wound(s)/ulcer(s) today, debridement is required to promote healing and evaluate the wound base. Performed by: MAYELA Berman - CNP    Consent obtained:  Yes    Time out taken:  Yes    Pain Control: Anesthetic  Anesthetic: 2% Lidocaine Gel Topical     Debridement:Excisional Debridement    Using curette, scissors, and forceps the wound(s)/ulcer(s) was/were sharply debrided down through and including the removal of subcutaneous tissue.         Devitalized Tissue Debrided:  fibrin, biofilm, slough, and necrotic/eschar    Pre Debridement Measurements:  Are located in the [place]  Documentation Flow Sheet    Wound/Ulcer #: 1    Post Debridement Measurements:  Wound/Ulcer Descriptions are Pre Debridement except measurements:    Wound 10/06/22 Heel Left #1 left heel (Active)   Wound Image   10/06/22 0907   Wound Etiology Pressure Stage 3 10/27/22 0840   Dressing Status Old drainage noted;New drainage noted 10/27/22 0840   Wound Cleansed Cleansed with saline 10/27/22 0840   Wound Length (cm) 1.4 cm 10/27/22 0840   Wound Width (cm) 0.7 cm 10/27/22 0840   Wound Depth (cm) 0.3 cm 10/27/22 0840   Wound Surface Area (cm^2) 0.98 cm^2 10/27/22 0840   Change in Wound Size % (l*w) 31.94 10/27/22 0840   Wound Volume (cm^3) 0.294 cm^3 10/27/22 0840   Wound Healing % 32 10/27/22 0840   Post-Procedure Length (cm) 1.4 cm 10/27/22 0840   Post-Procedure Width (cm) 0.7 cm 10/27/22 0840   Post-Procedure Depth (cm) 0.3 cm 10/27/22 0840   Post-Procedure Surface Area (cm^2) 0.98 cm^2 10/27/22 0840   Post-Procedure Volume (cm^3) 0.294 cm^3 10/27/22 0840   Wound Assessment Pink/red 10/27/22 0840   Drainage Amount Moderate 10/27/22 0840   Drainage Description Serosanguinous 10/27/22 0840   Odor None 10/27/22 0840   Danika-wound Assessment Hyperkeratosis (callous) 10/27/22 0840   Margins Defined edges 10/27/22 0840   Number of days: 21          Percent of Wound(s)/Ulcer(s) Debrided: 100%    Total Surface Area Debrided:  0.98 sq cm     Diabetic/Pressure/Non Pressure Ulcers only:  Ulcer: Pressure ulcer, Stage 3    Estimated Blood Loss:  Minimal    Hemostasis Achieved:  by pressure    Procedural Pain:  2  / 10     Post Procedural Pain:  0 / 10     Response to treatment:  Well tolerated by patient. Plan:     Treatment Note please see Discharge Instructions    Written patient dismissal instructions given to patient and signed by patient or POA.            Electronically signed by MAYELA Barth CNP on 10/27/2022 at 9:14 AM

## 2022-10-31 NOTE — DISCHARGE INSTRUCTIONS
[address] [facility],[address] -Phone: [phone] Fax: [phone]    Visit  Discharge Instructions / Physician Orders     DATE: 11/3/2022     Home Care: [facility]     SUPPLIES ORDERED THRU: [employer]     Wound Location: Left Heel     Cleanse with: Liquid antibacterial soap and water, rinse well      Dressing Orders: Santyl ointment to wound nickel thickness, saline moistened gauze, ABD, piece of gauze on anterior ankle to pad, Kerlix (may use paper tape to hold kerlix in place-do not wrap tightly), Light Compression ACE Wrap            Float Heels with pillows while in bed-prevent back of heels from being in direct contact with surface     Frequency: Once Daily     Additional Orders: Increase protein to diet (meat, cheese, eggs, fish, peanut butter, nuts and beans)  Float Heels with pillows while in bed or chair-prevent back of heels from being in direct contact with surface  ELEVATE LEGS AS MUCH AS POSSIBLE     [facility] in 1 week with Ian Garcia NP     (Please note your next appointment above and if you are unable to keep, kindly give a 24 hour notice. Thank you.)  If more than 15 min late we cannot guarantee you will be seen due to clinician schedule  Per Policy, Excessive cancellation will call for dismissal from program.     If you experience any of the following, please call the [facility] during business hours:  [phone]  Your Phone call may be forwarded to [employer] during business hours that [facility] is closed. * Increase in Pain  * Temperature over 101  * Increase in drainage from your wound  * Drainage with a foul odor  * Bleeding  * Increase in swelling  * Need for compression bandage changes due to slippage, breakthrough drainage. If you need medical attention outside of the business hours of the [address] [place] [address] please contact your PCP or go to the nearest emergency room.      The information contained in the After Visit Summary has been reviewed with me, the patient and/or responsible adult, by my health care provider(s). I had the opportunity to ask questions regarding this information.  I have elected to receive;      []After Visit Summary  [x]Comprehensive Discharge Instruction        Patient signature______________________________________Date:________  Electronically signed by Adriana Diallo RN on 11/3/2022 at 9:36 AM  Electronically signed by MAYELA Cuenca CNP on 11/3/2022 at 9:50 AM

## 2022-11-03 ENCOUNTER — HOSPITAL ENCOUNTER (OUTPATIENT)
Dept: WOUND CARE | Age: 77
Discharge: HOME | End: 2022-11-03
Payer: MEDICARE

## 2022-11-03 VITALS
DIASTOLIC BLOOD PRESSURE: 48 MMHG | SYSTOLIC BLOOD PRESSURE: 122 MMHG | TEMPERATURE: 98.3 F | RESPIRATION RATE: 16 BRPM | HEART RATE: 70 BPM

## 2022-11-03 DIAGNOSIS — E66.9 OBESITY (BMI 35.0-39.9 WITHOUT COMORBIDITY): ICD-10-CM

## 2022-11-03 DIAGNOSIS — E11.42 DIABETIC POLYNEUROPATHY ASSOCIATED WITH TYPE 2 DIABETES MELLITUS (HCC): ICD-10-CM

## 2022-11-03 DIAGNOSIS — L89.623 PRESSURE INJURY OF LEFT HEEL, STAGE 3 (HCC): Primary | ICD-10-CM

## 2022-11-03 DIAGNOSIS — R60.0 BILATERAL EDEMA OF LOWER EXTREMITY: ICD-10-CM

## 2022-11-03 PROCEDURE — 11042 DBRDMT SUBQ TIS 1ST 20SQCM/<: CPT | Performed by: NURSE PRACTITIONER

## 2022-11-03 PROCEDURE — 11042 DBRDMT SUBQ TIS 1ST 20SQCM/<: CPT

## 2022-11-03 RX ORDER — LIDOCAINE HYDROCHLORIDE 20 MG/ML
JELLY TOPICAL ONCE
Status: CANCELLED | OUTPATIENT
Start: 2022-11-03 | End: 2022-11-03

## 2022-11-03 RX ORDER — LIDOCAINE HYDROCHLORIDE 20 MG/ML
JELLY TOPICAL ONCE
Status: COMPLETED | OUTPATIENT
Start: 2022-11-03 | End: 2022-11-03

## 2022-11-03 RX ORDER — LIDOCAINE HYDROCHLORIDE 40 MG/ML
SOLUTION TOPICAL ONCE
Status: CANCELLED | OUTPATIENT
Start: 2022-11-03 | End: 2022-11-03

## 2022-11-03 RX ORDER — MAGNESIUM HYDROXIDE 1200 MG/15ML
LIQUID ORAL
Qty: 500 ML | Refills: 1 | Status: SHIPPED | OUTPATIENT
Start: 2022-11-03 | End: 2022-11-10

## 2022-11-03 RX ADMIN — LIDOCAINE HYDROCHLORIDE 4 ML: 20 JELLY TOPICAL at 09:23

## 2022-11-03 ASSESSMENT — ENCOUNTER SYMPTOMS
COUGH: 0
NAUSEA: 0
RHINORRHEA: 0
DIARRHEA: 0
VOMITING: 0
SHORTNESS OF BREATH: 0

## 2022-11-03 ASSESSMENT — PAIN SCALES - GENERAL: PAINLEVEL_OUTOF10: 0

## 2022-11-03 NOTE — PROGRESS NOTES
[address]   Progress Note and Procedure Note      [address] [facility] RECORD NUMBER:  [id number]  AGE: 68 y.o. GENDER: female  : 1945  EPISODE DATE:  11/3/2022    Subjective:     Chief Complaint   Patient presents with    Wound Check     Left heel         HISTORY of PRESENT ILLNESS HPI     Yris Gar is a 68 y.o. female who presents today for wound/ulcer evaluation. History of Wound Context: here to follow up on left heel pressure ulceration. Wound is healing well. Will continue santyl for now, likely change to collagen with silver next week. Has [facility]. Here with daughter today. Wound/Ulcer Pain Timing/Severity: intermittent  Quality of pain: sharp  Severity:  2 / 10   Modifying Factors: None  Associated Signs/Symptoms: none    Ulcer Identification:  Ulcer Type: pressure  Contributing Factors: edema, venous stasis, diabetes, poor glucose control, chronic pressure, decreased mobility, shear force, obesity, and arterial insufficiency         PAST MEDICAL HISTORY        Diagnosis Date    Acute renal failure with tubular necrosis ([address] 2021    Secondary to ischemic ATN post fall intravascularly depletion hypotension and low flow baseline creatinine 1.2-1.4 peaked up to 2.1 during her hospitalization in 2020. Work-up showed benign urine sediment, kidney size to be 11.2 on the right 15.1 on the left serological work-up negative.     Anxiety     Atrial fibrillation (HCC)     chronic-takes xarelto    Benign positional vertigo     CAD (coronary artery disease)     Chest pain     CKD (chronic kidney disease), stage III ([address] 2021    Secondary to ischemic and diabetic nephrosclerosis baseline 1.2-1.4    Depression     Diabetes mellitus ([address]     Diabetic neuropathy ([address]     Dysuria 2021    Hyperlipidemia     Hypertension     Migraine     Migraine headaches aggravated with sublingual nitroglycerin    Persistent proteinuria 2022 From diabetic nephrosclerosis UP 1.0    Suspected COVID-19 virus infection 7/13/2020       PAST SURGICAL HISTORY    Past Surgical History:   Procedure Laterality Date    APPENDECTOMY      CARDIAC CATHETERIZATION      2012    CORONARY ANGIOPLASTY WITH STENT PLACEMENT  02/25/2017    4 SYNERGY HEART STENTS DRUG ELUTING ALL MRI CONDITONAL 3T OK, SAFE IMMEDIATELY. CORONARY ANGIOPLASTY WITH STENT PLACEMENT  07/11/2012    XIENCE HEART STENT/ MRI CONDITIONAL 3T OK, SAFE IMMEDIATELY    CORONARY ANGIOPLASTY WITH STENT PLACEMENT  12/11/2020    CYSTOSCOPY Right 08/12/2022    HOLMIUM, CYSTOSCOPY, URETEROSCOPY, STENT PLACEMENT    PTCA      URETER SURGERY Right 8/12/2022    HOLMIUM, CYSTOSCOPY, URETEROSCOPY, STENT PLACEMENT performed by Rina Aldana MD at [address]    Family History   Problem Relation Age of Onset    Cancer Mother     Cancer Father        SOCIAL HISTORY    Social History     Tobacco Use    Smoking status: Never    Smokeless tobacco: Never   Substance Use Topics    Alcohol use: No    Drug use: No       ALLERGIES    Allergies   Allergen Reactions    Penicillins      Other reaction(s): Hives    Pregabalin     Sulfa Antibiotics      Other reaction(s): Rash       MEDICATIONS    Current Outpatient Medications on File Prior to Encounter   Medication Sig Dispense Refill    lisinopril (PRINIVIL;ZESTRIL) 10 MG tablet TAKE ONE TABLET BY MOUTH DAILY 30 tablet 11    benzonatate (TESSALON) 100 MG capsule Take 1 capsule by mouth 3 times daily as needed for Cough 30 capsule 0    clopidogrel (PLAVIX) Take 5 mg by mouth daily      gabapentin (NEURONTIN) 100 MG capsule Take 1 capsule by mouth in the morning and at bedtime for 30 days.  60 capsule 11    BANOPHEN 50 MG capsule TAKE ONE CAPSULE BY MOUTH EVERY 6 HOURS AS NEEDED FOR ITCHING 120 capsule 1    spironolactone (ALDACTONE) 25 MG tablet Take 1 tablet by mouth daily 30 tablet 11    insulin detemir (LEVEMIR FLEXTOUCH) 100 UNIT/ML injection pen INJECT 10 UNITS INTO THE SKIN at bedtime 1 Adjustable Dose Pre-filled Pen Syringe 0    atorvastatin (LIPITOR) 80 MG tablet Take 1 tablet by mouth daily 30 tablet 11    pantoprazole (PROTONIX) 40 MG tablet Take 1 tablet by mouth every morning (before breakfast) 30 tablet 11    isosorbide mononitrate (IMDUR) 30 MG extended release tablet Take 1 tablet by mouth daily 30 tablet 11    clopidogrel (PLAVIX) 75 MG tablet Take 1 tablet by mouth daily 30 tablet 11    vitamin D3 (CHOLECALCIFEROL) 25 MCG (1000 UT) TABS tablet Take 1 tablet by mouth daily 90 tablet 3    ferrous sulfate (IRON 325) 325 (65 Fe) MG tablet Take 1 tablet by mouth daily (with breakfast) 30 tablet 11    potassium chloride (KLOR-CON M) 20 MEQ extended release tablet Take 1 tablet by mouth daily 30 tablet 11    amLODIPine (NORVASC) 10 MG tablet Take 1 tablet by mouth daily 30 tablet 11    gabapentin (NEURONTIN) 300 MG capsule Take 1 capsule by mouth nightly for 30 days. 30 capsule 11    alfuzosin (UROXATRAL) 10 MG extended release tablet Take 1 tablet by mouth in the morning.  30 tablet 3    oxybutynin (DITROPAN XL) 10 MG extended release tablet Take 1 tablet by mouth daily as needed (Bladder spasms, stent pain) 30 tablet 3    acetaminophen (TYLENOL) 325 MG tablet Take 650 mg by mouth every 6 hours as needed for Pain      [DISCONTINUED] carvedilol (COREG) 12.5 MG tablet Take 1 tablet by mouth 2 times daily (with meals) 180 tablet 3    vitamin E 400 UNIT capsule Take 1 capsule by mouth daily      apixaban (ELIQUIS) 5 MG TABS tablet Take 1 tablet by mouth 2 times daily 120 tablet 5    prednisoLONE acetate (PRED FORTE) 1 % ophthalmic suspension Place 1 drop into both eyes 3 times daily      insulin aspart (NOVOLOG FLEXPEN) 100 UNIT/ML injection pen Use TID before meals according to scale - max 45 units a day 10 pen 11    Lancets MISC 1 each by Does not apply route 2 times daily 300 each 0    Insulin Pen Needle 32G X 4 MM MISC 5 each by Does not apply route daily 200 each 11    escitalopram (LEXAPRO) 20 MG tablet TAKE ONE TABLET BY MOUTH DAILY 90 tablet 3    Continuous Blood Gluc Sensor (FREESTYLE SYLVIA 14 DAY SENSOR) MISC 1 each by Does not apply route every 14 days 2 each 12    nitroGLYCERIN (NITROSTAT) 0.4 MG SL tablet place 1 tablet under the tongue if needed every 5 minutes if needed for CHEST PAIN 25 tablet 3    Misc. Devices (WALKER) MISC Diagnosis: right 5th metatarsal fracture, pain in limb    Duration: 3 months 1 each 0    Misc. Devices (COMMODE BEDSIDE) MISC 1 Device by Does not apply route daily 1 each 0    Continuous Blood Gluc  (FREESTYLE SYLVIA 14 DAY READER) JAQUELINE 1 each by Does not apply route continuous [facility] 1 Device 11     No current facility-administered medications on file prior to encounter. REVIEW OF SYSTEMS    Review of Systems   Constitutional:  Negative for chills, fatigue and fever. HENT:  Negative for congestion and rhinorrhea. Respiratory:  Negative for cough and shortness of breath. Cardiovascular:  Positive for leg swelling (bilateral). Negative for chest pain. Gastrointestinal:  Negative for diarrhea, nausea and vomiting. Endocrine:        Neuropathy   Musculoskeletal:  Positive for gait problem (wheelchair bound). Skin:  Positive for wound (left heel pressure ulcer). Allergic/Immunologic: Negative for immunocompromised state. Neurological:  Positive for weakness. Negative for dizziness and syncope. Psychiatric/Behavioral:  Negative for agitation. The patient is nervous/anxious (slight).       Objective:      BP (!) 122/48   Pulse 70   Temp 98.3 °F (36.8 °C) (Tympanic)   Resp 16     Wt Readings from Last 3 Encounters:   10/27/22 229 lb (103.9 kg)   10/13/22 229 lb (103.9 kg)   10/06/22 229 lb (103.9 kg)       PHYSICAL EXAM    General Appearance: alert and oriented to person, place and time, well-developed and well-nourished, in no acute distress  Skin: warm and dry, no rash or erythema, left heel ulcer   Head: normocephalic and atraumatic  Eyes: pupils equal, round and conjunctivae normal  Pulmonary/Chest: normal air movement, no respiratory distress  Extremities: no cyanosis and no clubbing. Bilateral lower leg edema   Musculoskeletal: no joint swelling, deformity or tenderness  Neurologic: wheelchair bound, coordination normal and speech normal      Assessment:     Problem List Items Addressed This Visit       Bilateral edema of lower extremity    Relevant Orders    Initiate Outpatient Wound Care Protocol    Obesity (BMI 35.0-39.9 without comorbidity)    Relevant Orders    Initiate Outpatient Wound Care Protocol    Pressure injury of left heel, stage 3 (HCC) - Primary    Relevant Orders    Initiate Outpatient Wound Care Protocol    Diabetic polyneuropathy associated with type 2 diabetes mellitus ([facility]    Relevant Orders    Initiate Outpatient Wound Care Protocol        Procedure Note  Indications:  Based on my examination of this patient's wound(s)/ulcer(s) today, debridement is required to promote healing and evaluate the wound base. Performed by: MAYELA James - CNP    Consent obtained:  Yes    Time out taken:  Yes    Pain Control: Anesthetic  Anesthetic: 2% Lidocaine Gel Topical     Debridement:Excisional Debridement    Using curette, scissors, and forceps the wound(s)/ulcer(s) was/were sharply debrided down through and including the removal of subcutaneous tissue.         Devitalized Tissue Debrided:  fibrin, biofilm, and slough    Pre Debridement Measurements:  Are located in the Reading  Documentation Flow Sheet    Wound/Ulcer #: 1    Post Debridement Measurements:  Wound/Ulcer Descriptions are Pre Debridement except measurements:    Wound 10/06/22 Heel Left #1 left heel (Active)   Wound Image   10/06/22 0907   Wound Etiology Pressure Stage 3 11/03/22 0912   Dressing Status Old drainage noted;New drainage noted 11/03/22 0912   Wound Cleansed Cleansed with saline 11/03/22 0912   Dressing/Treatment Other (comment) 10/27/22 0919   Wound Length (cm) 0.7 cm 11/03/22 0912   Wound Width (cm) 1 cm 11/03/22 0912   Wound Depth (cm) 0.2 cm 11/03/22 0912   Wound Surface Area (cm^2) 0.7 cm^2 11/03/22 0912   Change in Wound Size % (l*w) 51.39 11/03/22 0912   Wound Volume (cm^3) 0.14 cm^3 11/03/22 0912   Wound Healing % 68 11/03/22 0912   Post-Procedure Length (cm) 0.7 cm 11/03/22 0912   Post-Procedure Width (cm) 1 cm 11/03/22 0912   Post-Procedure Depth (cm) 0.2 cm 11/03/22 0912   Post-Procedure Surface Area (cm^2) 0.7 cm^2 11/03/22 0912   Post-Procedure Volume (cm^3) 0.14 cm^3 11/03/22 0912   Wound Assessment Slough;[place]/red 11/03/22 0912   Drainage Amount Moderate 11/03/22 0912   Drainage Description Serosanguinous 11/03/22 0912   Odor None 11/03/22 0912   Danika-wound Assessment Hyperkeratosis (callous) 11/03/22 0912   Margins Defined edges 11/03/22 0912   Number of days: 28          Percent of Wound(s)/Ulcer(s) Debrided: 100%    Total Surface Area Debrided:  0.70 sq cm     Diabetic/Pressure/Non Pressure Ulcers only:  Ulcer: Pressure ulcer, Stage 3    Estimated Blood Loss:  Minimal    Hemostasis Achieved:  by pressure    Procedural Pain:  2  / 10     Post Procedural Pain:  0 / 10     Response to treatment:  Well tolerated by patient. Plan:     Treatment Note please see Discharge Instructions    Written patient dismissal instructions given to patient and signed by patient or POA.            Electronically signed by MAYELA Bean CNP on 11/3/2022 at 9:49 AM

## 2022-11-08 NOTE — DISCHARGE INSTRUCTIONS
[address] [facility] -Phone: [phone] Fax: [phone]    Visit  Discharge Instructions / Physician Orders     DATE: 11/10/2022     Home Care: [facility]     SUPPLIES ORDERED THRU: [facility]     Wound Location: Left Heel     Cleanse with: Liquid antibacterial soap and water, rinse well      Dressing Orders: Antonella to wound-may moisten with saline, ABD, piece of gauze on anterior ankle to pad, Kerlix (may use paper tape to hold kerlix in place-do not wrap tightly), Light Compression ACE Wrap            Float Heels with pillows while in bed-prevent back of heels from being in direct contact with surface     Frequency: Once Daily     Additional Orders: Increase protein to diet (meat, cheese, eggs, fish, peanut butter, nuts and beans)  Float Heels with pillows while in bed or chair-prevent back of heels from being in direct contact with surface  ELEVATE LEGS AS MUCH AS POSSIBLE     Your next appointment with [facility] is in 1 week with Surinder Amanda NP     (Please note your next appointment above and if you are unable to keep, kindly give a 24 hour notice. Thank you.)  If more than 15 min late we cannot guarantee you will be seen due to clinician schedule  Per Policy, Excessive cancellation will call for dismissal from program.     If you experience any of the following, please call the [facility] during business hours:  [phone]  Your Phone call may be forwarded to [facility] during business hours that [facility] is closed. * Increase in Pain  * Temperature over 101  * Increase in drainage from your wound  * Drainage with a foul odor  * Bleeding  * Increase in swelling  * Need for compression bandage changes due to slippage, breakthrough drainage. If you need medical attention outside of the business hours of the [facility] please contact your PCP or go to the nearest emergency room.      The information contained in the After Visit Summary has been reviewed with me, the patient and/or responsible adult, by my health care provider(s). I had the opportunity to ask questions regarding this information.  I have elected to receive;      []After Visit Summary  [x]Comprehensive Discharge Instruction        Patient signature______________________________________Date:________  Electronically signed by Catherine Suazo RN on 11/10/2022 at 11:22 AM  Electronically signed by MAYELA Lopes CNP on 11/10/2022 at 11:28 AM

## 2022-11-10 ENCOUNTER — HOSPITAL ENCOUNTER (OUTPATIENT)
Dept: WOUND CARE | Age: 77
Discharge: HOME | End: 2022-11-10
Payer: MEDICARE

## 2022-11-10 ENCOUNTER — HOSPITAL ENCOUNTER (OUTPATIENT)
Dept: VASCULAR LAB | Age: 77
Discharge: HOME | End: 2022-11-10
Payer: MEDICARE

## 2022-11-10 VITALS
HEART RATE: 52 BPM | TEMPERATURE: 98.7 F | HEIGHT: 66 IN | WEIGHT: 229 LBS | DIASTOLIC BLOOD PRESSURE: 42 MMHG | RESPIRATION RATE: 18 BRPM | BODY MASS INDEX: 36.8 KG/M2 | SYSTOLIC BLOOD PRESSURE: 124 MMHG

## 2022-11-10 DIAGNOSIS — E11.42 DIABETIC POLYNEUROPATHY ASSOCIATED WITH TYPE 2 DIABETES MELLITUS (HCC): ICD-10-CM

## 2022-11-10 DIAGNOSIS — R60.0 BILATERAL EDEMA OF LOWER EXTREMITY: ICD-10-CM

## 2022-11-10 DIAGNOSIS — R09.89 DECREASED PEDAL PULSES: ICD-10-CM

## 2022-11-10 DIAGNOSIS — E66.9 OBESITY (BMI 35.0-39.9 WITHOUT COMORBIDITY): ICD-10-CM

## 2022-11-10 DIAGNOSIS — L89.623 PRESSURE INJURY OF LEFT HEEL, STAGE 3 (HCC): Primary | ICD-10-CM

## 2022-11-10 PROCEDURE — 93923 UPR/LXTR ART STDY 3+ LVLS: CPT

## 2022-11-10 PROCEDURE — 93970 EXTREMITY STUDY: CPT

## 2022-11-10 PROCEDURE — 11042 DBRDMT SUBQ TIS 1ST 20SQCM/<: CPT

## 2022-11-10 PROCEDURE — 11042 DBRDMT SUBQ TIS 1ST 20SQCM/<: CPT | Performed by: NURSE PRACTITIONER

## 2022-11-10 RX ORDER — LIDOCAINE HYDROCHLORIDE 20 MG/ML
JELLY TOPICAL ONCE
Status: CANCELLED | OUTPATIENT
Start: 2022-11-10 | End: 2022-11-10

## 2022-11-10 RX ORDER — LIDOCAINE HYDROCHLORIDE 20 MG/ML
JELLY TOPICAL ONCE
Status: COMPLETED | OUTPATIENT
Start: 2022-11-10 | End: 2022-11-10

## 2022-11-10 RX ORDER — LIDOCAINE HYDROCHLORIDE 40 MG/ML
SOLUTION TOPICAL ONCE
Status: CANCELLED | OUTPATIENT
Start: 2022-11-10 | End: 2022-11-10

## 2022-11-10 RX ADMIN — LIDOCAINE HYDROCHLORIDE 6 ML: 20 JELLY TOPICAL at 10:56

## 2022-11-10 ASSESSMENT — ENCOUNTER SYMPTOMS
COUGH: 0
SHORTNESS OF BREATH: 0
RHINORRHEA: 0
VOMITING: 0
DIARRHEA: 0
NAUSEA: 0

## 2022-11-10 NOTE — PROGRESS NOTES
[address] [facility] [address]:     [facility]   [facility]  Phone: [phone]   Fax: [phone]      Ordering Center:     [facility] [address]  [place] [address]  [phone]  WOUND CARE Dept: [address] [place] [address] Avenue NUMBER [phone]    Patient Information:      [address]  [address] [facility]   [phone]   : 1945  AGE: 68 y.o.      GENDER: female   TODAYS DATE:  11/10/2022    Insurance:      PRIMARY INSURANCE:  [employer] PPO  Coverage: AET[employer] MEDICARE  Effective Date: 2022  [id number] - (Medicare Managed)  Group: [id number]    Patient Wound Information:      Diagnoses       Codes Comments   Pressure injury of left heel, stage 3 ([facility] [address]  - Primary N08.326     Bilateral edema of lower extremity  R60.0     Obesity (BMI 35.0-39.9 without comorbidity)  E66.9     Diabetic polyneuropathy associated with type 2 diabetes mellitus ([facility] [address]  E11.42        WOUNDS REQUIRING DRESSING SUPPLIES:     Wound 10/06/22 Heel Left #1 left heel (Active)   Wound Image   10/06/22 0907   Wound Etiology Pressure Stage 3 11/10/22 1047   Dressing Status Old drainage noted;New drainage noted 11/10/22 1047   Wound Cleansed Cleansed with saline 11/10/22 104   Dressing/Treatment Other (comment) 10/27/22 0919   Wound Length (cm) 0.9 cm 11/10/22 1047   Wound Width (cm) 0.9 cm 11/10/22 1047   Wound Depth (cm) 0.2 cm 11/10/22 1047   Wound Surface Area (cm^2) 0.81 cm^2 11/10/22 104   Change in Wound Size % (l*w) 43.75 11/10/22 1047   Wound Volume (cm^3) 0.162 cm^3 11/10/22 1047   Wound Healing % 63 11/10/22 1047   Post-Procedure Length (cm) 0.9 cm 11/10/22 1047   Post-Procedure Width (cm) 0.9 cm 11/10/22 1047   Post-Procedure Depth (cm) 0.2 cm 11/10/22 1047   Post-Procedure Surface Area (cm^2) 0.81 cm^2 11/10/22 1047   Post-Procedure Volume (cm^3) 0.162 cm^3 11/10/22 1047   Wound Assessment Slough 11/10/22 1047 Drainage Amount Moderate 11/10/22 1047   Drainage Description Serosanguinous 11/10/22 1047   Odor None 11/10/22 1047   Danika-wound Assessment Hyperkeratosis (callous) 11/10/22 1047   Margins Defined edges 11/10/22 1047   Number of days: 35          Supplies Requested :      WOUND #: 1   PRIMARY DRESSING:  Collagen with silver-Antonella   Cover and Secure with: ABD pad  Bulky roll gauze  Ace wrap     FREQUENCY OF DRESSING CHANGES:  Daily     ADDITIONAL ITEMS:  [] Gloves Small  [x] Gloves Medium [] Gloves Large [] Gloves XLarge  [] Tape 1\" [x] Paper Tape 2\" [] Tape 3\"  [] Medipore Tape  [x] Saline  [] Skin Prep   [] Adhesive Remover   [] Cotton Tip Applicators   [] Other:    Patient Wound(s) Debrided: [x] Yes   [] No    Debribement Type: subcutaneous tissue    Debridement Date: 11/10/2022    Patient currently being seen by Home Health: [x] Yes   [] No    Duration for needed supplies:  []15  [x]30  []60  []90 Days    Provider Information:      PROVIDER NAME: MALISSA Mckay  NPI: [id number]

## 2022-11-10 NOTE — PROGRESS NOTES
[address]   Progress Note and Procedure Note      [address] [facility], [place] RECORD NUMBER:  [id number]  AGE: 68 y.o. GENDER: female  : 1945  EPISODE DATE:  11/10/2022    Subjective:     Chief Complaint   Patient presents with    Wound Check     Left heel         HISTORY of PRESENT ILLNESS HPI     Holley Bourgeois is a 68 y.o. female who presents today for wound/ulcer evaluation. History of Wound Context: here to follow up on left heel pressure ulceration. Wound continues to heal well. [facility]. Here with son today. Wound/Ulcer Pain Timing/Severity: intermittent  Quality of pain: sharp  Severity:  2  10   Modifying Factors: None  Associated Signs/Symptoms: none    Ulcer Identification:  Ulcer Type: pressure  Contributing Factors: edema, venous stasis, diabetes, poor glucose control, chronic pressure, decreased mobility, shear force, obesity, and arterial insufficiency         PAST MEDICAL HISTORY        Diagnosis Date    Acute renal failure with tubular necrosis ([address] 2021    Secondary to ischemic ATN post fall intravascularly depletion hypotension and low flow baseline creatinine 1.2-1.4 peaked up to 2.1 during her hospitalization in 2020. Work-up showed benign urine sediment, kidney size to be 11.2 on the right 15.1 on the left serological work-up negative.     Anxiety     Atrial fibrillation (HCC)     chronic-takes xarelto    Benign positional vertigo     CAD (coronary artery disease)     Chest pain     CKD (chronic kidney disease), stage III ([address] 2021    Secondary to ischemic and diabetic nephrosclerosis baseline 1.2-1.4    Depression     Diabetes mellitus ([address]     Diabetic neuropathy ([address]     Dysuria 2021    Hyperlipidemia     Hypertension     Migraine     Migraine headaches aggravated with sublingual nitroglycerin    Persistent proteinuria 2022    From diabetic nephrosclerosis UPC 1.0    Suspected COVID-19 virus infection 7/13/2020       PAST SURGICAL HISTORY    Past Surgical History:   Procedure Laterality Date    APPENDECTOMY      CARDIAC CATHETERIZATION      2012    CORONARY ANGIOPLASTY WITH STENT PLACEMENT  02/25/2017    4 SYNERGY HEART STENTS DRUG ELUTING ALL MRI CONDITONAL 3T OK, SAFE IMMEDIATELY. CORONARY ANGIOPLASTY WITH STENT PLACEMENT  07/11/2012    XIENCE HEART STENT/ MRI CONDITIONAL 3T OK, SAFE IMMEDIATELY    CORONARY ANGIOPLASTY WITH STENT PLACEMENT  12/11/2020    CYSTOSCOPY Right 08/12/2022    HOLMIUM, CYSTOSCOPY, URETEROSCOPY, STENT PLACEMENT    PTCA      URETER SURGERY Right 8/12/2022    HOLMIUM, CYSTOSCOPY, URETEROSCOPY, STENT PLACEMENT performed by Thu Dimas MD at [address] [place] [address]    Family History   Problem Relation Age of Onset    Cancer Mother     Cancer Father        SOCIAL HISTORY    Social History     Tobacco Use    Smoking status: Never    Smokeless tobacco: Never   Substance Use Topics    Alcohol use: No    Drug use: No       ALLERGIES    Allergies   Allergen Reactions    Penicillins      Other reaction(s): Hives    Pregabalin     Sulfa Antibiotics      Other reaction(s): Rash       MEDICATIONS    Current Outpatient Medications on File Prior to Encounter   Medication Sig Dispense Refill    sodium chloride (CURITY STERILE SALINE) 0.9 % irrigation Use daily with dressing changes 500 mL 1    lisinopril (PRINIVIL;ZESTRIL) 10 MG tablet TAKE ONE TABLET BY MOUTH DAILY 30 tablet 11    clopidogrel (PLAVIX) Take 5 mg by mouth daily      gabapentin (NEURONTIN) 100 MG capsule Take 1 capsule by mouth in the morning and at bedtime for 30 days.  60 capsule 11    BANOPHEN 50 MG capsule TAKE ONE CAPSULE BY MOUTH EVERY 6 HOURS AS NEEDED FOR ITCHING 120 capsule 1    spironolactone (ALDACTONE) 25 MG tablet Take 1 tablet by mouth daily 30 tablet 11    insulin detemir (LEVEMIR FLEXTOUCH) 100 UNIT/ML injection pen INJECT 10 UNITS INTO THE SKIN at bedtime 1 Adjustable Dose Pre-filled Pen Syringe 0    atorvastatin (LIPITOR) 80 MG tablet Take 1 tablet by mouth daily 30 tablet 11    pantoprazole (PROTONIX) 40 MG tablet Take 1 tablet by mouth every morning (before breakfast) 30 tablet 11    isosorbide mononitrate (IMDUR) 30 MG extended release tablet Take 1 tablet by mouth daily 30 tablet 11    clopidogrel (PLAVIX) 75 MG tablet Take 1 tablet by mouth daily 30 tablet 11    vitamin D3 (CHOLECALCIFEROL) 25 MCG (1000 UT) TABS tablet Take 1 tablet by mouth daily 90 tablet 3    ferrous sulfate (IRON 325) 325 (65 Fe) MG tablet Take 1 tablet by mouth daily (with breakfast) 30 tablet 11    potassium chloride (KLOR-CON M) 20 MEQ extended release tablet Take 1 tablet by mouth daily 30 tablet 11    amLODIPine (NORVASC) 10 MG tablet Take 1 tablet by mouth daily 30 tablet 11    gabapentin (NEURONTIN) 300 MG capsule Take 1 capsule by mouth nightly for 30 days. 30 capsule 11    alfuzosin (UROXATRAL) 10 MG extended release tablet Take 1 tablet by mouth in the morning.  30 tablet 3    oxybutynin (DITROPAN XL) 10 MG extended release tablet Take 1 tablet by mouth daily as needed (Bladder spasms, stent pain) 30 tablet 3    acetaminophen (TYLENOL) 325 MG tablet Take 650 mg by mouth every 6 hours as needed for Pain      [DISCONTINUED] carvedilol (COREG) 12.5 MG tablet Take 1 tablet by mouth 2 times daily (with meals) 180 tablet 3    vitamin E 400 UNIT capsule Take 1 capsule by mouth daily      apixaban (ELIQUIS) 5 MG TABS tablet Take 1 tablet by mouth 2 times daily 120 tablet 5    prednisoLONE acetate (PRED FORTE) 1 % ophthalmic suspension Place 1 drop into both eyes 3 times daily      insulin aspart (NOVOLOG FLEXPEN) 100 UNIT/ML injection pen Use TID before meals according to scale - max 45 units a day 10 pen 11    Lancets MISC 1 each by Does not apply route 2 times daily 300 each 0    Insulin Pen Needle 32G X 4 MM MISC 5 each by Does not apply route daily 200 each 11    escitalopram (LEXAPRO) 20 MG tablet TAKE ONE TABLET BY MOUTH DAILY 90 tablet 3    Continuous Blood Gluc Sensor (FREESTYLE SYLVIA 14 DAY SENSOR) MISC 1 each by Does not apply route every 14 days 2 each 12    nitroGLYCERIN (NITROSTAT) 0.4 MG SL tablet place 1 tablet under the tongue if needed every 5 minutes if needed for CHEST PAIN 25 tablet 3    Misc. Devices (WALKER) MISC Diagnosis: right 5th metatarsal fracture, pain in limb    Duration: 3 months 1 each 0    Misc. Devices (COMMODE BEDSIDE) MISC 1 Device by Does not apply route daily 1 each 0    Continuous Blood Gluc  (FREESTYLE SYLVIA 14 DAY READER) JAQUELINE 1 each by Does not apply route continuous [facility] 1 Device 11     No current facility-administered medications on file prior to encounter. REVIEW OF SYSTEMS    Review of Systems   Constitutional:  Negative for chills, fatigue and fever. HENT:  Negative for congestion and rhinorrhea. Respiratory:  Negative for cough and shortness of breath. Cardiovascular:  Positive for leg swelling (bilateral). Negative for chest pain. Gastrointestinal:  Negative for diarrhea, nausea and vomiting. Endocrine:        Neuropathy   Musculoskeletal:  Positive for gait problem (wheelchair bound). Skin:  Positive for wound (left heel pressure ulcer). Allergic/Immunologic: Negative for immunocompromised state. Neurological:  Positive for weakness. Negative for dizziness and syncope. Psychiatric/Behavioral:  Negative for agitation. The patient is nervous/anxious (slight).       Objective:      BP (!) 124/42   Pulse 52   Temp 98.7 °F (37.1 °C) (Tympanic)   Resp 18   Ht 5' 6\" (1.676 m)   Wt 229 lb (103.9 kg)   BMI 36.96 kg/m²     Wt Readings from Last 3 Encounters:   11/10/22 229 lb (103.9 kg)   10/27/22 229 lb (103.9 kg)   10/13/22 229 lb (103.9 kg)       PHYSICAL EXAM    General Appearance: alert and oriented to person, place and time, well-developed and well-nourished, in no acute distress  Skin: warm and dry, no rash or erythema, left heel ulcer   Head: normocephalic and atraumatic  Eyes: pupils equal, round and conjunctivae normal  Pulmonary/Chest: normal air movement, no respiratory distress  Extremities: no cyanosis and no clubbing. Bilateral lower leg edema   Musculoskeletal: no joint swelling, deformity or tenderness  Neurologic: wheelchair dependent, coordination normal and speech normal      Assessment:     Problem List Items Addressed This Visit       Bilateral edema of lower extremity    Relevant Orders    Initiate Outpatient Wound Care Protocol    Obesity (BMI 35.0-39.9 without comorbidity)    Relevant Orders    Initiate Outpatient Wound Care Protocol    Pressure injury of left heel, stage 3 (HCC) - Primary    Relevant Orders    Initiate Outpatient Wound Care Protocol    Diabetic polyneuropathy associated with type 2 diabetes mellitus ([facility]    Relevant Orders    Initiate Outpatient Wound Care Protocol        Procedure Note  Indications:  Based on my examination of this patient's wound(s)/ulcer(s) today, debridement is required to promote healing and evaluate the wound base. Performed by: MAYELA Tan - CNP    Consent obtained:  Yes    Time out taken:  Yes    Pain Control: Anesthetic  Anesthetic: 2% Lidocaine Gel Topical     Debridement:Excisional Debridement    Using curette, scissors, and forceps the wound(s)/ulcer(s) was/were sharply debrided down through and including the removal of subcutaneous tissue.         Devitalized Tissue Debrided:  fibrin, biofilm, and slough    Pre Debridement Measurements:  Are located in the [place]  Documentation Flow Sheet    Wound/Ulcer #: 1    Post Debridement Measurements:  Wound/Ulcer Descriptions are Pre Debridement except measurements:    Wound 10/06/22 Heel Left #1 left heel (Active)   Wound Image   10/06/22 0907   Wound Etiology Pressure Stage 3 11/10/22 1047   Dressing Status Old drainage noted;New drainage noted 11/10/22 1047   Wound Cleansed Cleansed with saline 11/10/22 1047 Dressing/Treatment Other (comment) 10/27/22 0919   Wound Length (cm) 0.9 cm 11/10/22 1047   Wound Width (cm) 0.9 cm 11/10/22 1047   Wound Depth (cm) 0.2 cm 11/10/22 1047   Wound Surface Area (cm^2) 0.81 cm^2 11/10/22 1047   Change in Wound Size % (l*w) 43.75 11/10/22 1047   Wound Volume (cm^3) 0.162 cm^3 11/10/22 1047   Wound Healing % 63 11/10/22 1047   Post-Procedure Length (cm) 0.9 cm 11/10/22 1047   Post-Procedure Width (cm) 0.9 cm 11/10/22 1047   Post-Procedure Depth (cm) 0.2 cm 11/10/22 1047   Post-Procedure Surface Area (cm^2) 0.81 cm^2 11/10/22 1047   Post-Procedure Volume (cm^3) 0.162 cm^3 11/10/22 1047   Wound Assessment Slough 11/10/22 1047   Drainage Amount Moderate 11/10/22 1047   Drainage Description Serosanguinous 11/10/22 1047   Odor None 11/10/22 1047   Danika-wound Assessment Hyperkeratosis (callous) 11/10/22 1047   Margins Defined edges 11/10/22 1047   Number of days: 35          Percent of Wound(s)/Ulcer(s) Debrided: 100%    Total Surface Area Debrided:  0.81 sq cm     Diabetic/Pressure/Non Pressure Ulcers only:  Ulcer: Pressure ulcer, Stage 3    Estimated Blood Loss:  Minimal    Hemostasis Achieved:  by pressure    Procedural Pain:  2  / 10     Post Procedural Pain:  0 / 10     Response to treatment:  Well tolerated by patient. Plan:     Treatment Note please see Discharge Instructions    Written patient dismissal instructions given to patient and signed by patient or POA.            Electronically signed by MAYELA Lugo CNP on 11/10/2022 at 11:35 AM

## 2022-11-17 ENCOUNTER — HOSPITAL ENCOUNTER (OUTPATIENT)
Dept: WOUND CARE | Age: 77
Discharge: HOME | End: 2022-11-17
Payer: MEDICARE

## 2022-11-17 VITALS
DIASTOLIC BLOOD PRESSURE: 46 MMHG | SYSTOLIC BLOOD PRESSURE: 120 MMHG | BODY MASS INDEX: 36.64 KG/M2 | HEIGHT: 66 IN | RESPIRATION RATE: 16 BRPM | WEIGHT: 228 LBS | TEMPERATURE: 99.2 F | HEART RATE: 73 BPM

## 2022-11-17 DIAGNOSIS — R60.0 BILATERAL EDEMA OF LOWER EXTREMITY: ICD-10-CM

## 2022-11-17 DIAGNOSIS — E66.9 OBESITY (BMI 35.0-39.9 WITHOUT COMORBIDITY): ICD-10-CM

## 2022-11-17 DIAGNOSIS — L89.623 PRESSURE INJURY OF LEFT HEEL, STAGE 3 (HCC): Primary | ICD-10-CM

## 2022-11-17 DIAGNOSIS — E11.42 DIABETIC POLYNEUROPATHY ASSOCIATED WITH TYPE 2 DIABETES MELLITUS (HCC): ICD-10-CM

## 2022-11-17 PROCEDURE — 11042 DBRDMT SUBQ TIS 1ST 20SQCM/<: CPT

## 2022-11-17 PROCEDURE — 11042 DBRDMT SUBQ TIS 1ST 20SQCM/<: CPT | Performed by: NURSE PRACTITIONER

## 2022-11-17 RX ORDER — LIDOCAINE HYDROCHLORIDE 20 MG/ML
JELLY TOPICAL ONCE
OUTPATIENT
Start: 2022-11-17 | End: 2022-11-17

## 2022-11-17 RX ORDER — LIDOCAINE HYDROCHLORIDE 20 MG/ML
JELLY TOPICAL ONCE
Status: CANCELLED | OUTPATIENT
Start: 2022-11-17 | End: 2022-11-17

## 2022-11-17 RX ORDER — LIDOCAINE HYDROCHLORIDE 20 MG/ML
JELLY TOPICAL ONCE
Status: COMPLETED | OUTPATIENT
Start: 2022-11-17 | End: 2022-11-17

## 2022-11-17 RX ORDER — LIDOCAINE HYDROCHLORIDE 40 MG/ML
SOLUTION TOPICAL ONCE
OUTPATIENT
Start: 2022-11-17 | End: 2022-11-17

## 2022-11-17 RX ADMIN — LIDOCAINE HYDROCHLORIDE 6 ML: 20 JELLY TOPICAL at 09:27

## 2022-11-17 ASSESSMENT — PAIN DESCRIPTION - ORIENTATION: ORIENTATION: LEFT

## 2022-11-17 ASSESSMENT — PAIN SCALES - GENERAL: PAINLEVEL_OUTOF10: 1

## 2022-11-17 ASSESSMENT — ENCOUNTER SYMPTOMS
DIARRHEA: 0
RHINORRHEA: 0
VOMITING: 0
NAUSEA: 0
COUGH: 0
SHORTNESS OF BREATH: 0

## 2022-11-17 ASSESSMENT — PAIN DESCRIPTION - FREQUENCY: FREQUENCY: INTERMITTENT

## 2022-11-17 ASSESSMENT — PAIN DESCRIPTION - PAIN TYPE: TYPE: CHRONIC PAIN

## 2022-11-17 ASSESSMENT — PAIN DESCRIPTION - DESCRIPTORS: DESCRIPTORS: NAGGING

## 2022-11-17 ASSESSMENT — PAIN DESCRIPTION - LOCATION: LOCATION: FOOT

## 2022-11-17 NOTE — PROGRESS NOTES
Migraine headaches aggravated with sublingual nitroglycerin    Persistent proteinuria 7/20/2022    From diabetic nephrosclerosis UPC 1.0    Suspected COVID-19 virus infection 7/13/2020       PAST SURGICAL HISTORY    Past Surgical History:   Procedure Laterality Date    APPENDECTOMY      CARDIAC CATHETERIZATION      2012    CORONARY ANGIOPLASTY WITH STENT PLACEMENT  02/25/2017    4 SYNERGY HEART STENTS DRUG ELUTING ALL MRI CONDITONAL 3T OK, SAFE IMMEDIATELY. CORONARY ANGIOPLASTY WITH STENT PLACEMENT  07/11/2012    XIENCE HEART STENT/ MRI CONDITIONAL 3T OK, SAFE IMMEDIATELY    CORONARY ANGIOPLASTY WITH STENT PLACEMENT  12/11/2020    CYSTOSCOPY Right 08/12/2022    HOLMIUM, CYSTOSCOPY, URETEROSCOPY, STENT PLACEMENT    PTCA      URETER SURGERY Right 8/12/2022    HOLMIUM, CYSTOSCOPY, URETEROSCOPY, STENT PLACEMENT performed by Noris Van MD at [facility] HISTORY    Family History   Problem Relation Age of Onset    Cancer Mother     Cancer Father        SOCIAL HISTORY    Social History     Tobacco Use    Smoking status: Never    Smokeless tobacco: Never   Substance Use Topics    Alcohol use: No    Drug use: No       ALLERGIES    Allergies   Allergen Reactions    Penicillins      Other reaction(s): Hives    Pregabalin     Sulfa Antibiotics      Other reaction(s): Rash       MEDICATIONS    Current Outpatient Medications on File Prior to Encounter   Medication Sig Dispense Refill    lisinopril (PRINIVIL;ZESTRIL) 10 MG tablet TAKE ONE TABLET BY MOUTH DAILY 30 tablet 11    gabapentin (NEURONTIN) 100 MG capsule Take 1 capsule by mouth in the morning and at bedtime for 30 days.  60 capsule 11    BANOPHEN 50 MG capsule TAKE ONE CAPSULE BY MOUTH EVERY 6 HOURS AS NEEDED FOR ITCHING 120 capsule 1    spironolactone (ALDACTONE) 25 MG tablet Take 1 tablet by mouth daily 30 tablet 11    insulin detemir (LEVEMIR FLEXTOUCH) 100 UNIT/ML injection pen INJECT 10 UNITS INTO THE SKIN at bedtime 1 Adjustable Dose Pre-filled Pen Syringe 0    atorvastatin (LIPITOR) 80 MG tablet Take 1 tablet by mouth daily 30 tablet 11    pantoprazole (PROTONIX) 40 MG tablet Take 1 tablet by mouth every morning (before breakfast) 30 tablet 11    isosorbide mononitrate (IMDUR) 30 MG extended release tablet Take 1 tablet by mouth daily 30 tablet 11    clopidogrel (PLAVIX) 75 MG tablet Take 1 tablet by mouth daily 30 tablet 11    vitamin D3 (CHOLECALCIFEROL) 25 MCG (1000 UT) TABS tablet Take 1 tablet by mouth daily 90 tablet 3    ferrous sulfate (IRON 325) 325 (65 Fe) MG tablet Take 1 tablet by mouth daily (with breakfast) 30 tablet 11    potassium chloride (KLOR-CON M) 20 MEQ extended release tablet Take 1 tablet by mouth daily 30 tablet 11    amLODIPine (NORVASC) 10 MG tablet Take 1 tablet by mouth daily 30 tablet 11    gabapentin (NEURONTIN) 300 MG capsule Take 1 capsule by mouth nightly for 30 days. 30 capsule 11    alfuzosin (UROXATRAL) 10 MG extended release tablet Take 1 tablet by mouth in the morning.  30 tablet 3    oxybutynin (DITROPAN XL) 10 MG extended release tablet Take 1 tablet by mouth daily as needed (Bladder spasms, stent pain) 30 tablet 3    acetaminophen (TYLENOL) 325 MG tablet Take 650 mg by mouth every 6 hours as needed for Pain      [DISCONTINUED] carvedilol (COREG) 12.5 MG tablet Take 1 tablet by mouth 2 times daily (with meals) 180 tablet 3    vitamin E 400 UNIT capsule Take 1 capsule by mouth daily      apixaban (ELIQUIS) 5 MG TABS tablet Take 1 tablet by mouth 2 times daily 120 tablet 5    prednisoLONE acetate (PRED FORTE) 1 % ophthalmic suspension Place 1 drop into both eyes 3 times daily      insulin aspart (NOVOLOG FLEXPEN) 100 UNIT/ML injection pen Use TID before meals according to scale - max 45 units a day 10 pen 11    Lancets MISC 1 each by Does not apply route 2 times daily 300 each 0    Insulin Pen Needle 32G X 4 MM MISC 5 each by Does not apply route daily 200 each 11    escitalopram (LEXAPRO) 20 MG tablet TAKE ONE TABLET BY MOUTH DAILY 90 tablet 3    Continuous Blood Gluc Sensor (FREESTYLE SYLVIA 14 DAY SENSOR) MISC 1 each by Does not apply route every 14 days 2 each 12    nitroGLYCERIN (NITROSTAT) 0.4 MG SL tablet place 1 tablet under the tongue if needed every 5 minutes if needed for CHEST PAIN 25 tablet 3    Misc. Devices (WALKER) MISC Diagnosis: right 5th metatarsal fracture, pain in limb    Duration: 3 months 1 each 0    Misc. Devices (COMMODE BEDSIDE) MISC 1 Device by Does not apply route daily 1 each 0    Continuous Blood Gluc  (FREESTYLE SYLVIA 14 DAY READER) JAQUELINE 1 each by Does not apply route continuous [facility] [address] 1 Device 11     No current facility-administered medications on file prior to encounter. REVIEW OF SYSTEMS    Review of Systems   Constitutional:  Negative for chills, fatigue and fever. HENT:  Negative for congestion and rhinorrhea. Respiratory:  Negative for cough and shortness of breath. Cardiovascular:  Positive for leg swelling (bilateral). Negative for chest pain. Gastrointestinal:  Negative for diarrhea, nausea and vomiting. Endocrine:        Neuropathy   Musculoskeletal:  Positive for gait problem (wheelchair bound). Skin:  Positive for wound (left heel pressure ulcer). Allergic/Immunologic: Negative for immunocompromised state. Neurological:  Positive for weakness. Negative for dizziness and syncope. Psychiatric/Behavioral:  Negative for agitation. The patient is nervous/anxious (slight).       Objective:      BP (!) 120/46   Pulse 73   Temp 99.2 °F (37.3 °C) (Tympanic)   Resp 16   Ht 5' 6\" (1.676 m)   Wt 228 lb (103.4 kg)   BMI 36.80 kg/m²     Wt Readings from Last 3 Encounters:   11/17/22 228 lb (103.4 kg)   11/16/22 228 lb (103.4 kg)   11/10/22 229 lb (103.9 kg)       PHYSICAL EXAM    General Appearance: alert and oriented to person, place and time, well-developed and obese, in no acute distress  Skin: warm and dry, no rash or erythema, left heel ulceration   Head: normocephalic and atraumatic  Eyes: pupils equal, round and conjunctivae normal  Pulmonary/Chest: normal air movement, no respiratory distress  Extremities: no cyanosis and no clubbing. Bilateral lower leg edema   Musculoskeletal: no joint swelling, deformity or tenderness  Neurologic: wheelchair dependent, coordination normal and speech normal      Assessment:     Problem List Items Addressed This Visit       Bilateral edema of lower extremity    Relevant Orders    Initiate Outpatient Wound Care Protocol    Obesity (BMI 35.0-39.9 without comorbidity)    Relevant Orders    Initiate Outpatient Wound Care Protocol    Pressure injury of left heel, stage 3 (HCC) - Primary    Relevant Orders    Initiate Outpatient Wound Care Protocol    Diabetic polyneuropathy associated with type 2 diabetes mellitus ([facility] [address] [facility]    Relevant Orders    Initiate Outpatient Wound Care Protocol        Procedure Note  Indications:  Based on my examination of this patient's wound(s)/ulcer(s) today, debridement is required to promote healing and evaluate the wound base. Performed by: MAYELA Guzman - CNP    Consent obtained:  Yes    Time out taken:  Yes    Pain Control: Anesthetic  Anesthetic: 2% Lidocaine Gel Topical     Debridement:Excisional Debridement    Using curette the wound(s)/ulcer(s) was/were sharply debrided down through and including the removal of subcutaneous tissue.         Devitalized Tissue Debrided:  fibrin, biofilm, slough, and callus    Pre Debridement Measurements:  Are located in the [place]  Documentation Flow Sheet    Wound/Ulcer #: 1    Post Debridement Measurements:  Wound/Ulcer Descriptions are Pre Debridement except measurements:    Wound 10/06/22 Heel Left #1 left heel (Active)   Wound Image   10/06/22 0907   Wound Etiology Pressure Stage 3 11/17/22 0918   Dressing Status Old drainage noted;New drainage noted 11/17/22 0918   Wound Cleansed Cleansed with saline 11/17/22 0180   Dressing/Treatment Other (comment) 10/27/22 0919   Wound Length (cm) 0 cm 11/17/22 0918   Wound Width (cm) 0 cm 11/17/22 0918   Wound Depth (cm) 0 cm 11/17/22 0918   Wound Surface Area (cm^2) 0 cm^2 11/17/22 0918   Change in Wound Size % (l*w) 100 11/17/22 0918   Wound Volume (cm^3) 0 cm^3 11/17/22 0918   Wound Healing % 100 11/17/22 0918   Post-Procedure Length (cm) 1.2 cm 11/17/22 0918   Post-Procedure Width (cm) 1 cm 11/17/22 0918   Post-Procedure Depth (cm) 0.3 cm 11/17/22 0918   Post-Procedure Surface Area (cm^2) 1.2 cm^2 11/17/22 0918   Post-Procedure Volume (cm^3) 0.36 cm^3 11/17/22 0918   Wound Assessment Slough 11/17/22 0918   Drainage Amount Moderate 11/17/22 0918   Drainage Description Serosanguinous 11/17/22 0918   Odor None 11/17/22 0918   Danika-wound Assessment Hyperkeratosis (callous) 11/17/22 0918   Margins Defined edges 11/17/22 0918   Number of days: 42          Percent of Wound(s)/Ulcer(s) Debrided: 100%    Total Surface Area Debrided:  1.20 sq cm     Diabetic/Pressure/Non Pressure Ulcers only:  Ulcer: Pressure ulcer, Stage 3    Estimated Blood Loss:  Minimal    Hemostasis Achieved:  by pressure    Procedural Pain:  2  / 10     Post Procedural Pain:  0 / 10     Response to treatment:  Well tolerated by patient. Plan:     Treatment Note please see Discharge Instructions    Written patient dismissal instructions given to patient and signed by patient or POA.            Electronically signed by MAYELA Valdes CNP on 11/17/2022 at 10:00 AM

## 2022-11-23 ENCOUNTER — HOSPITAL ENCOUNTER (OUTPATIENT)
Age: 77
Setting detail: SPECIMEN
Discharge: HOME | End: 2022-11-23
Payer: MEDICARE

## 2022-11-23 DIAGNOSIS — R30.0 DYSURIA: ICD-10-CM

## 2022-11-23 DIAGNOSIS — E11.22 TYPE 2 DIABETES MELLITUS WITH STAGE 3 CHRONIC KIDNEY DISEASE, WITH LONG-TERM CURRENT USE OF INSULIN, UNSPECIFIED WHETHER STAGE 3A OR 3B CKD (HCC): ICD-10-CM

## 2022-11-23 DIAGNOSIS — Z79.4 TYPE 2 DIABETES MELLITUS WITH STAGE 3 CHRONIC KIDNEY DISEASE, WITH LONG-TERM CURRENT USE OF INSULIN, UNSPECIFIED WHETHER STAGE 3A OR 3B CKD (HCC): ICD-10-CM

## 2022-11-23 DIAGNOSIS — N17.0 ACUTE RENAL FAILURE WITH TUBULAR NECROSIS (HCC): ICD-10-CM

## 2022-11-23 DIAGNOSIS — R80.1 PERSISTENT PROTEINURIA: ICD-10-CM

## 2022-11-23 DIAGNOSIS — N18.31 STAGE 3A CHRONIC KIDNEY DISEASE (HCC): ICD-10-CM

## 2022-11-23 DIAGNOSIS — N18.30 TYPE 2 DIABETES MELLITUS WITH STAGE 3 CHRONIC KIDNEY DISEASE, WITH LONG-TERM CURRENT USE OF INSULIN, UNSPECIFIED WHETHER STAGE 3A OR 3B CKD (HCC): ICD-10-CM

## 2022-11-23 LAB
ALBUMIN SERPL-MCNC: 3.9 G/DL (ref 3.5–5.2)
ANION GAP SERPL CALCULATED.3IONS-SCNC: 13 MMOL/L (ref 9–17)
BACTERIA: ABNORMAL
BILIRUBIN URINE: NEGATIVE
BUN BLDV-MCNC: 27 MG/DL (ref 8–23)
CALCIUM SERPL-MCNC: 8.9 MG/DL (ref 8.6–10.4)
CASTS UA: ABNORMAL /LPF (ref 0–8)
CHLORIDE BLD-SCNC: 100 MMOL/L (ref 98–107)
CO2: 21 MMOL/L (ref 20–31)
COLOR: YELLOW
CREAT SERPL-MCNC: 1.36 MG/DL (ref 0.5–0.9)
CREATININE URINE: 95.9 MG/DL (ref 28–217)
EPITHELIAL CELLS UA: ABNORMAL /HPF (ref 0–5)
GFR SERPL CREATININE-BSD FRML MDRD: 40 ML/MIN/1.73M2
GLUCOSE BLD-MCNC: 311 MG/DL (ref 70–99)
GLUCOSE URINE: ABNORMAL
HCT VFR BLD CALC: 27.9 % (ref 36.3–47.1)
HEMOGLOBIN: 8.6 G/DL (ref 11.9–15.1)
KETONES, URINE: NEGATIVE
LEUKOCYTE ESTERASE, URINE: ABNORMAL
MCH RBC QN AUTO: 29.7 PG (ref 25.2–33.5)
MCHC RBC AUTO-ENTMCNC: 30.8 G/DL (ref 28.4–34.8)
MCV RBC AUTO: 96.2 FL (ref 82.6–102.9)
NITRITE, URINE: NEGATIVE
NRBC AUTOMATED: 0 PER 100 WBC
PDW BLD-RTO: 14.7 % (ref 11.8–14.4)
PH UA: 5.5 (ref 5–8)
PHOSPHORUS: 3.3 MG/DL (ref 2.6–4.5)
PLATELET # BLD: 162 K/UL (ref 138–453)
PMV BLD AUTO: 12.3 FL (ref 8.1–13.5)
POTASSIUM SERPL-SCNC: 4.9 MMOL/L (ref 3.7–5.3)
PROTEIN UA: NEGATIVE
PTH INTACT: 84.4 PG/ML (ref 14–72)
RBC # BLD: 2.9 M/UL (ref 3.95–5.11)
RBC UA: ABNORMAL /HPF (ref 0–4)
SODIUM BLD-SCNC: 134 MMOL/L (ref 135–144)
SPECIFIC GRAVITY UA: 1.02 (ref 1–1.03)
TOTAL PROTEIN, URINE: 20 MG/DL
TURBIDITY: CLEAR
URINE HGB: NEGATIVE
URINE TOTAL PROTEIN CREATININE RATIO: 0.21 (ref 0–0.2)
UROBILINOGEN, URINE: NORMAL
WBC # BLD: 5.9 K/UL (ref 3.5–11.3)
WBC UA: ABNORMAL /HPF (ref 0–5)

## 2022-11-23 PROCEDURE — 84156 ASSAY OF PROTEIN URINE: CPT

## 2022-11-23 PROCEDURE — 84100 ASSAY OF PHOSPHORUS: CPT

## 2022-11-23 PROCEDURE — 36415 COLL VENOUS BLD VENIPUNCTURE: CPT

## 2022-11-23 PROCEDURE — 83970 ASSAY OF PARATHORMONE: CPT

## 2022-11-23 PROCEDURE — 87086 URINE CULTURE/COLONY COUNT: CPT

## 2022-11-23 PROCEDURE — 87186 SC STD MICRODIL/AGAR DIL: CPT

## 2022-11-23 PROCEDURE — 87077 CULTURE AEROBIC IDENTIFY: CPT

## 2022-11-23 PROCEDURE — 85027 COMPLETE CBC AUTOMATED: CPT

## 2022-11-23 PROCEDURE — 80048 BASIC METABOLIC PNL TOTAL CA: CPT

## 2022-11-23 PROCEDURE — 82570 ASSAY OF URINE CREATININE: CPT

## 2022-11-23 PROCEDURE — 81001 URINALYSIS AUTO W/SCOPE: CPT

## 2022-11-23 PROCEDURE — 82040 ASSAY OF SERUM ALBUMIN: CPT

## 2022-11-27 ENCOUNTER — CLINICAL DOCUMENTATION (OUTPATIENT)
Dept: NEPHROLOGY | Age: 77
End: 2022-11-27

## 2022-11-27 LAB
CULTURE: ABNORMAL
SPECIMEN DESCRIPTION: ABNORMAL

## 2022-11-28 NOTE — DISCHARGE INSTRUCTIONS
[address] [facility],[address] -Phone: [phone] Fax: [phone]    Visit  Discharge Instructions / Physician Orders     DATE: 12/1/2022     Home Care: [facility]     SUPPLIES ORDERED THRU: [facility] [employer]     Wound Location: Left Heel     Cleanse with: Liquid antibacterial soap and water, rinse well      Dressing Orders: Antonella to wound-may moisten with saline, ABD, piece of gauze on anterior ankle to pad, Kerlix (may use paper tape to hold kerlix in place-do not wrap tightly), Light Compression ACE Wrap            Float Heels with pillows while in bed-prevent back of heels from being in direct contact with surface     Frequency: Once Daily     Additional Orders: Increase protein to diet (meat, cheese, eggs, fish, peanut butter, nuts and beans)  Float Heels with pillows while in bed or chair-prevent back of heels from being in direct contact with surface  ELEVATE LEGS AS MUCH AS POSSIBLE     Your next appointment with [facility] is in 1 week with Dr. Norbert Bhandari     (Please note your next appointment above and if you are unable to keep, kindly give a 24 hour notice. Thank you.)  If more than 15 min late we cannot guarantee you will be seen due to clinician schedule  Per Policy, Excessive cancellation will call for dismissal from program.     If you experience any of the following, please call the [facility] during business hours:  [phone]  Your Phone call may be forwarded to [facility] during business hours that [facility] is closed. * Increase in Pain  * Temperature over 101  * Increase in drainage from your wound  * Drainage with a foul odor  * Bleeding  * Increase in swelling  * Need for compression bandage changes due to slippage, breakthrough drainage. If you need medical attention outside of the business hours of the [facility] please contact your PCP or go to the nearest emergency room.      The information contained in the After Visit Summary has been reviewed with me, the patient and/or responsible adult, by my health care provider(s). I had the opportunity to ask questions regarding this information.  I have elected to receive;      []After Visit Summary  [x]Comprehensive Discharge Instruction        Patient signature______________________________________Date:________  Electronically signed by Yanci Serra RN on 12/1/2022 at 9:56 AM  Electronically signed by MAYELA Tsai CNP on 12/1/2022 at 10:04 AM

## 2022-11-28 NOTE — PROGRESS NOTES
Labs 11/23/2022 reviewed, creatinine down to 1.3, potassium was 4.9. Urine culture positive for E. coli negative ESBL, sensitive to cephalosporins, resistant to quinolones. Patient has penicillin allergy. Will find out what Allergy she has to penicillin. If its not serious we can start her on cephalexin 500 mg twice a day for 5 days. His potassium is now high normal we will discontinue oral potassium as well.

## 2022-12-01 ENCOUNTER — HOSPITAL ENCOUNTER (OUTPATIENT)
Dept: WOUND CARE | Age: 77
Discharge: HOME | End: 2022-12-01
Payer: MEDICARE

## 2022-12-01 VITALS
BODY MASS INDEX: 36.64 KG/M2 | HEART RATE: 66 BPM | TEMPERATURE: 97 F | HEIGHT: 66 IN | DIASTOLIC BLOOD PRESSURE: 57 MMHG | RESPIRATION RATE: 18 BRPM | SYSTOLIC BLOOD PRESSURE: 138 MMHG | WEIGHT: 228 LBS

## 2022-12-01 DIAGNOSIS — R60.0 BILATERAL EDEMA OF LOWER EXTREMITY: ICD-10-CM

## 2022-12-01 DIAGNOSIS — E11.42 DIABETIC POLYNEUROPATHY ASSOCIATED WITH TYPE 2 DIABETES MELLITUS (HCC): ICD-10-CM

## 2022-12-01 DIAGNOSIS — L89.623 PRESSURE INJURY OF LEFT HEEL, STAGE 3 (HCC): Primary | ICD-10-CM

## 2022-12-01 DIAGNOSIS — E66.9 OBESITY (BMI 35.0-39.9 WITHOUT COMORBIDITY): ICD-10-CM

## 2022-12-01 PROCEDURE — 11042 DBRDMT SUBQ TIS 1ST 20SQCM/<: CPT

## 2022-12-01 RX ORDER — LIDOCAINE HYDROCHLORIDE 20 MG/ML
JELLY TOPICAL ONCE
OUTPATIENT
Start: 2022-12-01 | End: 2022-12-01

## 2022-12-01 RX ORDER — LIDOCAINE HYDROCHLORIDE 20 MG/ML
JELLY TOPICAL ONCE
Status: COMPLETED | OUTPATIENT
Start: 2022-12-01 | End: 2022-12-01

## 2022-12-01 RX ORDER — LIDOCAINE HYDROCHLORIDE 40 MG/ML
SOLUTION TOPICAL ONCE
OUTPATIENT
Start: 2022-12-01 | End: 2022-12-01

## 2022-12-01 RX ADMIN — LIDOCAINE HYDROCHLORIDE 6 ML: 20 JELLY TOPICAL at 09:43

## 2022-12-01 ASSESSMENT — PAIN DESCRIPTION - ORIENTATION: ORIENTATION: LEFT

## 2022-12-01 ASSESSMENT — PAIN DESCRIPTION - ONSET: ONSET: ON-GOING

## 2022-12-01 ASSESSMENT — PAIN DESCRIPTION - DESCRIPTORS: DESCRIPTORS: ACHING

## 2022-12-01 ASSESSMENT — PAIN DESCRIPTION - LOCATION: LOCATION: FOOT

## 2022-12-01 ASSESSMENT — ENCOUNTER SYMPTOMS
SHORTNESS OF BREATH: 0
COUGH: 0
NAUSEA: 0
RHINORRHEA: 0
DIARRHEA: 0
VOMITING: 0

## 2022-12-01 ASSESSMENT — PAIN DESCRIPTION - FREQUENCY: FREQUENCY: INTERMITTENT

## 2022-12-01 ASSESSMENT — PAIN - FUNCTIONAL ASSESSMENT: PAIN_FUNCTIONAL_ASSESSMENT: PREVENTS OR INTERFERES SOME ACTIVE ACTIVITIES AND ADLS

## 2022-12-01 ASSESSMENT — PAIN SCALES - GENERAL: PAINLEVEL_OUTOF10: 7

## 2022-12-01 NOTE — PLAN OF CARE
Problem: Chronic Conditions and Co-morbidities  Goal: Patient's chronic conditions and co-morbidity symptoms are monitored and maintained or improved  Outcome: Progressing     Problem: Discharge Planning  Goal: Discharge to home or other facility with appropriate resources  Outcome: Progressing     Problem: Safety - Adult  Goal: Free from fall injury  Outcome: Progressing     Problem: ABCDS Injury Assessment  Goal: Absence of physical injury  Outcome: Progressing     Problem: Pain  Goal: Verbalizes/displays adequate comfort level or baseline comfort level  Outcome: Progressing     Problem: Wound:  Goal: Will show signs of wound healing; wound closure and no evidence of infection  Description: Will show signs of wound healing; wound closure and no evidence of infection  Outcome: Progressing     Problem: Falls - Risk of:  Goal: Will remain free from falls  Description: Will remain free from falls  Outcome: Progressing

## 2022-12-01 NOTE — PROGRESS NOTES
[address]   Progress Note and Procedure Note      [facility] RECORD NUMBER:  [id number]  AGE: 68 y.o. GENDER: female  : 1945  EPISODE DATE:  2022    Subjective:     Chief Complaint   Patient presents with    Wound Check     Left heel         HISTORY of PRESENT ILLNESS HPI     Lilo Wyatt is a 68 y.o. female who presents today for wound/ulcer evaluation. History of Wound Context: here with son today to follow up on left heel pressure ulceration which is slowly closing. Has no signs or symptoms of infection. [facility]. Discussed need to see Dr Helen Rothman d/t moderate arterial disease on left leg. Wound/Ulcer Pain Timing/Severity: intermittent  Quality of pain: sharp  Severity:  2 / 10   Modifying Factors: None  Associated Signs/Symptoms: none    Ulcer Identification:  Ulcer Type: pressure  Contributing Factors: edema, venous stasis, poor glucose control, chronic pressure, decreased mobility, shear force, obesity, and arterial insufficiency         PAST MEDICAL HISTORY        Diagnosis Date    Acute renal failure with tubular necrosis ([address] 2021    Secondary to ischemic ATN post fall intravascularly depletion hypotension and low flow baseline creatinine 1.2-1.4 peaked up to 2.1 during her hospitalization in 2020. Work-up showed benign urine sediment, kidney size to be 11.2 on the right 15.1 on the left serological work-up negative.     Anxiety     Atrial fibrillation (HCC)     chronic-takes xarelto    Benign positional vertigo     CAD (coronary artery disease)     Chest pain     CKD (chronic kidney disease), stage III ([address] 2021    Secondary to ischemic and diabetic nephrosclerosis baseline 1.2-1.4    Depression     Diabetes mellitus ([address]     Diabetic neuropathy ([address]     Dysuria 2021    Hyperlipidemia     Hypertension     Migraine     Migraine headaches aggravated with sublingual nitroglycerin    Persistent proteinuria 7/20/2022    From diabetic nephrosclerosis UPC 1.0    Suspected COVID-19 virus infection 7/13/2020       PAST SURGICAL HISTORY    Past Surgical History:   Procedure Laterality Date    APPENDECTOMY      CARDIAC CATHETERIZATION      2012    CORONARY ANGIOPLASTY WITH STENT PLACEMENT  02/25/2017    4 SYNERGY HEART STENTS DRUG ELUTING ALL MRI CONDITONAL 3T OK, SAFE IMMEDIATELY.      CORONARY ANGIOPLASTY WITH STENT PLACEMENT  07/11/2012    XIENCE HEART STENT/ MRI CONDITIONAL 3T OK, SAFE IMMEDIATELY    CORONARY ANGIOPLASTY WITH STENT PLACEMENT  12/11/2020    CYSTOSCOPY Right 08/12/2022    HOLMIUM, CYSTOSCOPY, URETEROSCOPY, STENT PLACEMENT    PTCA      URETER SURGERY Right 8/12/2022    HOLMIUM, CYSTOSCOPY, URETEROSCOPY, STENT PLACEMENT performed by Heather Saab MD at [facility] HISTORY    Family History   Problem Relation Age of Onset    Cancer Mother     Cancer Father        SOCIAL HISTORY    Social History     Tobacco Use    Smoking status: Never    Smokeless tobacco: Never   Substance Use Topics    Alcohol use: No    Drug use: No       ALLERGIES    Allergies   Allergen Reactions    Penicillins      Other reaction(s): Hives    Pregabalin     Sulfa Antibiotics      Other reaction(s): Rash       MEDICATIONS    Current Outpatient Medications on File Prior to Encounter   Medication Sig Dispense Refill    Continuous Blood Gluc  (FREESTYLE SYLVIA 15 DAY READER) JAQUELINE 1 each by Does not apply route continuous [facility] [address] 1 each 1    insulin detemir (LEVEMIR FLEXTOUCH) 100 UNIT/ML injection pen INJECT 10 UNITS INTO THE SKIN at bedtime 1 Adjustable Dose Pre-filled Pen Syringe 0    Continuous Blood Gluc Sensor (FREESTYLE SYLVIA 14 DAY SENSOR) MISC Check sugar 4 times daily dx E11.65 2 each 12    lisinopril (PRINIVIL;ZESTRIL) 10 MG tablet TAKE ONE TABLET BY MOUTH DAILY 30 tablet 11    gabapentin (NEURONTIN) 100 MG capsule Take 1 capsule by mouth in the morning and at bedtime for 30 days. 60 capsule 11    BANOPHEN 50 MG capsule TAKE ONE CAPSULE BY MOUTH EVERY 6 HOURS AS NEEDED FOR ITCHING 120 capsule 1    spironolactone (ALDACTONE) 25 MG tablet Take 1 tablet by mouth daily 30 tablet 11    atorvastatin (LIPITOR) 80 MG tablet Take 1 tablet by mouth daily 30 tablet 11    pantoprazole (PROTONIX) 40 MG tablet Take 1 tablet by mouth every morning (before breakfast) 30 tablet 11    isosorbide mononitrate (IMDUR) 30 MG extended release tablet Take 1 tablet by mouth daily 30 tablet 11    clopidogrel (PLAVIX) 75 MG tablet Take 1 tablet by mouth daily 30 tablet 11    vitamin D3 (CHOLECALCIFEROL) 25 MCG (1000 UT) TABS tablet Take 1 tablet by mouth daily 90 tablet 3    ferrous sulfate (IRON 325) 325 (65 Fe) MG tablet Take 1 tablet by mouth daily (with breakfast) 30 tablet 11    amLODIPine (NORVASC) 10 MG tablet Take 1 tablet by mouth daily 30 tablet 11    gabapentin (NEURONTIN) 300 MG capsule Take 1 capsule by mouth nightly for 30 days. 30 capsule 11    alfuzosin (UROXATRAL) 10 MG extended release tablet Take 1 tablet by mouth in the morning.  30 tablet 3    oxybutynin (DITROPAN XL) 10 MG extended release tablet Take 1 tablet by mouth daily as needed (Bladder spasms, stent pain) 30 tablet 3    acetaminophen (TYLENOL) 325 MG tablet Take 650 mg by mouth every 6 hours as needed for Pain      [DISCONTINUED] carvedilol (COREG) 12.5 MG tablet Take 1 tablet by mouth 2 times daily (with meals) 180 tablet 3    vitamin E 400 UNIT capsule Take 1 capsule by mouth daily      apixaban (ELIQUIS) 5 MG TABS tablet Take 1 tablet by mouth 2 times daily 120 tablet 5    prednisoLONE acetate (PRED FORTE) 1 % ophthalmic suspension Place 1 drop into both eyes 3 times daily      insulin aspart (NOVOLOG FLEXPEN) 100 UNIT/ML injection pen Use TID before meals according to scale - max 45 units a day 10 pen 11    Lancets MISC 1 each by Does not apply route 2 times daily 300 each 0    Insulin Pen Needle 32G X 4 MM MISC 5 each by Does not apply route daily 200 each 11    escitalopram (LEXAPRO) 20 MG tablet TAKE ONE TABLET BY MOUTH DAILY 90 tablet 3    Continuous Blood Gluc Sensor (FREESTYLE SYLVIA 14 DAY SENSOR) MISC 1 each by Does not apply route every 14 days 2 each 12    nitroGLYCERIN (NITROSTAT) 0.4 MG SL tablet place 1 tablet under the tongue if needed every 5 minutes if needed for CHEST PAIN 25 tablet 3    Misc. Devices (WALKER) MISC Diagnosis: right 5th metatarsal fracture, pain in limb    Duration: 3 months 1 each 0    Misc. Devices (COMMODE BEDSIDE) MISC 1 Device by Does not apply route daily 1 each 0     No current facility-administered medications on file prior to encounter. REVIEW OF SYSTEMS    Review of Systems   Constitutional:  Negative for chills, fatigue and fever. HENT:  Negative for congestion and rhinorrhea. Respiratory:  Negative for cough and shortness of breath. Cardiovascular:  Positive for leg swelling (bilateral). Negative for chest pain. Gastrointestinal:  Negative for diarrhea, nausea and vomiting. Endocrine:        Neuropathy   Musculoskeletal:  Positive for gait problem (wheelchair dependence). Skin:  Positive for wound (left heel pressure ulcer). Allergic/Immunologic: Negative for immunocompromised state. Neurological:  Positive for weakness. Negative for dizziness and syncope. Psychiatric/Behavioral:  Negative for agitation. The patient is nervous/anxious (slight).       Objective:      BP (!) 138/57   Pulse 66   Temp 97 °F (36.1 °C) (Tympanic)   Resp 18   Ht 5' 6\" (1.676 m)   Wt 228 lb (103.4 kg)   BMI 36.80 kg/m²     Wt Readings from Last 3 Encounters:   12/01/22 228 lb (103.4 kg)   11/17/22 228 lb (103.4 kg)   11/16/22 228 lb (103.4 kg)       PHYSICAL EXAM    General Appearance: alert and oriented to person, place and time, well-developed and obese, in no acute distress  Skin: warm and dry, no rash or erythema, left heel pressure ulcer   Head: normocephalic and atraumatic  Eyes: pupils equal, round and conjunctivae normal  Pulmonary/Chest: normal air movement, no respiratory distress  Extremities: no cyanosis and no clubbing. Bilateral lower leg edema   Musculoskeletal: no joint swelling, deformity or tenderness  Neurologic: wheelchair dependence, coordination normal and speech normal      Assessment:     Problem List Items Addressed This Visit       Bilateral edema of lower extremity    Relevant Orders    Initiate Outpatient Wound Care Protocol    Obesity (BMI 35.0-39.9 without comorbidity)    Relevant Orders    Initiate Outpatient Wound Care Protocol    Pressure injury of left heel, stage 3 (HCC) - Primary    Relevant Orders    Initiate Outpatient Wound Care Protocol    Diabetic polyneuropathy associated with type 2 diabetes mellitus ([facility]    Relevant Orders    Initiate Outpatient Wound Care Protocol        Procedure Note  Indications:  Based on my examination of this patient's wound(s)/ulcer(s) today, debridement is required to promote healing and evaluate the wound base. Performed by: MAYELA Lopes - CNP    Consent obtained:  Yes    Time out taken:  Yes    Pain Control: Anesthetic  Anesthetic: 2% Lidocaine Gel Topical     Debridement:Excisional Debridement    Using curette, scissors, and forceps the wound(s)/ulcer(s) was/were sharply debrided down through and including the removal of subcutaneous tissue.         Devitalized Tissue Debrided:  fibrin, biofilm, slough, and callus    Pre Debridement Measurements:  Are located in the [facility]  Documentation Flow Sheet    Wound/Ulcer #: 1    Post Debridement Measurements:  Wound/Ulcer Descriptions are Pre Debridement except measurements:    Wound 10/06/22 Heel Left #1 left heel (Active)   Wound Image   10/06/22 0907   Wound Etiology Pressure Stage 3 12/01/22 0943   Dressing Status Old drainage noted;New drainage noted 12/01/22 0943   Wound Cleansed Cleansed with saline 12/01/22 0943   Dressing/Treatment Other (comment) 10/27/22 0919   Wound Length (cm) 0 cm 12/01/22 0943   Wound Width (cm) 0 cm 12/01/22 0943   Wound Depth (cm) 0 cm 12/01/22 0943   Wound Surface Area (cm^2) 0 cm^2 12/01/22 0943   Change in Wound Size % (l*w) 100 12/01/22 0943   Wound Volume (cm^3) 0 cm^3 12/01/22 0943   Wound Healing % 100 12/01/22 0943   Post-Procedure Length (cm) 0.8 cm 12/01/22 0943   Post-Procedure Width (cm) 0.8 cm 12/01/22 0943   Post-Procedure Depth (cm) 0.3 cm 12/01/22 0943   Post-Procedure Surface Area (cm^2) 0.64 cm^2 12/01/22 0943   Post-Procedure Volume (cm^3) 0.192 cm^3 12/01/22 0943   Wound Assessment Pink/red 12/01/22 0943   Drainage Amount Moderate 12/01/22 0943   Drainage Description Serosanguinous 12/01/22 0943   Odor None 12/01/22 0943   Danika-wound Assessment Hyperkeratosis (callous) 12/01/22 0943   Margins Defined edges 12/01/22 0943   Number of days: 56          Percent of Wound(s)/Ulcer(s) Debrided: 100%    Total Surface Area Debrided:  0.64 sq cm     Diabetic/Pressure/Non Pressure Ulcers only:  Ulcer: Pressure ulcer, Stage 3    Estimated Blood Loss:  Minimal    Hemostasis Achieved:  by pressure    Procedural Pain:  2  / 10     Post Procedural Pain:  0 / 10     Response to treatment:  Well tolerated by patient. Plan:     Treatment Note please see Discharge Instructions    Written patient dismissal instructions given to patient and signed by patient or POA.            Electronically signed by MAYELA Berman CNP on 12/1/2022 at 10:10 AM

## 2022-12-02 NOTE — DISCHARGE INSTRUCTIONS
[address] [facility],[address] -Phone: [phone] Fax: [phone]    Visit  Discharge Instructions / Physician Orders     DATE: 12/7/2022     Home Care: [facility]     SUPPLIES ORDERED THRU: [employer]     Wound Location: Left Heel     Cleanse with: Liquid antibacterial soap and water, rinse well      Dressing Orders: Antonella to wound-may moisten with saline, ABD, piece of gauze on anterior ankle to pad, Kerlix (may use paper tape to hold kerlix in place-do not wrap tightly), Light Compression ACE Wrap            Float Heels with pillows while in bed-prevent back of heels from being in direct contact with surface     Frequency: Once Daily     Additional Orders: Increase protein to diet (meat, cheese, eggs, fish, peanut butter, nuts and beans)  Float Heels with pillows while in bed or chair-prevent back of heels from being in direct contact with surface  ELEVATE LEGS AS MUCH AS POSSIBLE     Your next appointment with [facility] is in 1 week with Dr. Dionicio Mendoza     (Please note your next appointment above and if you are unable to keep, kindly give a 24 hour notice. Thank you.)  If more than 15 min late we cannot guarantee you will be seen due to clinician schedule  Per Policy, Excessive cancellation will call for dismissal from program.     If you experience any of the following, please call the [employer] [facility] during business hours:  [phone]  Your Phone call may be forwarded to [employer] and [employer] during business hours that [facility] is closed. * Increase in Pain  * Temperature over 101  * Increase in drainage from your wound  * Drainage with a foul odor  * Bleeding  * Increase in swelling  * Need for compression bandage changes due to slippage, breakthrough drainage. If you need medical attention outside of the business hours of the [facility] please contact your PCP or go to the nearest emergency room.      The information contained in the After Visit Summary has been reviewed with me, the patient and/or responsible adult, by my health care provider(s). I had the opportunity to ask questions regarding this information.  I have elected to receive;      []After Visit Summary  [x]Comprehensive Discharge Instruction        Patient signature______________________________________Date:________

## 2022-12-07 ENCOUNTER — TELEPHONE (OUTPATIENT)
Dept: WOUND CARE | Age: 77
End: 2022-12-07

## 2022-12-07 ENCOUNTER — HOSPITAL ENCOUNTER (OUTPATIENT)
Dept: WOUND CARE | Age: 77
Discharge: HOME | End: 2022-12-07

## 2022-12-07 NOTE — TELEPHONE ENCOUNTER
Patient was a no call no show for [facility] wound care appointment because she fell and broke her hips and is now hospitalize. Patient will call back to reschedule appointment once discharge from rehab.

## 2022-12-12 ENCOUNTER — HOSPITAL ENCOUNTER (OUTPATIENT)
Age: 77
Setting detail: SPECIMEN
Discharge: HOME | End: 2022-12-12

## 2022-12-12 LAB
ABSOLUTE EOS #: 0.24 K/UL (ref 0–0.4)
ABSOLUTE IMMATURE GRANULOCYTE: 0.06 K/UL (ref 0–0.3)
ABSOLUTE LYMPH #: 1.38 K/UL (ref 1–4.8)
ABSOLUTE MONO #: 0.78 K/UL (ref 0.2–0.8)
ANION GAP SERPL CALCULATED.3IONS-SCNC: 8 MMOL/L (ref 9–17)
BASOPHILS # BLD: 1 %
BASOPHILS ABSOLUTE: 0.06 K/UL (ref 0–0.2)
BUN BLDV-MCNC: 31 MG/DL (ref 8–23)
BUN/CREAT BLD: 23 (ref 9–20)
CALCIUM SERPL-MCNC: 8.8 MG/DL (ref 8.6–10.4)
CHLORIDE BLD-SCNC: 103 MMOL/L (ref 98–107)
CO2: 27 MMOL/L (ref 20–31)
CREAT SERPL-MCNC: 1.36 MG/DL (ref 0.5–0.9)
EOSINOPHILS RELATIVE PERCENT: 4 % (ref 1–4)
GFR SERPL CREATININE-BSD FRML MDRD: 40 ML/MIN/1.73M2
GLUCOSE BLD-MCNC: 163 MG/DL (ref 70–99)
HCT VFR BLD CALC: 21.7 % (ref 36.3–47.1)
HEMOGLOBIN: 6.4 G/DL (ref 11.9–15.1)
IMMATURE GRANULOCYTES: 1 %
LYMPHOCYTES # BLD: 23 % (ref 24–44)
MCH RBC QN AUTO: 29.1 PG (ref 25.2–33.5)
MCHC RBC AUTO-ENTMCNC: 29.5 G/DL (ref 28.4–34.8)
MCV RBC AUTO: 98.6 FL (ref 82.6–102.9)
MONOCYTES # BLD: 13 % (ref 1–7)
MORPHOLOGY: ABNORMAL
NRBC AUTOMATED: 0 PER 100 WBC
PDW BLD-RTO: 14.7 % (ref 11.8–14.4)
PLATELET # BLD: 227 K/UL (ref 138–453)
PMV BLD AUTO: 11.2 FL (ref 8.1–13.5)
POTASSIUM SERPL-SCNC: 3.9 MMOL/L (ref 3.7–5.3)
RBC # BLD: 2.2 M/UL (ref 3.95–5.11)
SEG NEUTROPHILS: 58 % (ref 36–66)
SEGMENTED NEUTROPHILS ABSOLUTE COUNT: 3.48 K/UL (ref 1.8–7.7)
SODIUM BLD-SCNC: 138 MMOL/L (ref 135–144)
WBC # BLD: 6 K/UL (ref 3.5–11.3)

## 2022-12-12 PROCEDURE — P9603 ONE-WAY ALLOW PRORATED MILES: HCPCS

## 2022-12-12 PROCEDURE — 85025 COMPLETE CBC W/AUTO DIFF WBC: CPT

## 2022-12-12 PROCEDURE — 80048 BASIC METABOLIC PNL TOTAL CA: CPT

## 2022-12-12 PROCEDURE — 36415 COLL VENOUS BLD VENIPUNCTURE: CPT

## 2022-12-19 ENCOUNTER — HOSPITAL ENCOUNTER (OUTPATIENT)
Age: 77
Setting detail: SPECIMEN
Discharge: HOME | End: 2022-12-19

## 2022-12-19 LAB
ABSOLUTE EOS #: 0.24 K/UL (ref 0–0.44)
ABSOLUTE IMMATURE GRANULOCYTE: 0.03 K/UL (ref 0–0.3)
ABSOLUTE LYMPH #: 1.97 K/UL (ref 1.1–3.7)
ABSOLUTE MONO #: 0.58 K/UL (ref 0.1–1.2)
ALBUMIN SERPL-MCNC: 3.5 G/DL (ref 3.5–5.2)
ALP BLD-CCNC: 144 U/L (ref 35–104)
ALT SERPL-CCNC: 6 U/L (ref 5–33)
ANION GAP SERPL CALCULATED.3IONS-SCNC: 11 MMOL/L (ref 9–17)
AST SERPL-CCNC: 11 U/L
BASOPHILS # BLD: 0 % (ref 0–2)
BASOPHILS ABSOLUTE: 0.03 K/UL (ref 0–0.2)
BILIRUB SERPL-MCNC: 0.4 MG/DL (ref 0.3–1.2)
BUN BLDV-MCNC: 18 MG/DL (ref 8–23)
BUN/CREAT BLD: 16 (ref 9–20)
CALCIUM SERPL-MCNC: 9.6 MG/DL (ref 8.6–10.4)
CHLORIDE BLD-SCNC: 102 MMOL/L (ref 98–107)
CO2: 27 MMOL/L (ref 20–31)
CREAT SERPL-MCNC: 1.11 MG/DL (ref 0.5–0.9)
EOSINOPHILS RELATIVE PERCENT: 3 % (ref 1–4)
GFR SERPL CREATININE-BSD FRML MDRD: 51 ML/MIN/1.73M2
GLUCOSE BLD-MCNC: 83 MG/DL (ref 70–99)
HCT VFR BLD CALC: 28.6 % (ref 36.3–47.1)
HEMOGLOBIN: 8.5 G/DL (ref 11.9–15.1)
IMMATURE GRANULOCYTES: 0 %
LYMPHOCYTES # BLD: 25 % (ref 24–43)
MCH RBC QN AUTO: 27.9 PG (ref 25.2–33.5)
MCHC RBC AUTO-ENTMCNC: 29.7 G/DL (ref 28.4–34.8)
MCV RBC AUTO: 93.8 FL (ref 82.6–102.9)
MONOCYTES # BLD: 7 % (ref 3–12)
NRBC AUTOMATED: 0 PER 100 WBC
PDW BLD-RTO: 16.9 % (ref 11.8–14.4)
PLATELET # BLD: 317 K/UL (ref 138–453)
PMV BLD AUTO: 10.7 FL (ref 8.1–13.5)
POTASSIUM SERPL-SCNC: 3.9 MMOL/L (ref 3.7–5.3)
RBC # BLD: 3.05 M/UL (ref 3.95–5.11)
RBC # BLD: ABNORMAL 10*6/UL
SEG NEUTROPHILS: 64 % (ref 36–65)
SEGMENTED NEUTROPHILS ABSOLUTE COUNT: 5.03 K/UL (ref 1.5–8.1)
SODIUM BLD-SCNC: 140 MMOL/L (ref 135–144)
TOTAL PROTEIN: 6.8 G/DL (ref 6.4–8.3)
WBC # BLD: 7.9 K/UL (ref 3.5–11.3)

## 2022-12-19 PROCEDURE — 36415 COLL VENOUS BLD VENIPUNCTURE: CPT

## 2022-12-19 PROCEDURE — 80053 COMPREHEN METABOLIC PANEL: CPT

## 2022-12-19 PROCEDURE — 85025 COMPLETE CBC W/AUTO DIFF WBC: CPT

## 2022-12-19 PROCEDURE — P9603 ONE-WAY ALLOW PRORATED MILES: HCPCS

## 2023-02-08 RX ORDER — CLOTRIMAZOLE AND BETAMETHASONE DIPROPIONATE 10; .64 MG/G; MG/G
CREAM TOPICAL
Qty: 45 G | Refills: 11 | Status: SHIPPED | OUTPATIENT
Start: 2023-02-08

## 2023-02-12 NOTE — PROGRESS NOTES
Division of Vascular Surgery        Follow Up      Chief Complaint:      My legs hurt    History of Present Illness:      Maria Esther Chan is a 76 y. o. woman who presents for follow up regarding her PAD and claudication symptoms. She has right greater then left leg pain when she walks. She says this has gotten worse and she can barely make it to the end of the hallway before her symptoms start. It does improve when she stops and rests. She has a sitting walker which she uses all the time. She also has some neurogenic issues with numbness and discomfort on the top part oh er thighs and wrapping around her knee to her calf. Medical History:     Past Medical History:   Diagnosis Date    Anxiety     Benign positional vertigo     CAD (coronary artery disease)     Chest pain     Depression     Diabetes mellitus ([address]     Diabetic neuropathy ([address]     Hyperlipidemia     Hypertension     Migraine     Migraine headaches aggravated with sublingual nitroglycerin       Surgical History:     Past Surgical History:   Procedure Laterality Date    APPENDECTOMY      CARDIAC CATHETERIZATION      2012    CORONARY ANGIOPLASTY WITH STENT PLACEMENT      had 2 with 4 more placed     PTCA         Family History:     No family history on file. Allergies:       Penicillins and Sulfa antibiotics    Medications:      Current Outpatient Medications   Medication Sig Dispense Refill    metFORMIN (GLUCOPHAGE-XR) 500 MG extended release tablet metformin  mg tablet,extended release 24 hr   Take 1 tablet twice a day by oral route for 30 days.  gabapentin (NEURONTIN) 100 MG capsule Take 4 capsules by mouth 3 times daily for 30 days. . 360 capsule 0    Diabetic Shoe MISC by Does not apply route Please dispense one pair of diabetic extra depth shoes and three pairs of accommodative inserts. 1 each 0    lidocaine (LIDODERM) 5 % Place 1 patch onto the skin daily 12 hours on, 12 hours off.  30 patch 0    Bangladeshi

## 2023-05-31 NOTE — CARE COORDINATION
[facility] Follow Up Call    9/15/2020    Patient: Gillian Chiang  Patient : 1945   MRN: [id number]  Reason for Admission: Shortness of breath, diarrhea, COVID 19 monitoring  Discharge Date: 20 RARS: Readmission Risk Score: 24         Spoke with: George Vuong    Spoke with patient who said she has been feeling good, has some baseline shortness of breath but denies any fever or chills or other viral type symptoms. She has [facility] home care but says she has not seen a nurse since the Friday before last and needs someone to fill her medication box. She is unsure why they are not coming. I placed a call to [facility] but got vm and left message to return either my call or directly to the patient. Apparently patient has broken hand, has difficulty with getting medications together. Needs to be reviewed by the provider   Additional needs identified to be addressed with provider No  none  Discussed COVID-19 related testing which was available at this time. Test results were negative. Patient informed of results, if available? Yes         Method of communication with provider : none    Care Transition Nurse (CTN) contacted the patient by telephone to follow up after admission on 20. Verified name and  with patient as identifiers. Addressed changes since last contact: [facility] resources-[facility] called to see about home care nurse  Discharged needs reviewed: none  Follow up appointment completed? Yes    Advance Care Planning:   Does patient have an Advance Directive:  reviewed and current. CTN reviewed discharge instructions, medical action plan and red flags with patient and discussed any barriers to care and/or understanding of plan of care after discharge. Discussed appropriate site of care based on symptoms and resources available to patient including: PCP. The patient agrees to contact the PCP office for questions related to their healthcare. Attending to bill

## 2023-06-12 PROBLEM — L89.623 PRESSURE INJURY OF LEFT HEEL, STAGE 3 (HCC): Status: RESOLVED | Noted: 2022-10-06 | Resolved: 2023-06-12

## 2023-06-21 ENCOUNTER — OFFICE VISIT (OUTPATIENT)
Dept: PODIATRY | Age: 78
End: 2023-06-21
Payer: MEDICARE

## 2023-06-21 VITALS
WEIGHT: 200 LBS | HEIGHT: 66 IN | DIASTOLIC BLOOD PRESSURE: 76 MMHG | SYSTOLIC BLOOD PRESSURE: 148 MMHG | BODY MASS INDEX: 32.14 KG/M2 | HEART RATE: 89 BPM

## 2023-06-21 DIAGNOSIS — Z79.4 TYPE 2 DIABETES MELLITUS WITH DIABETIC POLYNEUROPATHY, WITH LONG-TERM CURRENT USE OF INSULIN (HCC): Primary | ICD-10-CM

## 2023-06-21 DIAGNOSIS — B35.1 ONYCHOMYCOSIS: ICD-10-CM

## 2023-06-21 DIAGNOSIS — E11.42 TYPE 2 DIABETES MELLITUS WITH DIABETIC POLYNEUROPATHY, WITH LONG-TERM CURRENT USE OF INSULIN (HCC): Primary | ICD-10-CM

## 2023-06-21 PROCEDURE — 11721 DEBRIDE NAIL 6 OR MORE: CPT

## 2023-06-21 PROCEDURE — 99999 PR OFFICE/OUTPT VISIT,PROCEDURE ONLY: CPT

## 2023-06-27 ENCOUNTER — HOSPITAL ENCOUNTER (OUTPATIENT)
Dept: GENERAL RADIOLOGY | Age: 78
Discharge: HOME | End: 2023-06-29
Payer: MEDICARE

## 2023-06-27 ENCOUNTER — HOSPITAL ENCOUNTER (OUTPATIENT)
Age: 78
Discharge: HOME | End: 2023-06-29
Payer: MEDICARE

## 2023-06-27 DIAGNOSIS — J32.0 CHRONIC MAXILLARY SINUSITIS: ICD-10-CM

## 2023-06-27 PROCEDURE — 70220 X-RAY EXAM OF SINUSES: CPT

## 2023-07-06 ENCOUNTER — HOSPITAL ENCOUNTER (EMERGENCY)
Age: 78
Discharge: HOME | End: 2023-07-06
Attending: EMERGENCY MEDICINE
Payer: MEDICARE

## 2023-07-06 ENCOUNTER — APPOINTMENT (OUTPATIENT)
Dept: GENERAL RADIOLOGY | Age: 78
End: 2023-07-06
Payer: MEDICARE

## 2023-07-06 VITALS
TEMPERATURE: 97.5 F | BODY MASS INDEX: 38.09 KG/M2 | OXYGEN SATURATION: 100 % | DIASTOLIC BLOOD PRESSURE: 61 MMHG | HEART RATE: 83 BPM | RESPIRATION RATE: 18 BRPM | SYSTOLIC BLOOD PRESSURE: 125 MMHG | WEIGHT: 236 LBS

## 2023-07-06 DIAGNOSIS — M25.511 ACUTE PAIN OF RIGHT SHOULDER: Primary | ICD-10-CM

## 2023-07-06 PROCEDURE — 6370000000 HC RX 637 (ALT 250 FOR IP)

## 2023-07-06 PROCEDURE — 99283 EMERGENCY DEPT VISIT LOW MDM: CPT

## 2023-07-06 PROCEDURE — 73030 X-RAY EXAM OF SHOULDER: CPT

## 2023-07-06 RX ORDER — OXYCODONE HYDROCHLORIDE 5 MG/1
5 TABLET ORAL ONCE
Status: COMPLETED | OUTPATIENT
Start: 2023-07-06 | End: 2023-07-06

## 2023-07-06 RX ORDER — OXYCODONE HYDROCHLORIDE 5 MG/1
5 TABLET ORAL EVERY 6 HOURS PRN
Qty: 12 TABLET | Refills: 0 | Status: SHIPPED | OUTPATIENT
Start: 2023-07-06 | End: 2023-07-09

## 2023-07-06 RX ADMIN — OXYCODONE HYDROCHLORIDE 5 MG: 5 TABLET ORAL at 13:02

## 2023-07-06 ASSESSMENT — ENCOUNTER SYMPTOMS
VOMITING: 0
PHOTOPHOBIA: 0
ABDOMINAL PAIN: 0
DIARRHEA: 0
NAUSEA: 0
RHINORRHEA: 0

## 2023-07-06 ASSESSMENT — PAIN DESCRIPTION - LOCATION: LOCATION: SHOULDER

## 2023-07-06 ASSESSMENT — PAIN - FUNCTIONAL ASSESSMENT: PAIN_FUNCTIONAL_ASSESSMENT: 0-10

## 2023-07-06 ASSESSMENT — PAIN SCALES - GENERAL: PAINLEVEL_OUTOF10: 8

## 2023-07-06 ASSESSMENT — PAIN DESCRIPTION - ORIENTATION: ORIENTATION: RIGHT

## 2023-07-06 NOTE — DISCHARGE INSTRUCTIONS
You were seen today in the emergency department for your right shoulder pain. We now feel you are safe for discharge home. Call your PCP and schedule appoint within next 24 to 48 hours for follow-up. Please also call the orthopedic surgery clinic and schedule appoint for follow-up. Please return to the emergency department immediately if develop any new or worsening concerns including chest pain, shortness of breath, abdominal pain, nausea, vomiting, diarrhea, weakness, loss consciousness, fever, chills, , or any other concerns. Please call your PCP and schedule appointment within the next 24 to 48 hours for follow-up.

## 2023-07-06 NOTE — ED TRIAGE NOTES
Pt to ed c/o right shoulder pain. Pt states she had a fall and broke her right wrist 2 years ago, but has had shoulder pain on and off ever since. Pt states the pain got bad in January and she started physical therapy. Pt states she has pain meds her home nurse gives her, but did not take anything today. Pt states the pain got worse about 3 days go. Pt to room via wheelchair. Pt is alert and oriented x4. Family at bedside. Pt in gown, resting comfortably on cot. Will continue to monitor.

## 2023-07-07 ENCOUNTER — CARE COORDINATION (OUTPATIENT)
Dept: CARE COORDINATION | Age: 78
End: 2023-07-07

## 2023-07-07 NOTE — CARE COORDINATION
Attempted to reach patient for ED follow up. No answer and no voicemail . Unable to leave a message.

## 2023-07-17 ENCOUNTER — APPOINTMENT (OUTPATIENT)
Dept: CT IMAGING | Age: 78
End: 2023-07-17
Payer: MEDICARE

## 2023-07-17 ENCOUNTER — HOSPITAL ENCOUNTER (EMERGENCY)
Age: 78
Discharge: HOME | End: 2023-07-17
Attending: EMERGENCY MEDICINE
Payer: MEDICARE

## 2023-07-17 ENCOUNTER — APPOINTMENT (OUTPATIENT)
Dept: GENERAL RADIOLOGY | Age: 78
End: 2023-07-17
Payer: MEDICARE

## 2023-07-17 VITALS
OXYGEN SATURATION: 98 % | RESPIRATION RATE: 18 BRPM | BODY MASS INDEX: 37.77 KG/M2 | TEMPERATURE: 98.4 F | DIASTOLIC BLOOD PRESSURE: 105 MMHG | HEART RATE: 89 BPM | HEIGHT: 66 IN | SYSTOLIC BLOOD PRESSURE: 188 MMHG | WEIGHT: 235 LBS

## 2023-07-17 DIAGNOSIS — R19.7 NAUSEA VOMITING AND DIARRHEA: Primary | ICD-10-CM

## 2023-07-17 DIAGNOSIS — R11.2 NAUSEA VOMITING AND DIARRHEA: Primary | ICD-10-CM

## 2023-07-17 LAB
ALBUMIN SERPL-MCNC: 4.5 G/DL (ref 3.5–5.2)
ALBUMIN/GLOB SERPL: 1.5 {RATIO} (ref 1–2.5)
ALP SERPL-CCNC: 122 U/L (ref 35–104)
ALT SERPL-CCNC: 14 U/L (ref 5–33)
ANION GAP SERPL CALCULATED.3IONS-SCNC: 12 MMOL/L (ref 9–17)
AST SERPL-CCNC: 17 U/L
BASOPHILS # BLD: <0.03 K/UL (ref 0–0.2)
BASOPHILS NFR BLD: 0 % (ref 0–2)
BILIRUB SERPL-MCNC: 0.4 MG/DL (ref 0.3–1.2)
BILIRUB UR QL STRIP: NEGATIVE
BNP SERPL-MCNC: 912 PG/ML
BUN SERPL-MCNC: 13 MG/DL (ref 8–23)
CALCIUM SERPL-MCNC: 10.1 MG/DL (ref 8.6–10.4)
CHLORIDE SERPL-SCNC: 101 MMOL/L (ref 98–107)
CLARITY UR: CLEAR
CO2 SERPL-SCNC: 24 MMOL/L (ref 20–31)
COLOR UR: YELLOW
CREAT SERPL-MCNC: 1.1 MG/DL (ref 0.5–0.9)
EOSINOPHIL # BLD: 0.2 K/UL (ref 0–0.44)
EOSINOPHILS RELATIVE PERCENT: 3 % (ref 1–4)
EPI CELLS #/AREA URNS HPF: NORMAL /HPF (ref 0–5)
ERYTHROCYTE [DISTWIDTH] IN BLOOD BY AUTOMATED COUNT: 13.5 % (ref 11.8–14.4)
GFR SERPL CREATININE-BSD FRML MDRD: 51 ML/MIN/1.73M2
GLUCOSE SERPL-MCNC: 257 MG/DL (ref 70–99)
GLUCOSE UR STRIP-MCNC: ABNORMAL MG/DL
HCT VFR BLD AUTO: 33.1 % (ref 36.3–47.1)
HGB BLD-MCNC: 10.4 G/DL (ref 11.9–15.1)
HGB UR QL STRIP.AUTO: ABNORMAL
IMM GRANULOCYTES # BLD AUTO: <0.03 K/UL (ref 0–0.3)
IMM GRANULOCYTES NFR BLD: 0 %
KETONES UR STRIP-MCNC: NEGATIVE MG/DL
LACTIC ACID, WHOLE BLOOD: 1.4 MMOL/L (ref 0.7–2.1)
LEUKOCYTE ESTERASE UR QL STRIP: NEGATIVE
LIPASE SERPL-CCNC: 19 U/L (ref 13–60)
LYMPHOCYTES # BLD: 27 % (ref 24–43)
LYMPHOCYTES NFR BLD: 1.68 K/UL (ref 1.1–3.7)
MCH RBC QN AUTO: 29.3 PG (ref 25.2–33.5)
MCHC RBC AUTO-ENTMCNC: 31.4 G/DL (ref 28.4–34.8)
MCV RBC AUTO: 93.2 FL (ref 82.6–102.9)
MONOCYTES NFR BLD: 0.52 K/UL (ref 0.1–1.2)
MONOCYTES NFR BLD: 8 % (ref 3–12)
NEUTROPHILS NFR BLD: 62 % (ref 36–65)
NEUTS SEG NFR BLD: 3.9 K/UL (ref 1.5–8.1)
NITRITE UR QL STRIP: NEGATIVE
NRBC BLD-RTO: 0 PER 100 WBC
PH UR STRIP: 8.5 [PH] (ref 5–8)
PLATELET # BLD AUTO: 153 K/UL (ref 138–453)
PMV BLD AUTO: 11.5 FL (ref 8.1–13.5)
POTASSIUM SERPL-SCNC: 4 MMOL/L (ref 3.7–5.3)
PROT SERPL-MCNC: 7.6 G/DL (ref 6.4–8.3)
PROT UR STRIP-MCNC: NEGATIVE MG/DL
RBC # BLD AUTO: 3.55 M/UL (ref 3.95–5.11)
RBC #/AREA URNS HPF: NORMAL /HPF (ref 0–4)
SODIUM SERPL-SCNC: 137 MMOL/L (ref 135–144)
SP GR UR STRIP: 1.01 (ref 1–1.03)
TROPONIN I SERPL HS-MCNC: 24 NG/L (ref 0–14)
TROPONIN I SERPL HS-MCNC: 25 NG/L (ref 0–14)
UROBILINOGEN UR STRIP-ACNC: NORMAL EU/DL (ref 0–1)
WBC #/AREA URNS HPF: NORMAL /HPF (ref 0–5)
WBC OTHER # BLD: 6.3 K/UL (ref 3.5–11.3)

## 2023-07-17 PROCEDURE — 2580000003 HC RX 258: Performed by: STUDENT IN AN ORGANIZED HEALTH CARE EDUCATION/TRAINING PROGRAM

## 2023-07-17 PROCEDURE — 6370000000 HC RX 637 (ALT 250 FOR IP): Performed by: STUDENT IN AN ORGANIZED HEALTH CARE EDUCATION/TRAINING PROGRAM

## 2023-07-17 PROCEDURE — 83605 ASSAY OF LACTIC ACID: CPT

## 2023-07-17 PROCEDURE — 99285 EMERGENCY DEPT VISIT HI MDM: CPT

## 2023-07-17 PROCEDURE — 96361 HYDRATE IV INFUSION ADD-ON: CPT

## 2023-07-17 PROCEDURE — 81001 URINALYSIS AUTO W/SCOPE: CPT

## 2023-07-17 PROCEDURE — 83690 ASSAY OF LIPASE: CPT

## 2023-07-17 PROCEDURE — 96374 THER/PROPH/DIAG INJ IV PUSH: CPT

## 2023-07-17 PROCEDURE — 70450 CT HEAD/BRAIN W/O DYE: CPT

## 2023-07-17 PROCEDURE — 84484 ASSAY OF TROPONIN QUANT: CPT

## 2023-07-17 PROCEDURE — 85027 COMPLETE CBC AUTOMATED: CPT

## 2023-07-17 PROCEDURE — 6360000002 HC RX W HCPCS: Performed by: STUDENT IN AN ORGANIZED HEALTH CARE EDUCATION/TRAINING PROGRAM

## 2023-07-17 PROCEDURE — 71045 X-RAY EXAM CHEST 1 VIEW: CPT

## 2023-07-17 PROCEDURE — 80053 COMPREHEN METABOLIC PANEL: CPT

## 2023-07-17 PROCEDURE — 83880 ASSAY OF NATRIURETIC PEPTIDE: CPT

## 2023-07-17 PROCEDURE — 96375 TX/PRO/DX INJ NEW DRUG ADDON: CPT

## 2023-07-17 PROCEDURE — 93005 ELECTROCARDIOGRAM TRACING: CPT | Performed by: STUDENT IN AN ORGANIZED HEALTH CARE EDUCATION/TRAINING PROGRAM

## 2023-07-17 RX ORDER — DIPHENHYDRAMINE HYDROCHLORIDE 50 MG/ML
25 INJECTION INTRAMUSCULAR; INTRAVENOUS ONCE
Status: COMPLETED | OUTPATIENT
Start: 2023-07-17 | End: 2023-07-17

## 2023-07-17 RX ORDER — PROCHLORPERAZINE EDISYLATE 5 MG/ML
10 INJECTION INTRAMUSCULAR; INTRAVENOUS ONCE
Status: COMPLETED | OUTPATIENT
Start: 2023-07-17 | End: 2023-07-17

## 2023-07-17 RX ORDER — MECLIZINE HCL 12.5 MG/1
12.5 TABLET ORAL ONCE
Status: COMPLETED | OUTPATIENT
Start: 2023-07-17 | End: 2023-07-17

## 2023-07-17 RX ORDER — KETOROLAC TROMETHAMINE 15 MG/ML
15 INJECTION, SOLUTION INTRAMUSCULAR; INTRAVENOUS ONCE
Status: COMPLETED | OUTPATIENT
Start: 2023-07-17 | End: 2023-07-17

## 2023-07-17 RX ORDER — SODIUM CHLORIDE, SODIUM LACTATE, POTASSIUM CHLORIDE, AND CALCIUM CHLORIDE .6; .31; .03; .02 G/100ML; G/100ML; G/100ML; G/100ML
1000 INJECTION, SOLUTION INTRAVENOUS ONCE
Status: COMPLETED | OUTPATIENT
Start: 2023-07-17 | End: 2023-07-17

## 2023-07-17 RX ADMIN — PROCHLORPERAZINE EDISYLATE 10 MG: 5 INJECTION INTRAMUSCULAR; INTRAVENOUS at 13:22

## 2023-07-17 RX ADMIN — KETOROLAC TROMETHAMINE 15 MG: 15 INJECTION, SOLUTION INTRAMUSCULAR; INTRAVENOUS at 13:22

## 2023-07-17 RX ADMIN — SODIUM CHLORIDE, POTASSIUM CHLORIDE, SODIUM LACTATE AND CALCIUM CHLORIDE 1000 ML: 600; 310; 30; 20 INJECTION, SOLUTION INTRAVENOUS at 13:20

## 2023-07-17 RX ADMIN — DIPHENHYDRAMINE HYDROCHLORIDE 25 MG: 50 INJECTION, SOLUTION INTRAMUSCULAR; INTRAVENOUS at 13:21

## 2023-07-17 RX ADMIN — MECLIZINE 12.5 MG: 12.5 TABLET ORAL at 15:32

## 2023-07-17 ASSESSMENT — ENCOUNTER SYMPTOMS
SHORTNESS OF BREATH: 0
NAUSEA: 1
ABDOMINAL PAIN: 0
DIARRHEA: 1
VOMITING: 1

## 2023-07-17 ASSESSMENT — PAIN - FUNCTIONAL ASSESSMENT: PAIN_FUNCTIONAL_ASSESSMENT: NONE - DENIES PAIN

## 2023-07-17 ASSESSMENT — PAIN DESCRIPTION - LOCATION: LOCATION: ABDOMEN;HEAD

## 2023-07-17 ASSESSMENT — PAIN SCALES - GENERAL: PAINLEVEL_OUTOF10: 7

## 2023-07-17 NOTE — ED NOTES
Pt respirations are even and unlabored, pt is alert and oriented X 4, speaking in complete sentences, bed is in the lowest position, call light is within reach, NAD noted. Will continue to follow plan of care.         Shashi Hilliard RN  07/17/23 [id number]

## 2023-07-17 NOTE — ED NOTES
26-Jul-2018 14:23 Pt respirations are even and unlabored, pt is alert and oriented X 4, speaking in complete sentences, bed is in the lowest position, call light is within reach, NAD noted. Will continue to follow plan of care.         Errol Arevalo RN  07/17/23 [id number]

## 2023-07-17 NOTE — ED NOTES
Pt respirations are even and unlabored, pt is alert and oriented X 4, speaking in complete sentences, bed is in the lowest position, call light is within reach, NAD noted. Will continue to follow plan of care.         Evelin Keyes RN  07/17/23 [id number]

## 2023-07-17 NOTE — ED NOTES
Pt arrives to ED 07 via triage. Pt co headache, nausea, vomiting, and diarrhea. Pt states that symptoms began 2 days ago. Pt states that she was supposed to have rotator cuff surgery, but she has not been feeling well. Pt states that she has hypertension, is on eliquis and plavix, and is a diabetic. Pt denies taking any of her medications today. Pt states that she has had a cough and has been coughing up yellow mucous. Pt respirations are even and unlabored, pt is alert and oriented X 4, speaking in complete sentences, bed is in the lowest position, call light is within reach, NAD noted. Will continue to follow plan of care.         Car Pollack RN  07/17/23 [id number]

## 2023-07-17 NOTE — DISCHARGE INSTRUCTIONS
You were evaluated due to nausea, vomiting, headache and diarrhea. We performed a CT of your head that showed no acute strokes. We evaluated your heart which did not show any heart attacks. We did a urinalysis that showed no UTI. We also did an x-ray of your chest that showed no pneumonias. We gave you treatment with a migraine cocktail that helps with headache and nausea. We also gave you some fluids. Please follow-up with your primary care physician in the next week to ensure your symptoms have resolved. Please return to the ED if you develop any worsening weakness, pain, abdominal pain, nausea, vomiting, dizziness, weakness of your arms or legs, facial drooping, chest pain or shortness of breath.

## 2023-07-17 NOTE — ED PROVIDER NOTES
[address] [place] [address] ED  Emergency Department Encounter  Emergency Medicine Resident     Pt Amado Lamar  MRN: [id number]  [address] [facility] 1945  Date of evaluation: 7/17/23  PCP:  Nilay Weaver MD  Note Started: 5:45 PM EDT      CHIEF COMPLAINT       Chief Complaint   Patient presents with    Nausea    Emesis    Diarrhea       HISTORY OF PRESENT ILLNESS  (Location/Symptom, Timing/Onset, Context/Setting, Quality, Duration, Modifying Factors, Severity.)      Amando Gonsales is a 66 y.o. female who presents with 2 days of nausea, vomiting, vertigo. Patient reports she has been having an upset stomach with multiple episodes of nausea and vomiting for the past 2 days. She reports that she is unable to take her medications at home. She reports he does have a baseline condition of vertigo that gives her very bad dizziness when she turns her head which she reports she is having now. She denies any chest pain or shortness of breath but does report some generalized weakness. She reports she has been able to tolerate some fluids but is unable to eat a regular meal for the past couple days. She denies any dysuria but reports that she is urinating a large amount. She reports 1 episode of diarrhea. She denies any abdominal pain, but reports that her stomach feels \"upset \". Patient additionally reports headache with her dizziness. She reports she has some right-sided shoulder pain due to a rotator cuff tear for which she is supposed to be evaluated by orthopedic surgery this week. She reports she has been having some dry cough.     PAST MEDICAL / SURGICAL / SOCIAL / FAMILY HISTORY      has a past medical history of Acute renal failure with tubular necrosis (HCC), Anxiety, Atrial fibrillation (HCC), Benign positional vertigo, CAD (coronary artery disease), Chest pain, CKD (chronic kidney disease), stage III ([address]), Depression, Diabetes mellitus ([address]), Diabetic neuropathy ([address]), Dysuria, Hyperlipidemia,

## 2023-07-18 ENCOUNTER — OFFICE VISIT (OUTPATIENT)
Dept: ORTHOPEDIC SURGERY | Age: 78
End: 2023-07-18

## 2023-07-18 VITALS — WEIGHT: 235 LBS | BODY MASS INDEX: 37.77 KG/M2 | HEIGHT: 66 IN

## 2023-07-18 DIAGNOSIS — R29.898 HAND WEAKNESS: Primary | ICD-10-CM

## 2023-07-18 NOTE — PROGRESS NOTES
Chief Complaint   Patient presents with    Follow-up     ED FU : RIGHT SHOULDER PAIN / PATIENT HAD A FALL 12/2022   This 77-year-old patient is seen here complaining of pins-and-needles in both hands she has had for about 2 years she also says that she has had this shaking in the hands for the last 2 years since her fall and rib fracture. She is unable to make a fist.  She also has a slight clicking of the hand on the left side. The patient had an intertrochanteric fracture of the right hip treated at [facility] in December 2022. She also has severe pain in the right knee. Examination: She continuously is shaking her right hand. Sensation was normal.  Phalen test was negative. She also has mild shaking in the left hand. Shoulder examination showed that she was able to abduct to about 90 degrees. Internal/external rotation were painless. Right hip examination shows significant stiffness. Right knee examination shows effusion as well as tenderness over the medial and lateral joint line. She does have mild flexion contracture but flexes fully. She also has swelling of the left leg. Patient has seen Dr Carla Kumar neurologist.  She is also seen a neurosurgeon. Diagnosis: Primary osteoarthritis right knee. #2 history of intertrochanteric fracture right hip. #3 possible carpal tunnel syndrome bilaterally. Treatment: I was going to inject the right knee but her sugar sometimes runs into 300s and this morning was 160. I therefore suggested that they see Dr. Mitch Buckley neurologist evaluation of this and symptoms. And have the knee injected locally when her sugar is little better controlled.

## 2023-07-19 LAB
EKG ATRIAL RATE: 83 BPM
EKG P AXIS: 61 DEGREES
EKG P-R INTERVAL: 256 MS
EKG Q-T INTERVAL: 396 MS
EKG QRS DURATION: 88 MS
EKG QTC CALCULATION (BAZETT): 465 MS
EKG R AXIS: 10 DEGREES
EKG T AXIS: 96 DEGREES
EKG VENTRICULAR RATE: 83 BPM

## 2023-07-19 PROCEDURE — 93010 ELECTROCARDIOGRAM REPORT: CPT | Performed by: INTERNAL MEDICINE

## 2023-08-30 ENCOUNTER — APPOINTMENT (OUTPATIENT)
Dept: GENERAL RADIOLOGY | Age: 78
DRG: 291 | End: 2023-08-30
Payer: MEDICARE

## 2023-08-30 ENCOUNTER — HOSPITAL ENCOUNTER (INPATIENT)
Age: 78
LOS: 12 days | Discharge: NURSING, REHAB OR LONG-TERM CARE | DRG: 291 | End: 2023-09-11
Attending: EMERGENCY MEDICINE | Admitting: INTERNAL MEDICINE
Payer: MEDICARE

## 2023-08-30 ENCOUNTER — APPOINTMENT (OUTPATIENT)
Dept: CT IMAGING | Age: 78
DRG: 291 | End: 2023-08-30
Payer: MEDICARE

## 2023-08-30 DIAGNOSIS — I48.91 ATRIAL FIBRILLATION WITH RVR (HCC): Primary | ICD-10-CM

## 2023-08-30 DIAGNOSIS — R06.00 DYSPNEA, UNSPECIFIED TYPE: ICD-10-CM

## 2023-08-30 DIAGNOSIS — Z95.5 H/O HEART ARTERY STENT: ICD-10-CM

## 2023-08-30 PROBLEM — I50.9 CHF EXACERBATION (HCC): Status: ACTIVE | Noted: 2023-08-30

## 2023-08-30 PROBLEM — R79.89 ELEVATED TROPONIN: Status: ACTIVE | Noted: 2023-08-30

## 2023-08-30 PROBLEM — E87.70 VOLUME OVERLOAD: Status: ACTIVE | Noted: 2023-08-30

## 2023-08-30 PROBLEM — J81.1 PULMONARY EDEMA: Status: ACTIVE | Noted: 2023-08-30

## 2023-08-30 PROBLEM — R07.9 CHEST PAIN: Status: ACTIVE | Noted: 2023-08-30

## 2023-08-30 PROBLEM — R20.0 RIGHT SIDED NUMBNESS: Status: ACTIVE | Noted: 2023-08-30

## 2023-08-30 PROBLEM — R77.8 ELEVATED TROPONIN: Status: ACTIVE | Noted: 2023-08-30

## 2023-08-30 LAB
ANION GAP SERPL CALCULATED.3IONS-SCNC: 9 MMOL/L (ref 9–17)
BASOPHILS # BLD: 0.03 K/UL (ref 0–0.2)
BASOPHILS NFR BLD: 1 % (ref 0–2)
BNP SERPL-MCNC: 1342 PG/ML
BUN SERPL-MCNC: 19 MG/DL (ref 8–23)
CALCIUM SERPL-MCNC: 9.3 MG/DL (ref 8.6–10.4)
CHLORIDE SERPL-SCNC: 101 MMOL/L (ref 98–107)
CO2 SERPL-SCNC: 27 MMOL/L (ref 20–31)
CREAT SERPL-MCNC: 1.3 MG/DL (ref 0.5–0.9)
D DIMER PPP FEU-MCNC: 0.39 UG/ML FEU (ref 0–0.57)
EOSINOPHIL # BLD: 0.16 K/UL (ref 0–0.44)
EOSINOPHILS RELATIVE PERCENT: 3 % (ref 1–4)
ERYTHROCYTE [DISTWIDTH] IN BLOOD BY AUTOMATED COUNT: 13.2 % (ref 11.8–14.4)
GFR SERPL CREATININE-BSD FRML MDRD: 42 ML/MIN/1.73M2
GLUCOSE SERPL-MCNC: 295 MG/DL (ref 70–99)
HCT VFR BLD AUTO: 35.2 % (ref 36.3–47.1)
HGB BLD-MCNC: 11 G/DL (ref 11.9–15.1)
IMM GRANULOCYTES # BLD AUTO: <0.03 K/UL (ref 0–0.3)
IMM GRANULOCYTES NFR BLD: 0 %
LYMPHOCYTES NFR BLD: 1.7 K/UL (ref 1.1–3.7)
LYMPHOCYTES RELATIVE PERCENT: 31 % (ref 24–43)
MCH RBC QN AUTO: 29.6 PG (ref 25.2–33.5)
MCHC RBC AUTO-ENTMCNC: 31.3 G/DL (ref 28.4–34.8)
MCV RBC AUTO: 94.6 FL (ref 82.6–102.9)
MONOCYTES NFR BLD: 0.46 K/UL (ref 0.1–1.2)
MONOCYTES NFR BLD: 8 % (ref 3–12)
NEUTROPHILS NFR BLD: 57 % (ref 36–65)
NEUTS SEG NFR BLD: 3.18 K/UL (ref 1.5–8.1)
NRBC BLD-RTO: 0 PER 100 WBC
PLATELET # BLD AUTO: 147 K/UL (ref 138–453)
PMV BLD AUTO: 11.8 FL (ref 8.1–13.5)
POTASSIUM SERPL-SCNC: 4.3 MMOL/L (ref 3.7–5.3)
RBC # BLD AUTO: 3.72 M/UL (ref 3.95–5.11)
SODIUM SERPL-SCNC: 137 MMOL/L (ref 135–144)
TROPONIN I SERPL HS-MCNC: 32 NG/L (ref 0–14)
TROPONIN I SERPL HS-MCNC: 32 NG/L (ref 0–14)
WBC OTHER # BLD: 5.6 K/UL (ref 3.5–11.3)

## 2023-08-30 PROCEDURE — 96375 TX/PRO/DX INJ NEW DRUG ADDON: CPT

## 2023-08-30 PROCEDURE — 99222 1ST HOSP IP/OBS MODERATE 55: CPT | Performed by: STUDENT IN AN ORGANIZED HEALTH CARE EDUCATION/TRAINING PROGRAM

## 2023-08-30 PROCEDURE — 71045 X-RAY EXAM CHEST 1 VIEW: CPT

## 2023-08-30 PROCEDURE — 99285 EMERGENCY DEPT VISIT HI MDM: CPT

## 2023-08-30 PROCEDURE — 96365 THER/PROPH/DIAG IV INF INIT: CPT

## 2023-08-30 PROCEDURE — 70450 CT HEAD/BRAIN W/O DYE: CPT

## 2023-08-30 PROCEDURE — 96366 THER/PROPH/DIAG IV INF ADDON: CPT

## 2023-08-30 PROCEDURE — 2500000003 HC RX 250 WO HCPCS: Performed by: STUDENT IN AN ORGANIZED HEALTH CARE EDUCATION/TRAINING PROGRAM

## 2023-08-30 PROCEDURE — 2060000000 HC ICU INTERMEDIATE R&B

## 2023-08-30 PROCEDURE — 83880 ASSAY OF NATRIURETIC PEPTIDE: CPT

## 2023-08-30 PROCEDURE — 2580000003 HC RX 258: Performed by: STUDENT IN AN ORGANIZED HEALTH CARE EDUCATION/TRAINING PROGRAM

## 2023-08-30 PROCEDURE — 6360000002 HC RX W HCPCS: Performed by: EMERGENCY MEDICINE

## 2023-08-30 PROCEDURE — 84484 ASSAY OF TROPONIN QUANT: CPT

## 2023-08-30 PROCEDURE — 6370000000 HC RX 637 (ALT 250 FOR IP): Performed by: STUDENT IN AN ORGANIZED HEALTH CARE EDUCATION/TRAINING PROGRAM

## 2023-08-30 PROCEDURE — 85025 COMPLETE CBC W/AUTO DIFF WBC: CPT

## 2023-08-30 PROCEDURE — 6370000000 HC RX 637 (ALT 250 FOR IP): Performed by: EMERGENCY MEDICINE

## 2023-08-30 PROCEDURE — 85379 FIBRIN DEGRADATION QUANT: CPT

## 2023-08-30 PROCEDURE — 93005 ELECTROCARDIOGRAM TRACING: CPT | Performed by: INTERNAL MEDICINE

## 2023-08-30 PROCEDURE — 80048 BASIC METABOLIC PNL TOTAL CA: CPT

## 2023-08-30 PROCEDURE — 96376 TX/PRO/DX INJ SAME DRUG ADON: CPT

## 2023-08-30 RX ORDER — NITROGLYCERIN 0.4 MG/1
0.4 TABLET SUBLINGUAL EVERY 5 MIN PRN
Status: DISCONTINUED | OUTPATIENT
Start: 2023-08-30 | End: 2023-09-11 | Stop reason: HOSPADM

## 2023-08-30 RX ORDER — ASPIRIN 81 MG/1
324 TABLET, CHEWABLE ORAL ONCE
Status: COMPLETED | OUTPATIENT
Start: 2023-08-30 | End: 2023-08-30

## 2023-08-30 RX ORDER — DILTIAZEM HYDROCHLORIDE 5 MG/ML
10 INJECTION INTRAVENOUS ONCE
Status: COMPLETED | OUTPATIENT
Start: 2023-08-30 | End: 2023-08-30

## 2023-08-30 RX ORDER — CARVEDILOL 12.5 MG/1
6.25 TABLET ORAL 2 TIMES DAILY
Status: CANCELLED | OUTPATIENT
Start: 2023-08-30

## 2023-08-30 RX ORDER — FUROSEMIDE 10 MG/ML
40 INJECTION INTRAMUSCULAR; INTRAVENOUS ONCE
Status: COMPLETED | OUTPATIENT
Start: 2023-08-30 | End: 2023-08-30

## 2023-08-30 RX ADMIN — DILTIAZEM HYDROCHLORIDE 5 MG/HR: 5 INJECTION, SOLUTION INTRAVENOUS at 16:11

## 2023-08-30 RX ADMIN — NITROGLYCERIN 0.4 MG: 0.4 TABLET SUBLINGUAL at 16:32

## 2023-08-30 RX ADMIN — DILTIAZEM HYDROCHLORIDE 10 MG: 5 INJECTION, SOLUTION INTRAVENOUS at 15:41

## 2023-08-30 RX ADMIN — ASPIRIN 81 MG 324 MG: 81 TABLET ORAL at 15:42

## 2023-08-30 RX ADMIN — FUROSEMIDE 40 MG: 10 INJECTION, SOLUTION INTRAMUSCULAR; INTRAVENOUS at 16:38

## 2023-08-30 ASSESSMENT — ENCOUNTER SYMPTOMS
SHORTNESS OF BREATH: 1
ABDOMINAL PAIN: 0
NAUSEA: 0
COUGH: 0
CONSTIPATION: 0
SHORTNESS OF BREATH: 0
VOMITING: 0
DIARRHEA: 0
RHINORRHEA: 0
SORE THROAT: 0
BACK PAIN: 0

## 2023-08-30 NOTE — CONSULTS
[address] [facility] Neurology   [address] [facility]    Neurology Consult Note            Date:   8/30/2023  Patient name:  Kimberly Samuel  Date of admission:  8/30/2023  3:19 PM  MRN:   [id number]  Account:  [de-identified]  YOB: 1945  PCP:    Lizet Gomez MD  Room:   22/22  Code Status:    Prior    Chief Complaint:     Chief Complaint   Patient presents with    Chest Pain     Pt had outpatient echo 20 minutes pta    Shortness of Breath    Dizziness       History Obtained From:     patient    History of Present Illness:     40-year-old female with past medical history of CAD on Plavix 75 mg daily, atrial fibrillation on Eliquis 5 mg twice daily, diabetes on insulin, severe diabetic neuropathy on gabapentin 100 mg presented after having a few days of chest pain/heaviness and right-sided numbness. Patient is unsure when her right-sided numbness started. She mentions it could have been up to 2 days ago. She does not complain of any weakness but appears to have drift in the right arm. NIH score 4, mrs 4-patient wheelchair-bound. Patient has had no neurological history or strokes in the past, CT head this admission showed no acute intracranial abnormalities, and patient has some metabolic problems-elevated troponins, elevated BNP, some anemia. The patient will have a stroke work-up done, with MRI brain without contrast and has already had an echo done which showed normal ejection fraction and dilated left atrium with no thrombi. Past Medical History:     Past Medical History:   Diagnosis Date    Acute renal failure with tubular necrosis ([address]) 11/24/2021    Secondary to ischemic ATN post fall intravascularly depletion hypotension and low flow baseline creatinine 1.2-1.4 peaked up to 2.1 during her hospitalization in September 2020. Work-up showed benign urine sediment, kidney size to be 11.2 on the right 15.1 on the left serological work-up negative.     Anxiety

## 2023-08-30 NOTE — ED NOTES
The following labs were labeled with appropriate pt sticker and tubed to lab:     [x] Blue     [x] Lavender   [] on ice  [x] Green/yellow  [x] Green/black [] on ice  [] Francena Achilles  [] on ice  [] Yellow  [] Red  [] Type/ Screen  [] ABG  [] VBG    [] COVID-19 swab    [] Rapid  [] PCR  [] Flu swab  [] Peds Viral Panel     [] Urine Sample  [] Fecal Sample  [] Pelvic Cultures  [] Blood Cultures  [] X 2  [] STREP Cultures       Cherry Conti RN  08/30/23 0473

## 2023-08-30 NOTE — ED NOTES
Pt presents to the ER via triage in wheelchair. Pt able to ambulate to bed from wheelchair. Pt was getting outpatient echocardiogram and was sent here d/t patient being in a-fib RVR. Pt was told she had a-fib previously. Pt is already on eliquis and plavix. Pt states 9 previous stent placements. Pt states before going to get echo, pt was already experiencing CP, SOB, & dizziness. Pt describes the pain almost as an intense pressure. Pt placed on bedside cardiac monitor, ekg taken, line established, labs drawn. Call light within reach. Dr. Kat Nunez at bedside to further evaluate patient.       Ana Paula Ortiz, RN  08/30/23 6362

## 2023-08-30 NOTE — ED NOTES
Pt adjusted and placed on external catheter. Pt placed back on monitor. Pt A&O x4, in NAD, VSS.      Monica Walter RN  08/30/23 7727

## 2023-08-31 ENCOUNTER — APPOINTMENT (OUTPATIENT)
Dept: MRI IMAGING | Age: 78
DRG: 291 | End: 2023-08-31
Payer: MEDICARE

## 2023-08-31 PROBLEM — Z95.5 H/O HEART ARTERY STENT: Status: ACTIVE | Noted: 2023-08-31

## 2023-08-31 PROBLEM — I15.8 OTHER SECONDARY HYPERTENSION: Status: ACTIVE | Noted: 2023-08-31

## 2023-08-31 LAB
CHOLEST SERPL-MCNC: 133 MG/DL
CHOLESTEROL/HDL RATIO: 3.3
EST. AVERAGE GLUCOSE BLD GHB EST-MCNC: 275 MG/DL
GLUCOSE BLD-MCNC: 207 MG/DL (ref 65–105)
GLUCOSE BLD-MCNC: 237 MG/DL (ref 65–105)
GLUCOSE BLD-MCNC: 242 MG/DL (ref 65–105)
GLUCOSE BLD-MCNC: 258 MG/DL (ref 65–105)
GLUCOSE BLD-MCNC: 260 MG/DL (ref 65–105)
HBA1C MFR BLD: 11.2 % (ref 4–6)
HDLC SERPL-MCNC: 40 MG/DL
INR PPP: 1.7
LDLC SERPL CALC-MCNC: 70 MG/DL (ref 0–130)
PROTHROMBIN TIME: 19.8 SEC (ref 11.7–14.9)
TRIGL SERPL-MCNC: 117 MG/DL
TROPONIN I SERPL HS-MCNC: 31 NG/L (ref 0–14)
TROPONIN I SERPL HS-MCNC: 31 NG/L (ref 0–14)

## 2023-08-31 PROCEDURE — 36415 COLL VENOUS BLD VENIPUNCTURE: CPT

## 2023-08-31 PROCEDURE — 6370000000 HC RX 637 (ALT 250 FOR IP): Performed by: NURSE PRACTITIONER

## 2023-08-31 PROCEDURE — 6370000000 HC RX 637 (ALT 250 FOR IP): Performed by: STUDENT IN AN ORGANIZED HEALTH CARE EDUCATION/TRAINING PROGRAM

## 2023-08-31 PROCEDURE — 83036 HEMOGLOBIN GLYCOSYLATED A1C: CPT

## 2023-08-31 PROCEDURE — 2060000000 HC ICU INTERMEDIATE R&B

## 2023-08-31 PROCEDURE — 2500000003 HC RX 250 WO HCPCS: Performed by: NURSE PRACTITIONER

## 2023-08-31 PROCEDURE — 99233 SBSQ HOSP IP/OBS HIGH 50: CPT | Performed by: STUDENT IN AN ORGANIZED HEALTH CARE EDUCATION/TRAINING PROGRAM

## 2023-08-31 PROCEDURE — 6360000002 HC RX W HCPCS: Performed by: STUDENT IN AN ORGANIZED HEALTH CARE EDUCATION/TRAINING PROGRAM

## 2023-08-31 PROCEDURE — 70547 MR ANGIOGRAPHY NECK W/O DYE: CPT

## 2023-08-31 PROCEDURE — 6360000002 HC RX W HCPCS: Performed by: NURSE PRACTITIONER

## 2023-08-31 PROCEDURE — 93005 ELECTROCARDIOGRAM TRACING: CPT | Performed by: STUDENT IN AN ORGANIZED HEALTH CARE EDUCATION/TRAINING PROGRAM

## 2023-08-31 PROCEDURE — 70544 MR ANGIOGRAPHY HEAD W/O DYE: CPT

## 2023-08-31 PROCEDURE — 99223 1ST HOSP IP/OBS HIGH 75: CPT | Performed by: INTERNAL MEDICINE

## 2023-08-31 PROCEDURE — 97166 OT EVAL MOD COMPLEX 45 MIN: CPT

## 2023-08-31 PROCEDURE — 2580000003 HC RX 258: Performed by: STUDENT IN AN ORGANIZED HEALTH CARE EDUCATION/TRAINING PROGRAM

## 2023-08-31 PROCEDURE — 70551 MRI BRAIN STEM W/O DYE: CPT

## 2023-08-31 PROCEDURE — 97535 SELF CARE MNGMENT TRAINING: CPT

## 2023-08-31 PROCEDURE — 82947 ASSAY GLUCOSE BLOOD QUANT: CPT

## 2023-08-31 PROCEDURE — 84484 ASSAY OF TROPONIN QUANT: CPT

## 2023-08-31 PROCEDURE — 80061 LIPID PANEL: CPT

## 2023-08-31 PROCEDURE — 99222 1ST HOSP IP/OBS MODERATE 55: CPT | Performed by: STUDENT IN AN ORGANIZED HEALTH CARE EDUCATION/TRAINING PROGRAM

## 2023-08-31 PROCEDURE — 97530 THERAPEUTIC ACTIVITIES: CPT

## 2023-08-31 PROCEDURE — 85610 PROTHROMBIN TIME: CPT

## 2023-08-31 RX ORDER — CLOPIDOGREL BISULFATE 75 MG/1
75 TABLET ORAL DAILY
Status: DISCONTINUED | OUTPATIENT
Start: 2023-08-31 | End: 2023-09-02

## 2023-08-31 RX ORDER — LORAZEPAM 2 MG/ML
1 INJECTION INTRAMUSCULAR ONCE
Status: COMPLETED | OUTPATIENT
Start: 2023-08-31 | End: 2023-08-31

## 2023-08-31 RX ORDER — SODIUM CHLORIDE 0.9 % (FLUSH) 0.9 %
5-40 SYRINGE (ML) INJECTION EVERY 12 HOURS SCHEDULED
Status: DISCONTINUED | OUTPATIENT
Start: 2023-08-31 | End: 2023-09-11 | Stop reason: HOSPADM

## 2023-08-31 RX ORDER — LANOLIN ALCOHOL/MO/W.PET/CERES
325 CREAM (GRAM) TOPICAL
Status: DISCONTINUED | OUTPATIENT
Start: 2023-08-31 | End: 2023-09-11 | Stop reason: HOSPADM

## 2023-08-31 RX ORDER — TAMSULOSIN HYDROCHLORIDE 0.4 MG/1
0.4 CAPSULE ORAL DAILY
Status: DISCONTINUED | OUTPATIENT
Start: 2023-08-31 | End: 2023-09-11 | Stop reason: HOSPADM

## 2023-08-31 RX ORDER — INSULIN LISPRO 100 [IU]/ML
0-4 INJECTION, SOLUTION INTRAVENOUS; SUBCUTANEOUS NIGHTLY
Status: DISCONTINUED | OUTPATIENT
Start: 2023-08-31 | End: 2023-09-11 | Stop reason: HOSPADM

## 2023-08-31 RX ORDER — FUROSEMIDE 10 MG/ML
40 INJECTION INTRAMUSCULAR; INTRAVENOUS DAILY
Status: DISCONTINUED | OUTPATIENT
Start: 2023-08-31 | End: 2023-09-02

## 2023-08-31 RX ORDER — FUROSEMIDE 20 MG/1
20 TABLET ORAL DAILY
Status: DISCONTINUED | OUTPATIENT
Start: 2023-08-31 | End: 2023-08-31

## 2023-08-31 RX ORDER — ACETAMINOPHEN 325 MG/1
650 TABLET ORAL EVERY 6 HOURS PRN
Status: DISCONTINUED | OUTPATIENT
Start: 2023-08-31 | End: 2023-09-11 | Stop reason: HOSPADM

## 2023-08-31 RX ORDER — POLYETHYLENE GLYCOL 3350 17 G/17G
17 POWDER, FOR SOLUTION ORAL DAILY PRN
Status: DISCONTINUED | OUTPATIENT
Start: 2023-08-31 | End: 2023-09-11 | Stop reason: HOSPADM

## 2023-08-31 RX ORDER — ACETAMINOPHEN 650 MG/1
650 SUPPOSITORY RECTAL EVERY 6 HOURS PRN
Status: DISCONTINUED | OUTPATIENT
Start: 2023-08-31 | End: 2023-09-11 | Stop reason: HOSPADM

## 2023-08-31 RX ORDER — ALFUZOSIN HYDROCHLORIDE 10 MG/1
10 TABLET, EXTENDED RELEASE ORAL DAILY
Status: DISCONTINUED | OUTPATIENT
Start: 2023-08-31 | End: 2023-08-31

## 2023-08-31 RX ORDER — SODIUM CHLORIDE 9 MG/ML
INJECTION, SOLUTION INTRAVENOUS PRN
Status: DISCONTINUED | OUTPATIENT
Start: 2023-08-31 | End: 2023-09-11 | Stop reason: HOSPADM

## 2023-08-31 RX ORDER — ONDANSETRON 2 MG/ML
4 INJECTION INTRAMUSCULAR; INTRAVENOUS EVERY 6 HOURS PRN
Status: DISCONTINUED | OUTPATIENT
Start: 2023-08-31 | End: 2023-09-11 | Stop reason: HOSPADM

## 2023-08-31 RX ORDER — CARVEDILOL 6.25 MG/1
6.25 TABLET ORAL 2 TIMES DAILY
Status: DISCONTINUED | OUTPATIENT
Start: 2023-08-31 | End: 2023-09-04

## 2023-08-31 RX ORDER — DILTIAZEM HYDROCHLORIDE 5 MG/ML
10 INJECTION INTRAVENOUS ONCE
Status: COMPLETED | OUTPATIENT
Start: 2023-08-31 | End: 2023-08-31

## 2023-08-31 RX ORDER — DIPHENHYDRAMINE HYDROCHLORIDE 50 MG/ML
25 INJECTION INTRAMUSCULAR; INTRAVENOUS ONCE
Status: COMPLETED | OUTPATIENT
Start: 2023-08-31 | End: 2023-08-31

## 2023-08-31 RX ORDER — ATORVASTATIN CALCIUM 80 MG/1
80 TABLET, FILM COATED ORAL NIGHTLY
Status: DISCONTINUED | OUTPATIENT
Start: 2023-08-31 | End: 2023-09-11 | Stop reason: HOSPADM

## 2023-08-31 RX ORDER — ISOSORBIDE MONONITRATE 30 MG/1
30 TABLET, EXTENDED RELEASE ORAL DAILY
Status: DISCONTINUED | OUTPATIENT
Start: 2023-08-31 | End: 2023-09-04

## 2023-08-31 RX ORDER — LOSARTAN POTASSIUM 50 MG/1
100 TABLET ORAL EVERY MORNING
Status: DISCONTINUED | OUTPATIENT
Start: 2023-09-01 | End: 2023-09-02

## 2023-08-31 RX ORDER — INSULIN GLARGINE 100 [IU]/ML
10 INJECTION, SOLUTION SUBCUTANEOUS NIGHTLY
Status: DISCONTINUED | OUTPATIENT
Start: 2023-08-31 | End: 2023-09-02

## 2023-08-31 RX ORDER — ONDANSETRON 4 MG/1
4 TABLET, ORALLY DISINTEGRATING ORAL EVERY 8 HOURS PRN
Status: DISCONTINUED | OUTPATIENT
Start: 2023-08-31 | End: 2023-09-11 | Stop reason: HOSPADM

## 2023-08-31 RX ORDER — ESCITALOPRAM OXALATE 10 MG/1
20 TABLET ORAL DAILY
Status: DISCONTINUED | OUTPATIENT
Start: 2023-08-31 | End: 2023-09-11 | Stop reason: HOSPADM

## 2023-08-31 RX ORDER — SODIUM CHLORIDE 0.9 % (FLUSH) 0.9 %
5-40 SYRINGE (ML) INJECTION PRN
Status: DISCONTINUED | OUTPATIENT
Start: 2023-08-31 | End: 2023-09-11 | Stop reason: HOSPADM

## 2023-08-31 RX ORDER — INSULIN LISPRO 100 [IU]/ML
0-16 INJECTION, SOLUTION INTRAVENOUS; SUBCUTANEOUS
Status: DISCONTINUED | OUTPATIENT
Start: 2023-08-31 | End: 2023-09-11 | Stop reason: HOSPADM

## 2023-08-31 RX ADMIN — SODIUM CHLORIDE, PRESERVATIVE FREE 10 ML: 5 INJECTION INTRAVENOUS at 08:46

## 2023-08-31 RX ADMIN — FERROUS SULFATE TAB EC 325 MG (65 MG FE EQUIVALENT) 325 MG: 325 (65 FE) TABLET DELAYED RESPONSE at 08:46

## 2023-08-31 RX ADMIN — ISOSORBIDE MONONITRATE 30 MG: 30 TABLET, EXTENDED RELEASE ORAL at 23:44

## 2023-08-31 RX ADMIN — INSULIN GLARGINE 10 UNITS: 100 INJECTION, SOLUTION SUBCUTANEOUS at 20:30

## 2023-08-31 RX ADMIN — DIPHENHYDRAMINE HYDROCHLORIDE 25 MG: 50 INJECTION, SOLUTION INTRAMUSCULAR; INTRAVENOUS at 20:30

## 2023-08-31 RX ADMIN — TAMSULOSIN HYDROCHLORIDE 0.4 MG: 0.4 CAPSULE ORAL at 08:46

## 2023-08-31 RX ADMIN — DILTIAZEM HYDROCHLORIDE 10 MG: 5 INJECTION, SOLUTION INTRAVENOUS at 20:26

## 2023-08-31 RX ADMIN — INSULIN GLARGINE 10 UNITS: 100 INJECTION, SOLUTION SUBCUTANEOUS at 01:11

## 2023-08-31 RX ADMIN — CLOPIDOGREL BISULFATE 75 MG: 75 TABLET ORAL at 08:45

## 2023-08-31 RX ADMIN — ESCITALOPRAM 20 MG: 10 TABLET, FILM COATED ORAL at 08:45

## 2023-08-31 RX ADMIN — APIXABAN 5 MG: 5 TABLET, FILM COATED ORAL at 01:11

## 2023-08-31 RX ADMIN — ATORVASTATIN CALCIUM 80 MG: 80 TABLET, FILM COATED ORAL at 01:11

## 2023-08-31 RX ADMIN — SODIUM CHLORIDE, PRESERVATIVE FREE 10 ML: 5 INJECTION INTRAVENOUS at 20:31

## 2023-08-31 RX ADMIN — ATORVASTATIN CALCIUM 80 MG: 80 TABLET, FILM COATED ORAL at 20:30

## 2023-08-31 RX ADMIN — APIXABAN 5 MG: 5 TABLET, FILM COATED ORAL at 08:45

## 2023-08-31 RX ADMIN — FUROSEMIDE 40 MG: 10 INJECTION, SOLUTION INTRAMUSCULAR; INTRAVENOUS at 08:46

## 2023-08-31 RX ADMIN — INSULIN LISPRO 4 UNITS: 100 INJECTION, SOLUTION INTRAVENOUS; SUBCUTANEOUS at 17:14

## 2023-08-31 RX ADMIN — LORAZEPAM 1 MG: 2 INJECTION INTRAMUSCULAR; INTRAVENOUS at 11:34

## 2023-08-31 RX ADMIN — INSULIN LISPRO 4 UNITS: 100 INJECTION, SOLUTION INTRAVENOUS; SUBCUTANEOUS at 08:46

## 2023-08-31 RX ADMIN — CARVEDILOL 6.25 MG: 6.25 TABLET, FILM COATED ORAL at 23:44

## 2023-08-31 RX ADMIN — APIXABAN 5 MG: 5 TABLET, FILM COATED ORAL at 20:30

## 2023-08-31 NOTE — CONSULTS
[facility]    Consult / H&P               Today's Date: 8/31/2023  Patient Name: Jossie Sutherland  Date of admission: 8/30/2023  3:19 PM  Patient's age: 66 y.o., 1945  Admission Dx: Atrial fibrillation with RVR ([address]) [I48.91]    Requesting Physician: No admitting provider for patient encounter. Cardiac Evaluation Reason:  Chest heaviness, CAD history of stents, A-fib with RVR CHF exacerbation    History Obtained From: patient and chart review     History of Present Illness: This patient 66y.o. years old with PMHx significant for CAD s/p Stents, HFpEF, type II DM, hypertension, hyperlipidemia, CKD stage III (baseline creatinine 1.3-1.4). Presented with chest pain and shortness of breath, substernal chest pain describes it as heaviness noticed yesterday morning, nonradiating, not associated with nausea or vomiting, reported some shortness of breath, patient had a scheduled echo yesterday and while obtaining her echo she was encouraged to come to the ED for further evaluation, in ED was found to have A-fib with  and hypertensive 182/110, and l sent for CHF exacerbation was started on Cardizem drip,  Per patient she had 9 stents placed last 1 was last year, was also started on Lasix 40 IV  -High-sensitivity troponin flat trend. 32-32-31-31    - On my evaluation patient still has volume overload, with bilateral TAMIKO and tenderness, bibasilar crackles appreciated on auscultation, still complaining of chest heaviness and SOB that started 3 weeks ago but wasn't that bad, also c/o worsening chest pain on exertion, and orthopnea. Currently patient is in Sinus rhythm on monitor with HR 60s-70s, denies any palpitations at the moment. Based on EKGs patient has been going back and forth between A-fib and NSR. -Currently on Coreg 6.25 twice daily, Lasix 20 mg, Imdur 30 mg, Lipitor 80 mg, Cozaar 100 mg, Eliquis 5 mg twice daily, Plavix 75 mg, Norvasc 10 mg, Aldactone 25 mg.   Past Medical

## 2023-08-31 NOTE — CARE COORDINATION
Case Management Assessment  Initial Evaluation    Date/Time of Evaluation: 8/31/2023 12:19 PM  Assessment Completed by: Jesus Xiong RN    If patient is discharged prior to next notation, then this note serves as note for discharge by case management. Patient Name: Khai Jackson                   YOB: 1945  Diagnosis: Atrial fibrillation with RVR [facility]) [I48.91]                   Date / Time: 8/30/2023  3:19 PM    Patient Admission Status: Inpatient   Readmission Risk (Low < 19, Mod (19-27), High > 27): Readmission Risk Score: 19.9    Current PCP: Koko Rice MD  PCP verified by CM? (P) Yes    Chart Reviewed: Yes      History Provided by: Patient  Patient Orientation: Alert and Oriented    Patient Cognition: Alert    Hospitalization in the last 30 days (Readmission):  No    If yes, Readmission Assessment in CM Navigator will be completed.     Advance Directives:      Code Status: Full Code   Patient's Primary Decision Maker is: (P) Named in Scanned ACP Document    Primary Decision MakerKattoriedeyvi Davis Child - [phone]    Secondary Decision Maker: Chad Logan  Child - [phone]    Discharge Planning:    Patient lives with: (P) Children Type of Home: (P) Trailer/Mobile Home  Primary Care Giver: Self  Patient Support Systems include: Children, Family Members   Current Financial resources: (P) Medicare  Current community resources:    Current services prior to admission: (P) Durable Medical Equipment, Home Care            Current DME: (P) Bedside Commode, Walker, Wheelchair, Shower Chair            Type of Home Care services:  (P) OT, PT, Nursing Services    ADLS  Prior functional level: (P) Assistance with the following:, Dressing, Cooking, Housework  Current functional level: (P) Assistance with the following:, Dressing, Cooking, Housework    PT AM-PAC:   /24  OT AM-PAC:   /24    Family can provide assistance at DC: (P) Yes  Would you like Case Management to discuss the none required

## 2023-09-01 ENCOUNTER — APPOINTMENT (OUTPATIENT)
Dept: NUCLEAR MEDICINE | Age: 78
DRG: 291 | End: 2023-09-01
Payer: MEDICARE

## 2023-09-01 ENCOUNTER — APPOINTMENT (OUTPATIENT)
Age: 78
DRG: 291 | End: 2023-09-01
Payer: MEDICARE

## 2023-09-01 PROBLEM — G45.9 TRANSIENT ISCHEMIC ATTACK (TIA): Status: ACTIVE | Noted: 2023-09-01

## 2023-09-01 PROBLEM — I63.59 ISCHEMIC CEREBROVASCULAR ACCIDENT (CVA) DUE TO ATHEROSCLEROSIS OF LARGE INTRACRANIAL ARTERY (HCC): Status: ACTIVE | Noted: 2023-09-01

## 2023-09-01 LAB
ALBUMIN SERPL-MCNC: 3.3 G/DL (ref 3.5–5.2)
ALBUMIN/GLOB SERPL: 1.1 {RATIO} (ref 1–2.5)
ALP SERPL-CCNC: 104 U/L (ref 35–104)
ALT SERPL-CCNC: 10 U/L (ref 5–33)
AST SERPL-CCNC: 24 U/L
BILIRUB DIRECT SERPL-MCNC: 0.1 MG/DL
BILIRUB INDIRECT SERPL-MCNC: 0.4 MG/DL (ref 0–1)
BILIRUB SERPL-MCNC: 0.5 MG/DL (ref 0.3–1.2)
BNP SERPL-MCNC: 1120 PG/ML
EKG ATRIAL RATE: 116 BPM
EKG ATRIAL RATE: 192 BPM
EKG P AXIS: 33 DEGREES
EKG P-R INTERVAL: 312 MS
EKG Q-T INTERVAL: 240 MS
EKG Q-T INTERVAL: 358 MS
EKG QRS DURATION: 82 MS
EKG QRS DURATION: 86 MS
EKG QTC CALCULATION (BAZETT): 333 MS
EKG QTC CALCULATION (BAZETT): 501 MS
EKG R AXIS: 0 DEGREES
EKG R AXIS: 2 DEGREES
EKG T AXIS: -164 DEGREES
EKG T AXIS: -178 DEGREES
EKG VENTRICULAR RATE: 116 BPM
EKG VENTRICULAR RATE: 118 BPM
GLUCOSE BLD-MCNC: 258 MG/DL (ref 65–105)
GLUCOSE BLD-MCNC: 281 MG/DL (ref 65–105)
GLUCOSE BLD-MCNC: 287 MG/DL (ref 65–105)
GLUCOSE BLD-MCNC: 305 MG/DL (ref 65–105)
MAGNESIUM SERPL-MCNC: 2.2 MG/DL (ref 1.6–2.6)
POTASSIUM SERPL-SCNC: 4.2 MMOL/L (ref 3.7–5.3)
PROT SERPL-MCNC: 6.3 G/DL (ref 6.4–8.3)
TROPONIN I SERPL HS-MCNC: 36 NG/L (ref 0–14)

## 2023-09-01 PROCEDURE — 2580000003 HC RX 258: Performed by: STUDENT IN AN ORGANIZED HEALTH CARE EDUCATION/TRAINING PROGRAM

## 2023-09-01 PROCEDURE — 99233 SBSQ HOSP IP/OBS HIGH 50: CPT | Performed by: NURSE PRACTITIONER

## 2023-09-01 PROCEDURE — 6360000002 HC RX W HCPCS: Performed by: STUDENT IN AN ORGANIZED HEALTH CARE EDUCATION/TRAINING PROGRAM

## 2023-09-01 PROCEDURE — 80048 BASIC METABOLIC PNL TOTAL CA: CPT

## 2023-09-01 PROCEDURE — 2060000000 HC ICU INTERMEDIATE R&B

## 2023-09-01 PROCEDURE — 6370000000 HC RX 637 (ALT 250 FOR IP): Performed by: STUDENT IN AN ORGANIZED HEALTH CARE EDUCATION/TRAINING PROGRAM

## 2023-09-01 PROCEDURE — 83735 ASSAY OF MAGNESIUM: CPT

## 2023-09-01 PROCEDURE — 93005 ELECTROCARDIOGRAM TRACING: CPT | Performed by: NURSE PRACTITIONER

## 2023-09-01 PROCEDURE — 3430000000 HC RX DIAGNOSTIC RADIOPHARMACEUTICAL: Performed by: STUDENT IN AN ORGANIZED HEALTH CARE EDUCATION/TRAINING PROGRAM

## 2023-09-01 PROCEDURE — 83880 ASSAY OF NATRIURETIC PEPTIDE: CPT

## 2023-09-01 PROCEDURE — 80076 HEPATIC FUNCTION PANEL: CPT

## 2023-09-01 PROCEDURE — 6370000000 HC RX 637 (ALT 250 FOR IP): Performed by: NURSE PRACTITIONER

## 2023-09-01 PROCEDURE — 6360000002 HC RX W HCPCS

## 2023-09-01 PROCEDURE — 84484 ASSAY OF TROPONIN QUANT: CPT

## 2023-09-01 PROCEDURE — 2580000003 HC RX 258: Performed by: NURSE PRACTITIONER

## 2023-09-01 PROCEDURE — A9500 TC99M SESTAMIBI: HCPCS | Performed by: STUDENT IN AN ORGANIZED HEALTH CARE EDUCATION/TRAINING PROGRAM

## 2023-09-01 PROCEDURE — 36415 COLL VENOUS BLD VENIPUNCTURE: CPT

## 2023-09-01 PROCEDURE — 6360000002 HC RX W HCPCS: Performed by: NURSE PRACTITIONER

## 2023-09-01 PROCEDURE — 82947 ASSAY GLUCOSE BLOOD QUANT: CPT

## 2023-09-01 PROCEDURE — 6370000000 HC RX 637 (ALT 250 FOR IP): Performed by: INTERNAL MEDICINE

## 2023-09-01 PROCEDURE — 84443 ASSAY THYROID STIM HORMONE: CPT

## 2023-09-01 PROCEDURE — 84132 ASSAY OF SERUM POTASSIUM: CPT

## 2023-09-01 RX ORDER — AMINOPHYLLINE DIHYDRATE 25 MG/ML
50 INJECTION, SOLUTION INTRAVENOUS PRN
Status: DISCONTINUED | OUTPATIENT
Start: 2023-09-01 | End: 2023-09-01

## 2023-09-01 RX ORDER — INSULIN GLARGINE 100 [IU]/ML
6 INJECTION, SOLUTION SUBCUTANEOUS ONCE
Status: DISCONTINUED | OUTPATIENT
Start: 2023-09-01 | End: 2023-09-02

## 2023-09-01 RX ORDER — SODIUM CHLORIDE 9 MG/ML
500 INJECTION, SOLUTION INTRAVENOUS CONTINUOUS PRN
Status: DISCONTINUED | OUTPATIENT
Start: 2023-09-01 | End: 2023-09-01

## 2023-09-01 RX ORDER — LOPERAMIDE HYDROCHLORIDE 2 MG/1
2 CAPSULE ORAL 4 TIMES DAILY PRN
Status: DISCONTINUED | OUTPATIENT
Start: 2023-09-01 | End: 2023-09-02

## 2023-09-01 RX ORDER — REGADENOSON 0.08 MG/ML
0.4 INJECTION, SOLUTION INTRAVENOUS
Status: DISCONTINUED | OUTPATIENT
Start: 2023-09-01 | End: 2023-09-01

## 2023-09-01 RX ORDER — DIGOXIN 0.25 MG/ML
250 INJECTION INTRAMUSCULAR; INTRAVENOUS DAILY
Status: COMPLETED | OUTPATIENT
Start: 2023-09-01 | End: 2023-09-01

## 2023-09-01 RX ORDER — DIGOXIN 0.25 MG/ML
INJECTION INTRAMUSCULAR; INTRAVENOUS
Status: COMPLETED
Start: 2023-09-01 | End: 2023-09-01

## 2023-09-01 RX ORDER — SODIUM CHLORIDE 0.9 % (FLUSH) 0.9 %
5-40 SYRINGE (ML) INJECTION PRN
Status: DISCONTINUED | OUTPATIENT
Start: 2023-09-01 | End: 2023-09-01

## 2023-09-01 RX ORDER — METOPROLOL TARTRATE 5 MG/5ML
5 INJECTION INTRAVENOUS EVERY 5 MIN PRN
Status: DISCONTINUED | OUTPATIENT
Start: 2023-09-01 | End: 2023-09-01

## 2023-09-01 RX ORDER — ALBUTEROL SULFATE 90 UG/1
2 AEROSOL, METERED RESPIRATORY (INHALATION) PRN
Status: DISCONTINUED | OUTPATIENT
Start: 2023-09-01 | End: 2023-09-01

## 2023-09-01 RX ORDER — TETRAKIS(2-METHOXYISOBUTYLISOCYANIDE)COPPER(I) TETRAFLUOROBORATE 1 MG/ML
16.2 INJECTION, POWDER, LYOPHILIZED, FOR SOLUTION INTRAVENOUS
Status: COMPLETED | OUTPATIENT
Start: 2023-09-01 | End: 2023-09-01

## 2023-09-01 RX ORDER — NITROGLYCERIN 0.4 MG/1
0.4 TABLET SUBLINGUAL EVERY 5 MIN PRN
Status: DISCONTINUED | OUTPATIENT
Start: 2023-09-01 | End: 2023-09-01

## 2023-09-01 RX ORDER — ATROPINE SULFATE 0.1 MG/ML
0.5 INJECTION INTRAVENOUS EVERY 5 MIN PRN
Status: DISCONTINUED | OUTPATIENT
Start: 2023-09-01 | End: 2023-09-01

## 2023-09-01 RX ORDER — INSULIN GLARGINE 100 [IU]/ML
30 INJECTION, SOLUTION SUBCUTANEOUS EVERY MORNING
Status: DISCONTINUED | OUTPATIENT
Start: 2023-09-02 | End: 2023-09-02

## 2023-09-01 RX ADMIN — FUROSEMIDE 40 MG: 10 INJECTION, SOLUTION INTRAMUSCULAR; INTRAVENOUS at 08:36

## 2023-09-01 RX ADMIN — ATORVASTATIN CALCIUM 80 MG: 80 TABLET, FILM COATED ORAL at 21:28

## 2023-09-01 RX ADMIN — LOPERAMIDE HYDROCHLORIDE 2 MG: 2 CAPSULE ORAL at 15:46

## 2023-09-01 RX ADMIN — AMIODARONE HYDROCHLORIDE 1 MG/MIN: 50 INJECTION, SOLUTION INTRAVENOUS at 11:40

## 2023-09-01 RX ADMIN — ISOSORBIDE MONONITRATE 30 MG: 30 TABLET, EXTENDED RELEASE ORAL at 11:33

## 2023-09-01 RX ADMIN — INSULIN LISPRO 12 UNITS: 100 INJECTION, SOLUTION INTRAVENOUS; SUBCUTANEOUS at 17:35

## 2023-09-01 RX ADMIN — FERROUS SULFATE TAB EC 325 MG (65 MG FE EQUIVALENT) 325 MG: 325 (65 FE) TABLET DELAYED RESPONSE at 11:34

## 2023-09-01 RX ADMIN — APIXABAN 5 MG: 5 TABLET, FILM COATED ORAL at 21:28

## 2023-09-01 RX ADMIN — DEXTROSE 150 MG: 50 INJECTION, SOLUTION INTRAVENOUS at 11:27

## 2023-09-01 RX ADMIN — CARVEDILOL 6.25 MG: 6.25 TABLET, FILM COATED ORAL at 11:34

## 2023-09-01 RX ADMIN — INSULIN GLARGINE 10 UNITS: 100 INJECTION, SOLUTION SUBCUTANEOUS at 21:28

## 2023-09-01 RX ADMIN — ONDANSETRON 4 MG: 2 INJECTION INTRAMUSCULAR; INTRAVENOUS at 15:37

## 2023-09-01 RX ADMIN — ONDANSETRON 4 MG: 2 INJECTION INTRAMUSCULAR; INTRAVENOUS at 05:12

## 2023-09-01 RX ADMIN — CLOPIDOGREL BISULFATE 75 MG: 75 TABLET ORAL at 11:34

## 2023-09-01 RX ADMIN — DIGOXIN 250 MCG: 0.25 INJECTION INTRAMUSCULAR; INTRAVENOUS at 10:02

## 2023-09-01 RX ADMIN — AMIODARONE HYDROCHLORIDE 0.5 MG/MIN: 50 INJECTION, SOLUTION INTRAVENOUS at 21:06

## 2023-09-01 RX ADMIN — CARVEDILOL 6.25 MG: 6.25 TABLET, FILM COATED ORAL at 21:28

## 2023-09-01 RX ADMIN — ESCITALOPRAM 20 MG: 10 TABLET, FILM COATED ORAL at 11:34

## 2023-09-01 RX ADMIN — INSULIN LISPRO 8 UNITS: 100 INJECTION, SOLUTION INTRAVENOUS; SUBCUTANEOUS at 08:37

## 2023-09-01 RX ADMIN — APIXABAN 5 MG: 5 TABLET, FILM COATED ORAL at 11:34

## 2023-09-01 RX ADMIN — TAMSULOSIN HYDROCHLORIDE 0.4 MG: 0.4 CAPSULE ORAL at 11:34

## 2023-09-01 RX ADMIN — LOSARTAN POTASSIUM 100 MG: 50 TABLET, FILM COATED ORAL at 11:35

## 2023-09-01 RX ADMIN — SODIUM CHLORIDE, PRESERVATIVE FREE 10 ML: 5 INJECTION INTRAVENOUS at 08:37

## 2023-09-01 RX ADMIN — TETRAKIS(2-METHOXYISOBUTYLISOCYANIDE)COPPER(I) TETRAFLUOROBORATE 16.2 MILLICURIE: 1 INJECTION, POWDER, LYOPHILIZED, FOR SOLUTION INTRAVENOUS at 07:42

## 2023-09-01 ASSESSMENT — PAIN DESCRIPTION - ORIENTATION: ORIENTATION: MID

## 2023-09-01 ASSESSMENT — PAIN DESCRIPTION - DESCRIPTORS: DESCRIPTORS: HEAVINESS

## 2023-09-01 ASSESSMENT — PAIN SCALES - GENERAL
PAINLEVEL_OUTOF10: 0

## 2023-09-01 ASSESSMENT — PAIN DESCRIPTION - FREQUENCY: FREQUENCY: CONTINUOUS

## 2023-09-01 ASSESSMENT — PAIN DESCRIPTION - LOCATION: LOCATION: CHEST

## 2023-09-01 NOTE — CARE COORDINATION
Call to [facility] to follow up on referral. They only provide home care services to assisted living. Patient updated. Home care list provided    [phone] notified by family that patient was supposed to start care with [facility]. Spoke to Consuelo from [address]. He relates that they are able to accept.  Orders can be faxed to [phone]

## 2023-09-01 NOTE — FLOWSHEET NOTE
Patient went into Afib RVR, heart rate of 140-150. She describes it as palpitations and feeling her heart racing, no other symptoms noted and reported. Sent a perfect serve to primary team, Diltiazem 10mg IV was given. Heart rate now in the 's, still on Afib. Blood pressure is at 174/92, notable that her blood pressure remained to be high throughout the day as none of her anti hypertensive medications have been resumed. Sent a perfect serve inquiring about blood pressure medications. Patient now resting comfortably, denies any symptoms. Will continue to monitor.

## 2023-09-02 ENCOUNTER — APPOINTMENT (OUTPATIENT)
Dept: GENERAL RADIOLOGY | Age: 78
DRG: 291 | End: 2023-09-02
Payer: MEDICARE

## 2023-09-02 LAB
ALBUMIN SERPL-MCNC: 3.4 G/DL (ref 3.5–5.2)
ALBUMIN/GLOB SERPL: 1.1 {RATIO} (ref 1–2.5)
ALP SERPL-CCNC: 99 U/L (ref 35–104)
ALT SERPL-CCNC: 14 U/L (ref 5–33)
ANION GAP SERPL CALCULATED.3IONS-SCNC: 12 MMOL/L (ref 9–17)
AST SERPL-CCNC: 18 U/L
BACTERIA URNS QL MICRO: ABNORMAL
BASOPHILS # BLD: 0.03 K/UL (ref 0–0.2)
BASOPHILS NFR BLD: 0 % (ref 0–2)
BILIRUB SERPL-MCNC: 0.4 MG/DL (ref 0.3–1.2)
BILIRUB UR QL STRIP: NEGATIVE
BUN SERPL-MCNC: 34 MG/DL (ref 8–23)
CALCIUM SERPL-MCNC: 9 MG/DL (ref 8.6–10.4)
CASTS #/AREA URNS LPF: ABNORMAL /LPF (ref 0–8)
CHLORIDE SERPL-SCNC: 99 MMOL/L (ref 98–107)
CLARITY UR: ABNORMAL
CO2 SERPL-SCNC: 24 MMOL/L (ref 20–31)
COLOR UR: YELLOW
CREAT SERPL-MCNC: 2.6 MG/DL (ref 0.5–0.9)
EOSINOPHIL # BLD: 0.12 K/UL (ref 0–0.44)
EOSINOPHILS RELATIVE PERCENT: 2 % (ref 1–4)
EPI CELLS #/AREA URNS HPF: ABNORMAL /HPF (ref 0–5)
ERYTHROCYTE [DISTWIDTH] IN BLOOD BY AUTOMATED COUNT: 13.2 % (ref 11.8–14.4)
GFR SERPL CREATININE-BSD FRML MDRD: 18 ML/MIN/1.73M2
GLUCOSE BLD-MCNC: 116 MG/DL (ref 65–105)
GLUCOSE BLD-MCNC: 206 MG/DL (ref 65–105)
GLUCOSE BLD-MCNC: 232 MG/DL (ref 65–105)
GLUCOSE BLD-MCNC: 258 MG/DL (ref 65–105)
GLUCOSE BLD-MCNC: 263 MG/DL (ref 65–105)
GLUCOSE SERPL-MCNC: 261 MG/DL (ref 70–99)
GLUCOSE UR STRIP-MCNC: ABNORMAL MG/DL
HCT VFR BLD AUTO: 33.8 % (ref 36.3–47.1)
HGB BLD-MCNC: 10.3 G/DL (ref 11.9–15.1)
HGB UR QL STRIP.AUTO: NEGATIVE
IMM GRANULOCYTES # BLD AUTO: 0.03 K/UL (ref 0–0.3)
IMM GRANULOCYTES NFR BLD: 0 %
KETONES UR STRIP-MCNC: NEGATIVE MG/DL
LEUKOCYTE ESTERASE UR QL STRIP: ABNORMAL
LYMPHOCYTES NFR BLD: 1.7 K/UL (ref 1.1–3.7)
LYMPHOCYTES RELATIVE PERCENT: 23 % (ref 24–43)
MCH RBC QN AUTO: 29.6 PG (ref 25.2–33.5)
MCHC RBC AUTO-ENTMCNC: 30.5 G/DL (ref 28.4–34.8)
MCV RBC AUTO: 97.1 FL (ref 82.6–102.9)
MONOCYTES NFR BLD: 0.65 K/UL (ref 0.1–1.2)
MONOCYTES NFR BLD: 9 % (ref 3–12)
NEUTROPHILS NFR BLD: 66 % (ref 36–65)
NEUTS SEG NFR BLD: 4.72 K/UL (ref 1.5–8.1)
NITRITE UR QL STRIP: NEGATIVE
NRBC BLD-RTO: 0 PER 100 WBC
PH UR STRIP: 5 [PH] (ref 5–8)
PLATELET # BLD AUTO: 145 K/UL (ref 138–453)
PMV BLD AUTO: 11.6 FL (ref 8.1–13.5)
POTASSIUM SERPL-SCNC: 3.8 MMOL/L (ref 3.7–5.3)
PROT SERPL-MCNC: 6.4 G/DL (ref 6.4–8.3)
PROT UR STRIP-MCNC: NEGATIVE MG/DL
RBC # BLD AUTO: 3.48 M/UL (ref 3.95–5.11)
RBC #/AREA URNS HPF: ABNORMAL /HPF (ref 0–4)
SODIUM SERPL-SCNC: 135 MMOL/L (ref 135–144)
SP GR UR STRIP: 1.02 (ref 1–1.03)
UROBILINOGEN UR STRIP-ACNC: NORMAL EU/DL (ref 0–1)
WBC #/AREA URNS HPF: ABNORMAL /HPF (ref 0–5)
WBC OTHER # BLD: 7.3 K/UL (ref 3.5–11.3)

## 2023-09-02 PROCEDURE — 2580000003 HC RX 258: Performed by: STUDENT IN AN ORGANIZED HEALTH CARE EDUCATION/TRAINING PROGRAM

## 2023-09-02 PROCEDURE — 82947 ASSAY GLUCOSE BLOOD QUANT: CPT

## 2023-09-02 PROCEDURE — 36415 COLL VENOUS BLD VENIPUNCTURE: CPT

## 2023-09-02 PROCEDURE — 71045 X-RAY EXAM CHEST 1 VIEW: CPT

## 2023-09-02 PROCEDURE — 2060000000 HC ICU INTERMEDIATE R&B

## 2023-09-02 PROCEDURE — 6370000000 HC RX 637 (ALT 250 FOR IP): Performed by: NURSE PRACTITIONER

## 2023-09-02 PROCEDURE — 97535 SELF CARE MNGMENT TRAINING: CPT

## 2023-09-02 PROCEDURE — 87186 SC STD MICRODIL/AGAR DIL: CPT

## 2023-09-02 PROCEDURE — 6370000000 HC RX 637 (ALT 250 FOR IP): Performed by: STUDENT IN AN ORGANIZED HEALTH CARE EDUCATION/TRAINING PROGRAM

## 2023-09-02 PROCEDURE — 87086 URINE CULTURE/COLONY COUNT: CPT

## 2023-09-02 PROCEDURE — 80053 COMPREHEN METABOLIC PANEL: CPT

## 2023-09-02 PROCEDURE — 6370000000 HC RX 637 (ALT 250 FOR IP): Performed by: INTERNAL MEDICINE

## 2023-09-02 PROCEDURE — 99233 SBSQ HOSP IP/OBS HIGH 50: CPT | Performed by: NURSE PRACTITIONER

## 2023-09-02 PROCEDURE — 81001 URINALYSIS AUTO W/SCOPE: CPT

## 2023-09-02 PROCEDURE — 85025 COMPLETE CBC W/AUTO DIFF WBC: CPT

## 2023-09-02 PROCEDURE — 87077 CULTURE AEROBIC IDENTIFY: CPT

## 2023-09-02 RX ORDER — LOSARTAN POTASSIUM 50 MG/1
100 TABLET ORAL EVERY MORNING
Status: DISCONTINUED | OUTPATIENT
Start: 2023-09-03 | End: 2023-09-11 | Stop reason: HOSPADM

## 2023-09-02 RX ORDER — LOPERAMIDE HYDROCHLORIDE 2 MG/1
2 CAPSULE ORAL 2 TIMES DAILY
Status: DISCONTINUED | OUTPATIENT
Start: 2023-09-02 | End: 2023-09-11 | Stop reason: HOSPADM

## 2023-09-02 RX ORDER — INSULIN GLARGINE 100 [IU]/ML
5 INJECTION, SOLUTION SUBCUTANEOUS ONCE
Status: COMPLETED | OUTPATIENT
Start: 2023-09-02 | End: 2023-09-02

## 2023-09-02 RX ORDER — METOPROLOL TARTRATE 5 MG/5ML
5 INJECTION INTRAVENOUS EVERY 6 HOURS PRN
Status: DISCONTINUED | OUTPATIENT
Start: 2023-09-02 | End: 2023-09-11 | Stop reason: HOSPADM

## 2023-09-02 RX ORDER — INSULIN GLARGINE 100 [IU]/ML
40 INJECTION, SOLUTION SUBCUTANEOUS EVERY MORNING
Status: DISCONTINUED | OUTPATIENT
Start: 2023-09-03 | End: 2023-09-11 | Stop reason: HOSPADM

## 2023-09-02 RX ORDER — INSULIN GLARGINE 100 [IU]/ML
12 INJECTION, SOLUTION SUBCUTANEOUS NIGHTLY
Status: DISCONTINUED | OUTPATIENT
Start: 2023-09-02 | End: 2023-09-04

## 2023-09-02 RX ORDER — AMIODARONE HYDROCHLORIDE 200 MG/1
200 TABLET ORAL DAILY
Status: DISCONTINUED | OUTPATIENT
Start: 2023-09-02 | End: 2023-09-11 | Stop reason: HOSPADM

## 2023-09-02 RX ORDER — LOSARTAN POTASSIUM 50 MG/1
50 TABLET ORAL EVERY MORNING
Status: DISCONTINUED | OUTPATIENT
Start: 2023-09-03 | End: 2023-09-02

## 2023-09-02 RX ORDER — LORAZEPAM 0.5 MG/1
0.5 TABLET ORAL EVERY 8 HOURS PRN
Status: DISCONTINUED | OUTPATIENT
Start: 2023-09-02 | End: 2023-09-11 | Stop reason: HOSPADM

## 2023-09-02 RX ADMIN — LOPERAMIDE HYDROCHLORIDE 2 MG: 2 CAPSULE ORAL at 20:53

## 2023-09-02 RX ADMIN — INSULIN GLARGINE 5 UNITS: 100 INJECTION, SOLUTION SUBCUTANEOUS at 12:24

## 2023-09-02 RX ADMIN — TAMSULOSIN HYDROCHLORIDE 0.4 MG: 0.4 CAPSULE ORAL at 08:27

## 2023-09-02 RX ADMIN — LOSARTAN POTASSIUM 100 MG: 50 TABLET, FILM COATED ORAL at 08:20

## 2023-09-02 RX ADMIN — TICAGRELOR 90 MG: 90 TABLET ORAL at 20:53

## 2023-09-02 RX ADMIN — APIXABAN 5 MG: 5 TABLET, FILM COATED ORAL at 08:20

## 2023-09-02 RX ADMIN — TICAGRELOR 90 MG: 90 TABLET ORAL at 10:44

## 2023-09-02 RX ADMIN — ATORVASTATIN CALCIUM 80 MG: 80 TABLET, FILM COATED ORAL at 20:54

## 2023-09-02 RX ADMIN — INSULIN LISPRO 8 UNITS: 100 INJECTION, SOLUTION INTRAVENOUS; SUBCUTANEOUS at 08:22

## 2023-09-02 RX ADMIN — FERROUS SULFATE TAB EC 325 MG (65 MG FE EQUIVALENT) 325 MG: 325 (65 FE) TABLET DELAYED RESPONSE at 08:26

## 2023-09-02 RX ADMIN — SODIUM CHLORIDE, PRESERVATIVE FREE 10 ML: 5 INJECTION INTRAVENOUS at 20:55

## 2023-09-02 RX ADMIN — LORAZEPAM 0.5 MG: 0.5 TABLET ORAL at 20:53

## 2023-09-02 RX ADMIN — INSULIN LISPRO 4 UNITS: 100 INJECTION, SOLUTION INTRAVENOUS; SUBCUTANEOUS at 15:46

## 2023-09-02 RX ADMIN — ESCITALOPRAM 20 MG: 10 TABLET, FILM COATED ORAL at 08:26

## 2023-09-02 RX ADMIN — INSULIN LISPRO 4 UNITS: 100 INJECTION, SOLUTION INTRAVENOUS; SUBCUTANEOUS at 12:24

## 2023-09-02 RX ADMIN — INSULIN GLARGINE 12 UNITS: 100 INJECTION, SOLUTION SUBCUTANEOUS at 20:54

## 2023-09-02 RX ADMIN — AMIODARONE HYDROCHLORIDE 200 MG: 200 TABLET ORAL at 10:44

## 2023-09-02 RX ADMIN — INSULIN GLARGINE 30 UNITS: 100 INJECTION, SOLUTION SUBCUTANEOUS at 08:24

## 2023-09-02 RX ADMIN — ISOSORBIDE MONONITRATE 30 MG: 30 TABLET, EXTENDED RELEASE ORAL at 08:28

## 2023-09-02 RX ADMIN — SODIUM CHLORIDE, PRESERVATIVE FREE 10 ML: 5 INJECTION INTRAVENOUS at 08:28

## 2023-09-02 RX ADMIN — LOPERAMIDE HYDROCHLORIDE 2 MG: 2 CAPSULE ORAL at 11:07

## 2023-09-02 RX ADMIN — CARVEDILOL 6.25 MG: 6.25 TABLET, FILM COATED ORAL at 20:53

## 2023-09-02 RX ADMIN — APIXABAN 5 MG: 5 TABLET, FILM COATED ORAL at 20:53

## 2023-09-02 RX ADMIN — CARVEDILOL 6.25 MG: 6.25 TABLET, FILM COATED ORAL at 08:28

## 2023-09-02 ASSESSMENT — PAIN SCALES - GENERAL
PAINLEVEL_OUTOF10: 0
PAINLEVEL_OUTOF10: 1

## 2023-09-02 NOTE — CARE COORDINATION
SW following to complete PHQ-9 . Attempted to see pt this a.m and rapid response was called. When more appropriate will complete assessment.

## 2023-09-03 LAB
ANION GAP SERPL CALCULATED.3IONS-SCNC: 13 MMOL/L (ref 9–17)
BUN SERPL-MCNC: 34 MG/DL (ref 8–23)
CALCIUM SERPL-MCNC: 9.3 MG/DL (ref 8.6–10.4)
CHLORIDE SERPL-SCNC: 100 MMOL/L (ref 98–107)
CO2 SERPL-SCNC: 25 MMOL/L (ref 20–31)
CREAT SERPL-MCNC: 2.1 MG/DL (ref 0.5–0.9)
GFR SERPL CREATININE-BSD FRML MDRD: 24 ML/MIN/1.73M2
GLUCOSE BLD-MCNC: 117 MG/DL (ref 65–105)
GLUCOSE BLD-MCNC: 145 MG/DL (ref 65–105)
GLUCOSE BLD-MCNC: 154 MG/DL (ref 65–105)
GLUCOSE BLD-MCNC: 87 MG/DL (ref 65–105)
GLUCOSE SERPL-MCNC: 96 MG/DL (ref 70–99)
POTASSIUM SERPL-SCNC: 3.7 MMOL/L (ref 3.7–5.3)
SODIUM SERPL-SCNC: 138 MMOL/L (ref 135–144)
TROPONIN I SERPL HS-MCNC: 38 NG/L (ref 0–14)
TSH SERPL DL<=0.05 MIU/L-ACNC: 0.55 UIU/ML (ref 0.3–5)

## 2023-09-03 PROCEDURE — 84484 ASSAY OF TROPONIN QUANT: CPT

## 2023-09-03 PROCEDURE — 82947 ASSAY GLUCOSE BLOOD QUANT: CPT

## 2023-09-03 PROCEDURE — 36415 COLL VENOUS BLD VENIPUNCTURE: CPT

## 2023-09-03 PROCEDURE — 6370000000 HC RX 637 (ALT 250 FOR IP): Performed by: STUDENT IN AN ORGANIZED HEALTH CARE EDUCATION/TRAINING PROGRAM

## 2023-09-03 PROCEDURE — 6370000000 HC RX 637 (ALT 250 FOR IP): Performed by: NURSE PRACTITIONER

## 2023-09-03 PROCEDURE — 93005 ELECTROCARDIOGRAM TRACING: CPT | Performed by: INTERNAL MEDICINE

## 2023-09-03 PROCEDURE — 80048 BASIC METABOLIC PNL TOTAL CA: CPT

## 2023-09-03 PROCEDURE — 2580000003 HC RX 258: Performed by: NURSE PRACTITIONER

## 2023-09-03 PROCEDURE — 2580000003 HC RX 258: Performed by: STUDENT IN AN ORGANIZED HEALTH CARE EDUCATION/TRAINING PROGRAM

## 2023-09-03 PROCEDURE — 99233 SBSQ HOSP IP/OBS HIGH 50: CPT | Performed by: NURSE PRACTITIONER

## 2023-09-03 PROCEDURE — 6370000000 HC RX 637 (ALT 250 FOR IP): Performed by: INTERNAL MEDICINE

## 2023-09-03 PROCEDURE — 6360000002 HC RX W HCPCS: Performed by: INTERNAL MEDICINE

## 2023-09-03 PROCEDURE — 2060000000 HC ICU INTERMEDIATE R&B

## 2023-09-03 PROCEDURE — 2580000003 HC RX 258: Performed by: INTERNAL MEDICINE

## 2023-09-03 RX ORDER — PANTOPRAZOLE SODIUM 40 MG/1
40 TABLET, DELAYED RELEASE ORAL
Status: DISCONTINUED | OUTPATIENT
Start: 2023-09-03 | End: 2023-09-11 | Stop reason: HOSPADM

## 2023-09-03 RX ORDER — SODIUM CHLORIDE 9 MG/ML
INJECTION, SOLUTION INTRAVENOUS ONCE
Status: COMPLETED | OUTPATIENT
Start: 2023-09-03 | End: 2023-09-03

## 2023-09-03 RX ORDER — SODIUM CHLORIDE 9 MG/ML
INJECTION, SOLUTION INTRAVENOUS CONTINUOUS
Status: DISCONTINUED | OUTPATIENT
Start: 2023-09-03 | End: 2023-09-03

## 2023-09-03 RX ADMIN — CARVEDILOL 6.25 MG: 6.25 TABLET, FILM COATED ORAL at 07:54

## 2023-09-03 RX ADMIN — LOSARTAN POTASSIUM 100 MG: 50 TABLET ORAL at 07:54

## 2023-09-03 RX ADMIN — INSULIN GLARGINE 40 UNITS: 100 INJECTION, SOLUTION SUBCUTANEOUS at 07:56

## 2023-09-03 RX ADMIN — TICAGRELOR 90 MG: 90 TABLET ORAL at 07:54

## 2023-09-03 RX ADMIN — LORAZEPAM 0.5 MG: 0.5 TABLET ORAL at 21:07

## 2023-09-03 RX ADMIN — ATORVASTATIN CALCIUM 80 MG: 80 TABLET, FILM COATED ORAL at 20:29

## 2023-09-03 RX ADMIN — LOPERAMIDE HYDROCHLORIDE 2 MG: 2 CAPSULE ORAL at 07:54

## 2023-09-03 RX ADMIN — SODIUM CHLORIDE, PRESERVATIVE FREE 10 ML: 5 INJECTION INTRAVENOUS at 20:22

## 2023-09-03 RX ADMIN — FERROUS SULFATE TAB EC 325 MG (65 MG FE EQUIVALENT) 325 MG: 325 (65 FE) TABLET DELAYED RESPONSE at 07:55

## 2023-09-03 RX ADMIN — CARVEDILOL 6.25 MG: 6.25 TABLET, FILM COATED ORAL at 20:29

## 2023-09-03 RX ADMIN — TAMSULOSIN HYDROCHLORIDE 0.4 MG: 0.4 CAPSULE ORAL at 07:54

## 2023-09-03 RX ADMIN — SODIUM CHLORIDE: 9 INJECTION, SOLUTION INTRAVENOUS at 09:19

## 2023-09-03 RX ADMIN — AMIODARONE HYDROCHLORIDE 200 MG: 200 TABLET ORAL at 07:54

## 2023-09-03 RX ADMIN — TICAGRELOR 90 MG: 90 TABLET ORAL at 21:07

## 2023-09-03 RX ADMIN — CEFTRIAXONE SODIUM 1000 MG: 10 INJECTION, POWDER, FOR SOLUTION INTRAVENOUS at 20:22

## 2023-09-03 RX ADMIN — INSULIN GLARGINE 12 UNITS: 100 INJECTION, SOLUTION SUBCUTANEOUS at 20:34

## 2023-09-03 RX ADMIN — SODIUM CHLORIDE, PRESERVATIVE FREE 10 ML: 5 INJECTION INTRAVENOUS at 07:55

## 2023-09-03 RX ADMIN — ESCITALOPRAM 20 MG: 10 TABLET, FILM COATED ORAL at 07:54

## 2023-09-03 RX ADMIN — SODIUM CHLORIDE: 9 INJECTION, SOLUTION INTRAVENOUS at 23:49

## 2023-09-03 RX ADMIN — APIXABAN 5 MG: 5 TABLET, FILM COATED ORAL at 07:54

## 2023-09-03 NOTE — CARE COORDINATION
Notified by RN family and pt are requesting info regarding skilled nursing facilities. Son &  GF at bedside and given [place] of Choice SNF list.                       Post Acute Facility/Agency List     Provided child with the following list, the list includes the overall star ratings obtained from CMS per the Medicare Web site (www.Medicare.gov):     [] [address]  [] Acute Inpatient Rehabilitation Facilities  [x] Skilled Nursing Facilities  [] Home Care    Provided verbal instructions on how to utilize the QR Code to obtain additional detailed star ratings from www. Medicare. gov     offered to print and provide the detailed list:    []Accepted   [x]Declined      Son and GF request Steve. Referral sent, awaiting further choices.

## 2023-09-04 LAB
ABO + RH BLD: NORMAL
ALBUMIN SERPL-MCNC: 3.5 G/DL (ref 3.5–5.2)
ALBUMIN/GLOB SERPL: 1.1 {RATIO} (ref 1–2.5)
ALP SERPL-CCNC: 101 U/L (ref 35–104)
ALT SERPL-CCNC: 15 U/L (ref 5–33)
ANION GAP SERPL CALCULATED.3IONS-SCNC: 10 MMOL/L (ref 9–17)
ARM BAND NUMBER: NORMAL
AST SERPL-CCNC: 23 U/L
BILIRUB DIRECT SERPL-MCNC: 0.1 MG/DL
BILIRUB INDIRECT SERPL-MCNC: 0.3 MG/DL (ref 0–1)
BILIRUB SERPL-MCNC: 0.4 MG/DL (ref 0.3–1.2)
BLOOD BANK SAMPLE EXPIRATION: NORMAL
BLOOD GROUP ANTIBODIES SERPL: NEGATIVE
BNP SERPL-MCNC: 568 PG/ML
BUN SERPL-MCNC: 29 MG/DL (ref 8–23)
CALCIUM SERPL-MCNC: 9.3 MG/DL (ref 8.6–10.4)
CHLORIDE SERPL-SCNC: 106 MMOL/L (ref 98–107)
CO2 SERPL-SCNC: 25 MMOL/L (ref 20–31)
CREAT SERPL-MCNC: 1.6 MG/DL (ref 0.5–0.9)
GFR SERPL CREATININE-BSD FRML MDRD: 33 ML/MIN/1.73M2
GLUCOSE BLD-MCNC: 108 MG/DL (ref 65–105)
GLUCOSE BLD-MCNC: 119 MG/DL (ref 65–105)
GLUCOSE BLD-MCNC: 74 MG/DL (ref 65–105)
GLUCOSE BLD-MCNC: 81 MG/DL (ref 65–105)
GLUCOSE SERPL-MCNC: 69 MG/DL (ref 70–99)
INR PPP: 1.6
MAGNESIUM SERPL-MCNC: 2.1 MG/DL (ref 1.6–2.6)
MICROORGANISM SPEC CULT: ABNORMAL
POTASSIUM SERPL-SCNC: 3.5 MMOL/L (ref 3.7–5.3)
PROT SERPL-MCNC: 6.6 G/DL (ref 6.4–8.3)
PROTHROMBIN TIME: 18.7 SEC (ref 11.7–14.9)
SODIUM SERPL-SCNC: 141 MMOL/L (ref 135–144)
SPECIMEN DESCRIPTION: ABNORMAL

## 2023-09-04 PROCEDURE — 36415 COLL VENOUS BLD VENIPUNCTURE: CPT

## 2023-09-04 PROCEDURE — 82947 ASSAY GLUCOSE BLOOD QUANT: CPT

## 2023-09-04 PROCEDURE — 85610 PROTHROMBIN TIME: CPT

## 2023-09-04 PROCEDURE — 83880 ASSAY OF NATRIURETIC PEPTIDE: CPT

## 2023-09-04 PROCEDURE — 86901 BLOOD TYPING SEROLOGIC RH(D): CPT

## 2023-09-04 PROCEDURE — 99233 SBSQ HOSP IP/OBS HIGH 50: CPT | Performed by: NURSE PRACTITIONER

## 2023-09-04 PROCEDURE — 6360000002 HC RX W HCPCS: Performed by: INTERNAL MEDICINE

## 2023-09-04 PROCEDURE — 86850 RBC ANTIBODY SCREEN: CPT

## 2023-09-04 PROCEDURE — 2580000003 HC RX 258: Performed by: INTERNAL MEDICINE

## 2023-09-04 PROCEDURE — 80048 BASIC METABOLIC PNL TOTAL CA: CPT

## 2023-09-04 PROCEDURE — 80076 HEPATIC FUNCTION PANEL: CPT

## 2023-09-04 PROCEDURE — 6370000000 HC RX 637 (ALT 250 FOR IP): Performed by: NURSE PRACTITIONER

## 2023-09-04 PROCEDURE — 86900 BLOOD TYPING SEROLOGIC ABO: CPT

## 2023-09-04 PROCEDURE — 2580000003 HC RX 258: Performed by: STUDENT IN AN ORGANIZED HEALTH CARE EDUCATION/TRAINING PROGRAM

## 2023-09-04 PROCEDURE — 6370000000 HC RX 637 (ALT 250 FOR IP): Performed by: STUDENT IN AN ORGANIZED HEALTH CARE EDUCATION/TRAINING PROGRAM

## 2023-09-04 PROCEDURE — 2060000000 HC ICU INTERMEDIATE R&B

## 2023-09-04 PROCEDURE — 6370000000 HC RX 637 (ALT 250 FOR IP): Performed by: INTERNAL MEDICINE

## 2023-09-04 PROCEDURE — 6360000002 HC RX W HCPCS: Performed by: STUDENT IN AN ORGANIZED HEALTH CARE EDUCATION/TRAINING PROGRAM

## 2023-09-04 PROCEDURE — 83735 ASSAY OF MAGNESIUM: CPT

## 2023-09-04 RX ORDER — SODIUM CHLORIDE 0.9 % (FLUSH) 0.9 %
5-40 SYRINGE (ML) INJECTION PRN
Status: CANCELLED | OUTPATIENT
Start: 2023-09-04 | End: 2023-09-04

## 2023-09-04 RX ORDER — ATROPINE SULFATE 0.1 MG/ML
0.5 INJECTION INTRAVENOUS EVERY 5 MIN PRN
Status: CANCELLED | OUTPATIENT
Start: 2023-09-04 | End: 2023-09-04

## 2023-09-04 RX ORDER — AMINOPHYLLINE DIHYDRATE 25 MG/ML
50 INJECTION, SOLUTION INTRAVENOUS PRN
Status: CANCELLED | OUTPATIENT
Start: 2023-09-04 | End: 2023-09-04

## 2023-09-04 RX ORDER — NITROGLYCERIN 0.4 MG/1
0.4 TABLET SUBLINGUAL EVERY 5 MIN PRN
Status: CANCELLED | OUTPATIENT
Start: 2023-09-04 | End: 2023-09-04

## 2023-09-04 RX ORDER — SODIUM CHLORIDE 9 MG/ML
500 INJECTION, SOLUTION INTRAVENOUS CONTINUOUS PRN
Status: CANCELLED | OUTPATIENT
Start: 2023-09-04 | End: 2023-09-04

## 2023-09-04 RX ORDER — METOPROLOL TARTRATE 5 MG/5ML
5 INJECTION INTRAVENOUS EVERY 5 MIN PRN
Status: CANCELLED | OUTPATIENT
Start: 2023-09-04 | End: 2023-09-04

## 2023-09-04 RX ORDER — ALBUTEROL SULFATE 90 UG/1
2 AEROSOL, METERED RESPIRATORY (INHALATION) PRN
Status: CANCELLED | OUTPATIENT
Start: 2023-09-04 | End: 2023-09-04

## 2023-09-04 RX ORDER — DEXTROSE MONOHYDRATE 100 MG/ML
INJECTION, SOLUTION INTRAVENOUS CONTINUOUS PRN
Status: DISCONTINUED | OUTPATIENT
Start: 2023-09-04 | End: 2023-09-11 | Stop reason: HOSPADM

## 2023-09-04 RX ORDER — CARVEDILOL 12.5 MG/1
12.5 TABLET ORAL 2 TIMES DAILY
Status: DISCONTINUED | OUTPATIENT
Start: 2023-09-04 | End: 2023-09-07

## 2023-09-04 RX ORDER — REGADENOSON 0.08 MG/ML
0.4 INJECTION, SOLUTION INTRAVENOUS
Status: CANCELLED | OUTPATIENT
Start: 2023-09-04

## 2023-09-04 RX ORDER — INSULIN GLARGINE 100 [IU]/ML
10 INJECTION, SOLUTION SUBCUTANEOUS NIGHTLY
Status: DISCONTINUED | OUTPATIENT
Start: 2023-09-04 | End: 2023-09-11 | Stop reason: HOSPADM

## 2023-09-04 RX ADMIN — TICAGRELOR 90 MG: 90 TABLET ORAL at 21:30

## 2023-09-04 RX ADMIN — LOSARTAN POTASSIUM 100 MG: 50 TABLET ORAL at 10:03

## 2023-09-04 RX ADMIN — SODIUM CHLORIDE, PRESERVATIVE FREE 10 ML: 5 INJECTION INTRAVENOUS at 10:22

## 2023-09-04 RX ADMIN — PANTOPRAZOLE SODIUM 40 MG: 40 TABLET, DELAYED RELEASE ORAL at 10:08

## 2023-09-04 RX ADMIN — LORAZEPAM 0.5 MG: 0.5 TABLET ORAL at 21:39

## 2023-09-04 RX ADMIN — INSULIN GLARGINE 40 UNITS: 100 INJECTION, SOLUTION SUBCUTANEOUS at 10:04

## 2023-09-04 RX ADMIN — APIXABAN 5 MG: 5 TABLET, FILM COATED ORAL at 21:27

## 2023-09-04 RX ADMIN — CARVEDILOL 12.5 MG: 12.5 TABLET, FILM COATED ORAL at 21:28

## 2023-09-04 RX ADMIN — AMIODARONE HYDROCHLORIDE 200 MG: 200 TABLET ORAL at 10:03

## 2023-09-04 RX ADMIN — FERROUS SULFATE TAB EC 325 MG (65 MG FE EQUIVALENT) 325 MG: 325 (65 FE) TABLET DELAYED RESPONSE at 10:03

## 2023-09-04 RX ADMIN — ONDANSETRON 4 MG: 2 INJECTION INTRAMUSCULAR; INTRAVENOUS at 10:22

## 2023-09-04 RX ADMIN — SODIUM CHLORIDE, PRESERVATIVE FREE 10 ML: 5 INJECTION INTRAVENOUS at 21:29

## 2023-09-04 RX ADMIN — CEFTRIAXONE SODIUM 1000 MG: 10 INJECTION, POWDER, FOR SOLUTION INTRAVENOUS at 21:30

## 2023-09-04 RX ADMIN — POTASSIUM BICARBONATE 40 MEQ: 782 TABLET, EFFERVESCENT ORAL at 10:07

## 2023-09-04 RX ADMIN — ESCITALOPRAM 20 MG: 10 TABLET, FILM COATED ORAL at 10:03

## 2023-09-04 RX ADMIN — TAMSULOSIN HYDROCHLORIDE 0.4 MG: 0.4 CAPSULE ORAL at 10:03

## 2023-09-04 RX ADMIN — ATORVASTATIN CALCIUM 80 MG: 80 TABLET, FILM COATED ORAL at 21:28

## 2023-09-04 RX ADMIN — TICAGRELOR 90 MG: 90 TABLET ORAL at 10:03

## 2023-09-04 ASSESSMENT — PAIN DESCRIPTION - FREQUENCY: FREQUENCY: CONTINUOUS

## 2023-09-04 ASSESSMENT — PAIN SCALES - GENERAL
PAINLEVEL_OUTOF10: 4
PAINLEVEL_OUTOF10: 0
PAINLEVEL_OUTOF10: 0

## 2023-09-04 ASSESSMENT — PAIN DESCRIPTION - LOCATION: LOCATION: CHEST

## 2023-09-04 ASSESSMENT — PAIN DESCRIPTION - DESCRIPTORS: DESCRIPTORS: HEAVINESS

## 2023-09-04 NOTE — ED NOTES
Patient Health Questionnaire-9 (PHQ-9)    Over the last 2 weeks, how often have you been bothered by any of the following problems? 1. Little Interest or pleasure in doing things? [x] Not at all  [] Several Days  [] More than half the day  []  Nearly every day    2. Feeling down, depressed or hopeless? [] Not at all  [x] Several Days  [] More than half the day  []  Nearly every day    3. Trouble falling or staying asleep, or sleeping too much? [x] Not at all  [] Several Days  [] More than half the day  []  Nearly every day    4. Feeling tired or having little energy? [] Not at all  [] Several Days  [x] More than half the day  [x]  Nearly every day    5. Poor apettite or overeating? [] Not at all  [] Several Days  [x] More than half the day  []  Nearly every day    6. Feeling bad about yourself-or that you are a failure or have let yourself or your family down? [] Not at all  [x] Several Days  [] More than half the day  []  Nearly every day    7. Trouble concentrating on things, such as reading the newspaper or watching television? [x] Not at all  [] Several Days  [] More than half the day  []  Nearly every day    8. Moving or speaking so slowly that other people could have noticed? Or the opposite-being so fidgety or restless that you have been moving around a lot more than usual?   [x] Not at all  [] Several Days  [] More than half the day  []  Nearly every day    9. Thoughts that you would be better off dead or of hurting yourself in some way? [x] Not at all  [] Several Days  [] More than half the day  []  Nearly every day    Total Score: 7    If you checked off any problems, how difficult have these problems made it for you to do your work, take care of things at home, or get along with other people? [x] Not difficult at all  [x] Somewhat Difficult  [] Very Difficult  []  Extremely Difficult     Patient states that she lives at home with her son and has a large support system.   She reports

## 2023-09-05 ENCOUNTER — HOSPITAL ENCOUNTER (INPATIENT)
Age: 78
Discharge: HOME | DRG: 291 | End: 2023-09-07
Payer: MEDICARE

## 2023-09-05 ENCOUNTER — APPOINTMENT (OUTPATIENT)
Dept: NUCLEAR MEDICINE | Age: 78
DRG: 291 | End: 2023-09-05
Payer: MEDICARE

## 2023-09-05 LAB
ANION GAP SERPL CALCULATED.3IONS-SCNC: 12 MMOL/L (ref 9–17)
BASOPHILS # BLD: <0.03 K/UL (ref 0–0.2)
BASOPHILS NFR BLD: 0 % (ref 0–2)
BUN SERPL-MCNC: 22 MG/DL (ref 8–23)
CALCIUM SERPL-MCNC: 9.3 MG/DL (ref 8.6–10.4)
CHLORIDE SERPL-SCNC: 105 MMOL/L (ref 98–107)
CO2 SERPL-SCNC: 24 MMOL/L (ref 20–31)
CREAT SERPL-MCNC: 1.4 MG/DL (ref 0.5–0.9)
DATE, STOOL #1: NORMAL
ECHO BSA: 2.38 M2
EKG ATRIAL RATE: 65 BPM
EKG P AXIS: 71 DEGREES
EKG P-R INTERVAL: 174 MS
EKG Q-T INTERVAL: 460 MS
EKG QRS DURATION: 96 MS
EKG QTC CALCULATION (BAZETT): 478 MS
EKG R AXIS: 3 DEGREES
EKG T AXIS: 108 DEGREES
EKG VENTRICULAR RATE: 65 BPM
EOSINOPHIL # BLD: 0.12 K/UL (ref 0–0.44)
EOSINOPHILS RELATIVE PERCENT: 2 % (ref 1–4)
ERYTHROCYTE [DISTWIDTH] IN BLOOD BY AUTOMATED COUNT: 13.1 % (ref 11.8–14.4)
GFR SERPL CREATININE-BSD FRML MDRD: 39 ML/MIN/1.73M2
GLUCOSE BLD-MCNC: 113 MG/DL (ref 65–105)
GLUCOSE BLD-MCNC: 116 MG/DL (ref 65–105)
GLUCOSE BLD-MCNC: 141 MG/DL (ref 65–105)
GLUCOSE BLD-MCNC: 151 MG/DL (ref 65–105)
GLUCOSE BLD-MCNC: 175 MG/DL (ref 65–105)
GLUCOSE SERPL-MCNC: 131 MG/DL (ref 70–99)
HCT VFR BLD AUTO: 35.8 % (ref 36.3–47.1)
HEMOCCULT SP1 STL QL: NEGATIVE
HGB BLD-MCNC: 11.2 G/DL (ref 11.9–15.1)
IMM GRANULOCYTES # BLD AUTO: <0.03 K/UL (ref 0–0.3)
IMM GRANULOCYTES NFR BLD: 0 %
LYMPHOCYTES NFR BLD: 1.63 K/UL (ref 1.1–3.7)
LYMPHOCYTES RELATIVE PERCENT: 26 % (ref 24–43)
MAGNESIUM SERPL-MCNC: 2 MG/DL (ref 1.6–2.6)
MCH RBC QN AUTO: 29.5 PG (ref 25.2–33.5)
MCHC RBC AUTO-ENTMCNC: 31.3 G/DL (ref 28.4–34.8)
MCV RBC AUTO: 94.2 FL (ref 82.6–102.9)
MONOCYTES NFR BLD: 0.61 K/UL (ref 0.1–1.2)
MONOCYTES NFR BLD: 10 % (ref 3–12)
NEUTROPHILS NFR BLD: 62 % (ref 36–65)
NEUTS SEG NFR BLD: 3.98 K/UL (ref 1.5–8.1)
NRBC BLD-RTO: 0 PER 100 WBC
PLATELET # BLD AUTO: 153 K/UL (ref 138–453)
PMV BLD AUTO: 11.3 FL (ref 8.1–13.5)
POTASSIUM SERPL-SCNC: 3.7 MMOL/L (ref 3.7–5.3)
RBC # BLD AUTO: 3.8 M/UL (ref 3.95–5.11)
SODIUM SERPL-SCNC: 141 MMOL/L (ref 135–144)
STRESS BASELINE DIAS BP: 78 MMHG
STRESS BASELINE HR: 71 BPM
STRESS BASELINE SYS BP: 211 MMHG
STRESS TARGET HR: 142 BPM
TIME, STOOL #1: NORMAL
WBC OTHER # BLD: 6.4 K/UL (ref 3.5–11.3)

## 2023-09-05 PROCEDURE — 6360000002 HC RX W HCPCS: Performed by: STUDENT IN AN ORGANIZED HEALTH CARE EDUCATION/TRAINING PROGRAM

## 2023-09-05 PROCEDURE — 82270 OCCULT BLOOD FECES: CPT

## 2023-09-05 PROCEDURE — 6370000000 HC RX 637 (ALT 250 FOR IP): Performed by: STUDENT IN AN ORGANIZED HEALTH CARE EDUCATION/TRAINING PROGRAM

## 2023-09-05 PROCEDURE — 93016 CV STRESS TEST SUPVJ ONLY: CPT | Performed by: INTERNAL MEDICINE

## 2023-09-05 PROCEDURE — 99232 SBSQ HOSP IP/OBS MODERATE 35: CPT | Performed by: INTERNAL MEDICINE

## 2023-09-05 PROCEDURE — 6360000002 HC RX W HCPCS: Performed by: INTERNAL MEDICINE

## 2023-09-05 PROCEDURE — 6370000000 HC RX 637 (ALT 250 FOR IP): Performed by: NURSE PRACTITIONER

## 2023-09-05 PROCEDURE — 97162 PT EVAL MOD COMPLEX 30 MIN: CPT

## 2023-09-05 PROCEDURE — 6360000002 HC RX W HCPCS: Performed by: NURSE PRACTITIONER

## 2023-09-05 PROCEDURE — 2580000003 HC RX 258: Performed by: NURSE PRACTITIONER

## 2023-09-05 PROCEDURE — 87324 CLOSTRIDIUM AG IA: CPT

## 2023-09-05 PROCEDURE — 85025 COMPLETE CBC W/AUTO DIFF WBC: CPT

## 2023-09-05 PROCEDURE — 82947 ASSAY GLUCOSE BLOOD QUANT: CPT

## 2023-09-05 PROCEDURE — 3430000000 HC RX DIAGNOSTIC RADIOPHARMACEUTICAL: Performed by: NURSE PRACTITIONER

## 2023-09-05 PROCEDURE — 93018 CV STRESS TEST I&R ONLY: CPT | Performed by: INTERNAL MEDICINE

## 2023-09-05 PROCEDURE — 87449 NOS EACH ORGANISM AG IA: CPT

## 2023-09-05 PROCEDURE — A9500 TC99M SESTAMIBI: HCPCS | Performed by: NURSE PRACTITIONER

## 2023-09-05 PROCEDURE — 93010 ELECTROCARDIOGRAM REPORT: CPT | Performed by: INTERNAL MEDICINE

## 2023-09-05 PROCEDURE — 2580000003 HC RX 258: Performed by: STUDENT IN AN ORGANIZED HEALTH CARE EDUCATION/TRAINING PROGRAM

## 2023-09-05 PROCEDURE — 99233 SBSQ HOSP IP/OBS HIGH 50: CPT | Performed by: SURGERY

## 2023-09-05 PROCEDURE — 93017 CV STRESS TEST TRACING ONLY: CPT

## 2023-09-05 PROCEDURE — 80048 BASIC METABOLIC PNL TOTAL CA: CPT

## 2023-09-05 PROCEDURE — 78452 HT MUSCLE IMAGE SPECT MULT: CPT

## 2023-09-05 PROCEDURE — 6370000000 HC RX 637 (ALT 250 FOR IP): Performed by: INTERNAL MEDICINE

## 2023-09-05 PROCEDURE — 83735 ASSAY OF MAGNESIUM: CPT

## 2023-09-05 PROCEDURE — 97530 THERAPEUTIC ACTIVITIES: CPT

## 2023-09-05 PROCEDURE — 36415 COLL VENOUS BLD VENIPUNCTURE: CPT

## 2023-09-05 PROCEDURE — 2060000000 HC ICU INTERMEDIATE R&B

## 2023-09-05 PROCEDURE — 2580000003 HC RX 258: Performed by: INTERNAL MEDICINE

## 2023-09-05 RX ORDER — ATROPINE SULFATE 0.1 MG/ML
0.5 INJECTION INTRAVENOUS EVERY 5 MIN PRN
Status: DISCONTINUED | OUTPATIENT
Start: 2023-09-05 | End: 2023-09-05

## 2023-09-05 RX ORDER — REGADENOSON 0.08 MG/ML
0.4 INJECTION, SOLUTION INTRAVENOUS
Status: COMPLETED | OUTPATIENT
Start: 2023-09-05 | End: 2023-09-05

## 2023-09-05 RX ORDER — AMINOPHYLLINE DIHYDRATE 25 MG/ML
50 INJECTION, SOLUTION INTRAVENOUS PRN
Status: DISCONTINUED | OUTPATIENT
Start: 2023-09-05 | End: 2023-09-05

## 2023-09-05 RX ORDER — ALBUTEROL SULFATE 90 UG/1
2 AEROSOL, METERED RESPIRATORY (INHALATION) PRN
Status: DISCONTINUED | OUTPATIENT
Start: 2023-09-05 | End: 2023-09-05

## 2023-09-05 RX ORDER — METOPROLOL TARTRATE 5 MG/5ML
5 INJECTION INTRAVENOUS EVERY 5 MIN PRN
Status: DISCONTINUED | OUTPATIENT
Start: 2023-09-05 | End: 2023-09-05

## 2023-09-05 RX ORDER — SODIUM CHLORIDE 9 MG/ML
500 INJECTION, SOLUTION INTRAVENOUS CONTINUOUS PRN
Status: DISCONTINUED | OUTPATIENT
Start: 2023-09-05 | End: 2023-09-05

## 2023-09-05 RX ORDER — NITROGLYCERIN 0.4 MG/1
0.4 TABLET SUBLINGUAL EVERY 5 MIN PRN
Status: DISCONTINUED | OUTPATIENT
Start: 2023-09-05 | End: 2023-09-05

## 2023-09-05 RX ORDER — TETRAKIS(2-METHOXYISOBUTYLISOCYANIDE)COPPER(I) TETRAFLUOROBORATE 1 MG/ML
15.8 INJECTION, POWDER, LYOPHILIZED, FOR SOLUTION INTRAVENOUS
Status: COMPLETED | OUTPATIENT
Start: 2023-09-05 | End: 2023-09-05

## 2023-09-05 RX ORDER — SODIUM CHLORIDE 0.9 % (FLUSH) 0.9 %
10 SYRINGE (ML) INJECTION PRN
Status: DISCONTINUED | OUTPATIENT
Start: 2023-09-05 | End: 2023-09-11 | Stop reason: HOSPADM

## 2023-09-05 RX ORDER — TETRAKIS(2-METHOXYISOBUTYLISOCYANIDE)COPPER(I) TETRAFLUOROBORATE 1 MG/ML
40 INJECTION, POWDER, LYOPHILIZED, FOR SOLUTION INTRAVENOUS
Status: COMPLETED | OUTPATIENT
Start: 2023-09-05 | End: 2023-09-05

## 2023-09-05 RX ORDER — SODIUM CHLORIDE 0.9 % (FLUSH) 0.9 %
5-40 SYRINGE (ML) INJECTION PRN
Status: DISCONTINUED | OUTPATIENT
Start: 2023-09-05 | End: 2023-09-05

## 2023-09-05 RX ORDER — ISOSORBIDE MONONITRATE 30 MG/1
30 TABLET, EXTENDED RELEASE ORAL DAILY
Status: DISCONTINUED | OUTPATIENT
Start: 2023-09-05 | End: 2023-09-06

## 2023-09-05 RX ORDER — HYDRALAZINE HYDROCHLORIDE 20 MG/ML
10 INJECTION INTRAMUSCULAR; INTRAVENOUS ONCE
Status: COMPLETED | OUTPATIENT
Start: 2023-09-05 | End: 2023-09-05

## 2023-09-05 RX ADMIN — ATORVASTATIN CALCIUM 80 MG: 80 TABLET, FILM COATED ORAL at 20:15

## 2023-09-05 RX ADMIN — TETRAKIS(2-METHOXYISOBUTYLISOCYANIDE)COPPER(I) TETRAFLUOROBORATE 15.8 MILLICURIE: 1 INJECTION, POWDER, LYOPHILIZED, FOR SOLUTION INTRAVENOUS at 07:55

## 2023-09-05 RX ADMIN — LORAZEPAM 0.5 MG: 0.5 TABLET ORAL at 12:46

## 2023-09-05 RX ADMIN — APIXABAN 5 MG: 5 TABLET, FILM COATED ORAL at 12:41

## 2023-09-05 RX ADMIN — ISOSORBIDE MONONITRATE 30 MG: 30 TABLET, EXTENDED RELEASE ORAL at 16:22

## 2023-09-05 RX ADMIN — SODIUM CHLORIDE, PRESERVATIVE FREE 10 ML: 5 INJECTION INTRAVENOUS at 09:33

## 2023-09-05 RX ADMIN — INSULIN GLARGINE 10 UNITS: 100 INJECTION, SOLUTION SUBCUTANEOUS at 20:17

## 2023-09-05 RX ADMIN — TICAGRELOR 90 MG: 90 TABLET ORAL at 20:15

## 2023-09-05 RX ADMIN — LOSARTAN POTASSIUM 100 MG: 50 TABLET ORAL at 06:59

## 2023-09-05 RX ADMIN — ONDANSETRON 4 MG: 2 INJECTION INTRAMUSCULAR; INTRAVENOUS at 12:46

## 2023-09-05 RX ADMIN — SODIUM CHLORIDE, PRESERVATIVE FREE 10 ML: 5 INJECTION INTRAVENOUS at 10:21

## 2023-09-05 RX ADMIN — CEFTRIAXONE SODIUM 1000 MG: 10 INJECTION, POWDER, FOR SOLUTION INTRAVENOUS at 20:16

## 2023-09-05 RX ADMIN — PANTOPRAZOLE SODIUM 40 MG: 40 TABLET, DELAYED RELEASE ORAL at 12:41

## 2023-09-05 RX ADMIN — ESCITALOPRAM 20 MG: 10 TABLET, FILM COATED ORAL at 12:42

## 2023-09-05 RX ADMIN — HYDRALAZINE HYDROCHLORIDE 10 MG: 20 INJECTION, SOLUTION INTRAMUSCULAR; INTRAVENOUS at 04:46

## 2023-09-05 RX ADMIN — TAMSULOSIN HYDROCHLORIDE 0.4 MG: 0.4 CAPSULE ORAL at 12:41

## 2023-09-05 RX ADMIN — LORAZEPAM 0.5 MG: 0.5 TABLET ORAL at 20:17

## 2023-09-05 RX ADMIN — SODIUM CHLORIDE, PRESERVATIVE FREE 10 ML: 5 INJECTION INTRAVENOUS at 20:16

## 2023-09-05 RX ADMIN — APIXABAN 5 MG: 5 TABLET, FILM COATED ORAL at 20:15

## 2023-09-05 RX ADMIN — TICAGRELOR 90 MG: 90 TABLET ORAL at 12:41

## 2023-09-05 RX ADMIN — REGADENOSON 0.4 MG: 0.08 INJECTION, SOLUTION INTRAVENOUS at 10:14

## 2023-09-05 RX ADMIN — SODIUM CHLORIDE, PRESERVATIVE FREE 10 ML: 5 INJECTION INTRAVENOUS at 07:55

## 2023-09-05 RX ADMIN — AMIODARONE HYDROCHLORIDE 200 MG: 200 TABLET ORAL at 10:58

## 2023-09-05 RX ADMIN — FERROUS SULFATE TAB EC 325 MG (65 MG FE EQUIVALENT) 325 MG: 325 (65 FE) TABLET DELAYED RESPONSE at 12:42

## 2023-09-05 RX ADMIN — CARVEDILOL 12.5 MG: 12.5 TABLET, FILM COATED ORAL at 20:15

## 2023-09-05 RX ADMIN — TETRAKIS(2-METHOXYISOBUTYLISOCYANIDE)COPPER(I) TETRAFLUOROBORATE 40 MILLICURIE: 1 INJECTION, POWDER, LYOPHILIZED, FOR SOLUTION INTRAVENOUS at 10:21

## 2023-09-05 RX ADMIN — CARVEDILOL 12.5 MG: 12.5 TABLET, FILM COATED ORAL at 10:58

## 2023-09-05 NOTE — CARE COORDINATION
Transitional planning    Received phone call from Nate at [facility] [place]. They are able to accept patient. They need PT/OT notes to be able to submit for pre cert.

## 2023-09-06 LAB
ALBUMIN SERPL-MCNC: 3.6 G/DL (ref 3.5–5.2)
ANION GAP SERPL CALCULATED.3IONS-SCNC: 10 MMOL/L (ref 9–17)
BUN SERPL-MCNC: 27 MG/DL (ref 8–23)
C DIFF GDH + TOXINS A+B STL QL IA.RAPID: NEGATIVE
CALCIUM SERPL-MCNC: 9.2 MG/DL (ref 8.6–10.4)
CHLORIDE SERPL-SCNC: 105 MMOL/L (ref 98–107)
CO2 SERPL-SCNC: 25 MMOL/L (ref 20–31)
CREAT SERPL-MCNC: 1.6 MG/DL (ref 0.5–0.9)
EKG ATRIAL RATE: 117 BPM
EKG ATRIAL RATE: 182 BPM
EKG Q-T INTERVAL: 310 MS
EKG Q-T INTERVAL: 392 MS
EKG QRS DURATION: 84 MS
EKG QRS DURATION: 84 MS
EKG QTC CALCULATION (BAZETT): 478 MS
EKG QTC CALCULATION (BAZETT): 503 MS
EKG R AXIS: -7 DEGREES
EKG R AXIS: 3 DEGREES
EKG T AXIS: -45 DEGREES
EKG T AXIS: 153 DEGREES
EKG VENTRICULAR RATE: 143 BPM
EKG VENTRICULAR RATE: 99 BPM
ERYTHROCYTE [DISTWIDTH] IN BLOOD BY AUTOMATED COUNT: 13.3 % (ref 11.8–14.4)
GFR SERPL CREATININE-BSD FRML MDRD: 33 ML/MIN/1.73M2
GLUCOSE BLD-MCNC: 147 MG/DL (ref 65–105)
GLUCOSE BLD-MCNC: 158 MG/DL (ref 65–105)
GLUCOSE BLD-MCNC: 165 MG/DL (ref 65–105)
GLUCOSE BLD-MCNC: 197 MG/DL (ref 65–105)
GLUCOSE SERPL-MCNC: 152 MG/DL (ref 70–99)
HCT VFR BLD AUTO: 32.2 % (ref 36.3–47.1)
HGB BLD-MCNC: 10.2 G/DL (ref 11.9–15.1)
MAGNESIUM SERPL-MCNC: 2.1 MG/DL (ref 1.6–2.6)
MCH RBC QN AUTO: 29.5 PG (ref 25.2–33.5)
MCHC RBC AUTO-ENTMCNC: 31.7 G/DL (ref 28.4–34.8)
MCV RBC AUTO: 93.1 FL (ref 82.6–102.9)
NRBC BLD-RTO: 0 PER 100 WBC
PHOSPHATE SERPL-MCNC: 3.5 MG/DL (ref 2.6–4.5)
PLATELET # BLD AUTO: 160 K/UL (ref 138–453)
PMV BLD AUTO: 10.9 FL (ref 8.1–13.5)
POTASSIUM SERPL-SCNC: 3.8 MMOL/L (ref 3.7–5.3)
RBC # BLD AUTO: 3.46 M/UL (ref 3.95–5.11)
SODIUM SERPL-SCNC: 140 MMOL/L (ref 135–144)
SPECIMEN DESCRIPTION: NORMAL
WBC OTHER # BLD: 6.9 K/UL (ref 3.5–11.3)

## 2023-09-06 PROCEDURE — 99233 SBSQ HOSP IP/OBS HIGH 50: CPT | Performed by: SURGERY

## 2023-09-06 PROCEDURE — 6370000000 HC RX 637 (ALT 250 FOR IP): Performed by: NURSE PRACTITIONER

## 2023-09-06 PROCEDURE — 2060000000 HC ICU INTERMEDIATE R&B

## 2023-09-06 PROCEDURE — 99232 SBSQ HOSP IP/OBS MODERATE 35: CPT | Performed by: INTERNAL MEDICINE

## 2023-09-06 PROCEDURE — 6370000000 HC RX 637 (ALT 250 FOR IP): Performed by: STUDENT IN AN ORGANIZED HEALTH CARE EDUCATION/TRAINING PROGRAM

## 2023-09-06 PROCEDURE — 2580000003 HC RX 258: Performed by: STUDENT IN AN ORGANIZED HEALTH CARE EDUCATION/TRAINING PROGRAM

## 2023-09-06 PROCEDURE — 82947 ASSAY GLUCOSE BLOOD QUANT: CPT

## 2023-09-06 PROCEDURE — 36415 COLL VENOUS BLD VENIPUNCTURE: CPT

## 2023-09-06 PROCEDURE — 85027 COMPLETE CBC AUTOMATED: CPT

## 2023-09-06 PROCEDURE — 83735 ASSAY OF MAGNESIUM: CPT

## 2023-09-06 PROCEDURE — 6360000002 HC RX W HCPCS: Performed by: INTERNAL MEDICINE

## 2023-09-06 PROCEDURE — 6370000000 HC RX 637 (ALT 250 FOR IP): Performed by: INTERNAL MEDICINE

## 2023-09-06 PROCEDURE — 93010 ELECTROCARDIOGRAM REPORT: CPT | Performed by: INTERNAL MEDICINE

## 2023-09-06 PROCEDURE — 6360000002 HC RX W HCPCS: Performed by: STUDENT IN AN ORGANIZED HEALTH CARE EDUCATION/TRAINING PROGRAM

## 2023-09-06 PROCEDURE — 2580000003 HC RX 258: Performed by: INTERNAL MEDICINE

## 2023-09-06 PROCEDURE — 80069 RENAL FUNCTION PANEL: CPT

## 2023-09-06 RX ORDER — ISOSORBIDE MONONITRATE 60 MG/1
120 TABLET, EXTENDED RELEASE ORAL DAILY
Status: DISCONTINUED | OUTPATIENT
Start: 2023-09-07 | End: 2023-09-07

## 2023-09-06 RX ADMIN — ATORVASTATIN CALCIUM 80 MG: 80 TABLET, FILM COATED ORAL at 20:49

## 2023-09-06 RX ADMIN — INSULIN GLARGINE 40 UNITS: 100 INJECTION, SOLUTION SUBCUTANEOUS at 12:06

## 2023-09-06 RX ADMIN — ESCITALOPRAM 20 MG: 10 TABLET, FILM COATED ORAL at 12:06

## 2023-09-06 RX ADMIN — APIXABAN 5 MG: 5 TABLET, FILM COATED ORAL at 20:49

## 2023-09-06 RX ADMIN — LOPERAMIDE HYDROCHLORIDE 2 MG: 2 CAPSULE ORAL at 10:54

## 2023-09-06 RX ADMIN — LOSARTAN POTASSIUM 100 MG: 50 TABLET ORAL at 12:04

## 2023-09-06 RX ADMIN — CARVEDILOL 12.5 MG: 12.5 TABLET, FILM COATED ORAL at 12:05

## 2023-09-06 RX ADMIN — LORAZEPAM 0.5 MG: 0.5 TABLET ORAL at 20:48

## 2023-09-06 RX ADMIN — TICAGRELOR 90 MG: 90 TABLET ORAL at 20:49

## 2023-09-06 RX ADMIN — SODIUM CHLORIDE, PRESERVATIVE FREE 10 ML: 5 INJECTION INTRAVENOUS at 20:48

## 2023-09-06 RX ADMIN — TAMSULOSIN HYDROCHLORIDE 0.4 MG: 0.4 CAPSULE ORAL at 12:06

## 2023-09-06 RX ADMIN — INSULIN GLARGINE 10 UNITS: 100 INJECTION, SOLUTION SUBCUTANEOUS at 20:48

## 2023-09-06 RX ADMIN — FERROUS SULFATE TAB EC 325 MG (65 MG FE EQUIVALENT) 325 MG: 325 (65 FE) TABLET DELAYED RESPONSE at 12:06

## 2023-09-06 RX ADMIN — LOPERAMIDE HYDROCHLORIDE 2 MG: 2 CAPSULE ORAL at 20:49

## 2023-09-06 RX ADMIN — CEFTRIAXONE SODIUM 1000 MG: 10 INJECTION, POWDER, FOR SOLUTION INTRAVENOUS at 20:48

## 2023-09-06 RX ADMIN — CARVEDILOL 12.5 MG: 12.5 TABLET, FILM COATED ORAL at 20:49

## 2023-09-06 RX ADMIN — AMIODARONE HYDROCHLORIDE 200 MG: 200 TABLET ORAL at 12:04

## 2023-09-06 RX ADMIN — ONDANSETRON 4 MG: 2 INJECTION INTRAMUSCULAR; INTRAVENOUS at 17:06

## 2023-09-06 RX ADMIN — TICAGRELOR 90 MG: 90 TABLET ORAL at 12:06

## 2023-09-06 RX ADMIN — APIXABAN 5 MG: 5 TABLET, FILM COATED ORAL at 12:05

## 2023-09-06 NOTE — CARE COORDINATION
Transitional planning    Spoke to patient and she confirms that plan is for her to go to [facility] at discharge. [facility] is her choice. [address] [phone]. She will start pre cert. [facility] called and she needs updated OT note to be able to submit for pre cert. 1234 called OT dept and left Vm that patient needs to be seen to be able to start pre cert. Also let patient RN know that OT needs to see patient.     1503 called OT dept and asked that patient be seen asap

## 2023-09-07 LAB
ALBUMIN SERPL-MCNC: 3.4 G/DL (ref 3.5–5.2)
ANION GAP SERPL CALCULATED.3IONS-SCNC: 10 MMOL/L (ref 9–17)
BUN SERPL-MCNC: 24 MG/DL (ref 8–23)
CALCIUM SERPL-MCNC: 9.1 MG/DL (ref 8.6–10.4)
CHLORIDE SERPL-SCNC: 106 MMOL/L (ref 98–107)
CO2 SERPL-SCNC: 23 MMOL/L (ref 20–31)
CREAT SERPL-MCNC: 1.4 MG/DL (ref 0.5–0.9)
ERYTHROCYTE [DISTWIDTH] IN BLOOD BY AUTOMATED COUNT: 13.2 % (ref 11.8–14.4)
GFR SERPL CREATININE-BSD FRML MDRD: 39 ML/MIN/1.73M2
GLUCOSE BLD-MCNC: 119 MG/DL (ref 65–105)
GLUCOSE BLD-MCNC: 158 MG/DL (ref 65–105)
GLUCOSE BLD-MCNC: 168 MG/DL (ref 65–105)
GLUCOSE BLD-MCNC: 192 MG/DL (ref 65–105)
GLUCOSE BLD-MCNC: 198 MG/DL (ref 65–105)
GLUCOSE BLD-MCNC: 201 MG/DL (ref 65–105)
GLUCOSE SERPL-MCNC: 122 MG/DL (ref 70–99)
HCT VFR BLD AUTO: 35.8 % (ref 36.3–47.1)
HGB BLD-MCNC: 10.9 G/DL (ref 11.9–15.1)
MAGNESIUM SERPL-MCNC: 2.3 MG/DL (ref 1.6–2.6)
MCH RBC QN AUTO: 29.1 PG (ref 25.2–33.5)
MCHC RBC AUTO-ENTMCNC: 30.4 G/DL (ref 28.4–34.8)
MCV RBC AUTO: 95.5 FL (ref 82.6–102.9)
NRBC BLD-RTO: 0 PER 100 WBC
PHOSPHATE SERPL-MCNC: 3.4 MG/DL (ref 2.6–4.5)
PLATELET # BLD AUTO: 151 K/UL (ref 138–453)
PMV BLD AUTO: 11.3 FL (ref 8.1–13.5)
POTASSIUM SERPL-SCNC: 4.1 MMOL/L (ref 3.7–5.3)
RBC # BLD AUTO: 3.75 M/UL (ref 3.95–5.11)
SODIUM SERPL-SCNC: 139 MMOL/L (ref 135–144)
WBC OTHER # BLD: 6 K/UL (ref 3.5–11.3)

## 2023-09-07 PROCEDURE — 97535 SELF CARE MNGMENT TRAINING: CPT

## 2023-09-07 PROCEDURE — 82947 ASSAY GLUCOSE BLOOD QUANT: CPT

## 2023-09-07 PROCEDURE — 80069 RENAL FUNCTION PANEL: CPT

## 2023-09-07 PROCEDURE — 99231 SBSQ HOSP IP/OBS SF/LOW 25: CPT | Performed by: INTERNAL MEDICINE

## 2023-09-07 PROCEDURE — 2580000003 HC RX 258: Performed by: STUDENT IN AN ORGANIZED HEALTH CARE EDUCATION/TRAINING PROGRAM

## 2023-09-07 PROCEDURE — 6360000002 HC RX W HCPCS: Performed by: INTERNAL MEDICINE

## 2023-09-07 PROCEDURE — 97530 THERAPEUTIC ACTIVITIES: CPT

## 2023-09-07 PROCEDURE — 36415 COLL VENOUS BLD VENIPUNCTURE: CPT

## 2023-09-07 PROCEDURE — 6370000000 HC RX 637 (ALT 250 FOR IP): Performed by: INTERNAL MEDICINE

## 2023-09-07 PROCEDURE — 85027 COMPLETE CBC AUTOMATED: CPT

## 2023-09-07 PROCEDURE — 6370000000 HC RX 637 (ALT 250 FOR IP): Performed by: NURSE PRACTITIONER

## 2023-09-07 PROCEDURE — 2700000000 HC OXYGEN THERAPY PER DAY

## 2023-09-07 PROCEDURE — 2580000003 HC RX 258: Performed by: INTERNAL MEDICINE

## 2023-09-07 PROCEDURE — 6370000000 HC RX 637 (ALT 250 FOR IP): Performed by: STUDENT IN AN ORGANIZED HEALTH CARE EDUCATION/TRAINING PROGRAM

## 2023-09-07 PROCEDURE — 6370000000 HC RX 637 (ALT 250 FOR IP): Performed by: SURGERY

## 2023-09-07 PROCEDURE — 83735 ASSAY OF MAGNESIUM: CPT

## 2023-09-07 PROCEDURE — 94761 N-INVAS EAR/PLS OXIMETRY MLT: CPT

## 2023-09-07 PROCEDURE — 2060000000 HC ICU INTERMEDIATE R&B

## 2023-09-07 RX ORDER — ISOSORBIDE MONONITRATE 30 MG/1
30 TABLET, EXTENDED RELEASE ORAL DAILY
Status: DISCONTINUED | OUTPATIENT
Start: 2023-09-08 | End: 2023-09-09

## 2023-09-07 RX ORDER — CARVEDILOL 12.5 MG/1
12.5 TABLET ORAL 2 TIMES DAILY
Status: DISCONTINUED | OUTPATIENT
Start: 2023-09-08 | End: 2023-09-11 | Stop reason: HOSPADM

## 2023-09-07 RX ORDER — CARVEDILOL 12.5 MG/1
12.5 TABLET ORAL 2 TIMES DAILY
Status: DISCONTINUED | OUTPATIENT
Start: 2023-09-07 | End: 2023-09-07

## 2023-09-07 RX ORDER — CARVEDILOL 25 MG/1
25 TABLET ORAL 2 TIMES DAILY
Status: DISCONTINUED | OUTPATIENT
Start: 2023-09-07 | End: 2023-09-07

## 2023-09-07 RX ADMIN — INSULIN GLARGINE 40 UNITS: 100 INJECTION, SOLUTION SUBCUTANEOUS at 10:38

## 2023-09-07 RX ADMIN — LOSARTAN POTASSIUM 100 MG: 50 TABLET ORAL at 10:37

## 2023-09-07 RX ADMIN — AMIODARONE HYDROCHLORIDE 200 MG: 200 TABLET ORAL at 10:38

## 2023-09-07 RX ADMIN — TAMSULOSIN HYDROCHLORIDE 0.4 MG: 0.4 CAPSULE ORAL at 10:42

## 2023-09-07 RX ADMIN — PANTOPRAZOLE SODIUM 40 MG: 40 TABLET, DELAYED RELEASE ORAL at 10:56

## 2023-09-07 RX ADMIN — ATORVASTATIN CALCIUM 80 MG: 80 TABLET, FILM COATED ORAL at 20:08

## 2023-09-07 RX ADMIN — ESCITALOPRAM 20 MG: 10 TABLET, FILM COATED ORAL at 10:42

## 2023-09-07 RX ADMIN — LOPERAMIDE HYDROCHLORIDE 2 MG: 2 CAPSULE ORAL at 20:08

## 2023-09-07 RX ADMIN — INSULIN GLARGINE 10 UNITS: 100 INJECTION, SOLUTION SUBCUTANEOUS at 20:04

## 2023-09-07 RX ADMIN — TICAGRELOR 90 MG: 90 TABLET ORAL at 10:42

## 2023-09-07 RX ADMIN — APIXABAN 5 MG: 5 TABLET, FILM COATED ORAL at 10:38

## 2023-09-07 RX ADMIN — APIXABAN 5 MG: 5 TABLET, FILM COATED ORAL at 20:08

## 2023-09-07 RX ADMIN — CARVEDILOL 25 MG: 12.5 TABLET, FILM COATED ORAL at 10:42

## 2023-09-07 RX ADMIN — SODIUM CHLORIDE, PRESERVATIVE FREE 5 ML: 5 INJECTION INTRAVENOUS at 20:08

## 2023-09-07 RX ADMIN — CEFTRIAXONE SODIUM 1000 MG: 10 INJECTION, POWDER, FOR SOLUTION INTRAVENOUS at 19:58

## 2023-09-07 RX ADMIN — TICAGRELOR 90 MG: 90 TABLET ORAL at 21:39

## 2023-09-07 RX ADMIN — FERROUS SULFATE TAB EC 325 MG (65 MG FE EQUIVALENT) 325 MG: 325 (65 FE) TABLET DELAYED RESPONSE at 10:42

## 2023-09-07 RX ADMIN — ISOSORBIDE MONONITRATE 120 MG: 60 TABLET, EXTENDED RELEASE ORAL at 10:42

## 2023-09-07 RX ADMIN — LOPERAMIDE HYDROCHLORIDE 2 MG: 2 CAPSULE ORAL at 10:38

## 2023-09-07 ASSESSMENT — PAIN SCALES - GENERAL: PAINLEVEL_OUTOF10: 0

## 2023-09-07 NOTE — CARE COORDINATION
Notified by Ileana Damon from [facility] [place] that they are not able to accept d/t no bed available. Met with patient and daughter, Clarence Medina, to update. Their next choice is [facility] and second choice [facility]. Referrals sent    [phone] notified by Moises Green from [facility] that they are able to accept.  She will start precert once OT note available    [phone] patient updated and agreeable with plan

## 2023-09-08 LAB
ALBUMIN SERPL-MCNC: 3.2 G/DL (ref 3.5–5.2)
ANION GAP SERPL CALCULATED.3IONS-SCNC: 8 MMOL/L (ref 9–17)
BUN SERPL-MCNC: 27 MG/DL (ref 8–23)
CALCIUM SERPL-MCNC: 8.7 MG/DL (ref 8.6–10.4)
CHLORIDE SERPL-SCNC: 109 MMOL/L (ref 98–107)
CO2 SERPL-SCNC: 23 MMOL/L (ref 20–31)
CREAT SERPL-MCNC: 1.6 MG/DL (ref 0.5–0.9)
ERYTHROCYTE [DISTWIDTH] IN BLOOD BY AUTOMATED COUNT: 13.4 % (ref 11.8–14.4)
GFR SERPL CREATININE-BSD FRML MDRD: 33 ML/MIN/1.73M2
GLUCOSE BLD-MCNC: 102 MG/DL (ref 65–105)
GLUCOSE BLD-MCNC: 131 MG/DL (ref 65–105)
GLUCOSE BLD-MCNC: 90 MG/DL (ref 65–105)
GLUCOSE SERPL-MCNC: 83 MG/DL (ref 70–99)
HCT VFR BLD AUTO: 32.3 % (ref 36.3–47.1)
HGB BLD-MCNC: 9.7 G/DL (ref 11.9–15.1)
MAGNESIUM SERPL-MCNC: 2.2 MG/DL (ref 1.6–2.6)
MCH RBC QN AUTO: 28.9 PG (ref 25.2–33.5)
MCHC RBC AUTO-ENTMCNC: 30 G/DL (ref 28.4–34.8)
MCV RBC AUTO: 96.1 FL (ref 82.6–102.9)
NRBC BLD-RTO: 0 PER 100 WBC
PHOSPHATE SERPL-MCNC: 3.2 MG/DL (ref 2.6–4.5)
PLATELET # BLD AUTO: 141 K/UL (ref 138–453)
PMV BLD AUTO: 11.5 FL (ref 8.1–13.5)
POTASSIUM SERPL-SCNC: 3.7 MMOL/L (ref 3.7–5.3)
RBC # BLD AUTO: 3.36 M/UL (ref 3.95–5.11)
SODIUM SERPL-SCNC: 140 MMOL/L (ref 135–144)
WBC OTHER # BLD: 6.5 K/UL (ref 3.5–11.3)

## 2023-09-08 PROCEDURE — 99232 SBSQ HOSP IP/OBS MODERATE 35: CPT | Performed by: INTERNAL MEDICINE

## 2023-09-08 PROCEDURE — 97116 GAIT TRAINING THERAPY: CPT

## 2023-09-08 PROCEDURE — 85027 COMPLETE CBC AUTOMATED: CPT

## 2023-09-08 PROCEDURE — 83735 ASSAY OF MAGNESIUM: CPT

## 2023-09-08 PROCEDURE — 82947 ASSAY GLUCOSE BLOOD QUANT: CPT

## 2023-09-08 PROCEDURE — 97530 THERAPEUTIC ACTIVITIES: CPT

## 2023-09-08 PROCEDURE — 6370000000 HC RX 637 (ALT 250 FOR IP): Performed by: INTERNAL MEDICINE

## 2023-09-08 PROCEDURE — 97535 SELF CARE MNGMENT TRAINING: CPT

## 2023-09-08 PROCEDURE — 6370000000 HC RX 637 (ALT 250 FOR IP): Performed by: STUDENT IN AN ORGANIZED HEALTH CARE EDUCATION/TRAINING PROGRAM

## 2023-09-08 PROCEDURE — 36415 COLL VENOUS BLD VENIPUNCTURE: CPT

## 2023-09-08 PROCEDURE — 2580000003 HC RX 258: Performed by: STUDENT IN AN ORGANIZED HEALTH CARE EDUCATION/TRAINING PROGRAM

## 2023-09-08 PROCEDURE — 6370000000 HC RX 637 (ALT 250 FOR IP): Performed by: NURSE PRACTITIONER

## 2023-09-08 PROCEDURE — 2060000000 HC ICU INTERMEDIATE R&B

## 2023-09-08 PROCEDURE — 80069 RENAL FUNCTION PANEL: CPT

## 2023-09-08 RX ADMIN — APIXABAN 5 MG: 5 TABLET, FILM COATED ORAL at 09:20

## 2023-09-08 RX ADMIN — TAMSULOSIN HYDROCHLORIDE 0.4 MG: 0.4 CAPSULE ORAL at 09:19

## 2023-09-08 RX ADMIN — ISOSORBIDE MONONITRATE 30 MG: 30 TABLET, EXTENDED RELEASE ORAL at 09:18

## 2023-09-08 RX ADMIN — SODIUM CHLORIDE, PRESERVATIVE FREE 10 ML: 5 INJECTION INTRAVENOUS at 09:21

## 2023-09-08 RX ADMIN — INSULIN GLARGINE 10 UNITS: 100 INJECTION, SOLUTION SUBCUTANEOUS at 22:09

## 2023-09-08 RX ADMIN — AMIODARONE HYDROCHLORIDE 200 MG: 200 TABLET ORAL at 09:19

## 2023-09-08 RX ADMIN — PANTOPRAZOLE SODIUM 40 MG: 40 TABLET, DELAYED RELEASE ORAL at 09:20

## 2023-09-08 RX ADMIN — CARVEDILOL 12.5 MG: 12.5 TABLET, FILM COATED ORAL at 09:20

## 2023-09-08 RX ADMIN — ATORVASTATIN CALCIUM 80 MG: 80 TABLET, FILM COATED ORAL at 22:09

## 2023-09-08 RX ADMIN — TICAGRELOR 90 MG: 90 TABLET ORAL at 09:19

## 2023-09-08 RX ADMIN — LOPERAMIDE HYDROCHLORIDE 2 MG: 2 CAPSULE ORAL at 09:19

## 2023-09-08 RX ADMIN — APIXABAN 5 MG: 5 TABLET, FILM COATED ORAL at 22:09

## 2023-09-08 RX ADMIN — CARVEDILOL 12.5 MG: 12.5 TABLET, FILM COATED ORAL at 22:09

## 2023-09-08 RX ADMIN — INSULIN GLARGINE 40 UNITS: 100 INJECTION, SOLUTION SUBCUTANEOUS at 09:21

## 2023-09-08 RX ADMIN — TICAGRELOR 90 MG: 90 TABLET ORAL at 22:09

## 2023-09-08 RX ADMIN — LOPERAMIDE HYDROCHLORIDE 2 MG: 2 CAPSULE ORAL at 22:09

## 2023-09-08 RX ADMIN — LOSARTAN POTASSIUM 100 MG: 50 TABLET ORAL at 09:19

## 2023-09-08 RX ADMIN — ESCITALOPRAM 20 MG: 10 TABLET, FILM COATED ORAL at 09:18

## 2023-09-08 RX ADMIN — FERROUS SULFATE TAB EC 325 MG (65 MG FE EQUIVALENT) 325 MG: 325 (65 FE) TABLET DELAYED RESPONSE at 09:19

## 2023-09-08 RX ADMIN — SODIUM CHLORIDE, PRESERVATIVE FREE 10 ML: 5 INJECTION INTRAVENOUS at 22:17

## 2023-09-08 ASSESSMENT — PAIN SCALES - GENERAL
PAINLEVEL_OUTOF10: 0
PAINLEVEL_OUTOF10: 0

## 2023-09-08 NOTE — CARE COORDINATION
Call to admissions at [facility]. Requested for precert to be started    [phone] notified by Huy Canales from [place] that insurance has denied and offered a peer to peer. Notified Dr Brittni Reyes. He requested to notify Dr Tamir Chung. Spoke to Dr Tamir Chung. She is agreeable to complete peer to peer. Spoke to Elsa from [place]. Peer to peer scheduled for first available time; Monday between 9-11 am. Information sent to Dr Tamir Chung via secure email.  Patient updated

## 2023-09-09 LAB
ALBUMIN SERPL-MCNC: 3.3 G/DL (ref 3.5–5.2)
ANION GAP SERPL CALCULATED.3IONS-SCNC: 11 MMOL/L (ref 9–17)
BUN SERPL-MCNC: 21 MG/DL (ref 8–23)
CALCIUM SERPL-MCNC: 9.2 MG/DL (ref 8.6–10.4)
CHLORIDE SERPL-SCNC: 108 MMOL/L (ref 98–107)
CO2 SERPL-SCNC: 20 MMOL/L (ref 20–31)
CREAT SERPL-MCNC: 1.3 MG/DL (ref 0.5–0.9)
ERYTHROCYTE [DISTWIDTH] IN BLOOD BY AUTOMATED COUNT: 13.3 % (ref 11.8–14.4)
GFR SERPL CREATININE-BSD FRML MDRD: 42 ML/MIN/1.73M2
GLUCOSE BLD-MCNC: 165 MG/DL (ref 65–105)
GLUCOSE BLD-MCNC: 175 MG/DL (ref 65–105)
GLUCOSE BLD-MCNC: 75 MG/DL (ref 65–105)
GLUCOSE SERPL-MCNC: 179 MG/DL (ref 70–99)
HCT VFR BLD AUTO: 33.9 % (ref 36.3–47.1)
HGB BLD-MCNC: 10.4 G/DL (ref 11.9–15.1)
MAGNESIUM SERPL-MCNC: 2.3 MG/DL (ref 1.6–2.6)
MCH RBC QN AUTO: 29.7 PG (ref 25.2–33.5)
MCHC RBC AUTO-ENTMCNC: 30.7 G/DL (ref 28.4–34.8)
MCV RBC AUTO: 96.9 FL (ref 82.6–102.9)
NRBC BLD-RTO: 0 PER 100 WBC
PHOSPHATE SERPL-MCNC: 3.1 MG/DL (ref 2.6–4.5)
PLATELET # BLD AUTO: 146 K/UL (ref 138–453)
PMV BLD AUTO: 11.2 FL (ref 8.1–13.5)
POTASSIUM SERPL-SCNC: 4.3 MMOL/L (ref 3.7–5.3)
RBC # BLD AUTO: 3.5 M/UL (ref 3.95–5.11)
SODIUM SERPL-SCNC: 139 MMOL/L (ref 135–144)
WBC OTHER # BLD: 5.6 K/UL (ref 3.5–11.3)

## 2023-09-09 PROCEDURE — 6370000000 HC RX 637 (ALT 250 FOR IP): Performed by: INTERNAL MEDICINE

## 2023-09-09 PROCEDURE — 80069 RENAL FUNCTION PANEL: CPT

## 2023-09-09 PROCEDURE — 6370000000 HC RX 637 (ALT 250 FOR IP): Performed by: NURSE PRACTITIONER

## 2023-09-09 PROCEDURE — 85027 COMPLETE CBC AUTOMATED: CPT

## 2023-09-09 PROCEDURE — 36415 COLL VENOUS BLD VENIPUNCTURE: CPT

## 2023-09-09 PROCEDURE — 83735 ASSAY OF MAGNESIUM: CPT

## 2023-09-09 PROCEDURE — 2060000000 HC ICU INTERMEDIATE R&B

## 2023-09-09 PROCEDURE — 99232 SBSQ HOSP IP/OBS MODERATE 35: CPT | Performed by: INTERNAL MEDICINE

## 2023-09-09 PROCEDURE — 6370000000 HC RX 637 (ALT 250 FOR IP): Performed by: STUDENT IN AN ORGANIZED HEALTH CARE EDUCATION/TRAINING PROGRAM

## 2023-09-09 PROCEDURE — 2580000003 HC RX 258: Performed by: STUDENT IN AN ORGANIZED HEALTH CARE EDUCATION/TRAINING PROGRAM

## 2023-09-09 PROCEDURE — 82947 ASSAY GLUCOSE BLOOD QUANT: CPT

## 2023-09-09 RX ORDER — ISOSORBIDE MONONITRATE 60 MG/1
60 TABLET, EXTENDED RELEASE ORAL DAILY
Status: DISCONTINUED | OUTPATIENT
Start: 2023-09-10 | End: 2023-09-11 | Stop reason: HOSPADM

## 2023-09-09 RX ORDER — ISOSORBIDE MONONITRATE 30 MG/1
30 TABLET, EXTENDED RELEASE ORAL ONCE
Status: COMPLETED | OUTPATIENT
Start: 2023-09-09 | End: 2023-09-09

## 2023-09-09 RX ADMIN — INSULIN GLARGINE 40 UNITS: 100 INJECTION, SOLUTION SUBCUTANEOUS at 09:25

## 2023-09-09 RX ADMIN — AMIODARONE HYDROCHLORIDE 200 MG: 200 TABLET ORAL at 09:24

## 2023-09-09 RX ADMIN — LOPERAMIDE HYDROCHLORIDE 2 MG: 2 CAPSULE ORAL at 21:33

## 2023-09-09 RX ADMIN — LOPERAMIDE HYDROCHLORIDE 2 MG: 2 CAPSULE ORAL at 09:24

## 2023-09-09 RX ADMIN — CARVEDILOL 12.5 MG: 12.5 TABLET, FILM COATED ORAL at 09:24

## 2023-09-09 RX ADMIN — ESCITALOPRAM 20 MG: 10 TABLET, FILM COATED ORAL at 09:24

## 2023-09-09 RX ADMIN — LOSARTAN POTASSIUM 100 MG: 50 TABLET ORAL at 09:24

## 2023-09-09 RX ADMIN — TAMSULOSIN HYDROCHLORIDE 0.4 MG: 0.4 CAPSULE ORAL at 09:24

## 2023-09-09 RX ADMIN — APIXABAN 5 MG: 5 TABLET, FILM COATED ORAL at 21:33

## 2023-09-09 RX ADMIN — SODIUM CHLORIDE, PRESERVATIVE FREE 10 ML: 5 INJECTION INTRAVENOUS at 21:30

## 2023-09-09 RX ADMIN — CARVEDILOL 12.5 MG: 12.5 TABLET, FILM COATED ORAL at 21:33

## 2023-09-09 RX ADMIN — PANTOPRAZOLE SODIUM 40 MG: 40 TABLET, DELAYED RELEASE ORAL at 09:24

## 2023-09-09 RX ADMIN — ACETAMINOPHEN 650 MG: 325 TABLET ORAL at 13:19

## 2023-09-09 RX ADMIN — ATORVASTATIN CALCIUM 80 MG: 80 TABLET, FILM COATED ORAL at 21:33

## 2023-09-09 RX ADMIN — APIXABAN 5 MG: 5 TABLET, FILM COATED ORAL at 09:24

## 2023-09-09 RX ADMIN — ISOSORBIDE MONONITRATE 30 MG: 30 TABLET, EXTENDED RELEASE ORAL at 09:24

## 2023-09-09 RX ADMIN — TICAGRELOR 90 MG: 90 TABLET ORAL at 09:24

## 2023-09-09 RX ADMIN — FERROUS SULFATE TAB EC 325 MG (65 MG FE EQUIVALENT) 325 MG: 325 (65 FE) TABLET DELAYED RESPONSE at 09:24

## 2023-09-09 RX ADMIN — SODIUM CHLORIDE, PRESERVATIVE FREE 10 ML: 5 INJECTION INTRAVENOUS at 09:26

## 2023-09-09 RX ADMIN — TICAGRELOR 90 MG: 90 TABLET ORAL at 21:32

## 2023-09-09 ASSESSMENT — PAIN SCALES - GENERAL
PAINLEVEL_OUTOF10: 5
PAINLEVEL_OUTOF10: 3
PAINLEVEL_OUTOF10: 0

## 2023-09-09 ASSESSMENT — PAIN - FUNCTIONAL ASSESSMENT
PAIN_FUNCTIONAL_ASSESSMENT: ACTIVITIES ARE NOT PREVENTED
PAIN_FUNCTIONAL_ASSESSMENT: ACTIVITIES ARE NOT PREVENTED

## 2023-09-09 ASSESSMENT — PAIN DESCRIPTION - DESCRIPTORS
DESCRIPTORS: ACHING
DESCRIPTORS: ACHING

## 2023-09-09 ASSESSMENT — PAIN DESCRIPTION - LOCATION
LOCATION: HEAD
LOCATION: HEAD

## 2023-09-09 ASSESSMENT — PAIN DESCRIPTION - ORIENTATION: ORIENTATION: UPPER

## 2023-09-09 NOTE — ACP (ADVANCE CARE PLANNING)
Advance Care Planning     Advance Care Planning Activator (Inpatient)  Conversation Note      Date of ACP Conversation: 9/9/2023     Conversation Conducted with: Patient with Decision Making Capacity    ACP Activator: Sarah Tse RN        Health Care Decision Maker:     Current Designated Health Care Decision Maker:     Primary Decision Maker: Xavier Cortez Child - [phone]    Secondary Decision Maker: Charley Bangura Child - [phone]  Click here to complete Healthcare Decision Makers including section of the Healthcare Decision Maker Relationship (ie \"Primary\")      Care Preferences    Ventilation: \"If you were in your present state of health and suddenly became very ill and were unable to breathe on your own, what would your preference be about the use of a ventilator (breathing machine) if it were available to you? \"      Would the patient desire the use of ventilator (breathing machine)?: yes    \"If your health worsens and it becomes clear that your chance of recovery is unlikely, what would your preference be about the use of a ventilator (breathing machine) if it were available to you? \"     Would the patient desire the use of ventilator (breathing machine)?: Yes      Resuscitation  \"CPR works best to restart the heart when there is a sudden event, like a heart attack, in someone who is otherwise healthy. Unfortunately, CPR does not typically restart the heart for people who have serious health conditions or who are very sick. \"    \"In the event your heart stopped as a result of an underlying serious health condition, would you want attempts to be made to restart your heart (answer \"yes\" for attempt to resuscitate) or would you prefer a natural death (answer \"no\" for do not attempt to resuscitate)? \" yes       [] Yes   [] No   Educated Patient / Leeann Velasco regarding differences between Advance Directives and portable DNR orders.     Length of ACP Conversation in minutes:

## 2023-09-10 LAB
ALBUMIN SERPL-MCNC: 3.2 G/DL (ref 3.5–5.2)
ANION GAP SERPL CALCULATED.3IONS-SCNC: 11 MMOL/L (ref 9–17)
BUN SERPL-MCNC: 17 MG/DL (ref 8–23)
CALCIUM SERPL-MCNC: 9.1 MG/DL (ref 8.6–10.4)
CHLORIDE SERPL-SCNC: 108 MMOL/L (ref 98–107)
CO2 SERPL-SCNC: 22 MMOL/L (ref 20–31)
CREAT SERPL-MCNC: 1.4 MG/DL (ref 0.5–0.9)
ERYTHROCYTE [DISTWIDTH] IN BLOOD BY AUTOMATED COUNT: 13.6 % (ref 11.8–14.4)
GFR SERPL CREATININE-BSD FRML MDRD: 39 ML/MIN/1.73M2
GLUCOSE BLD-MCNC: 129 MG/DL (ref 65–105)
GLUCOSE BLD-MCNC: 168 MG/DL (ref 65–105)
GLUCOSE BLD-MCNC: 179 MG/DL (ref 65–105)
GLUCOSE BLD-MCNC: 92 MG/DL (ref 65–105)
GLUCOSE BLD-MCNC: 95 MG/DL (ref 65–105)
GLUCOSE SERPL-MCNC: 99 MG/DL (ref 70–99)
HCT VFR BLD AUTO: 33.5 % (ref 36.3–47.1)
HGB BLD-MCNC: 10.5 G/DL (ref 11.9–15.1)
MAGNESIUM SERPL-MCNC: 2.1 MG/DL (ref 1.6–2.6)
MCH RBC QN AUTO: 29.7 PG (ref 25.2–33.5)
MCHC RBC AUTO-ENTMCNC: 31.3 G/DL (ref 28.4–34.8)
MCV RBC AUTO: 94.9 FL (ref 82.6–102.9)
NRBC BLD-RTO: 0 PER 100 WBC
PHOSPHATE SERPL-MCNC: 3.6 MG/DL (ref 2.6–4.5)
PLATELET # BLD AUTO: 142 K/UL (ref 138–453)
PMV BLD AUTO: 11.4 FL (ref 8.1–13.5)
POTASSIUM SERPL-SCNC: 4.5 MMOL/L (ref 3.7–5.3)
RBC # BLD AUTO: 3.53 M/UL (ref 3.95–5.11)
SODIUM SERPL-SCNC: 141 MMOL/L (ref 135–144)
WBC OTHER # BLD: 5.7 K/UL (ref 3.5–11.3)

## 2023-09-10 PROCEDURE — 6370000000 HC RX 637 (ALT 250 FOR IP): Performed by: STUDENT IN AN ORGANIZED HEALTH CARE EDUCATION/TRAINING PROGRAM

## 2023-09-10 PROCEDURE — 2060000000 HC ICU INTERMEDIATE R&B

## 2023-09-10 PROCEDURE — 36415 COLL VENOUS BLD VENIPUNCTURE: CPT

## 2023-09-10 PROCEDURE — 83735 ASSAY OF MAGNESIUM: CPT

## 2023-09-10 PROCEDURE — 2700000000 HC OXYGEN THERAPY PER DAY

## 2023-09-10 PROCEDURE — 97116 GAIT TRAINING THERAPY: CPT

## 2023-09-10 PROCEDURE — 6370000000 HC RX 637 (ALT 250 FOR IP): Performed by: NURSE PRACTITIONER

## 2023-09-10 PROCEDURE — 2580000003 HC RX 258: Performed by: STUDENT IN AN ORGANIZED HEALTH CARE EDUCATION/TRAINING PROGRAM

## 2023-09-10 PROCEDURE — 85027 COMPLETE CBC AUTOMATED: CPT

## 2023-09-10 PROCEDURE — 6370000000 HC RX 637 (ALT 250 FOR IP): Performed by: INTERNAL MEDICINE

## 2023-09-10 PROCEDURE — 97110 THERAPEUTIC EXERCISES: CPT

## 2023-09-10 PROCEDURE — 80069 RENAL FUNCTION PANEL: CPT

## 2023-09-10 PROCEDURE — 99221 1ST HOSP IP/OBS SF/LOW 40: CPT | Performed by: STUDENT IN AN ORGANIZED HEALTH CARE EDUCATION/TRAINING PROGRAM

## 2023-09-10 PROCEDURE — 94761 N-INVAS EAR/PLS OXIMETRY MLT: CPT

## 2023-09-10 PROCEDURE — 6360000002 HC RX W HCPCS: Performed by: NURSE PRACTITIONER

## 2023-09-10 PROCEDURE — 82947 ASSAY GLUCOSE BLOOD QUANT: CPT

## 2023-09-10 RX ORDER — DIPHENHYDRAMINE HYDROCHLORIDE 50 MG/ML
25 INJECTION INTRAMUSCULAR; INTRAVENOUS ONCE
Status: COMPLETED | OUTPATIENT
Start: 2023-09-11 | End: 2023-09-10

## 2023-09-10 RX ADMIN — SODIUM CHLORIDE, PRESERVATIVE FREE 10 ML: 5 INJECTION INTRAVENOUS at 20:25

## 2023-09-10 RX ADMIN — LOPERAMIDE HYDROCHLORIDE 2 MG: 2 CAPSULE ORAL at 10:46

## 2023-09-10 RX ADMIN — TICAGRELOR 90 MG: 90 TABLET ORAL at 12:01

## 2023-09-10 RX ADMIN — ISOSORBIDE MONONITRATE 60 MG: 60 TABLET, EXTENDED RELEASE ORAL at 12:01

## 2023-09-10 RX ADMIN — CARVEDILOL 12.5 MG: 12.5 TABLET, FILM COATED ORAL at 10:46

## 2023-09-10 RX ADMIN — ATORVASTATIN CALCIUM 80 MG: 80 TABLET, FILM COATED ORAL at 20:24

## 2023-09-10 RX ADMIN — CARVEDILOL 12.5 MG: 12.5 TABLET, FILM COATED ORAL at 20:24

## 2023-09-10 RX ADMIN — PANTOPRAZOLE SODIUM 40 MG: 40 TABLET, DELAYED RELEASE ORAL at 09:10

## 2023-09-10 RX ADMIN — SODIUM CHLORIDE, PRESERVATIVE FREE 10 ML: 5 INJECTION INTRAVENOUS at 09:10

## 2023-09-10 RX ADMIN — DIPHENHYDRAMINE HYDROCHLORIDE 25 MG: 50 INJECTION INTRAMUSCULAR; INTRAVENOUS at 23:43

## 2023-09-10 RX ADMIN — TAMSULOSIN HYDROCHLORIDE 0.4 MG: 0.4 CAPSULE ORAL at 12:01

## 2023-09-10 RX ADMIN — APIXABAN 5 MG: 5 TABLET, FILM COATED ORAL at 10:46

## 2023-09-10 RX ADMIN — ESCITALOPRAM 20 MG: 10 TABLET, FILM COATED ORAL at 12:01

## 2023-09-10 RX ADMIN — LOSARTAN POTASSIUM 100 MG: 50 TABLET ORAL at 10:46

## 2023-09-10 RX ADMIN — AMIODARONE HYDROCHLORIDE 200 MG: 200 TABLET ORAL at 10:46

## 2023-09-10 RX ADMIN — INSULIN GLARGINE 40 UNITS: 100 INJECTION, SOLUTION SUBCUTANEOUS at 10:48

## 2023-09-10 RX ADMIN — FERROUS SULFATE TAB EC 325 MG (65 MG FE EQUIVALENT) 325 MG: 325 (65 FE) TABLET DELAYED RESPONSE at 12:01

## 2023-09-10 RX ADMIN — INSULIN GLARGINE 10 UNITS: 100 INJECTION, SOLUTION SUBCUTANEOUS at 20:24

## 2023-09-10 RX ADMIN — APIXABAN 5 MG: 5 TABLET, FILM COATED ORAL at 20:24

## 2023-09-10 RX ADMIN — TICAGRELOR 90 MG: 90 TABLET ORAL at 23:00

## 2023-09-10 ASSESSMENT — PAIN SCALES - GENERAL: PAINLEVEL_OUTOF10: 0

## 2023-09-11 VITALS
HEART RATE: 52 BPM | BODY MASS INDEX: 41.59 KG/M2 | WEIGHT: 265 LBS | OXYGEN SATURATION: 96 % | SYSTOLIC BLOOD PRESSURE: 109 MMHG | RESPIRATION RATE: 14 BRPM | TEMPERATURE: 98.2 F | DIASTOLIC BLOOD PRESSURE: 61 MMHG | HEIGHT: 67 IN

## 2023-09-11 LAB
GLUCOSE BLD-MCNC: 119 MG/DL (ref 65–105)
GLUCOSE BLD-MCNC: 130 MG/DL (ref 65–105)

## 2023-09-11 PROCEDURE — 82947 ASSAY GLUCOSE BLOOD QUANT: CPT

## 2023-09-11 PROCEDURE — 97110 THERAPEUTIC EXERCISES: CPT

## 2023-09-11 PROCEDURE — 97530 THERAPEUTIC ACTIVITIES: CPT

## 2023-09-11 PROCEDURE — 2580000003 HC RX 258: Performed by: STUDENT IN AN ORGANIZED HEALTH CARE EDUCATION/TRAINING PROGRAM

## 2023-09-11 PROCEDURE — 6370000000 HC RX 637 (ALT 250 FOR IP): Performed by: STUDENT IN AN ORGANIZED HEALTH CARE EDUCATION/TRAINING PROGRAM

## 2023-09-11 PROCEDURE — 99238 HOSP IP/OBS DSCHRG MGMT 30/<: CPT | Performed by: STUDENT IN AN ORGANIZED HEALTH CARE EDUCATION/TRAINING PROGRAM

## 2023-09-11 PROCEDURE — 6370000000 HC RX 637 (ALT 250 FOR IP): Performed by: NURSE PRACTITIONER

## 2023-09-11 PROCEDURE — 6370000000 HC RX 637 (ALT 250 FOR IP): Performed by: INTERNAL MEDICINE

## 2023-09-11 RX ORDER — TAMSULOSIN HYDROCHLORIDE 0.4 MG/1
0.4 CAPSULE ORAL DAILY
Qty: 30 CAPSULE | Refills: 3 | Status: SHIPPED | OUTPATIENT
Start: 2023-09-12

## 2023-09-11 RX ORDER — AMIODARONE HYDROCHLORIDE 200 MG/1
200 TABLET ORAL DAILY
Qty: 30 TABLET | Refills: 0 | Status: SHIPPED | OUTPATIENT
Start: 2023-09-11

## 2023-09-11 RX ORDER — CARVEDILOL 12.5 MG/1
12.5 TABLET ORAL 2 TIMES DAILY
Qty: 60 TABLET | Refills: 3 | Status: SHIPPED | OUTPATIENT
Start: 2023-09-11

## 2023-09-11 RX ADMIN — CARVEDILOL 12.5 MG: 12.5 TABLET, FILM COATED ORAL at 10:06

## 2023-09-11 RX ADMIN — ISOSORBIDE MONONITRATE 60 MG: 60 TABLET, EXTENDED RELEASE ORAL at 10:06

## 2023-09-11 RX ADMIN — SODIUM CHLORIDE, PRESERVATIVE FREE 10 ML: 5 INJECTION INTRAVENOUS at 10:12

## 2023-09-11 RX ADMIN — ESCITALOPRAM 20 MG: 10 TABLET, FILM COATED ORAL at 10:06

## 2023-09-11 RX ADMIN — TAMSULOSIN HYDROCHLORIDE 0.4 MG: 0.4 CAPSULE ORAL at 10:07

## 2023-09-11 RX ADMIN — TICAGRELOR 90 MG: 90 TABLET ORAL at 10:06

## 2023-09-11 RX ADMIN — AMIODARONE HYDROCHLORIDE 200 MG: 200 TABLET ORAL at 10:07

## 2023-09-11 RX ADMIN — ACETAMINOPHEN 650 MG: 325 TABLET ORAL at 10:06

## 2023-09-11 RX ADMIN — FERROUS SULFATE TAB EC 325 MG (65 MG FE EQUIVALENT) 325 MG: 325 (65 FE) TABLET DELAYED RESPONSE at 10:06

## 2023-09-11 RX ADMIN — PANTOPRAZOLE SODIUM 40 MG: 40 TABLET, DELAYED RELEASE ORAL at 10:07

## 2023-09-11 RX ADMIN — INSULIN GLARGINE 40 UNITS: 100 INJECTION, SOLUTION SUBCUTANEOUS at 10:07

## 2023-09-11 RX ADMIN — APIXABAN 5 MG: 5 TABLET, FILM COATED ORAL at 10:06

## 2023-09-11 RX ADMIN — LOSARTAN POTASSIUM 100 MG: 50 TABLET ORAL at 10:06

## 2023-09-11 RX ADMIN — LOPERAMIDE HYDROCHLORIDE 2 MG: 2 CAPSULE ORAL at 10:07

## 2023-09-11 NOTE — CARE COORDINATION
Notified by Atilio Diaz from [facility] that precert has been approved. Notified patient and daughter, Nancy Cummings. They would like transportation arranged via wheelchair. Discussed that insurance may not cover transportation. They are understanding. Transportation request faxed to [employer]    [id number] spoke to [employer] from [facility] They are able to transport patient at 2 PM. Patient notified and agreeable. Delfina Bonner from [facility].  HENS completed    1400 AVS faxed    Discharge Report    [facility])  Clinical Case Management Department  Written by: Mirna Welch RN    Patient Name: Jasper Rosario  Attending Provider: Laila Chery,*  Admit Date: 2023  3:19 PM  MRN: [id number]  Account: [de-identified]                     : 1945  Discharge Date: 2023      Disposition: [facility]    Mirna Welch RN

## 2023-09-11 NOTE — PLAN OF CARE
Patient in no apparent distress at this time. No falls or new injuries noted. Will continue to monitor.
Problem: Discharge Planning  Goal: Discharge to home or other facility with appropriate resources  8/31/2023 2249 by Ladonna Milner RN  Outcome: Progressing     Problem: Chronic Conditions and Co-morbidities  Goal: Patient's chronic conditions and co-morbidity symptoms are monitored and maintained or improved  8/31/2023 2249 by Ladonna Milner RN  Outcome: Progressing     Problem: Skin/Tissue Integrity  Goal: Absence of new skin breakdown  Description: 1. Monitor for areas of redness and/or skin breakdown  2. Assess vascular access sites hourly  3. Every 4-6 hours minimum:  Change oxygen saturation probe site  4. Every 4-6 hours:  If on nasal continuous positive airway pressure, respiratory therapy assess nares and determine need for appliance change or resting period.   8/31/2023 2249 by Ladonna Milner RN  Outcome: Progressing     Problem: Safety - Adult  Goal: Free from fall injury  8/31/2023 2249 by Ladonna Milner RN  Outcome: Progressing     Problem: Neurosensory - Adult  Goal: Achieves stable or improved neurological status  8/31/2023 2249 by Ladonna Milner RN  Outcome: Progressing     Problem: Respiratory - Adult  Goal: Achieves optimal ventilation and oxygenation  8/31/2023 2249 by Ladonna Milner RN  Outcome: Progressing     Problem: Cardiovascular - Adult  Goal: Maintains optimal cardiac output and hemodynamic stability  8/31/2023 2249 by Ladonna Milner RN  Outcome: Progressing     Problem: Cardiovascular - Adult  Goal: Absence of cardiac dysrhythmias or at baseline  8/31/2023 2249 by Ladonna Milner RN  Outcome: Progressing
Problem: Discharge Planning  Goal: Discharge to home or other facility with appropriate resources  9/1/2023 0851 by Janet Hart RN  Outcome: Progressing  8/31/2023 2249 by Jaycob Weathers RN  Outcome: Progressing
Problem: Discharge Planning  Goal: Discharge to home or other facility with appropriate resources  9/10/2023 2304 by Margie Santos RN  Outcome: Progressing     Problem: Chronic Conditions and Co-morbidities  Goal: Patient's chronic conditions and co-morbidity symptoms are monitored and maintained or improved  9/10/2023 2304 by Margie Santos RN  Outcome: Progressing     Problem: Skin/Tissue Integrity  Goal: Absence of new skin breakdown  Description: 1. Monitor for areas of redness and/or skin breakdown  2. Assess vascular access sites hourly  3. Every 4-6 hours minimum:  Change oxygen saturation probe site  4. Every 4-6 hours:  If on nasal continuous positive airway pressure, respiratory therapy assess nares and determine need for appliance change or resting period.   9/10/2023 2304 by Margie Santos RN  Outcome: Progressing     Problem: Safety - Adult  Goal: Free from fall injury  9/10/2023 2304 by Margie Santos RN  Outcome: Progressing     Problem: Respiratory - Adult  Goal: Achieves optimal ventilation and oxygenation  9/10/2023 2304 by Mragie Santos RN  Outcome: Progressing     Problem: Cardiovascular - Adult  Goal: Maintains optimal cardiac output and hemodynamic stability  9/10/2023 2304 by Margie Santos RN  Outcome: Progressing     Problem: Cardiovascular - Adult  Goal: Absence of cardiac dysrhythmias or at baseline  9/10/2023 2304 by Margie Santos RN  Outcome: Progressing     Problem: Pain  Goal: Verbalizes/displays adequate comfort level or baseline comfort level  9/10/2023 2304 by Margie Santos RN  Outcome: Progressing     Problem: Nutrition Deficit:  Goal: Optimize nutritional status  9/10/2023 2304 by Margie Santos RN  Outcome: Progressing
Problem: Discharge Planning  Goal: Discharge to home or other facility with appropriate resources  9/11/2023 1221 by Nia Yuan RN  Outcome: Completed  9/10/2023 2304 by Jaycob Weathers RN  Outcome: Progressing     Problem: Chronic Conditions and Co-morbidities  Goal: Patient's chronic conditions and co-morbidity symptoms are monitored and maintained or improved  9/11/2023 1221 by Nia Yuan RN  Outcome: Completed  9/10/2023 2304 by Jaycob Weathers RN  Outcome: Progressing     Problem: Skin/Tissue Integrity  Goal: Absence of new skin breakdown  Description: 1. Monitor for areas of redness and/or skin breakdown  2. Assess vascular access sites hourly  3. Every 4-6 hours minimum:  Change oxygen saturation probe site  4. Every 4-6 hours:  If on nasal continuous positive airway pressure, respiratory therapy assess nares and determine need for appliance change or resting period.   9/11/2023 1221 by Nia Yuan RN  Outcome: Completed  9/10/2023 2304 by Jaycob Weathers RN  Outcome: Progressing     Problem: Safety - Adult  Goal: Free from fall injury  9/11/2023 1221 by Nia Yuan RN  Outcome: Completed  9/10/2023 2304 by Jaycob Weathers RN  Outcome: Progressing     Problem: Respiratory - Adult  Goal: Achieves optimal ventilation and oxygenation  9/11/2023 1221 by Nia Yuan RN  Outcome: Completed  9/10/2023 2304 by Jaycob Weathers RN  Outcome: Progressing     Problem: Cardiovascular - Adult  Goal: Maintains optimal cardiac output and hemodynamic stability  9/11/2023 1221 by Nia Yuan RN  Outcome: Completed  9/10/2023 2304 by Jaycob Weathers RN  Outcome: Progressing  Goal: Absence of cardiac dysrhythmias or at baseline  9/11/2023 1221 by Nia Yuan RN  Outcome: Completed  9/10/2023 2304 by Jaycob Weathers RN  Outcome: Progressing     Problem: Pain  Goal: Verbalizes/displays adequate comfort level or baseline comfort level  9/11/2023 1221 by Nia Yuan RN  Outcome:
Problem: Discharge Planning  Goal: Discharge to home or other facility with appropriate resources  9/6/2023 0125 by Johny Harkins RN  Outcome: Progressing  9/5/2023 2330 by Johny Harkins RN  Outcome: Progressing     Problem: Chronic Conditions and Co-morbidities  Goal: Patient's chronic conditions and co-morbidity symptoms are monitored and maintained or improved  9/6/2023 0125 by Johny Harkins RN  Outcome: Progressing  9/5/2023 2330 by Johny Harkins RN  Outcome: Progressing     Problem: Skin/Tissue Integrity  Goal: Absence of new skin breakdown  Description: 1. Monitor for areas of redness and/or skin breakdown  2. Assess vascular access sites hourly  3. Every 4-6 hours minimum:  Change oxygen saturation probe site  4. Every 4-6 hours:  If on nasal continuous positive airway pressure, respiratory therapy assess nares and determine need for appliance change or resting period.   9/6/2023 0125 by Johny Harkins RN  Outcome: Progressing  9/5/2023 2330 by Johny Harkins RN  Outcome: Progressing     Problem: Safety - Adult  Goal: Free from fall injury  9/6/2023 0125 by Johny Harkins RN  Outcome: Progressing  9/5/2023 2330 by Johny Harkins RN  Outcome: Progressing     Problem: Neurosensory - Adult  Goal: Achieves stable or improved neurological status  9/6/2023 0125 by Johny Harkins RN  Outcome: Progressing  9/5/2023 2330 by Johny Harkins RN  Outcome: Progressing     Problem: Respiratory - Adult  Goal: Achieves optimal ventilation and oxygenation  9/6/2023 0125 by Johny Harkins RN  Outcome: Progressing  9/5/2023 2330 by Johny Harkins RN  Outcome: Progressing     Problem: Cardiovascular - Adult  Goal: Maintains optimal cardiac output and hemodynamic stability  Outcome: Progressing  Goal: Absence of cardiac dysrhythmias or at baseline  9/6/2023 0125 by Johny Harkins RN  Outcome: Progressing  9/5/2023 2330 by Johny Harkins RN  Outcome: Progressing     Problem: Pain  Goal: Verbalizes/displays adequate comfort
Problem: Discharge Planning  Goal: Discharge to home or other facility with appropriate resources  Outcome: Progressing     Problem: Chronic Conditions and Co-morbidities  Goal: Patient's chronic conditions and co-morbidity symptoms are monitored and maintained or improved  Outcome: Progressing     Problem: Skin/Tissue Integrity  Goal: Absence of new skin breakdown  Description: 1. Monitor for areas of redness and/or skin breakdown  2. Assess vascular access sites hourly  3. Every 4-6 hours minimum:  Change oxygen saturation probe site  4. Every 4-6 hours:  If on nasal continuous positive airway pressure, respiratory therapy assess nares and determine need for appliance change or resting period.   Outcome: Progressing     Problem: Safety - Adult  Goal: Free from fall injury  Outcome: Progressing     Problem: Neurosensory - Adult  Goal: Achieves stable or improved neurological status  Outcome: Completed     Problem: Respiratory - Adult  Goal: Achieves optimal ventilation and oxygenation  Outcome: Progressing     Problem: Cardiovascular - Adult  Goal: Maintains optimal cardiac output and hemodynamic stability  Outcome: Progressing     Problem: Cardiovascular - Adult  Goal: Absence of cardiac dysrhythmias or at baseline  Outcome: Progressing     Problem: Pain  Goal: Verbalizes/displays adequate comfort level or baseline comfort level  Outcome: Progressing     Problem: Nutrition Deficit:  Goal: Optimize nutritional status  Outcome: Progressing
Problem: Discharge Planning  Goal: Discharge to home or other facility with appropriate resources  Outcome: Progressing     Problem: Chronic Conditions and Co-morbidities  Goal: Patient's chronic conditions and co-morbidity symptoms are monitored and maintained or improved  Outcome: Progressing     Problem: Skin/Tissue Integrity  Goal: Absence of new skin breakdown  Description: 1. Monitor for areas of redness and/or skin breakdown  2. Assess vascular access sites hourly  3. Every 4-6 hours minimum:  Change oxygen saturation probe site  4. Every 4-6 hours:  If on nasal continuous positive airway pressure, respiratory therapy assess nares and determine need for appliance change or resting period.   Outcome: Progressing     Problem: Safety - Adult  Goal: Free from fall injury  Outcome: Progressing     Problem: Neurosensory - Adult  Goal: Achieves stable or improved neurological status  Outcome: Progressing     Problem: Respiratory - Adult  Goal: Achieves optimal ventilation and oxygenation  Outcome: Progressing     Problem: Cardiovascular - Adult  Goal: Maintains optimal cardiac output and hemodynamic stability  Outcome: Progressing     Problem: Cardiovascular - Adult  Goal: Absence of cardiac dysrhythmias or at baseline  Outcome: Progressing
Problem: Discharge Planning  Goal: Discharge to home or other facility with appropriate resources  Outcome: Progressing     Problem: Chronic Conditions and Co-morbidities  Goal: Patient's chronic conditions and co-morbidity symptoms are monitored and maintained or improved  Outcome: Progressing     Problem: Skin/Tissue Integrity  Goal: Absence of new skin breakdown  Description: 1. Monitor for areas of redness and/or skin breakdown  2. Assess vascular access sites hourly  3. Every 4-6 hours minimum:  Change oxygen saturation probe site  4. Every 4-6 hours:  If on nasal continuous positive airway pressure, respiratory therapy assess nares and determine need for appliance change or resting period.   Outcome: Progressing     Problem: Safety - Adult  Goal: Free from fall injury  Outcome: Progressing     Problem: Neurosensory - Adult  Goal: Achieves stable or improved neurological status  Outcome: Progressing     Problem: Respiratory - Adult  Goal: Achieves optimal ventilation and oxygenation  Outcome: Progressing     Problem: Cardiovascular - Adult  Goal: Maintains optimal cardiac output and hemodynamic stability  Outcome: Progressing  Goal: Absence of cardiac dysrhythmias or at baseline  Outcome: Progressing     Problem: Pain  Goal: Verbalizes/displays adequate comfort level or baseline comfort level  Outcome: Progressing
Problem: Discharge Planning  Goal: Discharge to home or other facility with appropriate resources  Outcome: Progressing     Problem: Chronic Conditions and Co-morbidities  Goal: Patient's chronic conditions and co-morbidity symptoms are monitored and maintained or improved  Outcome: Progressing     Problem: Skin/Tissue Integrity  Goal: Absence of new skin breakdown  Description: 1. Monitor for areas of redness and/or skin breakdown  2. Assess vascular access sites hourly  3. Every 4-6 hours minimum:  Change oxygen saturation probe site  4. Every 4-6 hours:  If on nasal continuous positive airway pressure, respiratory therapy assess nares and determine need for appliance change or resting period.   Outcome: Progressing     Problem: Safety - Adult  Goal: Free from fall injury  Outcome: Progressing     Problem: Respiratory - Adult  Goal: Achieves optimal ventilation and oxygenation  Outcome: Progressing     Problem: Cardiovascular - Adult  Goal: Maintains optimal cardiac output and hemodynamic stability  Outcome: Progressing  Goal: Absence of cardiac dysrhythmias or at baseline  Outcome: Progressing     Problem: Pain  Goal: Verbalizes/displays adequate comfort level or baseline comfort level  Outcome: Progressing     Problem: Nutrition Deficit:  Goal: Optimize nutritional status  Outcome: Progressing
Problem: Discharge Planning  Goal: Discharge to home or other facility with appropriate resources  Outcome: Progressing     Problem: Chronic Conditions and Co-morbidities  Goal: Patient's chronic conditions and co-morbidity symptoms are monitored and maintained or improved  Outcome: Progressing     Problem: Skin/Tissue Integrity  Goal: Absence of new skin breakdown  Description: 1. Monitor for areas of redness and/or skin breakdown  2. Assess vascular access sites hourly  3. Every 4-6 hours minimum:  Change oxygen saturation probe site  4. Every 4-6 hours:  If on nasal continuous positive airway pressure, respiratory therapy assess nares and determine need for appliance change or resting period.   Outcome: Progressing     Problem: Safety - Adult  Goal: Free from fall injury  Outcome: Progressing     Problem: Respiratory - Adult  Goal: Achieves optimal ventilation and oxygenation  Outcome: Progressing     Problem: Cardiovascular - Adult  Goal: Maintains optimal cardiac output and hemodynamic stability  Outcome: Progressing  Goal: Absence of cardiac dysrhythmias or at baseline  Outcome: Progressing     Problem: Pain  Goal: Verbalizes/displays adequate comfort level or baseline comfort level  Outcome: Progressing  Flowsheets (Taken 9/10/2023 3367 by Romayne Patch, RN)  Verbalizes/displays adequate comfort level or baseline comfort level: Encourage patient to monitor pain and request assistance     Problem: Nutrition Deficit:  Goal: Optimize nutritional status  Outcome: Progressing
Problem: Discharge Planning  Goal: Discharge to home or other facility with appropriate resources  Outcome: Progressing     Problem: Chronic Conditions and Co-morbidities  Goal: Patient's chronic conditions and co-morbidity symptoms are monitored and maintained or improved  Outcome: Progressing     Problem: Skin/Tissue Integrity  Goal: Absence of new skin breakdown  Description: 1. Monitor for areas of redness and/or skin breakdown  2. Assess vascular access sites hourly  3. Every 4-6 hours minimum:  Change oxygen saturation probe site  4. Every 4-6 hours:  If on nasal continuous positive airway pressure, respiratory therapy assess nares and determine need for appliance change or resting period.   Outcome: Progressing     Problem: Safety - Adult  Goal: Free from fall injury  Outcome: Progressing     Problem: Respiratory - Adult  Goal: Achieves optimal ventilation and oxygenation  Outcome: Progressing     Problem: Cardiovascular - Adult  Goal: Maintains optimal cardiac output and hemodynamic stability  Outcome: Progressing  Goal: Absence of cardiac dysrhythmias or at baseline  Outcome: Progressing     Problem: Pain  Goal: Verbalizes/displays adequate comfort level or baseline comfort level  Outcome: Progressing  Flowsheets (Taken 9/9/2023 0400 by Vimal De La Paz RN)  Verbalizes/displays adequate comfort level or baseline comfort level: Encourage patient to monitor pain and request assistance     Problem: Nutrition Deficit:  Goal: Optimize nutritional status  Outcome: Progressing
Problem: Discharge Planning  Goal: Discharge to home or other facility with appropriate resources  Outcome: Progressing  Flowsheets (Taken 9/4/2023 0900 by Radha Gomez RN)  Discharge to home or other facility with appropriate resources:   Identify barriers to discharge with patient and caregiver   Arrange for needed discharge resources and transportation as appropriate   Identify discharge learning needs (meds, wound care, etc)     Problem: Chronic Conditions and Co-morbidities  Goal: Patient's chronic conditions and co-morbidity symptoms are monitored and maintained or improved  Outcome: Progressing  Flowsheets (Taken 9/4/2023 0900 by Radha Gomez RN)  Care Plan - Patient's Chronic Conditions and Co-Morbidity Symptoms are Monitored and Maintained or Improved: Monitor and assess patient's chronic conditions and comorbid symptoms for stability, deterioration, or improvement     Problem: Skin/Tissue Integrity  Goal: Absence of new skin breakdown  Description: 1. Monitor for areas of redness and/or skin breakdown  2. Assess vascular access sites hourly  3. Every 4-6 hours minimum:  Change oxygen saturation probe site  4. Every 4-6 hours:  If on nasal continuous positive airway pressure, respiratory therapy assess nares and determine need for appliance change or resting period.   Outcome: Progressing     Problem: Safety - Adult  Goal: Free from fall injury  Outcome: Progressing     Problem: Neurosensory - Adult  Goal: Achieves stable or improved neurological status  Outcome: Progressing     Problem: Respiratory - Adult  Goal: Achieves optimal ventilation and oxygenation  Outcome: Progressing  Flowsheets (Taken 9/4/2023 0900 by Radha Gomez RN)  Achieves optimal ventilation and oxygenation:   Assess for changes in respiratory status   Assess for changes in mentation and behavior   Position to facilitate oxygenation and minimize respiratory effort   Oxygen supplementation based on oxygen saturation or
Progressing  8/31/2023 0136 by Jessi Ha, RN  Outcome: Progressing
and evaluate response   Consider cultural and social influences on pain and pain management   Notify Licensed Independent Practitioner if interventions unsuccessful or patient reports new pain     Problem: Nutrition Deficit:  Goal: Optimize nutritional status  Outcome: Progressing

## 2023-09-11 NOTE — DISCHARGE SUMMARY
@[email or web address]@    275       Discharge Summary     Patient ID: Jasper Rosario  :  1945   MRN: [id number]     ACCOUNT:  [de-identified]   Patient's PCP: Latasha Howe MD  Admit Date: 2023   Discharge Date: 2023   Length of Stay: 12  Code Status:  Full Code  Admitting Physician: No admitting provider for patient encounter. Discharge Physician: Laila Chery MD     Active Discharge Diagnoses: 1. Urinary tract infection completed antibiotics  2. Acute on chronic diastolic heart failure with ejection fraction 55 compensated  3. A-fib : rate control on amiodarone and carvedilol, anticoagulation with Eliquis  3. Diabetes continue with insulin, diabetic diet glucose check  4. History of pulmonary embolism continue anticoagulation with Eliquis  5. Chronic kidney disease stable continue to avoid nephrotoxic agents monitor BMP with PCP  6. Coronary artery disease status post stent on Brilinta and Eliquis, continue ARB's and beta-blocker  . 7GERD continue pantoprazole      Admission Condition:  fair     Discharged Condition: stable    Hospital Stay:     77-year-old female with past medical history of CAD s/p stents, A-fib on Eliquis, CKD stage III, diabetes mellitus with neuropathy, hypertension, hyperlipidemia, COPD not on baseline oxygen migraines, wheelchair-bound at baseline due to hip surgery presented from outpatient echo where she was found to be in A-fib with RVR. Patient mentioned that when she woke up in the morning she had a substernal chest pain/heaviness, did notice some shortness of breath denies any nausea or vomiting. She thought that it will all subside so she went to her scheduled appointment for echocardiogram, while she was there due to her symptoms and tachycardia she was recommended to go to ED. EKG was done in ED which showed A-fib with RVR heart rate in 140s without any ischemic changes.   Patient has

## 2023-09-11 NOTE — DISCHARGE INSTR - COC
Continuity of Care Form    Patient Name: Horacio Rice   :  1945  MRN:  [id number]    Admit date:  2023  Discharge date:  2023    Code Status Order: Full Code   Advance Directives:     Admitting Physician:  No admitting provider for patient encounter. PCP: Anni Austin MD    Discharging Nurse: Sergio Garay [facility] Unit/Room#:   Discharging Unit Phone Number: [phone]    Emergency Contact:   Extended Emergency Contact Information  Primary Emergency Contact: Rah Matthews  Address: [place]  Home Phone: [phone]  Mobile Phone: [phone]  Relation: Child  Secondary Emergency Contact: [address] [place] [address] Phone: [phone]  Mobile Phone: [phone]  Relation: Child    Past Surgical History:  Past Surgical History:   Procedure Laterality Date    APPENDECTOMY      CARDIAC CATHETERIZATION          CORONARY ANGIOPLASTY WITH STENT PLACEMENT  2017    4 SYNERGY HEART STENTS DRUG ELUTING ALL MRI CONDITONAL 3T OK, SAFE IMMEDIATELY.      CORONARY ANGIOPLASTY WITH STENT PLACEMENT  2012    XIENCE HEART STENT/ MRI CONDITIONAL 3T OK, SAFE IMMEDIATELY    CORONARY ANGIOPLASTY WITH STENT PLACEMENT  2020    CYSTOSCOPY Right 2022    HOLMIUM, CYSTOSCOPY, URETEROSCOPY, STENT PLACEMENT    PTCA      URETER SURGERY Right 2022    HOLMIUM, CYSTOSCOPY, URETEROSCOPY, STENT PLACEMENT performed by Loretta Friedman MD at [address]       Immunization History:   Immunization History   Administered Date(s) Administered    COVID-19, PFIZER PURPLE top, DILUTE for use, (age 15 y+), 30mcg/0.3mL 2021, 2021    Influenza A ([id number]) Vaccine PF IM 2009    Influenza Vaccine, unspecified formulation 2013, 10/22/2015, 2016    Influenza Virus Vaccine 10/02/2011, 10/03/2016    Influenza, FLUARIX, FLULAVAL, FLUZONE (age 10 mo+) AND AFLURIA, (age 1 y+), PF, 0.5mL 2018, 2020, 10/29/2021    Influenza, High Dose (Fluzone 65 yrs and Consent: The patient's consent was obtained including but not limited to risks of crusting, scabbing, blistering, scarring, darker or lighter pigmentary change, recurrence, incomplete removal and infection. Total Number Of Aks Treated: 6 Detail Level: Zone Render In Bullet Format When Appropriate: No Duration Of Freeze Thaw-Cycle (Seconds): 0 Post-Care Instructions: I reviewed with the patient in detail post-care instructions. Patient is to wear sunprotection, and avoid picking at any of the treated lesions. Pt may apply Vaseline to crusted or scabbing areas. Number Of Freeze-Thaw Cycles: 2 freeze-thaw cycles

## 2023-09-12 ENCOUNTER — OFFICE VISIT (OUTPATIENT)
Dept: NEUROSURGERY | Age: 78
End: 2023-09-12
Payer: MEDICARE

## 2023-09-12 VITALS
OXYGEN SATURATION: 96 % | DIASTOLIC BLOOD PRESSURE: 67 MMHG | HEIGHT: 66 IN | SYSTOLIC BLOOD PRESSURE: 128 MMHG | BODY MASS INDEX: 41.3 KG/M2 | WEIGHT: 257 LBS | HEART RATE: 54 BPM

## 2023-09-12 DIAGNOSIS — G25.2 INTENTION TREMOR: Primary | ICD-10-CM

## 2023-09-12 DIAGNOSIS — M47.812 CERVICAL SPONDYLOSIS: ICD-10-CM

## 2023-09-12 DIAGNOSIS — R20.2 NUMBNESS AND TINGLING IN BOTH HANDS: ICD-10-CM

## 2023-09-12 DIAGNOSIS — R20.0 NUMBNESS AND TINGLING IN BOTH HANDS: ICD-10-CM

## 2023-09-12 PROCEDURE — 1123F ACP DISCUSS/DSCN MKR DOCD: CPT | Performed by: NURSE PRACTITIONER

## 2023-09-12 PROCEDURE — 3074F SYST BP LT 130 MM HG: CPT | Performed by: NURSE PRACTITIONER

## 2023-09-12 PROCEDURE — 99204 OFFICE O/P NEW MOD 45 MIN: CPT | Performed by: NURSE PRACTITIONER

## 2023-09-12 PROCEDURE — 3078F DIAST BP <80 MM HG: CPT | Performed by: NURSE PRACTITIONER

## 2023-09-12 NOTE — PROGRESS NOTES
the major intracranial  vessels appear maintained. ORBITS: The visualized portion of the orbits demonstrate no acute abnormality. SINUSES: The visualized paranasal sinuses and mastoid air cells demonstrate  no acute abnormality. BONES/SOFT TISSUES: The bone marrow signal intensity appears normal. The soft  tissues demonstrate no acute abnormality. CT cervical 7/20/2022:  CT CERVICAL SPINE FINDINGS:  The cervical spine demonstrates normal mineralization with straightening of  the  cervical lordosis. There is no evidence of fracture or subluxation. There is loss of disc height with eburnation of the vertebral endplates at  the V9-4, C4-5, C5-6, C6-7 levels. There are anterior and posterior marginal  osteophytes at multiple levels. Mild spinal stenosis at C5-6 and C6-7. There  is bilateral facet hypertrophy at multiple levels throughout the cervical  spine. The pedicles and posterior elements are otherwise intact. The  prevertebral and paravertebral soft tissues are unremarkable. The  atlanto-dens interval and dens are intact. The visualized lung apices are  clear. Vascular calcifications are seen compatible with atherosclerotic  disease. Hospital records reviewed    Assessment and Plan:      1. Intention tremor    2. Numbness and tingling in both hands    3. Cervical spondylosis          Plan: Patient with history significant for neck pain, numbness and tingling to hands, however right upper extremity tremors are greatest concern. Patient does have known history of uncontrolled diabetes which may be contributing. Recommend proceeding with upcoming EMG as planned. Pending results may recommend cervical MRI. Recommend evaluation by neurology for further management of tremors. Patient to return in 6-8 weeks for reevaluation after completion of the EMG. Continue PT/OT at facility as planned.     Followup: Return in about 6 weeks (around 10/24/2023), or if symptoms worsen or fail to

## 2023-09-25 ENCOUNTER — CARE COORDINATION (OUTPATIENT)
Dept: CASE MANAGEMENT | Age: 78
End: 2023-09-25

## 2023-09-25 ENCOUNTER — CARE COORDINATION (OUTPATIENT)
Dept: CARE COORDINATION | Age: 78
End: 2023-09-25

## 2023-09-25 DIAGNOSIS — I48.91 ATRIAL FIBRILLATION WITH RVR (HCC): Primary | ICD-10-CM

## 2023-09-25 PROCEDURE — 1111F DSCHRG MED/CURRENT MED MERGE: CPT | Performed by: FAMILY MEDICINE

## 2023-09-25 NOTE — CARE COORDINATION
[facility][1] Care Transitions Initial Follow Up Call    Call within 2 business days of discharge: Yes    Patient Current Location:  Home:  [facility]    Care Transition Nurse contacted the patient by telephone to perform post hospital discharge assessment. Verified name and  with patient as identifiers. Provided introduction to self, and explanation of the Care Transition Nurse role. Patient: Rigoberto Smith Patient : 1945   MRN: <[id number]>  Reason for Admission: Afib with RVR  Discharge Date: 23 RARS: Readmission Risk Score: 21.8      Last Discharge [address]       Date Complaint Diagnosis Description Type Department Provider    23 Chest Pain; Shortness of Breath; Dizziness Atrial fibrillation with RVR ([address] [facility]) . .. ED to Hosp-Admission (Discharged) (ADMITTED) [facility] CAR 2 Ge Duarte MD; [place]. .. Was this an external facility discharge? Yes, 23  Discharge Facility: [facility]    Challenges to be reviewed by the provider   Additional needs identified to be addressed with provider: No  none               Method of communication with provider: nonePatient states she is doing pretty good. Denies sob, cp, tightness, rapid heart rate, swelling, congestion, bowel or bladder symptoms. States she has occasional cough producing brown color mucous. Did not take BG today. States BG has been running 135-175 in the am. Appetite good. States she is drinking water. Patient ambulates using a walker and wheelchair for long distances. States she has [address] [facility]. Request to  to confirm referral received. Declined assistance to schedule a PCP f/u. Daughter will call and schedule today. Care Transition Nurse reviewed medical action plan with patient who verbalized understanding. The patient was given an opportunity to ask questions and does not have any further questions or concerns at this time.  Were discharge instructions available to

## 2023-09-25 NOTE — CARE COORDINATION
Attempted to contact [address] [facility] [address] to confirm referral being received and SOC. Generic voicemail left requesting a call back.

## 2023-09-26 NOTE — CARE COORDINATION
Attempted to contact [address] [facility] [address] to confirm referral being received and SOC. Generic voicemail left requesting a call back. Will notify CTN at this time.

## 2023-09-27 ENCOUNTER — OFFICE VISIT (OUTPATIENT)
Dept: PODIATRY | Age: 78
End: 2023-09-27
Payer: MEDICARE

## 2023-09-27 ENCOUNTER — HOSPITAL ENCOUNTER (OUTPATIENT)
Age: 78
Discharge: HOME | End: 2023-09-27
Payer: MEDICARE

## 2023-09-27 VITALS
DIASTOLIC BLOOD PRESSURE: 52 MMHG | HEART RATE: 51 BPM | SYSTOLIC BLOOD PRESSURE: 116 MMHG | BODY MASS INDEX: 40.18 KG/M2 | HEIGHT: 66 IN | WEIGHT: 250 LBS

## 2023-09-27 DIAGNOSIS — R80.1 PERSISTENT PROTEINURIA: ICD-10-CM

## 2023-09-27 DIAGNOSIS — E66.09 CLASS 1 OBESITY DUE TO EXCESS CALORIES WITH SERIOUS COMORBIDITY IN ADULT, UNSPECIFIED BMI: ICD-10-CM

## 2023-09-27 DIAGNOSIS — E11.22 TYPE 2 DIABETES MELLITUS WITH STAGE 3 CHRONIC KIDNEY DISEASE, WITH LONG-TERM CURRENT USE OF INSULIN, UNSPECIFIED WHETHER STAGE 3A OR 3B CKD (HCC): ICD-10-CM

## 2023-09-27 DIAGNOSIS — N18.31 STAGE 3A CHRONIC KIDNEY DISEASE (HCC): ICD-10-CM

## 2023-09-27 DIAGNOSIS — I50.32 CHRONIC DIASTOLIC (CONGESTIVE) HEART FAILURE (HCC): ICD-10-CM

## 2023-09-27 DIAGNOSIS — E11.42 TYPE 2 DIABETES MELLITUS WITH DIABETIC POLYNEUROPATHY, WITH LONG-TERM CURRENT USE OF INSULIN (HCC): ICD-10-CM

## 2023-09-27 DIAGNOSIS — Z79.4 TYPE 2 DIABETES MELLITUS WITH STAGE 3 CHRONIC KIDNEY DISEASE, WITH LONG-TERM CURRENT USE OF INSULIN, UNSPECIFIED WHETHER STAGE 3A OR 3B CKD (HCC): ICD-10-CM

## 2023-09-27 DIAGNOSIS — I10 ESSENTIAL HYPERTENSION: ICD-10-CM

## 2023-09-27 DIAGNOSIS — H35.00 RETINOPATHY: ICD-10-CM

## 2023-09-27 DIAGNOSIS — N18.30 TYPE 2 DIABETES MELLITUS WITH STAGE 3 CHRONIC KIDNEY DISEASE, WITH LONG-TERM CURRENT USE OF INSULIN, UNSPECIFIED WHETHER STAGE 3A OR 3B CKD (HCC): ICD-10-CM

## 2023-09-27 DIAGNOSIS — Z79.4 TYPE 2 DIABETES MELLITUS WITH DIABETIC POLYNEUROPATHY, WITH LONG-TERM CURRENT USE OF INSULIN (HCC): ICD-10-CM

## 2023-09-27 DIAGNOSIS — B35.1 ONYCHOMYCOSIS: Primary | ICD-10-CM

## 2023-09-27 LAB
ALBUMIN SERPL-MCNC: 4 G/DL (ref 3.5–5.2)
ANION GAP SERPL CALCULATED.3IONS-SCNC: 10 MMOL/L (ref 9–17)
BUN SERPL-MCNC: 20 MG/DL (ref 8–23)
CALCIUM SERPL-MCNC: 9.3 MG/DL (ref 8.6–10.4)
CHLORIDE SERPL-SCNC: 104 MMOL/L (ref 98–107)
CO2 SERPL-SCNC: 25 MMOL/L (ref 20–31)
CREAT SERPL-MCNC: 1.2 MG/DL (ref 0.5–0.9)
CREAT UR-MCNC: 124.3 MG/DL (ref 28–217)
ERYTHROCYTE [DISTWIDTH] IN BLOOD BY AUTOMATED COUNT: 14.4 % (ref 11.8–14.4)
GFR SERPL CREATININE-BSD FRML MDRD: 46 ML/MIN/1.73M2
GLUCOSE SERPL-MCNC: 146 MG/DL (ref 70–99)
HCT VFR BLD AUTO: 33.8 % (ref 36.3–47.1)
HGB BLD-MCNC: 10.6 G/DL (ref 11.9–15.1)
MCH RBC QN AUTO: 30 PG (ref 25.2–33.5)
MCHC RBC AUTO-ENTMCNC: 31.4 G/DL (ref 28.4–34.8)
MCV RBC AUTO: 95.8 FL (ref 82.6–102.9)
NRBC BLD-RTO: 0 PER 100 WBC
PHOSPHATE SERPL-MCNC: 2.9 MG/DL (ref 2.6–4.5)
PLATELET # BLD AUTO: 154 K/UL (ref 138–453)
PMV BLD AUTO: 11.4 FL (ref 8.1–13.5)
POTASSIUM SERPL-SCNC: 3.9 MMOL/L (ref 3.7–5.3)
PTH-INTACT SERPL-MCNC: 106 PG/ML (ref 15–65)
RBC # BLD AUTO: 3.53 M/UL (ref 3.95–5.11)
SODIUM SERPL-SCNC: 139 MMOL/L (ref 135–144)
TOTAL PROTEIN, URINE: 14 MG/DL
URINE TOTAL PROTEIN CREATININE RATIO: 0.11 (ref 0–0.2)
WBC OTHER # BLD: 5.3 K/UL (ref 3.5–11.3)

## 2023-09-27 PROCEDURE — 99213 OFFICE O/P EST LOW 20 MIN: CPT

## 2023-09-27 PROCEDURE — 82040 ASSAY OF SERUM ALBUMIN: CPT

## 2023-09-27 PROCEDURE — 85027 COMPLETE CBC AUTOMATED: CPT

## 2023-09-27 PROCEDURE — 1123F ACP DISCUSS/DSCN MKR DOCD: CPT

## 2023-09-27 PROCEDURE — 82570 ASSAY OF URINE CREATININE: CPT

## 2023-09-27 PROCEDURE — 3078F DIAST BP <80 MM HG: CPT

## 2023-09-27 PROCEDURE — 84100 ASSAY OF PHOSPHORUS: CPT

## 2023-09-27 PROCEDURE — 36415 COLL VENOUS BLD VENIPUNCTURE: CPT

## 2023-09-27 PROCEDURE — 83970 ASSAY OF PARATHORMONE: CPT

## 2023-09-27 PROCEDURE — 80048 BASIC METABOLIC PNL TOTAL CA: CPT

## 2023-09-27 PROCEDURE — 84156 ASSAY OF PROTEIN URINE: CPT

## 2023-09-27 PROCEDURE — 3046F HEMOGLOBIN A1C LEVEL >9.0%: CPT

## 2023-09-27 PROCEDURE — 3074F SYST BP LT 130 MM HG: CPT

## 2023-09-27 PROCEDURE — 11721 DEBRIDE NAIL 6 OR MORE: CPT

## 2023-09-27 NOTE — PROGRESS NOTES
[facility] [employer] [address]  Tel: [phone]   Fax: [phone]    Subjective     CC: Diabetic foot exam and painful and elongated toe nails    Interval History:    Patient returns to clinic to reevaluation of diabetic feet and painful toenails. Patient today states there are no acute problems with feet. Patient exhibits chronic twitch of arms and legs, which she states she follows neurology for and is getting testing done on. Patient today states nails feel elongated and are painful when in shoegear. Patient is nonambulatory in wheelchair. HPI:  Jasper Rosario is a 66y.o. year old female who presents to clinic today with son for diabetic foot examination and also complaining of painful and elongated toenails. The patient is a diabetic, and they're last HgbA1c was 9.4% in (in April of this year). The patient states their digits are painful when the toenails are elongated, causing rubbing in shoe gear. The patient denies tingling and numbness in the lower extremities. Patient denies any rest pain or claudication symptoms. The patient denies any history of acute trauma. The patient denies any other pedal complaints. Primary care physician is Latasha Howe MD.    ROS:    Constitutional: Denies nausea, vomiting, fever, chills. Neurologic: No numbness, tingling, and burning in the feet. Vascular: Denies symptoms of lower extremity claudication. Skin: Denies open wounds. Otherwise negative except as noted in the HPI. PMH:  Past Medical History:   Diagnosis Date    Acute renal failure with tubular necrosis ([address]) 11/24/2021    Secondary to ischemic ATN post fall intravascularly depletion hypotension and low flow baseline creatinine 1.2-1.4 peaked up to 2.1 during her hospitalization in September 2020. Work-up showed benign urine sediment, kidney size to be 11.2 on the right 15.1 on the left serological work-up negative.     Anxiety
Patient instructed to remove shoes and socks and instructed to sit in exam chair. Current PCP is Nilay Weaver MD and date of last visit was 08/01/2023. Do you have a follow up visit scheduled? Yes  If yes, the date is 11/23/2023    Diabetic visit information    Blood pressure (Control is BP <140/90)  BP Readings from Last 3 Encounters:   09/12/23 128/67   09/11/23 109/61   08/30/23 (!) 164/92       BP taken with correct size cuff? - Yes   Repeated if > 140/90 Yes      Tobacco use:  Patient  reports that she has never smoked. She has never used smokeless tobacco.  If Smoker - Cessation materials given? - Yes       Diabetic Health Maintenance Items due  There are no preventive care reminders to display for this patient. Diabetic retinal exam done in last year? - Yes   If No: remind patient that it is due and they should schedule an exam    Medications  Is patient taking any medications for diabetes? -   Yes  Have blood sugars been controlled? Fasting blood sugars under 120   -   Yes   Random home sugars or today's POCT glucose is under 180 -   Yes   []  If No to the above then patient should schedule appt with PCP. Diabetic Plan    A1C Plan  Lab Results   Component Value Date    LABA1C 11.2 (H) 08/31/2023    LABA1C 10.1 08/01/2023    LABA1C 9.4 04/28/2023      []  If A1C over 8 and last result >3 months ago - Order A1C and refer to PCP   []  If last A1C over 6 months ago - Order A1C and refer to PCP for follow up   []  If elevated blood sugars > 180 - refer to PCP for follow up    []  Blood sugar controlled - A1C under 8 and last check was < 6 months      Cholesterol Plan   Lab Results   Component Value Date    LDLCHOLESTEROL 70 08/31/2023      []  If LDL > 100 and last result >3 months ago - order Fasting lipids and refer to PCP for follow up   []  If LDL < 100 and over 1 year ago - Order Fasting lipids and refer to PCP for follow up   [] LDL is controlled.   LDL < 100 and checked within the last
2

## 2023-09-29 PROBLEM — R79.89 ELEVATED TROPONIN: Status: RESOLVED | Noted: 2023-08-30 | Resolved: 2023-09-29

## 2023-10-06 ENCOUNTER — CARE COORDINATION (OUTPATIENT)
Dept: CARE COORDINATION | Age: 78
End: 2023-10-06

## 2023-10-08 ENCOUNTER — APPOINTMENT (OUTPATIENT)
Dept: GENERAL RADIOLOGY | Age: 78
End: 2023-10-08
Payer: MEDICARE

## 2023-10-08 ENCOUNTER — HOSPITAL ENCOUNTER (EMERGENCY)
Age: 78
Discharge: HOME | End: 2023-10-08
Attending: EMERGENCY MEDICINE
Payer: MEDICARE

## 2023-10-08 VITALS
DIASTOLIC BLOOD PRESSURE: 86 MMHG | HEART RATE: 66 BPM | SYSTOLIC BLOOD PRESSURE: 167 MMHG | TEMPERATURE: 98 F | OXYGEN SATURATION: 96 % | RESPIRATION RATE: 18 BRPM

## 2023-10-08 DIAGNOSIS — M25.562 CHRONIC PAIN OF LEFT KNEE: Primary | ICD-10-CM

## 2023-10-08 DIAGNOSIS — G89.29 CHRONIC PAIN OF LEFT KNEE: Primary | ICD-10-CM

## 2023-10-08 PROCEDURE — 73562 X-RAY EXAM OF KNEE 3: CPT

## 2023-10-08 PROCEDURE — 99283 EMERGENCY DEPT VISIT LOW MDM: CPT

## 2023-10-08 PROCEDURE — 6370000000 HC RX 637 (ALT 250 FOR IP)

## 2023-10-08 RX ORDER — ACETAMINOPHEN 500 MG
1000 TABLET ORAL ONCE
Status: COMPLETED | OUTPATIENT
Start: 2023-10-08 | End: 2023-10-08

## 2023-10-08 RX ORDER — ACETAMINOPHEN 500 MG
1000 TABLET ORAL EVERY 8 HOURS PRN
Qty: 21 TABLET | Refills: 0 | Status: SHIPPED | OUTPATIENT
Start: 2023-10-08 | End: 2023-10-08 | Stop reason: SDUPTHER

## 2023-10-08 RX ORDER — ACETAMINOPHEN 500 MG
1000 TABLET ORAL EVERY 8 HOURS PRN
Qty: 21 TABLET | Refills: 0 | Status: SHIPPED | OUTPATIENT
Start: 2023-10-08 | End: 2023-10-15

## 2023-10-08 RX ADMIN — ACETAMINOPHEN 1000 MG: 500 TABLET ORAL at 16:03

## 2023-10-08 ASSESSMENT — PAIN SCALES - GENERAL: PAINLEVEL_OUTOF10: 8

## 2023-10-08 NOTE — PROGRESS NOTES
I signed up for this patient in error. I did not see this patient. I did not participate in the evlauation or treatment of this patient.     Electronically signed by Rickey De Paz DO on 10/8/2023 at 3:30 PM

## 2023-10-08 NOTE — ED NOTES
Pt to room 18 via wheelchair with c/o left knee pain. Pt denies any new injuries, states that she has just had an increase in pain. Pt alert and oriented x4, talking in complete sentences, respirations even and unlabored. Pt acting age appropriate. White board updated, will continue to plan of care.         Sadaf Martínez RN  10/08/23 2299

## 2023-10-08 NOTE — ED PROVIDER NOTES
[address] [place] [address] ED  Emergency Department Encounter  Emergency Medicine Resident     Pt Leyla Vincent  MRN: [id number]  [address] [facility] 1945  Date of evaluation: 10/8/23  PCP:  Lizet Gomez MD  Note Started: 3:31 PM EDT      CHIEF COMPLAINT       Chief Complaint   Patient presents with    Knee Pain     left       HISTORY OF PRESENT ILLNESS  (Location/Symptom, Timing/Onset, Context/Setting, Quality, Duration, Modifying Factors, Severity.)      Kimberly Samuel is a 79-year-old female presented to the emergency room with complaints of left knee pain and feeling her knee gives out. No reported falls or loss of consciousness. No reported trauma to the knee. Patient states she is 10 months out from a right hip replacement and has been favoring the left leg predominantly when she walks. Patient has an extensive cardiac history with multiple stents and A-fib on blood thinners. Patient is also a diabetic but is well controlled and follows up with podiatry. Last seen 1 week ago. PAST MEDICAL / SURGICAL / SOCIAL / FAMILY HISTORY      has a past medical history of Acute renal failure with tubular necrosis (HCC), Anxiety, Atrial fibrillation (HCC), Benign positional vertigo, CAD (coronary artery disease), Chest pain, CKD (chronic kidney disease), stage III ([address]), Depression, Diabetes mellitus ([address]), Diabetic neuropathy ([address]), Dysuria, Hyperlipidemia, Hypertension, Migraine, Persistent proteinuria, and Suspected COVID-19 virus infection. has a past surgical history that includes Percutaneous Transluminal Coronary Angio; Cardiac catheterization; Coronary angioplasty with stent (02/25/2017); Appendectomy; Coronary angioplasty with stent (07/11/2012); Coronary angioplasty with stent (12/11/2020); Cystoscopy (Right, 08/12/2022); and Ureter surgery (Right, 8/12/2022).       Social History     Socioeconomic History    Marital status:      Spouse name: Not on file    Number of

## 2023-10-08 NOTE — DISCHARGE INSTRUCTIONS
Purschase a neoprene sleeve knee to help with pain. Please call orthopedic surgery at your earliest convenience to have your chronic left knee pain evaluated. Please take Tylenol as needed for pain control. Continue to use your walker while ambulating. If you have any worsening pain, numbness, weakness, swelling, fevers or chills return to the emergency department.

## 2023-10-09 ENCOUNTER — CARE COORDINATION (OUTPATIENT)
Dept: CARE COORDINATION | Age: 78
End: 2023-10-09

## 2023-10-09 NOTE — CARE COORDINATION
Ambulatory Care Coordination  ED Follow up Call    Reason for ED visit:  Chronic pain of left knee   Status:     improved    Did you call your PCP prior to going to the ED? Yes      Did you receive a discharge instructions from the Emergency Room? Yes  Review of Instructions:     Understands what to report/when to return?:  Yes   Understands discharge instructions?:  Yes   Following discharge instructions?:  Yes   If not why? Are there any new complaints of pain? No  New Pain Meds? No    Constipation prophylaxis needed? N/A    If you have a wound is the dressing clean, dry, and intact? N/A  Understands wound care regimen? N/A    Are there any other complaints/concerns that you wish to tell your provider? no    FU appts/Provider:    Future Appointments   Date Time Provider [address]   10/11/2023  2:00 PM [facility] EMG  [facility] EMG [facility]   10/11/2023  2:00 PM Annika Degroot MD [facility] med/reha [facility]   10/31/2023  1:00 PM MAYELA Hill - CNP [facility]PP   11/1/2023 11:00 AM Matt Carroll MD [address]   12/4/2023  3:30 PM Srini Villarreal MD Neuro [facility] Neurology -   1/3/2024 12:15 PM [facility]aim[facility] Beltre DPM [facility]   3/20/2024  4:10 PM Chong Berg MD [facility] Neph Shawn None           New Medications?:   No      Medication Reconciliation by phone - Yes  Understands Medications? Yes  Taking Medications? Yes  Can you swallow your pills? Yes    Any further needs in the home i.e. Equipment? No    Link to services in community?:  No   Which services:      Giuseppe Gaines for ED follow up. She was seen for knee pain, which is chronic for her. She stated that the pain was getting worse and she just wanted to make sure there wasn't anything wrong. She denies her legs giving out, or any falls. She was encouraged to take Tylenol for the pain. She is to follow up with the ortho clinic at [facility]. Axel Reminded her of her EMG testing next week.  Will

## 2023-10-11 ENCOUNTER — HOSPITAL ENCOUNTER (OUTPATIENT)
Dept: NEUROLOGY | Age: 78
Discharge: HOME | End: 2023-10-11
Payer: MEDICARE

## 2023-10-11 PROCEDURE — 95910 NRV CNDJ TEST 7-8 STUDIES: CPT | Performed by: PHYSICAL MEDICINE & REHABILITATION

## 2023-10-11 PROCEDURE — 95886 MUSC TEST DONE W/N TEST COMP: CPT | Performed by: PHYSICAL MEDICINE & REHABILITATION

## 2023-10-19 ENCOUNTER — CARE COORDINATION (OUTPATIENT)
Dept: CARE COORDINATION | Age: 78
End: 2023-10-19

## 2023-10-23 ENCOUNTER — HOSPITAL ENCOUNTER (OUTPATIENT)
Dept: PULMONOLOGY | Age: 78
Discharge: HOME | End: 2023-10-23

## 2023-10-23 NOTE — CARE COORDINATION
well-coordinated? (Include coordination with other services you are now recommendation): Required care/services in place and adequately coordinated   Suggested Interventions and Community Resources  Fall Risk Prevention: In Process Home Health Services: Completed (Comment: [address],[place] [address])   Occupational Therapy: In Process   Physical Therapy: In Process   Registered Dietician: Not Started   Zone Management Tools: In Process                  Future Appointments   Date Time Provider [address]   10/23/2023  2:00 PM STV PFT RM 1 [facility] PFT [facility]   10/31/2023  1:00 PM MAYELA Tineo - CNP [facility]   11/1/2023 11:00 AM Hoda Al MD [address]   12/4/2023  3:30 PM Carmen Aleman MD Neuro [facility] Neurology -   1/3/2024 12:15 PM CHARISSE SrinivasanM [facility] Podiatry [facility]   3/20/2024  4:10 PM Abe Yu MD [facility] Ne[facility] Shawn None     ,   Diabetes Assessment    Medic Alert ID: No  Meal Planning: Avoidance of concentrated sweets   How often do you test your blood sugar?: Daily, Meals, Bedtime   Do you have barriers with adherence to non-pharmacologic self-management interventions?  (Nutrition/Exercise/Self-Monitoring): Yes   Have you ever had to go to the ED for symptoms of low blood sugar?: No       Increase or Decrease trend in Blood Sugars   Do you have hyperglycemia symptoms?: No   Do you have hypoglycemia symptoms?: No   Last Blood Sugar Value: 278   Blood Sugar Monitoring Regimen: Morning Fasting, Before Meals, At Bedtime   Blood Sugar Trends: Fluctuating        , and   Congestive Heart Failure Assessment    Are you currently restricting fluids?: 2000cc     No patient-reported symptoms      Symptoms:  CHF associated dyspnea on exertion: Pos, CHF associated fatigue: Pos      Symptom course: stable  Weight trend: stable  Salt intake watch compared to last visit: stable

## 2023-10-30 ENCOUNTER — CARE COORDINATION (OUTPATIENT)
Dept: CARE COORDINATION | Age: 78
End: 2023-10-30

## 2023-12-04 ENCOUNTER — OFFICE VISIT (OUTPATIENT)
Dept: NEUROLOGY | Age: 78
End: 2023-12-04
Payer: MEDICARE

## 2023-12-04 VITALS
HEART RATE: 60 BPM | BODY MASS INDEX: 41.3 KG/M2 | OXYGEN SATURATION: 100 % | HEIGHT: 66 IN | WEIGHT: 257 LBS | SYSTOLIC BLOOD PRESSURE: 178 MMHG | DIASTOLIC BLOOD PRESSURE: 71 MMHG

## 2023-12-04 DIAGNOSIS — E11.42 DIABETIC POLYNEUROPATHY ASSOCIATED WITH TYPE 2 DIABETES MELLITUS (HCC): Primary | ICD-10-CM

## 2023-12-04 PROCEDURE — 99214 OFFICE O/P EST MOD 30 MIN: CPT

## 2023-12-04 PROCEDURE — 1123F ACP DISCUSS/DSCN MKR DOCD: CPT

## 2023-12-04 PROCEDURE — 3046F HEMOGLOBIN A1C LEVEL >9.0%: CPT

## 2023-12-04 PROCEDURE — 3078F DIAST BP <80 MM HG: CPT

## 2023-12-04 PROCEDURE — 3074F SYST BP LT 130 MM HG: CPT

## 2023-12-04 RX ORDER — CLOPIDOGREL BISULFATE 75 MG/1
75 TABLET ORAL EVERY MORNING
COMMUNITY
Start: 2023-11-16

## 2023-12-04 RX ORDER — GABAPENTIN 100 MG/1
100 CAPSULE ORAL 3 TIMES DAILY
COMMUNITY
Start: 2023-11-16 | End: 2023-12-04

## 2023-12-04 RX ORDER — GABAPENTIN 100 MG/1
CAPSULE ORAL
Qty: 90 CAPSULE | Refills: 2 | Status: SHIPPED | OUTPATIENT
Start: 2023-12-04 | End: 2024-02-02

## 2023-12-04 ASSESSMENT — ENCOUNTER SYMPTOMS
COUGH: 0
NAUSEA: 0
VOMITING: 0
SHORTNESS OF BREATH: 0
SORE THROAT: 0
ABDOMINAL PAIN: 0
RHINORRHEA: 0

## 2023-12-04 NOTE — PROGRESS NOTES
MISC 1 each by Does not apply route continuous Seated, wheeled walker 1 each 0    Handicap Placard MISC by Does not apply route 5 years, cannot walk over 200 ft. 1 each 0    pantoprazole (PROTONIX) 40 MG tablet take 1 tablet by mouth every morning for GERD 30 tablet 11    vitamin D3 (CHOLECALCIFEROL) 25 MCG (1000 UT) TABS tablet Take 1 tablet by mouth daily 90 tablet 3    Insulin Pen Needle 32G X 4 MM MISC 1 each by Does not apply route 2 times daily 100 each 11    Lancets MISC 1 each by Does not apply route 2 times daily 300 each 11    isosorbide mononitrate (IMDUR) 30 MG extended release tablet Take 1 tablet by mouth daily 30 tablet 11    atorvastatin (LIPITOR) 80 MG tablet take 1 tablet by mouth at bedtime 30 tablet 11    losartan (COZAAR) 100 MG tablet take 1 tablet by mouth every morning 30 tablet 11    apixaban (ELIQUIS) 5 MG TABS tablet Take 1 tablet by mouth 2 times daily 60 tablet 11    Continuous Blood Gluc Sensor ([employer] SYLVIA 14 DAY SENSOR) [facility] Check sugar 4 times daily dx E11.65 (Patient not taking: Reported on 10/6/2023) 2 each 12    ferrous sulfate (IRON 325) 325 (65 Fe) MG tablet Take 1 tablet by mouth daily (with breakfast) 30 tablet 11    escitalopram (LEXAPRO) 20 MG tablet TAKE ONE TABLET BY MOUTH DAILY 90 tablet 3    Misc. Devices (WALKER) MISC Diagnosis: right 5th metatarsal fracture, pain in limb    Duration: 3 months 1 each 0    Misc. Devices (COMMODE BEDSIDE) MISC 1 Device by Does not apply route daily 1 each 0     No current facility-administered medications for this visit. LABS & TESTS:      Lab Results   Component Value Date    WBC 5.42 10/31/2023    HGB 8.0 (L) 10/31/2023    HCT 25.7 (L) 10/31/2023    MCV 99.6 (H) 10/31/2023     10/31/2023       REVIEW OF SYSTEMS:     Review of Systems   Constitutional:  Negative for chills and fever. HENT:  Negative for rhinorrhea and sore throat. Respiratory:  Negative for cough and shortness of breath.     Cardiovascular:

## 2024-01-04 DIAGNOSIS — F33.1 MODERATE EPISODE OF RECURRENT MAJOR DEPRESSIVE DISORDER (HCC): ICD-10-CM

## 2024-01-04 RX ORDER — CARVEDILOL 12.5 MG/1
12.5 TABLET ORAL 2 TIMES DAILY
Qty: 180 TABLET | Refills: 3 | Status: SHIPPED | OUTPATIENT
Start: 2024-01-04

## 2024-01-04 RX ORDER — ESCITALOPRAM OXALATE 20 MG/1
20 TABLET ORAL DAILY
Qty: 90 TABLET | Refills: 3 | Status: SHIPPED | OUTPATIENT
Start: 2024-01-04

## 2024-01-04 NOTE — PROGRESS NOTES
Patient instructed to remove shoes and socks and instructed to sit in exam chair.  Current PCP is Adia Mcmanus MD and date of last visit was 12/11/2023.   Do you have a follow up visit scheduled?  Yes  If yes, the date is 03/12/2024.    Diabetic visit information    Blood pressure (Control is BP <140/90)  BP Readings from Last 3 Encounters:   12/11/23 (!) 158/70   12/04/23 (!) 178/71   10/08/23 (!) 167/86       BP taken with correct size cuff? - Yes   Repeated if > 140/90 Yes      Tobacco use:  Patient  reports that she has never smoked. She has never used smokeless tobacco.  If Smoker - Cessation materials given?- Yes       Diabetic Health Maintenance Items due  There are no preventive care reminders to display for this patient.    Diabetic retinal exam done in last year? - Yes   If No: remind patient that it is due and they should schedule an exam    Medications  Is patient taking any medications for diabetes? -   Yes  Have blood sugars been controlled?   Fasting blood sugars under 120   -   Yes   Random home sugars or today's POCT glucose is under 180 -   Yes   []  If No to the above then patient should schedule appt with PCP.     Diabetic Plan    A1C Plan  Lab Results   Component Value Date    LABA1C 5.9 12/11/2023    LABA1C 11.2 (H) 08/31/2023    LABA1C 10.1 08/01/2023      []  If A1C over 8 and last result >3 months ago - Order A1C and refer to PCP   []  If last A1C over 6 months ago - Order A1C and refer to PCP for follow up   []  If elevated blood sugars > 180 - refer to PCP for follow up    []  Blood sugar controlled - A1C under 8 and last check was < 6 months      Cholesterol Plan   Lab Results   Component Value Date    LDLCHOLESTEROL 70 08/31/2023      []  If LDL > 100 and last result >3 months ago - order Fasting lipids and refer to PCP for follow up   []  If LDL < 100 and over 1 year ago - Order Fasting lipids and refer to PCP for follow up   [] LDL is controlled.  LDL < 100 and checked within

## 2024-01-17 ENCOUNTER — OFFICE VISIT (OUTPATIENT)
Dept: PODIATRY | Age: 79
End: 2024-01-17
Payer: MEDICARE

## 2024-01-17 VITALS
HEIGHT: 66 IN | BODY MASS INDEX: 41.14 KG/M2 | SYSTOLIC BLOOD PRESSURE: 149 MMHG | HEART RATE: 60 BPM | DIASTOLIC BLOOD PRESSURE: 65 MMHG | WEIGHT: 256 LBS

## 2024-01-17 DIAGNOSIS — R60.0 EDEMA OF LOWER EXTREMITY: ICD-10-CM

## 2024-01-17 DIAGNOSIS — B35.1 ONYCHOMYCOSIS: Primary | ICD-10-CM

## 2024-01-17 DIAGNOSIS — Z79.4 TYPE 2 DIABETES MELLITUS WITH DIABETIC POLYNEUROPATHY, WITH LONG-TERM CURRENT USE OF INSULIN (HCC): ICD-10-CM

## 2024-01-17 DIAGNOSIS — E66.01 OBESITY, CLASS III, BMI 40-49.9 (MORBID OBESITY) (HCC): ICD-10-CM

## 2024-01-17 DIAGNOSIS — E11.42 TYPE 2 DIABETES MELLITUS WITH DIABETIC POLYNEUROPATHY, WITH LONG-TERM CURRENT USE OF INSULIN (HCC): ICD-10-CM

## 2024-01-17 PROCEDURE — 11721 DEBRIDE NAIL 6 OR MORE: CPT

## 2024-01-17 PROCEDURE — 99999 PR OFFICE/OUTPT VISIT,PROCEDURE ONLY: CPT

## 2024-01-17 PROCEDURE — 99213 OFFICE O/P EST LOW 20 MIN: CPT | Performed by: PODIATRIST

## 2024-01-17 NOTE — PROGRESS NOTES
[facility]  [address] [facility]   [address] [place] [address]  Tel: [phone]   Fax: [phone]    Subjective     CC: Diabetic foot exam and painful and elongated toe nails    Interval History:    Patient returns to clinic to reevaluation of diabetic feet and painful toenails. Patient states that she has had no recent issues, wounds or hospitalizations to bilateral lower extremities.Patient does have pain to his nails and states she gets releief from professional debridements. Is interested in having them debrided today. No other pedal complaints. Patient is nonambulatory in wheelchair.    HPI:  Kayleigh Matthews is a 78 y.o. year old female who presents to clinic today with son for diabetic foot examination and also complaining of painful and elongated toenails.  The patient is a diabetic, and they're last HgbA1c was 9.4% in (in April of this year). The patient states their digits are painful when the toenails are elongated, causing rubbing in shoe gear. The patient denies tingling and numbness in the lower extremities. Patient denies any rest pain or claudication symptoms.The patient denies any history of acute trauma. The patient denies any other pedal complaints.     Primary care physician is Adia Mcmanus MD.    ROS:    Constitutional: Denies nausea, vomiting, fever, chills.  Neurologic: No numbness, tingling, and burning in the feet.    Vascular: Denies symptoms of lower extremity claudication.    Skin: Denies open wounds.  Otherwise negative except as noted in the HPI.     PMH:  Past Medical History:   Diagnosis Date    Acute renal failure with tubular necrosis (HCC) 11/24/2021    Secondary to ischemic ATN post fall intravascularly depletion hypotension and low flow baseline creatinine 1.2-1.4 peaked up to 2.1 during her hospitalization in September 2020.  Work-up showed benign urine sediment, kidney size to be 11.2 on the right 15.1 on the left serological work-up negative.

## 2024-03-12 PROBLEM — M35.00 SICCA, UNSPECIFIED TYPE (HCC): Status: RESOLVED | Noted: 2021-10-29 | Resolved: 2024-03-12

## 2024-03-12 PROBLEM — N18.32 CHRONIC KIDNEY DISEASE, STAGE 3B (HCC): Status: ACTIVE | Noted: 2021-11-24

## 2024-03-12 PROBLEM — N18.30 CKD (CHRONIC KIDNEY DISEASE), STAGE III (HCC): Status: RESOLVED | Noted: 2021-11-24 | Resolved: 2024-03-12

## 2024-03-12 PROBLEM — Z86.73 HISTORY OF STROKE: Status: ACTIVE | Noted: 2024-03-12

## 2024-03-12 PROBLEM — I50.33 ACUTE ON CHRONIC DIASTOLIC CHF (CONGESTIVE HEART FAILURE) (HCC): Status: RESOLVED | Noted: 2020-11-06 | Resolved: 2024-03-12

## 2024-03-13 ENCOUNTER — CARE COORDINATION (OUTPATIENT)
Dept: CARE COORDINATION | Age: 79
End: 2024-03-13

## 2024-03-13 NOTE — CARE COORDINATION
PCP referral, mailed introduction letter.  GRETTA GonzalezN, RN ambulatory care manager [phone].

## 2024-03-20 ENCOUNTER — CARE COORDINATION (OUTPATIENT)
Dept: PRIMARY CARE CLINIC | Age: 79
End: 2024-03-20

## 2024-03-20 ENCOUNTER — CARE COORDINATION (OUTPATIENT)
Dept: CARE COORDINATION | Age: 79
End: 2024-03-20

## 2024-03-20 DIAGNOSIS — E11.22 TYPE 2 DIABETES MELLITUS WITH STAGE 3 CHRONIC KIDNEY DISEASE, UNSPECIFIED WHETHER LONG TERM INSULIN USE, UNSPECIFIED WHETHER STAGE 3A OR 3B CKD (HCC): Primary | ICD-10-CM

## 2024-03-20 DIAGNOSIS — I50.9 ACUTE ON CHRONIC CONGESTIVE HEART FAILURE, UNSPECIFIED HEART FAILURE TYPE (HCC): ICD-10-CM

## 2024-03-20 DIAGNOSIS — I10 ESSENTIAL HYPERTENSION: ICD-10-CM

## 2024-03-20 DIAGNOSIS — N18.30 TYPE 2 DIABETES MELLITUS WITH STAGE 3 CHRONIC KIDNEY DISEASE, UNSPECIFIED WHETHER LONG TERM INSULIN USE, UNSPECIFIED WHETHER STAGE 3A OR 3B CKD (HCC): Primary | ICD-10-CM

## 2024-03-20 NOTE — CARE COORDINATION
Ambulatory Care Coordination Note  3/20/2024    Patient Current Location:  Home: [address] [place] [address] [address]  [facility] [address]     ACM contacted the patient by telephone. Verified name and  with patient as identifiers. Provided introduction to self, and explanation of the ACM role.     Challenges to be reviewed by the provider   Additional needs identified to be addressed with provider: No  none               Method of communication with provider: none.    Spoke with patient, explained care coordination. Patient is accepting of program.  Patient is currently having an increased cough. She was notified that her PCP did send in another cough medication for her. She did say that she has some increased SOB and swelling in her feet. She plans to rest and see if the cough medication helps.   She agreed to enrolling into RPM as well and to a follow up call in 1-2 days to check on symptoms.       Offered patient enrollment in the Remote Patient Monitoring (RPM) program for in-home monitoring: Yes, patient enrolled:     Remote Patient Monitoring Enrollment Note    Date/Time:  3/20/2024 2:49 PM    Offered patient enrollment in the [facility] Remote Patient Monitoring (RPM) program for in home monitoring for CHF; condition managed by Adia Mcmanus MD PCP - General. Diabetes; condition managed by Adia Mcmanus MD PCP . HTN; condition managed by Adia Mcmanus MD PCP .  Patient accepted.    Patient will be monitoring the following daily:  Blood Pressure, Pulse ox, Weight, and blood sugar.    ACM reviewed the information below with the patient:    Emergency Contact (name and contact number):     Rah Matthews (Child)  [phone] (Mobile)       [x]  A member from the care coordination team will reach out to notify the patient once the RPM kit is ordered.  [x]  Once the kit is delivered, the HRS team will contact the patient after [employer] delivers to assist with set up.  [x]  Determined BP cuff

## 2024-03-20 NOTE — CARE COORDINATION
Remote Patient Kit Ordering Note      Date/Time:  3/20/2024 3:11 PM      CCSS placed phone call to patient/family today to notify of RPM kit order; patient/family was available; discussed the following topics below and all questions answered.    [x] CCSS confirmed patient shipping address  [x] Patient will receive package over the next 1-3 business days. Someone 21 years or older must be present to sign for UPS delivery.  [x] HRS will contact patient within 24 hours, an HRS  will call the patient directly: If the patient does not answer, HRS will follow up with the clinical team notifying them about the unsuccessful attempt to contact the patient. HRS will make three call attempts to the patient.Provide patient with [facility] Virtual install number is: [phone].  [x] ACM will contact patient once equipment is active to welcome them to the program.                                                         [x] Hours of RPM monitoring - Monday-Friday 3795-7940; encourage patient to get vitals entered by Noon each day to have the alert addressed same day.  [x][facility].                      ACM made aware the RPM kit has been ordered.

## 2024-03-20 NOTE — PROGRESS NOTES
Remote Patient Monitoring Treatment Plan    Received request from [facility]/Tish Quan RN   to order remote patient monitoring for in home monitoring of CHF; Condition managed by PCP.  Diabetes; Condition managed by PCP.  HTN; Condition managed by PCP.  and order completed.     Patient will be monitoring blood pressure   glucose  pulse ox   weight.      Patient will engage in Remote Patient Monitoring each day to develop the skills necessary for self management.       RPM Care Team Responsibilities:   Alerts will be reviewed daily and addressed within 2-4 hours during operational hours (Monday -Friday 9 am-4 pm)  Alert response and intervention documented in patient medical record  Alert response escalated to PCP per protocol and documented in patient medical record  Patient monitored over approximately  days  Discharge from program based on self-management readiness    See care coordination encounters for additional details.

## 2024-03-22 ENCOUNTER — CARE COORDINATION (OUTPATIENT)
Dept: CARE COORDINATION | Age: 79
End: 2024-03-22

## 2024-03-22 NOTE — CARE COORDINATION
Ambulatory Care Coordination  ED Follow up Call    Reason for ED visit:  Cough   Pt presents to with \"lingering covid cough\". Pt has already had meds prescribed but she is \"tired of coughing\".   Fatigue   Pt reports increased fatigue and generalized weakness.   Status:     not changed    Did you call your PCP prior to going to the ED?  Not Applicable      Did you receive a discharge instructions from the Emergency Room? Yes  Review of Instructions:     Understands what to report/when to return?:  Yes   Understands discharge instructions?:  Yes   Following discharge instructions?:  Yes      Are there any new complaints of pain? No  New Pain Meds? N/A    Constipation prophylaxis needed?  N/A    If you have a wound is the dressing clean, dry, and intact? N/A  Understands wound care regimen? N/A    Are there any other complaints/concerns that you wish to tell your provider?   Patient was changed to lasix and is asking if she should still take her other diuretic. Family member wants to keep her on both, patient is questing this. I encouraged them to call her nephrologist to make sure of the directions. They agreed to call.     FU appts/Provider:    Future Appointments   Date Time Provider Department Center   4/17/2024 12:15 PM Zhen Acosta DPM [facility] Podiat[facility]   6/10/2024  3:30 PM Teresa Bejarano MD Neuro [facility] Neurology -   6/13/2024  1:00 PM Adia Mcmanus MD [facility]   9/18/2024  3:50 PM Arsalan Berg MD AFL [facility] None           New Medications?:   Yes      Medication Reconciliation by phone - Yes  Understands Medications?  Yes  Taking Medications? Yes  Can you swallow your pills?  Yes    Any further needs in the home i.e. Equipment?  No

## 2024-03-22 NOTE — CARE COORDINATION
Remote Patient Monitoring Note      Date/Time:  3/22/2024 2:25 PM    LPN attempted to contact patient by telephone regarding red alert received for pulse ox reading (90%).     Background: CHF, Diabetes, High Blood-Pressure     Clinical Interventions:  No VM, unable to LM.    Plan/Follow Up: Will continue to review, monitor and address alerts with follow up based on severity of symptoms and risk factors.       Melinda Louise LPN  [facility]/ CTN/ Remote Patient Monitoring  [phone]

## 2024-03-22 NOTE — CARE COORDINATION
Remote Patient Monitoring Note      2nd Attempt  Date/Time:  3/22/2024 3:33 PM    LPN attempted to contact patient by telephone regarding red alert received for pulse ox reading (90%).      Background: CHF, Diabetes, High Blood-Pressure      Clinical Interventions:  No VM, unable to LM.  Patient number and EC number the same.  ACM updated.     Plan/Follow Up: Will continue to review, monitor and address alerts with follow up based on severity of symptoms and risk factors.       Melinda Louise LPN  [facility]/ CTN/ Remote Patient Monitoring  [phone]

## 2024-03-25 ENCOUNTER — CARE COORDINATION (OUTPATIENT)
Dept: CARE COORDINATION | Age: 79
End: 2024-03-25

## 2024-03-25 NOTE — CARE COORDINATION
weighs self at same time every day (or after urinating and waking up)  Take blood pressure 1-2 hrs after medications   RPM team may have different phone area code (including [place], [place], [place] or [place])                              [x] Instructed patient to keep scale on flat surface                                                         [x] Instructed patient to keep tablet plugged in at all times                         [x] Instructed how to contact IT support ([phone])  [x] Provided Remote Patient Monitoring care  information                All questions answered at this time.

## 2024-03-26 ENCOUNTER — CARE COORDINATION (OUTPATIENT)
Dept: CASE MANAGEMENT | Age: 79
End: 2024-03-26

## 2024-03-26 ENCOUNTER — CARE COORDINATION (OUTPATIENT)
Dept: CARE COORDINATION | Age: 79
End: 2024-03-26

## 2024-03-26 NOTE — CARE COORDINATION
Remote Alert Monitoring Note      Date/Time:  3/26/2024 10:43 AM  Patient Current Location: Home: [address]  [place] [address]    LPN contacted patient by telephone regarding red alert received for blood pressure reading (181/47). Verified patients name and  as identifiers.    Rpm alert to be reviewed by the provider                         Background:  CHF, Diabetes, High Blood-Pressure     Refer to 911 immediately if:  Patient unresponsive or unable to provide history  Change in cognition or sudden confusion  Patient unable to respond in complete sentences  Intense chest pain/tightness  Any concern for any clinical emergency  Red Alert: Provider response time of 1 hr required for any red alert requiring intervention  Yellow Alert: Provider response time of 3hr required for any escalated yellow alert    BP Triage  Are you having any Chest Pain? no   Are you having any Shortness of Breath? no   Do you have a headache or have any vision changes? no   Are you having any numbness or tingling? no   Are you having any other health concerns or issues? no         Have you taken your medications as instructed by your doctor today? No       Clinical Interventions: Reviewed and followed up on alerts and treatments-. Spoke with [facility] nurse. She states patient just got up out of bed and has not taken any of her medications yet today.  Nurse advised to wait 1 hour after taking medications to check metrics.  Will await update.        Plan/Follow Up: Will continue to review, monitor and address alerts with follow up based on severity of symptoms and risk factors.    AG Sheffield [facility]/ CTN/ Remote Patient monitoring  [phone]

## 2024-03-26 NOTE — CARE COORDINATION
Date/Time:  3/26/2024 1:10 PM    Update: Updated BP wnl, 132/56.  Sp02 now alerting at 89%.  Patient denies any SOB and states she doesn't have time to recheck as she is on her way to a doctor appt.  She does states she struggles to get good readings due to cold hands.  ACM updated.  Will monitor.

## 2024-03-26 NOTE — CARE COORDINATION
Date/Time:  3/26/2024 11:03 AM  LPN attempted to reach patient by telephone regarding red alert /112 in remote patient monitoring program. Left HIPPA compliant message requesting a return call. Will attempt to reach patient again.

## 2024-03-27 ENCOUNTER — CARE COORDINATION (OUTPATIENT)
Dept: CASE MANAGEMENT | Age: 79
End: 2024-03-27

## 2024-03-27 NOTE — CARE COORDINATION
spot and not moving it she should also weigh in the same amount of clothing daily, she verbalized understanding and will find a safe spot for scale placement, recheck of BP is now WNL will continue to monitor    Plan/Follow Up: Will continue to review, monitor and address alerts with follow up based on severity of symptoms and risk factors.

## 2024-03-28 ENCOUNTER — CARE COORDINATION (OUTPATIENT)
Dept: CARE COORDINATION | Age: 79
End: 2024-03-28

## 2024-03-28 NOTE — CARE COORDINATION
Remote Patient Monitoring Note      Date/Time:  3/28/2024 10:28 AM    LPN attempted to contact patient by telephone regarding red alert received for weight increase (221.8lb).     Background: CHF, Diabetes, High Blood-Pressure     Clinical Interventions:  HIPAA compliant message left on VM x2 requesting a return call from patient and EC.  Noted that patients actual baseline weight is 254lb prior to starting RPM.  ACM updated.    Plan/Follow Up: Will continue to review, monitor and address alerts with follow up based on severity of symptoms and risk factors.       AG Sheffield [facility]/ CTN/ Remote Patient Monitoring  [phone]

## 2024-03-29 VITALS
HEART RATE: 60 BPM | SYSTOLIC BLOOD PRESSURE: 164 MMHG | DIASTOLIC BLOOD PRESSURE: 73 MMHG | BODY MASS INDEX: 33.77 KG/M2 | OXYGEN SATURATION: 94 % | WEIGHT: 209.2 LBS

## 2024-04-03 ENCOUNTER — CARE COORDINATION (OUTPATIENT)
Dept: CARE COORDINATION | Age: 79
End: 2024-04-03

## 2024-04-05 ENCOUNTER — CARE COORDINATION (OUTPATIENT)
Dept: CARE COORDINATION | Age: 79
End: 2024-04-05

## 2024-04-05 NOTE — CARE COORDINATION
Ambulatory Care Coordination Hospital IP Discharge Follow up Call    Summary: Heart failure, unspecified (I509)  Chronic diastolic (congestive) heart failure ()  Chronic systolic (congestive) heart failure ()  Acute on chronic diastolic (congestive) heart failure ()    Date of discharge: 4.4.24  Facility: [facility]  Non-face-to-face services provided:  Obtained and reviewed discharge summary and/or continuity of care documents  Communication with home health agencies or other community services the patient is currently using- [facility]- per patient they have resumed care.     Reason for Hospital Visit:  SOB  Discharged with Home Health?:  Yes      Status:     significantly improved    Did you receive a discharge summary with list of medication from the hospital? Yes  Review of Instructions:     Understands what to report/when to return?:  Yes   Understands discharge instructions?:  Yes   Following discharge instructions?:  Yes     Is there any lingering symptoms? No  Are you eating and drinking OK? Yes  Any other problems i.e. Constipation, other symptoms?  No  Are there any new complaints of pain? No  If you have a wound is the dressing clean, dry, and intact? Yes- per patient she has a reoccurring wound on her ankle that home care is addressing. Referral to wound care placed by PCP.   Understands wound care regimen? Yes  Are there any other complaints/concerns that you wish to tell your provider? no     FU appts/Provider:    Future Appointments   Date Time Provider Department Center   4/17/2024 12:15 PM Zhen Acosta DPM [facility]   5/15/2024  1:30 PM Arsalan Berg MD [facility] None   6/10/2024  3:30 PM Teresa Bejarano MD Neuro [facility] Neurology -   6/13/2024  1:00 PM [facility]   9/18/2024  3:50 PM Arsalan Berg MD [facility] None       New Medications at discharge?:     Yes                      Medication Reconciliation by

## 2024-04-05 NOTE — TELEPHONE ENCOUNTER
Spoke with [facility] pharmacy. They state insurance will not cover a glucometer and supplies because she is using a continuous glucose monitor already.

## 2024-04-09 ENCOUNTER — CARE COORDINATION (OUTPATIENT)
Dept: CARE COORDINATION | Age: 79
End: 2024-04-09

## 2024-04-09 NOTE — CARE COORDINATION
Attempting to reach patient for a follow up care coordination call regarding any needs, questions or concerns.  Tish Menezes [facility] [phone]  No VM available.

## 2024-04-16 ENCOUNTER — CARE COORDINATION (OUTPATIENT)
Dept: CARE COORDINATION | Age: 79
End: 2024-04-16

## 2024-04-16 NOTE — CARE COORDINATION
Attempting to reach patient for a follow up care coordination call regarding any needs, questions or concerns.  Tish Menezes [facility] [phone]

## 2024-04-22 ENCOUNTER — CARE COORDINATION (OUTPATIENT)
Dept: CARE COORDINATION | Age: 79
End: 2024-04-22

## 2024-04-22 ENCOUNTER — HOSPITAL ENCOUNTER (OUTPATIENT)
Dept: WOUND CARE | Age: 79
Discharge: HOME | End: 2024-04-22
Payer: MEDICARE

## 2024-04-22 VITALS
BODY MASS INDEX: 40.35 KG/M2 | TEMPERATURE: 98.9 F | DIASTOLIC BLOOD PRESSURE: 73 MMHG | SYSTOLIC BLOOD PRESSURE: 170 MMHG | WEIGHT: 250 LBS | RESPIRATION RATE: 16 BRPM | HEART RATE: 60 BPM

## 2024-04-22 DIAGNOSIS — L89.626 PRESSURE INJURY OF DEEP TISSUE OF LEFT HEEL: Primary | ICD-10-CM

## 2024-04-22 PROCEDURE — 99212 OFFICE O/P EST SF 10 MIN: CPT | Performed by: NURSE PRACTITIONER

## 2024-04-22 PROCEDURE — 99212 OFFICE O/P EST SF 10 MIN: CPT

## 2024-04-22 ASSESSMENT — PAIN DESCRIPTION - ORIENTATION: ORIENTATION: LEFT

## 2024-04-22 ASSESSMENT — PAIN SCALES - GENERAL: PAINLEVEL_OUTOF10: 3

## 2024-04-22 ASSESSMENT — ENCOUNTER SYMPTOMS
VOMITING: 0
RHINORRHEA: 0
DIARRHEA: 0
COUGH: 0
SHORTNESS OF BREATH: 0

## 2024-04-22 ASSESSMENT — PAIN - FUNCTIONAL ASSESSMENT: PAIN_FUNCTIONAL_ASSESSMENT: ACTIVITIES ARE NOT PREVENTED

## 2024-04-22 ASSESSMENT — PAIN DESCRIPTION - DESCRIPTORS: DESCRIPTORS: ACHING;THROBBING

## 2024-04-22 ASSESSMENT — PAIN DESCRIPTION - LOCATION: LOCATION: FOOT

## 2024-04-22 NOTE — PROGRESS NOTES
[facility]   Progress Note and Procedure Note      Kayleigh Matthews  MEDICAL RECORD NUMBER:  [id number]  AGE: 78 y.o.   GENDER: female  : 1945  EPISODE DATE:  2024    Subjective:     Chief Complaint   Patient presents with    Wound Check     Left heel         HISTORY of PRESENT ILLNESS HPI     Kayleigh Matthews is a 78 y.o. female who presents today for wound/ulcer evaluation.   History of Wound Context: presents with daughter in law for evaluation of left heel deep tissue injury. Was open several weeks ago but now closed.   Wound/Ulcer Pain Timing/Severity: none  Quality of pain: N/A  Severity:  0 / 10   Modifying Factors: None  Associated Signs/Symptoms: none    Ulcer Identification:  Ulcer Type: pressure  Contributing Factors: chronic pressure, decreased mobility, and obesity         PAST MEDICAL HISTORY        Diagnosis Date    Acute renal failure with tubular necrosis (HCC) 2021    Secondary to ischemic ATN post fall intravascularly depletion hypotension and low flow baseline creatinine 1.2-1.4 peaked up to 2.1 during her hospitalization in 2020.  Work-up showed benign urine sediment, kidney size to be 11.2 on the right 15.1 on the left serological work-up negative.    Anxiety     Atrial fibrillation (HCC)     chronic-takes xarelto    Benign positional vertigo     CAD (coronary artery disease)     Chest pain     CKD (chronic kidney disease), stage III (HCC) 2021    Secondary to ischemic and diabetic nephrosclerosis baseline 1.2-1.4    Depression     Diabetes mellitus (HCC)     Diabetic neuropathy (HCC)     Dysuria 2021    Hyperlipidemia     Hypertension     Migraine     Migraine headaches aggravated with sublingual nitroglycerin    Persistent proteinuria 2022    From diabetic nephrosclerosis UPC 1.0    Suspected COVID-19 virus infection 2020       PAST SURGICAL HISTORY    Past Surgical History:   Procedure Laterality Date

## 2024-04-22 NOTE — DISCHARGE INSTRUCTIONS
[facility] -Phone: [phone] Fax: [phone]   Visit  Discharge Instructions / Physician Orders    DATE: 4/22/2024     Home Care: N/A     SUPPLIES ORDERED THRU: N/A     Wound Location: Left Heel     Cleanse with: Liquid antibacterial soap and water, rinse well      Dressing Orders: Protect with foam dressing     Frequency: EVERY OTHER DAY     Additional Orders: Increase protein to diet (meat, cheese, eggs, fish, peanut butter, nuts and beans)  ELEVATE LEGS AS MUCH AS POSSIBLE    DO NOT PUT PRESSURE ON THE HEEL. WEAR SHOES WITHOUT A BACK UNTIL AREA IS HEALED.    Your next appointment with Wound Care Center is in 1 week     (Please note your next appointment above and if you are unable to keep, kindly give a 24 hour notice. Thank you.)  If more than 15 min late we cannot guarantee you will be seen due to clinician schedule  Per Policy, Excessive cancellation will call for dismissal from program.     If you experience any of the following, please call the Wound Care Center during business hours:  [phone]     * Increase in Pain  * Temperature over 101  * Increase in drainage from your wound  * Drainage with a foul odor  * Bleeding  * Increase in swelling  * Need for compression bandage changes due to slippage, breakthrough drainage.     If you need medical attention outside of the business hours of the Wound Care Centers please contact your PCP or go to the an urgent care or emergency department     The information contained in the After Visit Summary has been reviewed with me, the patient and/or responsible adult, by my health care provider(s). I had the opportunity to ask questions regarding this information. I have elected to receive;      []After Visit Summary  [x]Comprehensive Discharge Instruction      Patient signature______________________________________Date:________   Electronically signed by Carisa Engel RN on 4/22/2024 at 2:40 PM   Electronically signed by MAYELA CHAMBERLAIN

## 2024-04-22 NOTE — CARE COORDINATION
ACM   Confidence: 9/10  Anticipated Goal Completion Date: 6.20.24                Future Appointments   Date Time Provider Department Center   4/22/2024  2:45 PM Sarah Storm, APRN - CNP [facility]   5/15/2024  1:30 PM Arsalan Berg MD [facility] None   6/10/2024  3:30 PM Teresa Bejarano MD Neuro [facility] Neurology -   6/13/2024  1:00 PM Adia Mcmanus MD [facility]   9/18/2024  3:50 PM Arsalan Berg MD [facility] None   ,   Diabetes Assessment    Medic Alert ID: No  Meal Planning: Avoidance of concentrated sweets   How often do you test your blood sugar?: Daily, Meals, Bedtime   Do you have barriers with adherence to non-pharmacologic self-management interventions? (Nutrition/Exercise/Self-Monitoring): Yes   Have you ever had to go to the ED for symptoms of low blood sugar?: No       No patient-reported symptoms        ,   Congestive Heart Failure Assessment    Are you currently restricting fluids?: 2000cc     No patient-reported symptoms      Symptoms:          , and   General Assessment    Do you have any symptoms that are causing concern?: No

## 2024-04-23 ENCOUNTER — CARE COORDINATION (OUTPATIENT)
Dept: CASE MANAGEMENT | Age: 79
End: 2024-04-23

## 2024-04-23 RX ORDER — POTASSIUM CHLORIDE 750 MG/1
10 TABLET, EXTENDED RELEASE ORAL DAILY
COMMUNITY
Start: 2024-03-26

## 2024-04-23 RX ORDER — ISOSORBIDE MONONITRATE 60 MG/1
60 TABLET, EXTENDED RELEASE ORAL DAILY
COMMUNITY
Start: 2024-04-16

## 2024-04-23 RX ORDER — DAPAGLIFLOZIN 10 MG/1
10 TABLET, FILM COATED ORAL DAILY
COMMUNITY
Start: 2024-04-09

## 2024-04-23 NOTE — CARE COORDINATION
Date/Time:  4/23/2024 10:05 AM  LPN attempted to reach patient by telephone regarding  NO Metrics x 2 days  in remote patient monitoring program. Unable to leave message  VM not set up yet. Will attempt to reach patient again.    Call to ruthie Anderson. Monica will update pt.  Enrolled in RPM for CHF DM AND HTN

## 2024-04-24 ENCOUNTER — CARE COORDINATION (OUTPATIENT)
Dept: CASE MANAGEMENT | Age: 79
End: 2024-04-24

## 2024-04-24 NOTE — CARE COORDINATION
Remote Patient Monitoring Note      Date/Time:  2024 3:41 PM  Patient Current Location: Home: [address]  [place] [address]  LPN contacted patient by telephone regarding  no metrics x 3 days   Verified patients name and  as identifiers.    Background: enrolled in RPM for CHF DM HTN  Clinical Interventions: Reviewed and followed up on alerts and treatments-   Spoke to Kayleigh regarding RPM. Pt states her tablet still says paused after resuming 3 days ago. 3 way call to HRS. Spoke to Fan. Update to tablet completed. Pulse ox metrics obtained   . Bp Cuff reads error. Pt given instructions on checking BP. Educated on keeping feet flat on the floor. Place cuff  between shoulder and elbow with tubing going towards the wrist. White dot is aligned with inner most part of elbow . BP cuff needs to fit snug. not loose. Able to get 2 fingers under the cuff. Extend arm on table, dresser or counter. Do not bend arm.  Educated on not checking pressure over a long sleeve shirt or sweater. Sit quietly while checking. verbalized understanding. Recheck of BP. New reading is 150/77.  Pt will obtain weight metrics tomorrow. All metrics reported are WNL.   Plan/Follow Up: Will continue to review, monitor and address alerts with follow up based on severity of symptoms and risk factors.

## 2024-04-24 NOTE — CARE COORDINATION
Date/Time:  4/24/2024 11:38 AM  LPN attempted to reach patient by telephone regarding  NO METRICS X 3 DAYS  in remote patient monitoring program. Unable to leave message. VM not set up yet  Will attempt to reach patient again.    Enrolled in RPM for CHF DM AND HTN

## 2024-04-26 ENCOUNTER — CARE COORDINATION (OUTPATIENT)
Dept: CASE MANAGEMENT | Age: 79
End: 2024-04-26

## 2024-04-26 NOTE — CARE COORDINATION
Remote Patient Monitoring Note      Date/Time:  2024 10:25 AM  Patient Current Location: Home: [address] [place] [address] [address]  [facility] [address]  LPN contacted family by telephone regarding red alert received for glucose reading (950). Verified patients name and  as identifiers.    Background: CHF, DM, HTN  Clinical Interventions:  Attempted to reach patient for BG reading of 950 , was able to reach daughter kaelyn she attempted to reach patient and was unsuccessful she is going to get in touch with mom and recheck BG reading she will call me back if she has any concerns      Plan/Follow Up: Will continue to review, monitor and address alerts with follow up based on severity of symptoms and risk factors.

## 2024-05-03 ENCOUNTER — CARE COORDINATION (OUTPATIENT)
Dept: CASE MANAGEMENT | Age: 79
End: 2024-05-03

## 2024-05-03 NOTE — CARE COORDINATION
Remote Patient Monitoring Note      Date/Time:  5/3/2024 1:37 PM  Patient Current Location: Home: [address] [place] [address] [address]  [facility] [address]  LPN contacted family by telephone regarding yellow alert received for survey response (CHF ZONE ). Verified patients name and  as identifiers.    Background: CHF, DM, HTN  Clinical Interventions: Reviewed and followed up on alerts and treatments-spoke to son he states he just left his moms house and she is doing well denies ches tpain, SOB, dizziness ad swelling he thinks she did not understand the survey question      Plan/Follow Up: Will continue to review, monitor and address alerts with follow up based on severity of symptoms and risk factors.

## 2024-05-06 ENCOUNTER — CARE COORDINATION (OUTPATIENT)
Dept: CARE COORDINATION | Age: 79
End: 2024-05-06

## 2024-05-06 NOTE — CARE COORDINATION
CHF TUCK IN 1st attempt. No answer, No VM available.   Noted most recent RPM metrics:  Green Alert Reviewed ---- Current Patient Metrics ---- Blood Pressure: 153/66, 60bpm Glucose: 153.0mg/dl Pulseox: 95%, 61bpm Survey: 0/11 Weight: 252.8lbs Note Created at: 05/06/2024

## 2024-05-07 ENCOUNTER — CARE COORDINATION (OUTPATIENT)
Dept: CARE COORDINATION | Age: 79
End: 2024-05-07

## 2024-05-07 NOTE — CARE COORDINATION
Ambulatory Care Coordination Note  2024    Patient Current Location:  Home: [address] [place] [address] [address]  [place] [address]     ACM contacted the patient by telephone. Verified name and  with patient as identifiers. Provided introduction to self, and explanation of the ACM role.     Challenges to be reviewed by the provider   Additional needs identified to be addressed with provider: No  none               Method of communication with provider: none.    ACM: JASPER MEDLEY, RN    Summary  Date Care Coordination Episode Started:  3.20.24      Reason patient is in Care Coordination:     PCP referral    Topics Discussed Today:     DM, blood sugar running between , discussed hypoglycemia and how to treat it. Patient stated it has only been under 70 on 3 occasions and she has glucose tablets for emergency. Educated on eating something after needing to use glucose tablets to keep the blood sugar up. She will reach out if she continues to have low blood sugar. Will update PCP.   CHF, no concerns today.   Per patient she is doing well and has no issues today.       Assessments completed today:     Fall risk  Initial assessment  Medication reconciliation  SDOH  DM, CHF (Health assessments)      Care Coordinator plan of care:     Follow up in 1-2 weeks, continue education and support.       Offered patient enrollment in the Remote Patient Monitoring (RPM) program for in-home monitoring: Yes, patient enrolled; current status is activated and monitoring.  Green Alert Reviewed ---- Current Patient Metrics ---- Blood Pressure: 131/62, 61bpm Glucose: 128.0mg/dl Pulseox: 96%, 60bpm Survey: 0/11 Weight: 254.5lbs Note Created at: 2024 08:57 AM          Goals Addressed                   This Visit's Progress     Conditions and Symptoms   On track     I will schedule office visits, as directed by my provider.  I will keep my appointment or reschedule if I have to cancel.  I will notify my provider of any barriers to my plan of

## 2024-05-10 ENCOUNTER — CARE COORDINATION (OUTPATIENT)
Dept: CASE MANAGEMENT | Age: 79
End: 2024-05-10

## 2024-05-10 ENCOUNTER — TELEPHONE (OUTPATIENT)
Dept: PRIMARY CARE CLINIC | Age: 79
End: 2024-05-10

## 2024-05-10 NOTE — CARE COORDINATION
-Remote Alert Monitoring Note      Date/Time:  5/10/2024 10:47 AM  Patient Current Location: Home: [address] [place] [address] [address]  [facility] [address]  Verified patients name and  as identifiers.    Rpm alert to be reviewed by the provider   red alert  weight (5 lbs in 7 days)  Additional needs to be addressed by provider: Spoke to patient about 5 lb weight gain in 7 days she denies chest pain, SOB, dizziness she does states she has increased swelling in legs no swelling in hands or abdomen. Patient taking Lasix 20 mg daily, she states she did have on a gown today does not weigh in shoes and does not move scale. She states she was a little shaky trying to  balance but did not touch counter or wall. All other vitals WNL at present time todays weight 258.7 starting weight 254                   LPN contacted patient by telephone regarding red alert received for Weight:5 lbs in 7 days    Background: CHF, DM, HTN  Refer to 911 immediately if:  Patient unresponsive or unable to provide history  Change in cognition or sudden confusion  Patient unable to respond in complete sentences  Intense chest pain/tightness  Any concern for any clinical emergency  Red Alert: Provider response time of 1 hr required for any red alert requiring intervention  Yellow Alert: Provider response time of 3hr required for any escalated yellow alert    Patient Chief Complaint:  Weight Scale Triage  Was your weight obtained upon rising/waking today? yes   Was your weight obtained after voiding and/or use of the bathroom today? yes   Did you weigh yourself in the same amount of clothing today, compared to how you typically do? no   Was the scale bumped or moved prior to today's weight? no   Is your scale on a flat/hard surface? yes   Did you obtain your weight with shoes on? no   If yes, is this something you normally do during your daily weights? no   Were you standing up straight on the scale today? yes   Were you leaning on anything while obtaining your

## 2024-05-10 NOTE — CARE COORDINATION
Called first number on chart, no answer, unable to leave message.    Called second number, [phone]. Daughter, also named Kayleigh.  She verbalizes understanding and states that she will contact pt.

## 2024-05-10 NOTE — TELEPHONE ENCOUNTER
Her provider is out of office this week.  I am not familiar with her.  If she has gained that much weight, is not taking her Lasix and is in distress she needs to go to the emergency room for further evaluation.

## 2024-05-13 ENCOUNTER — CARE COORDINATION (OUTPATIENT)
Dept: CARE COORDINATION | Age: 79
End: 2024-05-13

## 2024-05-14 NOTE — CARE COORDINATION
Attempting to reach patient for a follow up care coordination call regarding any needs, questions or concerns.No VM available.   Last RPM metrics:  Green alert reviewed ---- Current Patient Metrics ---- Blood Pressure: 153/70, 60bpm Glucose: 157.0mg/dl Pulseox: 93%, 61bpm Survey: 5/11 Weight: 256.1lbs Note Created at: 05/13/2024 11:02 AM     Tish Menezes [facility] [phone]

## 2024-05-20 ENCOUNTER — CARE COORDINATION (OUTPATIENT)
Dept: CARE COORDINATION | Age: 79
End: 2024-05-20

## 2024-05-31 ENCOUNTER — CARE COORDINATION (OUTPATIENT)
Dept: CARE COORDINATION | Age: 79
End: 2024-05-31

## 2024-05-31 NOTE — CARE COORDINATION
Ambulatory Care Coordination Note  2024    Patient Current Location:  Home: [address] [place], [place] [address]  [place] [address]     ACM contacted the patient by telephone. Verified name and  with patient as identifiers. Provided introduction to self, and explanation of the ACM role.     Challenges to be reviewed by the provider   Additional needs identified to be addressed with provider: No  none               Method of communication with provider: none.    Summary  Date Care Coordination Episode Started:  3.20.24      Reason patient is in Care Coordination:     PCP referral    Topics Discussed Today:     Medications, no needs today.  Upcoming appointment reminder.  RPM, patient stated it is going very well and she is thankful to have it. Will route updated metrics to PCP for her upcoming appointment.   Patient denied any needs today.       Assessments completed today:     Fall risk  Initial assessment  Medication reconciliation  SDOH  CHF, DM (Health assessments)      Care Coordinator plan of care:     Follow up call in 2-3 weeks, continue education and support.     Offered patient enrollment in the Remote Patient Monitoring (RPM) program for in-home monitoring: Yes, patient enrolled; current status is activated and monitoring.         Goals Addressed                   This Visit's Progress     Conditions and Symptoms   Improving     I will schedule office visits, as directed by my provider.  I will keep my appointment or reschedule if I have to cancel.  I will notify my provider of any barriers to my plan of care.  I will follow my Zone Management tool to seek urgent or emergent care.  I will notify my provider of any symptoms that indicate a worsening of my condition.    Barriers: overwhelmed by complexity of regimen  Plan for overcoming my barriers: work with ACM   Confidence: 9/10  Anticipated Goal Completion Date: 24                Future Appointments   Date Time Provider Department Center   6/3/2024  1:50 PM

## 2024-06-07 ENCOUNTER — CARE COORDINATION (OUTPATIENT)
Dept: CARE COORDINATION | Age: 79
End: 2024-06-07

## 2024-06-07 NOTE — CARE COORDINATION
Remote Alert Monitoring Note      Date/Time:  2024 8:16 AM  Patient Current Location: Home: [address]  [place] [address]    LPN contacted patient by telephone regarding red alert received for pulse ox reading (91%). Verified patients name and  as identifiers.    Rpm alert to be reviewed by the provider                         Background:  CHF, Diabetes, High Blood-Pressure     Refer to 911 immediately if:  Patient unresponsive or unable to provide history  Change in cognition or sudden confusion  Patient unable to respond in complete sentences  Intense chest pain/tightness  Any concern for any clinical emergency  Red Alert: Provider response time of 1 hr required for any red alert requiring intervention  Yellow Alert: Provider response time of 3hr required for any escalated yellow alert        O2 Triage  Are you having any Chest Pain? no   Are you having any Shortness of Breath? no        Are you having any other health concerns or issues? no       Clinical Interventions: Reviewed and followed up on alerts and treatments-. Pt is asymptomatic and in no apparent distress, speaking in full sentences.  Patient states \"I'm fine\" and denies any SOB.  She declines to recheck the SP02 stating she is not able to at this time.  She states if able, will do so later today.        Plan/Follow Up: Will continue to review, monitor and address alerts with follow up based on severity of symptoms and risk factors.    [facility]/ CTN/ Remote Patient monitoring  [phone]

## 2024-06-10 ENCOUNTER — CARE COORDINATION (OUTPATIENT)
Dept: CARE COORDINATION | Age: 79
End: 2024-06-10

## 2024-06-10 ENCOUNTER — OFFICE VISIT (OUTPATIENT)
Dept: NEUROLOGY | Age: 79
End: 2024-06-10
Payer: MEDICARE

## 2024-06-10 VITALS
HEART RATE: 60 BPM | SYSTOLIC BLOOD PRESSURE: 183 MMHG | BODY MASS INDEX: 40.98 KG/M2 | HEIGHT: 66 IN | DIASTOLIC BLOOD PRESSURE: 85 MMHG | WEIGHT: 255 LBS

## 2024-06-10 DIAGNOSIS — E11.42 DIABETIC PERIPHERAL NEUROPATHY (HCC): Primary | ICD-10-CM

## 2024-06-10 PROCEDURE — 3044F HG A1C LEVEL LT 7.0%: CPT

## 2024-06-10 PROCEDURE — 99214 OFFICE O/P EST MOD 30 MIN: CPT

## 2024-06-10 PROCEDURE — 3077F SYST BP >= 140 MM HG: CPT

## 2024-06-10 PROCEDURE — 3079F DIAST BP 80-89 MM HG: CPT

## 2024-06-10 PROCEDURE — 1123F ACP DISCUSS/DSCN MKR DOCD: CPT

## 2024-06-10 RX ORDER — PREGABALIN 50 MG/1
CAPSULE ORAL
Qty: 60 CAPSULE | Refills: 2 | Status: SHIPPED | OUTPATIENT
Start: 2024-06-10 | End: 2024-06-18

## 2024-06-10 NOTE — CARE COORDINATION
Second attempt to outreach patient for discuss weight gain. Patient did not answer and unable to leave VM.   E/C was attempted to be reached and again unable to leave VM.     Will continue to monitor and outreach the patient based on alerts

## 2024-06-10 NOTE — CARE COORDINATION
ACM attempted to outreach patient due to a red alert for weight gain.    Patient weight noted to be 257.8 this morning.  Baseline weight is 254.   Unable to leave message as patient does not have a VM box set up to receive calls.     Will send MCM asking patient to outreach to [facility].   Will follow up again this afternoon to attempt triage

## 2024-06-11 ENCOUNTER — TELEPHONE (OUTPATIENT)
Dept: NEUROLOGY | Age: 79
End: 2024-06-11

## 2024-06-11 DIAGNOSIS — E11.42 DIABETIC PERIPHERAL NEUROPATHY (HCC): ICD-10-CM

## 2024-06-11 NOTE — TELEPHONE ENCOUNTER
Pt daughter Siobhan ([phone]) called and stated they did not received the Lyrica script at the Pharmacy. I called the Pharmacy and they needed clarification of the directions. Can you please send a new script to the pharmacy with correct directions please

## 2024-06-12 ENCOUNTER — CARE COORDINATION (OUTPATIENT)
Dept: CARE COORDINATION | Age: 79
End: 2024-06-12

## 2024-06-12 NOTE — CARE COORDINATION
Remote Alert Monitoring Note      Date/Time:  2024 8:45 AM  Patient Current Location: Home: [address] [place], [place] [address] [address]  [facility] [address]    LPN contacted patient by telephone regarding red alert received for weight increase (257.6lb). Verified patients name and  as identifiers.    Rpm alert to be reviewed by the provider                         Background:  CHF, Diabetes, High Blood-Pressure     Refer to 911 immediately if:  Patient unresponsive or unable to provide history  Change in cognition or sudden confusion  Patient unable to respond in complete sentences  Intense chest pain/tightness  Any concern for any clinical emergency  Red Alert: Provider response time of 1 hr required for any red alert requiring intervention  Yellow Alert: Provider response time of 3hr required for any escalated yellow alert        Weight Scale Triage  Was your weight obtained upon rising/waking today? yes   Was your weight obtained after voiding and/or use of the bathroom today? yes   Did you weigh yourself in the same amount of clothing today, compared to how you typically do? yes   Was the scale bumped or moved prior to today's weight? no   Is your scale on a flat/hard surface? yes   Did you obtain your weight with shoes on? no   If yes, is this something you normally do during your daily weights? yes   Were you standing up straight on the scale today? yes   Were you leaning on anything while obtaining your weight today? no     Have you taken your medications as instructed by your doctor today? Yes   Weight Triage  Are you weighing any different than you did yesterday? (time of day, clothes and shoes on or off, etc)? no   Do you have any shortness of breath? no   Do you have any swelling in your hands of feet? no   Are you having any other health concerns or issues? no       Clinical Interventions: Reviewed and followed up on alerts and treatments-. Pt is asymptomatic and in no apparent distress, speaking in full sentences.

## 2024-06-14 ENCOUNTER — CARE COORDINATION (OUTPATIENT)
Dept: CARE COORDINATION | Age: 79
End: 2024-06-14

## 2024-06-14 NOTE — CARE COORDINATION
Patient returned call stating that she is doing good right now, denied any needs. Reminder to be careful in the extreme heat that is predicted for our area this coming week.

## 2024-06-14 NOTE — CARE COORDINATION
Ambulatory Care Coordination Note     6/14/2024 1:20 PM     patient outreach attempt by this ACM today to perform care management follow up . ACM was unable to reach the patient by telephone today;  No VM available     ACM: JASPER MEDLEY RN     Care Summary Note: Noted RPM metrics: Green Alert Reviewed ---- Current Patient Metrics ---- Blood Pressure: 139/70, 62bpm Glucose: 107.0mg/dl Pulseox: 94%, 62bpm Survey: 0/11 Weight: 257.8lbs Note Created at: 06/14/2024 08:34 AM     PCP/Specialist follow up:   Future Appointments         Provider Specialty Dept Phone    6/19/2024 1:30 PM Arsalan Berg MD Nephrology [phone]    6/20/2024 9:30 AM Elisha Comer, APRN - [facility] Neurosurgery [phone]    9/18/2024 3:50 PM Arsalan Berg MD Nephrology [phone]    9/19/2024 2:30 PM Adia Mcmanus MD Family Medicine [phone]    10/7/2024 2:00 PM Teresa Bejarano MD Neurology [phone]            Follow Up:   Plan for next ACM outreach in approximately 2 weeks to complete:  - disease specific assessments  - medication review  - goal progression  - education   - RPM.

## 2024-06-17 DIAGNOSIS — E11.42 DIABETIC PERIPHERAL NEUROPATHY (HCC): ICD-10-CM

## 2024-06-17 RX ORDER — PREGABALIN 50 MG/1
CAPSULE ORAL
Qty: 60 CAPSULE | Refills: 2 | Status: CANCELLED | OUTPATIENT
Start: 2024-06-17 | End: 2024-06-30

## 2024-06-18 RX ORDER — PREGABALIN 50 MG/1
CAPSULE ORAL
Qty: 60 CAPSULE | Refills: 2 | Status: SHIPPED | OUTPATIENT
Start: 2024-06-18 | End: 2024-06-21 | Stop reason: SDUPTHER

## 2024-06-19 NOTE — TELEPHONE ENCOUNTER
Joie South the pt home health care nurse called [phone] and stated [facility] never received the lyrica script. I spoke with [facility] and they stated they needed clarification on the lyrica script  because the note to the pharmacy and the sig is saying two different things. Can you please call the pharmacy to clarify the correct directions

## 2024-06-20 ENCOUNTER — OFFICE VISIT (OUTPATIENT)
Dept: NEUROSURGERY | Age: 79
End: 2024-06-20
Payer: MEDICARE

## 2024-06-20 VITALS
SYSTOLIC BLOOD PRESSURE: 118 MMHG | DIASTOLIC BLOOD PRESSURE: 64 MMHG | BODY MASS INDEX: 41.62 KG/M2 | OXYGEN SATURATION: 93 % | HEIGHT: 66 IN | HEART RATE: 70 BPM | WEIGHT: 259 LBS

## 2024-06-20 DIAGNOSIS — R20.2 NUMBNESS AND TINGLING IN BOTH HANDS: Primary | ICD-10-CM

## 2024-06-20 DIAGNOSIS — R20.0 NUMBNESS AND TINGLING IN BOTH HANDS: Primary | ICD-10-CM

## 2024-06-20 DIAGNOSIS — M47.812 CERVICAL SPONDYLOSIS: ICD-10-CM

## 2024-06-20 DIAGNOSIS — E11.42 DIABETIC PERIPHERAL NEUROPATHY (HCC): ICD-10-CM

## 2024-06-20 DIAGNOSIS — R29.898 LEFT HAND WEAKNESS: ICD-10-CM

## 2024-06-20 PROCEDURE — 99214 OFFICE O/P EST MOD 30 MIN: CPT | Performed by: NURSE PRACTITIONER

## 2024-06-20 PROCEDURE — 3078F DIAST BP <80 MM HG: CPT | Performed by: NURSE PRACTITIONER

## 2024-06-20 PROCEDURE — 3074F SYST BP LT 130 MM HG: CPT | Performed by: NURSE PRACTITIONER

## 2024-06-20 PROCEDURE — 1123F ACP DISCUSS/DSCN MKR DOCD: CPT | Performed by: NURSE PRACTITIONER

## 2024-06-20 RX ORDER — PREGABALIN 50 MG/1
100 CAPSULE ORAL 3 TIMES DAILY
Qty: 180 CAPSULE | Refills: 2 | Status: CANCELLED | OUTPATIENT
Start: 2024-06-20 | End: 2024-09-18

## 2024-06-20 RX ORDER — FUROSEMIDE 40 MG/1
TABLET ORAL
COMMUNITY
Start: 2024-05-31

## 2024-06-20 RX ORDER — POTASSIUM CHLORIDE 20 MEQ/1
TABLET, EXTENDED RELEASE ORAL
COMMUNITY
Start: 2024-06-17

## 2024-06-20 RX ORDER — CARVEDILOL 12.5 MG/1
TABLET ORAL
COMMUNITY
Start: 2024-06-15

## 2024-06-20 NOTE — PROGRESS NOTES
extended release tablet Take 1 tablet by mouth daily 1.5 tabs per day      dapagliflozin (FARXIGA) 10 MG tablet Take 1 tablet by mouth daily      potassium chloride (KLOR-CON M) 10 MEQ extended release tablet Take 1 tablet by mouth daily      Lancets MISC 1 each by Does not apply route 3 times daily 600 each 1    blood glucose test strips (ASCENSIA AUTODISC VI;ONE TOUCH ULTRA TEST VI) strip Check glucose AC TID, dx E11.9, may sub covered product 100 strip 11    carvedilol (COREG) 25 MG tablet Take 1 tablet by mouth 2 times daily 180 tablet 3    BANOPHEN 50 MG capsule take 1 capsule by mouth every 6 hours if needed for itching 120 capsule 1    nystatin (MYCOSTATIN) 283096 UNIT/GM powder Apply 3 times daily. 60 g 11    TRUEPLUS GLUCOSE 4 g chewable tablet CHEW AND SWALLOW 4 to 8 tablets by mouth daily if needed      NOVOLOG FLEXPEN 100 UNIT/ML injection pen inject 4 units subcutaneously before breakfast then inject 6 unit...  (REFER TO PRESCRIPTION NOTES).      acetaminophen (TYLENOL 8 HOUR) 650 MG extended release tablet Take 1 tablet by mouth every 8 hours as needed for Pain      amiodarone (CORDARONE) 200 MG tablet Take 1 tablet by mouth daily 30 tablet 0    tamsulosin (FLOMAX) 0.4 MG capsule Take 1 capsule by mouth daily 30 capsule 3    nitroGLYCERIN (NITROSTAT) 0.4 MG SL tablet Place 1 tablet under the tongue every 5 minutes as needed for Chest pain up to max of 3 total doses. If no relief after 1 dose, call 911. 25 tablet 3    meclizine (ANTIVERT) 12.5 MG tablet take 1 tablet by mouth three times a day if needed for dizziness 30 tablet 5    Misc. Devices (STEP N REST WALKER) MISC 1 each by Does not apply route continuous Seated, wheeled walker 1 each 0    pantoprazole (PROTONIX) 40 MG tablet take 1 tablet by mouth every morning for GERD 30 tablet 11    vitamin D3 (CHOLECALCIFEROL) 25 MCG (1000 UT) TABS tablet Take 1 tablet by mouth daily 90 tablet 3    Insulin Pen Needle 32G X 4 MM MISC 1 each by Does not apply

## 2024-06-20 NOTE — TELEPHONE ENCOUNTER
I sent a message to Dr. Moore in [facility] concerning this pt and he stated ok about calling the pharmacy to clarify the directions for the lyrica script. I called Joie back yesterday and let her know.

## 2024-06-21 DIAGNOSIS — E11.42 DIABETIC PERIPHERAL NEUROPATHY (HCC): Primary | ICD-10-CM

## 2024-06-21 RX ORDER — PREGABALIN 50 MG/1
CAPSULE ORAL
Qty: 21 CAPSULE | Refills: 0 | Status: SHIPPED | OUTPATIENT
Start: 2024-06-21 | End: 2024-08-07

## 2024-06-21 RX ORDER — PREGABALIN 100 MG/1
100 CAPSULE ORAL 3 TIMES DAILY
COMMUNITY
End: 2024-06-21 | Stop reason: SDUPTHER

## 2024-06-21 RX ORDER — PREGABALIN 100 MG/1
100 CAPSULE ORAL 3 TIMES DAILY
Qty: 90 CAPSULE | Refills: 2 | Status: SHIPPED | OUTPATIENT
Start: 2024-06-21 | End: 2024-09-19

## 2024-07-02 ENCOUNTER — CARE COORDINATION (OUTPATIENT)
Dept: CASE MANAGEMENT | Age: 79
End: 2024-07-02

## 2024-07-02 NOTE — CARE COORDINATION
-Remote Alert Monitoring Note      Date/Time:  2024 10:36 AM  Patient Current Location: Home: [address] [place] [address] [address]  [facility] [address]  Verified patients name and  as identifiers.    Rpm alert to be reviewed by the provider   red alert  weight (4 lbs in day )    Additional needs to be addressed by provider:  Spoke to patient about 4 lb weight gain in 1 day, patient denies chest pain, SOB, dizziness she does have swelling in both feet patient has placed compression stockings she is elevating feet and using 40 Mg Lasix daily. Patient is in no distress and states she is weighing in same amount of clothing as directed she does not move scale. And is having normal bowel elimination. Patient has no swelling in legs hands or abdomen.                    LPN contacted patient by telephone regarding red alert received   Background: CHF, DM, HTN  Refer to 911 immediately if:  Patient unresponsive or unable to provide history  Change in cognition or sudden confusion  Patient unable to respond in complete sentences  Intense chest pain/tightness  Any concern for any clinical emergency  Red Alert: Provider response time of 1 hr required for any red alert requiring intervention  Yellow Alert: Provider response time of 3hr required for any escalated yellow alert  Patient Chief Complaint:  Weight Scale Triage  Was your weight obtained upon rising/waking today? yes   Was your weight obtained after voiding and/or use of the bathroom today? yes   Did you weigh yourself in the same amount of clothing today, compared to how you typically do? yes   Was the scale bumped or moved prior to today's weight? no   Is your scale on a flat/hard surface? yes   Did you obtain your weight with shoes on? no   If yes, is this something you normally do during your daily weights? no   Were you standing up straight on the scale today? yes   Were you leaning on anything while obtaining your weight today? no     Clinical Interventions: Spoke to patient

## 2024-07-02 NOTE — TELEPHONE ENCOUNTER
Please call patient - have her take a second Lasix 40 mg daily in the afternoon today, 7/3 and 7/4. Avoid salty, processed food and limit fluid intake to 48 oz a day.

## 2024-07-02 NOTE — CARE COORDINATION
Patient called LPN back and I was able to give her new orders to  take a second Lasix 40 mg daily in the afternoon today, 7/3 and 7/4. Avoid salty, processed food and limit fluid intake to 48 oz a day. Patient verbalized understanding and will do this

## 2024-07-02 NOTE — CARE COORDINATION
Date/Time:  7/2/2024 11:01 AM  LPN attempted to reach patient by telephone regarding  call to patient with new order from PCP  in remote patient monitoring program.no answer unable to leave HIPPA compliant message requesting a return call. Will attempt to reach patient again.

## 2024-07-02 NOTE — CARE COORDINATION
Date/Time:  7/2/2024 11:53 AM  LPN attempted to reach patient by telephone regarding red alert in remote patient monitoring program. No answer unable to leave HIPPA compliant message requesting a return call. Will attempt to reach patient again.

## 2024-07-02 NOTE — CARE COORDINATION
LPN attempted to reach patient by telephone regarding red alert in remote patient monitoring program. No answer unable to leave HIPPA compliant message requesting a return call. Will attempt to reach patient again.

## 2024-07-09 ENCOUNTER — HOSPITAL ENCOUNTER (OUTPATIENT)
Dept: MAMMOGRAPHY | Age: 79
Discharge: HOME | End: 2024-07-11
Attending: FAMILY MEDICINE
Payer: MEDICARE

## 2024-07-09 DIAGNOSIS — Z78.0 MENOPAUSE: ICD-10-CM

## 2024-07-09 DIAGNOSIS — Z12.31 SCREENING MAMMOGRAM FOR BREAST CANCER: ICD-10-CM

## 2024-07-09 PROCEDURE — 77063 BREAST TOMOSYNTHESIS BI: CPT

## 2024-07-09 PROCEDURE — 77080 DXA BONE DENSITY AXIAL: CPT

## 2024-07-11 ENCOUNTER — CARE COORDINATION (OUTPATIENT)
Dept: CARE COORDINATION | Age: 79
End: 2024-07-11

## 2024-07-11 NOTE — CARE COORDINATION
Ambulatory Care Coordination Note     2024 9:55 AM     Patient Current Location:  Home: [address] [place] [address] [address]  [facility] [address]     ACM contacted the patient by telephone. Verified name and  with patient as identifiers.         ACM: JASPER MEDLEY RN     Challenges to be reviewed by the provider   Additional needs identified to be addressed with provider Yes  Still having intermittent SOB since ED visit, requesting a follow up appointment.                Method of communication with provider: chart routing.    Care Summary Note: Patient stated she is doing much better since her ED visit on 24. She reports her diuretic is helping significantly, she is still having bouts of SOB. She is requesting a sooner appt, will route to office to call and schedule.   No other concerns today per patient.     Offered patient enrollment in the Remote Patient Monitoring (RPM) program for in-home monitoring: Yes, patient enrolled; current status is activated and monitoring.   Green Alert Reviewed ---- Current Patient Metrics ---- Blood Pressure: 100/56, 67bpm Glucose: 102.0mg/dl Pulseox: 93%, 65bpm Survey: 0 Weight: 260.3lbs Note Created at: 2024 08:13 AM   Assessments Completed:   Diabetes Assessment    Medic Alert ID: No  Meal Planning: Avoidance of concentrated sweets   How often do you test your blood sugar?: Daily, Meals, Bedtime   Do you have barriers with adherence to non-pharmacologic self-management interventions? (Nutrition/Exercise/Self-Monitoring): Yes   Have you ever had to go to the ED for symptoms of low blood sugar?: No       No patient-reported symptoms         ,   Congestive Heart Failure Assessment    Are you currently restricting fluids?: 2000cc     Shortness of breath (worse than baseline)      Symptoms:     Symptom course: no change  Weight trend: stable  Salt intake watch compared to last visit: stable      , and   General Assessment    Do you have any symptoms that are causing concern?:

## 2024-07-26 ENCOUNTER — CARE COORDINATION (OUTPATIENT)
Dept: CARE COORDINATION | Age: 79
End: 2024-07-26

## 2024-07-26 ENCOUNTER — CARE COORDINATION (OUTPATIENT)
Dept: CASE MANAGEMENT | Age: 79
End: 2024-07-26

## 2024-07-26 NOTE — CARE COORDINATION
Rpm alert for no metrics x 2 days  Noted pt is admitted to [facility]  RPM Paused until discharge  Enrolled in RPM for CHF  DM AND HTN

## 2024-08-05 ENCOUNTER — HOSPITAL ENCOUNTER (OUTPATIENT)
Age: 79
Setting detail: SPECIMEN
Discharge: HOME | End: 2024-08-05

## 2024-08-05 LAB
ANION GAP SERPL CALCULATED.3IONS-SCNC: 15 MMOL/L (ref 9–17)
BUN SERPL-MCNC: 18 MG/DL (ref 8–23)
BUN/CREAT SERPL: 14 (ref 9–20)
CALCIUM SERPL-MCNC: 9.9 MG/DL (ref 8.6–10.4)
CHLORIDE SERPL-SCNC: 100 MMOL/L (ref 98–107)
CO2 SERPL-SCNC: 24 MMOL/L (ref 20–31)
CREAT SERPL-MCNC: 1.3 MG/DL (ref 0.5–0.9)
ERYTHROCYTE [DISTWIDTH] IN BLOOD BY AUTOMATED COUNT: 14.2 % (ref 11.8–14.4)
GFR, ESTIMATED: 42 ML/MIN/1.73M2
GLUCOSE SERPL-MCNC: 125 MG/DL (ref 70–99)
HCT VFR BLD AUTO: 38.9 % (ref 36.3–47.1)
HGB BLD-MCNC: 12.5 G/DL (ref 11.9–15.1)
MCH RBC QN AUTO: 31 PG (ref 25.2–33.5)
MCHC RBC AUTO-ENTMCNC: 32.1 G/DL (ref 28.4–34.8)
MCV RBC AUTO: 96.5 FL (ref 82.6–102.9)
NRBC BLD-RTO: 0 PER 100 WBC
PLATELET # BLD AUTO: 222 K/UL (ref 138–453)
PMV BLD AUTO: 11.6 FL (ref 8.1–13.5)
POTASSIUM SERPL-SCNC: 3.4 MMOL/L (ref 3.7–5.3)
RBC # BLD AUTO: 4.03 M/UL (ref 3.95–5.11)
SODIUM SERPL-SCNC: 139 MMOL/L (ref 135–144)
WBC OTHER # BLD: 5.6 K/UL (ref 3.5–11.3)

## 2024-08-05 PROCEDURE — 85027 COMPLETE CBC AUTOMATED: CPT

## 2024-08-05 PROCEDURE — 36415 COLL VENOUS BLD VENIPUNCTURE: CPT

## 2024-08-05 PROCEDURE — 80048 BASIC METABOLIC PNL TOTAL CA: CPT

## 2024-08-05 PROCEDURE — P9603 ONE-WAY ALLOW PRORATED MILES: HCPCS

## 2024-08-07 ENCOUNTER — CARE COORDINATION (OUTPATIENT)
Dept: CARE COORDINATION | Age: 79
End: 2024-08-07

## 2024-08-07 NOTE — CARE COORDINATION
Ambulatory Care Coordination Note     8/7/2024 8:50 AM     Patient outreach attempt by this ACM today to perform care management follow up . ACM was unable to reach the patient by telephone today; voicemail full and unable to leave a message.      ACM: JASPER MEDLEY RN     Care Summary Note: Noted patient is likely at a SNF, DC from [facility].     PCP/Specialist follow up:   Future Appointments         Provider Specialty Dept Phone    8/27/2024 1:20 PM Adia Mcmanus MD Family Medicine [phone]    9/11/2024 3:00 PM Laurie Ibanez DO Neurosurgery [phone]    9/18/2024 3:50 PM Arsalan Berg MD Nephrology [phone]    10/7/2024 2:00 PM Teresa Bejarano MD Neurology [phone]            Follow Up:   Plan for next ACM outreach in approximately 1 week to complete:  - disease specific assessments  - medication review  - advance care planning  - goal progression  - education   - RPM.

## 2024-08-12 ENCOUNTER — CARE COORDINATION (OUTPATIENT)
Dept: CARE COORDINATION | Age: 79
End: 2024-08-12

## 2024-08-12 ENCOUNTER — HOSPITAL ENCOUNTER (OUTPATIENT)
Age: 79
Setting detail: SPECIMEN
Discharge: HOME | End: 2024-08-12

## 2024-08-12 NOTE — CARE COORDINATION
Sp[facility] with [facility]. Expected to come home by early next week.   She would like a hospital bed for home.   Will route request to PCP.   Patient denied any other needs today.

## 2024-08-13 ENCOUNTER — HOSPITAL ENCOUNTER (OUTPATIENT)
Age: 79
Setting detail: SPECIMEN
Discharge: HOME | End: 2024-08-13
Payer: MEDICARE

## 2024-08-13 LAB
ANION GAP SERPL CALCULATED.3IONS-SCNC: 13 MMOL/L (ref 9–17)
BNP SERPL-MCNC: 702 PG/ML
BUN SERPL-MCNC: 23 MG/DL (ref 8–23)
BUN/CREAT SERPL: 15 (ref 9–20)
CALCIUM SERPL-MCNC: 9 MG/DL (ref 8.6–10.4)
CHLORIDE SERPL-SCNC: 101 MMOL/L (ref 98–107)
CO2 SERPL-SCNC: 26 MMOL/L (ref 20–31)
CREAT SERPL-MCNC: 1.5 MG/DL (ref 0.5–0.9)
GFR, ESTIMATED: 35 ML/MIN/1.73M2
GLUCOSE SERPL-MCNC: 139 MG/DL (ref 70–99)
MAGNESIUM SERPL-MCNC: 2.2 MG/DL (ref 1.6–2.6)
POTASSIUM SERPL-SCNC: 3.8 MMOL/L (ref 3.7–5.3)
SODIUM SERPL-SCNC: 140 MMOL/L (ref 135–144)

## 2024-08-13 PROCEDURE — 83735 ASSAY OF MAGNESIUM: CPT

## 2024-08-13 PROCEDURE — P9603 ONE-WAY ALLOW PRORATED MILES: HCPCS

## 2024-08-13 PROCEDURE — 83880 ASSAY OF NATRIURETIC PEPTIDE: CPT

## 2024-08-13 PROCEDURE — 36415 COLL VENOUS BLD VENIPUNCTURE: CPT

## 2024-08-13 PROCEDURE — 80048 BASIC METABOLIC PNL TOTAL CA: CPT

## 2024-08-15 ENCOUNTER — HOSPITAL ENCOUNTER (OUTPATIENT)
Age: 79
Setting detail: SPECIMEN
Discharge: HOME | End: 2024-08-15

## 2024-08-15 LAB
ANION GAP SERPL CALCULATED.3IONS-SCNC: 9 MMOL/L (ref 9–17)
BNP SERPL-MCNC: 858 PG/ML
BUN SERPL-MCNC: 24 MG/DL (ref 8–23)
BUN/CREAT SERPL: 17 (ref 9–20)
CALCIUM SERPL-MCNC: 9.2 MG/DL (ref 8.6–10.4)
CHLORIDE SERPL-SCNC: 104 MMOL/L (ref 98–107)
CO2 SERPL-SCNC: 29 MMOL/L (ref 20–31)
CREAT SERPL-MCNC: 1.4 MG/DL (ref 0.5–0.9)
GFR, ESTIMATED: 38 ML/MIN/1.73M2
GLUCOSE SERPL-MCNC: 112 MG/DL (ref 70–99)
MAGNESIUM SERPL-MCNC: 2.2 MG/DL (ref 1.6–2.6)
POTASSIUM SERPL-SCNC: 3.9 MMOL/L (ref 3.7–5.3)
SODIUM SERPL-SCNC: 142 MMOL/L (ref 135–144)

## 2024-08-15 PROCEDURE — P9603 ONE-WAY ALLOW PRORATED MILES: HCPCS

## 2024-08-15 PROCEDURE — 83880 ASSAY OF NATRIURETIC PEPTIDE: CPT

## 2024-08-15 PROCEDURE — 83735 ASSAY OF MAGNESIUM: CPT

## 2024-08-15 PROCEDURE — 80048 BASIC METABOLIC PNL TOTAL CA: CPT

## 2024-08-15 PROCEDURE — 36415 COLL VENOUS BLD VENIPUNCTURE: CPT

## 2024-08-20 ENCOUNTER — CARE COORDINATION (OUTPATIENT)
Dept: CARE COORDINATION | Age: 79
End: 2024-08-20

## 2024-08-20 NOTE — CARE COORDINATION
Ambulatory Care Coordination Note     8/20/2024 1:58 PM     Patient outreach attempt by this ACM today to perform care management follow up . ACM was unable to reach the patient by telephone today;  no VM available.     ACM: JASPER MEDLEY, RN     Care Summary Note: would like to check to see if patient has been DC home from SNF yet.     PCP/Specialist follow up:   Future Appointments         Provider Specialty Dept Phone    8/27/2024 1:20 PM Adia Mcmanus MD Family Medicine [phone]    9/11/2024 3:00 PM Laurie Ibanez DO Neurosurgery [phone]    9/18/2024 3:50 PM Arsalan Berg MD Nephrology [phone]    10/7/2024 2:00 PM Teresa Bejarano MD Neurology [phone]            Follow Up:   Plan for next ACM outreach in approximately 1-2 days  to complete:  - disease specific assessments  - medication review  - goal progression  - education   - RPM.

## 2024-08-23 ENCOUNTER — HOSPITAL ENCOUNTER (OUTPATIENT)
Age: 79
Setting detail: SPECIMEN
Discharge: HOME | End: 2024-08-23

## 2024-08-23 ENCOUNTER — CARE COORDINATION (OUTPATIENT)
Dept: CARE COORDINATION | Age: 79
End: 2024-08-23

## 2024-08-23 LAB
25(OH)D3 SERPL-MCNC: 32.4 NG/ML (ref 30–100)
ALBUMIN SERPL-MCNC: 3.9 G/DL (ref 3.5–5.2)
ALP SERPL-CCNC: 89 U/L (ref 35–104)
ALT SERPL-CCNC: 9 U/L (ref 5–33)
ANION GAP SERPL CALCULATED.3IONS-SCNC: 11 MMOL/L (ref 9–17)
AST SERPL-CCNC: 12 U/L
BILIRUB DIRECT SERPL-MCNC: 0.1 MG/DL
BILIRUB INDIRECT SERPL-MCNC: 0.2 MG/DL (ref 0–1)
BILIRUB SERPL-MCNC: 0.3 MG/DL (ref 0.3–1.2)
BNP SERPL-MCNC: 1245 PG/ML
BUN SERPL-MCNC: 20 MG/DL (ref 8–23)
BUN/CREAT SERPL: 14 (ref 9–20)
CALCIUM SERPL-MCNC: 9.1 MG/DL (ref 8.6–10.4)
CHLORIDE SERPL-SCNC: 104 MMOL/L (ref 98–107)
CHOLEST SERPL-MCNC: 227 MG/DL (ref 0–199)
CHOLESTEROL/HDL RATIO: 4
CO2 SERPL-SCNC: 28 MMOL/L (ref 20–31)
CREAT SERPL-MCNC: 1.4 MG/DL (ref 0.5–0.9)
ERYTHROCYTE [DISTWIDTH] IN BLOOD BY AUTOMATED COUNT: 14.9 % (ref 11.8–14.4)
EST. AVERAGE GLUCOSE BLD GHB EST-MCNC: 151 MG/DL
GFR, ESTIMATED: 38 ML/MIN/1.73M2
GLUCOSE SERPL-MCNC: 138 MG/DL (ref 70–99)
HBA1C MFR BLD: 6.9 % (ref 4–6)
HCT VFR BLD AUTO: 34.7 % (ref 36.3–47.1)
HDLC SERPL-MCNC: 60 MG/DL
HGB BLD-MCNC: 10.6 G/DL (ref 11.9–15.1)
LDLC SERPL CALC-MCNC: 152 MG/DL (ref 0–100)
MCH RBC QN AUTO: 30.8 PG (ref 25.2–33.5)
MCHC RBC AUTO-ENTMCNC: 30.5 G/DL (ref 28.4–34.8)
MCV RBC AUTO: 100.9 FL (ref 82.6–102.9)
NRBC BLD-RTO: 0 PER 100 WBC
PLATELET # BLD AUTO: 110 K/UL (ref 138–453)
PMV BLD AUTO: 12.5 FL (ref 8.1–13.5)
POTASSIUM SERPL-SCNC: 4.1 MMOL/L (ref 3.7–5.3)
PROT SERPL-MCNC: 6.7 G/DL (ref 6.4–8.3)
RBC # BLD AUTO: 3.44 M/UL (ref 3.95–5.11)
SODIUM SERPL-SCNC: 143 MMOL/L (ref 135–144)
TRIGL SERPL-MCNC: 76 MG/DL (ref 0–149)
TSH SERPL DL<=0.05 MIU/L-ACNC: 0.5 UIU/ML (ref 0.3–5)
VLDLC SERPL CALC-MCNC: 15 MG/DL
WBC OTHER # BLD: 4.1 K/UL (ref 3.5–11.3)

## 2024-08-23 PROCEDURE — 80061 LIPID PANEL: CPT

## 2024-08-23 PROCEDURE — P9603 ONE-WAY ALLOW PRORATED MILES: HCPCS

## 2024-08-23 PROCEDURE — 82306 VITAMIN D 25 HYDROXY: CPT

## 2024-08-23 PROCEDURE — 80076 HEPATIC FUNCTION PANEL: CPT

## 2024-08-23 PROCEDURE — 36415 COLL VENOUS BLD VENIPUNCTURE: CPT

## 2024-08-23 PROCEDURE — 80048 BASIC METABOLIC PNL TOTAL CA: CPT

## 2024-08-23 PROCEDURE — 84443 ASSAY THYROID STIM HORMONE: CPT

## 2024-08-23 PROCEDURE — 85027 COMPLETE CBC AUTOMATED: CPT

## 2024-08-23 PROCEDURE — 83036 HEMOGLOBIN GLYCOSYLATED A1C: CPT

## 2024-08-23 PROCEDURE — 83880 ASSAY OF NATRIURETIC PEPTIDE: CPT

## 2024-08-23 NOTE — CARE COORDINATION
Ambulatory Care Coordination Note     8/23/2024 11:52 AM     ACM outreach attempt by this ACM today to perform care management follow up . ACM was unable to reach the patient by telephone today;  unable to leave .     ACM: JASPER MEDLEY RN       PCP/Specialist follow up:   Future Appointments         Provider Specialty Dept Phone    8/27/2024 1:20 PM Adia Mcmanus MD Family Medicine [phone]    9/11/2024 3:00 PM Laurie Ibanez,  Neurosurgery [phone]    9/18/2024 3:50 PM Arsalan Berg MD Nephrology [phone]    10/7/2024 2:00 PM Teresa Bejarano MD Neurology [phone]            Follow Up:   Plan for next ACM outreach in approximately 1 week to complete:  - disease specific assessments  - SDOH assessments  - medication review  - goal progression  - education   - RPM.              detailed exam

## 2024-08-30 ENCOUNTER — CARE COORDINATION (OUTPATIENT)
Dept: CARE COORDINATION | Age: 79
End: 2024-08-30

## 2024-08-30 NOTE — CARE COORDINATION
Spoke with patient briefly who stated she is still in a facility, expected to go home next week. She will call ACM once she is home so we can un pause her RPM equipment.   Tish Menezes, BSN, RN ambulatory care manager [phone].

## 2024-09-09 ENCOUNTER — CARE COORDINATION (OUTPATIENT)
Dept: CASE MANAGEMENT | Age: 79
End: 2024-09-09

## 2024-09-09 ENCOUNTER — CARE COORDINATION (OUTPATIENT)
Dept: CARE COORDINATION | Age: 79
End: 2024-09-09

## 2024-09-10 ENCOUNTER — CARE COORDINATION (OUTPATIENT)
Dept: CASE MANAGEMENT | Age: 79
End: 2024-09-10

## 2024-09-11 ENCOUNTER — CARE COORDINATION (OUTPATIENT)
Dept: CARE COORDINATION | Age: 79
End: 2024-09-11

## 2024-09-12 ENCOUNTER — CARE COORDINATION (OUTPATIENT)
Dept: CASE MANAGEMENT | Age: 79
End: 2024-09-12

## 2024-09-13 ENCOUNTER — CARE COORDINATION (OUTPATIENT)
Dept: CASE MANAGEMENT | Age: 79
End: 2024-09-13

## 2024-09-16 ENCOUNTER — CARE COORDINATION (OUTPATIENT)
Dept: CASE MANAGEMENT | Age: 79
End: 2024-09-16

## 2024-09-16 ENCOUNTER — CARE COORDINATION (OUTPATIENT)
Dept: CARE COORDINATION | Age: 79
End: 2024-09-16

## 2024-09-19 ENCOUNTER — CARE COORDINATION (OUTPATIENT)
Dept: CARE COORDINATION | Age: 79
End: 2024-09-19

## 2024-09-24 ENCOUNTER — CARE COORDINATION (OUTPATIENT)
Dept: CASE MANAGEMENT | Age: 79
End: 2024-09-24

## 2024-09-24 PROBLEM — E11.22 TYPE 2 DIABETES MELLITUS WITH CHRONIC KIDNEY DISEASE (HCC): Status: ACTIVE | Noted: 2024-09-24

## 2024-09-27 ENCOUNTER — CARE COORDINATION (OUTPATIENT)
Dept: CARE COORDINATION | Age: 79
End: 2024-09-27

## 2024-10-02 ENCOUNTER — CARE COORDINATION (OUTPATIENT)
Dept: CARE COORDINATION | Age: 79
End: 2024-10-02

## 2024-10-02 NOTE — CARE COORDINATION
Date/Time:  10/2/2024 11:54 AM    Update: No updated BP received at this time. Spoke with son Rah and he states his brother is supposed to go over to patients home and recheck BP at noon.  Will await update.

## 2024-10-02 NOTE — CARE COORDINATION
Date/Time:  10/2/2024 2:31 PM    Update: Return call/VM received from patient's son Rah.  Updated BP wnl, 112/58. Metrics updated in HRS.

## 2024-10-02 NOTE — CARE COORDINATION
Remote Alert Monitoring Note      Date/Time:  10/2/2024 9:36 AM  Patient Current Location: Home: [address] [place], [place] [address]  [place] [address]    LPN contacted patient's nurse by telephone regarding red alert received for blood pressure reading (187/75). Verified patients name and  as identifiers.    Rpm alert to be reviewed by the provider                         Background: CHF, Diabetes, High Blood-Pressure     Refer to 911 immediately if:  Patient unresponsive or unable to provide history  Change in cognition or sudden confusion  Patient unable to respond in complete sentences  Intense chest pain/tightness  Any concern for any clinical emergency  Red Alert: Provider response time of 1 hr required for any red alert requiring intervention  Yellow Alert: Provider response time of 3hr required for any escalated yellow alert        Blood Pressure BP Triage  Are you having any Chest Pain? no   Are you having any Shortness of Breath? no   Do you have a headache or have any vision changes? no   Are you having any numbness or tingling? no   Are you having any other health concerns or issues? no     Have you taken your medications as instructed by your doctor today? No       Clinical Interventions: Reviewed and followed up on alerts and treatments-. Patients [facility] nurse states patient has not taken her medications yet causing elevated BP. She states she is eating breakfast now and will take medications asap. Son to recheck BP I hour after medications.  Will await update.        Plan/Follow Up: Will continue to review, monitor and address alerts with follow up based on severity of symptoms and risk factors.    [facility]/ CTN/ Remote Patient monitoring  [phone]

## 2024-10-04 ENCOUNTER — CARE COORDINATION (OUTPATIENT)
Dept: CARE COORDINATION | Age: 79
End: 2024-10-04

## 2024-10-04 NOTE — CARE COORDINATION
Ambulatory Care Coordination Note     10/4/2024 1:05 PM     ACM outreach attempt by this ACM today to perform care management follow up . ACM was unable to reach the patient by telephone today; left voice message requesting a return phone call to this ACM.     ACM: JASPER MEDLEY RN     PCP/Specialist follow up:   Future Appointments         Provider Specialty Dept Phone    10/7/2024 2:00 PM Teresa Bejarano MD Neurology [phone]    10/23/2024 11:00 AM Laurie Ibanez DO Neurosurgery [phone]    11/20/2024 3:50 PM Arsalan Berg MD Nephrology [phone]    12/27/2024 2:30 PM Adia Mcmanus MD Family Medicine [phone]            Follow Up:   Plan for next ACM outreach in approximately 1 week to complete:  - disease specific assessments  - SDOH assessments  - medication review  - goal progression  - education   - RPM.

## 2024-10-07 ENCOUNTER — CARE COORDINATION (OUTPATIENT)
Dept: CARE COORDINATION | Age: 79
End: 2024-10-07

## 2024-10-07 ENCOUNTER — OFFICE VISIT (OUTPATIENT)
Dept: NEUROLOGY | Age: 79
End: 2024-10-07
Payer: MEDICARE

## 2024-10-07 VITALS
DIASTOLIC BLOOD PRESSURE: 79 MMHG | SYSTOLIC BLOOD PRESSURE: 148 MMHG | BODY MASS INDEX: 40.34 KG/M2 | HEIGHT: 66 IN | HEART RATE: 73 BPM | WEIGHT: 251 LBS

## 2024-10-07 DIAGNOSIS — E11.42 DIABETIC PERIPHERAL NEUROPATHY (HCC): Primary | ICD-10-CM

## 2024-10-07 PROCEDURE — 1123F ACP DISCUSS/DSCN MKR DOCD: CPT

## 2024-10-07 PROCEDURE — 3078F DIAST BP <80 MM HG: CPT

## 2024-10-07 PROCEDURE — 3077F SYST BP >= 140 MM HG: CPT

## 2024-10-07 PROCEDURE — 99214 OFFICE O/P EST MOD 30 MIN: CPT

## 2024-10-07 RX ORDER — GABAPENTIN 100 MG/1
300 CAPSULE ORAL 3 TIMES DAILY
COMMUNITY
End: 2024-10-07

## 2024-10-07 RX ORDER — LIDOCAINE 40 MG/G
CREAM TOPICAL PRN
Status: SHIPPED | OUTPATIENT
Start: 2024-10-07

## 2024-10-07 RX ORDER — DULOXETIN HYDROCHLORIDE 30 MG/1
30 CAPSULE, DELAYED RELEASE ORAL DAILY
Qty: 30 CAPSULE | Refills: 3 | Status: SHIPPED | OUTPATIENT
Start: 2024-10-07 | End: 2025-02-24 | Stop reason: ALTCHOICE

## 2024-10-07 NOTE — CARE COORDINATION
Remote Alert Monitoring Note      Date/Time:  10/7/2024 9:43 AM  Patient Current Location: Home: [address] [place] [address] [address]  [place] [address]    LPN contacted patient by telephone regarding red alert received for blood pressure heart rate (147). Verified patients name and  as identifiers.    Rpm alert to be reviewed by the provider                         Background: CHF, Diabetes, High Blood-Pressure     Refer to 911 immediately if:  Patient unresponsive or unable to provide history  Change in cognition or sudden confusion  Patient unable to respond in complete sentences  Intense chest pain/tightness  Any concern for any clinical emergency  Red Alert: Provider response time of 1 hr required for any red alert requiring intervention  Yellow Alert: Provider response time of 3hr required for any escalated yellow alert        Heart Rate HR Triage  Are you having any Chest Pain? no   Are you having any Shortness of Breath? no   Are you having any dizziness? no   Are you feeling more fatigued or tired than normal? no   Are you having any other health concerns or issues? no       Clinical Interventions: Reviewed and followed up on alerts and treatments-. Pt is asymptomatic and in no apparent distress, speaking in full sentences.  Patient states she is feeling \"good\". We discussed her elevated HR during BP check and she stated her [facility] nurse just left but was sure that the reading was not correct. The [facility] nurse did not recheck prior to leaving. Patient states the equipment is put away now and is not able to recheck on her own. Writer contacted [facility] and explained situation. He states he will go over there soon and recheck.  Will await update.        Plan/Follow Up: Will continue to review, monitor and address alerts with follow up based on severity of symptoms and risk factors.    [facility]/ CTN/ Remote Patient monitoring  [phone]

## 2024-10-10 ENCOUNTER — CARE COORDINATION (OUTPATIENT)
Dept: CARE COORDINATION | Age: 79
End: 2024-10-10

## 2024-10-10 NOTE — CARE COORDINATION
Ambulatory Care Coordination Note     10/10/2024 11:53 AM     Patient Current Location:  Home: [address] [place] [address]     ACM contacted the patient by telephone. Verified name and  with patient as identifiers.         ACM: JASPER MEDLEY RN     Challenges to be reviewed by the provider   Additional needs identified to be addressed with provider No  none               Method of communication with provider: none.    Has the patient been seen in the ED since your last call? no    Care Summary Note: Patient stated she is doing ok at this time. She stated she will be having a colonoscopy and EGD in the future. She has family to take her. Kayleigh reports that she has plenty of help and has all of her medications.       Offered patient enrollment in the Remote Patient Monitoring (RPM) program for in-home monitoring: Yes, patient enrolled; current status is activated and monitoring.   Recent metrics: Blood Pressure: 160/66, 60bpm Glucose: 136.0mg/dl Pulseox: 94%, 60bpm Survey: 0/0 Weight: 249.3lbs Note Created at: 10/10/2024 10:45 AM   Assessments Completed:   Diabetes Assessment    Medic Alert ID: No  Meal Planning: Avoidance of concentrated sweets   How often do you test your blood sugar?: Daily, Meals, Bedtime   Do you have barriers with adherence to non-pharmacologic self-management interventions? (Nutrition/Exercise/Self-Monitoring): Yes   Have you ever had to go to the ED for symptoms of low blood sugar?: No       No patient-reported symptoms         ,   Congestive Heart Failure Assessment    Are you currently restricting fluids?: 2000cc     No patient-reported symptoms      Symptoms:           ,   General Assessment    Do you have any symptoms that are causing concern?: No          Medications Reviewed:   Patient denies any changes with medications and reports taking all medications as prescribed.    Advance Care Planning:   Reviewed and current     Care Planning:   Education Documentation  General

## 2024-10-18 ENCOUNTER — TELEPHONE (OUTPATIENT)
Dept: NEUROSURGERY | Age: 79
End: 2024-10-18

## 2024-10-18 NOTE — TELEPHONE ENCOUNTER
I called pt son and left a detailed vm about sending the order to [facility] and gave him the number to schedule the MRI([phone]) faxed the order to [phone]

## 2024-10-18 NOTE — TELEPHONE ENCOUNTER
I spoke with pt son Rah and he stated pt is not able to do the MRI because of the type of pacemaker she has because of the stents. He stated that is what central schedule stated. Please advise.

## 2024-11-01 ENCOUNTER — CARE COORDINATION (OUTPATIENT)
Dept: CASE MANAGEMENT | Age: 79
End: 2024-11-01

## 2024-11-01 ENCOUNTER — CARE COORDINATION (OUTPATIENT)
Dept: CARE COORDINATION | Age: 79
End: 2024-11-01

## 2024-11-01 NOTE — CARE COORDINATION
Ambulatory Care Coordination Note     2024 2:20 PM     Patient Current Location:  Home: [address] [place] [address]     ACM contacted the patient by telephone. Verified name and  with patient as identifiers.         ACM: JASPER MEDLEY RN     Challenges to be reviewed by the provider   Additional needs identified to be addressed with provider Yes  medications-refills pended to PCP.               Method of communication with provider: chart routing.    Has the patient been seen in the ED since your last call? no    Care Summary Note: Patient stated she is doing well other than some intermittent leg pain that she relates to an old fracture.   She requested a refill of tramadol that she has not had filled in a long time. She stated she only takes them when tylenol does not help so she has had them for a long time. Request routed to PCP.   Patient reminded to use her RPM equipment daily, she agreed to try to do this.     Offered patient enrollment in the Remote Patient Monitoring (RPM) program for in-home monitoring: Yes, patient enrolled; current status is activated and monitoring.     Assessments Completed:   Diabetes Assessment    Medic Alert ID: No  Meal Planning: Avoidance of concentrated sweets   How often do you test your blood sugar?: Daily, Meals, Bedtime   Do you have barriers with adherence to non-pharmacologic self-management interventions? (Nutrition/Exercise/Self-Monitoring): Yes   Have you ever had to go to the ED for symptoms of low blood sugar?: No       No patient-reported symptoms         ,   Congestive Heart Failure Assessment    Are you currently restricting fluids?: 2000cc         Symptoms:     Symptom course: stable  Salt intake watch compared to last visit: stable      ,   General Assessment    Do you have any symptoms that are causing concern?: Yes  Progression since Onset: Intermittent - Waxing/Waning  Reported Symptoms: Pain (Comment: leg)          Medications Reviewed:   Patient 
show

## 2024-11-01 NOTE — CARE COORDINATION
2024 2:55 PM  *  RPM Alert   Remote Patient Monitoring Note      Date/Time:  2024 2:55 PM  Patient Current Location: Home: [address] [place] [address]  LPN contacted patient by telephone regarding yellow alert received for no metrics in 2 days. Verified patients name and  as identifiers.  Background: CHF,DM,HTN  Clinical Interventions: Reviewed and followed up on alerts and treatments-Spoke to patient she states she is doing really well she is not able to take her vitals but will resume them tomorrow     Plan/Follow Up: Will continue to review, monitor and address alerts with follow up based on severity of symptoms and risk factors.

## 2024-11-05 ENCOUNTER — CARE COORDINATION (OUTPATIENT)
Dept: CASE MANAGEMENT | Age: 79
End: 2024-11-05

## 2024-11-05 NOTE — CARE COORDINATION
2024 11:38 AM  *  Alert and Triage   -Remote Alert Monitoring Note      Date/Time:  2024 11:39 AM  Patient Current Location: Home: [address] [place], [place] [id number]  Verified patients name and  as identifiers.    Rpm alert to be reviewed by the provider   red alert  weight (5 lbs in 7 days )  Vitals Recheck weight (na)  Additional needs to be addressed by provider: No                   LPN contacted patient by telephone regarding red alert received   Background: CHF,DM,HTN  Refer to 911 immediately if:  Patient unresponsive or unable to provide history  Change in cognition or sudden confusion  Patient unable to respond in complete sentences  Intense chest pain/tightness  Any concern for any clinical emergency  Red Alert: Provider response time of 1 hr required for any red alert requiring intervention  Yellow Alert: Provider response time of 3hr required for any escalated yellow alert  Patient Chief Complaint:  Weight Weight Scale Triage  Was your weight obtained upon rising/waking today? yes   Was your weight obtained after voiding and/or use of the bathroom today? yes   Did you weigh yourself in the same amount of clothing today, compared to how you typically do? yes   Was the scale bumped or moved prior to today's weight? no   Is your scale on a flat/hard surface? yes   Did you obtain your weight with shoes on? no   If yes, is this something you normally do during your daily weights? no   Were you standing up straight on the scale today? yes   Were you leaning on anything while obtaining your weight today? no     Clinical Interventions: Reviewed and followed up on alerts and treatments-Spoke to patient she denies chest pain, SOB, dizziness has no unusual swelling, she states she has been out of Diuretics for 2 days but her son went and picked them up last night she has no concerns we will continue to watch weight closely the rest of the week    Plan/Follow Up: Will continue to review, monitor and

## 2024-11-12 ENCOUNTER — CARE COORDINATION (OUTPATIENT)
Dept: CARE COORDINATION | Age: 79
End: 2024-11-12

## 2024-11-13 NOTE — CARE COORDINATION
Ambulatory Care Coordination Note     11/13/2024 12:20 PM  ACM outreach attempt by this ACM today to perform care management follow up . ACM was unable to reach the patient by telephone today; left voice message requesting a return phone call to this ACM.     ACM: JASPER MEDLEY RN     PCP/Specialist follow up:   Future Appointments         Provider Specialty Dept Phone    11/20/2024 3:50 PM Arsalan Berg MD Nephrology [phone]    11/27/2024 2:30 PM Laurie Ibanez DO Neurosurgery [phone]    12/27/2024 2:30 PM Adia Mcmanus MD Family Medicine [phone]    1/6/2025 1:00 PM Teresa Bejarano MD Neurology [phone]            Follow Up:   Plan for next ACM outreach in approximately 1 week to complete:  - disease specific assessments  - SDOH assessments  - medication review  - goal progression  - education   - RPM.         Latest RPM metrics: Blood Pressure: 121/83, 70bpm Glucose: 182.0mg/dl Pulseox: 95%, 60bpm Survey: 0/0 Weight: 248.2lbs Note Created at: 11/13/2024 10:03 AM

## 2024-11-20 ENCOUNTER — CARE COORDINATION (OUTPATIENT)
Dept: CARE COORDINATION | Age: 79
End: 2024-11-20

## 2024-11-20 NOTE — CARE COORDINATION
11/27/2024 2:30 PM Laurie Ibanez DO Neurosurgery [phone]    12/27/2024 2:30 PM Adia Mcmanus MD Family Medicine [phone]    1/6/2025 1:00 PM Teresa Bejarano MD Neurology [phone]            Follow Up:   Plan for next AC outreach in approximately 2 weeks to complete:  - disease specific assessments  - SDOH assessments  - medication review   - goal progression  - education   - RPM.   Patient  is agreeable to this plan.

## 2024-12-04 ENCOUNTER — TELEPHONE (OUTPATIENT)
Dept: NEUROSURGERY | Age: 79
End: 2024-12-04

## 2024-12-04 DIAGNOSIS — F40.240 CLAUSTROPHOBIA: Primary | ICD-10-CM

## 2024-12-04 RX ORDER — DIAZEPAM 5 MG/1
5 TABLET ORAL
Qty: 1 TABLET | Refills: 0 | Status: SHIPPED | OUTPATIENT
Start: 2024-12-04 | End: 2024-12-04

## 2024-12-04 NOTE — TELEPHONE ENCOUNTER
I spoke with pt son and he stated pt is doing her MRI at [facility] on 12/10 and she needs medication to help her relax. Please advise.

## 2024-12-05 PROBLEM — I50.9 CHF EXACERBATION (HCC): Status: RESOLVED | Noted: 2023-08-30 | Resolved: 2024-12-05

## 2024-12-05 PROBLEM — J81.1 PULMONARY EDEMA: Status: RESOLVED | Noted: 2023-08-30 | Resolved: 2024-12-05

## 2024-12-05 PROBLEM — D62 ACUTE BLOOD LOSS ANEMIA (ABLA): Status: RESOLVED | Noted: 2022-08-16 | Resolved: 2024-12-05

## 2024-12-05 PROBLEM — G45.9 TRANSIENT ISCHEMIC ATTACK (TIA): Status: RESOLVED | Noted: 2023-09-01 | Resolved: 2024-12-05

## 2024-12-05 PROBLEM — N13.9 ACUTE UNILATERAL OBSTRUCTIVE UROPATHY: Status: RESOLVED | Noted: 2022-08-12 | Resolved: 2024-12-05

## 2024-12-05 PROBLEM — I67.4 HYPERTENSIVE ENCEPHALOPATHY: Status: RESOLVED | Noted: 2022-10-06 | Resolved: 2024-12-05

## 2024-12-05 PROBLEM — R55 SYNCOPE AND COLLAPSE: Status: RESOLVED | Noted: 2022-07-28 | Resolved: 2024-12-05

## 2024-12-05 PROBLEM — I50.31 ACUTE DIASTOLIC CHF (CONGESTIVE HEART FAILURE) (HCC): Status: RESOLVED | Noted: 2020-11-07 | Resolved: 2024-12-05

## 2024-12-05 PROBLEM — I25.2 HISTORY OF NON-ST ELEVATION MYOCARDIAL INFARCTION (NSTEMI): Status: ACTIVE | Noted: 2024-12-05

## 2024-12-05 PROBLEM — R30.0 DYSURIA: Status: RESOLVED | Noted: 2021-11-24 | Resolved: 2024-12-05

## 2024-12-05 PROBLEM — E44.0 MODERATE MALNUTRITION (HCC): Status: RESOLVED | Noted: 2020-11-13 | Resolved: 2024-12-05

## 2024-12-05 PROBLEM — N17.9 AKI (ACUTE KIDNEY INJURY) (HCC): Status: RESOLVED | Noted: 2020-07-13 | Resolved: 2024-12-05

## 2024-12-05 PROBLEM — N18.9 ACUTE KIDNEY INJURY SUPERIMPOSED ON CKD (HCC): Status: RESOLVED | Noted: 2022-08-11 | Resolved: 2024-12-05

## 2024-12-05 PROBLEM — E87.70 VOLUME OVERLOAD: Status: RESOLVED | Noted: 2023-08-30 | Resolved: 2024-12-05

## 2024-12-05 PROBLEM — R40.4 TRANSIENT ALTERATION OF AWARENESS: Status: RESOLVED | Noted: 2022-10-06 | Resolved: 2024-12-05

## 2024-12-05 PROBLEM — I63.59 ISCHEMIC CEREBROVASCULAR ACCIDENT (CVA) DUE TO ATHEROSCLEROSIS OF LARGE INTRACRANIAL ARTERY (HCC): Status: RESOLVED | Noted: 2023-09-01 | Resolved: 2024-12-05

## 2024-12-05 PROBLEM — N17.9 ACUTE KIDNEY INJURY SUPERIMPOSED ON CKD (HCC): Status: RESOLVED | Noted: 2022-08-11 | Resolved: 2024-12-05

## 2024-12-05 PROBLEM — R06.00 DYSPNEA: Status: RESOLVED | Noted: 2018-01-13 | Resolved: 2024-12-05

## 2024-12-06 ENCOUNTER — CARE COORDINATION (OUTPATIENT)
Dept: CARE COORDINATION | Age: 79
End: 2024-12-06

## 2024-12-06 NOTE — TELEPHONE ENCOUNTER
I'll send a referral, but I'm not optimistic about their having any options for her. Sending referral to [facility]

## 2024-12-06 NOTE — CARE COORDINATION
Ambulatory Care Coordination Note     2024 12:04 PM     Patient Current Location:  Home: [address] [place] [address]     ACM contacted the patient by telephone. Verified name and  with patient as identifiers.         ACM: JASPER MEDLEY RN     Challenges to be reviewed by the provider   Additional needs identified to be addressed with provider Yes  Patient is requesting a referral to pain management and a diabetic shoe supplier.                Method of communication with provider: chart routing.    Utilization: Patient has not had any utilization since our last call.     Care Summary Note: Patient stated that her hip and hand pain are a continuing issue. She is not able to get much relief. Discussed the possibility to a referral to pain management, will route to PCP. She stated she is scheduled for a MRI next week and will follow up with neurology.   She would like a referral to go get fitted for DM shoes, she will look for the location information and call ACM back.     Offered patient enrollment in the Remote Patient Monitoring (RPM) program for in-home monitoring: Yes, patient enrolled; current status is activated and monitoring.   recent metrics:  Blood Pressure: 136/56, 60bpm Glucose: 238.0mg/dl Pulseox: 94%, 60bpm Survey: 0/0 Weight: 252.8lbs Note Created at: 2024 09:53 AM    Assessments Completed:   Diabetes Assessment    Medic Alert ID: No  Meal Planning: Avoidance of concentrated sweets   How often do you test your blood sugar?: Daily, Meals, Bedtime   Do you have barriers with adherence to non-pharmacologic self-management interventions? (Nutrition/Exercise/Self-Monitoring): Yes   Have you ever had to go to the ED for symptoms of low blood sugar?: No       No patient-reported symptoms         ,   Congestive Heart Failure Assessment    Are you currently restricting fluids?: 2000cc         Symptoms:     Symptom course: stable  Weight trend: stable  Salt intake watch compared to last

## 2024-12-09 DIAGNOSIS — F40.240 CLAUSTROPHOBIA: Primary | ICD-10-CM

## 2024-12-09 RX ORDER — DIAZEPAM 5 MG/1
5 TABLET ORAL
Qty: 1 TABLET | Refills: 0 | Status: SHIPPED | OUTPATIENT
Start: 2024-12-09 | End: 2024-12-09

## 2024-12-23 ENCOUNTER — CARE COORDINATION (OUTPATIENT)
Dept: OTHER | Facility: CLINIC | Age: 79
End: 2024-12-23

## 2024-12-23 ENCOUNTER — CARE COORDINATION (OUTPATIENT)
Dept: CARE COORDINATION | Age: 79
End: 2024-12-23

## 2024-12-23 NOTE — CARE COORDINATION
Ambulatory Care Coordination Note     12/23/2024 12:40 PM     ACM outreach attempt by this ACM today to perform care management follow up . ACM was unable to reach the patient by telephone today;   left voice message requesting a return phone call to this ACM.     ACM: JASPER MEDLEY RN     Care Summary Note: follow up on any needs and the pain management referral.     PCP/Specialist follow up:   Future Appointments         Provider Specialty Dept Phone    1/6/2025 1:00 PM Teresa Bejarano MD Neurology [phone]    1/29/2025 12:15 PM Zhen Acosta DPM Podiatry [phone]    2/19/2025 4:10 PM Arsalan Berg MD Nephrology [phone]    3/6/2025 1:40 PM Adia Mcmanus MD Family Medicine [phone]            Follow Up:   Plan for next ACM outreach in approximately 1 week to complete:  - disease specific assessments  - goal progression  - education   - RPM.

## 2024-12-23 NOTE — CARE COORDINATION
12/23/2024 11:32 AM  *  Unable to Reach Date/Time:  12/23/2024 11:32 AM  ACM attempted to reach patient by telephone regarding red alert r/t SpO2 in remote patient monitoring program. Left HIPAA compliant message requesting a return call. Will attempt to reach patient again.       AARON Cordoba, RN  Associate Care Manager   Cell: [phone]  [email or web address]

## 2024-12-27 ENCOUNTER — CARE COORDINATION (OUTPATIENT)
Dept: CASE MANAGEMENT | Age: 79
End: 2024-12-27

## 2024-12-27 NOTE — CARE COORDINATION
something you normally do during your daily weights? no   Were you standing up straight on the scale today? yes   Were you leaning on anything while obtaining your weight today? no   Weight Education Provided to Patient or Caregiver:   Patient to weigh on the same scale at the same time each day  Patient to weigh first thing in the morning, after going to the bathroom; before eating breakfast, and before having anything to drink.  Instructed on importance of  not wearing shoes on the scale, and wearing the same type of clothing each day.  Clinical Interventions:  spoke to  patient about 5 lb weight gain in 7 days baseline weight has been 254 but she recently decreased to the 247 range, patient states she moves her scale daily weighs unclothed. Has no swelling in feet or hands, still has swelling in leg from 12/21 ED visit. Denies chest pain, SOB, dizziness continues taking Bumex 2 mg daily. Patient states she has no concerns and is mad at herself for the weight gain stating it is all the holiday food she has been eating this week, patient plans to watch her food intake and not move scale going forward, she is aware of when to seek medical attention for symptoms,    Plan/Follow Up: Will continue to review, monitor and address alerts with follow up based on severity of symptoms and risk factors.  **For any new or worsening symptoms or you are concerned in anyway, please contact your Provider or report to the nearest Emergency Room.**

## 2024-12-30 ENCOUNTER — CARE COORDINATION (OUTPATIENT)
Dept: CARE COORDINATION | Age: 79
End: 2024-12-30

## 2024-12-30 NOTE — CARE COORDINATION
Ambulatory Care Coordination Note     12/30/2024 2:32 PM     ACM outreach attempt by this ACM today to perform care management follow up . ACM was unable to reach the patient by telephone today;   left voice message requesting a return phone call to this ACM.     ACM: JASPER MEDLEY RN       PCP/Specialist follow up:   Future Appointments         Provider Specialty Dept Phone    1/6/2025 1:00 PM Teresa Bejarano MD Neurology [phone]    1/29/2025 12:15 PM Zhen Acosta DPM Podiatry [phone]    2/19/2025 4:10 PM Arsalan Berg MD Nephrology [phone]    3/6/2025 1:40 PM Adia Mcmanus MD Family Medicine [phone]            Follow Up:   Plan for next ACM outreach in approximately 1 week to complete:  - disease specific assessments  - medication review  - goal progression  - education   - RPM.

## 2025-01-06 ENCOUNTER — CARE COORDINATION (OUTPATIENT)
Dept: CARE COORDINATION | Age: 80
End: 2025-01-06

## 2025-01-06 DIAGNOSIS — I16.1 HYPERTENSIVE EMERGENCY: ICD-10-CM

## 2025-01-06 DIAGNOSIS — I50.32 CHRONIC DIASTOLIC (CONGESTIVE) HEART FAILURE (HCC): Primary | ICD-10-CM

## 2025-01-06 DIAGNOSIS — I16.0 HYPERTENSIVE URGENCY: ICD-10-CM

## 2025-01-06 PROCEDURE — 1111F DSCHRG MED/CURRENT MED MERGE: CPT | Performed by: FAMILY MEDICINE

## 2025-01-06 NOTE — CARE COORDINATION
Ambulatory Care Coordination Note     2025 2:38 PM     Patient Current Location:  Home: [address] [place] [address]     ACM contacted the patient by telephone. Verified name and  with patient as identifiers.         ACM: JASPER MEDLEY RN     Challenges to be reviewed by the provider   Additional needs identified to be addressed with provider No  none               Method of communication with provider: none.    Utilization: Has the patient been discharged from the hospital since your last call? yes -   Call within 2 business days of discharge: Yes    Patient: Kayleigh Matthews    Patient : 1945   MRN: [id number]    Reason for Admission: Hypertensive emergency- CHF  Discharge Date: 10/8/23  RURS: No data recorded    Last Discharge Facility       Date Complaint Diagnosis Description Type Department Provider    10/8/23 Knee Pain Chronic pain of left knee ED (DISCHARGE) [facility] Yvan Gooden MD            Was this an external facility discharge? Yes. Discharge Date: 1.3.25. Facility Name: [facility]    Ambulatory Care Manager reviewed discharge instructions, medical action plan, and red flags with patient. The patient was given an opportunity to ask questions; all questions answered at this time.. The patient verbalized understanding.   Were discharge instructions available to patient? Yes.   Reviewed appropriate site of care based on symptoms and resources available to patient including: PCP  Specialist  Home health. The patient agrees to contact the primary care provider and/or specialist office for questions related to their healthcare.     Patients top risk factors for readmission: lack of knowledge about disease, medication management, and multiple health system providers    Hospital follow up appointment: Discussed follow up appointments. Patient has hospital follow up appointment scheduled  routed to PCP staff to schedule.     *[facility]-up    Discharge date:

## 2025-01-06 NOTE — CARE COORDINATION
DAY 3, NO METRICS RECEIVED, called pt, left vm.     1/6/2025 10:44 AM  *  Unable to Reach Date/Time:  1/6/2025 10:45 AM  LPN attempted to reach patient by telephone regarding  DAY 3, NO METRICS RECEIVED  in remote patient monitoring program. Left HIPAA compliant message requesting a return call. Will attempt to reach patient again.   Unable to send mc.

## 2025-01-07 ENCOUNTER — CARE COORDINATION (OUTPATIENT)
Dept: CASE MANAGEMENT | Age: 80
End: 2025-01-07

## 2025-01-07 NOTE — CARE COORDINATION
Rpm REVIEWED. No metrics entered x 5 days. No call. Message sent to [facility]  Kayleigh Matthews  is currently enrolled in Remote Patient Monitoring (RPM) and has not entered vitals in 4 days. The RPM team has Left A Message your patient to discuss adherence in RPM.    Please attempt to outreach your patient and discuss adherence with RPM. If patient is no longer interested in participating please send a dis-enrollment request to the RPM pool for processing.     Thank You,

## 2025-01-09 PROBLEM — F41.1 GENERALIZED ANXIETY DISORDER: Status: ACTIVE | Noted: 2024-08-03

## 2025-01-09 PROBLEM — I16.1 HYPERTENSIVE EMERGENCY: Status: ACTIVE | Noted: 2025-01-02

## 2025-01-09 PROBLEM — Z95.0 PRESENCE OF CARDIAC PACEMAKER: Status: ACTIVE | Noted: 2023-10-28

## 2025-01-09 PROBLEM — I34.0 NONRHEUMATIC MITRAL (VALVE) INSUFFICIENCY: Status: ACTIVE | Noted: 2024-08-03

## 2025-01-09 PROBLEM — M19.019 PRIMARY LOCALIZED OSTEOARTHROSIS OF SHOULDER REGION: Status: ACTIVE | Noted: 2024-09-06

## 2025-01-09 PROBLEM — I36.1 NONRHEUMATIC TRICUSPID (VALVE) INSUFFICIENCY: Status: ACTIVE | Noted: 2024-08-03

## 2025-01-09 PROBLEM — I51.7 CARDIOMEGALY: Status: ACTIVE | Noted: 2024-08-03

## 2025-01-09 PROBLEM — J44.9 CHRONIC OBSTRUCTIVE PULMONARY DISEASE, UNSPECIFIED (HCC): Status: ACTIVE | Noted: 2024-08-03

## 2025-01-09 PROBLEM — I50.43 ACUTE ON CHRONIC COMBINED SYSTOLIC AND DIASTOLIC CONGESTIVE HEART FAILURE (HCC): Status: ACTIVE | Noted: 2020-11-06

## 2025-01-09 PROBLEM — K80.50 CALCULUS OF BILE DUCT WITHOUT CHOLANGITIS OR CHOLECYSTITIS WITHOUT OBSTRUCTION: Status: ACTIVE | Noted: 2024-07-25

## 2025-01-09 PROBLEM — J45.909 UNSPECIFIED ASTHMA, UNCOMPLICATED: Status: ACTIVE | Noted: 2023-01-05

## 2025-01-13 ENCOUNTER — CARE COORDINATION (OUTPATIENT)
Dept: CARE COORDINATION | Age: 80
End: 2025-01-13

## 2025-01-13 ENCOUNTER — CARE COORDINATION (OUTPATIENT)
Dept: CASE MANAGEMENT | Age: 80
End: 2025-01-13

## 2025-01-13 NOTE — CARE COORDINATION
-Remote Alert Monitoring Note      Date/Time:  2025 9:31 AM  Patient Current Location: Home: [address] [place], [place] [id number]  Verified patients name and  as identifiers.    Rpm alert to be reviewed by the provider   red alert  weight (5 lbs in 7 days )  Vitals Recheck weight (NA)  Additional needs to be addressed by provider: No                   LPN contacted patient by telephone regarding red alert received   Background: CHF, DM, HTN  Refer to 911 immediately if:  Patient unresponsive or unable to provide history  Change in cognition or sudden confusion  Patient unable to respond in complete sentences  Intense chest pain/tightness  Any concern for any clinical emergency  Red Alert: Provider response time of 1 hr required for any red alert requiring intervention  Yellow Alert: Provider response time of 3hr required for any escalated yellow alert  Patient Chief Complaint:  Weight Weight Scale Triage  Was your weight obtained upon rising/waking today? yes   Was your weight obtained after voiding and/or use of the bathroom today? yes   Did you weigh yourself in the same amount of clothing today, compared to how you typically do? yes   Was the scale bumped or moved prior to today's weight? no   Is your scale on a flat/hard surface? yes   Did you obtain your weight with shoes on? no   If yes, is this something you normally do during your daily weights? no   Were you standing up straight on the scale today? yes   Were you leaning on anything while obtaining your weight today? no   Weight Education Provided to Patient or Caregiver:   Patient to weigh on the same scale at the same time each day  Patient to weigh first thing in the morning, after going to the bathroom; before eating breakfast, and before having anything to drink.  Instructed on importance of  not wearing shoes on the scale, and wearing the same type of clothing each day.  Clinical Interventions: Reviewed and followed up on alerts and

## 2025-01-14 ENCOUNTER — CARE COORDINATION (OUTPATIENT)
Dept: CARE COORDINATION | Age: 80
End: 2025-01-14

## 2025-01-14 NOTE — CARE COORDINATION
Weight Increase of 5 lbs In Last 7 Days     2025 10:15 AM  *  Alert and Triage   -Remote Alert Monitoring Note      Date/Time:  2025 10:15 AM  Patient Current Location: Home: [address] [place], [place] [id number]  Verified patients name and  as identifiers.    Rpm alert to be reviewed by the provider   red alert  weight (248.4)  Weight Increase of 5 lbs In Last 7 Days     Additional needs to be addressed by provider: No           LPN contacted patient by telephone regarding red alert received   Background: enrolled RPM for CHF, DM and HTN  Refer to 911 immediately if:  Patient unresponsive or unable to provide history  Change in cognition or sudden confusion  Patient unable to respond in complete sentences  Intense chest pain/tightness  Any concern for any clinical emergency  Red Alert: Provider response time of 1 hr required for any red alert requiring intervention  Yellow Alert: Provider response time of 3hr required for any escalated yellow alert  Patient Chief Complaint:  Weight Weight Scale Triage  Was your weight obtained upon rising/waking today? yes   Was your weight obtained after voiding and/or use of the bathroom today? yes   Did you weigh yourself in the same amount of clothing today, compared to how you typically do? yes   Was the scale bumped or moved prior to today's weight? no   Is your scale on a flat/hard surface? yes   Did you obtain your weight with shoes on? no   If yes, is this something you normally do during your daily weights? no   Were you standing up straight on the scale today? yes   Were you leaning on anything while obtaining your weight today? no     Weight Education Provided to Patient or Caregiver:   Patient to weigh on the same scale at the same time each day  Patient to weigh first thing in the morning, after going to the bathroom; before eating breakfast, and before having anything to drink.  Instructed on importance of  not wearing shoes on the scale, and wearing the same

## 2025-01-14 NOTE — CARE COORDINATION
Ambulatory Care Coordination Note     1/14/2025 12:50 PM     ACM outreach attempt by this ACM today to perform care management follow up . ACM was unable to reach the patient by telephone today;   VM left requesting a return call     ACM: JASPER MEDLEY RN         PCP/Specialist follow up:   Future Appointments         Provider Specialty Dept Phone    1/29/2025 12:45 PM Zhen Acosta DPM Podiatry [phone]    2/3/2025 2:15 PM Kal Romo DO Pain Management [phone]    2/19/2025 4:10 PM Arsalan Berg MD Nephrology [phone]    3/6/2025 1:40 PM Adia Mcmanus MD Family Medicine [phone]            Follow Up:   Plan for next ACM outreach in approximately 1 week to complete:  - disease specific assessments  - medication review  - advance care planning  - goal progression  - education   - RPM.

## 2025-01-15 ENCOUNTER — CARE COORDINATION (OUTPATIENT)
Dept: CASE MANAGEMENT | Age: 80
End: 2025-01-15

## 2025-01-15 NOTE — CARE COORDINATION
treatments-Spoke with patient she denies chest pain, SOB dizziness states her legs look good no swelling . She did move her scale 5 days ago and weight has been fluctuating since she also reveals that she did lean her head on the wall when weighing today . Educated patient about standing straight and not touching wall or counter she states she will do better and has no concerns    Plan/Follow Up: Will continue to review, monitor and address alerts with follow up based on severity of symptoms and risk factors.  **For any new or worsening symptoms or you are concerned in anyway, please contact your Provider or report to the nearest Emergency Room.**

## 2025-01-20 ENCOUNTER — CARE COORDINATION (OUTPATIENT)
Dept: CARE COORDINATION | Age: 80
End: 2025-01-20

## 2025-01-21 NOTE — CARE COORDINATION
Ambulatory Care Coordination Note     2025 3:37 PM     Patient Current Location:  Home: [address]  [facility] [address]     ACM contacted the patient by telephone. Verified name and  with patient as identifiers.         ACM: TISH MEDLEY RN     Challenges to be reviewed by the provider   Additional needs identified to be addressed with provider No  none               Method of communication with provider: none.    Utilization: Patient has not had any utilization since our last call.     Care Summary Note: Patient stated she has been having loose stools and mild abdominal cramping. She has tried OTC medication that has helped some.   She denied any nausea or fever.   She was encouraged to reach out if symptoms do not improve or if they worsen. She was encouraged to stay hydrated and consume a bland diet until issue resolves.       Offered patient enrollment in the Remote Patient Monitoring (RPM) program for in-home monitoring: Yes, patient enrolled; current status is activated and monitoring.   Current Patient Metrics ---- Blood Pressure: 145/67, 63bpm Glucose: 151.0mg/dl Pulseox: 95%, 63bpm Survey: 0/0 Weight: 249.5lbs Note Created at: 2025 10:57 AM   Assessments Completed:   General Assessment    Do you have any symptoms that are causing concern?: Yes  Progression since Onset: Intermittent - Waxing/Waning  Reported Symptoms: Other (Comment: loose stools)          Medications Reviewed:   Patient denies any changes with medications and reports taking all medications as prescribed.    Advance Care Planning:   Reviewed and current     Care Planning:   Education Documentation  General medication information, taught by Tish Medley RN at 2025  3:37 PM.  Learner: Patient  Readiness: Acceptance  Method: Explanation  Response: Verbalizes Understanding    Educate reporting changes in condition, taught by Tish Medley RN at 2025  3:37 PM.  Learner: Patient  Readiness: Acceptance  Method:

## 2025-01-27 ENCOUNTER — CARE COORDINATION (OUTPATIENT)
Dept: CASE MANAGEMENT | Age: 80
End: 2025-01-27

## 2025-01-27 ENCOUNTER — CARE COORDINATION (OUTPATIENT)
Dept: CARE COORDINATION | Age: 80
End: 2025-01-27

## 2025-01-27 NOTE — CARE COORDINATION
Ambulatory Care Coordination Note     1/27/2025 2:17 PM     ACM outreach attempt by this ACM today to perform care management follow up . ACM was unable to reach the patient by telephone today;   left voice message requesting a return phone call to this ACM.     ACM: JASPER MEDLEY RN     Care Summary Note: included upcoming appointment reminder.     PCP/Specialist follow up:   Future Appointments         Provider Specialty Dept Phone    1/29/2025 12:45 PM Zhen Acosta DPM Podiatry [phone]    2/3/2025 2:15 PM Kal Romo DO Pain Management [phone]    2/19/2025 4:10 PM Arsalan Berg MD Nephrology [phone]    3/6/2025 1:40 PM Adia Mcmanus MD Family Medicine [phone]            Follow Up:   Plan for next ACM outreach in approximately 1 week to complete:  - disease specific assessments  - SDOH assessments  - medication review  - advance care planning  - goal progression  - education   - RPM.

## 2025-01-28 ENCOUNTER — CARE COORDINATION (OUTPATIENT)
Dept: CARE COORDINATION | Age: 80
End: 2025-01-28

## 2025-01-28 NOTE — CARE COORDINATION
DAY 3, NO METRICS RECEIVED, called pt, left vm    1/28/2025 10:29 AM  *  Unable to Reach Date/Time:  1/28/2025 10:29 AM  LPN attempted to reach patient by telephone regarding  DAY 3, NO METRICS RECEIVED  in remote patient monitoring program. Left HIPAA compliant message requesting a return call. Will attempt to reach patient again.     Unable to send mcm.

## 2025-01-29 ENCOUNTER — CARE COORDINATION (OUTPATIENT)
Dept: CASE MANAGEMENT | Age: 80
End: 2025-01-29

## 2025-01-29 NOTE — CARE COORDINATION
Rpm reviewed. No metrics entered for 5 days. No call  Tablet battery depleted.  Update to [facility]  Kayleigh Matthews  is currently enrolled in Remote Patient Monitoring (RPM) and has not entered vitals in 4 days. The RPM team has  Been Unable to Reach your patient to discuss adherence in RPM.    Please attempt to outreach your patient and discuss adherence with RPM. If patient is no longer interested in participating please send a dis-enrollment request to the RPM pool for processing.     Thank You,

## 2025-01-30 ENCOUNTER — CARE COORDINATION (OUTPATIENT)
Dept: CARE COORDINATION | Age: 80
End: 2025-01-30

## 2025-01-30 NOTE — CARE COORDINATION
Ambulatory Care Coordination Note     1/30/2025 2:18 PM     ACM outreach attempt by this ACM today to perform care management follow up . ACM was unable to reach the patient by telephone today;   left voice message requesting a return phone call to this ACM.     ACM: JASPER MEDLEY RN      PCP/Specialist follow up:   Future Appointments         Provider Specialty Dept Phone    2/3/2025 2:15 PM Kal Romo DO Pain Management [phone]    2/19/2025 4:10 PM Arsalan Berg MD Nephrology [phone]    3/6/2025 1:40 PM Adia Mcmanus MD Family Medicine [phone]            Follow Up:   Plan for next ACM outreach in approximately 1 week to complete:  - disease specific assessments  - SDOH assessments  - medication review  - goal progression  - education   RPM graduation .

## 2025-02-05 ENCOUNTER — CARE COORDINATION (OUTPATIENT)
Dept: CARE COORDINATION | Age: 80
End: 2025-02-05

## 2025-02-05 NOTE — CARE COORDINATION
Ambulatory Care Coordination Note     2/5/2025 10:09 AM     Patient outreach attempt by this ACM today to perform care management follow up . ACM was unable to reach the patient by telephone today;   left voice message requesting a return phone call to this ACM.  Will request RPM to be paused.      ACM: JASPER MEDLEY RN     Care Summary Note: Noted patient is currently admitted to [facility] since 2.4.25 for CHF per [facility] report.     PCP/Specialist follow up:   Future Appointments         Provider Specialty Dept Phone    3/6/2025 1:40 PM Adia Mcmanus MD Family Medicine [phone]    3/21/2025 3:10 PM Arsalan Berg MD Nephrology [phone]            Follow Up:   Plan for next ACM outreach in approximately 1 week to complete:  - disease specific assessments  - SDOH assessments  - medication review  - goal progression  - education   - RPM  Check if patient is home-resume RPM .

## 2025-02-14 ENCOUNTER — HOSPITAL ENCOUNTER (OUTPATIENT)
Age: 80
Setting detail: SPECIMEN
Discharge: HOME | End: 2025-02-14

## 2025-02-14 LAB
ANION GAP SERPL CALCULATED.3IONS-SCNC: 10 MMOL/L (ref 9–16)
BNP SERPL-MCNC: 1558 PG/ML (ref 0–450)
BUN SERPL-MCNC: 19 MG/DL (ref 8–23)
CALCIUM SERPL-MCNC: 9.1 MG/DL (ref 8.8–10.2)
CHLORIDE SERPL-SCNC: 102 MMOL/L (ref 98–107)
CO2 SERPL-SCNC: 28 MMOL/L (ref 20–31)
CREAT SERPL-MCNC: 1.5 MG/DL (ref 0.5–0.9)
ERYTHROCYTE [DISTWIDTH] IN BLOOD BY AUTOMATED COUNT: 14.4 % (ref 11.8–14.4)
GFR, ESTIMATED: 35 ML/MIN/1.73M2
GLUCOSE SERPL-MCNC: 185 MG/DL (ref 82–115)
HCT VFR BLD AUTO: 30.3 % (ref 36.3–47.1)
HGB BLD-MCNC: 9.6 G/DL (ref 11.9–15.1)
MAGNESIUM SERPL-MCNC: 2.1 MG/DL (ref 1.6–2.4)
MCH RBC QN AUTO: 31.1 PG (ref 25.2–33.5)
MCHC RBC AUTO-ENTMCNC: 31.7 G/DL (ref 28.4–34.8)
MCV RBC AUTO: 98.1 FL (ref 82.6–102.9)
NRBC BLD-RTO: 0 PER 100 WBC
PLATELET # BLD AUTO: 184 K/UL (ref 138–453)
PMV BLD AUTO: 11.2 FL (ref 8.1–13.5)
POTASSIUM SERPL-SCNC: 4.1 MMOL/L (ref 3.7–5.3)
RBC # BLD AUTO: 3.09 M/UL (ref 3.95–5.11)
SODIUM SERPL-SCNC: 140 MMOL/L (ref 136–145)
WBC OTHER # BLD: 5.2 K/UL (ref 3.5–11.3)

## 2025-02-14 PROCEDURE — 85027 COMPLETE CBC AUTOMATED: CPT

## 2025-02-14 PROCEDURE — 36415 COLL VENOUS BLD VENIPUNCTURE: CPT

## 2025-02-14 PROCEDURE — 80048 BASIC METABOLIC PNL TOTAL CA: CPT

## 2025-02-14 PROCEDURE — 83880 ASSAY OF NATRIURETIC PEPTIDE: CPT

## 2025-02-14 PROCEDURE — 83735 ASSAY OF MAGNESIUM: CPT

## 2025-02-17 ENCOUNTER — CARE COORDINATION (OUTPATIENT)
Dept: CARE COORDINATION | Age: 80
End: 2025-02-17

## 2025-02-17 NOTE — CARE COORDINATION
Spoke briefly with patients son. He verified that Kayleigh is still at rehab, should be home in a week.   Discussed having RPM picked up. He would like to hold off until he knows a DC plan for Kayleigh. He is also unable to move around to pack it up at this time due to a surgery of his own.   Planned a call for 2.24.25.

## 2025-02-18 ENCOUNTER — CARE COORDINATION (OUTPATIENT)
Dept: CARE COORDINATION | Age: 80
End: 2025-02-18

## 2025-02-18 ENCOUNTER — HOSPITAL ENCOUNTER (OUTPATIENT)
Age: 80
Setting detail: SPECIMEN
Discharge: HOME | End: 2025-02-18

## 2025-02-18 LAB
ANION GAP SERPL CALCULATED.3IONS-SCNC: 9 MMOL/L (ref 9–16)
BNP SERPL-MCNC: 1032 PG/ML (ref 0–450)
BUN SERPL-MCNC: 18 MG/DL (ref 8–23)
CALCIUM SERPL-MCNC: 9.8 MG/DL (ref 8.8–10.2)
CHLORIDE SERPL-SCNC: 102 MMOL/L (ref 98–107)
CO2 SERPL-SCNC: 29 MMOL/L (ref 20–31)
CREAT SERPL-MCNC: 1.7 MG/DL (ref 0.5–0.9)
ERYTHROCYTE [DISTWIDTH] IN BLOOD BY AUTOMATED COUNT: 14.3 % (ref 11.8–14.4)
GFR, ESTIMATED: 30 ML/MIN/1.73M2
GLUCOSE SERPL-MCNC: 173 MG/DL (ref 82–115)
HCT VFR BLD AUTO: 31.9 % (ref 36.3–47.1)
HGB BLD-MCNC: 9.9 G/DL (ref 11.9–15.1)
MAGNESIUM SERPL-MCNC: 2.3 MG/DL (ref 1.6–2.4)
MCH RBC QN AUTO: 30.7 PG (ref 25.2–33.5)
MCHC RBC AUTO-ENTMCNC: 31 G/DL (ref 28.4–34.8)
MCV RBC AUTO: 99.1 FL (ref 82.6–102.9)
NRBC BLD-RTO: 0 PER 100 WBC
PLATELET # BLD AUTO: 164 K/UL (ref 138–453)
PMV BLD AUTO: 10.9 FL (ref 8.1–13.5)
POTASSIUM SERPL-SCNC: 4.7 MMOL/L (ref 3.7–5.3)
RBC # BLD AUTO: 3.22 M/UL (ref 3.95–5.11)
SODIUM SERPL-SCNC: 140 MMOL/L (ref 136–145)
WBC OTHER # BLD: 4.7 K/UL (ref 3.5–11.3)

## 2025-02-18 PROCEDURE — 36415 COLL VENOUS BLD VENIPUNCTURE: CPT

## 2025-02-18 PROCEDURE — 83880 ASSAY OF NATRIURETIC PEPTIDE: CPT

## 2025-02-18 PROCEDURE — 85027 COMPLETE CBC AUTOMATED: CPT

## 2025-02-18 PROCEDURE — 80048 BASIC METABOLIC PNL TOTAL CA: CPT

## 2025-02-18 PROCEDURE — 83735 ASSAY OF MAGNESIUM: CPT

## 2025-02-18 NOTE — CARE COORDINATION
Son called asking if PCP would be willing to write a letter to whom it may concern.  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   He needs this letter to be able to keep his Medicaid benefits.   Will route to PCP.

## 2025-02-21 ENCOUNTER — CARE COORDINATION (OUTPATIENT)
Dept: CARE COORDINATION | Age: 80
End: 2025-02-21

## 2025-02-21 DIAGNOSIS — I50.22 CHRONIC SYSTOLIC HEART FAILURE (HCC): Primary | ICD-10-CM

## 2025-02-21 NOTE — CARE COORDINATION
Ambulatory Care Coordination Note     2025 10:43 AM     Patient Current Location:  Home: [address] [place] [address]     ACM contacted the family by telephone. Verified name and  with patient as identifiers.         ACM: JASPER MEDLEY RN     Challenges to be reviewed by the provider   Additional needs identified to be addressed with provider Yes  Complaint of a productive cough.               Method of communication with provider: chart routing.    Utilization: Has the patient been discharged from the hospital since your last call? yes -   Call within 2 business days of discharge: Yes    Patient: Kayleigh Matthews    Patient : 1945   MRN: [id number]    Reason for Admission: CHF  Discharge Date: 10/8/23  RURS: No data recorded    Last Discharge Facility       Date Complaint Diagnosis Description Type Department Provider    10/8/23 Knee Pain Chronic pain of left knee ED (DISCHARGE) [facility], MD            Was this an external facility discharge? Yes. Discharge Date: 25. Facility Name: [facility] . DC to [facility] until DC on 25.    Ambulatory Care Manager reviewed discharge instructions, medical action plan, and red flags with patient, family, and caregiver. The patient, family, and caregiver was given an opportunity to ask questions; questions regarding ongoing cough sent to provider for clarification.. The caregiver verbalized understanding.   Were discharge instructions available to patient? No. Called  [facility] and requested DC summary be faxed to PCP.   Reviewed appropriate site of care based on symptoms and resources available to patient including: PCP  Specialist  Home health. The family agrees to contact the primary care provider and/or specialist office for questions related to their healthcare.     Patients top risk factors for readmission: lack of knowledge about disease and medication management    Hospital follow up appointment:

## 2025-02-24 ENCOUNTER — CARE COORDINATION (OUTPATIENT)
Dept: CASE MANAGEMENT | Age: 80
End: 2025-02-24

## 2025-02-24 ENCOUNTER — HOSPITAL ENCOUNTER (OUTPATIENT)
Age: 80
Setting detail: SPECIMEN
Discharge: HOME | End: 2025-02-24

## 2025-02-24 DIAGNOSIS — E11.22 TYPE 2 DIABETES MELLITUS WITH STAGE 3 CHRONIC KIDNEY DISEASE, WITH LONG-TERM CURRENT USE OF INSULIN, UNSPECIFIED WHETHER STAGE 3A OR 3B CKD (HCC): ICD-10-CM

## 2025-02-24 DIAGNOSIS — N18.30 TYPE 2 DIABETES MELLITUS WITH STAGE 3 CHRONIC KIDNEY DISEASE, WITH LONG-TERM CURRENT USE OF INSULIN, UNSPECIFIED WHETHER STAGE 3A OR 3B CKD (HCC): ICD-10-CM

## 2025-02-24 DIAGNOSIS — Z79.4 TYPE 2 DIABETES MELLITUS WITH STAGE 3 CHRONIC KIDNEY DISEASE, WITH LONG-TERM CURRENT USE OF INSULIN, UNSPECIFIED WHETHER STAGE 3A OR 3B CKD (HCC): ICD-10-CM

## 2025-02-24 DIAGNOSIS — R80.1 PERSISTENT PROTEINURIA: ICD-10-CM

## 2025-02-24 DIAGNOSIS — I10 ESSENTIAL HYPERTENSION: ICD-10-CM

## 2025-02-24 DIAGNOSIS — N18.31 STAGE 3A CHRONIC KIDNEY DISEASE (HCC): ICD-10-CM

## 2025-02-24 LAB
25(OH)D3 SERPL-MCNC: 41.8 NG/ML (ref 30–100)
ALBUMIN SERPL-MCNC: 3.9 G/DL (ref 3.5–5.2)
ANION GAP SERPL CALCULATED.3IONS-SCNC: 8 MMOL/L (ref 9–16)
BUN SERPL-MCNC: 33 MG/DL (ref 8–23)
CALCIUM SERPL-MCNC: 9.6 MG/DL (ref 8.6–10.4)
CHLORIDE SERPL-SCNC: 102 MMOL/L (ref 98–107)
CO2 SERPL-SCNC: 31 MMOL/L (ref 20–31)
CREAT SERPL-MCNC: 2.3 MG/DL (ref 0.6–0.9)
CREAT UR-MCNC: 36.1 MG/DL (ref 28–217)
ERYTHROCYTE [DISTWIDTH] IN BLOOD BY AUTOMATED COUNT: 14.6 % (ref 11.8–14.4)
GFR, ESTIMATED: 21 ML/MIN/1.73M2
GLUCOSE SERPL-MCNC: 125 MG/DL (ref 74–99)
HCT VFR BLD AUTO: 33.2 % (ref 36.3–47.1)
HGB BLD-MCNC: 10.1 G/DL (ref 11.9–15.1)
MCH RBC QN AUTO: 30.4 PG (ref 25.2–33.5)
MCHC RBC AUTO-ENTMCNC: 30.4 G/DL (ref 28.4–34.8)
MCV RBC AUTO: 100 FL (ref 82.6–102.9)
NRBC BLD-RTO: 0 PER 100 WBC
PHOSPHATE SERPL-MCNC: 3.5 MG/DL (ref 2.5–4.5)
PLATELET # BLD AUTO: 204 K/UL (ref 138–453)
PMV BLD AUTO: 11.1 FL (ref 8.1–13.5)
POTASSIUM SERPL-SCNC: 5.8 MMOL/L (ref 3.7–5.3)
PTH-INTACT SERPL-MCNC: 161 PG/ML (ref 15–65)
RBC # BLD AUTO: 3.32 M/UL (ref 3.95–5.11)
SODIUM SERPL-SCNC: 141 MMOL/L (ref 136–145)
TOTAL PROTEIN, URINE: <4 MG/DL
URINE TOTAL PROTEIN CREATININE RATIO: NORMAL (ref 0–0.2)
WBC OTHER # BLD: 5.3 K/UL (ref 3.5–11.3)

## 2025-02-25 ENCOUNTER — CARE COORDINATION (OUTPATIENT)
Dept: CASE MANAGEMENT | Age: 80
End: 2025-02-25

## 2025-02-25 NOTE — CARE COORDINATION
2025 12:18 PM  *  Alert and Triage   -Remote Alert Monitoring Note      Date/Time:  2025 12:19 PM  Patient Current Location: Home: [address] [place] [id number]  Verified patients name and  as identifiers.    Rpm alert to be reviewed by the provider   No metrics x3 days    Additional needs to be addressed by provider: No                   LPN contacted patient by telephone regarding red alert received   Background: RPM monitoring for DM, HTN, CHF  Refer to 911 immediately if:  Patient unresponsive or unable to provide history  Change in cognition or sudden confusion  Patient unable to respond in complete sentences  Intense chest pain/tightness  Any concern for any clinical emergency  Red Alert: Provider response time of 1 hr required for any red alert requiring intervention  Yellow Alert: Provider response time of 3hr required for any escalated yellow alert    Clinical Interventions:  LPN HO spoke briefly with patient. She was unaware device has been reactivated. States she will have her [facility] nurse assist with monitoring this afternoon.     Plan/Follow Up: Will continue to review, monitor and address alerts with follow up based on severity of symptoms and risk factors.  **For any new or worsening symptoms or you are concerned in anyway, please contact your Provider or report to the nearest Emergency Room.**

## 2025-02-26 ENCOUNTER — CARE COORDINATION (OUTPATIENT)
Dept: CASE MANAGEMENT | Age: 80
End: 2025-02-26

## 2025-02-26 ENCOUNTER — CARE COORDINATION (OUTPATIENT)
Dept: CARE COORDINATION | Age: 80
End: 2025-02-26

## 2025-02-26 NOTE — CARE COORDINATION
2/26/2025 12:28 PM  *  No Metrics 4 Days Kayleigh Matthews  is currently enrolled in Remote Patient Monitoring (RPM) and has not entered vitals in 4 days. The RPM team has  spoken with  your patient to discuss adherence in RPM.    Please attempt to outreach your patient and discuss adherence with RPM. If patient is no longer interested in participating please send a dis-enrollment request to the RPM pool for processing.     Thank you,   Cortney Burgos LPN Care Coordinator   [facility]  Remote Patient Monitoring, Care Transitions, Ambulatory Care Management  [phone]

## 2025-02-27 ENCOUNTER — CARE COORDINATION (OUTPATIENT)
Dept: CARE COORDINATION | Age: 80
End: 2025-02-27

## 2025-02-27 NOTE — CARE COORDINATION
Ambulatory Care Coordination Note     2025 9:44 AM     Patient Current Location:  Home: [address] [place] [address]     ACM contacted the patient by telephone. Verified name and  with caregiver as identifiers.         ACM: JASPER MEDLEY RN     Challenges to be reviewed by the provider   Additional needs identified to be addressed with provider No  none               Method of communication with provider: none.    Utilization: Has the patient been seen in the ED since your last call? Yes,   Discharge Date: 25  Discharge Facility: [facility]  Reason for ED Visit: potassium concern  Visit Diagnosis: abnormal lab concern    Number of ED visits in the last 6 months: 2      Do you have any ongoing symptoms? No  Did you call your PCP prior to going to the ED?  Sent to ED by specialist    Review of Discharge Instructions:   [x] AVS discharge instructions  [x] Right Care, Right Place, Right Time document  [x] Medication changes  [x] Follow up appointments  [] Referral follow up          Care Summary Note: Per son, Kayleigh is doing well. She was sent to the ED for a lab concern. He reports once at the ED they were told it was stable. She is feeling well at this time.     Offered patient enrollment in the Remote Patient Monitoring (RPM) program for in-home monitoring: Yes, patient enrolled; current status is activated and monitoring.   Son provided with IT number to RPM, he stated she has been monitoring. No vitals are coming through.   Assessments Completed:   Diabetes Assessment    Medic Alert ID: No  Meal Planning: Avoidance of concentrated sweets   How often do you test your blood sugar?: Daily, Meals, Bedtime   Do you have barriers with adherence to non-pharmacologic self-management interventions? (Nutrition/Exercise/Self-Monitoring): Yes   Have you ever had to go to the ED for symptoms of low blood sugar?: No       No patient-reported symptoms         ,   Congestive Heart Failure Assessment    Are you

## 2025-03-06 ENCOUNTER — CARE COORDINATION (OUTPATIENT)
Dept: CARE COORDINATION | Age: 80
End: 2025-03-06

## 2025-03-07 NOTE — CARE COORDINATION
Patient notified of PCP recommendations to go to the walk in or UC to be evaluated. She voiced understanding.   
for Symptoms, taught by Tish Menezes, RN at 3/6/2025  3:34 PM.  Learner: Patient  Readiness: Acceptance  Method: Explanation  Response: Verbalizes Understanding    Educate limitations or barriers to activity and/or exercise on discharge, taught by Tish Menezes RN at 3/6/2025  3:34 PM.  Learner: Patient  Readiness: Acceptance  Method: Explanation  Response: Verbalizes Understanding    Adaptive Equipment, taught by Tish Menezes RN at 3/6/2025  3:34 PM.  Learner: Patient  Readiness: Acceptance  Method: Explanation  Response: Verbalizes Understanding    Education Comments  No comments found.     ,    Goals Addressed                   This Visit's Progress     Conditions and Symptoms   Improving     I will schedule office visits, as directed by my provider.  I will keep my appointment or reschedule if I have to cancel.  I will notify my provider of any barriers to my plan of care.  I will follow my Zone Management tool to seek urgent or emergent care.  I will notify my provider of any symptoms that indicate a worsening of my condition.    Barriers: overwhelmed by complexity of regimen  Plan for overcoming my barriers: work with ACM   Confidence: 9/10  Anticipated Goal Completion Date: 6.20.24                 PCP/Specialist follow up:   Future Appointments         Provider Specialty Dept Phone    3/13/2025 10:00 AM Johanna Perez MD Wound Ostomy [phone]    3/14/2025 3:10 PM Adia Mcmanus MD Family Medicine [phone]    3/21/2025 3:10 PM Arsalan Berg MD Nephrology [phone]    6/18/2025 1:10 PM (Arrive by 1:00 PM) Arsalan Berg MD Nephrology [phone]            Follow Up:   Plan for next ACM outreach in approximately 2 weeks to complete:  - disease specific assessments  - advance care planning   - goal progression  - education   - RPM.   Patient  is agreeable to this plan.

## 2025-03-10 ENCOUNTER — CARE COORDINATION (OUTPATIENT)
Dept: CARE COORDINATION | Age: 80
End: 2025-03-10

## 2025-03-10 NOTE — CARE COORDINATION
Son called with an update that Kayleigh went to the urgent care and was started on an antibiotic and steroid this weekend. Per Kayleigh she reports starting to feel better. No needs today from PCP.

## 2025-03-11 NOTE — DISCHARGE INSTRUCTIONS
[facility] -Phone: [phone] Fax: [phone]    Visit  Discharge Instructions / Physician Orders     DATE: 4/22/2024     Home Care: N/A     SUPPLIES ORDERED THRU: N/A     Wound Location: Left Heel     Cleanse with: Liquid antibacterial soap and water, rinse well      Dressing Orders: Protect with foam dressing     Frequency: EVERY OTHER DAY     Additional Orders: Increase protein to diet (meat, cheese, eggs, fish, peanut butter, nuts and beans)  ELEVATE LEGS AS MUCH AS POSSIBLE     DO NOT PUT PRESSURE ON THE HEEL. WEAR SHOES WITHOUT A BACK UNTIL AREA IS HEALED.     Your next appointment with Wound Care Center is in 1 week     (Please note your next appointment above and if you are unable to keep, kindly give a 24 hour notice. Thank you.)  If more than 15 min late we cannot guarantee you will be seen due to clinician schedule  Per Policy, Excessive cancellation will call for dismissal from program.     If you experience any of the following, please call the Wound Care Center during business hours:  [phone]     * Increase in Pain  * Temperature over 101  * Increase in drainage from your wound  * Drainage with a foul odor  * Bleeding  * Increase in swelling  * Need for compression bandage changes due to slippage, breakthrough drainage.     If you need medical attention outside of the business hours of the Wound Care Centers please contact your PCP or go to the an urgent care or emergency department     The information contained in the After Visit Summary has been reviewed with me, the patient and/or responsible adult, by my health care provider(s). I had the opportunity to ask questions regarding this information. I have elected to receive;      []After Visit Summary  [x]Comprehensive Discharge Instruction        Patient signature______________________________________Date:________

## 2025-03-13 ENCOUNTER — HOSPITAL ENCOUNTER (OUTPATIENT)
Dept: WOUND CARE | Age: 80
Discharge: HOME | End: 2025-03-13

## 2025-03-17 ENCOUNTER — CARE COORDINATION (OUTPATIENT)
Dept: CASE MANAGEMENT | Age: 80
End: 2025-03-17

## 2025-03-20 ENCOUNTER — CARE COORDINATION (OUTPATIENT)
Dept: CARE COORDINATION | Age: 80
End: 2025-03-20

## 2025-03-20 NOTE — CARE COORDINATION
Ambulatory Care Coordination Note     3/20/2025 8:45 AM     ACM outreach attempt by this ACM today to perform care management follow up . ACM was unable to reach the patient by telephone today;   Not available     ACM: JASPER MEDLEY, RN     Care Summary Note: Noted RPM Metrics:   Current Patient Metrics ---- Blood Pressure: 150/66, 65bpm Glucose: 118.0mg/dl Pulseox: 96%, 111bpm Survey: 0/0 Weight: 250.4lbs Note Created at: 03/19/2025 11:22 AM     PCP/Specialist follow up:   Future Appointments         Provider Specialty Dept Phone    3/21/2025 3:10 PM Arsalan Berg MD Nephrology [phone]    4/2/2025 12:45 PM Zhen Acosta DPM Podiatry [phone]    6/16/2025 3:40 PM Adia Mcmanus MD Family Medicine [phone]    6/18/2025 1:10 PM (Arrive by 1:00 PM) Arsalan Berg MD Nephrology [phone]            Follow Up:   Plan for next ACM outreach in approximately 1 week to complete:  - disease specific assessments  - SDOH assessments  - medication review  - goal progression  - education   - RPM.

## 2025-03-24 ENCOUNTER — CARE COORDINATION (OUTPATIENT)
Dept: CARE COORDINATION | Age: 80
End: 2025-03-24

## 2025-03-24 NOTE — CARE COORDINATION
3/24/2025 10:07 AM  *  Unable to Reach Date/Time:  3/24/2025 10:07 AM  LPN attempted to reach patient by telephone regarding red alert in remote patient monitoring program. Left HIPAA compliant message requesting a return call. Will attempt to reach patient again.      Weight Increase of 5 lbs In Last 7 Days   Called [phone], left vm

## 2025-03-24 NOTE — CARE COORDINATION
3/24/2025 10:36 AM  *  Alert and Triage   -Remote Alert Monitoring Note      Date/Time:  3/24/2025 10:37 AM  Patient Current Location: Home: [address] [place], [place] [id number]  Verified patients name and  as identifiers.    Rpm alert to be reviewed by the provider   red alert  weight (256.1)    Additional needs to be addressed by provider: FYI      PCP,  Kayleigh Matthews complains of sob and swelling in ankles.  Denies chest pain.  Took medications around 10:30 including Bumex, Cozaar, Aldactone, Imdur and Coreg.  Kayleigh Matthews states she does follow the 2 L/day fluid restriction.   Please advise, thanks.     Weight metric      All metrics             LPN contacted patient by telephone regarding red alert received   Background: enrolled in RPM for RPM Care Plans: CHF, Diabetes, and HTN  Refer to 911 immediately if:  Patient unresponsive or unable to provide history  Change in cognition or sudden confusion  Patient unable to respond in complete sentences  Intense chest pain/tightness  Any concern for any clinical emergency  Red Alert: Provider response time of 1 hr required for any red alert requiring intervention  Yellow Alert: Provider response time of 3hr required for any escalated yellow alert  Patient Chief Complaint:  Weight Weight Scale Triage  Was your weight obtained upon rising/waking today? yes   Was your weight obtained after voiding and/or use of the bathroom today? yes   Did you weigh yourself in the same amount of clothing today, compared to how you typically do? yes   Was the scale bumped or moved prior to today's weight? yes   Is your scale on a flat/hard surface? yes   Did you obtain your weight with shoes on? no   If yes, is this something you normally do during your daily weights? no   Were you standing up straight on the scale today? yes   Were you leaning on anything while obtaining your weight today? no   Weight Education Provided to Patient or Caregiver:   Patient to weigh on the same scale

## 2025-03-27 ENCOUNTER — CARE COORDINATION (OUTPATIENT)
Dept: PRIMARY CARE CLINIC | Age: 80
End: 2025-03-27

## 2025-03-27 ENCOUNTER — CARE COORDINATION (OUTPATIENT)
Dept: CARE COORDINATION | Age: 80
End: 2025-03-27

## 2025-03-27 DIAGNOSIS — I50.32 CHRONIC DIASTOLIC (CONGESTIVE) HEART FAILURE: ICD-10-CM

## 2025-03-27 DIAGNOSIS — Z79.4 TYPE 2 DIABETES MELLITUS WITH STAGE 3 CHRONIC KIDNEY DISEASE, WITH LONG-TERM CURRENT USE OF INSULIN, UNSPECIFIED WHETHER STAGE 3A OR 3B CKD (HCC): Primary | ICD-10-CM

## 2025-03-27 DIAGNOSIS — I10 ESSENTIAL HYPERTENSION: ICD-10-CM

## 2025-03-27 DIAGNOSIS — N18.30 TYPE 2 DIABETES MELLITUS WITH STAGE 3 CHRONIC KIDNEY DISEASE, WITH LONG-TERM CURRENT USE OF INSULIN, UNSPECIFIED WHETHER STAGE 3A OR 3B CKD (HCC): Primary | ICD-10-CM

## 2025-03-27 DIAGNOSIS — E11.22 TYPE 2 DIABETES MELLITUS WITH STAGE 3 CHRONIC KIDNEY DISEASE, WITH LONG-TERM CURRENT USE OF INSULIN, UNSPECIFIED WHETHER STAGE 3A OR 3B CKD (HCC): Primary | ICD-10-CM

## 2025-03-27 NOTE — PROGRESS NOTES
Remote Patient Order Discontinued    Received request from Tish Menezes, RN   to discontinue order for remote patient monitoring of CHF, Diabetes, and HTN and order completed.

## 2025-03-27 NOTE — CARE COORDINATION
Pt needs box so kit return can be processed.  Writer notified H[facility] to mail the patient a box.  The patient informed to call writer back once she receives the box, so kit return can be processed. Pt voiced understanding.  Pt is deactivated.

## 2025-03-27 NOTE — CARE COORDINATION
Ambulatory Care Coordination Note     3/27/2025 2:20 PM     Patient Current Location:  Home: [address] [place], [place] [id number]     ACM contacted the patient by telephone. Verified name and  with patient as identifiers.     Patient graduated from the High Risk Care Management program on 3/27/2025.  Patient verbalizes confidence in the ability to self-manage at this time. has the ability to self-manage at this time..  Care management goals have been completed. No further Ambulatory Care Manager follow up scheduled.      ACM: JASPER MEDLEY RN     Challenges to be reviewed by the provider   Additional needs identified to be addressed with provider No  none               Method of communication with provider: none.    Utilization: Patient has not had any utilization since our last call.     Care Summary Note: Patient denied any needs, stated she is doing better. She is ready to graduate from RPM and . Encouraged her to still reach out in the future with any concerns.   Remote Patient Monitoring Graduation      Date/Time:  3/27/2025 2:22 PM  Patient Current Location: Home: [address] [place], [place] [id number]  Patient has graduated from the Remote Patient Monitoring program on 3/27/2025.   RPM goals have been met at this time.      Patient has been provided instruction on process to return RPM equipment and RPM has been deactivated.     Patient has ACM's contact information for any further questions, concerns, or needs.      Offered patient enrollment in the Remote Patient Monitoring (RPM) program for in-home monitoring: Yes, patient enrolled; current status is activated and monitoring.     Assessments Completed:   Diabetes Assessment    Medic Alert ID: No  Meal Planning: Avoidance of concentrated sweets   How often do you test your blood sugar?: Daily, Meals, Bedtime   Do you have barriers with adherence to non-pharmacologic self-management interventions? (Nutrition/Exercise/Self-Monitoring): Yes   Have you ever had to

## 2025-04-02 ENCOUNTER — OFFICE VISIT (OUTPATIENT)
Dept: PODIATRY | Age: 80
End: 2025-04-02
Payer: MEDICARE

## 2025-04-02 VITALS
BODY MASS INDEX: 41 KG/M2 | WEIGHT: 254 LBS | DIASTOLIC BLOOD PRESSURE: 49 MMHG | HEART RATE: 60 BPM | SYSTOLIC BLOOD PRESSURE: 109 MMHG

## 2025-04-02 DIAGNOSIS — Z79.4 TYPE 2 DIABETES MELLITUS WITH DIABETIC POLYNEUROPATHY, WITH LONG-TERM CURRENT USE OF INSULIN (HCC): ICD-10-CM

## 2025-04-02 DIAGNOSIS — E11.42 TYPE 2 DIABETES MELLITUS WITH DIABETIC POLYNEUROPATHY, WITH LONG-TERM CURRENT USE OF INSULIN (HCC): ICD-10-CM

## 2025-04-02 DIAGNOSIS — E08.59 DIABETES MELLITUS DUE TO UNDERLYING CONDITION WITH OTHER CIRCULATORY COMPLICATIONS: ICD-10-CM

## 2025-04-02 DIAGNOSIS — M79.676 PAIN DUE TO ONYCHOMYCOSIS OF TOENAIL: ICD-10-CM

## 2025-04-02 DIAGNOSIS — S91.309A OPEN WOUND OF HEEL, UNSPECIFIED LATERALITY, INITIAL ENCOUNTER: Primary | ICD-10-CM

## 2025-04-02 DIAGNOSIS — B35.1 PAIN DUE TO ONYCHOMYCOSIS OF TOENAIL: ICD-10-CM

## 2025-04-02 DIAGNOSIS — B35.1 ONYCHOMYCOSIS: ICD-10-CM

## 2025-04-02 PROCEDURE — 3078F DIAST BP <80 MM HG: CPT | Performed by: STUDENT IN AN ORGANIZED HEALTH CARE EDUCATION/TRAINING PROGRAM

## 2025-04-02 PROCEDURE — 11721 DEBRIDE NAIL 6 OR MORE: CPT | Performed by: STUDENT IN AN ORGANIZED HEALTH CARE EDUCATION/TRAINING PROGRAM

## 2025-04-02 PROCEDURE — 1126F AMNT PAIN NOTED NONE PRSNT: CPT | Performed by: STUDENT IN AN ORGANIZED HEALTH CARE EDUCATION/TRAINING PROGRAM

## 2025-04-02 PROCEDURE — 1123F ACP DISCUSS/DSCN MKR DOCD: CPT | Performed by: STUDENT IN AN ORGANIZED HEALTH CARE EDUCATION/TRAINING PROGRAM

## 2025-04-02 PROCEDURE — 11042 DBRDMT SUBQ TIS 1ST 20SQCM/<: CPT | Performed by: STUDENT IN AN ORGANIZED HEALTH CARE EDUCATION/TRAINING PROGRAM

## 2025-04-02 PROCEDURE — 99214 OFFICE O/P EST MOD 30 MIN: CPT | Performed by: PODIATRIST

## 2025-04-02 PROCEDURE — 3074F SYST BP LT 130 MM HG: CPT | Performed by: STUDENT IN AN ORGANIZED HEALTH CARE EDUCATION/TRAINING PROGRAM

## 2025-04-02 PROCEDURE — 1159F MED LIST DOCD IN RCRD: CPT | Performed by: STUDENT IN AN ORGANIZED HEALTH CARE EDUCATION/TRAINING PROGRAM

## 2025-04-02 PROCEDURE — 99214 OFFICE O/P EST MOD 30 MIN: CPT | Performed by: STUDENT IN AN ORGANIZED HEALTH CARE EDUCATION/TRAINING PROGRAM

## 2025-04-02 RX ORDER — KETOCONAZOLE 20 MG/G
CREAM TOPICAL
Qty: 15 G | Refills: 1 | Status: SHIPPED | OUTPATIENT
Start: 2025-04-02

## 2025-04-02 RX ORDER — LIDOCAINE HYDROCHLORIDE 20 MG/ML
JELLY TOPICAL ONCE
Status: COMPLETED | OUTPATIENT
Start: 2025-04-02 | End: 2025-04-02

## 2025-04-02 RX ORDER — CICLOPIROX 80 MG/ML
SOLUTION TOPICAL
Qty: 6.6 ML | Refills: 1 | Status: SHIPPED | OUTPATIENT
Start: 2025-04-02

## 2025-04-02 RX ADMIN — LIDOCAINE HYDROCHLORIDE: 20 JELLY TOPICAL at 12:49

## 2025-04-02 NOTE — PROGRESS NOTES
Patient instructed to remove shoes and socks and instructed to sit in exam chair.  Current PCP is Adia Mcmanus MD and date of last visit was 3/14/2025.   Do you have a follow up visit scheduled?  Yes  If yes, the date is 6/16/2025      
cream     Sig: Apply topically daily.     Dispense:  15 g     Refill:  1     Orders Placed This Encounter   Procedures    XR FOOT LEFT (MIN 3 VIEWS)     Reason for exam::   left heel wound       Iraida Aguirre DPM PGY1  Podiatric Medicine & Surgery   4/5/2025 at 8:52 PM       I performed a history and physical examination of the patient and discussed management with the resident. I reviewed the resident’s note and agree with the documented findings and plan of care. Any areas of disagreement are noted on the chart. I was personally present for the key portions of any procedures. I have documented in the chart those procedures where I was not present during the key portions. I have reviewed the Podiatry Resident progress note. I agree with the chief complaint, past medical history, past surgical history, allergies, medications, social and family history as documented unless otherwise noted below. Documentation of the HPI, Physical Exam and Medical Decision Making performed by medical students or scribes is based on my personal performance of the HPI, PE and MDM. I have personally evaluated this patient and have completed at least one if not all key elements of the E/M (history, physical exam, and MDM). Additional findings are as noted.     Electronically signed by Zhen Acosta DPM on 4/9/2025 at 2:14 PM

## 2025-04-02 NOTE — PATIENT INSTRUCTIONS
Please apply the ketoconazole cream nightly to the bottoms of the feet.  Apply the penlac to the toenails nightly, at the end of the week take a nail file and file the nails down. Then repeat the process.    Wound: saline wet to dry dressings daily    Please get xray of left foot. Please complete scheduled vascular studies.     Will look at imaging results at next visit and re-evaluate wound. Please wear surgical shoe at all times

## 2025-04-05 PROBLEM — B35.1 ONYCHOMYCOSIS: Status: ACTIVE | Noted: 2025-04-05

## 2025-04-05 PROBLEM — S91.309A OPEN WOUND OF HEEL: Status: ACTIVE | Noted: 2025-04-05

## 2025-04-07 ENCOUNTER — CARE COORDINATION (OUTPATIENT)
Dept: PRIMARY CARE CLINIC | Age: 80
End: 2025-04-07

## 2025-04-07 ENCOUNTER — CARE COORDINATION (OUTPATIENT)
Dept: CARE COORDINATION | Age: 80
End: 2025-04-07

## 2025-04-07 NOTE — CARE COORDINATION
ACM notified writer that the patient dropped the rpm kit off at her PCP's office.  Writer will place return order using PCP address:Eri Jimenez[id number], [place] [address] .  Return kit order is complete.

## 2025-04-09 ENCOUNTER — TELEPHONE (OUTPATIENT)
Dept: PODIATRY | Age: 80
End: 2025-04-09

## 2025-04-09 NOTE — TELEPHONE ENCOUNTER
Elena from patient home health agency called and stated that the patient told her that she had received a call today stating for her to be seen at the ED due to her wound. Elena also stated that the patient is already receiving wound care and dressing changes from a NP every 3 days. Also XR and Vas study was ordered on  4/2/2025 but patient already had previous order and has completed. Writer received completed imaging from [facility] and is scanned into media.  Elena stated that that patient has only had this wound for 2 weeks due to patient wanting to wear shoes. Patient does not have reliable transportation and elena stated that patient will need to choose if she will follow here at [facility] podiatry or continue care with home health. Thank you

## 2025-04-10 NOTE — TELEPHONE ENCOUNTER
Writer spoke with provider and was to advise patient she should get the Xray and vas study completed still due to recent heel wound. Patient can either see provider here at [facility] or can choose her current wound changes of having home health.
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
## 2025-05-13 PROBLEM — I50.43 ACUTE ON CHRONIC COMBINED SYSTOLIC AND DIASTOLIC CONGESTIVE HEART FAILURE (HCC): Status: RESOLVED | Noted: 2020-11-06 | Resolved: 2025-05-13

## 2025-05-13 PROBLEM — D50.9 IRON DEFICIENCY ANEMIA, UNSPECIFIED: Status: RESOLVED | Noted: 2022-08-28 | Resolved: 2025-05-13
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
## 2025-05-13 PROBLEM — H25.9 AGE-RELATED CATARACT OF RIGHT EYE: Status: RESOLVED | Noted: 2022-10-06 | Resolved: 2025-05-13

## 2025-05-13 PROBLEM — E66.813 CLASS 3 SEVERE OBESITY IN ADULT (HCC): Status: RESOLVED | Noted: 2020-07-14 | Resolved: 2025-05-13

## 2025-05-13 PROBLEM — R07.9 CHEST PAIN: Status: RESOLVED | Noted: 2023-08-30 | Resolved: 2025-05-13

## 2025-05-13 PROBLEM — R60.0 BILATERAL EDEMA OF LOWER EXTREMITY: Status: RESOLVED | Noted: 2022-10-06 | Resolved: 2025-05-13

## 2025-05-13 PROBLEM — F41.9 ANXIETY: Status: RESOLVED | Noted: 2017-11-27 | Resolved: 2025-05-13

## 2025-05-13 PROBLEM — R05.3 CHRONIC COUGH: Status: RESOLVED | Noted: 2022-10-06 | Resolved: 2025-05-13

## 2025-05-13 PROBLEM — R80.1 PERSISTENT PROTEINURIA: Status: RESOLVED | Noted: 2022-07-20 | Resolved: 2025-05-13

## 2025-05-13 PROBLEM — E66.813 OBESITY, CLASS III, BMI 40-49.9 (MORBID OBESITY) (HCC): Status: RESOLVED | Noted: 2022-07-28 | Resolved: 2025-05-13

## 2025-05-13 PROBLEM — R09.89 DECREASED PEDAL PULSES: Status: RESOLVED | Noted: 2022-10-06 | Resolved: 2025-05-13

## 2025-05-13 PROBLEM — R25.1 TREMOR, UNSPECIFIED: Status: RESOLVED | Noted: 2022-09-04 | Resolved: 2025-05-13

## 2025-05-13 PROBLEM — J30.2 SEASONAL ALLERGIC RHINITIS: Status: RESOLVED | Noted: 2019-08-13 | Resolved: 2025-05-13

## 2025-06-18 ENCOUNTER — HOSPITAL ENCOUNTER (OUTPATIENT)
Age: 80
Discharge: HOME | End: 2025-06-18
Payer: MEDICARE

## 2025-06-18 DIAGNOSIS — E87.5 HYPERKALEMIA: ICD-10-CM

## 2025-06-18 DIAGNOSIS — Z79.4 TYPE 2 DIABETES MELLITUS WITH STAGE 3 CHRONIC KIDNEY DISEASE, WITH LONG-TERM CURRENT USE OF INSULIN, UNSPECIFIED WHETHER STAGE 3A OR 3B CKD (HCC): ICD-10-CM

## 2025-06-18 DIAGNOSIS — N18.30 TYPE 2 DIABETES MELLITUS WITH STAGE 3 CHRONIC KIDNEY DISEASE, WITH LONG-TERM CURRENT USE OF INSULIN, UNSPECIFIED WHETHER STAGE 3A OR 3B CKD (HCC): ICD-10-CM

## 2025-06-18 DIAGNOSIS — E66.811 CLASS 1 OBESITY DUE TO EXCESS CALORIES WITH SERIOUS COMORBIDITY IN ADULT, UNSPECIFIED BMI: ICD-10-CM

## 2025-06-18 DIAGNOSIS — E11.22 TYPE 2 DIABETES MELLITUS WITH STAGE 3 CHRONIC KIDNEY DISEASE, WITH LONG-TERM CURRENT USE OF INSULIN, UNSPECIFIED WHETHER STAGE 3A OR 3B CKD (HCC): ICD-10-CM

## 2025-06-18 DIAGNOSIS — I10 ESSENTIAL HYPERTENSION: ICD-10-CM

## 2025-06-18 DIAGNOSIS — H35.00 RETINOPATHY: ICD-10-CM

## 2025-06-18 DIAGNOSIS — E66.09 CLASS 1 OBESITY DUE TO EXCESS CALORIES WITH SERIOUS COMORBIDITY IN ADULT, UNSPECIFIED BMI: ICD-10-CM

## 2025-06-18 DIAGNOSIS — N18.32 STAGE 3B CHRONIC KIDNEY DISEASE (HCC): ICD-10-CM

## 2025-06-18 LAB
ALBUMIN SERPL-MCNC: 4.2 G/DL (ref 3.5–5.2)
ANION GAP SERPL CALCULATED.3IONS-SCNC: 10 MMOL/L (ref 9–16)
BUN SERPL-MCNC: 30 MG/DL (ref 8–23)
CALCIUM SERPL-MCNC: 9.6 MG/DL (ref 8.6–10.4)
CHLORIDE SERPL-SCNC: 102 MMOL/L (ref 98–107)
CO2 SERPL-SCNC: 27 MMOL/L (ref 20–31)
CREAT SERPL-MCNC: 1.9 MG/DL (ref 0.6–0.9)
CREAT UR-MCNC: 79.6 MG/DL (ref 28–217)
ERYTHROCYTE [DISTWIDTH] IN BLOOD BY AUTOMATED COUNT: 13.9 % (ref 11.8–14.4)
GFR, ESTIMATED: 26 ML/MIN/1.73M2
GLUCOSE SERPL-MCNC: 104 MG/DL (ref 74–99)
HCT VFR BLD AUTO: 35 % (ref 36.3–47.1)
HGB BLD-MCNC: 10.8 G/DL (ref 11.9–15.1)
MCH RBC QN AUTO: 30.3 PG (ref 25.2–33.5)
MCHC RBC AUTO-ENTMCNC: 30.9 G/DL (ref 28.4–34.8)
MCV RBC AUTO: 98.3 FL (ref 82.6–102.9)
NRBC BLD-RTO: 0 PER 100 WBC
PHOSPHATE SERPL-MCNC: 3.9 MG/DL (ref 2.5–4.5)
PLATELET # BLD AUTO: 156 K/UL (ref 138–453)
PMV BLD AUTO: 11 FL (ref 8.1–13.5)
POTASSIUM SERPL-SCNC: 4.8 MMOL/L (ref 3.7–5.3)
PTH-INTACT SERPL-MCNC: 127 PG/ML (ref 17.9–58.6)
RBC # BLD AUTO: 3.56 M/UL (ref 3.95–5.11)
SODIUM SERPL-SCNC: 139 MMOL/L (ref 136–145)
TOTAL PROTEIN, URINE: 12 MG/DL
URINE TOTAL PROTEIN CREATININE RATIO: 0.15 (ref 0–0.2)
WBC OTHER # BLD: 5.1 K/UL (ref 3.5–11.3)

## 2025-06-18 PROCEDURE — 83970 ASSAY OF PARATHORMONE: CPT

## 2025-06-18 PROCEDURE — 85027 COMPLETE CBC AUTOMATED: CPT

## 2025-06-18 PROCEDURE — 36415 COLL VENOUS BLD VENIPUNCTURE: CPT

## 2025-06-18 PROCEDURE — 84100 ASSAY OF PHOSPHORUS: CPT

## 2025-06-18 PROCEDURE — 84156 ASSAY OF PROTEIN URINE: CPT

## 2025-06-18 PROCEDURE — 82570 ASSAY OF URINE CREATININE: CPT

## 2025-06-18 PROCEDURE — 82040 ASSAY OF SERUM ALBUMIN: CPT

## 2025-06-18 PROCEDURE — 80048 BASIC METABOLIC PNL TOTAL CA: CPT

## 2025-07-23 ENCOUNTER — CARE COORDINATION (OUTPATIENT)
Dept: CARE COORDINATION | Age: 80
End: 2025-07-23

## 2025-07-23 NOTE — CARE COORDINATION
Care Transitions Note    Initial Call - Call within 2 business days of discharge: Yes    Patient Current Location:  Home: [address] [place] [id number]    Care Transition Nurse contacted the patient by telephone to perform post hospital discharge assessment, verified name and  as identifiers.  Provided introduction to self, and explanation of the Care Transition Nurse role.    Patient: Kayleigh Matthews    Patient : 1945   MRN: [id number]    Reason for Admission:   Discharge Date: 25 RURS: No data recorded    Last Discharge Facility       Date Complaint Diagnosis Description Type Department Provider    25  PAD (peripheral artery disease) with CKD3  inpatient [facility] Chelsea Garcia MD             Was this an external facility discharge? Yes. Discharge Date: 25. Facility Name: [facility]    Additional needs identified to be addressed with provider                Method of communication with provider: .    Patients top risk factors for readmission: functional physical ability and medication management    Interventions to address risk factors:   Review of patient management of conditions/medications: discharge instructions and medications, 1111F order completed  Home Health: [facility]     Care Summary Note: Writer spoke to patient, she was admitted on 25 for PAD-  She underwent  Left LE Angiogram, DCB left Fem Pop artery, left balloon angioplasty of left  peroneal artery  on . She did well post operatively. Denies chest pain or sob. Her left leg pain is controlled. Insulin and calcium and her home bumex were ordered for her potassium 5.2. Groins were soft without hematoma. , she stated she has all her medications, denied any c/o fever chills, n/v/d sob or chest pain, reviewed s/s of infection, patient has vn and lsw who was present at time of call, explained role of CTN provided contact information, will follow//JU     Patient was discharged on plavix and eliquis.

## 2025-07-28 PROBLEM — I73.9 PERIPHERAL ARTERIAL DISEASE: Status: ACTIVE | Noted: 2025-06-10

## 2025-08-05 ENCOUNTER — CARE COORDINATION (OUTPATIENT)
Dept: CARE COORDINATION | Age: 80
End: 2025-08-05

## 2025-08-06 ENCOUNTER — CARE COORDINATION (OUTPATIENT)
Dept: CARE COORDINATION | Age: 80
End: 2025-08-06

## (undated) DEVICE — FIBER LSR FLEXIVA PULSE TRACTIP 242 BOX

## (undated) DEVICE — DRAPE,REIN 53X77,STERILE: Brand: MEDLINE

## (undated) DEVICE — PREMIUM DRY TRAY LF: Brand: MEDLINE INDUSTRIES, INC.

## (undated) DEVICE — GLOVE ORANGE PI 8   MSG9080

## (undated) DEVICE — ADAPTER URO SCP UROLOK LL

## (undated) DEVICE — 3M™ STERI-STRIP™ COMPOUND BENZOIN TINCTURE 40 BAGS/CARTON 4 CARTONS/CASE C1544: Brand: 3M™ STERI-STRIP™

## (undated) DEVICE — GUIDEWIRE URO L150CM DIA0.035IN STIFF NIT HYDRPHLC STR TIP

## (undated) DEVICE — PACK PROCEDURE SURG CYSTO SVMMC LF

## (undated) DEVICE — GLOVE SURG SZ 65 THK91MIL LTX FREE SYN POLYISOPRENE

## (undated) DEVICE — SINGLE-USE DIGITAL FLEXIBLE URETEROSCOPE: Brand: LITHOVUE

## (undated) DEVICE — SINGLE ACTION PUMPING SYSTEM

## (undated) DEVICE — STRIP,CLOSURE,WOUND,MEDI-STRIP,1/2X4: Brand: MEDLINE

## (undated) DEVICE — DUAL LUMEN URETERAL CATHETER